# Patient Record
Sex: FEMALE | Race: WHITE | NOT HISPANIC OR LATINO | Employment: OTHER | URBAN - METROPOLITAN AREA
[De-identification: names, ages, dates, MRNs, and addresses within clinical notes are randomized per-mention and may not be internally consistent; named-entity substitution may affect disease eponyms.]

---

## 2017-02-16 ENCOUNTER — ALLSCRIPTS OFFICE VISIT (OUTPATIENT)
Dept: OTHER | Facility: OTHER | Age: 82
End: 2017-02-16

## 2017-05-30 ENCOUNTER — ALLSCRIPTS OFFICE VISIT (OUTPATIENT)
Dept: OTHER | Facility: OTHER | Age: 82
End: 2017-05-30

## 2017-06-29 ENCOUNTER — ALLSCRIPTS OFFICE VISIT (OUTPATIENT)
Dept: OTHER | Facility: OTHER | Age: 82
End: 2017-06-29

## 2017-08-30 ENCOUNTER — ALLSCRIPTS OFFICE VISIT (OUTPATIENT)
Dept: OTHER | Facility: OTHER | Age: 82
End: 2017-08-30

## 2017-12-19 ENCOUNTER — ALLSCRIPTS OFFICE VISIT (OUTPATIENT)
Dept: OTHER | Facility: OTHER | Age: 82
End: 2017-12-19

## 2017-12-20 NOTE — PROGRESS NOTES
Assessment  Assessed    1  Visit for pre-operative examination (V72 84) (Z01 818)   2  Hyperlipidemia (272 4) (E78 5)   3  Pacemaker Permanent Placement   4  Paroxysmal atrial fibrillation (427 31) (I48 0)    Plan  Hyperlipidemia, Pacemaker Permanent Placement, Paroxysmal atrial fibrillation    · Follow-up visit in 6 months Evaluation and Treatment  Follow-up  Status: Hold For -Scheduling  Requested for: 99WZE5852   Ordered; For: Hyperlipidemia, Pacemaker Permanent Placement, Paroxysmal atrial fibrillation; Ordered By: Liat Tucker Performed:  Due: 67KOI4386  Visit for pre-operative examination    · EKG/ECG- POC; Status:Complete;   Done: 80OVS5186   Perform: In Office; Due:32Cqn9707; Last Updated By:Anusha Machuca; 12/19/2017 2:48:05 PM;Ordered; For:Visit for pre-operative examination; Ordered By:Briana Ramsey; Discussion/Summary  Cardiology Discussion Summary Free Text Note Form [facility]:   I will continue her present medical regimen  She appears well compensated  I've asked her to call if there is a problem in the interim otherwise I will see her again in six months time  From my perspective she is cleared for her upcoming bilateral inguinal herniorrhaphy which is scheduled early in January with Dr Lisa Bethea  Chief Complaint  Chief Complaint Free Text Note Form: 6 mth ov S/C Hernia Repair      History of Present Illness  Cardiology HPI Free Text Note Form [facility]: The patient is here for a follow-up visit  She was last seen by me in June of this year  Since that time she has done well from a cardiac point of view  She was identified as having bilateral inguinal hernias and will require repair of this  This is to be done by Dr Lisa Bethea  She did have cardiac testing done in the fall of last year including an echocardiogram and a nuclear stress test both of which showed no significant abnormality  Her EKG today is unchanged compared to a prior study done November 8, 2016     Atrial Fibrillation (Follow-Up): The patient presents with paroxysmal atrial fibrillation  The treatment strategy for this patient is rate control  She states her atrial fibrillation has been well controlled since the last visit  Symptoms:   Hyperlipidemia (Follow-Up): The patient states her hyperlipidemia has been under good control since the last visit  Symptoms: The patient is currently asymptomatic  Review of Systems  Cardiology Female ROS:    Cardiac: No complaints of chest pain, no palpitations, no fainting -- and-- as noted in HPI  Skin: No complaints of nonhealing sores or skin rash  Genitourinary: No complaints of recurrent urinary tract infections, frequent urination at night, difficult urination, blood in urine, kidney stones, loss of bladder control, kidney problems, denies any birth control or hormone replacement, is not post menopausal, not currently pregnant  Psychological: No complaints of feeling depressed, anxiety, panic attacks, or difficulty concentrating  General: No complaints of trouble sleeping, lack of energy, fatigue, appetite changes, weight changes, fever, frequent infections, or night sweats  Respiratory: No complaints of shortness of breath, cough with sputum, or wheezing  HEENT: No complaints of serious problems, hearing problems, nose problems, throat problems, or snoring  Gastrointestinal: No complaints of liver problems, nausea, vomiting, heartburn, constipation, bloody stools, diarrhea, problems swallowing, adbominal pain, or rectal bleeding  Hematologic: No complaints of bleeding disorders, anemia, blood clots, or excessive brusing  Neurological: No complaints of numbness, tingling, dizziness, weakness, seizures, headaches, syncope or fainting, AM fatigue, daytime sleepiness, no witnessed apnea episodes  Musculoskeletal: No complaints of arthritis, back pain, or painfull swelling  Active Problems  Problems    1  Carotid artery plaque, bilateral (742 67,067 45) (I68 23)   2  Chest tightness or pressure (786 59) (R07 89)   3  Dizziness (780 4) (R42)   4  Glaucoma (365 9) (H40 9)   5  Hyperlipidemia (272 4) (E78 5)   6  Hypertension (401 9) (I10)   7  Near syncope (780 2) (R55)   8  Occlusion of right carotid artery (433 10) (I65 21)   9  Pacemaker Permanent Placement   10  Paroxysmal atrial fibrillation (427 31) (I48 0)   11  Sinus bradycardia (427 89) (R00 1)   12  SOB (shortness of breath) on exertion (786 05) (R06 02)   13  Stenosis of left carotid artery (433 10) (I65 22)   14  Symptomatic PVCs (427 69) (I49 3)   15  Type 2 diabetes mellitus (250 00) (E11 9)   16  Visit for pre-operative examination (V72 84) (I95 085)    Past Medical History  Active Problems And Past Medical History Reviewed: The active problems and past medical history were reviewed and updated today  Family History  Father    1  Family history of myocardial infarction (V17 3) (Z82 49)   2  Family history of Hiatal hernia    Social History  Problems    · Former smoker (N83 66) (E88 135)    Current Meds   1  Aspirin Low Dose 81 MG Oral Tablet Delayed Release; TAKE 1 TABLET DAILY; Therapy: 97IDL5227 to Recorded   2  Ativan 0 5 MG Oral Tablet; Take 1 tablet 2 times daily as needed; Therapy: (Recorded:22Nov2016) to Recorded   3  Famotidine 10 MG Oral Tablet; PRN; Therapy: (Recorded:43Xgq6306) to Recorded   4  Flaxseed Oil CAPS; Therapy: (Recorded:05Gsb6500) to Recorded   5  GlipiZIDE 5 MG Oral Tablet; TAKE 1 TABLET TWICE DAILY; Therapy: 70BEN3521 to Recorded   6  Losartan Potassium 50 MG Oral Tablet; Take 1 tablet daily; Therapy: 76NAY8769 to (Evaluate:21Qyc8942)  Requested for: 95EDZ1490; Last Rx:08Nov2017 Ordered   7  Lumigan 0 01 % Ophthalmic Solution; APPLY DROP  as directed; Therapy: 07ESS2247 to Recorded   8  MetFORMIN HCl - 500 MG Oral Tablet; TAKE 1 TABLET TWICE DAILY; Therapy: 50PGT7120 to (Susan Toro) Recorded   9   Metoprolol Tartrate 50 MG Oral Tablet; TAKE 1 AND 1/2 TABLETS(75MG) TWICE A DAY; Therapy: 67DAO9881 to (Kendell Hayes)  Requested for: 25LVI4610; Last Rx:08Nov2017 Ordered   10  Vytorin 10-40 MG Oral Tablet; TAKE 1 TABLET AT BEDTIME; Therapy: 78BSR7012 to Recorded  Medication List Reviewed: The medication list was reviewed and updated today  Allergies  Medication    1  No Known Drug Allergies    Vitals  Vital Signs    Recorded: 90VCB8823 02:40PM   Heart Rate 68   Systolic 789, RUE, Sitting   Diastolic 62, RUE, Sitting   Height 5 ft 8 in   Weight 162 lb 8 oz   BMI Calculated 24 71   BSA Calculated 1 87     Physical Exam   Constitutional  General appearance: No acute distress, well appearing and well nourished  Eyes  Conjunctiva and Sclera examination: Conjunctiva pink, sclera anicteric  Pulmonary  Respiratory effort: No increased work of breathing or signs of respiratory distress  Auscultation of lungs: Clear to auscultation, no rales, no rhonchi, no wheezing, good air movement  Cardiovascular  Palpation of heart: Normal PMI, no thrills  Auscultation of heart: Normal rate and rhythm, normal S1 and S2, no murmurs  Carotid pulses: Normal, 2+ bilaterally  Examination of extremities for edema and/or varicosities: Normal    Chest - Chest: Normal   Musculoskeletal Gait and station: Normal gait  -- Digits and nails: Normal without clubbing or cyanosis    Neurologic - Speech: Normal    Psychiatric - Orientation to person, place, and time: Normal -- Mood and affect: Normal       Results/Data  ECG Report: Normal sinus rhythm versus ectopic atrial rhythm with left axis deviation and no significant change compared to a prior EKG done November 8, 2016      Future Appointments    Date/Time Provider Specialty Site   02/21/2018 02:00 PM Cardiology, 2021 Nic Ferreira     Signatures   Electronically signed by : LAURA Villalobos ; Dec 19 2017  3:02PM EST                       (Author)

## 2017-12-29 NOTE — PRE-PROCEDURE INSTRUCTIONS
Pre-Surgery Instructions:   Medication Instructions    aspirin 81 mg chewable tablet Instructed patient per Anesthesia Guidelines   bimatoprost (LUMIGAN) 0 01 % ophthalmic drops Instructed patient per Anesthesia Guidelines   ezetimibe-simvastatin (VYTORIN) 10-40 mg per tablet Instructed patient per Anesthesia Guidelines   glipiZIDE (GLUCOTROL) 5 mg tablet Instructed patient per Anesthesia Guidelines   LOSARTAN POTASSIUM PO Instructed patient per Anesthesia Guidelines   metFORMIN (GLUCOPHAGE) 500 mg tablet Instructed patient per Anesthesia Guidelines   metoprolol tartrate (LOPRESSOR) 50 mg tablet Instructed patient per Anesthesia Guidelines  REVIEWED  PRINTED SURGICAL INSTRUCTIONS WITH PATIENT , PATIENT VERBALIZED UNDERSTANDING   MEDICATIONS REVIEWED

## 2018-01-04 ENCOUNTER — ANESTHESIA EVENT (OUTPATIENT)
Dept: PERIOP | Facility: HOSPITAL | Age: 83
End: 2018-01-04
Payer: MEDICARE

## 2018-01-05 ENCOUNTER — HOSPITAL ENCOUNTER (OUTPATIENT)
Facility: HOSPITAL | Age: 83
Setting detail: OUTPATIENT SURGERY
Discharge: HOME | End: 2018-01-05
Attending: SURGERY | Admitting: SURGERY
Payer: MEDICARE

## 2018-01-05 ENCOUNTER — ANESTHESIA (OUTPATIENT)
Dept: PERIOP | Facility: HOSPITAL | Age: 83
End: 2018-01-05
Payer: MEDICARE

## 2018-01-05 VITALS
SYSTOLIC BLOOD PRESSURE: 165 MMHG | HEIGHT: 69 IN | WEIGHT: 161 LBS | BODY MASS INDEX: 23.85 KG/M2 | HEART RATE: 68 BPM | OXYGEN SATURATION: 99 % | DIASTOLIC BLOOD PRESSURE: 62 MMHG | TEMPERATURE: 97.2 F | RESPIRATION RATE: 16 BRPM

## 2018-01-05 LAB
GLUCOSE SERPL-MCNC: 163 MG/DL (ref 65–140)
GLUCOSE SERPL-MCNC: 174 MG/DL (ref 65–140)
GLUCOSE SERPL-MCNC: 215 MG/DL (ref 65–140)

## 2018-01-05 PROCEDURE — 82948 REAGENT STRIP/BLOOD GLUCOSE: CPT

## 2018-01-05 PROCEDURE — C1781 MESH (IMPLANTABLE): HCPCS | Performed by: SURGERY

## 2018-01-05 DEVICE — BARD MODIFIED KUGEL HERNIA PATCH
Type: IMPLANTABLE DEVICE | Site: INGUINAL | Status: FUNCTIONAL
Brand: BARD MODIFIED KUGEL HERNIA PATCH

## 2018-01-05 RX ORDER — SODIUM CHLORIDE, SODIUM LACTATE, POTASSIUM CHLORIDE, CALCIUM CHLORIDE 600; 310; 30; 20 MG/100ML; MG/100ML; MG/100ML; MG/100ML
50 INJECTION, SOLUTION INTRAVENOUS CONTINUOUS
Status: DISCONTINUED | OUTPATIENT
Start: 2018-01-05 | End: 2018-01-05 | Stop reason: HOSPADM

## 2018-01-05 RX ORDER — BUPIVACAINE HYDROCHLORIDE AND EPINEPHRINE 2.5; 5 MG/ML; UG/ML
INJECTION, SOLUTION INFILTRATION; PERINEURAL AS NEEDED
Status: DISCONTINUED | OUTPATIENT
Start: 2018-01-05 | End: 2018-01-05 | Stop reason: HOSPADM

## 2018-01-05 RX ORDER — LIDOCAINE HYDROCHLORIDE 10 MG/ML
INJECTION, SOLUTION INFILTRATION; PERINEURAL AS NEEDED
Status: DISCONTINUED | OUTPATIENT
Start: 2018-01-05 | End: 2018-01-05 | Stop reason: SURG

## 2018-01-05 RX ORDER — ROCURONIUM BROMIDE 10 MG/ML
INJECTION, SOLUTION INTRAVENOUS AS NEEDED
Status: DISCONTINUED | OUTPATIENT
Start: 2018-01-05 | End: 2018-01-05 | Stop reason: SURG

## 2018-01-05 RX ORDER — GLYCOPYRROLATE 0.2 MG/ML
INJECTION INTRAMUSCULAR; INTRAVENOUS AS NEEDED
Status: DISCONTINUED | OUTPATIENT
Start: 2018-01-05 | End: 2018-01-05 | Stop reason: SURG

## 2018-01-05 RX ORDER — LORAZEPAM 0.5 MG/1
1 TABLET ORAL
COMMUNITY
End: 2019-06-25 | Stop reason: SDUPTHER

## 2018-01-05 RX ORDER — FENTANYL CITRATE 50 UG/ML
INJECTION, SOLUTION INTRAMUSCULAR; INTRAVENOUS AS NEEDED
Status: DISCONTINUED | OUTPATIENT
Start: 2018-01-05 | End: 2018-01-05 | Stop reason: SURG

## 2018-01-05 RX ORDER — MAGNESIUM HYDROXIDE 1200 MG/15ML
LIQUID ORAL AS NEEDED
Status: DISCONTINUED | OUTPATIENT
Start: 2018-01-05 | End: 2018-01-05 | Stop reason: HOSPADM

## 2018-01-05 RX ORDER — ACETAMINOPHEN 500 MG
1000 TABLET ORAL AS NEEDED
COMMUNITY

## 2018-01-05 RX ORDER — CEFAZOLIN SODIUM 1 G/3ML
INJECTION, POWDER, FOR SOLUTION INTRAMUSCULAR; INTRAVENOUS AS NEEDED
Status: DISCONTINUED | OUTPATIENT
Start: 2018-01-05 | End: 2018-01-05 | Stop reason: SURG

## 2018-01-05 RX ORDER — SODIUM CHLORIDE, SODIUM LACTATE, POTASSIUM CHLORIDE, CALCIUM CHLORIDE 600; 310; 30; 20 MG/100ML; MG/100ML; MG/100ML; MG/100ML
20 INJECTION, SOLUTION INTRAVENOUS CONTINUOUS
Status: DISCONTINUED | OUTPATIENT
Start: 2018-01-05 | End: 2018-01-05 | Stop reason: HOSPADM

## 2018-01-05 RX ORDER — FENTANYL CITRATE/PF 50 MCG/ML
50 SYRINGE (ML) INJECTION
Status: DISCONTINUED | OUTPATIENT
Start: 2018-01-05 | End: 2018-01-05 | Stop reason: HOSPADM

## 2018-01-05 RX ORDER — ONDANSETRON 2 MG/ML
INJECTION INTRAMUSCULAR; INTRAVENOUS AS NEEDED
Status: DISCONTINUED | OUTPATIENT
Start: 2018-01-05 | End: 2018-01-05 | Stop reason: SURG

## 2018-01-05 RX ORDER — EPHEDRINE SULFATE 50 MG/ML
INJECTION, SOLUTION INTRAVENOUS AS NEEDED
Status: DISCONTINUED | OUTPATIENT
Start: 2018-01-05 | End: 2018-01-05 | Stop reason: SURG

## 2018-01-05 RX ORDER — KETOROLAC TROMETHAMINE 30 MG/ML
INJECTION, SOLUTION INTRAMUSCULAR; INTRAVENOUS AS NEEDED
Status: DISCONTINUED | OUTPATIENT
Start: 2018-01-05 | End: 2018-01-05 | Stop reason: SURG

## 2018-01-05 RX ORDER — ONDANSETRON 2 MG/ML
4 INJECTION INTRAMUSCULAR; INTRAVENOUS EVERY 4 HOURS PRN
Status: DISCONTINUED | OUTPATIENT
Start: 2018-01-05 | End: 2018-01-05 | Stop reason: HOSPADM

## 2018-01-05 RX ORDER — PROMETHAZINE HYDROCHLORIDE 25 MG/ML
25 INJECTION, SOLUTION INTRAMUSCULAR; INTRAVENOUS ONCE AS NEEDED
Status: DISCONTINUED | OUTPATIENT
Start: 2018-01-05 | End: 2018-01-05 | Stop reason: HOSPADM

## 2018-01-05 RX ORDER — OXYCODONE HYDROCHLORIDE AND ACETAMINOPHEN 5; 325 MG/1; MG/1
1 TABLET ORAL EVERY 4 HOURS PRN
Status: DISCONTINUED | OUTPATIENT
Start: 2018-01-05 | End: 2018-01-05 | Stop reason: HOSPADM

## 2018-01-05 RX ORDER — PROPOFOL 10 MG/ML
INJECTION, EMULSION INTRAVENOUS AS NEEDED
Status: DISCONTINUED | OUTPATIENT
Start: 2018-01-05 | End: 2018-01-05 | Stop reason: SURG

## 2018-01-05 RX ADMIN — FENTANYL CITRATE 50 MCG: 50 INJECTION, SOLUTION INTRAMUSCULAR; INTRAVENOUS at 11:37

## 2018-01-05 RX ADMIN — SODIUM CHLORIDE, SODIUM LACTATE, POTASSIUM CHLORIDE, AND CALCIUM CHLORIDE: .6; .31; .03; .02 INJECTION, SOLUTION INTRAVENOUS at 11:03

## 2018-01-05 RX ADMIN — ROCURONIUM BROMIDE 25 MG: 10 INJECTION INTRAVENOUS at 11:25

## 2018-01-05 RX ADMIN — KETOROLAC TROMETHAMINE 15 MG: 30 INJECTION, SOLUTION INTRAMUSCULAR at 12:36

## 2018-01-05 RX ADMIN — EPHEDRINE SULFATE 5 MG: 50 INJECTION, SOLUTION INTRAMUSCULAR; INTRAVENOUS; SUBCUTANEOUS at 12:33

## 2018-01-05 RX ADMIN — NEOSTIGMINE METHYLSULFATE 3 MG: 1 INJECTION, SOLUTION INTRAMUSCULAR; INTRAVENOUS; SUBCUTANEOUS at 12:36

## 2018-01-05 RX ADMIN — SODIUM CHLORIDE, SODIUM LACTATE, POTASSIUM CHLORIDE, AND CALCIUM CHLORIDE: .6; .31; .03; .02 INJECTION, SOLUTION INTRAVENOUS at 12:40

## 2018-01-05 RX ADMIN — SODIUM CHLORIDE, SODIUM LACTATE, POTASSIUM CHLORIDE, AND CALCIUM CHLORIDE: .6; .31; .03; .02 INJECTION, SOLUTION INTRAVENOUS at 11:33

## 2018-01-05 RX ADMIN — FENTANYL CITRATE 50 MCG: 50 INJECTION INTRAMUSCULAR; INTRAVENOUS at 13:02

## 2018-01-05 RX ADMIN — FENTANYL CITRATE 50 MCG: 50 INJECTION, SOLUTION INTRAMUSCULAR; INTRAVENOUS at 12:37

## 2018-01-05 RX ADMIN — SODIUM CHLORIDE, SODIUM LACTATE, POTASSIUM CHLORIDE, AND CALCIUM CHLORIDE 20 ML/HR: .6; .31; .03; .02 INJECTION, SOLUTION INTRAVENOUS at 09:55

## 2018-01-05 RX ADMIN — CEFAZOLIN 1000 MG: 1 INJECTION, POWDER, FOR SOLUTION INTRAVENOUS at 11:18

## 2018-01-05 RX ADMIN — FENTANYL CITRATE 50 MCG: 50 INJECTION INTRAMUSCULAR; INTRAVENOUS at 13:23

## 2018-01-05 RX ADMIN — LIDOCAINE HYDROCHLORIDE 50 MG: 10 INJECTION, SOLUTION INFILTRATION; PERINEURAL at 11:25

## 2018-01-05 RX ADMIN — DEXAMETHASONE SODIUM PHOSPHATE 4 MG: 10 INJECTION INTRAMUSCULAR; INTRAVENOUS at 11:33

## 2018-01-05 RX ADMIN — ONDANSETRON 4 MG: 2 INJECTION INTRAMUSCULAR; INTRAVENOUS at 12:36

## 2018-01-05 RX ADMIN — PROPOFOL 150 MG: 10 INJECTION, EMULSION INTRAVENOUS at 11:25

## 2018-01-05 RX ADMIN — GLYCOPYRROLATE 0.4 MG: 0.2 INJECTION, SOLUTION INTRAMUSCULAR; INTRAVENOUS at 12:36

## 2018-01-05 NOTE — DISCHARGE INSTRUCTIONS
Diet and activity as tolerated  May shower  May drive when not taking pain med  Please use milk of magnesia daily in order to help prevent constipation  Please call [phone] for a follow-up office visit for 2 weeks

## 2018-01-05 NOTE — PERIOPERATIVE NURSING NOTE
VSS, pt denies nausea, tolerating ice chips, reports improvement in pain, assessment unchanged, report called, no questions, pt transferred to [facility]

## 2018-01-05 NOTE — ANESTHESIA PREPROCEDURE EVALUATION
Review of Systems/Medical History  Patient summary reviewed  Chart reviewed  No history of anesthetic complications     Cardiovascular  EKG reviewed, Exercise tolerance: good,  Pacemaker/AICD, Hyperlipidemia, Hypertension controlled, Valvular heart disease , mitral regurgitation, No CAD, , No dysrhythmias, , No angina , No CHF , No DASILVA,    Pulmonary       GI/Hepatic  Dysphagia, Dysphagia type: solids  GERD poorly controlled,             Endo/Other  Diabetes well controlled type 2 Oral agent,      GYN       Hematology   Musculoskeletal       Neurology   Psychology           Physical Exam    Airway    Mallampati score: II  TM Distance: >3 FB  Neck ROM: full     Dental   upper dentures and lower dentures,     Cardiovascular      Pulmonary      Other Findings        Anesthesia Plan  ASA Score- 2     Anesthesia Type- general with ASA Monitors  Additional Monitors:   Airway Plan: ETT  Plan Factors-    Induction-     Postoperative Plan- Plan for postoperative opioid use  Informed Consent- Anesthetic plan and risks discussed with patient

## 2018-01-05 NOTE — ANESTHESIA POSTPROCEDURE EVALUATION
Post-Op Assessment Note      CV Status:  Stable    Mental Status:  Alert and awake    Hydration Status:  Euvolemic    PONV Controlled:  None    Airway Patency:  Patent  Airway: intubated    Post Op Vitals Reviewed: Yes          Staff: Anesthesiologist           /64 (01/05/18 1249)    Temp 97 9 °F (36 6 °C) (01/05/18 1249)    Pulse 63 (01/05/18 1249)   Resp 21 (01/05/18 1249)    SpO2 99 % (01/05/18 1249)

## 2018-01-09 NOTE — PROCEDURES
Procedures by Petra Molina MD at 11/8/2016  12:37 PM      Author:  Petra Molina MD Service:  Electrophysiology Author Type:  Physician     Filed:  11/8/2016 12:42 PM Date of Service:  11/8/2016 12:37 PM Status:  Signed     :  Petra Molina MD (Physician)             DUAL  CHAMBER    History and physical were reviewed  Patient was examined and history was reviewed  No change in patient's condition Since history and physical has been completed        The pre- operative diagnosis:  Tachy -guerline syndrome  PVC, PAC -  Symptomatic ( palpitation and light headedness)  Symptomatic bradycardia on low dose metoprolol - HR in 40s with light headedness        Postoperative diagnosis:  Tachy -guerline syndrome  PVC, PAC symptomatic ( palpitation and light headedness)  Symptomatic bradycardia on low dose metoprolol - HR in 40s with light headedness          Procedure:  Dual chamber PPM        Surgeon: [address] [place] [address] Drive -none    Specimens - none    Estimated blood loss- 10 ml    Findings-none    Complications none    Anesthesia-  Anesthesia by anaesthesiologist , local lidocaine by myself      Details of the device          Description of procedure: The patient was seen before the procedure  The details of the procedure was explained and patient agreed to the same  Appropriate consent was signed  The patient was brought to the electrophysiologic laboratory  Proper time out was done  Sterile dressing and draping was done  Local lidocaine was infiltrated, In the infraclavicular region at the site of device implant  Initial incision was made by a sharp number 10 blade  Thereafter electrocautery and blunt dissection was used to form a prepectoral pocket  Appropriate hemostasis was taken care of      20 mL of radiocontrast, followed by 20 mL of saline, was injected in a peripheral vein on the side of the implant  Under direct visualization, the axillary vein was  Punctured,extra-thoracic   A single guidewire was inserted, and taking all the way to  the inferior vena cava to confirm that it is on the right side  We also confirmed by the left anterior oblique view that the wire is in the right side  Thereafter a second access was obtained using the fluoroscopic image of the venogram as a roadmap  Wire was once again taken to the inferior vena cava, and position checked in Citizen of [place] views To confirm the appropriate position  A sheath was passed over the wire  The right ventricular lead was taken through the sheath  In BARRIENTOS view the lead was taken to the right ventricular outflow tract  It was then dropped to the apex  Position of the lead was confirmed in both BARRIENT[place] and Citizen of [place]  views that it was apical and septal  Appropriate sensing and thresholds were noted  The lead was screwed in  Once again the lead was tested for appropriate sensing, impedance and threshold  The sheath was removed  The lead was sutured to the prepectoral  fascia and muscle  A 7 [place] sheath was passed over the second wire  The dilator and wire were removed  An atrial lead with the car stylet in it was taken through this sheath into the right atrium  It was maneuvered into the right atrial appendage  Appropriate sensing  and thresholds were noted  The lead was screwed in  The sheath was removed  The lead was sutured to the pectoral muscle and fascia    The pocket was washed with large amounts of antibiotic saline  Appropriate hemostasis was taken care of  The lead was connected to the generator  The generator and leads were placed inside the pocket  Thereafter the device was interrogated and proper  sensing and thresholds through the device were confirmed  The pocket was closed in 3 separate layers with intermittent sutures  Dressing was done with Steri-Strips, Telfa and Tegaderm        Summary of the procedure: The patient came in to the laboratory for dual -chamber device placement   The device has been placed and patient tolerated the procedure pastora RICHARDSON    Nov 8 2016 12:43PM [facility] Standard Time

## 2018-01-14 VITALS
WEIGHT: 163 LBS | HEIGHT: 68 IN | HEART RATE: 72 BPM | DIASTOLIC BLOOD PRESSURE: 68 MMHG | SYSTOLIC BLOOD PRESSURE: 130 MMHG | BODY MASS INDEX: 24.71 KG/M2

## 2018-01-18 NOTE — MISCELLANEOUS
12/16/2016    To whom it may concern: On 11/5/2016 Deonnabry Tejadanetta [birth year] called the ambulance for PVC's which resulted in hospital admission  I feel it was appropriate for the patient to call 911 due to dizziness, lightheadedness, which resulted in a diagnosis of tachy/guerline  syndrome, and a permanent pacemaker that was implanted  Sincerely        Dr Farhat Bravo MD        Electronically signed Nury RICHARDSON    Dec 16 2016  2:37PM EST Author

## 2018-01-22 VITALS
HEART RATE: 68 BPM | SYSTOLIC BLOOD PRESSURE: 122 MMHG | DIASTOLIC BLOOD PRESSURE: 62 MMHG | BODY MASS INDEX: 24.63 KG/M2 | WEIGHT: 162.5 LBS | HEIGHT: 68 IN

## 2018-02-21 ENCOUNTER — CLINICAL SUPPORT (OUTPATIENT)
Dept: CARDIOLOGY CLINIC | Facility: CLINIC | Age: 83
End: 2018-02-21
Payer: MEDICARE

## 2018-02-21 DIAGNOSIS — Z95.0 CARDIAC PACEMAKER IN SITU: Primary | ICD-10-CM

## 2018-02-21 DIAGNOSIS — R00.1 BRADYCARDIA: ICD-10-CM

## 2018-02-21 PROCEDURE — 93280 PM DEVICE PROGR EVAL DUAL: CPT | Performed by: INTERNAL MEDICINE

## 2018-02-21 NOTE — PROGRESS NOTES
DEVICE INTERROGATED IN THE [facility] OFFICE: PACER  BATTERY VOLTAGE ADEQUATE  AP 99 2%  0%  ALL LEAD PARAMETERS WITHIN NORMAL LIMITS  NO SIGNIFICANT HIGH RATE EPISODES  NO PROGRAMMING CHANGES MADE TO DEVICE PARAMETERS  NORMAL DEVICE FUNCTION   NC

## 2018-03-07 NOTE — PROGRESS NOTES
"  Discussion/Summary  Normal device function      Results/Data  Cardiac Device In Clinic [id number] 07:32PM Bill Leon     Test Name Result Flag Reference   MISCELLANEOUS COMMENT (Report)     DEVICE INTERROGATED IN THE [facility] OFFICE  BATTERY VOLTAGE ADEQUATE  (9 YRS)  AP 98%  1%  ALL AVAILABLE LEAD PARAMETERS WITHIN NORMAL LIMITS  NO SIGNIFICANT HIGH RATE EPISODES  DECREASE MADE TO AMPLITUDE TO PROMOTE DEVICE LONGEVITY WHILE MAINTAINING AN APPROPRIATE SAFETY MARGIN  NORMAL DEVICE FUNCTION  ---CHERRY   Cardiac Electrophysiology Report      [id number]_1  pdf   DEVICE TYPE Pacemaker       Cardiac Electrophysiology Report [id number] 07:32PM Bill Leon     Test Name Result Flag Reference   Cardiac Electrophysiology Report      [id number]  pdf     Signatures   Electronically signed by : Zehra Culver, ; Feb 16 2017  2:58PM EST                       (Author)    Electronically signed by : LAURA Shepard ; Feb 19 2017  5:11PM EST                       (Author)    "

## 2018-03-07 NOTE — PROGRESS NOTES
"  Discussion/Summary  Normal device function      Results/Data  Cardiac Device In Clinic 22Nov2016 07:37PM B[facility]      Test Name Result Flag Reference   MISCELLANEOUS COMMENT (Report)     DEVICE INTERROGATED IN THE [facility] OFFICE  BATTERY VOLTAGE ADEQUATE (12 YRS)  AP-95%, <0 1%  NO SIGNIFICANT HIGH RATE EPISODES  ALL LEAD PARAMETERS WITHIN NORMAL LIMITS  ALL OTHER TESTING WITHIN NORMAL LIMITS  NORMAL DEVICE FUNCTION  WOUND CHECK: INCISION CLEAN AND DRY WITH EDGES APPROXIMATED; WOUND CARE AND RESTRICTIONS REVIEWED WITH PATIENT  GV   Cardiac Electrophysiology Report      [id number] [id number]_1  pdf   DEVICE TYPE Pacemaker       Cardiac Electrophysiology Report [id number] 07:37PM [facility]      Test Name Result Flag Reference   Cardiac Electrophysiology Report      [id number] pdf     Signatures   Electronically signed by : Delgado Jett RN; Nov 22 2016  3:06PM EST                       (Author)    Electronically signed by : Beryle Mares, M D ; Nov 23 2016  6:51PM EST                       (Author)    "

## 2018-03-07 NOTE — PROGRESS NOTES
"  Discussion/Summary  Normal device function      Results/Data  Cardiac Device Remote [id number] 03:48PM Murl Lenexa     Test Name Result Flag Reference   MISCELLANEOUS COMMENT      CARELINK TRANSMISSION: BATTERY STATUS "OK"  AP 98 3%   0%  ALL AVAILABLE LEAD PARAMETERS WITHIN NORMAL LIMITS  NO SIGNIFICANT HIGH RATE EPISODES  NORMAL DEVICE FUNCTION  NC   Cardiac Electrophysiology Report      [id number] Health_[id number]  pdf   DEVICE TYPE Pacemaker       Cardiac Electrophysiology Report [id number] 03:48PM Murl Lenexa     Test Name Result Flag Reference   Cardiac Electrophysiology Report      [id number] pdf     Signatures   Electronically signed by : Loli Flores, ; Jun 5 2017 10:43AM EST                       (Author)    Electronically signed by : Rush Calvo DO; Jun 5 2017  2:05PM EST                       (Author)    "

## 2018-03-07 NOTE — PROGRESS NOTES
"  Discussion/Summary  Normal device function      Results/Data  Cardiac Device Remote 30Aug2017 11:30AM Bakari Sanchez     Test Name Result Flag Reference   MISCELLANEOUS COMMENT      CARELINK TRANSMISSION: [id number] VOLTAGE ADEQUATE  (8 YRS)  AP 91%  <1%  ALL AVAILABLE LEAD PARAMETERS WITHIN NORMAL LIMITS  NO SIGNIFICANT HIGH RATE EPISODES  NORMAL DEVICE FUNCTION --CHERRY   Cardiac Electrophysiology Report      [id number]  pdf   DEVICE TYPE Pacemaker       Cardiac Electrophysiology Report [id number] 11:30AM Bakari Sanchez     Test Name Result Flag Reference   Cardiac Electrophysiology Report      [id number]  pdf     Signatures   Electronically signed by : Samanta Montgomery, ; Sep  8 2017 10:01AM EST                       (Author)    Electronically signed by : Gilbert Chaudhari DO; Sep  8 2017 11:00AM EST                       (Author)    "

## 2018-05-22 ENCOUNTER — IN-CLINIC DEVICE VISIT (OUTPATIENT)
Dept: CARDIOLOGY CLINIC | Facility: CLINIC | Age: 83
End: 2018-05-22
Payer: MEDICARE

## 2018-05-22 DIAGNOSIS — R00.1 BRADYCARDIA: Primary | ICD-10-CM

## 2018-05-22 DIAGNOSIS — Z95.0 CARDIAC PACEMAKER IN SITU: ICD-10-CM

## 2018-05-22 PROCEDURE — 93294 REM INTERROG EVL PM/LDLS PM: CPT | Performed by: INTERNAL MEDICINE

## 2018-05-22 PROCEDURE — 93296 REM INTERROG EVL PM/IDS: CPT | Performed by: INTERNAL MEDICINE

## 2018-05-22 NOTE — PROGRESS NOTES
CARELINK TRANSMISSION: BATTERY VOLTAGE ADEQUATE  (8 YRS)  AP 99 5%  <0 1%  ALL AVAILABLE LEAD PARAMETERS WITHIN NORMAL LIMITS  NO SIGNIFICANT HIGH RATE EPISODES   NORMAL DEVICE FUNCTION   CH/GV

## 2018-06-13 RX ORDER — LOSARTAN POTASSIUM 50 MG/1
1 TABLET ORAL DAILY
COMMUNITY
Start: 2016-12-14 | End: 2018-06-29 | Stop reason: SDUPTHER

## 2018-06-14 ENCOUNTER — OFFICE VISIT (OUTPATIENT)
Dept: CARDIOLOGY CLINIC | Facility: CLINIC | Age: 83
End: 2018-06-14
Payer: MEDICARE

## 2018-06-14 VITALS
WEIGHT: 166 LBS | DIASTOLIC BLOOD PRESSURE: 66 MMHG | BODY MASS INDEX: 25.16 KG/M2 | HEIGHT: 68 IN | HEART RATE: 60 BPM | SYSTOLIC BLOOD PRESSURE: 118 MMHG

## 2018-06-14 DIAGNOSIS — I48.0 PAROXYSMAL ATRIAL FIBRILLATION (HCC): ICD-10-CM

## 2018-06-14 DIAGNOSIS — Z95.0 PACEMAKER: ICD-10-CM

## 2018-06-14 DIAGNOSIS — E78.00 PURE HYPERCHOLESTEROLEMIA: Primary | ICD-10-CM

## 2018-06-14 PROCEDURE — 99214 OFFICE O/P EST MOD 30 MIN: CPT | Performed by: INTERNAL MEDICINE

## 2018-06-14 RX ORDER — FAMOTIDINE 10 MG
10 TABLET ORAL AS NEEDED
COMMUNITY
End: 2021-01-06 | Stop reason: ALTCHOICE

## 2018-06-14 NOTE — PROGRESS NOTES
Cardiology Follow Up    Pratibha Colón  1935  [id number]  HEART & VASCULAR [facility]  [address] [place] [address] [address]    1  Pure hypercholesterolemia     2  Paroxysmal atrial fibrillation (HCC)     3  Pacemaker         Interval History:  Patient is here for a follow-up visit  She was last seen by me in December of last year  Patient had recent interrogation of her dual-chamber permanent pacemaker in May of this year and this demonstrated an appropriately functioning device with no evidence of significant high rate episodes  She had a pharmacologic nuclear stress test done October 2016 which showed no evidence of prognostically important ischemia  An echocardiogram done at that time as well demonstrated preserved LV systolic function with mild LVH and no significant valvular heart disease  She has been feeling well  She has had no chest pain or significant dyspnea  She is concerned about her carotid status as she has a history of carotid Doppler performed October 2016 that demonstrated an occluded right internal carotid an approximately 50% stenosis of the left carotid  There had been no change compared to a prior study  I will order an updated study and refer her to vascular surgery if there is any change in her study  Patient Active Problem List   Diagnosis    Diabetes mellitus ([address] )    HTN (hypertension)    HLD (hyperlipidemia)    Glaucoma    Bilateral carotid artery disease (HCC)    Symptomatic PVCs     Past Medical History:   Diagnosis Date    Diabetes mellitus [facility] [address] )     Glaucoma     Hyperlipidemia     Hypertension     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 ([facility] [address] [facility] )      Social History     Social History    Marital status:      Spouse name: N/A    Number of children: N/A    Years of education: N/A     Occupational History    Not on file       Social History Main Topics    Smoking status: Former Smoker     Quit date: 1994    Smokeless tobacco: Never Used    Alcohol use No    Drug use: No    Sexual activity: Not on file     Other Topics Concern    Not on file     Social History Narrative    No narrative on file      No family history on file  Past Surgical History:   Procedure Laterality Date    NV REPAIR ING HERNIA,5+Y/O,REDUCIBL Bilateral 1/5/2018    Procedure: INGUINAL HERNIA REPAIR;  Surgeon: Josette Barcenas MD;  [facility];  Service: General    VASCULAR SURGERY  1994    Agram-  R carotid occlusion       Current Outpatient Prescriptions:     losartan (COZAAR) 50 mg tablet, Take 1 tablet by mouth daily, Disp: , Rfl:     acetaminophen (TYLENOL) 500 mg tablet, Take 1,000 mg by mouth as needed for mild pain, Disp: , Rfl:     aspirin 81 mg chewable tablet, Chew 1 tablet daily for 30 days, Disp: 30 tablet, Rfl: 0    bimatoprost (LUMIGAN) 0 01 % ophthalmic drops, Administer 1 drop to both eyes daily at bedtime for 30 days, Disp: 1 5 mL, Rfl: 0    ezetimibe-simvastatin (VYTORIN) 10-40 mg per tablet, Take 1 tablet by mouth daily at bedtime, Disp: , Rfl:     Flaxseed, Linseed, (FLAX SEED OIL PO), Take by mouth, Disp: , Rfl:     glipiZIDE (GLUCOTROL) 5 mg tablet, Take 5 mg by mouth 2 (two) times a day before meals, Disp: , Rfl:     LORazepam (ATIVAN) 0 5 mg tablet, Take by mouth daily at bedtime, Disp: , Rfl:     LOSARTAN POTASSIUM PO, Take by mouth, Disp: , Rfl:     metFORMIN (GLUCOPHAGE) 500 mg tablet, Take 500 mg by mouth 2 (two) times a day with meals, Disp: , Rfl:     metoprolol tartrate (LOPRESSOR) 50 mg tablet, Take 1 5 tablets by mouth 2 (two) times a day for 30 days, Disp: 30 tablet, Rfl: 0  No Known Allergies    Labs:not applicable  Imaging: No results found  Review of Systems:  Review of Systems   All other systems reviewed and are negative  Physical Exam:  Physical Exam   Constitutional: She is oriented to person, place, and time   She appears well-developed and well-nourished  HENT:   Head: Normocephalic and atraumatic  Eyes: Conjunctivae and EOM are normal  Pupils are equal, round, and reactive to light  Neck: Normal range of motion  Neck supple  Cardiovascular: Normal rate and normal heart sounds  Pulmonary/Chest: Effort normal and breath sounds normal    Neurological: She is alert and oriented to person, place, and time  Skin: Skin is warm and dry  Psychiatric: She has a normal mood and affect  Vitals reviewed  Discussion/Summary:I will continue the patient's present medical regimen  The patient appears well compensated  I have asked the patient to call if there is a problem in the interim otherwise I will see the patient in six months time  She will have ongoing follow-up in reference to her permanent pacemaker and as noted I will check a carotid Doppler given her prior history of carotid artery disease

## 2018-06-14 NOTE — PATIENT INSTRUCTIONS
I will continue the patient's present medical regimen  The patient appears well compensated  I have asked the patient to call if there is a problem in the interim otherwise I will see the patient in six months time  As per our discussion I will order a carotid Doppler

## 2018-06-29 DIAGNOSIS — I10 ESSENTIAL (PRIMARY) HYPERTENSION: Primary | ICD-10-CM

## 2018-06-30 RX ORDER — METOPROLOL TARTRATE 50 MG/1
TABLET, FILM COATED ORAL
Qty: 90 TABLET | Refills: 0 | Status: SHIPPED | OUTPATIENT
Start: 2018-06-30 | End: 2019-06-25 | Stop reason: SDUPTHER

## 2018-06-30 RX ORDER — LOSARTAN POTASSIUM 50 MG/1
TABLET ORAL
Qty: 30 TABLET | Refills: 0 | Status: SHIPPED | OUTPATIENT
Start: 2018-06-30 | End: 2019-06-24 | Stop reason: ALTCHOICE

## 2018-07-16 ENCOUNTER — HOSPITAL ENCOUNTER (OUTPATIENT)
Dept: NON INVASIVE DIAGNOSTICS | Facility: CLINIC | Age: 83
Discharge: HOME | End: 2018-07-16
Payer: MEDICARE

## 2018-07-16 DIAGNOSIS — I48.0 PAROXYSMAL ATRIAL FIBRILLATION (HCC): ICD-10-CM

## 2018-07-16 DIAGNOSIS — Z95.0 PACEMAKER: ICD-10-CM

## 2018-07-16 DIAGNOSIS — E78.00 PURE HYPERCHOLESTEROLEMIA: ICD-10-CM

## 2018-07-16 PROCEDURE — 93880 EXTRACRANIAL BILAT STUDY: CPT | Performed by: SURGERY

## 2018-07-16 PROCEDURE — 93880 EXTRACRANIAL BILAT STUDY: CPT

## 2018-08-22 ENCOUNTER — REMOTE DEVICE CLINIC VISIT (OUTPATIENT)
Dept: CARDIOLOGY CLINIC | Facility: CLINIC | Age: 83
End: 2018-08-22
Payer: MEDICARE

## 2018-08-22 DIAGNOSIS — I49.5 SSS (SICK SINUS SYNDROME) (HCC): Primary | ICD-10-CM

## 2018-08-22 DIAGNOSIS — Z95.0 PRESENCE OF PERMANENT CARDIAC PACEMAKER: ICD-10-CM

## 2018-08-22 DIAGNOSIS — I48.0 PAROXYSMAL ATRIAL FIBRILLATION (HCC): ICD-10-CM

## 2018-08-22 PROCEDURE — 93296 REM INTERROG EVL PM/IDS: CPT | Performed by: INTERNAL MEDICINE

## 2018-08-22 PROCEDURE — 93294 REM INTERROG EVL PM/LDLS PM: CPT | Performed by: INTERNAL MEDICINE

## 2018-12-11 NOTE — PROGRESS NOTES
Cardiology Follow Up    [facility]  1935  [id number]  [address]    1  Presence of permanent cardiac pacemaker     2  Paroxysmal atrial fibrillation (HCC)     3  Essential (primary) hypertension     4  Pure hypercholesterolemia         Interval History:  Patient is here for a follow-up visit  She was last seen by me in June  Patient had recent interrogation of her dual-chamber permanent pacemaker in May of this year and this demonstrated an appropriately functioning device with no evidence of significant high rate episodes  She had a pharmacologic nuclear stress test done October 2016 which showed no evidence of prognostically important ischemia  An echocardiogram done at that time as well demonstrated preserved LV systolic function with mild LVH and no significant valvular heart disease  Patient had a carotid Doppler done in July which demonstrated a chronic occlusion of the right internal carotid with a 50-69% stenosis his noted in the left internal carotid  There was no significant change compared to a prior study done October 30, 2016  Patient did notice of brief episode of palpitation when she had missed a dose of metoprolol  In general however she has been well compensated  Her primary care physician is retiring and she is taking on a new physician  Patient Active Problem List   Diagnosis    Diabetes mellitus ([facility] )    HTN (hypertension)    HLD (hyperlipidemia)    Glaucoma    Bilateral carotid artery disease (HCC)    Symptomatic PVCs     Past Medical History:   Diagnosis Date    Diabetes mellitus ([facility] [address] [facility] )     Glaucoma     Hyperlipidemia     Hypertension     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 ([facility] [address] [facility] )      Social History     Social History    Marital status:      Spouse name: N/A    Number of children: N/A    Years of education: N/A     Occupational History    Not on file  Social History Main Topics    Smoking status: Former Smoker     Quit date: 1994    Smokeless tobacco: Never Used    Alcohol use No    Drug use: No    Sexual activity: Not on file     Other Topics Concern    Not on file     Social History Narrative    No narrative on file      Family History   Problem Relation Age of Onset    Heart attack Father     Hiatal hernia Father      Past Surgical History:   Procedure Laterality Date    CA REPAIR ING HERNIA,5+Y/O,REDUCIBL Bilateral 1/5/2018    Procedure: INGUINAL HERNIA REPAIR;  Surgeon: Cj Espinoza MD;  Location[facility];  Service: [address] [place] [address] carotid occlusion       Current Outpatient Prescriptions:     acetaminophen (TYLENOL) 500 mg tablet, Take 1,000 mg by mouth as needed for mild pain, Disp: , Rfl:     aspirin 81 mg chewable tablet, Chew 1 tablet daily for 30 days, Disp: 30 tablet, Rfl: 0    bimatoprost (LUMIGAN) 0 01 % ophthalmic drops, Administer 1 drop to both eyes daily at bedtime for 30 days, Disp: 1 5 mL, Rfl: 0    ezetimibe-simvastatin (VYTORIN) 10-40 mg per tablet, Take 1 tablet by mouth daily at bedtime, Disp: , Rfl:     famotidine (PEPCID) 10 mg tablet, Take by mouth, Disp: , Rfl:     glipiZIDE (GLUCOTROL) 5 mg tablet, Take 5 mg by mouth 2 (two) times a day before meals, Disp: , Rfl:     LORazepam (ATIVAN) 0 5 mg tablet, Take by mouth daily at bedtime, Disp: , Rfl:     losartan (COZAAR) 50 mg tablet, TAKE 1 TABLET DAILY, Disp: 30 tablet, Rfl: 0    metFORMIN (GLUCOPHAGE) 500 mg tablet, Take 500 mg by mouth 2 (two) times a day with meals, Disp: , Rfl:     metoprolol tartrate (LOPRESSOR) 50 mg tablet, TAKE 1 AND 1/2 TABS(75MG) TWICE A DAY, Disp: 90 tablet, Rfl: 0  No Known Allergies    Labs:not applicable  Imaging: No results found  Review of Systems:  Review of Systems   All other systems reviewed and are negative        Physical Exam:  /66 (BP Location: Right arm, Patient Position: Sitting, Cuff Size: Standard)   Pulse 66   Ht 5' 8" (1 727 m)   Wt 74 8 kg (165 lb)   BMI 25 09 kg/m²   Physical Exam   Constitutional: She is oriented to person, place, and time  She appears well-developed and well-nourished  HENT:   Head: Normocephalic and atraumatic  Eyes: Pupils are equal, round, and reactive to light  Conjunctivae and EOM are normal    Neck: Normal range of motion  Neck supple  Cardiovascular: Normal rate and normal heart sounds  Pulmonary/Chest: Effort normal and breath sounds normal    Neurological: She is alert and oriented to person, place, and time  Skin: Skin is warm and dry  Psychiatric: She has a normal mood and affect  Vitals reviewed  Discussion/Summary:I will continue the patient's present medical regimen  The patient appears well compensated  I have asked the patient to call if there is a problem in the interim otherwise I will see the patient in six months time

## 2018-12-14 ENCOUNTER — IN-CLINIC DEVICE VISIT (OUTPATIENT)
Dept: CARDIOLOGY CLINIC | Facility: CLINIC | Age: 83
End: 2018-12-14
Payer: MEDICARE

## 2018-12-14 ENCOUNTER — OFFICE VISIT (OUTPATIENT)
Dept: CARDIOLOGY CLINIC | Facility: CLINIC | Age: 83
End: 2018-12-14
Payer: MEDICARE

## 2018-12-14 VITALS
BODY MASS INDEX: 25.01 KG/M2 | WEIGHT: 165 LBS | HEIGHT: 68 IN | DIASTOLIC BLOOD PRESSURE: 66 MMHG | SYSTOLIC BLOOD PRESSURE: 116 MMHG | HEART RATE: 66 BPM

## 2018-12-14 DIAGNOSIS — E78.00 PURE HYPERCHOLESTEROLEMIA: ICD-10-CM

## 2018-12-14 DIAGNOSIS — Z95.0 CARDIAC PACEMAKER IN SITU: ICD-10-CM

## 2018-12-14 DIAGNOSIS — R00.1 BRADYCARDIA: Primary | ICD-10-CM

## 2018-12-14 DIAGNOSIS — Z95.0 PRESENCE OF PERMANENT CARDIAC PACEMAKER: Primary | ICD-10-CM

## 2018-12-14 DIAGNOSIS — Z45.018 ADJUSTMENT AND MANAGEMENT OF CARDIAC PACEMAKER: ICD-10-CM

## 2018-12-14 DIAGNOSIS — I10 ESSENTIAL (PRIMARY) HYPERTENSION: ICD-10-CM

## 2018-12-14 DIAGNOSIS — I48.0 PAROXYSMAL ATRIAL FIBRILLATION (HCC): ICD-10-CM

## 2018-12-14 PROCEDURE — 99214 OFFICE O/P EST MOD 30 MIN: CPT | Performed by: INTERNAL MEDICINE

## 2018-12-14 PROCEDURE — 93280 PM DEVICE PROGR EVAL DUAL: CPT | Performed by: INTERNAL MEDICINE

## 2018-12-14 NOTE — PROGRESS NOTES
Results for orders placed or performed in visit on 12/14/18   Cardiac EP device report    Narrative    MDT DUAL PM  DEVICE INTERROGATED IN THE [facility] OFFICE  BATTERY VOLTAGE ADEQUATE (8 YRS)  AP>99%, <0 1%  ALL LEAD PARAMETERS WITHIN NORMAL LIMITS  1 NSVT EPISODE ON 10/21/18 FOR 9 BEATS, AVG CL~400MS  EF-60% (ECHO 10/30/16)  PT ON ASA 81MG & METOPROLOL  DECREASE MADE TO RV AMPLITUDE TO PROMOTE DEVICE LONGEVITY WHILE MAINTAINING AN APPROPRIATE SAFETY MARGIN  NORMAL DEVICE FUNCTION  APPT W/ DR VIDAL TODAY   GV

## 2019-01-10 LAB
LEFT EYE DIABETIC RETINOPATHY: NORMAL
RIGHT EYE DIABETIC RETINOPATHY: NORMAL

## 2019-03-08 DIAGNOSIS — E78.5 HYPERLIPIDEMIA, UNSPECIFIED HYPERLIPIDEMIA TYPE: Primary | ICD-10-CM

## 2019-03-08 RX ORDER — EZETIMIBE AND SIMVASTATIN 10; 40 MG/1; MG/1
1 TABLET ORAL
Qty: 90 TABLET | Refills: 3 | Status: SHIPPED | OUTPATIENT
Start: 2019-03-08 | End: 2019-06-25 | Stop reason: SDUPTHER

## 2019-03-08 NOTE — TELEPHONE ENCOUNTER
Pt's PCP usually orders, but he is retired and presently pt stuck in [place] because she fell and FX'd her shoulder

## 2019-03-21 LAB — HBA1C MFR BLD HPLC: 7.2 %

## 2019-04-12 LAB — HBA1C MFR BLD HPLC: 7.1 %

## 2019-06-18 ENCOUNTER — EVALUATION (OUTPATIENT)
Dept: PHYSICAL THERAPY | Facility: REHABILITATION | Age: 84
End: 2019-06-18
Payer: MEDICARE

## 2019-06-18 VITALS — SYSTOLIC BLOOD PRESSURE: 128 MMHG | DIASTOLIC BLOOD PRESSURE: 64 MMHG | HEART RATE: 73 BPM

## 2019-06-18 DIAGNOSIS — Z96.611 STATUS POST REVERSE TOTAL ARTHROPLASTY OF RIGHT SHOULDER: ICD-10-CM

## 2019-06-18 DIAGNOSIS — M25.511 ACUTE PAIN OF RIGHT SHOULDER: Primary | ICD-10-CM

## 2019-06-18 PROCEDURE — 97140 MANUAL THERAPY 1/> REGIONS: CPT | Performed by: PHYSICAL THERAPIST

## 2019-06-18 PROCEDURE — 97162 PT EVAL MOD COMPLEX 30 MIN: CPT | Performed by: PHYSICAL THERAPIST

## 2019-06-19 ENCOUNTER — TRANSCRIBE ORDERS (OUTPATIENT)
Dept: PHYSICAL THERAPY | Facility: REHABILITATION | Age: 84
End: 2019-06-19

## 2019-06-19 DIAGNOSIS — M25.511 ACUTE PAIN OF RIGHT SHOULDER: Primary | ICD-10-CM

## 2019-06-20 ENCOUNTER — OFFICE VISIT (OUTPATIENT)
Dept: PHYSICAL THERAPY | Facility: REHABILITATION | Age: 84
End: 2019-06-20
Payer: MEDICARE

## 2019-06-20 DIAGNOSIS — M25.511 ACUTE PAIN OF RIGHT SHOULDER: ICD-10-CM

## 2019-06-20 DIAGNOSIS — Z96.611 STATUS POST REVERSE TOTAL ARTHROPLASTY OF RIGHT SHOULDER: Primary | ICD-10-CM

## 2019-06-20 PROCEDURE — 97140 MANUAL THERAPY 1/> REGIONS: CPT | Performed by: PHYSICAL THERAPIST

## 2019-06-20 PROCEDURE — 97110 THERAPEUTIC EXERCISES: CPT | Performed by: PHYSICAL THERAPIST

## 2019-06-24 ENCOUNTER — OFFICE VISIT (OUTPATIENT)
Dept: PHYSICAL THERAPY | Facility: REHABILITATION | Age: 84
End: 2019-06-24
Payer: MEDICARE

## 2019-06-24 ENCOUNTER — OFFICE VISIT (OUTPATIENT)
Dept: ENDOCRINOLOGY | Facility: CLINIC | Age: 84
End: 2019-06-24
Payer: MEDICARE

## 2019-06-24 ENCOUNTER — TELEPHONE (OUTPATIENT)
Dept: ENDOCRINOLOGY | Facility: CLINIC | Age: 84
End: 2019-06-24

## 2019-06-24 VITALS
WEIGHT: 153 LBS | HEIGHT: 67 IN | DIASTOLIC BLOOD PRESSURE: 88 MMHG | HEART RATE: 76 BPM | BODY MASS INDEX: 24.01 KG/M2 | SYSTOLIC BLOOD PRESSURE: 130 MMHG

## 2019-06-24 DIAGNOSIS — Z96.611 STATUS POST REVERSE TOTAL ARTHROPLASTY OF RIGHT SHOULDER: Primary | ICD-10-CM

## 2019-06-24 DIAGNOSIS — Z79.4 TYPE 2 DIABETES MELLITUS WITH RETINOPATHY, WITH LONG-TERM CURRENT USE OF INSULIN, MACULAR EDEMA PRESENCE UNSPECIFIED, UNSPECIFIED LATERALITY, UNSPECIFIED RETINOPATHY SEVERITY (HCC): Primary | ICD-10-CM

## 2019-06-24 DIAGNOSIS — I10 ESSENTIAL HYPERTENSION: ICD-10-CM

## 2019-06-24 DIAGNOSIS — M25.511 ACUTE PAIN OF RIGHT SHOULDER: ICD-10-CM

## 2019-06-24 DIAGNOSIS — E11.42 DIABETIC POLYNEUROPATHY ASSOCIATED WITH TYPE 2 DIABETES MELLITUS (HCC): ICD-10-CM

## 2019-06-24 DIAGNOSIS — E78.2 MIXED HYPERLIPIDEMIA: ICD-10-CM

## 2019-06-24 DIAGNOSIS — E11.319 TYPE 2 DIABETES MELLITUS WITH RETINOPATHY, WITH LONG-TERM CURRENT USE OF INSULIN, MACULAR EDEMA PRESENCE UNSPECIFIED, UNSPECIFIED LATERALITY, UNSPECIFIED RETINOPATHY SEVERITY (HCC): Primary | ICD-10-CM

## 2019-06-24 PROCEDURE — 99204 OFFICE O/P NEW MOD 45 MIN: CPT | Performed by: INTERNAL MEDICINE

## 2019-06-24 PROCEDURE — 97110 THERAPEUTIC EXERCISES: CPT

## 2019-06-24 PROCEDURE — 97140 MANUAL THERAPY 1/> REGIONS: CPT

## 2019-06-24 RX ORDER — INSULIN ASPART 100 [IU]/ML
INJECTION, SOLUTION INTRAVENOUS; SUBCUTANEOUS
Refills: 5 | COMMUNITY
Start: 2019-05-31 | End: 2019-07-23 | Stop reason: SDUPTHER

## 2019-06-24 RX ORDER — INSULIN DETEMIR 100 [IU]/ML
5 INJECTION, SOLUTION SUBCUTANEOUS
Refills: 4 | COMMUNITY
Start: 2019-05-13 | End: 2019-09-30 | Stop reason: SDUPTHER

## 2019-06-24 RX ORDER — APIXABAN 5 MG/1
5 TABLET, FILM COATED ORAL 2 TIMES DAILY
Refills: 1 | COMMUNITY
Start: 2019-05-21 | End: 2019-06-25 | Stop reason: SDUPTHER

## 2019-06-25 ENCOUNTER — OFFICE VISIT (OUTPATIENT)
Dept: FAMILY MEDICINE CLINIC | Facility: CLINIC | Age: 84
End: 2019-06-25
Payer: MEDICARE

## 2019-06-25 VITALS
TEMPERATURE: 96.7 F | BODY MASS INDEX: 23.61 KG/M2 | DIASTOLIC BLOOD PRESSURE: 60 MMHG | SYSTOLIC BLOOD PRESSURE: 106 MMHG | HEART RATE: 66 BPM | WEIGHT: 150.4 LBS | OXYGEN SATURATION: 97 % | RESPIRATION RATE: 16 BRPM | HEIGHT: 67 IN

## 2019-06-25 DIAGNOSIS — Z76.89 ENCOUNTER TO ESTABLISH CARE: ICD-10-CM

## 2019-06-25 DIAGNOSIS — E11.3552 TYPE 2 DIABETES MELLITUS WITH STABLE PROLIFERATIVE RETINOPATHY OF LEFT EYE, WITH LONG-TERM CURRENT USE OF INSULIN (HCC): ICD-10-CM

## 2019-06-25 DIAGNOSIS — R42 DIZZINESS: ICD-10-CM

## 2019-06-25 DIAGNOSIS — E11.319 TYPE 2 DIABETES MELLITUS WITH RETINOPATHY, WITH LONG-TERM CURRENT USE OF INSULIN, MACULAR EDEMA PRESENCE UNSPECIFIED, UNSPECIFIED LATERALITY, UNSPECIFIED RETINOPATHY SEVERITY (HCC): ICD-10-CM

## 2019-06-25 DIAGNOSIS — I77.9 BILATERAL CAROTID ARTERY DISEASE, UNSPECIFIED TYPE (HCC): ICD-10-CM

## 2019-06-25 DIAGNOSIS — Z79.4 TYPE 2 DIABETES MELLITUS WITH RETINOPATHY, WITH LONG-TERM CURRENT USE OF INSULIN, MACULAR EDEMA PRESENCE UNSPECIFIED, UNSPECIFIED LATERALITY, UNSPECIFIED RETINOPATHY SEVERITY (HCC): ICD-10-CM

## 2019-06-25 DIAGNOSIS — Z79.4 TYPE 2 DIABETES MELLITUS WITH STABLE PROLIFERATIVE RETINOPATHY OF LEFT EYE, WITH LONG-TERM CURRENT USE OF INSULIN (HCC): ICD-10-CM

## 2019-06-25 DIAGNOSIS — K21.9 GASTROESOPHAGEAL REFLUX DISEASE, ESOPHAGITIS PRESENCE NOT SPECIFIED: ICD-10-CM

## 2019-06-25 DIAGNOSIS — F41.9 ANXIETY: ICD-10-CM

## 2019-06-25 DIAGNOSIS — E78.5 HYPERLIPIDEMIA, UNSPECIFIED HYPERLIPIDEMIA TYPE: ICD-10-CM

## 2019-06-25 DIAGNOSIS — M85.80 OSTEOPENIA, UNSPECIFIED LOCATION: ICD-10-CM

## 2019-06-25 DIAGNOSIS — I10 ESSENTIAL (PRIMARY) HYPERTENSION: ICD-10-CM

## 2019-06-25 DIAGNOSIS — I10 ESSENTIAL HYPERTENSION: ICD-10-CM

## 2019-06-25 DIAGNOSIS — E11.42 TYPE 2 DIABETES MELLITUS WITH DIABETIC POLYNEUROPATHY, WITH LONG-TERM CURRENT USE OF INSULIN (HCC): ICD-10-CM

## 2019-06-25 DIAGNOSIS — Z79.4 TYPE 2 DIABETES MELLITUS WITH RIGHT EYE AFFECTED BY MODERATE NONPROLIFERATIVE RETINOPATHY WITHOUT MACULAR EDEMA, WITH LONG-TERM CURRENT USE OF INSULIN (HCC): ICD-10-CM

## 2019-06-25 DIAGNOSIS — I48.0 PAROXYSMAL ATRIAL FIBRILLATION (HCC): Primary | ICD-10-CM

## 2019-06-25 DIAGNOSIS — Z79.4 TYPE 2 DIABETES MELLITUS WITH DIABETIC POLYNEUROPATHY, WITH LONG-TERM CURRENT USE OF INSULIN (HCC): ICD-10-CM

## 2019-06-25 DIAGNOSIS — E11.3391 TYPE 2 DIABETES MELLITUS WITH RIGHT EYE AFFECTED BY MODERATE NONPROLIFERATIVE RETINOPATHY WITHOUT MACULAR EDEMA, WITH LONG-TERM CURRENT USE OF INSULIN (HCC): ICD-10-CM

## 2019-06-25 PROCEDURE — 99204 OFFICE O/P NEW MOD 45 MIN: CPT | Performed by: NURSE PRACTITIONER

## 2019-06-25 PROCEDURE — 93000 ELECTROCARDIOGRAM COMPLETE: CPT | Performed by: NURSE PRACTITIONER

## 2019-06-25 RX ORDER — APIXABAN 5 MG/1
5 TABLET, FILM COATED ORAL 2 TIMES DAILY
Qty: 60 TABLET | Refills: 2 | Status: SHIPPED | OUTPATIENT
Start: 2019-06-25 | End: 2019-08-19 | Stop reason: SDUPTHER

## 2019-06-25 RX ORDER — METOPROLOL TARTRATE 50 MG/1
50 TABLET, FILM COATED ORAL EVERY 12 HOURS SCHEDULED
Qty: 60 TABLET | Refills: 2 | Status: SHIPPED | OUTPATIENT
Start: 2019-06-25 | End: 2019-08-28 | Stop reason: SDUPTHER

## 2019-06-25 RX ORDER — EZETIMIBE AND SIMVASTATIN 10; 40 MG/1; MG/1
1 TABLET ORAL
Qty: 30 TABLET | Refills: 2 | Status: SHIPPED | OUTPATIENT
Start: 2019-06-25 | End: 2019-08-28 | Stop reason: SDUPTHER

## 2019-06-25 RX ORDER — LORAZEPAM 0.5 MG/1
1 TABLET ORAL
Qty: 60 TABLET | Refills: 2 | Status: SHIPPED | OUTPATIENT
Start: 2019-06-25 | End: 2019-09-25 | Stop reason: SDUPTHER

## 2019-06-26 ENCOUNTER — LAB (OUTPATIENT)
Dept: LAB | Facility: CLINIC | Age: 84
End: 2019-06-26
Payer: MEDICARE

## 2019-06-26 ENCOUNTER — TELEPHONE (OUTPATIENT)
Dept: ENDOCRINOLOGY | Facility: CLINIC | Age: 84
End: 2019-06-26

## 2019-06-26 DIAGNOSIS — E11.319 TYPE 2 DIABETES MELLITUS WITH RETINOPATHY, WITH LONG-TERM CURRENT USE OF INSULIN, MACULAR EDEMA PRESENCE UNSPECIFIED, UNSPECIFIED LATERALITY, UNSPECIFIED RETINOPATHY SEVERITY (HCC): ICD-10-CM

## 2019-06-26 DIAGNOSIS — Z79.4 TYPE 2 DIABETES MELLITUS WITH RETINOPATHY, WITH LONG-TERM CURRENT USE OF INSULIN, MACULAR EDEMA PRESENCE UNSPECIFIED, UNSPECIFIED LATERALITY, UNSPECIFIED RETINOPATHY SEVERITY (HCC): ICD-10-CM

## 2019-06-26 LAB
ALBUMIN SERPL BCP-MCNC: 3.9 G/DL (ref 3.5–5)
ALP SERPL-CCNC: 71 U/L (ref 46–116)
ALT SERPL W P-5'-P-CCNC: 18 U/L (ref 12–78)
ANION GAP SERPL CALCULATED.3IONS-SCNC: 4 MMOL/L (ref 4–13)
AST SERPL W P-5'-P-CCNC: 17 U/L (ref 5–45)
BILIRUB SERPL-MCNC: 0.41 MG/DL (ref 0.2–1)
BUN SERPL-MCNC: 14 MG/DL (ref 5–25)
CALCIUM SERPL-MCNC: 9 MG/DL (ref 8.3–10.1)
CHLORIDE SERPL-SCNC: 97 MMOL/L (ref 100–108)
CHOLEST SERPL-MCNC: 124 MG/DL (ref 50–200)
CO2 SERPL-SCNC: 30 MMOL/L (ref 21–32)
CREAT SERPL-MCNC: 0.54 MG/DL (ref 0.6–1.3)
CREAT UR-MCNC: 20.7 MG/DL
EST. AVERAGE GLUCOSE BLD GHB EST-MCNC: 166 MG/DL
GFR SERPL CREATININE-BSD FRML MDRD: 89 ML/MIN/1.73SQ M
GLUCOSE P FAST SERPL-MCNC: 113 MG/DL (ref 65–99)
HBA1C MFR BLD: 7.4 % (ref 4.2–6.3)
HDLC SERPL-MCNC: 66 MG/DL (ref 40–60)
LDLC SERPL CALC-MCNC: 42 MG/DL (ref 0–100)
MICROALBUMIN UR-MCNC: 19.5 MG/L (ref 0–20)
MICROALBUMIN/CREAT 24H UR: 94 MG/G CREATININE (ref 0–30)
NONHDLC SERPL-MCNC: 58 MG/DL
POTASSIUM SERPL-SCNC: 4.1 MMOL/L (ref 3.5–5.3)
PROT SERPL-MCNC: 7.5 G/DL (ref 6.4–8.2)
SODIUM SERPL-SCNC: 131 MMOL/L (ref 136–145)
T4 FREE SERPL-MCNC: 1.28 NG/DL (ref 0.76–1.46)
TRIGL SERPL-MCNC: 81 MG/DL
TSH SERPL DL<=0.05 MIU/L-ACNC: 1.28 UIU/ML (ref 0.36–3.74)

## 2019-06-26 PROCEDURE — 82570 ASSAY OF URINE CREATININE: CPT

## 2019-06-26 PROCEDURE — 84443 ASSAY THYROID STIM HORMONE: CPT

## 2019-06-26 PROCEDURE — 83036 HEMOGLOBIN GLYCOSYLATED A1C: CPT

## 2019-06-26 PROCEDURE — 80053 COMPREHEN METABOLIC PANEL: CPT

## 2019-06-26 PROCEDURE — 36415 COLL VENOUS BLD VENIPUNCTURE: CPT

## 2019-06-26 PROCEDURE — 82043 UR ALBUMIN QUANTITATIVE: CPT

## 2019-06-26 PROCEDURE — 80061 LIPID PANEL: CPT

## 2019-06-26 PROCEDURE — 84439 ASSAY OF FREE THYROXINE: CPT

## 2019-06-27 ENCOUNTER — OFFICE VISIT (OUTPATIENT)
Dept: PHYSICAL THERAPY | Facility: REHABILITATION | Age: 84
End: 2019-06-27
Payer: MEDICARE

## 2019-06-27 DIAGNOSIS — M25.511 ACUTE PAIN OF RIGHT SHOULDER: ICD-10-CM

## 2019-06-27 DIAGNOSIS — Z79.4 TYPE 2 DIABETES MELLITUS WITH RETINOPATHY, WITH LONG-TERM CURRENT USE OF INSULIN, MACULAR EDEMA PRESENCE UNSPECIFIED, UNSPECIFIED LATERALITY, UNSPECIFIED RETINOPATHY SEVERITY (HCC): ICD-10-CM

## 2019-06-27 DIAGNOSIS — Z96.611 STATUS POST REVERSE TOTAL ARTHROPLASTY OF RIGHT SHOULDER: Primary | ICD-10-CM

## 2019-06-27 DIAGNOSIS — E11.319 TYPE 2 DIABETES MELLITUS WITH RETINOPATHY, WITH LONG-TERM CURRENT USE OF INSULIN, MACULAR EDEMA PRESENCE UNSPECIFIED, UNSPECIFIED LATERALITY, UNSPECIFIED RETINOPATHY SEVERITY (HCC): ICD-10-CM

## 2019-06-27 DIAGNOSIS — I10 ESSENTIAL HYPERTENSION: Primary | ICD-10-CM

## 2019-06-27 PROCEDURE — 97140 MANUAL THERAPY 1/> REGIONS: CPT | Performed by: PHYSICAL THERAPIST

## 2019-06-27 RX ORDER — LISINOPRIL 5 MG/1
5 TABLET ORAL DAILY
Qty: 30 TABLET | Refills: 5 | Status: CANCELLED | OUTPATIENT
Start: 2019-06-27

## 2019-06-28 DIAGNOSIS — E11.319 TYPE 2 DIABETES MELLITUS WITH RETINOPATHY, WITH LONG-TERM CURRENT USE OF INSULIN, MACULAR EDEMA PRESENCE UNSPECIFIED, UNSPECIFIED LATERALITY, UNSPECIFIED RETINOPATHY SEVERITY (HCC): Primary | ICD-10-CM

## 2019-06-28 DIAGNOSIS — Z79.4 TYPE 2 DIABETES MELLITUS WITH RETINOPATHY, WITH LONG-TERM CURRENT USE OF INSULIN, MACULAR EDEMA PRESENCE UNSPECIFIED, UNSPECIFIED LATERALITY, UNSPECIFIED RETINOPATHY SEVERITY (HCC): Primary | ICD-10-CM

## 2019-06-29 PROBLEM — M85.80 OSTEOPENIA: Status: ACTIVE | Noted: 2019-06-29

## 2019-06-29 PROBLEM — E11.42 TYPE 2 DIABETES MELLITUS WITH DIABETIC POLYNEUROPATHY, WITH LONG-TERM CURRENT USE OF INSULIN (HCC): Status: ACTIVE | Noted: 2019-06-29

## 2019-06-29 PROBLEM — Z79.4 TYPE 2 DIABETES MELLITUS WITH RIGHT EYE AFFECTED BY MODERATE NONPROLIFERATIVE RETINOPATHY WITHOUT MACULAR EDEMA, WITH LONG-TERM CURRENT USE OF INSULIN (HCC): Status: ACTIVE | Noted: 2019-06-29

## 2019-06-29 PROBLEM — K21.9 GERD (GASTROESOPHAGEAL REFLUX DISEASE): Status: ACTIVE | Noted: 2019-06-29

## 2019-06-29 PROBLEM — E11.3391 TYPE 2 DIABETES MELLITUS WITH RIGHT EYE AFFECTED BY MODERATE NONPROLIFERATIVE RETINOPATHY WITHOUT MACULAR EDEMA, WITH LONG-TERM CURRENT USE OF INSULIN (HCC): Status: ACTIVE | Noted: 2019-06-29

## 2019-06-29 PROBLEM — Z79.4 TYPE 2 DIABETES MELLITUS WITH DIABETIC POLYNEUROPATHY, WITH LONG-TERM CURRENT USE OF INSULIN (HCC): Status: ACTIVE | Noted: 2019-06-29

## 2019-06-29 PROBLEM — F41.9 ANXIETY: Status: ACTIVE | Noted: 2019-06-29

## 2019-06-29 RX ORDER — FERROUS SULFATE 325(65) MG
325 TABLET ORAL
COMMUNITY
End: 2021-11-09 | Stop reason: ALTCHOICE

## 2019-07-01 ENCOUNTER — TELEPHONE (OUTPATIENT)
Dept: FAMILY MEDICINE CLINIC | Facility: CLINIC | Age: 84
End: 2019-07-01

## 2019-07-01 ENCOUNTER — TELEPHONE (OUTPATIENT)
Dept: ENDOCRINOLOGY | Facility: CLINIC | Age: 84
End: 2019-07-01

## 2019-07-01 ENCOUNTER — LAB (OUTPATIENT)
Dept: LAB | Facility: CLINIC | Age: 84
End: 2019-07-01
Payer: MEDICARE

## 2019-07-01 DIAGNOSIS — I10 ESSENTIAL HYPERTENSION: ICD-10-CM

## 2019-07-01 DIAGNOSIS — E11.42 TYPE 2 DIABETES MELLITUS WITH DIABETIC POLYNEUROPATHY, WITH LONG-TERM CURRENT USE OF INSULIN (HCC): ICD-10-CM

## 2019-07-01 DIAGNOSIS — I48.0 PAROXYSMAL ATRIAL FIBRILLATION (HCC): ICD-10-CM

## 2019-07-01 DIAGNOSIS — I48.0 PAROXYSMAL ATRIAL FIBRILLATION (HCC): Primary | ICD-10-CM

## 2019-07-01 DIAGNOSIS — Z79.4 TYPE 2 DIABETES MELLITUS WITH DIABETIC POLYNEUROPATHY, WITH LONG-TERM CURRENT USE OF INSULIN (HCC): ICD-10-CM

## 2019-07-01 LAB
BASOPHILS # BLD AUTO: 0.03 THOUSANDS/ΜL (ref 0–0.1)
BASOPHILS NFR BLD AUTO: 1 % (ref 0–1)
EOSINOPHIL # BLD AUTO: 0.16 THOUSAND/ΜL (ref 0–0.61)
EOSINOPHIL NFR BLD AUTO: 3 % (ref 0–6)
ERYTHROCYTE [DISTWIDTH] IN BLOOD BY AUTOMATED COUNT: 14.2 % (ref 11.6–15.1)
HCT VFR BLD AUTO: 34.7 % (ref 34.8–46.1)
HGB BLD-MCNC: 10.8 G/DL (ref 11.5–15.4)
IMM GRANULOCYTES # BLD AUTO: 0.01 THOUSAND/UL (ref 0–0.2)
IMM GRANULOCYTES NFR BLD AUTO: 0 % (ref 0–2)
LYMPHOCYTES # BLD AUTO: 1.42 THOUSANDS/ΜL (ref 0.6–4.47)
LYMPHOCYTES NFR BLD AUTO: 25 % (ref 14–44)
MCH RBC QN AUTO: 26.2 PG (ref 26.8–34.3)
MCHC RBC AUTO-ENTMCNC: 31.1 G/DL (ref 31.4–37.4)
MCV RBC AUTO: 84 FL (ref 82–98)
MONOCYTES # BLD AUTO: 0.71 THOUSAND/ΜL (ref 0.17–1.22)
MONOCYTES NFR BLD AUTO: 12 % (ref 4–12)
NEUTROPHILS # BLD AUTO: 3.46 THOUSANDS/ΜL (ref 1.85–7.62)
NEUTS SEG NFR BLD AUTO: 59 % (ref 43–75)
NRBC BLD AUTO-RTO: 0 /100 WBCS
PLATELET # BLD AUTO: 183 THOUSANDS/UL (ref 149–390)
PMV BLD AUTO: 10.4 FL (ref 8.9–12.7)
RBC # BLD AUTO: 4.12 MILLION/UL (ref 3.81–5.12)
WBC # BLD AUTO: 5.79 THOUSAND/UL (ref 4.31–10.16)

## 2019-07-01 PROCEDURE — 36415 COLL VENOUS BLD VENIPUNCTURE: CPT

## 2019-07-01 PROCEDURE — 85025 COMPLETE CBC W/AUTO DIFF WBC: CPT

## 2019-07-02 ENCOUNTER — TELEPHONE (OUTPATIENT)
Dept: ENDOCRINOLOGY | Facility: CLINIC | Age: 84
End: 2019-07-02

## 2019-07-02 ENCOUNTER — OFFICE VISIT (OUTPATIENT)
Dept: PHYSICAL THERAPY | Facility: REHABILITATION | Age: 84
End: 2019-07-02
Payer: MEDICARE

## 2019-07-02 DIAGNOSIS — E11.319 TYPE 2 DIABETES MELLITUS WITH RETINOPATHY, WITH LONG-TERM CURRENT USE OF INSULIN, MACULAR EDEMA PRESENCE UNSPECIFIED, UNSPECIFIED LATERALITY, UNSPECIFIED RETINOPATHY SEVERITY (HCC): ICD-10-CM

## 2019-07-02 DIAGNOSIS — Z79.4 TYPE 2 DIABETES MELLITUS WITH RETINOPATHY, WITH LONG-TERM CURRENT USE OF INSULIN, MACULAR EDEMA PRESENCE UNSPECIFIED, UNSPECIFIED LATERALITY, UNSPECIFIED RETINOPATHY SEVERITY (HCC): ICD-10-CM

## 2019-07-02 DIAGNOSIS — E11.65 UNCONTROLLED TYPE 2 DIABETES MELLITUS WITH HYPERGLYCEMIA (HCC): Primary | ICD-10-CM

## 2019-07-02 DIAGNOSIS — M25.511 ACUTE PAIN OF RIGHT SHOULDER: ICD-10-CM

## 2019-07-02 DIAGNOSIS — Z96.611 STATUS POST REVERSE TOTAL ARTHROPLASTY OF RIGHT SHOULDER: Primary | ICD-10-CM

## 2019-07-02 PROBLEM — IMO0002 TYPE II DIABETES MELLITUS, UNCONTROLLED: Status: ACTIVE | Noted: 2019-07-02

## 2019-07-02 PROCEDURE — 97140 MANUAL THERAPY 1/> REGIONS: CPT | Performed by: PHYSICAL THERAPIST

## 2019-07-02 PROCEDURE — 97110 THERAPEUTIC EXERCISES: CPT | Performed by: PHYSICAL THERAPIST

## 2019-07-02 NOTE — PROGRESS NOTES
Daily Note     Today's date: 2019  Patient name: Veronica Woods  : 1935  MRN: [id number]  Referring provider: Mike Ghosh  Dx:   Encounter Diagnosis     ICD-10-CM    1  Status post reverse total arthroplasty of right shoulder Z96 611    2  Acute pain of right shoulder M25 511        Start Time: 930  Stop Time: 1014  Total time in clinic (min): 44 minutes    Subjective: I over did it this weekend and am very sore  Objective: See treatment diary below      Assessment: Tolerated treatment fair  Patient demonstrated fatigue post treatment, exhibited good technique with therapeutic exercises and would benefit from continued PT      Plan: Continue per plan of care  Progress treatment as tolerated  Progress treament per protocol        Precautions: Falls, hypotension, Follow attached Reverse Shoulder Protcol sx 19  Precautions: Falls, hypotension, Follow attached Reverse Shoulder Protcol sx 19    Manual   07/02          PROM  LB  LMR LB          Upper trap STM  LB  LB          Elbow PROM  LB LMR LB                                        Exercise Diary   7/2          Pulleys FF  nv  8 min           scap squeezes   5"x5 3x5          Table slides FF abd    3x10           Sleeper stretch gentle Follow protcol              JAGUAR terminal elbow ext              Mod tandem              SLS              FT FA EOEC                                                                                                                                                                             Modalities

## 2019-07-02 NOTE — TELEPHONE ENCOUNTER
Spoke with pharmacist Omega Solorzano, she called the  c/o that we are not taking care of a rx for the patient  I did tell pharmacist that I would give a verbal over the phone however she said that the only way this could be done was sending a fax due pt having Medicare, and it needed to say one touch ultra test strips, testing 4 times daily and it also needed a dx code  I said ok that we would provide that information  I told her that this had already been done, however there must have been something that went wrong with the electronic transmission  I told her as she well knows this often happens between pharmacy and physician offices, we often send something and then it does not go through for whatever reason  She became very nasty with me, she said why cant you just do your job? I said we have done our job, we have sent it through and it did not transmit for some reason  She went on and on with a horrible attitude that we are not doing what we are supposed to do and they have been trying to get this from us since Saturday, she said what is my excuse for Saturday? I said I dont know what happened on Sat since we are not here on Sat it may have been a on call physician  She said just manually fax it I told her that we do not manually fax rx's it will need to go through, erx and this will get signed first thing in the AM  She said you have no one that can do this now? At the time I did not realize we had someone here so I told her we did not have any provider in the office that they had all gone, she specifically asked for Dr Bree De La Paz and I told her that he was gone for the day  She then said "oh so your office is closed"? I said no we are not closed  She kept after me with everything, I told her that I would call the pt and make her aware of the situation  She kept going, I then told her that I was going to end the call and then hung up   I then called the pt and informed her that I had spoke to the pharmacy and there was an issue with the transmission of the rx between office and pharmacy  I apologized for this and any inconvenience this caused her  I told her that I would make Dr Eliza Crocker aware that this rx needed to be signed first thing in the morning and then it would get sent to the pharmacy  The pt was very kind and understanding, she also mentioned that she would be needing her one touch ultra soft lancets as well  I told her that we would send this in at the same time for her   Patient understands

## 2019-07-03 ENCOUNTER — TELEPHONE (OUTPATIENT)
Dept: ENDOCRINOLOGY | Facility: CLINIC | Age: 84
End: 2019-07-03

## 2019-07-03 ENCOUNTER — OFFICE VISIT (OUTPATIENT)
Dept: PHYSICAL THERAPY | Facility: REHABILITATION | Age: 84
End: 2019-07-03
Payer: MEDICARE

## 2019-07-03 DIAGNOSIS — Z96.611 STATUS POST REVERSE TOTAL ARTHROPLASTY OF RIGHT SHOULDER: Primary | ICD-10-CM

## 2019-07-03 DIAGNOSIS — M25.511 ACUTE PAIN OF RIGHT SHOULDER: ICD-10-CM

## 2019-07-03 PROCEDURE — 97112 NEUROMUSCULAR REEDUCATION: CPT | Performed by: PHYSICAL THERAPIST

## 2019-07-03 PROCEDURE — 97140 MANUAL THERAPY 1/> REGIONS: CPT | Performed by: PHYSICAL THERAPIST

## 2019-07-03 PROCEDURE — 97110 THERAPEUTIC EXERCISES: CPT | Performed by: PHYSICAL THERAPIST

## 2019-07-03 NOTE — TELEPHONE ENCOUNTER
Confirmed yesterday that Rx was rec'd at pharmacy for test strips and lancets at pt's request   Pharmacy stated that this could not be filled till 7/5/19 and she had not had Rx's there x 1 year

## 2019-07-03 NOTE — PROGRESS NOTES
Daily Note     Today's date: 7/3/2019  Patient name: Francisco Moreno  : 1935  MRN: [id number]  Referring provider: Sang Hirsch  Dx:   Encounter Diagnosis     ICD-10-CM    1  Status post reverse total arthroplasty of right shoulder Z96 611    2  Acute pain of right shoulder M25 511        Start Time: 0815  Stop Time: 0910  Total time in clinic (min): 55 minutes    Subjective: I feel a little better than last time but still so sore from over doing it on the weekend  Objective: See treatment diary below      Assessment: Tolerated treatment well  Patient demonstrated fatigue post treatment, exhibited good technique with therapeutic exercises and would benefit from continued PT      Plan: Continue per plan of care  Progress treatment as tolerated         Precautions: Falls, hypotension, Follow attached Reverse Shoulder Protcol sx 19  Precautions: Falls, hypotension, Follow attached Reverse Shoulder Protcol sx 19    Manual   07/ 7/03         PROM  LB  LMR LB LB         Upper trap STM  LB  LB LB         Elbow PROM  LB LMR LB LB                                       Exercise Diary   7/ 7/03         Pulleys FF  nv  8 min  8          scap squeezes   5"x5 3x5 YTB 2x15         Table slides FF abd    3x10  3x10          Sleeper stretch gentle Follow protcol              JAGUAR terminal elbow ext     2x15         Mod tandem              SLS              FT FA EOEC             digiflex     2x15         AAROM FF ABD     3x10                                                                                                                                               Modalities

## 2019-07-03 NOTE — TELEPHONE ENCOUNTER
Joey Cartagena called in stating she spoke to Cora Hurst and the prescription she was requesting was not going through the system  Chey Terry re-sent the RX with the lancets and had [employer]  sign off in order to expedite the process  Chey Terry called the pharmacy after the RX was re-sent to verify that it was received  It was received and will be available for the patient to  on 7/5/19

## 2019-07-05 ENCOUNTER — TELEPHONE (OUTPATIENT)
Dept: CARDIOLOGY CLINIC | Facility: CLINIC | Age: 84
End: 2019-07-05

## 2019-07-05 ENCOUNTER — OFFICE VISIT (OUTPATIENT)
Dept: CARDIOLOGY CLINIC | Facility: CLINIC | Age: 84
End: 2019-07-05
Payer: MEDICARE

## 2019-07-05 VITALS
HEIGHT: 67 IN | SYSTOLIC BLOOD PRESSURE: 132 MMHG | DIASTOLIC BLOOD PRESSURE: 76 MMHG | BODY MASS INDEX: 23.54 KG/M2 | HEART RATE: 83 BPM | WEIGHT: 150 LBS

## 2019-07-05 DIAGNOSIS — I10 ESSENTIAL (PRIMARY) HYPERTENSION: Primary | ICD-10-CM

## 2019-07-05 DIAGNOSIS — E11.3552 TYPE 2 DIABETES MELLITUS WITH STABLE PROLIFERATIVE RETINOPATHY OF LEFT EYE, WITH LONG-TERM CURRENT USE OF INSULIN (HCC): Primary | ICD-10-CM

## 2019-07-05 DIAGNOSIS — Z95.0 CARDIAC PACEMAKER IN SITU: ICD-10-CM

## 2019-07-05 DIAGNOSIS — E78.00 PURE HYPERCHOLESTEROLEMIA: ICD-10-CM

## 2019-07-05 DIAGNOSIS — I48.0 PAROXYSMAL ATRIAL FIBRILLATION (HCC): ICD-10-CM

## 2019-07-05 DIAGNOSIS — Z79.4 TYPE 2 DIABETES MELLITUS WITH STABLE PROLIFERATIVE RETINOPATHY OF LEFT EYE, WITH LONG-TERM CURRENT USE OF INSULIN (HCC): Primary | ICD-10-CM

## 2019-07-05 PROCEDURE — 99214 OFFICE O/P EST MOD 30 MIN: CPT | Performed by: INTERNAL MEDICINE

## 2019-07-05 PROCEDURE — 93000 ELECTROCARDIOGRAM COMPLETE: CPT | Performed by: INTERNAL MEDICINE

## 2019-07-05 RX ORDER — LISINOPRIL 5 MG/1
5 TABLET ORAL DAILY
Qty: 30 TABLET | Refills: 2 | Status: SHIPPED | OUTPATIENT
Start: 2019-07-05 | End: 2019-08-28 | Stop reason: SDUPTHER

## 2019-07-05 RX ORDER — LISINOPRIL 5 MG/1
5 TABLET ORAL DAILY
COMMUNITY
End: 2019-07-05 | Stop reason: SDUPTHER

## 2019-07-05 NOTE — TELEPHONE ENCOUNTER
Left VM for Device Clinic stating pt was seen in office today  Pt was in [place] and needs a Device Check and please contact pt to schedule

## 2019-07-05 NOTE — TELEPHONE ENCOUNTER
Pt called and said her dr said it was alright for her to start lisinopril she said that you wanted her to check but I don't see anything about it in note what dose should she be taking

## 2019-07-05 NOTE — TELEPHONE ENCOUNTER
Faxed signed Release of Health Information to [facility] for pt records while in [place]    [phone]

## 2019-07-08 ENCOUNTER — OFFICE VISIT (OUTPATIENT)
Dept: PHYSICAL THERAPY | Facility: REHABILITATION | Age: 84
End: 2019-07-08
Payer: MEDICARE

## 2019-07-08 DIAGNOSIS — M25.511 ACUTE PAIN OF RIGHT SHOULDER: ICD-10-CM

## 2019-07-08 DIAGNOSIS — Z96.611 STATUS POST REVERSE TOTAL ARTHROPLASTY OF RIGHT SHOULDER: Primary | ICD-10-CM

## 2019-07-08 PROCEDURE — 97110 THERAPEUTIC EXERCISES: CPT | Performed by: PHYSICAL THERAPY ASSISTANT

## 2019-07-08 PROCEDURE — 97112 NEUROMUSCULAR REEDUCATION: CPT | Performed by: PHYSICAL THERAPY ASSISTANT

## 2019-07-08 PROCEDURE — 97140 MANUAL THERAPY 1/> REGIONS: CPT | Performed by: PHYSICAL THERAPY ASSISTANT

## 2019-07-08 NOTE — PROGRESS NOTES
Daily Note     Today's date: 2019  Patient name: Jerel Jasso  : 1935  MRN: [id number]  Referring provider: Adrienne Correa  Dx:   Encounter Diagnosis     ICD-10-CM    1  Status post reverse total arthroplasty of right shoulder Z96 611    2  Acute pain of right shoulder M25 511                   Subjective: No new complaints  Pt reports rainy weather makes symptoms worse  Objective: See treatment diary below      Assessment: Tolerated treatment well  Verbal cues for good technique and appropriate intensity of TE  Patient demonstrated fatigue post treatment, exhibited good technique with therapeutic exercises and would benefit from continued PT      Plan: Continue per plan of care  Progress treatment as tolerated         Precautions: Falls, hypotension, Follow attached Reverse Shoulder Protcol sx 19  Precautions: Falls, hypotension, Follow attached Reverse Shoulder Protcol sx 19    Manual   7 7/8        PROM  LB  LMR LB LB RK        Upper trap STM  LB  LB LB RK        Elbow PROM  LB LMR LB LB RK                                      Exercise Diary   7 7/8        Pulleys FF  nv  8 min  8  8'        scap squeezes   5"x5 3x5 YTB 2x15 YTB  2x15        Table slides FF abd    3x10  3x10  3x10        Sleeper stretch gentle Follow protcol              JAGUAR terminal elbow ext     2x15 3x10        Mod tandem              SLS              FT FA EOEC             digiflex     2x15 2x15        AAROM FF ABD     3x10 3x10  ABD, ER,FF                                                                                                                                              Modalities
Gastritis    GERD (gastroesophageal reflux disease)    HTN (hypertension)    Hypercholesteremia    Hypertension    Pulmonary embolism

## 2019-07-10 ENCOUNTER — OFFICE VISIT (OUTPATIENT)
Dept: PHYSICAL THERAPY | Facility: REHABILITATION | Age: 84
End: 2019-07-10
Payer: MEDICARE

## 2019-07-10 DIAGNOSIS — M25.511 ACUTE PAIN OF RIGHT SHOULDER: ICD-10-CM

## 2019-07-10 DIAGNOSIS — Z96.611 STATUS POST REVERSE TOTAL ARTHROPLASTY OF RIGHT SHOULDER: Primary | ICD-10-CM

## 2019-07-10 DIAGNOSIS — D64.9 ANEMIA, UNSPECIFIED TYPE: Primary | ICD-10-CM

## 2019-07-10 PROCEDURE — 97140 MANUAL THERAPY 1/> REGIONS: CPT | Performed by: PHYSICAL THERAPY ASSISTANT

## 2019-07-10 PROCEDURE — 97112 NEUROMUSCULAR REEDUCATION: CPT | Performed by: PHYSICAL THERAPY ASSISTANT

## 2019-07-10 PROCEDURE — 97110 THERAPEUTIC EXERCISES: CPT | Performed by: PHYSICAL THERAPY ASSISTANT

## 2019-07-10 NOTE — PROGRESS NOTES
Daily Note     Today's date: 7/10/2019  Patient name: Faizan Boyle  : 1935  MRN: [id number]  Referring provider: Sarah Cleary  Dx:   Encounter Diagnosis     ICD-10-CM    1  Status post reverse total arthroplasty of right shoulder Z96 611    2  Acute pain of right shoulder M25 511                   Subjective: No new complaints  Pt reports she is feeling really good today  Objective: See treatment diary below      Assessment: Tolerated treatment well  Patient demonstrated fatigue post treatment, exhibited good technique with therapeutic exercises and would benefit from continued PT      Plan: Continue per plan of care  Progress treatment as tolerated         Precautions: Falls, hypotension, Follow attached Reverse Shoulder Protcol sx 19  Precautions: Falls, hypotension, Follow attached Reverse Shoulder Protcol sx 19    Manual   7/8 7/10       PROM  LB  LMR LB LB RK rk       Upper trap STM  LB  LB LB RK rk       Elbow PROM  LB LMR LB LB RK rk                                     Exercise Diary   7 7/8 7/10       Pulleys FF  nv  8 min  8  8' 8'       scap squeezes   5"x5 3x5 YTB 2x15 YTB  2x15 YTB  2x15       Table slides FF abd    3x10  3x10  3x10 3x10       Sleeper stretch gentle Follow protcol              JAGUAR terminal elbow ext     2x15 3x10 3x10       Mod tandem              SLS              FT FA EOEC             digiflex     2x15 2x15 2x15  green       AAROM FF ABD     3x10 3x10  ABD, ER,FF 3x10  ABD,  FF                                                                                                                                             Modalities

## 2019-07-10 NOTE — RESULT ENCOUNTER NOTE
Please let patient know her blood count is a little bit low  Given her recent surgery, I recommend repeating this in 6-8 weeks to monitor  Continue to take daily iron supplement

## 2019-07-12 ENCOUNTER — OFFICE VISIT (OUTPATIENT)
Dept: PHYSICAL THERAPY | Facility: REHABILITATION | Age: 84
End: 2019-07-12
Payer: MEDICARE

## 2019-07-12 DIAGNOSIS — M25.511 ACUTE PAIN OF RIGHT SHOULDER: ICD-10-CM

## 2019-07-12 DIAGNOSIS — Z96.611 STATUS POST REVERSE TOTAL ARTHROPLASTY OF RIGHT SHOULDER: Primary | ICD-10-CM

## 2019-07-12 PROCEDURE — 97140 MANUAL THERAPY 1/> REGIONS: CPT | Performed by: PHYSICAL THERAPIST

## 2019-07-12 PROCEDURE — 97112 NEUROMUSCULAR REEDUCATION: CPT | Performed by: PHYSICAL THERAPIST

## 2019-07-12 PROCEDURE — 97110 THERAPEUTIC EXERCISES: CPT | Performed by: PHYSICAL THERAPIST

## 2019-07-15 ENCOUNTER — OFFICE VISIT (OUTPATIENT)
Dept: PHYSICAL THERAPY | Facility: REHABILITATION | Age: 84
End: 2019-07-15
Payer: MEDICARE

## 2019-07-15 DIAGNOSIS — M25.511 ACUTE PAIN OF RIGHT SHOULDER: ICD-10-CM

## 2019-07-15 DIAGNOSIS — Z96.611 STATUS POST REVERSE TOTAL ARTHROPLASTY OF RIGHT SHOULDER: Primary | ICD-10-CM

## 2019-07-15 PROCEDURE — 97140 MANUAL THERAPY 1/> REGIONS: CPT | Performed by: PHYSICAL THERAPIST

## 2019-07-15 PROCEDURE — 97110 THERAPEUTIC EXERCISES: CPT | Performed by: PHYSICAL THERAPIST

## 2019-07-15 PROCEDURE — 97112 NEUROMUSCULAR REEDUCATION: CPT | Performed by: PHYSICAL THERAPIST

## 2019-07-15 NOTE — PROGRESS NOTES
Daily Note     Today's date: 7/15/2019  Patient name: Abel Mortensen  : 1935  MRN: [id number]  Referring provider: Meg Gold  Dx:   Encounter Diagnosis     ICD-10-CM    1  Status post reverse total arthroplasty of right shoulder Z96 611    2  Acute pain of right shoulder M25 511        Start Time: 1032  Stop Time: 1119  Total time in clinic (min): 47 minutes    Subjective: I am just not feeling myself today  Objective: See treatment diary below      Assessment: BP was assessed 130/68 Tolerated treatment well  Patient demonstrated fatigue post treatment, exhibited good technique with therapeutic exercises and would benefit from continued PT      Plan: Continue per plan of care  Progress treatment as tolerated           Precautions: Falls, hypotension, Follow attached Reverse Shoulder Protcol sx 19    Manual  6/18 06/27 07/02 7/03 7/8 7/10 7/12 7/15     PROM  LB  LMR LB LB RK rk LB LB     Upper trap STM  LB  LB LB RK rk LB LB     Elbow PROM  LB LMR LB LB RK rk LB LB                                   Exercise Diary  6/18 06/27 7/2 7/03 7/8 7/10 7/12 7/15     Pulleys FF  nv  8 min  8  8' 8' 8' 8'     scap squeezes   5"x5 3x5 YTB 2x15 YTB  2x15 YTB  2x15 GTB 3x15 GTB 3x15      Table slides FF abd    3x10  3x10  3x10 3x10 3x10 3x10      Sleeper stretch gentle Follow protcol              JAGUAR terminal elbow ext     2x15 3x10 3x10 3x10  2# 2# 3x10     Mod tandem              SLS              FT FA EOEC             digiflex     2x15 2x15 2x15  green       AAROM FF ABD     3x10 3x10  ABD, ER,FF 3x10  ABD,  FF 3x10  incline table  3x10      Isometric ball 6 way        20x 2x20                                                                                                                              Modalities

## 2019-07-16 ENCOUNTER — TELEPHONE (OUTPATIENT)
Dept: ENDOCRINOLOGY | Facility: CLINIC | Age: 84
End: 2019-07-16

## 2019-07-16 ENCOUNTER — LAB (OUTPATIENT)
Dept: LAB | Facility: CLINIC | Age: 84
End: 2019-07-16
Payer: MEDICARE

## 2019-07-16 DIAGNOSIS — I10 ESSENTIAL HYPERTENSION: ICD-10-CM

## 2019-07-16 LAB
ALBUMIN SERPL BCP-MCNC: 3.7 G/DL (ref 3.5–5)
ALP SERPL-CCNC: 68 U/L (ref 46–116)
ALT SERPL W P-5'-P-CCNC: 20 U/L (ref 12–78)
ANION GAP SERPL CALCULATED.3IONS-SCNC: 8 MMOL/L (ref 4–13)
AST SERPL W P-5'-P-CCNC: 15 U/L (ref 5–45)
BILIRUB SERPL-MCNC: 0.33 MG/DL (ref 0.2–1)
BUN SERPL-MCNC: 16 MG/DL (ref 5–25)
CALCIUM SERPL-MCNC: 8.7 MG/DL (ref 8.3–10.1)
CHLORIDE SERPL-SCNC: 97 MMOL/L (ref 100–108)
CO2 SERPL-SCNC: 30 MMOL/L (ref 21–32)
CREAT SERPL-MCNC: 0.52 MG/DL (ref 0.6–1.3)
GFR SERPL CREATININE-BSD FRML MDRD: 90 ML/MIN/1.73SQ M
GLUCOSE P FAST SERPL-MCNC: 115 MG/DL (ref 65–99)
POTASSIUM SERPL-SCNC: 4.2 MMOL/L (ref 3.5–5.3)
PROT SERPL-MCNC: 6.9 G/DL (ref 6.4–8.2)
SODIUM SERPL-SCNC: 135 MMOL/L (ref 136–145)

## 2019-07-16 PROCEDURE — 36415 COLL VENOUS BLD VENIPUNCTURE: CPT

## 2019-07-16 PROCEDURE — 80053 COMPREHEN METABOLIC PANEL: CPT

## 2019-07-16 NOTE — TELEPHONE ENCOUNTER
----- Message from Rufino Asher MD sent at 7/16/2019 12:45 PM EDT -----  Please call the patient regarding her abnormal result  Sodium level slightly low  Glucose a little elevated  Monitor for now

## 2019-07-16 NOTE — RESULT ENCOUNTER NOTE
Please call the patient regarding her abnormal result  Sodium level slightly low  Glucose a little elevated  Monitor for now

## 2019-07-17 ENCOUNTER — OFFICE VISIT (OUTPATIENT)
Dept: PHYSICAL THERAPY | Facility: REHABILITATION | Age: 84
End: 2019-07-17
Payer: MEDICARE

## 2019-07-17 DIAGNOSIS — Z96.611 STATUS POST REVERSE TOTAL ARTHROPLASTY OF RIGHT SHOULDER: Primary | ICD-10-CM

## 2019-07-17 DIAGNOSIS — M25.511 ACUTE PAIN OF RIGHT SHOULDER: ICD-10-CM

## 2019-07-17 PROCEDURE — 97140 MANUAL THERAPY 1/> REGIONS: CPT | Performed by: PHYSICAL THERAPIST

## 2019-07-17 PROCEDURE — 97110 THERAPEUTIC EXERCISES: CPT | Performed by: PHYSICAL THERAPIST

## 2019-07-17 PROCEDURE — 97112 NEUROMUSCULAR REEDUCATION: CPT | Performed by: PHYSICAL THERAPIST

## 2019-07-17 NOTE — PROGRESS NOTES
Daily Note     Today's date: 7/15/2019  Patient name: Stephon Delgado  : 1935  MRN: [id number]  Referring provider: Anant Amador  Dx:   Encounter Diagnosis     ICD-10-CM    1  Status post reverse total arthroplasty of right shoulder Z96 611    2  Acute pain of right shoulder M25 511        Start Time: 1032  Stop Time: 1119  Total time in clinic (min): 47 minutes    Subjective: I had a good day yesterday but today not as well  I am starting to be able to reach a little bit higher  Objective: See treatment diary below      Assessment:  Tolerated treatment well  Patient demonstrated fatigue post treatment, exhibited good technique with therapeutic exercises and would benefit from continued PT      Plan: Continue per plan of care  Progress treatment as tolerated           Precautions: Falls, hypotension, Follow attached Reverse Shoulder Protcol sx 19    Manual  6/18 06/27 07/02 7/03 7/8 7/10 7/12 7/15 7/17    PROM  LB  LMR LB LB RK rk LB LB LB    Upper trap STM  LB  LB LB RK rk LB LB LB    Elbow PROM  LB LMR LB LB RK rk LB LB LB                                  Exercise Diary  6/18 06/27 7/2 7/03 7/8 7/10 7/12 7/15 7/17    Pulleys FF  nv  8 min  8  8' 8' 8' 8' 6'    scap squeezes   5"x5 3x5 YTB 2x15 YTB  2x15 YTB  2x15 GTB 3x15 GTB 3x15  GTB 3x15    Table slides FF abd    3x10  3x10  3x10 3x10 3x10 3x10  3x10 ball FF ABD     Sleeper stretch gentle Follow protcol              JAGUAR terminal elbow ext     2x15 3x10 3x10 3x10  2# 2# 3x10 2x10    Mod tandem              SLS              FT FA EOEC             digiflex     2x15 2x15 2x15  green       AAROM FF ABD     3x10 3x10  ABD, ER,FF 3x10  ABD,  FF 3x10  incline table  3x10  3x10     Isometric ball 6 way        20x 2x20 2x20     Cane FF ABD          2x10 5"                                                                                                                 Modalities

## 2019-07-18 ENCOUNTER — TELEPHONE (OUTPATIENT)
Dept: ENDOCRINOLOGY | Facility: CLINIC | Age: 84
End: 2019-07-18

## 2019-07-18 NOTE — TELEPHONE ENCOUNTER
Spoke to pt and she is concerned that her sugars have been going low, the 15th at 8 am it was 94, at lunch it was 132 took 6 units, at dinner it was 189 took 8 units and then at 915 pm 87 had to eat something before bed, on the 16th 645 am was 124 didn't take insulin, at 130 it was 135 took 6 units then at 4 pm was 85 before bed 169 took 5 units and on the 17th  at 7 am was 115, 12pm 195 took 8 units at 3 pm was 68, she isn't sure what she is doing wrong, pt called back and 2 hours after breakfast was 60 took 4 glucose tablets

## 2019-07-19 ENCOUNTER — OFFICE VISIT (OUTPATIENT)
Dept: PHYSICAL THERAPY | Facility: REHABILITATION | Age: 84
End: 2019-07-19
Payer: MEDICARE

## 2019-07-19 DIAGNOSIS — M25.511 ACUTE PAIN OF RIGHT SHOULDER: ICD-10-CM

## 2019-07-19 DIAGNOSIS — Z96.611 STATUS POST REVERSE TOTAL ARTHROPLASTY OF RIGHT SHOULDER: Primary | ICD-10-CM

## 2019-07-19 PROCEDURE — 97110 THERAPEUTIC EXERCISES: CPT | Performed by: PHYSICAL THERAPIST

## 2019-07-19 PROCEDURE — 97140 MANUAL THERAPY 1/> REGIONS: CPT | Performed by: PHYSICAL THERAPIST

## 2019-07-19 NOTE — PROGRESS NOTES
Daily Note     Today's date: 2019  Patient name: Pratibha Colón  : 1935  MRN: [id number]  Referring provider: Fady Guzman  Dx:   Encounter Diagnosis     ICD-10-CM    1  Status post reverse total arthroplasty of right shoulder Z96 611    2  Acute pain of right shoulder M25 511        Start Time: 0900  Stop Time: 0948  Total time in clinic (min): 48 minutes    Subjective: I had a great deal of problems with my blood sugar on Wednesday  My shoulder is not feeling well today its painful when I stretch it  Objective: See treatment diary below      Assessment: Tolerated treatment fair  Patient exhibited good technique with therapeutic exercises and would benefit from continued PT      Plan: Continue per plan of care  Progress treatment as tolerated  Precautions: Falls, hypotension, Follow attached Reverse Shoulder Protcol sx 19    Manual  6/18 06/27 07/02 7/03 7/8 7/10 7/12 7/15 7/19    PROM  LB  LMR LB LB RK rk LB LB LB    Upper trap STM  LB  LB LB RK rk LB LB LB    Elbow PROM  LB LMR LB LB RK rk LB LB LB                                  Exercise Diary  6/18 06/27 7/2 7/03 7/8 7/10 7/12 7/15 7/19    Pulleys FF  nv  8 min  8  8' 8' 8' 8' UBE 4/4  pulleys 3 ! P    scap squeezes   5"x5 3x5 YTB 2x15 YTB  2x15 YTB  2x15 GTB 3x15 GTB 3x15      Table slides FF abd    3x10  3x10  3x10 3x10 3x10 3x10  VG slides 3x10     Sleeper stretch gentle Follow protcol              JAGUAR terminal elbow ext     2x15 3x10 3x10 3x10  2# 2# 3x10 3x10     Mod tandem              SLS              FT FA EOEC             digiflex     2x15 2x15 2x15  green       AAROM FF ABD     3x10 3x10  ABD, ER,FF 3x10  ABD,  FF 3x10  incline table  3x10  3x10     Isometric ball 6 way        20x 2x20     Cane FF abd ER          10x 5-10 sec                                                                                                                 Modalities

## 2019-07-19 NOTE — TELEPHONE ENCOUNTER
We probably need to adjust her sliding scale insulin  Please find out with the scale is and I will make adjustments

## 2019-07-22 ENCOUNTER — OFFICE VISIT (OUTPATIENT)
Dept: PHYSICAL THERAPY | Facility: REHABILITATION | Age: 84
End: 2019-07-22
Payer: MEDICARE

## 2019-07-22 DIAGNOSIS — M25.511 ACUTE PAIN OF RIGHT SHOULDER: ICD-10-CM

## 2019-07-22 DIAGNOSIS — Z96.611 STATUS POST REVERSE TOTAL ARTHROPLASTY OF RIGHT SHOULDER: Primary | ICD-10-CM

## 2019-07-22 PROCEDURE — 97140 MANUAL THERAPY 1/> REGIONS: CPT | Performed by: PHYSICAL THERAPIST

## 2019-07-22 PROCEDURE — 97110 THERAPEUTIC EXERCISES: CPT | Performed by: PHYSICAL THERAPIST

## 2019-07-22 PROCEDURE — 97112 NEUROMUSCULAR REEDUCATION: CPT | Performed by: PHYSICAL THERAPIST

## 2019-07-22 NOTE — PROGRESS NOTES
PT Re-Evaluation     Today's date: 2019  Patient name: Anne Marie Mendiola  : 1935  MRN: [id number]  Referring provider: Guerda Bone  Dx:   Encounter Diagnosis     ICD-10-CM    1  Status post reverse total arthroplasty of right shoulder Z96 611    2  Acute pain of right shoulder M25 511        Start Time: 0950  Stop Time: 1045  Total time in clinic (min): 55 minutes    Assessment  Assessment details: Since beginning physical therapy, Judit Car has attended a total number of 3 and 12 visits and has maintained excellent compliance with established POC  Patient has made significant improvements in all areas, including decreased pain, increased strength, increased ROM, improved flexibility and improved postural awareness  Patient is reporting improved ability to perform a/iadls, recreational activities and engaging in social activities  Despite these improvements, Patient continues to present with pain, decreased strength, decreased ROM and postural  dysfunction  Patient would continue to benefit from skilled physical therapy to address Her aforementioned impairments, improve their level of function and to improve their overall quality of life  Goals  Goals  Short Term: Partially achieved   Pt will report decreased levels of pain by at least 2 subjective ratings in 4 weeks  Pt will demonstrate improved ROM by at least 10 degrees in 4 weeks  Long Term:   Pt will be independent in their HEP in 8 weeks  Pt will demonstrate improved FOTO, > 62 Not achieved  Pt will return to performing all ADL's with RUE partially achieved   Pt will improve AROM in RUE shoulder in order to perform household tasks Not achieved   Pt will increase RUE strength to 4/5 throughout in order to lift groceries and perform household tasks   Not achieved             Subjective Evaluation    Pain  Current pain ratin  At best pain ratin  At worst pain ratin  Progression: improved          Objective     Static Posture Shoulders  Depressed  Tenderness     Right Shoulder  Tenderness in the biceps tendon (proximal) and bicipital groove  No tenderness in the [facility] joint and medial scapula  Neurological Testing     Sensation     Shoulder   Left Shoulder   Intact: light touch and proprioception    Right Shoulder   Intact: light touch and proprioception    Reflexes   Left   Biceps (C5/C6): trace (1+)    Right   Biceps (C5/C6): normal (2+)    Active Range of Motion   Left Shoulder   Flexion: 160 degrees   Abduction: 149 degrees   External rotation 90°: 72 degrees   Internal rotation 90°: 60 degrees     Right Shoulder   Flexion: 87 degrees   Abduction: 78 degrees with pain  External rotation 45°: 13 degrees   Internal rotation 45°: 22 degrees     Passive Range of Motion   Left Shoulder   Normal passive range of motion    Right Shoulder   Flexion: 112 degrees with pain  Abduction: 96 degrees with pain  External rotation 45°: 32 degrees with pain  Internal rotation 45°: 42 degrees     Strength/Myotome Testing     Right Shoulder     Planes of Motion   Flexion: 3-   Abduction: 3   Adduction: 3-   External rotation at 45°: 3-   Internal rotation at 45°: 3-     Functional Assessment        Single Leg Stance - Eyes Open   Left  Trial 1: 2 seconds  Trial 2: 1 seconds  Trial 3: 2 seconds  Average: 1 67 seconds     Right  Trial 1: 3 seconds  Trial 2: 1 seconds  Trial 3: 2 seconds  Average: 2 seconds              Precautions: Falls, hypotension, Follow attached Reverse Shoulder Protcol sx 4/26/19    Manual  6/18 06/27 07/02 7/03 7/8 7/10 7/12 7/15 7/19 7/22   PROM  LB  LMR LB LB RK rk LB LB LB LB   Upper trap STM  LB  LB LB RK rk LB LB LB    Elbow PROM  LB LMR LB LB RK rk LB LB LB LB                                 Exercise Diary  6/18 06/27 7/2 7/03 7/8 7/10 7/12 7/15 7/19 7/22   Pulleys FF  nv  8 min  8  8' 8' 8' 8' UBE 4/4  pulleys 3 ! P UBE 5/5  pulleys 4   scap squeezes   5"x5 3x5 YTB 2x15 YTB  2x15 YTB  2x15 GTB 3x15 GTB 3x15   GTB 3x15  Ext 3x10 only to ant hip    Table slides FF abd    3x10  3x10  3x10 3x10 3x10 3x10  VG slides 3x10  Ball 3x10 FF abd    Sleeper stretch gentle Follow protcol              JAGUAR terminal elbow ext     2x15 3x10 3x10 3x10  2# 2# 3x10 3x10     Mod tandem              SLS              FT FA EOEC             digiflex     2x15 2x15 2x15  green       AAROM FF ABD     3x10 3x10  ABD, ER,FF 3x10  ABD,  FF 3x10  incline table  3x10  3x10  3x10    Isometric ball 6 way        20x 2x20     Cane FF abd ER          10x 5-10 sec  2x10 5-10"   No moneys           3x10    T band ER/IR walkouts           3x10 YTB    T's Y's           3x15                                                                        Modalities

## 2019-07-23 ENCOUNTER — TELEPHONE (OUTPATIENT)
Dept: ENDOCRINOLOGY | Facility: CLINIC | Age: 84
End: 2019-07-23

## 2019-07-23 DIAGNOSIS — Z79.4 TYPE 2 DIABETES MELLITUS WITH STABLE PROLIFERATIVE RETINOPATHY OF LEFT EYE, WITH LONG-TERM CURRENT USE OF INSULIN (HCC): Primary | ICD-10-CM

## 2019-07-23 DIAGNOSIS — E11.3552 TYPE 2 DIABETES MELLITUS WITH STABLE PROLIFERATIVE RETINOPATHY OF LEFT EYE, WITH LONG-TERM CURRENT USE OF INSULIN (HCC): Primary | ICD-10-CM

## 2019-07-23 RX ORDER — INSULIN ASPART 100 [IU]/ML
INJECTION, SOLUTION INTRAVENOUS; SUBCUTANEOUS
Qty: 5 PEN | Refills: 5
Start: 2019-07-23 | End: 2019-11-05 | Stop reason: ALTCHOICE

## 2019-07-23 NOTE — TELEPHONE ENCOUNTER
Can you please call this pt dr Néstor áSnchez never addressed this before he left and pt called Friday after everyone was gone, with low sugars and was upset that no one called her back, conor told her she could call on call  if there was a problem over the weekend, I would appreciate if you could reach out to her

## 2019-07-23 NOTE — TELEPHONE ENCOUNTER
Spoke with patient by phone apologized for delays in communication  For now Advised her to reduce Novolog to 4 units per meal  Continue levemir- she is taking 5 units per meal  Send log in 1 week  Advised her to call office if any issues, she can ask for Usman Earl- Dr Rafita Warner medical assistant who well get her readings to Dr Nidia Moreno or someone else that can help her if he is not here  Due to low requirements and normal kidney function transition to non-insulin medications may be an option  She is wondering what to do if having trouble getting through to us again-- if every any doubt what to do advised her that she can skip a dose if needed  If issues after hours, she should call office to be connected to on call physician

## 2019-07-23 NOTE — TELEPHONE ENCOUNTER
Pt called at the end of the day on Friday 7/19/19 to say that she was having low blood sugars  There was no provider here for us to be able to help her so I asked her to call the on call doctor  As of today there are no notes re: same  I called pt and she said the answering service did not put her in touch with the on call doctor and just said if it was an emergency to go to the ER  I apologized to pt many times and told her that I would have you talk to her and help her out

## 2019-07-24 ENCOUNTER — OFFICE VISIT (OUTPATIENT)
Dept: PHYSICAL THERAPY | Facility: REHABILITATION | Age: 84
End: 2019-07-24
Payer: MEDICARE

## 2019-07-24 DIAGNOSIS — Z96.611 STATUS POST REVERSE TOTAL ARTHROPLASTY OF RIGHT SHOULDER: Primary | ICD-10-CM

## 2019-07-24 DIAGNOSIS — M25.511 ACUTE PAIN OF RIGHT SHOULDER: ICD-10-CM

## 2019-07-24 PROCEDURE — 97112 NEUROMUSCULAR REEDUCATION: CPT | Performed by: PHYSICAL THERAPIST

## 2019-07-24 PROCEDURE — 97110 THERAPEUTIC EXERCISES: CPT | Performed by: PHYSICAL THERAPIST

## 2019-07-24 PROCEDURE — 97140 MANUAL THERAPY 1/> REGIONS: CPT | Performed by: PHYSICAL THERAPIST

## 2019-07-24 NOTE — PROGRESS NOTES
Daily Note     Today's date: 2019  Patient name: Tracy Maguire  : 1935  MRN: [id number]  Referring provider: Jeramy Lopez  Dx:   Encounter Diagnosis     ICD-10-CM    1  Status post reverse total arthroplasty of right shoulder Z96 611    2  Acute pain of right shoulder M25 511        Start Time: 0900  Stop Time: 0950  Total time in clinic (min): 50 minutes    Subjective: I was sore yesterday from the increased stretching  Objective: See treatment diary below      Assessment: Tolerated treatment fair  Pt tx modified secondary to pain  Patient demonstrated fatigue post treatment, exhibited good technique with therapeutic exercises and would benefit from continued PT      Plan: Continue per plan of care  Progress treatment as tolerated         Precautions: Falls, hypotension, Follow attached Reverse Shoulder Protcol sx 19    Manual              PROM  LB            Upper trap STM  LB            Elbow PROM  LB                                          Exercise Diary              UBE  3/3            scap squeezes  GTB 2x15             Table slides FF abd  Ball 2x10             Sleeper stretch gentle Follow protcol              JAGUAR terminal elbow ext              Mod tandem              SLS              FT FA EOEC             digiflex              AAROM FF ABD              Isometric ball 6 way              Cane FF abd ER  2x10 5-10"            No moneys              T band ER/IR walkouts              T's Y's              Bicep curls w ext hold  2# 2x15 5"            scap punches  1# 2x15                                                       Modalities

## 2019-07-25 ENCOUNTER — OFFICE VISIT (OUTPATIENT)
Dept: FAMILY MEDICINE CLINIC | Facility: CLINIC | Age: 84
End: 2019-07-25
Payer: MEDICARE

## 2019-07-25 DIAGNOSIS — Z23 ENCOUNTER FOR IMMUNIZATION: ICD-10-CM

## 2019-07-25 DIAGNOSIS — M85.80 OSTEOPENIA, UNSPECIFIED LOCATION: ICD-10-CM

## 2019-07-25 DIAGNOSIS — E11.42 TYPE 2 DIABETES MELLITUS WITH DIABETIC POLYNEUROPATHY, WITH LONG-TERM CURRENT USE OF INSULIN (HCC): Primary | ICD-10-CM

## 2019-07-25 DIAGNOSIS — L85.3 XEROSIS OF SKIN: ICD-10-CM

## 2019-07-25 DIAGNOSIS — Z79.4 TYPE 2 DIABETES MELLITUS WITH DIABETIC POLYNEUROPATHY, WITH LONG-TERM CURRENT USE OF INSULIN (HCC): Primary | ICD-10-CM

## 2019-07-25 DIAGNOSIS — I48.0 PAROXYSMAL ATRIAL FIBRILLATION (HCC): ICD-10-CM

## 2019-07-25 DIAGNOSIS — Z00.00 INITIAL MEDICARE ANNUAL WELLNESS VISIT: ICD-10-CM

## 2019-07-25 DIAGNOSIS — Z96.611 STATUS POST REPLACEMENT OF RIGHT SHOULDER JOINT: ICD-10-CM

## 2019-07-25 DIAGNOSIS — Z78.0 POST-MENOPAUSE: ICD-10-CM

## 2019-07-25 DIAGNOSIS — I10 ESSENTIAL HYPERTENSION: ICD-10-CM

## 2019-07-25 DIAGNOSIS — R05.9 COUGH: ICD-10-CM

## 2019-07-25 PROCEDURE — 90670 PCV13 VACCINE IM: CPT

## 2019-07-25 PROCEDURE — 99214 OFFICE O/P EST MOD 30 MIN: CPT | Performed by: NURSE PRACTITIONER

## 2019-07-25 PROCEDURE — G0009 ADMIN PNEUMOCOCCAL VACCINE: HCPCS

## 2019-07-25 PROCEDURE — G0438 PPPS, INITIAL VISIT: HCPCS | Performed by: NURSE PRACTITIONER

## 2019-07-25 NOTE — PROGRESS NOTES
Assessment and Plan:     Problem List Items Addressed This Visit        Endocrine    Type 2 diabetes mellitus with diabetic polyneuropathy, with long-term current use of insulin (HCC)    Relevant Orders    Comprehensive metabolic panel    CBC    Lipid panel    TSH, 3rd generation    Hemoglobin A1C       Cardiovascular and Mediastinum    HTN (hypertension)    Relevant Orders    Comprehensive metabolic panel    CBC    Lipid panel    TSH, 3rd generation    Hemoglobin A1C       Musculoskeletal and Integument    Osteopenia - Primary    Relevant Orders    DXA bone density spine hip and pelvis      Other Visit Diagnoses     Post-menopause        Relevant Orders    DXA bone density spine hip and pelvis         History of Present Illness:     Patient presents for Medicare Annual Wellness visit    Patient Care Team:  Joel Houston as PCP - General (Family Medicine)  Rebecca Farr MD as PCP - Endocrinology (Endocrinology)  Lauren Singh MD     Problem List:     Patient Active Problem List   Diagnosis    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, with long-term current use of insulin ([address] )    HTN (hypertension)    HLD (hyperlipidemia)    Glaucoma    Bilateral carotid artery disease (HCC)    Symptomatic PVCs    Occlusion of right carotid artery    Stenosis of left carotid artery    Paroxysmal atrial fibrillation ([address] )    Presence of permanent cardiac pacemaker    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, with long-term current use of insulin ([address] )    Type 2 diabetes mellitus with diabetic polyneuropathy, with long-term current use of insulin ([address] )    Osteopenia    GERD (gastroesophageal reflux disease)    Anxiety    Type II diabetes mellitus, uncontrolled ([address] )      Past Medical and Surgical History:     Past Medical History:   Diagnosis Date    Diabetes mellitus ([address] )     Glaucoma     H/O degenerative disc disease     Hyperlipidemia     Hypertension     Peripheral neuropathy     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 ([address] )      Past Surgical History:   Procedure Laterality Date    CATARACT EXTRACTION, BILATERAL      CYST REMOVAL      left lower Quadrant     HERNIA REPAIR      LIPOMA RESECTION      RI REPAIR ING HERNIA,5+Y/O,REDUCIBL Bilateral 2018    Procedure: INGUINAL HERNIA REPAIR;  Surgeon: Laury Rodriguez MD;  Location:  MAIN OR;  Service: General    SHOULDER SURGERY  2019    Dr Bette Tolliver [facility]   [id number] carotid occlusion      Family History:     Family History   Problem Relation Age of Onset    Heart attack Father     Hiatal hernia Father     Diabetes Father     Heart disease Mother     Cancer Brother     Diabetes Brother     Heart disease Brother     Hypertension Brother       Social History:     Social History     Tobacco Use   Smoking Status Former Smoker    Last attempt to quit:     Years since quittin 5   Smokeless Tobacco Never Used     Social History     Substance and Sexual Activity   Alcohol Use No     Social History     Substance and Sexual Activity   Drug Use No      Medications and Allergies:     Current Outpatient Medications   Medication Sig Dispense Refill    acetaminophen (TYLENOL) 500 mg tablet Take 1,000 mg by mouth as needed for mild pain      bimatoprost (LUMIGAN) 0 01 % ophthalmic drops Administer 1 drop to both eyes daily at bedtime for 30 days 1 5 mL 0    ELIQUIS 5 MG Take 1 tablet (5 mg total) by mouth 2 (two) times a day 60 tablet 2    ezetimibe-simvastatin (VYTORIN) 10-40 mg per tablet Take 1 tablet by mouth daily at bedtime 30 tablet 2    famotidine (PEPCID) 10 mg tablet Take 10 mg by mouth as needed       ferrous sulfate 325 (65 Fe) mg tablet Take 325 mg by mouth daily with breakfast      Insulin Pen Needle (BD PEN NEEDLE CORNELL U/F) 32G X 4 MM MISC by Does not apply route 4 (four) times a day 400 each 3    LEVEMIR FLEXTOUCH 100 units/mL injection pen Inject 5 Units as directed daily at bedtime  4    lisinopril (ZESTRIL) 5 mg tablet Take 1 tablet (5 mg total) by mouth daily 30 tablet 2    LORazepam (ATIVAN) 0 5 mg tablet Take 2 tablets (1 mg total) by mouth daily at bedtime 60 tablet 2    metFORMIN (GLUCOPHAGE) 500 mg tablet Take 1 tablet (500 mg total) by mouth 2 (two) times a day with meals 60 tablet 2    metoprolol tartrate (LOPRESSOR) 50 mg tablet Take 1 tablet (50 mg total) by mouth every 12 (twelve) hours for 30 days 60 tablet 2    NOVOLOG FLEXPEN 100 units/mL injection pen 4 units with meals 5 pen 5    ONE TOUCH ULTRA TEST test strip Testing four times daily as directed 400 each 1    ONETOUCH DELICA LANCETS FINE MISC Use one lancet to test blood 4 times a day 400 each 1     No current facility-administered medications for this visit  No Known Allergies   Immunizations:     Immunization History   Administered Date(s) Administered    INFLUENZA 11/02/2016, 10/16/2017, 09/18/2018, 09/18/2018    Influenza, Quadrivalent (nasal) 11/02/2016    Pneumococcal Polysaccharide PPV23 03/17/2003    Zoster 07/09/2010      Medicare Screening Tests and Risk Assessments:     Sofia Chandler is here for her Initial Wellness visit  Health Risk Assessment:  Patient rates overall health as good  Patient feels that their physical health rating is Slightly worse  Eyesight was rated as Slightly worse  Hearing was rated as Same  Patient feels that their emotional and mental health rating is Slightly worse  Pain experienced by patient in the last 7 days has been Some  Emotional/Mental Health:  Patient has been feeling nervous/anxious  PHQ-9 Depression Screening:    Frequency of the following problems over the past two weeks:      1  Little interest or pleasure in doing things: 0 - not at all      2   Feeling down, depressed, or hopeless: 0 - not at all  PHQ-2 Score: 0          Broken Bones/Falls: Fall Risk Assessment:    In the past year, patient has experienced: No history of falling in past year          Bladder/Bowel:  Patient has leaked urine accidently in the last six months  Patient reports no loss of bowel control  Immunizations:  Patient has had a flu vaccination within the last year  Patient has received a pneumonia shot  Patient has received a shingles shot  Patient has not received tetanus/diphtheria shot  Home Safety:  Patient does not have trouble with stairs inside or outside of their home  Patient currently reports that there are no safety hazards present in home, working smoke alarms,     Preventative Screenings:   Breast cancer screening performed, 4/15/2014  no colon cancer screen completed, cholesterol screen completed, 6/26/2019  glaucoma eye exam completed, 7/1/2019      Nutrition:  Current diet: Diabetic, Low Cholesterol and Limited junk food with servings of the following:    Medications:  Patient is currently taking over-the-counter supplements  List of OTC medications includes: see med list  Patient is able to manage medications  Lifestyle Choices:  Patient reports no tobacco use  Patient has smoked or used tobacco in the past   Patient has stopped her tobacco use  Patient reports no alcohol use  Patient does not drive a vehicle  Patient wears seat belt  Current level of exercise of physical activity described by patient as: minimal         Activities of Daily Living:  Can get out of bed by his or her self, able to dress self, able to make own meals, able to do own shopping, able to bathe self, can do own laundry/housekeeping, can manage own money, pay bills and track expenses    Previous Hospitalizations:  Hospitalization or ED visit in past 12 months  Number of hospitalizations within the last year: 1-2        Advanced Directives:  Patient has decided on a power of     Patient has spoken to designated power of   Patient has completed advanced directive  Preventative Screening/Counseling:      Cardiovascular:      General: Screening Current          Diabetes:      General: Screening Current          Colorectal Cancer:      General: Screening Not Indicated          Breast Cancer:      General: Patient Declines          Cervical Cancer:      General: Screening Not Indicated          Osteoporosis:      General: Risks and Benefits Discussed      Counseling: Calcium and Vitamin D Intake and Regular Weightbearing Exercise      Due for studies: DXA Axial          AAA:      General: Screening Not Indicated          Glaucoma:      General: Screening Current          HIV:      General: Screening Not Indicated          Hepatitis C:      General: Screening Not Indicated        Advanced Directives:   Patient has living will for healthcare, has durable POA for healthcare, patient has an advanced directive  Immunizations:   Additional Comments: Pneumovax up to date  Prevnar 15 given today  Will consider Shingrix

## 2019-07-25 NOTE — PATIENT INSTRUCTIONS
Obesity   AMBULATORY CARE:   Obesity  is when your body mass index (BMI) is greater than 30  Your healthcare provider will use your height and weight to measure your BMI  The risks of obesity include  many health problems, such as injuries or physical disability  You may need tests to check for the following:  · Diabetes     · High blood pressure or high cholesterol     · Heart disease     · Gallbladder or liver disease     · Cancer of the colon, breast, prostate, liver, or kidney     · Sleep apnea     · Arthritis or gout  Seek care immediately if:   · You have a severe headache, confusion, or difficulty speaking  · You have weakness on one side of your body  · You have chest pain, sweating, or shortness of breath  Contact your healthcare provider if:   · You have symptoms of gallbladder or liver disease, such as pain in your upper abdomen  · You have knee or hip pain and discomfort while walking  · You have symptoms of diabetes, such as intense hunger and thirst, and frequent urination  · You have symptoms of sleep apnea, such as snoring or daytime sleepiness  · You have questions or concerns about your condition or care  Treatment for obesity  focuses on helping you lose weight to improve your health  Even a small decrease in BMI can reduce the risk for many health problems  Your healthcare provider will help you set a weight-loss goal   · Lifestyle changes  are the first step in treating obesity  These include making healthy food choices and getting regular physical activity  Your healthcare provider may suggest a weight-loss program that involves coaching, education, and therapy  · Medicine  may help you lose weight when it is used with a healthy diet and physical activity  · Surgery  can help you lose weight if you are very obese and have other health problems  There are several types of weight-loss surgery  Ask your healthcare provider for more information    Be successful losing weight:   · Set small, realistic goals  An example of a small goal is to walk for 20 minutes 5 days a week  Anther goal is to lose 5% of your body weight  · Tell friends, family members, and coworkers about your goals  and ask for their support  Ask a friend to lose weight with you, or join a weight-loss support group  · Identify foods or triggers that may cause you to overeat , and find ways to avoid them  Remove tempting high-calorie foods from your home and workplace  Place a bowl of fresh fruit on your kitchen counter  If stress causes you to eat, then find other ways to cope with stress  · Keep a diary to track what you eat and drink  Also write down how many minutes of physical activity you do each day  Weigh yourself once a week and record it in your diary  Eating changes: You will need to eat 500 to 1,000 fewer calories each day than you currently eat to lose 1 to 2 pounds a week  The following changes will help you cut calories:  · Eat smaller portions  Use small plates, no larger than 9 inches in diameter  Fill your plate half full of fruits and vegetables  Measure your food using measuring cups until you know what a serving size looks like  · Eat 3 meals and 1 or 2 snacks each day  Plan your meals in advance  [facility] and eat at home most of the time  Eat slowly  · Eat fruits and vegetables at every meal   They are low in calories and high in fiber, which makes you feel full  Do not add butter, margarine, or cream sauce to vegetables  Use herbs to season steamed vegetables  · Eat less fat and fewer fried foods  Eat more baked or grilled chicken and fish  These protein sources are lower in calories and fat than red meat  Limit fast food  Dress your salads with olive oil and vinegar instead of bottled dressing  · Limit the amount of sugar you eat  Do not drink sugary beverages  Limit alcohol  Activity changes:  Physical activity is good for your body in many ways   It helps you burn calories and build strong muscles  It decreases stress and depression, and improves your mood  It can also help you sleep better  Talk to your healthcare provider before you begin an exercise program   · Exercise for at least 30 minutes 5 days a week  Start slowly  Set aside time each day for physical activity that you enjoy and that is convenient for you  It is best to do both weight training and an activity that increases your heart rate, such as walking, bicycling, or swimming  · Find ways to be more active  Do yard work and housecleaning  Walk up the stairs instead of using elevators  Spend your leisure time going to events that require walking, such as outdoor festivals or fairs  This extra physical activity can help you lose weight and keep it off  Follow up with your healthcare provider as directed: You may need to meet with a dietitian  Write down your questions so you remember to ask them during your visits  © 2017 [employer] Information is for End User's use only and may not be sold, redistributed or otherwise used for commercial purposes  All illustrations and images included in CareNotes® are the copyrighted property of [employer]  or Jakob Harmon  The above information is an  only  It is not intended as medical advice for individual conditions or treatments  Talk to your doctor, nurse or pharmacist before following any medical regimen to see if it is safe and effective for you  Urinary Incontinence   WHAT YOU NEED TO KNOW:   What is urinary incontinence? Urinary incontinence (UI) is when you lose control of your bladder  What causes UI? UI occurs because your bladder cannot store or empty urine properly  The following are the most common types of UI:  · Stress incontinence  is when you leak urine due to increased bladder pressure  This may happen when you cough, sneeze, or exercise       · Urge incontinence  is when you feel the need to urinate right away and leak urine accidentally  · Mixed incontinence  is when you have both stress and urge UI  What are the signs and symptoms of UI?   · You feel like your bladder does not empty completely when you urinate  · You urinate often and need to urinate immediately  · You leak urine when you sleep, or you wake up with the urge to urinate  · You leak urine when you cough, sneeze, exercise, or laugh  How is UI diagnosed? Your healthcare provider will ask how often you leak urine and whether you have stress or urge symptoms  Tell him which medicines you take, how often you urinate, and how much liquid you drink each day  You may need any of the following tests:  · Urine tests  may show infection or kidney function  · A pelvic exam  may be done to check for blockages  A pelvic exam will also show if your bladder, uterus, or other organs have moved out of place  · An x-ray, ultrasound, or CT  may show problems with parts of your urinary system  You may be given contrast liquid to help your organs show up better in the pictures  Tell the healthcare provider if you have ever had an allergic reaction to contrast liquid  Do not enter the MRI room with anything metal  Metal can cause serious injury  Tell the healthcare provider if you have any metal in or on your body  · A bladder scan  will show how much urine is left in your bladder after you urinate  You will be asked to urinate and then healthcare providers will use a small ultrasound machine to check the urine left in your bladder  · Cystometry  is used to check the function of your urinary system  Your healthcare provider checks the pressure in your bladder while filling it with fluid  Your bladder pressure may also be tested when your bladder is full and while you urinate  How is UI treated? · Medicines  can help strengthen your bladder control      · Electrical stimulation  is used to send a small amount of electrical energy to your pelvic floor muscles  This helps control your bladder function  Electrodes may be placed outside your body or in your rectum  For women, the electrodes may be placed in the vagina  · A bulking agent  may be injected into the wall of your urethra to make it thicker  This helps keep your urethra closed and decreases urine leakage  · Devices  such as a clamp, pessary, or tampon may help stop urine leaks  Ask your healthcare provider for more information about these and other devices  · Surgery  may be needed if other treatments do not work  Several types of surgery can help improve your bladder control  Ask your healthcare provider for more information about the surgery you may need  How can I manage my symptoms? · Do pelvic muscle exercises often  Your pelvic muscles help you stop urinating  Squeeze these muscles tight for 5 seconds, then relax for 5 seconds  Gradually work up to squeezing for 10 seconds  Do 3 sets of 15 repetitions a day, or as directed  This will help strengthen your pelvic muscles and improve bladder control  · A catheter  may be used to help empty your bladder  A catheter is a tiny, plastic tube that is put into your bladder to drain your urine  Your healthcare provider may tell you to use a catheter to prevent your bladder from getting too full and leaking urine  · Keep a UI record  Write down how often you leak urine and how much you leak  Make a note of what you were doing when you leaked urine  · Train your bladder  Go to the bathroom at set times, such as every 2 hours, even if you do not feel the urge to go  You can also try to hold your urine when you feel the urge to go  For example, hold your urine for 5 minutes when you feel the urge to go  As that becomes easier, hold your urine for 10 minutes  · Drink liquids as directed  Ask your healthcare provider how much liquid to drink each day and which liquids are best for you   You may need to limit the amount of liquid you drink to help control your urine leakage  Limit or do not have drinks that contain caffeine or alcohol  Do not drink any liquid right before you go to bed  · Prevent constipation  Eat a variety of high-fiber foods  Good examples are high-fiber cereals, beans, vegetables, and whole-grain breads  Prune juice may help make your bowel movement softer  Walking is the best way to trigger your intestines to have a bowel movement  · Exercise regularly and maintain a healthy weight  Ask your healthcare provider how much you should weigh and about the best exercise plan for you  Weight loss and exercise will decrease pressure on your bladder and help you control your leakage  Ask him to help you create a weight loss plan if you are overweight  When should I seek immediate care? · You have severe pain  · You are confused or cannot think clearly  When should I contact my healthcare provider? · You have a fever  · You see blood in your urine  · You have pain when you urinate  · You have new or worse pain, even after treatment  · Your mouth feels dry or you have vision changes  · Your urine is cloudy or smells bad  · You have questions or concerns about your condition or care  CARE AGREEMENT:   You have the right to help plan your care  Learn about your health condition and how it may be treated  Discuss treatment options with your caregivers to decide what care you want to receive  You always have the right to refuse treatment  The above information is an  only  It is not intended as medical advice for individual conditions or treatments  Talk to your doctor, nurse or pharmacist before following any medical regimen to see if it is safe and effective for you  © 2017 [employer] Information is for End User's use only and may not be sold, redistributed or otherwise used for commercial purposes   All illustrations and images included in CareNotes® are the copyrighted property of [employer]  or Jakob Harmon  Cigarette Smoking and Your Health   AMBULATORY CARE:   Risks to your health if you smoke:  Nicotine and other chemicals found in tobacco damage every cell in your body  Even if you are a light smoker, you have an increased risk for cancer, heart disease, and lung disease  If you are pregnant or have diabetes, smoking increases your risk for complications  Benefits to your health if you stop smoking:   · You decrease respiratory symptoms such as coughing, wheezing, and shortness of breath  · You reduce your risk for cancers of the lung, mouth, throat, kidney, bladder, pancreas, stomach, and cervix  If you already have cancer, you increase the benefits of chemotherapy  You also reduce your risk for cancer returning or a second cancer from developing  · You reduce your risk for heart disease, blood clots, heart attack, and stroke  · You reduce your risk for lung infections, and diseases such as pneumonia, asthma, chronic bronchitis, and emphysema  · Your circulation improves  More oxygen can be delivered to your body  If you have diabetes, you lower your risk for complications, such as kidney, artery, and eye diseases  You also lower your risk for nerve damage  Nerve damage can lead to amputations, poor vision, and blindness  · You improve your body's ability to heal and to fight infections  Benefits to the health of others if you stop smoking:  Tobacco is harmful to nonsmokers who breathe in your secondhand smoke  The following are ways the health of others around you may improve when you stop smoking:  · You lower the risks for lung cancer and heart disease in nonsmoking adults  · If you are pregnant, you lower the risk for miscarriage, early delivery, low birth weight, and stillbirth  You also lower your baby's risk for SIDS, obesity, developmental delay, and neurobehavioral problems, such as ADHD  · If you have children, you lower their risk for ear infections, colds, pneumonia, bronchitis, and asthma  For more information and support to stop smoking:   · [employer]  gov  Phone: 1- 214 - 983-7910  Web Address: www smokefree  gov  Follow up with your healthcare provider as directed:  Write down your questions so you remember to ask them during your visits  © 2017 [employer] Information is for End User's use only and may not be sold, redistributed or otherwise used for commercial purposes  All illustrations and images included in CareNotes® are the copyrighted property of [employer]  or Jakob Harmon  The above information is an  only  It is not intended as medical advice for individual conditions or treatments  Talk to your doctor, nurse or pharmacist before following any medical regimen to see if it is safe and effective for you  Fall Prevention   AMBULATORY CARE:   Fall prevention  includes ways to make your home and other areas safer  It also includes ways you can move more carefully to prevent a fall  Health conditions that cause changes in your blood pressure, vision, or muscle strength and coordination may increase your risk for falls  Medicines may also increase your risk for falls if they make you dizzy, weak, or sleepy  Call 911 or have someone else call if:   · You have fallen and are unconscious  · You have fallen and cannot move part of your body  Contact your healthcare provider if:   · You have fallen and have pain or a headache  · You have questions or concerns about your condition or care  Fall prevention tips:   · Stand or sit up slowly  This may help you keep your balance and prevent falls  · Use assistive devices as directed  Your healthcare provider may suggest that you use a cane or walker to help you keep your balance  You may need to have grab bars put in your bathroom near the toilet or in the shower      · Wear shoes that fit well and have soles that   Wear shoes both inside and outside  Use slippers with good   Do not wear shoes with high heels  · Wear a personal alarm  This is a device that allows you to call 911 if you fall and need help  Ask your healthcare provider for more information  · Stay active  Exercise can help strengthen your muscles and improve your balance  Your healthcare provider may recommend water aerobics or walking  He or she may also recommend physical therapy to improve your coordination  Never start an exercise program without talking to your healthcare provider first      · Manage your medical conditions  Keep all appointments with your healthcare providers  Visit your eye doctor as directed  Home safety tips:   · Add items to prevent falls in the bathroom  Put nonslip strips on your bath or shower floor to prevent you from slipping  Use a bath mat if you do not have carpet in the bathroom  This will prevent you from falling when you step out of the bath or shower  Use a shower seat so you do not need to stand while you shower  Sit on the toilet or a chair in your bathroom to dry yourself and put on clothing  This will prevent you from losing your balance from drying or dressing yourself while you are standing  · Keep paths clear  Remove books, shoes, and other objects from walkways and stairs  Place cords for telephones and lamps out of the way so that you do not need to walk over them  Tape them down if you cannot move them  Remove small rugs  If you cannot remove a rug, secure it with double-sided tape  This will prevent you from tripping  · Install bright lights in your home  Use night lights to help light paths to the bathroom or kitchen  Always turn on the light before you start walking  · Keep items you use often on shelves within reach  Do not use a step stool to help you reach an item  · Paint or place reflective tape on the edges of your stairs    This will help you see the stairs better  Follow up with your healthcare provider as directed:  Write down your questions so you remember to ask them during your visits  © 2017 [employer] Information is for End User's use only and may not be sold, redistributed or otherwise used for commercial purposes  All illustrations and images included in CareNotes® are the copyrighted property of [employer]  or Jakob Harmon  The above information is an  only  It is not intended as medical advice for individual conditions or treatments  Talk to your doctor, nurse or pharmacist before following any medical regimen to see if it is safe and effective for you  Advance Directives   WHAT YOU NEED TO KNOW:   What are advance directives? Advance directives are legal documents that state your wishes and plans for medical care  These plans are made ahead of time in case you lose your ability to make decisions for yourself  Advance directives can apply to any medical decision, such as the treatments you want, and if you want to donate organs  What are the types of advance directives? There are many types of advance directives, and each state has rules about how to use them  You may choose a combination of any of the following:  · Living will: This is a written record of the treatment you want  You can also choose which treatments you do not want, which to limit, and which to stop at a certain time  This includes surgery, medicine, IV fluid, and tube feedings  · Durable power of  for healthcare [place] [facility]): This is a written record that states who you want to make healthcare choices for you when you are unable to make them for yourself  This person, called a proxy, is usually a family member or a friend  You may choose more than 1 proxy  · Do not resuscitate (DNR) order:  A DNR order is used in case your heart stops beating or you stop breathing   It is a request not to have certain forms of treatment, such as CPR  A DNR order may be included in other types of advance directives  · Medical directive: This covers the care that you want if you are in a coma, near death, or unable to make decisions for yourself  You can list the treatments you want for each condition  Treatment may include pain medicine, surgery, blood transfusions, dialysis, IV or tube feedings, and a ventilator (breathing machine)  · Values history: This document has questions about your views, beliefs, and how you feel and think about life  This information can help others choose the care that you would choose  Why are advance directives important? An advance directive helps you control your care  Although spoken wishes may be used, it is better to have your wishes written down  Spoken wishes can be misunderstood, or not followed  Treatments may be given even if you do not want them  An advance directive may make it easier for your family to make difficult choices about your care  How do I decide what to put in my advance directives? · Make informed decisions:  Make sure you fully understand treatments or care you may receive  Think about the benefits and problems your decisions could cause for you or your family  Talk to healthcare providers if you have concerns or questions before you write down your wishes  You may also want to talk with your [employer]regation or , or a   Check your state laws to make sure that what you put in your advance directive is legal      · Sign all forms:  Sign and date your advance directive when you have finished  You may also need 2 witnesses to sign the forms  Witnesses cannot be your doctor or his staff, your spouse, heirs or beneficiaries, people you owe money to, or your chosen proxy  Talk to your family, proxy, and healthcare providers about your advance directive  Give each person a copy, and keep one for yourself in a place you can get to easily   Do not keep it hidden or locked away  · Review and revise your plans: You can revise your advance directive at any time, as long as you are able to make decisions  Review your plan every year, and when there are changes in your life, or your health  When you make changes, let your family, proxy, and healthcare providers know  Give each a new copy  Where can I find more information? · [employer]  [address] , [place]  Phone: 4- 050 - 723-0181  Phone: 5- 357 - [phone]  Web Address: http://www  aafp org  · [address] [facility])  [address] [address] [facility], [address] [facility] , [address] [place] [address]  Phone: 3- [phone]  Phone: [phone]  Web Address: Gabe olivo  CARE AGREEMENT:   You have the right to help plan your care  To help with this plan, you must learn about your health condition and treatment options  You must also learn about advance directives and how they are used  Work with your healthcare providers to decide what care will be used to treat you  You always have the right to refuse treatment  The above information is an  only  It is not intended as medical advice for individual conditions or treatments  Talk to your doctor, nurse or pharmacist before following any medical regimen to see if it is safe and effective for you  © 2017 [employer] [employer] Information is for End User's use only and may not be sold, redistributed or otherwise used for commercial purposes  All illustrations and images included in CareNotes® are the copyrighted property of [employer]  or Jakob Harmon

## 2019-07-25 NOTE — PROGRESS NOTES
FAMILY PRACTICE OFFICE VISIT       NAME: Thu Hurd  AGE: 82 y o  SEX: female       : 1935        MRN: [id number]    DATE: 2019    Assessment and Plan     Problem List Items Addressed This Visit        Endocrine    Type 2 diabetes mellitus with diabetic polyneuropathy, with long-term current use of insulin (HCC) - Primary    Relevant Orders    Comprehensive metabolic panel    CBC    Lipid panel    TSH, 3rd generation    Hemoglobin A1C       Cardiovascular and Mediastinum    HTN (hypertension)    Relevant Orders    Comprehensive metabolic panel    CBC    Lipid panel    TSH, 3rd generation    Hemoglobin A1C    Paroxysmal atrial fibrillation (HCC)       Musculoskeletal and Integument    Osteopenia    Relevant Orders    DXA bone density spine hip and pelvis      Other Visit Diagnoses     Post-menopause        Relevant Orders    DXA bone density spine hip and pelvis    Status post replacement of right shoulder joint        Xerosis of skin        Encounter for immunization        Relevant Orders    PNEUMOCOCCAL CONJUGATE VACCINE 13-VALENT GREATER THAN 6 MONTHS (Completed)    Initial Medicare annual wellness visit            1  Type 2 diabetes mellitus with diabetic polyneuropathy, with long-term current use of insulin (HCC)  A1c 7 4%  Following with endocrinology  Recent insulin adjustments made for hypoglycemia episodes  No hypoglycemia in the last 3-4 days since changes were made  Using Levemir 5 units at bedtime  Using NovoLog 4 units with every meal    Metformin 500 mg b i d      Will repeat blood work in September  Comprehensive metabolic panel    CBC    Lipid panel    TSH, 3rd generation    Hemoglobin A1C   2  Essential hypertension  Stable on current medication metoprolol 75 mg twice daily, lisinopril 5 mg daily  Comprehensive metabolic panel    CBC    Lipid panel    TSH, 3rd generation    Hemoglobin A1C   3   Osteopenia, unspecified location  Recommend repeat DEXA scan given recent fall and shoulder fracture, last DEXA scan was in 2005  DXA bone density spine hip and pelvis   4  Post-menopause  DXA bone density spine hip and pelvis   5  Paroxysmal atrial fibrillation (HCC)  Regular rate and rhythm on auscultation today  Pacemaker in place  Following with Cardiology, Dr Nancy Donnelly  Anticoagulated on Eliquis  Rate controlled with metoprolol  6  Status post replacement of right shoulder joint  Slowly regaining strength and mobility of right shoulder with physical therapy  Has established with Dr Lupe Cartagena for continuing care post shoulder replacement completed in [place]  7  Xerosis of skin  Recommend showering every other day  Gentle soap, such as Dove  Apply Eucerin cream or Aquaphor cream 1-2 times daily  8  Cough Persistent cough over the past few months  Suspect cough is allergic in origin  She did have a chest x-ray in May, which was negative  Will change from Claritin to Zyrtec 10 mg at bedtime  She will call me if cough is not improving over the next 2 weeks  9  Encounter for immunization  Prevnar 13 administered today  PNEUMOCOCCAL CONJUGATE VACCINE 13-VALENT GREATER THAN 6 MONTHS   10  Initial Medicare annual wellness visit       Repeat blood work and follow-up in September  Chief Complaint     Chief Complaint   Patient presents with   [facility]     Initial AWV    Follow-up     Pt is here for 1 mos f/u blood work       History of Present Illness     Sheba Diaz is an 82-year-old female presenting today for follow-up visit  She has established with Dr Lupe Cartagena for continuing care of recent right shoulder replacement completed in [place] after a fall  Following with endocrinology regarding diabetes  Recent episodes of hypoglycemia  She has adjusted insulin, and has had no hypoglycemia over the past 3-4 days  Has persistent dry cough for the past couple of months  She does have postnasal drip    Cough occurs with talking, slouching  Cough is not bothersome at night  Is worse in the morning when she wakes up  She is taking Claritin daily  She has been taking Claritin for many years  Recently started lisinopril 5 mg daily for renal protection  Cough was present prior to starting lisinopril  She does have some sneezing and runny nose  Had a chest x-ray at urgent care in May which was normal      Poor appetite since shoulder surgery  She does monitor her weight daily at home, it has been stable  Review of Systems   Review of Systems   Constitutional: Negative  HENT: Positive for postnasal drip, rhinorrhea and sneezing  Negative for congestion, ear pain, sinus pressure and sore throat  Respiratory: Positive for cough  Cardiovascular: Negative  Gastrointestinal: Negative  Genitourinary: Negative  Musculoskeletal: Positive for arthralgias (right shoulder)  Skin:        dry   Neurological: Positive for dizziness (chronic mild dizziness at times)  Psychiatric/Behavioral: Negative          Active Problem List     Patient Active Problem List   Diagnosis    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, with long-term current use of insulin ([facility] )    HTN (hypertension)    HLD (hyperlipidemia)    Glaucoma    Bilateral carotid artery disease (HCC)    Symptomatic PVCs    Occlusion of right carotid artery    Stenosis of left carotid artery    Paroxysmal atrial fibrillation (HCC)    Presence of permanent cardiac pacemaker    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, with long-term current use of insulin (HCC)    Type 2 diabetes mellitus with diabetic polyneuropathy, with long-term current use of insulin (HCC)    Osteopenia    GERD (gastroesophageal reflux disease)    Anxiety    Type II diabetes mellitus, uncontrolled ([address] )       Past Medical History:  Past Medical History:   Diagnosis Date    Diabetes mellitus ([facility] )     Glaucoma     H/O degenerative disc disease     Hyperlipidemia     Hypertension     Peripheral neuropathy     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 ([facility] )        Past Surgical History:  Past Surgical History:   Procedure Laterality Date    CATARACT EXTRACTION, BILATERAL      CYST REMOVAL      left lower Quadrant     HERNIA REPAIR      LIPOMA RESECTION      MT REPAIR ING HERNIA,5+Y/O,REDUCIBL Bilateral 2018    Procedure: INGUINAL HERNIA REPAIR;  Surgeon: Raymond Menchaca MD;  Location: [facility];  Service: General    SHOULDER SURGERY  2019    Dr Cortez [facility]   [address] carotid occlusion       Family History:  Family History   Problem Relation Age of Onset    Heart attack Father     Hiatal hernia Father     Diabetes Father     Heart disease Mother     Cancer Brother     Diabetes Brother     Heart disease Brother     Hypertension Brother        Social History:  Social History     Socioeconomic History    Marital status:      Spouse name: Not on file    Number of children: Not on file    Years of education: Not on file    Highest education level: Not on file   Occupational History    Not on file   Social Needs    Financial resource strain: Not on file    Food insecurity:     Worry: Not on file     Inability: Not on file    Transportation needs:     Medical: Not on file     Non-medical: Not on file   Tobacco Use    Smoking status: Former Smoker     Last attempt to quit:      Years since quittin 5    Smokeless tobacco: Never Used   Substance and Sexual Activity    Alcohol use: No    Drug use: No    Sexual activity: Not on file   Lifestyle    Physical activity:     Days per week: Not on file     Minutes per session: Not on file    Stress: Not on file   Relationships    Social connections:     Talks on phone: Not on file     Gets together: Not on file     Attends Bahai service: Not on file     Active member of club or organization: Not on file     Attends meetings of clubs or organizations: Not on file     Relationship status: Not on file    Intimate partner violence:     Fear of current or ex partner: Not on file     Emotionally abused: Not on file     Physically abused: Not on file     Forced sexual activity: Not on file   Other Topics Concern    Not on file   Social History Narrative    Lives [place]    2 children       I have reviewed the patient's medical history in detail; there are no changes to the history as noted in the electronic medical record  Objective     Vitals:    07/25/19 1003 07/25/19 1040   BP: 150/90 120/80   Pulse: 68    Resp: 16    Temp: 98 °F (36 7 °C)    TempSrc: Tympanic    SpO2: 98%    Weight: 67 kg (147 lb 12 8 oz)    Height: 5' 6 54" (1 69 m)      Wt Readings from Last 3 Encounters:   07/25/19 67 kg (147 lb 12 8 oz)   07/05/19 68 kg (150 lb)   06/25/19 68 2 kg (150 lb 6 4 oz)     Body mass index is 23 47 kg/m²  PHQ-9 Depression Screening    PHQ-9:    Frequency of the following problems over the past two weeks:            Physical Exam   Constitutional: She is oriented to person, place, and time  She appears well-developed and well-nourished  No distress  HENT:   Head: Normocephalic and atraumatic  Right Ear: Tympanic membrane and ear canal normal    Left Ear: Tympanic membrane and ear canal normal    Nose: Nose normal    Mouth/Throat: Uvula is midline and oropharynx is clear and moist    Eyes: Pupils are equal, round, and reactive to light  Conjunctivae and EOM are normal    Neck: Normal range of motion  Neck supple  Carotid bruit is not present  No thyromegaly present  Cardiovascular: Normal rate and regular rhythm  No murmur heard  Pulmonary/Chest: Effort normal and breath sounds normal    Abdominal: Soft  Bowel sounds are normal    Musculoskeletal: Normal range of motion     Lymphadenopathy:     She has no cervical adenopathy  Neurological: She is alert and oriented to person, place, and time  Skin: No rash noted  Psychiatric: She has a normal mood and affect  Nursing note and vitals reviewed  ALLERGIES:  No Known Allergies    Current Medications     Current Outpatient Medications   Medication Sig Dispense Refill    acetaminophen (TYLENOL) 500 mg tablet Take 1,000 mg by mouth as needed for mild pain      bimatoprost (LUMIGAN) 0 01 % ophthalmic drops Administer 1 drop to both eyes daily at bedtime for 30 days 1 5 mL 0    ELIQUIS 5 MG Take 1 tablet (5 mg total) by mouth 2 (two) times a day 60 tablet 2    ezetimibe-simvastatin (VYTORIN) 10-40 mg per tablet Take 1 tablet by mouth daily at bedtime 30 tablet 2    famotidine (PEPCID) 10 mg tablet Take 10 mg by mouth as needed       ferrous sulfate 325 (65 Fe) mg tablet Take 325 mg by mouth daily with breakfast      Insulin Pen Needle (BD PEN NEEDLE CORNELL U/F) 32G X 4 MM MISC by Does not apply route 4 (four) times a day 400 each 3    LEVEMIR FLEXTOUCH 100 units/mL injection pen Inject 5 Units as directed daily at bedtime  4    lisinopril (ZESTRIL) 5 mg tablet Take 1 tablet (5 mg total) by mouth daily 30 tablet 2    LORazepam (ATIVAN) 0 5 mg tablet Take 2 tablets (1 mg total) by mouth daily at bedtime 60 tablet 2    metFORMIN (GLUCOPHAGE) 500 mg tablet Take 1 tablet (500 mg total) by mouth 2 (two) times a day with meals 60 tablet 2    metoprolol tartrate (LOPRESSOR) 50 mg tablet Take 1 tablet (50 mg total) by mouth every 12 (twelve) hours for 30 days (Patient taking differently: Take 75 mg by mouth every 12 (twelve) hours ) 60 tablet 2    NOVOLOG FLEXPEN 100 units/mL injection pen 4 units with meals 5 pen 5    ONE TOUCH ULTRA TEST test strip Testing four times daily as directed 400 each 1    ONETOUCH DELICA LANCETS FINE MISC Use one lancet to test blood 4 times a day 400 each 1     No current facility-administered medications for this visit            Health Maintenance     Health Maintenance   Topic Date Due    Medicare Annual Wellness Visit (AWV)  1935    PT PLAN OF CARE  1935    HEPATITIS B VACCINES (1 of 3 - Risk 3-dose series) 12/01/1956    Falls: Plan of Care  12/01/2002    INFLUENZA VACCINE  07/01/2019    DTaP,Tdap,and Td Vaccines (1 - Tdap) 06/17/2020 (Originally 12/1/1956)    HEMOGLOBIN A1C  12/26/2019    DM Eye Exam  01/10/2020    Diabetic Foot Exam  06/24/2020    Fall Risk  07/25/2020    Depression Screening PHQ  07/25/2020    Urinary Incontinence Screening  07/25/2020    BMI: Adult  07/25/2020    Pneumococcal Vaccine: 65+ Years  Completed    Pneumococcal Vaccine: Pediatrics (0 to 5 Years) and At-Risk Patients (6 to 59 Years)  Aged [employer] History   Administered Date(s) Administered    INFLUENZA 11/02/2016, 10/16/2017, 09/18/2018, 09/18/2018    Influenza, Quadrivalent (nasal) 11/02/2016    Pneumococcal Conjugate 13-Valent 07/25/2019    Pneumococcal Polysaccharide PPV23 03/17/2003    Zoster 07/09/2010       ZANDER Dos Santos

## 2019-07-26 ENCOUNTER — OFFICE VISIT (OUTPATIENT)
Dept: PHYSICAL THERAPY | Facility: REHABILITATION | Age: 84
End: 2019-07-26
Payer: MEDICARE

## 2019-07-26 DIAGNOSIS — Z96.611 STATUS POST REVERSE TOTAL ARTHROPLASTY OF RIGHT SHOULDER: Primary | ICD-10-CM

## 2019-07-26 DIAGNOSIS — M25.511 ACUTE PAIN OF RIGHT SHOULDER: ICD-10-CM

## 2019-07-26 PROCEDURE — 97112 NEUROMUSCULAR REEDUCATION: CPT | Performed by: PHYSICAL THERAPIST

## 2019-07-26 PROCEDURE — 97140 MANUAL THERAPY 1/> REGIONS: CPT | Performed by: PHYSICAL THERAPIST

## 2019-07-26 PROCEDURE — 97110 THERAPEUTIC EXERCISES: CPT | Performed by: PHYSICAL THERAPIST

## 2019-07-26 NOTE — PROGRESS NOTES
Daily Note     Today's date: 2019  Patient name: Hi Malloy  : 1935  MRN: [id number]  Referring provider: Chey Del Cid  Dx:   Encounter Diagnosis     ICD-10-CM    1  Status post reverse total arthroplasty of right shoulder Z96 611    2  Acute pain of right shoulder M25 511        Start Time: 0900  Stop Time: 0950  Total time in clinic (min): 50 minutes    Subjective: I am feeling better today  Pain 1-2 at worst just feels weak  Objective: See treatment diary below      Assessment: Tolerated treatment well  Patient demonstrated fatigue post treatment, exhibited good technique with therapeutic exercises and would benefit from continued PT      Plan: Continue per plan of care  Progress treatment as tolerated         Precautions: Falls, hypotension, Follow attached Reverse Shoulder Protcol sx 19    Manual             PROM  LB LB           Upper trap STM  LB            Elbow PROM  LB LB                                         Exercise Diary             UBE  3/3 4/4           scap squeezes  GTB 2x15             Table slides FF abd  Ball 2x10  Ball FF abd 10x ea           Sleeper stretch gentle Follow protcol              JAGUAR terminal elbow ext              Mod tandem              SLS              FT FA EOEC             digiflex              AAROM FF ABD   2x10            Isometric ball 6 way              Cane FF abd ER  2x10 5-10" 10x ea           No moneys              T band ER/IR walkouts              T's Y's   2x12            Bicep curls w ext hold  2# 2x15 5" 2# 2x15           scap punches  1# 2x15 1# 2x15            sidelying ER   2x10                                          Modalities

## 2019-07-27 VITALS
RESPIRATION RATE: 16 BRPM | BODY MASS INDEX: 23.2 KG/M2 | SYSTOLIC BLOOD PRESSURE: 120 MMHG | DIASTOLIC BLOOD PRESSURE: 80 MMHG | HEIGHT: 67 IN | OXYGEN SATURATION: 98 % | HEART RATE: 68 BPM | TEMPERATURE: 98 F | WEIGHT: 147.8 LBS

## 2019-07-29 ENCOUNTER — OFFICE VISIT (OUTPATIENT)
Dept: PHYSICAL THERAPY | Facility: REHABILITATION | Age: 84
End: 2019-07-29
Payer: MEDICARE

## 2019-07-29 DIAGNOSIS — Z96.611 STATUS POST REVERSE TOTAL ARTHROPLASTY OF RIGHT SHOULDER: Primary | ICD-10-CM

## 2019-07-29 DIAGNOSIS — M25.511 ACUTE PAIN OF RIGHT SHOULDER: ICD-10-CM

## 2019-07-29 PROCEDURE — 97140 MANUAL THERAPY 1/> REGIONS: CPT | Performed by: PHYSICAL THERAPIST

## 2019-07-29 PROCEDURE — 97112 NEUROMUSCULAR REEDUCATION: CPT | Performed by: PHYSICAL THERAPIST

## 2019-07-29 PROCEDURE — 97110 THERAPEUTIC EXERCISES: CPT | Performed by: PHYSICAL THERAPIST

## 2019-07-29 NOTE — PROGRESS NOTES
Daily Note     Today's date: 2019  Patient name: Pepe Munoz  : 1935  MRN: [id number]  Referring provider: Erlin Amaro  Dx:   Encounter Diagnosis     ICD-10-CM    1  Status post reverse total arthroplasty of right shoulder Z96 611    2  Acute pain of right shoulder M25 511        Start Time: 0900  Stop Time: 0956  Total time in clinic (min): 56 minutes    Subjective: I am feeling pretty tired today not quite myself  Still struggling with regulating my blood sugars      Objective: See treatment diary below      Assessment: Tolerated treatment well  Pt started neuromuscular re-ed balance exercise to improve balance to decrease risk of falls which caused her fx shoulder  Patient demonstrated fatigue post treatment, exhibited good technique with therapeutic exercises and would benefit from continued PT      Plan: Continue per plan of care  Progress treatment as tolerated         Precautions: Falls, hypotension, Follow attached Reverse Shoulder Protcol sx 19    Manual            PROM  LB LB LB          Upper trap STM  LB            Elbow PROM  LB LB LB                                        Exercise Diary             UBE  3/3 4/4 4/4  Pulleys 5 min           scap squeezes  GTB 2x15   GTB 3x15          Table slides FF abd  Ball 2x10  Ball FF abd 10x ea Ball 2x20 abd   20x FF          T band IR/ER    OTB 1 band  3x10           JAGUAR terminal elbow ext              Mod tandem    10x R/L          SLS    5x           FT FA EOEC             digiflex              AAROM FF ABD   2x10            Isometric ball 6 way              Cane FF abd ER  2x10 5-10" 10x ea 10x           No moneys              T band ER/IR walkouts              T's Y's   2x12  3x10           Bicep curls w ext hold  2# 2x15 5" 2# 2x15           scap punches  1# 2x15 1# 2x15            sidelying ER   2x10                                          Modalities

## 2019-07-31 ENCOUNTER — OFFICE VISIT (OUTPATIENT)
Dept: PHYSICAL THERAPY | Facility: REHABILITATION | Age: 84
End: 2019-07-31
Payer: MEDICARE

## 2019-07-31 DIAGNOSIS — M25.511 ACUTE PAIN OF RIGHT SHOULDER: ICD-10-CM

## 2019-07-31 DIAGNOSIS — Z96.611 STATUS POST REVERSE TOTAL ARTHROPLASTY OF RIGHT SHOULDER: Primary | ICD-10-CM

## 2019-07-31 PROCEDURE — 97112 NEUROMUSCULAR REEDUCATION: CPT | Performed by: PHYSICAL THERAPY ASSISTANT

## 2019-07-31 PROCEDURE — 97110 THERAPEUTIC EXERCISES: CPT | Performed by: PHYSICAL THERAPY ASSISTANT

## 2019-07-31 PROCEDURE — 97140 MANUAL THERAPY 1/> REGIONS: CPT | Performed by: PHYSICAL THERAPY ASSISTANT

## 2019-07-31 NOTE — PROGRESS NOTES
Daily Note     Today's date: 2019  Patient name: Jamie Nicole  : 1935  MRN: [id number]  Referring provider: Hansa Larry  Dx:   Encounter Diagnosis     ICD-10-CM    1  Status post reverse total arthroplasty of right shoulder Z96 611    2  Acute pain of right shoulder M25 511                   Subjective:Pt reports she has been having a popping sensation in her shoulder recently  Pt states she mentioned it to the DR, but he didn't seem concerned about it  Pt feels like it may be getting worse  Objective: See treatment diary below      Assessment: Tolerated treatment well  No reports of popping sensation during TE this session  Fair tolerance to balance activities  Patient demonstrated fatigue post treatment, exhibited good technique with therapeutic exercises and would benefit from continued PT      Plan: Continue per plan of care  Progress treatment as tolerated         Precautions: Falls, hypotension, Follow attached Reverse Shoulder Protcol sx 19    Manual           PROM  LB LB LB RK         Upper trap STM  LB            Elbow PROM  LB LB LB RK                                       Exercise Diary           UBE  3/3 4/4 4/4  Pulleys 5 min  4/4  Pulleys  5'         scap squeezes  GTB 2x15   GTB 3x15 GTB  3x15         Table slides FF abd  Ball 2x10  Ball FF abd 10x ea Ball 2x20 abd   20x FF          T band IR/ER    OTB 1 band  3x10  OTB  3x10         JAGUAR terminal elbow ext              Mod tandem    10x R/L 30"x2 each         SLS    5x  10"x5 each         FT FA EOEC             digiflex              AAROM FF ABD   2x10            Isometric ball 6 way              Cane FF abd ER  2x10 5-10" 10x ea 10x  15x ea         No moneys              T band ER/IR walkouts              T's Y's   2x12  3x10  3x10         Bicep curls w ext hold  2# 2x15 5" 2# 2x15           scap punches  1# 2x15 1# 2x15  1#  2x15 1#  2x15         sidelying ER   2x10 3x10                                       Modalities

## 2019-08-02 ENCOUNTER — REMOTE DEVICE CLINIC VISIT (OUTPATIENT)
Dept: CARDIOLOGY CLINIC | Facility: CLINIC | Age: 84
End: 2019-08-02
Payer: MEDICARE

## 2019-08-02 ENCOUNTER — OFFICE VISIT (OUTPATIENT)
Dept: PHYSICAL THERAPY | Facility: REHABILITATION | Age: 84
End: 2019-08-02
Payer: MEDICARE

## 2019-08-02 DIAGNOSIS — Z96.611 STATUS POST REVERSE TOTAL ARTHROPLASTY OF RIGHT SHOULDER: Primary | ICD-10-CM

## 2019-08-02 DIAGNOSIS — M25.511 ACUTE PAIN OF RIGHT SHOULDER: ICD-10-CM

## 2019-08-02 DIAGNOSIS — Z95.0 CARDIAC PACEMAKER IN SITU: Primary | ICD-10-CM

## 2019-08-02 PROCEDURE — 97110 THERAPEUTIC EXERCISES: CPT | Performed by: PHYSICAL THERAPIST

## 2019-08-02 PROCEDURE — 93294 REM INTERROG EVL PM/LDLS PM: CPT | Performed by: INTERNAL MEDICINE

## 2019-08-02 PROCEDURE — 97112 NEUROMUSCULAR REEDUCATION: CPT | Performed by: PHYSICAL THERAPIST

## 2019-08-02 PROCEDURE — 97140 MANUAL THERAPY 1/> REGIONS: CPT | Performed by: PHYSICAL THERAPIST

## 2019-08-02 PROCEDURE — 93296 REM INTERROG EVL PM/IDS: CPT | Performed by: INTERNAL MEDICINE

## 2019-08-02 NOTE — PROGRESS NOTES
Daily Note     Today's date: 2019  Patient name: Josue Julio  : 1935  MRN: [id number]  Referring provider: Franchesca Santizo  Dx:   Encounter Diagnosis     ICD-10-CM    1  Status post reverse total arthroplasty of right shoulder Z96 611    2  Acute pain of right shoulder M25 511        Start Time: 902  Stop Time: 09  Total time in clinic (min): 54 minutes    Subjective: I have more achiness than pain 2/10       Objective: See treatment diary below      Assessment: Tolerated treatment well  Pt focus on increasing strength and intitating more functional reaching throughout the day  Patient demonstrated fatigue post treatment, exhibited good technique with therapeutic exercises and would benefit from continued PT      Plan: Continue per plan of care  Progress treatment as tolerated  Progress treament per protocol        Precautions: Falls, hypotension, Follow attached Reverse Shoulder Protcol sx 19    Manual   8/2        PROM  LB LB LB RK LB        Upper trap STM  LB            Elbow PROM  LB LB LB RK LB                                       Exercise Diary   8/2        UBE  3/3 4/4 4/4  Pulleys 5 min  4/4  Pulleys  5' 4/4        scap squeezes  GTB 2x15   GTB 3x15 GTB  3x15 GTB 3x15         Table slides FF abd  Ball 2x10  Ball FF abd 10x ea Ball 2x20 abd   20x FF  Ball roll table 10x 10' ff ABD         T band IR/ER    OTB 1 band  3x10  OTB  3x10         Bicep curls      1# 3x15         Mod tandem    10x R/L 30"x2 each         SLS    5x  10"x5 each         FT FA EOEC             digiflex              AAROM FF ABD   2x10            Isometric ball 6 way              Cane FF abd ER  2x10 5-10" 10x ea 10x  15x ea         No moneys              T band ER/IR      [id number]        T's Y's   2x12  3x10  3x10 15x 1#        Chest press 2# 2x15 5" 2# 2x15   1# 1/2 no # 1/2 2x10         scap punches  1# 2x15 1# 2x15  1#  2x15 1#  2x15 1# 3x15        sidelying ER 2x10   3x10                                       Modalities

## 2019-08-02 NOTE — PROGRESS NOTES
Results for orders placed or performed in visit on 08/02/19   Cardiac EP device report    Narrative    MDT DUAL PM  CARELINK TRANSMISSION: BATTERY VOLTAGE ADEQUATE (6 5 YRS)  AP-95%, -8%  ALL AVAILABLE LEAD PARAMETERS WITHIN NORMAL LIMITS  1 NSVT EPISODE PREVIOUSLY ADDRESSED  242 AT/AF EPISODES MAX DURATION 47 SEC- COMPETITIVE ATRIAL PACING & LEAD NOISE ON EGM'S (SENSE POLARITY IS UNIPOLAR  POLARITY SWITCH 4/26/19)  PT ON ASA 81MG & METOPROLOL  NORMAL DEVICE FUNCTION   GV

## 2019-08-05 ENCOUNTER — TELEPHONE (OUTPATIENT)
Dept: DERMATOLOGY | Facility: CLINIC | Age: 84
End: 2019-08-05

## 2019-08-06 ENCOUNTER — OFFICE VISIT (OUTPATIENT)
Dept: PHYSICAL THERAPY | Facility: REHABILITATION | Age: 84
End: 2019-08-06
Payer: MEDICARE

## 2019-08-06 DIAGNOSIS — M25.511 ACUTE PAIN OF RIGHT SHOULDER: ICD-10-CM

## 2019-08-06 DIAGNOSIS — Z96.611 STATUS POST REVERSE TOTAL ARTHROPLASTY OF RIGHT SHOULDER: Primary | ICD-10-CM

## 2019-08-06 PROCEDURE — 97110 THERAPEUTIC EXERCISES: CPT | Performed by: PHYSICAL THERAPIST

## 2019-08-06 PROCEDURE — 97140 MANUAL THERAPY 1/> REGIONS: CPT | Performed by: PHYSICAL THERAPIST

## 2019-08-06 PROCEDURE — 97112 NEUROMUSCULAR REEDUCATION: CPT | Performed by: PHYSICAL THERAPIST

## 2019-08-06 NOTE — PROGRESS NOTES
Daily Note     Today's date: 2019  Patient name: Faizan Boyle  : 1935  MRN: [id number]  Referring provider: Sarah Cleary  Dx:   Encounter Diagnosis     ICD-10-CM    1  Status post reverse total arthroplasty of right shoulder Z96 611    2  Acute pain of right shoulder M25 511        Start Time: 915  Stop Time: 1022  Total time in clinic (min): 67 minutes    Subjective: I am ding ok seem to have more clicking lately  Objective: See treatment diary below      Assessment: Tolerated treatment well  Patient demonstrated fatigue post treatment, exhibited good technique with therapeutic exercises and would benefit from continued PT      Plan: Continue per plan of care  Progress treatment as tolerated         Precautions: Falls, hypotension, Follow attached Reverse Shoulder Protcol sx 19    Manual   8/ 8       PROM  LB LB LB RK LB LB       Upper trap STM  LB     LB       Elbow PROM  LB LB LB RK LB                                       Exercise Diary   8/ 86       UBE  3/3 4/ 4/4  Pulleys 5 min  4/4  Pulleys  5' / 4/4       scap squeezes  GTB 2x15   GTB 3x15 GTB  3x15 GTB 3x15         Table slides FF abd  Ball 2x10  Ball FF abd 10x ea Ball 2x20 abd   20x FF  Ball roll table 10x 10' ff ABD  Ball 2x10        T band IR/ER    OTB 1 band  3x10  OTB  3x10  OTB 3x10       Bicep curls      1# 3x15         Mod tandem    10x R/L 30"x2 each  3x 30"       SLS    5x  10"x5 each  10x 5-10"       FT FA EOEC             digiflex              AAROM FF ABD   2x10     2x10        Isometric ball 6 way              Cane FF abd ER  2x10 5-10" 10x ea 10x  15x ea  10x 10"       No moneys              T band ER/IR      [id number]        T's Y's   2x12  3x10  3x10 15x 1# 15x 1#       Chest press 2# 2x15 5" 2# 2x15   1# 1/2 no # 1/2 2x10  1# 2x15       scap punches  1# 2x15 1# 2x15  1#  2x15 1#  2x15 1# 3x15 2# 3x12       sidelying ER   2x10   3x10  3x10 Modalities

## 2019-08-07 ENCOUNTER — APPOINTMENT (OUTPATIENT)
Dept: PHYSICAL THERAPY | Facility: REHABILITATION | Age: 84
End: 2019-08-07
Payer: MEDICARE

## 2019-08-08 ENCOUNTER — OFFICE VISIT (OUTPATIENT)
Dept: PHYSICAL THERAPY | Facility: REHABILITATION | Age: 84
End: 2019-08-08
Payer: MEDICARE

## 2019-08-08 DIAGNOSIS — Z96.611 STATUS POST REVERSE TOTAL ARTHROPLASTY OF RIGHT SHOULDER: Primary | ICD-10-CM

## 2019-08-08 DIAGNOSIS — M25.511 ACUTE PAIN OF RIGHT SHOULDER: ICD-10-CM

## 2019-08-08 PROCEDURE — 97110 THERAPEUTIC EXERCISES: CPT | Performed by: PHYSICAL THERAPIST

## 2019-08-08 PROCEDURE — 97112 NEUROMUSCULAR REEDUCATION: CPT | Performed by: PHYSICAL THERAPIST

## 2019-08-08 PROCEDURE — 97140 MANUAL THERAPY 1/> REGIONS: CPT | Performed by: PHYSICAL THERAPIST

## 2019-08-08 NOTE — PROGRESS NOTES
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    2  Acute pain of right shoulder M25 511                   Subjective: I am sore today think I slept on it wrong      Objective: See treatment diary below      Assessment: Tolerated treatment fair  Patient demonstrated fatigue post treatment, exhibited good technique with therapeutic exercises and would benefit from continued PT      Plan: Continue per plan of care  Progress note during next visit  Progress treatment as tolerated         Precautions: Falls, hypotension, Follow attached Reverse Shoulder Protcol sx 19    Manual        PROM  LB LB LB RK LB LB LB      Upper trap STM  LB     LB LB      Elbow PROM  LB LB LB RK LB                                       Exercise Diary        UBE  3/3   Pulleys 5 min    Pulleys  5' /      scap squeezes  GTB 2x15   GTB 3x15 GTB  3x15 GTB 3x15   GTB 3x15      Table slides FF abd  Ball 2x10  Ball FF abd 10x ea Ball 2x20 abd   20x FF  Ball roll table 10x 10' ff ABD  Ball 2x10  Ball 2x15      T band IR/ER    OTB 1 band  3x10  OTB  3x10  OTB 3x10 OTB 3x10      Bicep curls      1# 3x15         Mod tandem    10x R/L 30"x2 each  3x 30" 5x       SLS    5x  10"x5 each  10x 5-10" 10x 10"      FT FA EOEC             digiflex              AAROM FF ABD   2x10     2x10  10x      Isometric ball 6 way              Cane FF abd ER  2x10 5-10" 10x ea 10x  15x ea  10x 10"       No moneys              T band ER/IR        OTB 3x10      T's Y's   2x12  3x10  3x10 15x 1# 15x 1# 2x10      Chest press 2# 2x15 5" 2# 2x15   1# 1/2 no # 1/2 2x10  1# 2x15       scap punches  1# 2x15 1# 2x15  1#  2x15 1#  2x15 1# 3x15 2# 3x12       sidelying ER   2x10   3x10  3x10 3x10                                    Modalities

## 2019-08-09 ENCOUNTER — APPOINTMENT (OUTPATIENT)
Dept: PHYSICAL THERAPY | Facility: REHABILITATION | Age: 84
End: 2019-08-09
Payer: MEDICARE

## 2019-08-12 ENCOUNTER — OFFICE VISIT (OUTPATIENT)
Dept: PHYSICAL THERAPY | Facility: REHABILITATION | Age: 84
End: 2019-08-12
Payer: MEDICARE

## 2019-08-12 DIAGNOSIS — Z96.611 STATUS POST REVERSE TOTAL ARTHROPLASTY OF RIGHT SHOULDER: Primary | ICD-10-CM

## 2019-08-12 DIAGNOSIS — M25.511 ACUTE PAIN OF RIGHT SHOULDER: ICD-10-CM

## 2019-08-12 PROCEDURE — 97140 MANUAL THERAPY 1/> REGIONS: CPT | Performed by: PHYSICAL THERAPIST

## 2019-08-12 PROCEDURE — 97110 THERAPEUTIC EXERCISES: CPT | Performed by: PHYSICAL THERAPIST

## 2019-08-12 PROCEDURE — 97112 NEUROMUSCULAR REEDUCATION: CPT | Performed by: PHYSICAL THERAPIST

## 2019-08-12 NOTE — PROGRESS NOTES
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    2  Acute pain of right shoulder M25 511        Start Time: 915  Stop Time: 1005  Total time in clinic (min): 50 minutes    Assessment  Assessment details: Since beginning physical therapy, Henry Vanegas has attended a total number of 20  visits and has maintained excellent compliance with established POC  Patient has made significant improvements in all areas, including decreased pain, increased strength, increased ROM, improved flexibility and improved postural awareness  Patient is reporting improved ability to perform a/iadls, recreational activities and engaging in social activities  Despite these improvements, Patient continues to present with pain, decreased strength, decreased ROM and postural  dysfunction  Patient would continue to benefit from skilled physical therapy to address Her aforementioned impairments, improve their level of function and to improve their overall quality of life  Goals  Goals  Short Term: Achieved   Pt will report decreased levels of pain by at least 2 subjective ratings in 4 weeks  Pt will demonstrate improved ROM by at least 10 degrees in 4 weeks  Long Term:   Pt will be independent in their HEP in 8 weeks  Pt will demonstrate improved FOTO, > 62 Not achieved  Pt will return to performing all ADL's with RUE partially achieved   Pt will improve AROM in RUE shoulder in order to perform household tasks Not achieved   Pt will increase RUE strength to 4/5 throughout in order to lift groceries and perform household tasks   Not achieved         Plan  Patient would benefit from: skilled physical therapy  Planned therapy interventions: home exercise program, functional ROM exercises, flexibility, postural training, patient education, neuromuscular re-education, manual therapy, joint mobilization, strengthening, stretching, therapeutic activities, therapeutic exercise and body mechanics training  Frequency: 2x week  Duration in weeks: 16  Treatment plan discussed with: patient        Subjective Evaluation    History of Present Illness  Date of surgery: 3/26/2019  Mechanism of injury: surgery  Mechanism of injury: Reverse total shoulder replacement           Not a recurrent problem   Quality of life: good    Pain  Current pain ratin  At best pain ratin  At worst pain ratin          Objective     Active Range of Motion     Right Shoulder   Flexion: 94 degrees with pain  Extension: 47 degrees with pain  Abduction: 80 degrees with pain  External rotation 45°: 37 degrees   Internal rotation 45°: 41 degrees     Passive Range of Motion     Right Shoulder   Flexion: 155 degrees   Abduction: 130 degrees   External rotation 45°: 55 degrees   Internal rotation 45°: 70 degrees     Strength/Myotome Testing     Right Shoulder     Planes of Motion   Flexion: 3   Extension: 3+   Abduction: 3-   Adduction: 3+   External rotation at 45°: 3   Internal rotation at 45°: 3+     Additional Strength Details  MMT within available ROM            Precautions: Falls, hypotension, Follow attached Reverse Shoulder Protcol sx 19    Manual   8     PROM  LB LB LB RK LB LB LB LB     Upper trap STM  LB     LB LB LB     Elbow PROM  LB LB LB RK LB                                       Exercise Diary       UBE  3/3 4/4 4/4  Pulleys 5 min  4/4  Pulleys  5' 4/4 4/4 4/4 4/4     scap squeezes  GTB 2x15   GTB 3x15 GTB  3x15 GTB 3x15   GTB 3x15 BTB 3x10      Table slides FF abd  Ball 2x10  Ball FF abd 10x ea Ball 2x20 abd   20x FF  Ball roll table 10x 10' ff ABD  Ball 2x10  Ball 2x15 Ball 2x10      T band IR/ER    OTB 1 band  3x10  OTB  3x10  OTB 3x10 OTB 3x10 OTB 3x15     Bicep curls      1# 3x15    2# 3x15      Mod tandem    10x R/L 30"x2 each  3x 30" 5x       SLS    5x  10"x5 each  10x 5-10" 10x 10"      FT FA EOEC             digiflex              AAROM FF ABD   2x10     2x10  10x      Isometric ball 6 way              Cane FF abd ER  2x10 5-10" 10x ea 10x  15x ea  10x 10"       No moneys         YTB 3x10                   T's Y's   2x12  3x10  3x10 15x 1# 15x 1# 2x10 2x10   1# 2x10      Chest press 2# 2x15 5" 2# 2x15   1# 1/2 no # 1/2 2x10  1# 2x15       scap punches  1# 2x15 1# 2x15  1#  2x15 1#  2x15 1# 3x15 2# 3x12       sidelying ER   2x10   3x10  3x10  3x10 3x10                                   Modalities

## 2019-08-14 ENCOUNTER — OFFICE VISIT (OUTPATIENT)
Dept: PHYSICAL THERAPY | Facility: REHABILITATION | Age: 84
End: 2019-08-14
Payer: MEDICARE

## 2019-08-14 DIAGNOSIS — M25.511 ACUTE PAIN OF RIGHT SHOULDER: ICD-10-CM

## 2019-08-14 DIAGNOSIS — Z96.611 STATUS POST REVERSE TOTAL ARTHROPLASTY OF RIGHT SHOULDER: Primary | ICD-10-CM

## 2019-08-14 PROCEDURE — 97110 THERAPEUTIC EXERCISES: CPT | Performed by: PHYSICAL THERAPIST

## 2019-08-14 PROCEDURE — 97112 NEUROMUSCULAR REEDUCATION: CPT | Performed by: PHYSICAL THERAPIST

## 2019-08-14 PROCEDURE — 97140 MANUAL THERAPY 1/> REGIONS: CPT | Performed by: PHYSICAL THERAPIST

## 2019-08-14 NOTE — PROGRESS NOTES
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 611    2  Acute pain of right shoulder M25 511        Start Time: 0915  Stop Time: 1000  Total time in clinic (min): 45 minutes    Subjective: I am fine no changes       Objective: See treatment diary below      Assessment: Tolerated treatment well  Patient demonstrated fatigue post treatment, exhibited good technique with therapeutic exercises and would benefit from continued PT      Plan: Continue per plan of care  Progress treatment as tolerated         Precautions: Falls, hypotension, Follow attached Reverse Shoulder Protcol sx 19    Manual      PROM  LB LB LB RK LB LB LB LB LB    Upper trap STM  LB     LB LB LB LB    Elbow PROM  LB LB LB RK LB                                       Exercise Diary      UBE  3/3 4/4 4/4  Pulleys 5 min  4/4  Pulleys  5' 4/4 4/4 4/4 / 4/4    scap squeezes  GTB 2x15   GTB 3x15 GTB  3x15 GTB 3x15   GTB 3x15 BTB 3x10      Table slides FF abd  Ball 2x10  Ball FF abd 10x ea Ball 2x20 abd   20x FF  Ball roll table 10x 10' ff ABD  Ball 2x10  Ball 2x15 Ball 2x10  Ball 3x10     T band IR/ER    OTB 1 band  3x10  OTB  3x10  OTB 3x10 OTB 3x10 OTB 3x15     Bicep curls      1# 3x15    2# 3x15      Mod tandem    10x R/L 30"x2 each  3x 30" 5x       SLS    5x  10"x5 each  10x 5-10" 10x 10"      FT FA EOEC             digiflex              AAROM FF ABD   2x10     2x10  10x      Isometric ball 6 way              Cane FF abd ER  2x10 5-10" 10x ea 10x  15x ea  10x 10"   10x 10"    No moneys         YTB 3x10  YTB 3x10                  T's Y's   2x12  3x10  3x10 15x 1# 15x 1# 2x10 2x10   1# 2x10  2x10 1# 2x10    Chest press 2# 2x15 5" 2# 2x15   1# 1/2 no # 1/2 2x10 1# 2x15   2# 3x10     scap punches  1# 2x15 1# 2x15  1#  2x15 1#  2x15 1# 3x15 2# 3x12   2# 3x10     sidelying ER   2x10   3x10  3x10  3x10 3x10 1# 3x10                                  Modalities

## 2019-08-15 ENCOUNTER — HOSPITAL ENCOUNTER (OUTPATIENT)
Dept: NON INVASIVE DIAGNOSTICS | Facility: CLINIC | Age: 84
Discharge: HOME | End: 2019-08-15
Payer: MEDICARE

## 2019-08-15 DIAGNOSIS — I48.0 PAROXYSMAL ATRIAL FIBRILLATION (HCC): ICD-10-CM

## 2019-08-15 DIAGNOSIS — Z95.0 CARDIAC PACEMAKER IN SITU: ICD-10-CM

## 2019-08-15 DIAGNOSIS — I10 ESSENTIAL (PRIMARY) HYPERTENSION: ICD-10-CM

## 2019-08-15 DIAGNOSIS — E78.00 PURE HYPERCHOLESTEROLEMIA: ICD-10-CM

## 2019-08-15 PROCEDURE — 93306 TTE W/DOPPLER COMPLETE: CPT

## 2019-08-15 PROCEDURE — 93306 TTE W/DOPPLER COMPLETE: CPT | Performed by: INTERNAL MEDICINE

## 2019-08-16 ENCOUNTER — OFFICE VISIT (OUTPATIENT)
Dept: PHYSICAL THERAPY | Facility: REHABILITATION | Age: 84
End: 2019-08-16
Payer: MEDICARE

## 2019-08-16 DIAGNOSIS — Z96.611 STATUS POST REVERSE TOTAL ARTHROPLASTY OF RIGHT SHOULDER: Primary | ICD-10-CM

## 2019-08-16 DIAGNOSIS — M25.511 ACUTE PAIN OF RIGHT SHOULDER: ICD-10-CM

## 2019-08-16 PROCEDURE — 97112 NEUROMUSCULAR REEDUCATION: CPT | Performed by: PHYSICAL THERAPIST

## 2019-08-16 PROCEDURE — 97140 MANUAL THERAPY 1/> REGIONS: CPT | Performed by: PHYSICAL THERAPIST

## 2019-08-16 PROCEDURE — 97110 THERAPEUTIC EXERCISES: CPT | Performed by: PHYSICAL THERAPIST

## 2019-08-16 NOTE — PROGRESS NOTES
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 611    2  Acute pain of right shoulder M25 511        Start Time: 1045  Stop Time: 1135  Total time in clinic (min): 50 minutes    Subjective: I am doing well today it moving a little better       Objective: See treatment diary below      Assessment: Tolerated treatment well  Patient demonstrated fatigue post treatment, exhibited good technique with therapeutic exercises and would benefit from continued PT      Plan: Continue per plan of care  Progress treatment as tolerated         Precautions: Falls, hypotension, Follow attached Reverse Shoulder Protcol sx 19    Manual     PROM  LB LB LB RK LB LB LB LB LB LB   Upper trap STM  LB     LB LB LB LB LB   Elbow PROM  LB LB LB RK LB                                       Exercise Diary     UBE  3/3 4/4 4/4  Pulleys 5 min  4/4  Pulleys  5' 4/4 4/4 4/4 4/ 4/ 4/4   scap squeezes  GTB 2x15   GTB 3x15 GTB  3x15 GTB 3x15   GTB 3x15 BTB 3x10   BTB 3x15   Table slides FF abd  Ball 2x10  Ball FF abd 10x ea Ball 2x20 abd   20x FF  Ball roll table 10x 10' ff ABD  Ball 2x10  Ball 2x15 Ball 2x10  Ball 3x10  2x10    T band IR/ER    OTB 1 band  3x10  OTB  3x10  OTB 3x10 OTB 3x10 OTB 3x15     Bicep curls      1# 3x15    2# 3x15      Mod tandem    10x R/L 30"x2 each  3x 30" 5x       SLS    5x  10"x5 each  10x 5-10" 10x 10"      FT FA EOEC             digiflex              AAROM FF ABD   2x10     2x10  10x      Isometric ball 6 way              Cane FF abd ER  2x10 5-10" 10x ea 10x  15x ea  10x 10"   10x 10" 10x 10   No moneys         YTB 3x10  YTB 3x10                  T's Y's   2x12  3x10  3x10 15x 1# 15x 1# 2x10 2x10   1# 2x10  2x10 1# 2x10 2x10 2#    Chest press 2# 2x15 5" 2# 2x15   1# 1/2 no # 1/2 2x10  1# 2x15   2# 3x10     scap punches  1# 2x15 1# 2x15  1#  2x15 1#  2x15 1# 3x15 2# 3x12   2# 3x10  3# 3x10    sidelying ER   2x10   3x10  3x10  3x10 3x10 1# 3x10                                  Modalities

## 2019-08-19 DIAGNOSIS — I48.0 PAROXYSMAL ATRIAL FIBRILLATION (HCC): ICD-10-CM

## 2019-08-19 RX ORDER — APIXABAN 5 MG/1
5 TABLET, FILM COATED ORAL 2 TIMES DAILY
Qty: 60 TABLET | Refills: 2 | Status: SHIPPED | OUTPATIENT
Start: 2019-08-19 | End: 2019-11-21 | Stop reason: SDUPTHER

## 2019-08-20 ENCOUNTER — OFFICE VISIT (OUTPATIENT)
Dept: PHYSICAL THERAPY | Facility: REHABILITATION | Age: 84
End: 2019-08-20
Payer: MEDICARE

## 2019-08-20 DIAGNOSIS — Z96.611 STATUS POST REVERSE TOTAL ARTHROPLASTY OF RIGHT SHOULDER: Primary | ICD-10-CM

## 2019-08-20 DIAGNOSIS — M25.511 ACUTE PAIN OF RIGHT SHOULDER: ICD-10-CM

## 2019-08-20 PROCEDURE — 97140 MANUAL THERAPY 1/> REGIONS: CPT | Performed by: PHYSICAL THERAPIST

## 2019-08-20 PROCEDURE — 97112 NEUROMUSCULAR REEDUCATION: CPT | Performed by: PHYSICAL THERAPIST

## 2019-08-20 PROCEDURE — 97110 THERAPEUTIC EXERCISES: CPT | Performed by: PHYSICAL THERAPIST

## 2019-08-20 NOTE — PROGRESS NOTES
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 611    2  Acute pain of right shoulder M25 511        Start Time: 1000  Stop Time: 1048  Total time in clinic (min): 48 minutes    Subjective: I am doing ok today just fatigued  Objective: See treatment diary below      Assessment: Tolerated treatment well  Patient demonstrated fatigue post treatment, exhibited good technique with therapeutic exercises and would benefit from continued PT      Plan: Continue per plan of care  Progress treatment as tolerated         Precautions: Falls, hypotension, Follow attached Reverse Shoulder Protcol sx 19    Manual              PROM  LB            Upper trap STM  LB            Elbow PROM  LB                                          Exercise Diary              UBE              scap squeezes  BTB 3x15            T ball  Slides on table  FF abd  2x10 10"            T band IR/ER              Bicep curls  2# 3x15            Mod tandem              SLS              FT FA EOEC             digiflex              AAROM FF ABD              Isometric ball 6 way              Cane FF abd ER  2x10             No moneys  YTB 3x10                          T's Y's              Chest press             scap punches  3# 3x10            sidelying ER              Wall ball  3x10 c/cc                             Modalities

## 2019-08-23 ENCOUNTER — OFFICE VISIT (OUTPATIENT)
Dept: PHYSICAL THERAPY | Facility: REHABILITATION | Age: 84
End: 2019-08-23
Payer: MEDICARE

## 2019-08-23 DIAGNOSIS — M25.511 ACUTE PAIN OF RIGHT SHOULDER: ICD-10-CM

## 2019-08-23 DIAGNOSIS — Z96.611 STATUS POST REVERSE TOTAL ARTHROPLASTY OF RIGHT SHOULDER: Primary | ICD-10-CM

## 2019-08-23 PROCEDURE — 97112 NEUROMUSCULAR REEDUCATION: CPT | Performed by: PHYSICAL MEDICINE & REHABILITATION

## 2019-08-23 PROCEDURE — 97110 THERAPEUTIC EXERCISES: CPT | Performed by: PHYSICAL MEDICINE & REHABILITATION

## 2019-08-23 PROCEDURE — 97140 MANUAL THERAPY 1/> REGIONS: CPT | Performed by: PHYSICAL MEDICINE & REHABILITATION

## 2019-08-23 NOTE — PROGRESS NOTES
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 611    2  Acute pain of right shoulder M25 511                   Subjective: Patient offers no new complaints regarding shoulder  No issues since last visit  Objective: See treatment diary below      Assessment: Tolerated treatment well  Challenged with TE as charted  Patient demonstrated fatigue post treatment, exhibited good technique with therapeutic exercises and would benefit from continued PT  Plan: Continue per plan of care  Progress treatment as tolerated         Precautions: Falls, hypotension, Follow attached Reverse Shoulder Protcol sx 19    Manual             PROM  LB LH           Upper trap STM  LB LH           Elbow PROM  LB [facility]                                         Exercise Diary             UBE  4/ 5/5           scap squeezes  BTB 3x15 BTB 3x15           T ball  Slides on table  FF abd  2x10 10" 2x10, 10" ea           T band IR/ER              Bicep curls  2# 3x15 2#, 3x15           Mod tandem              SLS              FT FA EOEC             digiflex              AAROM FF ABD              Isometric ball 6 way              Cane FF abd ER  2x10  2x10 ea           No moneys  YTB 3x10  YTB 3x10                        T's Y's              Chest press             scap punches  3# 3x10 3#, 3x10           sidelying ER              Wall ball  3x10 c/cc 3x10 ea                            Modalities

## 2019-08-27 ENCOUNTER — OFFICE VISIT (OUTPATIENT)
Dept: PHYSICAL THERAPY | Facility: REHABILITATION | Age: 84
End: 2019-08-27
Payer: MEDICARE

## 2019-08-27 DIAGNOSIS — Z96.611 STATUS POST REVERSE TOTAL ARTHROPLASTY OF RIGHT SHOULDER: Primary | ICD-10-CM

## 2019-08-27 DIAGNOSIS — M25.511 ACUTE PAIN OF RIGHT SHOULDER: ICD-10-CM

## 2019-08-27 PROCEDURE — 97112 NEUROMUSCULAR REEDUCATION: CPT | Performed by: PHYSICAL THERAPIST

## 2019-08-27 PROCEDURE — 97110 THERAPEUTIC EXERCISES: CPT | Performed by: PHYSICAL THERAPIST

## 2019-08-27 NOTE — PROGRESS NOTES
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 611    2  Acute pain of right shoulder M25 511        Start Time: 915  Stop Time: 1002  Total time in clinic (min): 47 minutes    Subjective: I am about the same worst pain is 3/10 with my stretching exercises      Objective: See treatment diary below      Assessment: Tolerated treatment well  Patient demonstrated fatigue post treatment, exhibited good technique with therapeutic exercises and would benefit from continued PT      Plan: Continue per plan of care  Progress treatment as tolerated         Precautions: Falls, hypotension, Follow attached Reverse Shoulder Protcol sx 19    Manual            PROM  LB LH LB          Upper trap STM  LB LH           Elbow PROM  LB LH LB                                        Exercise Diary            UBE  4/4 5/5 4/4          scap squeezes  BTB 3x15 BTB 3x15           T ball  Slides on table  FF abd  2x10 10" 2x10, 10" ea           T band IR/ER              Bicep curls  2# 3x15 2#, 3x15           Mod tandem              SLS              Shelve reaching 1# 2#    2 heights 4 weights 4x           digiflex              AAROM FF ABD    supine elvated head 3x10 2#          Isometric ball 6 way              Cane FF abd ER  2x10  2x10 ea           No moneys  YTB 3x10  YTB 3x10                        T's Y's              Chest press   3# 1 set 2# 3x10           scap punches  3# 3x10 3#, 3x10 3# 3x15           sidelying ER    2# 3x10           Wall ball  3x10 c/cc 3x10 ea 3x10                            Modalities

## 2019-08-28 DIAGNOSIS — I10 ESSENTIAL (PRIMARY) HYPERTENSION: ICD-10-CM

## 2019-08-28 DIAGNOSIS — F41.9 ANXIETY: ICD-10-CM

## 2019-08-28 DIAGNOSIS — E11.319 TYPE 2 DIABETES MELLITUS WITH RETINOPATHY, WITH LONG-TERM CURRENT USE OF INSULIN, MACULAR EDEMA PRESENCE UNSPECIFIED, UNSPECIFIED LATERALITY, UNSPECIFIED RETINOPATHY SEVERITY (HCC): ICD-10-CM

## 2019-08-28 DIAGNOSIS — E78.5 HYPERLIPIDEMIA, UNSPECIFIED HYPERLIPIDEMIA TYPE: ICD-10-CM

## 2019-08-28 DIAGNOSIS — E11.3552 TYPE 2 DIABETES MELLITUS WITH STABLE PROLIFERATIVE RETINOPATHY OF LEFT EYE, WITH LONG-TERM CURRENT USE OF INSULIN (HCC): ICD-10-CM

## 2019-08-28 DIAGNOSIS — Z79.4 TYPE 2 DIABETES MELLITUS WITH STABLE PROLIFERATIVE RETINOPATHY OF LEFT EYE, WITH LONG-TERM CURRENT USE OF INSULIN (HCC): ICD-10-CM

## 2019-08-28 DIAGNOSIS — Z79.4 TYPE 2 DIABETES MELLITUS WITH RETINOPATHY, WITH LONG-TERM CURRENT USE OF INSULIN, MACULAR EDEMA PRESENCE UNSPECIFIED, UNSPECIFIED LATERALITY, UNSPECIFIED RETINOPATHY SEVERITY (HCC): ICD-10-CM

## 2019-08-28 RX ORDER — METOPROLOL TARTRATE 50 MG/1
75 TABLET, FILM COATED ORAL EVERY 12 HOURS SCHEDULED
Qty: 90 TABLET | Refills: 5 | Status: SHIPPED | OUTPATIENT
Start: 2019-08-28 | End: 2020-01-16

## 2019-08-28 RX ORDER — LORAZEPAM 0.5 MG/1
1 TABLET ORAL
Qty: 60 TABLET | OUTPATIENT
Start: 2019-08-28

## 2019-08-28 RX ORDER — LISINOPRIL 5 MG/1
5 TABLET ORAL DAILY
Qty: 30 TABLET | Refills: 2 | Status: SHIPPED | OUTPATIENT
Start: 2019-08-28 | End: 2019-11-06 | Stop reason: ALTCHOICE

## 2019-08-28 RX ORDER — EZETIMIBE AND SIMVASTATIN 10; 40 MG/1; MG/1
1 TABLET ORAL
Qty: 30 TABLET | Refills: 5 | Status: SHIPPED | OUTPATIENT
Start: 2019-08-28 | End: 2020-02-13

## 2019-08-30 ENCOUNTER — OFFICE VISIT (OUTPATIENT)
Dept: PHYSICAL THERAPY | Facility: REHABILITATION | Age: 84
End: 2019-08-30
Payer: MEDICARE

## 2019-08-30 DIAGNOSIS — M25.511 ACUTE PAIN OF RIGHT SHOULDER: ICD-10-CM

## 2019-08-30 DIAGNOSIS — Z96.611 STATUS POST REVERSE TOTAL ARTHROPLASTY OF RIGHT SHOULDER: Primary | ICD-10-CM

## 2019-08-30 PROCEDURE — 97112 NEUROMUSCULAR REEDUCATION: CPT | Performed by: PHYSICAL THERAPIST

## 2019-08-30 PROCEDURE — 97110 THERAPEUTIC EXERCISES: CPT | Performed by: PHYSICAL THERAPIST

## 2019-08-30 PROCEDURE — 97140 MANUAL THERAPY 1/> REGIONS: CPT | Performed by: PHYSICAL THERAPIST

## 2019-08-30 NOTE — PROGRESS NOTES
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 611    2  Acute pain of right shoulder M25 511                   Subjective: I am not feeling so well today back to being tired and a little dizzy sometimes       Objective: See treatment diary below      Assessment: Tolerated treatment well  BP taken 101/58 pt advised to talk to PCP about her symptoms  Patient demonstrated fatigue post treatment, exhibited good technique with therapeutic exercises and would benefit from continued PT      Plan: Continue per plan of care  Progress treatment as tolerated         Precautions: Falls, hypotension, Follow attached Reverse Shoulder Protcol sx 19    Manual           PROM  LB LH LB LB         Upper trap STM  LB LH           Elbow PROM  LB LH LB                                        Exercise Diary           UBE  4/4 5/5 4/4 4/4         scap squeezes  BTB 3x15 BTB 3x15           T ball  Slides on table  FF abd  2x10 10" 2x10, 10" ea  2x10 10"         T band IR/ER              Bicep curls  2# 3x15 2#, 3x15  2# 3x15         Mod tandem              SLS              Shelve reaching 1# 2#    2 heights 4 weights 4x           digiflex              AAROM FF ABD    supine elvated head 3x10 2# Supine elevated table 3x10 2#FF   abd 2# 2x10         Isometric ball 6 way              Cane FF abd ER  2x10  2x10 ea  10x          No moneys  YTB 3x10  YTB 3x10  YTB 2x10                      T's Y's              Chest press   3# 1 set 2# 3x10           scap punches  3# 3x10 3#, 3x10 3# 3x15  4# 3x10         sidelying ER    2# 3x10           Wall ball  3x10 c/cc 3x10 ea 3x10                            Modalities

## 2019-09-03 ENCOUNTER — OFFICE VISIT (OUTPATIENT)
Dept: PHYSICAL THERAPY | Facility: REHABILITATION | Age: 84
End: 2019-09-03
Payer: MEDICARE

## 2019-09-03 DIAGNOSIS — M25.511 ACUTE PAIN OF RIGHT SHOULDER: ICD-10-CM

## 2019-09-03 DIAGNOSIS — Z96.611 STATUS POST REVERSE TOTAL ARTHROPLASTY OF RIGHT SHOULDER: Primary | ICD-10-CM

## 2019-09-03 PROCEDURE — 97140 MANUAL THERAPY 1/> REGIONS: CPT | Performed by: PHYSICAL THERAPIST

## 2019-09-03 PROCEDURE — 97110 THERAPEUTIC EXERCISES: CPT | Performed by: PHYSICAL THERAPIST

## 2019-09-03 NOTE — PROGRESS NOTES
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    2  Acute pain of right shoulder M25 511                   Subjective: Reports no change in shoulder function this session  Objective: See treatment diary below      Assessment: Able to progress in some strengthening exercises this session  Continues to be limited in PROM and AROM in ER, ABD, flex  Pain noted at end range ER and abd  Pt would benefit from continued PT services to reduce pain and increase level of function  Plan: Continue per plan of care  Progress treatment as tolerated         Precautions: Falls, hypotension, Follow attached Reverse Shoulder Protcol sx 19    Manual  8/20 8/23 8/27 8/30 9/3        PROM  LB LH LB LB MM        Upper trap STM  LB LH           Elbow PROM  LB LH LB                                        Exercise Diary  8/20 8/23 8/27 8/30 9/3        UBE  4/4 5/5 4/4 4/4 4/4        scap squeezes  BTB 3x15 BTB 3x15           T ball  Slides on table  FF abd  2x10 10" 2x10, 10" ea  2x10 10"         T band IR/ER              Bicep curls  2# 3x15 2#, 3x15  2# 3x15 3#, 3x15        Mod tandem              SLS              Shelve reaching 1# 2#    2 heights 4 weights 4x           digiflex              AAROM FF ABD    supine elvated head 3x10 2# Supine elevated table 3x10 2#FF   abd 2# 2x10 Seated, L assist w/ ecc control 2#x20        Isometric ball 6 way              Cane FF abd ER  2x10  2x10 ea  10x  20x        No moneys  YTB 3x10  YTB 3x10  YTB 2x10 YTB, 2x10                     T's Y's              Chest press   3# 1 set 2# 3x10           scap punches  3# 3x10 3#, 3x10 3# 3x15  4# 3x10         sidelying ER    2# 3x10  2#, 3x10 2#, 3x10        Wall ball  3x10 c/cc 3x10 ea 3x10           Scap retract     YTB, 30x3" Modalities

## 2019-09-06 ENCOUNTER — OFFICE VISIT (OUTPATIENT)
Dept: PHYSICAL THERAPY | Facility: REHABILITATION | Age: 84
End: 2019-09-06
Payer: MEDICARE

## 2019-09-06 DIAGNOSIS — M25.511 ACUTE PAIN OF RIGHT SHOULDER: ICD-10-CM

## 2019-09-06 DIAGNOSIS — Z96.611 STATUS POST REVERSE TOTAL ARTHROPLASTY OF RIGHT SHOULDER: Primary | ICD-10-CM

## 2019-09-06 PROCEDURE — 97110 THERAPEUTIC EXERCISES: CPT | Performed by: PHYSICAL THERAPIST

## 2019-09-06 PROCEDURE — 97140 MANUAL THERAPY 1/> REGIONS: CPT | Performed by: PHYSICAL THERAPIST

## 2019-09-06 PROCEDURE — 97112 NEUROMUSCULAR REEDUCATION: CPT | Performed by: PHYSICAL THERAPIST

## 2019-09-06 NOTE — PROGRESS NOTES
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 611    2  Acute pain of right shoulder M25 511        Start Time: 0915  Stop Time: 1000  Total time in clinic (min): 45 minutes    Subjective: I am not as dizzy anymore stopped taking my one medication      Objective: See treatment diary below      Assessment: Tolerated treatment fair  Pt was advised to contact MD about BP symptoms and medication  Patient demonstrated fatigue post treatment, exhibited good technique with therapeutic exercises and would benefit from continued PT      Plan: Continue per plan of care  Progress treatment as tolerated         Precautions: Falls, hypotension, Follow attached Reverse Shoulder Protcol sx 19    Manual  8/20 8/23 8/27 8/30 9/3 9/06       PROM  LB LH LB LB MM LB       Upper trap STM  LB LH           Elbow PROM  LB LH LB                                        Exercise Diary  8/20 8/23 8/27 8/30 9/3 906       UBE   55  44       scap squeezes  BTB 3x15 BTB 3x15           T ball  Slides on table  FF abd  2x10 10" 2x10, 10" ea  2x10 10"  2x10 ball roll up the wall        T band IR/ER              Bicep curls  2# 3x15 2#, 3x15  2# 3x15 3#, 3x15        Mod tandem              SLS              Shelve reaching 1# 2#    2 heights 4 weights 4x           digiflex              AAROM FF ABD    supine elvated head 3x10 2# Supine elevated table 3x10 2#FF   abd 2# 2x10 Seated, L assist w/ ecc control 2#x20 1# 3x10        Isometric ball 6 way              Cane FF abd ER  2x10  2x10 ea  10x  20x        No moneys  YTB 3x10  YTB 3x10  YTB 2x10 YTB, 2x10 YTB 2x10                     T's Y's              Chest press   3# 1 set 2# 3x10           scap punches  3# 3x10 3#, 3x10 3# 3x15  4# 3x10         sidelying ER    2# 3x10  2#, 3x10 2#, 3x10 2# 3x10        Wall ball  3x10 c/cc 3x10 ea 3x10    3x20       Scap retract     YTB, 30x3" bodyblade 3x FF           Modalities

## 2019-09-10 ENCOUNTER — OFFICE VISIT (OUTPATIENT)
Dept: PHYSICAL THERAPY | Facility: REHABILITATION | Age: 84
End: 2019-09-10
Payer: MEDICARE

## 2019-09-10 DIAGNOSIS — M25.511 ACUTE PAIN OF RIGHT SHOULDER: ICD-10-CM

## 2019-09-10 DIAGNOSIS — Z96.611 STATUS POST REVERSE TOTAL ARTHROPLASTY OF RIGHT SHOULDER: Primary | ICD-10-CM

## 2019-09-10 PROCEDURE — 97110 THERAPEUTIC EXERCISES: CPT | Performed by: PHYSICAL THERAPIST

## 2019-09-10 PROCEDURE — 97140 MANUAL THERAPY 1/> REGIONS: CPT | Performed by: PHYSICAL THERAPIST

## 2019-09-10 PROCEDURE — 97112 NEUROMUSCULAR REEDUCATION: CPT | Performed by: PHYSICAL THERAPIST

## 2019-09-10 NOTE — PROGRESS NOTES
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    2  Acute pain of right shoulder M25 511        Start Time: 915  Stop Time: 1000  Total time in clinic (min): 45 minutes    Subjective: I see the MD next week but I think I am just at a standstill  Objective: See treatment diary below      Assessment: Tolerated treatment well  Patient demonstrated fatigue post treatment, exhibited good technique with therapeutic exercises and would benefit from continued PT      Plan: Continue per plan of care  Progress treatment as tolerated         Precautions: Falls, hypotension, Follow attached Reverse Shoulder Protcol sx 19    Manual  8/20 8/23 8/27 8/30 9/3 9/06 09/10      PROM  LB LH LB LB MM LB LB      Upper trap STM  LB LH           Elbow PROM  LB LH LB                                        Exercise Diary  8/20 8/23 8/27 8/30 9/3 9/06 9/10      UBE  4/ 5/5 4/4 4/ 4/ 44 4/ level 3  4 level 1       scap squeezes  BTB 3x15 BTB 3x15     pulleys 5 min      T ball  Slides on table  FF abd  2x10 10" 2x10, 10" ea  2x10 10"  2x10 ball roll up the wall        T band IR/ER              Bicep curls  2# 3x15 2#, 3x15  2# 3x15 3#, 3x15  3# 3x15      Mod tandem              SLS              Shelve reaching 1# 2#    2 heights 4 weights 4x           digiflex              AAROM FF ABD    supine elvated head 3x10 2# Supine elevated table 3x10 2#FF   abd 2# 2x10 Seated, L assist w/ ecc control 2#x20 1# 3x10  T band OTB 2x10 5" hold       Isometric ball 6 way              Cane FF abd ER  2x10  2x10 ea  10x  20x        No moneys  YTB 3x10  YTB 3x10  YTB 2x10 YTB, 2x10 YTB 2x10  YTB 3x10                    T's Y's              Chest press   3# 1 set 2# 3x10     2# 3x10      scap punches  3# 3x10 3#, 3x10 3# 3x15  4# 3x10 3# 3x15       sidelying ER    2# 3x10  2#, 3x10 2#, 3x10 2# 3x10        Wall ball  3x10 c/cc 3x10 ea 3x10    3x20       Scap retract     YTB, 30x3" bodyblade 3x FF           Modalities

## 2019-09-13 ENCOUNTER — APPOINTMENT (OUTPATIENT)
Dept: PHYSICAL THERAPY | Facility: REHABILITATION | Age: 84
End: 2019-09-13
Payer: MEDICARE

## 2019-09-17 ENCOUNTER — OFFICE VISIT (OUTPATIENT)
Dept: PHYSICAL THERAPY | Facility: REHABILITATION | Age: 84
End: 2019-09-17
Payer: MEDICARE

## 2019-09-17 DIAGNOSIS — M25.511 ACUTE PAIN OF RIGHT SHOULDER: ICD-10-CM

## 2019-09-17 DIAGNOSIS — Z96.611 STATUS POST REVERSE TOTAL ARTHROPLASTY OF RIGHT SHOULDER: Primary | ICD-10-CM

## 2019-09-17 PROCEDURE — 97112 NEUROMUSCULAR REEDUCATION: CPT | Performed by: PHYSICAL THERAPIST

## 2019-09-17 PROCEDURE — 97110 THERAPEUTIC EXERCISES: CPT | Performed by: PHYSICAL THERAPIST

## 2019-09-17 PROCEDURE — 97140 MANUAL THERAPY 1/> REGIONS: CPT | Performed by: PHYSICAL THERAPIST

## 2019-09-17 NOTE — PROGRESS NOTES
PT Discharge    Today's date: 2019  Patient name: Eusebia Titus  : 1935  MRN: [id number]  Referring provider: Chele Kamara  Dx:   Encounter Diagnosis     ICD-10-CM    1  Status post reverse total arthroplasty of right shoulder Z96 611    2  Acute pain of right shoulder M25 511        Start Time: 916  Stop Time: 957  Total time in clinic (min): 41 minutes    Assessment  Assessment details: Pt has been attending outpatient PT for _29___  Pt has made steady progress in PT and has met all impairment and functional goals at this time  Pt is independent with HEP  Pt is D/C from PT to HEP continue to progress towards personal goals  Thank you!     Plan  Plan details: Pt dc'd to [facility]         Subjective Evaluation    Pain  Current pain ratin  At best pain ratin  At worst pain ratin (only when stretchig in PT)          Objective     Active Range of Motion     Right Shoulder   Flexion: 97 degrees   Extension: 47 degrees   Abduction: 86 degrees   External rotation 45°: 35 degrees   Internal rotation 45°: 42 degrees     Passive Range of Motion     Right Shoulder   Flexion: 108 degrees   Abduction: 93 degrees with pain  External rotation 45°: 55 degrees with pain  Internal rotation 45°: 62 degrees     Strength/Myotome Testing     Right Shoulder     Planes of Motion   Flexion: 4-   Extension: 4-   Abduction: 3+   Adduction: 3+   External rotation at 45°: 3+   Internal rotation at 45°: 3+     Additional Strength Details  MMT within available ROM        Precautions: Falls, hypotension, Follow attached Reverse Shoulder Protcol sx 19    Manual  8/20 8/23 8/27 8/30 9/3 9/06 09/10 9/17     PROM  LB LH LB LB MM LB LB LB     Upper trap STM  LB LH           Elbow PROM  LB LH LB                                        Exercise Diary  8/20 8/23 8/27 8/30 9/3 9/06 9/10 9/17      UBE  4/4 5/5 4/4 4/4 4/4 44 4/ level 3  4 level 1  3/3     scap squeezes  BTB 3x15 BTB 3x15     pulleys 5 min      T ball Slides on table  FF abd  2x10 10" 2x10, 10" ea  2x10 10"  2x10 ball roll up the wall        T band IR/ER         3x10      Bicep curls  2# 3x15 2#, 3x15  2# 3x15 3#, 3x15  3# 3x15      Mod tandem              SLS              Shelve reaching 1# 2#    2 heights 4 weights 4x           digiflex              AAROM FF ABD    supine elvated head 3x10 2# Supine elevated table 3x10 2#FF   abd 2# 2x10 Seated, L assist w/ ecc control 2#x20 1# 3x10  T band OTB 2x10 5" hold  2# 3x10      Isometric ball 6 way              Cane FF abd ER  2x10  2x10 ea  10x  20x   5x     No moneys  YTB 3x10  YTB 3x10  YTB 2x10 YTB, 2x10 YTB 2x10  YTB 3x10                    T's Y's              Chest press   3# 1 set 2# 3x10     2# 3x10      scap punches  3# 3x10 3#, 3x10 3# 3x15  4# 3x10   3# 3x15  3# 3x15     sidelying ER    2# 3x10  2#, 3x10 2#, 3x10 2# 3x10   2# 15     Wall ball  3x10 c/cc 3x10 ea 3x10    3x20  T's Y's 2x10 2#     Scap retract     YTB, 30x3" bodyblade 3x FF           Modalities

## 2019-09-20 ENCOUNTER — APPOINTMENT (OUTPATIENT)
Dept: PHYSICAL THERAPY | Facility: REHABILITATION | Age: 84
End: 2019-09-20
Payer: MEDICARE

## 2019-09-24 ENCOUNTER — APPOINTMENT (OUTPATIENT)
Dept: PHYSICAL THERAPY | Facility: REHABILITATION | Age: 84
End: 2019-09-24
Payer: MEDICARE

## 2019-09-25 DIAGNOSIS — F41.9 ANXIETY: ICD-10-CM

## 2019-09-25 RX ORDER — LORAZEPAM 0.5 MG/1
1 TABLET ORAL
Qty: 60 TABLET | Refills: 0 | Status: SHIPPED | OUTPATIENT
Start: 2019-09-25 | End: 2019-10-24 | Stop reason: SDUPTHER

## 2019-09-27 ENCOUNTER — APPOINTMENT (OUTPATIENT)
Dept: PHYSICAL THERAPY | Facility: REHABILITATION | Age: 84
End: 2019-09-27
Payer: MEDICARE

## 2019-09-30 ENCOUNTER — OFFICE VISIT (OUTPATIENT)
Dept: ENDOCRINOLOGY | Facility: CLINIC | Age: 84
End: 2019-09-30
Payer: MEDICARE

## 2019-09-30 ENCOUNTER — TELEPHONE (OUTPATIENT)
Dept: ENDOCRINOLOGY | Facility: CLINIC | Age: 84
End: 2019-09-30

## 2019-09-30 VITALS
BODY MASS INDEX: 23.95 KG/M2 | WEIGHT: 149 LBS | HEART RATE: 85 BPM | DIASTOLIC BLOOD PRESSURE: 74 MMHG | HEIGHT: 66 IN | SYSTOLIC BLOOD PRESSURE: 128 MMHG

## 2019-09-30 DIAGNOSIS — E11.3391 TYPE 2 DIABETES MELLITUS WITH RIGHT EYE AFFECTED BY MODERATE NONPROLIFERATIVE RETINOPATHY WITHOUT MACULAR EDEMA, WITH LONG-TERM CURRENT USE OF INSULIN (HCC): ICD-10-CM

## 2019-09-30 DIAGNOSIS — Z79.4 TYPE 2 DIABETES MELLITUS WITH RETINOPATHY, WITH LONG-TERM CURRENT USE OF INSULIN, MACULAR EDEMA PRESENCE UNSPECIFIED, UNSPECIFIED LATERALITY, UNSPECIFIED RETINOPATHY SEVERITY (HCC): Primary | ICD-10-CM

## 2019-09-30 DIAGNOSIS — I10 HYPERTENSION, UNSPECIFIED TYPE: ICD-10-CM

## 2019-09-30 DIAGNOSIS — Z79.4 TYPE 2 DIABETES MELLITUS WITH RIGHT EYE AFFECTED BY MODERATE NONPROLIFERATIVE RETINOPATHY WITHOUT MACULAR EDEMA, WITH LONG-TERM CURRENT USE OF INSULIN (HCC): ICD-10-CM

## 2019-09-30 DIAGNOSIS — E78.5 HYPERLIPIDEMIA, UNSPECIFIED HYPERLIPIDEMIA TYPE: ICD-10-CM

## 2019-09-30 DIAGNOSIS — E11.319 TYPE 2 DIABETES MELLITUS WITH RETINOPATHY, WITH LONG-TERM CURRENT USE OF INSULIN, MACULAR EDEMA PRESENCE UNSPECIFIED, UNSPECIFIED LATERALITY, UNSPECIFIED RETINOPATHY SEVERITY (HCC): Primary | ICD-10-CM

## 2019-09-30 PROCEDURE — 99214 OFFICE O/P EST MOD 30 MIN: CPT | Performed by: PHYSICIAN ASSISTANT

## 2019-09-30 RX ORDER — INSULIN DETEMIR 100 [IU]/ML
5 INJECTION, SOLUTION SUBCUTANEOUS
Qty: 5 PEN | Refills: 4
Start: 2019-09-30 | End: 2020-01-31 | Stop reason: ALTCHOICE

## 2019-09-30 NOTE — PROGRESS NOTES
Established Patient Progress Note      Chief Complaint   Patient presents with    Diabetes Type 2        History of Present Illness:   Yousif Peralta is a 82 y o  female with a history of type 2 diabetes with long term use of insulin since 10 years ago  Reports complications of retinopathy and neuropathy  Denies recent illness or hospitalizations  Denies recent severe hypoglycemic or severe hyperglycemic episodes  Denies any issues with her current regimen  home glucose monitoring: are performed regularly 4x per day  Does does report blood sugars tend to drop over night so has to snack at bedtime  Morning BG are on the low side so skips morning dose of insulin       Home blood glucose readings:   Before breakfast: 80s-low 100s  Before lunch: 121-180  Before dinner: low 100s  Bedtime: 130-180     Current regimen:   Levemir 5 units at bedtime  Novolog 4 units before meals, typically skips breakfast dosing  Metformin 100mg twice per day     Last Eye Exam:  UTD, needed injections for retinopathy  Last Foot Exam: UTD, reports more neuropathy pain in heels    Has hypertension: Taking metoprolol  Has hyperlipidemia: Taking vytorin      Patient Active Problem List   Diagnosis    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, with long-term current use of [facility]    HTN (hypertension)    HLD (hyperlipidemia)    Glaucoma    Bilateral carotid artery disease ([facility]    Symptomatic PVCs    Occlusion of right carotid artery    Stenosis of left carotid artery    Paroxysmal atrial fibrillation ([facility]    Presence of permanent cardiac pacemaker    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   [address]  R carotid occlusion      Family History   Problem Relation Age of Onset    Heart attack Father     Hiatal hernia Father     Diabetes Father     Heart disease Mother     Cancer Brother     Diabetes Brother     Heart disease Brother     Hypertension Brother      Social History     Tobacco Use    Smoking status: Former Smoker     Last attempt to quit:      Years since quittin 7    Smokeless tobacco: Never Used   Substance Use Topics    Alcohol use: No     No Known Allergies      Current Outpatient Medications:     acetaminophen (TYLENOL) 500 mg tablet, Take 1,000 mg by mouth as needed for mild pain, Disp: , Rfl:     bimatoprost (LUMIGAN) 0 01 % ophthalmic drops, Administer 1 drop to both eyes daily at bedtime for 30 days, Disp: 1 5 mL, Rfl: 0    ELIQUIS 5 MG, TAKE 1 TABLET (5 MG TOTAL) BY MOUTH 2 (TWO) TIMES A DAY, Disp: 60 tablet, Rfl: 2    ezetimibe-simvastatin (VYTORIN) 10-40 mg per tablet, TAKE 1 TABLET BY MOUTH DAILY AT BEDTIME, Disp: 30 tablet, Rfl: 5    famotidine (PEPCID) 10 mg tablet, Take 10 mg by mouth as needed , Disp: , Rfl:     ferrous sulfate 325 (65 Fe) mg tablet, Take 325 mg by mouth daily with breakfast, Disp: , Rfl:     Insulin Pen Needle (BD PEN NEEDLE CORNELL U/F) 32G X 4 MM MISC, by Does not apply route 4 (four) times a day, Disp: 400 each, Rfl: 3    LEVEMIR FLEXTOUCH 100 units/mL injection pen, Inject 5 Units under the skin daily at bedtime, Disp: 5 pen, Rfl: 4    LORazepam (ATIVAN) 0 5 mg tablet, TAKE 2 TABLETS (1 MG TOTAL) BY MOUTH DAILY AT BEDTIME, Disp: 60 tablet, Rfl: 0    metFORMIN (GLUCOPHAGE) 500 mg tablet, TAKE 1 TABLET (500 MG TOTAL) BY MOUTH 2 (TWO) TIMES A DAY WITH MEALS, Disp: 60 tablet, Rfl: 5    metoprolol tartrate (LOPRESSOR) 50 mg tablet, Take 1 5 tablets (75 mg total) by mouth every 12 (twelve) hours, Disp: 90 tablet, Rfl: 5    NOVOLOG FLEXPEN 100 units/mL injection pen, 4 units with meals, Disp: 5 pen, Rfl: 5    ONE TOUCH ULTRA TEST test strip, Testing four times daily as directed, Disp: 400 each, Rfl: 1    ONETOUCH DELICA LANCETS FINE MISC, Use one lancet to test blood 4 times a day, Disp: 400 each, Rfl: 1    lisinopril (ZESTRIL) 5 mg tablet, TAKE 1 TABLET (5 MG TOTAL) BY MOUTH DAILY (Patient not taking: Reported on 9/30/2019), Disp: 30 tablet, Rfl: 2    sitaGLIPtin (JANUVIA) 100 mg tablet, Take 1 tablet (100 mg total) by mouth daily, Disp: 30 tablet, Rfl: 5    Review of Systems   Constitutional: Negative for activity change, appetite change, chills, diaphoresis, fatigue, fever and unexpected weight change  HENT: Negative for trouble swallowing and voice change  Eyes: Negative for visual disturbance  Respiratory: Negative for shortness of breath  Cardiovascular: Negative for chest pain and palpitations  Gastrointestinal: Negative for abdominal pain, constipation and diarrhea  Endocrine: Negative for cold intolerance, heat intolerance, polydipsia, polyphagia and polyuria  Genitourinary: Negative for frequency and menstrual problem  Musculoskeletal: Negative for arthralgias and myalgias  Skin: Negative for rash  Allergic/Immunologic: Negative for food allergies     Neurological: Negative for dizziness and tremors  Hematological: Negative for adenopathy  Psychiatric/Behavioral: Negative for sleep disturbance  All other systems reviewed and are negative  Physical Exam:  Body mass index is 24 05 kg/m²  /74   Pulse 85   Ht 5' 6" (1 676 m)   Wt 67 6 kg (149 lb)   BMI 24 05 kg/m²    Wt Readings from Last 3 Encounters:   09/30/19 67 6 kg (149 lb)   07/25/19 67 kg (147 lb 12 8 oz)   07/05/19 68 kg (150 lb)       Physical Exam   Constitutional: She is oriented to person, place, and time  She appears well-developed and well-nourished  No distress  HENT:   Head: Normocephalic and atraumatic  Eyes: Pupils are equal, round, and reactive to light  Conjunctivae are normal    Neck: Normal range of motion  Neck supple  No thyromegaly present  Cardiovascular: Normal rate, regular rhythm and normal heart sounds  Pulmonary/Chest: Effort normal and breath sounds normal  No respiratory distress  She has no wheezes  She has no rales  Abdominal: Soft  Bowel sounds are normal  She exhibits no distension  There is no tenderness  Musculoskeletal: Normal range of motion  She exhibits no edema  Neurological: She is alert and oriented to person, place, and time  Skin: Skin is warm and dry  Psychiatric: She has a normal mood and affect  Vitals reviewed        Labs:   Lab Results   Component Value Date    HGBA1C 7 4 (H) 06/26/2019    HGBA1C 7 1 04/12/2019    HGBA1C 7 2 03/21/2019     Lab Results   Component Value Date    CREATININE 0 52 (L) 07/16/2019    CREATININE 0 54 (L) 06/26/2019    CREATININE 0 60 11/09/2016    BUN 16 07/16/2019    K 4 2 07/16/2019    CL 97 (L) 07/16/2019    CO2 30 07/16/2019     eGFR   Date Value Ref Range Status   07/16/2019 90 ml/min/1 73sq m Final     Lab Results   Component Value Date    HDL 66 (H) 06/26/2019    TRIG 81 06/26/2019     Lab Results   Component Value Date    ALT 20 07/16/2019    AST 15 07/16/2019    ALKPHOS 68 07/16/2019     Lab Results   Component Value Date ZNJ6YWAJPVJQ 1 280 06/26/2019    SSI4DEMWQVPH 1 120 11/06/2016    YQV7ANAXGNDA 1 090 10/31/2016     Lab Results   Component Value Date    FREET4 1 28 06/26/2019       Impression & Plan:    Problem List Items Addressed This Visit        Endocrine    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, with long-term current use of insulin (HCC)     Appears to be well controlled based on result of home glucose monitoring  She is only using about 8 units of novolog per day-- Discussed trying to d/c novolog and start Januvia 100mg daily and she is agreeable  Will also change levemir to morning dosing t prevent overnight hypoglycemia as glucose does drop significantly overnight  Continue to monitor BG 4x per day over next two weeks and send log for review- if stable will reduce BG monitoring to 2x per day  Relevant Medications    sitaGLIPtin (JANUVIA) 100 mg tablet    LEVEMIR FLEXTOUCH 100 units/mL injection pen       Cardiovascular and Mediastinum    HTN (hypertension)     Well Controlled  Other    HLD (hyperlipidemia)     Continue vytorin  Other Visit Diagnoses     Type 2 diabetes mellitus with retinopathy, with long-term current use of insulin, macular edema presence unspecified, unspecified laterality, unspecified retinopathy severity ([address] )    -  Primary    Relevant Medications    sitaGLIPtin (JANUVIA) 100 mg tablet    LEVEMIR FLEXTOUCH 100 units/mL injection pen          No orders of the defined types were placed in this encounter  There are no Patient Instructions on file for this visit  Discussed with the patient and all questioned fully answered  She will call me if any problems arise  Follow-up appointment in 3 months       Counseled patient on diagnostic results, prognosis, risk and benefit of treatment options, instruction for management, importance of treatment compliance, Risk  factor reduction and impressions    Analy Lemus PA-C

## 2019-09-30 NOTE — ASSESSMENT & PLAN NOTE
Appears to be well controlled based on result of home glucose monitoring  She is only using about 8 units of novolog per day-- Discussed trying to d/c novolog and start Januvia 100mg daily and she is agreeable  Will also change levemir to morning dosing t prevent overnight hypoglycemia as glucose does drop significantly overnight  Continue to monitor BG 4x per day over next two weeks and send log for review- if stable will reduce BG monitoring to 2x per day

## 2019-10-02 ENCOUNTER — TELEPHONE (OUTPATIENT)
Dept: ENDOCRINOLOGY | Facility: CLINIC | Age: 84
End: 2019-10-02

## 2019-10-02 NOTE — TELEPHONE ENCOUNTER
has rejected her Januvia  There is a 38% chance she will get it if I prior auth  Do you want to just change her Rx?

## 2019-10-02 NOTE — TELEPHONE ENCOUNTER
Spoke with pt and told her that Januvia was not covered    I told her that I was waiting for [facility] to get back to me to see what we were going to switch her too

## 2019-10-03 ENCOUNTER — TELEPHONE (OUTPATIENT)
Dept: ENDOCRINOLOGY | Facility: CLINIC | Age: 84
End: 2019-10-03

## 2019-10-03 NOTE — TELEPHONE ENCOUNTER
OK thanks! This was we can know the med is working before doing the prior [place]--  Once you get the prior auth i'll fill it out though it will be helpful to see how she does after about a week or so on med

## 2019-10-03 NOTE — TELEPHONE ENCOUNTER
Notified pt that the pharmacy will be filling her Rx for Januvia  This way we can see if it works before we do the prior [place] [place]

## 2019-10-03 NOTE — TELEPHONE ENCOUNTER
38%?    Is [employer] covered? OK to switch to [employer]  If not, I will do prior auth if you get me the paperwork  Thanks!

## 2019-10-03 NOTE — TELEPHONE ENCOUNTER
I called [facility]  They are going to give her a 1 mo supply of Januvia until I can do the prior auth    I will call the Pharmacy and have them resubmit

## 2019-10-09 ENCOUNTER — TELEPHONE (OUTPATIENT)
Dept: DERMATOLOGY | Facility: CLINIC | Age: 84
End: 2019-10-09

## 2019-10-21 ENCOUNTER — LAB (OUTPATIENT)
Dept: LAB | Facility: CLINIC | Age: 84
End: 2019-10-21
Payer: MEDICARE

## 2019-10-21 DIAGNOSIS — I10 ESSENTIAL HYPERTENSION: ICD-10-CM

## 2019-10-21 DIAGNOSIS — Z79.4 TYPE 2 DIABETES MELLITUS WITH DIABETIC POLYNEUROPATHY, WITH LONG-TERM CURRENT USE OF INSULIN (HCC): ICD-10-CM

## 2019-10-21 DIAGNOSIS — D64.9 ANEMIA, UNSPECIFIED TYPE: ICD-10-CM

## 2019-10-21 DIAGNOSIS — E11.42 TYPE 2 DIABETES MELLITUS WITH DIABETIC POLYNEUROPATHY, WITH LONG-TERM CURRENT USE OF INSULIN (HCC): ICD-10-CM

## 2019-10-21 LAB
ALBUMIN SERPL BCP-MCNC: 4.3 G/DL (ref 3.5–5)
ALP SERPL-CCNC: 68 U/L (ref 46–116)
ALT SERPL W P-5'-P-CCNC: 22 U/L (ref 12–78)
ANION GAP SERPL CALCULATED.3IONS-SCNC: 8 MMOL/L (ref 4–13)
AST SERPL W P-5'-P-CCNC: 17 U/L (ref 5–45)
BILIRUB SERPL-MCNC: 0.52 MG/DL (ref 0.2–1)
BUN SERPL-MCNC: 17 MG/DL (ref 5–25)
CALCIUM SERPL-MCNC: 9.5 MG/DL (ref 8.3–10.1)
CHLORIDE SERPL-SCNC: 99 MMOL/L (ref 100–108)
CHOLEST SERPL-MCNC: 122 MG/DL (ref 50–200)
CO2 SERPL-SCNC: 28 MMOL/L (ref 21–32)
CREAT SERPL-MCNC: 0.6 MG/DL (ref 0.6–1.3)
ERYTHROCYTE [DISTWIDTH] IN BLOOD BY AUTOMATED COUNT: 14.1 % (ref 11.6–15.1)
EST. AVERAGE GLUCOSE BLD GHB EST-MCNC: 157 MG/DL
GFR SERPL CREATININE-BSD FRML MDRD: 86 ML/MIN/1.73SQ M
GLUCOSE P FAST SERPL-MCNC: 157 MG/DL (ref 65–99)
HBA1C MFR BLD: 7.1 % (ref 4.2–6.3)
HCT VFR BLD AUTO: 38.2 % (ref 34.8–46.1)
HDLC SERPL-MCNC: 62 MG/DL
HGB BLD-MCNC: 11.6 G/DL (ref 11.5–15.4)
LDLC SERPL CALC-MCNC: 45 MG/DL (ref 0–100)
MCH RBC QN AUTO: 26.6 PG (ref 26.8–34.3)
MCHC RBC AUTO-ENTMCNC: 30.4 G/DL (ref 31.4–37.4)
MCV RBC AUTO: 88 FL (ref 82–98)
NONHDLC SERPL-MCNC: 60 MG/DL
PLATELET # BLD AUTO: 161 THOUSANDS/UL (ref 149–390)
PMV BLD AUTO: 10.9 FL (ref 8.9–12.7)
POTASSIUM SERPL-SCNC: 4.2 MMOL/L (ref 3.5–5.3)
PROT SERPL-MCNC: 7.5 G/DL (ref 6.4–8.2)
RBC # BLD AUTO: 4.36 MILLION/UL (ref 3.81–5.12)
SODIUM SERPL-SCNC: 135 MMOL/L (ref 136–145)
TRIGL SERPL-MCNC: 74 MG/DL
TSH SERPL DL<=0.05 MIU/L-ACNC: 1.36 UIU/ML (ref 0.36–3.74)
WBC # BLD AUTO: 5.26 THOUSAND/UL (ref 4.31–10.16)

## 2019-10-21 PROCEDURE — 84443 ASSAY THYROID STIM HORMONE: CPT

## 2019-10-21 PROCEDURE — 36415 COLL VENOUS BLD VENIPUNCTURE: CPT

## 2019-10-21 PROCEDURE — 80061 LIPID PANEL: CPT

## 2019-10-21 PROCEDURE — 85027 COMPLETE CBC AUTOMATED: CPT

## 2019-10-21 PROCEDURE — 83036 HEMOGLOBIN GLYCOSYLATED A1C: CPT

## 2019-10-21 PROCEDURE — 80053 COMPREHEN METABOLIC PANEL: CPT

## 2019-10-22 NOTE — RESULT ENCOUNTER NOTE
Please let patient know blood work is stable  A1c is 7 1%  We will discuss in further detail at her office visit on 11/6

## 2019-10-24 DIAGNOSIS — F41.9 ANXIETY: ICD-10-CM

## 2019-10-24 RX ORDER — LORAZEPAM 0.5 MG/1
1 TABLET ORAL
Qty: 60 TABLET | Refills: 0 | Status: SHIPPED | OUTPATIENT
Start: 2019-10-24 | End: 2019-11-21 | Stop reason: SDUPTHER

## 2019-11-05 ENCOUNTER — TELEPHONE (OUTPATIENT)
Dept: ENDOCRINOLOGY | Facility: CLINIC | Age: 84
End: 2019-11-05

## 2019-11-05 NOTE — TELEPHONE ENCOUNTER
Blood sugars look good, hypoglycemia has improved off the novolog  Has she has any problems with the [place] [employer] [place]? I think she is going to need prior auth to continue the [place] [employer] [place]

## 2019-11-05 NOTE — TELEPHONE ENCOUNTER
L/m for patient notifying her  Asked her to call back to report any problems she may be having with Januvia

## 2019-11-05 NOTE — TELEPHONE ENCOUNTER
Pt feels she is doing good on the Januvia  I have a form to fill out for [facility]    I will work on it for her

## 2019-11-06 ENCOUNTER — OFFICE VISIT (OUTPATIENT)
Dept: FAMILY MEDICINE CLINIC | Facility: CLINIC | Age: 84
End: 2019-11-06
Payer: MEDICARE

## 2019-11-06 VITALS
RESPIRATION RATE: 16 BRPM | BODY MASS INDEX: 24.13 KG/M2 | HEART RATE: 71 BPM | HEIGHT: 66 IN | SYSTOLIC BLOOD PRESSURE: 106 MMHG | TEMPERATURE: 96.8 F | WEIGHT: 150.13 LBS | OXYGEN SATURATION: 97 % | DIASTOLIC BLOOD PRESSURE: 70 MMHG

## 2019-11-06 DIAGNOSIS — I10 HYPERTENSION, UNSPECIFIED TYPE: ICD-10-CM

## 2019-11-06 DIAGNOSIS — Z23 FLU VACCINE NEED: ICD-10-CM

## 2019-11-06 DIAGNOSIS — M85.80 OSTEOPENIA, UNSPECIFIED LOCATION: ICD-10-CM

## 2019-11-06 DIAGNOSIS — I48.0 PAROXYSMAL ATRIAL FIBRILLATION (HCC): ICD-10-CM

## 2019-11-06 DIAGNOSIS — E78.5 HYPERLIPIDEMIA, UNSPECIFIED HYPERLIPIDEMIA TYPE: ICD-10-CM

## 2019-11-06 DIAGNOSIS — E11.3552 TYPE 2 DIABETES MELLITUS WITH STABLE PROLIFERATIVE RETINOPATHY OF LEFT EYE, WITH LONG-TERM CURRENT USE OF INSULIN (HCC): Primary | ICD-10-CM

## 2019-11-06 DIAGNOSIS — Z79.4 TYPE 2 DIABETES MELLITUS WITH STABLE PROLIFERATIVE RETINOPATHY OF LEFT EYE, WITH LONG-TERM CURRENT USE OF INSULIN (HCC): Primary | ICD-10-CM

## 2019-11-06 PROCEDURE — 90662 IIV NO PRSV INCREASED AG IM: CPT | Performed by: NURSE PRACTITIONER

## 2019-11-06 PROCEDURE — G0008 ADMIN INFLUENZA VIRUS VAC: HCPCS | Performed by: NURSE PRACTITIONER

## 2019-11-06 PROCEDURE — 99214 OFFICE O/P EST MOD 30 MIN: CPT | Performed by: NURSE PRACTITIONER

## 2019-11-06 NOTE — PROGRESS NOTES
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, with long-term current use of insulin ([address] ) - Primary       Lab Results   Component Value Date    HGBA1C 7 1 (H) 10/21/2019     A1c stable at 7 1%  Following with endocrinology  Up-to-date on eye exam and foot exam   Up-to-date on urine for microalbumin  She stopped taking lisinopril, because she thought it making her dizzy  Continue same medications metformin, Levemir, and Januvia  Repeat A1c in 3-4 months  Relevant Orders    CBC    Comprehensive metabolic panel    Hemoglobin A1C       Cardiovascular and Mediastinum    HTN (hypertension)     Blood pressure stable on metoprolol 75 mg every 12 hours  Relevant Orders    CBC    Comprehensive metabolic panel    Paroxysmal atrial fibrillation (HCC)     Regular rate and rhythm auscultated today  Continue metoprolol 75 mg twice daily and Eliquis  Follow up with Cardiology, Dr Ernesto Sterling  Musculoskeletal and Integument    Osteopenia     She does eat a lot of dairy products  I do recommend she start taking an over-the-counter calcium supplement +vitamin d  Recommend Citracal  Continue weight bearing exercise  Other    HLD (hyperlipidemia)     LDL at goal 45, continue Vytorin  Repeat lipid panel in 3-4 months  Other Visit Diagnoses     Flu vaccine need        Relevant Orders    FLUZONE HIGH-DOSE: influenza vaccine, high-dose, preservative-free 0 5 mL (Completed)      Repeat blood work and follow up in 3-4 months  The patient has a history of falls  I did complete a risk assessment for falls  A plan of care for falls was documented    Falls Plan of Care: Balance, strength, and gait training instructions were provided  Just completed a class working on balance  1  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, with long-term current use of insulin (HCC)  CBC    Comprehensive metabolic panel    Hemoglobin A1C   2  Osteopenia, unspecified location     3  Paroxysmal atrial fibrillation (HCC)     4  Hypertension, unspecified type  CBC    Comprehensive metabolic panel   5  Flu vaccine need  FLUZONE HIGH-DOSE: influenza vaccine, high-dose, preservative-free 0 5 mL    CANCELED: FLUBLOK: influenza vaccine, quadrivalent, recombinant, PF, 0 5 mL   6  Hyperlipidemia, unspecified hyperlipidemia type             Chief Complaint     Chief Complaint   Patient presents with    Follow-up     requesting flu shot        History of Present Illness     Zoie Ramirez is an 61-year-old female presenting today for follow-up  Calling with Dr Darwyn Canavan for AFib and history of permanent pacemaker  Following with endocrinology, Dr Mihaela Conley for diabetes  Abida Manjarrez is working well, has only been on this for 5 days now  Sugars are higher than she would like  No recent hypoglycemia  Follows with [facility] for diabetic retinopathy, Dr Maxx Parish and Dr Oneil Expose  Scheduled with Dr Evi Rojas, podiatry on November 13th to establish care  Has completed physical therapy post right shoulder surgery  States she does not have full range of motion, but it is significantly improved  She still does some home exercises  Appetite is better  Recently completed a balance class  She is being extra careful not to fall again  Lorazepam at night for sleep  Recently stopped iron supplementation due to GI upset  Just restarted yesterday  Review of Systems   Review of Systems   Constitutional: Negative  HENT: Negative  Eyes: Negative  Respiratory: Negative  Cardiovascular: Negative  Gastrointestinal: Negative  Endocrine: Negative  Genitourinary: Negative  Musculoskeletal: Negative  Skin: Negative  Allergic/Immunologic: Negative  Neurological: Positive for dizziness (intermittent, chronic, improved with stopping lisinopril)  Hematological: Negative  Psychiatric/Behavioral: Negative          Active Problem List     Patient Active Problem List   Diagnosis    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, with long-term current use of insulin ([facility] )    HTN (hypertension)    HLD (hyperlipidemia)    Glaucoma    Bilateral carotid artery disease (HCC)    Symptomatic PVCs    Occlusion of right carotid artery    Stenosis of left carotid artery    Paroxysmal atrial fibrillation ([facility] )    Presence of permanent cardiac pacemaker    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, with long-term current use of insulin ([place] [address] )    Type 2 diabetes mellitus with diabetic polyneuropathy, with long-term current use of insulin ([facility])    Osteopenia    GERD (gastroesophageal reflux disease)    Anxiety    [facility], [facility]  )       Past Medical History:  Past Medical History:   Diagnosis Date    Diabetes mellitus ([place] [address] )     Glaucoma     H/O degenerative disc disease     Hyperlipidemia     Hypertension     Peripheral neuropathy     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 ([place] [address] )        Past Surgical History:  Past Surgical History:   Procedure Laterality Date    CATARACT EXTRACTION, BILATERAL  2011    CYST REMOVAL  2009    left lower Quadrant     HERNIA REPAIR  1992    LIPOMA RESECTION  2010    MT REPAIR ING HERNIA,5+Y/O,REDUCIBL Bilateral 1/5/2018    Procedure: INGUINAL HERNIA REPAIR;  Surgeon: Flaquita Kaminski MD;  [facility];  Service: General    SHOULDER SURGERY  04/26/2019    Dr Arianne Curran [facility]   [address]  R carotid occlusion       Family History:  Family History Problem Relation Age of Onset    Heart attack Father     Hiatal hernia Father     Diabetes Father     Heart disease Mother     Cancer Brother     Diabetes Brother     Heart disease Brother     Hypertension Brother        Social History:  Social History     Socioeconomic History    Marital status:      Spouse name: Not on file    Number of children: Not on file    Years of education: Not on file    Highest education level: Not on file   Occupational History    Not on file   Social Needs    Financial resource strain: Not on file    Food insecurity:     Worry: Not on file     Inability: Not on file    Transportation needs:     Medical: Not on file     Non-medical: Not on file   Tobacco Use    Smoking status: Former Smoker     Last attempt to quit:      Years since quittin 8    Smokeless tobacco: Never Used   Substance and Sexual Activity    Alcohol use: No    Drug use: No    Sexual activity: Not on file   Lifestyle    Physical activity:     Days per week: Not on file     Minutes per session: Not on file    Stress: Not on file   Relationships    Social connections:     Talks on phone: Not on file     Gets together: Not on file     Attends [employer] service: Not on file     Active member of club or organization: Not on file     Attends meetings of clubs or organizations: Not on file     Relationship status: Not on file    Intimate partner violence:     Fear of current or ex partner: Not on file     Emotionally abused: Not on file     Physically abused: Not on file     Forced sexual activity: Not on file   Other Topics Concern    Not on file   Social History Narrative    Lives alone [place]    2 children       I have reviewed the patient's medical history in detail; there are no changes to the history as noted in the electronic medical record      Objective     Vitals:    19 0942   BP: 106/70   BP Location: Left arm   Patient Position: Sitting   Cuff Size: Adult Pulse: 71   Resp: 16   Temp: (!) 96 8 °F (36 °C)   TempSrc: Tympanic   SpO2: 97%   Weight: 68 1 kg (150 lb 2 oz)   Height: 5' 6" (1 676 m)     Wt Readings from Last 3 Encounters:   11/06/19 68 1 kg (150 lb 2 oz)   09/30/19 67 6 kg (149 lb)   07/25/19 67 kg (147 lb 12 8 oz)     Body mass index is 24 23 kg/m²  PHQ-9 Depression Screening    PHQ-9:    Frequency of the following problems over the past two weeks:            Physical Exam   Constitutional: She is oriented to person, place, and time  She appears well-developed and well-nourished  No distress  HENT:   Head: Normocephalic and atraumatic  Right Ear: Tympanic membrane, external ear and ear canal normal    Left Ear: Tympanic membrane, external ear and ear canal normal    Mouth/Throat: Uvula is midline and oropharynx is clear and moist    Eyes: Pupils are equal, round, and reactive to light  Conjunctivae are normal    Neck: Normal range of motion  Neck supple  Carotid bruit is not present  No thyromegaly present  Cardiovascular: Normal rate and regular rhythm  No murmur heard  Pulmonary/Chest: Effort normal and breath sounds normal    Abdominal: Soft  Bowel sounds are normal  There is no tenderness  Musculoskeletal: She exhibits edema (mild non-pitting edema right ankle--patient reports history of old ankle fracture)  Lymphadenopathy:     She has no cervical adenopathy  Neurological: She is alert and oriented to person, place, and time  Skin: No rash noted  Psychiatric: She has a normal mood and affect  Nursing note and vitals reviewed        ALLERGIES:  No Known Allergies    Current Medications     Current Outpatient Medications   Medication Sig Dispense Refill    acetaminophen (TYLENOL) 500 mg tablet Take 1,000 mg by mouth as needed for mild pain      bimatoprost (LUMIGAN) 0 01 % ophthalmic drops Administer 1 drop to both eyes daily at bedtime for 30 days 1 5 mL 0    ELIQUIS 5 MG TAKE 1 TABLET (5 MG TOTAL) BY MOUTH 2 (TWO) TIMES A DAY 60 tablet 2    ezetimibe-simvastatin (VYTORIN) 10-40 mg per tablet TAKE 1 TABLET BY MOUTH DAILY AT BEDTIME 30 tablet 5    famotidine (PEPCID) 10 mg tablet Take 10 mg by mouth as needed       ferrous sulfate 325 (65 Fe) mg tablet Take 325 mg by mouth daily with breakfast      Insulin Pen Needle (BD PEN NEEDLE CORNELL U/F) 32G X 4 MM MISC by Does not apply route 4 (four) times a day 400 each 3    LEVEMIR FLEXTOUCH 100 units/mL injection pen Inject 5 Units under the skin daily at bedtime 5 pen 4    LORazepam (ATIVAN) 0 5 mg tablet TAKE 2 TABLETS (1 MG TOTAL) BY MOUTH DAILY AT BEDTIME 60 tablet 0    metFORMIN (GLUCOPHAGE) 500 mg tablet TAKE 1 TABLET (500 MG TOTAL) BY MOUTH 2 (TWO) TIMES A DAY WITH MEALS 60 tablet 5    metoprolol tartrate (LOPRESSOR) 50 mg tablet Take 1 5 tablets (75 mg total) by mouth every 12 (twelve) hours 90 tablet 5    ONE TOUCH ULTRA TEST test strip Testing four times daily as directed 400 each 1    ONETOUCH DELICA LANCETS FINE MISC Use one lancet to test blood 4 times a day 400 each 1    sitaGLIPtin (JANUVIA) 100 mg tablet Take 1 tablet (100 mg total) by mouth daily 30 tablet 5     No current facility-administered medications for this visit            Health Maintenance     Health Maintenance   Topic Date Due    HEPATITIS B VACCINES (1 of 3 - Risk 3-dose series) 12/01/1954    Falls: Plan of Care  12/01/2002    DTaP,Tdap,and Td Vaccines (1 - Tdap) 06/17/2020 (Originally 12/1/1956)    DM Eye Exam  01/10/2020    HEMOGLOBIN A1C  04/21/2020    Diabetic Foot Exam  06/24/2020    Fall Risk  07/25/2020    Depression Screening PHQ  07/25/2020    Urinary Incontinence Screening  07/25/2020    Medicare Annual Wellness Visit (AWV)  07/25/2020    BMI: Adult  11/06/2020    INFLUENZA VACCINE  Completed    Pneumococcal Vaccine: 65+ Years  Completed    Pneumococcal Vaccine: Pediatrics (0 to 5 Years) and At-Risk Patients (6 to 59 Years)  Aged [employer] History   Administered Date(s) Administered    INFLUENZA 11/02/2016, 10/16/2017, 09/18/2018, 09/18/2018    Influenza, Quadrivalent (nasal) 11/02/2016    Influenza, high dose seasonal 0 5 mL 11/06/2019    Pneumococcal Conjugate 13-Valent 07/25/2019    Pneumococcal Polysaccharide PPV23 03/17/2003    Zoster 07/09/2010       ZANDER Sanz

## 2019-11-08 ENCOUNTER — TELEPHONE (OUTPATIENT)
Dept: ENDOCRINOLOGY | Facility: CLINIC | Age: 84
End: 2019-11-08

## 2019-11-10 NOTE — ASSESSMENT & PLAN NOTE
She does eat a lot of dairy products  I do recommend she start taking an over-the-counter calcium supplement +vitamin d  Recommend Citracal  Continue weight bearing exercise

## 2019-11-10 NOTE — ASSESSMENT & PLAN NOTE
Regular rate and rhythm auscultated today  Continue metoprolol 75 mg twice daily and Eliquis  Follow up with Cardiology, Dr Lizzie Payan

## 2019-11-10 NOTE — ASSESSMENT & PLAN NOTE
Lab Results   Component Value Date    HGBA1C 7 1 (H) 10/21/2019     A1c stable at 7 1%  Following with endocrinology  Up-to-date on eye exam and foot exam   Up-to-date on urine for microalbumin  She stopped taking lisinopril, because she thought it was making her dizzy  Continue same medications metformin, Levemir, and Januvia  Repeat A1c in 3-4 months

## 2019-11-13 ENCOUNTER — TELEPHONE (OUTPATIENT)
Dept: ENDOCRINOLOGY | Facility: CLINIC | Age: 84
End: 2019-11-13

## 2019-11-13 NOTE — TELEPHONE ENCOUNTER
Kori and they told me [facility] approved the Januvia Rx  I then called pt and relayed this message    They will have it for her tomorrow

## 2019-11-13 NOTE — TELEPHONE ENCOUNTER
Patient was checking on the approval for her [place] [facility] [place]  She said that she is on her last pill  She wants to know if it's not approved, what should she do now? Her number is [phone]    Thank you

## 2019-11-21 DIAGNOSIS — I48.0 PAROXYSMAL ATRIAL FIBRILLATION (HCC): ICD-10-CM

## 2019-11-21 DIAGNOSIS — F41.9 ANXIETY: ICD-10-CM

## 2019-11-21 RX ORDER — LORAZEPAM 0.5 MG/1
1 TABLET ORAL
Qty: 60 TABLET | Refills: 0 | Status: SHIPPED | OUTPATIENT
Start: 2019-11-21 | End: 2019-12-19 | Stop reason: SDUPTHER

## 2019-11-21 RX ORDER — APIXABAN 5 MG/1
5 TABLET, FILM COATED ORAL 2 TIMES DAILY
Qty: 60 TABLET | Refills: 0 | Status: SHIPPED | OUTPATIENT
Start: 2019-11-21 | End: 2020-01-16

## 2019-12-16 ENCOUNTER — IN-CLINIC DEVICE VISIT (OUTPATIENT)
Dept: CARDIOLOGY CLINIC | Facility: CLINIC | Age: 84
End: 2019-12-16
Payer: MEDICARE

## 2019-12-16 DIAGNOSIS — Z95.0 CARDIAC PACEMAKER IN SITU: Primary | ICD-10-CM

## 2019-12-16 PROCEDURE — 93280 PM DEVICE PROGR EVAL DUAL: CPT | Performed by: INTERNAL MEDICINE

## 2019-12-16 NOTE — PROGRESS NOTES
Results for orders placed or performed in visit on 12/16/19   Cardiac EP device report    Narrative    MDT DUAL PM  DEVICE INTERROGATED IN THE [place] OFFICE  BATTERY VOLTAGE ADEQUATE (6 5 YRS)  AP-96%, -10%  ALL LEAD PARAMETERS WITHIN NORMAL LIMITS  50 NEW AT/AF EPISODES MAX DURATION 62 SEC- ATRIAL LEAD NOISE ON AVAILABLE EGM'S (SENSE POLARITY IS UNIPOLAR  POLARITY SWITCHED ON 4/26/19)  HX: PAF & ON ELIQUIS & METOPROLOL  REPROGRAMMED ATRIAL PACE & SENSE POLARITY TO BIPOLAR  NORMAL DEVICE FUNCTION   GV
no

## 2019-12-19 DIAGNOSIS — F41.9 ANXIETY: ICD-10-CM

## 2019-12-19 RX ORDER — LORAZEPAM 0.5 MG/1
1 TABLET ORAL
Qty: 60 TABLET | Refills: 0 | Status: SHIPPED | OUTPATIENT
Start: 2019-12-19 | End: 2020-01-17

## 2020-01-16 DIAGNOSIS — I10 ESSENTIAL (PRIMARY) HYPERTENSION: ICD-10-CM

## 2020-01-16 DIAGNOSIS — I48.0 PAROXYSMAL ATRIAL FIBRILLATION (HCC): ICD-10-CM

## 2020-01-16 DIAGNOSIS — F41.9 ANXIETY: ICD-10-CM

## 2020-01-16 RX ORDER — METOPROLOL TARTRATE 50 MG/1
75 TABLET, FILM COATED ORAL EVERY 12 HOURS SCHEDULED
Qty: 90 TABLET | Refills: 5 | Status: SHIPPED | OUTPATIENT
Start: 2020-01-16 | End: 2020-07-06

## 2020-01-16 RX ORDER — APIXABAN 5 MG/1
5 TABLET, FILM COATED ORAL 2 TIMES DAILY
Qty: 60 TABLET | Refills: 3 | Status: SHIPPED | OUTPATIENT
Start: 2020-01-16 | End: 2020-05-06

## 2020-01-17 RX ORDER — LORAZEPAM 0.5 MG/1
1 TABLET ORAL
Qty: 60 TABLET | Refills: 0 | Status: SHIPPED | OUTPATIENT
Start: 2020-01-17 | End: 2020-02-13

## 2020-01-29 ENCOUNTER — TELEPHONE (OUTPATIENT)
Dept: ENDOCRINOLOGY | Facility: CLINIC | Age: 85
End: 2020-01-29

## 2020-01-29 NOTE — PROGRESS NOTES
Cardiology Follow Up    Kenny Driver  1935  [id number]  [facility] [id number] [facility]  [phone]    1  Essential (primary) hypertension     2  Pure hypercholesterolemia     3  Paroxysmal atrial fibrillation (HCC)     4  Cardiac pacemaker in situ         Interval History:  Patient is here for a follow-up visit  Patient has a dual-chamber pacemaker with ongoing interrogation  Her most recent check was in 12/19 and at that time her device was found to be functioning appropriately  She had PAF  She had a pharmacologic nuclear stress test done October 2016 which showed no evidence of prognostically important ischemia   An echocardiogram done 8/19 demonstrated preserved LV systolic function  and no significant valvular heart disease   Patient had a carotid Doppler done in July 2018 which demonstrated a chronic occlusion of the right internal carotid with a 50-69% stenosis his noted in the left internal carotid  Loree Bolanos was no significant change compared to a prior study done October 30, 2016  Patient while in [place] over the winter time 2019, had an episode where she fractured her right shoulder  She apparently had issues with atrial fibrillation and was placed on anticoagulation  Aspirin was discontinued  It sounds like she had a [facility] Myoview in [place] although I do not have formal records of this  There is reference to one being done which showed no ischemia with an ejection fraction of 62%  patient has been well  She has had no chest pain or significant dyspnea  Patient had a lipid profile done October 2019  Her total cholesterol was 122 with an HDL of 62 and a direct LDL of 45      Patient Active Problem List   Diagnosis    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, with long-term current use of insulin ([address] )    HTN (hypertension)    HLD (hyperlipidemia)    Glaucoma    Bilateral carotid artery disease (HCC)    Symptomatic PVCs    Occlusion of right carotid artery    Stenosis of left carotid artery    Paroxysmal atrial fibrillation (HCC)    Presence of permanent cardiac pacemaker    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, with long-term current use of insulin (HCC)    Type 2 diabetes mellitus with diabetic polyneuropathy, with long-term current use of insulin (HCC)    Osteopenia    GERD (gastroesophageal reflux disease)    Anxiety    Uncontrolled type 2 diabetes mellitus with hypoglycemia ([facility])     Past Medical History:   Diagnosis Date    [facility]     Glaucoma     H/O degenerative disc disease     Hyperlipidemia     Hypertension     Peripheral neuropathy     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 ([place] [address] )      Social History     Socioeconomic History    Marital status:      Spouse name: Not on file    Number of children: Not on file    Years of education: Not on file    Highest education level: Not on file   Occupational History    Occupation: customer service   retired   Social Needs    Financial resource strain: Not on file    Food insecurity:     Worry: Not on file     Inability: Not on file   [employer] needs:     Medical: Not on file     Non-medical: Not on file   Tobacco Use    Smoking status: Former Smoker     Last attempt to quit:      Years since quittin 1    Smokeless tobacco: Never Used   Substance and Sexual Activity    Alcohol use: No    Drug use: No    Sexual activity: Not on file   Lifestyle    Physical activity:     Days per week: Not on file     Minutes per session: Not on file    Stress: Not on file   Relationships    Social connections:     Talks on phone: Not on file     Gets together: Not on file     Attends Christian service: Not on file     Active member of club or organization: Not on file     Attends meetings of clubs or organizations: Not on file     Relationship status: Not on file    Intimate partner violence:     Fear of current or ex partner: Not on file     Emotionally abused: Not on file     Physically abused: Not on file     Forced sexual activity: Not on file   Other Topics Concern    Not on file   Social History Narrative    Lives alone [employer]    2 children      Family History   Problem Relation Age of Onset    Heart attack Father     Hiatal hernia Father     Diabetes Father     Heart disease Mother     Cancer Brother     Diabetes Brother     Heart disease Brother     Hypertension Brother      Past Surgical History:   Procedure Laterality Date    CATARACT EXTRACTION, BILATERAL  2011    CYST REMOVAL  2009    left lower Quadrant     HERNIA REPAIR  1992    LIPOMA RESECTION  2010    PA REPAIR ING HERNIA,5+Y/O,REDUCIBL Bilateral 1/5/2018    Procedure: [address];  Surgeon: Doreen Colvin MD;  [facility];  Service: General    [facility]  [facility]    [facility]   [id number] [employer] carotid occlusion       Current Outpatient Medications:     acetaminophen (TYLENOL) 500 mg tablet, Take 1,000 mg by mouth as needed for mild pain, Disp: , Rfl:     bimatoprost (LUMIGAN) 0 01 % ophthalmic drops, Administer 1 drop to both eyes daily at bedtime for 30 days, Disp: 1 5 mL, Rfl: 0    calcium carbonate (OS-DANIA) 600 MG tablet, Take 600 mg by mouth 2 (two) times a day with meals, Disp: , Rfl:     ELIQUIS 5 MG, TAKE 1 TABLET (5 MG TOTAL) BY MOUTH 2 (TWO) TIMES A DAY, Disp: 60 tablet, Rfl: 3    ezetimibe-simvastatin (VYTORIN) 10-40 mg per tablet, TAKE 1 TABLET BY MOUTH DAILY AT BEDTIME, Disp: 30 tablet, Rfl: 5    famotidine (PEPCID) 10 mg tablet, Take 10 mg by mouth as needed , Disp: , Rfl:     ferrous sulfate 325 (65 Fe) mg tablet, Take 325 mg by mouth daily with breakfast, Disp: , Rfl:     LORazepam (ATIVAN) 0 5 mg tablet, TAKE 2 TABLETS (1 MG TOTAL) BY MOUTH DAILY AT BEDTIME, Disp: 60 tablet, Rfl: 0    metFORMIN (GLUCOPHAGE) 500 mg tablet, TAKE 1 TABLET (500 MG TOTAL) BY MOUTH 2 (TWO) TIMES A DAY WITH MEALS, Disp: 60 tablet, Rfl: 5    metoprolol tartrate (LOPRESSOR) 50 mg tablet, TAKE 1 5 TABLETS (75 MG TOTAL) BY MOUTH EVERY 12 (TWELVE) HOURS, Disp: 90 tablet, Rfl: 5    sitaGLIPtin (JANUVIA) 100 mg tablet, Take 1 tablet (100 mg total) by mouth daily, Disp: 30 tablet, Rfl: 5    Insulin Pen Needle (BD PEN NEEDLE CORNELL U/F) 32G X 4 MM MISC, by Does not apply route 4 (four) times a day, Disp: 400 each, Rfl: 3    ONE TOUCH ULTRA TEST test strip, Testing four times daily as directed, Disp: 400 each, Rfl: 1    ONETOUCH DELICA LANCETS FINE MISC, Use one lancet to test blood 4 times a day, Disp: 400 each, Rfl: 1  No Known Allergies    Labs:not applicable  Imaging: No results found  Review of Systems:  Review of Systems   All other systems reviewed and are negative  Physical Exam:  /60 (BP Location: Left arm, Cuff Size: Standard)   Pulse 60   Ht 5' 6" (1 676 m)   Wt 67 1 kg (148 lb)   BMI 23 89 kg/m²   Physical Exam   Constitutional: She is oriented to person, place, and time  She appears well-developed and well-nourished  HENT:   Head: Normocephalic and atraumatic  Eyes: Pupils are equal, round, and reactive to light  Conjunctivae and EOM are normal    Neck: Normal range of motion  Neck supple  Cardiovascular: Normal rate and normal heart sounds  Pulmonary/Chest: Effort normal and breath sounds normal    Neurological: She is alert and oriented to person, place, and time  Skin: Skin is warm and dry  Psychiatric: She has a normal mood and affect  Vitals reviewed  Discussion/Summary:I will continue the patient's present medical regimen  The patient appears well compensated    I have asked the patient to call if there is a problem in the interim otherwise I will see the patient in six months time

## 2020-01-31 ENCOUNTER — OFFICE VISIT (OUTPATIENT)
Dept: ENDOCRINOLOGY | Facility: CLINIC | Age: 85
End: 2020-01-31
Payer: MEDICARE

## 2020-01-31 VITALS
DIASTOLIC BLOOD PRESSURE: 60 MMHG | HEART RATE: 88 BPM | WEIGHT: 152.6 LBS | BODY MASS INDEX: 24.63 KG/M2 | SYSTOLIC BLOOD PRESSURE: 90 MMHG

## 2020-01-31 DIAGNOSIS — E78.5 HYPERLIPIDEMIA, UNSPECIFIED HYPERLIPIDEMIA TYPE: ICD-10-CM

## 2020-01-31 DIAGNOSIS — Z79.4 TYPE 2 DIABETES MELLITUS WITH STABLE PROLIFERATIVE RETINOPATHY OF LEFT EYE, WITH LONG-TERM CURRENT USE OF INSULIN (HCC): Primary | ICD-10-CM

## 2020-01-31 DIAGNOSIS — E11.65 UNCONTROLLED TYPE 2 DIABETES MELLITUS WITH HYPERGLYCEMIA (HCC): ICD-10-CM

## 2020-01-31 DIAGNOSIS — E11.649 UNCONTROLLED TYPE 2 DIABETES MELLITUS WITH HYPOGLYCEMIA WITHOUT COMA (HCC): ICD-10-CM

## 2020-01-31 DIAGNOSIS — E11.3552 TYPE 2 DIABETES MELLITUS WITH STABLE PROLIFERATIVE RETINOPATHY OF LEFT EYE, WITH LONG-TERM CURRENT USE OF INSULIN (HCC): Primary | ICD-10-CM

## 2020-01-31 DIAGNOSIS — M85.80 OSTEOPENIA, UNSPECIFIED LOCATION: ICD-10-CM

## 2020-01-31 PROCEDURE — 99214 OFFICE O/P EST MOD 30 MIN: CPT | Performed by: INTERNAL MEDICINE

## 2020-01-31 RX ORDER — PHENOL 1.4 %
600 AEROSOL, SPRAY (ML) MUCOUS MEMBRANE 2 TIMES DAILY WITH MEALS
COMMUNITY

## 2020-01-31 NOTE — PATIENT INSTRUCTIONS
Recommend carryingboost / Glucerna and an energy bar/ Atkins bar for treatment of potential hypoglycemia

## 2020-01-31 NOTE — PROGRESS NOTES
New Patient Progress Note        Referring Provider  Claudia Osorio  [address] [place] [id number]     CC:    History of Present Illness:   Khushbu Echols is a 82 y o  female with a twenty-five year history of type 2 diabetes associated with diabetic  Proliferative retinopathy, peripheral neuropathy and nephropathy with microalbuminuria  No reported history of heart attacks or strokes or intermittent claudication  She has been on insulin for approximately a year that was started in [place] and since then has been slowly being reduced on the dosing  She came off her mealtime insulin regimen on her last visit and is presently only taking 5 units of Levemir every morning  Today she presents for routine follow-up  Denies any polyuria polydipsia weight changes or blurred vision  Opthamology:   Yes  Podiatry:  yes  Influenza:  yes  Dental: yes  Pancreatitis: no     Home blood glucose monitoring:  She uses an Accu-Chek glucometer and checks blood glucose 2 to 3 times a day  Before breakfast: 100-150  Before lunch: 110-150  Before dinner:   Bedtime: 100-170    Diet: 3   meals per day,  1 snacks usually at night  diabetic diet compliance:  compliant most of the time  Activity:     Current regimen: Levemir 5u in AM, metformin 500mg BID, Januvia 100mg daily  compliant most of the timedenies any side effects from medications  Injects in: abdomen, periumbilical Rotates sites: Yes  Hypoglycemic episodes:  Reports 1 episode in the last few days when the blood glucose on the morning was 90 but after breakfast patient develops symptoms of hypoglycemia that included clumsy or jerky movements, hunger, jitteriness and sweating a few days ago    Medic alert tag: recommended: Yes  Diabetes education: Yes     Diabetic ROS: no polyuria or polydipsia, no chest pain, dyspnea or TIAs, no numbness, tingling or pain in extremities, no unusual visual symptoms      Diabetic FH:      Thyroid disorders: No   has hypertension  Previously was tried on an ACE-inhibitor for proteinuria but patient was unable to tolerate due to drop in blood pressure  Hx hyperlipidemia: followed by PCP; on statin - tolerating well, no myalgias  compliant most of the time  denies any side effects from medications  She also has history of atrial fibrillation for which she is on anticoagulation with Eliquis and has a pacemaker      Patient Active Problem List   Diagnosis    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, with long-term current use of insulin ([facility]    HTN (hypertension)    HLD (hyperlipidemia)    Glaucoma    Bilateral carotid artery disease (HCC)    Symptomatic PVCs    Occlusion of right carotid artery    Stenosis of left carotid artery    Paroxysmal atrial fibrillation ([facility] )    Presence of permanent cardiac pacemaker    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, with long-term current use of insulin ([facility] )    Type 2 diabetes mellitus with diabetic polyneuropathy, with long-term current use of insulin ([facility])    Osteopenia    GERD (gastroesophageal reflux disease)    Anxiety    Type II diabetes mellitus, uncontrolled ([facility] )      Past Medical History:   Diagnosis Date    Diabetes mellitus ([address] )     Glaucoma     H/O degenerative disc disease     Hyperlipidemia     Hypertension     Peripheral neuropathy     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 ([address] )       Past Surgical History:   Procedure Laterality Date    CATARACT EXTRACTION, BILATERAL  2011    CYST REMOVAL  2009    left lower Quadrant     HERNIA REPAIR  1992    LIPOMA RESECTION  2010    DC REPAIR ING HERNIA,5+Y/O,REDUCIBL Bilateral 1/5/2018    Procedure: INGUINAL HERNIA REPAIR;  Surgeon: Devan Culp MD;  [facility];  Service: General    SHOULDER [facility]  [facility]26/2019    [facility]   [address] [facility] carotid occlusion      Family History   Problem Relation Age of Onset    Heart attack Father     Hiatal hernia Father     Diabetes Father     Heart disease Mother     Cancer Brother     Diabetes Brother     Heart disease Brother     Hypertension Brother      Social History     Tobacco Use    Smoking status: Former Smoker     Last attempt to quit:      Years since quittin 0    Smokeless tobacco: Never Used   Substance Use Topics    Alcohol use: No     No Known Allergies      Current Outpatient Medications:     acetaminophen (TYLENOL) 500 mg tablet, Take 1,000 mg by mouth as needed for mild pain, Disp: , Rfl:     bimatoprost (LUMIGAN) 0 01 % ophthalmic drops, Administer 1 drop to both eyes daily at bedtime for 30 days, Disp: 1 5 mL, Rfl: 0    calcium carbonate (OS-DANIA) 600 MG tablet, Take 600 mg by mouth 2 (two) times a day with meals, Disp: , Rfl:     ELIQUIS 5 MG, TAKE 1 TABLET (5 MG TOTAL) BY MOUTH 2 (TWO) TIMES A DAY, Disp: 60 tablet, Rfl: 3    ezetimibe-simvastatin (VYTORIN) 10-40 mg per tablet, TAKE 1 TABLET BY MOUTH DAILY AT BEDTIME, Disp: 30 tablet, Rfl: 5    famotidine (PEPCID) 10 mg tablet, Take 10 mg by mouth as needed , Disp: , Rfl:     ferrous sulfate 325 (65 Fe) mg tablet, Take 325 mg by mouth daily with breakfast, Disp: , Rfl:     Insulin Pen Needle (BD PEN NEEDLE CORNELL U/F) 32G X 4 MM MISC, by Does not apply route 4 (four) times a day, Disp: 400 each, Rfl: 3    LEVEMIR FLEXTOUCH 100 units/mL injection pen, Inject 5 Units under the skin daily at bedtime, Disp: 5 pen, Rfl: 4    LORazepam (ATIVAN) 0 5 mg tablet, TAKE 2 TABLETS (1 MG TOTAL) BY MOUTH DAILY AT BEDTIME, Disp: 60 tablet, Rfl: 0    metFORMIN (GLUCOPHAGE) 500 mg tablet, TAKE 1 TABLET (500 MG TOTAL) BY MOUTH 2 (TWO) TIMES A DAY WITH MEALS, Disp: 60 tablet, Rfl: 5    metoprolol tartrate (LOPRESSOR) 50 mg tablet, TAKE 1 5 TABLETS (75 MG TOTAL) BY MOUTH EVERY 12 (TWELVE) HOURS, Disp: 90 tablet, Rfl: 5    ONE TOUCH ULTRA TEST test strip, Testing four times daily as directed, Disp: 400 each, Rfl: 1    ONETOUCH DELICA LANCETS FINE MISC, Use one lancet to test blood 4 times a day, Disp: 400 each, Rfl: 1    sitaGLIPtin (JANUVIA) 100 mg tablet, Take 1 tablet (100 mg total) by mouth daily, Disp: 30 tablet, Rfl: 5     Review of Systems   Constitutional: Positive for fatigue  HENT: Negative  Eyes: Negative  Respiratory: Negative  Cardiovascular: Negative  Gastrointestinal: Negative  Endocrine: Negative  Musculoskeletal: Negative  Skin: Negative  Allergic/Immunologic: Negative  Neurological: Negative  Hematological: Negative  Psychiatric/Behavioral: Negative  All other systems reviewed and are negative  Physical Exam:  Body mass index is 24 63 kg/m²  BP 90/60   Pulse 88   Wt 69 2 kg (152 lb 9 6 oz)   BMI 24 63 kg/m²    Wt Readings from Last 3 Encounters:   01/31/20 69 2 kg (152 lb 9 6 oz)   11/06/19 68 1 kg (150 lb 2 oz)   09/30/19 67 6 kg (149 lb)       Physical Exam   Constitutional: She is oriented to person, place, and time  She appears well-developed  HENT:   Head: Normocephalic  Mouth/Throat: Oropharynx is clear and moist    Eyes: Pupils are equal, round, and reactive to light  Neck: Normal range of motion  No thyromegaly present  Cardiovascular: Normal rate and normal heart sounds  Pulmonary/Chest: Effort normal and breath sounds normal    Abdominal: Soft  Bowel sounds are normal    Musculoskeletal: She exhibits no edema or deformity  Neurological: She is alert and oriented to person, place, and time  Skin: Capillary refill takes less than 2 seconds  No rash noted  No pallor  Psychiatric: She has a normal mood and affect  Vitals reviewed          Labs:       Lab Results   Component Value Date    CREATININE 0 60 10/21/2019    CREATININE 0 52 (L) 07/16/2019    CREATININE 0 54 (L) 06/26/2019    BUN 17 10/21/2019    K 4 2 10/21/2019    CL 99 (L) 10/21/2019    CO2 28 10/21/2019     eGFR   Date Value Ref Range Status   10/21/2019 86 ml/min/1 73sq m Final     No components found for: [facility]    Lab Results   Component Value Date    HDL 62 10/21/2019    TRIG 74 10/21/2019       Lab Results   Component Value Date    ALT 22 10/21/2019    AST 17 10/21/2019    ALKPHOS 68 10/21/2019       Lab Results   Component Value Date    FREET4 1 28 06/26/2019      Ref  Range 10/21/2019 08:05   TSH 3RD GENERATON Latest Ref Range: 0 358 - 3 740 uIU/mL 1 360       Impression:  1  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, with long-term current use of insulin ([address] )    2  Osteopenia, unspecified location    3  Hyperlipidemia, unspecified hyperlipidemia type           Plan:  Diagnoses and all orders for this visit:    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, with long-term current use of insulin ([address] )  She is well controlled with an A1c of 7 1 and goal of 8%  Today we reviewed her blood glucose logs which are excellent and ranging from   She does report 1 episode of symptoms of hypoglycemia with jitteriness post breakfast in the last few days  We will stop Levemir to avoid hypoglycemic agents  She can continue metformin 500 twice daily and Januvia 100 mg every day  She may not need to follow with endocrinology and can see her PCP for her diabetes management  Osteopenia, unspecified location  She has normal  Corrected calcium  A repeat DEXA has been ordered by the primary care physician  Discussed with the patient and all questioned fully answered  She will call me if any problems arise  Educated/ Counseled patient on diagnostic test results, prognosis, risk vs benefit of treatment options, importance of treatment compliance, healthy life and lifestyle choices      Meggan Agee

## 2020-02-03 ENCOUNTER — OFFICE VISIT (OUTPATIENT)
Dept: CARDIOLOGY CLINIC | Facility: CLINIC | Age: 85
End: 2020-02-03
Payer: MEDICARE

## 2020-02-03 VITALS
WEIGHT: 148 LBS | BODY MASS INDEX: 23.78 KG/M2 | HEART RATE: 60 BPM | DIASTOLIC BLOOD PRESSURE: 60 MMHG | SYSTOLIC BLOOD PRESSURE: 118 MMHG | HEIGHT: 66 IN

## 2020-02-03 DIAGNOSIS — E78.00 PURE HYPERCHOLESTEROLEMIA: ICD-10-CM

## 2020-02-03 DIAGNOSIS — I10 ESSENTIAL (PRIMARY) HYPERTENSION: Primary | ICD-10-CM

## 2020-02-03 DIAGNOSIS — I48.0 PAROXYSMAL ATRIAL FIBRILLATION (HCC): ICD-10-CM

## 2020-02-03 DIAGNOSIS — Z95.0 CARDIAC PACEMAKER IN SITU: ICD-10-CM

## 2020-02-03 PROCEDURE — 1036F TOBACCO NON-USER: CPT | Performed by: INTERNAL MEDICINE

## 2020-02-03 PROCEDURE — 3078F DIAST BP <80 MM HG: CPT | Performed by: INTERNAL MEDICINE

## 2020-02-03 PROCEDURE — 3008F BODY MASS INDEX DOCD: CPT | Performed by: INTERNAL MEDICINE

## 2020-02-03 PROCEDURE — 99214 OFFICE O/P EST MOD 30 MIN: CPT | Performed by: INTERNAL MEDICINE

## 2020-02-03 PROCEDURE — 4040F PNEUMOC VAC/ADMIN/RCVD: CPT | Performed by: INTERNAL MEDICINE

## 2020-02-03 PROCEDURE — 3074F SYST BP LT 130 MM HG: CPT | Performed by: INTERNAL MEDICINE

## 2020-02-03 PROCEDURE — 1160F RVW MEDS BY RX/DR IN RCRD: CPT | Performed by: INTERNAL MEDICINE

## 2020-02-06 LAB
LEFT EYE DIABETIC RETINOPATHY: NORMAL
RIGHT EYE DIABETIC RETINOPATHY: NORMAL

## 2020-02-13 DIAGNOSIS — Z79.4 TYPE 2 DIABETES MELLITUS WITH RETINOPATHY, WITH LONG-TERM CURRENT USE OF INSULIN, MACULAR EDEMA PRESENCE UNSPECIFIED, UNSPECIFIED LATERALITY, UNSPECIFIED RETINOPATHY SEVERITY (HCC): ICD-10-CM

## 2020-02-13 DIAGNOSIS — E11.319 TYPE 2 DIABETES MELLITUS WITH RETINOPATHY, WITH LONG-TERM CURRENT USE OF INSULIN, MACULAR EDEMA PRESENCE UNSPECIFIED, UNSPECIFIED LATERALITY, UNSPECIFIED RETINOPATHY SEVERITY (HCC): ICD-10-CM

## 2020-02-13 DIAGNOSIS — E78.5 HYPERLIPIDEMIA, UNSPECIFIED HYPERLIPIDEMIA TYPE: ICD-10-CM

## 2020-02-13 DIAGNOSIS — F41.9 ANXIETY: ICD-10-CM

## 2020-02-13 RX ORDER — LORAZEPAM 0.5 MG/1
1 TABLET ORAL
Qty: 60 TABLET | Refills: 0 | Status: SHIPPED | OUTPATIENT
Start: 2020-02-13 | End: 2020-03-12

## 2020-02-13 RX ORDER — EZETIMIBE AND SIMVASTATIN 10; 40 MG/1; MG/1
1 TABLET ORAL
Qty: 30 TABLET | Refills: 5 | Status: SHIPPED | OUTPATIENT
Start: 2020-02-13 | End: 2020-08-03

## 2020-03-02 ENCOUNTER — LAB (OUTPATIENT)
Dept: LAB | Facility: CLINIC | Age: 85
End: 2020-03-02
Payer: MEDICARE

## 2020-03-02 DIAGNOSIS — E11.3552 TYPE 2 DIABETES MELLITUS WITH STABLE PROLIFERATIVE RETINOPATHY OF LEFT EYE, WITH LONG-TERM CURRENT USE OF INSULIN (HCC): ICD-10-CM

## 2020-03-02 DIAGNOSIS — I10 HYPERTENSION, UNSPECIFIED TYPE: ICD-10-CM

## 2020-03-02 DIAGNOSIS — Z79.4 TYPE 2 DIABETES MELLITUS WITH STABLE PROLIFERATIVE RETINOPATHY OF LEFT EYE, WITH LONG-TERM CURRENT USE OF INSULIN (HCC): ICD-10-CM

## 2020-03-02 LAB
ALBUMIN SERPL BCP-MCNC: 3.9 G/DL (ref 3.5–5)
ALP SERPL-CCNC: 59 U/L (ref 46–116)
ALT SERPL W P-5'-P-CCNC: 21 U/L (ref 12–78)
ANION GAP SERPL CALCULATED.3IONS-SCNC: 6 MMOL/L (ref 4–13)
AST SERPL W P-5'-P-CCNC: 17 U/L (ref 5–45)
BILIRUB SERPL-MCNC: 0.46 MG/DL (ref 0.2–1)
BUN SERPL-MCNC: 13 MG/DL (ref 5–25)
CALCIUM SERPL-MCNC: 9.1 MG/DL (ref 8.3–10.1)
CHLORIDE SERPL-SCNC: 99 MMOL/L (ref 100–108)
CO2 SERPL-SCNC: 30 MMOL/L (ref 21–32)
CREAT SERPL-MCNC: 0.56 MG/DL (ref 0.6–1.3)
ERYTHROCYTE [DISTWIDTH] IN BLOOD BY AUTOMATED COUNT: 13.7 % (ref 11.6–15.1)
EST. AVERAGE GLUCOSE BLD GHB EST-MCNC: 174 MG/DL
GFR SERPL CREATININE-BSD FRML MDRD: 87 ML/MIN/1.73SQ M
GLUCOSE P FAST SERPL-MCNC: 161 MG/DL (ref 65–99)
HBA1C MFR BLD: 7.7 %
HCT VFR BLD AUTO: 37 % (ref 34.8–46.1)
HGB BLD-MCNC: 11.5 G/DL (ref 11.5–15.4)
MCH RBC QN AUTO: 26.6 PG (ref 26.8–34.3)
MCHC RBC AUTO-ENTMCNC: 31.1 G/DL (ref 31.4–37.4)
MCV RBC AUTO: 86 FL (ref 82–98)
PLATELET # BLD AUTO: 143 THOUSANDS/UL (ref 149–390)
PMV BLD AUTO: 10.2 FL (ref 8.9–12.7)
POTASSIUM SERPL-SCNC: 4.2 MMOL/L (ref 3.5–5.3)
PROT SERPL-MCNC: 7.4 G/DL (ref 6.4–8.2)
RBC # BLD AUTO: 4.32 MILLION/UL (ref 3.81–5.12)
SODIUM SERPL-SCNC: 135 MMOL/L (ref 136–145)
WBC # BLD AUTO: 4.03 THOUSAND/UL (ref 4.31–10.16)

## 2020-03-02 PROCEDURE — 83036 HEMOGLOBIN GLYCOSYLATED A1C: CPT

## 2020-03-02 PROCEDURE — 80053 COMPREHEN METABOLIC PANEL: CPT

## 2020-03-02 PROCEDURE — 85027 COMPLETE CBC AUTOMATED: CPT

## 2020-03-02 PROCEDURE — 36415 COLL VENOUS BLD VENIPUNCTURE: CPT

## 2020-03-03 DIAGNOSIS — Z79.4 TYPE 2 DIABETES MELLITUS WITH RETINOPATHY, WITH LONG-TERM CURRENT USE OF INSULIN, MACULAR EDEMA PRESENCE UNSPECIFIED, UNSPECIFIED LATERALITY, UNSPECIFIED RETINOPATHY SEVERITY (HCC): ICD-10-CM

## 2020-03-03 DIAGNOSIS — E11.319 TYPE 2 DIABETES MELLITUS WITH RETINOPATHY, WITH LONG-TERM CURRENT USE OF INSULIN, MACULAR EDEMA PRESENCE UNSPECIFIED, UNSPECIFIED LATERALITY, UNSPECIFIED RETINOPATHY SEVERITY (HCC): ICD-10-CM

## 2020-03-03 RX ORDER — SITAGLIPTIN 100 MG/1
TABLET, FILM COATED ORAL
Qty: 30 TABLET | Refills: 5 | Status: SHIPPED | OUTPATIENT
Start: 2020-03-03 | End: 2020-09-21

## 2020-03-04 ENCOUNTER — OFFICE VISIT (OUTPATIENT)
Dept: FAMILY MEDICINE CLINIC | Facility: CLINIC | Age: 85
End: 2020-03-04
Payer: MEDICARE

## 2020-03-04 VITALS
DIASTOLIC BLOOD PRESSURE: 70 MMHG | SYSTOLIC BLOOD PRESSURE: 110 MMHG | BODY MASS INDEX: 23.43 KG/M2 | WEIGHT: 145.8 LBS | HEART RATE: 77 BPM | OXYGEN SATURATION: 98 % | TEMPERATURE: 98.4 F | RESPIRATION RATE: 16 BRPM | HEIGHT: 66 IN

## 2020-03-04 DIAGNOSIS — K52.9 GASTROENTERITIS: Primary | ICD-10-CM

## 2020-03-04 PROCEDURE — 1036F TOBACCO NON-USER: CPT | Performed by: NURSE PRACTITIONER

## 2020-03-04 PROCEDURE — 2026F EYE IMG VALID EVC RTNOPTHY: CPT | Performed by: NURSE PRACTITIONER

## 2020-03-04 PROCEDURE — 3074F SYST BP LT 130 MM HG: CPT | Performed by: NURSE PRACTITIONER

## 2020-03-04 PROCEDURE — 3051F HG A1C>EQUAL 7.0%<8.0%: CPT | Performed by: NURSE PRACTITIONER

## 2020-03-04 PROCEDURE — 4040F PNEUMOC VAC/ADMIN/RCVD: CPT | Performed by: NURSE PRACTITIONER

## 2020-03-04 PROCEDURE — 3078F DIAST BP <80 MM HG: CPT | Performed by: NURSE PRACTITIONER

## 2020-03-04 PROCEDURE — 99213 OFFICE O/P EST LOW 20 MIN: CPT | Performed by: NURSE PRACTITIONER

## 2020-03-04 PROCEDURE — 1160F RVW MEDS BY RX/DR IN RCRD: CPT | Performed by: NURSE PRACTITIONER

## 2020-03-04 RX ORDER — ONDANSETRON 4 MG/1
4 TABLET, ORALLY DISINTEGRATING ORAL EVERY 8 HOURS PRN
Qty: 10 TABLET | Refills: 0 | Status: SHIPPED | OUTPATIENT
Start: 2020-03-04 | End: 2020-07-06 | Stop reason: ALTCHOICE

## 2020-03-04 NOTE — PROGRESS NOTES
FAMILY PRACTICE OFFICE VISIT       NAME: Ramona Carter  AGE: 82 y o  SEX: female       : 1935        MRN: [id number]    Assessment and Plan     Problem List Items Addressed This Visit     None      Visit Diagnoses     Gastroenteritis    -  Primary    Relevant Medications    ondansetron (ZOFRAN-ODT) 4 mg disintegrating tablet        1  Gastroenteritis  ondansetron (ZOFRAN-ODT) 4 mg disintegrating tablet     This 82year old female with diabetes, HTN, HLD, carotid artery disease, GERD, A fib, presents today with symptoms consistent with viral gastroenteritis  Nausea, vomiting, diarrhea started early this morning  Has not eaten yet today  Blood glucose in office 52  She does not feel her sugar is low  Given ricardo crackers, and Pepsi  Will hold metformin and Januvia today  Will push fluids  Eat bland diet, and slowly advance as tolerated  Prescription provided for Zofran to be used if needed  Resume Metformin and Januvia when vomiting and diarrhea resolves, able to keep fluids down and resume eating  Continue to monitor blood glucoses  She has routine follow up visit scheduled in 2 days, she will keep this visit  For any worsening of symptoms, abdominal pain, inability to keep fluids down, fever, go to the emergency room  Chief Complaint     Chief Complaint   Patient presents with    Vomiting    Diarrhea       History of Present Illness     Ramona Carter is an 82year old female presenting today for nausea, vomiting, and diarrhea  Symptoms began early this morning with diarrhea  She was feeling dizzy and not well, tried to drink a boost shake this morning, and vomited  Is feeling better after vomiting  No longer dizzy  She did not eat or drink anything this morning  Did not take her medications yet this morning  Denies abdominal pain, chest pain, shortness of breath  There is a virus going around her apartment building with nausea, vomiting, and diarrhea   She visited a friend that had similar symptoms  She has had no fevers  One episode of diaphoresis surrounding diarrhea  No nausea right now  Feels "headachey "                      Review of Systems   Review of Systems   Constitutional: Positive for diaphoresis and fatigue  Negative for chills and fever  Respiratory: Negative for cough, chest tightness, shortness of breath and wheezing  Cardiovascular: Negative for chest pain, palpitations and leg swelling  Gastrointestinal: Positive for diarrhea, nausea and vomiting  Negative for abdominal pain  Neurological: Positive for dizziness         Active Problem List     Patient Active Problem List   Diagnosis    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, with [facility]-term [facility]    HTN (hypertension)    HLD (hyperlipidemia)    Glaucoma    Bilateral carotid artery disease (HCC)    Symptomatic PVCs    Occlusion of right carotid artery    Stenosis of left carotid artery    [facility]oxysmal [facility] fibrillation ([facility]    Presence of permanent cardiac pacemaker    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, with long-term current use of insulin ([facility] )    Type 2 diabetes mellitus with diabetic polyneuropathy, with long-term current use of insulin ([facility])    Osteopenia    GERD (gastroesophageal reflux disease)    Anxiety    Uncontrolled type 2 diabetes mellitus with hypoglycemia ([facility] )       Past Medical History:  Past Medical History:   Diagnosis Date    Diabetes mellitus ([facility]     Glaucoma     H/O degenerative disc disease     Hyperlipidemia     Hypertension     Peripheral neuropathy     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 ([address] )        Past Surgical History:  Past Surgical History:   Procedure Laterality Date    CATARACT EXTRACTION, BILATERAL  2011    CYST REMOVAL  2009    left lower Quadrant     HERNIA REPAIR  1992    LIPOMA RESECTION  2010  OR REPAIR ING HERNIA,5+Y/O,REDUCIBL Bilateral 2018    Procedure: INGUINAL HERNIA REPAIR;  Surgeon: King Wang MD;  Location[facility];  Service: General    SHOULDER SURGERY  2019    Dr Noelle Fabian [facility]   [address] carotid occlusion       Family History:  Family History   Problem Relation Age of Onset    Heart attack Father     Hiatal hernia Father     Diabetes Father     Heart disease Mother     Cancer Brother     Diabetes Brother     Heart disease Brother     Hypertension Brother        Social History:  Social History     Socioeconomic History    Marital status:      Spouse name: Not on file    Number of children: Not on file    Years of education: Not on file    Highest education level: Not on file   Occupational History    Occupation: customer service   retired   Social Needs    Financial resource strain: Not on file    Food insecurity:     Worry: Not on file     Inability: Not on file   [place] needs:     Medical: Not on file     Non-medical: Not on file   Tobacco Use    Smoking status: Former Smoker     Last attempt to quit:      Years since quittin 1    Smokeless tobacco: Never Used   Substance and Sexual Activity    Alcohol use: No    Drug use: No    Sexual activity: Not on file   Lifestyle    Physical activity:     Days per week: Not on file     Minutes per session: Not on file    Stress: Not on file   Relationships    Social connections:     Talks on phone: Not on file     Gets together: Not on file     Attends Tenriism service: Not on file     Active member of club or organization: Not on file     Attends meetings of clubs or organizations: Not on file     Relationship status: Not on file    Intimate partner violence:     Fear of current or ex partner: Not on file     Emotionally abused: Not on file     Physically abused: Not on file     Forced sexual activity: Not on file   Other Topics Concern    Not on file   Social History Narrative    Lives [place]    2 children       I have reviewed the patient's medical history in detail; there are no changes to the history as noted in the electronic medical record  Objective     Vitals:    03/04/20 1104   BP: 110/70   BP Location: Right arm   Patient Position: Sitting   Cuff Size: Adult   Pulse: 77   Resp: 16   Temp: 98 4 °F (36 9 °C)   TempSrc: Tympanic   SpO2: 98%   Weight: 66 1 kg (145 lb 12 8 oz)   Height: 5' 6" (1 676 m)     Wt Readings from Last 3 Encounters:   03/06/20 65 8 kg (145 lb)   03/04/20 66 1 kg (145 lb 12 8 oz)   02/03/20 67 1 kg (148 lb)     Physical Exam   Constitutional: She appears well-developed and well-nourished  No distress  Cardiovascular: Normal rate, regular rhythm and normal heart sounds  No murmur heard  Pulmonary/Chest: Effort normal and breath sounds normal    Abdominal: Soft  Bowel sounds are increased  There is no tenderness  Musculoskeletal: She exhibits no edema  Skin: There is pallor  Psychiatric: She has a normal mood and affect  Nursing note and vitals reviewed           ALLERGIES:  No Known Allergies    Current Medications     Current Outpatient Medications   Medication Sig Dispense Refill    acetaminophen (TYLENOL) 500 mg tablet Take 1,000 mg by mouth as needed for mild pain      bimatoprost (LUMIGAN) 0 01 % ophthalmic drops Administer 1 drop to both eyes daily at bedtime for 30 days 1 5 mL 0    calcium carbonate (OS-DANIA) 600 MG tablet Take 600 mg by mouth 2 (two) times a day with meals      ELIQUIS 5 MG TAKE 1 TABLET (5 MG TOTAL) BY MOUTH 2 (TWO) TIMES A DAY 60 tablet 3    ezetimibe-simvastatin (VYTORIN) 10-40 mg per tablet TAKE 1 TABLET BY MOUTH DAILY AT BEDTIME 30 tablet 5    famotidine (PEPCID) 10 mg tablet Take 10 mg by mouth as needed       ferrous sulfate 325 (65 Fe) mg tablet Take 325 mg by mouth daily with breakfast      glucose blood (ONE TOUCH ULTRA TEST) test strip TEST FOUR TIMES A DAY 400 each 0    Insulin Pen Needle (BD PEN NEEDLE CORNELL U/F) 32G X 4 MM MISC by Does not apply route 4 (four) times a day 400 each 3    JANUVIA 100 MG tablet TAKE 1 TABLET (100 MG TOTAL) BY MOUTH DAILY 30 tablet 5    LORazepam (ATIVAN) 0 5 mg tablet TAKE 2 TABLETS (1 MG TOTAL) BY MOUTH DAILY AT BEDTIME 60 tablet 0    metFORMIN (GLUCOPHAGE) 500 mg tablet TAKE 1 TABLET (500 MG TOTAL) BY MOUTH 2 (TWO) TIMES A DAY WITH MEALS 60 tablet 5    metoprolol tartrate (LOPRESSOR) 50 mg tablet TAKE 1 5 TABLETS (75 MG TOTAL) BY MOUTH EVERY 12 (TWELVE) HOURS 90 tablet 5    ONETOUCH DELICA LANCETS FINE MISC Use one lancet to test blood 4 times a day 400 each 1    ondansetron (ZOFRAN-ODT) 4 mg disintegrating tablet Take 1 tablet (4 mg total) by mouth every 8 (eight) hours as needed for nausea or vomiting 10 tablet 0     No current facility-administered medications for this visit            Health Maintenance     Health Maintenance   Topic Date Due    DTaP,Tdap,and Td Vaccines (1 - Tdap) 06/17/2020 (Originally 12/1/1946)    Fall Risk  07/25/2020    Depression Screening PHQ  07/25/2020    Medicare Annual Wellness Visit (AWV)  07/25/2020    HEMOGLOBIN A1C  09/02/2020    Falls: Plan of Care  11/10/2020    Diabetic Foot Exam  11/13/2020    DM Eye Exam  02/06/2021    BMI: Adult  03/06/2021    Influenza Vaccine  Completed    Pneumococcal Vaccine: 65+ Years  Completed    Pneumococcal Vaccine: Pediatrics (0 to 5 Years) and At-Risk Patients (6 to 59 Years)  Aged Out    HIB Vaccine  Aged Out    Hepatitis B Vaccine  Aged Out    IPV Vaccine  Aged Out    Hepatitis A Vaccine  Aged Out    Meningococcal ACWY Vaccine  Aged Out    HPV Vaccine  Aged [employer] History   Administered Date(s) Administered    INFLUENZA 11/02/2016, 10/16/2017, 09/18/2018, 09/18/2018    Influenza, Quadrivalent (nasal) 11/02/2016    Influenza, high dose seasonal 0 5 mL 11/06/2019    Pneumococcal Conjugate 13-Valent 07/25/2019    Pneumococcal Polysaccharide PPV23 03/17/2003    Zoster 07/09/2010       ZANDER Swain

## 2020-03-06 ENCOUNTER — OFFICE VISIT (OUTPATIENT)
Dept: FAMILY MEDICINE CLINIC | Facility: CLINIC | Age: 85
End: 2020-03-06
Payer: MEDICARE

## 2020-03-06 VITALS
WEIGHT: 145 LBS | HEIGHT: 66 IN | RESPIRATION RATE: 16 BRPM | OXYGEN SATURATION: 96 % | SYSTOLIC BLOOD PRESSURE: 120 MMHG | TEMPERATURE: 95.9 F | DIASTOLIC BLOOD PRESSURE: 70 MMHG | BODY MASS INDEX: 23.3 KG/M2 | HEART RATE: 84 BPM

## 2020-03-06 DIAGNOSIS — Z79.4 TYPE 2 DIABETES MELLITUS WITH RIGHT EYE AFFECTED BY MODERATE NONPROLIFERATIVE RETINOPATHY WITHOUT MACULAR EDEMA, WITH LONG-TERM CURRENT USE OF INSULIN (HCC): ICD-10-CM

## 2020-03-06 DIAGNOSIS — E78.5 HYPERLIPIDEMIA, UNSPECIFIED HYPERLIPIDEMIA TYPE: ICD-10-CM

## 2020-03-06 DIAGNOSIS — E11.3552 TYPE 2 DIABETES MELLITUS WITH STABLE PROLIFERATIVE RETINOPATHY OF LEFT EYE, WITH LONG-TERM CURRENT USE OF INSULIN (HCC): ICD-10-CM

## 2020-03-06 DIAGNOSIS — I65.22 STENOSIS OF LEFT CAROTID ARTERY: ICD-10-CM

## 2020-03-06 DIAGNOSIS — I10 HYPERTENSION, UNSPECIFIED TYPE: ICD-10-CM

## 2020-03-06 DIAGNOSIS — M85.80 OSTEOPENIA, UNSPECIFIED LOCATION: ICD-10-CM

## 2020-03-06 DIAGNOSIS — I77.9 BILATERAL CAROTID ARTERY DISEASE, UNSPECIFIED TYPE (HCC): Primary | ICD-10-CM

## 2020-03-06 DIAGNOSIS — I65.21 OCCLUSION OF RIGHT CAROTID ARTERY: ICD-10-CM

## 2020-03-06 DIAGNOSIS — E11.3391 TYPE 2 DIABETES MELLITUS WITH RIGHT EYE AFFECTED BY MODERATE NONPROLIFERATIVE RETINOPATHY WITHOUT MACULAR EDEMA, WITH LONG-TERM CURRENT USE OF INSULIN (HCC): ICD-10-CM

## 2020-03-06 DIAGNOSIS — I48.0 PAROXYSMAL ATRIAL FIBRILLATION (HCC): ICD-10-CM

## 2020-03-06 DIAGNOSIS — K21.9 GASTROESOPHAGEAL REFLUX DISEASE, ESOPHAGITIS PRESENCE NOT SPECIFIED: ICD-10-CM

## 2020-03-06 DIAGNOSIS — Z79.4 TYPE 2 DIABETES MELLITUS WITH STABLE PROLIFERATIVE RETINOPATHY OF LEFT EYE, WITH LONG-TERM CURRENT USE OF INSULIN (HCC): ICD-10-CM

## 2020-03-06 PROCEDURE — 3074F SYST BP LT 130 MM HG: CPT | Performed by: NURSE PRACTITIONER

## 2020-03-06 PROCEDURE — 4040F PNEUMOC VAC/ADMIN/RCVD: CPT | Performed by: NURSE PRACTITIONER

## 2020-03-06 PROCEDURE — 3078F DIAST BP <80 MM HG: CPT | Performed by: NURSE PRACTITIONER

## 2020-03-06 PROCEDURE — 2026F EYE IMG VALID EVC RTNOPTHY: CPT | Performed by: NURSE PRACTITIONER

## 2020-03-06 PROCEDURE — 3051F HG A1C>EQUAL 7.0%<8.0%: CPT | Performed by: NURSE PRACTITIONER

## 2020-03-06 PROCEDURE — 1036F TOBACCO NON-USER: CPT | Performed by: NURSE PRACTITIONER

## 2020-03-06 PROCEDURE — 1160F RVW MEDS BY RX/DR IN RCRD: CPT | Performed by: NURSE PRACTITIONER

## 2020-03-06 PROCEDURE — 99214 OFFICE O/P EST MOD 30 MIN: CPT | Performed by: NURSE PRACTITIONER

## 2020-03-06 NOTE — PROGRESS NOTES
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, with long-term current use of insulin ([facility])       Lab Results   Component Value Date    HGBA1C 7 7 (H) 2020     Hemoglobin A1c within goal <8%  Recent discontinuation of levemir  Sugars running on average around 150 on home blood sugar log  Will repeat A1c in 3 months  Up to date on eye exam  Up to date on foot exam  Follow up in 3 months  Relevant Medications    carboxymethylcellulose (Artificial Tears) 1 % ophthalmic solution    Other Relevant Orders    CBC    Comprehensive metabolic panel    Hemoglobin A1C    TSH, 3rd generation    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, with long-term current use of insulin ([facility])       Lab Results   Component Value Date    HGBA1C 7 7 (H) 2020     Hemoglobin A1c within goal <8%  Recent discontinuation of levemir  Sugars running on average around 150 on home blood sugar log  Will repeat A1c in 3 months  Up to date on eye exam  Up to date on foot exam  Follow up in 3 months  Relevant Medications    carboxymethylcellulose (Artificial Tears) 1 % ophthalmic solution    Other Relevant Orders    CBC    Comprehensive metabolic panel    Hemoglobin A1C    TSH, 3rd generation       Cardiovascular and Mediastinum    HTN (hypertension)     Well controlled on Metoprolol            Relevant Orders    CBC    Comprehensive metabolic panel    TSH, 3rd generation    Bilateral carotid artery disease ([address] ) - Primary     Last carotid duplex 2018  RIGHT:  Known occlusion of the internal carotid artery  Vertebral artery flow is antegrade  There is no significant subclavian artery  disease  LEFT:  There is 50-69% stenosis noted in the internal carotid artery  Elevated  velocities can represent compensatory flow for contralateral occlusion  Plaque  is heterogenous and irregular  Vertebral artery flow is antegrade  There is no significant subclavian artery  Disease  Continue Vytorin  Anticoagulated on Eliquis for a fib  Will repeat carotid duplex  Relevant Orders    VAS carotid complete study    Occlusion of right carotid artery     Last carotid doppler July 2018 with   Known occlusion of the internal carotid artery  Vertebral artery flow is antegrade  There is no significant subclavian artery  disease  Will repeat carotid duplex at this time  Relevant Orders    VAS carotid complete study    Stenosis of left carotid artery     Last carotid duplex July 2018  There is 50-69% stenosis noted in the left internal carotid artery  Elevated  velocities can represent compensatory flow for contralateral occlusion  Plaque  is heterogenous and irregular  Vertebral artery flow is antegrade  There is no significant subclavian artery  Disease  Will repeat carotid duplex  Continue Vytorin and Eliquis  Relevant Orders    VAS carotid complete study    Paroxysmal atrial fibrillation (HCC)     Stable on metoprolol and Eliquis  Following with cardiology  Musculoskeletal and Integument    Osteopenia     Maintained on calcium and vitamin D3 supplement  Weight bearing exercise  Due for dexa scan  Has a prescription for this  Other    HLD (hyperlipidemia)     LDL 45 at goal  Continue Vytorin  Relevant Orders    Lipid panel    TSH, 3rd generation        Will complete blood work and follow up in 3 months  1  Bilateral carotid artery disease, unspecified type ([address] )  VAS carotid complete study   2   Stenosis of left carotid artery  VAS carotid complete study   3  Occlusion of right carotid artery  VAS carotid complete study   4  Gastroesophageal reflux disease, esophagitis presence not specified     5  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, with long-term current use of insulin ([facility])  CBC    Comprehensive metabolic panel    Hemoglobin A1C    TSH, 3rd generation   6  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, with long-term current use of insulin ([facility])  CBC    Comprehensive metabolic panel    Hemoglobin A1C    TSH, 3rd generation   7  Hypertension, unspecified type  CBC    Comprehensive metabolic panel    TSH, 3rd generation   8  Osteopenia, unspecified location     9  Paroxysmal atrial fibrillation ([facility])     10  Hyperlipidemia, unspecified hyperlipidemia type  Lipid panel    TSH, 3rd generation           Chief Complaint     Chief Complaint   Patient presents with    Follow-up     Pt is here for 4 mos f/u blood work and blood sugars       History of Present Illness     Dottie Beth is an 82year old female presenting today for follow up  Seen in office 2 days ago for gastroenteritis, which has resolved  She is feeling much better  Radha Hernandez brought blood sugar log today  Sugars run mostly around 150  She recently stopped Levemir insulin under the direction of endocrinology, Dr Dillon Floor  Continues on Januvia and Metformin  Sugars were in the 200's last two days, but she was ill  Is taking calcium supplement most of the time  It does cause abdominal upset sometimes, stops it for a few days, and then is able to resume it  Continues on daily ferrous sulfate, tolerated well  Requires form completion regarding OTC supplements and medications for reducing housing cost    Currently uses the following OTC products:  Tylenol as needed for pain   Has chronic left shoulder pain post shoulder surgery in 4/2019 and arthritis in knees  Flonase nasal spray and Claritin as needed for seasonal allergies  Ferrous sulfate for iron deficiency  Vitamin B12 supplement due to metformin use  Calcium and vitamin D3 supplement for osteopenia  Boost/glucerna, Atkins/Kashi bars as needed for hypoglycemia  Moisturizing eye drops, thera tears for dry eye  Famotidine or Rolaids as needed for GERD    Follows with Dr Maya Pardo and Dr Guevara Craig for eye care  Review of Systems   Review of Systems   Constitutional: Negative  HENT: Negative  Respiratory: Negative  Cardiovascular: Negative  Gastrointestinal: Negative  Genitourinary: Negative  Musculoskeletal: Positive for arthralgias  Skin: Negative  Neurological: Negative  Hematological: Negative  Psychiatric/Behavioral: Negative          Active Problem List     Patient Active Problem List   Diagnosis    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, with long-term current use of insulin ([facility])    HTN (hypertension)    HLD (hyperlipidemia)    Glaucoma    Bilateral carotid artery disease ([facility])    Symptomatic PVCs    Occlusion of right carotid artery    Stenosis of left carotid artery    Paroxysmal atrial fibrillation ([facility])    Presence of permanent cardiac pacemaker    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, with long-term current use of insulin ([facility])    Type 2 diabetes mellitus with diabetic polyneuropathy, with long-term current use of insulin ([facility])    Osteopenia    GERD (gastroesophageal reflux disease)    Anxiety    Uncontrolled type 2 diabetes mellitus with hypoglycemia ([facility])    Iron deficiency       Past Medical History:  Past Medical History:   Diagnosis Date    Diabetes mellitus ([facility] )     Glaucoma     H/O degenerative disc disease     Hyperlipidemia     Hypertension     Peripheral neuropathy     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 ([facility] )        Past Surgical History:  Past Surgical History:   Procedure Laterality Date    CATARACT EXTRACTION, BILATERAL      CYST REMOVAL  2009    left lower Quadrant     HERNIA REPAIR      LIPOMA RESECTION  2010    MI REPAIR ING HERNIA,5+Y/O,REDUCIBL Bilateral 2018    Procedure: INGUINAL HERNIA REPAIR;  Surgeon: Nahum Watters MD;  Location: [facility];  Service: General    SHOULDER SURGERY  2019    Dr Elmer Urbano [facility])   [id number] carotid occlusion       Family History:  Family History   Problem Relation Age of Onset    Heart attack Father     Hiatal hernia Father     Diabetes Father     Heart disease Mother     Cancer Brother     Diabetes Brother     Heart disease Brother     Hypertension Brother        Social History:  Social History     Socioeconomic History    Marital status:      Spouse name: Not on file    Number of children: Not on file    Years of education: Not on file    Highest education level: Not on file   Occupational History    Occupation: customer service   retired   Social Needs    Financial resource strain: Not on file    Food insecurity:     Worry: Not on file     Inability: Not on file   FedBid needs:     Medical: Not on file     Non-medical: Not on file   Tobacco Use    Smoking status: Former Smoker     Last attempt to quit:      Years since quittin 2    Smokeless tobacco: Never Used   Substance and Sexual Activity    Alcohol use: No    Drug use: No    Sexual activity: Not on file   Lifestyle    Physical activity:     Days per week: Not on file     Minutes per session: Not on file    Stress: Not on file   Relationships    Social connections:     Talks on phone: Not on file     Gets together: Not on file     Attends Judaism service: Not on file     Active member of club or organization: Not on file     Attends meetings of clubs or organizations: Not on file     Relationship status: Not on file    Intimate partner violence:     Fear of current or ex partner: Not on file     Emotionally abused: Not on file     Physically abused: Not on file     Forced sexual activity: Not on file   Other Topics Concern    Not on file   Social History Narrative    Lives [place]    2 children       I have reviewed the patient's medical history in detail; there are no changes to the history as noted in the electronic medical record  Objective     Vitals:    03/06/20 0930   BP: 120/70   Pulse: 84   Resp: 16   Temp: (!) 95 9 °F (35 5 °C)   TempSrc: Tympanic   SpO2: 96%   Weight: 65 8 kg (145 lb)   Height: 5' 6" (1 676 m)     Wt Readings from Last 3 Encounters:   03/06/20 65 8 kg (145 lb)   03/04/20 66 1 kg (145 lb 12 8 oz)   02/03/20 67 1 kg (148 lb)     Physical Exam   Constitutional: She is oriented to person, place, and time  She appears well-developed and well-nourished  She does not appear ill  No distress  HENT:   Head: Normocephalic and atraumatic  Right Ear: Tympanic membrane and ear canal normal    Left Ear: Tympanic membrane and ear canal normal    Mouth/Throat: Uvula is midline and oropharynx is clear and moist    Eyes: Pupils are equal, round, and reactive to light  Conjunctivae are normal    Neck: Normal range of motion  Neck supple  Carotid bruit is not present  No thyromegaly present  Cardiovascular: Normal rate and regular rhythm  No murmur heard  Pulmonary/Chest: Effort normal and breath sounds normal  She has no wheezes  She has no rales  Abdominal: Soft  Bowel sounds are normal  There is no tenderness  Musculoskeletal: She exhibits no edema or deformity  Lymphadenopathy:     She has no cervical adenopathy  Neurological: She is alert and oriented to person, place, and time  Skin: No rash noted  Psychiatric: She has a normal mood and affect  Nursing note and vitals reviewed  Falls Plan of Care: balance, strength, and gait training instructions were provided           ALLERGIES:  No Known Allergies    Current Medications     Current Outpatient Medications   Medication Sig Dispense Refill    acetaminophen (TYLENOL) 500 mg tablet Take 1,000 mg by mouth as needed for mild pain      bimatoprost (LUMIGAN) 0 01 % ophthalmic drops Administer 1 drop to both eyes daily at bedtime for 30 days 1 5 mL 0    calcium carbonate (OS-DANIA) 600 MG tablet Take 600 mg by mouth 2 (two) times a day with meals      carboxymethylcellulose (Artificial Tears) 1 % ophthalmic solution 1 drop 3 (three) times a day      ELIQUIS 5 MG TAKE 1 TABLET (5 MG TOTAL) BY MOUTH 2 (TWO) TIMES A DAY 60 tablet 3    ezetimibe-simvastatin (VYTORIN) 10-40 mg per tablet TAKE 1 TABLET BY MOUTH DAILY AT BEDTIME 30 tablet 5    famotidine (PEPCID) 10 mg tablet Take 10 mg by mouth as needed       ferrous sulfate 325 (65 Fe) mg tablet Take 325 mg by mouth daily with breakfast      fluticasone (FLONASE) 50 mcg/act nasal spray 2 sprays into each nostril daily as needed for rhinitis      glucose blood (ONE TOUCH ULTRA TEST) test strip TEST FOUR TIMES A  each 0    JANUVIA 100 MG tablet TAKE 1 TABLET (100 MG TOTAL) BY MOUTH DAILY 30 tablet 5    loratadine (CLARITIN) 10 mg tablet Take 10 mg by mouth daily as needed for allergies      LORazepam (ATIVAN) 0 5 mg tablet TAKE 2 TABLETS (1 MG TOTAL) BY MOUTH DAILY AT BEDTIME 60 tablet 0    metFORMIN (GLUCOPHAGE) 500 mg tablet TAKE 1 TABLET (500 MG TOTAL) BY MOUTH 2 (TWO) TIMES A DAY WITH MEALS 60 tablet 5    metoprolol tartrate (LOPRESSOR) 50 mg tablet TAKE 1 5 TABLETS (75 MG TOTAL) BY MOUTH EVERY 12 (TWELVE) HOURS 90 tablet 5    ondansetron (ZOFRAN-ODT) 4 mg disintegrating tablet Take 1 tablet (4 mg total) by mouth every 8 (eight) hours as needed for nausea or vomiting 10 tablet 0    ONETOUCH DELICA LANCETS FINE MISC Use one lancet to test blood 4 times a day 400 each 1    vitamin B-12 (VITAMIN B-12) 500 mcg tablet Take 500 mcg by mouth daily       No current facility-administered medications for this visit            Health Maintenance     Health Maintenance   Topic Date Due    DTaP,Tdap,and Td Vaccines (1 - Tdap) 06/17/2020 (Originally 12/1/1946)    Fall Risk  07/25/2020    Depression Screening PHQ  07/25/2020    Medicare Annual Wellness Visit (AWV)  07/25/2020    HEMOGLOBIN A1C  09/02/2020    Falls: Plan of Care  11/10/2020    Diabetic Foot Exam  11/13/2020    DM Eye Exam  02/06/2021    BMI: Adult  03/06/2021    Influenza Vaccine  Completed    Pneumococcal Vaccine: 65+ Years  Completed    Pneumococcal Vaccine: Pediatrics (0 to 5 Years) and At-Risk Patients (6 to 59 Years)  Aged Out    HIB Vaccine  Aged Out    Hepatitis B Vaccine  Aged Out    IPV Vaccine  Aged Out    Hepatitis A Vaccine  Aged Out    Meningococcal ACWY Vaccine  Aged Out    HPV Vaccine  Aged [employer] History   Administered Date(s) Administered    INFLUENZA 11/02/2016, 10/16/2017, 09/18/2018, 09/18/2018    Influenza, Quadrivalent (nasal) 11/02/2016    Influenza, high dose seasonal 0 5 mL 11/06/2019    Pneumococcal Conjugate 13-Valent 07/25/2019    Pneumococcal Polysaccharide PPV23 03/17/2003    Zoster 07/09/2010       ZANDER Meng

## 2020-03-08 PROBLEM — E61.1 IRON DEFICIENCY: Status: ACTIVE | Noted: 2020-03-08

## 2020-03-08 RX ORDER — FLUTICASONE PROPIONATE 50 MCG
2 SPRAY, SUSPENSION (ML) NASAL DAILY PRN
COMMUNITY
End: 2020-07-06 | Stop reason: ALTCHOICE

## 2020-03-08 RX ORDER — CHOLECALCIFEROL (VITAMIN D3) 125 MCG
500 CAPSULE ORAL DAILY
COMMUNITY
End: 2020-12-03

## 2020-03-08 RX ORDER — LORATADINE 10 MG/1
10 TABLET ORAL DAILY PRN
COMMUNITY

## 2020-03-08 NOTE — ASSESSMENT & PLAN NOTE
Lab Results   Component Value Date    HGBA1C 7 7 (H) 03/02/2020     Hemoglobin A1c within goal <8%  Recent discontinuation of levemir  Sugars running on average around 150 on home blood sugar log  Will repeat A1c in 3 months  Up to date on eye exam  Up to date on foot exam  Follow up in 3 months

## 2020-03-08 NOTE — ASSESSMENT & PLAN NOTE
Maintained on calcium and vitamin D3 supplement  Weight bearing exercise  Due for dexa scan  Has a prescription for this

## 2020-03-08 NOTE — ASSESSMENT & PLAN NOTE
Last carotid doppler July 2018 with   Known occlusion of the internal carotid artery  Vertebral artery flow is antegrade  There is no significant subclavian artery  disease  Will repeat carotid duplex at this time

## 2020-03-08 NOTE — ASSESSMENT & PLAN NOTE
Last carotid duplex July 2018  There is 50-69% stenosis noted in the left internal carotid artery  Elevated  velocities can represent compensatory flow for contralateral occlusion  Plaque  is heterogenous and irregular  Vertebral artery flow is antegrade  There is no significant subclavian artery  Disease  Will repeat carotid duplex  Continue Vytorin and Eliquis

## 2020-03-08 NOTE — ASSESSMENT & PLAN NOTE
Last carotid duplex July 2018  RIGHT:  Known occlusion of the internal carotid artery  Vertebral artery flow is antegrade  There is no significant subclavian artery  disease  LEFT:  There is 50-69% stenosis noted in the internal carotid artery  Elevated  velocities can represent compensatory flow for contralateral occlusion  Plaque  is heterogenous and irregular  Vertebral artery flow is antegrade  There is no significant subclavian artery  Disease  Continue Vytorin  Anticoagulated on Eliquis for a fib  Will repeat carotid duplex

## 2020-03-11 DIAGNOSIS — F41.9 ANXIETY: ICD-10-CM

## 2020-03-12 RX ORDER — LORAZEPAM 0.5 MG/1
1 TABLET ORAL
Qty: 60 TABLET | Refills: 0 | Status: SHIPPED | OUTPATIENT
Start: 2020-03-12 | End: 2020-05-07

## 2020-03-16 ENCOUNTER — REMOTE DEVICE CLINIC VISIT (OUTPATIENT)
Dept: CARDIOLOGY CLINIC | Facility: CLINIC | Age: 85
End: 2020-03-16
Payer: MEDICARE

## 2020-03-16 DIAGNOSIS — Z95.0 PACEMAKER: Primary | ICD-10-CM

## 2020-03-16 PROCEDURE — 93296 REM INTERROG EVL PM/IDS: CPT | Performed by: INTERNAL MEDICINE

## 2020-03-16 PROCEDURE — 93294 REM INTERROG EVL PM/LDLS PM: CPT | Performed by: INTERNAL MEDICINE

## 2020-03-16 NOTE — PROGRESS NOTES
Results for orders placed or performed in visit on 03/16/20   Cardiac EP device report    Narrative    MDT-DUAL CHAMBER PPM (AAIR-DDDR MODE)  CARELINK TRANSMISSION: BATTERY ADEQUATE (6 5 YRS)  AP 99%;  0%  ALL LEAD PARAMETERS WITHIN NORMAL LIMITS  1 NSVT EPISODE (UP  BPM & 8 BEATS)  PT  TAKES ELIQUIS & METOPROLOL TART  EF 55% (ECHO/2019)  SENT TO DR VIDAL FOR >200 NSVT  NORMAL DEVICE FUNCTION   PL

## 2020-05-06 DIAGNOSIS — F41.9 ANXIETY: ICD-10-CM

## 2020-05-06 DIAGNOSIS — I48.0 PAROXYSMAL ATRIAL FIBRILLATION (HCC): ICD-10-CM

## 2020-05-06 RX ORDER — APIXABAN 5 MG/1
5 TABLET, FILM COATED ORAL 2 TIMES DAILY
Qty: 60 TABLET | Refills: 3 | Status: SHIPPED | OUTPATIENT
Start: 2020-05-06 | End: 2020-08-03

## 2020-05-07 RX ORDER — LORAZEPAM 0.5 MG/1
1 TABLET ORAL
Qty: 60 TABLET | Refills: 0 | Status: SHIPPED | OUTPATIENT
Start: 2020-05-07 | End: 2020-06-05

## 2020-06-03 DIAGNOSIS — F41.9 ANXIETY: ICD-10-CM

## 2020-06-04 LAB
LEFT EYE DIABETIC RETINOPATHY: NORMAL
RIGHT EYE DIABETIC RETINOPATHY: NORMAL

## 2020-06-05 RX ORDER — LORAZEPAM 0.5 MG/1
1 TABLET ORAL
Qty: 60 TABLET | Refills: 0 | Status: SHIPPED | OUTPATIENT
Start: 2020-06-05 | End: 2020-07-06

## 2020-06-17 ENCOUNTER — REMOTE DEVICE CLINIC VISIT (OUTPATIENT)
Dept: CARDIOLOGY CLINIC | Facility: CLINIC | Age: 85
End: 2020-06-17
Payer: MEDICARE

## 2020-06-17 DIAGNOSIS — Z95.0 CARDIAC PACEMAKER IN SITU: Primary | ICD-10-CM

## 2020-06-17 PROCEDURE — 93296 REM INTERROG EVL PM/IDS: CPT | Performed by: INTERNAL MEDICINE

## 2020-06-17 PROCEDURE — 93294 REM INTERROG EVL PM/LDLS PM: CPT | Performed by: INTERNAL MEDICINE

## 2020-06-24 ENCOUNTER — TELEPHONE (OUTPATIENT)
Dept: CARDIOLOGY CLINIC | Facility: CLINIC | Age: 85
End: 2020-06-24

## 2020-07-03 LAB — HBA1C MFR BLD HPLC: NORMAL %

## 2020-07-06 ENCOUNTER — OFFICE VISIT (OUTPATIENT)
Dept: FAMILY MEDICINE CLINIC | Facility: CLINIC | Age: 85
End: 2020-07-06
Payer: MEDICARE

## 2020-07-06 ENCOUNTER — TELEPHONE (OUTPATIENT)
Dept: FAMILY MEDICINE CLINIC | Facility: CLINIC | Age: 85
End: 2020-07-06

## 2020-07-06 VITALS
HEIGHT: 66 IN | HEART RATE: 87 BPM | DIASTOLIC BLOOD PRESSURE: 76 MMHG | OXYGEN SATURATION: 98 % | RESPIRATION RATE: 16 BRPM | BODY MASS INDEX: 23.11 KG/M2 | WEIGHT: 143.8 LBS | TEMPERATURE: 98.4 F | SYSTOLIC BLOOD PRESSURE: 128 MMHG

## 2020-07-06 DIAGNOSIS — E78.5 HYPERLIPIDEMIA, UNSPECIFIED HYPERLIPIDEMIA TYPE: ICD-10-CM

## 2020-07-06 DIAGNOSIS — E61.1 IRON DEFICIENCY: ICD-10-CM

## 2020-07-06 DIAGNOSIS — F41.9 ANXIETY: ICD-10-CM

## 2020-07-06 DIAGNOSIS — I77.9 BILATERAL CAROTID ARTERY DISEASE, UNSPECIFIED TYPE (HCC): ICD-10-CM

## 2020-07-06 DIAGNOSIS — E11.3552 TYPE 2 DIABETES MELLITUS WITH STABLE PROLIFERATIVE RETINOPATHY OF LEFT EYE, WITH LONG-TERM CURRENT USE OF INSULIN (HCC): ICD-10-CM

## 2020-07-06 DIAGNOSIS — Z79.4 TYPE 2 DIABETES MELLITUS WITH DIABETIC POLYNEUROPATHY, WITH LONG-TERM CURRENT USE OF INSULIN (HCC): ICD-10-CM

## 2020-07-06 DIAGNOSIS — Z79.4 TYPE 2 DIABETES MELLITUS WITH STABLE PROLIFERATIVE RETINOPATHY OF LEFT EYE, WITH LONG-TERM CURRENT USE OF INSULIN (HCC): ICD-10-CM

## 2020-07-06 DIAGNOSIS — Z79.4 TYPE 2 DIABETES MELLITUS WITH RIGHT EYE AFFECTED BY MODERATE NONPROLIFERATIVE RETINOPATHY WITHOUT MACULAR EDEMA, WITH LONG-TERM CURRENT USE OF INSULIN (HCC): ICD-10-CM

## 2020-07-06 DIAGNOSIS — K21.9 GASTROESOPHAGEAL REFLUX DISEASE, ESOPHAGITIS PRESENCE NOT SPECIFIED: Primary | ICD-10-CM

## 2020-07-06 DIAGNOSIS — E11.42 TYPE 2 DIABETES MELLITUS WITH DIABETIC POLYNEUROPATHY, WITH LONG-TERM CURRENT USE OF INSULIN (HCC): ICD-10-CM

## 2020-07-06 DIAGNOSIS — Z79.4 TYPE 2 DIABETES MELLITUS WITH DIABETIC POLYNEUROPATHY, WITH LONG-TERM CURRENT USE OF INSULIN (HCC): Primary | ICD-10-CM

## 2020-07-06 DIAGNOSIS — I10 ESSENTIAL (PRIMARY) HYPERTENSION: ICD-10-CM

## 2020-07-06 DIAGNOSIS — M85.80 OSTEOPENIA, UNSPECIFIED LOCATION: ICD-10-CM

## 2020-07-06 DIAGNOSIS — E11.3391 TYPE 2 DIABETES MELLITUS WITH RIGHT EYE AFFECTED BY MODERATE NONPROLIFERATIVE RETINOPATHY WITHOUT MACULAR EDEMA, WITH LONG-TERM CURRENT USE OF INSULIN (HCC): ICD-10-CM

## 2020-07-06 DIAGNOSIS — E11.42 TYPE 2 DIABETES MELLITUS WITH DIABETIC POLYNEUROPATHY, WITH LONG-TERM CURRENT USE OF INSULIN (HCC): Primary | ICD-10-CM

## 2020-07-06 DIAGNOSIS — I10 HYPERTENSION, UNSPECIFIED TYPE: ICD-10-CM

## 2020-07-06 DIAGNOSIS — I48.0 PAROXYSMAL ATRIAL FIBRILLATION (HCC): ICD-10-CM

## 2020-07-06 PROCEDURE — 2026F EYE IMG VALID EVC RTNOPTHY: CPT | Performed by: NURSE PRACTITIONER

## 2020-07-06 PROCEDURE — 1036F TOBACCO NON-USER: CPT | Performed by: NURSE PRACTITIONER

## 2020-07-06 PROCEDURE — 1160F RVW MEDS BY RX/DR IN RCRD: CPT | Performed by: NURSE PRACTITIONER

## 2020-07-06 PROCEDURE — 4040F PNEUMOC VAC/ADMIN/RCVD: CPT | Performed by: NURSE PRACTITIONER

## 2020-07-06 PROCEDURE — 99214 OFFICE O/P EST MOD 30 MIN: CPT | Performed by: NURSE PRACTITIONER

## 2020-07-06 PROCEDURE — 3078F DIAST BP <80 MM HG: CPT | Performed by: NURSE PRACTITIONER

## 2020-07-06 PROCEDURE — 3074F SYST BP LT 130 MM HG: CPT | Performed by: NURSE PRACTITIONER

## 2020-07-06 PROCEDURE — 3051F HG A1C>EQUAL 7.0%<8.0%: CPT | Performed by: NURSE PRACTITIONER

## 2020-07-06 RX ORDER — LORAZEPAM 0.5 MG/1
1 TABLET ORAL
Qty: 60 TABLET | Refills: 0 | Status: SHIPPED | OUTPATIENT
Start: 2020-07-06 | End: 2020-08-03

## 2020-07-06 RX ORDER — METOPROLOL TARTRATE 50 MG/1
75 TABLET, FILM COATED ORAL EVERY 12 HOURS SCHEDULED
Qty: 90 TABLET | Refills: 5 | Status: SHIPPED | OUTPATIENT
Start: 2020-07-06 | End: 2021-02-03

## 2020-07-06 NOTE — PROGRESS NOTES
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, with long-term current use of insulin ([facility] [address] )     Recent eye exam shows regressive PDR  She will continue to be monitored by Ophthalmology, Dr Georgia Pineda and dr Sujata Mujica  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, with long-term current use of insulin ([facility])       Recent eye exam shows right eye with moderate and PDR  Exam is stable and will be monitored  She follows with ophthalmology Dr Georgia Pineda, and Dr Sujata Mujica  Type 2 diabetes mellitus with diabetic polyneuropathy, with long-term current use of insulin ([facility])     Most recent hemoglobin A1c 7 7%    Currently taking metformin 500 mg twice daily and Januvia 100 mg daily  She had repeat A1c completed 3 days ago, awaiting results  Podiatry care as per Dr Ba De La Paz  Cardiovascular and Mediastinum    HTN (hypertension)     Blood pressure stable on metoprolol 75 mg every 12 hours  Bilateral carotid artery disease ([facility] [address] )     Last carotid study 2018:  RIGHT:  Known occlusion of the internal carotid artery  Vertebral artery flow is antegrade  There is no significant subclavian artery  disease  LEFT:  There is 50-69% stenosis noted in the internal carotid artery  Elevated  velocities can represent compensatory flow for contralateral occlusion  Plaque  is heterogenous and irregular  Vertebral artery flow is antegrade  There is no significant subclavian artery  Disease  She is overdue for repeat doppler and will schedule    Remains asymptomatic  She has prior prescription  Paroxysmal atrial fibrillation (HCC)     Heart auscultated with regular rate and rhythm today  She is maintained on Eliquis and aspirin for anticoagulation  Metoprolol tartrate 75 mg twice daily for rate control  Follows with cardiology  Musculoskeletal and Integument    Osteopenia     Recent epigastric abdominal pain, which patient is not sure if it was from eating too many oranges or from the calcium supplement  She stopped both items and pain resolved  Recommend trying to resume calcium supplement on a once daily basis  If tolerated after 2 weeks, may try to increase again to twice daily  Other    HLD (hyperlipidemia)     Last LDL under good control at 45 with Vytorin  Awaiting blood work results completed 3 days ago  Office will call her with results  Anxiety     Anxiety is under good control using lorazepam 1 mg as needed at bedtime  Iron deficiency     Stable Hemoglobin 11 6 on ferrous sulfate once daily, 325 mg  Office will call her with recent blood work results when available  She will follow up in office in 4 months or sooner if needed  1  Gastroesophageal reflux disease, esophagitis presence not specified     2  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, with long-term current use of insulin ([address] )     3  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, with long-term current use of insulin ([address] )     4  Type 2 diabetes mellitus with diabetic polyneuropathy, with long-term current use of insulin ([address] )     5  Bilateral carotid artery disease, unspecified type ([address] )     6  Hypertension, unspecified type     7  Paroxysmal atrial fibrillation (HCC)     8  Osteopenia, unspecified location     9  Anxiety     10  Hyperlipidemia, unspecified hyperlipidemia type     11   Iron deficiency Chief Complaint     Chief Complaint   Patient presents with    Follow-up     Pt is here for 4 mos f/u       History of Present Illness     Charlene Alanis is an 82year old female presenting today for follow up visit  She completed blood work at [facility] 3 days ago  Unfortunately I have not received results yet  She will be scheduling bone density scan and bilateral carotid Doppler as previously ordered  She had originally canceled them due to COVID-19  She is up-to-date with eye exam, which she completed 2 weeks ago  Foot care is provided by Dr Arnaldo Eckert  Osteopenia:  Stop taking calcium approximately 1 month ago due to stomach pain  She was eating a lot of navel oranges at the time, and was unsure if it was the calcium or the oranges, and she stopped both  She is taking iron supplementation for iron deficiency anemia  There is constipated, but she has been successful using high-fiber foods to alleviate this  Continues to use Pepcid for GERD symptoms as needed  Anxiety has been under good control  She is limiting how much new she watches to help reduce anxiety  Review of Systems   Review of Systems   Constitutional: Negative  HENT: Negative  Eyes: Negative  Respiratory: Negative  Cardiovascular: Negative  Gastrointestinal: Negative  Endocrine: Negative  Genitourinary: Negative  Musculoskeletal: Negative  Skin: Negative  Allergic/Immunologic: Negative  Neurological: Negative  Hematological: Negative  Psychiatric/Behavioral: Negative          Active Problem List     Patient Active Problem List   Diagnosis    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, with long-term current use of insulin ([facility] [address] )    HTN (hypertension)    HLD (hyperlipidemia)    Glaucoma    Bilateral carotid artery disease (HCC)    Symptomatic PVCs    Occlusion of right carotid artery    Stenosis of left carotid artery    Paroxysmal atrial fibrillation (HCC)    Presence of permanent cardiac pacemaker    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, with long-term current use of insulin (HCC)    Type 2 diabetes mellitus with diabetic polyneuropathy, with long-term current use of insulin (HCC)    Osteopenia    GERD (gastroesophageal reflux disease)    Anxiety    Uncontrolled type 2 diabetes mellitus with hypoglycemia ([facility] [address] )    Iron deficiency       Past Medical History:  Past Medical History:   Diagnosis Date    Diabetes mellitus ([facility] [address] )     Glaucoma     H/O degenerative disc disease     Hyperlipidemia     Hypertension     Peripheral neuropathy     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 ([facility] [address] )        Past Surgical History:  Past Surgical History:   Procedure Laterality Date    CATARACT EXTRACTION, BILATERAL  2011    CYST REMOVAL  2009    left lower Quadrant     HERNIA REPAIR  1992    LIPOMA RESECTION  2010    HI REPAIR ING HERNIA,5+Y/O,REDUCIBL Bilateral 1/5/2018    Procedure: INGUINAL HERNIA REPAIR;  Surgeon: Sierra Mabry MD;  Location: [facility];  Service: General    SHOULDER SURGERY  04/26/2019    Dr [facility]   [address]  R carotid occlusion       Family History:  Family History   Problem Relation Age of Onset    Heart attack Father     Hiatal hernia Father     Diabetes Father     Heart disease Mother     Cancer Brother     Diabetes Brother     Heart disease Brother     Hypertension Brother        Social History:  Social History     Socioeconomic History    Marital status:      Spouse name: Not on file    Number of children: Not on file    Years of education: Not on file    Highest education level: Not on file   Occupational History    Occupation: customer service   retired   Social Needs    Financial resource strain: Not on file    Food insecurity:     Worry: Not on file     Inability: Not on file    Transportation needs:     Medical: Not on file     Non-medical: Not on file   Tobacco Use    Smoking status: Former Smoker     Last attempt to quit:      Years since quittin 5    Smokeless tobacco: Never Used   Substance and Sexual Activity    Alcohol use: No    Drug use: No    Sexual activity: Not on file   Lifestyle    Physical activity:     Days per week: Not on file     Minutes per session: Not on file    Stress: Not on file   Relationships    Social connections:     Talks on phone: Not on file     Gets together: Not on file     Attends [employer] service: Not on file     Active member of club or organization: Not on file     Attends meetings of clubs or organizations: Not on file     Relationship status: Not on file    Intimate partner violence:     Fear of current or ex partner: Not on file     Emotionally abused: Not on file     Physically abused: Not on file     Forced sexual activity: Not on file   Other Topics Concern    Not on file   Social History Narrative    Lives [place]    2 children       I have reviewed the patient's medical history in detail; there are no changes to the history as noted in the electronic medical record  Objective     Vitals:    20 0858 20 0934   BP: 140/80 128/76   Pulse: 87    Resp: 16    Temp: 98 4 °F (36 9 °C)    TempSrc: Temporal    SpO2: 98%    Weight: 65 2 kg (143 lb 12 8 oz)    Height: 5' 6" (1 676 m)      Wt Readings from Last 3 Encounters:   20 65 2 kg (143 lb 12 8 oz)   20 65 8 kg (145 lb)   20 66 1 kg (145 lb 12 8 oz)     Physical Exam   Constitutional: She is oriented to person, place, and time  She appears well-developed and well-nourished  HENT:   Head: Normocephalic and atraumatic  Eyes: Pupils are equal, round, and reactive to light  Conjunctivae are normal    Neck: Normal range of motion  Neck supple  Carotid bruit is not present  No thyromegaly present  Cardiovascular: Normal rate and regular rhythm  No murmur heard  Pulmonary/Chest: Effort normal and breath sounds normal  No respiratory distress  Abdominal: Soft  Bowel sounds are normal    Musculoskeletal: She exhibits no edema  Lymphadenopathy:     She has no cervical adenopathy  Neurological: She is alert and oriented to person, place, and time  Skin: No rash noted  Psychiatric: She has a normal mood and affect  Nursing note and vitals reviewed           ALLERGIES:  No Known Allergies    Current Medications     Current Outpatient Medications   Medication Sig Dispense Refill    acetaminophen (TYLENOL) 500 mg tablet Take 1,000 mg by mouth as needed for mild pain      bimatoprost (LUMIGAN) 0 01 % ophthalmic drops Administer 1 drop to both eyes daily at bedtime for 30 days 1 5 mL 0    carboxymethylcellulose (Artificial Tears) 1 % ophthalmic solution 1 drop 3 (three) times a day      ELIQUIS 5 MG TAKE 1 TABLET (5 MG TOTAL) BY MOUTH 2 (TWO) TIMES A DAY 60 tablet 3    ezetimibe-simvastatin (VYTORIN) 10-40 mg per tablet TAKE 1 TABLET BY MOUTH DAILY AT BEDTIME 30 tablet 5    famotidine (PEPCID) 10 mg tablet Take 10 mg by mouth as needed       ferrous sulfate 325 (65 Fe) mg tablet Take 325 mg by mouth daily with breakfast      glucose blood (ONE TOUCH ULTRA TEST) test strip TEST FOUR TIMES A  each 0    JANUVIA 100 MG tablet TAKE 1 TABLET (100 MG TOTAL) BY MOUTH DAILY 30 tablet 5    loratadine (CLARITIN) 10 mg tablet Take 10 mg by mouth daily as needed for allergies      ONETOUCH DELICA LANCETS FINE MISC Use one lancet to test blood 4 times a day 400 each 1    vitamin B-12 (VITAMIN B-12) 500 mcg tablet Take 500 mcg by mouth daily      calcium carbonate (OS-DANIA) 600 MG tablet Take 600 mg by mouth 2 (two) times a day with meals      LORazepam (ATIVAN) 0 5 mg tablet TAKE 2 TABLETS (1 MG TOTAL) BY MOUTH DAILY AT BEDTIME 60 tablet 0    metFORMIN (GLUCOPHAGE) 500 mg tablet Take 2 tablets (1000 mg) every morning and 1 tablet (500 mg) every evening 90 tablet 5    metoprolol tartrate (LOPRESSOR) 50 mg tablet TAKE 1 5 TABLETS (75 MG TOTAL) BY MOUTH EVERY 12 (TWELVE) HOURS 90 tablet 5     No current facility-administered medications for this visit            Health Maintenance     Health Maintenance   Topic Date Due    DTaP,Tdap,and Td Vaccines (1 - Tdap) 12/01/1946    Influenza Vaccine  07/01/2020    Fall Risk  07/25/2020    Medicare Annual Wellness Visit (AWV)  07/25/2020    HEMOGLOBIN A1C  01/03/2021    Diabetic Foot Exam  01/23/2021    Falls: Plan of Care  03/08/2021    DM Eye Exam  06/04/2021    Depression Screening PHQ  07/06/2021    BMI: Adult  07/06/2021    Pneumococcal Vaccine: 65+ Years  Completed    Pneumococcal Vaccine: Pediatrics (0 to 5 Years) and At-Risk Patients (6 to 59 Years)  Aged Out    HIB Vaccine  Aged Out    Hepatitis B Vaccine  Aged Out    IPV Vaccine  Aged Out    Hepatitis A Vaccine  Aged Out    Meningococcal ACWY Vaccine  Aged Out    HPV Vaccine  Aged [employer] History   Administered Date(s) Administered    INFLUENZA 11/02/2016, 10/16/2017, 09/18/2018, 09/18/2018    Influenza, Quadrivalent (nasal) 11/02/2016    Influenza, high dose seasonal 0 5 mL 11/06/2019    Pneumococcal Conjugate 13-Valent 07/25/2019    Pneumococcal Polysaccharide PPV23 03/17/2003    Zoster 07/09/2010       ZANDER Nascimento

## 2020-07-07 ENCOUNTER — TELEPHONE (OUTPATIENT)
Dept: FAMILY MEDICINE CLINIC | Facility: CLINIC | Age: 85
End: 2020-07-07

## 2020-07-07 DIAGNOSIS — Z79.4 TYPE 2 DIABETES MELLITUS WITH RETINOPATHY, WITH LONG-TERM CURRENT USE OF INSULIN, MACULAR EDEMA PRESENCE UNSPECIFIED, UNSPECIFIED LATERALITY, UNSPECIFIED RETINOPATHY SEVERITY (HCC): ICD-10-CM

## 2020-07-07 DIAGNOSIS — E11.319 TYPE 2 DIABETES MELLITUS WITH RETINOPATHY, WITH LONG-TERM CURRENT USE OF INSULIN, MACULAR EDEMA PRESENCE UNSPECIFIED, UNSPECIFIED LATERALITY, UNSPECIFIED RETINOPATHY SEVERITY (HCC): ICD-10-CM

## 2020-07-07 NOTE — TELEPHONE ENCOUNTER
Spoke with pt  Made aware as per provider's orders  Pt verbalized understanding and had no questions at this time     BW mailed as per Kimberly's request    3M F/U scheduled for 10/06 at [address] [place] [address]

## 2020-07-07 NOTE — TELEPHONE ENCOUNTER
Please contact patient with results of her blood work  All blood work is stable, except for her hemoglobin A1c has increased to 8 2%  If she has not had any past problems tolerating metformin, and I would like her to increase her metformin to 2 tablets in the morning and 1 tablet in the evening  If she has any questions about this, please let me know and I will call her  I would like her to repeat her blood work and follow-up in 3-4 months  Please mail her a copy of her blood work results, as well as blood work slips for next set of blood work

## 2020-07-07 NOTE — TELEPHONE ENCOUNTER
Jerry with [facility] called requesting a new RX for Metformin, patient informed the pharmacy that her directions changed on the medication and she needs a refill

## 2020-07-12 NOTE — ASSESSMENT & PLAN NOTE
Most recent hemoglobin A1c 7 7%    Currently taking metformin 500 mg twice daily and Januvia 100 mg daily  She had repeat A1c completed 3 days ago, awaiting results  Podiatry care as per Dr Frida Molina

## 2020-07-12 NOTE — ASSESSMENT & PLAN NOTE
Recent epigastric abdominal pain, which patient is not sure if it was from eating too many oranges or from the calcium supplement  She stopped both items and pain resolved  Recommend trying to resume calcium supplement on a once daily basis  If tolerated after 2 weeks, may try to increase again to twice daily

## 2020-07-12 NOTE — ASSESSMENT & PLAN NOTE
Heart auscultated with regular rate and rhythm today  She is maintained on Eliquis and aspirin for anticoagulation  Metoprolol tartrate 75 mg twice daily for rate control  Follows with cardiology

## 2020-07-12 NOTE — ASSESSMENT & PLAN NOTE
Last LDL under good control at 45 with Vytorin  Awaiting blood work results completed 3 days ago  Office will call her with results

## 2020-07-12 NOTE — ASSESSMENT & PLAN NOTE
Recent eye exam shows regressive PDR  She will continue to be monitored by Ophthalmology, Dr Xiang Acuna and dr Princess Caldwell

## 2020-07-12 NOTE — ASSESSMENT & PLAN NOTE
Last carotid study 2018:  RIGHT:  Known occlusion of the internal carotid artery  Vertebral artery flow is antegrade  There is no significant subclavian artery  disease  LEFT:  There is 50-69% stenosis noted in the internal carotid artery  Elevated  velocities can represent compensatory flow for contralateral occlusion  Plaque  is heterogenous and irregular  Vertebral artery flow is antegrade  There is no significant subclavian artery  Disease  She is overdue for repeat doppler and will schedule  Remains asymptomatic  She has prior prescription

## 2020-07-12 NOTE — ASSESSMENT & PLAN NOTE
Recent eye exam shows right eye with moderate and PDR  Exam is stable and will be monitored  She follows with ophthalmology Dr Elsie Ibrahim, and Dr Marlene Duran

## 2020-07-30 ENCOUNTER — APPOINTMENT (OUTPATIENT)
Dept: LAB | Facility: CLINIC | Age: 85
End: 2020-07-30
Payer: MEDICARE

## 2020-07-30 ENCOUNTER — OFFICE VISIT (OUTPATIENT)
Dept: FAMILY MEDICINE CLINIC | Facility: CLINIC | Age: 85
End: 2020-07-30
Payer: MEDICARE

## 2020-07-30 ENCOUNTER — TELEPHONE (OUTPATIENT)
Dept: OTHER | Facility: OTHER | Age: 85
End: 2020-07-30

## 2020-07-30 ENCOUNTER — HOSPITAL ENCOUNTER (OUTPATIENT)
Dept: CT IMAGING | Facility: HOSPITAL | Age: 85
Discharge: HOME | End: 2020-07-30
Payer: MEDICARE

## 2020-07-30 VITALS
OXYGEN SATURATION: 96 % | TEMPERATURE: 98 F | BODY MASS INDEX: 22.52 KG/M2 | HEART RATE: 85 BPM | DIASTOLIC BLOOD PRESSURE: 70 MMHG | RESPIRATION RATE: 18 BRPM | HEIGHT: 66 IN | WEIGHT: 140.13 LBS | SYSTOLIC BLOOD PRESSURE: 120 MMHG

## 2020-07-30 DIAGNOSIS — E87.1 HYPONATREMIA: Primary | ICD-10-CM

## 2020-07-30 DIAGNOSIS — Z00.00 MEDICARE ANNUAL WELLNESS VISIT, SUBSEQUENT: ICD-10-CM

## 2020-07-30 DIAGNOSIS — R63.4 WEIGHT LOSS: ICD-10-CM

## 2020-07-30 DIAGNOSIS — R10.13 EPIGASTRIC PAIN: ICD-10-CM

## 2020-07-30 DIAGNOSIS — R10.13 EPIGASTRIC PAIN: Primary | ICD-10-CM

## 2020-07-30 DIAGNOSIS — E11.65 UNCONTROLLED TYPE 2 DIABETES MELLITUS WITH HYPERGLYCEMIA (HCC): ICD-10-CM

## 2020-07-30 LAB
ALBUMIN SERPL BCP-MCNC: 4.2 G/DL (ref 3.5–5)
ALP SERPL-CCNC: 60 U/L (ref 46–116)
ALT SERPL W P-5'-P-CCNC: 28 U/L (ref 12–78)
ANION GAP SERPL CALCULATED.3IONS-SCNC: 8 MMOL/L (ref 4–13)
AST SERPL W P-5'-P-CCNC: 20 U/L (ref 5–45)
BASOPHILS # BLD AUTO: 0.01 THOUSANDS/ΜL (ref 0–0.1)
BASOPHILS NFR BLD AUTO: 0 % (ref 0–1)
BILIRUB SERPL-MCNC: 0.36 MG/DL (ref 0.2–1)
BUN SERPL-MCNC: 12 MG/DL (ref 5–25)
CALCIUM SERPL-MCNC: 9 MG/DL (ref 8.3–10.1)
CHLORIDE SERPL-SCNC: 90 MMOL/L (ref 100–108)
CO2 SERPL-SCNC: 29 MMOL/L (ref 21–32)
CREAT SERPL-MCNC: 0.52 MG/DL (ref 0.6–1.3)
EOSINOPHIL # BLD AUTO: 0.09 THOUSAND/ΜL (ref 0–0.61)
EOSINOPHIL NFR BLD AUTO: 1 % (ref 0–6)
ERYTHROCYTE [DISTWIDTH] IN BLOOD BY AUTOMATED COUNT: 13.1 % (ref 11.6–15.1)
GFR SERPL CREATININE-BSD FRML MDRD: 89 ML/MIN/1.73SQ M
GLUCOSE SERPL-MCNC: 171 MG/DL (ref 65–140)
HCT VFR BLD AUTO: 37.3 % (ref 34.8–46.1)
HGB BLD-MCNC: 12 G/DL (ref 11.5–15.4)
IMM GRANULOCYTES # BLD AUTO: 0.01 THOUSAND/UL (ref 0–0.2)
IMM GRANULOCYTES NFR BLD AUTO: 0 % (ref 0–2)
LIPASE SERPL-CCNC: 151 U/L (ref 73–393)
LYMPHOCYTES # BLD AUTO: 1.63 THOUSANDS/ΜL (ref 0.6–4.47)
LYMPHOCYTES NFR BLD AUTO: 25 % (ref 14–44)
MCH RBC QN AUTO: 28 PG (ref 26.8–34.3)
MCHC RBC AUTO-ENTMCNC: 32.2 G/DL (ref 31.4–37.4)
MCV RBC AUTO: 87 FL (ref 82–98)
MONOCYTES # BLD AUTO: 0.78 THOUSAND/ΜL (ref 0.17–1.22)
MONOCYTES NFR BLD AUTO: 12 % (ref 4–12)
NEUTROPHILS # BLD AUTO: 3.93 THOUSANDS/ΜL (ref 1.85–7.62)
NEUTS SEG NFR BLD AUTO: 62 % (ref 43–75)
NRBC BLD AUTO-RTO: 0 /100 WBCS
PLATELET # BLD AUTO: 150 THOUSANDS/UL (ref 149–390)
PMV BLD AUTO: 9.8 FL (ref 8.9–12.7)
POTASSIUM SERPL-SCNC: 4.4 MMOL/L (ref 3.5–5.3)
PROT SERPL-MCNC: 7.8 G/DL (ref 6.4–8.2)
RBC # BLD AUTO: 4.29 MILLION/UL (ref 3.81–5.12)
SODIUM SERPL-SCNC: 127 MMOL/L (ref 136–145)
TROPONIN I SERPL-MCNC: <0.02 NG/ML
WBC # BLD AUTO: 6.45 THOUSAND/UL (ref 4.31–10.16)

## 2020-07-30 PROCEDURE — 3008F BODY MASS INDEX DOCD: CPT | Performed by: NURSE PRACTITIONER

## 2020-07-30 PROCEDURE — 2026F EYE IMG VALID EVC RTNOPTHY: CPT | Performed by: NURSE PRACTITIONER

## 2020-07-30 PROCEDURE — 99214 OFFICE O/P EST MOD 30 MIN: CPT | Performed by: NURSE PRACTITIONER

## 2020-07-30 PROCEDURE — 85025 COMPLETE CBC W/AUTO DIFF WBC: CPT

## 2020-07-30 PROCEDURE — G0439 PPPS, SUBSEQ VISIT: HCPCS | Performed by: NURSE PRACTITIONER

## 2020-07-30 PROCEDURE — 1036F TOBACCO NON-USER: CPT | Performed by: NURSE PRACTITIONER

## 2020-07-30 PROCEDURE — 74177 CT ABD & PELVIS W/CONTRAST: CPT

## 2020-07-30 PROCEDURE — 1160F RVW MEDS BY RX/DR IN RCRD: CPT | Performed by: NURSE PRACTITIONER

## 2020-07-30 PROCEDURE — 1125F AMNT PAIN NOTED PAIN PRSNT: CPT | Performed by: NURSE PRACTITIONER

## 2020-07-30 PROCEDURE — 36415 COLL VENOUS BLD VENIPUNCTURE: CPT

## 2020-07-30 PROCEDURE — 1170F FXNL STATUS ASSESSED: CPT | Performed by: NURSE PRACTITIONER

## 2020-07-30 PROCEDURE — 3078F DIAST BP <80 MM HG: CPT | Performed by: NURSE PRACTITIONER

## 2020-07-30 PROCEDURE — 80053 COMPREHEN METABOLIC PANEL: CPT

## 2020-07-30 PROCEDURE — 84484 ASSAY OF TROPONIN QUANT: CPT

## 2020-07-30 PROCEDURE — 1123F ACP DISCUSS/DSCN MKR DOCD: CPT | Performed by: NURSE PRACTITIONER

## 2020-07-30 PROCEDURE — 3051F HG A1C>EQUAL 7.0%<8.0%: CPT | Performed by: NURSE PRACTITIONER

## 2020-07-30 PROCEDURE — 3074F SYST BP LT 130 MM HG: CPT | Performed by: NURSE PRACTITIONER

## 2020-07-30 PROCEDURE — 4040F PNEUMOC VAC/ADMIN/RCVD: CPT | Performed by: NURSE PRACTITIONER

## 2020-07-30 PROCEDURE — 83690 ASSAY OF LIPASE: CPT

## 2020-07-30 RX ORDER — AMOXICILLIN 500 MG/1
CAPSULE ORAL
COMMUNITY
Start: 2020-07-20

## 2020-07-30 RX ORDER — LANCETS 33 GAUGE
EACH MISCELLANEOUS
Qty: 400 EACH | Refills: 1 | Status: SHIPPED | OUTPATIENT
Start: 2020-07-30

## 2020-07-30 RX ORDER — PANTOPRAZOLE SODIUM 40 MG/1
40 TABLET, DELAYED RELEASE ORAL DAILY
Qty: 30 TABLET | Refills: 1 | Status: SHIPPED | OUTPATIENT
Start: 2020-07-30 | End: 2020-09-25

## 2020-07-30 RX ADMIN — IOHEXOL 100 ML: 350 INJECTION, SOLUTION INTRAVENOUS at 17:58

## 2020-07-30 NOTE — PROGRESS NOTES
FAMILY PRACTICE OFFICE VISIT       NAME: Con Dickey  AGE: 82 y o  SEX: female       : 1935        MRN: [id number]      Assessment and Plan     Problem List Items Addressed This Visit     None      Visit Diagnoses     Epigastric pain    -  Primary    Relevant Medications    pantoprazole (PROTONIX) 40 mg tablet    Other Relevant Orders    CBC and differential (Completed)    Comprehensive metabolic panel (Completed)    Lipase (Completed)    Ambulatory referral to Gastroenterology    CT abdomen pelvis w contrast (Completed)    Troponin I (Completed)    Weight loss        Relevant Orders    CBC and differential (Completed)    Comprehensive metabolic panel (Completed)    Lipase (Completed)    Ambulatory referral to Gastroenterology    CT abdomen pelvis w contrast (Completed)    Uncontrolled type 2 diabetes mellitus with hyperglycemia (HCC)        Relevant Medications    glucose blood (ONE TOUCH ULTRA TEST) test strip    Lancets ([address]) MISC    Medicare annual wellness visit, subsequent              1  Epigastric pain  CBC and differential    Comprehensive metabolic panel    Lipase    Ambulatory referral to Gastroenterology    pantoprazole (PROTONIX) 40 mg tablet    CT abdomen pelvis w contrast    Troponin I    CANCELED: Ambulatory referral to Gastroenterology   2  Weight loss  CBC and differential    Comprehensive metabolic panel    Lipase    Ambulatory referral to Gastroenterology    CT abdomen pelvis w contrast   3  Uncontrolled type 2 diabetes mellitus with hyperglycemia (HCC)  glucose blood (ONE TOUCH ULTRA TEST) test strip    Lancets (ONETOUCH DELICA PLUS [id number]) MISC   4  Medicare annual wellness visit, subsequent       This 22-year-old female with diabetes, hypertension, paroxysmal atrial fib, GERD , left carotid artery stenosis , hyperlipidemia , iron deficiency anemia, pacemaker in place presents today for epigastric pain  Pain started about 6-8 weeks ago    Was initially mild and intermittent  She attributed it to different foods  Tried eliminating different things from her diet  Over the past 1 week pain has increased in frequency and intensity  Pain occurs approximately 3-5 hours after eating, when her stomach is empty  Pain is relieved by eating  She has tried taking over-the-counter antacids with no relief  She has intermittently taken Pepcid 10-20 mg, with no relief  She has started to slowly lose weight  Denies any bloody stools  No nausea or vomiting  No diarrhea  Decreased appetite  On exam abdomen is nontender  Appears normal, with active bowel sounds  There is a left upper quadrant palpable mass, likely stool given history of constipation  Unclear etiology of symptoms  In office ECG shows an atrial paced rhythm  No changes in comparison to 07/05/2019  Will check blood work, CBC, CMP, lipase, troponin  Will send for stat CT abdomen and pelvis with contrast due to abdominal pain with weight loss  Will refer to Gastroenterology  Will start pantoprazole 40 mg once daily, 30 minutes prior to breakfast, for possible ulcer or gastritis  Office will call her with results of CT abdomen and pelvis when available  For any worsening pain or persisting pain she is advised to go the emergency room  8:15 pm  Spoke with patient via telephone regarding results of blood work and CT abdomen and pelvis  There is a 5 mm stone or debris in the common bile duct, further testing is recommended  Discussed this may be an incidental finding, as her symptoms are not completely consistent with a common bile duct stone  Will need evaluation by gastroenterology, our office will facilitate appointment  There is a moderate to large amount of stool seen on scan, which is consistent with patient reports of constipation  Do not feel this is cause of pain  There is a L1-L2 disc protrusion and moderate central canal stenosis    She denies any numbness or tingling in lower extremities  She only has low back pain if she is sitting too long  Will continue to monitor symptoms for now  Blood work shows normal CBC, troponin, lipase  Sodium today is 127  Patient typically has mild hyponatremia, 133-135  Blood work completed on 07/06 shows a sodium level of 134  She denies headaches, nausea, vomiting, forgetfulness, confusion, muscle cramping  She does have mild fatigue  She tries to drink 8 glasses of water daily  She has not been drinking more fluids than usual   Unclear etiology for recent decrease in sodium  She will go to the lab 1st thing tomorrow morning, and repeat sodium level  Again advised patient if pain is severe, or if pain is persisting, not relieved with eating, go to the emergency room  Chief Complaint     Chief Complaint   Patient presents with    Medicare Wellness Visit    Follow-up     abd pain         History of Present Illness     Erica Lowe is an 82year old female presenting today for epigastric pain  Epigastric pain began approximately 6-8 weeks ago  Pain was intermittent and mild  She attributed pain to something she was eating such as a lot of oranges, or a vinegar oil salad dressing she was using a lot  Also eliminated dairy and stopped calcium supplementation, thought this may be the cause as well  Over the past week, abdominal pain has increased in intensity and frequency  She has over-the-counter Pepcid 10 mg tablets  Has tried taking 1-2 tablets, but it does not help with symptoms  Is not taking Pepcid often, because it was not helping with symptoms  Has also tried over-the-counter antacids with no relief  Last week, she was experiencing loose stools, this is abnormal for her  She is usually constipated due to iron supplementation  Stools have returned to normal this week  She felt fatigued last week, still fatigued this week, but less so  Has no nausea or vomiting  Appetite is reduced    She is slowly losing weight, weight in March was 145, earlier this month weight was 143, today weight is 140  Pain begins approximately 3-5 hours after she has eaten, when her stomach is empty  If she eats, pain resolves  Pain was more severe at lunch time today, and took longer to resolve today than usual   She ate oatmeal for breakfast and oatmeal for lunch today  Yesterday, when she was having pain, she drank tomato juice and ate chili, and pain resolved  No pain right now  States "intestines feel irritated "    She denies any shortness of breath  She has intermittent mild dizziness, that is at her baseline  She has no palpitations  Sometimes in the afternoon in the evenings she feels a little bit jittery  Drinks 2 cups of coffee every morning for caffeine intake  No alcohol intake  Former smoker, quit 1994  Not using NSAIDS  Review of Systems   Review of Systems   Constitutional: Positive for appetite change (decreased), fatigue and unexpected weight change  Negative for chills, diaphoresis and fever  Respiratory: Negative for cough, chest tightness, shortness of breath and wheezing  Cardiovascular: Negative for chest pain, palpitations and leg swelling  Gastrointestinal: Positive for abdominal pain and constipation  Negative for anal bleeding, blood in stool, diarrhea, nausea and vomiting  Genitourinary: Negative  Musculoskeletal: Negative  Skin: Negative for rash  Neurological: Positive for dizziness (mild intermittent, at baseline)  Negative for tremors, seizures, syncope, speech difficulty, weakness, light-headedness, numbness and headaches         Active Problem List     Patient Active Problem List   Diagnosis    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)    Osteopenia    GERD (gastroesophageal reflux disease)    Anxiety    Uncontrolled type 2 diabetes mellitus with hypoglycemia ([facility]    Iron deficiency       Past Medical History:  Past Medical History:   Diagnosis Date    [facility]     Glaucoma     H/O degenerative disc disease     Hyperlipidemia     Hypertension     Peripheral neuropathy     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 ([facility] )        Past Surgical History:  Past Surgical History:   Procedure Laterality Date    CATARACT EXTRACTION, BILATERAL  2011    CYST REMOVAL  2009    left lower Quadrant     HERNIA REPAIR  1992    LIPOMA RESECTION  2010    CA REPAIR ING HERNIA,5+Y/O,REDUCIBL Bilateral 1/5/2018    Procedure: INGUINAL HERNIA REPAIR;  Surgeon: Frieda Chavira MD;  [facility];  Service: General    SHOULDER SURGERY  04/26/2019    Dr Woods [facility]-  R carotid occlusion       Family History:  Family History   Problem Relation Age of Onset    Heart attack Father     Hiatal hernia Father     Diabetes Father     Heart disease Mother     Cancer Brother     Diabetes Brother     Heart disease Brother     Hypertension Brother        Social History:  Social History     Socioeconomic History    Marital status:      Spouse name: Not on file    Number of children: Not on file    Years of education: Not on file    Highest education level: Not on file   Occupational History    Occupation: customer service   retired   Social Needs    Financial resource strain: Not on file    Food insecurity:     Worry: Not on file     Inability: Not on file    Transportation needs:     Medical: Not on file     Non-medical: Not on file   Tobacco Use    Smoking status: Former Smoker     Last attempt to quit:      Years since quittin 5    Smokeless tobacco: Never Used   Substance and Sexual Activity    Alcohol use: No    Drug use: No    Sexual activity: Not on file   Lifestyle    Physical activity:     Days per week: Not on file     Minutes per session: Not on file    Stress: Not on file   Relationships    Social connections:     Talks on phone: Not on file     Gets together: Not on file     Attends Taoism service: Not on file     Active member of club or organization: Not on file     Attends meetings of clubs or organizations: Not on file     Relationship status: Not on file    Intimate partner violence:     Fear of current or ex partner: Not on file     Emotionally abused: Not on file     Physically abused: Not on file     Forced sexual activity: Not on file   Other Topics Concern    Not on file   Social History Narrative    Lives alone [place]    2 children       I have reviewed the patient's medical history in detail; there are no changes to the history as noted in the electronic medical record  Objective     Vitals:    20 1400   BP: 120/70   Pulse: 85   Resp: 18   Temp: 98 °F (36 7 °C)   SpO2: 96%   Weight: 63 6 kg (140 lb 2 oz)   Height: 5' 6" (1 676 m)     Wt Readings from Last 3 Encounters:   20 63 6 kg (140 lb 2 oz)   20 65 2 kg (143 lb 12 8 oz)   20 65 8 kg (145 lb)     Physical Exam   Constitutional: She is oriented to person, place, and time  She appears well-developed and well-nourished  No distress  HENT:   Head: Normocephalic and atraumatic  Cardiovascular: Normal rate, regular rhythm and normal heart sounds  Exam reveals no gallop and no friction rub  No murmur heard    Pulmonary/Chest: Effort normal and breath sounds normal  No respiratory distress  She has no wheezes  She has no rales  Abdominal: Soft  Normal appearance and bowel sounds are normal  There is no tenderness  There is no guarding  Neurological: She is alert and oriented to person, place, and time  Skin: Skin is warm and dry  No rash noted  No pallor  Psychiatric: She has a normal mood and affect  Her behavior is normal  Judgment and thought content normal    Nursing note and vitals reviewed           ALLERGIES:  No Known Allergies    Current Medications     Current Outpatient Medications   Medication Sig Dispense Refill    acetaminophen (TYLENOL) 500 mg tablet Take 1,000 mg by mouth as needed for mild pain      amoxicillin (AMOXIL) 500 mg capsule TAKE 4 CAPSULES 1 HOUR BEFORE DENTAL APPOINTMENT      bimatoprost (LUMIGAN) 0 01 % ophthalmic drops Administer 1 drop to both eyes daily at bedtime for 30 days 1 5 mL 0    carboxymethylcellulose (Artificial Tears) 1 % ophthalmic solution 1 drop 3 (three) times a day      ELIQUIS 5 MG TAKE 1 TABLET (5 MG TOTAL) BY MOUTH 2 (TWO) TIMES A DAY 60 tablet 3    ezetimibe-simvastatin (VYTORIN) 10-40 mg per tablet TAKE 1 TABLET BY MOUTH DAILY AT BEDTIME 30 tablet 5    famotidine (PEPCID) 10 mg tablet Take 10 mg by mouth as needed       ferrous sulfate 325 (65 Fe) mg tablet Take 325 mg by mouth daily with breakfast      glucose blood (ONE TOUCH ULTRA TEST) test strip TEST FOUR TIMES A  each 1    JANUVIA 100 MG tablet TAKE 1 TABLET (100 MG TOTAL) BY MOUTH DAILY 30 tablet 5    loratadine (CLARITIN) 10 mg tablet Take 10 mg by mouth daily as needed for allergies      LORazepam (ATIVAN) 0 5 mg tablet TAKE 2 TABLETS (1 MG TOTAL) BY MOUTH DAILY AT BEDTIME 60 tablet 0    metFORMIN (GLUCOPHAGE) 500 mg tablet Take 2 tablets (1000 mg) every morning and 1 tablet (500 mg) every evening 90 tablet 5    metoprolol tartrate (LOPRESSOR) 50 mg tablet TAKE 1 5 TABLETS (75 MG TOTAL) BY MOUTH EVERY 12 (TWELVE) HOURS 90 tablet 5    vitamin B-12 (VITAMIN B-12) 500 mcg tablet Take 500 mcg by mouth daily      calcium carbonate (OS-DANIA) 600 MG tablet Take 600 mg by mouth 2 (two) times a day with meals      Lancets (ONETOUCH DELICA PLUS [id number]) MISC Use one lancet to test blood 4 times a day 400 each 1    pantoprazole (PROTONIX) 40 mg tablet Take 1 tablet (40 mg total) by mouth daily 30 tablet 1     No current facility-administered medications for this visit            Health Maintenance     Health Maintenance   Topic Date Due    DTaP,Tdap,and Td Vaccines (1 - Tdap) 12/01/1946    Influenza Vaccine  07/01/2020    Medicare Annual Wellness Visit (AWV)  07/25/2020    HEMOGLOBIN A1C  01/03/2021    Diabetic Foot Exam  01/23/2021    DM Eye Exam  06/04/2021    Fall Risk  07/30/2021    Depression Screening PHQ  07/30/2021    BMI: Adult  07/30/2021    Pneumococcal Vaccine: 65+ Years  Completed    Pneumococcal Vaccine: Pediatrics (0 to 5 Years) and At-Risk Patients (6 to 59 Years)  Aged Out    HIB Vaccine  Aged Out    Hepatitis B Vaccine  Aged Out    IPV Vaccine  Aged Out    Hepatitis A Vaccine  Aged Out    Meningococcal ACWY Vaccine  Aged Out    HPV Vaccine  Aged [employer] History   Administered Date(s) Administered    INFLUENZA 11/02/2016, 10/16/2017, 09/18/2018, 09/18/2018    Influenza, Quadrivalent (nasal) 11/02/2016    Influenza, high dose seasonal 0 5 mL 11/06/2019    Pneumococcal Conjugate 13-Valent 07/25/2019    Pneumococcal Polysaccharide PPV23 03/17/2003    Zoster 07/09/2010       ZANDER Goyal

## 2020-07-30 NOTE — PROGRESS NOTES
Assessment and Plan:     Problem List Items Addressed This Visit     None           Preventive health issues were discussed with patient, and age appropriate screening tests were ordered as noted in patient's After Visit Summary  Personalized health advice and appropriate referrals for health education or preventive services given if needed, as noted in patient's After Visit Summary       History of Present Illness:     Patient presents for Medicare Annual Wellness visit    Patient Care Team:  Alec Ramon as PCP - General (Family Medicine)  Jack Hicks MD as PCP - Endocrinology (Endocrinology)  MD Shannan Nova MD (Ophthalmology)  Patricia Darling MD (Ophthalmology)     Problem List:     Patient Active Problem List   Diagnosis    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, with long-term current use of insulin ([address]    HTN (hypertension)    HLD (hyperlipidemia)    Glaucoma    Bilateral carotid artery disease ([address] )    Symptomatic PVCs    Occlusion of right carotid artery    Stenosis of left carotid artery    Paroxysmal atrial fibrillation ([address] )    Presence of permanent cardiac pacemaker    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, with long-term current use of insulin ([address] )    Type 2 diabetes mellitus with diabetic polyneuropathy, with long-term current use of insulin ([address] )    Osteopenia    GERD (gastroesophageal reflux disease)    Anxiety    Uncontrolled type 2 diabetes mellitus with hypoglycemia ([address] )    Iron deficiency      Past Medical and Surgical History:     Past Medical History:   Diagnosis Date    Diabetes mellitus ([address]     Glaucoma     H/O degenerative disc disease     Hyperlipidemia     Hypertension     Peripheral neuropathy     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 ([address] )      Past Surgical History:   Procedure Laterality Date    CATARACT EXTRACTION, BILATERAL      CYST REMOVAL      left lower Quadrant     HERNIA REPAIR  1992    LIPOMA RESECTION  2010    DC REPAIR ING HERNIA,5+Y/O,REDUCIBL Bilateral 2018    Procedure: INGUINAL HERNIA REPAIR;  Surgeon: Jocelyn Remy MD;  [facility];  Service: General    SHOULDER SURGERY  2019    Dr Hong [facility])   [address] carotid occlusion      Family History:     Family History   Problem Relation Age of Onset    Heart attack Father     Hiatal hernia Father     Diabetes Father     Heart disease Mother     Cancer Brother     Diabetes Brother     Heart disease Brother     Hypertension Brother       Social History:     E-Cigarette/Vaping    E-Cigarette Use Never User      E-Cigarette/Vaping Substances    Nicotine No     THC No     CBD No     Flavoring No     Other No     Unknown No      Social History     Socioeconomic History    Marital status:      Spouse name: None    Number of children: None    Years of education: None    Highest education level: None   Occupational History    Occupation: customer service   retired   Social Needs    Financial resource strain: None    Food insecurity:     Worry: None     Inability: None    Transportation needs:     Medical: None     Non-medical: None   Tobacco Use    Smoking status: Former Smoker     Last attempt to quit:      Years since quittin 5    Smokeless tobacco: Never Used   Substance and Sexual Activity    Alcohol use: No    Drug use: No    Sexual activity: None   Lifestyle    Physical activity:     Days per week: None     Minutes per session: None    Stress: None   Relationships    Social connections:     Talks on phone: None     Gets together: None     Attends Confucianism service: None     Active member of club or organization: None     Attends meetings of clubs or organizations: None     Relationship status: None    Intimate partner violence:     Fear of current or ex partner: None     Emotionally abused: None     Physically abused: None     Forced sexual activity: None   Other Topics Concern    None   Social History Narrative    Lives alone [place]    2 children      Medications and Allergies:     Current Outpatient Medications   Medication Sig Dispense Refill    acetaminophen (TYLENOL) 500 mg tablet Take 1,000 mg by mouth as needed for mild pain      amoxicillin (AMOXIL) 500 mg capsule TAKE 4 CAPSULES 1 HOUR BEFORE DENTAL APPOINTMENT      bimatoprost (LUMIGAN) 0 01 % ophthalmic drops Administer 1 drop to both eyes daily at bedtime for 30 days 1 5 mL 0    carboxymethylcellulose (Artificial Tears) 1 % ophthalmic solution 1 drop 3 (three) times a day      ELIQUIS 5 MG TAKE 1 TABLET (5 MG TOTAL) BY MOUTH 2 (TWO) TIMES A DAY 60 tablet 3    ezetimibe-simvastatin (VYTORIN) 10-40 mg per tablet TAKE 1 TABLET BY MOUTH DAILY AT BEDTIME 30 tablet 5    famotidine (PEPCID) 10 mg tablet Take 10 mg by mouth as needed       ferrous sulfate 325 (65 Fe) mg tablet Take 325 mg by mouth daily with breakfast      glucose blood (ONE TOUCH ULTRA TEST) test strip TEST FOUR TIMES A  each 0    JANUVIA 100 MG tablet TAKE 1 TABLET (100 MG TOTAL) BY MOUTH DAILY 30 tablet 5    loratadine (CLARITIN) 10 mg tablet Take 10 mg by mouth daily as needed for allergies      LORazepam (ATIVAN) 0 5 mg tablet TAKE 2 TABLETS (1 MG TOTAL) BY MOUTH DAILY AT BEDTIME 60 tablet 0    metFORMIN (GLUCOPHAGE) 500 mg tablet Take 2 tablets (1000 mg) every morning and 1 tablet (500 mg) every evening 90 tablet 5    metoprolol tartrate (LOPRESSOR) 50 mg tablet TAKE 1 5 TABLETS (75 MG TOTAL) BY MOUTH EVERY 12 (TWELVE) HOURS 90 tablet 5    ONETOUCH DELICA LANCETS FINE MISC Use one lancet to test blood 4 times a day 400 each 1    vitamin B-12 (VITAMIN B-12) 500 mcg tablet Take 500 mcg by mouth daily      calcium carbonate (OS-DANIA) 600 MG tablet Take 600 mg by mouth 2 (two) times a day with meals       No current facility-administered medications for this visit  No Known Allergies   Immunizations:     Immunization History   Administered Date(s) Administered    INFLUENZA 11/02/2016, 10/16/2017, 09/18/2018, 09/18/2018    Influenza, Quadrivalent (nasal) 11/02/2016    Influenza, high dose seasonal 0 5 mL 11/06/2019    Pneumococcal Conjugate 13-Valent 07/25/2019    Pneumococcal Polysaccharide PPV23 03/17/2003    Zoster 07/09/2010      Health Maintenance: There are no preventive care reminders to display for this patient  Topic Date Due    DTaP,Tdap,and Td Vaccines (1 - Tdap) 12/01/1946    Influenza Vaccine  07/01/2020      Medicare Health Risk Assessment:     /70   Pulse 85   Temp 98 °F (36 7 °C)   Resp 18   Ht 5' 6" (1 676 m)   Wt 63 6 kg (140 lb 2 oz)   SpO2 96%   BMI 22 62 kg/m²          Health Risk Assessment:   Patient rates overall health as fair  Patient feels that their physical health rating is much better  Eyesight was rated as same  Hearing was rated as same  Patient feels that their emotional and mental health rating is slightly better  Pain experienced in the last 7 days has been a lot  Patient's pain rating has been 9/10  Patient states that she has experienced no weight loss or gain in last 6 months  Depression Screening:   PHQ-2 Score: 0      Fall Risk Screening: In the past year, patient has experienced: no history of falling in past year      Urinary Incontinence Screening:   Patient has leaked urine accidently in the last six months  Home Safety:  Patient does not have trouble with stairs inside or outside of their home  Patient has working smoke alarms and has working carbon monoxide detector  Home safety hazards include: none  Nutrition:   Current diet is Regular  Medications:   Patient is currently taking over-the-counter supplements  OTC medications include: see medication list  Patient is able to manage medications       Activities of Daily Living (ADLs)/Instrumental Activities of Daily Living (IADLs):   Walk and transfer into and out of bed and chair?: Yes  Dress and groom yourself?: Yes    Bathe or shower yourself?: Yes    Feed yourself?  Yes  Do your laundry/housekeeping?: Yes  Manage your money, pay your bills and track your expenses?: Yes  Make your own meals?: Yes    Do your own shopping?: Yes    Previous Hospitalizations:   Any hospitalizations or ED visits within the last 12 months?: No      Advance Care Planning:   Living will: Yes    Advanced directive: Yes      PREVENTIVE SCREENINGS      Cardiovascular Screening:    General: Screening Not Indicated and History Lipid Disorder      Diabetes Screening:     General: Screening Not Indicated and History Diabetes      Cervical Cancer Screening:    General: Screening Not Indicated      Lung Cancer Screening:     General: Screening Not Indicated      Kimber[facility]

## 2020-07-30 NOTE — PATIENT INSTRUCTIONS
Medicare Preventive Visit Patient Instructions  Thank you for completing your Welcome to Medicare Visit or Medicare Annual Wellness Visit today  Your next wellness visit will be due in one year (7/30/2021)  The screening/preventive services that you may require over the next 5-10 years are detailed below  Some tests may not apply to you based off risk factors and/or age  Screening tests ordered at today's visit but not completed yet may show as past due  Also, please note that scanned in results may not display below  Preventive Screenings:  Service Recommendations Previous Testing/Comments   Colorectal Cancer Screening  * Colonoscopy    * Fecal Occult Blood Test (FOBT)/Fecal Immunochemical Test (FIT)  * Fecal DNA/Cologuard Test  * Flexible Sigmoidoscopy Age: 54-65 years old   Colonoscopy: every 10 years (may be performed more frequently if at higher risk)  OR  FOBT/FIT: every 1 year  OR  Cologuard: every 3 years  OR  Sigmoidoscopy: every 5 years  Screening may be recommended earlier than age 48 if at higher risk for colorectal cancer  Also, an individualized decision between you and your healthcare provider will decide whether screening between the ages of 74-80 would be appropriate  Colonoscopy: Not on file  FOBT/FIT: Not on file  Cologuard: Not on file  Sigmoidoscopy: Not on file         Breast Cancer Screening Age: 36 years old  Frequency: every 1-2 years  Not required if history of left and right mastectomy Mammogram: 04/15/2014       Cervical Cancer Screening Between the ages of 21-29, pap smear recommended once every 3 years  Between the ages of 33-67, can perform pap smear with HPV co-testing every 5 years     Recommendations may differ for women with a history of total hysterectomy, cervical cancer, or abnormal pap smears in past  Pap Smear: Not on file    Screening Not Indicated   Hepatitis C Screening Once for adults born between [facility]  More frequently in patients at high risk for Hepatitis C Hep C Antibody: Not on file       Diabetes Screening 1-2 times per year if you're at risk for diabetes or have pre-diabetes Fasting glucose: 161 mg/dL   A1C: 8/0 2    Screening Not Indicated  History Diabetes   Cholesterol Screening Once every 5 years if you don't have a lipid disorder  May order more often based on risk factors  Lipid panel: 10/21/2019    Screening Not Indicated  History Lipid Disorder     Other Preventive Screenings Covered by Medicare:  1  Abdominal Aortic Aneurysm (AAA) Screening: covered once if your at risk  You're considered to be at risk if you have a family history of AAA  2  Lung Cancer Screening: covers low dose CT scan once per year if you meet all of the following conditions: (1) Age 50-69; (2) No signs or symptoms of lung cancer; (3) Current smoker or have quit smoking within the last 15 years; (4) You have a tobacco smoking history of at least 30 pack years (packs per day multiplied by number of years you smoked); (5) You get a written order from a healthcare provider  3  Glaucoma Screening: covered annually if you're considered high risk: (1) You have diabetes OR (2) Family history of glaucoma OR (3)  aged 48 and older OR (3)  American aged 72 and older  3  Osteoporosis Screening: covered every 2 years if you meet one of the following conditions: (1) You're estrogen deficient and at risk for osteoporosis based off medical history and other findings; (2) Have a vertebral abnormality; (3) On glucocorticoid therapy for more than 3 months; (4) Have primary hyperparathyroidism; (5) On osteoporosis medications and need to assess response to drug therapy  · Last bone density test (DXA Scan): Not on file  5  HIV Screening: covered annually if you're between the age of 12-76  Also covered annually if you are younger than 13 and older than 72 with risk factors for HIV infection   For pregnant patients, it is covered up to 3 times per pregnancy  Immunizations:  Immunization Recommendations   Influenza Vaccine Annual influenza vaccination during flu season is recommended for all persons aged >= 6 months who do not have contraindications   Pneumococcal Vaccine (Prevnar and Pneumovax)  * Prevnar = PCV13  * Pneumovax = PPSV23   Adults 25-60 years old: 1-3 doses may be recommended based on certain risk factors  Adults 72 years old: Prevnar (PCV13) vaccine recommended followed by Pneumovax (PPSV23) vaccine  If already received PPSV23 since turning 65, then PCV13 recommended at least one year after PPSV23 dose  Hepatitis B Vaccine 3 dose series if at intermediate or high risk (ex: diabetes, end stage renal disease, liver disease)   Tetanus (Td) Vaccine - COST NOT COVERED BY MEDICARE PART B Following completion of primary series, a booster dose should be given every 10 years to maintain immunity against tetanus  Td may also be given as tetanus wound prophylaxis  Tdap Vaccine - COST NOT COVERED BY MEDICARE PART B Recommended at least once for all adults  For pregnant patients, recommended with each pregnancy  Shingles Vaccine (Shingrix) - COST NOT COVERED BY MEDICARE PART B  2 shot series recommended in those aged 48 and above     Health Maintenance Due:  There are no preventive care reminders to display for this patient  Immunizations Due:      Topic Date Due    DTaP,Tdap,and Td Vaccines (1 - Tdap) 12/01/1946    Influenza Vaccine  07/01/2020     Advance Directives   What are advance directives? Advance directives are legal documents that state your wishes and plans for medical care  These plans are made ahead of time in case you lose your ability to make decisions for yourself  Advance directives can apply to any medical decision, such as the treatments you want, and if you want to donate organs  What are the types of advance directives? There are many types of advance directives, and each state has rules about how to use them   You may choose a combination of any of the following:  · Living will: This is a written record of the treatment you want  You can also choose which treatments you do not want, which to limit, and which to stop at a certain time  This includes surgery, medicine, IV fluid, and tube feedings  · Durable power of  for healthcare [facility]): This is a written record that states who you want to make healthcare choices for you when you are unable to make them for yourself  This person, called a proxy, is usually a family member or a friend  You may choose more than 1 proxy  · Do not resuscitate (DNR) order:  A DNR order is used in case your heart stops beating or you stop breathing  It is a request not to have certain forms of treatment, such as CPR  A DNR order may be included in other types of advance directives  · Medical directive: This covers the care that you want if you are in a coma, near death, or unable to make decisions for yourself  You can list the treatments you want for each condition  Treatment may include pain medicine, surgery, blood transfusions, dialysis, IV or tube feedings, and a ventilator (breathing machine)  · Values history: This document has questions about your views, beliefs, and how you feel and think about life  This information can help others choose the care that you would choose  Why are advance directives important? An advance directive helps you control your care  Although spoken wishes may be used, it is better to have your wishes written down  Spoken wishes can be misunderstood, or not followed  Treatments may be given even if you do not want them  An advance directive may make it easier for your family to make difficult choices about your care  Urinary Incontinence   Urinary incontinence (UI)  is when you lose control of your bladder  UI develops because your bladder cannot store or empty urine properly   The 3 most common types of UI are stress incontinence, urge incontinence, or both  Medicines:   · May be given to help strengthen your bladder control  Report any side effects of medication to your healthcare provider  Do pelvic muscle exercises often:  Your pelvic muscles help you stop urinating  Squeeze these muscles tight for 5 seconds, then relax for 5 seconds  Gradually work up to squeezing for 10 seconds  Do 3 sets of 15 repetitions a day, or as directed  This will help strengthen your pelvic muscles and improve bladder control  Train your bladder:  Go to the bathroom at set times, such as every 2 hours, even if you do not feel the urge to go  You can also try to hold your urine when you feel the urge to go  For example, hold your urine for 5 minutes when you feel the urge to go  As that becomes easier, hold your urine for 10 minutes  Self-care:   · Keep a UI record  Write down how often you leak urine and how much you leak  Make a note of what you were doing when you leaked urine  · Drink liquids as directed  You may need to limit the amount of liquid you drink to help control your urine leakage  Do not drink any liquid right before you go to bed  Limit or do not have drinks that contain caffeine or alcohol  · Prevent constipation  Eat a variety of high-fiber foods  Good examples are high-fiber cereals, beans, vegetables, and whole-grain breads  Walking is the best way to trigger your intestines to have a bowel movement  · Exercise regularly and maintain a healthy weight  Weight loss and exercise will decrease pressure on your bladder and help you control your leakage  · Use a catheter as directed  to help empty your bladder  A catheter is a tiny, plastic tube that is put into your bladder to drain your urine  · Go to behavior therapy as directed  Behavior therapy may be used to help you learn to control your urge to urinate         © Copyright [employer] 2018 Information is for End User's use only and may not be sold, redistributed or otherwise used for commercial purposes   All illustrations and images included in CareNotes® are the copyrighted property of A D A M , Inc  or [facility] Saran Aranda

## 2020-07-31 ENCOUNTER — APPOINTMENT (OUTPATIENT)
Dept: LAB | Facility: CLINIC | Age: 85
End: 2020-07-31
Payer: MEDICARE

## 2020-07-31 ENCOUNTER — TELEPHONE (OUTPATIENT)
Dept: FAMILY MEDICINE CLINIC | Facility: CLINIC | Age: 85
End: 2020-07-31

## 2020-07-31 DIAGNOSIS — E87.1 HYPONATREMIA: ICD-10-CM

## 2020-07-31 DIAGNOSIS — E87.1 HYPONATREMIA: Primary | ICD-10-CM

## 2020-07-31 LAB
ANION GAP SERPL CALCULATED.3IONS-SCNC: 7 MMOL/L (ref 4–13)
BUN SERPL-MCNC: 11 MG/DL (ref 5–25)
CALCIUM SERPL-MCNC: 9.1 MG/DL (ref 8.3–10.1)
CHLORIDE SERPL-SCNC: 93 MMOL/L (ref 100–108)
CO2 SERPL-SCNC: 29 MMOL/L (ref 21–32)
CREAT SERPL-MCNC: 0.7 MG/DL (ref 0.6–1.3)
GFR SERPL CREATININE-BSD FRML MDRD: 81 ML/MIN/1.73SQ M
GLUCOSE SERPL-MCNC: 226 MG/DL (ref 65–140)
POTASSIUM SERPL-SCNC: 4.1 MMOL/L (ref 3.5–5.3)
SODIUM SERPL-SCNC: 129 MMOL/L (ref 136–145)

## 2020-07-31 PROCEDURE — 80048 BASIC METABOLIC PNL TOTAL CA: CPT

## 2020-07-31 PROCEDURE — 36415 COLL VENOUS BLD VENIPUNCTURE: CPT

## 2020-07-31 NOTE — PROGRESS NOTES
Assessment and Plan:     Problem List Items Addressed This Visit     None      Visit Diagnoses     Epigastric pain    -  Primary    Relevant Medications    pantoprazole (PROTONIX) 40 mg tablet    Other Relevant Orders    CBC and differential (Completed)    Comprehensive metabolic panel (Completed)    Lipase (Completed)    Ambulatory referral to Gastroenterology    CT abdomen pelvis w contrast (Completed)    Troponin I (Completed)    Weight loss        Relevant Orders    CBC and differential (Completed)    Comprehensive metabolic panel (Completed)    Lipase (Completed)    Ambulatory referral to Gastroenterology    CT abdomen pelvis w contrast (Completed)    Uncontrolled type 2 diabetes mellitus with hyperglycemia (HCC)        Relevant Medications    glucose blood (ONE TOUCH ULTRA TEST) test strip    Lancets ([address]) MISC    Medicare annual wellness visit, subsequent               Preventive health issues were discussed with patient, and age appropriate screening tests were ordered as noted in patient's After Visit Summary  Personalized health advice and appropriate referrals for health education or preventive services given if needed, as noted in patient's After Visit Summary       History of Present Illness:     Patient presents for Medicare Annual Wellness visit    Patient Care Team:  Alysha Sotelo as PCP - General (Family Medicine)  Steffi Garcia MD as PCP - Endocrinology (Endocrinology)  MD Carolynn Blakely MD (Ophthalmology)  Evita Funez MD (Ophthalmology)     Problem List:     Patient Active Problem List   Diagnosis    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, with long-term current use of insulin ([facility] )    HTN (hypertension)    HLD (hyperlipidemia)    Glaucoma    Bilateral carotid artery disease ([facility] )    Symptomatic PVCs    Occlusion of right carotid artery    Stenosis of left carotid artery    Paroxysmal atrial fibrillation ([facility] )    Presence of permanent cardiac pacemaker    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, with long-term current use of insulin (HCC)    Type 2 diabetes mellitus with diabetic polyneuropathy, with long-term current use of insulin ([facility])    Osteopenia    GERD (gastroesophageal reflux disease)    Anxiety    Uncontrolled type 2 diabetes mellitus with hypoglycemia [facility]    Iron deficiency      Past Medical and Surgical History:     Past Medical History:   Diagnosis Date    Diabetes mellitus ([place] [address] )     Glaucoma     H/O degenerative disc disease     Hyperlipidemia     Hypertension     Peripheral neuropathy     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 ([facility] )      Past Surgical History:   Procedure Laterality Date    CATARACT EXTRACTION, BILATERAL  2011    CYST REMOVAL  2009    left lower Quadrant     HERNIA REPAIR  1992    LIPOMA RESECTION  2010    IA REPAIR ING HERNIA,5+Y/O,REDUCIBL Bilateral 1/5/2018    Procedure: INGUINAL HERNIA REPAIR;  Surgeon: Anni Cline MD;  Location: [facility];  Service: General    SHOULDER SURGERY  04/26/2019    Dr Vo Rinne [facility]) [address]  R carotid occlusion      Family History:     Family History   Problem Relation Age of Onset    Heart attack Father     Hiatal hernia Father     Diabetes Father     Heart disease Mother     Cancer Brother     Diabetes Brother     Heart disease Brother     Hypertension Brother       Social History:     E-Cigarette/Vaping    E-Cigarette Use Never User      E-Cigarette/Vaping Substances    Nicotine No     THC No     CBD No     Flavoring No     Other No     Unknown No      Social History     Socioeconomic History    Marital status:      Spouse name: None    Number of children: None    Years of education: None    Highest education level: None   Occupational History    Occupation: customer service retired   Social Needs    Financial resource strain: None    Food insecurity:     Worry: None     Inability: None    Transportation needs:     Medical: None     Non-medical: None   Tobacco Use    Smoking status: Former Smoker     Last attempt to quit:      Years since quittin 5    Smokeless tobacco: Never Used   Substance and Sexual Activity    Alcohol use: No    Drug use: No    Sexual activity: None   Lifestyle    Physical activity:     Days per week: None     Minutes per session: None    Stress: None   Relationships    Social connections:     Talks on phone: None     Gets together: None     Attends [employer] service: None     Active member of club or organization: None     Attends meetings of clubs or organizations: None     Relationship status: None    Intimate partner violence:     Fear of current or ex partner: None     Emotionally abused: None     Physically abused: None     Forced sexual activity: None   Other Topics Concern    None   Social History Narrative    Lives [place]    2 children      Medications and Allergies:     Current Outpatient Medications   Medication Sig Dispense Refill    acetaminophen (TYLENOL) 500 mg tablet Take 1,000 mg by mouth as needed for mild pain      amoxicillin (AMOXIL) 500 mg capsule TAKE 4 CAPSULES 1 HOUR BEFORE DENTAL APPOINTMENT      bimatoprost (LUMIGAN) 0 01 % ophthalmic drops Administer 1 drop to both eyes daily at bedtime for 30 days 1 5 mL 0    carboxymethylcellulose (Artificial Tears) 1 % ophthalmic solution 1 drop 3 (three) times a day      ELIQUIS 5 MG TAKE 1 TABLET (5 MG TOTAL) BY MOUTH 2 (TWO) TIMES A DAY 60 tablet 3    ezetimibe-simvastatin (VYTORIN) 10-40 mg per tablet TAKE 1 TABLET BY MOUTH DAILY AT BEDTIME 30 tablet 5    famotidine (PEPCID) 10 mg tablet Take 10 mg by mouth as needed       ferrous sulfate 325 (65 Fe) mg tablet Take 325 mg by mouth daily with breakfast      glucose blood (ONE TOUCH ULTRA TEST) test strip TEST FOUR TIMES A  each 1    JANUVIA 100 MG tablet TAKE 1 TABLET (100 MG TOTAL) BY MOUTH DAILY 30 tablet 5    loratadine (CLARITIN) 10 mg tablet Take 10 mg by mouth daily as needed for allergies      LORazepam (ATIVAN) 0 5 mg tablet TAKE 2 TABLETS (1 MG TOTAL) BY MOUTH DAILY AT BEDTIME 60 tablet 0    metFORMIN (GLUCOPHAGE) 500 mg tablet Take 2 tablets (1000 mg) every morning and 1 tablet (500 mg) every evening 90 tablet 5    metoprolol tartrate (LOPRESSOR) 50 mg tablet TAKE 1 5 TABLETS (75 MG TOTAL) BY MOUTH EVERY 12 (TWELVE) HOURS 90 tablet 5    vitamin B-12 (VITAMIN B-12) 500 mcg tablet Take 500 mcg by mouth daily      calcium carbonate (OS-DANIA) 600 MG tablet Take 600 mg by mouth 2 (two) times a day with meals      Lancets (ONETOUCH DELICA PLUS [id number]) MISC Use one lancet to test blood 4 times a day 400 each 1    pantoprazole (PROTONIX) 40 mg tablet Take 1 tablet (40 mg total) by mouth daily 30 tablet 1     No current facility-administered medications for this visit  No Known Allergies   Immunizations:     Immunization History   Administered Date(s) Administered    INFLUENZA 11/02/2016, 10/16/2017, 09/18/2018, 09/18/2018    Influenza, Quadrivalent (nasal) 11/02/2016    Influenza, high dose seasonal 0 5 mL 11/06/2019    Pneumococcal Conjugate 13-Valent 07/25/2019    Pneumococcal Polysaccharide PPV23 03/17/2003    Zoster 07/09/2010      Health Maintenance: There are no preventive care reminders to display for this patient  Topic Date Due    DTaP,Tdap,and Td Vaccines (1 - Tdap) 12/01/1946    Influenza Vaccine  07/01/2020      Medicare Health Risk Assessment:     /70   Pulse 85   Temp 98 °F (36 7 °C)   Resp 18   Ht 5' 6" (1 676 m)   Wt 63 6 kg (140 lb 2 oz)   SpO2 96%   BMI 22 62 kg/m²      Lovette Dakin is here for her Subsequent Wellness visit  Health Risk Assessment:   Patient rates overall health as fair   Patient feels that their physical health rating is much better  Eyesight was rated as same  Hearing was rated as same  Patient feels that their emotional and mental health rating is slightly better  Pain experienced in the last 7 days has been a lot  Patient's pain rating has been 9/10  Patient states that she has experienced no weight loss or gain in last 6 months  Depression Screening:   PHQ-2 Score: 0      Fall Risk Screening: In the past year, patient has experienced: no history of falling in past year      Urinary Incontinence Screening:   Patient has leaked urine accidently in the last six months  Home Safety:  Patient does not have trouble with stairs inside or outside of their home  Patient has working smoke alarms and has working carbon monoxide detector  Home safety hazards include: none  Nutrition:   Current diet is Regular  Medications:   Patient is currently taking over-the-counter supplements  OTC medications include: see medication list  Patient is able to manage medications  Activities of Daily Living (ADLs)/Instrumental Activities of Daily Living (IADLs):   Walk and transfer into and out of bed and chair?: Yes  Dress and groom yourself?: Yes    Bathe or shower yourself?: Yes    Feed yourself?  Yes  Do your laundry/housekeeping?: Yes  Manage your money, pay your bills and track your expenses?: Yes  Make your own meals?: Yes    Do your own shopping?: Yes    Previous Hospitalizations:   Any hospitalizations or ED visits within the last 12 months?: No      Advance Care Planning:   Living will: Yes    Advanced directive: Yes      Cognitive Screening:   Provider or family/friend/caregiver concerned regarding cognition?: No    PREVENTIVE SCREENINGS      Cardiovascular Screening:    General: Screening Not Indicated and History Lipid Disorder      Diabetes Screening:     General: Screening Not Indicated and History Diabetes      Colorectal Cancer Screening:     General: Screening Not Indicated      Breast Cancer Screening:     General: Patient Declines      Cervical Cancer Screening:    General: Screening Not Indicated      Osteoporosis Screening:    General: Risks and Benefits Discussed      Abdominal Aortic Aneurysm (AAA) Screening:        General: Screening Not Indicated      Lung Cancer Screening:     General: Screening Not Indicated      Hepatitis C Screening:    General: Screening Not Indicated      Preventive Screening Comments: Dexa scheduled      ZANDER Long

## 2020-08-03 ENCOUNTER — LAB (OUTPATIENT)
Dept: LAB | Facility: CLINIC | Age: 85
End: 2020-08-03
Payer: MEDICARE

## 2020-08-03 DIAGNOSIS — E78.5 HYPERLIPIDEMIA, UNSPECIFIED HYPERLIPIDEMIA TYPE: ICD-10-CM

## 2020-08-03 DIAGNOSIS — Z79.4 TYPE 2 DIABETES MELLITUS WITH DIABETIC POLYNEUROPATHY, WITH LONG-TERM CURRENT USE OF INSULIN (HCC): ICD-10-CM

## 2020-08-03 DIAGNOSIS — E11.42 TYPE 2 DIABETES MELLITUS WITH DIABETIC POLYNEUROPATHY, WITH LONG-TERM CURRENT USE OF INSULIN (HCC): ICD-10-CM

## 2020-08-03 DIAGNOSIS — I48.0 PAROXYSMAL ATRIAL FIBRILLATION (HCC): ICD-10-CM

## 2020-08-03 DIAGNOSIS — Z79.4 TYPE 2 DIABETES MELLITUS WITH RIGHT EYE AFFECTED BY MODERATE NONPROLIFERATIVE RETINOPATHY WITHOUT MACULAR EDEMA, WITH LONG-TERM CURRENT USE OF INSULIN (HCC): ICD-10-CM

## 2020-08-03 DIAGNOSIS — E11.3391 TYPE 2 DIABETES MELLITUS WITH RIGHT EYE AFFECTED BY MODERATE NONPROLIFERATIVE RETINOPATHY WITHOUT MACULAR EDEMA, WITH LONG-TERM CURRENT USE OF INSULIN (HCC): ICD-10-CM

## 2020-08-03 DIAGNOSIS — F41.9 ANXIETY: ICD-10-CM

## 2020-08-03 DIAGNOSIS — E11.3552 TYPE 2 DIABETES MELLITUS WITH STABLE PROLIFERATIVE RETINOPATHY OF LEFT EYE, WITH LONG-TERM CURRENT USE OF INSULIN (HCC): ICD-10-CM

## 2020-08-03 DIAGNOSIS — I10 HYPERTENSION, UNSPECIFIED TYPE: ICD-10-CM

## 2020-08-03 DIAGNOSIS — Z79.4 TYPE 2 DIABETES MELLITUS WITH STABLE PROLIFERATIVE RETINOPATHY OF LEFT EYE, WITH LONG-TERM CURRENT USE OF INSULIN (HCC): ICD-10-CM

## 2020-08-03 DIAGNOSIS — E87.1 HYPONATREMIA: ICD-10-CM

## 2020-08-03 DIAGNOSIS — I10 ESSENTIAL (PRIMARY) HYPERTENSION: ICD-10-CM

## 2020-08-03 LAB
ANION GAP SERPL CALCULATED.3IONS-SCNC: 7 MMOL/L (ref 4–13)
BUN SERPL-MCNC: 15 MG/DL (ref 5–25)
CALCIUM SERPL-MCNC: 9 MG/DL (ref 8.3–10.1)
CHLORIDE SERPL-SCNC: 94 MMOL/L (ref 100–108)
CO2 SERPL-SCNC: 31 MMOL/L (ref 21–32)
CREAT SERPL-MCNC: 0.53 MG/DL (ref 0.6–1.3)
GFR SERPL CREATININE-BSD FRML MDRD: 89 ML/MIN/1.73SQ M
GLUCOSE SERPL-MCNC: 158 MG/DL (ref 65–140)
POTASSIUM SERPL-SCNC: 4 MMOL/L (ref 3.5–5.3)
SODIUM SERPL-SCNC: 132 MMOL/L (ref 136–145)

## 2020-08-03 PROCEDURE — 80048 BASIC METABOLIC PNL TOTAL CA: CPT

## 2020-08-03 PROCEDURE — 36415 COLL VENOUS BLD VENIPUNCTURE: CPT

## 2020-08-03 RX ORDER — LORAZEPAM 0.5 MG/1
1 TABLET ORAL
Qty: 60 TABLET | Refills: 0 | Status: SHIPPED | OUTPATIENT
Start: 2020-08-03 | End: 2020-08-31

## 2020-08-03 RX ORDER — APIXABAN 5 MG/1
5 TABLET, FILM COATED ORAL 2 TIMES DAILY
Qty: 60 TABLET | Refills: 3 | Status: SHIPPED | OUTPATIENT
Start: 2020-08-03 | End: 2020-12-14 | Stop reason: HOSPADM

## 2020-08-03 RX ORDER — EZETIMIBE AND SIMVASTATIN 10; 40 MG/1; MG/1
1 TABLET ORAL
Qty: 30 TABLET | Refills: 5 | Status: SHIPPED | OUTPATIENT
Start: 2020-08-03 | End: 2021-04-04

## 2020-08-03 NOTE — RESULT ENCOUNTER NOTE
Please let patient know her sodium level has improved to 132, this is still a little bit low but better  Blood sugars better at 158  Will await recommendations of gastroenterology

## 2020-08-04 ENCOUNTER — TELEPHONE (OUTPATIENT)
Dept: FAMILY MEDICINE CLINIC | Facility: CLINIC | Age: 85
End: 2020-08-04

## 2020-08-04 ENCOUNTER — TELEPHONE (OUTPATIENT)
Dept: OTHER | Facility: OTHER | Age: 85
End: 2020-08-04

## 2020-08-04 NOTE — TELEPHONE ENCOUNTER
Pt had to cancel her apt for today  Pt is not feeling well  She would like a call back to reschedule

## 2020-08-04 NOTE — TELEPHONE ENCOUNTER
Patient called and stated that she had to cancel her appointment today with Gastro due to having Diarrhea  She called Dr Brittany Faria office and they will not schedule her until October    She would like to know if you could call again to try and get in earlier/  Please call to advise

## 2020-08-05 ENCOUNTER — TELEPHONE (OUTPATIENT)
Dept: FAMILY MEDICINE CLINIC | Facility: CLINIC | Age: 85
End: 2020-08-05

## 2020-08-05 DIAGNOSIS — R93.5 ABNORMAL CT OF THE ABDOMEN: Primary | ICD-10-CM

## 2020-08-05 NOTE — TELEPHONE ENCOUNTER
Spoke with patient via telephone  Abdominal pain has improved  Still has "twinges of pain " but better  Taking protonix 40 mg daily  Had diarrhea Monday night and all day yesterday, with cramping  No stools yet today  Decreased appetite and fatigue  Has been taking Amoxicillin frequently recently for dental prophylaxis  July 1, 16, and 23  Will need to take again August 17th and 24th  Our office facilitated an appointment for her with Gastroenterology, Dr Willie Green on August 12th at 11:00 a m  She has address and phone number  We will proceed with MRI abdomen with and without contrast and MRCP for possible common bile duct stone or debris  I contacted patient's cardiology office yesterday, and spoke with Josafat Friend in the [place] [address], who states her pacemaker is compatible with MRI

## 2020-08-11 LAB
LEFT EYE DIABETIC RETINOPATHY: NORMAL
RIGHT EYE DIABETIC RETINOPATHY: NORMAL

## 2020-08-12 ENCOUNTER — OFFICE VISIT (OUTPATIENT)
Dept: GASTROENTEROLOGY | Facility: CLINIC | Age: 85
End: 2020-08-12
Payer: MEDICARE

## 2020-08-12 VITALS
WEIGHT: 138 LBS | BODY MASS INDEX: 22.18 KG/M2 | HEART RATE: 66 BPM | TEMPERATURE: 98.5 F | HEIGHT: 66 IN | SYSTOLIC BLOOD PRESSURE: 170 MMHG | DIASTOLIC BLOOD PRESSURE: 90 MMHG

## 2020-08-12 DIAGNOSIS — E11.42 TYPE 2 DIABETES MELLITUS WITH DIABETIC POLYNEUROPATHY, WITH LONG-TERM CURRENT USE OF INSULIN (HCC): ICD-10-CM

## 2020-08-12 DIAGNOSIS — K21.00 GASTROESOPHAGEAL REFLUX DISEASE WITH ESOPHAGITIS: Primary | ICD-10-CM

## 2020-08-12 DIAGNOSIS — Z79.4 TYPE 2 DIABETES MELLITUS WITH DIABETIC POLYNEUROPATHY, WITH LONG-TERM CURRENT USE OF INSULIN (HCC): ICD-10-CM

## 2020-08-12 DIAGNOSIS — E61.1 IRON DEFICIENCY: ICD-10-CM

## 2020-08-12 DIAGNOSIS — R10.11 RIGHT UPPER QUADRANT ABDOMINAL PAIN: ICD-10-CM

## 2020-08-12 PROCEDURE — 99204 OFFICE O/P NEW MOD 45 MIN: CPT | Performed by: INTERNAL MEDICINE

## 2020-08-12 PROCEDURE — 3051F HG A1C>EQUAL 7.0%<8.0%: CPT | Performed by: INTERNAL MEDICINE

## 2020-08-12 NOTE — LETTER
August 21, 2020     ZANDER Gonzales  [address] [facility] [address]    Patient: Gin Clay   YOB: 1935   Date of Visit: 8/12/2020       Dear Dr Luong Fresh: Thank you for referring Tyrone Noel to me for evaluation  Below are my notes for this consultation  If you have questions, please do not hesitate to call me  I look forward to following your patient along with you  Sincerely,        Celestino Cárdenas MD        CC: No Recipients  Celestino Cárdenas MD  8/21/2020  7:45 AM  Signed  [facility] - Outpatient Consultation  Gin Clay 82 y o  female MRN: [id number]  Encounter: [id number]          ASSESSMENT AND PLAN:      1  Right upper quadrant abdominal pain rule out choledocholithiasis  She continued to have intermittent epigastric and right upper quadrant pain  Sometimes her pain is following eating other times pain starts without eating  She reports mild nausea but no vomiting  She reports about 10-15 lb weight loss  She had a CT scan which showed possible 5 mm noncalcified CBD stone or debris  She could not have MRCP due to pacemaker  Fortunately LFTs are normal   Will start with ultrasound to see if there is biliary dilatation or choledocholithiasis  If ultrasound is inconclusive will proceed to endoscopic ultrasound to assess for choledocholithiasis  2  Iron deficiency  -she has chronic iron-deficiency anemia currently taking iron sulfate  Her hemoglobin has been stable between ranging between 11 and 12  She has no evidence of overt GI bleeding  No recent EGD or colonoscopy  Will plan EGD and colonoscopy for further evaluation  - Colonoscopy; Future  - EGD; Future    3  Gastroesophageal reflux disease with esophagitis  We reviewed reflux precautions  Continue pantoprazole 40 mg daily    4   Type 2 diabetes mellitus with diabetic polyneuropathy, with long-term current use of insulin ([facility] )  Follow-up with family doctor prior to the procedure    We reviewed risks and benefits of EGD and colonoscopy as well as EUS  This include but not limited to infection, bleeding, perforation, missed lesion  Addendum  Her ultrasound showed cholelithiasis but no clear evidence of choledocholithiasis  Will proceed with EGD/EUS and colonoscopy  Clearance for cardiologist to hold her Eliquis prior to the procedure  ______________________________________________________________________    HPI:  61-year-old female with diabetes, chronic iron-deficiency anemia here for evaluation of reflux symptoms, intermittent epigastric and right upper quadrant pain and weight loss  She has been experiencing epigastric and right upper quadrant pain  This pain is intermittent radiating to her back sometimes related to eating  She also reports nausea but no vomiting  She reports 10-15 lb weight loss over the last several months  She denies diarrhea, constipation, melena or hematochezia  No family history of GI malignancy  She had a CT scan which showed questionable stone in the distal CBD  Her LFTs are completely normal  She had colonoscopy several years ago    REVIEW OF SYSTEMS:    CONSTITUTIONAL: Denies any fever, chills, rigors, and weight loss  HEENT: No earache or tinnitus  Denies hearing loss or visual disturbances  CARDIOVASCULAR: No chest pain or palpitations  RESPIRATORY: Denies any cough, hemoptysis, shortness of breath or dyspnea on exertion  GASTROINTESTINAL: As noted in the History of Present Illness  GENITOURINARY: No problems with urination  Denies any hematuria or dysuria  NEUROLOGIC: No dizziness or vertigo, denies headaches  MUSCULOSKELETAL: Denies any muscle or joint pain  SKIN: Denies skin rashes or itching  ENDOCRINE: Denies excessive thirst  Denies intolerance to heat or cold  PSYCHOSOCIAL: Denies depression or anxiety  Denies any recent memory loss         Historical Information   Past Medical History:   Diagnosis Date    Diabetes mellitus ([facility] )     Glaucoma     H/O degenerative disc disease     Hyperlipidemia     Hypertension     Peripheral neuropathy     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 ([facility] )      Past Surgical History:   Procedure Laterality Date    CATARACT EXTRACTION, BILATERAL      COLONOSCOPY      CYST REMOVAL      left lower Quadrant     HERNIA REPAIR      LIPOMA RESECTION      NM REPAIR ING HERNIA,5+Y/O,REDUCIBL Bilateral 2018    Procedure: INGUINAL HERNIA REPAIR;  Surgeon: Yari Santo MD;  Location: [facility];  Service: General    [facility]  [facility]   [address] carotid occlusion     Social History   Social History     Substance and Sexual Activity   Alcohol Use No     Social History     Substance and Sexual Activity   Drug Use No     Social History     Tobacco Use   Smoking Status Former Smoker    Last attempt to quit:     Years since quittin 6   Smokeless Tobacco Never Used     Family History   Problem Relation Age of Onset    Heart attack Father     Hiatal hernia Father     Diabetes Father     Heart disease Mother     Cancer Brother     Diabetes Brother     Heart disease Brother     Hypertension Brother        Meds/Allergies       Current Outpatient Medications:     acetaminophen (TYLENOL) 500 mg tablet    amoxicillin (AMOXIL) 500 mg capsule    bimatoprost (LUMIGAN) 0 01 % ophthalmic drops    calcium carbonate (OS-DANIA) 600 MG tablet    carboxymethylcellulose (Artificial Tears) 1 % ophthalmic solution    Eliquis 5 MG    ezetimibe-simvastatin (VYTORIN) 10-40 mg per tablet    famotidine (PEPCID) 10 mg tablet    ferrous sulfate 325 (65 Fe) mg tablet    glucose blood (ONE TOUCH ULTRA TEST) test strip    JANUVIA 100 MG tablet    Lancets (ONETOUCH DELICA PLUS [id number]) MISC    loratadine (CLARITIN) 10 mg tablet    LORazepam (ATIVAN) 0 5 mg tablet    metFORMIN (GLUCOPHAGE) 500 mg tablet   metoprolol tartrate (LOPRESSOR) 50 mg tablet    pantoprazole (PROTONIX) 40 mg tablet    vitamin B-12 (VITAMIN B-12) 500 mcg tablet    No Known Allergies        Objective     Blood pressure 170/90, pulse 66, temperature 98 5 °F (36 9 °C), temperature source Tympanic, height 5' 6" (1 676 m), weight 62 6 kg (138 lb)  Body mass index is 22 27 kg/m²  PHYSICAL EXAM:      General Appearance:   Alert, cooperative, no distress   HEENT:   Normocephalic, atraumatic, anicteric      Neck:  Supple, symmetrical, trachea midline   Lungs:   Clear to auscultation bilaterally; no rales, rhonchi or wheezing; respirations unlabored    Heart[de-identified]   Regular rate and rhythm; no murmur, rub, or gallop  Abdomen:   Soft, non-tender, non-distended; normal bowel sounds; no masses, no organomegaly    Genitalia:   Deferred    Rectal:   Deferred    Extremities:  No cyanosis, clubbing or edema    Pulses:  2+ and symmetric    Skin:  No jaundice, rashes, or lesions    Lymph nodes:  No palpable cervical lymphadenopathy        Lab Results:   No visits with results within 1 Day(s) from this visit  Latest known visit with results is:   Orders Only on 08/11/2020   Component Date Value    Right Eye Diabetic Retin* 08/11/2020 None     Left Eye Diabetic Retino* 08/11/2020 None          Radiology Results:   Us Abdomen Limited    Result Date: 8/13/2020  Narrative: RIGHT UPPER QUADRANT ULTRASOUND INDICATION:    R10 11: Right upper quadrant pain  Epigastric pain  Assess for CBD stone  COMPARISON: CT dated July 30, 2020 TECHNIQUE:   Real-time ultrasound of the right upper quadrant was performed with a curvilinear transducer with both volumetric sweeps and still imaging techniques  FINDINGS: PANCREAS:  Visualized portions of the pancreas are within normal limits  AORTA AND IVC:  Aorta is atherosclerotic  Distal abdominal aorta is somewhat ectatic as seen on the prior CT measuring 2 2 cm transverse  LIVER: Size:  Within normal range    The liver measures 13 6 cm in the midclavicular line  Contour:  Surface contour is smooth  Parenchyma:  Echogenicity and echotexture are within normal limits  No evidence of suspicious mass  Limited imaging of the main portal vein shows it to be patent and hepatopetal   BILIARY: The gallbladder is adequately distended No wall thickening or pericholecystic fluid  Mobile calcified gallstone  No sonographic Scott's sign  No intrahepatic biliary dilatation  CBD only segmentally visualized measuring 4 mm  No choledocholithiasis  KIDNEY: Right kidney measures 11 x 6 6 cm  Lower pole simple cyst measuring 3 2 cm  Previously 3 2 cm  Exophytic lower interpolar cyst laterally measuring 6 2 cm  Previously 6 2 cm  ASCITES:   None  Impression: Midline structures including Pancreas and distal CBD obscured by overlying bowel gas  Cholelithiasis without sonographic evidence of cholecystitis  Midportion of CBD only segmentally visualized appearing normal caliber  No CBD stones were identified  If there remains a high clinical suspicion for nonobstructive CBD stones: Consider MRCP or ERCP (there is a cardiac pacemaker present)  Mild ectasia of the distal abdominal aorta stable from prior exam measuring 2 2 cm transverse  Unremarkable liver  Simple right-sided renal cysts  The study was marked in [facility] for immediate notification  Workstation performed: [id number]     Ct Abdomen Pelvis W Contrast    Result Date: 7/30/2020  Narrative: CT ABDOMEN AND PELVIS WITH IV CONTRAST INDICATION:  R10 13: Epigastric pain  R63 4: Abnormal weight loss  COMPARISON:  None TECHNIQUE:  CT examination of the abdomen and pelvis was performed  Axial, sagittal, and coronal 2D reformatted images were created from the source data and submitted for interpretation  Radiation dose length product (DLP) for this visit:  490 mGy-cm     This examination, like all CT scans performed in the [facility], was performed utilizing techniques to minimize radiation dose exposure, including the use of iterative reconstruction and automated exposure control  IV Contrast:  100 mL of iohexol (OMNIPAQUE) Enteric Contrast:  50 mL of iohexol (OMNIPAQUE) FINDINGS: ABDOMEN LOWER CHEST:  Mild reticular interstitial opacity bilaterally may reflect mild fibrosis  Atherosclerotic changes thoracic aorta and coronary arteries  Pacer wires  LIVER/BILIARY TREE:  The liver is grossly unremarkable  There is no biliary dilatation seen  Possible layering 5 mm noncalcified stone or debris within the distal CBD series 601 image 91  GALLBLADDER:  Calcified gallstone  No pericholecystic inflammatory change  SPLEEN:  Unremarkable  PANCREAS:  Unremarkable  ADRENAL GLANDS:  Unremarkable  KIDNEYS/URETERS:  Bilateral renal cysts including an exophytic 6 1 cm right mid to lower pole exophytic cyst laterally  No hydronephrosis  Cortical scarring upper pole left kidney  STOMACH AND BOWEL:  The stomach is grossly unremarkable  The small bowel is normal caliber  Moderate to large amount of colonic stool may reflect constipation  No focal inflammatory changes or fluid collections are identified  APPENDIX:  The appendix is not visualized  No pericecal inflammatory changes detected  ABDOMINOPELVIC CAVITY:  No ascites  No pneumoperitoneum  No lymphadenopathy  VESSELS: Atherosclerotic changes abdominal aorta without evidence of aneurysm  PELVIS REPRODUCTIVE ORGANS:  Unremarkable for patient's age  URINARY BLADDER:  Unremarkable  ABDOMINAL WALL/INGUINAL REGIONS:  Unremarkable  OSSEOUS STRUCTURES:  No acute fracture or destructive osseous lesion  Bony demineralization with multilevel degenerative changes  Calcified central disc protrusion suspected at L1-2 causing moderate central canal stenosis  Grade 1 anterolisthesis L4 on L5  Impression: Possible 5 mm noncalcified distal CBD calculus or debris  No biliary dilatation    Given its location near the ampulla, doubtful ultrasound would be beneficial   Presuming the pacemaker will preclude MRCP evaluation, consider ERCP  If the pacemaker is MRI compatible, MRCP would be the study of choice  Otherwise, no acute intra-abdominal abnormality identified  Cholelithiasis  Moderate to large amount of colonic stool may reflect constipation  Calcified central disc protrusion suspected at L1-2 causing moderate central canal stenosis    Workstation performed: [id number]

## 2020-08-12 NOTE — PATIENT INSTRUCTIONS
EGD/COLON scheduled on 8/27/20 with Dr Lebron at [facility]  Koko Gonzalez gave patient verbal instructions/paper work given  Med Clearance faxed to Dr Roxy Mai-Miralax/Dulcolax   scheduled at [facility] on 8/13/20

## 2020-08-13 ENCOUNTER — HOSPITAL ENCOUNTER (OUTPATIENT)
Dept: RADIOLOGY | Age: 85
Discharge: HOME | End: 2020-08-13
Payer: MEDICARE

## 2020-08-13 ENCOUNTER — TELEPHONE (OUTPATIENT)
Dept: GASTROENTEROLOGY | Facility: AMBULARY SURGERY CENTER | Age: 85
End: 2020-08-13

## 2020-08-13 DIAGNOSIS — R10.11 RIGHT UPPER QUADRANT ABDOMINAL PAIN: ICD-10-CM

## 2020-08-13 PROCEDURE — 76705 ECHO EXAM OF ABDOMEN: CPT

## 2020-08-13 NOTE — TELEPHONE ENCOUNTER
[facility],    Please schedule her for EEG/EUS and colonoscopy with Dr Green Blizzard      Thank you,    Lucrecia Goddard

## 2020-08-13 NOTE — TELEPHONE ENCOUNTER
Patients GI provider:  Dr Sherin Richardson    Number to return call: (  [phone]    Reason for call: Pt has immediate findings on ruq ultrasound, per pascale in radiology, will send tiger text    Scheduled procedure/appointment date if applicable: Apt/procedure   8-27-20 COLON

## 2020-08-14 NOTE — TELEPHONE ENCOUNTER
I called Ac back with ultrasound results - she is aware of our plan  Alicia Pratt - please schedule her for EGD/EUS and colonoscopy

## 2020-08-14 NOTE — TELEPHONE ENCOUNTER
Pt returning call for results from her [address]   Would like a call back at [phone]

## 2020-08-17 ENCOUNTER — DOCUMENTATION (OUTPATIENT)
Dept: GASTROENTEROLOGY | Facility: CLINIC | Age: 85
End: 2020-08-17

## 2020-08-17 ENCOUNTER — PREP FOR PROCEDURE (OUTPATIENT)
Dept: GASTROENTEROLOGY | Facility: CLINIC | Age: 85
End: 2020-08-17

## 2020-08-17 DIAGNOSIS — R93.5 ABNORMAL US (ULTRASOUND) OF ABDOMEN: Primary | ICD-10-CM

## 2020-08-17 PROBLEM — R93.3 ABNORMAL FINDING ON GI TRACT IMAGING: Status: ACTIVE | Noted: 2020-08-17

## 2020-08-17 NOTE — PROGRESS NOTES
Patient with abnormal finding on abdominal US - unable to have MRI due to pacemaker  Will proceed to EUS as previously documented in telephone messages  Patient is aware      Mahad Tai PA-C

## 2020-08-18 ENCOUNTER — TELEPHONE (OUTPATIENT)
Dept: CARDIOLOGY CLINIC | Facility: CLINIC | Age: 85
End: 2020-08-18

## 2020-08-18 NOTE — TELEPHONE ENCOUNTER
Received a fax from GI asking permission to hold Eliquis two days prior to EUS/EGD/colonoscopy scheduled for 9/2/2020?

## 2020-08-19 ENCOUNTER — TELEPHONE (OUTPATIENT)
Dept: FAMILY MEDICINE CLINIC | Facility: CLINIC | Age: 85
End: 2020-08-19

## 2020-08-19 NOTE — TELEPHONE ENCOUNTER
Patient lives in the the [facility], there is a cleaning crew that comes in but have not been able to since the start of the pandemic, her  stated if she receives a letter form her PCP stating due to her age and various medical conditions she needs the cleaning crew to come in to clean her apartment  Can a letter be written and mailed to patient  Thank you!

## 2020-08-21 ENCOUNTER — HOSPITAL ENCOUNTER (OUTPATIENT)
Dept: NON INVASIVE DIAGNOSTICS | Facility: CLINIC | Age: 85
Discharge: HOME | End: 2020-08-21
Payer: MEDICARE

## 2020-08-21 DIAGNOSIS — I65.22 STENOSIS OF LEFT CAROTID ARTERY: ICD-10-CM

## 2020-08-21 DIAGNOSIS — I77.9 BILATERAL CAROTID ARTERY DISEASE, UNSPECIFIED TYPE (HCC): ICD-10-CM

## 2020-08-21 DIAGNOSIS — I65.21 OCCLUSION OF RIGHT CAROTID ARTERY: ICD-10-CM

## 2020-08-21 PROCEDURE — 93880 EXTRACRANIAL BILAT STUDY: CPT | Performed by: SURGERY

## 2020-08-21 PROCEDURE — 93880 EXTRACRANIAL BILAT STUDY: CPT

## 2020-08-21 NOTE — PROGRESS NOTES
[facility] - Outpatient Consultation  Rosaura Espitia 82 y o  female MRN: [id number]  Encounter: [id number]          ASSESSMENT AND PLAN:      1  Right upper quadrant abdominal pain rule out choledocholithiasis  She continued to have intermittent epigastric and right upper quadrant pain  Sometimes her pain is following eating other times pain starts without eating  She reports mild nausea but no vomiting  She reports about 10-15 lb weight loss  She had a CT scan which showed possible 5 mm noncalcified CBD stone or debris  She could not have MRCP due to pacemaker  Fortunately LFTs are normal   Will start with ultrasound to see if there is biliary dilatation or choledocholithiasis  If ultrasound is inconclusive will proceed to endoscopic ultrasound to assess for choledocholithiasis  2  Iron deficiency  -she has chronic iron-deficiency anemia currently taking iron sulfate  Her hemoglobin has been stable between ranging between 11 and 12  She has no evidence of overt GI bleeding  No recent EGD or colonoscopy  Will plan EGD and colonoscopy for further evaluation  - Colonoscopy; Future  - EGD; Future    3  Gastroesophageal reflux disease with esophagitis  We reviewed reflux precautions  Continue pantoprazole 40 mg daily    4  Type 2 diabetes mellitus with diabetic polyneuropathy, with long-term current use of insulin ([facility] [address] )  Follow-up with family doctor prior to the procedure    We reviewed risks and benefits of EGD and colonoscopy as well as EUS  This include but not limited to infection, bleeding, perforation, missed lesion        Addendum  Her ultrasound showed cholelithiasis but no clear evidence of choledocholithiasis  Will proceed with EGD/EUS and colonoscopy  Clearance for cardiologist to hold her Eliquis prior to the procedure  ______________________________________________________________________    HPI:  82-year-old female with diabetes, chronic iron-deficiency anemia here for evaluation of reflux symptoms, intermittent epigastric and right upper quadrant pain and weight loss  She has been experiencing epigastric and right upper quadrant pain  This pain is intermittent radiating to her back sometimes related to eating  She also reports nausea but no vomiting  She reports 10-15 lb weight loss over the last several months  She denies diarrhea, constipation, melena or hematochezia  No family history of GI malignancy  She had a CT scan which showed questionable stone in the distal CBD  Her LFTs are completely normal  She had colonoscopy several years ago    REVIEW OF SYSTEMS:    CONSTITUTIONAL: Denies any fever, chills, rigors, and weight loss  HEENT: No earache or tinnitus  Denies hearing loss or visual disturbances  CARDIOVASCULAR: No chest pain or palpitations  RESPIRATORY: Denies any cough, hemoptysis, shortness of breath or dyspnea on exertion  GASTROINTESTINAL: As noted in the History of Present Illness  GENITOURINARY: No problems with urination  Denies any hematuria or dysuria  NEUROLOGIC: No dizziness or vertigo, denies headaches  MUSCULOSKELETAL: Denies any muscle or joint pain  SKIN: Denies skin rashes or itching  ENDOCRINE: Denies excessive thirst  Denies intolerance to heat or cold  PSYCHOSOCIAL: Denies depression or anxiety  Denies any recent memory loss         Historical Information   Past Medical History:   Diagnosis Date    Diabetes mellitus ([facility] [address] )     Glaucoma     H/O degenerative disc disease     Hyperlipidemia     Hypertension     Peripheral neuropathy     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 ([facility] [address] )      Past Surgical History:   Procedure Laterality Date    CATARACT EXTRACTION, BILATERAL  2011    COLONOSCOPY      CYST REMOVAL  2009    left lower Quadrant     HERNIA REPAIR  1992    LIPOMA RESECTION  2010    TN REPAIR ING HERNIA,5+Y/O,REDUCIBL Bilateral 1/5/2018    Procedure: INGUINAL HERNIA REPAIR;  Surgeon: Marianna Soares, MD;  Location: [facility];  Service: General    SHOULDER SURGERY  2019    Dr Samira Cobian [facility])   [address] carotid occlusion     Social History   Social History     Substance and Sexual Activity   Alcohol Use No     Social History     Substance and Sexual Activity   Drug Use No     Social History     Tobacco Use   Smoking Status Former Smoker    Last attempt to quit:     Years since quittin 6   Smokeless Tobacco Never Used     Family History   Problem Relation Age of Onset    Heart attack Father     Hiatal hernia Father     Diabetes Father     Heart disease Mother     Cancer Brother     Diabetes Brother     Heart disease Brother     Hypertension Brother        Meds/Allergies       Current Outpatient Medications:     acetaminophen (TYLENOL) 500 mg tablet    amoxicillin (AMOXIL) 500 mg capsule    bimatoprost (LUMIGAN) 0 01 % ophthalmic drops    calcium carbonate (OS-DANIA) 600 MG tablet    carboxymethylcellulose (Artificial Tears) 1 % ophthalmic solution    Eliquis 5 MG    ezetimibe-simvastatin (VYTORIN) 10-40 mg per tablet    famotidine (PEPCID) 10 mg tablet    ferrous sulfate 325 (65 Fe) mg tablet    glucose blood (ONE TOUCH ULTRA TEST) test strip    JANUVIA 100 MG tablet    Lancets (ONETOUCH DELICA PLUS [id number]) MISC    loratadine (CLARITIN) 10 mg tablet    LORazepam (ATIVAN) 0 5 mg tablet    metFORMIN (GLUCOPHAGE) 500 mg tablet    metoprolol tartrate (LOPRESSOR) 50 mg tablet    pantoprazole (PROTONIX) 40 mg tablet    vitamin B-12 (VITAMIN B-12) 500 mcg tablet    No Known Allergies        Objective     Blood pressure 170/90, pulse 66, temperature 98 5 °F (36 9 °C), temperature source Tympanic, height 5' 6" (1 676 m), weight 62 6 kg (138 lb)  Body mass index is 22 27 kg/m²          PHYSICAL EXAM:      General Appearance:   Alert, cooperative, no distress   HEENT:   Normocephalic, atraumatic, anicteric      Neck:  Supple, symmetrical, trachea midline   Lungs:   Clear to auscultation bilaterally; no rales, rhonchi or wheezing; respirations unlabored    Heart[de-identified]   Regular rate and rhythm; no murmur, rub, or gallop  Abdomen:   Soft, non-tender, non-distended; normal bowel sounds; no masses, no organomegaly    Genitalia:   Deferred    Rectal:   Deferred    Extremities:  No cyanosis, clubbing or edema    Pulses:  2+ and symmetric    Skin:  No jaundice, rashes, or lesions    Lymph nodes:  No palpable cervical lymphadenopathy        Lab Results:   No visits with results within 1 Day(s) from this visit  Latest known visit with results is:   Orders Only on 08/11/2020   Component Date Value    Right Eye Diabetic Retin* 08/11/2020 None     Left Eye Diabetic Retino* 08/11/2020 None          Radiology Results:   Us Abdomen Limited    Result Date: 8/13/2020  Narrative: RIGHT UPPER QUADRANT ULTRASOUND INDICATION:    R10 11: Right upper quadrant pain  Epigastric pain  Assess for CBD stone  COMPARISON: CT dated July 30, 2020 TECHNIQUE:   Real-time ultrasound of the right upper quadrant was performed with a curvilinear transducer with both volumetric sweeps and still imaging techniques  FINDINGS: PANCREAS:  Visualized portions of the pancreas are within normal limits  AORTA AND IVC:  Aorta is atherosclerotic  Distal abdominal aorta is somewhat ectatic as seen on the prior CT measuring 2 2 cm transverse  LIVER: Size:  Within normal range  The liver measures 13 6 cm in the midclavicular line  Contour:  Surface contour is smooth  Parenchyma:  Echogenicity and echotexture are within normal limits  No evidence of suspicious mass  Limited imaging of the main portal vein shows it to be patent and hepatopetal   BILIARY: The gallbladder is adequately distended No wall thickening or pericholecystic fluid  Mobile calcified gallstone  No sonographic Scott's sign  No intrahepatic biliary dilatation  CBD only segmentally visualized measuring 4 mm  No choledocholithiasis  KIDNEY: Right kidney measures 11 x 6 6 cm  Lower pole simple cyst measuring 3 2 cm  Previously 3 2 cm  Exophytic lower interpolar cyst laterally measuring 6 2 cm  Previously 6 2 cm  ASCITES:   None  Impression: Midline structures including Pancreas and distal CBD obscured by overlying bowel gas  Cholelithiasis without sonographic evidence of cholecystitis  Midportion of CBD only segmentally visualized appearing normal caliber  No CBD stones were identified  If there remains a high clinical suspicion for nonobstructive CBD stones: Consider MRCP or ERCP (there is a cardiac pacemaker present)  Mild ectasia of the distal abdominal aorta stable from prior exam measuring 2 2 cm transverse  Unremarkable liver  Simple right-sided renal cysts  The study was marked in [facility] for immediate notification  Workstation performed: [id number]     Ct Abdomen Pelvis W Contrast    Result Date: 7/30/2020  Narrative: CT ABDOMEN AND PELVIS WITH IV CONTRAST INDICATION:  R10 13: Epigastric pain  R63 4: Abnormal weight loss  COMPARISON:  None TECHNIQUE:  CT examination of the abdomen and pelvis was performed  Axial, sagittal, and coronal 2D reformatted images were created from the source data and submitted for interpretation  Radiation dose length product (DLP) for this visit:  490 mGy-cm   This examination, like all CT scans performed in the [place], was performed utilizing techniques to minimize radiation dose exposure, including the use of iterative reconstruction and automated exposure control  IV Contrast:  100 mL of iohexol (OMNIPAQUE) Enteric Contrast:  50 mL of iohexol (OMNIPAQUE) FINDINGS: ABDOMEN LOWER CHEST:  Mild reticular interstitial opacity bilaterally may reflect mild fibrosis  Atherosclerotic changes thoracic aorta and coronary arteries  Pacer wires  LIVER/BILIARY TREE:  The liver is grossly unremarkable  There is no biliary dilatation seen    Possible layering 5 mm noncalcified stone or debris within the distal CBD series 601 image 91  GALLBLADDER:  Calcified gallstone  No pericholecystic inflammatory change  SPLEEN:  Unremarkable  PANCREAS:  Unremarkable  ADRENAL GLANDS:  Unremarkable  KIDNEYS/URETERS:  Bilateral renal cysts including an exophytic 6 1 cm right mid to lower pole exophytic cyst laterally  No hydronephrosis  Cortical scarring upper pole left kidney  STOMACH AND BOWEL:  The stomach is grossly unremarkable  The small bowel is normal caliber  Moderate to large amount of colonic stool may reflect constipation  No focal inflammatory changes or fluid collections are identified  APPENDIX:  The appendix is not visualized  No pericecal inflammatory changes detected  ABDOMINOPELVIC CAVITY:  No ascites  No pneumoperitoneum  No lymphadenopathy  VESSELS: Atherosclerotic changes abdominal aorta without evidence of aneurysm  PELVIS REPRODUCTIVE ORGANS:  Unremarkable for patient's age  URINARY BLADDER:  Unremarkable  ABDOMINAL WALL/INGUINAL REGIONS:  Unremarkable  OSSEOUS STRUCTURES:  No acute fracture or destructive osseous lesion  Bony demineralization with multilevel degenerative changes  Calcified central disc protrusion suspected at L1-2 causing moderate central canal stenosis  Grade 1 anterolisthesis L4 on L5  Impression: Possible 5 mm noncalcified distal CBD calculus or debris  No biliary dilatation  Given its location near the ampulla, doubtful ultrasound would be beneficial   Presuming the pacemaker will preclude MRCP evaluation, consider ERCP  If the pacemaker is MRI compatible, MRCP would be the study of choice  Otherwise, no acute intra-abdominal abnormality identified  Cholelithiasis  Moderate to large amount of colonic stool may reflect constipation  Calcified central disc protrusion suspected at L1-2 causing moderate central canal stenosis    Workstation performed: [id number]

## 2020-08-24 NOTE — RESULT ENCOUNTER NOTE
Please let patient know results of carotid artery study  Left side is stable  Right side shows new narrowing of 50-75% in the right subclavian artery  I would like you to see a vascular surgeon regarding this  Please provide contact information for vascular Center, [phone]

## 2020-08-25 ENCOUNTER — TELEPHONE (OUTPATIENT)
Dept: FAMILY MEDICINE CLINIC | Facility: CLINIC | Age: 85
End: 2020-08-25

## 2020-08-25 NOTE — TELEPHONE ENCOUNTER
----- Message from [address] sent at 8/24/2020  7:21 PM EDT -----  Please let patient know results of carotid artery study  Left side is stable  Right side shows new narrowing of 50-75% in the right subclavian artery  I would like you to see a vascular surgeon regarding this  Please provide contact information for vascular Center, [phone]

## 2020-08-26 NOTE — TELEPHONE ENCOUNTER
Pt called to ask if she can take her blood pressure medication on the day of her procedure    Call back # [phone]

## 2020-08-28 ENCOUNTER — TELEPHONE (OUTPATIENT)
Dept: VASCULAR SURGERY | Facility: CLINIC | Age: 85
End: 2020-08-28

## 2020-08-31 ENCOUNTER — OFFICE VISIT (OUTPATIENT)
Dept: VASCULAR SURGERY | Facility: CLINIC | Age: 85
End: 2020-08-31
Payer: MEDICARE

## 2020-08-31 VITALS
SYSTOLIC BLOOD PRESSURE: 138 MMHG | DIASTOLIC BLOOD PRESSURE: 84 MMHG | HEART RATE: 65 BPM | TEMPERATURE: 97.6 F | WEIGHT: 136 LBS | BODY MASS INDEX: 21.35 KG/M2 | HEIGHT: 67 IN | RESPIRATION RATE: 18 BRPM

## 2020-08-31 DIAGNOSIS — E11.649 UNCONTROLLED TYPE 2 DIABETES MELLITUS WITH HYPOGLYCEMIA WITHOUT COMA (HCC): ICD-10-CM

## 2020-08-31 DIAGNOSIS — I65.22 ASYMPTOMATIC CAROTID ARTERY STENOSIS, LEFT: Primary | ICD-10-CM

## 2020-08-31 DIAGNOSIS — I77.1 STENOSIS OF RIGHT SUBCLAVIAN ARTERY (HCC): ICD-10-CM

## 2020-08-31 DIAGNOSIS — F41.9 ANXIETY: ICD-10-CM

## 2020-08-31 DIAGNOSIS — I65.21 OCCLUSION OF RIGHT CAROTID ARTERY: ICD-10-CM

## 2020-08-31 PROCEDURE — 3051F HG A1C>EQUAL 7.0%<8.0%: CPT | Performed by: SURGERY

## 2020-08-31 PROCEDURE — 99203 OFFICE O/P NEW LOW 30 MIN: CPT | Performed by: SURGERY

## 2020-08-31 RX ORDER — ASPIRIN 81 MG/1
81 TABLET ORAL DAILY
Start: 2020-08-31

## 2020-08-31 RX ORDER — LORAZEPAM 0.5 MG/1
1 TABLET ORAL
Qty: 60 TABLET | Refills: 0 | Status: SHIPPED | OUTPATIENT
Start: 2020-08-31 | End: 2020-09-29

## 2020-08-31 NOTE — PATIENT INSTRUCTIONS
1) Carotid disease  -you have a complete blockage on the right side and a moderate blockage on the left side  -we discussed common stroke symptoms; if you have any of these, please go immediately to the ER  -we will continue to monitor this with ultrasound on a 6 month basis    2) Subclavian disease  -you have a moderate blockage in the artery leading to the right arm  -this is not causing you any issues and does not need to be fixed  -we will continue to monitor this with ultrasound    3) Medications  -please continue taking your vytorin and eliquis  -I would like you to start aspirin 81mg once daily after your GI testing is complete    4) Diabetes  -continue to work on your diabetes diet and lowering your A1C levels

## 2020-08-31 NOTE — PROGRESS NOTES
Assessment/Plan:    Pt is an 81 yo F w/ GERD, DM, HTN, afib (on Eliquis), anxiety, HLD, s/p pacer, presents to discuss carotid disease    Asymptomatic carotid artery stenosis, left  Occlusion of right carotid artery  Stenosis of right subclavian artery (HCC)  -     VAS carotid complete study; Future  -     aspirin (ECOTRIN LOW STRENGTH) 81 mg EC tablet; Take 1 tablet (81 mg total) by mouth daily  -reviewed carotid DU which shows R ICA occlusion, L ICA 50-69% stenosis and R SC stenosis w/ brach BP gradient 8mmHg and antegrade verts B  -discussed pathophysiology of carotid disease; no surgical intervention on R side for complete occlusion; will continue to monitor her L ICA stenosis with DU q6mos; discussed common CVA symptoms  -R SC stenosis is asymptomatic with B antegrade verts and nonsignificant BP gradient; patient has limited mobility/use of her arm after shoulder injury last year; unlikely that she will become symptomatic from this but did discuss symptoms including claudication; will monitor this at the same time as her carotid disease  -f/u 1 year    Diabetes Mellitus  -A1C: 8 0  -discussed importance of good diabetic diet and decreasing A1C  -care per PCP; endocrine if needed    Medications  -continue statin and eliquis for afib  -instructed to start ASA 81mg once daily for best medical management; instructed her to start this AFTER her GI testing is complete    Subjective:      Patient ID: Sotero Samayoa is a 82 y o  female  Patient is new to our practice and was referred by ZANDER Caldera  Pt denies TIA or CVA symptoms  Pt is on Eliquis and Vytorin  HPI:    Patient presents to discuss carotid disease  Patient has been followed by PCP  In 1994, she had an episode of R eye amaurosis  This led to further workup and finding of R carotid occlusion  She has been monitored for this since that time  Denies any other episodes of amaurosis   Denies any episodes of facial droop, garbled speech, unilateral weakness/numbness  Denies hx of CVA  Denies any RUE cramping with use  Very limited mobility 2/2 R shoulder injury last year  Complains of dizziness upon standing  Past smoker, quit in 1994  Takes Eliquis for afib  Not on any antiplatelet  Takes statin  The following portions of the patient's history were reviewed and updated as appropriate: allergies, current medications, past family history, past medical history, past social history, past surgical history and problem list     Review of Systems   Constitutional: Negative  HENT: Negative  Eyes: Negative for visual disturbance  Respiratory: Negative  Cardiovascular: Negative  Negative for leg swelling  Gastrointestinal: Positive for abdominal pain (undergoing workup) and constipation  Negative for nausea and vomiting  Endocrine: Negative  Genitourinary: Negative  Musculoskeletal: Positive for arthralgias (R shoulder) and myalgias  Negative for gait problem  Skin: Negative  Negative for wound  Allergic/Immunologic: Negative  Neurological: Negative for dizziness, facial asymmetry, speech difficulty, weakness, light-headedness and numbness  Hematological: Bruises/bleeds easily  Psychiatric/Behavioral: Negative  Objective:      /84 (BP Location: Left arm, Patient Position: Sitting)   Pulse 65   Temp 97 6 °F (36 4 °C) (Tympanic)   Resp 18   Ht 5' 7" (1 702 m)   Wt 61 7 kg (136 lb)   BMI 21 30 kg/m²          Physical Exam  Vitals signs and nursing note reviewed  Constitutional:       Appearance: She is well-developed  HENT:      Head: Normocephalic and atraumatic  Eyes:      Conjunctiva/sclera: Conjunctivae normal    Neck:      Musculoskeletal: Normal range of motion and neck supple  Cardiovascular:      Rate and Rhythm: Normal rate and regular rhythm  Pulses:           Radial pulses are 2+ on the right side and 2+ on the left side          Popliteal pulses are 0 on the right side and 1+ on the left side  Dorsalis pedis pulses are 2+ on the right side and 2+ on the left side  Posterior tibial pulses are 0 on the right side and 0 on the left side  Heart sounds: Normal heart sounds  No murmur  Comments: No carotid bruits B  Pulmonary:      Effort: Pulmonary effort is normal  No respiratory distress  Breath sounds: Normal breath sounds  No wheezing or rales  Abdominal:      General: There is no distension  Palpations: Abdomen is soft  Tenderness: There is no abdominal tenderness  There is no rebound  Musculoskeletal: Normal range of motion  General: No swelling  Skin:     General: Skin is warm and dry  Comments: Some spider veins and reticular veins to BLE; no chronic skin changes; no wounds   Neurological:      Mental Status: She is alert and oriented to person, place, and time  Psychiatric:         Behavior: Behavior normal            I have reviewed and made appropriate changes to the review of systems input by the medical assistant      Vitals:    08/31/20 0926 08/31/20 0929   BP: 142/88 138/84   BP Location: Right arm Left arm   Patient Position: Sitting Sitting   Pulse: 65    Resp: 18    Temp: 97 6 °F (36 4 °C)    TempSrc: Tympanic    Weight: 61 7 kg (136 lb)    Height: 5' 7" (1 702 m)        Patient Active Problem List   Diagnosis    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, with long-term current use of insulin ([facility]    HTN (hypertension)    HLD (hyperlipidemia)    Glaucoma    Asymptomatic carotid artery stenosis, left    Symptomatic PVCs    Occlusion of right carotid artery    Stenosis of right subclavian artery (HCC)    Paroxysmal atrial fibrillation (HCC)    Presence of permanent cardiac pacemaker    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, with long-term current use of insulin ([facility])    Type 2 diabetes mellitus with diabetic polyneuropathy, with long-term current use of insulin (HCC)    Osteopenia    GERD (gastroesophageal reflux disease)    Anxiety    Uncontrolled type 2 diabetes mellitus with hypoglycemia (HCC)    Iron deficiency    Abnormal finding on GI tract imaging       Past Surgical History:   Procedure Laterality Date    CATARACT EXTRACTION, BILATERAL      COLONOSCOPY      CYST REMOVAL  2009    left lower Quadrant    [address] [facility] [place]    LIPOMA RESECTION      PA REPAIR ING HERNIA,5+Y/O,REDUCIBL Bilateral 2018    Procedure: INGUINAL HERNIA REPAIR;  Surgeon: Heather Morillo MD;  Location:  MAIN OR;  Service: General    SHOULDER SURGERY  2019    Dr Lori Wong [facility]   [address] carotid occlusion       Family History   Problem Relation Age of Onset    Heart attack Father     Hiatal hernia Father     Diabetes Father     Heart disease Mother     Cancer Brother     Diabetes Brother     Heart disease Brother     Hypertension Brother        Social History     Socioeconomic History    Marital status:      Spouse name: Not on file    Number of children: Not on file    Years of education: Not on file    Highest education level: Not on file   Occupational History    Occupation: customer service   retired   Social Needs    Financial resource strain: Not on file    Food insecurity     Worry: Not on file     Inability: Not on file   [employer] needs     Medical: Not on file     Non-medical: Not on file   Tobacco Use    Smoking status: Former Smoker     Last attempt to quit:      Years since quittin 6    Smokeless tobacco: Never Used   Substance and Sexual Activity    Alcohol use: No    Drug use: No    Sexual activity: Not on file   Lifestyle    Physical activity     Days per week: Not on file     Minutes per session: Not on file    Stress: Not on file   Relationships    Social connections     Talks on phone: Not on file     Gets together: Not on file     Attends [employer] service: Not on file     Active member of club or organization: Not on file     Attends meetings of clubs or organizations: Not on file     Relationship status: Not on file    Intimate partner violence     Fear of current or ex partner: Not on file     Emotionally abused: Not on file     Physically abused: Not on file     Forced sexual activity: Not on file   Other Topics Concern    Not on file   Social History Narrative    Lives [place]    2 children       No Known Allergies      Current Outpatient Medications:     acetaminophen (TYLENOL) 500 mg tablet, Take 1,000 mg by mouth as needed for mild pain, Disp: , Rfl:     amoxicillin (AMOXIL) 500 mg capsule, TAKE 4 CAPSULES 1 HOUR BEFORE DENTAL APPOINTMENT, Disp: , Rfl:     bimatoprost (LUMIGAN) 0 01 % ophthalmic drops, Administer 1 drop to both eyes daily at bedtime for 30 days, Disp: 1 5 mL, Rfl: 0    carboxymethylcellulose (Artificial Tears) 1 % ophthalmic solution, 1 drop 3 (three) times a day, Disp: , Rfl:     Eliquis 5 MG, TAKE 1 TABLET (5 MG TOTAL) BY MOUTH 2 (TWO) TIMES A DAY, Disp: 60 tablet, Rfl: 3    ezetimibe-simvastatin (VYTORIN) 10-40 mg per tablet, TAKE 1 TABLET BY MOUTH DAILY AT BEDTIME, Disp: 30 tablet, Rfl: 5    famotidine (PEPCID) 10 mg tablet, Take 10 mg by mouth as needed , Disp: , Rfl:     ferrous sulfate 325 (65 Fe) mg tablet, Take 325 mg by mouth daily with breakfast, Disp: , Rfl:     JANUVIA 100 MG tablet, TAKE 1 TABLET (100 MG TOTAL) BY MOUTH DAILY, Disp: 30 tablet, Rfl: 5    loratadine (CLARITIN) 10 mg tablet, Take 10 mg by mouth daily as needed for allergies, Disp: , Rfl:     LORazepam (ATIVAN) 0 5 mg tablet, TAKE 2 TABLETS (1 MG TOTAL) BY MOUTH DAILY AT BEDTIME, Disp: 60 tablet, Rfl: 0    metFORMIN (GLUCOPHAGE) 500 mg tablet, Take 2 tablets (1000 mg) every morning and 1 tablet (500 mg) every evening, Disp: 90 tablet, Rfl: 5    metoprolol tartrate (LOPRESSOR) 50 mg tablet, TAKE 1 5 TABLETS (75 MG TOTAL) BY MOUTH EVERY 12 (TWELVE) HOURS, Disp: 90 tablet, Rfl: 5    pantoprazole (PROTONIX) 40 mg tablet, Take 1 tablet (40 mg total) by mouth daily, Disp: 30 tablet, Rfl: 1    vitamin B-12 (VITAMIN B-12) 500 mcg tablet, Take 500 mcg by mouth daily, Disp: , Rfl:     aspirin (ECOTRIN LOW STRENGTH) 81 mg EC tablet, Take 1 tablet (81 mg total) by mouth daily, Disp:  , Rfl:     calcium carbonate (OS-DANIA) 600 MG tablet, Take 600 mg by mouth 2 (two) times a day with meals, Disp: , Rfl:     glucose blood (ONE TOUCH ULTRA TEST) test strip, TEST FOUR TIMES A DAY, Disp: 400 each, Rfl: 1    Lancets (ONETOUCH DELICA PLUS [id number]) MISC, Use one lancet to test blood 4 times a day, Disp: 400 each, Rfl: 1

## 2020-09-01 ENCOUNTER — HOSPITAL ENCOUNTER (OUTPATIENT)
Dept: RADIOLOGY | Age: 85
Discharge: HOME | End: 2020-09-01
Payer: MEDICARE

## 2020-09-01 DIAGNOSIS — Z78.0 POST-MENOPAUSE: ICD-10-CM

## 2020-09-01 DIAGNOSIS — M85.80 OSTEOPENIA, UNSPECIFIED LOCATION: ICD-10-CM

## 2020-09-01 PROCEDURE — 77080 DXA BONE DENSITY AXIAL: CPT

## 2020-09-02 ENCOUNTER — HOSPITAL ENCOUNTER (OUTPATIENT)
Dept: GASTROENTEROLOGY | Facility: HOSPITAL | Age: 85
Setting detail: OUTPATIENT SURGERY
Discharge: HOME | End: 2020-09-02
Attending: INTERNAL MEDICINE
Payer: MEDICARE

## 2020-09-02 ENCOUNTER — ANESTHESIA EVENT (OUTPATIENT)
Dept: GASTROENTEROLOGY | Facility: HOSPITAL | Age: 85
End: 2020-09-02

## 2020-09-02 ENCOUNTER — ANESTHESIA (OUTPATIENT)
Dept: GASTROENTEROLOGY | Facility: HOSPITAL | Age: 85
End: 2020-09-02

## 2020-09-02 VITALS
BODY MASS INDEX: 21.3 KG/M2 | HEART RATE: 67 BPM | DIASTOLIC BLOOD PRESSURE: 66 MMHG | SYSTOLIC BLOOD PRESSURE: 133 MMHG | RESPIRATION RATE: 18 BRPM | OXYGEN SATURATION: 98 % | WEIGHT: 136 LBS

## 2020-09-02 DIAGNOSIS — E61.1 IRON DEFICIENCY: ICD-10-CM

## 2020-09-02 DIAGNOSIS — R93.5 ABNORMAL US (ULTRASOUND) OF ABDOMEN: ICD-10-CM

## 2020-09-02 DIAGNOSIS — K21.00 GASTROESOPHAGEAL REFLUX DISEASE WITH ESOPHAGITIS: ICD-10-CM

## 2020-09-02 PROCEDURE — NC001 PR NO CHARGE: Performed by: INTERNAL MEDICINE

## 2020-09-02 PROCEDURE — 88305 TISSUE EXAM BY PATHOLOGIST: CPT | Performed by: PATHOLOGY

## 2020-09-02 PROCEDURE — 43239 EGD BIOPSY SINGLE/MULTIPLE: CPT | Performed by: INTERNAL MEDICINE

## 2020-09-02 PROCEDURE — 43237 ENDOSCOPIC US EXAM ESOPH: CPT | Performed by: INTERNAL MEDICINE

## 2020-09-02 PROCEDURE — 45385 COLONOSCOPY W/LESION REMOVAL: CPT | Performed by: INTERNAL MEDICINE

## 2020-09-02 RX ORDER — PROPOFOL 10 MG/ML
INJECTION, EMULSION INTRAVENOUS CONTINUOUS PRN
Status: DISCONTINUED | OUTPATIENT
Start: 2020-09-02 | End: 2020-09-02

## 2020-09-02 RX ORDER — EPHEDRINE SULFATE 50 MG/ML
INJECTION INTRAVENOUS AS NEEDED
Status: DISCONTINUED | OUTPATIENT
Start: 2020-09-02 | End: 2020-09-02

## 2020-09-02 RX ORDER — LIDOCAINE HYDROCHLORIDE 10 MG/ML
INJECTION, SOLUTION EPIDURAL; INFILTRATION; INTRACAUDAL; PERINEURAL AS NEEDED
Status: DISCONTINUED | OUTPATIENT
Start: 2020-09-02 | End: 2020-09-02

## 2020-09-02 RX ORDER — FENTANYL CITRATE 50 UG/ML
INJECTION, SOLUTION INTRAMUSCULAR; INTRAVENOUS AS NEEDED
Status: DISCONTINUED | OUTPATIENT
Start: 2020-09-02 | End: 2020-09-02

## 2020-09-02 RX ORDER — PROPOFOL 10 MG/ML
INJECTION, EMULSION INTRAVENOUS AS NEEDED
Status: DISCONTINUED | OUTPATIENT
Start: 2020-09-02 | End: 2020-09-02

## 2020-09-02 RX ORDER — SODIUM CHLORIDE, SODIUM LACTATE, POTASSIUM CHLORIDE, CALCIUM CHLORIDE 600; 310; 30; 20 MG/100ML; MG/100ML; MG/100ML; MG/100ML
INJECTION, SOLUTION INTRAVENOUS CONTINUOUS PRN
Status: DISCONTINUED | OUTPATIENT
Start: 2020-09-02 | End: 2020-09-02

## 2020-09-02 RX ADMIN — FENTANYL CITRATE 25 MCG: 50 INJECTION, SOLUTION INTRAMUSCULAR; INTRAVENOUS at 12:58

## 2020-09-02 RX ADMIN — PHENYLEPHRINE HYDROCHLORIDE 100 MCG: 10 INJECTION INTRAVENOUS at 13:26

## 2020-09-02 RX ADMIN — SODIUM CHLORIDE, SODIUM LACTATE, POTASSIUM CHLORIDE, AND CALCIUM CHLORIDE: .6; .31; .03; .02 INJECTION, SOLUTION INTRAVENOUS at 13:29

## 2020-09-02 RX ADMIN — SODIUM CHLORIDE, SODIUM LACTATE, POTASSIUM CHLORIDE, AND CALCIUM CHLORIDE: .6; .31; .03; .02 INJECTION, SOLUTION INTRAVENOUS at 12:58

## 2020-09-02 RX ADMIN — LIDOCAINE HYDROCHLORIDE 80 MG: 10 INJECTION, SOLUTION EPIDURAL; INFILTRATION; INTRACAUDAL; PERINEURAL at 12:58

## 2020-09-02 RX ADMIN — PROPOFOL 80 MCG/KG/MIN: 10 INJECTION, EMULSION INTRAVENOUS at 12:59

## 2020-09-02 RX ADMIN — PROPOFOL 40 MG: 10 INJECTION, EMULSION INTRAVENOUS at 12:58

## 2020-09-02 RX ADMIN — PROPOFOL 40 MG: 10 INJECTION, EMULSION INTRAVENOUS at 12:59

## 2020-09-02 RX ADMIN — FENTANYL CITRATE 25 MCG: 50 INJECTION, SOLUTION INTRAMUSCULAR; INTRAVENOUS at 13:06

## 2020-09-02 RX ADMIN — EPHEDRINE SULFATE 15 MG: 50 INJECTION, SOLUTION INTRAVENOUS at 13:50

## 2020-09-02 RX ADMIN — PHENYLEPHRINE HYDROCHLORIDE 100 MCG: 10 INJECTION INTRAVENOUS at 13:06

## 2020-09-02 RX ADMIN — FENTANYL CITRATE 50 MCG: 50 INJECTION, SOLUTION INTRAMUSCULAR; INTRAVENOUS at 13:12

## 2020-09-02 RX ADMIN — EPHEDRINE SULFATE 15 MG: 50 INJECTION, SOLUTION INTRAVENOUS at 13:37

## 2020-09-02 RX ADMIN — PHENYLEPHRINE HYDROCHLORIDE 100 MCG: 10 INJECTION INTRAVENOUS at 13:02

## 2020-09-02 NOTE — H&P
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 ([facility] )      Past Surgical History:   Procedure Laterality Date    CATARACT EXTRACTION, BILATERAL      COLONOSCOPY      CYST REMOVAL      left lower Quadrant     HERNIA REPAIR      LIPOMA RESECTION      KS REPAIR ING HERNIA,5+Y/O,REDUCIBL Bilateral 2018    Procedure: INGUINAL HERNIA REPAIR;  Surgeon: Alessandra Hardin MD;  [facility];  Service: General    SHOULDER SURGERY  2019    Dr Ross [facility]   [address] carotid occlusion     Social History   Social History     Substance and Sexual Activity   Alcohol Use No     Social History     Substance and Sexual Activity   Drug Use No     Social History     Tobacco Use   Smoking Status Former Smoker    Last attempt to quit:     Years since quittin 6   Smokeless Tobacco Never Used     Family History   Problem Relation Age of Onset    Heart attack Father     Hiatal hernia Father     Diabetes Father     Heart disease Mother     Cancer Brother     Diabetes Brother     Heart disease Brother     Hypertension Brother        Meds/Allergies     (Not in a hospital admission)      No Known Allergies    Objective     BP (!) 184/88   Pulse 92   Resp 18   Wt 61 7 kg (136 lb)   SpO2 100%   BMI 21 30 kg/m²       PHYSICAL EXAM    Gen: NAD  CV: RRR  CHEST: Clear  ABD: soft, NT/ND  EXT: no edema      ASSESSMENT/PLAN:  This is a 82y o  year old female here for EGD/EUS/colonoscopy, and she is stable and optimized for her procedure

## 2020-09-02 NOTE — ANESTHESIA POSTPROCEDURE EVALUATION
Post-Op Assessment Note    CV Status:  Stable    Pain management: adequate     Mental Status:  Awake   Hydration Status:  Euvolemic and stable   PONV Controlled:  None   Airway Patency:  Patent      Post Op Vitals Reviewed: Yes      Staff: CRNA         No complications documented      BP     Temp      Pulse     Resp      SpO2

## 2020-09-02 NOTE — ANESTHESIA PREPROCEDURE EVALUATION
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, with long-term current use of insulin (HCC)   (+) Uncontrolled type 2 diabetes mellitus with hypoglycemia (HCC)      GI/HEPATIC   (+) GERD (gastroesophageal reflux disease)      NEURO/PSYCH   (+) Anxiety        Physical Exam    Airway    Mallampati score: II  TM Distance: >3 FB  Neck ROM: full     Dental   Comment: Poor dentition, upper dentures and lower dentures,     Cardiovascular      Pulmonary      Other Findings        Anesthesia Plan  ASA Score- 3     Anesthesia Type- IV sedation with anesthesia with ASA Monitors  Additional Monitors:   Airway Plan:           Plan Factors-Exercise tolerance (METS): <4 METS  Chart reviewed  EKG reviewed  Existing labs reviewed  Patient summary reviewed  Patient is not a current smoker  Induction-     Postoperative Plan-     Informed Consent- Anesthetic plan and risks discussed with patient  I personally reviewed this patient with the CRNA  Discussed and agreed on the Anesthesia Plan with the NICKOLAS Hauser

## 2020-09-03 ENCOUNTER — TELEPHONE (OUTPATIENT)
Dept: GASTROENTEROLOGY | Facility: CLINIC | Age: 85
End: 2020-09-03

## 2020-09-03 NOTE — TELEPHONE ENCOUNTER
----- Message from Oswald Murillo MA sent at 9/2/2020  4:22 PM EDT -----    ----- Message -----  From: Rita Damon MD  Sent: 9/2/2020   3:22 PM EDT  To: , #    Can we schedule patient to follow up with myself in the office in 2-3 weeks to discuss results of EUS and possible need for ERCP  Thank you!

## 2020-09-08 ENCOUNTER — HOSPITAL ENCOUNTER (OUTPATIENT)
Dept: RADIOLOGY | Facility: HOSPITAL | Age: 85
Discharge: HOME | End: 2020-09-08
Payer: MEDICARE

## 2020-09-08 DIAGNOSIS — R93.5 ABNORMAL CT OF THE ABDOMEN: ICD-10-CM

## 2020-09-08 PROCEDURE — 74183 MRI ABD W/O CNTR FLWD CNTR: CPT

## 2020-09-08 PROCEDURE — A9585 GADOBUTROL INJECTION: HCPCS | Performed by: NURSE PRACTITIONER

## 2020-09-08 PROCEDURE — G1004 CDSM NDSC: HCPCS

## 2020-09-08 PROCEDURE — 76376 3D RENDER W/INTRP POSTPROCES: CPT

## 2020-09-08 RX ADMIN — GADOBUTROL 6 ML: 604.72 INJECTION INTRAVENOUS at 10:04

## 2020-09-08 NOTE — NURSING NOTE
Device interrogation for MRI  Normal device function prior to MRI  Device and leads meet all requirements for MRI  Device programmed DOO 80bpm per Cardiology  Pt has no complaints  Vital signs stable throughout  Normal device function post MRI  Device reprogrammed to prior settings per Cardiology

## 2020-09-10 ENCOUNTER — TELEPHONE (OUTPATIENT)
Dept: FAMILY MEDICINE CLINIC | Facility: CLINIC | Age: 85
End: 2020-09-10

## 2020-09-10 NOTE — TELEPHONE ENCOUNTER
----- Message from [address] sent at 9/10/2020  1:38 PM EDT -----  Please let patient know her bone density scan shows osteoporosis, is she able to schedule an appointment to discuss?

## 2020-09-11 NOTE — TELEPHONE ENCOUNTER
Patient states that she will wait until the results of her MRI comes back and then she will schedule an appointment with Kimberly to discuss both

## 2020-09-14 NOTE — TELEPHONE ENCOUNTER
I have received her MRI results  Please ask her to schedule  Also, has she scheduled follow up with her gastroenterologist?  If not, please ask her to do so         Thank you

## 2020-09-14 NOTE — TELEPHONE ENCOUNTER
Patient scheduled for 9/18/2020 and would likt to talk to you before she sets up appt with [facility]

## 2020-09-16 ENCOUNTER — REMOTE DEVICE CLINIC VISIT (OUTPATIENT)
Dept: CARDIOLOGY CLINIC | Facility: CLINIC | Age: 85
End: 2020-09-16
Payer: MEDICARE

## 2020-09-16 DIAGNOSIS — Z95.0 PACEMAKER: Primary | ICD-10-CM

## 2020-09-16 PROCEDURE — 93294 REM INTERROG EVL PM/LDLS PM: CPT | Performed by: INTERNAL MEDICINE

## 2020-09-16 PROCEDURE — 93296 REM INTERROG EVL PM/IDS: CPT | Performed by: INTERNAL MEDICINE

## 2020-09-16 NOTE — PROGRESS NOTES
Results for orders placed or performed in visit on 09/16/20   Cardiac EP device report    Narrative    MDT-DUAL CHAMBER PPM (AAIR-DDDR MODE)/ ACTIVE SYSTEM IS MRI CONDITIONAL  CARELINK TRANSMISSION: BATTERY VOLTAGE ADEQUATE (6 YRS)  AP 97 5%  <0 1%  ALL AVAILABLE LEAD PARAMETERS WITHIN NORMAL LIMITS  NO SIGNIFICANT HIGH RATE EPISODES  PACEMAKER FUNCTIONING APPROPRIATELY   EB

## 2020-09-16 NOTE — PROGRESS NOTES
Cardiology Follow Up    [facility] Setting  1935  [id number]  [facility] ARIEL Mckeon [address] [facility]  [phone]    1  Essential (primary) hypertension     2  Pure hypercholesterolemia     3  Paroxysmal atrial fibrillation (HCC)     4  Pacemaker         Interval History: Patient is here for a follow-up visit  Joanne Campbell has a dual-chamber pacemaker with ongoing interrogation  [facility] most recent check was in 9/2020 and at that time her device was found to be functioning well  There was no evidence of atrial fibrillation   She had a pharmacologic nuclear stress test done October 2016 which showed no evidence of prognostically important ischemia   An echocardiogram done 8/19 demonstrated preserved LV systolic function  and no significant valvular heart disease   Patient had a carotid Doppler done in [address] [place] [address] demonstrated a chronic occlusion of the right internal carotid with a 50-69% stenosis his noted in the left internal carotid  Karen Hodges was no significant change compared to a prior study done October 30, 2016  Patient has right subclavian stenosis as well  Patient follows with vascular in reference to this and a follow-up study is been arranged    Patient while in [place] over the winter time 2019, had an episode where she fractured her right shoulder   She apparently had issues with atrial fibrillation and was placed on anticoagulation   Aspirin was discontinued  It sounds like she had a [employer] in [place] although I do not have formal records of this  Karen Hodges is reference to one being done which showed no ischemia with an ejection fraction of 62%  Patient had a lipid profile done October 2019  Her total cholesterol was 122 with an HDL of 62 and a direct LDL of 45  Patient has had no chest pain or significant dyspnea  Her vital signs are stable today    She has been following with Gastroenterology in reference to she may require ERCP  cholelithiasis  She had recent EGD and colonoscopy  She has had some weight loss      Patient Active Problem List   Diagnosis    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, with long-term current use of insulin ([facility])    HTN (hypertension)    HLD (hyperlipidemia)    Glaucoma    Asymptomatic carotid artery stenosis, left    Symptomatic PVCs    Occlusion of right carotid artery    Stenosis of right subclavian artery ([facility])    Paroxysmal atrial fibrillation ([place] [address] )    Pacemaker    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, with long-term current use of insulin ([facility])    Type 2 diabetes mellitus with diabetic polyneuropathy, with long-term current use of insulin ([facility])    Osteoporosis    GERD (gastroesophageal reflux disease)    Anxiety    Uncontrolled type 2 diabetes mellitus with hypoglycemia ([facility])    Iron deficiency    Abnormal finding on GI tract imaging     Past Medical History:   Diagnosis Date    Diabetes mellitus ([place] [address] )     Glaucoma     H/O degenerative disc disease     Hyperlipidemia     Hypertension     Peripheral neuropathy     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 ([place] [address] )      Social History     Socioeconomic History    Marital status:      Spouse name: Not on file    Number of children: Not on file    Years of education: Not on file    Highest education level: Not on file   Occupational History    Occupation: customer service   retired   Social Needs    Financial resource strain: Not on file    Food insecurity     Worry: Not on file     Inability: Not on file   [employer] needs     Medical: Not on file     Non-medical: Not on file   Tobacco Use    Smoking status: Former Smoker     Last attempt to quit:      Years since quittin 7    Smokeless tobacco: Never Used   Substance and Sexual Activity  Alcohol use: No    Drug use: No    Sexual activity: Not on file   Lifestyle    Physical activity     Days per week: Not on file     Minutes per session: Not on file    Stress: Not on file   Relationships    Social connections     Talks on phone: Not on file     Gets together: Not on file     Attends Latter day service: Not on file     Active member of club or organization: Not on file     Attends meetings of clubs or organizations: Not on file     Relationship status: Not on file    Intimate partner violence     Fear of current or ex partner: Not on file     Emotionally abused: Not on file     Physically abused: Not on file     Forced sexual activity: Not on file   Other Topics Concern    Not on file   Social History Narrative    Lives alone [place]    2 children      Family History   Problem Relation Age of Onset    Heart attack Father     Hiatal hernia Father     Diabetes Father     Heart disease Mother     Cancer Brother     Diabetes Brother     Heart disease Brother     Hypertension Brother      Past Surgical History:   Procedure Laterality Date    CATARACT EXTRACTION, BILATERAL  2011    COLONOSCOPY      CYST REMOVAL  2009    left lower Quadrant     HERNIA REPAIR  1992    LIPOMA RESECTION  2010    VT REPAIR ING HERNIA,5+Y/O,REDUCIBL Bilateral 1/5/2018    Procedure: [address];  Surgeon: Vaughn Salter MD;  [facility];  Service: General    SHOULDER SURGERY  04/26/2019    Dr Adams [facility] [place]   [address] [facility] carotid occlusion       Current Outpatient Medications:     acetaminophen (TYLENOL) 500 mg tablet, Take 1,000 mg by mouth as needed for mild pain, Disp: , Rfl:     amoxicillin (AMOXIL) 500 mg capsule, TAKE 4 CAPSULES 1 HOUR BEFORE DENTAL APPOINTMENT, Disp: , Rfl:     aspirin (ECOTRIN LOW STRENGTH) 81 mg EC tablet, Take 1 tablet (81 mg total) by mouth daily, Disp:  , Rfl:     bimatoprost (LUMIGAN) 0 01 % ophthalmic drops, Administer 1 drop to both eyes daily at bedtime for 30 days, Disp: 1 5 mL, Rfl: 0    calcium carbonate (OS-DANIA) 600 MG tablet, Take 600 mg by mouth 2 (two) times a day with meals, Disp: , Rfl:     carboxymethylcellulose (Artificial Tears) 1 % ophthalmic solution, 1 drop 3 (three) times a day, Disp: , Rfl:     Eliquis 5 MG, TAKE 1 TABLET (5 MG TOTAL) BY MOUTH 2 (TWO) TIMES A DAY, Disp: 60 tablet, Rfl: 3    ezetimibe-simvastatin (VYTORIN) 10-40 mg per tablet, TAKE 1 TABLET BY MOUTH DAILY AT BEDTIME, Disp: 30 tablet, Rfl: 5    famotidine (PEPCID) 10 mg tablet, Take 10 mg by mouth as needed , Disp: , Rfl:     ferrous sulfate 325 (65 Fe) mg tablet, Take 325 mg by mouth daily with breakfast, Disp: , Rfl:     glucose blood (ONE TOUCH ULTRA TEST) test strip, TEST FOUR TIMES A DAY, Disp: 400 each, Rfl: 1    JANUVIA 100 MG tablet, TAKE 1 TABLET (100 MG TOTAL) BY MOUTH DAILY, Disp: 30 tablet, Rfl: 5    Lancets (ONETOUCH DELICA PLUS [id number]) MISC, Use one lancet to test blood 4 times a day, Disp: 400 each, Rfl: 1    loratadine (CLARITIN) 10 mg tablet, Take 10 mg by mouth daily as needed for allergies, Disp: , Rfl:     LORazepam (ATIVAN) 0 5 mg tablet, TAKE 2 TABLETS (1 MG TOTAL) BY MOUTH DAILY AT BEDTIME, Disp: 60 tablet, Rfl: 0    metFORMIN (GLUCOPHAGE) 500 mg tablet, Take 2 tablets (1000 mg) every morning and 1 tablet (500 mg) every evening (Patient taking differently: 500 mg 2 tabs in am and 1 tab in pm), Disp: 90 tablet, Rfl: 5    metoprolol tartrate (LOPRESSOR) 50 mg tablet, TAKE 1 5 TABLETS (75 MG TOTAL) BY MOUTH EVERY 12 (TWELVE) HOURS, Disp: 90 tablet, Rfl: 5    pantoprazole (PROTONIX) 40 mg tablet, Take 1 tablet (40 mg total) by mouth daily, Disp: 30 tablet, Rfl: 1    vitamin B-12 (VITAMIN B-12) 500 mcg tablet, Take 500 mcg by mouth daily, Disp: , Rfl:   No Known Allergies    Labs:not applicable  Imaging: Mri Abdomen W Wo Contrast And Mrcp    Result Date: 9/10/2020  Narrative: MRI OF THE ABDOMEN WITH AND WITHOUT CONTRAST WITH MRCP INDICATION:  Abnormal CT with possible choledocholithiasis, abdominal pain and diarrhea COMPARISON: CT from 7/30/2020 TECHNIQUE:  The following pulse sequences were obtained:  Coronal and axial T2 with TE of 90 and 180 respectively, axial T2 with fat saturation, axial FIESTA fat-sat, axial T1-weighted in-and-out-of phase, axial DWI/ADC, pre-contrast axial T1 with fat saturation, post-contrast dynamic axial T1 with fat saturation at 20, 70, and 180 seconds, followed by coronal and 7 minute delayed axial T1 with fat saturation  3D MRCP images were obtained with radial thick slabs and projections  3D rendering was performed from the acquisition scanner  Imaging performed on 1 5T MRI IV Contrast:  6 mL of gadobutrol injection (MULTI-DOSE) FINDINGS: LOWER CHEST:   Unremarkable  LIVER: Normal in size and configuration  No suspicious mass  The hepatic veins and portal veins are patent  BILE DUCTS:  As seen on prior CT and endoscopic ultrasound, there is a 3 mm distal common bile duct stone  The duct measures 7-8 mm distally, 5-6 mm more proximally  There is no intrahepatic biliary ductal dilatation  GALLBLADDER:  There are gallstones measuring up to 1 6 cm  PANCREAS:  There is a pancreas divisum  No main pancreatic ductal dilation  ADRENAL GLANDS:  Normal  SPLEEN:  Normal  KIDNEYS/PROXIMAL URETERS:  No hydroureteronephrosis  No suspicious renal mass  There are right renal cysts, the largest measuring 4 0 x 5 6 cm  There is a small left renal cyst  BOWEL:   No dilated loops of bowel  PERITONEUM/RETROPERITONEUM:  No ascites  LYMPH NODES:  No abdominal lymphadenopathy  VASCULAR STRUCTURES:  No aneurysm  ABDOMINAL WALL:  Unremarkable  OSSEOUS STRUCTURES:  No suspicious osseous lesion  Impression: 1  Stable 3 mm distal common bile duct stone with minimal dilatation of the distal common bile duct and normal caliber proximal duct and intrahepatic ducts  2   Cholelithiasis   3   Pancreas divisum  4   Bilateral renal cysts  The study was marked in EPIC for significant notification  Workstation performed: [id number]     Dxa Bone Density Spine Hip And Pelvis    Result Date: 9/3/2020  Narrative: CENTRAL  DXA SCAN CLINICAL HISTORY:   82year old post-menopausal  female risk factors include medication for reflux  History of diabetes and hypertension  TECHNIQUE: Bone densitometry was performed using a Horizon A bone densitometer  Regions of interest appear properly placed  There are no obvious fractures or other confounding variables which could limit the study  Degenerative changes in the lumbar spine may spuriously elevate bone density  COMPARISON:  None  RESULTS: LUMBAR SPINE:  L1-L4: BMD 0 808 gm/cm2 T-score -2 2 Z-score 0 6 LEFT TOTAL HIP: BMD 0 575 gm/cm2 T-score -3 0 Z-score -0 8 LEFT FEMORAL NECK: BMD 0 548 gm/cm2 T-score -2 7 Z-score -0 3     Impression: 1  Based on the [facility] classification, the T-score of -3 0 at the left hip is consistent with osteoporosis  2   According to the [address] [place] [address], prescription therapy is recommended with a T-score of -2 5 or less in the spine or hip after appropriate evaluation to exclude secondary causes  3   A daily intake of at least 1200 mg Calcium and 800 to 1000 IU of Vitamin D, as well as weight bearing and muscle strengthening exercise, fall prevention and avoidance of tobacco and excessive alcohol intake as  basic preventive measures are suggested  4   Repeat DXA  in 18 - 24 months, on the same machine, as clinically indicated  The 10 year risk of hip fracture is 7 0%, with the 10 year risk of major osteoporotic fracture being 18%, as calculated by the [facility] fracture risk assessment tool (FRAX)  The current NOF guidelines recommend treating patients with FRAX 10 year risk score of >3% for hip fracture and >20% for major osteoporotic fracture   WHO CLASSIFICATION: Normal (a T-score of -1 0 or higher) Low bone mineral density (a T-score of less than -1 0 but higher than -2 5) Osteoporosis (a T-score of -2 5 or less) Severe osteoporosis (a T-score of -2 5 or less with a fragility fracture)   Workstation performed: [id number]     Vas Carotid Complete Study    Result Date: 8/21/2020  Narrative:  [facility] REPORT CLINICAL: Indications:  Surveillance of carotid artery disease  Patient states long term dizziness only when getting up from certain chairs, though this is not a new problem  She states recent difficulty with swallowing food at times  Operative History: Vascular surgery, 1994 Risk Factors The patient has history of ELENA, HTN, Diabetes (IDDM), HLD, A-Fib and previous smoking (quit >10 yrs ago)  Clinical Right Pressure:  172/90 mm Hg Left Pressure:  180/90 mm Hg  FINDINGS:  Right        Impression  PSV  EDV (cm/s)  Direction of Flow  Ratio  Dist  ICA    Occluded                                               Mid  ICA     Occluded                                               Prox  ICA    Occluded                                               Dist CCA                  27           3                            Mid CCA                   36           0                      0 34  Prox CCA                 107           0                      1 91  Ext Carotid               70           5                      1 94  Prox Vert                 55          11  Antegrade                 Subclavian   50 - 75%    151           0                            Innominate                56           8                             Left         Impression        PSV  EDV (cm/s)  Direction of Flow  Ratio  Dist  ICA    Tortuous          135          35                      2 69  Mid  ICA     50 - 69%          137          27                      2 73  Prox   ICA    50 - 69%          142          46                      2 82  Dist CCA                        64          17                            Mid CCA                         50          13 0 84  Prox CCA                        60          12                            Ext Carotid  Minimal stenosis  165           5                      3 28  Prox Vert                       57          14  Antegrade                 Subclavian                     114           0                               CONCLUSION:  Impression RIGHT: Known occlusion of the internal carotid artery  Vertebral artery flow is antegrade  There is evidence of a 50-75 % stenosis in the proximal subclavian artery  LEFT: There is 50-69% stenosis noted in the internal carotid artery  Elevated velocities can represent compensatory flow for contralateral occlusion  Plaque is homogenous and irregular  Minimal stenosis noted in the proximal external carotid artery  Vertebral artery flow is antegrade  There is no significant subclavian artery disease  In comparison to the study of 7/16/18, there is new disease in the right SCLA  SIGNATURE: Electronically Signed by: Eunice Hill on 2020-08-21 07:51:22 PM      Review of Systems:  Review of Systems   All other systems reviewed and are negative  Physical Exam:  /70 (BP Location: Right arm, Patient Position: Sitting, Cuff Size: Standard)   Pulse 78   Temp 97 8 °F (36 6 °C)   Ht 5' 9" (1 753 m)   Wt 61 3 kg (135 lb 3 2 oz)   BMI 19 97 kg/m²   Physical Exam  Vitals signs reviewed  Constitutional:       Appearance: She is well-developed  HENT:      Head: Normocephalic and atraumatic  Eyes:      Conjunctiva/sclera: Conjunctivae normal       Pupils: Pupils are equal, round, and reactive to light  Neck:      Musculoskeletal: Normal range of motion and neck supple  Cardiovascular:      Rate and Rhythm: Normal rate  Heart sounds: Normal heart sounds  Pulmonary:      Effort: Pulmonary effort is normal       Breath sounds: Normal breath sounds  Skin:     General: Skin is warm and dry     Neurological:      Mental Status: She is alert and oriented to person, place, and time  Discussion/Summary:I will continue the patient's present medical regimen  The patient appears well compensated  I have asked the patient to call if there is a problem in the interim otherwise I will see the patient in six months time

## 2020-09-18 ENCOUNTER — OFFICE VISIT (OUTPATIENT)
Dept: FAMILY MEDICINE CLINIC | Facility: CLINIC | Age: 85
End: 2020-09-18
Payer: MEDICARE

## 2020-09-18 VITALS
TEMPERATURE: 98 F | BODY MASS INDEX: 21.44 KG/M2 | WEIGHT: 136.6 LBS | SYSTOLIC BLOOD PRESSURE: 120 MMHG | RESPIRATION RATE: 16 BRPM | OXYGEN SATURATION: 100 % | DIASTOLIC BLOOD PRESSURE: 80 MMHG | HEART RATE: 92 BPM | HEIGHT: 67 IN

## 2020-09-18 DIAGNOSIS — K80.50 COMMON BILE DUCT STONE: ICD-10-CM

## 2020-09-18 DIAGNOSIS — R63.4 WEIGHT LOSS: ICD-10-CM

## 2020-09-18 DIAGNOSIS — M81.0 OSTEOPOROSIS, UNSPECIFIED OSTEOPOROSIS TYPE, UNSPECIFIED PATHOLOGICAL FRACTURE PRESENCE: Primary | ICD-10-CM

## 2020-09-18 DIAGNOSIS — R05.9 COUGH: ICD-10-CM

## 2020-09-18 DIAGNOSIS — R13.10 DYSPHAGIA, UNSPECIFIED TYPE: ICD-10-CM

## 2020-09-18 PROCEDURE — 99214 OFFICE O/P EST MOD 30 MIN: CPT | Performed by: NURSE PRACTITIONER

## 2020-09-18 NOTE — PROGRESS NOTES
FAMILY PRACTICE OFFICE VISIT       NAME: Delroy Casiano  AGE: 82 y o  SEX: female       : 1935        MRN: [id number]      Assessment and Plan     Problem List Items Addressed This Visit        Musculoskeletal and Integument    Osteoporosis - Primary    Relevant Orders    Vitamin D 25 hydroxy      Other Visit Diagnoses     Common bile duct stone        Cough        Relevant Orders    XR chest pa & lateral    Weight loss        Relevant Orders    XR chest pa & lateral    Dysphagia, unspecified type        Relevant Orders    FL barium swallow          1  Osteoporosis, unspecified osteoporosis type, unspecified pathological fracture presence  Vitamin D 25 hydroxy   2  Common bile duct stone     3  Cough  XR chest pa & lateral   4  Weight loss  XR chest pa & lateral   5  Dysphagia, unspecified type  FL barium swallow     Osteoporosis:  We discussed at length recent DEXA scan results that show osteoporosis  We discussed importance of calcium and vitamin-D supplementation  She is having difficulty swallowing calcium pills  I have encouraged her to change to a chewable calcium supplement with vitamin-D   1200 mg of calcium is needed daily  We will repeat vitamin-D level  Discussed importance of weight-bearing exercises and several examples of weight-bearing exercises that she could do  We discussed medications to treat osteoporosis  She would not be a candidate at this time for Fosamax due to recent abdominal pain, GERD on pantoprazole, and dysphagia  We discussed Prolia and side effect profile  She declines Prolia, due to side effect profile  We discussed risks of not treating osteoporosis, including fractures from minor trauma, as well as spontaneous fractures, especially of the hip and spine  Check vitamin-D level  Discussed recent testing including MRI of the abdomen, colonoscopy, EGD, endoscopic ultrasound  Reviewed MRI results today  She does have a 3 mm common bile duct stone    It is unclear how long the stone has been present, or if the stone was causing her recent abdominal symptoms, as symptoms seemed to improve 1-2 weeks after starting pantoprazole  I have encouraged her to follow-up and discussed results and plan of care with Gastroenterology, Junior Chacon  She is agreeable will schedule  Weight loss, 14 pounds over the past 10 months  This summer experienced epigastric abdominal pain with poor appetite, which likely contributed to weight loss  She is trying to eat more again  Recent abdomen and pelvis scans, colonoscopy, EGD with no evidence of malignancy  She has had a chronic dry cough, will check a chest x-ray  Former smoker, quit in 1994  May consider CT chest in the future  Dysphagia: has had some difficulty swallowing last few years  Worsening recently  Can no longer swallow calcium tablets  Can't swallow plain peanut butter  Will send for barium swallow  She is due for repeat blood work in October  She will complete this and then return to office for follow up and weight check  She will call with any questions or concerns in the interim  Chief Complaint     Chief Complaint   Patient presents with    Follow-up     Pt is here for results, MRI and bone density       History of Present Illness     Josue Phoenix is an 82year old female presenting today for follow up  Recent testing for abdominal pain experienced in July demonstrates common bile duct stone  She is due to follow up with Dr Rodriguez Kathy to discuss testing in detail and plan of care  Pain has resolved 1-2 weeks after starting pantoprazole  Intermittent loose stools and diarrhea has resolved  She has felt more tired lately  Not as much energy is normal   She is not sure this is from just overall being sedentary, and COVID-19, she has not been going anywhere  She is trying to eat more  Drinking Glucerna once daily  Has been slowly losing weight    Has lost 14 lb over the past 10 months  Her appetite was down significantly this summer when she was having a lot of pain  Food log:  Breakfast: cheese and peanut butter on bread  Lunch: turkey sandwich  Dinner: Baked chicken, sweet potato, broccoli  Snack: Cheddar cheese before bed    Fasting blood sugar 163 today  Has difficulty swallowing, can't get calcium tablets down anymore  Used to eat a spoonful of peanut butter before bed, now can't swallow it  Sometimes feels like food gets stuck  No problems swallowing liquids  Continues to experience dizziness when standing up or bending over  Has constant rhinorrhea, dry cough sneezing  These symptoms seem worse at home  She thinks it is old rugs and dust  They have not had cleaning services in her apartment due to COVID-19  Review of Systems   Review of Systems   Constitutional: Negative  HENT: Positive for postnasal drip, rhinorrhea and sneezing  Negative for congestion, ear pain and sore throat  Respiratory: Positive for cough  Negative for chest tightness, shortness of breath and wheezing  Cardiovascular: Negative  Musculoskeletal: Negative  Skin: Negative  Neurological: Positive for dizziness  Negative for tremors, weakness and headaches  Hematological: Negative  Psychiatric/Behavioral: Negative          Active Problem List     Patient Active Problem List   Diagnosis    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    Pacemaker    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, with long-term current use of insulin ([facility])    Type 2 diabetes mellitus with diabetic polyneuropathy, with long-term current use of insulin (HCC)    Osteoporosis    GERD (gastroesophageal reflux disease)    Anxiety    Uncontrolled type 2 diabetes mellitus with hypoglycemia (HCC)    Iron deficiency    Abnormal finding on GI tract imaging       Past Medical History:  Past Medical History:   Diagnosis Date    Diabetes mellitus ([facility] )     Glaucoma     H/O degenerative disc disease     Hyperlipidemia     Hypertension     Peripheral neuropathy     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 ([facility] )        Past Surgical History:  Past Surgical History:   Procedure Laterality Date    CATARACT EXTRACTION, BILATERAL  2011    COLONOSCOPY      CYST REMOVAL  2009    left lower Quadrant     HERNIA REPAIR  1992    LIPOMA RESECTION  2010    IL REPAIR ING HERNIA,5+Y/O,REDUCIBL Bilateral 1/5/2018    Procedure: INGUINAL HERNIA REPAIR;  Surgeon: Sierra Mabry MD;  Location: [facility];  Service: General    SHOULDER SURGERY  04/26/2019    [facility]  R [facility] occlusion       Family History:  Family History   Problem Relation Age of Onset    Heart attack Father     Hiatal hernia Father     Diabetes Father     Heart disease Mother     Cancer Brother     Diabetes Brother     Heart disease Brother     Hypertension Brother        Social History:  Social History     Socioeconomic History    Marital status:      Spouse name: Not on file    Number of children: Not on file    Years of education: Not on file    Highest education level: Not on file   Occupational History    Occupation: customer service   retired   Social Needs    Financial resource strain: Not on file    Food insecurity     Worry: Not on file     Inability: Not on file   [employer] needs     Medical: Not on file     Non-medical: Not on file   Tobacco Use    Smoking status: Former Smoker     Last attempt to quit: 1994     Years since quittin 7    Smokeless tobacco: Never Used   Substance and Sexual Activity    Alcohol use: No    Drug use: No    Sexual activity: Not on file   Lifestyle    Physical activity     Days per week: Not on file     Minutes per session: Not on file    Stress: Not on file   Relationships    Social connections     Talks on phone: Not on file     Gets together: Not on file     Attends Pentecostalism service: Not on file     Active member of club or organization: Not on file     Attends meetings of clubs or organizations: Not on file     Relationship status: Not on file    Intimate partner violence     Fear of current or ex partner: Not on file     Emotionally abused: Not on file     Physically abused: Not on file     Forced sexual activity: Not on file   Other Topics Concern    Not on file   Social History Narrative    Lives alone [place]    2 children       I have reviewed the patient's medical history in detail; there are no changes to the history as noted in the electronic medical record  Objective     Vitals:    20 1112   BP: 120/80   Pulse: 92   Resp: 16   Temp: 98 °F (36 7 °C)   TempSrc: Temporal   SpO2: 100%   Weight: 62 kg (136 lb 9 6 oz)   Height: 5' 7" (1 702 m)     Wt Readings from Last 3 Encounters:   20 62 kg (136 lb 9 6 oz)   20 61 7 kg (136 lb)   20 61 7 kg (136 lb)     Physical Exam  Vitals signs and nursing note reviewed  Constitutional:       General: She is not in acute distress  Appearance: Normal appearance  She is not ill-appearing, toxic-appearing or diaphoretic  HENT:      Head: Normocephalic and atraumatic  Right Ear: Tympanic membrane and ear canal normal       Left Ear: Tympanic membrane and ear canal normal    Eyes:      Pupils: Pupils are equal, round, and reactive to light  Cardiovascular:      Rate and Rhythm: Normal rate and regular rhythm  Heart sounds: No murmur     Pulmonary:      Effort: Pulmonary effort is normal  No respiratory distress  Breath sounds: Normal breath sounds  No wheezing or rales  Abdominal:      General: Abdomen is flat  Bowel sounds are normal       Palpations: Abdomen is soft  Tenderness: There is no abdominal tenderness  Musculoskeletal:      Right lower leg: No edema  Left lower leg: No edema  Skin:     General: Skin is warm and dry  Neurological:      Mental Status: She is alert and oriented to person, place, and time     Psychiatric:         Mood and Affect: Mood normal          Behavior: Behavior normal             ALLERGIES:  No Known Allergies    Current Medications     Current Outpatient Medications   Medication Sig Dispense Refill    acetaminophen (TYLENOL) 500 mg tablet Take 1,000 mg by mouth as needed for mild pain      amoxicillin (AMOXIL) 500 mg capsule TAKE 4 CAPSULES 1 HOUR BEFORE DENTAL APPOINTMENT      aspirin (ECOTRIN LOW STRENGTH) 81 mg EC tablet Take 1 tablet (81 mg total) by mouth daily      bimatoprost (LUMIGAN) 0 01 % ophthalmic drops Administer 1 drop to both eyes daily at bedtime for 30 days 1 5 mL 0    calcium carbonate (OS-DANIA) 600 MG tablet Take 600 mg by mouth 2 (two) times a day with meals      carboxymethylcellulose (Artificial Tears) 1 % ophthalmic solution 1 drop 3 (three) times a day      Eliquis 5 MG TAKE 1 TABLET (5 MG TOTAL) BY MOUTH 2 (TWO) TIMES A DAY 60 tablet 3    ezetimibe-simvastatin (VYTORIN) 10-40 mg per tablet TAKE 1 TABLET BY MOUTH DAILY AT BEDTIME 30 tablet 5    famotidine (PEPCID) 10 mg tablet Take 10 mg by mouth as needed       ferrous sulfate 325 (65 Fe) mg tablet Take 325 mg by mouth daily with breakfast      glucose blood (ONE TOUCH ULTRA TEST) test strip TEST FOUR TIMES A  each 1    JANUVIA 100 MG tablet TAKE 1 TABLET (100 MG TOTAL) BY MOUTH DAILY 30 tablet 5    Lancets (ONETOUCH DELICA PLUS [id number]) MISC Use one lancet to test blood 4 times a day 400 each 1    loratadine (CLARITIN) 10 mg tablet Take 10 mg by mouth daily as needed for allergies      LORazepam (ATIVAN) 0 5 mg tablet TAKE 2 TABLETS (1 MG TOTAL) BY MOUTH DAILY AT BEDTIME 60 tablet 0    metFORMIN (GLUCOPHAGE) 500 mg tablet Take 2 tablets (1000 mg) every morning and 1 tablet (500 mg) every evening 90 tablet 5    metoprolol tartrate (LOPRESSOR) 50 mg tablet TAKE 1 5 TABLETS (75 MG TOTAL) BY MOUTH EVERY 12 (TWELVE) HOURS 90 tablet 5    pantoprazole (PROTONIX) 40 mg tablet Take 1 tablet (40 mg total) by mouth daily 30 tablet 1    vitamin B-12 (VITAMIN B-12) 500 mcg tablet Take 500 mcg by mouth daily       No current facility-administered medications for this visit            Health Maintenance     Health Maintenance   Topic Date Due    DTaP,Tdap,and Td Vaccines (1 - Tdap) 12/01/1956    Influenza Vaccine  07/01/2020    HEMOGLOBIN A1C  01/03/2021    Diabetic Foot Exam  01/23/2021    Fall Risk  07/30/2021    Depression Screening PHQ  07/30/2021    Medicare Annual Wellness Visit (AWV)  07/30/2021    DM Eye Exam  08/11/2021    BMI: Adult  09/18/2021    Colonoscopy Surveillance  09/02/2025    Pneumococcal Vaccine: 65+ Years  Completed    HIB Vaccine  Aged Out    Hepatitis B Vaccine  Aged Out    IPV Vaccine  Aged Out    Hepatitis A Vaccine  Aged Out    Meningococcal ACWY Vaccine  Aged Out    HPV Vaccine  Aged [employer] History   Administered Date(s) Administered    INFLUENZA 11/02/2016, 10/16/2017, 09/18/2018, 09/18/2018    Influenza, Quadrivalent (nasal) 11/02/2016    Influenza, high dose seasonal 0 7 mL 11/06/2019    Pneumococcal Conjugate 13-Valent 07/25/2019    Pneumococcal Polysaccharide PPV23 03/17/2003    Zoster 07/09/2010       ZANDER Moore

## 2020-09-20 PROBLEM — M81.0 OSTEOPOROSIS: Status: ACTIVE | Noted: 2019-06-29

## 2020-09-21 ENCOUNTER — OFFICE VISIT (OUTPATIENT)
Dept: CARDIOLOGY CLINIC | Facility: CLINIC | Age: 85
End: 2020-09-21
Payer: MEDICARE

## 2020-09-21 VITALS
WEIGHT: 135.2 LBS | HEART RATE: 78 BPM | TEMPERATURE: 97.8 F | BODY MASS INDEX: 20.03 KG/M2 | HEIGHT: 69 IN | DIASTOLIC BLOOD PRESSURE: 70 MMHG | SYSTOLIC BLOOD PRESSURE: 112 MMHG

## 2020-09-21 DIAGNOSIS — I10 ESSENTIAL (PRIMARY) HYPERTENSION: Primary | ICD-10-CM

## 2020-09-21 DIAGNOSIS — Z79.4 TYPE 2 DIABETES MELLITUS WITH RETINOPATHY, WITH LONG-TERM CURRENT USE OF INSULIN, MACULAR EDEMA PRESENCE UNSPECIFIED, UNSPECIFIED LATERALITY, UNSPECIFIED RETINOPATHY SEVERITY (HCC): ICD-10-CM

## 2020-09-21 DIAGNOSIS — Z95.0 PACEMAKER: ICD-10-CM

## 2020-09-21 DIAGNOSIS — E78.00 PURE HYPERCHOLESTEROLEMIA: ICD-10-CM

## 2020-09-21 DIAGNOSIS — I48.0 PAROXYSMAL ATRIAL FIBRILLATION (HCC): ICD-10-CM

## 2020-09-21 DIAGNOSIS — E11.319 TYPE 2 DIABETES MELLITUS WITH RETINOPATHY, WITH LONG-TERM CURRENT USE OF INSULIN, MACULAR EDEMA PRESENCE UNSPECIFIED, UNSPECIFIED LATERALITY, UNSPECIFIED RETINOPATHY SEVERITY (HCC): ICD-10-CM

## 2020-09-21 PROCEDURE — 99214 OFFICE O/P EST MOD 30 MIN: CPT | Performed by: INTERNAL MEDICINE

## 2020-09-21 RX ORDER — SITAGLIPTIN 100 MG/1
TABLET, FILM COATED ORAL
Qty: 30 TABLET | Refills: 5 | Status: SHIPPED | OUTPATIENT
Start: 2020-09-21 | End: 2021-03-03 | Stop reason: SDUPTHER

## 2020-09-25 DIAGNOSIS — R10.13 EPIGASTRIC PAIN: ICD-10-CM

## 2020-09-25 RX ORDER — PANTOPRAZOLE SODIUM 40 MG/1
40 TABLET, DELAYED RELEASE ORAL DAILY
Qty: 30 TABLET | Refills: 1 | Status: SHIPPED | OUTPATIENT
Start: 2020-09-25 | End: 2020-10-18 | Stop reason: ALTCHOICE

## 2020-09-29 ENCOUNTER — APPOINTMENT (OUTPATIENT)
Dept: LAB | Facility: CLINIC | Age: 85
End: 2020-09-29
Payer: MEDICARE

## 2020-09-29 DIAGNOSIS — F41.9 ANXIETY: ICD-10-CM

## 2020-09-29 DIAGNOSIS — M81.0 OSTEOPOROSIS, UNSPECIFIED OSTEOPOROSIS TYPE, UNSPECIFIED PATHOLOGICAL FRACTURE PRESENCE: ICD-10-CM

## 2020-09-29 LAB
25(OH)D3 SERPL-MCNC: 33.5 NG/ML (ref 30–100)
ALBUMIN SERPL BCP-MCNC: 4.3 G/DL (ref 3.5–5)
ALP SERPL-CCNC: 56 U/L (ref 46–116)
ALT SERPL W P-5'-P-CCNC: 21 U/L (ref 12–78)
ANION GAP SERPL CALCULATED.3IONS-SCNC: 4 MMOL/L (ref 4–13)
AST SERPL W P-5'-P-CCNC: 14 U/L (ref 5–45)
BILIRUB SERPL-MCNC: 0.54 MG/DL (ref 0.2–1)
BUN SERPL-MCNC: 14 MG/DL (ref 5–25)
CALCIUM SERPL-MCNC: 9.1 MG/DL (ref 8.3–10.1)
CHLORIDE SERPL-SCNC: 98 MMOL/L (ref 100–108)
CHOLEST SERPL-MCNC: 139 MG/DL (ref 50–200)
CO2 SERPL-SCNC: 32 MMOL/L (ref 21–32)
CREAT SERPL-MCNC: 0.46 MG/DL (ref 0.6–1.3)
ERYTHROCYTE [DISTWIDTH] IN BLOOD BY AUTOMATED COUNT: 13.6 % (ref 11.6–15.1)
EST. AVERAGE GLUCOSE BLD GHB EST-MCNC: 186 MG/DL
GFR SERPL CREATININE-BSD FRML MDRD: 93 ML/MIN/1.73SQ M
GLUCOSE P FAST SERPL-MCNC: 158 MG/DL (ref 65–99)
HBA1C MFR BLD: 8.1 %
HCT VFR BLD AUTO: 37.8 % (ref 34.8–46.1)
HDLC SERPL-MCNC: 57 MG/DL
HGB BLD-MCNC: 12 G/DL (ref 11.5–15.4)
LDLC SERPL CALC-MCNC: 65 MG/DL (ref 0–100)
MCH RBC QN AUTO: 27.5 PG (ref 26.8–34.3)
MCHC RBC AUTO-ENTMCNC: 31.7 G/DL (ref 31.4–37.4)
MCV RBC AUTO: 87 FL (ref 82–98)
NONHDLC SERPL-MCNC: 82 MG/DL
PLATELET # BLD AUTO: 170 THOUSANDS/UL (ref 149–390)
PMV BLD AUTO: 10.6 FL (ref 8.9–12.7)
POTASSIUM SERPL-SCNC: 4.2 MMOL/L (ref 3.5–5.3)
PROT SERPL-MCNC: 7.5 G/DL (ref 6.4–8.2)
RBC # BLD AUTO: 4.37 MILLION/UL (ref 3.81–5.12)
SODIUM SERPL-SCNC: 134 MMOL/L (ref 136–145)
TRIGL SERPL-MCNC: 84 MG/DL
TSH SERPL DL<=0.05 MIU/L-ACNC: 0.76 UIU/ML (ref 0.36–3.74)
WBC # BLD AUTO: 5.39 THOUSAND/UL (ref 4.31–10.16)

## 2020-09-29 PROCEDURE — 84443 ASSAY THYROID STIM HORMONE: CPT

## 2020-09-29 PROCEDURE — 80061 LIPID PANEL: CPT

## 2020-09-29 PROCEDURE — 36415 COLL VENOUS BLD VENIPUNCTURE: CPT

## 2020-09-29 PROCEDURE — 83036 HEMOGLOBIN GLYCOSYLATED A1C: CPT

## 2020-09-29 PROCEDURE — 85027 COMPLETE CBC AUTOMATED: CPT

## 2020-09-29 PROCEDURE — 80053 COMPREHEN METABOLIC PANEL: CPT

## 2020-09-29 PROCEDURE — 82306 VITAMIN D 25 HYDROXY: CPT

## 2020-09-29 RX ORDER — LORAZEPAM 0.5 MG/1
1 TABLET ORAL
Qty: 60 TABLET | Refills: 0 | Status: SHIPPED | OUTPATIENT
Start: 2020-09-29 | End: 2020-10-27

## 2020-10-06 ENCOUNTER — OFFICE VISIT (OUTPATIENT)
Dept: FAMILY MEDICINE CLINIC | Facility: CLINIC | Age: 85
End: 2020-10-06
Payer: MEDICARE

## 2020-10-06 VITALS
TEMPERATURE: 97.3 F | HEART RATE: 74 BPM | BODY MASS INDEX: 20.06 KG/M2 | HEIGHT: 69 IN | OXYGEN SATURATION: 98 % | DIASTOLIC BLOOD PRESSURE: 80 MMHG | SYSTOLIC BLOOD PRESSURE: 130 MMHG | WEIGHT: 135.4 LBS | RESPIRATION RATE: 16 BRPM

## 2020-10-06 DIAGNOSIS — R13.10 DYSPHAGIA, UNSPECIFIED TYPE: ICD-10-CM

## 2020-10-06 DIAGNOSIS — Z79.4 TYPE 2 DIABETES MELLITUS WITH DIABETIC POLYNEUROPATHY, WITH LONG-TERM CURRENT USE OF INSULIN (HCC): Primary | ICD-10-CM

## 2020-10-06 DIAGNOSIS — E11.42 TYPE 2 DIABETES MELLITUS WITH DIABETIC POLYNEUROPATHY, WITH LONG-TERM CURRENT USE OF INSULIN (HCC): Primary | ICD-10-CM

## 2020-10-06 DIAGNOSIS — M81.0 OSTEOPOROSIS WITHOUT CURRENT PATHOLOGICAL FRACTURE, UNSPECIFIED OSTEOPOROSIS TYPE: ICD-10-CM

## 2020-10-06 DIAGNOSIS — Z23 INFLUENZA VACCINE NEEDED: ICD-10-CM

## 2020-10-06 DIAGNOSIS — I10 HYPERTENSION, UNSPECIFIED TYPE: ICD-10-CM

## 2020-10-06 DIAGNOSIS — E78.5 HYPERLIPIDEMIA, UNSPECIFIED HYPERLIPIDEMIA TYPE: ICD-10-CM

## 2020-10-06 DIAGNOSIS — K21.00 GASTROESOPHAGEAL REFLUX DISEASE WITH ESOPHAGITIS WITHOUT HEMORRHAGE: ICD-10-CM

## 2020-10-06 PROCEDURE — 99214 OFFICE O/P EST MOD 30 MIN: CPT | Performed by: NURSE PRACTITIONER

## 2020-10-06 PROCEDURE — 90662 IIV NO PRSV INCREASED AG IM: CPT | Performed by: FAMILY MEDICINE

## 2020-10-06 PROCEDURE — G0008 ADMIN INFLUENZA VIRUS VAC: HCPCS | Performed by: FAMILY MEDICINE

## 2020-10-08 ENCOUNTER — TRANSCRIBE ORDERS (OUTPATIENT)
Dept: RADIOLOGY | Facility: HOSPITAL | Age: 85
End: 2020-10-08

## 2020-10-08 ENCOUNTER — HOSPITAL ENCOUNTER (OUTPATIENT)
Dept: RADIOLOGY | Facility: HOSPITAL | Age: 85
Discharge: HOME | End: 2020-10-08
Payer: MEDICARE

## 2020-10-08 DIAGNOSIS — R05.9 COUGH: ICD-10-CM

## 2020-10-08 DIAGNOSIS — R13.10 DYSPHAGIA, UNSPECIFIED TYPE: Primary | ICD-10-CM

## 2020-10-08 DIAGNOSIS — R13.10 DYSPHAGIA, UNSPECIFIED TYPE: ICD-10-CM

## 2020-10-08 DIAGNOSIS — R63.4 WEIGHT LOSS: ICD-10-CM

## 2020-10-08 PROCEDURE — 71046 X-RAY EXAM CHEST 2 VIEWS: CPT

## 2020-10-08 PROCEDURE — 74220 X-RAY XM ESOPHAGUS 1CNTRST: CPT

## 2020-10-09 ENCOUNTER — TELEPHONE (OUTPATIENT)
Dept: FAMILY MEDICINE CLINIC | Facility: CLINIC | Age: 85
End: 2020-10-09

## 2020-10-12 DIAGNOSIS — J84.10 PULMONARY FIBROSIS (HCC): Primary | ICD-10-CM

## 2020-10-13 ENCOUNTER — TELEPHONE (OUTPATIENT)
Dept: PULMONOLOGY | Facility: CLINIC | Age: 85
End: 2020-10-13

## 2020-10-13 PROBLEM — J84.9 INTERSTITIAL LUNG DISEASE (HCC): Status: ACTIVE | Noted: 2020-10-13

## 2020-10-14 ENCOUNTER — OFFICE VISIT (OUTPATIENT)
Dept: PULMONOLOGY | Facility: CLINIC | Age: 85
End: 2020-10-14
Payer: MEDICARE

## 2020-10-14 VITALS
BODY MASS INDEX: 19.99 KG/M2 | OXYGEN SATURATION: 97 % | HEART RATE: 84 BPM | RESPIRATION RATE: 16 BRPM | SYSTOLIC BLOOD PRESSURE: 158 MMHG | TEMPERATURE: 98.3 F | DIASTOLIC BLOOD PRESSURE: 80 MMHG | HEIGHT: 69 IN | WEIGHT: 135 LBS

## 2020-10-14 DIAGNOSIS — I10 HYPERTENSION, UNSPECIFIED TYPE: ICD-10-CM

## 2020-10-14 DIAGNOSIS — K21.00 GASTROESOPHAGEAL REFLUX DISEASE WITH ESOPHAGITIS WITHOUT HEMORRHAGE: ICD-10-CM

## 2020-10-14 DIAGNOSIS — R09.82 POST-NASAL DRIP: ICD-10-CM

## 2020-10-14 DIAGNOSIS — J84.9 INTERSTITIAL LUNG DISEASE (HCC): Primary | ICD-10-CM

## 2020-10-14 DIAGNOSIS — R09.82 POSTNASAL DRIP: ICD-10-CM

## 2020-10-14 DIAGNOSIS — J31.0 CHRONIC RHINITIS: ICD-10-CM

## 2020-10-14 DIAGNOSIS — Z91.09 ENVIRONMENTAL ALLERGIES: ICD-10-CM

## 2020-10-14 DIAGNOSIS — J84.10 PULMONARY FIBROSIS (HCC): ICD-10-CM

## 2020-10-14 DIAGNOSIS — I48.0 PAROXYSMAL ATRIAL FIBRILLATION (HCC): ICD-10-CM

## 2020-10-14 PROCEDURE — 99205 OFFICE O/P NEW HI 60 MIN: CPT | Performed by: INTERNAL MEDICINE

## 2020-10-14 RX ORDER — AZELASTINE 1 MG/ML
1 SPRAY, METERED NASAL 2 TIMES DAILY
Qty: 1 BOTTLE | Refills: 2 | Status: SHIPPED | OUTPATIENT
Start: 2020-10-14 | End: 2021-07-19

## 2020-10-14 RX ORDER — FLUTICASONE PROPIONATE 50 MCG
2 SPRAY, SUSPENSION (ML) NASAL DAILY
Qty: 9.9 ML | Refills: 3 | Status: CANCELLED | OUTPATIENT
Start: 2020-10-14

## 2020-10-16 ENCOUNTER — LAB (OUTPATIENT)
Dept: LAB | Facility: CLINIC | Age: 85
End: 2020-10-16
Payer: MEDICARE

## 2020-10-16 DIAGNOSIS — J31.0 CHRONIC RHINITIS: ICD-10-CM

## 2020-10-16 DIAGNOSIS — Z91.09 ENVIRONMENTAL ALLERGIES: ICD-10-CM

## 2020-10-16 DIAGNOSIS — J84.9 INTERSTITIAL LUNG DISEASE (HCC): ICD-10-CM

## 2020-10-16 LAB — RHEUMATOID FACT SER QL LA: NEGATIVE

## 2020-10-16 PROCEDURE — 86671 FUNGUS NES ANTIBODY: CPT

## 2020-10-16 PROCEDURE — 86255 FLUORESCENT ANTIBODY SCREEN: CPT

## 2020-10-16 PROCEDURE — 86430 RHEUMATOID FACTOR TEST QUAL: CPT

## 2020-10-16 PROCEDURE — 36415 COLL VENOUS BLD VENIPUNCTURE: CPT

## 2020-10-16 PROCEDURE — 86038 ANTINUCLEAR ANTIBODIES: CPT

## 2020-10-16 PROCEDURE — 86331 IMMUNODIFFUSION OUCHTERLONY: CPT

## 2020-10-16 PROCEDURE — 86602 ANTINOMYCES ANTIBODY: CPT

## 2020-10-16 PROCEDURE — 82785 ASSAY OF IGE: CPT

## 2020-10-16 PROCEDURE — 86200 CCP ANTIBODY: CPT

## 2020-10-16 PROCEDURE — 86606 ASPERGILLUS ANTIBODY: CPT

## 2020-10-16 PROCEDURE — 86003 ALLG SPEC IGE CRUDE XTRC EA: CPT

## 2020-10-18 LAB — CCP IGA+IGG SERPL IA-ACNC: 14 UNITS (ref 0–19)

## 2020-10-19 LAB
A ALTERNATA IGE QN: <0.1 KUA/I
A FUMIGATUS IGE QN: <0.1 KUA/I
ALLERGEN COMMENT: ABNORMAL
BERMUDA GRASS IGE QN: <0.1 KUA/I
BOXELDER IGE QN: <0.1 KUA/I
C HERBARUM IGE QN: <0.1 KUA/I
C-ANCA TITR SER IF: NORMAL TITER
CAT DANDER IGE QN: <0.1 KUA/I
CMN PIGWEED IGE QN: <0.1 KUA/I
COMMON RAGWEED IGE QN: <0.1 KUA/I
COTTONWOOD IGE QN: <0.1 KUA/I
D FARINAE IGE QN: <0.1 KUA/I
D PTERONYSS IGE QN: <0.1 KUA/I
DOG DANDER IGE QN: <0.1 KUA/I
LONDON PLANE IGE QN: 0.16 KUA/I
MOUSE URINE PROT IGE QN: <0.1 KUA/I
MT JUNIPER IGE QN: <0.1 KUA/I
MUGWORT IGE QN: 2.21 KUA/I
MYELOPEROXIDASE AB SER IA-ACNC: <9 U/ML (ref 0–9)
P NOTATUM IGE QN: <0.1 KUA/I
P-ANCA ATYPICAL TITR SER IF: NORMAL TITER
P-ANCA TITR SER IF: NORMAL TITER
PROTEINASE3 AB SER IA-ACNC: <3.5 U/ML (ref 0–3.5)
ROACH IGE QN: <0.1 KUA/I
RYE IGE QN: NEGATIVE
SHEEP SORREL IGE QN: <0.1 KUA/I
SILVER BIRCH IGE QN: 0.13 KUA/I
TIMOTHY IGE QN: <0.1 KUA/I
TOTAL IGE SMQN RAST: 14.6 KU/L (ref 0–113)
WALNUT IGE QN: <0.1 KUA/I
WHITE ASH IGE QN: <0.1 KUA/I
WHITE ELM IGE QN: <0.1 KUA/I
WHITE MULBERRY IGE QN: <0.1 KUA/I
WHITE OAK IGE QN: 0.23 KUA/I

## 2020-10-21 LAB
A FUMIGATUS1 AB SER QL ID: NEGATIVE
A PULLULANS AB SER QL: NEGATIVE
LACEYELLA SACCHARI AB SER QL: NEGATIVE
PIGEON SERUM AB QL ID: NEGATIVE
S RECTIVIRGULA AB SER QL ID: NEGATIVE
T VULGARIS AB SER QL ID: NEGATIVE

## 2020-10-27 DIAGNOSIS — F41.9 ANXIETY: ICD-10-CM

## 2020-10-27 RX ORDER — LORAZEPAM 0.5 MG/1
1 TABLET ORAL
Qty: 60 TABLET | Refills: 0 | Status: SHIPPED | OUTPATIENT
Start: 2020-10-27 | End: 2020-11-23

## 2020-11-03 ENCOUNTER — OFFICE VISIT (OUTPATIENT)
Dept: FAMILY MEDICINE CLINIC | Facility: CLINIC | Age: 85
End: 2020-11-03
Payer: MEDICARE

## 2020-11-03 VITALS
BODY MASS INDEX: 19.76 KG/M2 | HEART RATE: 80 BPM | WEIGHT: 133.4 LBS | SYSTOLIC BLOOD PRESSURE: 130 MMHG | TEMPERATURE: 97.3 F | DIASTOLIC BLOOD PRESSURE: 70 MMHG | OXYGEN SATURATION: 99 % | HEIGHT: 69 IN | RESPIRATION RATE: 16 BRPM

## 2020-11-03 DIAGNOSIS — E11.42 TYPE 2 DIABETES MELLITUS WITH DIABETIC POLYNEUROPATHY, WITH LONG-TERM CURRENT USE OF INSULIN (HCC): ICD-10-CM

## 2020-11-03 DIAGNOSIS — R39.9 UTI SYMPTOMS: Primary | ICD-10-CM

## 2020-11-03 DIAGNOSIS — Z79.4 TYPE 2 DIABETES MELLITUS WITH DIABETIC POLYNEUROPATHY, WITH LONG-TERM CURRENT USE OF INSULIN (HCC): ICD-10-CM

## 2020-11-03 DIAGNOSIS — R82.90 ABNORMAL URINE FINDING: ICD-10-CM

## 2020-11-03 LAB
SL AMB  POCT GLUCOSE, UA: NORMAL
SL AMB LEUKOCYTE ESTERASE,UA: NORMAL
SL AMB POCT BILIRUBIN,UA: NORMAL
SL AMB POCT BLOOD,UA: NORMAL
SL AMB POCT CLARITY,UA: NORMAL
SL AMB POCT COLOR,UA: NORMAL
SL AMB POCT KETONES,UA: NORMAL
SL AMB POCT NITRITE,UA: NORMAL
SL AMB POCT PH,UA: 6
SL AMB POCT SPECIFIC GRAVITY,UA: 1
SL AMB POCT URINE PROTEIN: NORMAL
SL AMB POCT UROBILINOGEN: 0.2

## 2020-11-03 PROCEDURE — 99213 OFFICE O/P EST LOW 20 MIN: CPT | Performed by: NURSE PRACTITIONER

## 2020-11-03 PROCEDURE — 87077 CULTURE AEROBIC IDENTIFY: CPT | Performed by: NURSE PRACTITIONER

## 2020-11-03 PROCEDURE — 81003 URINALYSIS AUTO W/O SCOPE: CPT | Performed by: NURSE PRACTITIONER

## 2020-11-03 PROCEDURE — 87186 SC STD MICRODIL/AGAR DIL: CPT | Performed by: NURSE PRACTITIONER

## 2020-11-03 PROCEDURE — 87086 URINE CULTURE/COLONY COUNT: CPT | Performed by: NURSE PRACTITIONER

## 2020-11-03 RX ORDER — CEPHALEXIN 500 MG/1
500 CAPSULE ORAL EVERY 12 HOURS SCHEDULED
Qty: 14 CAPSULE | Refills: 0 | Status: SHIPPED | OUTPATIENT
Start: 2020-11-03 | End: 2020-11-04

## 2020-11-04 ENCOUNTER — TELEPHONE (OUTPATIENT)
Dept: FAMILY MEDICINE CLINIC | Facility: CLINIC | Age: 85
End: 2020-11-04

## 2020-11-04 DIAGNOSIS — R39.9 UTI SYMPTOMS: Primary | ICD-10-CM

## 2020-11-04 RX ORDER — NITROFURANTOIN 25; 75 MG/1; MG/1
100 CAPSULE ORAL 2 TIMES DAILY
Qty: 10 CAPSULE | Refills: 0 | Status: SHIPPED | OUTPATIENT
Start: 2020-11-04 | End: 2020-11-09

## 2020-11-06 ENCOUNTER — TELEPHONE (OUTPATIENT)
Dept: FAMILY MEDICINE CLINIC | Facility: CLINIC | Age: 85
End: 2020-11-06

## 2020-11-06 LAB — BACTERIA UR CULT: ABNORMAL

## 2020-11-11 ENCOUNTER — HOSPITAL ENCOUNTER (OUTPATIENT)
Dept: CT IMAGING | Facility: HOSPITAL | Age: 85
Discharge: HOME | End: 2020-11-11
Attending: INTERNAL MEDICINE
Payer: MEDICARE

## 2020-11-11 ENCOUNTER — HOSPITAL ENCOUNTER (OUTPATIENT)
Dept: PULMONOLOGY | Facility: HOSPITAL | Age: 85
Discharge: HOME | End: 2020-11-11
Attending: INTERNAL MEDICINE
Payer: MEDICARE

## 2020-11-11 DIAGNOSIS — J84.10 PULMONARY FIBROSIS (HCC): ICD-10-CM

## 2020-11-11 DIAGNOSIS — J84.9 INTERSTITIAL LUNG DISEASE (HCC): ICD-10-CM

## 2020-11-11 PROCEDURE — 94010 BREATHING CAPACITY TEST: CPT

## 2020-11-11 PROCEDURE — 94729 DIFFUSING CAPACITY: CPT

## 2020-11-11 PROCEDURE — 94726 PLETHYSMOGRAPHY LUNG VOLUMES: CPT | Performed by: INTERNAL MEDICINE

## 2020-11-11 PROCEDURE — 94618 PULMONARY STRESS TESTING: CPT | Performed by: INTERNAL MEDICINE

## 2020-11-11 PROCEDURE — G1004 CDSM NDSC: HCPCS

## 2020-11-11 PROCEDURE — 94761 N-INVAS EAR/PLS OXIMETRY MLT: CPT

## 2020-11-11 PROCEDURE — 94729 DIFFUSING CAPACITY: CPT | Performed by: INTERNAL MEDICINE

## 2020-11-11 PROCEDURE — 94726 PLETHYSMOGRAPHY LUNG VOLUMES: CPT

## 2020-11-11 PROCEDURE — 71250 CT THORAX DX C-: CPT

## 2020-11-11 PROCEDURE — 94010 BREATHING CAPACITY TEST: CPT | Performed by: INTERNAL MEDICINE

## 2020-11-16 ENCOUNTER — TELEPHONE (OUTPATIENT)
Dept: GASTROENTEROLOGY | Facility: CLINIC | Age: 85
End: 2020-11-16

## 2020-11-16 ENCOUNTER — OFFICE VISIT (OUTPATIENT)
Dept: GASTROENTEROLOGY | Facility: CLINIC | Age: 85
End: 2020-11-16
Payer: MEDICARE

## 2020-11-16 VITALS
TEMPERATURE: 96.7 F | DIASTOLIC BLOOD PRESSURE: 80 MMHG | BODY MASS INDEX: 19.05 KG/M2 | WEIGHT: 129 LBS | SYSTOLIC BLOOD PRESSURE: 157 MMHG | HEART RATE: 68 BPM

## 2020-11-16 DIAGNOSIS — K80.50 CHOLEDOCHOLITHIASIS: Primary | ICD-10-CM

## 2020-11-16 PROCEDURE — 99214 OFFICE O/P EST MOD 30 MIN: CPT | Performed by: INTERNAL MEDICINE

## 2020-11-18 ENCOUNTER — TELEPHONE (OUTPATIENT)
Dept: FAMILY MEDICINE CLINIC | Facility: CLINIC | Age: 85
End: 2020-11-18

## 2020-11-19 ENCOUNTER — CLINICAL SUPPORT (OUTPATIENT)
Dept: FAMILY MEDICINE CLINIC | Facility: CLINIC | Age: 85
End: 2020-11-19
Payer: MEDICARE

## 2020-11-19 DIAGNOSIS — R39.9 UTI SYMPTOMS: Primary | ICD-10-CM

## 2020-11-19 LAB
SL AMB  POCT GLUCOSE, UA: NORMAL
SL AMB LEUKOCYTE ESTERASE,UA: NORMAL
SL AMB POCT BILIRUBIN,UA: NORMAL
SL AMB POCT BLOOD,UA: NORMAL
SL AMB POCT CLARITY,UA: CLEAR
SL AMB POCT COLOR,UA: YELLOW
SL AMB POCT KETONES,UA: NORMAL
SL AMB POCT NITRITE,UA: NORMAL
SL AMB POCT PH,UA: 5
SL AMB POCT SPECIFIC GRAVITY,UA: 1
SL AMB POCT URINE PROTEIN: NORMAL
SL AMB POCT UROBILINOGEN: 0.2

## 2020-11-19 PROCEDURE — 81003 URINALYSIS AUTO W/O SCOPE: CPT | Performed by: NURSE PRACTITIONER

## 2020-11-23 DIAGNOSIS — F41.9 ANXIETY: ICD-10-CM

## 2020-11-23 RX ORDER — LORAZEPAM 0.5 MG/1
1 TABLET ORAL
Qty: 60 TABLET | Refills: 0 | Status: SHIPPED | OUTPATIENT
Start: 2020-11-23 | End: 2021-01-19

## 2020-11-25 ENCOUNTER — OFFICE VISIT (OUTPATIENT)
Dept: PULMONOLOGY | Facility: CLINIC | Age: 85
End: 2020-11-25
Payer: MEDICARE

## 2020-11-25 VITALS
TEMPERATURE: 95.3 F | BODY MASS INDEX: 19.55 KG/M2 | HEART RATE: 88 BPM | SYSTOLIC BLOOD PRESSURE: 126 MMHG | HEIGHT: 69 IN | WEIGHT: 132 LBS | OXYGEN SATURATION: 99 % | DIASTOLIC BLOOD PRESSURE: 74 MMHG | RESPIRATION RATE: 18 BRPM

## 2020-11-25 DIAGNOSIS — I48.0 PAROXYSMAL ATRIAL FIBRILLATION (HCC): ICD-10-CM

## 2020-11-25 DIAGNOSIS — I10 HYPERTENSION, UNSPECIFIED TYPE: ICD-10-CM

## 2020-11-25 DIAGNOSIS — R09.82 POSTNASAL DRIP: ICD-10-CM

## 2020-11-25 DIAGNOSIS — J84.9 INTERSTITIAL LUNG DISEASE (HCC): Primary | ICD-10-CM

## 2020-11-25 DIAGNOSIS — K21.00 GASTROESOPHAGEAL REFLUX DISEASE WITH ESOPHAGITIS WITHOUT HEMORRHAGE: ICD-10-CM

## 2020-11-25 PROCEDURE — 99214 OFFICE O/P EST MOD 30 MIN: CPT | Performed by: INTERNAL MEDICINE

## 2020-12-02 ENCOUNTER — ANESTHESIA EVENT (OUTPATIENT)
Dept: GASTROENTEROLOGY | Facility: HOSPITAL | Age: 85
End: 2020-12-02

## 2020-12-02 PROBLEM — K80.50 CHOLEDOCHOLITHIASIS: Status: ACTIVE | Noted: 2020-12-02

## 2020-12-02 RX ORDER — SODIUM CHLORIDE 9 MG/ML
125 INJECTION, SOLUTION INTRAVENOUS CONTINUOUS
Status: CANCELLED | OUTPATIENT
Start: 2020-12-02

## 2020-12-03 ENCOUNTER — HOSPITAL ENCOUNTER (OUTPATIENT)
Dept: RADIOLOGY | Facility: HOSPITAL | Age: 85
Discharge: HOME | End: 2020-12-03
Payer: MEDICARE

## 2020-12-03 ENCOUNTER — ANESTHESIA (OUTPATIENT)
Dept: GASTROENTEROLOGY | Facility: HOSPITAL | Age: 85
End: 2020-12-03

## 2020-12-03 ENCOUNTER — HOSPITAL ENCOUNTER (EMERGENCY)
Facility: HOSPITAL | Age: 85
Discharge: HOME | End: 2020-12-03
Attending: EMERGENCY MEDICINE | Admitting: EMERGENCY MEDICINE
Payer: MEDICARE

## 2020-12-03 ENCOUNTER — TELEPHONE (OUTPATIENT)
Dept: GASTROENTEROLOGY | Facility: AMBULARY SURGERY CENTER | Age: 85
End: 2020-12-03

## 2020-12-03 ENCOUNTER — HOSPITAL ENCOUNTER (OUTPATIENT)
Dept: GASTROENTEROLOGY | Facility: HOSPITAL | Age: 85
Setting detail: OUTPATIENT SURGERY
Discharge: HOME | End: 2020-12-03
Attending: INTERNAL MEDICINE | Admitting: INTERNAL MEDICINE
Payer: MEDICARE

## 2020-12-03 VITALS
RESPIRATION RATE: 16 BRPM | SYSTOLIC BLOOD PRESSURE: 188 MMHG | HEART RATE: 61 BPM | BODY MASS INDEX: 20.09 KG/M2 | TEMPERATURE: 96.8 F | WEIGHT: 128 LBS | OXYGEN SATURATION: 100 % | DIASTOLIC BLOOD PRESSURE: 61 MMHG | HEIGHT: 67 IN

## 2020-12-03 VITALS — HEART RATE: 61 BPM

## 2020-12-03 DIAGNOSIS — K80.50 CHOLEDOCHOLITHIASIS: ICD-10-CM

## 2020-12-03 DIAGNOSIS — R10.9 ABDOMINAL PAIN: Primary | ICD-10-CM

## 2020-12-03 LAB
ALBUMIN SERPL BCP-MCNC: 3.9 G/DL (ref 3.5–5)
ALP SERPL-CCNC: 69 U/L (ref 46–116)
ALT SERPL W P-5'-P-CCNC: 198 U/L (ref 12–78)
ANION GAP SERPL CALCULATED.3IONS-SCNC: 6 MMOL/L (ref 4–13)
AST SERPL W P-5'-P-CCNC: 309 U/L (ref 5–45)
BASOPHILS # BLD AUTO: 0.02 THOUSANDS/ΜL (ref 0–0.1)
BASOPHILS NFR BLD AUTO: 0 % (ref 0–1)
BILIRUB SERPL-MCNC: 0.87 MG/DL (ref 0.2–1)
BUN SERPL-MCNC: 20 MG/DL (ref 5–25)
CALCIUM SERPL-MCNC: 9 MG/DL (ref 8.3–10.1)
CHLORIDE SERPL-SCNC: 96 MMOL/L (ref 100–108)
CO2 SERPL-SCNC: 30 MMOL/L (ref 21–32)
CREAT SERPL-MCNC: 0.63 MG/DL (ref 0.6–1.3)
EOSINOPHIL # BLD AUTO: 0.06 THOUSAND/ΜL (ref 0–0.61)
EOSINOPHIL NFR BLD AUTO: 1 % (ref 0–6)
ERYTHROCYTE [DISTWIDTH] IN BLOOD BY AUTOMATED COUNT: 13.5 % (ref 11.6–15.1)
GFR SERPL CREATININE-BSD FRML MDRD: 83 ML/MIN/1.73SQ M
GLUCOSE SERPL-MCNC: 168 MG/DL (ref 65–140)
HCT VFR BLD AUTO: 35.3 % (ref 34.8–46.1)
HGB BLD-MCNC: 11 G/DL (ref 11.5–15.4)
IMM GRANULOCYTES # BLD AUTO: 0.04 THOUSAND/UL (ref 0–0.2)
IMM GRANULOCYTES NFR BLD AUTO: 0 % (ref 0–2)
LIPASE SERPL-CCNC: 162 U/L (ref 73–393)
LYMPHOCYTES # BLD AUTO: 1.08 THOUSANDS/ΜL (ref 0.6–4.47)
LYMPHOCYTES NFR BLD AUTO: 9 % (ref 14–44)
MCH RBC QN AUTO: 26.9 PG (ref 26.8–34.3)
MCHC RBC AUTO-ENTMCNC: 31.2 G/DL (ref 31.4–37.4)
MCV RBC AUTO: 86 FL (ref 82–98)
MONOCYTES # BLD AUTO: 1.07 THOUSAND/ΜL (ref 0.17–1.22)
MONOCYTES NFR BLD AUTO: 9 % (ref 4–12)
NEUTROPHILS # BLD AUTO: 10 THOUSANDS/ΜL (ref 1.85–7.62)
NEUTS SEG NFR BLD AUTO: 81 % (ref 43–75)
NRBC BLD AUTO-RTO: 0 /100 WBCS
PLATELET # BLD AUTO: 142 THOUSANDS/UL (ref 149–390)
PMV BLD AUTO: 10.2 FL (ref 8.9–12.7)
POTASSIUM SERPL-SCNC: 4.4 MMOL/L (ref 3.5–5.3)
PROT SERPL-MCNC: 7.4 G/DL (ref 6.4–8.2)
RBC # BLD AUTO: 4.09 MILLION/UL (ref 3.81–5.12)
SODIUM SERPL-SCNC: 132 MMOL/L (ref 136–145)
WBC # BLD AUTO: 12.27 THOUSAND/UL (ref 4.31–10.16)

## 2020-12-03 PROCEDURE — C1769 GUIDE WIRE: HCPCS

## 2020-12-03 PROCEDURE — C1726 CATH, BAL DIL, NON-VASCULAR: HCPCS

## 2020-12-03 PROCEDURE — 99284 EMERGENCY DEPT VISIT MOD MDM: CPT

## 2020-12-03 PROCEDURE — 99284 EMERGENCY DEPT VISIT MOD MDM: CPT | Performed by: EMERGENCY MEDICINE

## 2020-12-03 PROCEDURE — 80053 COMPREHEN METABOLIC PANEL: CPT | Performed by: EMERGENCY MEDICINE

## 2020-12-03 PROCEDURE — 43262 ENDO CHOLANGIOPANCREATOGRAPH: CPT | Performed by: INTERNAL MEDICINE

## 2020-12-03 PROCEDURE — 43264 ERCP REMOVE DUCT CALCULI: CPT | Performed by: INTERNAL MEDICINE

## 2020-12-03 PROCEDURE — 85025 COMPLETE CBC W/AUTO DIFF WBC: CPT | Performed by: EMERGENCY MEDICINE

## 2020-12-03 PROCEDURE — 83690 ASSAY OF LIPASE: CPT | Performed by: EMERGENCY MEDICINE

## 2020-12-03 PROCEDURE — 36415 COLL VENOUS BLD VENIPUNCTURE: CPT | Performed by: EMERGENCY MEDICINE

## 2020-12-03 PROCEDURE — 74328 X-RAY BILE DUCT ENDOSCOPY: CPT

## 2020-12-03 RX ORDER — FENTANYL CITRATE 50 UG/ML
INJECTION, SOLUTION INTRAMUSCULAR; INTRAVENOUS AS NEEDED
Status: DISCONTINUED | OUTPATIENT
Start: 2020-12-03 | End: 2020-12-03

## 2020-12-03 RX ORDER — SODIUM CHLORIDE, SODIUM LACTATE, POTASSIUM CHLORIDE, CALCIUM CHLORIDE 600; 310; 30; 20 MG/100ML; MG/100ML; MG/100ML; MG/100ML
INJECTION, SOLUTION INTRAVENOUS CONTINUOUS PRN
Status: DISCONTINUED | OUTPATIENT
Start: 2020-12-03 | End: 2020-12-03

## 2020-12-03 RX ORDER — SUCCINYLCHOLINE/SOD CL,ISO/PF 100 MG/5ML
SYRINGE (ML) INTRAVENOUS AS NEEDED
Status: DISCONTINUED | OUTPATIENT
Start: 2020-12-03 | End: 2020-12-03

## 2020-12-03 RX ORDER — MULTIVITAMIN
1 TABLET ORAL DAILY
COMMUNITY

## 2020-12-03 RX ORDER — ONDANSETRON 2 MG/ML
INJECTION INTRAMUSCULAR; INTRAVENOUS AS NEEDED
Status: DISCONTINUED | OUTPATIENT
Start: 2020-12-03 | End: 2020-12-03

## 2020-12-03 RX ORDER — PROPOFOL 10 MG/ML
INJECTION, EMULSION INTRAVENOUS AS NEEDED
Status: DISCONTINUED | OUTPATIENT
Start: 2020-12-03 | End: 2020-12-03

## 2020-12-03 RX ORDER — LIDOCAINE HYDROCHLORIDE 10 MG/ML
INJECTION, SOLUTION EPIDURAL; INFILTRATION; INTRACAUDAL; PERINEURAL AS NEEDED
Status: DISCONTINUED | OUTPATIENT
Start: 2020-12-03 | End: 2020-12-03

## 2020-12-03 RX ADMIN — Medication 70 MG: at 08:07

## 2020-12-03 RX ADMIN — PHENYLEPHRINE HYDROCHLORIDE 100 MCG: 10 INJECTION INTRAVENOUS at 08:13

## 2020-12-03 RX ADMIN — LIDOCAINE HYDROCHLORIDE 50 MG: 10 INJECTION, SOLUTION EPIDURAL; INFILTRATION; INTRACAUDAL; PERINEURAL at 08:07

## 2020-12-03 RX ADMIN — SODIUM CHLORIDE, SODIUM LACTATE, POTASSIUM CHLORIDE, AND CALCIUM CHLORIDE: .6; .31; .03; .02 INJECTION, SOLUTION INTRAVENOUS at 08:02

## 2020-12-03 RX ADMIN — PROPOFOL 120 MG: 10 INJECTION, EMULSION INTRAVENOUS at 08:07

## 2020-12-03 RX ADMIN — INDOMETHACIN 100 MG: 50 SUPPOSITORY RECTAL at 08:20

## 2020-12-03 RX ADMIN — IOHEXOL 16 ML: 240 INJECTION, SOLUTION INTRATHECAL; INTRAVASCULAR; INTRAVENOUS; ORAL at 08:30

## 2020-12-03 RX ADMIN — PHENYLEPHRINE HYDROCHLORIDE 100 MCG: 10 INJECTION INTRAVENOUS at 08:33

## 2020-12-03 RX ADMIN — FENTANYL CITRATE 50 MCG: 50 INJECTION INTRAMUSCULAR; INTRAVENOUS at 08:07

## 2020-12-03 RX ADMIN — ONDANSETRON 4 MG: 2 INJECTION INTRAMUSCULAR; INTRAVENOUS at 08:07

## 2020-12-04 VITALS
HEART RATE: 64 BPM | SYSTOLIC BLOOD PRESSURE: 141 MMHG | OXYGEN SATURATION: 100 % | RESPIRATION RATE: 16 BRPM | TEMPERATURE: 97.8 F | DIASTOLIC BLOOD PRESSURE: 67 MMHG

## 2020-12-07 ENCOUNTER — APPOINTMENT (EMERGENCY)
Dept: RADIOLOGY | Facility: HOSPITAL | Age: 85
DRG: 418 | End: 2020-12-07
Payer: MEDICARE

## 2020-12-07 ENCOUNTER — HOSPITAL ENCOUNTER (INPATIENT)
Facility: HOSPITAL | Age: 85
LOS: 7 days | Discharge: HOME HEALTH | DRG: 418 | End: 2020-12-14
Attending: EMERGENCY MEDICINE | Admitting: INTERNAL MEDICINE
Payer: MEDICARE

## 2020-12-07 DIAGNOSIS — K80.50 CHOLEDOCHOLITHIASIS: ICD-10-CM

## 2020-12-07 DIAGNOSIS — K80.20 CHOLELITHIASIS: ICD-10-CM

## 2020-12-07 DIAGNOSIS — R17 ELEVATED BILIRUBIN: ICD-10-CM

## 2020-12-07 DIAGNOSIS — R10.13 EPIGASTRIC PAIN: ICD-10-CM

## 2020-12-07 DIAGNOSIS — I48.0 PAROXYSMAL ATRIAL FIBRILLATION (HCC): ICD-10-CM

## 2020-12-07 DIAGNOSIS — E11.65 TYPE 2 DIABETES MELLITUS WITH HYPERGLYCEMIA (HCC): ICD-10-CM

## 2020-12-07 DIAGNOSIS — E11.3552 TYPE 2 DIABETES MELLITUS WITH STABLE PROLIFERATIVE RETINOPATHY OF LEFT EYE, WITH LONG-TERM CURRENT USE OF INSULIN (HCC): ICD-10-CM

## 2020-12-07 DIAGNOSIS — R10.9 ABDOMINAL PAIN: ICD-10-CM

## 2020-12-07 DIAGNOSIS — R74.01 TRANSAMINITIS: ICD-10-CM

## 2020-12-07 DIAGNOSIS — K83.8 COMMON BILE DUCT DILATATION: ICD-10-CM

## 2020-12-07 DIAGNOSIS — Z79.4 TYPE 2 DIABETES MELLITUS WITH STABLE PROLIFERATIVE RETINOPATHY OF LEFT EYE, WITH LONG-TERM CURRENT USE OF INSULIN (HCC): ICD-10-CM

## 2020-12-07 DIAGNOSIS — K83.1 BILIARY OBSTRUCTION: Primary | ICD-10-CM

## 2020-12-07 DIAGNOSIS — K59.00 CONSTIPATION: ICD-10-CM

## 2020-12-07 PROBLEM — E11.9 TYPE 2 DIABETES MELLITUS (HCC): Status: ACTIVE | Noted: 2019-06-29

## 2020-12-07 PROBLEM — E87.1 HYPONATREMIA: Status: ACTIVE | Noted: 2020-12-07

## 2020-12-07 PROBLEM — R13.10 DYSPHAGIA: Status: ACTIVE | Noted: 2020-12-07

## 2020-12-07 LAB
ALBUMIN SERPL BCP-MCNC: 3.5 G/DL (ref 3.5–5)
ALP SERPL-CCNC: 734 U/L (ref 46–116)
ALT SERPL W P-5'-P-CCNC: 1574 U/L (ref 12–78)
ANION GAP SERPL CALCULATED.3IONS-SCNC: 11 MMOL/L (ref 4–13)
AST SERPL W P-5'-P-CCNC: 992 U/L (ref 5–45)
BACTERIA UR QL AUTO: NORMAL /HPF
BASOPHILS # BLD AUTO: 0.02 THOUSANDS/ΜL (ref 0–0.1)
BASOPHILS NFR BLD AUTO: 0 % (ref 0–1)
BILIRUB DIRECT SERPL-MCNC: 3.2 MG/DL (ref 0–0.2)
BILIRUB SERPL-MCNC: 3.79 MG/DL (ref 0.2–1)
BILIRUB UR QL STRIP: ABNORMAL
BUN SERPL-MCNC: 8 MG/DL (ref 5–25)
CALCIUM SERPL-MCNC: 9 MG/DL (ref 8.3–10.1)
CHLORIDE SERPL-SCNC: 91 MMOL/L (ref 100–108)
CLARITY UR: CLEAR
CO2 SERPL-SCNC: 28 MMOL/L (ref 21–32)
COLOR UR: YELLOW
COLOR, POC: NORMAL
CREAT SERPL-MCNC: 0.56 MG/DL (ref 0.6–1.3)
EOSINOPHIL # BLD AUTO: 0.02 THOUSAND/ΜL (ref 0–0.61)
EOSINOPHIL NFR BLD AUTO: 0 % (ref 0–6)
ERYTHROCYTE [DISTWIDTH] IN BLOOD BY AUTOMATED COUNT: 14 % (ref 11.6–15.1)
GFR SERPL CREATININE-BSD FRML MDRD: 87 ML/MIN/1.73SQ M
GLUCOSE SERPL-MCNC: 174 MG/DL (ref 65–140)
GLUCOSE SERPL-MCNC: 196 MG/DL (ref 65–140)
GLUCOSE SERPL-MCNC: 203 MG/DL (ref 65–140)
GLUCOSE SERPL-MCNC: 214 MG/DL (ref 65–140)
GLUCOSE UR STRIP-MCNC: ABNORMAL MG/DL
HAV IGM SER QL: NORMAL
HBV CORE AB SER QL: NORMAL
HBV CORE IGM SER QL: NORMAL
HBV SURFACE AB SER-ACNC: <3.1 MIU/ML
HBV SURFACE AG SER QL: NORMAL
HCT VFR BLD AUTO: 36.4 % (ref 34.8–46.1)
HCV AB SER QL: NORMAL
HGB BLD-MCNC: 11.6 G/DL (ref 11.5–15.4)
HGB UR QL STRIP.AUTO: ABNORMAL
HYALINE CASTS #/AREA URNS LPF: NORMAL /LPF
IMM GRANULOCYTES # BLD AUTO: 0.06 THOUSAND/UL (ref 0–0.2)
IMM GRANULOCYTES NFR BLD AUTO: 1 % (ref 0–2)
KETONES UR STRIP-MCNC: ABNORMAL MG/DL
LEUKOCYTE ESTERASE UR QL STRIP: NEGATIVE
LIPASE SERPL-CCNC: 163 U/L (ref 73–393)
LYMPHOCYTES # BLD AUTO: 0.59 THOUSANDS/ΜL (ref 0.6–4.47)
LYMPHOCYTES NFR BLD AUTO: 6 % (ref 14–44)
MCH RBC QN AUTO: 27.3 PG (ref 26.8–34.3)
MCHC RBC AUTO-ENTMCNC: 31.9 G/DL (ref 31.4–37.4)
MCV RBC AUTO: 86 FL (ref 82–98)
MONOCYTES # BLD AUTO: 0.86 THOUSAND/ΜL (ref 0.17–1.22)
MONOCYTES NFR BLD AUTO: 9 % (ref 4–12)
NEUTROPHILS # BLD AUTO: 7.93 THOUSANDS/ΜL (ref 1.85–7.62)
NEUTS SEG NFR BLD AUTO: 84 % (ref 43–75)
NITRITE UR QL STRIP: NEGATIVE
NON-SQ EPI CELLS URNS QL MICRO: NORMAL /HPF
NRBC BLD AUTO-RTO: 0 /100 WBCS
PH UR STRIP.AUTO: 7 [PH] (ref 4.5–8)
PLATELET # BLD AUTO: 146 THOUSANDS/UL (ref 149–390)
PMV BLD AUTO: 11.2 FL (ref 8.9–12.7)
POTASSIUM SERPL-SCNC: 4 MMOL/L (ref 3.5–5.3)
PROT SERPL-MCNC: 7.6 G/DL (ref 6.4–8.2)
PROT UR STRIP-MCNC: ABNORMAL MG/DL
RBC # BLD AUTO: 4.25 MILLION/UL (ref 3.81–5.12)
RBC #/AREA URNS AUTO: NORMAL /HPF
SODIUM SERPL-SCNC: 130 MMOL/L (ref 136–145)
SP GR UR STRIP.AUTO: 1.01 (ref 1–1.03)
UROBILINOGEN UR QL STRIP.AUTO: 0.2 E.U./DL
WBC # BLD AUTO: 9.48 THOUSAND/UL (ref 4.31–10.16)
WBC #/AREA URNS AUTO: NORMAL /HPF

## 2020-12-07 PROCEDURE — 99223 1ST HOSP IP/OBS HIGH 75: CPT | Performed by: INTERNAL MEDICINE

## 2020-12-07 PROCEDURE — 36415 COLL VENOUS BLD VENIPUNCTURE: CPT

## 2020-12-07 PROCEDURE — 80074 ACUTE HEPATITIS PANEL: CPT | Performed by: INTERNAL MEDICINE

## 2020-12-07 PROCEDURE — 74177 CT ABD & PELVIS W/CONTRAST: CPT

## 2020-12-07 PROCEDURE — 86706 HEP B SURFACE ANTIBODY: CPT | Performed by: INTERNAL MEDICINE

## 2020-12-07 PROCEDURE — 99285 EMERGENCY DEPT VISIT HI MDM: CPT | Performed by: EMERGENCY MEDICINE

## 2020-12-07 PROCEDURE — 99285 EMERGENCY DEPT VISIT HI MDM: CPT

## 2020-12-07 PROCEDURE — 83690 ASSAY OF LIPASE: CPT

## 2020-12-07 PROCEDURE — G1004 CDSM NDSC: HCPCS

## 2020-12-07 PROCEDURE — 96375 TX/PRO/DX INJ NEW DRUG ADDON: CPT

## 2020-12-07 PROCEDURE — 96365 THER/PROPH/DIAG IV INF INIT: CPT

## 2020-12-07 PROCEDURE — 80053 COMPREHEN METABOLIC PANEL: CPT

## 2020-12-07 PROCEDURE — 96366 THER/PROPH/DIAG IV INF ADDON: CPT

## 2020-12-07 PROCEDURE — 82948 REAGENT STRIP/BLOOD GLUCOSE: CPT

## 2020-12-07 PROCEDURE — 96376 TX/PRO/DX INJ SAME DRUG ADON: CPT

## 2020-12-07 PROCEDURE — 86704 HEP B CORE ANTIBODY TOTAL: CPT | Performed by: INTERNAL MEDICINE

## 2020-12-07 PROCEDURE — 82248 BILIRUBIN DIRECT: CPT | Performed by: INTERNAL MEDICINE

## 2020-12-07 PROCEDURE — 85025 COMPLETE CBC W/AUTO DIFF WBC: CPT

## 2020-12-07 PROCEDURE — 81001 URINALYSIS AUTO W/SCOPE: CPT

## 2020-12-07 RX ORDER — SODIUM CHLORIDE, SODIUM GLUCONATE, SODIUM ACETATE, POTASSIUM CHLORIDE, MAGNESIUM CHLORIDE, SODIUM PHOSPHATE, DIBASIC, AND POTASSIUM PHOSPHATE .53; .5; .37; .037; .03; .012; .00082 G/100ML; G/100ML; G/100ML; G/100ML; G/100ML; G/100ML; G/100ML
500 INJECTION, SOLUTION INTRAVENOUS ONCE
Status: COMPLETED | OUTPATIENT
Start: 2020-12-07 | End: 2020-12-07

## 2020-12-07 RX ORDER — MORPHINE SULFATE 10 MG/ML
6 INJECTION, SOLUTION INTRAMUSCULAR; INTRAVENOUS ONCE
Status: COMPLETED | OUTPATIENT
Start: 2020-12-07 | End: 2020-12-07

## 2020-12-07 RX ORDER — LORAZEPAM 0.5 MG/1
1 TABLET ORAL
Status: DISCONTINUED | OUTPATIENT
Start: 2020-12-07 | End: 2020-12-14 | Stop reason: HOSPADM

## 2020-12-07 RX ORDER — SODIUM CHLORIDE, SODIUM GLUCONATE, SODIUM ACETATE, POTASSIUM CHLORIDE, MAGNESIUM CHLORIDE, SODIUM PHOSPHATE, DIBASIC, AND POTASSIUM PHOSPHATE .53; .5; .37; .037; .03; .012; .00082 G/100ML; G/100ML; G/100ML; G/100ML; G/100ML; G/100ML; G/100ML
75 INJECTION, SOLUTION INTRAVENOUS CONTINUOUS
Status: DISCONTINUED | OUTPATIENT
Start: 2020-12-07 | End: 2020-12-09

## 2020-12-07 RX ORDER — POLYVINYL ALCOHOL 14 MG/ML
1 SOLUTION/ DROPS OPHTHALMIC
Status: DISCONTINUED | OUTPATIENT
Start: 2020-12-07 | End: 2020-12-07 | Stop reason: CLARIF

## 2020-12-07 RX ORDER — FAMOTIDINE 20 MG/1
10 TABLET, FILM COATED ORAL DAILY
Status: DISCONTINUED | OUTPATIENT
Start: 2020-12-07 | End: 2020-12-14 | Stop reason: HOSPADM

## 2020-12-07 RX ORDER — HYDROMORPHONE HCL/PF 1 MG/ML
0.2 SYRINGE (ML) INJECTION EVERY 4 HOURS PRN
Status: DISCONTINUED | OUTPATIENT
Start: 2020-12-07 | End: 2020-12-14 | Stop reason: HOSPADM

## 2020-12-07 RX ORDER — ONDANSETRON 2 MG/ML
4 INJECTION INTRAMUSCULAR; INTRAVENOUS EVERY 6 HOURS PRN
Status: DISCONTINUED | OUTPATIENT
Start: 2020-12-07 | End: 2020-12-14 | Stop reason: HOSPADM

## 2020-12-07 RX ORDER — AZELASTINE 1 MG/ML
1 SPRAY, METERED NASAL 2 TIMES DAILY
Status: DISCONTINUED | OUTPATIENT
Start: 2020-12-07 | End: 2020-12-14 | Stop reason: HOSPADM

## 2020-12-07 RX ORDER — OXYCODONE HYDROCHLORIDE 5 MG/1
2.5 TABLET ORAL EVERY 4 HOURS PRN
Status: DISCONTINUED | OUTPATIENT
Start: 2020-12-07 | End: 2020-12-14 | Stop reason: HOSPADM

## 2020-12-07 RX ORDER — ASPIRIN 81 MG/1
81 TABLET ORAL DAILY
Status: DISCONTINUED | OUTPATIENT
Start: 2020-12-07 | End: 2020-12-14 | Stop reason: HOSPADM

## 2020-12-07 RX ORDER — CALCIUM CARBONATE 500(1250)
1 TABLET ORAL
Status: DISCONTINUED | OUTPATIENT
Start: 2020-12-07 | End: 2020-12-14 | Stop reason: HOSPADM

## 2020-12-07 RX ORDER — FERROUS SULFATE 325(65) MG
325 TABLET ORAL
Status: DISCONTINUED | OUTPATIENT
Start: 2020-12-07 | End: 2020-12-14 | Stop reason: HOSPADM

## 2020-12-07 RX ORDER — ONDANSETRON 2 MG/ML
4 INJECTION INTRAMUSCULAR; INTRAVENOUS ONCE
Status: COMPLETED | OUTPATIENT
Start: 2020-12-07 | End: 2020-12-07

## 2020-12-07 RX ORDER — LORATADINE 10 MG/1
10 TABLET ORAL DAILY PRN
Status: DISCONTINUED | OUTPATIENT
Start: 2020-12-07 | End: 2020-12-14 | Stop reason: HOSPADM

## 2020-12-07 RX ORDER — OXYCODONE HYDROCHLORIDE 5 MG/1
5 TABLET ORAL EVERY 4 HOURS PRN
Status: DISCONTINUED | OUTPATIENT
Start: 2020-12-07 | End: 2020-12-08

## 2020-12-07 RX ADMIN — SODIUM CHLORIDE 3 G: 9 INJECTION, SOLUTION INTRAVENOUS at 05:47

## 2020-12-07 RX ADMIN — AZELASTINE HYDROCHLORIDE 1 SPRAY: 137 SPRAY, METERED NASAL at 19:21

## 2020-12-07 RX ADMIN — INSULIN LISPRO 1 UNITS: 100 INJECTION, SOLUTION INTRAVENOUS; SUBCUTANEOUS at 19:24

## 2020-12-07 RX ADMIN — METOPROLOL TARTRATE 75 MG: 50 TABLET, FILM COATED ORAL at 22:00

## 2020-12-07 RX ADMIN — SODIUM CHLORIDE, SODIUM GLUCONATE, SODIUM ACETATE, POTASSIUM CHLORIDE, MAGNESIUM CHLORIDE, SODIUM PHOSPHATE, DIBASIC, AND POTASSIUM PHOSPHATE 75 ML/HR: .53; .5; .37; .037; .03; .012; .00082 INJECTION, SOLUTION INTRAVENOUS at 19:47

## 2020-12-07 RX ADMIN — ONDANSETRON 4 MG: 2 INJECTION INTRAMUSCULAR; INTRAVENOUS at 01:06

## 2020-12-07 RX ADMIN — SODIUM CHLORIDE, SODIUM GLUCONATE, SODIUM ACETATE, POTASSIUM CHLORIDE, MAGNESIUM CHLORIDE, SODIUM PHOSPHATE, DIBASIC, AND POTASSIUM PHOSPHATE 500 ML: .53; .5; .37; .037; .03; .012; .00082 INJECTION, SOLUTION INTRAVENOUS at 01:05

## 2020-12-07 RX ADMIN — SODIUM CHLORIDE, SODIUM GLUCONATE, SODIUM ACETATE, POTASSIUM CHLORIDE, MAGNESIUM CHLORIDE, SODIUM PHOSPHATE, DIBASIC, AND POTASSIUM PHOSPHATE 75 ML/HR: .53; .5; .37; .037; .03; .012; .00082 INJECTION, SOLUTION INTRAVENOUS at 05:45

## 2020-12-07 RX ADMIN — FAMOTIDINE 10 MG: 20 TABLET ORAL at 10:31

## 2020-12-07 RX ADMIN — METOPROLOL TARTRATE 75 MG: 50 TABLET, FILM COATED ORAL at 10:31

## 2020-12-07 RX ADMIN — MORPHINE SULFATE 6 MG: 10 INJECTION INTRAVENOUS at 03:03

## 2020-12-07 RX ADMIN — BIMATOPROST 1 DROP: 0.1 SOLUTION/ DROPS OPHTHALMIC at 22:01

## 2020-12-07 RX ADMIN — INSULIN LISPRO 1 UNITS: 100 INJECTION, SOLUTION INTRAVENOUS; SUBCUTANEOUS at 12:56

## 2020-12-07 RX ADMIN — LORAZEPAM 1 MG: 0.5 TABLET ORAL at 22:00

## 2020-12-07 RX ADMIN — ASPIRIN 81 MG: 81 TABLET ORAL at 10:30

## 2020-12-07 RX ADMIN — MORPHINE SULFATE 6 MG: 10 INJECTION INTRAVENOUS at 01:06

## 2020-12-07 RX ADMIN — AZELASTINE HYDROCHLORIDE 1 SPRAY: 137 SPRAY, METERED NASAL at 10:31

## 2020-12-07 RX ADMIN — IOHEXOL 100 ML: 350 INJECTION, SOLUTION INTRAVENOUS at 02:12

## 2020-12-08 ENCOUNTER — ANESTHESIA (INPATIENT)
Dept: GASTROENTEROLOGY | Facility: HOSPITAL | Age: 85
DRG: 418 | End: 2020-12-08
Payer: MEDICARE

## 2020-12-08 ENCOUNTER — APPOINTMENT (INPATIENT)
Dept: RADIOLOGY | Facility: HOSPITAL | Age: 85
DRG: 418 | End: 2020-12-08
Payer: MEDICARE

## 2020-12-08 ENCOUNTER — ANESTHESIA EVENT (INPATIENT)
Dept: PERIOP | Facility: HOSPITAL | Age: 85
DRG: 418 | End: 2020-12-08
Payer: MEDICARE

## 2020-12-08 ENCOUNTER — APPOINTMENT (INPATIENT)
Dept: RADIOLOGY | Facility: HOSPITAL | Age: 85
DRG: 418 | End: 2020-12-08
Attending: INTERNAL MEDICINE
Payer: MEDICARE

## 2020-12-08 ENCOUNTER — APPOINTMENT (INPATIENT)
Dept: GASTROENTEROLOGY | Facility: HOSPITAL | Age: 85
DRG: 418 | End: 2020-12-08
Payer: MEDICARE

## 2020-12-08 ENCOUNTER — ANESTHESIA EVENT (INPATIENT)
Dept: GASTROENTEROLOGY | Facility: HOSPITAL | Age: 85
DRG: 418 | End: 2020-12-08
Payer: MEDICARE

## 2020-12-08 VITALS — HEART RATE: 85 BPM

## 2020-12-08 LAB
ALBUMIN SERPL BCP-MCNC: 2.7 G/DL (ref 3.5–5)
ALP SERPL-CCNC: 518 U/L (ref 46–116)
ALT SERPL W P-5'-P-CCNC: 788 U/L (ref 12–78)
ANION GAP SERPL CALCULATED.3IONS-SCNC: 8 MMOL/L (ref 4–13)
AST SERPL W P-5'-P-CCNC: 232 U/L (ref 5–45)
BILIRUB SERPL-MCNC: 1.34 MG/DL (ref 0.2–1)
BUN SERPL-MCNC: 9 MG/DL (ref 5–25)
CALCIUM ALBUM COR SERPL-MCNC: 9.9 MG/DL (ref 8.3–10.1)
CALCIUM SERPL-MCNC: 8.9 MG/DL (ref 8.3–10.1)
CHLORIDE SERPL-SCNC: 97 MMOL/L (ref 100–108)
CO2 SERPL-SCNC: 29 MMOL/L (ref 21–32)
CREAT SERPL-MCNC: 0.29 MG/DL (ref 0.6–1.3)
ERYTHROCYTE [DISTWIDTH] IN BLOOD BY AUTOMATED COUNT: 14.4 % (ref 11.6–15.1)
GFR SERPL CREATININE-BSD FRML MDRD: 107 ML/MIN/1.73SQ M
GLUCOSE SERPL-MCNC: 121 MG/DL (ref 65–140)
GLUCOSE SERPL-MCNC: 122 MG/DL (ref 65–140)
GLUCOSE SERPL-MCNC: 134 MG/DL (ref 65–140)
GLUCOSE SERPL-MCNC: 161 MG/DL (ref 65–140)
GLUCOSE SERPL-MCNC: 215 MG/DL (ref 65–140)
HCT VFR BLD AUTO: 30.5 % (ref 34.8–46.1)
HGB BLD-MCNC: 9.7 G/DL (ref 11.5–15.4)
INR PPP: 1.14 (ref 0.84–1.19)
MCH RBC QN AUTO: 27.2 PG (ref 26.8–34.3)
MCHC RBC AUTO-ENTMCNC: 31.8 G/DL (ref 31.4–37.4)
MCV RBC AUTO: 85 FL (ref 82–98)
PLATELET # BLD AUTO: 130 THOUSANDS/UL (ref 149–390)
PMV BLD AUTO: 10.9 FL (ref 8.9–12.7)
POTASSIUM SERPL-SCNC: 3.6 MMOL/L (ref 3.5–5.3)
PROT SERPL-MCNC: 6.3 G/DL (ref 6.4–8.2)
PROTHROMBIN TIME: 14.6 SECONDS (ref 11.6–14.5)
RBC # BLD AUTO: 3.57 MILLION/UL (ref 3.81–5.12)
SODIUM SERPL-SCNC: 134 MMOL/L (ref 136–145)
WBC # BLD AUTO: 6.75 THOUSAND/UL (ref 4.31–10.16)

## 2020-12-08 PROCEDURE — C1769 GUIDE WIRE: HCPCS

## 2020-12-08 PROCEDURE — 85610 PROTHROMBIN TIME: CPT | Performed by: INTERNAL MEDICINE

## 2020-12-08 PROCEDURE — 99232 SBSQ HOSP IP/OBS MODERATE 35: CPT | Performed by: INTERNAL MEDICINE

## 2020-12-08 PROCEDURE — 43264 ERCP REMOVE DUCT CALCULI: CPT | Performed by: INTERNAL MEDICINE

## 2020-12-08 PROCEDURE — 82948 REAGENT STRIP/BLOOD GLUCOSE: CPT

## 2020-12-08 PROCEDURE — 0F798DZ DILATION OF COMMON BILE DUCT WITH INTRALUMINAL DEVICE, VIA NATURAL OR ARTIFICIAL OPENING ENDOSCOPIC: ICD-10-PCS | Performed by: INTERNAL MEDICINE

## 2020-12-08 PROCEDURE — 99223 1ST HOSP IP/OBS HIGH 75: CPT | Performed by: SURGERY

## 2020-12-08 PROCEDURE — BF101ZZ FLUOROSCOPY OF BILE DUCTS USING LOW OSMOLAR CONTRAST: ICD-10-PCS | Performed by: INTERNAL MEDICINE

## 2020-12-08 PROCEDURE — 80076 HEPATIC FUNCTION PANEL: CPT | Performed by: INTERNAL MEDICINE

## 2020-12-08 PROCEDURE — C1726 CATH, BAL DIL, NON-VASCULAR: HCPCS

## 2020-12-08 PROCEDURE — G1004 CDSM NDSC: HCPCS

## 2020-12-08 PROCEDURE — 85027 COMPLETE CBC AUTOMATED: CPT | Performed by: INTERNAL MEDICINE

## 2020-12-08 PROCEDURE — 74328 X-RAY BILE DUCT ENDOSCOPY: CPT

## 2020-12-08 PROCEDURE — 93005 ELECTROCARDIOGRAM TRACING: CPT

## 2020-12-08 PROCEDURE — 74181 MRI ABDOMEN W/O CONTRAST: CPT

## 2020-12-08 PROCEDURE — C1874 STENT, COATED/COV W/DEL SYS: HCPCS

## 2020-12-08 PROCEDURE — 73630 X-RAY EXAM OF FOOT: CPT

## 2020-12-08 PROCEDURE — 43274 ERCP DUCT STENT PLACEMENT: CPT | Performed by: INTERNAL MEDICINE

## 2020-12-08 PROCEDURE — 80053 COMPREHEN METABOLIC PANEL: CPT | Performed by: INTERNAL MEDICINE

## 2020-12-08 RX ORDER — PROPOFOL 10 MG/ML
INJECTION, EMULSION INTRAVENOUS AS NEEDED
Status: DISCONTINUED | OUTPATIENT
Start: 2020-12-08 | End: 2020-12-08

## 2020-12-08 RX ORDER — GLYCOPYRROLATE 0.2 MG/ML
INJECTION INTRAMUSCULAR; INTRAVENOUS AS NEEDED
Status: DISCONTINUED | OUTPATIENT
Start: 2020-12-08 | End: 2020-12-08

## 2020-12-08 RX ORDER — PROPOFOL 10 MG/ML
INJECTION, EMULSION INTRAVENOUS CONTINUOUS PRN
Status: DISCONTINUED | OUTPATIENT
Start: 2020-12-08 | End: 2020-12-08

## 2020-12-08 RX ORDER — KETOROLAC TROMETHAMINE 30 MG/ML
15 INJECTION, SOLUTION INTRAMUSCULAR; INTRAVENOUS EVERY 6 HOURS PRN
Status: DISCONTINUED | OUTPATIENT
Start: 2020-12-08 | End: 2020-12-09

## 2020-12-08 RX ORDER — ROCURONIUM BROMIDE 10 MG/ML
INJECTION, SOLUTION INTRAVENOUS AS NEEDED
Status: DISCONTINUED | OUTPATIENT
Start: 2020-12-08 | End: 2020-12-08

## 2020-12-08 RX ORDER — DEXAMETHASONE SODIUM PHOSPHATE 10 MG/ML
INJECTION, SOLUTION INTRAMUSCULAR; INTRAVENOUS AS NEEDED
Status: DISCONTINUED | OUTPATIENT
Start: 2020-12-08 | End: 2020-12-08

## 2020-12-08 RX ORDER — LIDOCAINE HYDROCHLORIDE 10 MG/ML
INJECTION, SOLUTION EPIDURAL; INFILTRATION; INTRACAUDAL; PERINEURAL AS NEEDED
Status: DISCONTINUED | OUTPATIENT
Start: 2020-12-08 | End: 2020-12-08

## 2020-12-08 RX ORDER — CEFAZOLIN SODIUM 2 G/50ML
2000 SOLUTION INTRAVENOUS
Status: COMPLETED | OUTPATIENT
Start: 2020-12-09 | End: 2020-12-09

## 2020-12-08 RX ORDER — SUCCINYLCHOLINE/SOD CL,ISO/PF 100 MG/5ML
SYRINGE (ML) INTRAVENOUS AS NEEDED
Status: DISCONTINUED | OUTPATIENT
Start: 2020-12-08 | End: 2020-12-08

## 2020-12-08 RX ORDER — MAGNESIUM CARB/ALUMINUM HYDROX 105-160MG
296 TABLET,CHEWABLE ORAL ONCE
Status: DISCONTINUED | OUTPATIENT
Start: 2020-12-08 | End: 2020-12-08

## 2020-12-08 RX ADMIN — METOPROLOL TARTRATE 75 MG: 50 TABLET, FILM COATED ORAL at 21:13

## 2020-12-08 RX ADMIN — DEXAMETHASONE SODIUM PHOSPHATE 5 MG: 10 INJECTION, SOLUTION INTRAMUSCULAR; INTRAVENOUS at 15:36

## 2020-12-08 RX ADMIN — METOPROLOL TARTRATE 75 MG: 50 TABLET, FILM COATED ORAL at 09:25

## 2020-12-08 RX ADMIN — GLYCOPYRROLATE 0.2 MG: 0.2 INJECTION, SOLUTION INTRAMUSCULAR; INTRAVENOUS at 15:12

## 2020-12-08 RX ADMIN — ASPIRIN 81 MG: 81 TABLET ORAL at 09:25

## 2020-12-08 RX ADMIN — BIMATOPROST 1 DROP: 0.1 SOLUTION/ DROPS OPHTHALMIC at 21:15

## 2020-12-08 RX ADMIN — SUGAMMADEX 200 MG: 100 INJECTION, SOLUTION INTRAVENOUS at 15:53

## 2020-12-08 RX ADMIN — ROCURONIUM BROMIDE 50 MG: 50 INJECTION, SOLUTION INTRAVENOUS at 15:27

## 2020-12-08 RX ADMIN — PROPOFOL 50 MCG/KG/MIN: 10 INJECTION, EMULSION INTRAVENOUS at 15:21

## 2020-12-08 RX ADMIN — SODIUM CHLORIDE, SODIUM GLUCONATE, SODIUM ACETATE, POTASSIUM CHLORIDE, MAGNESIUM CHLORIDE, SODIUM PHOSPHATE, DIBASIC, AND POTASSIUM PHOSPHATE 75 ML/HR: .53; .5; .37; .037; .03; .012; .00082 INJECTION, SOLUTION INTRAVENOUS at 12:09

## 2020-12-08 RX ADMIN — PROPOFOL 120 MG: 10 INJECTION, EMULSION INTRAVENOUS at 15:18

## 2020-12-08 RX ADMIN — Medication 1 TABLET: at 09:25

## 2020-12-08 RX ADMIN — AZELASTINE HYDROCHLORIDE 1 SPRAY: 137 SPRAY, METERED NASAL at 09:25

## 2020-12-08 RX ADMIN — Medication 80 MG: at 15:19

## 2020-12-08 RX ADMIN — PHENYLEPHRINE HYDROCHLORIDE 100 MCG: 10 INJECTION INTRAVENOUS at 15:49

## 2020-12-08 RX ADMIN — FAMOTIDINE 10 MG: 20 TABLET ORAL at 09:32

## 2020-12-08 RX ADMIN — SODIUM CHLORIDE, SODIUM GLUCONATE, SODIUM ACETATE, POTASSIUM CHLORIDE, MAGNESIUM CHLORIDE, SODIUM PHOSPHATE, DIBASIC, AND POTASSIUM PHOSPHATE: .53; .5; .37; .037; .03; .012; .00082 INJECTION, SOLUTION INTRAVENOUS at 15:07

## 2020-12-08 RX ADMIN — IOHEXOL 16 ML: 240 INJECTION, SOLUTION INTRATHECAL; INTRAVASCULAR; INTRAVENOUS; ORAL at 15:45

## 2020-12-08 RX ADMIN — LORAZEPAM 1 MG: 0.5 TABLET ORAL at 21:13

## 2020-12-08 RX ADMIN — LIDOCAINE HYDROCHLORIDE 50 MG: 10 INJECTION, SOLUTION EPIDURAL; INFILTRATION; INTRACAUDAL; PERINEURAL at 15:18

## 2020-12-08 RX ADMIN — AZELASTINE HYDROCHLORIDE 1 SPRAY: 137 SPRAY, METERED NASAL at 18:17

## 2020-12-08 RX ADMIN — PHENYLEPHRINE HYDROCHLORIDE 100 MCG/MIN: 10 INJECTION INTRAVENOUS at 15:18

## 2020-12-08 RX ADMIN — SODIUM CHLORIDE, SODIUM GLUCONATE, SODIUM ACETATE, POTASSIUM CHLORIDE, MAGNESIUM CHLORIDE, SODIUM PHOSPHATE, DIBASIC, AND POTASSIUM PHOSPHATE 75 ML/HR: .53; .5; .37; .037; .03; .012; .00082 INJECTION, SOLUTION INTRAVENOUS at 18:23

## 2020-12-08 RX ADMIN — INSULIN LISPRO 2 UNITS: 100 INJECTION, SOLUTION INTRAVENOUS; SUBCUTANEOUS at 18:18

## 2020-12-09 ENCOUNTER — ANESTHESIA (INPATIENT)
Dept: PERIOP | Facility: HOSPITAL | Age: 85
DRG: 418 | End: 2020-12-09
Payer: MEDICARE

## 2020-12-09 VITALS — HEART RATE: 73 BPM

## 2020-12-09 LAB
ABO GROUP BLD: NORMAL
ALBUMIN SERPL BCP-MCNC: 3 G/DL (ref 3.5–5)
ALP SERPL-CCNC: 482 U/L (ref 46–116)
ALT SERPL W P-5'-P-CCNC: 571 U/L (ref 12–78)
ANION GAP SERPL CALCULATED.3IONS-SCNC: 15 MMOL/L (ref 4–13)
AST SERPL W P-5'-P-CCNC: 94 U/L (ref 5–45)
ATRIAL RATE: 65 BPM
BILIRUB DIRECT SERPL-MCNC: 0.62 MG/DL (ref 0–0.2)
BILIRUB SERPL-MCNC: 1.02 MG/DL (ref 0.2–1)
BLD GP AB SCN SERPL QL: NEGATIVE
BUN SERPL-MCNC: 14 MG/DL (ref 5–25)
CALCIUM SERPL-MCNC: 9.5 MG/DL (ref 8.3–10.1)
CHLORIDE SERPL-SCNC: 97 MMOL/L (ref 100–108)
CO2 SERPL-SCNC: 22 MMOL/L (ref 21–32)
CREAT SERPL-MCNC: 0.48 MG/DL (ref 0.6–1.3)
ERYTHROCYTE [DISTWIDTH] IN BLOOD BY AUTOMATED COUNT: 14.4 % (ref 11.6–15.1)
GFR SERPL CREATININE-BSD FRML MDRD: 91 ML/MIN/1.73SQ M
GLUCOSE SERPL-MCNC: 186 MG/DL (ref 65–140)
GLUCOSE SERPL-MCNC: 199 MG/DL (ref 65–140)
GLUCOSE SERPL-MCNC: 206 MG/DL (ref 65–140)
GLUCOSE SERPL-MCNC: 222 MG/DL (ref 65–140)
GLUCOSE SERPL-MCNC: 287 MG/DL (ref 65–140)
GLUCOSE SERPL-MCNC: 321 MG/DL (ref 65–140)
HCT VFR BLD AUTO: 31.4 % (ref 34.8–46.1)
HGB BLD-MCNC: 9.9 G/DL (ref 11.5–15.4)
MCH RBC QN AUTO: 27 PG (ref 26.8–34.3)
MCHC RBC AUTO-ENTMCNC: 31.5 G/DL (ref 31.4–37.4)
MCV RBC AUTO: 86 FL (ref 82–98)
P AXIS: 42 DEGREES
PLATELET # BLD AUTO: 191 THOUSANDS/UL (ref 149–390)
PMV BLD AUTO: 11 FL (ref 8.9–12.7)
POTASSIUM SERPL-SCNC: 3.8 MMOL/L (ref 3.5–5.3)
PR INTERVAL: 186 MS
PROT SERPL-MCNC: 6.8 G/DL (ref 6.4–8.2)
QRS AXIS: -27 DEGREES
QRSD INTERVAL: 84 MS
QT INTERVAL: 436 MS
QTC INTERVAL: 453 MS
RBC # BLD AUTO: 3.67 MILLION/UL (ref 3.81–5.12)
RH BLD: NEGATIVE
SODIUM SERPL-SCNC: 134 MMOL/L (ref 136–145)
SPECIMEN EXPIRATION DATE: NORMAL
T WAVE AXIS: 19 DEGREES
VENTRICULAR RATE: 65 BPM
WBC # BLD AUTO: 8.37 THOUSAND/UL (ref 4.31–10.16)

## 2020-12-09 PROCEDURE — 80076 HEPATIC FUNCTION PANEL: CPT | Performed by: INTERNAL MEDICINE

## 2020-12-09 PROCEDURE — 85027 COMPLETE CBC AUTOMATED: CPT | Performed by: ORTHOPAEDIC SURGERY

## 2020-12-09 PROCEDURE — 99233 SBSQ HOSP IP/OBS HIGH 50: CPT | Performed by: SURGERY

## 2020-12-09 PROCEDURE — 99232 SBSQ HOSP IP/OBS MODERATE 35: CPT | Performed by: INTERNAL MEDICINE

## 2020-12-09 PROCEDURE — 88307 TISSUE EXAM BY PATHOLOGIST: CPT | Performed by: PATHOLOGY

## 2020-12-09 PROCEDURE — 0FT44ZZ RESECTION OF GALLBLADDER, PERCUTANEOUS ENDOSCOPIC APPROACH: ICD-10-PCS | Performed by: SURGERY

## 2020-12-09 PROCEDURE — 86900 BLOOD TYPING SEROLOGIC ABO: CPT | Performed by: INTERNAL MEDICINE

## 2020-12-09 PROCEDURE — 93005 ELECTROCARDIOGRAM TRACING: CPT

## 2020-12-09 PROCEDURE — 86850 RBC ANTIBODY SCREEN: CPT | Performed by: INTERNAL MEDICINE

## 2020-12-09 PROCEDURE — 82948 REAGENT STRIP/BLOOD GLUCOSE: CPT

## 2020-12-09 PROCEDURE — 47562 LAPAROSCOPIC CHOLECYSTECTOMY: CPT | Performed by: SURGERY

## 2020-12-09 PROCEDURE — 80048 BASIC METABOLIC PNL TOTAL CA: CPT | Performed by: ORTHOPAEDIC SURGERY

## 2020-12-09 PROCEDURE — 86901 BLOOD TYPING SEROLOGIC RH(D): CPT | Performed by: INTERNAL MEDICINE

## 2020-12-09 PROCEDURE — 93010 ELECTROCARDIOGRAM REPORT: CPT | Performed by: INTERNAL MEDICINE

## 2020-12-09 RX ORDER — XYLITOL/YERBA SANTA
5 AEROSOL, SPRAY WITH PUMP (ML) MUCOUS MEMBRANE 4 TIMES DAILY PRN
Status: DISCONTINUED | OUTPATIENT
Start: 2020-12-09 | End: 2020-12-14 | Stop reason: HOSPADM

## 2020-12-09 RX ORDER — METOPROLOL TARTRATE 5 MG/5ML
2.5 INJECTION INTRAVENOUS EVERY 6 HOURS PRN
Status: DISCONTINUED | OUTPATIENT
Start: 2020-12-09 | End: 2020-12-14 | Stop reason: HOSPADM

## 2020-12-09 RX ORDER — HEPARIN SODIUM 5000 [USP'U]/ML
5000 INJECTION, SOLUTION INTRAVENOUS; SUBCUTANEOUS EVERY 8 HOURS SCHEDULED
Status: DISCONTINUED | OUTPATIENT
Start: 2020-12-09 | End: 2020-12-12

## 2020-12-09 RX ORDER — FENTANYL CITRATE/PF 50 MCG/ML
50 SYRINGE (ML) INJECTION
Status: COMPLETED | OUTPATIENT
Start: 2020-12-09 | End: 2020-12-09

## 2020-12-09 RX ORDER — GLYCOPYRROLATE 0.2 MG/ML
INJECTION INTRAMUSCULAR; INTRAVENOUS AS NEEDED
Status: DISCONTINUED | OUTPATIENT
Start: 2020-12-09 | End: 2020-12-09

## 2020-12-09 RX ORDER — BUPIVACAINE HYDROCHLORIDE 5 MG/ML
INJECTION, SOLUTION PERINEURAL AS NEEDED
Status: DISCONTINUED | OUTPATIENT
Start: 2020-12-09 | End: 2020-12-09 | Stop reason: HOSPADM

## 2020-12-09 RX ORDER — PROPOFOL 10 MG/ML
INJECTION, EMULSION INTRAVENOUS AS NEEDED
Status: DISCONTINUED | OUTPATIENT
Start: 2020-12-09 | End: 2020-12-09

## 2020-12-09 RX ORDER — HEPARIN SODIUM 5000 [USP'U]/ML
5000 INJECTION, SOLUTION INTRAVENOUS; SUBCUTANEOUS EVERY 8 HOURS SCHEDULED
Status: DISCONTINUED | OUTPATIENT
Start: 2020-12-09 | End: 2020-12-09

## 2020-12-09 RX ORDER — LIDOCAINE HYDROCHLORIDE 10 MG/ML
INJECTION, SOLUTION EPIDURAL; INFILTRATION; INTRACAUDAL; PERINEURAL AS NEEDED
Status: DISCONTINUED | OUTPATIENT
Start: 2020-12-09 | End: 2020-12-09

## 2020-12-09 RX ORDER — ROCURONIUM BROMIDE 10 MG/ML
INJECTION, SOLUTION INTRAVENOUS AS NEEDED
Status: DISCONTINUED | OUTPATIENT
Start: 2020-12-09 | End: 2020-12-09

## 2020-12-09 RX ORDER — ONDANSETRON 2 MG/ML
4 INJECTION INTRAMUSCULAR; INTRAVENOUS ONCE AS NEEDED
Status: DISCONTINUED | OUTPATIENT
Start: 2020-12-09 | End: 2020-12-09 | Stop reason: HOSPADM

## 2020-12-09 RX ORDER — ONDANSETRON 2 MG/ML
INJECTION INTRAMUSCULAR; INTRAVENOUS AS NEEDED
Status: DISCONTINUED | OUTPATIENT
Start: 2020-12-09 | End: 2020-12-09

## 2020-12-09 RX ORDER — SODIUM CHLORIDE, SODIUM LACTATE, POTASSIUM CHLORIDE, CALCIUM CHLORIDE 600; 310; 30; 20 MG/100ML; MG/100ML; MG/100ML; MG/100ML
125 INJECTION, SOLUTION INTRAVENOUS CONTINUOUS
Status: CANCELLED | OUTPATIENT
Start: 2020-12-09

## 2020-12-09 RX ORDER — MAGNESIUM HYDROXIDE 1200 MG/15ML
LIQUID ORAL AS NEEDED
Status: DISCONTINUED | OUTPATIENT
Start: 2020-12-09 | End: 2020-12-09 | Stop reason: HOSPADM

## 2020-12-09 RX ORDER — NEOSTIGMINE METHYLSULFATE 1 MG/ML
INJECTION INTRAVENOUS AS NEEDED
Status: DISCONTINUED | OUTPATIENT
Start: 2020-12-09 | End: 2020-12-09

## 2020-12-09 RX ORDER — LABETALOL 20 MG/4 ML (5 MG/ML) INTRAVENOUS SYRINGE
AS NEEDED
Status: DISCONTINUED | OUTPATIENT
Start: 2020-12-09 | End: 2020-12-09

## 2020-12-09 RX ORDER — FENTANYL CITRATE 50 UG/ML
INJECTION, SOLUTION INTRAMUSCULAR; INTRAVENOUS AS NEEDED
Status: DISCONTINUED | OUTPATIENT
Start: 2020-12-09 | End: 2020-12-09

## 2020-12-09 RX ORDER — DIPHENHYDRAMINE HYDROCHLORIDE 50 MG/ML
12.5 INJECTION INTRAMUSCULAR; INTRAVENOUS ONCE AS NEEDED
Status: DISCONTINUED | OUTPATIENT
Start: 2020-12-09 | End: 2020-12-09 | Stop reason: HOSPADM

## 2020-12-09 RX ORDER — DEXAMETHASONE SODIUM PHOSPHATE 10 MG/ML
INJECTION, SOLUTION INTRAMUSCULAR; INTRAVENOUS AS NEEDED
Status: DISCONTINUED | OUTPATIENT
Start: 2020-12-09 | End: 2020-12-09

## 2020-12-09 RX ORDER — HYDROMORPHONE HCL/PF 1 MG/ML
0.5 SYRINGE (ML) INJECTION
Status: DISCONTINUED | OUTPATIENT
Start: 2020-12-09 | End: 2020-12-09 | Stop reason: HOSPADM

## 2020-12-09 RX ORDER — METOPROLOL TARTRATE 5 MG/5ML
INJECTION INTRAVENOUS AS NEEDED
Status: DISCONTINUED | OUTPATIENT
Start: 2020-12-09 | End: 2020-12-09

## 2020-12-09 RX ADMIN — HYDROMORPHONE HYDROCHLORIDE 0.5 MG: 1 INJECTION, SOLUTION INTRAMUSCULAR; INTRAVENOUS; SUBCUTANEOUS at 13:24

## 2020-12-09 RX ADMIN — GLYCOPYRROLATE 0.4 MG: 0.2 INJECTION, SOLUTION INTRAMUSCULAR; INTRAVENOUS at 12:35

## 2020-12-09 RX ADMIN — FENTANYL CITRATE 50 MCG: 50 INJECTION INTRAMUSCULAR; INTRAVENOUS at 10:45

## 2020-12-09 RX ADMIN — HYDROMORPHONE HYDROCHLORIDE 0.5 MG: 1 INJECTION, SOLUTION INTRAMUSCULAR; INTRAVENOUS; SUBCUTANEOUS at 13:44

## 2020-12-09 RX ADMIN — PROPOFOL 120 MG: 10 INJECTION, EMULSION INTRAVENOUS at 10:46

## 2020-12-09 RX ADMIN — DEXAMETHASONE SODIUM PHOSPHATE 10 MG: 10 INJECTION, SOLUTION INTRAMUSCULAR; INTRAVENOUS at 11:07

## 2020-12-09 RX ADMIN — LIDOCAINE HYDROCHLORIDE 50 MG: 10 INJECTION, SOLUTION EPIDURAL; INFILTRATION; INTRACAUDAL; PERINEURAL at 10:43

## 2020-12-09 RX ADMIN — METOPROLOL TARTRATE 75 MG: 50 TABLET, FILM COATED ORAL at 21:48

## 2020-12-09 RX ADMIN — PROPOFOL 30 MG: 10 INJECTION, EMULSION INTRAVENOUS at 11:08

## 2020-12-09 RX ADMIN — INSULIN LISPRO 3 UNITS: 100 INJECTION, SOLUTION INTRAVENOUS; SUBCUTANEOUS at 21:50

## 2020-12-09 RX ADMIN — Medication 50 MCG: at 13:15

## 2020-12-09 RX ADMIN — CEFAZOLIN SODIUM 2000 MG: 2 SOLUTION INTRAVENOUS at 10:40

## 2020-12-09 RX ADMIN — Medication 5 SPRAY: at 21:52

## 2020-12-09 RX ADMIN — INSULIN LISPRO 1 UNITS: 100 INJECTION, SOLUTION INTRAVENOUS; SUBCUTANEOUS at 01:04

## 2020-12-09 RX ADMIN — ROCURONIUM BROMIDE 30 MG: 50 INJECTION, SOLUTION INTRAVENOUS at 10:46

## 2020-12-09 RX ADMIN — FENTANYL CITRATE 50 MCG: 50 INJECTION INTRAMUSCULAR; INTRAVENOUS at 11:23

## 2020-12-09 RX ADMIN — INSULIN LISPRO 3 UNITS: 100 INJECTION, SOLUTION INTRAVENOUS; SUBCUTANEOUS at 17:56

## 2020-12-09 RX ADMIN — BIMATOPROST 1 DROP: 0.1 SOLUTION/ DROPS OPHTHALMIC at 21:55

## 2020-12-09 RX ADMIN — Medication 50 MCG: at 13:09

## 2020-12-09 RX ADMIN — AZELASTINE HYDROCHLORIDE 1 SPRAY: 137 SPRAY, METERED NASAL at 17:56

## 2020-12-09 RX ADMIN — FENTANYL CITRATE 50 MCG: 50 INJECTION INTRAMUSCULAR; INTRAVENOUS at 12:57

## 2020-12-09 RX ADMIN — OXYCODONE HYDROCHLORIDE 2.5 MG: 5 TABLET ORAL at 16:07

## 2020-12-09 RX ADMIN — METOROPROLOL TARTRATE 2 MG: 5 INJECTION, SOLUTION INTRAVENOUS at 11:15

## 2020-12-09 RX ADMIN — PHENYLEPHRINE HYDROCHLORIDE 100 MCG: 10 INJECTION INTRAVENOUS at 11:02

## 2020-12-09 RX ADMIN — ROCURONIUM BROMIDE 20 MG: 50 INJECTION, SOLUTION INTRAVENOUS at 11:04

## 2020-12-09 RX ADMIN — LORAZEPAM 1 MG: 0.5 TABLET ORAL at 21:48

## 2020-12-09 RX ADMIN — PHENYLEPHRINE HYDROCHLORIDE 50 MCG: 10 INJECTION INTRAVENOUS at 11:34

## 2020-12-09 RX ADMIN — HYDROMORPHONE HYDROCHLORIDE 0.5 MG: 1 INJECTION, SOLUTION INTRAMUSCULAR; INTRAVENOUS; SUBCUTANEOUS at 13:34

## 2020-12-09 RX ADMIN — NEOSTIGMINE METHYLSULFATE 3 MG: 1 INJECTION INTRAVENOUS at 12:35

## 2020-12-09 RX ADMIN — ONDANSETRON 4 MG: 2 INJECTION INTRAMUSCULAR; INTRAVENOUS at 11:07

## 2020-12-09 RX ADMIN — FENTANYL CITRATE 50 MCG: 50 INJECTION INTRAMUSCULAR; INTRAVENOUS at 10:44

## 2020-12-09 RX ADMIN — HEPARIN SODIUM 5000 UNITS: 5000 INJECTION INTRAVENOUS; SUBCUTANEOUS at 05:46

## 2020-12-09 RX ADMIN — SODIUM CHLORIDE, SODIUM GLUCONATE, SODIUM ACETATE, POTASSIUM CHLORIDE, MAGNESIUM CHLORIDE, SODIUM PHOSPHATE, DIBASIC, AND POTASSIUM PHOSPHATE 75 ML/HR: .53; .5; .37; .037; .03; .012; .00082 INJECTION, SOLUTION INTRAVENOUS at 09:03

## 2020-12-09 RX ADMIN — HEPARIN SODIUM 5000 UNITS: 5000 INJECTION INTRAVENOUS; SUBCUTANEOUS at 21:49

## 2020-12-09 RX ADMIN — INSULIN LISPRO 1 UNITS: 100 INJECTION, SOLUTION INTRAVENOUS; SUBCUTANEOUS at 06:19

## 2020-12-09 RX ADMIN — LABETALOL 20 MG/4 ML (5 MG/ML) INTRAVENOUS SYRINGE 10 MG: at 11:17

## 2020-12-10 ENCOUNTER — PREP FOR PROCEDURE (OUTPATIENT)
Dept: GASTROENTEROLOGY | Facility: CLINIC | Age: 85
End: 2020-12-10

## 2020-12-10 DIAGNOSIS — E61.1 IRON DEFICIENCY: Primary | ICD-10-CM

## 2020-12-10 DIAGNOSIS — K80.50 CHOLEDOCHOLITHIASIS: ICD-10-CM

## 2020-12-10 DIAGNOSIS — K80.50 CHOLEDOCHOLITHIASIS: Primary | ICD-10-CM

## 2020-12-10 LAB
ABO GROUP BLD: NORMAL
ALBUMIN SERPL BCP-MCNC: 2.7 G/DL (ref 3.5–5)
ALBUMIN SERPL BCP-MCNC: 2.9 G/DL (ref 3.5–5)
ALP SERPL-CCNC: 354 U/L (ref 46–116)
ALP SERPL-CCNC: 514 U/L (ref 46–116)
ALT SERPL W P-5'-P-CCNC: 381 U/L (ref 12–78)
ALT SERPL W P-5'-P-CCNC: 830 U/L (ref 12–78)
AST SERPL W P-5'-P-CCNC: 242 U/L (ref 5–45)
AST SERPL W P-5'-P-CCNC: 90 U/L (ref 5–45)
ATRIAL RATE: 150 BPM
BASOPHILS # BLD AUTO: 0.01 THOUSANDS/ΜL (ref 0–0.1)
BASOPHILS NFR BLD AUTO: 0 % (ref 0–1)
BILIRUB DIRECT SERPL-MCNC: 0.51 MG/DL (ref 0–0.2)
BILIRUB DIRECT SERPL-MCNC: 0.86 MG/DL (ref 0–0.2)
BILIRUB SERPL-MCNC: 0.81 MG/DL (ref 0.2–1)
BILIRUB SERPL-MCNC: 1.31 MG/DL (ref 0.2–1)
EOSINOPHIL # BLD AUTO: 0 THOUSAND/ΜL (ref 0–0.61)
EOSINOPHIL NFR BLD AUTO: 0 % (ref 0–6)
ERYTHROCYTE [DISTWIDTH] IN BLOOD BY AUTOMATED COUNT: 14.4 % (ref 11.6–15.1)
GLUCOSE SERPL-MCNC: 253 MG/DL (ref 65–140)
GLUCOSE SERPL-MCNC: 277 MG/DL (ref 65–140)
GLUCOSE SERPL-MCNC: 334 MG/DL (ref 65–140)
GLUCOSE SERPL-MCNC: 374 MG/DL (ref 65–140)
GLUCOSE SERPL-MCNC: 379 MG/DL (ref 65–140)
HCT VFR BLD AUTO: 24.8 % (ref 34.8–46.1)
HGB BLD-MCNC: 7.8 G/DL (ref 11.5–15.4)
IMM GRANULOCYTES # BLD AUTO: 0.06 THOUSAND/UL (ref 0–0.2)
IMM GRANULOCYTES NFR BLD AUTO: 1 % (ref 0–2)
LYMPHOCYTES # BLD AUTO: 0.48 THOUSANDS/ΜL (ref 0.6–4.47)
LYMPHOCYTES NFR BLD AUTO: 4 % (ref 14–44)
MCH RBC QN AUTO: 27.2 PG (ref 26.8–34.3)
MCHC RBC AUTO-ENTMCNC: 31.5 G/DL (ref 31.4–37.4)
MCV RBC AUTO: 86 FL (ref 82–98)
MONOCYTES # BLD AUTO: 1.07 THOUSAND/ΜL (ref 0.17–1.22)
MONOCYTES NFR BLD AUTO: 9 % (ref 4–12)
NEUTROPHILS # BLD AUTO: 9.82 THOUSANDS/ΜL (ref 1.85–7.62)
NEUTS SEG NFR BLD AUTO: 86 % (ref 43–75)
NRBC BLD AUTO-RTO: 0 /100 WBCS
PLATELET # BLD AUTO: 165 THOUSANDS/UL (ref 149–390)
PMV BLD AUTO: 10.5 FL (ref 8.9–12.7)
PROT SERPL-MCNC: 6.1 G/DL (ref 6.4–8.2)
PROT SERPL-MCNC: 6.8 G/DL (ref 6.4–8.2)
QRS AXIS: -14 DEGREES
QRSD INTERVAL: 94 MS
QT INTERVAL: 336 MS
QTC INTERVAL: 452 MS
RBC # BLD AUTO: 2.87 MILLION/UL (ref 3.81–5.12)
RH BLD: NEGATIVE
T WAVE AXIS: 183 DEGREES
VENTRICULAR RATE: 109 BPM
WBC # BLD AUTO: 11.44 THOUSAND/UL (ref 4.31–10.16)

## 2020-12-10 PROCEDURE — 93010 ELECTROCARDIOGRAM REPORT: CPT | Performed by: INTERNAL MEDICINE

## 2020-12-10 PROCEDURE — 99024 POSTOP FOLLOW-UP VISIT: CPT | Performed by: SURGERY

## 2020-12-10 PROCEDURE — 99232 SBSQ HOSP IP/OBS MODERATE 35: CPT | Performed by: INTERNAL MEDICINE

## 2020-12-10 PROCEDURE — 82948 REAGENT STRIP/BLOOD GLUCOSE: CPT

## 2020-12-10 PROCEDURE — 85025 COMPLETE CBC W/AUTO DIFF WBC: CPT | Performed by: SURGERY

## 2020-12-10 PROCEDURE — 80076 HEPATIC FUNCTION PANEL: CPT | Performed by: INTERNAL MEDICINE

## 2020-12-10 RX ORDER — OXYCODONE HYDROCHLORIDE 5 MG/1
5 TABLET ORAL EVERY 4 HOURS PRN
Status: DISCONTINUED | OUTPATIENT
Start: 2020-12-10 | End: 2020-12-14 | Stop reason: HOSPADM

## 2020-12-10 RX ORDER — POLYETHYLENE GLYCOL 3350 17 G/17G
17 POWDER, FOR SOLUTION ORAL DAILY
Status: DISCONTINUED | OUTPATIENT
Start: 2020-12-10 | End: 2020-12-12

## 2020-12-10 RX ORDER — AMOXICILLIN 250 MG
1 CAPSULE ORAL
Status: DISCONTINUED | OUTPATIENT
Start: 2020-12-10 | End: 2020-12-14 | Stop reason: HOSPADM

## 2020-12-10 RX ADMIN — DOCUSATE SODIUM AND SENNOSIDES 1 TABLET: 8.6; 5 TABLET ORAL at 21:18

## 2020-12-10 RX ADMIN — HEPARIN SODIUM 5000 UNITS: 5000 INJECTION INTRAVENOUS; SUBCUTANEOUS at 21:18

## 2020-12-10 RX ADMIN — INSULIN LISPRO 3 UNITS: 100 INJECTION, SOLUTION INTRAVENOUS; SUBCUTANEOUS at 21:22

## 2020-12-10 RX ADMIN — METOPROLOL TARTRATE 75 MG: 50 TABLET, FILM COATED ORAL at 08:14

## 2020-12-10 RX ADMIN — HEPARIN SODIUM 5000 UNITS: 5000 INJECTION INTRAVENOUS; SUBCUTANEOUS at 05:36

## 2020-12-10 RX ADMIN — POLYETHYLENE GLYCOL 3350 17 G: 17 POWDER, FOR SOLUTION ORAL at 11:46

## 2020-12-10 RX ADMIN — INSULIN LISPRO 2 UNITS: 100 INJECTION, SOLUTION INTRAVENOUS; SUBCUTANEOUS at 16:58

## 2020-12-10 RX ADMIN — INSULIN LISPRO 3 UNITS: 100 INJECTION, SOLUTION INTRAVENOUS; SUBCUTANEOUS at 06:32

## 2020-12-10 RX ADMIN — HYDROMORPHONE HYDROCHLORIDE 0.5 MG: 1 INJECTION, SOLUTION INTRAMUSCULAR; INTRAVENOUS; SUBCUTANEOUS at 08:16

## 2020-12-10 RX ADMIN — AZELASTINE HYDROCHLORIDE 1 SPRAY: 137 SPRAY, METERED NASAL at 17:00

## 2020-12-10 RX ADMIN — BIMATOPROST 1 DROP: 0.1 SOLUTION/ DROPS OPHTHALMIC at 21:22

## 2020-12-10 RX ADMIN — INSULIN LISPRO 4 UNITS: 100 INJECTION, SOLUTION INTRAVENOUS; SUBCUTANEOUS at 11:46

## 2020-12-10 RX ADMIN — ASPIRIN 81 MG: 81 TABLET ORAL at 08:16

## 2020-12-10 RX ADMIN — LORAZEPAM 1 MG: 0.5 TABLET ORAL at 21:18

## 2020-12-10 RX ADMIN — OXYCODONE HYDROCHLORIDE 5 MG: 5 TABLET ORAL at 11:52

## 2020-12-10 RX ADMIN — DOCUSATE SODIUM AND SENNOSIDES 1 TABLET: 8.6; 5 TABLET ORAL at 11:46

## 2020-12-10 RX ADMIN — FAMOTIDINE 10 MG: 20 TABLET ORAL at 08:14

## 2020-12-10 RX ADMIN — Medication 1 TABLET: at 08:14

## 2020-12-10 RX ADMIN — OXYCODONE HYDROCHLORIDE 5 MG: 5 TABLET ORAL at 21:28

## 2020-12-10 RX ADMIN — HEPARIN SODIUM 5000 UNITS: 5000 INJECTION INTRAVENOUS; SUBCUTANEOUS at 14:52

## 2020-12-10 RX ADMIN — METOPROLOL TARTRATE 75 MG: 50 TABLET, FILM COATED ORAL at 21:18

## 2020-12-10 RX ADMIN — AZELASTINE HYDROCHLORIDE 1 SPRAY: 137 SPRAY, METERED NASAL at 08:17

## 2020-12-11 LAB
GLUCOSE SERPL-MCNC: 212 MG/DL (ref 65–140)
GLUCOSE SERPL-MCNC: 212 MG/DL (ref 65–140)
GLUCOSE SERPL-MCNC: 276 MG/DL (ref 65–140)
GLUCOSE SERPL-MCNC: 370 MG/DL (ref 65–140)

## 2020-12-11 PROCEDURE — 97163 PT EVAL HIGH COMPLEX 45 MIN: CPT

## 2020-12-11 PROCEDURE — 82948 REAGENT STRIP/BLOOD GLUCOSE: CPT

## 2020-12-11 PROCEDURE — 97167 OT EVAL HIGH COMPLEX 60 MIN: CPT

## 2020-12-11 PROCEDURE — 99232 SBSQ HOSP IP/OBS MODERATE 35: CPT | Performed by: INTERNAL MEDICINE

## 2020-12-11 RX ORDER — INSULIN GLARGINE 100 [IU]/ML
10 INJECTION, SOLUTION SUBCUTANEOUS EVERY MORNING
Status: DISCONTINUED | OUTPATIENT
Start: 2020-12-11 | End: 2020-12-12

## 2020-12-11 RX ADMIN — LORAZEPAM 1 MG: 0.5 TABLET ORAL at 21:38

## 2020-12-11 RX ADMIN — OXYCODONE HYDROCHLORIDE 5 MG: 5 TABLET ORAL at 21:43

## 2020-12-11 RX ADMIN — INSULIN GLARGINE 10 UNITS: 100 INJECTION, SOLUTION SUBCUTANEOUS at 09:16

## 2020-12-11 RX ADMIN — DOCUSATE SODIUM AND SENNOSIDES 1 TABLET: 8.6; 5 TABLET ORAL at 21:39

## 2020-12-11 RX ADMIN — INSULIN LISPRO 4 UNITS: 100 INJECTION, SOLUTION INTRAVENOUS; SUBCUTANEOUS at 11:39

## 2020-12-11 RX ADMIN — Medication 1 TABLET: at 09:05

## 2020-12-11 RX ADMIN — HEPARIN SODIUM 5000 UNITS: 5000 INJECTION INTRAVENOUS; SUBCUTANEOUS at 06:53

## 2020-12-11 RX ADMIN — INSULIN LISPRO 1 UNITS: 100 INJECTION, SOLUTION INTRAVENOUS; SUBCUTANEOUS at 21:39

## 2020-12-11 RX ADMIN — BIMATOPROST 1 DROP: 0.1 SOLUTION/ DROPS OPHTHALMIC at 21:39

## 2020-12-11 RX ADMIN — INSULIN LISPRO 2 UNITS: 100 INJECTION, SOLUTION INTRAVENOUS; SUBCUTANEOUS at 17:22

## 2020-12-11 RX ADMIN — ASPIRIN 81 MG: 81 TABLET ORAL at 09:05

## 2020-12-11 RX ADMIN — OXYCODONE HYDROCHLORIDE 5 MG: 5 TABLET ORAL at 06:59

## 2020-12-11 RX ADMIN — METOPROLOL TARTRATE 75 MG: 50 TABLET, FILM COATED ORAL at 09:05

## 2020-12-11 RX ADMIN — INSULIN LISPRO 3 UNITS: 100 INJECTION, SOLUTION INTRAVENOUS; SUBCUTANEOUS at 06:53

## 2020-12-11 RX ADMIN — AZELASTINE HYDROCHLORIDE 1 SPRAY: 137 SPRAY, METERED NASAL at 17:23

## 2020-12-11 RX ADMIN — AZELASTINE HYDROCHLORIDE 1 SPRAY: 137 SPRAY, METERED NASAL at 09:19

## 2020-12-11 RX ADMIN — POLYETHYLENE GLYCOL 3350 17 G: 17 POWDER, FOR SOLUTION ORAL at 09:09

## 2020-12-11 RX ADMIN — GLYCERIN, HYPROMELLOSE, POLYETHYLENE GLYCOL 1 DROP: .2; .2; 1 LIQUID OPHTHALMIC at 15:09

## 2020-12-11 RX ADMIN — FAMOTIDINE 10 MG: 20 TABLET ORAL at 09:05

## 2020-12-11 RX ADMIN — HEPARIN SODIUM 5000 UNITS: 5000 INJECTION INTRAVENOUS; SUBCUTANEOUS at 15:02

## 2020-12-11 RX ADMIN — HEPARIN SODIUM 5000 UNITS: 5000 INJECTION INTRAVENOUS; SUBCUTANEOUS at 21:39

## 2020-12-11 RX ADMIN — METOPROLOL TARTRATE 75 MG: 50 TABLET, FILM COATED ORAL at 21:38

## 2020-12-12 ENCOUNTER — APPOINTMENT (INPATIENT)
Dept: RADIOLOGY | Facility: HOSPITAL | Age: 85
DRG: 418 | End: 2020-12-12
Payer: MEDICARE

## 2020-12-12 PROBLEM — K59.00 CONSTIPATION: Status: ACTIVE | Noted: 2020-12-12

## 2020-12-12 PROBLEM — E44.0 MODERATE PROTEIN-CALORIE MALNUTRITION (HCC): Status: ACTIVE | Noted: 2020-12-12

## 2020-12-12 LAB
GLUCOSE SERPL-MCNC: 191 MG/DL (ref 65–140)
GLUCOSE SERPL-MCNC: 237 MG/DL (ref 65–140)
GLUCOSE SERPL-MCNC: 283 MG/DL (ref 65–140)
GLUCOSE SERPL-MCNC: 297 MG/DL (ref 65–140)

## 2020-12-12 PROCEDURE — 92610 EVALUATE SWALLOWING FUNCTION: CPT

## 2020-12-12 PROCEDURE — 92611 MOTION FLUOROSCOPY/SWALLOW: CPT

## 2020-12-12 PROCEDURE — 74230 X-RAY XM SWLNG FUNCJ C+: CPT

## 2020-12-12 PROCEDURE — 82948 REAGENT STRIP/BLOOD GLUCOSE: CPT

## 2020-12-12 PROCEDURE — 99232 SBSQ HOSP IP/OBS MODERATE 35: CPT | Performed by: INTERNAL MEDICINE

## 2020-12-12 RX ORDER — INSULIN GLARGINE 100 [IU]/ML
16 INJECTION, SOLUTION SUBCUTANEOUS EVERY MORNING
Status: DISCONTINUED | OUTPATIENT
Start: 2020-12-13 | End: 2020-12-13

## 2020-12-12 RX ORDER — BISACODYL 10 MG
10 SUPPOSITORY, RECTAL RECTAL DAILY
Status: DISCONTINUED | OUTPATIENT
Start: 2020-12-12 | End: 2020-12-13

## 2020-12-12 RX ORDER — MAGNESIUM CARB/ALUMINUM HYDROX 105-160MG
296 TABLET,CHEWABLE ORAL ONCE
Status: COMPLETED | OUTPATIENT
Start: 2020-12-12 | End: 2020-12-12

## 2020-12-12 RX ORDER — POLYETHYLENE GLYCOL 3350 17 G/17G
17 POWDER, FOR SOLUTION ORAL 3 TIMES DAILY
Status: DISCONTINUED | OUTPATIENT
Start: 2020-12-12 | End: 2020-12-13

## 2020-12-12 RX ORDER — BISACODYL 10 MG
10 SUPPOSITORY, RECTAL RECTAL DAILY PRN
Status: DISCONTINUED | OUTPATIENT
Start: 2020-12-12 | End: 2020-12-12

## 2020-12-12 RX ORDER — POLYETHYLENE GLYCOL 3350 17 G/17G
17 POWDER, FOR SOLUTION ORAL 2 TIMES DAILY
Status: DISCONTINUED | OUTPATIENT
Start: 2020-12-12 | End: 2020-12-12

## 2020-12-12 RX ADMIN — BIMATOPROST 1 DROP: 0.1 SOLUTION/ DROPS OPHTHALMIC at 21:17

## 2020-12-12 RX ADMIN — Medication 1 TABLET: at 08:38

## 2020-12-12 RX ADMIN — INSULIN GLARGINE 10 UNITS: 100 INJECTION, SOLUTION SUBCUTANEOUS at 08:41

## 2020-12-12 RX ADMIN — POLYETHYLENE GLYCOL 3350 17 G: 17 POWDER, FOR SOLUTION ORAL at 17:28

## 2020-12-12 RX ADMIN — INSULIN LISPRO 2 UNITS: 100 INJECTION, SOLUTION INTRAVENOUS; SUBCUTANEOUS at 21:18

## 2020-12-12 RX ADMIN — INSULIN LISPRO 1 UNITS: 100 INJECTION, SOLUTION INTRAVENOUS; SUBCUTANEOUS at 08:42

## 2020-12-12 RX ADMIN — OXYCODONE HYDROCHLORIDE 5 MG: 5 TABLET ORAL at 02:13

## 2020-12-12 RX ADMIN — MAGNESIUM CITRATE 296 ML: 1.75 LIQUID ORAL at 12:58

## 2020-12-12 RX ADMIN — ASPIRIN 81 MG: 81 TABLET ORAL at 08:41

## 2020-12-12 RX ADMIN — METOPROLOL TARTRATE 75 MG: 50 TABLET, FILM COATED ORAL at 21:15

## 2020-12-12 RX ADMIN — FERROUS SULFATE TAB 325 MG (65 MG ELEMENTAL FE) 325 MG: 325 (65 FE) TAB at 08:38

## 2020-12-12 RX ADMIN — BISACODYL 20 MG: 5 TABLET, COATED ORAL at 12:45

## 2020-12-12 RX ADMIN — METOPROLOL TARTRATE 75 MG: 50 TABLET, FILM COATED ORAL at 08:38

## 2020-12-12 RX ADMIN — BISACODYL 10 MG: 10 SUPPOSITORY RECTAL at 13:51

## 2020-12-12 RX ADMIN — AZELASTINE HYDROCHLORIDE 1 SPRAY: 137 SPRAY, METERED NASAL at 17:36

## 2020-12-12 RX ADMIN — HEPARIN SODIUM 5000 UNITS: 5000 INJECTION INTRAVENOUS; SUBCUTANEOUS at 06:27

## 2020-12-12 RX ADMIN — LORAZEPAM 1 MG: 0.5 TABLET ORAL at 21:16

## 2020-12-12 RX ADMIN — INSULIN HUMAN 8 UNITS: 100 INJECTION, SUSPENSION SUBCUTANEOUS at 23:00

## 2020-12-12 RX ADMIN — APIXABAN 2.5 MG: 2.5 TABLET, FILM COATED ORAL at 17:28

## 2020-12-12 RX ADMIN — POLYETHYLENE GLYCOL 3350 17 G: 17 POWDER, FOR SOLUTION ORAL at 12:48

## 2020-12-12 RX ADMIN — INSULIN LISPRO 3 UNITS: 100 INJECTION, SOLUTION INTRAVENOUS; SUBCUTANEOUS at 17:35

## 2020-12-12 RX ADMIN — AZELASTINE HYDROCHLORIDE 1 SPRAY: 137 SPRAY, METERED NASAL at 08:45

## 2020-12-12 RX ADMIN — FAMOTIDINE 10 MG: 20 TABLET ORAL at 08:38

## 2020-12-12 RX ADMIN — INSULIN LISPRO 3 UNITS: 100 INJECTION, SOLUTION INTRAVENOUS; SUBCUTANEOUS at 12:49

## 2020-12-13 PROBLEM — E87.1 HYPONATREMIA: Status: ACTIVE | Noted: 2020-12-13

## 2020-12-13 LAB
ALBUMIN SERPL BCP-MCNC: 2.3 G/DL (ref 3.5–5)
ALP SERPL-CCNC: 226 U/L (ref 46–116)
ALT SERPL W P-5'-P-CCNC: 121 U/L (ref 12–78)
ANION GAP SERPL CALCULATED.3IONS-SCNC: 7 MMOL/L (ref 4–13)
AST SERPL W P-5'-P-CCNC: 22 U/L (ref 5–45)
BASOPHILS # BLD AUTO: 0.02 THOUSANDS/ΜL (ref 0–0.1)
BASOPHILS NFR BLD AUTO: 0 % (ref 0–1)
BILIRUB SERPL-MCNC: 0.8 MG/DL (ref 0.2–1)
BUN SERPL-MCNC: 5 MG/DL (ref 5–25)
CALCIUM ALBUM COR SERPL-MCNC: 10.2 MG/DL (ref 8.3–10.1)
CALCIUM SERPL-MCNC: 8.8 MG/DL (ref 8.3–10.1)
CHLORIDE SERPL-SCNC: 94 MMOL/L (ref 100–108)
CO2 SERPL-SCNC: 31 MMOL/L (ref 21–32)
CREAT SERPL-MCNC: 0.39 MG/DL (ref 0.6–1.3)
EOSINOPHIL # BLD AUTO: 0.1 THOUSAND/ΜL (ref 0–0.61)
EOSINOPHIL NFR BLD AUTO: 1 % (ref 0–6)
ERYTHROCYTE [DISTWIDTH] IN BLOOD BY AUTOMATED COUNT: 14.6 % (ref 11.6–15.1)
GFR SERPL CREATININE-BSD FRML MDRD: 97 ML/MIN/1.73SQ M
GLUCOSE SERPL-MCNC: 137 MG/DL (ref 65–140)
GLUCOSE SERPL-MCNC: 160 MG/DL (ref 65–140)
GLUCOSE SERPL-MCNC: 174 MG/DL (ref 65–140)
GLUCOSE SERPL-MCNC: 175 MG/DL (ref 65–140)
GLUCOSE SERPL-MCNC: 208 MG/DL (ref 65–140)
HCT VFR BLD AUTO: 26.9 % (ref 34.8–46.1)
HGB BLD-MCNC: 8.5 G/DL (ref 11.5–15.4)
IMM GRANULOCYTES # BLD AUTO: 0.06 THOUSAND/UL (ref 0–0.2)
IMM GRANULOCYTES NFR BLD AUTO: 1 % (ref 0–2)
LYMPHOCYTES # BLD AUTO: 0.99 THOUSANDS/ΜL (ref 0.6–4.47)
LYMPHOCYTES NFR BLD AUTO: 9 % (ref 14–44)
MCH RBC QN AUTO: 27.2 PG (ref 26.8–34.3)
MCHC RBC AUTO-ENTMCNC: 31.6 G/DL (ref 31.4–37.4)
MCV RBC AUTO: 86 FL (ref 82–98)
MONOCYTES # BLD AUTO: 0.78 THOUSAND/ΜL (ref 0.17–1.22)
MONOCYTES NFR BLD AUTO: 7 % (ref 4–12)
NEUTROPHILS # BLD AUTO: 9.71 THOUSANDS/ΜL (ref 1.85–7.62)
NEUTS SEG NFR BLD AUTO: 82 % (ref 43–75)
NRBC BLD AUTO-RTO: 0 /100 WBCS
PLATELET # BLD AUTO: 229 THOUSANDS/UL (ref 149–390)
PMV BLD AUTO: 10.1 FL (ref 8.9–12.7)
POTASSIUM SERPL-SCNC: 3.5 MMOL/L (ref 3.5–5.3)
PROT SERPL-MCNC: 6 G/DL (ref 6.4–8.2)
RBC # BLD AUTO: 3.13 MILLION/UL (ref 3.81–5.12)
SODIUM SERPL-SCNC: 132 MMOL/L (ref 136–145)
WBC # BLD AUTO: 11.66 THOUSAND/UL (ref 4.31–10.16)

## 2020-12-13 PROCEDURE — 99223 1ST HOSP IP/OBS HIGH 75: CPT | Performed by: INTERNAL MEDICINE

## 2020-12-13 PROCEDURE — 99232 SBSQ HOSP IP/OBS MODERATE 35: CPT | Performed by: INTERNAL MEDICINE

## 2020-12-13 PROCEDURE — 85025 COMPLETE CBC W/AUTO DIFF WBC: CPT | Performed by: INTERNAL MEDICINE

## 2020-12-13 PROCEDURE — 80053 COMPREHEN METABOLIC PANEL: CPT | Performed by: INTERNAL MEDICINE

## 2020-12-13 PROCEDURE — 82948 REAGENT STRIP/BLOOD GLUCOSE: CPT

## 2020-12-13 RX ORDER — POLYETHYLENE GLYCOL 3350 17 G/17G
17 POWDER, FOR SOLUTION ORAL DAILY PRN
Status: DISCONTINUED | OUTPATIENT
Start: 2020-12-13 | End: 2020-12-14 | Stop reason: HOSPADM

## 2020-12-13 RX ORDER — INSULIN GLARGINE 100 [IU]/ML
12 INJECTION, SOLUTION SUBCUTANEOUS EVERY MORNING
Status: DISCONTINUED | OUTPATIENT
Start: 2020-12-14 | End: 2020-12-14 | Stop reason: HOSPADM

## 2020-12-13 RX ADMIN — GLYCERIN, HYPROMELLOSE, POLYETHYLENE GLYCOL 1 DROP: .2; .2; 1 LIQUID OPHTHALMIC at 08:31

## 2020-12-13 RX ADMIN — AZELASTINE HYDROCHLORIDE 1 SPRAY: 137 SPRAY, METERED NASAL at 08:26

## 2020-12-13 RX ADMIN — INSULIN GLARGINE 16 UNITS: 100 INJECTION, SOLUTION SUBCUTANEOUS at 08:27

## 2020-12-13 RX ADMIN — LORAZEPAM 1 MG: 0.5 TABLET ORAL at 21:28

## 2020-12-13 RX ADMIN — INSULIN LISPRO 4 UNITS: 100 INJECTION, SOLUTION INTRAVENOUS; SUBCUTANEOUS at 17:18

## 2020-12-13 RX ADMIN — INSULIN LISPRO 1 UNITS: 100 INJECTION, SOLUTION INTRAVENOUS; SUBCUTANEOUS at 11:48

## 2020-12-13 RX ADMIN — INSULIN LISPRO 1 UNITS: 100 INJECTION, SOLUTION INTRAVENOUS; SUBCUTANEOUS at 17:18

## 2020-12-13 RX ADMIN — INSULIN LISPRO 1 UNITS: 100 INJECTION, SOLUTION INTRAVENOUS; SUBCUTANEOUS at 08:27

## 2020-12-13 RX ADMIN — METOPROLOL TARTRATE 75 MG: 50 TABLET, FILM COATED ORAL at 08:26

## 2020-12-13 RX ADMIN — SODIUM CHLORIDE 1000 ML: 0.9 INJECTION, SOLUTION INTRAVENOUS at 09:27

## 2020-12-13 RX ADMIN — METOPROLOL TARTRATE 75 MG: 50 TABLET, FILM COATED ORAL at 21:28

## 2020-12-13 RX ADMIN — APIXABAN 2.5 MG: 2.5 TABLET, FILM COATED ORAL at 17:18

## 2020-12-13 RX ADMIN — Medication 1 TABLET: at 08:25

## 2020-12-13 RX ADMIN — FAMOTIDINE 10 MG: 20 TABLET ORAL at 08:25

## 2020-12-13 RX ADMIN — BIMATOPROST 1 DROP: 0.1 SOLUTION/ DROPS OPHTHALMIC at 21:28

## 2020-12-13 RX ADMIN — FERROUS SULFATE TAB 325 MG (65 MG ELEMENTAL FE) 325 MG: 325 (65 FE) TAB at 08:25

## 2020-12-13 RX ADMIN — ASPIRIN 81 MG: 81 TABLET ORAL at 08:25

## 2020-12-13 RX ADMIN — APIXABAN 2.5 MG: 2.5 TABLET, FILM COATED ORAL at 08:25

## 2020-12-13 RX ADMIN — INSULIN LISPRO 1 UNITS: 100 INJECTION, SOLUTION INTRAVENOUS; SUBCUTANEOUS at 21:30

## 2020-12-13 RX ADMIN — INSULIN LISPRO 4 UNITS: 100 INJECTION, SOLUTION INTRAVENOUS; SUBCUTANEOUS at 11:48

## 2020-12-14 ENCOUNTER — TELEPHONE (OUTPATIENT)
Dept: CARDIOLOGY CLINIC | Facility: CLINIC | Age: 85
End: 2020-12-14

## 2020-12-14 ENCOUNTER — TELEPHONE (OUTPATIENT)
Dept: GASTROENTEROLOGY | Facility: CLINIC | Age: 85
End: 2020-12-14

## 2020-12-14 VITALS
BODY MASS INDEX: 19.93 KG/M2 | HEART RATE: 67 BPM | SYSTOLIC BLOOD PRESSURE: 108 MMHG | HEIGHT: 67 IN | TEMPERATURE: 98.4 F | RESPIRATION RATE: 17 BRPM | OXYGEN SATURATION: 98 % | WEIGHT: 127 LBS | DIASTOLIC BLOOD PRESSURE: 56 MMHG

## 2020-12-14 LAB
ALBUMIN SERPL BCP-MCNC: 2.5 G/DL (ref 3.5–5)
ALP SERPL-CCNC: 194 U/L (ref 46–116)
ALT SERPL W P-5'-P-CCNC: 109 U/L (ref 12–78)
ANION GAP SERPL CALCULATED.3IONS-SCNC: 7 MMOL/L (ref 4–13)
AST SERPL W P-5'-P-CCNC: 32 U/L (ref 5–45)
BILIRUB SERPL-MCNC: 0.68 MG/DL (ref 0.2–1)
BUN SERPL-MCNC: 6 MG/DL (ref 5–25)
CALCIUM ALBUM COR SERPL-MCNC: 9.6 MG/DL (ref 8.3–10.1)
CALCIUM SERPL-MCNC: 8.4 MG/DL (ref 8.3–10.1)
CHLORIDE SERPL-SCNC: 98 MMOL/L (ref 100–108)
CO2 SERPL-SCNC: 29 MMOL/L (ref 21–32)
CREAT SERPL-MCNC: 0.4 MG/DL (ref 0.6–1.3)
GFR SERPL CREATININE-BSD FRML MDRD: 97 ML/MIN/1.73SQ M
GLUCOSE SERPL-MCNC: 123 MG/DL (ref 65–140)
GLUCOSE SERPL-MCNC: 148 MG/DL (ref 65–140)
GLUCOSE SERPL-MCNC: 205 MG/DL (ref 65–140)
POTASSIUM SERPL-SCNC: 3.4 MMOL/L (ref 3.5–5.3)
PROT SERPL-MCNC: 6.2 G/DL (ref 6.4–8.2)
SODIUM SERPL-SCNC: 134 MMOL/L (ref 136–145)

## 2020-12-14 PROCEDURE — 99239 HOSP IP/OBS DSCHRG MGMT >30: CPT | Performed by: INTERNAL MEDICINE

## 2020-12-14 PROCEDURE — 80053 COMPREHEN METABOLIC PANEL: CPT | Performed by: INTERNAL MEDICINE

## 2020-12-14 PROCEDURE — 97110 THERAPEUTIC EXERCISES: CPT

## 2020-12-14 PROCEDURE — 82948 REAGENT STRIP/BLOOD GLUCOSE: CPT

## 2020-12-14 PROCEDURE — 97116 GAIT TRAINING THERAPY: CPT

## 2020-12-14 PROCEDURE — 97535 SELF CARE MNGMENT TRAINING: CPT

## 2020-12-14 RX ORDER — OXYCODONE HYDROCHLORIDE 5 MG/1
2.5 TABLET ORAL EVERY 4 HOURS PRN
Qty: 30 TABLET | Refills: 0 | Status: SHIPPED | OUTPATIENT
Start: 2020-12-14 | End: 2020-12-24

## 2020-12-14 RX ORDER — POLYETHYLENE GLYCOL 3350 17 G/17G
17 POWDER, FOR SOLUTION ORAL DAILY PRN
Refills: 0
Start: 2020-12-14 | End: 2021-03-04 | Stop reason: ALTCHOICE

## 2020-12-14 RX ORDER — DICYCLOMINE HYDROCHLORIDE 10 MG/1
20 CAPSULE ORAL ONCE
Status: DISCONTINUED | OUTPATIENT
Start: 2020-12-14 | End: 2020-12-14 | Stop reason: HOSPADM

## 2020-12-14 RX ORDER — POTASSIUM CHLORIDE 20 MEQ/1
40 TABLET, EXTENDED RELEASE ORAL ONCE
Status: DISCONTINUED | OUTPATIENT
Start: 2020-12-14 | End: 2020-12-14 | Stop reason: HOSPADM

## 2020-12-14 RX ORDER — LOPERAMIDE HYDROCHLORIDE 2 MG/1
2 CAPSULE ORAL ONCE
Status: COMPLETED | OUTPATIENT
Start: 2020-12-14 | End: 2020-12-14

## 2020-12-14 RX ORDER — INSULIN GLARGINE 100 [IU]/ML
12 INJECTION, SOLUTION SUBCUTANEOUS
Qty: 5 PEN | Refills: 0 | Status: SHIPPED | OUTPATIENT
Start: 2020-12-14 | End: 2020-12-27

## 2020-12-14 RX ORDER — INSULIN LISPRO 100 [IU]/ML
4 INJECTION, SOLUTION INTRAVENOUS; SUBCUTANEOUS
Qty: 5 PEN | Refills: 0 | Status: SHIPPED | OUTPATIENT
Start: 2020-12-14 | End: 2021-03-04 | Stop reason: ALTCHOICE

## 2020-12-14 RX ADMIN — FERROUS SULFATE TAB 325 MG (65 MG ELEMENTAL FE) 325 MG: 325 (65 FE) TAB at 09:29

## 2020-12-14 RX ADMIN — INSULIN GLARGINE 12 UNITS: 100 INJECTION, SOLUTION SUBCUTANEOUS at 09:31

## 2020-12-14 RX ADMIN — LOPERAMIDE HYDROCHLORIDE 2 MG: 2 CAPSULE ORAL at 09:30

## 2020-12-14 RX ADMIN — LOPERAMIDE HYDROCHLORIDE 2 MG: 2 CAPSULE ORAL at 12:26

## 2020-12-14 RX ADMIN — APIXABAN 2.5 MG: 2.5 TABLET, FILM COATED ORAL at 09:31

## 2020-12-14 RX ADMIN — ASPIRIN 81 MG: 81 TABLET ORAL at 09:31

## 2020-12-14 RX ADMIN — Medication 1 TABLET: at 09:30

## 2020-12-14 RX ADMIN — FAMOTIDINE 10 MG: 20 TABLET ORAL at 09:29

## 2020-12-14 RX ADMIN — INSULIN LISPRO 4 UNITS: 100 INJECTION, SOLUTION INTRAVENOUS; SUBCUTANEOUS at 09:32

## 2020-12-15 ENCOUNTER — TRANSITIONAL CARE MANAGEMENT (OUTPATIENT)
Dept: FAMILY MEDICINE CLINIC | Facility: CLINIC | Age: 85
End: 2020-12-15

## 2020-12-21 ENCOUNTER — OFFICE VISIT (OUTPATIENT)
Dept: SURGERY | Facility: CLINIC | Age: 85
End: 2020-12-21

## 2020-12-21 VITALS — TEMPERATURE: 96.9 F | BODY MASS INDEX: 20.97 KG/M2 | HEIGHT: 67 IN | WEIGHT: 133.6 LBS

## 2020-12-21 DIAGNOSIS — Z09 POSTOP CHECK: Primary | ICD-10-CM

## 2020-12-21 PROBLEM — Z90.49 S/P LAPAROSCOPIC CHOLECYSTECTOMY: Status: ACTIVE | Noted: 2020-12-21

## 2020-12-21 PROBLEM — K80.20 CHOLELITHIASIS: Status: RESOLVED | Noted: 2020-12-07 | Resolved: 2020-12-21

## 2020-12-21 PROBLEM — K80.50 CHOLEDOCHOLITHIASIS: Status: RESOLVED | Noted: 2020-12-02 | Resolved: 2020-12-21

## 2020-12-21 PROCEDURE — 99024 POSTOP FOLLOW-UP VISIT: CPT | Performed by: SURGERY

## 2020-12-21 NOTE — PROGRESS NOTES
Office Visit - General Surgery  Kat Orona MRN: [id number]  Encounter: [id number]    Assessment and Plan    Problem List Items Addressed This Visit     None      Patient can be discharged from the Surgical Clinic. Follow-up p.r.n..  I advised her in regards to her bowel movement. She can take either Colace/MiraLax or Metamucil if needed. I advised her to follow with her GP for which she is doing tomorrow. She should also follow up with her speech/swallow therapist.  She states that she is also possibly seeing an endocrinologist.    Chief Complaint:  Kat Orona is a 82 y.o. female who presents for Post-op (p/o lap yassine. Feels fine.)    Subjective  Patient states that she is doing well in terms of the surgery part and abdominal pain. Having flatus and bowel movement. No N/V. Does have lack of appetite but this is chronic. Also have swallowing issues for which she is seeing a therapist. States she is mostly back to her usual self and is able to do most of the daily chores without help.     Past Medical History  Past Medical History:   Diagnosis Date   • Diabetes mellitus ([address])    • Glaucoma    • H/O degenerative disc disease    • Hyperlipidemia    • Hypertension    • Idiopathic pulmonary fibrosis ([address]) 11/2020   • Irregular heart beat     afib   • Peripheral neuropathy    • 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 [facility])        Past Surgical History  Past Surgical History:   Procedure Laterality Date   • CATARACT EXTRACTION, BILATERAL  2011   • CHOLECYSTECTOMY LAPAROSCOPIC N/A 12/9/2020    Procedure: CHOLECYSTECTOMY LAPAROSCOPIC;  Surgeon: Tricia Galeana MD;  [facility];  Service: General   • COLONOSCOPY     • CYST REMOVAL  2009    left lower Quadrant    • HERNIA REPAIR  1992   • LIPOMA RESECTION  2010   • LA REPAIR ING HERNIA,5+Y/O,REDUCIBL Bilateral 1/5/2018    Procedure: INGUINAL HERNIA REPAIR;  Surgeon: Violeta Connell MD;  [facility];  Service: General   • SHOULDER SURGERY  2019    Dr. Tiffanie Loyd [facility])   • VASCULAR SURGERY      Agram-  R carotid occlusion       Family History  Family History   Problem Relation Age of Onset   • Heart attack Father    • Hiatal hernia Father    • Diabetes Father    • Heart disease Mother    • Cancer Brother    • Diabetes Brother    • Heart disease Brother    • Hypertension Brother        Social History  Social History     Socioeconomic History   • Marital status:      Spouse name: None   • Number of children: None   • Years of education: None   • Highest education level: None   Occupational History   • Occupation: customer service   retired   Social Needs   • Financial resource strain: None   • Food insecurity     Worry: None     Inability: None   • Transportation needs     Medical: None     Non-medical: None   Tobacco Use   • Smoking status: Former Smoker     Packs/day: 1.50     Years: 40.00     Pack years: 60.00     Types: Cigarettes     Quit date:      Years since quittin.9   • Smokeless tobacco: Never Used   Substance and Sexual Activity   • Alcohol use: No   • Drug use: No   • Sexual activity: None   Lifestyle   • Physical activity     Days per week: None     Minutes per session: None   • Stress: None   Relationships   • Social connections     Talks on phone: None     Gets together: None     Attends Yazdanism service: None     Active member of club or organization: None     Attends meetings of clubs or organizations: None     Relationship status: None   • Intimate partner violence     Fear of current or ex partner: None     Emotionally abused: None     Physically abused: None     Forced sexual activity: None   Other Topics Concern   • None   Social History Narrative    Lives alone [place]    2 children        Medications  Current Outpatient Medications on File Prior to Visit   Medication Sig Dispense Refill   • acetaminophen (TYLENOL) 500 mg tablet Take 1,000 mg by mouth as needed for mild pain     • amoxicillin (AMOXIL) 500 mg capsule TAKE 4 CAPSULES 1 HOUR BEFORE DENTAL APPOINTMENT     • apixaban (ELIQUIS) 2.5 mg Take 1 tablet (2.5 mg total) by mouth 2 (two) times a day 60 tablet 0   • aspirin (ECOTRIN LOW STRENGTH) 81 mg EC tablet Take 1 tablet (81 mg total) by mouth daily     • azelastine (ASTELIN) 0.1 % nasal spray 1 spray into each nostril 2 (two) times a day Use in each nostril as directed 1 Bottle 2   • bimatoprost (LUMIGAN) 0.01 % ophthalmic drops Administer 1 drop to both eyes daily at bedtime for 30 days 1.5 mL 0   • calcium carbonate (OS-DANIA) 600 MG tablet Take 600 mg by mouth 2 (two) times a day with meals     • carboxymethylcellulose (Artificial Tears) 1 % ophthalmic solution 1 drop 3 (three) times a day     • ezetimibe-simvastatin (VYTORIN) 10-40 mg per tablet TAKE 1 TABLET BY MOUTH DAILY AT BEDTIME 30 tablet 5   • famotidine (PEPCID) 10 mg tablet Take 10 mg by mouth as needed      • ferrous sulfate 325 (65 Fe) mg tablet Take 325 mg by mouth daily with breakfast     • glucose blood (ONE TOUCH ULTRA TEST) test strip TEST FOUR TIMES A  each 1   • insulin glargine (Lantus SoloStar) 100 units/mL injection pen Inject 12 Units under the skin daily with breakfast 5 pen 0   • insulin lispro (HumaLOG KwikPen) 100 units/mL injection pen Inject 4 Units under the skin 3 (three) times a day with meals 5 pen 0   • Insulin Pen Needle 29G X 5MM MISC Use 4 (four) times a day (before meals and at bedtime) 100 each 0   • Januvia 100 MG tablet TAKE 1 TABLET (100 MG TOTAL) BY MOUTH DAILY 30 tablet 5   • Lancets (ONETOUCH DELICA PLUS [id number]) MISC Use one lancet to test blood 4 times a day 400 each 1   • loratadine (CLARITIN) 10 mg tablet Take 10 mg by mouth daily as needed for allergies     • LORazepam (ATIVAN) 0.5 mg tablet TAKE 2 TABLETS (1 MG TOTAL) BY MOUTH DAILY AT BEDTIME 60 tablet 0   • metFORMIN (GLUCOPHAGE) 500 mg tablet Take 2 tablets (1,000 mg total) by mouth 2 (two) times a day with meals 360 tablet 3   • metoprolol tartrate (LOPRESSOR) 50 mg tablet TAKE 1.5 TABLETS (75 MG TOTAL) BY MOUTH EVERY 12 (TWELVE) HOURS 90 tablet 5   • Multiple Vitamin (multivitamin) tablet Take 1 tablet by mouth daily     • oxyCODONE (ROXICODONE) 5 mg immediate release tablet Take 0.5 tablets (2.5 mg total) by mouth every 4 (four) hours as needed for moderate pain for up to 10 daysMax Daily Amount: 15 mg 30 tablet 0   • polyethylene glycol (MIRALAX) 17 g packet Take 17 g by mouth daily as needed (constipation)  0     No current facility-administered medications on file prior to visit. Allergies  Allergies   Allergen Reactions   • Keflex [Cephalexin] Diarrhea       Review of Systems   12 systems reviewed and were negative except for the above. Objective  Vitals:    12/21/20 0858   Temp: (!) 96.9 °F (36.1 °C)       Physical Exam   General:  Alert, awake, not in acute distress. Pulmonary:  Not tachypneic. On room air. Cardiology:  2+ pulses. not tachycardic. Abdomen:  Soft, nondistended, nontender to palpation. Incisions clean dry and intact with Steri-Strips still on. No surrounding erythema or signs of infection. Skin:  Warm, dry. Neurology:  Appropriate affect.   HEENT: atraumatic

## 2020-12-22 ENCOUNTER — OFFICE VISIT (OUTPATIENT)
Dept: FAMILY MEDICINE CLINIC | Facility: CLINIC | Age: 85
End: 2020-12-22
Payer: MEDICARE

## 2020-12-22 VITALS
TEMPERATURE: 97.1 F | OXYGEN SATURATION: 98 % | HEIGHT: 67 IN | DIASTOLIC BLOOD PRESSURE: 60 MMHG | SYSTOLIC BLOOD PRESSURE: 120 MMHG | BODY MASS INDEX: 20.75 KG/M2 | RESPIRATION RATE: 16 BRPM | WEIGHT: 132.2 LBS | HEART RATE: 70 BPM

## 2020-12-22 DIAGNOSIS — E11.65 TYPE 2 DIABETES MELLITUS WITH HYPERGLYCEMIA (HCC): ICD-10-CM

## 2020-12-22 DIAGNOSIS — J84.9 INTERSTITIAL LUNG DISEASE (HCC): ICD-10-CM

## 2020-12-22 DIAGNOSIS — E44.0 MODERATE PROTEIN-CALORIE MALNUTRITION (HCC): ICD-10-CM

## 2020-12-22 DIAGNOSIS — Z90.49 S/P LAPAROSCOPIC CHOLECYSTECTOMY: ICD-10-CM

## 2020-12-22 DIAGNOSIS — Z09 HOSPITAL DISCHARGE FOLLOW-UP: Primary | ICD-10-CM

## 2020-12-22 DIAGNOSIS — R13.19 ESOPHAGEAL DYSPHAGIA: ICD-10-CM

## 2020-12-22 PROCEDURE — 99495 TRANSJ CARE MGMT MOD F2F 14D: CPT | Performed by: NURSE PRACTITIONER

## 2020-12-27 RX ORDER — INSULIN GLARGINE 100 [IU]/ML
6 INJECTION, SOLUTION SUBCUTANEOUS
Qty: 5 PEN | Refills: 0
Start: 2020-12-27 | End: 2021-01-06 | Stop reason: ALTCHOICE

## 2020-12-29 ENCOUNTER — TELEPHONE (OUTPATIENT)
Dept: FAMILY MEDICINE CLINIC | Facility: CLINIC | Age: 85
End: 2020-12-29

## 2020-12-30 ENCOUNTER — TELEPHONE (OUTPATIENT)
Dept: FAMILY MEDICINE CLINIC | Facility: CLINIC | Age: 85
End: 2020-12-30

## 2021-01-05 ENCOUNTER — REMOTE DEVICE CLINIC VISIT (OUTPATIENT)
Dept: CARDIOLOGY CLINIC | Facility: CLINIC | Age: 86
End: 2021-01-05
Payer: MEDICARE

## 2021-01-05 DIAGNOSIS — Z95.0 PRESENCE OF PERMANENT CARDIAC PACEMAKER: Primary | ICD-10-CM

## 2021-01-05 PROCEDURE — 93294 REM INTERROG EVL PM/LDLS PM: CPT | Performed by: INTERNAL MEDICINE

## 2021-01-05 PROCEDURE — 93296 REM INTERROG EVL PM/IDS: CPT | Performed by: INTERNAL MEDICINE

## 2021-01-05 NOTE — PROGRESS NOTES
MDT-DUAL CHAMBER PPM / ACTIVE SYSTEM IS MRI CONDITIONAL   CARELINK TRANSMISSION:  BATTERY VOLTAGE ADEQUATE (6 5 YR)   AP 76 1%  0 3%    ALL LEAD PARAMETERS WITHIN NORMAL LIMITS   SINCE 12/8/20;  5 VT-NS, 5 FAST A&V, AND 34 AF EPISODES WITH ALL AVAILABLE EGMS SHOWING AT/AF/RVR, WITH V HR UP  BPM   PT TAKES ASA, ELIQUIS, AND METOPROLOL   TASK TO DR Brigitte Holt FOR REVIEW   NORMAL DEVICE FUNCTION   RG

## 2021-01-06 ENCOUNTER — OFFICE VISIT (OUTPATIENT)
Dept: FAMILY MEDICINE CLINIC | Facility: CLINIC | Age: 86
End: 2021-01-06
Payer: MEDICARE

## 2021-01-06 VITALS
BODY MASS INDEX: 20.09 KG/M2 | OXYGEN SATURATION: 94 % | TEMPERATURE: 97.1 F | WEIGHT: 128 LBS | RESPIRATION RATE: 16 BRPM | HEART RATE: 95 BPM | DIASTOLIC BLOOD PRESSURE: 80 MMHG | SYSTOLIC BLOOD PRESSURE: 140 MMHG | HEIGHT: 67 IN

## 2021-01-06 DIAGNOSIS — E78.2 MIXED HYPERLIPIDEMIA: ICD-10-CM

## 2021-01-06 DIAGNOSIS — I77.1 STENOSIS OF RIGHT SUBCLAVIAN ARTERY (HCC): ICD-10-CM

## 2021-01-06 DIAGNOSIS — E44.0 MODERATE PROTEIN-CALORIE MALNUTRITION (HCC): ICD-10-CM

## 2021-01-06 DIAGNOSIS — F32.A DEPRESSION, UNSPECIFIED DEPRESSION TYPE: Primary | ICD-10-CM

## 2021-01-06 DIAGNOSIS — J84.9 INTERSTITIAL LUNG DISEASE (HCC): ICD-10-CM

## 2021-01-06 DIAGNOSIS — K21.00 GASTROESOPHAGEAL REFLUX DISEASE WITH ESOPHAGITIS WITHOUT HEMORRHAGE: ICD-10-CM

## 2021-01-06 DIAGNOSIS — I48.91 ATRIAL FIBRILLATION, UNSPECIFIED TYPE (HCC): ICD-10-CM

## 2021-01-06 DIAGNOSIS — E11.65 TYPE 2 DIABETES MELLITUS WITH HYPERGLYCEMIA, UNSPECIFIED WHETHER LONG TERM INSULIN USE (HCC): ICD-10-CM

## 2021-01-06 DIAGNOSIS — R63.4 WEIGHT LOSS: ICD-10-CM

## 2021-01-06 DIAGNOSIS — R63.0 DECREASED APPETITE: ICD-10-CM

## 2021-01-06 PROBLEM — C23 MALIGNANT NEOPLASM OF GALLBLADDER (HCC): Status: ACTIVE | Noted: 2021-01-06

## 2021-01-06 PROCEDURE — 99214 OFFICE O/P EST MOD 30 MIN: CPT | Performed by: NURSE PRACTITIONER

## 2021-01-06 RX ORDER — FAMOTIDINE 20 MG/1
20 TABLET, FILM COATED ORAL 2 TIMES DAILY
Qty: 180 TABLET | Refills: 1 | Status: SHIPPED | OUTPATIENT
Start: 2021-01-06 | End: 2021-07-26

## 2021-01-06 RX ORDER — MIRTAZAPINE 7.5 MG/1
7.5 TABLET, FILM COATED ORAL
Qty: 30 TABLET | Refills: 5 | Status: SHIPPED | OUTPATIENT
Start: 2021-01-06 | End: 2021-01-22 | Stop reason: ALTCHOICE

## 2021-01-06 NOTE — PROGRESS NOTES
FAMILY PRACTICE OFFICE VISIT       NAME: Oniel Cat  AGE: 82 y o  SEX: female       : 1935        MRN: [id number]    Assessment and Plan     Problem List Items Addressed This Visit        Digestive    GERD (gastroesophageal reflux disease)    Relevant Medications    famotidine (PEPCID) 20 mg tablet       Endocrine    Type 2 diabetes mellitus with hyperglycemia (HCC)    Relevant Orders    CBC and differential    Comprehensive metabolic panel    Hemoglobin A1C    Ambulatory referral to clinical pharmacy       Respiratory    Interstitial lung disease ([facility] )       Cardiovascular and Mediastinum    Stenosis of right subclavian artery (HCC)       Other    HLD (hyperlipidemia)    Relevant Orders    Lipid panel    Moderate protein-calorie malnutrition ([facility] )      Other Visit Diagnoses     Depression, unspecified depression type    -  Primary    Relevant Medications    mirtazapine (REMERON) 7 5 MG tablet    Decreased appetite        Relevant Medications    mirtazapine (REMERON) 7 5 MG tablet    Weight loss        Relevant Medications    mirtazapine (REMERON) 7 5 MG tablet    Other Relevant Orders    TSH, 3rd generation    Atrial fibrillation, unspecified type (HCC)              1  Depression, unspecified depression type  mirtazapine (REMERON) 7 5 MG tablet   2  Decreased appetite  mirtazapine (REMERON) 7 5 MG tablet   3  Weight loss  mirtazapine (REMERON) 7 5 MG tablet    TSH, 3rd generation   4  Moderate protein-calorie malnutrition ([facility] )     5  Gastroesophageal reflux disease with esophagitis without hemorrhage  famotidine (PEPCID) 20 mg tablet   6  Type 2 diabetes mellitus with hyperglycemia, unspecified whether long term insulin use (HCC)  CBC and differential    Comprehensive metabolic panel    Hemoglobin A1C    Ambulatory referral to clinical pharmacy   7  Mixed hyperlipidemia  Lipid panel   8  Stenosis of right subclavian artery ([facility] )     9  Atrial fibrillation, unspecified type ([facility] )     10  Interstitial lung disease [facility])       This 29-year-old female presents today for follow-up on chronic conditions  She is status post cholecystectomy on 12/09/2020  Is struggling with mood, decreased appetite, weight loss, protein calorie malnutrition  She is trying to eat 3 meals a day and drink at least 1 can of Glucerna per day  She is weighing herself at home on a weekly basis last week her weight was 123 4 lbs  This week her weight is 124 2 lbs  Based on office visit weights, it appears she has lost 4 lb since last office visit  States recently it is painful to eat  She has epigastric discomfort  Notes she did miss a few doses of Pepcid and has had heartburn  Recommend resuming Pepcid 20 mg twice daily on a regular basis  Stop using Pepcid on a p r n  basis  She does have chronic cough which she notes has increased recently with increased postnasal drip  She is continuing to use Claritin and azelastine nasal spray  We discussed this could be allergy related, it could also be related to recent increase in GERD symptoms  She will monitor symptoms after increasing Pepcid to twice daily  May need to consider resuming PPI  She was on a PPI in the past, however we stopped PPI due to osteoporosis  We discussed trial of low dose Remeron for mood as well as appetite, weight gain  She will stop lorazepam at bedtime, and will trial Remeron  Aware not to take both of these medications together, one or the other, not both  Diabetes:  Last hemoglobin A1c in October 8 1%,  Slightly higher than goal   She is currently taking metformin and Januvia  After hospitalization for cholecystectomy she was started on Lantus 12 units at bedtime  She had an episode of symptomatic hypoglycemia on this dose, we reduced dose to 6 units daily  She again had an episode of symptomatic hypoglycemia  We discontinued Lantus  She has continued to use her mealtime insulin lispro 4 units 3 times daily    Recommend stopping mealtime insulin, she will return to office in 2 weeks with her blood sugar log  Will consider an alternative oral agent after reviewing her blood sugar log  She will call me with any questions or concerns in the interim  Diabetic foot exam completed today  Hyperlipidemia: Lipid panel is stable with LDL 65 on Vytorin  Will repeat lipid panel  She follows with vascular surgery for asymptomatic carotid artery stenosis and stenosis of right subclavian artery currently maintained on aspirin, statin, and Eliquis  Atrial fibrillation:  Stable  Anticoagulated on aspirin and Eliquis  Continue metoprolol [id number] mg twice daily  Continue follow-up with Cardiology  Interstitial lung disease: Following with pulmonology  Will be starting pulmonary rehab on Monday  Follow up in 2 weeks  Chief Complaint     Chief Complaint   Patient presents with    Follow-up       History of Present Illness     Chanell Mazariegos is an 63-year-old female presenting today for follow-up on blood sugars and weight  Still having difficulty with appetite  She has started weighing herself once weekly on Tuesdays  Last Tuesday her weight was 123 4 lbs  Yesterday her weight was 124 2 lbs  Feels like it hurts to eat  Has epigastric discomfort  Forgot to take Pepcid for a few days, and has had heartburn  Is trying to get in at least 1 can of glucerna per day  Trying to eat as much protein as able  Sugars: Worse at night, eating more at night  Sugars running 138-200  Chronic cough getting worse  Exacerbated by talking a lot  Using honey and cough drops  Always clearing throat--"a lot of phlegm back there "  She is taking Claritin and azelastine nasal spray  Speech therapy started yesterday  Twice per week for 5 weeks  Starting pulmonary rehab on Monday  Pulmonologist follow up in February  Mood is mildly down  Friends are dying   Not sure when she will be able to get to socialize again due to COVID-19, worries, if anyone will be left  States she is trying to work through all of these thoughts  Review of Systems   Review of Systems   Constitutional: Positive for fatigue and unexpected weight change  HENT: Positive for postnasal drip and rhinorrhea  Respiratory: Positive for cough and shortness of breath (with exertion)  Negative for chest tightness and wheezing  Cardiovascular: Negative  Gastrointestinal: Positive for abdominal pain (epigastric discomfort, "hurts to eat")  Negative for diarrhea, nausea and vomiting  Skin: Positive for rash ("bumpy rash on both feet, at the location of her slippers  She did get new slippers  )  Psychiatric/Behavioral: Positive for dysphoric mood (mild as noted in HPI)  Negative for self-injury, sleep disturbance and suicidal ideas         Active Problem List     Patient Active Problem List   Diagnosis    HTN (hypertension)    HLD (hyperlipidemia)    Glaucoma    Asymptomatic carotid artery stenosis, left    Symptomatic PVCs    Occlusion of right carotid artery    Stenosis of right subclavian artery (HCC)    Paroxysmal atrial fibrillation (HCC)    Pacemaker    Type 2 diabetes mellitus with hyperglycemia (HCC)    Osteoporosis    GERD (gastroesophageal reflux disease)    Anxiety    Iron deficiency    Interstitial lung disease (HCC)    Common bile duct dilatation    Abdominal pain    Dysphagia    Moderate protein-calorie malnutrition (HCC)    Constipation    Hyponatremia    S/P laparoscopic cholecystectomy    Malignant neoplasm of gallbladder ([facility] )       Past Medical History:  Past Medical History:   Diagnosis Date    [facility]     Glaucoma     H/O degenerative disc disease     Hyperlipidemia     Hypertension     Idiopathic pulmonary fibrosis [facility] ) 11/2020    Irregular heart beat     afib    Peripheral neuropathy     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 [facility])        Past Surgical History:  Past Surgical History:   Procedure Laterality Date    CATARACT EXTRACTION, BILATERAL      CHOLECYSTECTOMY LAPAROSCOPIC N/A 2020    Procedure: CHOLECYSTECTOMY LAPAROSCOPIC;  Surgeon: Naila Calhoun MD;  [facility];  Service: General    COLONOSCOPY      CYST REMOVAL      left lower Quadrant    [address]    LIPOMA RESECTION      CA REPAIR ING HERNIA,5+Y/O,REDUCIBL Bilateral 2018    Procedure: INGUINAL HERNIA REPAIR;  Surgeon: Elise Cloud MD;  [facility];  Service: General    [facility]   [address] carotid occlusion       Family History:  Family History   Problem Relation Age of Onset    Heart attack Father     Hiatal hernia Father     Diabetes Father     Heart disease Mother     Cancer Brother     Diabetes Brother     Heart disease Brother     Hypertension Brother        Social History:  Social History     Socioeconomic History    Marital status:      Spouse name: Not on file    Number of children: Not on file    Years of education: Not on file    Highest education level: Not on file   Occupational History    Occupation: customer service   retired   Social Needs    Financial resource strain: Not on file    Food insecurity     Worry: Not on file     Inability: Not on file   IfOnly needs     Medical: Not on file     Non-medical: Not on file   Tobacco Use    Smoking status: Former Smoker     Packs/day: 1 50     Years: 40 00     Pack years: 60 00     Types: Cigarettes     Quit date:      Years since quittin 0    Smokeless tobacco: Never Used   Substance and Sexual Activity    Alcohol use: No    Drug use: No    Sexual activity: Not on file   Lifestyle    Physical activity     Days per week: Not on file     Minutes per session: Not on file    Stress: Not on file   Relationships    Social connections     Talks on phone: Not on file Gets together: Not on file     Attends Baptism service: Not on file     Active member of club or organization: Not on file     Attends meetings of clubs or organizations: Not on file     Relationship status: Not on file    Intimate partner violence     Fear of current or ex partner: Not on file     Emotionally abused: Not on file     Physically abused: Not on file     Forced sexual activity: Not on file   Other Topics Concern    Not on file   Social History Narrative    Lives [place]    2 children       I have reviewed the patient's medical history in detail; there are no changes to the history as noted in the electronic medical record  Objective     Vitals:    01/06/21 1057   BP: 140/80   Pulse: 95   Resp: 16   Temp: (!) 97 1 °F (36 2 °C)   TempSrc: Temporal   SpO2: 94%   Weight: 58 1 kg (128 lb)   Height: 5' 7" (1 702 m)     Wt Readings from Last 3 Encounters:   01/08/21 56 2 kg (124 lb)   01/06/21 58 1 kg (128 lb)   12/22/20 60 kg (132 lb 3 2 oz)     Physical Exam  Vitals signs and nursing note reviewed  Constitutional:       General: She is not in acute distress  Appearance: She is not ill-appearing, toxic-appearing or diaphoretic  Comments: Frail, cachetic  HENT:      Head: Normocephalic and atraumatic  Eyes:      Conjunctiva/sclera: Conjunctivae normal    Cardiovascular:      Rate and Rhythm: Normal rate and regular rhythm  Pulses: no weak pulses          Dorsalis pedis pulses are 2+ on the right side and 2+ on the left side  Posterior tibial pulses are 2+ on the right side and 2+ on the left side  Heart sounds: No murmur  Comments: Dry cough  Pulmonary:      Effort: Pulmonary effort is normal  No respiratory distress  Breath sounds: Decreased breath sounds (mildly) present  Abdominal:      General: Abdomen is flat  Bowel sounds are normal       Palpations: Abdomen is soft  Tenderness: There is no abdominal tenderness     Musculoskeletal: Right lower leg: No edema  Left lower leg: No edema  Feet:      Right foot:      Skin integrity: Dry skin present  No ulcer, skin breakdown, erythema, warmth or callus  Left foot:      Skin integrity: Dry skin present  No ulcer, skin breakdown, erythema, warmth or callus  Skin:     General: Skin is warm and dry  Neurological:      Mental Status: She is alert and oriented to person, place, and time  Gait: Gait normal    Psychiatric:         Mood and Affect: Mood normal          Behavior: Behavior normal            Patient's shoes and socks removed  Right Foot/Ankle   Right Foot Inspection  Skin Exam: skin normal, skin intact and dry skin no warmth, no callus, no erythema, no maceration, no abnormal color, no pre-ulcer, no ulcer and no callus                          Toe Exam: ROM and strength within normal limits  Sensory     Proprioception: intact   Monofilament testing: diminished  Vascular  Capillary refills: < 3 seconds  The right DP pulse is 2+  The right PT pulse is 2+  Left Foot/Ankle  Left Foot Inspection  Skin Exam: skin normal, skin intact and dry skinno warmth, no erythema, no maceration, normal color, no pre-ulcer, no ulcer and no callus                         Toe Exam: ROM and strength within normal limits                   Sensory     Proprioception: intact  Monofilament: diminished  Vascular  Capillary refills: < 3 seconds  The left DP pulse is 2+  The left PT pulse is 2+  Assign Risk Category:  No deformity present; Loss of protective sensation;  No weak pulses       Risk: 1      ALLERGIES:  Allergies   Allergen Reactions    Keflex [Cephalexin] Diarrhea       Current Medications     Current Outpatient Medications   Medication Sig Dispense Refill    acetaminophen (TYLENOL) 500 mg tablet Take 1,000 mg by mouth as needed for mild pain      amoxicillin (AMOXIL) 500 mg capsule TAKE 4 CAPSULES 1 HOUR BEFORE DENTAL APPOINTMENT      apixaban (ELIQUIS) 2 5 mg Take 1 tablet (2 5 mg total) by mouth 2 (two) times a day 60 tablet 0    aspirin (ECOTRIN LOW STRENGTH) 81 mg EC tablet Take 1 tablet (81 mg total) by mouth daily      azelastine (ASTELIN) 0 1 % nasal spray 1 spray into each nostril 2 (two) times a day Use in each nostril as directed 1 Bottle 2    bimatoprost (LUMIGAN) 0 01 % ophthalmic drops Administer 1 drop to both eyes daily at bedtime for 30 days 1 5 mL 0    calcium carbonate (OS-DANIA) 600 MG tablet Take 600 mg by mouth 2 (two) times a day with meals      carboxymethylcellulose (Artificial Tears) 1 % ophthalmic solution 1 drop 3 (three) times a day      ezetimibe-simvastatin (VYTORIN) 10-40 mg per tablet TAKE 1 TABLET BY MOUTH DAILY AT BEDTIME 30 tablet 5    ferrous sulfate 325 (65 Fe) mg tablet Take 325 mg by mouth daily with breakfast      glucose blood (ONE TOUCH ULTRA TEST) test strip TEST FOUR TIMES A  each 1    insulin lispro (HumaLOG KwikPen) 100 units/mL injection pen Inject 4 Units under the skin 3 (three) times a day with meals 5 pen 0    Insulin Pen Needle 29G X 5MM MISC Use 4 (four) times a day (before meals and at bedtime) 100 each 0    Januvia 100 MG tablet TAKE 1 TABLET (100 MG TOTAL) BY MOUTH DAILY 30 tablet 5    Lancets (ONETOUCH DELICA PLUS VIZJRH99G) MISC Use one lancet to test blood 4 times a day 400 each 1    loratadine (CLARITIN) 10 mg tablet Take 10 mg by mouth daily as needed for allergies      LORazepam (ATIVAN) 0 5 mg tablet TAKE 2 TABLETS (1 MG TOTAL) BY MOUTH DAILY AT BEDTIME 60 tablet 0    metFORMIN (GLUCOPHAGE) 500 mg tablet Take 2 tablets (1,000 mg total) by mouth 2 (two) times a day with meals 360 tablet 3    metoprolol tartrate (LOPRESSOR) 50 mg tablet TAKE 1 5 TABLETS (75 MG TOTAL) BY MOUTH EVERY 12 (TWELVE) HOURS 90 tablet 5    Multiple Vitamin (multivitamin) tablet Take 1 tablet by mouth daily      polyethylene glycol (MIRALAX) 17 g packet Take 17 g by mouth daily as needed (constipation)  0    famotidine (PEPCID) 20 mg tablet Take 1 tablet (20 mg total) by mouth 2 (two) times a day 180 tablet 1    mirtazapine (REMERON) 7 5 MG tablet Take 1 tablet (7 5 mg total) by mouth daily at bedtime 30 tablet 5     No current facility-administered medications for this visit            Health Maintenance     Health Maintenance   Topic Date Due    DTaP,Tdap,and Td Vaccines (1 - Tdap) 12/01/1956    COVID-19 Vaccine (1) 12/01/2012    Diabetic Foot Exam  01/23/2021    HEMOGLOBIN A1C  03/29/2021    Fall Risk  07/30/2021    Depression Screening PHQ  07/30/2021    Medicare Annual Wellness Visit (AWV)  07/30/2021    DM Eye Exam  08/11/2021    BMI: Adult  01/08/2022    Colonoscopy Surveillance  09/02/2025    Hepatitis C Screening  Completed    Pneumococcal Vaccine: 65+ Years  Completed    Influenza Vaccine  Completed    HIB Vaccine  Aged Out    Hepatitis B Vaccine  Aged Out    IPV Vaccine  Aged Out    Hepatitis A Vaccine  Aged Out    Meningococcal ACWY Vaccine  Aged Out    HPV Vaccine  Aged Out     Immunization History   Administered Date(s) Administered    INFLUENZA 11/02/2016, 10/16/2017, 09/18/2018, 09/18/2018    Influenza, Quadrivalent (nasal) 11/02/2016    Influenza, high dose seasonal 0 7 mL 11/06/2019, 10/06/2020    Pneumococcal Conjugate 13-Valent 07/25/2019    Pneumococcal Polysaccharide PPV23 03/17/2003    Zoster 07/09/2010       ZANDER Osorio

## 2021-01-08 ENCOUNTER — HOSPITAL ENCOUNTER (OUTPATIENT)
Dept: GASTROENTEROLOGY | Facility: HOSPITAL | Age: 86
Setting detail: OUTPATIENT SURGERY
Discharge: HOME | End: 2021-01-08
Attending: INTERNAL MEDICINE | Admitting: INTERNAL MEDICINE
Payer: MEDICARE

## 2021-01-08 ENCOUNTER — ANESTHESIA (OUTPATIENT)
Dept: GASTROENTEROLOGY | Facility: HOSPITAL | Age: 86
End: 2021-01-08

## 2021-01-08 ENCOUNTER — TELEPHONE (OUTPATIENT)
Dept: FAMILY MEDICINE CLINIC | Facility: CLINIC | Age: 86
End: 2021-01-08

## 2021-01-08 ENCOUNTER — ANESTHESIA EVENT (OUTPATIENT)
Dept: GASTROENTEROLOGY | Facility: HOSPITAL | Age: 86
End: 2021-01-08

## 2021-01-08 ENCOUNTER — HOSPITAL ENCOUNTER (OUTPATIENT)
Dept: RADIOLOGY | Facility: HOSPITAL | Age: 86
Discharge: HOME | End: 2021-01-08
Payer: MEDICARE

## 2021-01-08 VITALS
OXYGEN SATURATION: 99 % | HEART RATE: 69 BPM | DIASTOLIC BLOOD PRESSURE: 81 MMHG | RESPIRATION RATE: 18 BRPM | WEIGHT: 124 LBS | SYSTOLIC BLOOD PRESSURE: 134 MMHG | HEIGHT: 67 IN | TEMPERATURE: 98 F | BODY MASS INDEX: 19.46 KG/M2

## 2021-01-08 VITALS — HEART RATE: 60 BPM

## 2021-01-08 DIAGNOSIS — K80.50 CHOLEDOCHOLITHIASIS: ICD-10-CM

## 2021-01-08 LAB — GLUCOSE SERPL-MCNC: 229 MG/DL (ref 65–140)

## 2021-01-08 PROCEDURE — 74328 X-RAY BILE DUCT ENDOSCOPY: CPT

## 2021-01-08 PROCEDURE — 43275 ERCP REMOVE FORGN BODY DUCT: CPT | Performed by: INTERNAL MEDICINE

## 2021-01-08 PROCEDURE — 82948 REAGENT STRIP/BLOOD GLUCOSE: CPT

## 2021-01-08 RX ORDER — SODIUM CHLORIDE 9 MG/ML
125 INJECTION, SOLUTION INTRAVENOUS CONTINUOUS
Status: CANCELLED | OUTPATIENT
Start: 2021-01-08

## 2021-01-08 RX ORDER — SODIUM CHLORIDE, SODIUM LACTATE, POTASSIUM CHLORIDE, CALCIUM CHLORIDE 600; 310; 30; 20 MG/100ML; MG/100ML; MG/100ML; MG/100ML
50 INJECTION, SOLUTION INTRAVENOUS CONTINUOUS
Status: CANCELLED | OUTPATIENT
Start: 2021-01-08

## 2021-01-08 RX ORDER — FENTANYL CITRATE 50 UG/ML
INJECTION, SOLUTION INTRAMUSCULAR; INTRAVENOUS AS NEEDED
Status: DISCONTINUED | OUTPATIENT
Start: 2021-01-08 | End: 2021-01-08

## 2021-01-08 RX ORDER — DEXAMETHASONE SODIUM PHOSPHATE 10 MG/ML
INJECTION, SOLUTION INTRAMUSCULAR; INTRAVENOUS AS NEEDED
Status: DISCONTINUED | OUTPATIENT
Start: 2021-01-08 | End: 2021-01-08

## 2021-01-08 RX ORDER — PROPOFOL 10 MG/ML
INJECTION, EMULSION INTRAVENOUS AS NEEDED
Status: DISCONTINUED | OUTPATIENT
Start: 2021-01-08 | End: 2021-01-08

## 2021-01-08 RX ORDER — ONDANSETRON 2 MG/ML
INJECTION INTRAMUSCULAR; INTRAVENOUS AS NEEDED
Status: DISCONTINUED | OUTPATIENT
Start: 2021-01-08 | End: 2021-01-08

## 2021-01-08 RX ORDER — ONDANSETRON 2 MG/ML
4 INJECTION INTRAMUSCULAR; INTRAVENOUS ONCE AS NEEDED
Status: CANCELLED | OUTPATIENT
Start: 2021-01-08

## 2021-01-08 RX ORDER — SODIUM CHLORIDE, SODIUM LACTATE, POTASSIUM CHLORIDE, CALCIUM CHLORIDE 600; 310; 30; 20 MG/100ML; MG/100ML; MG/100ML; MG/100ML
INJECTION, SOLUTION INTRAVENOUS CONTINUOUS PRN
Status: DISCONTINUED | OUTPATIENT
Start: 2021-01-08 | End: 2021-01-08

## 2021-01-08 RX ORDER — SUCCINYLCHOLINE/SOD CL,ISO/PF 100 MG/5ML
SYRINGE (ML) INTRAVENOUS AS NEEDED
Status: DISCONTINUED | OUTPATIENT
Start: 2021-01-08 | End: 2021-01-08

## 2021-01-08 RX ORDER — LIDOCAINE HYDROCHLORIDE 10 MG/ML
INJECTION, SOLUTION EPIDURAL; INFILTRATION; INTRACAUDAL; PERINEURAL AS NEEDED
Status: DISCONTINUED | OUTPATIENT
Start: 2021-01-08 | End: 2021-01-08

## 2021-01-08 RX ORDER — FENTANYL CITRATE/PF 50 MCG/ML
50 SYRINGE (ML) INJECTION
Status: CANCELLED | OUTPATIENT
Start: 2021-01-08

## 2021-01-08 RX ADMIN — FENTANYL CITRATE 25 MCG: 50 INJECTION INTRAMUSCULAR; INTRAVENOUS at 08:14

## 2021-01-08 RX ADMIN — PROPOFOL 120 MG: 10 INJECTION, EMULSION INTRAVENOUS at 08:05

## 2021-01-08 RX ADMIN — LIDOCAINE HYDROCHLORIDE 50 MG: 10 INJECTION, SOLUTION EPIDURAL; INFILTRATION; INTRACAUDAL; PERINEURAL at 08:05

## 2021-01-08 RX ADMIN — FENTANYL CITRATE 25 MCG: 50 INJECTION INTRAMUSCULAR; INTRAVENOUS at 08:19

## 2021-01-08 RX ADMIN — PHENYLEPHRINE HYDROCHLORIDE 100 MCG: 10 INJECTION INTRAVENOUS at 08:22

## 2021-01-08 RX ADMIN — PHENYLEPHRINE HYDROCHLORIDE 100 MCG: 10 INJECTION INTRAVENOUS at 08:10

## 2021-01-08 RX ADMIN — SODIUM CHLORIDE, SODIUM LACTATE, POTASSIUM CHLORIDE, AND CALCIUM CHLORIDE: .6; .31; .03; .02 INJECTION, SOLUTION INTRAVENOUS at 08:02

## 2021-01-08 RX ADMIN — IOHEXOL 9 ML: 240 INJECTION, SOLUTION INTRATHECAL; INTRAVASCULAR; INTRAVENOUS; ORAL at 08:43

## 2021-01-08 RX ADMIN — Medication 84.3 MG: at 08:06

## 2021-01-08 RX ADMIN — ONDANSETRON 4 MG: 2 INJECTION INTRAMUSCULAR; INTRAVENOUS at 08:10

## 2021-01-08 RX ADMIN — PHENYLEPHRINE HYDROCHLORIDE 100 MCG: 10 INJECTION INTRAVENOUS at 08:30

## 2021-01-08 RX ADMIN — FENTANYL CITRATE 50 MCG: 50 INJECTION INTRAMUSCULAR; INTRAVENOUS at 08:05

## 2021-01-08 RX ADMIN — DEXAMETHASONE SODIUM PHOSPHATE 10 MG: 10 INJECTION, SOLUTION INTRAMUSCULAR; INTRAVENOUS at 08:10

## 2021-01-08 NOTE — ANESTHESIA POSTPROCEDURE EVALUATION
Post-Op Assessment Note    CV Status:  Stable  Pain Score: 0    Pain management: adequate     Mental Status:  Alert and awake   Hydration Status:  Euvolemic   PONV Controlled:  Controlled   Airway Patency:  Patent      Post Op Vitals Reviewed: Yes      Staff: Anesthesiologist, CRNA         No complications documented      /66 (01/08/21 0841)    Temp 98 °F (36 7 °C) (01/08/21 0841)    Pulse 67 (01/08/21 0841)   Resp 18 (01/08/21 0841)    SpO2 100 % (01/08/21 0841)

## 2021-01-08 NOTE — TELEPHONE ENCOUNTER
Patient following up on blood sugar results  It was 397 at [facility], and at 4PM it was 296   Patient took shot of lispro at [facility]

## 2021-01-08 NOTE — TELEPHONE ENCOUNTER
Please have her restart her meal time insulin  Keep blood sugar log over the weekend, and I will call her on Monday or Tuesday for a report on blood sugars

## 2021-01-08 NOTE — H&P
History and Physical - [facility]  Shannan Rodriguez 82 y o  female MRN: [id number]                  HPI: Shannan Rodriguez is a 82y o  year old female who presents for ERCP with stent removal      REVIEW OF SYSTEMS: Per the HPI, and otherwise unremarkable      Historical Information   Past Medical History:   Diagnosis Date    Diabetes mellitus ([facility] )     Glaucoma     H/O degenerative disc disease     Hyperlipidemia     Hypertension     Idiopathic pulmonary fibrosis (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 [facility])      Past Surgical History:   Procedure Laterality Date    CATARACT EXTRACTION, BILATERAL      CHOLECYSTECTOMY LAPAROSCOPIC N/A 2020    Procedure: CHOLECYSTECTOMY LAPAROSCOPIC;  Surgeon: Loyd Zamora MD;  [facility];  Service: General    COLONOSCOPY      CYST REMOVAL      left lower Quadrant    [address]    LIPOMA RESECTION      MI REPAIR ING HERNIA,5+Y/O,REDUCIBL Bilateral 2018    Procedure: INGUINAL HERNIA REPAIR;  Surgeon: Domonique Horvath MD;  [facility];  Service: General    [facility]  [facility]   [address] carotid occlusion     Social History   Social History     Substance and Sexual Activity   Alcohol Use No     Social History     Substance and Sexual Activity   Drug Use No     Social History     Tobacco Use   Smoking Status Former Smoker    Packs/day: 1 50    Years: 40 00    Pack years: 60 00    Types: Cigarettes    Quit date: 12    Years since quittin 0   Smokeless Tobacco Never Used     Family History   Problem Relation Age of Onset    Heart attack Father     Hiatal hernia Father     Diabetes Father     Heart disease Mother     Cancer Brother     Diabetes Brother     Heart disease Brother     Hypertension Brother        Meds/Allergies     (Not in a hospital admission)      Allergies   Allergen Reactions    Keflex [Cephalexin] Diarrhea       Objective     Blood pressure 153/66, pulse 71, temperature 98 7 °F (37 1 °C), temperature source Oral, resp  rate 16, height 5' 7" (1 702 m), weight 56 2 kg (124 lb), SpO2 100 %  PHYSICAL EXAM    Gen: NAD  CV: RRR  CHEST: Clear  ABD: soft, NT/ND  EXT: no edema      ASSESSMENT/PLAN:  This is a 82y o  year old female here for ERCP, the patient is stable and optimized for the procedure, we reviewed risk and benefits   Risk include but not limited to infection, bleeding, perforation and missing a lesion    PLAN:   Procedure: ERCP with stent removal

## 2021-01-08 NOTE — ANESTHESIA PREPROCEDURE EVALUATION
Procedure:  ERCP    Relevant Problems   CARDIO   (+) Asymptomatic carotid artery stenosis, left   (+) HLD (hyperlipidemia)   (+) HTN (hypertension)   (+) Pacemaker   (+) Paroxysmal atrial fibrillation (HCC)   (+) Stenosis of right subclavian artery (HCC)   (+) Symptomatic PVCs      ENDO   (+) Type 2 diabetes mellitus with hyperglycemia (HCC)      GI/HEPATIC   (+) Dysphagia   (+) GERD (gastroesophageal reflux disease)      NEURO/PSYCH   (+) Anxiety      Other   (+) Hyponatremia   (+) Interstitial lung disease (HCC)        Physical Exam    Airway    Mallampati score: II  TM Distance: >3 FB  Neck ROM: full     Dental   Comment: Top and bottom partial , upper dentures and lower dentures,     Cardiovascular  Rhythm: irregular,     Pulmonary  Pulmonary exam normal     Other Findings  Atrial fibrillation with rapid ventricular response with premature ventricular or aberrantly conducted complexes  Poor R wave progression  Abnormal ECG  When compared with ECG of 08-DEC-2020 23:55,  Atrial fibrillation has replaced Sinus rhythm  Vent  rate has increased BY  44 BPM         Anesthesia Plan  ASA Score- 3     Anesthesia Type- general with ASA Monitors  Additional Monitors:   Airway Plan: ETT  Plan Factors-Exercise tolerance (METS): >4 METS  Chart reviewed  Patient is not a current smoker  Patient not instructed to abstain from smoking on day of procedure  Patient did not smoke on day of surgery  Induction- intravenous  Postoperative Plan-     Informed Consent- Anesthetic plan and risks discussed with patient  I personally reviewed this patient with the CRNA  Discussed and agreed on the Anesthesia Plan with the CRNA  Edwar Colon

## 2021-01-11 ENCOUNTER — CLINICAL SUPPORT (OUTPATIENT)
Dept: PULMONOLOGY | Facility: CLINIC | Age: 86
End: 2021-01-11
Payer: MEDICARE

## 2021-01-11 DIAGNOSIS — J84.9 INTERSTITIAL LUNG DISEASE (HCC): ICD-10-CM

## 2021-01-11 NOTE — PROGRESS NOTES
Pulmonary Rehabilitation Plan of Care   Initial Care Plan      Today's date: 2021   Visits: initial evaluation  Patient name: Ramona Carter      : 1935  Age: 82 y o  MRN: [id number]  Referring Physician: Jenn Harrison MD   Pulmonologist: same  Provider: Dwight Tucker  Clinician: Bethany Johnson, RRT    Dx:   Encounter Diagnosis   Name Primary?  [facility] ([facility] )      Date of onset: 10/2020      SUMMARY OF PROGRESS:  Initial evaluation for pulmonary rehab with recent diagnosis of ILD  Patient has mild sob with exertion  She also has a hx of diastolic heart failure, atrial fibrillation and has a pacemaker  She has had shoulder surgery in the past and has had cortisone injections in both knees  She also has a history of carotid artery stenosis and Type 2 diabetes    She walked 960 ft for her 6 MWT with a low oxygen level of 94% on room air  She felt moderately short of breath with the walk  She would like to increase her strength and stamina        Medication compliance: Yes   Comments:   Fall Risk: Low   Comments:     Oxygen Needs: none needed    Oxygen Goal: Maintain SpO2>90% during exercise  Plan: Titrate supplemental oxygen as needed to maintain SpO2>90% with exercise  CAT: 7   Shortness of breath questionnaire: 56    Progressing:  Reviewed Pt goals and determined plan of care    Readiness to change: Preparation:  (Getting ready to change)     EXERCISE ASSESSMENT and PLAN    Current Exercise Program in Rehab:       Frequency: days/week        Minutes:          METS:               SpO2:               RPD:                       HR:    RPE:          Modalities:       Exercise Progression 30 Day Goals :    Frequency: 3 days/week (one at home)    Minutes: 50 minutes         METS: 4-6              SpO2: > 90%              RPD: 4-6                      HR: 89-99   RPE: 4-6        Modalities: Treadmill, Airdyne bike, UBE, NuStep and Recumbent bike     Strength trainin-3 days / week  12-15 repetitions  1-2 sets per modality    Modalities: Lateral Raise, Arm Curl, Seated Row, Sit to [employer]    Progressing:  Reviewed Pt goals and determined plan of care    Home Exercise: goal is 1-2 days per week    Goals: Reduced dyspnea with physical activity  0-1/10    Education: home exercise guidelines   Plan:education on home exercise guidelines  Readiness to change: Contemplation:  (Acknowledging that there is a problem but not yet ready or sure of wanting to make a change)      NUTRITION ASSESSMENT AND PLAN    Weight control:    Starting weight: 124   Current weight:     Waist circumference:    Starting:    Current:    Diabetes: patient will bring glucometer  Lipid management:   Goals:patient lost 40 lbs in last year  BMI 19 4  Education: target goal for A1c <7 0  Progressing:Reviewed Pt goals and determined plan of care  Plan: remove salt shaker from table and use salt substitute like Mrs   Dash, increase utilization of fresh or dried herbs  Readiness to change: Contemplation:  (Acknowledging that there is a problem but not yet ready or sure of wanting to make a change)      PSYCHOSOCIAL ASSESSMENT AND PLAN    Emotional:  Depression assessment:  PHQ-9 = 1-4 = Minimal Depression            Anxiety measure:  WILFREDO-7 = 0-4  = Not anxious  Self-reported stress level: 2   Social support: Patient has friends available for support when needed   Goals:  patient denies depression; declined Kathie 33  Education: declined [place]  Progressing:Reviewed Pt goals and determined plan of care  Plan: Return to previous social activity and when health retrictions removed  Readiness to change: Pre-Contemplation:   (Not yet acknowledging that there is a problem behavior that needs to change)      OTHER CORE COMPONENTS     Tobacco:   Social History     Tobacco Use   Smoking Status Former Smoker    Packs/day: 1 50    Years: 40 00    Pack years: 60 00    Types: Cigarettes    Quit date: 1994    Years since quittin 0   Smokeless Tobacco Never Used       Tobacco Use Intervention: Referral to tobacco expert:   N/A: Pt has a remote history of smoking she quit > 20 years ago      Blood pressure:    Restin/66   Exercise:     Goals:   Education:  low sodium diet and HTN  Progressing:  Plan: engage in regular exercise  Readiness to change: Contemplation:  (Acknowledging that there is a problem but not yet ready or sure of wanting to make a change)

## 2021-01-11 NOTE — PROGRESS NOTES
PULMONARY REHAB ASSESSMENT    Today's date: 2021  Patient name: Gio Martinez     : 1935       MRN: [id number]  PCP: ZANDER Santoyo  Referring Physician: Raiza Whitten MD  Pulmonologist: same    Dx: ILD      Date of onset: 10/01/2020  Cultural needs:     Weight:    Wt Readings from Last 1 Encounters:   21 56 2 kg (124 lb)      Height:   Ht Readings from Last 1 Encounters:   21 5' 7" (1 702 m)     Medical History:   Past Medical History:   Diagnosis Date    Diabetes mellitus ([facility] )     Glaucoma     H/O degenerative disc disease     Hyperlipidemia     Hypertension     Idiopathic pulmonary fibrosis ([facility] ) 2020    Irregular heart beat     afib    Peripheral neuropathy     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 ([facility] )          Physical Limitations: none    Oxygen needs: none    Risk Factors   Cholesterol: Yes  Smoking: Former user  HTN: Yes  DM: Type 2   Obesity: No   Inactivity: Yes  Stress:  perceived  stress: 2/10   Stressors:health, pandemic     Goals for Stress Management:    Family History:  Family History   Problem Relation Age of Onset    Heart attack Father     Hiatal hernia Father     Diabetes Father     Heart disease Mother     Cancer Brother     Diabetes Brother     Heart disease Brother     Hypertension Brother        Allergies: Keflex [cephalexin]  ETOH:   Social History     Substance and Sexual Activity   Alcohol Use No         Current Medications:   Current Outpatient Medications   Medication Sig Dispense Refill    acetaminophen (TYLENOL) 500 mg tablet Take 1,000 mg by mouth as needed for mild pain      amoxicillin (AMOXIL) 500 mg capsule TAKE 4 CAPSULES 1 HOUR BEFORE DENTAL APPOINTMENT      apixaban (ELIQUIS) 2 5 mg Take 1 tablet (2 5 mg total) by mouth 2 (two) times a day 60 tablet 0    aspirin (ECOTRIN LOW STRENGTH) 81 mg EC tablet Take 1 tablet (81 mg total) by mouth daily      azelastine (ASTELIN) 0 1 % nasal spray 1 spray into each nostril 2 (two) times a day Use in each nostril as directed 1 Bottle 2    bimatoprost (LUMIGAN) 0 01 % ophthalmic drops Administer 1 drop to both eyes daily at bedtime for 30 days 1 5 mL 0    calcium carbonate (OS-DANIA) 600 MG tablet Take 600 mg by mouth 2 (two) times a day with meals      carboxymethylcellulose (Artificial Tears) 1 % ophthalmic solution 1 drop 3 (three) times a day      ezetimibe-simvastatin (VYTORIN) 10-40 mg per tablet TAKE 1 TABLET BY MOUTH DAILY AT BEDTIME 30 tablet 5    famotidine (PEPCID) 20 mg tablet Take 1 tablet (20 mg total) by mouth 2 (two) times a day 180 tablet 1    ferrous sulfate 325 (65 Fe) mg tablet Take 325 mg by mouth daily with breakfast      glucose blood (ONE TOUCH ULTRA TEST) test strip TEST FOUR TIMES A  each 1    insulin lispro (HumaLOG KwikPen) 100 units/mL injection pen Inject 4 Units under the skin 3 (three) times a day with meals 5 pen 0    Insulin Pen Needle 29G X 5MM MISC Use 4 (four) times a day (before meals and at bedtime) 100 each 0    Januvia 100 MG tablet TAKE 1 TABLET (100 MG TOTAL) BY MOUTH DAILY 30 tablet 5    Lancets (ONETOUCH DELICA PLUS BNQWIL33L) MISC Use one lancet to test blood 4 times a day 400 each 1    loratadine (CLARITIN) 10 mg tablet Take 10 mg by mouth daily as needed for allergies      LORazepam (ATIVAN) 0 5 mg tablet TAKE 2 TABLETS (1 MG TOTAL) BY MOUTH DAILY AT BEDTIME 60 tablet 0    metFORMIN (GLUCOPHAGE) 500 mg tablet Take 2 tablets (1,000 mg total) by mouth 2 (two) times a day with meals 360 tablet 3    metoprolol tartrate (LOPRESSOR) 50 mg tablet TAKE 1 5 TABLETS (75 MG TOTAL) BY MOUTH EVERY 12 (TWELVE) HOURS 90 tablet 5    mirtazapine (REMERON) 7 5 MG tablet Take 1 tablet (7 5 mg total) by mouth daily at bedtime 30 tablet 5    Multiple Vitamin (multivitamin) tablet Take 1 tablet by mouth daily      polyethylene glycol (MIRALAX) 17 g packet Take 17 g by mouth daily as needed (constipation)  0     No current facility-administered medications for this visit  Current Functional Status    Occupation: retired  Recreation: reading  ADLs: Capable of performing light to moderate ADLs able to perform self-care lives alone  [place]: limited by Dyspnea  Exercise: none  Other:         Patient Specific Goals:  Breathe better    Short Term Program Goals: improved energy/stamina with ADLs    Long Term Goals: Improved functional capacity    Oxygen Goals: Maintain SpO2>90% titrating supplemental oxygen as needed     Ability to reach goals/rehabilitation potential:  Good    Projected return to function: 12 weeks  Objective tests: 6 MWT      Nutritional   Reviewed details of Rate your Plate  Discussed key elements of heart healthy eating  Reviewed patient goals for dietary modifications and their clinical implications  Reviewed most recent lipid profile       Goals for dietary modification: low sodium      Emotional/Social  Patient reports he/she is coping well with good social support and denies depression or anxiety    SOCIAL SUPPORT NETWORK    Marital status:       Domestic Violence Screening: No    Comments:

## 2021-01-12 ENCOUNTER — TELEPHONE (OUTPATIENT)
Dept: FAMILY MEDICINE CLINIC | Facility: CLINIC | Age: 86
End: 2021-01-12

## 2021-01-13 ENCOUNTER — CLINICAL SUPPORT (OUTPATIENT)
Dept: PULMONOLOGY | Facility: CLINIC | Age: 86
End: 2021-01-13
Payer: MEDICARE

## 2021-01-13 DIAGNOSIS — J84.9 INTERSTITIAL LUNG DISEASE (HCC): ICD-10-CM

## 2021-01-13 PROCEDURE — G0239 OTH RESP PROC, GROUP: HCPCS

## 2021-01-13 NOTE — TELEPHONE ENCOUNTER
Spoke with patient via telephone today at 5:15 p m       Weight today is the same as last week 124 2 lbs  Blood sugars running mostly in the 200s  11/9 prior to dinner time she had a sugar of 141   11/10 she had a sugar of 163 before lunch time  Today before dinner  Her blood sugar was 109  States she was very active today, doing laundry, vacuum, moving around a lot  She is starting to feel better and be more active again  Discussed, I would like to consider getting her off of insulin and on to all oral agents  However, I will reach out to our clinical pharmacist, Ronak Olivas for her recommendations  Patient is agreeable to this and is willing to work with Ilene Hernadez  She has started attending pulmonary rehab  States her weight at pulmonary rehab yesterday was 129 lb  She has started taking Pepcid 20 mg twice daily  She is feeling better on this  Epigastric discomfort is less  She does still sometimes get epigastric discomfort at times  We discussed we may need to consider PPI  She did not start taking Remeron  She was reading the side effects and does not want to try this medication at this time  She will keep her follow-up visit as scheduled next week on 01/22

## 2021-01-18 ENCOUNTER — CLINICAL SUPPORT (OUTPATIENT)
Dept: PULMONOLOGY | Facility: CLINIC | Age: 86
End: 2021-01-18
Payer: MEDICARE

## 2021-01-18 DIAGNOSIS — F41.9 ANXIETY: ICD-10-CM

## 2021-01-18 DIAGNOSIS — J84.9 INTERSTITIAL LUNG DISEASE (HCC): ICD-10-CM

## 2021-01-18 DIAGNOSIS — E11.65 TYPE 2 DIABETES MELLITUS WITH HYPERGLYCEMIA (HCC): ICD-10-CM

## 2021-01-18 PROCEDURE — G0239 OTH RESP PROC, GROUP: HCPCS

## 2021-01-19 ENCOUNTER — LAB (OUTPATIENT)
Dept: LAB | Facility: CLINIC | Age: 86
End: 2021-01-19
Payer: MEDICARE

## 2021-01-19 DIAGNOSIS — E11.42 TYPE 2 DIABETES MELLITUS WITH DIABETIC POLYNEUROPATHY, WITH LONG-TERM CURRENT USE OF INSULIN (HCC): ICD-10-CM

## 2021-01-19 DIAGNOSIS — E78.5 HYPERLIPIDEMIA, UNSPECIFIED HYPERLIPIDEMIA TYPE: ICD-10-CM

## 2021-01-19 DIAGNOSIS — R63.4 WEIGHT LOSS: ICD-10-CM

## 2021-01-19 DIAGNOSIS — I10 HYPERTENSION, UNSPECIFIED TYPE: ICD-10-CM

## 2021-01-19 DIAGNOSIS — E78.2 MIXED HYPERLIPIDEMIA: ICD-10-CM

## 2021-01-19 DIAGNOSIS — Z79.4 TYPE 2 DIABETES MELLITUS WITH DIABETIC POLYNEUROPATHY, WITH LONG-TERM CURRENT USE OF INSULIN (HCC): ICD-10-CM

## 2021-01-19 DIAGNOSIS — E11.65 TYPE 2 DIABETES MELLITUS WITH HYPERGLYCEMIA, UNSPECIFIED WHETHER LONG TERM INSULIN USE (HCC): ICD-10-CM

## 2021-01-19 LAB
ALBUMIN SERPL BCP-MCNC: 3.6 G/DL (ref 3.5–5)
ALP SERPL-CCNC: 100 U/L (ref 46–116)
ALT SERPL W P-5'-P-CCNC: 53 U/L (ref 12–78)
ANION GAP SERPL CALCULATED.3IONS-SCNC: 4 MMOL/L (ref 4–13)
AST SERPL W P-5'-P-CCNC: 53 U/L (ref 5–45)
BASOPHILS # BLD AUTO: 0.03 THOUSANDS/ΜL (ref 0–0.1)
BASOPHILS NFR BLD AUTO: 0 % (ref 0–1)
BILIRUB SERPL-MCNC: 0.49 MG/DL (ref 0.2–1)
BUN SERPL-MCNC: 12 MG/DL (ref 5–25)
CALCIUM SERPL-MCNC: 9.1 MG/DL (ref 8.3–10.1)
CHLORIDE SERPL-SCNC: 97 MMOL/L (ref 100–108)
CHOLEST SERPL-MCNC: 129 MG/DL (ref 50–200)
CO2 SERPL-SCNC: 31 MMOL/L (ref 21–32)
CREAT SERPL-MCNC: 0.42 MG/DL (ref 0.6–1.3)
EOSINOPHIL # BLD AUTO: 0.09 THOUSAND/ΜL (ref 0–0.61)
EOSINOPHIL NFR BLD AUTO: 1 % (ref 0–6)
ERYTHROCYTE [DISTWIDTH] IN BLOOD BY AUTOMATED COUNT: 14.5 % (ref 11.6–15.1)
EST. AVERAGE GLUCOSE BLD GHB EST-MCNC: 146 MG/DL
GFR SERPL CREATININE-BSD FRML MDRD: 95 ML/MIN/1.73SQ M
GLUCOSE P FAST SERPL-MCNC: 157 MG/DL (ref 65–99)
HBA1C MFR BLD: 6.7 %
HCT VFR BLD AUTO: 35.1 % (ref 34.8–46.1)
HDLC SERPL-MCNC: 69 MG/DL
HGB BLD-MCNC: 10.4 G/DL (ref 11.5–15.4)
IMM GRANULOCYTES # BLD AUTO: 0.02 THOUSAND/UL (ref 0–0.2)
IMM GRANULOCYTES NFR BLD AUTO: 0 % (ref 0–2)
LDLC SERPL CALC-MCNC: 41 MG/DL (ref 0–100)
LYMPHOCYTES # BLD AUTO: 1 THOUSANDS/ΜL (ref 0.6–4.47)
LYMPHOCYTES NFR BLD AUTO: 14 % (ref 14–44)
MCH RBC QN AUTO: 25.8 PG (ref 26.8–34.3)
MCHC RBC AUTO-ENTMCNC: 29.6 G/DL (ref 31.4–37.4)
MCV RBC AUTO: 87 FL (ref 82–98)
MONOCYTES # BLD AUTO: 0.65 THOUSAND/ΜL (ref 0.17–1.22)
MONOCYTES NFR BLD AUTO: 9 % (ref 4–12)
NEUTROPHILS # BLD AUTO: 5.59 THOUSANDS/ΜL (ref 1.85–7.62)
NEUTS SEG NFR BLD AUTO: 76 % (ref 43–75)
NONHDLC SERPL-MCNC: 60 MG/DL
NRBC BLD AUTO-RTO: 0 /100 WBCS
PLATELET # BLD AUTO: 263 THOUSANDS/UL (ref 149–390)
PMV BLD AUTO: 10 FL (ref 8.9–12.7)
POTASSIUM SERPL-SCNC: 4 MMOL/L (ref 3.5–5.3)
PROT SERPL-MCNC: 7.9 G/DL (ref 6.4–8.2)
RBC # BLD AUTO: 4.03 MILLION/UL (ref 3.81–5.12)
SODIUM SERPL-SCNC: 132 MMOL/L (ref 136–145)
TRIGL SERPL-MCNC: 95 MG/DL
TSH SERPL DL<=0.05 MIU/L-ACNC: 1.08 UIU/ML (ref 0.36–3.74)
WBC # BLD AUTO: 7.38 THOUSAND/UL (ref 4.31–10.16)

## 2021-01-19 PROCEDURE — 84443 ASSAY THYROID STIM HORMONE: CPT

## 2021-01-19 PROCEDURE — 36415 COLL VENOUS BLD VENIPUNCTURE: CPT

## 2021-01-19 PROCEDURE — 80061 LIPID PANEL: CPT

## 2021-01-19 PROCEDURE — 80053 COMPREHEN METABOLIC PANEL: CPT

## 2021-01-19 PROCEDURE — 85025 COMPLETE CBC W/AUTO DIFF WBC: CPT

## 2021-01-19 PROCEDURE — 83036 HEMOGLOBIN GLYCOSYLATED A1C: CPT

## 2021-01-19 RX ORDER — PEN NEEDLE, DIABETIC
NEEDLE, DISPOSABLE MISCELLANEOUS
Qty: 100 EACH | Refills: 3 | Status: SHIPPED | OUTPATIENT
Start: 2021-01-19

## 2021-01-19 RX ORDER — LORAZEPAM 0.5 MG/1
1 TABLET ORAL
Qty: 60 TABLET | Refills: 0 | Status: SHIPPED | OUTPATIENT
Start: 2021-01-19 | End: 2021-02-20

## 2021-01-20 ENCOUNTER — CLINICAL SUPPORT (OUTPATIENT)
Dept: PULMONOLOGY | Facility: CLINIC | Age: 86
End: 2021-01-20
Payer: MEDICARE

## 2021-01-20 ENCOUNTER — TELEPHONE (OUTPATIENT)
Dept: FAMILY MEDICINE CLINIC | Facility: CLINIC | Age: 86
End: 2021-01-20

## 2021-01-20 DIAGNOSIS — J84.9 INTERSTITIAL LUNG DISEASE (HCC): ICD-10-CM

## 2021-01-20 PROCEDURE — G0239 OTH RESP PROC, GROUP: HCPCS

## 2021-01-20 NOTE — TELEPHONE ENCOUNTER
----- Message from [address] [facility] sent at 1/20/2021  8:20 AM EST -----  Will review with patient at office visit on Friday, 1/22/21

## 2021-01-22 ENCOUNTER — OFFICE VISIT (OUTPATIENT)
Dept: FAMILY MEDICINE CLINIC | Facility: CLINIC | Age: 86
End: 2021-01-22
Payer: MEDICARE

## 2021-01-22 ENCOUNTER — IMMUNIZATIONS (OUTPATIENT)
Dept: FAMILY MEDICINE CLINIC | Facility: HOSPITAL | Age: 86
End: 2021-01-22

## 2021-01-22 VITALS
BODY MASS INDEX: 19.9 KG/M2 | OXYGEN SATURATION: 96 % | SYSTOLIC BLOOD PRESSURE: 114 MMHG | HEIGHT: 67 IN | TEMPERATURE: 97.6 F | RESPIRATION RATE: 16 BRPM | HEART RATE: 86 BPM | WEIGHT: 126.8 LBS | DIASTOLIC BLOOD PRESSURE: 62 MMHG

## 2021-01-22 DIAGNOSIS — R79.89 ELEVATED LFTS: ICD-10-CM

## 2021-01-22 DIAGNOSIS — E11.65 TYPE 2 DIABETES MELLITUS WITH HYPERGLYCEMIA, UNSPECIFIED WHETHER LONG TERM INSULIN USE (HCC): Primary | ICD-10-CM

## 2021-01-22 DIAGNOSIS — K21.00 GASTROESOPHAGEAL REFLUX DISEASE WITH ESOPHAGITIS WITHOUT HEMORRHAGE: ICD-10-CM

## 2021-01-22 DIAGNOSIS — E44.0 MODERATE PROTEIN-CALORIE MALNUTRITION (HCC): ICD-10-CM

## 2021-01-22 DIAGNOSIS — F41.9 ANXIETY: ICD-10-CM

## 2021-01-22 DIAGNOSIS — Z23 ENCOUNTER FOR IMMUNIZATION: Primary | ICD-10-CM

## 2021-01-22 DIAGNOSIS — J84.9 INTERSTITIAL LUNG DISEASE (HCC): ICD-10-CM

## 2021-01-22 DIAGNOSIS — R13.19 ESOPHAGEAL DYSPHAGIA: ICD-10-CM

## 2021-01-22 PROCEDURE — 99214 OFFICE O/P EST MOD 30 MIN: CPT | Performed by: NURSE PRACTITIONER

## 2021-01-22 PROCEDURE — 91300 SARS-COV-2 / COVID-19 MRNA VACCINE (PFIZER-BIONTECH) 30 MCG: CPT

## 2021-01-22 PROCEDURE — 0001A SARS-COV-2 / COVID-19 MRNA VACCINE (PFIZER-BIONTECH) 30 MCG: CPT

## 2021-01-22 NOTE — PROGRESS NOTES
FAMILY PRACTICE OFFICE VISIT       NAME: Brandon Mayorga  AGE: 82 y o  SEX: female       : 1935        MRN: [id number]    Assessment and Plan     Problem List Items Addressed This Visit        Digestive    GERD (gastroesophageal reflux disease)      Currently using Pepcid  20 mg twice daily  She has intermittent epigastric aching that is relieved with over-the-counter antacids  This does not happen every day  We discussed risks and benefits of resuming pantoprazole  She will continue Pepcid 20 mg twice daily  She will use pantoprazole on an as-needed basis, only if she is having a bad day with a lot of GERD symptoms  Dysphagia     Normal EGD in 2020  Video barium swallow with speech therapy in December, no aspiration  Currently participating in speech therapy  Endocrine    Type 2 diabetes mellitus with hyperglycemia ([facility] ) - Primary       Lab Results   Component Value Date    HGBA1C 6 7 (H) 2021     Hemoglobin A1c has improved significantly from 8 1% to 6 7%  This is below goal   Goal between 7-8%  She has had a lot of changes over the past few months since last A1c was checked  In October we increase metformin from a 1000 mg in the morning and 500 mg in the even to 1000 mg twice daily  She has continued Januvia 100 mg daily  She was hospitalized in December for  Choledocholithiasis, with subsequent cholecystectomy  Sugars were elevated, inpatient she was started on Lantus insulin and mealtime insulin  After coming home she experienced hypoglycemia episodes, even after did cutting Lantus dosage in half to 6 units daily  She has continued to use mealtime insulin, however is still experiencing sugars in the low 100s at times,  Symptomatic at times  I would like to keep her blood sugar greater than 120 and <180     Over the past 2 weeks she has been more active, mood is improving, appetite is improving,  Subsequently sugars have improved,  Mostly staying less than 200, often around 140s to 170s, which is acceptable for her age  I have asked her to stop mealtime insulin  Keep track of her blood sugars over the weekend  She is meeting with Martha Smallwood, clinical pharmacist to help manage diabetes on Monday 1/25  At this point, with a hemoglobin A1c of 6 7%,  I would consider monitoring sugars on metformin 1000 mg twice daily and Januvia 100 mg daily,  Without adding an additional agent at this time,  However I would like to see recommendations from clinical pharmacist   Recommend she return to office in 1 month for follow up, will repeat POCT A1c at this time  I have also asked her to consult with a dietitian as she is struggling with regaining weight and trying to maintain a diabetic diet  She is concerned to glucometer is not working correctly  Glucometer is [de-identified] years old  Will send prescriptionsfor new glucometer supplies  Relevant Orders    Ambulatory referral to Nutrition Services       Respiratory    Interstitial lung disease [facility])     Following with pulmonology  Currently attending pulmonary rehab  Other    Anxiety     Mood is overall improving, as she has more contacts with people, speech therapy, VNA, attending pulmonary rehab  Even thought these are not close contacts, just being with other people has been very helpful  Still has anxiety   Fears falling, and being alone  May consider using a cane  Continues to use lorazepam at bedtime, which is effective  Moderate protein-calorie malnutrition (HCC)     Temporal wasting, clavicle protruding  Weight in March 2020, 145 lbs  Has slowly been losing weight since March, with weight currently 126 pounds  Is s/p cholecystectomy in December  Has been struggling with appetite and weight gain  Is trying to balance weight gain and eating a diabetic diet  Is very concerned about sugars     With low appetite, I have encouraged her to for now, eat whatever she feels she can eat, and we will manage sugars  Over the past 2 weeks mood is improving, appetite is improving, she is more active  Recommend follow up with dietician to work on balancing diabetic diet with weight gain  She is agreeable  Body mass index is 19 86 kg/m²  Relevant Orders    Ambulatory referral to Nutrition Services      Other Visit Diagnoses     Elevated LFTs      LFTs were significantly elevated prior to cholecystectomy  Now post cholecystectomy alk-phos has returned to 100, ALT 53, AST still mildly elevated at 53  Will repeat in 1 month  Relevant Orders    Comprehensive metabolic panel            1  Type 2 diabetes mellitus with hyperglycemia, unspecified whether long term insulin use (HCC)  Ambulatory referral to Nutrition Services    Glucometer    Glucometer test strips   2  Moderate protein-calorie malnutrition ([address] )  Ambulatory referral to Nutrition Services   3  Elevated LFTs  Comprehensive metabolic panel   4  Anxiety     5  Esophageal dysphagia     6  Gastroesophageal reflux disease with esophagitis without hemorrhage     7  Interstitial lung disease [facility])             Chief Complaint     Chief Complaint   Patient presents with    Follow-up     2 week f/u        History of Present Illness     Sendy Real  Is an 51-year-old female presenting today for follow-up on weight loss and diabetes  Weight is stable at home at 124 lb  She weighs herself weekly on Tuesdays  Yesterday she had a poor appetite, but overall she feels her appetite is improving  Looking forward to eating at times  She is more active, doing more activities around the house, going to pulmonary rehab  On Mondays and Wednesdays  She does feel she struggles with anxiety at times  For example yesterday she walked to her podiatrist office visit, which is not far from where she lives  She was scared of falling and being alone  Currently is not using a cane, however considering this    She does not experience this when she is walking or exercising at physical therapy  Blood sugars have been running mostly less than 200  She does have sugars in the low 100s  Sometimes  The other day, 3 hours after dinner she was feeling a little bit off  She checked her blood sugar which was 116, she had a snack and felt better  She notices that she is getting more active her sugars are improving  She has an appointment on Monday, 1/25 with Artemio Isaac, clinical pharmacist to work on diabetes control  Epigastric aching occurs intermittently  Not every day  Taking pepcid 20 mg twice daily  Using antacids as needed, which help  Trying to avoid food triggers  COVID-19 vaccine today [facility]  Currently participating in speech therapy for dysphagia  Review of Systems   Review of Systems   Constitutional: Negative  HENT: Positive for postnasal drip (chronic), rhinorrhea (chronic) and trouble swallowing  Negative for congestion and ear pain  Respiratory: Positive for shortness of breath (with exertion at baseline)  Cardiovascular: Negative  Gastrointestinal: Positive for abdominal pain (epigastric aching as noted in HPI)  Negative for abdominal distention, anal bleeding, blood in stool, constipation, diarrhea, nausea, rectal pain and vomiting  Genitourinary: Negative  Skin: Negative for rash  Neurological: Negative  Psychiatric/Behavioral: Negative for dysphoric mood, self-injury, sleep disturbance and suicidal ideas  The patient is nervous/anxious          Active Problem List     Patient Active Problem List   Diagnosis    HTN (hypertension)    HLD (hyperlipidemia)    Glaucoma    Asymptomatic carotid artery stenosis, left    Symptomatic PVCs    Occlusion of right carotid artery    Stenosis of right subclavian artery (HCC)    Paroxysmal atrial fibrillation (HCC)    Pacemaker    Type [facility] diabetes mellitus with hyperglycemia [facility] )    Osteoporosis    GERD (gastroesophageal reflux disease)    Anxiety    Iron deficiency    Interstitial lung disease (HCC)    Common bile duct dilatation    Abdominal pain    Dysphagia    Moderate protein-calorie malnutrition (HCC)    Constipation    Hyponatremia    S/P laparoscopic cholecystectomy       Past Medical History:  Past Medical History:   Diagnosis Date    Diabetes mellitus ([facility] )     Glaucoma     H/O degenerative disc disease     Hyperlipidemia     Hypertension     [facility] pulmonary [facility] 11/2020    Irregular heart beat     afib    Peripheral neuropathy     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 ([facility] )        Past Surgical History:  Past Surgical History:   Procedure Laterality Date    CATARACT EXTRACTION, BILATERAL  2011    CHOLECYSTECTOMY LAPAROSCOPIC N/A 12/9/2020    Procedure: CHOLECYSTECTOMY LAPAROSCOPIC;  Surgeon: Mauri Menchaca MD;  [facility];  Service: General    COLONOSCOPY      CYST REMOVAL  2009    left lower Quad[address]    [address]    LIPOMA RESECTION  2010    ND REPAIR ING HERNIA,5+Y/O,REDUCIBL Bilateral 1/5/2018    Procedure: INGUINAL HERNIA REPAIR;  Surgeon: Flaquita Kaminski MD;  Location: [facility];  Service: General    SHOULDER SURGERY  04/26/2019    Dr Arianne Curran [facility]   [address]  R carotid occlusion       Family History:  Family History   Problem Relation Age of Onset    Heart attack Father     Hiatal hernia Father     Diabetes Father     Heart disease Mother     Cancer Brother     Diabetes Brother     Heart disease Brother     Hypertension Brother        Social History:  Social History     Socioeconomic History    Marital status:      Spouse name: Not on file    Number of children: Not on file    Years of education: Not on file    Highest education level: Not on file   Occupational History    Occupation: customer service   retired   Social Needs    Financial resource strain: Not on file    Food insecurity     Worry: Not on file     Inability: Not on file    Transportation needs     Medical: Not on file     Non-medical: Not on file   Tobacco Use    Smoking status: Former Smoker     Packs/day: 1 50     Years: 40 00     Pack years: 60 00     Types: Cigarettes     Quit date: 12     Years since quittin 0    Smokeless tobacco: Never Used   Substance and Sexual Activity    Alcohol use: No    Drug use: No    Sexual activity: Not on file   Lifestyle    Physical activity     Days per week: Not on file     Minutes per session: Not on file    Stress: Not on file   Relationships    Social connections     Talks on phone: Not on file     Gets together: Not on file     Attends Yazidi service: Not on file     Active member of club or organization: Not on file     Attends meetings of clubs or organizations: Not on file     Relationship status: Not on file    Intimate partner violence     Fear of current or ex partner: Not on file     Emotionally abused: Not on file     Physically abused: Not on file     Forced sexual activity: Not on file   Other Topics Concern    Not on file   Social History Narrative    Lives [place]    2 children       I have reviewed the patient's medical history in detail; there are no changes to the history as noted in the electronic medical record  Objective     Vitals:    21 0902   BP: 114/62   BP Location: Left arm   Patient Position: Sitting   Cuff Size: Adult   Pulse: 86   Resp: 16   Temp: 97 6 °F (36 4 °C)   TempSrc: Temporal   SpO2: 96%   Weight: 57 5 kg (126 lb 12 8 oz)   Height: 5' 7" (1 702 m)     Wt Readings from Last 3 Encounters:   21 57 5 kg (126 lb 12 8 oz)   21 56 2 kg (124 lb)   21 58 1 kg (128 lb)     Physical Exam  Vitals signs and nursing note reviewed  Constitutional:       General: She is not in acute distress  Appearance: Normal appearance  She is not ill-appearing, toxic-appearing or diaphoretic  Comments: Cachetic, frail   HENT:      Head: Normocephalic and atraumatic  Eyes:      Conjunctiva/sclera: Conjunctivae normal    Neck:      Musculoskeletal: Normal range of motion and neck supple  Thyroid: No thyromegaly  Cardiovascular:      Rate and Rhythm: Normal rate and regular rhythm  Heart sounds: No murmur  Pulmonary:      Effort: Pulmonary effort is normal  No respiratory distress  Breath sounds: Normal breath sounds  No wheezing or rales  Abdominal:      General: Abdomen is flat  Palpations: Abdomen is soft  Tenderness: There is no abdominal tenderness  Musculoskeletal:      Right lower leg: No edema  Left lower leg: No edema  Lymphadenopathy:      Cervical: No cervical adenopathy  Skin:     General: Skin is warm and dry  Findings: No rash  Neurological:      Mental Status: She is alert and oriented to person, place, and time  Psychiatric:         Mood and Affect: Mood normal          Behavior: Behavior normal       Comments: In much better spirits than last several office visits              ALLERGIES:  Allergies   Allergen Reactions    Keflex [Cephalexin] Diarrhea       Current Medications     Current Outpatient Medications   Medication Sig Dispense Refill    acetaminophen (TYLENOL) 500 mg tablet Take 1,000 mg by mouth as needed for mild pain      amoxicillin (AMOXIL) 500 mg capsule TAKE 4 CAPSULES 1 HOUR BEFORE DENTAL APPOINTMENT      apixaban (ELIQUIS) 2 5 mg Take 1 tablet (2 5 mg total) by mouth 2 (two) times a day 60 tablet 0    aspirin (ECOTRIN LOW STRENGTH) 81 mg EC tablet Take 1 tablet (81 mg total) by mouth daily      azelastine (ASTELIN) 0 1 % nasal spray 1 spray into each nostril 2 (two) times a day Use in each nostril as directed 1 Bottle 2    bimatoprost (LUMIGAN) 0 01 % ophthalmic drops Administer 1 drop to both eyes daily at bedtime for 30 days 1 5 mL 0    calcium carbonate (OS-DANIA) 600 MG tablet Take 600 mg by mouth 2 (two) times a day with meals      carboxymethylcellulose (Artificial Tears) 1 % ophthalmic solution 1 drop 3 (three) times a day      ezetimibe-simvastatin (VYTORIN) 10-40 mg per tablet TAKE 1 TABLET BY MOUTH DAILY AT BEDTIME 30 tablet 5    famotidine (PEPCID) 20 mg tablet Take 1 tablet (20 mg total) by mouth 2 (two) times a day 180 tablet 1    ferrous sulfate 325 (65 Fe) mg tablet Take 325 mg by mouth daily with breakfast      glucose blood (ONE TOUCH ULTRA TEST) test strip TEST FOUR TIMES A  each 1    insulin lispro (HumaLOG KwikPen) 100 units/mL injection pen Inject 4 Units under the skin 3 (three) times a day with meals 5 pen 0    Januvia 100 MG tablet TAKE 1 TABLET (100 MG TOTAL) BY MOUTH DAILY 30 tablet 5    Lancets (ONETOUCH DELICA PLUS [id number]) MISC Use one lancet to test blood 4 times a day 400 each 1    loratadine (CLARITIN) 10 mg tablet Take 10 mg by mouth daily as needed for allergies      LORazepam (ATIVAN) 0 5 mg tablet TAKE 2 TABLETS (1 MG TOTAL) BY MOUTH DAILY AT BEDTIME 60 tablet 0    metFORMIN (GLUCOPHAGE) 500 mg tablet Take 2 tablets (1,000 mg total) by mouth 2 (two) times a day with meals 360 tablet 3    metoprolol tartrate (LOPRESSOR) 50 mg tablet TAKE 1 5 TABLETS (75 MG TOTAL) BY MOUTH EVERY 12 (TWELVE) HOURS 90 tablet 5    Multiple Vitamin (multivitamin) tablet Take 1 tablet by mouth daily      polyethylene glycol (MIRALAX) 17 g packet Take 17 g by mouth daily as needed (constipation)  0    QC Pen Needles 31G X 6 MM MISC USE 4 (FOUR) TIMES A DAY (BEFORE MEALS AND AT BEDTIME) 100 each 3     No current facility-administered medications for this visit            Health Maintenance     Health Maintenance   Topic Date Due    DTaP,Tdap,and Td Vaccines (1 - Tdap) 12/01/1956    COVID-19 Vaccine (2 of 2 - Pfizer series) 02/12/2021    HEMOGLOBIN A1C  07/19/2021    Fall Risk  07/30/2021    Depression Screening PHQ  07/30/2021    Medicare Annual Wellness Visit (AWV)  07/30/2021    DM Eye Exam  08/11/2021    Diabetic Foot Exam  01/12/2022    BMI: Adult  01/22/2022    Colonoscopy Surveillance  09/02/2025    Hepatitis C Screening  Completed    Pneumococcal Vaccine: 65+ Years  Completed    Influenza Vaccine  Completed    HIB Vaccine  Aged Out    Hepatitis B Vaccine  Aged Out    IPV Vaccine  Aged Out    Hepatitis A Vaccine  Aged Out    Meningococcal ACWY Vaccine  Aged Out    HPV Vaccine  Aged Out     Immunization History   Administered Date(s) Administered    INFLUENZA 11/02/2016, 10/16/2017, 09/18/2018, 09/18/2018    Influenza, Quadrivalent (nasal) 11/02/2016    Influenza, high dose seasonal 0 7 mL 11/06/2019, 10/06/2020    Pneumococcal Conjugate 13-Valent 07/25/2019    Pneumococcal Polysaccharide PPV23 03/17/2003    SARS-CoV-2 / COVID-19 mRNA IM (Pfizer-BioNTech) 01/22/2021    Zoster 07/09/2010       ZANDER Pearson

## 2021-01-24 DIAGNOSIS — F41.9 ANXIETY: ICD-10-CM

## 2021-01-24 PROBLEM — C23 MALIGNANT NEOPLASM OF GALLBLADDER (HCC): Status: RESOLVED | Noted: 2021-01-06 | Resolved: 2021-01-24

## 2021-01-24 NOTE — ASSESSMENT & PLAN NOTE
Currently using Pepcid  20 mg twice daily  She has intermittent epigastric aching that is relieved with over-the-counter antacids  This does not happen every day  We discussed risks and benefits of resuming pantoprazole  She will continue Pepcid 20 mg twice daily  She will use pantoprazole on an as-needed basis, only if she is having a bad day with a lot of GERD symptoms

## 2021-01-24 NOTE — ASSESSMENT & PLAN NOTE
Lab Results   Component Value Date    HGBA1C 6 7 (H) 01/19/2021     Hemoglobin A1c has improved significantly from 8 1% to 6 7%  This is below goal   Goal between 7-8%  She has had a lot of changes over the past few months since last A1c was checked  In October we increase metformin from a 1000 mg in the morning and 500 mg in the even to 1000 mg twice daily  She has continued Januvia 100 mg daily  She was hospitalized in December for  Choledocholithiasis, with subsequent cholecystectomy  Sugars were elevated, inpatient she was started on Lantus insulin and mealtime insulin  After coming home she experienced hypoglycemia episodes, even after did cutting Lantus dosage in half to 6 units daily  She has continued to use mealtime insulin, however is still experiencing sugars in the low 100s at times,  Symptomatic at times  I would like to keep her blood sugar greater than 120 and <200  Over the past 2 weeks she has been more active, mood is improving, appetite is improving,  Subsequently sugars have improved,  Mostly staying less than 200, often around 140s to 170s, which is acceptable for her age  I have asked her to stop mealtime insulin  Keep track of her blood sugars over the weekend  She is meeting with Kendell Reyez clinical pharmacist to help manage diabetes on Monday 1/25  At this point, with a hemoglobin A1c of 6 7%,  I would consider monitoring sugars on metformin 1000 mg twice daily and Januvia 100 mg daily,  Without adding an additional agent at this time,  However I would like to see recommendations from clinical pharmacist   Recommend she return to office in 1 month for follow up, will repeat POCT A1c at this time  I have also asked her to consult with a dietitian as she is struggling with regaining weight and trying to maintain a diabetic diet

## 2021-01-24 NOTE — ASSESSMENT & PLAN NOTE
Temporal wasting, clavicle protruding  Weight in March 2020, 145 lbs  Has slowly been losing weight since March, with weight currently 126 pounds  Is s/p cholecystectomy in December  Has been struggling with appetite and weight gain  Is trying to balance weight gain and eating a diabetic diet  Is very concerned about sugars  With low appetite, I have encouraged her to for now, eat whatever she feels she can eat, and we will manage sugars  Over the past 2 weeks mood is improving, appetite is improving, she is more active  Drinking 1-2 cans of glucerna daily  Recommend follow up with dietician to work on balancing diabetic diet with weight gain  She is agreeable  Body mass index is 19 86 kg/m²

## 2021-01-24 NOTE — ASSESSMENT & PLAN NOTE
Mood is overall improving, as she has more contacts with people, speech therapy, VNA, attending pulmonary rehab  Even thought these are not close contacts, just being with other people has been very helpful  Still has anxiety   Fears falling, and being alone  May consider using a cane  Continues to use lorazepam at bedtime, which is effective

## 2021-01-24 NOTE — ASSESSMENT & PLAN NOTE
Normal EGD in April 2020  Video barium swallow with speech therapy in December, no aspiration  Currently participating in speech therapy

## 2021-01-25 ENCOUNTER — APPOINTMENT (OUTPATIENT)
Dept: PULMONOLOGY | Facility: CLINIC | Age: 86
End: 2021-01-25
Payer: MEDICARE

## 2021-01-25 ENCOUNTER — CLINICAL SUPPORT (OUTPATIENT)
Dept: FAMILY MEDICINE CLINIC | Facility: CLINIC | Age: 86
End: 2021-01-25

## 2021-01-25 DIAGNOSIS — E11.65 TYPE 2 DIABETES MELLITUS WITH HYPERGLYCEMIA, UNSPECIFIED WHETHER LONG TERM INSULIN USE (HCC): Primary | ICD-10-CM

## 2021-01-25 NOTE — PROGRESS NOTES
[address] [facility]   Garth Regan, PharmD       Reason for visit: Appointment with 82y o  year old for management of T2DM monitoring efficacy and tolerability of anti-diabetic medications  Current DM Regimen:   Metformin 1000 BID   Januvia 100mg daily    ASSESSMENT/PLAN                                                                                     1  Type 2 diabetes: goal A1c <8 based on 2019 ADA guidelines and individualized based on age, duration, co-morbidities   Most recent A1c below goal 6 7 (Jan 2021), decrease from 8 prior to hospitalization, weight loss, loss of appetite     Duration: 30 plus years  Microvascular complications: neuropathy  Macrovascular complications: none noted  (Yes ) Aspirin (Yes ) Statin (No) ACEI/ARB  Eye Exam:  August 2020 UTD   Foot Exam: January 2021 UTD     Most recent labs and diabetes goals discussed with patient in clinic today  o MEDICATIONS: No changes, PCP already dced mealtime insulin, will await SMBG and A1C in 1 month to assess if we can dc Januvia as well  - If additional mealtime coverage is necessary could consider Prandin, but with SMBG patient brought today and recent A1C unlikely to need additional therapy   SMBG: Fasting in 160-170s, pre lunch and pre dinner similar, no highs or lows    TLCs: Re-enforced the importance of a Diabetic Diet, exercise  As able   REFERRALS PLACED: none today      2  Hyperlipidemia without clinical ASCVD event: 2018 ACC/AHA lipid guidelines recommend moderate intensity statin  Pt tolerating well without side effects  The ASCVD Risk score (Jose Larry et al , 2013) failed to calculate for the following reasons: The 2013 ASCVD risk score is only valid for ages 36 to 78     Continue vytorin as prescribed  3  HTN in the presence of DM: BP goal less than 130/80 mmHg  clinic BP below goal, HR acceptable  Serum K+ WNL        MEDICATIONS: metoprolol tartrate   HOME MONITORING: not conducted   TLCs: discussed salt intake, continued non processed foods diet    4  Medication Reconciliation: Medication list reviewed with pt at today's visit  Discrepancies as discussed below  - Medication list updated to reflect medications pt is currently taking   - Renewal request sent to prescribing provider for: none    5  TLC:  - Medication adherence is excellent  - Eating habits are fair  - Exercise habits are poor    6  Preventative Care  Immunizations: UTD  Referrals:none  Labs: urine microalbumin with next set of labs due in April 2021    Patient-specific goals to achieve prior to next visit:  1  Schedule with dietician to get specific recommendations for foods that are convenient, high in calories and protein, but NOT processed, not high in carbs, and not high in cholesterol  2  Continue monitoring blood sugar and report anything out of range 120-200mg/dL to PCP    Follow-up with pharmacy in 1 month (when a1c conducted at PCP)    Follow-Up:   _x_ Home Monitoring Records, BG/BP  _x_ Labs April 2021      SUBJECTIVE                                                                                                            Diagnosed with diabetes: sometime in the 90s per patient  ASCVD History: none  Previous DM medications/tolerability:    Lantus - hypoglycemia even with only 6 units   Lispro -dc since A1C under control     She denies hypoglycemia and denies hyperglycemia symptoms  Recent symptoms include: none  Past symptoms include: increased fatigue and polydipsia Hypoglycemia treatment includes: juice, crackers      Weight: has decreased 25 pounds over last year      Eating habits:She has not yet seen dietician/nutritionist   Current diet:  in general, a "healthy" diet  , tries to eat low carb and low cholesterol   Breakfast: peanut butter and cheese, whole wheat tortilla, cup or regular and cup of decaf black   Lunch: sandwich with tortilla turkey cheese scrambled eggs, low fat white, chips or crackers, frozen grapes, somedays no appetite for lunch    Dinner: chili, some kind of soup, make big batches and freeze, baked chicken, no out to eat anymore with COVID, shops at [employer],    Snacks: not much anymore, difficulty chewing plus swallowing   Beverages: water only, tonic for leg cramps, no soda/juice/sugary beverages, some milk and glucerna - carb steady version, herbal tea at night   Barriers to improving diet: issues with swallowing - something new in last year or so, speech therapy comes to house twice weekly, liquids okay but thicker gets stuck, hummus etc    Exercise habits: She is not active  (Goal at least 150 minutes per week)  Previous habits: walking   Barriers to improving exercise: low weight, many hospital stays and comorbidites to recover from currently      1  Medication Adherence: Medication list reviewed with patient, reports the following discrepancies/problems:   None noted    Misses doses:  Yes, maybe 1 time every couple months         Social History     Tobacco Use   Smoking Status Former Smoker    Packs/day: 1 50    Years: 40 00    Pack years: 60 00    Types: Cigarettes    Quit date: 12    Years since quittin 0   Smokeless Tobacco Never Used     Social History     Substance and Sexual Activity   Alcohol Use No          OBJECTIVE                                                                                                       reports that she quit smoking about 27 years ago  Her smoking use included cigarettes  She has a 60 00 pack-year smoking history   She has never used smokeless tobacco     Immunization History   Administered Date(s) Administered    INFLUENZA 2016, 10/16/2017, 2018, 2018    Influenza, Quadrivalent (nasal) 2016    Influenza, high dose seasonal 0 7 mL 2019, 10/06/2020    Pneumococcal Conjugate 13-Valent 2019    Pneumococcal Polysaccharide PPV23 2003    SARS-CoV-2 / COVID-19 mRNA IM (Pfizer-BioNTech) 01/22/2021    Zoster 07/09/2010       Allergies:      Allergies   Allergen Reactions    Keflex [Cephalexin] Diarrhea       Current Outpatient Medications on File Prior to Visit   Medication Sig Dispense Refill    acetaminophen (TYLENOL) 500 mg tablet Take 1,000 mg by mouth as needed for mild pain      amoxicillin (AMOXIL) 500 mg capsule TAKE 4 CAPSULES 1 HOUR BEFORE DENTAL APPOINTMENT      apixaban (ELIQUIS) 2 5 mg Take 1 tablet (2 5 mg total) by mouth 2 (two) times a day 60 tablet 0    aspirin (ECOTRIN LOW STRENGTH) 81 mg EC tablet Take 1 tablet (81 mg total) by mouth daily      azelastine (ASTELIN) 0 1 % nasal spray 1 spray into each nostril 2 (two) times a day Use in each nostril as directed 1 Bottle 2    bimatoprost (LUMIGAN) 0 01 % ophthalmic drops Administer 1 drop to both eyes daily at bedtime for 30 days 1 5 mL 0    calcium carbonate (OS-DANIA) 600 MG tablet Take 600 mg by mouth 2 (two) times a day with meals      carboxymethylcellulose (Artificial Tears) 1 % ophthalmic solution 1 drop 3 (three) times a day      ezetimibe-simvastatin (VYTORIN) 10-40 mg per tablet TAKE 1 TABLET BY MOUTH DAILY AT BEDTIME 30 tablet 5    famotidine (PEPCID) 20 mg tablet Take 1 tablet (20 mg total) by mouth 2 (two) times a day 180 tablet 1    ferrous sulfate 325 (65 Fe) mg tablet Take 325 mg by mouth daily with breakfast      glucose blood (ONE TOUCH ULTRA TEST) test strip TEST FOUR TIMES A  each 1    insulin lispro (HumaLOG KwikPen) 100 units/mL injection pen Inject 4 Units under the skin 3 (three) times a day with meals 5 pen 0    Januvia 100 MG tablet TAKE 1 TABLET (100 MG TOTAL) BY MOUTH DAILY 30 tablet 5    Lancets (ONETOUCH DELICA PLUS [id number]) MISC Use one lancet to test blood 4 times a day 400 each 1    loratadine (CLARITIN) 10 mg tablet Take 10 mg by mouth daily as needed for allergies      LORazepam (ATIVAN) 0 5 mg tablet TAKE 2 TABLETS (1 MG TOTAL) BY MOUTH DAILY AT BEDTIME 60 tablet 0    metFORMIN (GLUCOPHAGE) 500 mg tablet Take 2 tablets (1,000 mg total) by mouth 2 (two) times a day with meals 360 tablet 3    metoprolol tartrate (LOPRESSOR) 50 mg tablet TAKE 1 5 TABLETS (75 MG TOTAL) BY MOUTH EVERY 12 (TWELVE) HOURS 90 tablet 5    Multiple Vitamin (multivitamin) tablet Take 1 tablet by mouth daily      polyethylene glycol (MIRALAX) 17 g packet Take 17 g by mouth daily as needed (constipation)  0    QC Pen Needles 31G X 6 MM MISC USE 4 (FOUR) TIMES A DAY (BEFORE MEALS AND AT BEDTIME) 100 each 3     No current facility-administered medications on file prior to visit  I have reviewed the patient's allergies, medications and history as noted in the electronic medical record and updated as necessary  Vital Signs:   Wt Readings from Last 3 Encounters:   01/22/21 57 5 kg (126 lb 12 8 oz)   01/08/21 56 2 kg (124 lb)   01/06/21 58 1 kg (128 lb)      BP Readings from Last 3 Encounters:   01/22/21 114/62   01/08/21 134/81   01/06/21 140/80      Pulse Readings from Last 3 Encounters:   01/22/21 86   01/08/21 69   01/08/21 60           Pertinent Lab Data:   Patient was fasting for most recent labs  Hemoglobin A1C   Date Value   01/19/2021 6 7 % (H)   09/29/2020 8 1 % (H)   07/03/2020 8/0 2   03/02/2020 7 7 % (H)   12/04/2018 7 7 % (H)   05/14/2018 7 9 % (H)      Cholesterol (mg/dL)   Date Value   01/19/2021 129   09/29/2020 139   10/21/2019 122     Triglycerides (mg/dL)   Date Value   01/19/2021 95   09/29/2020 84   10/21/2019 74     HDL, Direct (mg/dL)   Date Value   01/19/2021 69   09/29/2020 57   10/21/2019 62     LDL Calculated (mg/dL)   Date Value   01/19/2021 41   09/29/2020 65   10/21/2019 45     No components found for: CREATSERUM, CREATFLUID, CREATADULT, MICROALBUMIN, MICROALBUPOC, POTASSIUM  ALT (U/L)   Date Value   01/19/2021 53     AST (U/L)   Date Value   01/19/2021 53 (H)     Alkaline Phosphatase (U/L)   Date Value   01/19/2021 100      No results found for: [id number]    Microalbumin/Creatinine:   Microalb Creat Ratio (mg/g creatinine)   Date Value   2019 94 (H)           Preventative Care:  Last dilated eye exam (annually): 2020  Last foot exam (annually): 2021  Last dental exam (every 6 months): unknown  Last microalbumin (annually, goal <30 mg/gm): 2019, overdue  Influenza vaccination status: Influenza: Influenza Up To Date This Year  Influenza Recommended Annually  Tetanus vaccination status:  last tetanus booster within 10 years  Pneumonia vaccination status: Pneumococcal: Lifetime Vaccine Complete  Hepatitis B vaccination status: unknown      Goals:  SMBG: once a day  FP-200  2hrPPG: <250  A1C: < 8  BP: < 130 / < 80  LDL: < 100 mg/dl (CHD or "CHD risk equivalent" is present)    Demographics  Interaction Method: Face to Face  Type of Intervention: New    Topic(s) Addressed  Diabetes    Intervention(s) Made    Pharmacologic:      Medication Discontinuation    Prevent or Manage Adverse Drug Reaction    Non-Pharmacologic:  Adherence Addressed    Preventive Care Gap Closure Recommendation    Lab Ordered    Other    Tool(s) Used  Not Applicable    Time Spent:   Time Spent in Direct Patient Care: 45 minutes    Time Spent in Care Coordination: 30 minutes    Recommendation(s) Accepted by the Patient/Caregiver:  All Accepted

## 2021-01-25 NOTE — Clinical Note
Thanks for sending her my way! I agree with luann lispro on Friday, we can monitor SMBG to see if she can dc Januvia as well to be honest  She mostly needs help with speciic food recommendations that can help her gain weight/strength but not impact her blood sugar  She is planning on scheduling with dietician very soon  I will watch for her f/u with you to see if more needs to be done  Thanks!

## 2021-01-27 ENCOUNTER — CLINICAL SUPPORT (OUTPATIENT)
Dept: PULMONOLOGY | Facility: CLINIC | Age: 86
End: 2021-01-27
Payer: MEDICARE

## 2021-01-27 DIAGNOSIS — J84.9 INTERSTITIAL LUNG DISEASE (HCC): ICD-10-CM

## 2021-01-27 PROCEDURE — G0239 OTH RESP PROC, GROUP: HCPCS

## 2021-02-01 ENCOUNTER — APPOINTMENT (OUTPATIENT)
Dept: PULMONOLOGY | Facility: CLINIC | Age: 86
End: 2021-02-01
Payer: MEDICARE

## 2021-02-03 ENCOUNTER — CLINICAL SUPPORT (OUTPATIENT)
Dept: PULMONOLOGY | Facility: CLINIC | Age: 86
End: 2021-02-03
Payer: MEDICARE

## 2021-02-03 DIAGNOSIS — J84.9 INTERSTITIAL LUNG DISEASE (HCC): ICD-10-CM

## 2021-02-03 DIAGNOSIS — I10 ESSENTIAL (PRIMARY) HYPERTENSION: ICD-10-CM

## 2021-02-03 PROCEDURE — G0239 OTH RESP PROC, GROUP: HCPCS

## 2021-02-03 RX ORDER — METOPROLOL TARTRATE 50 MG/1
75 TABLET, FILM COATED ORAL EVERY 12 HOURS SCHEDULED
Qty: 90 TABLET | Refills: 5 | Status: SHIPPED | OUTPATIENT
Start: 2021-02-03 | End: 2021-08-23

## 2021-02-08 ENCOUNTER — CLINICAL SUPPORT (OUTPATIENT)
Dept: PULMONOLOGY | Facility: CLINIC | Age: 86
End: 2021-02-08
Payer: MEDICARE

## 2021-02-08 DIAGNOSIS — J84.9 INTERSTITIAL LUNG DISEASE (HCC): ICD-10-CM

## 2021-02-08 PROCEDURE — G0239 OTH RESP PROC, GROUP: HCPCS

## 2021-02-10 ENCOUNTER — TELEPHONE (OUTPATIENT)
Dept: FAMILY MEDICINE CLINIC | Facility: CLINIC | Age: 86
End: 2021-02-10

## 2021-02-10 ENCOUNTER — APPOINTMENT (OUTPATIENT)
Dept: PULMONOLOGY | Facility: CLINIC | Age: 86
End: 2021-02-10
Payer: MEDICARE

## 2021-02-10 NOTE — TELEPHONE ENCOUNTER
Patient called, said she has a uti, she has had it for about a week now, she wants to know if she can just drop a sample off

## 2021-02-10 NOTE — TELEPHONE ENCOUNTER
Patient notified and verbalized understanding       Patient states she will drop off urine tomorrow if she can get a ride

## 2021-02-12 ENCOUNTER — CLINICAL SUPPORT (OUTPATIENT)
Dept: FAMILY MEDICINE CLINIC | Facility: CLINIC | Age: 86
End: 2021-02-12
Payer: MEDICARE

## 2021-02-12 ENCOUNTER — IMMUNIZATIONS (OUTPATIENT)
Dept: FAMILY MEDICINE CLINIC | Facility: HOSPITAL | Age: 86
End: 2021-02-12

## 2021-02-12 DIAGNOSIS — R39.9 UTI SYMPTOMS: Primary | ICD-10-CM

## 2021-02-12 DIAGNOSIS — Z23 ENCOUNTER FOR IMMUNIZATION: Primary | ICD-10-CM

## 2021-02-12 DIAGNOSIS — R82.90 ABNORMAL FINDING IN URINE: ICD-10-CM

## 2021-02-12 LAB
SL AMB  POCT GLUCOSE, UA: NORMAL
SL AMB LEUKOCYTE ESTERASE,UA: NORMAL
SL AMB POCT BILIRUBIN,UA: NORMAL
SL AMB POCT BLOOD,UA: NORMAL
SL AMB POCT CLARITY,UA: CLEAR
SL AMB POCT COLOR,UA: YELLOW
SL AMB POCT KETONES,UA: NORMAL
SL AMB POCT NITRITE,UA: NORMAL
SL AMB POCT PH,UA: 70
SL AMB POCT SPECIFIC GRAVITY,UA: 1
SL AMB POCT URINE PROTEIN: NORMAL
SL AMB POCT UROBILINOGEN: 0.2

## 2021-02-12 PROCEDURE — 87186 SC STD MICRODIL/AGAR DIL: CPT | Performed by: NURSE PRACTITIONER

## 2021-02-12 PROCEDURE — 91300 SARS-COV-2 / COVID-19 MRNA VACCINE (PFIZER-BIONTECH) 30 MCG: CPT

## 2021-02-12 PROCEDURE — 87077 CULTURE AEROBIC IDENTIFY: CPT | Performed by: NURSE PRACTITIONER

## 2021-02-12 PROCEDURE — 87086 URINE CULTURE/COLONY COUNT: CPT | Performed by: NURSE PRACTITIONER

## 2021-02-12 PROCEDURE — 0002A SARS-COV-2 / COVID-19 MRNA VACCINE (PFIZER-BIONTECH) 30 MCG: CPT

## 2021-02-12 PROCEDURE — 81003 URINALYSIS AUTO W/O SCOPE: CPT

## 2021-02-12 NOTE — PROGRESS NOTES
Pulmonary Rehabilitation Plan of Care   30 day reasessment      Today's date: 2021   Visits: 7 sessions  Patient name: Oniel Cat      : 1935  Age: 82 y o  MRN: [id number]  Referring Physician: Nuzhat Sequeira MD   Pulmonologist: lior  Provider: Wallace Hendrickson  Clinician: Henry Barrios, RRT    Dx:   Encounter Diagnosis   Name Primary?  Interstitial lung disease ([facility] [address] )      Date of onset: 10/2020      SUMMARY OF PROGRESS: Avani Moralez has completed 7 sessions of pulmonary rehab  She walks on the treadmill for 12 minutes at 1 1 mph  She exercises on the nustep for 12 minutes at level 2 and the UBE for 12 minutes at level 1 5  She does some light weight lifting with the 2 5 free weight  She feels the exercise is moderately hard and her sob  is slight  She exercises on room air with oxygen saturation levels > 94%  Medication compliance: Yes   Comments:   Fall Risk: Low   Comments:     Oxygen Needs: none needed    Oxygen Goal: Maintain SpO2>90% during exercise  Plan: Titrate supplemental oxygen as needed to maintain SpO2>90% with exercise  CAT: 7   Shortness of breath questionnaire: 56    Progressing:  Pt is progressing and showing improvement  toward the following goals:  inmproved strength and stamina        Readiness to change: Preparation:  (Getting ready to change)  and Action:  (Changing behavior)    EXERCISE ASSESSMENT and PLAN    Current Exercise Program in Rehab:       Frequency: days/week        Minutes:          METS:               SpO2:               RPD:                       HR:    RPE:          Modalities:       Exercise Progression 30 Day Goals :    Frequency: 3 days/week (one at home)    Minutes: 50 minutes         METS: 4-6              SpO2: > 90%              RPD: 4-6                      HR: 89-99   RPE: 4-6        Modalities: Treadmill, Airdyne bike, UBE, NuStep and Recumbent bike     Strength trainin-3 days / week  12-15 repetitions  1-2 sets per modality    Modalities: Lateral Raise, Arm Curl, Seated Row, Sit to [employer] Raises    Progressing:  Reviewed Pt goals and determined plan of care, Will continue to educate and progress as tolerated  Home Exercise: goal is 1-2 days per week    Goals: Reduced dyspnea with physical activity  0-1/10    Education: home exercise guidelines   Plan:education on home exercise guidelines  Readiness to change: Contemplation:  (Acknowledging that there is a problem but not yet ready or sure of wanting to make a change)      NUTRITION ASSESSMENT AND PLAN    Weight control:    Starting weight: 124   Current weight:     Waist circumference:    Starting:    Current:    Diabetes: patient will bring glucometer  Lipid management:   Goals:patient lost 40 lbs in last year  BMI 19 4  Education: target goal for A1c <7 0  Progressing:Reviewed Pt goals and determined plan of care  Plan: remove salt shaker from table and use salt substitute like Mrs   Dash, increase utilization of fresh or dried herbs  Readiness to change: Contemplation:  (Acknowledging that there is a problem but not yet ready or sure of wanting to make a change)      PSYCHOSOCIAL ASSESSMENT AND PLAN    Emotional:  Depression assessment:  PHQ-9 = 1-4 = Minimal Depression            Anxiety measure:  WILFREDO-7 = 0-4  = Not anxious  Self-reported stress level: 2   Social support: Patient has friends available for support when needed   Goals:  patient denies depression; declined Kathie [address]  Education: declined [place]  Progressing:Reviewed Pt goals and determined plan of care  Plan: Return to previous social activity and when health retrictions removed  Readiness to change: Pre-Contemplation:   (Not yet acknowledging that there is a problem behavior that needs to change)      OTHER CORE COMPONENTS     Tobacco:   Social History     Tobacco Use   Smoking Status Former Smoker    Packs/day: 1 50    Years: 40 00    Pack years: 60 00    Types: Cigarettes    Quit date: 12    Years since quittin 1   Smokeless Tobacco Never Used       Tobacco Use Intervention: Referral to tobacco expert:   N/A: Pt has a remote history of smoking she quit > 20 years ago      Blood pressure:    Restin/66   Exercise:     Goals:   Education:  low sodium diet and HTN  Progressing:  Plan: engage in regular exercise  Readiness to change: Contemplation:  (Acknowledging that there is a problem but not yet ready or sure of wanting to make a change)

## 2021-02-15 ENCOUNTER — APPOINTMENT (OUTPATIENT)
Dept: PULMONOLOGY | Facility: CLINIC | Age: 86
End: 2021-02-15
Payer: MEDICARE

## 2021-02-17 ENCOUNTER — CLINICAL SUPPORT (OUTPATIENT)
Dept: PULMONOLOGY | Facility: CLINIC | Age: 86
End: 2021-02-17
Payer: MEDICARE

## 2021-02-17 DIAGNOSIS — J84.9 INTERSTITIAL LUNG DISEASE (HCC): ICD-10-CM

## 2021-02-17 DIAGNOSIS — N30.00 ACUTE CYSTITIS WITHOUT HEMATURIA: Primary | ICD-10-CM

## 2021-02-17 LAB — BACTERIA UR CULT: ABNORMAL

## 2021-02-17 PROCEDURE — G0239 OTH RESP PROC, GROUP: HCPCS

## 2021-02-17 RX ORDER — NITROFURANTOIN 25; 75 MG/1; MG/1
100 CAPSULE ORAL 2 TIMES DAILY
Qty: 10 CAPSULE | Refills: 0 | Status: SHIPPED | OUTPATIENT
Start: 2021-02-17 | End: 2021-02-22

## 2021-02-19 DIAGNOSIS — F41.9 ANXIETY: ICD-10-CM

## 2021-02-20 RX ORDER — LORAZEPAM 0.5 MG/1
1 TABLET ORAL
Qty: 60 TABLET | Refills: 0 | Status: SHIPPED | OUTPATIENT
Start: 2021-02-20 | End: 2021-03-22

## 2021-02-22 ENCOUNTER — CLINICAL SUPPORT (OUTPATIENT)
Dept: PULMONOLOGY | Facility: CLINIC | Age: 86
End: 2021-02-22
Payer: MEDICARE

## 2021-02-22 DIAGNOSIS — I48.0 PAROXYSMAL ATRIAL FIBRILLATION (HCC): ICD-10-CM

## 2021-02-22 DIAGNOSIS — J84.9 INTERSTITIAL LUNG DISEASE (HCC): ICD-10-CM

## 2021-02-22 PROCEDURE — G0239 OTH RESP PROC, GROUP: HCPCS

## 2021-02-22 RX ORDER — APIXABAN 2.5 MG/1
TABLET, FILM COATED ORAL
Qty: 60 TABLET | Refills: 5 | Status: SHIPPED | OUTPATIENT
Start: 2021-02-22 | End: 2021-07-19

## 2021-02-24 ENCOUNTER — APPOINTMENT (OUTPATIENT)
Dept: PULMONOLOGY | Facility: CLINIC | Age: 86
End: 2021-02-24
Payer: MEDICARE

## 2021-03-01 ENCOUNTER — CLINICAL SUPPORT (OUTPATIENT)
Dept: PULMONOLOGY | Facility: CLINIC | Age: 86
End: 2021-03-01
Payer: MEDICARE

## 2021-03-01 DIAGNOSIS — J84.9 INTERSTITIAL LUNG DISEASE (HCC): ICD-10-CM

## 2021-03-01 PROCEDURE — G0239 OTH RESP PROC, GROUP: HCPCS

## 2021-03-02 ENCOUNTER — HOSPITAL ENCOUNTER (OUTPATIENT)
Dept: NON INVASIVE DIAGNOSTICS | Facility: CLINIC | Age: 86
Discharge: HOME | End: 2021-03-02
Payer: MEDICARE

## 2021-03-02 DIAGNOSIS — I77.1 STENOSIS OF RIGHT SUBCLAVIAN ARTERY (HCC): ICD-10-CM

## 2021-03-02 DIAGNOSIS — I65.21 OCCLUSION OF RIGHT CAROTID ARTERY: ICD-10-CM

## 2021-03-02 DIAGNOSIS — I65.22 ASYMPTOMATIC CAROTID ARTERY STENOSIS, LEFT: ICD-10-CM

## 2021-03-02 PROCEDURE — 93880 EXTRACRANIAL BILAT STUDY: CPT

## 2021-03-02 NOTE — PROGRESS NOTES
Pulmonary Follow Up Note   Lindsay Gonzalez 82 y o  female MRN: [id number]  3/3/2021    1  Interstitial lung disease ([address] )  Assessment & Plan:  - No chronic occupational exposures that are known, no history of autoimmune/rheumatologic diseases or symptoms   No exposure to animals, does not appear to be related to GERD given distribution   - Extensive autoimmune workup including PASTOR, ANCA, CCP, RF, HP panel negative  - HRCT revealed subpleural reticular/fiber nodular opacities, interlobular septal thickening, mild central/bibasilar bronchiectasis, possible UIP pattern  - PFTs with mild restriction based on TLC 73% predicted, severe diffusion defect      - At this time, leading diagnosis appears to be IPF, but given chronicity of findings, unclear, could be slowly progressing IPF  - Given mild restriction on PFts, will hold off on starting anti fibrotic therapy at this time, given significant side effect profile, josse in elderly patients with GI issues  -  Previously presented at [facility] conference and agreed with plan  - will have serial imaging and PFTs to monitor for progression of disease, Will schedule for April which would be 6 months from prior, with 6 minutes walk  - check TTE to r/o any contributing disease, pHTN,  Will schedule  -  Remains active with pulmonary rehab, will continue     2  Gastroesophageal reflux disease with esophagitis without hemorrhage  Assessment & Plan:  - recommend continued GI follow-up for evaluation of GERD and dysphagia  - continue Pepcid, recently taken off of Protonix due to osteoporosis        3  Hypertension, unspecified type  Assessment & Plan:  - management per pcp         4  Paroxysmal atrial fibrillation (HCC)  Assessment & Plan:  -continue Lopressor, Eliquis  -PCP and Cardiology follow-up        5   Postnasal drip  Assessment & Plan:  -  Patient will check with her ophthalmologist given glaucoma, then will start Flonase  -  Given samples of saline nasal sprays in the office Diagnoses and all orders for this visit:    Interstitial lung disease ([address] )  -     Complete PFT with post Bronchodilator and Six Minute walk; Future    Gastroesophageal reflux disease with esophagitis without hemorrhage    Hypertension, unspecified type    Paroxysmal atrial fibrillation (HCC)    Post-nasal drip    Pulmonary fibrosis (HCC)  -     Complete PFT with post Bronchodilator and Six Minute walk; Future        Return in about 4 months (around 7/3/2021) for Next scheduled follow up  History of Present Illness      HPI:  Zoie Ramirez is a 82 y o  female with history of hypertension/hyperlipidemia, dysphagia, paroxysmal atrial fibrillation on Eliquis with pacemaker, diabetes, neuropathy, presents for f/u of interstitial lung disease      Patient for seen in the office In November 2020  She had prior imaging showing significant interstitial lung disease and fibrosis  Looking back on prior imaging, it appears that she did have some reticular pattern back to imaging on 2007, but has progressed significantly over the past 13 years       In terms of pulmonary history, patient is a former smoker, quit in 1994  Patient used to work for [employer]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 and 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 chemicals   She subsequently worked as a  person and 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 different types of [employer] recently, she worked as a  without exposures  Phansunny Vargas has never had pets and denies any exposure to birds, lifestyle, farm animals   She denies any autoimmune or connective tissue disorders   She denies any skin rashes   She does have intermittent knee pain, but denies significant small joint pains   She has no family history of lung disease   She does note some environmental allergies worsen her postnasal drip and given her watery eyes      Given patient's finding of ILD, she then underwent further workup    High-resolution CT chest revealed subpleural reticular/fiber nodular opacities, interlobular septal thickening, mild central/bibasilar bronchiectasis, possible UIP pattern  Autoimmune workup was completed with negative PASTOR, Anca, RF, CCP, HP panel  Patient was also prescribed azelastin for postnasal drip  PFTs revealed mild restriction with TLC 73% predicted, DLCO 39% predicted (severe diffusion defect)  6 minutes walk did not reveal any desaturation  Since last visit, patient did have multiple ERCPs and cholecystectomy for biliary issues  She also had a barium swallow which did not reveal any aspiration  She has also been attending pulmonary rehab  She denies any significant worsening of her shortness of breath, cough  She does complain of postnasal drip, not alleviated by Azelastin  Denies recent f/c/n/v, chest pain  Trying to remain active  Does not feel that her breathing is getting any worse      Review of Systems   Constitutional: Positive for fatigue  Negative for chills and fever  HENT: Positive for postnasal drip  Negative for congestion, rhinorrhea, sneezing and sore throat  Respiratory: Positive for shortness of breath  Negative for cough and wheezing  Cardiovascular: Negative for chest pain, palpitations and leg swelling  Gastrointestinal: Negative for abdominal pain, constipation, diarrhea, nausea and vomiting  Endocrine: Negative for cold intolerance and heat intolerance  Genitourinary: Negative for dysuria  Musculoskeletal: Negative for arthralgias  Allergic/Immunologic: Negative for immunocompromised state  Neurological: Negative for dizziness and numbness         Historical Information   Past Medical History:   Diagnosis Date    Diabetes mellitus ([facility] )     Glaucoma     H/O degenerative disc disease     Hyperlipidemia     Hypertension     Idiopathic pulmonary fibrosis ([facility] ) 11/2020    Irregular heart beat     afib    Peripheral neuropathy     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 ([facility] )      Past Surgical History:   Procedure Laterality Date    CATARACT EXTRACTION, BILATERAL  2011    CHOLECYSTECTOMY LAPAROSCOPIC N/A 12/9/2020    Procedure: CHOLECYSTECTOMY LAPAROSCOPIC;  Surgeon: Alverto Viera MD;  Location: [facility];  Service: General    COLONOSCOPY      CYST REMOVAL  2009    left lower [facility] [address]    LIPOMA RESECTION  2010    DE REPAIR ING HERNIA,5+Y/O,REDUCIBL Bilateral 1/5/2018    Procedure: INGUINAL HERNIA REPAIR;  Surgeon: Magnus Jack MD;  Location: [facility];  Service: General    SHOULDER SURG[facility]  [facility]26/[facility]   [address]  R carotid occlusion     Family History   Problem Relation Age of Onset    Heart attack Father     Hiatal hernia Father     Diabetes Father     Heart disease Mother     Cancer Brother     Diabetes Brother     Heart disease Brother     Hypertension Brother          Meds/Allergies     Current Outpatient Medications:     acetaminophen (TYLENOL) 500 mg tablet, Take 1,000 mg by mouth as needed for mild pain, Disp: , Rfl:     amoxicillin (AMOXIL) 500 mg capsule, TAKE 4 CAPSULES 1 HOUR BEFORE DENTAL APPOINTMENT, Disp: , Rfl:     aspirin (ECOTRIN LOW STRENGTH) 81 mg EC tablet, Take 1 tablet (81 mg total) by mouth daily, Disp:  , Rfl:     azelastine (ASTELIN) 0 1 % nasal spray, 1 spray into each nostril 2 (two) times a day Use in each nostril as directed, Disp: 1 Bottle, Rfl: 2    bimatoprost (LUMIGAN) 0 01 % ophthalmic drops, Administer 1 drop to both eyes daily at bedtime for 30 days, Disp: 1 5 mL, Rfl: 0    calcium carbonate (OS-DANIA) 600 MG tablet, Take 600 mg by mouth 2 (two) times a day with meals, Disp: , Rfl:     carboxymethylcellulose (Artificial Tears) 1 % ophthalmic solution, 1 drop 3 (three) times a day, Disp: , Rfl:     Eliquis 2 5 MG, TAKE 1 TABLET (2 5 MG TOTAL) BY MOUTH 2 (TWO) TIMES A DAY, Disp: 60 tablet, Rfl: 5    ezetimibe-simvastatin (VYTORIN) 10-40 mg per tablet, TAKE 1 TABLET BY MOUTH DAILY AT BEDTIME, Disp: 30 tablet, Rfl: 5    famotidine (PEPCID) 20 mg tablet, Take 1 tablet (20 mg total) by mouth 2 (two) times a day, Disp: 180 tablet, Rfl: 1    ferrous sulfate 325 (65 Fe) mg tablet, Take 325 mg by mouth daily with breakfast, Disp: , Rfl:     glucose blood (ONE TOUCH ULTRA TEST) test strip, TEST FOUR TIMES A DAY, Disp: 400 each, Rfl: 1    insulin lispro (HumaLOG KwikPen) 100 units/mL injection pen, Inject 4 Units under the skin 3 (three) times a day with meals, Disp: 5 pen, Rfl: 0    Lancets (ONETOUCH DELICA PLUS IRAMXQ18X) MISC, Use one lancet to test blood 4 times a day, Disp: 400 each, Rfl: 1    loratadine (CLARITIN) 10 mg tablet, Take 10 mg by mouth daily as needed for allergies, Disp: , Rfl:     LORazepam (ATIVAN) 0 5 mg tablet, TAKE 2 TABLETS (1 MG TOTAL) BY MOUTH DAILY AT BEDTIME, Disp: 60 tablet, Rfl: 0    metFORMIN (GLUCOPHAGE) 500 mg tablet, Take 2 tablets (1,000 mg total) by mouth 2 (two) times a day with meals, Disp: 360 tablet, Rfl: 3    metoprolol tartrate (LOPRESSOR) 50 mg tablet, TAKE 1 5 TABLETS (75 MG TOTAL) BY MOUTH EVERY 12 (TWELVE) HOURS, Disp: 90 tablet, Rfl: 5    Multiple Vitamin (multivitamin) tablet, Take 1 tablet by mouth daily, Disp: , Rfl:     polyethylene glycol (MIRALAX) 17 g packet, Take 17 g by mouth daily as needed (constipation), Disp: , Rfl: 0    QC Pen Needles 31G X 6 MM MISC, USE 4 (FOUR) TIMES A DAY (BEFORE MEALS AND AT BEDTIME), Disp: 100 each, Rfl: 3    sitaGLIPtin (Januvia) 100 mg tablet, Take 1 tablet (100 mg total) by mouth daily, Disp: 30 tablet, Rfl: 5     Allergies   Allergen Reactions    Keflex [Cephalexin] Diarrhea     Vitals: Blood pressure 140/70, pulse 95, temperature (!) 96 2 °F (35 7 °C), resp  rate 18, height 5' 7" (1 702 m), weight 56 7 kg (125 lb), SpO2 97 %  Body mass index is 19 58 kg/m²   Oxygen Therapy  SpO2: 97 %  Oxygen Therapy: None (Room air)    Physical Exam  Constitutional:       General: She is not in acute distress  Appearance: She is well-developed  She is not diaphoretic  Comments: Thin    HENT:      Head: Normocephalic and atraumatic  Mouth/Throat:      Mouth: Mucous membranes are moist       Pharynx: Oropharynx is clear  No oropharyngeal exudate  Eyes:      General: No scleral icterus  Cardiovascular:      Rate and Rhythm: Normal rate and regular rhythm  Heart sounds: Normal heart sounds  No murmur  Pulmonary:      Effort: Pulmonary effort is normal  No respiratory distress  Breath sounds: No wheezing  Comments: Bibasilar crackles   Abdominal:      General: Bowel sounds are normal  There is no distension  Palpations: Abdomen is soft  Tenderness: There is no abdominal tenderness  Musculoskeletal:      Right lower leg: No edema  Left lower leg: No edema  Neurological:      Mental Status: She is alert and oriented to person, place, and time  Labs: I have personally reviewed pertinent lab results    Lab Results   Component Value Date    WBC 7 38 01/19/2021    HGB 10 4 (L) 01/19/2021    HCT 35 1 01/19/2021    MCV 87 01/19/2021     01/19/2021     Lab Results   Component Value Date    CALCIUM 9 1 01/19/2021    K 4 0 01/19/2021    CO2 31 01/19/2021    CL 97 (L) 01/19/2021    BUN 12 01/19/2021    CREATININE 0 42 (L) 01/19/2021     Lab Results   Component Value Date    IGE 14 6 10/16/2020     Lab Results   Component Value Date    ALT 53 01/19/2021    AST 53 (H) 01/19/2021    ALKPHOS 100 01/19/2021       Imaging and other studies: I have personally reviewed pertinent reports    and I have personally reviewed pertinent films in PACS  CT abdomen/pelvis  12/07/2020   intra and extrahepatic biliary ductal dilatation with CBD measuring up to 12 mm   subpleural reticular/ fibronodular opacities with interlobular septal thickening and bronchiectasis at lung bases    HRCT 11/11/20  High-resolution CT chest revealed subpleural reticular/fiber nodular opacities, interlobular septal thickening, mild central/bibasilar bronchiectasis, possible UIP pattern     Chest x-ray 10/08/2020  Moderate pulmonary fibrosis       Pulmonary function testin/11/20  Results:  FEV1/FVC Ratio: 85 %  Forced Vital Capacity: 1 98 L    60 % predicted  FEV1: 1 69 L     70 % predicted     Lung volumes by body plethysmography: Total Lung Capacity 73 % predicted Residual volume 85 % predicted     DLCO corrected for patients hemoglobin level: 39 %     6MWT performed on Room Air - total walk distance 204 meters, initialk SpO2 99%, omari 97%, initial HR 93bpm, maximal HR 97%, Aishwarya Dyspnea Scale 1/10--->3/10     Interpretation:     · Mild restrictive airflow defect on spirometry      · Normal Lung volumes     · Severely Reduced Diffusion     · Normal flow volume loops     · 6MWT distance of 204 meters, no significant desaturation     EKG, Pathology, and Other Studies: I have personally reviewed pertinent reports     Echo 08/15/2019  EF 26%, grade 1 diastolic dysfunction        Mook Edgar MD  Pulmonary & Critical Care Fellow, [facility]

## 2021-03-03 ENCOUNTER — OFFICE VISIT (OUTPATIENT)
Dept: PULMONOLOGY | Facility: CLINIC | Age: 86
End: 2021-03-03
Payer: MEDICARE

## 2021-03-03 ENCOUNTER — APPOINTMENT (OUTPATIENT)
Dept: PULMONOLOGY | Facility: CLINIC | Age: 86
End: 2021-03-03
Payer: MEDICARE

## 2021-03-03 VITALS
OXYGEN SATURATION: 97 % | SYSTOLIC BLOOD PRESSURE: 140 MMHG | HEART RATE: 95 BPM | DIASTOLIC BLOOD PRESSURE: 70 MMHG | HEIGHT: 67 IN | TEMPERATURE: 96.2 F | BODY MASS INDEX: 19.62 KG/M2 | WEIGHT: 125 LBS | RESPIRATION RATE: 18 BRPM

## 2021-03-03 DIAGNOSIS — R09.82 POST-NASAL DRIP: ICD-10-CM

## 2021-03-03 DIAGNOSIS — E11.319 TYPE 2 DIABETES MELLITUS WITH RETINOPATHY, WITH LONG-TERM CURRENT USE OF INSULIN, MACULAR EDEMA PRESENCE UNSPECIFIED, UNSPECIFIED LATERALITY, UNSPECIFIED RETINOPATHY SEVERITY (HCC): ICD-10-CM

## 2021-03-03 DIAGNOSIS — K21.00 GASTROESOPHAGEAL REFLUX DISEASE WITH ESOPHAGITIS WITHOUT HEMORRHAGE: ICD-10-CM

## 2021-03-03 DIAGNOSIS — J84.10 PULMONARY FIBROSIS (HCC): ICD-10-CM

## 2021-03-03 DIAGNOSIS — I48.0 PAROXYSMAL ATRIAL FIBRILLATION (HCC): ICD-10-CM

## 2021-03-03 DIAGNOSIS — Z79.4 TYPE 2 DIABETES MELLITUS WITH RETINOPATHY, WITH LONG-TERM CURRENT USE OF INSULIN, MACULAR EDEMA PRESENCE UNSPECIFIED, UNSPECIFIED LATERALITY, UNSPECIFIED RETINOPATHY SEVERITY (HCC): ICD-10-CM

## 2021-03-03 DIAGNOSIS — I10 HYPERTENSION, UNSPECIFIED TYPE: ICD-10-CM

## 2021-03-03 DIAGNOSIS — J84.9 INTERSTITIAL LUNG DISEASE (HCC): Primary | ICD-10-CM

## 2021-03-03 PROCEDURE — 99214 OFFICE O/P EST MOD 30 MIN: CPT | Performed by: INTERNAL MEDICINE

## 2021-03-03 RX ORDER — SITAGLIPTIN 100 MG/1
TABLET, FILM COATED ORAL
Qty: 30 TABLET | Refills: 5 | OUTPATIENT
Start: 2021-03-03

## 2021-03-03 NOTE — TELEPHONE ENCOUNTER
Patient needs appt for more refills  When appt has been made, let me know when appt is and will rx supply to last until appt  *She has not seen our office in about a year  Please schedule with our office for follow up or obtain refills from family physician if she plans to continue to follow with PCP for control of Diabetes   Thanks

## 2021-03-04 ENCOUNTER — OFFICE VISIT (OUTPATIENT)
Dept: FAMILY MEDICINE CLINIC | Facility: CLINIC | Age: 86
End: 2021-03-04
Payer: MEDICARE

## 2021-03-04 ENCOUNTER — LAB (OUTPATIENT)
Dept: LAB | Facility: CLINIC | Age: 86
End: 2021-03-04
Payer: MEDICARE

## 2021-03-04 VITALS
HEART RATE: 87 BPM | DIASTOLIC BLOOD PRESSURE: 74 MMHG | OXYGEN SATURATION: 98 % | BODY MASS INDEX: 19.62 KG/M2 | HEIGHT: 67 IN | WEIGHT: 125 LBS | RESPIRATION RATE: 16 BRPM | TEMPERATURE: 97.6 F | SYSTOLIC BLOOD PRESSURE: 130 MMHG

## 2021-03-04 DIAGNOSIS — R79.89 ELEVATED LFTS: ICD-10-CM

## 2021-03-04 DIAGNOSIS — E87.1 HYPONATREMIA: ICD-10-CM

## 2021-03-04 DIAGNOSIS — E11.65 TYPE 2 DIABETES MELLITUS WITH HYPERGLYCEMIA, UNSPECIFIED WHETHER LONG TERM INSULIN USE (HCC): Primary | ICD-10-CM

## 2021-03-04 DIAGNOSIS — E44.0 MODERATE PROTEIN-CALORIE MALNUTRITION (HCC): ICD-10-CM

## 2021-03-04 DIAGNOSIS — E78.2 MIXED HYPERLIPIDEMIA: ICD-10-CM

## 2021-03-04 DIAGNOSIS — M81.0 OSTEOPOROSIS WITHOUT CURRENT PATHOLOGICAL FRACTURE, UNSPECIFIED OSTEOPOROSIS TYPE: ICD-10-CM

## 2021-03-04 DIAGNOSIS — K21.00 GASTROESOPHAGEAL REFLUX DISEASE WITH ESOPHAGITIS WITHOUT HEMORRHAGE: ICD-10-CM

## 2021-03-04 DIAGNOSIS — R13.19 ESOPHAGEAL DYSPHAGIA: ICD-10-CM

## 2021-03-04 DIAGNOSIS — E61.1 IRON DEFICIENCY: ICD-10-CM

## 2021-03-04 DIAGNOSIS — I10 HYPERTENSION, UNSPECIFIED TYPE: ICD-10-CM

## 2021-03-04 LAB
ALBUMIN SERPL BCP-MCNC: 3.6 G/DL (ref 3.5–5)
ALP SERPL-CCNC: 94 U/L (ref 46–116)
ALT SERPL W P-5'-P-CCNC: 24 U/L (ref 12–78)
ANION GAP SERPL CALCULATED.3IONS-SCNC: 3 MMOL/L (ref 4–13)
AST SERPL W P-5'-P-CCNC: 15 U/L (ref 5–45)
BILIRUB SERPL-MCNC: 0.36 MG/DL (ref 0.2–1)
BUN SERPL-MCNC: 10 MG/DL (ref 5–25)
CALCIUM SERPL-MCNC: 8.8 MG/DL (ref 8.3–10.1)
CHLORIDE SERPL-SCNC: 101 MMOL/L (ref 100–108)
CO2 SERPL-SCNC: 31 MMOL/L (ref 21–32)
CREAT SERPL-MCNC: 0.43 MG/DL (ref 0.6–1.3)
GFR SERPL CREATININE-BSD FRML MDRD: 94 ML/MIN/1.73SQ M
GLUCOSE P FAST SERPL-MCNC: 172 MG/DL (ref 65–99)
POTASSIUM SERPL-SCNC: 4.4 MMOL/L (ref 3.5–5.3)
PROT SERPL-MCNC: 7.4 G/DL (ref 6.4–8.2)
SL AMB POCT HEMOGLOBIN AIC: 7.3 (ref ?–6.5)
SODIUM SERPL-SCNC: 135 MMOL/L (ref 136–145)

## 2021-03-04 PROCEDURE — 93880 EXTRACRANIAL BILAT STUDY: CPT | Performed by: SURGERY

## 2021-03-04 PROCEDURE — 83036 HEMOGLOBIN GLYCOSYLATED A1C: CPT | Performed by: NURSE PRACTITIONER

## 2021-03-04 PROCEDURE — 36416 COLLJ CAPILLARY BLOOD SPEC: CPT | Performed by: NURSE PRACTITIONER

## 2021-03-04 PROCEDURE — 36415 COLL VENOUS BLD VENIPUNCTURE: CPT

## 2021-03-04 PROCEDURE — 80053 COMPREHEN METABOLIC PANEL: CPT

## 2021-03-04 PROCEDURE — 99214 OFFICE O/P EST MOD 30 MIN: CPT | Performed by: NURSE PRACTITIONER

## 2021-03-04 NOTE — PROGRESS NOTES
FAMILY PRACTICE OFFICE VISIT       NAME: Romero Napoles  AGE: 82 y o  SEX: female       : 1935        MRN: [id number]    Assessment and Plan     Problem List Items Addressed This Visit        Digestive    GERD (gastroesophageal reflux disease)    Dysphagia       Endocrine    Type 2 diabetes mellitus with hyperglycemia ([facility] [address] ) - Primary    Relevant Orders    POCT hemoglobin A1c (Completed)       Other    Moderate protein-calorie malnutrition ([facility] )          1  Type 2 diabetes mellitus with hyperglycemia, unspecified whether long term insulin use (HCC)  POCT hemoglobin A1c   2  Gastroesophageal reflux disease with esophagitis without hemorrhage     3  Esophageal dysphagia     4  Moderate protein-calorie malnutrition ([facility] [address] )       This 79-year-old female presents today for follow-up on diabetes and weight  Hemoglobin A1c is 7 3% today  This is acceptable for her age  And chronic conditions  Moderate protein calorie  Malnutrition, has had difficulty gaining weight post cholecystectomy  For this reason, I continue to tell her to eat when she is hungry and feels able to eat, and do not worry so much about her sugars, right now  She still maintains a diabetic diet  She is scheduled with a nutritionist in April to work on gaining weight and managing diabetes  She does drink Glucerna 2 cans per day  GERD is stable on Pepcid  Dysphagia is stable after completing speech therapy  Follow up in 3 months  Continue to check weekly weights  Call if weight should be going down  Chief Complaint     Chief Complaint   Patient presents with    Follow-up     4 -6 week        History of Present Illness     Romero Napoles  Is an 79-year-old female presenting today for follow-up on diabetes and weight  Is feeling better after recent bladder infection  She did lose her appetite while taking the antibiotic  Weight was down to 119 lb, now is back up to 122 lb at home      She continues to follow with pulmonology for interstitial lung disease  Is attending pulmonary rehab  Has an appointment April 12th with nutritionist     Had both COVID-19 vaccinations and did well with them  Has brought her blood sugar log today  Blood sugars running 140s to 170s  She is doing overall well  Drinking Glucerna twice daily  She has recently tried if you fatty foods for the 1st time after cholecystectomy, notes they did not sit well  GERD-- doing well, minimal to no pain on pepcid  One day she forgot to take Pepcid, and did have abdominal pain  Swallowing is doing fair-completed speech therapy  Chronic cough still present  Tried flonase in the past--did not help  Continues to use Claritin  Review of Systems   Review of Systems   Constitutional: Negative  HENT: Negative  Respiratory: Positive for cough (chornic)  Cardiovascular: Negative  Gastrointestinal: Negative  Genitourinary: Negative  Musculoskeletal: Negative  Skin: Negative  Neurological: Negative  Hematological: Negative  Psychiatric/Behavioral: Negative          Active Problem List     Patient Active Problem List   Diagnosis    HTN (hypertension)    HLD (hyperlipidemia)    Glaucoma    Asymptomatic carotid artery stenosis, left    Symptomatic PVCs    Occlusion of right carotid artery    Stenosis of right subclavian artery (HCC)    Paroxysmal atrial fibrillation (HCC)    Pacemaker    Type 2 diabetes mellitus with hyperglycemia (HCC)    Osteoporosis    GERD (gastroesophageal reflux disease)    Anxiety    Iron deficiency    Interstitial lung disease (HCC)    Common bile duct dilatation    Abdominal pain    Dysphagia    Moderate protein-calorie malnutrition (HCC)    Constipation    Hyponatremia    S/P laparoscopic cholecystectomy    Post-nasal drip       Past Medical History:  Past Medical History:   Diagnosis Date    Diabetes mellitus ([address] )     Glaucoma     H/O degenerative disc disease  Hyperlipidemia     Hypertension     Idiopathic pulmonary fibrosis ([facility] [address] ) 2020    Irregular heart beat     afib    Peripheral neuropathy     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 ([facility] )        Past Surgical History:  Past Surgical History:   Procedure Laterality Date    CATARACT EXTRACTION, BILATERAL      CHOLECYSTECTOMY LAPAROSCOPIC N/A 2020    Procedure: CHOLECYSTECTOMY LAPAROSCOPIC;  Surgeon: Deedee Denny MD;  Location: [facility];  Service: General    COLONOSCOPY      CYST REMOVAL      left lower Quadrant    [address]    LIPOMA RESECTION      DE REPAIR ING HERNIA,5+Y/O,REDUCIBL Bilateral 2018    Procedure: INGUINAL HERNIA REPAIR;  Surgeon: Samella Najjar, MD;  Location: [facility];  Service: General    [facility]  [facility]   [address] carotid occlusion       Family History:  Family History   Problem Relation Age of Onset    Heart attack Father     Hiatal hernia Father     Diabetes Father     Heart disease Mother     Cancer Brother     Diabetes Brother     Heart disease Brother     Hypertension Brother        Social History:  Social History     Socioeconomic History    Marital status:      Spouse name: Not on file    Number of children: Not on file    Years of education: Not on file    Highest education level: Not on file   Occupational History    Occupation: customer service   retired   Social Needs    Financial resource strain: Not on file    Food insecurity     Worry: Not on file     Inability: Not on file   [employer] needs     Medical: Not on file     Non-medical: Not on file   Tobacco Use    Smoking status: Former Smoker     Packs/day: 1 50     Years: 38 00     Pack years: 57 00     Types: Cigarettes     Start date:      Quit date:      Years since quittin 1    Smokeless tobacco: Never Used   Substance and Sexual Activity    Alcohol use: No    Drug use: No    Sexual activity: Not on file   Lifestyle    Physical activity     Days per week: Not on file     Minutes per session: Not on file    Stress: Not on file   Relationships    Social connections     Talks on phone: Not on file     Gets together: Not on file     Attends Synagogue service: Not on file     Active member of club or organization: Not on file     Attends meetings of clubs or organizations: Not on file     Relationship status: Not on file    Intimate partner violence     Fear of current or ex partner: Not on file     Emotionally abused: Not on file     Physically abused: Not on file     Forced sexual activity: Not on file   Other Topics Concern    Not on file   Social History Narrative    Lives [place]    2 children       I have reviewed the patient's medical history in detail; there are no changes to the history as noted in the electronic medical record  Objective     Vitals:    03/04/21 1117   BP: 130/74   Pulse: 87   Resp: 16   Temp: 97 6 °F (36 4 °C)   SpO2: 98%   Weight: 56 7 kg (125 lb)   Height: 5' 7" (1 702 m)     Wt Readings from Last 3 Encounters:   03/04/21 56 7 kg (125 lb)   03/03/21 56 7 kg (125 lb)   01/22/21 57 5 kg (126 lb 12 8 oz)     Physical Exam  Vitals signs and nursing note reviewed  Constitutional:       General: She is not in acute distress  Appearance: Normal appearance  She is not ill-appearing, toxic-appearing or diaphoretic  HENT:      Head: Normocephalic and atraumatic  Right Ear: Tympanic membrane and ear canal normal       Left Ear: Tympanic membrane and ear canal normal    Eyes:      Conjunctiva/sclera: Conjunctivae normal    Neck:      Musculoskeletal: Normal range of motion and neck supple  Cardiovascular:      Rate and Rhythm: Normal rate and regular rhythm  Heart sounds: No murmur  Pulmonary:      Effort: Pulmonary effort is normal  No respiratory distress  Breath sounds: Normal breath sounds   No wheezing or rales    Musculoskeletal:      Right lower leg: No edema  Left lower leg: No edema  Skin:     General: Skin is warm and dry  Findings: No rash  Neurological:      Mental Status: She is alert and oriented to person, place, and time     Psychiatric:         Mood and Affect: Mood normal             ALLERGIES:  Allergies   Allergen Reactions    Keflex [Cephalexin] Diarrhea       Current Medications     Current Outpatient Medications   Medication Sig Dispense Refill    acetaminophen (TYLENOL) 500 mg tablet Take 1,000 mg by mouth as needed for mild pain      amoxicillin (AMOXIL) 500 mg capsule TAKE 4 CAPSULES 1 HOUR BEFORE DENTAL APPOINTMENT      aspirin (ECOTRIN LOW STRENGTH) 81 mg EC tablet Take 1 tablet (81 mg total) by mouth daily      azelastine (ASTELIN) 0 1 % nasal spray 1 spray into each nostril 2 (two) times a day Use in each nostril as directed (Patient taking differently: 1 spray into each nostril as needed Use in each nostril as directed) 1 Bottle 2    bimatoprost (LUMIGAN) 0 01 % ophthalmic drops Administer 1 drop to both eyes daily at bedtime for 30 days 1 5 mL 0    calcium carbonate (OS-DANIA) 600 MG tablet Take 600 mg by mouth 2 (two) times a day with meals      carboxymethylcellulose (Artificial Tears) 1 % ophthalmic solution 1 drop 3 (three) times a day      Eliquis 2 5 MG TAKE 1 TABLET (2 5 MG TOTAL) BY MOUTH 2 (TWO) TIMES A DAY 60 tablet 5    ezetimibe-simvastatin (VYTORIN) 10-40 mg per tablet TAKE 1 TABLET BY MOUTH DAILY AT BEDTIME 30 tablet 5    famotidine (PEPCID) 20 mg tablet Take 1 tablet (20 mg total) by mouth 2 (two) times a day 180 tablet 1    ferrous sulfate 325 (65 Fe) mg tablet Take 325 mg by mouth daily with breakfast      glucose blood (ONE TOUCH ULTRA TEST) test strip TEST FOUR TIMES A  each 1    Lancets (ONETOUCH DELICA PLUS [id number]) MISC Use one lancet to test blood 4 times a day 400 each 1    loratadine (CLARITIN) 10 mg tablet Take 10 mg by mouth daily as needed for allergies      LORazepam (ATIVAN) 0 5 mg tablet TAKE 2 TABLETS (1 MG TOTAL) BY MOUTH DAILY AT BEDTIME 60 tablet 0    metFORMIN (GLUCOPHAGE) 500 mg tablet Take 2 tablets (1,000 mg total) by mouth 2 (two) times a day with meals 360 tablet 3    metoprolol tartrate (LOPRESSOR) 50 mg tablet TAKE 1 5 TABLETS (75 MG TOTAL) BY MOUTH EVERY 12 (TWELVE) HOURS 90 tablet 5    Multiple Vitamin (multivitamin) tablet Take 1 tablet by mouth daily      QC Pen Needles 31G X 6 MM MISC USE 4 (FOUR) TIMES A DAY (BEFORE MEALS AND AT BEDTIME) 100 each 3    sitaGLIPtin (Januvia) 100 mg tablet Take 1 tablet (100 mg total) by mouth daily 30 tablet 5     No current facility-administered medications for this visit            Health Maintenance     Health Maintenance   Topic Date Due    DTaP,Tdap,and Td Vaccines (1 - Tdap) 12/01/1956   [facility] Medicare Annual Wellness Visit (AWV)  07/30/2021    DM Eye Exam  08/11/2021    HEMOGLOBIN A1C  09/04/2021    Diabetic Foot Exam  01/21/2022    Fall Risk  03/04/2022    Depression Screening PHQ  03/04/2022    BMI: Adult  03/04/2022    Colonoscopy Surveillance  09/02/2025    Hepatitis C Screening  Completed    Pneumococcal Vaccine: 65+ Years  Completed    Influenza Vaccine  Completed    COVID-19 Vaccine  Completed    HIB Vaccine  Aged Out    Hepatitis B Vaccine  Aged Out    IPV Vaccine  Aged Out    Hepatitis A Vaccine  Aged Out    Meningococcal ACWY Vaccine  Aged Out    HPV Vaccine  Aged Out     Immunization History   Administered Date(s) Administered    INFLUENZA 11/02/2016, 10/16/2017, 09/18/2018, 09/18/2018    Influenza, Quadrivalent (nasal) 11/02/2016    Influenza, high dose seasonal 0 7 mL 11/06/2019, 10/06/2020    Pneumococcal Conjugate 13-Valent 07/25/2019    Pneumococcal Polysaccharide PPV23 03/17/2003    SARS-CoV-2 / COVID-19 mRNA IM (Pfizer-BioNTech) 01/22/2021, 02/12/2021    Zoster 07/09/2010       ZANDER Hughes

## 2021-03-08 ENCOUNTER — CLINICAL SUPPORT (OUTPATIENT)
Dept: PULMONOLOGY | Facility: CLINIC | Age: 86
End: 2021-03-08
Payer: MEDICARE

## 2021-03-08 DIAGNOSIS — J84.9 INTERSTITIAL LUNG DISEASE (HCC): ICD-10-CM

## 2021-03-08 PROCEDURE — G0239 OTH RESP PROC, GROUP: HCPCS

## 2021-03-10 ENCOUNTER — CLINICAL SUPPORT (OUTPATIENT)
Dept: PULMONOLOGY | Facility: CLINIC | Age: 86
End: 2021-03-10
Payer: MEDICARE

## 2021-03-10 DIAGNOSIS — J84.9 INTERSTITIAL LUNG DISEASE (HCC): ICD-10-CM

## 2021-03-10 PROCEDURE — G0239 OTH RESP PROC, GROUP: HCPCS

## 2021-03-10 NOTE — PROGRESS NOTES
Pulmonary Rehabilitation Plan of Care   60 day reasessment      Today's date: 3/10/2021   Visits:  sessions  Patient name: Shayy Alejandro      : 1935  Age: 82 y o  MRN: [id number]  Referring Physician: Marisol Borrego MD   Pulmonologist: same  Provider: [facility]  Clinician: Juan Carlos Love, RRT    Dx:   Encounter Diagnosis   Name Primary?  Interstitial lung disease ([facility] [address] )      Date of onset: 10/2020      SUMMARY OF PROGRESS: Dilia Sanchez has completed 11 sessions of pulmonary rehab  She walks on the treadmill for 15 minutes at 1 3 mph  She exercises on the nustep for 15 minutes at level 2 and the UBE for 10 minutes at level 2  She does some light weight lifting with the 2 5 free weight  She feels the exercise is moderately hard and her sob  is slight  She exercises on room air with oxygen saturation levels > 94%  Medication compliance: Yes   Comments:   Fall Risk: Low   Comments:     Oxygen Needs: none needed    Oxygen Goal: Maintain SpO2>90% during exercise  Plan: Titrate supplemental oxygen as needed to maintain SpO2>90% with exercise  CAT: 7   Shortness of breath questionnaire: 56    Progressing:  Pt is progressing and showing improvement  toward the following goals:  inmproved strength and stamina        Readiness to change: Preparation:  (Getting ready to change)  and Action:  (Changing behavior)    EXERCISE ASSESSMENT and PLAN    Current Exercise Program in Rehab:       Frequency: days/week        Minutes:          METS:               SpO2:               RPD:                       HR:    RPE:          Modalities:       Exercise Progression 30 Day Goals :    Frequency: 3 days/week (one at home)    Minutes: 50 minutes         METS: 4-6              SpO2: > 90%              RPD: 4-6                      HR: 89-99   RPE: 4-6        Modalities: Treadmill, Airdyne bike, UBE, NuStep and Recumbent bike     Strength trainin-3 days / week  12-15 repetitions  1-2 sets per modality    Modalities: Lateral Raise, Arm Curl, Seated Row, Sit to [employer] Raises    Progressing:  Reviewed Pt goals and determined plan of care, Will continue to educate and progress as tolerated  Home Exercise: goal is 1-2 days per week    Goals: Reduced dyspnea with physical activity  0-1/10    Education: home exercise guidelines   Plan:education on home exercise guidelines  Readiness to change: Contemplation:  (Acknowledging that there is a problem but not yet ready or sure of wanting to make a change)      NUTRITION ASSESSMENT AND PLAN    Weight control:    Starting weight: 124   Current weight:     Waist circumference:    Starting:    Current:    Diabetes: patient will bring glucometer  Lipid management:   Goals:patient lost 40 lbs in last year  BMI 19 4  Education: target goal for A1c <7 0  Progressing:Reviewed Pt goals and determined plan of care  Plan: remove salt shaker from table and use salt substitute like Mrs   Dash, increase utilization of fresh or dried herbs  Readiness to change: Contemplation:  (Acknowledging that there is a problem but not yet ready or sure of wanting to make a change)      PSYCHOSOCIAL ASSESSMENT AND PLAN    Emotional:  Depression assessment:  PHQ-9 = 1-4 = Minimal Depression            Anxiety measure:  WILFREDO-7 = 0-4  = Not anxious  Self-reported stress level: 2   Social support: Patient has friends available for support when needed   Goals:  patient denies depression; declined [address]  Education: declined [employer]  Progressing:Reviewed Pt goals and determined plan of care  Plan: Return to previous social activity and when health retrictions removed  Readiness to change: Pre-Contemplation:   (Not yet acknowledging that there is a problem behavior that needs to change)      OTHER CORE COMPONENTS     Tobacco:   Social History     Tobacco Use   Smoking Status Former Smoker    Packs/day: 1 50    Years: 38 00    Pack years: 57 00    Types: Cigarettes    Start date: 1956    Quit date: Dalia Burton Years since quittin 2   Smokeless Tobacco Never Used       Tobacco Use Intervention: Referral to tobacco expert:   N/A: Pt has a remote history of smoking she quit > 20 years ago      Blood pressure:    Rest[place]/66   Exercise:     Goals:   Education:  low sodium diet and HTN  Progressing:  Plan: engage in regular exercise  Readiness to change: Contemplation:  (Acknowledging that there is a problem but not yet ready or sure of wanting to make a change)

## 2021-03-15 ENCOUNTER — CLINICAL SUPPORT (OUTPATIENT)
Dept: PULMONOLOGY | Facility: CLINIC | Age: 86
End: 2021-03-15
Payer: MEDICARE

## 2021-03-15 DIAGNOSIS — J84.9 INTERSTITIAL LUNG DISEASE (HCC): ICD-10-CM

## 2021-03-15 PROCEDURE — G0239 OTH RESP PROC, GROUP: HCPCS

## 2021-03-17 ENCOUNTER — APPOINTMENT (OUTPATIENT)
Dept: PULMONOLOGY | Facility: CLINIC | Age: 86
End: 2021-03-17
Payer: MEDICARE

## 2021-03-18 LAB
LEFT EYE DIABETIC RETINOPATHY: NORMAL
RIGHT EYE DIABETIC RETINOPATHY: NORMAL

## 2021-03-22 ENCOUNTER — CLINICAL SUPPORT (OUTPATIENT)
Dept: PULMONOLOGY | Facility: CLINIC | Age: 86
End: 2021-03-22
Payer: MEDICARE

## 2021-03-22 DIAGNOSIS — F41.9 ANXIETY: ICD-10-CM

## 2021-03-22 DIAGNOSIS — J84.9 INTERSTITIAL LUNG DISEASE (HCC): ICD-10-CM

## 2021-03-22 PROBLEM — E11.3592: Status: ACTIVE | Noted: 2021-03-22

## 2021-03-22 PROBLEM — E11.39 TYPE 2 DIABETES MELLITUS WITH OPHTHALMIC COMPLICATION, WITHOUT LONG-TERM CURRENT USE OF INSULIN (HCC): Status: ACTIVE | Noted: 2021-03-22

## 2021-03-22 PROCEDURE — G0239 OTH RESP PROC, GROUP: HCPCS

## 2021-03-22 RX ORDER — LORAZEPAM 0.5 MG/1
1 TABLET ORAL
Qty: 60 TABLET | Refills: 0 | Status: SHIPPED | OUTPATIENT
Start: 2021-03-22 | End: 2021-04-22

## 2021-03-22 NOTE — PROGRESS NOTES
Cardiology Follow Up    Josue Phoenix  1935  [id number]  [facility] ARIEL Mckeon [address] [facility]  [phone]    1  Essential (primary) hypertension  POCT ECG   2  Pure hypercholesterolemia  POCT ECG   3  Paroxysmal atrial fibrillation (HCC)  POCT ECG   4  Pacemaker         Interval History:  Patient is here for a follow-up visit  Tari Osullivan has a DDD PPM with ongoing interrogation  Maurilio Parham most recent check was in 1/2021 and at that time her device was found to be functioning well  There was no evidence of PAF and  NSVT   She had a pharmacologic nuclear stress test done October 2016 which showed no evidence of prognostically important ischemia   An echocardiogram done 8/19 demonstrated preserved LV systolic function  and no significant valvular heart disease   Patient had a carotid Doppler done in July 2018 which demonstrated a chronic occlusion of the BEBA with a 50-69% stenosis his noted in the LIC   There was no significant change compared to a prior study done October 2016  Patient has right subclavian stenosis as well  Patient follows with vascular in reference to this and a follow-up study is been arranged    Patient while in [place] over the winter time 2019, had an episode where she fractured her right shoulder   She apparently had issues with Afib and was placed on anticoagulation   Aspirin was discontinued  She had a [place] in [place] although I do not have formal records of this  Arya Betancur is reference to one being done which showed no ischemia with an ejection fraction of 62%   Lipid profile done January 2021 demonstrated total cholesterol of 129 with an HDL of 69 and a calculated LDL of 41  Patient has had no chest pain  Her vital signs are stable today    EKG today demonstrates sinus rhythm with nonspecific ST segment changes with no significant change compared to a prior tracing done July 31, 2020      Patient Active Problem List   Diagnosis    HTN (hypertension)    HLD (hyperlipidemia)    Glaucoma    Asymptomatic carotid artery stenosis, left    Symptomatic PVCs    Occlusion of right carotid artery    Stenosis of right subclavian artery (HCC)    Paroxysmal atrial fibrillation (HCC)    Pacemaker    Type 2 diabetes mellitus with hyperglycemia (HCC)    Osteoporosis    GERD (gastroesophageal reflux disease)    Anxiety    Iron deficiency    Interstitial lung disease (HCC)    Common bile duct dilatation    Abdominal pain    Dysphagia    Moderate protein-calorie malnutrition (HCC)    Constipation    Hyponatremia    S/P laparoscopic cholecystectomy    Post-nasal drip    Type 2 diabetes mellitus with ophthalmic complication, without long-term current use of insulin (HCC)    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, without long-term current use of insulin (HCC)     Past Medical History:   Diagnosis Date    Diabetes mellitus ([facility] )     Glaucoma     H/O degenerative disc disease     Hyperlipidemia     Hypertension     Idiopathic pulmonary fibrosis ([facility] ) 11/2020    Irregular heart beat     afib    Peripheral neuropathy     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 ([facility] )      Social History     Socioeconomic History    Marital status:      Spouse name: Not on file    Number of children: Not on file    Years of education: Not on file    Highest education level: Not on file   Occupational History    Occupation: customer service   retired   Social Needs    Financial resource strain: Not on file    Food insecurity     Worry: Not on file     Inability: Not on file   [employer] needs     Medical: Not on file     Non-medical: Not on file   Tobacco Use    Smoking status: Former Smoker     Packs/day: 1 50     Years: 38 00     Pack years: 57 00     Types: Cigarettes     Start date: 1956     Quit date: 12     Years since quittin 2    Smokeless tobacco: Never Used   Substance and Sexual Activity    Alcohol use: No    Drug use: No    Sexual activity: Not on file   Lifestyle    Physical activity     Days per week: Not on file     Minutes per session: Not on file    Stress: Not on file   Relationships    Social connections     Talks on phone: Not on file     Gets together: Not on file     Attends Restorationism service: Not on file     Active member of club or organization: Not on file     Attends meetings of clubs or organizations: Not on file     Relationship status: Not on file    Intimate partner violence     Fear of current or ex partner: Not on file     Emotionally abused: Not on file     Physically abused: Not on file     Forced sexual activity: Not on file   Other Topics Concern    Not on file   Social History Narrative    Lives alone [place]    2 children      Family History   Problem Relation Age of Onset    Heart attack Father     Hiatal hernia Father     Diabetes Father     Heart disease Mother     Cancer Brother     Diabetes Brother     Heart disease Brother     Hypertension Brother      Past Surgical History:   Procedure Laterality Date    CATARACT EXTRACTION, BILATERAL      CHOLECYSTECTOMY LAPAROSCOPIC N/A 2020    Procedure: CHOLECYSTECTOMY LAPAROSCOPIC;  Surgeon: Estela Gallagher MD;  Location: [facility];  Service: General    COLONOSCOPY      CYST REMOVAL      left lower Quadrant    [address] RESECTION      IA REPAIR ING HERNIA,5+Y/O,REDUCIBL Bilateral 2018    Procedure: INGUINAL HERNIA REPAIR;  Surgeon: Danii Merida MD;  Location: [facility];  Service: General    SHOULDER SURGERY  2019    Dr Manzanares [facility]   [id number] [facility] carotid occlusion       Current Outpatient Medications:     acetaminophen (TYLENOL) 500 mg tablet, Take 1,000 mg by mouth as needed for mild pain, Disp: , Rfl:    amoxicillin (AMOXIL) 500 mg capsule, TAKE 4 CAPSULES 1 HOUR BEFORE DENTAL APPOINTMENT, Disp: , Rfl:     aspirin (ECOTRIN LOW STRENGTH) 81 mg EC tablet, Take 1 tablet (81 mg total) by mouth daily, Disp:  , Rfl:     azelastine (ASTELIN) 0 1 % nasal spray, 1 spray into each nostril 2 (two) times a day Use in each nostril as directed (Patient taking differently: 1 spray into each nostril as needed Use in each nostril as directed), Disp: 1 Bottle, Rfl: 2    bimatoprost (LUMIGAN) 0 01 % ophthalmic drops, Administer 1 drop to both eyes daily at bedtime for 30 days, Disp: 1 5 mL, Rfl: 0    calcium carbonate (OS-DANIA) 600 MG tablet, Take 600 mg by mouth 2 (two) times a day with meals, Disp: , Rfl:     carboxymethylcellulose (Artificial Tears) 1 % ophthalmic solution, 1 drop 3 (three) times a day, Disp: , Rfl:     Eliquis 2 5 MG, TAKE 1 TABLET (2 5 MG TOTAL) BY MOUTH 2 (TWO) TIMES A DAY, Disp: 60 tablet, Rfl: 5    ezetimibe-simvastatin (VYTORIN) 10-40 mg per tablet, TAKE 1 TABLET BY MOUTH DAILY AT BEDTIME, Disp: 30 tablet, Rfl: 5    famotidine (PEPCID) 20 mg tablet, Take 1 tablet (20 mg total) by mouth 2 (two) times a day, Disp: 180 tablet, Rfl: 1    ferrous sulfate 325 (65 Fe) mg tablet, Take 325 mg by mouth daily with breakfast, Disp: , Rfl:     glucose blood (ONE TOUCH ULTRA TEST) test strip, TEST FOUR TIMES A DAY, Disp: 400 each, Rfl: 1    Lancets (ONETOUCH DELICA PLUS [id number]) MISC, Use one lancet to test blood 4 times a day, Disp: 400 each, Rfl: 1    loratadine (CLARITIN) 10 mg tablet, Take 10 mg by mouth daily as needed for allergies, Disp: , Rfl:     LORazepam (ATIVAN) 0 5 mg tablet, TAKE 2 TABLETS (1 MG TOTAL) BY MOUTH DAILY AT BEDTIME, Disp: 60 tablet, Rfl: 0    metFORMIN (GLUCOPHAGE) 500 mg tablet, Take 2 tablets (1,000 mg total) by mouth 2 (two) times a day with meals, Disp: 360 tablet, Rfl: 3    metoprolol tartrate (LOPRESSOR) 50 mg tablet, TAKE 1 5 TABLETS (75 MG TOTAL) BY MOUTH EVERY 12 (TWELVE) HOURS, Disp: 90 tablet, Rfl: 5    Multiple Vitamin (multivitamin) tablet, Take 1 tablet by mouth daily, Disp: , Rfl:     QC Pen Needles 31G X 6 MM MISC, USE 4 (FOUR) TIMES A DAY (BEFORE MEALS AND AT BEDTIME), Disp: 100 each, Rfl: 3    sitaGLIPtin (Januvia) 100 mg tablet, Take 1 tablet (100 mg total) by mouth daily, Disp: 30 tablet, Rfl: 5  Allergies   Allergen Reactions    Keflex [Cephalexin] Diarrhea       Labs:not applicable  Imaging: Vas Carotid Complete Study    Result Date: 3/4/2021  Narrative:  [facility] REPORT CLINICAL: Indications: 6 month surveillance of carotid artery disease  Patient is asymptomatic at this time  Operative History: 1994-01-01 Right Carotid agram Risk Factors The patient has history of HTN, Diabetes, HLD, ELENA, AFIB, and previous smoking (quit >10yrs ago)  Clinical Right Pressure: 130/78 mm Hg, Left Pressure: 134/78 mm Hg  FINDINGS:  Right        Impression  PSV  EDV (cm/s)  Direction of Flow  Ratio  Dist  ICA    Occluded      0                                  0 00  Mid  ICA     Occluded      0                                  0 00  Prox  ICA    Occluded      0                                  0 00  Dist CCA                  17           0                            Mid CCA                   28           0                      0 61  Prox CCA                  47           0                            Ext Carotid               41           0                      1 45  Prox Vert                 50          12  Antegrade                 Subclavian               157           0                             Left         Impression  PSV  EDV (cm/s)  Direction of Flow  Ratio  Dist  ICA                110          29                      2 18  Mid  ICA                  89          18                      1 77  Prox   ICA    50 - 69%    135          31                      2 66  Dist CCA                  53          14                            Mid CCA                   51          14 1 02  Prox CCA                  50           9                            Ext Carotid              152           0                      3 00  Prox Vert                 45          12  Antegrade                 Subclavian                99           0                               CONCLUSION:  Impression  RIGHT: Known occlusion of the internal carotid artery  Vertebral artery flow is antegrade  There is no significant subclavian artery disease  LEFT: There is 50-69% stenosis noted in the internal carotid artery  Plaque is heterogenous and irregular  Vertebral artery flow is antegrade  There is no significant subclavian artery disease  In comparison to the study on 8/21/2020, there is no significant change in the disease process, unable to reproduce previous 50-75% stenosis in the right subclavian artery  SIGNATURE: Electronically Signed by: Dillon Elizabeth MD on 2021-03-04 04:22:48 PM      Review of Systems:  Review of Systems   All other systems reviewed and are negative  Physical Exam:  /60 (BP Location: Left arm, Patient Position: Sitting, Cuff Size: Standard)   Pulse 81   Ht 5' 7" (1 702 m)   Wt 56 1 kg (123 lb 9 6 oz)   BMI 19 36 kg/m²   Physical Exam  Vitals signs reviewed  Constitutional:       Appearance: She is well-developed  HENT:      Head: Normocephalic and atraumatic  Eyes:      Conjunctiva/sclera: Conjunctivae normal       Pupils: Pupils are equal, round, and reactive to light  Neck:      Musculoskeletal: Normal range of motion and neck supple  Cardiovascular:      Rate and Rhythm: Normal rate  Heart sounds: Normal heart sounds  Pulmonary:      Effort: Pulmonary effort is normal       Breath sounds: Normal breath sounds  Skin:     General: Skin is warm and dry  Neurological:      Mental Status: She is alert and oriented to person, place, and time  Discussion/Summary:I will continue the patient's present medical regimen    The patient appears well compensated  I have asked the patient to call if there is a problem in the interim otherwise I will see the patient in six months time

## 2021-03-24 ENCOUNTER — APPOINTMENT (OUTPATIENT)
Dept: PULMONOLOGY | Facility: CLINIC | Age: 86
End: 2021-03-24
Payer: MEDICARE

## 2021-03-29 ENCOUNTER — CLINICAL SUPPORT (OUTPATIENT)
Dept: PULMONOLOGY | Facility: CLINIC | Age: 86
End: 2021-03-29
Payer: MEDICARE

## 2021-03-29 DIAGNOSIS — J84.9 INTERSTITIAL LUNG DISEASE (HCC): ICD-10-CM

## 2021-03-29 PROCEDURE — G0239 OTH RESP PROC, GROUP: HCPCS

## 2021-03-31 ENCOUNTER — CLINICAL SUPPORT (OUTPATIENT)
Dept: PULMONOLOGY | Facility: CLINIC | Age: 86
End: 2021-03-31
Payer: MEDICARE

## 2021-03-31 DIAGNOSIS — J84.9 INTERSTITIAL LUNG DISEASE (HCC): ICD-10-CM

## 2021-03-31 PROCEDURE — G0239 OTH RESP PROC, GROUP: HCPCS

## 2021-04-01 ENCOUNTER — OFFICE VISIT (OUTPATIENT)
Dept: CARDIOLOGY CLINIC | Facility: CLINIC | Age: 86
End: 2021-04-01
Payer: MEDICARE

## 2021-04-01 ENCOUNTER — IN-CLINIC DEVICE VISIT (OUTPATIENT)
Dept: CARDIOLOGY CLINIC | Facility: CLINIC | Age: 86
End: 2021-04-01
Payer: MEDICARE

## 2021-04-01 VITALS
HEIGHT: 67 IN | DIASTOLIC BLOOD PRESSURE: 60 MMHG | BODY MASS INDEX: 19.4 KG/M2 | WEIGHT: 123.6 LBS | SYSTOLIC BLOOD PRESSURE: 118 MMHG | HEART RATE: 81 BPM

## 2021-04-01 DIAGNOSIS — Z95.0 PACEMAKER: Primary | ICD-10-CM

## 2021-04-01 DIAGNOSIS — E78.00 PURE HYPERCHOLESTEROLEMIA: ICD-10-CM

## 2021-04-01 DIAGNOSIS — I48.0 PAROXYSMAL ATRIAL FIBRILLATION (HCC): ICD-10-CM

## 2021-04-01 DIAGNOSIS — I10 ESSENTIAL (PRIMARY) HYPERTENSION: Primary | ICD-10-CM

## 2021-04-01 DIAGNOSIS — Z95.0 PACEMAKER: ICD-10-CM

## 2021-04-01 PROCEDURE — 99214 OFFICE O/P EST MOD 30 MIN: CPT | Performed by: INTERNAL MEDICINE

## 2021-04-01 PROCEDURE — 93280 PM DEVICE PROGR EVAL DUAL: CPT | Performed by: INTERNAL MEDICINE

## 2021-04-01 PROCEDURE — 93000 ELECTROCARDIOGRAM COMPLETE: CPT | Performed by: INTERNAL MEDICINE

## 2021-04-01 NOTE — PROGRESS NOTES
Results for orders placed or performed in visit on 04/01/21   Cardiac EP device report    Narrative    MDT-DUAL CHAMBER PPM (AAIR-DDDR MODE)/ ACTIVE SYSTEM IS MRI CONDITIONAL  DEVICE INTERROGATED IN THE [place] OFFICE/[facility]  BATTERY ADEQUATE (4-5 5 YRS)  AP 90%;  0%  ALL LEAD PARAMETERS WITHIN NORMAL LIMITS  62 NEW AF EPISODES LISTED (UP TO 6:01 MNTS/DR  [address])  NO NEW VHR EPISODES NOTED  PT  TAKES ASA 81 & METOPROLOL TART  NO PROGRAMMING CHANGES MADE TO DEVICE PARAMETERS  NORMAL DEVICE FUNCTION   PL   **PT  DOES TAKE ELIQUIS**

## 2021-04-03 DIAGNOSIS — E78.5 HYPERLIPIDEMIA, UNSPECIFIED HYPERLIPIDEMIA TYPE: ICD-10-CM

## 2021-04-04 RX ORDER — EZETIMIBE AND SIMVASTATIN 10; 40 MG/1; MG/1
1 TABLET ORAL
Qty: 30 TABLET | Refills: 5 | Status: SHIPPED | OUTPATIENT
Start: 2021-04-04 | End: 2021-09-20

## 2021-04-05 ENCOUNTER — APPOINTMENT (OUTPATIENT)
Dept: PULMONOLOGY | Facility: CLINIC | Age: 86
End: 2021-04-05
Payer: MEDICARE

## 2021-04-12 ENCOUNTER — CLINICAL SUPPORT (OUTPATIENT)
Dept: PULMONOLOGY | Facility: CLINIC | Age: 86
End: 2021-04-12
Payer: MEDICARE

## 2021-04-12 ENCOUNTER — CLINICAL SUPPORT (OUTPATIENT)
Dept: NUTRITION | Facility: HOSPITAL | Age: 86
End: 2021-04-12
Payer: MEDICARE

## 2021-04-12 VITALS — BODY MASS INDEX: 19.45 KG/M2 | WEIGHT: 124.2 LBS

## 2021-04-12 DIAGNOSIS — E11.65 TYPE 2 DIABETES MELLITUS WITH HYPERGLYCEMIA, UNSPECIFIED WHETHER LONG TERM INSULIN USE (HCC): ICD-10-CM

## 2021-04-12 DIAGNOSIS — E44.0 MODERATE PROTEIN-CALORIE MALNUTRITION (HCC): ICD-10-CM

## 2021-04-12 DIAGNOSIS — J84.9 INTERSTITIAL LUNG DISEASE (HCC): ICD-10-CM

## 2021-04-12 PROCEDURE — 97802 MEDICAL NUTRITION INDIV IN: CPT

## 2021-04-12 PROCEDURE — G0239 OTH RESP PROC, GROUP: HCPCS

## 2021-04-12 NOTE — PROGRESS NOTES
Initial Nutrition Assessment Form    Patient Name: Zac Velázquez    YOB: 1935    Sex: Female     Assessment Date: 4/12/2021  Start Time: 1255 155 Total Minutes: 61     Data:  Present at session: self   Parent/Patient Concerns: "I need to gain weight and watch my sugar"   Medical Dx/Reason for Referral: DM   Past Medical History:   Diagnosis Date    [facility] mellitus [facility] )     Glaucoma     H/O degenerative disc disease     Hyperlipidemia     Hypertension     [facility] ) 11/2020    Irregular heart beat     afib    Peripheral neuropathy     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)        Current Outpatient Medications   Medication Sig Dispense Refill    acetaminophen (TYLENOL) 500 mg tablet Take 1,000 mg by mouth as needed for mild pain      amoxicillin (AMOXIL) 500 mg capsule TAKE 4 CAPSULES 1 HOUR BEFORE DENTAL APPOINTMENT      aspirin (ECOTRIN LOW STRENGTH) 81 mg EC tablet Take 1 tablet (81 mg total) by mouth daily      azelastine (ASTELIN) 0 1 % nasal spray 1 spray into each nostril 2 (two) times a day Use in each nostril as directed (Patient taking differently: 1 spray into each nostril as needed Use in each nostril as directed) 1 Bottle 2    bimatoprost (LUMIGAN) 0 01 % ophthalmic drops Administer 1 drop to both eyes daily at bedtime for 30 days 1 5 mL 0    calcium carbonate (OS-DANIA) 600 MG tablet Take 600 mg by mouth 2 (two) times a day with meals      carboxymethylcellulose (Artificial Tears) 1 % ophthalmic solution 1 drop 3 (three) times a day      Eliquis 2 5 MG TAKE 1 TABLET (2 5 MG TOTAL) BY MOUTH 2 (TWO) TIMES A DAY 60 tablet 5    ezetimibe-simvastatin (VYTORIN) 10-40 mg per tablet TAKE 1 TABLET BY MOUTH DAILY AT BEDTIME 30 tablet 5    famotidine (PEPCID) 20 mg tablet Take 1 tablet (20 mg total) by mouth 2 (two) times a day 180 tablet 1    ferrous sulfate 325 (65 Fe) mg tablet Take 325 mg by mouth daily with breakfast      glucose blood (ONE TOUCH ULTRA TEST) test strip TEST FOUR TIMES A  each 1    Lancets (ONETOUCH DELICA PLUS [id number]) MISC Use one lancet to test blood 4 times a day 400 each 1    loratadine (CLARITIN) 10 mg tablet Take 10 mg by mouth daily as needed for allergies      LORazepam (ATIVAN) 0 5 mg tablet TAKE 2 TABLETS (1 MG TOTAL) BY MOUTH DAILY AT BEDTIME 60 tablet 0    metFORMIN (GLUCOPHAGE) 500 mg tablet Take 2 tablets (1,000 mg total) by mouth 2 (two) times a day with meals 360 tablet 3    metoprolol tartrate (LOPRESSOR) 50 mg tablet TAKE 1 5 TABLETS (75 MG TOTAL) BY MOUTH EVERY 12 (TWELVE) HOURS 90 tablet 5    Multiple Vitamin (multivitamin) tablet Take 1 tablet by mouth daily      QC Pen Needles 31G X 6 MM MISC USE 4 (FOUR) TIMES A DAY (BEFORE MEALS AND AT BEDTIME) 100 each 3    sitaGLIPtin (Januvia) 100 mg tablet Take 1 tablet (100 mg total) by mouth daily 30 tablet 5     No current facility-administered medications for this visit  Additional Meds/Supplements: n/a   Special Learning Needs: n/a   Height: HC Readings from Last 3 Encounters:   No data found for [facility]       Weight: Wt Readings from Last 10 Encounters:   04/12/21 56 3 kg (124 lb 3 2 oz)   04/01/21 56 1 kg (123 lb 9 6 oz)   03/04/21 56 7 kg (125 lb)   03/03/21 56 7 kg (125 lb)   01/22/21 57 5 kg (126 lb 12 8 oz)   01/08/21 56 2 kg (124 lb)   01/06/21 58 1 kg (128 lb)   12/22/20 60 kg (132 lb 3 2 oz)   12/21/20 60 6 kg (133 lb 9 6 oz)   12/07/20 57 6 kg (127 lb)     Estimated body mass index is 19 45 kg/m² as calculated from the following:    Height as of 4/1/21: 5' 7" (1 702 m)  Weight as of this encounter: 56 3 kg (124 lb 3 2 oz)     Recent Weight Change: [x]Yes     []No  Amount: Gradually losing weight, indesirable      Energy Needs: 1680kcals (30kcals/kg) -1960 (35kcals/kg) to allow for 1#/wk wt gain   Allergies   Allergen Reactions    Keflex [Cephalexin] Diarrhea    NKFA   Social History     Substance and Sexual Activity Alcohol Use No    n/a   Social History     Tobacco Use   Smoking Status Former Smoker    Packs/day: 1 50    Years: 38 00    Pack years: 57 00    Types: Cigarettes    Start date:     Quit date: 12    Years since quittin 2   Smokeless Tobacco Never Used    n/a   Who shops? patient   Who cooks? patient   Exercise: n/a   Prior Counseling? []Yes     [x]No  When:      Why:         Diet Hx: coffee in the morning, glucerna 160 cals; water or milk with lunch -whole milk -water for dinner    Breakfast:  bread 1 5oz cheese (swiss) 2 TBSP PB    730-8  a m  Lunch: turkey x3oz s/w mayonnaise   12-1  p m  Dinner:  Bread 2 slices chicken x3 oz broccoli x 1c ; 2 c homemade chili; 2 c ham/bean soup   5  p m  Snacks: tortilla (medium 110 cals and 26gms carbs) with cheese and/or glucerna AM - n/a  PM - rare  HS - 8-10-11        Nutrition Diagnosis:   Altered Nutrition-Related Laboratory values  related to Kidney, liver, cardiac, endocrine, neurologic, and/or pulmonary dysfunction as evidenced by  Abnormal plasma glucose and/or HgbA1c levels       Medical Nutrition Therapy Intervention:  [x]Individualized Meal Plan-1600-2000kcals daily []Understanding Lab Values   []Basic Pathophysiology of Disease []Food/Medication Interactions   []Food Diary []Exercise   []Lifestyle/Behavior Modification Techniques []Medication, Mechanism of Action   []Label Reading [x]Self Blood Glucose Monitoring-was running hihg last A1C in 7's which was good for her; does check Bg at home, runs in the 130's this morning it was 167   [x]Weight/BMI Goals- lost weight over the last 2 years not intentional  [x]Other - more focused on gaining weight vs watching BG   Other Notes:  Getting 7 hours sleep   But does nap daily an hour or 2; in bed at 11; waking up at 7 Is having a trouble with swallowing now       Comprehension: []Excellent  []Very Good  [x]Good  []Fair   []Poor    Receptivity: []Excellent  []Very Good  [x]Good  []Fair []Poor    Expected Compliance: []Excellent  []Very Good  [x]Good  []Fair   []Poor        Goals:  1  8 oz milk -whole with lunch and dinner   2  3 pieces of fruit daily   3  1/4 nuts daily       No follow-ups on file    Labs:  CMP  Lab Results   Component Value Date    K 4 4 03/04/2021     03/04/2021    CO2 31 03/04/2021    BUN 10 03/04/2021    CREATININE 0 43 (L) 03/04/2021    GLUF 172 (H) 03/04/2021    CALCIUM 8 8 03/04/2021    CORRECTEDCA 9 6 12/14/2020    AST 15 03/04/2021    ALT 24 03/04/2021    ALKPHOS 94 03/04/2021    EGFR 94 03/04/2021       BMP  Lab Results   Component Value Date    CALCIUM 8 8 03/04/2021    K 4 4 03/04/2021    CO2 31 03/04/2021     03/04/2021    BUN 10 03/04/2021    CREATININE 0 43 (L) 03/04/2021       Lipids  No results found for: CHOL  Lab Results   Component Value Date    HDL 69 01/19/2021    HDL 57 09/29/2020    HDL 62 10/21/2019     Lab Results   Component Value Date    LDLCALC 41 01/19/2021    LDLCALC 65 09/29/2020    LDLCALC 45 10/21/2019     Lab Results   Component Value Date    TRIG 95 01/19/2021    TRIG 84 09/29/2020    TRIG 74 10/21/2019     No results found for: CHOLHDL    Hemoglobin A1C  Lab Results   Component Value Date    HGBA1C 7 3 (A) 03/04/2021       Fasting Glucose  Lab Results   Component Value Date    GLUF 172 (H) 03/04/2021       Insulin     Thyroid  No results found for: TSH, E2DZNIR, U3DWLVH, THYROIDAB    Hepatic Function Panel  Lab Results   Component Value Date    ALT 24 03/04/2021    AST 15 03/04/2021    ALKPHOS 94 03/04/2021       Celiac Disease Antibody Panel  No results found for: ENDOMYSIAL IGA, GLIADIN IGA, GLIADIN IGG, IGA, TISSUE TRANSGLUT AB, TTG IGA   Iron  Lab Results   Component Value Date    IRON 66 11/01/2016    TIBC 278 11/01/2016    FERRITIN 88 11/01/2016       Vitamins  No results found for: VITAMIN B2   No results found for: NICOTINAMIDE, NICOTINIC ACID   No results found for: VITAMINB6  No results found for: [id number]  No results found for: VITB5  No results found for: A8IRINBS  No results found for: THYROGLB  No results found for: VITAMIN K   No results found for: 25-HYDROXY VIT D   No components found for: Laura Almanza MS RD LDN  [place] [id number] [facility]

## 2021-04-16 NOTE — PROGRESS NOTES
Pulmonary Rehabilitation Plan of Care   discharge      Today's date: 2021   Visits:  16 sessions  Patient name: Oniel Cat      : 1935  Age: 82 y o  MRN: [id number]  Referring Physician: Nuzhat Sequeira MD   Pulmonologist: lior  Provider: Wallace Hendrickson  Clinician: Henry Barrios, RRT    Dx:   Encounter Diagnosis   Name Primary?  Inter[facility] )      Date of onset: 10/2020      SUMMARY OF PROGRESS: Avani Moralez has completed 16 sessions of pulmonary rehab  She is not continung with the program   She no longer has transportation  She walked on the treadmill for 15 minutes at 1 4 mph  She exercises on the nustep for 15 minutes at level 2 and the UBE for 10 minutes at level 2  She does some light weight lifting with the 2 5 free weight  She feels the exercise is moderately hard and her sob  is slight  She exercises on room air with oxygen saturation levels > 94%  She did repeat her 6 MWT and was able to walk 795 ft on room air with oxygen levels > 98%  She c/o hip pain with the walk  She did not complete the patient questionnaires at discharge  Medication compliance: Yes   Comments:   Fall Risk: Low   Comments:     Oxygen Needs: none needed    Oxygen Goal: Maintain SpO2>90% during exercise  Plan: Titrate supplemental oxygen as needed to maintain SpO2>90% with exercise  CAT: 7   Shortness of breath questionnaire: 56    Progressing:  Pt is progressing and showing improvement  toward the following goals:  inmproved strength and stamina        Readiness to change: Preparation:  (Getting ready to change)  and Action:  (Changing behavior)    EXERCISE ASSESSMENT and PLAN    Current Exercise Program in Rehab:       Frequency: days/week        Minutes:          METS:               SpO2:               RPD:                       HR:    RPE:          Modalities:       Exercise Progression 30 Day Goals :    Frequency: 3 days/week (one at home)    Minutes: 50 minutes         METS: 4-6 SpO2: > 90%              RPD: 4-6                      HR: 89-99   RPE: 4-6        Modalities: Treadmill, Airdyne bike, UBE, NuStep and Recumbent bike     Strength trainin-3 days / week  12-15 repetitions  1-2 sets per modality    Modalities: Lateral Raise, Arm Curl, Seated Row, Sit to [employer] Raises    Progressing:  Reviewed Pt goals and determined plan of care, Will continue to educate and progress as tolerated  Home Exercise: goal is 1-2 days per week    Goals: Reduced dyspnea with physical activity  0-1/10    Education: home exercise guidelines   Plan:education on home exercise guidelines  Readiness to change: Contemplation:  (Acknowledging that there is a problem but not yet ready or sure of wanting to make a change)      NUTRITION ASSESSMENT AND PLAN    Weight control:    Starting weight: 124   Current weight:     Waist circumference:    Starting:    Current:    Diabetes: patient will bring glucometer  Lipid management:   Goals:patient lost 40 lbs in last year  BMI 19 4  Education: target goal for A1c <7 0  Progressing:Reviewed Pt goals and determined plan of care  Plan: remove salt shaker from table and use salt substitute like Mrs   Dash, increase utilization of fresh or dried herbs  Readiness to change: Contemplation:  (Acknowledging that there is a problem but not yet ready or sure of wanting to make a change)      PSYCHOSOCIAL ASSESSMENT AND PLAN    Emotional:  Depression assessment:  PHQ-9 = 1-4 = Minimal Depression            Anxiety measure:  WILFREDO-7 = 0-4  = Not anxious  Self-reported stress level: 2   Social support: Patient has friends available for support when needed   Goals:  patient denies depression; declined [address]  Education: declined [place] cloud  Progressing:Reviewed Pt goals and determined plan of care  Plan: Return to previous social activity and when health retrictions removed  Readiness to change: Pre-Contemplation:   (Not yet acknowledging that there is a problem behavior that needs to change)      OTHER CORE COMPONENTS     Tobacco:   Social History     Tobacco Use   Smoking Status Former Smoker    Packs/day: 1 50    Years: 38 00    Pack years: 57 00    Types: Cigarettes    Start date: 28    Quit date: 12    Years since quittin 3   Smokeless Tobacco Never Used       Tobacco Use Intervention: Referral to tobacco expert:   N/A: Pt has a remote history of smoking she quit > 20 years ago      Blood pressure:    Restin/66   Exercise:     Goals:   Education:  low sodium diet and HTN  Progressing:  Plan: engage in regular exercise  Readiness to change: Contemplation:  (Acknowledging that there is a problem but not yet ready or sure of wanting to make a change)

## 2021-04-19 ENCOUNTER — HOSPITAL ENCOUNTER (OUTPATIENT)
Dept: PULMONOLOGY | Facility: HOSPITAL | Age: 86
Discharge: HOME | End: 2021-04-19
Attending: INTERNAL MEDICINE
Payer: MEDICARE

## 2021-04-19 DIAGNOSIS — J84.9 INTERSTITIAL LUNG DISEASE (HCC): ICD-10-CM

## 2021-04-19 DIAGNOSIS — J84.10 PULMONARY FIBROSIS (HCC): ICD-10-CM

## 2021-04-19 PROCEDURE — 94010 BREATHING CAPACITY TEST: CPT

## 2021-04-19 PROCEDURE — 94618 PULMONARY STRESS TESTING: CPT | Performed by: INTERNAL MEDICINE

## 2021-04-19 PROCEDURE — 94726 PLETHYSMOGRAPHY LUNG VOLUMES: CPT

## 2021-04-19 PROCEDURE — 94761 N-INVAS EAR/PLS OXIMETRY MLT: CPT

## 2021-04-19 PROCEDURE — 94729 DIFFUSING CAPACITY: CPT

## 2021-04-19 PROCEDURE — 94726 PLETHYSMOGRAPHY LUNG VOLUMES: CPT | Performed by: INTERNAL MEDICINE

## 2021-04-19 PROCEDURE — 94729 DIFFUSING CAPACITY: CPT | Performed by: INTERNAL MEDICINE

## 2021-04-19 PROCEDURE — 94010 BREATHING CAPACITY TEST: CPT | Performed by: INTERNAL MEDICINE

## 2021-04-20 ENCOUNTER — HOSPITAL ENCOUNTER (OUTPATIENT)
Dept: NON INVASIVE DIAGNOSTICS | Facility: CLINIC | Age: 86
Discharge: HOME | End: 2021-04-20
Payer: MEDICARE

## 2021-04-20 DIAGNOSIS — J84.9 INTERSTITIAL LUNG DISEASE (HCC): ICD-10-CM

## 2021-04-20 PROCEDURE — 93306 TTE W/DOPPLER COMPLETE: CPT

## 2021-04-20 PROCEDURE — 93306 TTE W/DOPPLER COMPLETE: CPT | Performed by: INTERNAL MEDICINE

## 2021-04-21 DIAGNOSIS — J84.9 INTERSTITIAL LUNG DISEASE (HCC): Primary | ICD-10-CM

## 2021-04-21 DIAGNOSIS — F41.9 ANXIETY: ICD-10-CM

## 2021-04-21 NOTE — RESULT ENCOUNTER NOTE
Reviewed results of PFTs with patient, indicating stable mild restriction with severe diffusion defect  Patient indicates that she has had no worsening in her symptoms/ shortness of breath  Her echocardiogram shows normal EF, grade 1 diastolic dysfunction, but no evidence of significant pulmonary hypertension  Given the stability of her disease, will plan on repeat spirometry with diffusion in 6 months with follow-up in the office    Patient voiced understanding and appreciation, all questions answered

## 2021-04-22 RX ORDER — LORAZEPAM 0.5 MG/1
1 TABLET ORAL
Qty: 60 TABLET | Refills: 0 | Status: SHIPPED | OUTPATIENT
Start: 2021-04-22 | End: 2021-05-22

## 2021-05-07 ENCOUNTER — OFFICE VISIT (OUTPATIENT)
Dept: FAMILY MEDICINE CLINIC | Facility: CLINIC | Age: 86
End: 2021-05-07
Payer: MEDICARE

## 2021-05-07 DIAGNOSIS — B02.9 HERPES ZOSTER WITHOUT COMPLICATION: Primary | ICD-10-CM

## 2021-05-07 PROCEDURE — 99213 OFFICE O/P EST LOW 20 MIN: CPT | Performed by: NURSE PRACTITIONER

## 2021-05-07 RX ORDER — VALACYCLOVIR HYDROCHLORIDE 500 MG/1
1000 TABLET, FILM COATED ORAL 3 TIMES DAILY
Qty: 42 TABLET | Refills: 0 | Status: SHIPPED | OUTPATIENT
Start: 2021-05-07 | End: 2021-06-04 | Stop reason: ALTCHOICE

## 2021-05-07 NOTE — PROGRESS NOTES
FAMILY PRACTICE OFFICE VISIT       NAME: Abner Arguelles  AGE: 82 y o  SEX: female       : 1935        MRN: [id number]    Assessment and Plan     Problem List Items Addressed This Visit     None      Visit Diagnoses     Herpes zoster without complication    -  Primary    Relevant Medications    valACYclovir (VALTREX) 500 mg tablet          1  Herpes zoster without complication  valACYclovir (VALTREX) 500 mg tablet     This 44-year-old female presents today for shingles rash on left low back and left lateral hip  She just noticed the rash yesterday  Will start Valtrex 1000 mg 3 times daily for 7 days  Reviewed usual course of shingles, and preventing spread of illness  She will call spread of rash, fever, increasing pain not able to be managed with Tylenol, nausea, vomiting, feeling ill  She has follow-up appointment scheduled in   Chief Complaint     Chief Complaint   Patient presents with    Rash       History of Present Illness     Abner Arguelles is an 82year old female presenting today for a rash  Rash noticed last night on left hip  Itchy, irritated rash  Has been having left low back pain last couple of days, using a heating pad  No fever  Diarrhea 3 times this morning, improved with one dose of imodium  Otherwise is feeling at her baseline  Review of Systems   Review of Systems   Constitutional: Negative  Gastrointestinal: Positive for diarrhea  Negative for abdominal pain, nausea and vomiting  Musculoskeletal: Positive for back pain  Skin: Positive for rash         Active Problem List     Patient Active Problem List   Diagnosis    HTN (hypertension)    HLD (hyperlipidemia)    Glaucoma    Asymptomatic carotid artery stenosis, left    Symptomatic PVCs    Occlusion of right carotid artery    Stenosis of right subclavian artery (HCC)    [facility]oxysmal atrial fibrillation ([facility] )    Pacemaker    Type 2 diabetes mellitus with hyperglycemia ([facility] )  Osteoporosis    GERD (gastroesophageal reflux disease)    Anxiety    Iron deficiency    Interstitial lung disease (HCC)    Common bile duct dilatation    Abdominal pain    Dysphagia    Moderate protein-calorie malnutrition (HCC)    Constipation    Hyponatremia    S/P laparoscopic cholecystectomy    Post-nasal drip    Type 2 diabetes mellitus with ophthalmic complication, without long-term current use of insulin (HCC)    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, without long-term current use of insulin (HCC)       Past Medical History:  Past Medical History:   Diagnosis Date    Diabetes mellitus ([facility] )     Glaucoma     H/O degenerative disc disease     Hyperlipidemia     Hypertension     Idiopathic pulmonary fibrosis ([facility] ) 11/2020    Irregular heart beat     afib    Peripheral neuropathy     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        Past Surgical History:  Past Surgical History:   Procedure Laterality Date    CATARACT EXTRACTION, BILATERAL  2011    CHOLECYSTECTOMY LAPAROSCOPIC N/A 12/9/2020    Procedure: CHOLECYSTECTOMY LAPAROSCOPIC;  Surgeon: Jay Irwin MD;  [facility];  Service: General    COLONOSCOPY      CYST REMOVAL  2009    left lower Quadrant     HERNIA REPAIR  1992    LIPOMA RESECTION  2010    SC REPAIR ING HERNIA,5+Y/O,REDUCIBL Bilateral 1/5/2018    Procedure: INGUINAL HERNIA REPAIR;  Surgeon: Heather Morillo MD;  Location: [facility];  Service: General    SHOULDER SURGERY  04/26/2019    Dr Lori Wong [facility]   [address]  R carotid occlusion       Family History:  Family History   Problem Relation Age of Onset    Heart attack Father     Hiatal hernia Father     Diabetes Father     Heart disease Mother     Cancer Brother     Diabetes Brother     Heart disease Brother     Hypertension Brother        Social History:  Social History     Socioeconomic History  Marital status:      Spouse name: Not on file    Number of children: Not on file    Years of education: Not on file    Highest education level: Not on file   Occupational History    Occupation: customer service   retired   Social Needs    Financial resource strain: Not on file    Food insecurity     Worry: Not on file     Inability: Not on file   [employer] needs     Medical: Not on file     Non-medical: Not on file   Tobacco Use    Smoking status: Former Smoker     Packs/day: 1 50     Years: 38 00     Pack years: 57 00     Types: Cigarettes     Start date:      Quit date:      Years since quittin 3    Smokeless tobacco: Never Used   Substance and Sexual Activity    Alcohol use: No    Drug use: No    Sexual activity: Not on file   Lifestyle    Physical activity     Days per week: Not on file     Minutes per session: Not on file    Stress: Not on file   Relationships    Social connections     Talks on phone: Not on file     Gets together: Not on file     Attends Buddhism service: Not on file     Active member of club or organization: Not on file     Attends meetings of clubs or organizations: Not on file     Relationship status: Not on file    Intimate partner violence     Fear of current or ex partner: Not on file     Emotionally abused: Not on file     Physically abused: Not on file     Forced sexual activity: Not on file   Other Topics Concern    Not on file   Social History Narrative    Lives [place]    2 children       I have reviewed the patient's medical history in detail; there are no changes to the history as noted in the electronic medical record      Objective     Vitals:    21 1508 21 1520   BP: 150/90 118/64   Pulse: 78    Resp: 16    Temp: 98 2 °F (36 8 °C)    TempSrc: Temporal    SpO2: 97%    Weight: 55 5 kg (122 lb 6 4 oz)    Height: 5' 7" (1 702 m)      Wt Readings from Last 3 Encounters:   21 55 5 kg (122 lb 6 4 oz) 04/12/21 56 3 kg (124 lb 3 2 oz)   04/01/21 56 1 kg (123 lb 9 6 oz)     Physical Exam  Vitals signs and nursing note reviewed  Constitutional:       General: She is not in acute distress  Appearance: She is not ill-appearing, toxic-appearing or diaphoretic  Comments: Thin, frail   HENT:      Head: Normocephalic and atraumatic  Cardiovascular:      Rate and Rhythm: Normal rate and regular rhythm  Heart sounds: No murmur  Pulmonary:      Effort: Pulmonary effort is normal  No respiratory distress  Breath sounds: Normal breath sounds  Skin:     Comments: Vesicular rash on erythematous base on left low back  Scabbed vesicles on erythematous base on left lateral hip  Left L2 dermatome distribution  Neurological:      Mental Status: She is alert     Psychiatric:         Mood and Affect: Mood normal             ALLERGIES:  Allergies   Allergen Reactions    Keflex [Cephalexin] Diarrhea       Current Medications     Current Outpatient Medications   Medication Sig Dispense Refill    acetaminophen (TYLENOL) 500 mg tablet Take 1,000 mg by mouth as needed for mild pain      amoxicillin (AMOXIL) 500 mg capsule TAKE 4 CAPSULES 1 HOUR BEFORE DENTAL APPOINTMENT      aspirin (ECOTRIN LOW STRENGTH) 81 mg EC tablet Take 1 tablet (81 mg total) by mouth daily      azelastine (ASTELIN) 0 1 % nasal spray 1 spray into each nostril 2 (two) times a day Use in each nostril as directed (Patient taking differently: 1 spray into each nostril as needed Use in each nostril as directed) 1 Bottle 2    bimatoprost (LUMIGAN) 0 01 % ophthalmic drops Administer 1 drop to both eyes daily at bedtime for 30 days 1 5 mL 0    calcium carbonate (OS-DANIA) 600 MG tablet Take 600 mg by mouth 2 (two) times a day with meals      carboxymethylcellulose (Artificial Tears) 1 % ophthalmic solution 1 drop 3 (three) times a day      Eliquis 2 5 MG TAKE 1 TABLET (2 5 MG TOTAL) BY MOUTH 2 (TWO) TIMES A DAY 60 tablet 5    ezetimibe-simvastatin (VYTORIN) 10-40 mg per tablet TAKE 1 TABLET BY MOUTH DAILY AT BEDTIME 30 tablet 5    famotidine (PEPCID) 20 mg tablet Take 1 tablet (20 mg total) by mouth 2 (two) times a day 180 tablet 1    ferrous sulfate 325 (65 Fe) mg tablet Take 325 mg by mouth daily with breakfast      glucose blood (ONE TOUCH ULTRA TEST) test strip TEST FOUR TIMES A  each 1    Lancets (ONETOUCH DELICA PLUS [id number]) MISC Use one lancet to test blood 4 times a day 400 each 1    loratadine (CLARITIN) 10 mg tablet Take 10 mg by mouth daily as needed for allergies      LORazepam (ATIVAN) 0 5 mg tablet TAKE 2 TABLETS (1 MG TOTAL) BY MOUTH DAILY AT BEDTIME 60 tablet 0    metFORMIN (GLUCOPHAGE) 500 mg tablet Take 2 tablets (1,000 mg total) by mouth 2 (two) times a day with meals 360 tablet 3    metoprolol tartrate (LOPRESSOR) 50 mg tablet TAKE 1 5 TABLETS (75 MG TOTAL) BY MOUTH EVERY 12 (TWELVE) HOURS 90 tablet 5    Multiple Vitamin (multivitamin) tablet Take 1 tablet by mouth daily      QC Pen Needles 31G X 6 MM MISC USE 4 (FOUR) TIMES A DAY (BEFORE MEALS AND AT BEDTIME) 100 each 3    sitaGLIPtin (Januvia) 100 mg tablet Take 1 tablet (100 mg total) by mouth daily 30 tablet 5    valACYclovir (VALTREX) 500 mg tablet Take 2 tablets (1,000 mg total) by mouth 3 (three) times a day for 7 days 42 tablet 0     No current facility-administered medications for this visit            Health Maintenance     Health Maintenance   Topic Date Due    DTaP,Tdap,and Td Vaccines (1 - Tdap) Never done   [facility] [employer] Annual Wellness Visit (AWV)  07/30/2021    HEMOGLOBIN A1C  09/04/2021    Fall Risk  03/04/2022    Depression Screening PHQ  03/04/2022    DM Eye Exam  03/18/2022    Diabetic Foot Exam  04/22/2022    BMI: Adult  05/07/2022    Colonoscopy Surveillance  09/02/2025    Hepatitis C Screening  Completed    Pneumococcal Vaccine: 65+ Years  Completed    Influenza Vaccine  Completed    COVID-19 Vaccine  Completed    HIB Vaccine  Aged Out    Hepatitis B Vaccine  Aged Out    IPV Vaccine  Aged Out    Hepatitis A Vaccine  Aged Out    Meningococcal ACWY Vaccine  Aged Out    HPV Vaccine  Aged Out     Immunization History   Administered Date(s) Administered    INFLUENZA 11/02/2016, 10/16/2017, 09/18/2018, 09/18/2018    Influenza, Quadrivalent (nasal) 11/02/2016    Influenza, high dose seasonal 0 7 mL 11/06/2019, 10/06/2020    Pneumococcal Conjugate 13-Valent 07/25/2019    Pneumococcal Polysaccharide PPV23 03/17/2003    SARS-CoV-2 / COVID-19 mRNA IM (Pfizer-BioNTech) 01/22/2021, 02/12/2021    Zoster 07/09/2010       ZANDER Long

## 2021-05-08 VITALS
TEMPERATURE: 98.2 F | RESPIRATION RATE: 16 BRPM | HEART RATE: 78 BPM | SYSTOLIC BLOOD PRESSURE: 118 MMHG | WEIGHT: 122.4 LBS | OXYGEN SATURATION: 97 % | DIASTOLIC BLOOD PRESSURE: 64 MMHG | BODY MASS INDEX: 19.21 KG/M2 | HEIGHT: 67 IN

## 2021-05-22 DIAGNOSIS — F41.9 ANXIETY: ICD-10-CM

## 2021-05-22 RX ORDER — LORAZEPAM 0.5 MG/1
1 TABLET ORAL
Qty: 60 TABLET | Refills: 0 | Status: SHIPPED | OUTPATIENT
Start: 2021-05-22 | End: 2021-06-21

## 2021-05-28 ENCOUNTER — TRANSCRIBE ORDERS (OUTPATIENT)
Dept: LAB | Facility: CLINIC | Age: 86
End: 2021-05-28

## 2021-05-28 ENCOUNTER — LAB (OUTPATIENT)
Dept: LAB | Facility: CLINIC | Age: 86
End: 2021-05-28
Payer: MEDICARE

## 2021-05-28 DIAGNOSIS — E11.65 TYPE 2 DIABETES MELLITUS WITH HYPERGLYCEMIA, UNSPECIFIED WHETHER LONG TERM INSULIN USE (HCC): ICD-10-CM

## 2021-05-28 DIAGNOSIS — E61.1 IRON DEFICIENCY: ICD-10-CM

## 2021-05-28 DIAGNOSIS — R10.9 ABDOMINAL PAIN: ICD-10-CM

## 2021-05-28 DIAGNOSIS — E78.2 MIXED HYPERLIPIDEMIA: ICD-10-CM

## 2021-05-28 DIAGNOSIS — E87.1 HYPONATREMIA: ICD-10-CM

## 2021-05-28 DIAGNOSIS — I10 HYPERTENSION, UNSPECIFIED TYPE: ICD-10-CM

## 2021-05-28 DIAGNOSIS — M81.0 OSTEOPOROSIS WITHOUT CURRENT PATHOLOGICAL FRACTURE, UNSPECIFIED OSTEOPOROSIS TYPE: ICD-10-CM

## 2021-05-28 DIAGNOSIS — K80.50 CHOLEDOCHOLITHIASIS: ICD-10-CM

## 2021-05-28 LAB
25(OH)D3 SERPL-MCNC: 29 NG/ML (ref 30–100)
ALBUMIN SERPL BCP-MCNC: 3.9 G/DL (ref 3.5–5)
ALP SERPL-CCNC: 56 U/L (ref 46–116)
ALT SERPL W P-5'-P-CCNC: 27 U/L (ref 12–78)
ANION GAP SERPL CALCULATED.3IONS-SCNC: 1 MMOL/L (ref 4–13)
AST SERPL W P-5'-P-CCNC: 18 U/L (ref 5–45)
BASOPHILS # BLD AUTO: 0.02 THOUSANDS/ΜL (ref 0–0.1)
BASOPHILS NFR BLD AUTO: 0 % (ref 0–1)
BILIRUB DIRECT SERPL-MCNC: 0.18 MG/DL (ref 0–0.2)
BILIRUB SERPL-MCNC: 0.56 MG/DL (ref 0.2–1)
BUN SERPL-MCNC: 12 MG/DL (ref 5–25)
CALCIUM SERPL-MCNC: 9.2 MG/DL (ref 8.3–10.1)
CHLORIDE SERPL-SCNC: 93 MMOL/L (ref 100–108)
CHOLEST SERPL-MCNC: 131 MG/DL (ref 50–200)
CO2 SERPL-SCNC: 36 MMOL/L (ref 21–32)
CREAT SERPL-MCNC: 0.39 MG/DL (ref 0.6–1.3)
EOSINOPHIL # BLD AUTO: 0.12 THOUSAND/ΜL (ref 0–0.61)
EOSINOPHIL NFR BLD AUTO: 2 % (ref 0–6)
ERYTHROCYTE [DISTWIDTH] IN BLOOD BY AUTOMATED COUNT: 16.2 % (ref 11.6–15.1)
EST. AVERAGE GLUCOSE BLD GHB EST-MCNC: 166 MG/DL
GFR SERPL CREATININE-BSD FRML MDRD: 97 ML/MIN/1.73SQ M
GLUCOSE P FAST SERPL-MCNC: 165 MG/DL (ref 65–99)
HBA1C MFR BLD: 7.4 %
HCT VFR BLD AUTO: 38.5 % (ref 34.8–46.1)
HDLC SERPL-MCNC: 65 MG/DL
HGB BLD-MCNC: 12.1 G/DL (ref 11.5–15.4)
IMM GRANULOCYTES # BLD AUTO: 0.02 THOUSAND/UL (ref 0–0.2)
IMM GRANULOCYTES NFR BLD AUTO: 0 % (ref 0–2)
LDLC SERPL CALC-MCNC: 50 MG/DL (ref 0–100)
LYMPHOCYTES # BLD AUTO: 1.66 THOUSANDS/ΜL (ref 0.6–4.47)
LYMPHOCYTES NFR BLD AUTO: 31 % (ref 14–44)
MCH RBC QN AUTO: 26.9 PG (ref 26.8–34.3)
MCHC RBC AUTO-ENTMCNC: 31.4 G/DL (ref 31.4–37.4)
MCV RBC AUTO: 86 FL (ref 82–98)
MONOCYTES # BLD AUTO: 0.58 THOUSAND/ΜL (ref 0.17–1.22)
MONOCYTES NFR BLD AUTO: 11 % (ref 4–12)
NEUTROPHILS # BLD AUTO: 3 THOUSANDS/ΜL (ref 1.85–7.62)
NEUTS SEG NFR BLD AUTO: 56 % (ref 43–75)
NONHDLC SERPL-MCNC: 66 MG/DL
NRBC BLD AUTO-RTO: 0 /100 WBCS
PLATELET # BLD AUTO: 183 THOUSANDS/UL (ref 149–390)
PMV BLD AUTO: 10.2 FL (ref 8.9–12.7)
POTASSIUM SERPL-SCNC: 4 MMOL/L (ref 3.5–5.3)
PROT SERPL-MCNC: 7.7 G/DL (ref 6.4–8.2)
RBC # BLD AUTO: 4.5 MILLION/UL (ref 3.81–5.12)
SODIUM SERPL-SCNC: 130 MMOL/L (ref 136–145)
TRIGL SERPL-MCNC: 81 MG/DL
TSH SERPL DL<=0.05 MIU/L-ACNC: 1.18 UIU/ML (ref 0.36–3.74)
WBC # BLD AUTO: 5.4 THOUSAND/UL (ref 4.31–10.16)

## 2021-05-28 PROCEDURE — 80053 COMPREHEN METABOLIC PANEL: CPT

## 2021-05-28 PROCEDURE — 85025 COMPLETE CBC W/AUTO DIFF WBC: CPT

## 2021-05-28 PROCEDURE — 84443 ASSAY THYROID STIM HORMONE: CPT

## 2021-05-28 PROCEDURE — 36415 COLL VENOUS BLD VENIPUNCTURE: CPT

## 2021-05-28 PROCEDURE — 82306 VITAMIN D 25 HYDROXY: CPT

## 2021-05-28 PROCEDURE — 83036 HEMOGLOBIN GLYCOSYLATED A1C: CPT

## 2021-05-28 PROCEDURE — 80061 LIPID PANEL: CPT

## 2021-05-28 PROCEDURE — 82248 BILIRUBIN DIRECT: CPT

## 2021-05-29 NOTE — RESULT ENCOUNTER NOTE
Dear staff:    Please kindly let patient know that liver enzymes have normalized  No evidence of CBD obstruction currently  Follow-up with PCP  As scheduled  Thank you

## 2021-06-04 ENCOUNTER — OFFICE VISIT (OUTPATIENT)
Dept: FAMILY MEDICINE CLINIC | Facility: CLINIC | Age: 86
End: 2021-06-04
Payer: MEDICARE

## 2021-06-04 VITALS
BODY MASS INDEX: 19.37 KG/M2 | RESPIRATION RATE: 16 BRPM | DIASTOLIC BLOOD PRESSURE: 64 MMHG | HEART RATE: 85 BPM | WEIGHT: 123.4 LBS | SYSTOLIC BLOOD PRESSURE: 120 MMHG | TEMPERATURE: 97.3 F | OXYGEN SATURATION: 100 % | HEIGHT: 67 IN

## 2021-06-04 DIAGNOSIS — R41.3 MEMORY CHANGE: ICD-10-CM

## 2021-06-04 DIAGNOSIS — I48.0 PAROXYSMAL ATRIAL FIBRILLATION (HCC): ICD-10-CM

## 2021-06-04 DIAGNOSIS — E61.1 IRON DEFICIENCY: ICD-10-CM

## 2021-06-04 DIAGNOSIS — E87.1 HYPONATREMIA: ICD-10-CM

## 2021-06-04 DIAGNOSIS — I65.22 ASYMPTOMATIC CAROTID ARTERY STENOSIS, LEFT: ICD-10-CM

## 2021-06-04 DIAGNOSIS — R19.7 DIARRHEA, UNSPECIFIED TYPE: ICD-10-CM

## 2021-06-04 DIAGNOSIS — E44.0 MODERATE PROTEIN-CALORIE MALNUTRITION (HCC): ICD-10-CM

## 2021-06-04 DIAGNOSIS — Z12.31 BREAST CANCER SCREENING BY MAMMOGRAM: ICD-10-CM

## 2021-06-04 DIAGNOSIS — R53.83 FATIGUE, UNSPECIFIED TYPE: ICD-10-CM

## 2021-06-04 DIAGNOSIS — I65.21 OCCLUSION OF RIGHT CAROTID ARTERY: ICD-10-CM

## 2021-06-04 DIAGNOSIS — J84.9 INTERSTITIAL LUNG DISEASE (HCC): ICD-10-CM

## 2021-06-04 DIAGNOSIS — I10 HYPERTENSION, UNSPECIFIED TYPE: ICD-10-CM

## 2021-06-04 DIAGNOSIS — E11.3393 TYPE 2 DIABETES MELLITUS WITH BOTH EYES AFFECTED BY MODERATE NONPROLIFERATIVE RETINOPATHY WITHOUT MACULAR EDEMA, WITHOUT LONG-TERM CURRENT USE OF INSULIN (HCC): Primary | ICD-10-CM

## 2021-06-04 DIAGNOSIS — E78.2 MIXED HYPERLIPIDEMIA: ICD-10-CM

## 2021-06-04 DIAGNOSIS — R06.02 SHORTNESS OF BREATH: ICD-10-CM

## 2021-06-04 PROCEDURE — 99214 OFFICE O/P EST MOD 30 MIN: CPT | Performed by: NURSE PRACTITIONER

## 2021-06-04 NOTE — PROGRESS NOTES
FAMILY PRACTICE OFFICE VISIT       NAME: Prachi Harrison  AGE: 82 y o  SEX: female       : 1935        MRN: [id number]    Assessment and Plan     Problem List Items Addressed This Visit        Endocrine    Type 2 diabetes mellitus with both eyes affected by moderate 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 edema, without long-term current use of insulin ([facility] ) - Primary       Lab Results   Component Value Date    HGBA1C 7 4 (H) 2021     Hemoglobin A1c is stable, which is at her goal less than 8%  Currently taking metformin and Januvia  She has been on Levemir and Lantus insulin in the past, however even at very low doses became hypoglycemic  Has bilateral moderate nonproliferative diabetic retinopathy, with recent vitreous hemorrhage in the left eye  She is following with Ophthalmology regarding this  She is compliant with diabetic diet  Currently struggling with trying to gain weight and keeping sugars at a reasonable level, with out hypoglycemia  Met with dietician in April  Will continue same medications for now, and monitor closely  Patient will continue to keep home glucose log  Respiratory    Interstitial lung disease ([facility] )     Continue follow up with pulmonology, Dr Ander Ralph, next appointment scheduled in July  Cardiovascular and Mediastinum    HTN (hypertension)       Blood pressure is stable on metoprolol 75 mg twice daily  Asymptomatic carotid artery stenosis, left     RIGHT:  Known occlusion of the internal carotid artery  Vertebral artery flow is antegrade  There is no significant subclavian artery  disease  LEFT:  There is 50-69% stenosis noted in the internal carotid artery  Plaque is  heterogenous and irregular  Vertebral artery flow is antegrade  There is no significant subclavian artery  disease       In comparison to the study on 2020, there is no significant change in the  disease process, unable to reproduce previous 50-75% stenosis in the right  subclavian artery  Continue aspirin, Eliquis, Vytorin  Continue follow-up with vascular surgery, Dr Ella Shi  Occlusion of right carotid artery     March 2021 carotid duplex study:  RIGHT:  Known occlusion of the internal carotid artery  Vertebral artery flow is antegrade  There is no significant subclavian artery  disease  LEFT:  There is 50-69% stenosis noted in the internal carotid artery  Plaque is  heterogenous and irregular  Vertebral artery flow is antegrade  There is no significant subclavian artery  disease  In comparison to the study on 8/21/2020, there is no significant change in the  disease process, unable to reproduce previous 50-75% stenosis in the right  subclavian artery  Continue Vytorin, aspirin, Eliquis  Continue follow-up with vascular surgery Dr Ella Shi  Paroxysmal atrial fibrillation (HCC)       Stable on metoprolol, Eliquis, and aspirin  Continue cardiology follow-up with Dr Isabella Dumont  Other    HLD (hyperlipidemia)       LDL 50, at goal on Vytorin  Iron deficiency       Stable  Anemia has resolved  Continue ferrous sulfate 325 mg daily  Moderate protein-calorie malnutrition (HCC)             Body mass index is 19 33 kg/m²  Continuing to work on eating high protein foods, Drinking glucerna 1-2 times per day  Weight is stable  Hyponatremia     Recent hypoglycemia, with sodium down to 130  Originally thought to be from poor PO intake  However, she reports she is eating at baseline, trying to eat more, yet sodium has decreased further, typically running 132-135, now 130  Glucose does not seem high enough, at this time to make such a large difference in sodium level  She is not taking any medications that would contribute to hypoglycemia  She denies alcohol use  Will refer to nephrology for further evaluation                  Relevant Orders    Ambulatory referral to Nephrology Other Visit Diagnoses     Fatigue, unspecified type      Suspect this is multifactorial   She struggles with depression, being alone often with COVID-19 pandemic has been difficult on her  She also notes she is fearful of end of life,  Which she thinks about more often now given her age and chronic health issues  May consider counseling, will hold off on SSRI, due to hyponatremia  Low-sodium may also be contributing to fatigue  Memory change      Mild recent memory changes  Notes she fills her pill boxes weekly, and recently has found some mistakes  This is unusual for her  She is also found herself doing things, that she can not explain why she is doing them  This may be related to low-sodium or depression, but cannot exclude other causes  Will check blood work and MRI brain  Diarrhea, unspecified type       Recent episodes of diarrhea over the past 2 days  She was eating is due to apples over the past few days, which may have contributed  She took Imodium today  She will let me know if this is persistent  Denies abdominal pain, nausea or vomiting  Shortness of breath     Over the past month has noted shortness of breath with exertion  She has upcoming appointments with pulmonology and Cardiology in July  She has known interstitial lung disease and has spirometry scheduled before her pulmonology appointment  She will discuss shortness of breath with her cardiologist and pulmonologist  If this should worsen in frequency, duration, or have new associated symptoms she will call me  1  Type 2 diabetes mellitus with both eyes affected by moderate 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 edema, without long-term current use of insulin ([address] )     2  Moderate protein-calorie malnutrition ([address] )     3  Interstitial lung disease ([address] )     4  Hypertension, unspecified type     5  Paroxysmal atrial fibrillation (HCC)     6   Hyponatremia  Ambulatory referral to Nephrology    Basic metabolic panel    Cortisol Level, AM Specimen   7  Iron deficiency     8  Occlusion of right carotid artery     9  Asymptomatic carotid artery stenosis, left     10  Mixed hyperlipidemia     11  Fatigue, unspecified type  Protein electrophoresis, serum   12  Memory change  MRI brain w wo contrast    RPR    Vitamin B12   13  Diarrhea, unspecified type     14  Shortness of breath     15  Breast cancer screening by mammogram  Mammo screening bilateral w 3d & cad           Chief Complaint     Chief Complaint   Patient presents with    Follow-up     Pt is here for 3 mos f/u sugars and weight check       History of Present Illness     Be Bowden is an 82year old female presenting today for follow up  Fatigue: falling asleep easily and unaware  Was talking on the phone to a friend, and fell asleep  Suddenly waking up  Noticed this over the past 1 month  Not able to do shopping  Tried to go to [employer] with in 30 minutes had to leave  Physically has no energy,  Dizzy sometimes, lasts a few seconds and goes away  Sitting down, feels better  Struggling with depression  Self talks her herself out of it  Ultimately, fears end of life  COVID-19 does not help, with less socialization  Short of breath at times: feels like can't deep breath in   Mild sob with activity, sits and resolves over a few minutes  Yesterday, and today, loose stools  Took imodium today  Ate stewed apples last few days, which may have contributed  Not out to eat recently    Memory  Does her pill boxes--finds she is not doing them correctly sometimes  Usually catches this, and keeps close counts her pills  Yenifer Guthrie herself doing things that she doesn't know why she is doing them  Under a lot of stress with family issues  Following with retinal specialist for moderate NPDR bilaterally, with recent vitreous hemorrhage  Has met with dietician regarding weight gain  Weight is maintaining     Sometimes just not hungry, but she eats anyway  Recently completed pulmonary rehab  Review of Systems   Review of Systems   Constitutional: Positive for appetite change (decreased appetite) and fatigue  Negative for chills, diaphoresis and fever  HENT: Positive for trouble swallowing (chronic at baseline)  Eyes: Positive for visual disturbance (recent vitreous hemorrhage, vision is improving, following with Ophthalmology)  Respiratory: Positive for cough (mild chronic) and shortness of breath  Negative for chest tightness and wheezing  Cardiovascular: Positive for palpitations  Negative for chest pain and leg swelling  Gastrointestinal: Positive for diarrhea (over the last 2 days)  Negative for abdominal pain, nausea and vomiting  Genitourinary: Negative  Musculoskeletal: Negative  Skin: Negative  Neurological: Positive for dizziness (occasionally)  Negative for headaches  Memory changes   Psychiatric/Behavioral: Positive for dysphoric mood  Negative for self-injury, sleep disturbance and suicidal ideas  The patient is nervous/anxious          Active Problem List     Patient Active Problem List   Diagnosis    HTN (hypertension)    HLD (hyperlipidemia)    Glaucoma    Asymptomatic carotid artery stenosis, left    Symptomatic PVCs    Occlusion of right carotid artery    Stenosis of right subclavian artery (HCC)    Paroxysmal atrial fibrillation (HCC)    Pacemaker    Osteoporosis    GERD (gastroesophageal reflux disease)    Anxiety    Iron deficiency    Interstitial lung disease ([address] )    Common bile duct dilatation    Dysphagia    Moderate protein-calorie malnutrition (HCC)    Constipation    Hyponatremia    S/P laparoscopic cholecystectomy    Post-nasal drip    Type 2 diabetes mellitus with both eyes affected by moderate 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 edema, without long-term current use of insulin (HCC)    Idiopathic pulmonary fibrosis [facility])       Past Medical History:  Past Medical History:   Diagnosis Date    Glaucoma     H/O degenerative disc disease     Idiopathic pulmonary fibrosis ([address] ) 2020    Irregular heart beat     afib    Peripheral neuropathy        Past Surgical History:  Past Surgical History:   Procedure Laterality Date    CATARACT EXTRACTION, BILATERAL      CHOLECYSTECTOMY LAPAROSCOPIC N/A 2020    Procedure: CHOLECYSTECTOMY LAPAROSCOPIC;  Surgeon: Julia Jiang MD;  [facility];  Service: General    COLONOSCOPY      CYST REMOVAL      left lower Quadrant    [address]    LIPOMA RESECTION      KS REPAIR ING HERNIA,5+Y/O,REDUCIBL Bilateral 2018    Procedure: INGUINAL HERNIA REPAIR;  Surgeon: Sesar Lares MD;  [facility];  Service: General    SHOULDER SURGERY  2019    Dr Medina [facility]   [id number] carotid occlusion       Family History:  Family History   Problem Relation Age of Onset    Heart attack Father     Hiatal hernia Father     Diabetes Father     Heart disease Mother     Cancer Brother     Diabetes Brother     Heart disease Brother     Hypertension Brother        Social History:  Social History     Socioeconomic History    Marital status:      Spouse name: Not on file    Number of children: Not on file    Years of education: Not on file    Highest education level: Not on file   Occupational History    Occupation: customer service   retired   Tobacco Use    Smoking status: Former Smoker     Packs/day: 1 50     Years: 38 00     Pack years: 57 00     Types: Cigarettes     Start date:      Quit date:      Years since quittin 4    Smokeless tobacco: Never Used   Vaping Use    Vaping Use: Never used   Substance and Sexual Activity    Alcohol use: No    Drug use: No    Sexual activity: Not on file   Other Topics Concern    Not on file   Social History Narrative    Lives alone [place]    2 children     Social Determinants of Health     Financial Resource Strain:     Difficulty of Paying Living Expenses:    Food Insecurity:     Worried About Running Out of Food in the Last Year:     [address] in the Last Year:    Transportation Needs:     Lack of Transportation (Medical):  Lack of Transportation (Non-Medical):    Physical Activity:     Days of Exercise per Week:     Minutes of Exercise per Session:    Stress:     Feeling of Stress :    Social Connections:     Frequency of Communication with Friends and Family:     Frequency of Social Gatherings with Friends and Family:     Attends Baptism Services:     Active Member of Clubs or Organizations:     Attends Club or Organization Meetings:     Marital Status:    Intimate Partner Violence:     Fear of Current or Ex-Partner:     Emotionally Abused:     Physically Abused:     Sexually Abused:        I have reviewed the patient's medical history in detail; there are no changes to the history as noted in the electronic medical record  Objective     Vitals:    06/04/21 1002 06/04/21 1043   BP: 140/80 120/64   Pulse: 85    Resp: 16    Temp: (!) 97 3 °F (36 3 °C)    TempSrc: Temporal    SpO2: 100%    Weight: 56 kg (123 lb 6 4 oz)    Height: 5' 7" (1 702 m)      Wt Readings from Last 3 Encounters:   06/04/21 56 kg (123 lb 6 4 oz)   05/07/21 55 5 kg (122 lb 6 4 oz)   04/12/21 56 3 kg (124 lb 3 2 oz)     Physical Exam  Vitals and nursing note reviewed  Constitutional:       General: She is not in acute distress  Appearance: She is not ill-appearing, toxic-appearing or diaphoretic  Comments: Frail appearing   HENT:      Head: Normocephalic and atraumatic  Right Ear: Tympanic membrane and ear canal normal       Left Ear: Tympanic membrane and ear canal normal       Mouth/Throat:      Mouth: Mucous membranes are moist       Pharynx: Oropharynx is clear     Eyes:      Conjunctiva/sclera: Conjunctivae normal  Pupils: Pupils are equal, round, and reactive to light  Neck:      Thyroid: No thyromegaly  Vascular: No carotid bruit  Cardiovascular:      Rate and Rhythm: Normal rate and regular rhythm  Heart sounds: No murmur heard  Pulmonary:      Effort: Pulmonary effort is normal  No respiratory distress  Breath sounds: Normal breath sounds  No wheezing or rales  Abdominal:      General: Abdomen is flat  Bowel sounds are normal       Palpations: Abdomen is soft  Tenderness: There is no abdominal tenderness  Musculoskeletal:      Cervical back: Normal range of motion and neck supple  Right lower leg: No edema  Left lower leg: No edema  Lymphadenopathy:      Cervical: No cervical adenopathy  Skin:     General: Skin is warm and dry  Neurological:      Mental Status: She is alert and oriented to person, place, and time     Psychiatric:         Mood and Affect: Mood normal             ALLERGIES:  Allergies   Allergen Reactions    Keflex [Cephalexin] Diarrhea       Current Medications     Current Outpatient Medications   Medication Sig Dispense Refill    acetaminophen (TYLENOL) 500 mg tablet Take 1,000 mg by mouth as needed for mild pain      amoxicillin (AMOXIL) 500 mg capsule TAKE 4 CAPSULES 1 HOUR BEFORE DENTAL APPOINTMENT      aspirin (ECOTRIN LOW STRENGTH) 81 mg EC tablet Take 1 tablet (81 mg total) by mouth daily      azelastine (ASTELIN) 0 1 % nasal spray 1 spray into each nostril 2 (two) times a day Use in each nostril as directed (Patient taking differently: 1 spray into each nostril as needed Use in each nostril as directed) 1 Bottle 2    bimatoprost (LUMIGAN) 0 01 % ophthalmic drops Administer 1 drop to both eyes daily at bedtime for 30 days 1 5 mL 0    calcium carbonate (OS-DANIA) 600 MG tablet Take 600 mg by mouth 2 (two) times a day with meals      carboxymethylcellulose (Artificial Tears) 1 % ophthalmic solution 1 drop 3 (three) times a day      Eliquis 2 5 MG TAKE 1 TABLET (2 5 MG TOTAL) BY MOUTH 2 (TWO) TIMES A DAY 60 tablet 5    ezetimibe-simvastatin (VYTORIN) 10-40 mg per tablet TAKE 1 TABLET BY MOUTH DAILY AT BEDTIME 30 tablet 5    famotidine (PEPCID) 20 mg tablet Take 1 tablet (20 mg total) by mouth 2 (two) times a day 180 tablet 1    ferrous sulfate 325 (65 Fe) mg tablet Take 325 mg by mouth daily with breakfast      glucose blood (ONE TOUCH ULTRA TEST) test strip TEST FOUR TIMES A  each 1    Lancets (ONETOUCH DELICA PLUS [id number]) MISC Use one lancet to test blood 4 times a day 400 each 1    loratadine (CLARITIN) 10 mg tablet Take 10 mg by mouth daily as needed for allergies      LORazepam (ATIVAN) 0 5 mg tablet TAKE 2 TABLETS (1 MG TOTAL) BY MOUTH DAILY AT BEDTIME 60 tablet 0    metFORMIN (GLUCOPHAGE) 500 mg tablet Take 2 tablets (1,000 mg total) by mouth 2 (two) times a day with meals 360 tablet 3    metoprolol tartrate (LOPRESSOR) 50 mg tablet TAKE 1 5 TABLETS (75 MG TOTAL) BY MOUTH EVERY 12 (TWELVE) HOURS 90 tablet 5    Multiple Vitamin (multivitamin) tablet Take 1 tablet by mouth daily      QC Pen Needles 31G X 6 MM MISC USE 4 (FOUR) TIMES A DAY (BEFORE MEALS AND AT BEDTIME) 100 each 3    sitaGLIPtin (Januvia) 100 mg tablet Take 1 tablet (100 mg total) by mouth daily 30 tablet 5     No current facility-administered medications for this visit           Health Maintenance     Health Maintenance   Topic Date Due    DTaP,Tdap,and Td Vaccines (1 - Tdap) Never done   [facility] Annual Wellness Visit (AWV)  07/30/2021    HEMOGLOBIN A1C  11/28/2021    Fall Risk  03/04/2022    Depression Screening PHQ  03/04/2022    DM Eye Exam  03/18/2022    Diabetic Foot Exam  04/22/2022    BMI: Adult  06/04/2022    Colorectal Cancer Screening  09/02/2025    Hepatitis C Screening  Completed    Pneumococcal Vaccine: 65+ Years  Completed    Influenza Vaccine  Completed    COVID-19 Vaccine  Completed    HIB Vaccine  Aged Out    Hepatitis B Vaccine  Aged Out    IPV Vaccine  Aged Out    Hepatitis A Vaccine  Aged Out    Meningococcal ACWY Vaccine  Aged Out    HPV Vaccine  Aged Out     Immunization History   Administered Date(s) Administered    INFLUENZA 11/02/2016, 10/16/2017, 09/18/2018, 09/18/2018    Influenza, Quadrivalent (nasal) 11/02/2016    Influenza, high dose seasonal 0 7 mL 11/06/2019, 10/06/2020    Pneumococcal Conjugate 13-Valent 07/25/2019    Pneumococcal Polysaccharide PPV23 03/17/2003    SARS-CoV-2 / COVID-19 mRNA IM (Pfizer-BioNTech) 01/22/2021, 02/12/2021    Zoster 07/09/2010       ZANDER Long

## 2021-06-16 ENCOUNTER — TELEPHONE (OUTPATIENT)
Dept: FAMILY MEDICINE CLINIC | Facility: CLINIC | Age: 86
End: 2021-06-16

## 2021-06-16 PROBLEM — E11.65 TYPE 2 DIABETES MELLITUS WITH HYPERGLYCEMIA (HCC): Status: RESOLVED | Noted: 2019-06-29 | Resolved: 2021-06-16

## 2021-06-16 PROBLEM — J84.112 IDIOPATHIC PULMONARY FIBROSIS (HCC): Status: ACTIVE | Noted: 2020-11-01

## 2021-06-16 PROBLEM — E11.3299 NPDR (NONPROLIFERATIVE DIABETIC RETINOPATHY) (HCC): Status: ACTIVE | Noted: 2021-06-16

## 2021-06-16 PROBLEM — E11.3592: Status: RESOLVED | Noted: 2021-03-22 | Resolved: 2021-06-16

## 2021-06-16 PROBLEM — E11.3393 TYPE 2 DIABETES MELLITUS WITH BOTH EYES AFFECTED BY MODERATE NONPROLIFERATIVE RETINOPATHY WITHOUT MACULAR EDEMA, WITHOUT LONG-TERM CURRENT USE OF INSULIN (HCC): Status: ACTIVE | Noted: 2021-03-22

## 2021-06-16 NOTE — ASSESSMENT & PLAN NOTE
Recent hypoglycemia, with sodium down to 130  Originally thought to be from poor PO intake  However, she reports she is eating at baseline, trying to eat more, yet sodium has decreased further, typically running 132-135, now 130  Glucose does not seem high enough, at this time to make such a large difference in sodium level  She is not taking any medications that would contribute to hypoglycemia  She denies alcohol use  Will refer to nephrology for further evaluation

## 2021-06-16 NOTE — ASSESSMENT & PLAN NOTE
RIGHT:  Known occlusion of the internal carotid artery  Vertebral artery flow is antegrade  There is no significant subclavian artery  disease  LEFT:  There is 50-69% stenosis noted in the internal carotid artery  Plaque is  heterogenous and irregular  Vertebral artery flow is antegrade  There is no significant subclavian artery  disease  In comparison to the study on 8/21/2020, there is no significant change in the  disease process, unable to reproduce previous 50-75% stenosis in the right  subclavian artery  Continue aspirin, Eliquis, Vytorin  Continue follow-up with vascular surgery, Dr Radha Faustin Doctor

## 2021-06-16 NOTE — ASSESSMENT & PLAN NOTE
Lab Results   Component Value Date    HGBA1C 7 4 (H) 05/28/2021     Hemoglobin A1c is stable, which is at her goal less than 8%  Currently taking metformin and Januvia  She has been on Levemir and Lantus insulin in the past, however even at very low doses became hypoglycemic  Has bilateral moderate nonproliferative diabetic retinopathy, with recent vitreous hemorrhage in the left eye  She is following with Ophthalmology regarding this  She is compliant with diabetic diet  Currently struggling with trying to gain weight and keeping sugars at a reasonable level, with out hypoglycemia  Met with dietician in April  Will continue same medications for now, and monitor closely  Patient will continue to keep home glucose log

## 2021-06-16 NOTE — TELEPHONE ENCOUNTER
Please ask Ac to complete the following as follow up from her last office visit:    Additional blood work  MRI brain  Mammogram    Orders placed, please mail if needed  Please also ask her to schedule with nephrology, regarding her low sodium levels as we discussed  Thank you

## 2021-06-16 NOTE — ASSESSMENT & PLAN NOTE
March 2021 carotid duplex study:  RIGHT:  Known occlusion of the internal carotid artery  Vertebral artery flow is antegrade  There is no significant subclavian artery  disease  LEFT:  There is 50-69% stenosis noted in the internal carotid artery  Plaque is  heterogenous and irregular  Vertebral artery flow is antegrade  There is no significant subclavian artery  disease  In comparison to the study on 8/21/2020, there is no significant change in the  disease process, unable to reproduce previous 50-75% stenosis in the right  subclavian artery  Continue Vytorin, aspirin, Eliquis  Continue follow-up with vascular surgery Dr Jennifer Loaiza Doctor

## 2021-06-16 NOTE — ASSESSMENT & PLAN NOTE
Body mass index is 19 33 kg/m²  Continuing to work on eating high protein foods, Drinking glucerna 1-2 times per day  Weight is stable

## 2021-06-21 DIAGNOSIS — F41.9 ANXIETY: ICD-10-CM

## 2021-06-21 RX ORDER — LORAZEPAM 0.5 MG/1
1 TABLET ORAL
Qty: 60 TABLET | Refills: 0 | Status: SHIPPED | OUTPATIENT
Start: 2021-06-21 | End: 2021-07-19

## 2021-07-02 NOTE — PROGRESS NOTES
Pulmonary Follow Up Note   Marcela Patton 82 y o  female MRN: [id number]  7/7/2021    1  Interstitial lung disease ([address] )  Assessment & Plan:  - No chronic occupational exposures that are known, no history of autoimmune/rheumatologic diseases or symptoms   No exposure to animals, does not appear to be related to GERD given distribution   - Extensive autoimmune workup including PASTOR, ANCA, CCP, RF, HP panel negative  - HRCT revealed subpleural reticular/fiber nodular opacities, interlobular septal thickening, mild central/bibasilar bronchiectasis, possible UIP pattern  - PFTs with mild restriction based on TLC 75% predicted, severe diffusion defect ; Stable from PFTs 6 months prior   likely IPF, may be slowly progressive  -  Given minimal symptoms and slow progression, will hold off on starting anti fibrotic therapy at this time, given significant side effect profile, josse in elderly patients with GI issues  -  Previously presented at [facility] conference and agreed with plan  - will have serial imaging and PFTs to monitor for progression of disease  - TTE with grade 1 diastolic dysfunction but no evidence of moderate or severe pulmonary hypertension preserved EF  -   recently completed pulmonary rehab  - will check repeat spirometry with diffusion October 2020 and call patient with results, will follow-up in about 6 months or so     2  Gastroesophageal reflux disease with esophagitis without hemorrhage  Assessment & Plan:  -continued GI follow-up for evaluation of GERD and dysphagia  - continue Pepcid, taken off of Protonix due to osteoporosis        3  Hypertension, unspecified type  Assessment & Plan:  - management per pcp         4  Paroxysmal atrial fibrillation (HCC)  Assessment & Plan:  -continue Lopressor, Eliquis  -PCP and Cardiology follow-up        5   Postnasal drip  Assessment & Plan:  -  c/w azelastin bid for post nasal drip and OTC anti histamine    Diagnoses and all orders for this visit:    Idiopathic pulmonary fibrosis (HCC)    Interstitial lung disease (HCC)    Gastroesophageal reflux disease with esophagitis without hemorrhage    Paroxysmal atrial fibrillation (HCC)    Hypertension, unspecified type    Post-nasal drip      Return in about 6 months (around 1/7/2022) for Next scheduled follow up  History of Present Illness      HPI:  Marie Tiwari is a 82 y o  female with history of hypertension/hyperlipidemia, dysphagia, paroxysmal atrial fibrillation on Eliquis with pacemaker, diabetes, neuropathy, presents for f/u of interstitial lung disease      Patient for seen in the office In March 2021   She had prior imaging showing significant interstitial lung disease and fibrosis   Looking back on prior imaging, it appears that she did have some reticular pattern back to imaging on 2007, but has progressed significantly over the past 13 years       In terms of pulmonary history, patient is a former smoker, quit in 1994  Patient 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 and 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 chemicals   She subsequently worked as a  person and 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 different types of [place]  [employer] recently, she worked as a  without exposures   She has never had pets and denies any exposure to birds, lifestyle, farm animals   No autoimmune, connective tissue disease, skin rash  She does have intermittent knee pain, but denies significant small joint pains   She has no family history of lung disease   She does note some environmental allergies worsen her postnasal drip and given her watery eyes      Given patient's finding of ILD, she then underwent further workup   High-resolution CT chest revealed subpleural reticular/fiber nodular opacities, interlobular septal thickening, mild central/bibasilar bronchiectasis, possible UIP pattern   Autoimmune workup was completed with negative PASTOR, Anca, RF, CCP, HP panel   Patient was also prescribed azelastin for postnasal drip  PFTs revealed mild restriction with TLC 73% predicted, DLCO 39% predicted (severe diffusion defect)   6 minutes walk did not reveal any desaturation, stable on PFT about 4 months later      Since last visit,  No change in patient's respiratory symptoms  She is not very active, but is able to walk in the grocery store without significant shortness of breath  She does have some cough worse in the morning and mild dysphagia, for which she follows with GI  She is on Pepcid  She does note postnasal drip and recently restarted her azelastin  Denies any recent fever, chills, weight changes  She does not have any wheezing  She does not use inhalers  Review of Systems   Constitutional: Positive for fatigue  Negative for chills and fever  HENT: Positive for postnasal drip  Negative for congestion, rhinorrhea, sneezing and sore throat  Respiratory: Positive for shortness of breath  Negative for cough and wheezing  Cardiovascular: Negative for chest pain, palpitations and leg swelling  Gastrointestinal: Negative for abdominal pain, constipation, diarrhea, nausea and vomiting  Endocrine: Negative for cold intolerance and heat intolerance  Genitourinary: Negative for dysuria  Musculoskeletal: Negative for arthralgias  Allergic/Immunologic: Negative for immunocompromised state  Neurological: Negative for dizziness and numbness         Historical Information   Past Medical History:   Diagnosis Date    Glaucoma     H/O degenerative disc disease     Idiopathic pulmonary fibrosis ([facility] ) 11/2020    Irregular heart beat     afib    Peripheral neuropathy      Past Surgical History:   Procedure Laterality Date    CATARACT EXTRACTION, BILATERAL  2011    CHOLECYSTECTOMY      CHOLECYSTECTOMY LAPAROSCOPIC N/A 12/9/2020    Procedure: CHOLECYSTECTOMY LAPAROSCOPIC;  Surgeon: Bob Damian MD;  Location: [facility];  Service: General    COLONOSCOPY      CYST REMOVAL  2009    left lower [facility] [address]    LIPOMA RESECTION  2010    AR REPAIR ING HERNIA,5+Y/O,REDUCIBL Bilateral 1/5/2018    Procedure: INGUINAL HERNIA REPAIR;  Surgeon: Jeanette Cleary MD;  Location: [facility];  Service: General    SHOULDER SURGERY  04/26/2019    [facility]   [address] carotid occlusion     Family History   Problem Relation Age of Onset    Heart attack Father     Hiatal hernia Father     Diabetes Father     Heart disease Mother     Cancer Brother     Diabetes Brother     Heart disease Brother     Hypertension Brother      Meds/Allergies     Current Outpatient Medications:     acetaminophen (TYLENOL) 500 mg tablet, Take 1,000 mg by mouth as needed for mild pain, Disp: , Rfl:     amoxicillin (AMOXIL) 500 mg capsule, TAKE 4 CAPSULES 1 HOUR BEFORE DENTAL APPOINTMENT, Disp: , Rfl:     aspirin (ECOTRIN LOW STRENGTH) 81 mg EC tablet, Take 1 tablet (81 mg total) by mouth daily, Disp:  , Rfl:     azelastine (ASTELIN) 0 1 % nasal spray, 1 spray into each nostril 2 (two) times a day Use in each nostril as directed (Patient taking differently: 1 spray into each nostril as needed Use in each nostril as directed), Disp: 1 Bottle, Rfl: 2    bimatoprost (LUMIGAN) 0 01 % ophthalmic drops, Administer 1 drop to both eyes daily at bedtime for 30 days, Disp: 1 5 mL, Rfl: 0    calcium carbonate (OS-DANIA) 600 MG tablet, Take 600 mg by mouth 2 (two) times a day with meals, Disp: , Rfl:     carboxymethylcellulose (Artificial Tears) 1 % ophthalmic solution, 1 drop 3 (three) times a day, Disp: , Rfl:     Eliquis 2 5 MG, TAKE 1 TABLET (2 5 MG TOTAL) BY MOUTH 2 (TWO) TIMES A DAY, Disp: 60 tablet, Rfl: 5    ezetimibe-simvastatin (VYTORIN) 10-40 mg per tablet, TAKE 1 TABLET BY MOUTH DAILY AT BEDTIME, Disp: 30 tablet, Rfl: 5    famotidine (PEPCID) 20 mg tablet, Take 1 tablet (20 mg total) by mouth 2 (two) times a day, Disp: 180 tablet, Rfl: 1    ferrous sulfate 325 (65 Fe) mg tablet, Take 325 mg by mouth daily with breakfast, Disp: , Rfl:     glucose blood (ONE TOUCH ULTRA TEST) test strip, TEST FOUR TIMES A DAY, Disp: 400 each, Rfl: 1    Lancets (ONETOUCH DELICA PLUS XIVRLK14P) MISC, Use one lancet to test blood 4 times a day, Disp: 400 each, Rfl: 1    loratadine (CLARITIN) 10 mg tablet, Take 10 mg by mouth daily as needed for allergies, Disp: , Rfl:     LORazepam (ATIVAN) 0 5 mg tablet, TAKE 2 TABLETS (1 MG TOTAL) BY MOUTH DAILY AT BEDTIME, Disp: 60 tablet, Rfl: 0    metFORMIN (GLUCOPHAGE) 500 mg tablet, Take 2 tablets (1,000 mg total) by mouth 2 (two) times a day with meals, Disp: 360 tablet, Rfl: 3    metoprolol tartrate (LOPRESSOR) 50 mg tablet, TAKE 1 5 TABLETS (75 MG TOTAL) BY MOUTH EVERY 12 (TWELVE) HOURS, Disp: 90 tablet, Rfl: 5    Multiple Vitamin (multivitamin) tablet, Take 1 tablet by mouth daily, Disp: , Rfl:     QC Pen Needles 31G X 6 MM MISC, USE 4 (FOUR) TIMES A DAY (BEFORE MEALS AND AT BEDTIME), Disp: 100 each, Rfl: 3    sitaGLIPtin (Januvia) 100 mg tablet, Take 1 tablet (100 mg total) by mouth daily, Disp: 30 tablet, Rfl: 5  Allergies   Allergen Reactions    Keflex [Cephalexin] Diarrhea       Vitals: Blood pressure 130/82, pulse 81, temperature (!) 96 4 °F (35 8 °C), temperature source Temporal, height 5' 7" (1 702 m), weight 55 kg (121 lb 3 2 oz), SpO2 100 %  Body mass index is 18 98 kg/m²  Oxygen Therapy  SpO2: 100 %  Oxygen Therapy: None (Room air)    Physical Exam  Constitutional:       General: She is not in acute distress  Appearance: She is well-developed  She is not diaphoretic  Comments: thin   HENT:      Head: Normocephalic and atraumatic  Mouth/Throat:      Mouth: Mucous membranes are moist       Pharynx: Oropharynx is clear  No oropharyngeal exudate  Eyes:      General: No scleral icterus  Cardiovascular:      Rate and Rhythm: Normal rate and regular rhythm  Heart sounds: Normal heart sounds  No murmur heard       Pulmonary: Effort: Pulmonary effort is normal  No respiratory distress  Breath sounds: No wheezing  Comments: Bibasilar crackles  Abdominal:      General: Bowel sounds are normal  There is no distension  Palpations: Abdomen is soft  Tenderness: There is no abdominal tenderness  Musculoskeletal:      Right lower leg: No edema  Left lower leg: No edema  Neurological:      Mental Status: She is alert and oriented to person, place, and time  Labs: I have personally reviewed pertinent lab results    Lab Results   Component Value Date    WBC 5 40 2021    HGB 12 1 2021    HCT 38 5 2021    MCV 86 2021     2021     Lab Results   Component Value Date    CALCIUM 9 2 2021    K 4 0 2021    CO2 36 (H) 2021    CL 93 (L) 2021    BUN 12 2021    CREATININE 0 39 (L) 2021     Lab Results   Component Value Date    IGE 14 6 10/16/2020     Lab Results   Component Value Date    ALT 27 2021    AST 18 2021    ALKPHOS 56 2021     Imaging and other studies: I have personally reviewed pertinent reports    and I have personally reviewed pertinent films in PACS  CT abdomen/pelvis  2020   intra and extrahepatic biliary ductal dilatation with CBD measuring up to 12 mm   subpleural reticular/ fibronodular opacities with interlobular septal thickening and bronchiectasis at lung bases     HRCT 20  High-resolution CT chest revealed subpleural reticular/fiber nodular opacities, interlobular septal thickening, mild central/bibasilar bronchiectasis, possible UIP pattern     Chest x-ray 10/08/2020  Moderate pulmonary fibrosis        Pulmonary function testin/11/20  Results:  FEV1/FVC Ratio: 85 %  Forced Vital Capacity: 1 98 L    60 % predicted  FEV1: 1 69 L     70 % predicted     Lung volumes by body plethysmography: Total Lung Capacity 73 % predicted Residual volume 85 % predicted     DLCO corrected for patients hemoglobin level: 39 %     6MWT performed on Room Air - total walk distance 204 meters, initialk SpO2 99%, omari 97%, initial HR 93bpm, maximal HR 97%, Aishwarya Dyspnea Scale 1/10--->3/10     Interpretation:     · Mild restrictive airflow defect on spirometry      · Normal Lung volumes     · Severely Reduced Diffusion     · Normal flow volume loops     · 6MWT distance of 204 meters, no significant desaturation    4/19/21  Results:  FEV1/FVC Ratio: 78 %  Forced Vital Capacity: 1 94 L    63 % predicted  FEV1: 1 52 L     67 % predicted     Lung volumes by body plethysmography:   Total Lung Capacity 75 % predicted   Residual volume 91 % predicted     DLCO corrected for patients hemoglobin level: 34 %     Interpretation:     · No obstructive airflow defect      · Mild restrictive lung disease as indicated by decreased TLC     · Normal Lung volumes     · Severely reduced diffusion capacity     · Resting room air saturation 98% on room air, pulse 87  The patient walked 289m in 6 minutes with a lowest SpO2 of 92% and highest heart rate of 96    Dyspnea at peak activity 0/10 on the Aishwarya scale      EKG, Pathology, and Other Studies: I have personally reviewed pertinent reports     Echo 08/15/2019  EF 24%, grade 1 diastolic dysfunction, no obvious pHTN       Abrahan Schulte MD  Pulmonary & Critical Care Fellow, [address] [facility]

## 2021-07-06 ENCOUNTER — REMOTE DEVICE CLINIC VISIT (OUTPATIENT)
Dept: CARDIOLOGY CLINIC | Facility: CLINIC | Age: 86
End: 2021-07-06
Payer: MEDICARE

## 2021-07-06 DIAGNOSIS — Z95.0 PACEMAKER: Primary | ICD-10-CM

## 2021-07-06 PROCEDURE — 93296 REM INTERROG EVL PM/IDS: CPT | Performed by: INTERNAL MEDICINE

## 2021-07-06 PROCEDURE — 93294 REM INTERROG EVL PM/LDLS PM: CPT | Performed by: INTERNAL MEDICINE

## 2021-07-06 NOTE — PROGRESS NOTES
Results for orders placed or performed in visit on 07/06/21   Cardiac EP device report    Narrative    MDT-DUAL CHAMBER PPM (AAIR-DDDR MODE)/ ACTIVE SYSTEM IS MRI CONDITIONAL  CARELINK TRANSMISSION: BATTERY ADEQUATE (4-5 5 YRS)  AP 97% (BRADYCARDIA/AAIR-DDDR 60)   0%  ALL LEAD PARAMETERS WITHIN NORMAL LIMITS  5 AF EPISODES (UP TO 10 MNTS)  NO VHR EPISODES  PT  TAKES ASA 81, ELIQUIS & METOPROLOL SUCC  NORMAL DEVICE FUNCTION   PL

## 2021-07-07 ENCOUNTER — OFFICE VISIT (OUTPATIENT)
Dept: PULMONOLOGY | Facility: CLINIC | Age: 86
End: 2021-07-07
Payer: MEDICARE

## 2021-07-07 VITALS
HEART RATE: 81 BPM | HEIGHT: 67 IN | SYSTOLIC BLOOD PRESSURE: 130 MMHG | BODY MASS INDEX: 19.02 KG/M2 | WEIGHT: 121.2 LBS | TEMPERATURE: 96.4 F | DIASTOLIC BLOOD PRESSURE: 82 MMHG | OXYGEN SATURATION: 100 %

## 2021-07-07 DIAGNOSIS — I48.0 PAROXYSMAL ATRIAL FIBRILLATION (HCC): ICD-10-CM

## 2021-07-07 DIAGNOSIS — I10 HYPERTENSION, UNSPECIFIED TYPE: ICD-10-CM

## 2021-07-07 DIAGNOSIS — R09.82 POST-NASAL DRIP: ICD-10-CM

## 2021-07-07 DIAGNOSIS — K21.00 GASTROESOPHAGEAL REFLUX DISEASE WITH ESOPHAGITIS WITHOUT HEMORRHAGE: ICD-10-CM

## 2021-07-07 DIAGNOSIS — J84.112 IDIOPATHIC PULMONARY FIBROSIS (HCC): Primary | ICD-10-CM

## 2021-07-07 DIAGNOSIS — J84.9 INTERSTITIAL LUNG DISEASE (HCC): ICD-10-CM

## 2021-07-07 PROCEDURE — 99214 OFFICE O/P EST MOD 30 MIN: CPT | Performed by: INTERNAL MEDICINE

## 2021-07-19 DIAGNOSIS — F41.9 ANXIETY: ICD-10-CM

## 2021-07-19 DIAGNOSIS — I48.0 PAROXYSMAL ATRIAL FIBRILLATION (HCC): ICD-10-CM

## 2021-07-19 RX ORDER — APIXABAN 2.5 MG/1
TABLET, FILM COATED ORAL
Qty: 60 TABLET | Refills: 5 | Status: SHIPPED | OUTPATIENT
Start: 2021-07-19 | End: 2022-02-09

## 2021-07-19 RX ORDER — LORAZEPAM 0.5 MG/1
1 TABLET ORAL
Qty: 60 TABLET | Refills: 0 | Status: SHIPPED | OUTPATIENT
Start: 2021-07-19 | End: 2021-08-23

## 2021-08-17 ENCOUNTER — FOLLOW UP (OUTPATIENT)
Dept: URBAN - METROPOLITAN AREA CLINIC 6 | Facility: CLINIC | Age: 86
End: 2021-08-17

## 2021-08-17 DIAGNOSIS — E11.9: ICD-10-CM

## 2021-08-17 DIAGNOSIS — H40.1132: ICD-10-CM

## 2021-08-17 DIAGNOSIS — H16.223: ICD-10-CM

## 2021-08-17 DIAGNOSIS — H26.493: ICD-10-CM

## 2021-08-17 DIAGNOSIS — Z96.1: ICD-10-CM

## 2021-08-17 PROCEDURE — 92014 COMPRE OPH EXAM EST PT 1/>: CPT

## 2021-08-17 PROCEDURE — 92020 GONIOSCOPY: CPT

## 2021-08-17 PROCEDURE — 92202 OPSCPY EXTND ON/MAC DRAW: CPT

## 2021-08-17 PROCEDURE — 92133 CPTRZD OPH DX IMG PST SGM ON: CPT

## 2021-08-17 ASSESSMENT — TONOMETRY
OD_IOP_MMHG: 12
OS_IOP_MMHG: 15

## 2021-08-17 ASSESSMENT — VISUAL ACUITY
OS_CC: 20/40-2
OD_CC: 20/200

## 2021-08-18 ENCOUNTER — TELEPHONE (OUTPATIENT)
Dept: NEPHROLOGY | Facility: CLINIC | Age: 86
End: 2021-08-18

## 2021-08-18 NOTE — TELEPHONE ENCOUNTER
I left message for patient to call the office to offer a sooner appointment date for her  Please transfer the call to me if possible when the patient calls back  Marvel Montes

## 2021-08-23 DIAGNOSIS — E11.319 TYPE 2 DIABETES MELLITUS WITH RETINOPATHY, WITH LONG-TERM CURRENT USE OF INSULIN, MACULAR EDEMA PRESENCE UNSPECIFIED, UNSPECIFIED LATERALITY, UNSPECIFIED RETINOPATHY SEVERITY (HCC): ICD-10-CM

## 2021-08-23 DIAGNOSIS — Z79.4 TYPE 2 DIABETES MELLITUS WITH RETINOPATHY, WITH LONG-TERM CURRENT USE OF INSULIN, MACULAR EDEMA PRESENCE UNSPECIFIED, UNSPECIFIED LATERALITY, UNSPECIFIED RETINOPATHY SEVERITY (HCC): ICD-10-CM

## 2021-08-23 DIAGNOSIS — F41.9 ANXIETY: ICD-10-CM

## 2021-08-23 DIAGNOSIS — I10 ESSENTIAL (PRIMARY) HYPERTENSION: ICD-10-CM

## 2021-08-23 RX ORDER — METOPROLOL TARTRATE 50 MG/1
75 TABLET, FILM COATED ORAL EVERY 12 HOURS SCHEDULED
Qty: 90 TABLET | Refills: 5 | Status: SHIPPED | OUTPATIENT
Start: 2021-08-23 | End: 2022-02-09

## 2021-08-23 RX ORDER — LORAZEPAM 0.5 MG/1
1 TABLET ORAL
Qty: 60 TABLET | Refills: 0 | Status: SHIPPED | OUTPATIENT
Start: 2021-08-23 | End: 2021-09-20

## 2021-08-23 RX ORDER — SITAGLIPTIN 100 MG/1
TABLET, FILM COATED ORAL
Qty: 30 TABLET | Refills: 5 | Status: SHIPPED | OUTPATIENT
Start: 2021-08-23 | End: 2022-02-09

## 2021-09-01 ENCOUNTER — APPOINTMENT (OUTPATIENT)
Dept: LAB | Facility: CLINIC | Age: 86
End: 2021-09-01
Payer: MEDICARE

## 2021-09-01 DIAGNOSIS — E87.1 HYPONATREMIA: ICD-10-CM

## 2021-09-01 DIAGNOSIS — R53.83 FATIGUE, UNSPECIFIED TYPE: ICD-10-CM

## 2021-09-01 DIAGNOSIS — R41.3 MEMORY CHANGE: ICD-10-CM

## 2021-09-01 PROBLEM — E11.42 TYPE 2 DIABETES MELLITUS WITH DIABETIC POLYNEUROPATHY, WITHOUT LONG-TERM CURRENT USE OF INSULIN (HCC): Status: ACTIVE | Noted: 2021-09-01

## 2021-09-01 PROBLEM — E78.5 TYPE 2 DIABETES MELLITUS WITH HYPERLIPIDEMIA (HCC): Status: ACTIVE | Noted: 2021-09-01

## 2021-09-01 PROBLEM — E11.69 TYPE 2 DIABETES MELLITUS WITH HYPERLIPIDEMIA (HCC): Status: ACTIVE | Noted: 2021-09-01

## 2021-09-01 LAB
ANION GAP SERPL CALCULATED.3IONS-SCNC: 5 MMOL/L (ref 4–13)
BUN SERPL-MCNC: 11 MG/DL (ref 5–25)
CALCIUM SERPL-MCNC: 9 MG/DL (ref 8.3–10.1)
CHLORIDE SERPL-SCNC: 96 MMOL/L (ref 100–108)
CO2 SERPL-SCNC: 30 MMOL/L (ref 21–32)
CORTIS AM PEAK SERPL-MCNC: 19 UG/DL (ref 4.2–22.4)
CREAT SERPL-MCNC: 0.42 MG/DL (ref 0.6–1.3)
GFR SERPL CREATININE-BSD FRML MDRD: 95 ML/MIN/1.73SQ M
GLUCOSE P FAST SERPL-MCNC: 144 MG/DL (ref 65–99)
POTASSIUM SERPL-SCNC: 3.9 MMOL/L (ref 3.5–5.3)
SODIUM SERPL-SCNC: 131 MMOL/L (ref 136–145)
VIT B12 SERPL-MCNC: 677 PG/ML (ref 100–900)

## 2021-09-01 PROCEDURE — 80048 BASIC METABOLIC PNL TOTAL CA: CPT

## 2021-09-01 PROCEDURE — 82533 TOTAL CORTISOL: CPT

## 2021-09-01 PROCEDURE — 36415 COLL VENOUS BLD VENIPUNCTURE: CPT

## 2021-09-01 PROCEDURE — 84165 PROTEIN E-PHORESIS SERUM: CPT

## 2021-09-01 PROCEDURE — 86592 SYPHILIS TEST NON-TREP QUAL: CPT

## 2021-09-01 PROCEDURE — 82607 VITAMIN B-12: CPT

## 2021-09-01 PROCEDURE — 84165 PROTEIN E-PHORESIS SERUM: CPT | Performed by: PATHOLOGY

## 2021-09-02 LAB
ALBUMIN SERPL ELPH-MCNC: 4.4 G/DL (ref 3.5–5)
ALBUMIN SERPL ELPH-MCNC: 57.9 % (ref 52–65)
ALPHA1 GLOB SERPL ELPH-MCNC: 0.27 G/DL (ref 0.1–0.4)
ALPHA1 GLOB SERPL ELPH-MCNC: 3.5 % (ref 2.5–5)
ALPHA2 GLOB SERPL ELPH-MCNC: 1.06 G/DL (ref 0.4–1.2)
ALPHA2 GLOB SERPL ELPH-MCNC: 13.9 % (ref 7–13)
BETA GLOB ABNORMAL SERPL ELPH-MCNC: 0.36 G/DL (ref 0.4–0.8)
BETA1 GLOB SERPL ELPH-MCNC: 4.8 % (ref 5–13)
BETA2 GLOB SERPL ELPH-MCNC: 5 % (ref 2–8)
BETA2+GAMMA GLOB SERPL ELPH-MCNC: 0.38 G/DL (ref 0.2–0.5)
GAMMA GLOB ABNORMAL SERPL ELPH-MCNC: 1.13 G/DL (ref 0.5–1.6)
GAMMA GLOB SERPL ELPH-MCNC: 14.9 % (ref 12–22)
IGG/ALB SER: 1.38 {RATIO} (ref 1.1–1.8)
PROT PATTERN SERPL ELPH-IMP: ABNORMAL
PROT SERPL-MCNC: 7.6 G/DL (ref 6.4–8.2)
RPR SER QL: NORMAL

## 2021-09-07 ENCOUNTER — OFFICE VISIT (OUTPATIENT)
Dept: FAMILY MEDICINE CLINIC | Facility: CLINIC | Age: 86
End: 2021-09-07
Payer: MEDICARE

## 2021-09-07 VITALS
OXYGEN SATURATION: 100 % | HEIGHT: 67 IN | RESPIRATION RATE: 16 BRPM | HEART RATE: 75 BPM | BODY MASS INDEX: 19.3 KG/M2 | TEMPERATURE: 97.2 F | WEIGHT: 123 LBS | SYSTOLIC BLOOD PRESSURE: 120 MMHG | DIASTOLIC BLOOD PRESSURE: 70 MMHG

## 2021-09-07 DIAGNOSIS — E11.3391 TYPE 2 DIABETES MELLITUS WITH RIGHT EYE AFFECTED BY MODERATE NONPROLIFERATIVE RETINOPATHY WITHOUT MACULAR EDEMA, WITHOUT LONG-TERM CURRENT USE OF INSULIN (HCC): ICD-10-CM

## 2021-09-07 DIAGNOSIS — E44.0 MODERATE PROTEIN-CALORIE MALNUTRITION (HCC): ICD-10-CM

## 2021-09-07 DIAGNOSIS — Z00.00 MEDICARE ANNUAL WELLNESS VISIT, SUBSEQUENT: ICD-10-CM

## 2021-09-07 DIAGNOSIS — I48.0 PAROXYSMAL ATRIAL FIBRILLATION (HCC): ICD-10-CM

## 2021-09-07 DIAGNOSIS — E11.3552 TYPE 2 DIABETES MELLITUS WITH STABLE PROLIFERATIVE RETINOPATHY OF LEFT EYE, WITHOUT LONG-TERM CURRENT USE OF INSULIN (HCC): ICD-10-CM

## 2021-09-07 DIAGNOSIS — E87.1 HYPONATREMIA: ICD-10-CM

## 2021-09-07 DIAGNOSIS — E11.3393 TYPE 2 DIABETES MELLITUS WITH BOTH EYES AFFECTED BY MODERATE NONPROLIFERATIVE RETINOPATHY WITHOUT MACULAR EDEMA, WITHOUT LONG-TERM CURRENT USE OF INSULIN (HCC): Primary | ICD-10-CM

## 2021-09-07 DIAGNOSIS — I10 HYPERTENSION, UNSPECIFIED TYPE: ICD-10-CM

## 2021-09-07 DIAGNOSIS — J84.112 IDIOPATHIC PULMONARY FIBROSIS (HCC): ICD-10-CM

## 2021-09-07 DIAGNOSIS — E78.2 MIXED HYPERLIPIDEMIA: ICD-10-CM

## 2021-09-07 LAB — SL AMB POCT HEMOGLOBIN AIC: 7.6 (ref ?–6.5)

## 2021-09-07 PROCEDURE — 99214 OFFICE O/P EST MOD 30 MIN: CPT | Performed by: NURSE PRACTITIONER

## 2021-09-07 PROCEDURE — 83036 HEMOGLOBIN GLYCOSYLATED A1C: CPT | Performed by: NURSE PRACTITIONER

## 2021-09-07 PROCEDURE — G0439 PPPS, SUBSEQ VISIT: HCPCS | Performed by: NURSE PRACTITIONER

## 2021-09-07 NOTE — PROGRESS NOTES
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, without long-term current use of insulin ([facility]) - Primary       Lab Results   Component Value Date    HGBA1C 7 6 (A) 09/07/2021     Hemoglobin A1c 7 6%:  Up slightly from 7 4%  A1c goal less than 8%, ideally less than 7 5% due to retinopathy  Has tried basal insulin, but had even at very low doses hypoglycemia  Also experienced hypoglycemia with mealtime insulin  Currently is on metformin and Januvia  I am concerned about adding sulfonylurea due to risk of hypoglycemia  Will avoid GLP-1 due to associated weight loss  May consider Jardiance 10 mg daily, however, would be concerned about risk of hypotension, hypovolemia given her frailty  For now will continue same medications and continue to monitor  She does have regular eye exams  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, without long-term current use of insulin ([facility])       Lab Results   Component Value Date    HGBA1C 7 6 (A) 09/07/2021     Hemoglobin A1c 7 6%:  Up slightly from 7 4%  A1c goal less than 8%, ideally less than 7 5% due to retinopathy  Has tried basal insulin, but had even at very low doses hypoglycemia  Also experienced hypoglycemia with mealtime insulin  Currently is on metformin and Januvia  I am concerned about adding sulfonylurea due to risk of hypoglycemia  Will avoid GLP-1 due to associated weight loss  May consider Jardiance 10 mg daily, however, would be concerned about risk of hypotension, hypovolemia given her frailty  For now will continue same medications and continue to monitor  She does have regular eye exams  Respiratory    Idiopathic pulmonary fibrosis (HCC)       Stable  Continues follow-up with pulmonology              Cardiovascular and Mediastinum    HTN (hypertension)     Stable on Metoprolol  Paroxysmal atrial fibrillation (HCC)       Anticoagulated on Eliquis and aspirin  Rate controlled on metoprolol  Follows with Cardiology, Dr Brenton Goddard  Other    HLD (hyperlipidemia)       Stable with LDL 50 on Vytorin  Moderate protein-calorie malnutrition (HCC)      BMI has decreased slightly from 19 33 with last office visit  She does weigh herself weekly, and states weight has been stable at 121 lb  She has been trying to eat small frequent meals,  Drinking Glucerna 1-2 times per day  Body mass index is 19 26 kg/m²  Hyponatremia       Persistently low sodium levels  Has consultation with Nephrology tomorrow  Other Visit Diagnoses     Medicare annual wellness visit, subsequent               Preventive health issues were discussed with patient, and age appropriate screening tests were ordered as noted in patient's After Visit Summary  Personalized health advice and appropriate referrals for health education or preventive services given if needed, as noted in patient's After Visit Summary       History of Present Illness:     Patient presents for Medicare Annual Wellness visit    Patient Care Team:  Yanet Dao as PCP - General (Family Medicine)  Alexandria Saenz MD as PCP - Endocrinology (Endocrinology)  MD Mookie Godfrey MD (Ophthalmology)  Sisi Chiang MD (Ophthalmology)     Problem List:     Patient Active Problem List   Diagnosis    HTN (hypertension)    HLD (hyperlipidemia)    Glaucoma    Asymptomatic carotid artery stenosis, left    Symptomatic PVCs    Occlusion of right carotid artery    Stenosis of right subclavian artery (HCC)    Paroxysmal atrial fibrillation ([address] )    Pacemaker    Osteoporosis    GERD (gastroesophageal reflux disease)    Anxiety    Iron deficiency    Interstitial lung disease ([address] )    Common bile duct dilatation    Dysphagia    Moderate protein-calorie malnutrition ([address] )    Constipation    Hyponatremia    S/P laparoscopic cholecystectomy    Post-nasal drip    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, without long-term current use of insulin (HCC)    Idiopathic pulmonary fibrosis (HCC)    Type 2 diabetes mellitus with hyperlipidemia (HCC)    Type 2 diabetes mellitus with diabetic polyneuropathy, without long-term current use of insulin (HCC)    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, without long-term current use of insulin ([facility] )      Past Medical and Surgical History:     Past Medical History:   Diagnosis Date    Glaucoma     H/O degenerative disc disease     Idiopathic pulmonary fibrosis ([facility] ) 11/2020    Irregular heart beat     afib    Peripheral neuropathy      Past Surgical History:   Procedure Laterality Date    CATARACT EXTRACTION, BILATERAL  2011    CHOLECYSTECTOMY      CHOLECYSTECTOMY LAPAROSCOPIC N/A 12/9/2020    Procedure: CHOLECYSTECTOMY LAPAROSCOPIC;  Surgeon: Deepa Salas MD;  [facility];  Service: General    COLONOSCOPY      CYST REMOVAL  2009    left lower Quadrant     HERNIA REPAIR  1992    LIPOMA RESECTION  2010    GA REPAIR ING HERNIA,5+Y/O,REDUCIBL Bilateral 1/5/2018    Procedure: INGUINAL HERNIA REPAIR;  Surgeon: Jazlyn Carpio MD;  [facility];  Service: General    SHOULDER SURGERY  04/26/2019    Dr Rena Wolf [facility]   [address]  R carotid occlusion      Family History:     Family History   Problem Relation Age of Onset    Heart attack Father     Hiatal hernia Father     Diabetes Father     Heart disease Mother     Cancer Brother     Diabetes Brother     Heart disease Brother     Hypertension Brother       Social History:     Social History     Socioeconomic History    Marital status:      Spouse name: None    Number of children: None    Years of education: None    Highest education level: None   Occupational History    Occupation: customer service   retired   Tobacco Use    Smoking status: Former Smoker     Packs/day: 1 50     Years: 38 00     Pack years: 57 00     Types: Cigarettes     Start date:      Quit date:      Years since quittin 7    Smokeless tobacco: Never Used   Vaping Use    Vaping Use: Never used   Substance and Sexual Activity    Alcohol use: No    Drug use: No    Sexual activity: None   Other Topics Concern    None   Social History Narrative    L[place]    2 children     Social Determinants of Health     Financial Resource Strain:     Difficulty of Paying Living Expenses:    Food Insecurity:     Worried About Running Out of Food in the Last Year:     Ran Out of Food in the Last Year:    Transportation Needs:     Lack of Transportation (Medical):      Lack of Transportation (Non-Medical):    Physical Activity:     Days of Exercise per Week:     Minutes of Exercise per Session:    Stress:     Feeling of Stress :    Social Connections:     Frequency of Communication with Friends and Family:     Frequency of Social Gatherings with Friends and Family:     Attends Synagogue Services:     Active Member of Clubs or Organizations:     Attends Club or Organization Meetings:     Marital Status:    Intimate Partner Violence:     Fear of Current or Ex-Partner:     Emotionally Abused:     Physically Abused:     Sexually Abused:       Medications and Allergies:     Current Outpatient Medications   Medication Sig Dispense Refill    acetaminophen (TYLENOL) 500 mg tablet Take 1,000 mg by mouth as needed for mild pain      amoxicillin (AMOXIL) 500 mg capsule Prophylactic, prior to dentist      aspirin (ECOTRIN LOW STRENGTH) 81 mg EC tablet Take 1 tablet (81 mg total) by mouth daily      azelastine (ASTELIN) 0 1 % nasal spray 1 SPRAY INTO EACH NOSTRIL 2 (TWO) TIMES A DAY USE IN EACH NOSTRIL AS DIRECTED 30 mL 1    bimatoprost (LUMIGAN) 0 01 % ophthalmic drops Administer 1 drop to both eyes daily at bedtime for 30 days 1 5 mL 0    calcium carbonate (OS-DANIA) 600 MG tablet Take 600 mg by mouth 2 (two) times a day with meals      carboxymethylcellulose (Artificial Tears) 1 % ophthalmic solution 1 drop 3 (three) times a day      Eliquis 2 5 MG TAKE 1 TABLET (2 5 MG TOTAL) BY MOUTH 2 (TWO) TIMES A DAY 60 tablet 5    ezetimibe-simvastatin (VYTORIN) 10-40 mg per tablet TAKE 1 TABLET BY MOUTH DAILY AT BEDTIME 30 tablet 5    famotidine (PEPCID) 20 mg tablet TAKE 1 TABLET (20 MG TOTAL) BY MOUTH 2 (TWO) TIMES A DAY 60 tablet 5    ferrous sulfate 325 (65 Fe) mg tablet Take 325 mg by mouth daily with breakfast      glucose blood (ONE TOUCH ULTRA TEST) test strip TEST FOUR TIMES A  each 1    Januvia 100 MG tablet TAKE 1 TABLET (100 MG TOTAL) BY MOUTH DAILY 30 tablet 5    Lancets (ONETOUCH DELICA PLUS MVJGEG52N) MISC Use one lancet to test blood 4 times a day 400 each 1    loratadine (CLARITIN) 10 mg tablet Take 10 mg by mouth daily as needed for allergies      LORazepam (ATIVAN) 0 5 mg tablet TAKE 2 TABLETS (1 MG TOTAL) BY MOUTH DAILY AT BEDTIME 60 tablet 0    metFORMIN (GLUCOPHAGE) 500 mg tablet TAKE 2 TABLETS (1,000 MG TOTAL) BY MOUTH 2 (TWO) TIMES A DAY WITH MEALS 120 tablet 5    metoprolol tartrate (LOPRESSOR) 50 mg tablet TAKE 1 5 TABLETS (75 MG TOTAL) BY MOUTH EVERY 12 (TWELVE) HOURS 90 tablet 5    Multiple Vitamin (multivitamin) tablet Take 1 tablet by mouth daily      QC Pen Needles 31G X 6 MM MISC USE 4 (FOUR) TIMES A DAY (BEFORE MEALS AND AT BEDTIME) 100 each 3     No current facility-administered medications for this visit       Allergies   Allergen Reactions    Keflex [Cephalexin] Diarrhea      Immunizations:     Immunization History   Administered Date(s) Administered    INFLUENZA 11/02/2016, 10/16/2017, 09/18/2018, 09/18/2018    Influenza, Quadrivalent (nasal) 11/02/2016    Influenza, high dose seasonal 0 7 mL 11/06/2019, 10/06/2020    Pneumococcal Conjugate 13-Valent 07/25/2019    Pneumococcal Polysaccharide PPV23 03/17/2003    SARS-CoV-2 / COVID-19 mRNA IM (Pfizer-BioNTech) 01/22/2021, 02/12/2021    Zoster 07/09/2010      Health Maintenance:         Topic Date Due    Colorectal Cancer Screening  09/02/2025    Hepatitis C Screening  Completed         Topic Date Due    DTaP,Tdap,and Td Vaccines (1 - Tdap) Never done    Influenza Vaccine (1) 09/01/2021      Medicare Health Risk Assessment:     /70   Pulse 75   Temp (!) 97 2 °F (36 2 °C) (Tympanic)   Resp 16   Ht 5' 7" (1 702 m)   Wt 55 8 kg (123 lb)   SpO2 100%   BMI 19 26 kg/m²      Pranav Glynn is here for her Subsequent Wellness visit  Health Risk Assessment:   Patient rates overall health as fair  Patient feels that their physical health rating is much better  Patient is satisfied with their life  Eyesight was rated as same  Hearing was rated as same  Patient feels that their emotional and mental health rating is much better  Patients states they are never, rarely angry  Patient states they are often unusually tired/fatigued  Pain experienced in the last 7 days has been some  Patient's pain rating has been 3/10  Patient states that she has experienced no weight loss or gain in last 6 months  Depression Screening:   PHQ-2 Score: 0  PHQ-9 Score: 5      Fall Risk Screening: In the past year, patient has experienced: no history of falling in past year      Urinary Incontinence Screening:   Patient has leaked urine accidently in the last six months  Home Safety:  Patient does not have trouble with stairs inside or outside of their home  Patient has working smoke alarms and has working carbon monoxide detector  Home safety hazards include: none  Nutrition:   Current diet is Regular  Medications:   Patient is currently taking over-the-counter supplements  OTC medications include: see medication list  Patient is able to manage medications  Activities of Daily Living (ADLs)/Instrumental Activities of Daily Living (IADLs):   Walk and transfer into and out of bed and chair?: Yes  Dress and groom yourself?: Yes    Bathe or shower yourself?: Yes    Feed yourself? Yes  Do your laundry/housekeeping?: Yes  Manage your money, pay your bills and track your expenses?: Yes  Make your own meals?: Yes    Do your own shopping?: Yes    Previous Hospitalizations:   Any hospitalizations or ED visits within the last 12 months?: Yes    How many hospitalizations have you had in the last year?: 1-2    Advance Care Planning:   Living will: Yes    Durable POA for healthcare: Yes    Advanced directive: Yes      Cognitive Screening:   Provider or family/friend/caregiver concerned regarding cognition?: No    PREVENTIVE SCREENINGS      Cardiovascular Screening:    General: Screening Not Indicated and History Lipid Disorder      Diabetes Screening:     General: Screening Not Indicated and History Diabetes      Colorectal Cancer Screening:     General: Screening Current      Breast Cancer Screening:     General: Patient Declines      Cervical Cancer Screening:    General: Screening Not Indicated      Osteoporosis Screening:    General: Screening Not Indicated and History Osteoporosis      Lung Cancer Screening:     General: Screening Not Indicated      Hepatitis C Screening:    General: Screening Current    Screening, Brief Intervention, and Referral to Treatment (SBIRT)    Screening  Typical number of drinks in a day: 0  Typical number of drinks in a week: 0  Interpretation: Low risk drinking behavior  Single Item Drug Screening:  How often have you used an illegal drug (including marijuana) or a prescription medication for non-medical reasons in the past year? never    Single Item Drug Screen Score: 0  Interpretation: Negative screen for possible drug use disorder    Brief Intervention  Alcohol & drug use screenings were reviewed   No concerns regarding substance use disorder identified         Kimberly Posey

## 2021-09-07 NOTE — PATIENT INSTRUCTIONS
Medicare Preventive Visit Patient Instructions  Thank you for completing your Welcome to Medicare Visit or Medicare Annual Wellness Visit today  Your next wellness visit will be due in one year (9/8/2022)  The screening/preventive services that you may require over the next 5-10 years are detailed below  Some tests may not apply to you based off risk factors and/or age  Screening tests ordered at today's visit but not completed yet may show as past due  Also, please note that scanned in results may not display below  Preventive Screenings:  Service Recommendations Previous Testing/Comments   Colorectal Cancer Screening  * Colonoscopy    * Fecal Occult Blood Test (FOBT)/Fecal Immunochemical Test (FIT)  * Fecal DNA/Cologuard Test  * Flexible Sigmoidoscopy Age: 54-65 years old   Colonoscopy: every 10 years (may be performed more frequently if at higher risk)  OR  FOBT/FIT: every 1 year  OR  Cologuard: every 3 years  OR  Sigmoidoscopy: every 5 years  Screening may be recommended earlier than age 48 if at higher risk for colorectal cancer  Also, an individualized decision between you and your healthcare provider will decide whether screening between the ages of 74-80 would be appropriate  Colonoscopy: 09/02/2020  FOBT/FIT: Not on file  Cologuard: Not on file  Sigmoidoscopy: Not on file    Screening Current     Breast Cancer Screening Age: 36 years old  Frequency: every 1-2 years  Not required if history of left and right mastectomy Mammogram: 04/15/2014        Cervical Cancer Screening Between the ages of 21-29, pap smear recommended once every 3 years  Between the ages of 33-67, can perform pap smear with HPV co-testing every 5 years     Recommendations may differ for women with a history of total hysterectomy, cervical cancer, or abnormal pap smears in past  Pap Smear: Not on file    Screening Not Indicated   Hepatitis C Screening Once for adults born between 1945 and 1965  More frequently in patients at high risk for Hepatitis C Hep C Antibody: 12/07/2020    Screening Current   Diabetes Screening 1-2 times per year if you're at risk for diabetes or have pre-diabetes Fasting glucose: 144 mg/dL   A1C: 7 4 %    Screening Not Indicated  History Diabetes   Cholesterol Screening Once every 5 years if you don't have a lipid disorder  May order more often based on risk factors  Lipid panel: 05/28/2021    Screening Not Indicated  History Lipid Disorder     Other Preventive Screenings Covered by Medicare:  1  Abdominal Aortic Aneurysm (AAA) Screening: covered once if your at risk  You're considered to be at risk if you have a family history of AAA  2  Lung Cancer Screening: covers low dose CT scan once per year if you meet all of the following conditions: (1) Age 50-69; (2) No signs or symptoms of lung cancer; (3) Current smoker or have quit smoking within the last 15 years; (4) You have a tobacco smoking history of at least 30 pack years (packs per day multiplied by number of years you smoked); (5) You get a written order from a healthcare provider  3  Glaucoma Screening: covered annually if you're considered high risk: (1) You have diabetes OR (2) Family history of glaucoma OR (3)  aged 48 and older OR (3)  American aged 72 and older  3  Osteoporosis Screening: covered every 2 years if you meet one of the following conditions: (1) You're estrogen deficient and at risk for osteoporosis based off medical history and other findings; (2) Have a vertebral abnormality; (3) On glucocorticoid therapy for more than 3 months; (4) Have primary hyperparathyroidism; (5) On osteoporosis medications and need to assess response to drug therapy  · Last bone density test (DXA Scan): 09/01/2020   5  HIV Screening: covered annually if you're between the age of 15-65  Also covered annually if you are younger than 13 and older than 72 with risk factors for HIV infection   For pregnant patients, it is covered up to 3 times per pregnancy  Immunizations:  Immunization Recommendations   Influenza Vaccine Annual influenza vaccination during flu season is recommended for all persons aged >= 6 months who do not have contraindications   Pneumococcal Vaccine (Prevnar and Pneumovax)  * Prevnar = PCV13  * Pneumovax = PPSV23   Adults 25-60 years old: 1-3 doses may be recommended based on certain risk factors  Adults 72 years old: Prevnar (PCV13) vaccine recommended followed by Pneumovax (PPSV23) vaccine  If already received PPSV23 since turning 65, then PCV13 recommended at least one year after PPSV23 dose  Hepatitis B Vaccine 3 dose series if at intermediate or high risk (ex: diabetes, end stage renal disease, liver disease)   Tetanus (Td) Vaccine - COST NOT COVERED BY MEDICARE PART B Following completion of primary series, a booster dose should be given every 10 years to maintain immunity against tetanus  Td may also be given as tetanus wound prophylaxis  Tdap Vaccine - COST NOT COVERED BY MEDICARE PART B Recommended at least once for all adults  For pregnant patients, recommended with each pregnancy  Shingles Vaccine (Shingrix) - COST NOT COVERED BY MEDICARE PART B  2 shot series recommended in those aged 48 and above     Health Maintenance Due:      Topic Date Due    Colorectal Cancer Screening  09/02/2025    Hepatitis C Screening  Completed     Immunizations Due:      Topic Date Due    DTaP,Tdap,and Td Vaccines (1 - Tdap) Never done    Influenza Vaccine (1) 09/01/2021     Advance Directives   What are advance directives? Advance directives are legal documents that state your wishes and plans for medical care  These plans are made ahead of time in case you lose your ability to make decisions for yourself  Advance directives can apply to any medical decision, such as the treatments you want, and if you want to donate organs  What are the types of advance directives?   There are many types of advance directives, and each state has rules about how to use them  You may choose a combination of any of the following:  · Living will: This is a written record of the treatment you want  You can also choose which treatments you do not want, which to limit, and which to stop at a certain time  This includes surgery, medicine, IV fluid, and tube feedings  · Durable power of  for healthcare [facility]): This is a written record that states who you want to make healthcare choices for you when you are unable to make them for yourself  This person, called a proxy, is usually a family member or a friend  You may choose more than 1 proxy  · Do not resuscitate (DNR) order:  A DNR order is used in case your heart stops beating or you stop breathing  It is a request not to have certain forms of treatment, such as CPR  A DNR order may be included in other types of advance directives  · Medical directive: This covers the care that you want if you are in a coma, near death, or unable to make decisions for yourself  You can list the treatments you want for each condition  Treatment may include pain medicine, surgery, blood transfusions, dialysis, IV or tube feedings, and a ventilator (breathing machine)  · Values history: This document has questions about your views, beliefs, and how you feel and think about life  This information can help others choose the care that you would choose  Why are advance directives important? An advance directive helps you control your care  Although spoken wishes may be used, it is better to have your wishes written down  Spoken wishes can be misunderstood, or not followed  Treatments may be given even if you do not want them  An advance directive may make it easier for your family to make difficult choices about your care  Urinary Incontinence   Urinary incontinence (UI)  is when you lose control of your bladder  UI develops because your bladder cannot store or empty urine properly   The 3 most common types of UI are stress incontinence, urge incontinence, or both  Medicines:   · May be given to help strengthen your bladder control  Report any side effects of medication to your healthcare provider  Do pelvic muscle exercises often:  Your pelvic muscles help you stop urinating  Squeeze these muscles tight for 5 seconds, then relax for 5 seconds  Gradually work up to squeezing for 10 seconds  Do 3 sets of 15 repetitions a day, or as directed  This will help strengthen your pelvic muscles and improve bladder control  Train your bladder:  Go to the bathroom at set times, such as every 2 hours, even if you do not feel the urge to go  You can also try to hold your urine when you feel the urge to go  For example, hold your urine for 5 minutes when you feel the urge to go  As that becomes easier, hold your urine for 10 minutes  Self-care:   · Keep a UI record  Write down how often you leak urine and how much you leak  Make a note of what you were doing when you leaked urine  · Drink liquids as directed  You may need to limit the amount of liquid you drink to help control your urine leakage  Do not drink any liquid right before you go to bed  Limit or do not have drinks that contain caffeine or alcohol  · Prevent constipation  Eat a variety of high-fiber foods  Good examples are high-fiber cereals, beans, vegetables, and whole-grain breads  Walking is the best way to trigger your intestines to have a bowel movement  · Exercise regularly and maintain a healthy weight  Weight loss and exercise will decrease pressure on your bladder and help you control your leakage  · Use a catheter as directed  to help empty your bladder  A catheter is a tiny, plastic tube that is put into your bladder to drain your urine  · Go to behavior therapy as directed  Behavior therapy may be used to help you learn to control your urge to urinate         © Copyright [employer] 2018 Information is for End User's use only and may not be sold, redistributed or otherwise used for commercial purposes   All illustrations and images included in CareNotes® are the copyrighted property of A [employer] A M , [employer]  or [facility] Saran Aranda

## 2021-09-07 NOTE — PROGRESS NOTES
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, without long-term current use of insulin ([facility] ) - Primary       Lab Results   Component Value Date    HGBA1C 7 6 (A) 2021     Hemoglobin A1c 7 6%:  Up slightly from 7 4%  A1c goal less than 8%, ideally less than 7 5% due to retinopathy  Has tried basal insulin, but had even at very low doses hypoglycemia  Also experienced hypoglycemia with mealtime insulin  Currently is on metformin and Januvia  I am concerned about adding sulfonylurea due to risk of hypoglycemia  Will avoid GLP-1 due to associated weight loss  May consider Jardiance 10 mg daily, however, would be concerned about risk of hypotension, hypovolemia given her frailty  For now will continue same medications and continue to monitor  She does have regular eye exams  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, without long-term current use of insulin ([facility])       Lab Results   Component Value Date    HGBA1C 7 6 (A) 2021     Hemoglobin A1c 7 6%:  Up slightly from 7 4%  A1c goal less than 8%, ideally less than 7 5% due to retinopathy  Has tried basal insulin, but had even at very low doses hypoglycemia  Also experienced hypoglycemia with mealtime insulin  Currently is on metformin and Januvia  I am concerned about adding sulfonylurea due to risk of hypoglycemia  Will avoid GLP-1 due to associated weight loss  May consider Jardiance 10 mg daily, however, would be concerned about risk of hypotension, hypovolemia given her frailty  For now will continue same medications and continue to monitor  She does have regular eye exams              Respiratory    Idiopathic pulmonary fibrosis ([facility] ) Stable  Continues follow-up with pulmonology  Cardiovascular and Mediastinum    HTN (hypertension)     Stable on Metoprolol  Paroxysmal atrial fibrillation (HCC)       Anticoagulated on Eliquis and aspirin  Rate controlled on metoprolol  Follows with Cardiology, Dr Al Dc  Other    HLD (hyperlipidemia)       Stable with LDL 50 on Vytorin  Moderate protein-calorie malnutrition (HCC)      BMI has decreased slightly from 19 33 with last office visit  She does weigh herself weekly, and states weight has been stable at 121 lb  She has been trying to eat small frequent meals,  Drinking Glucerna 1-2 times per day  Body mass index is 19 26 kg/m²  Hyponatremia       Persistently low sodium levels  Has consultation with Nephrology tomorrow  Other Visit Diagnoses     Medicare annual wellness visit, subsequent            1  Type 2 diabetes mellitus with both eyes affected by moderate 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 edema, without long-term current use of insulin ([address] )  POCT hemoglobin A1c   2  Paroxysmal atrial fibrillation (HCC)     3  Moderate protein-calorie malnutrition ([address] )     4  Idiopathic pulmonary fibrosis ([address] )     5  Hyponatremia     6  Hypertension, unspecified type     7  Mixed hyperlipidemia     8  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, without long-term current use of insulin ([address] )     9  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, without long-term current use of insulin ([facility])     10  Medicare annual wellness visit, subsequent             Chief Complaint     Chief Complaint   Patient presents with    Medicare Wellness Visit       History of Present Illness     Thu Hurd is an 82year old female presenting today for follow up on chronic conditions  Weight has been stable  Home weight is 121 lb      Weighs herself once weekly  Has intermittent constipation and diarrhea  Uses Imodium as needed  She has had problems with this since 2019  Home fasting blood sugars every morning running 140 to 170s  Feels lack of exercise and movement affects her sugars  She is mostly homebound due to COVID-19  Previously provided a prescription for an MRI of the brain for memory changes, notes she did not do this, because she does not really want to do a lot of testing anymore  Declines any further mammograms  PHQ-9 score is 5 today  Does not feel depressed  Prayer is coping mechanism and talking with friends  Gets bored  Going to [employer] today just to look around today  Drinks plenty of water  Has Nephrology consultation tomorrow for hyponatremia  Planning for influenza vaccination in October  Review of Systems   Review of Systems   Constitutional: Negative  HENT: Negative  Respiratory: Negative  Cardiovascular: Negative  Gastrointestinal: Positive for constipation and diarrhea  Genitourinary: Negative  Musculoskeletal: Negative  Skin: Negative  Neurological: Negative  Hematological: Negative  Psychiatric/Behavioral: Negative          Active Problem List     Patient Active Problem List   Diagnosis    HTN (hypertension)    HLD (hyperlipidemia)    Glaucoma    Asymptomatic carotid artery stenosis, left    Symptomatic PVCs    Occlusion of right carotid artery    Stenosis of right subclavian artery (HCC)    Paroxysmal atrial fibrillation (HCC)    Pacemaker    Osteoporosis    GERD (gastroesophageal reflux disease)    Anxiety    Iron deficiency    Interstitial lung disease ([address] )    Common bile duct dilatation    Dysphagia    Moderate protein-calorie malnutrition (HCC)    Constipation    Hyponatremia    S/P laparoscopic cholecystectomy    Post-nasal drip    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, without long-term current use of insulin (HCC)    Idiopathic pulmonary fibrosis (HCC)    Type 2 diabetes mellitus with hyperlipidemia (HCC)    Type 2 diabetes mellitus with diabetic polyneuropathy, without long-term current use of insulin (HCC)    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, without long-term current use of insulin (HCC)       Past Medical History:  Past Medical History:   Diagnosis Date    Glaucoma     H/O degenerative disc disease     Idiopathic pulmonary fibrosis ([address] ) 11/2020    Irregular heart beat     afib    Peripheral neuropathy        Past Surgical History:  Past Surgical History:   Procedure Laterality Date    CATARACT EXTRACTION, BILATERAL  2011    CHOLECYSTECTOMY      CHOLECYSTECTOMY LAPAROSCOPIC N/A 12/9/2020    Procedure: CHOLECYSTECTOMY LAPAROSCOPIC;  Surgeon: Eleno Way MD;  Location: [facility];  Service: General    COLONOSCOPY      CYST REMOVAL  2009    left lower Quadrant    [address]    LIPOMA RESECTION  2010    CO REPAIR ING HERNIA,5+Y/O,REDUCIBL Bilateral 1/5/2018    Procedure: INGUINAL HERNIA REPAIR;  Surgeon: Beth Boas, MD;  Location: [facility];  Service: General    SHOULDER SURGERY  04/26/2019    Dr Olga Morfin [facility]   [address]  R carotid occlusion       Family History:  Family History   Problem Relation Age of Onset    Heart attack Father     Hiatal hernia Father     Diabetes Father     Heart disease Mother     Cancer Brother     Diabetes Brother     Heart disease Brother     Hypertension Brother        Social History:  Social History     Socioeconomic History    Marital status:      Spouse name: Not on file    Number of children: Not on file    Years of education: Not on file    Highest education level: Not on file   Occupational History    Occupation: customer service   retired   Tobacco Use    Smoking status: Former Smoker     Packs/day: 1 50     Years: 38 00     Pack years: 57 00     Types: Cigarettes     Start date:      Quit date:      Years since quittin 7    Smokeless tobacco: Never Used   Vaping Use    Vaping Use: Never used   Substance and Sexual Activity    Alcohol use: No    Drug use: No    Sexual activity: Not on file   Other Topics Concern    Not on file   Social History Narrative    Lives [place]    2 children     Social Determinants of Health     Financial Resource Strain:     Difficulty of Paying Living Expenses:    Food Insecurity:     Worried About Running Out of Food in the Last Year:     [employer] of Food in the Last Year:    Transportation Needs:     Lack of Transportation (Medical):  Lack of Transportation (Non-Medical):    Physical Activity:     Days of Exercise per Week:     Minutes of Exercise per Session:    Stress:     Feeling of Stress :    Social Connections:     Frequency of Communication with Friends and Family:     Frequency of Social Gatherings with Friends and Family:     Attends Yazidism Services:     Active Member of Clubs or Organizations:     Attends Club or Organization Meetings:     Marital Status:    Intimate Partner Violence:     Fear of Current or Ex-Partner:     Emotionally Abused:     Physically Abused:     Sexually Abused:        I have reviewed the patient's medical history in detail; there are no changes to the history as noted in the electronic medical record  Objective     Vitals:    21 1001   BP: 120/70   Pulse: 75   Resp: 16   Temp: (!) 97 2 °F (36 2 °C)   TempSrc: Tympanic   SpO2: 100%   Weight: 55 8 kg (123 lb)   Height: 5' 7" (1 702 m)     Wt Readings from Last 3 Encounters:   21 55 2 kg (121 lb 9 6 oz)   21 55 8 kg (123 lb)   21 55 kg (121 lb 3 2 oz)     Physical Exam  Vitals and nursing note reviewed  Constitutional:       General: She is not in acute distress  Appearance: Normal appearance  She is not ill-appearing  Comments: Cachetic, frail   HENT:      Head: Normocephalic and atraumatic  Right Ear: Tympanic membrane and ear canal normal       Left Ear: Tympanic membrane and ear canal normal    Neck:      Thyroid: No thyromegaly  Cardiovascular:      Rate and Rhythm: Normal rate and regular rhythm  Heart sounds: No murmur heard  Pulmonary:      Effort: Pulmonary effort is normal  No respiratory distress  Breath sounds: Normal breath sounds  Abdominal:      General: Abdomen is flat  Bowel sounds are normal  There is no distension  Palpations: Abdomen is soft  Musculoskeletal:      Cervical back: Normal range of motion and neck supple  Right lower leg: No edema  Left lower leg: No edema  Lymphadenopathy:      Cervical: No cervical adenopathy  Skin:     General: Skin is warm and dry  Findings: No rash  Neurological:      Mental Status: She is alert and oriented to person, place, and time     Psychiatric:         Mood and Affect: Mood normal             ALLERGIES:  Allergies   Allergen Reactions    Keflex [Cephalexin] Diarrhea       Current Medications     Current Outpatient Medications   Medication Sig Dispense Refill    acetaminophen (TYLENOL) 500 mg tablet Take 1,000 mg by mouth as needed for mild pain      amoxicillin (AMOXIL) 500 mg capsule Prophylactic, prior to dentist      aspirin (ECOTRIN LOW STRENGTH) 81 mg EC tablet Take 1 tablet (81 mg total) by mouth daily      azelastine (ASTELIN) 0 1 % nasal spray 1 SPRAY INTO EACH NOSTRIL 2 (TWO) TIMES A DAY USE IN EACH NOSTRIL AS DIRECTED 30 mL 1    bimatoprost (LUMIGAN) 0 01 % ophthalmic drops Administer 1 drop to both eyes daily at bedtime for 30 days 1 5 mL 0    calcium carbonate (OS-DANIA) 600 MG tablet Take 600 mg by mouth 2 (two) times a day with meals      carboxymethylcellulose (Artificial Tears) 1 % ophthalmic solution 1 drop 3 (three) times a day      Eliquis 2 5 MG TAKE 1 TABLET (2 5 MG TOTAL) BY MOUTH 2 (TWO) TIMES A DAY 60 tablet 5    ezetimibe-simvastatin (VYTORIN) 10-40 mg per tablet TAKE 1 TABLET BY MOUTH DAILY AT BEDTIME 30 tablet 5    famotidine (PEPCID) 20 mg tablet TAKE 1 TABLET (20 MG TOTAL) BY MOUTH 2 (TWO) TIMES A DAY 60 tablet 5    ferrous sulfate 325 (65 Fe) mg tablet Take 325 mg by mouth daily with breakfast      glucose blood (ONE TOUCH ULTRA TEST) test strip TEST FOUR TIMES A  each 1    Januvia 100 MG tablet TAKE 1 TABLET (100 MG TOTAL) BY MOUTH DAILY 30 tablet 5    Lancets (ONETOUCH DELICA PLUS [id number]) MISC Use one lancet to test blood 4 times a day 400 each 1    loratadine (CLARITIN) 10 mg tablet Take 10 mg by mouth daily as needed for allergies      LORazepam (ATIVAN) 0 5 mg tablet TAKE 2 TABLETS (1 MG TOTAL) BY MOUTH DAILY AT BEDTIME 60 tablet 0    metFORMIN (GLUCOPHAGE) 500 mg tablet TAKE 2 TABLETS (1,000 MG TOTAL) BY MOUTH 2 (TWO) TIMES A DAY WITH MEALS 120 tablet 5    metoprolol tartrate (LOPRESSOR) 50 mg tablet TAKE 1 5 TABLETS (75 MG TOTAL) BY MOUTH EVERY 12 (TWELVE) HOURS 90 tablet 5    Multiple Vitamin (multivitamin) tablet Take 1 tablet by mouth daily      QC Pen Needles 31G X 6 MM MISC USE 4 (FOUR) TIMES A DAY (BEFORE MEALS AND AT BEDTIME) 100 each 3     No current facility-administered medications for this visit           Health Maintenance     Health Maintenance   Topic Date Due    DTaP,Tdap,and Td Vaccines (1 - Tdap) Never done   [facility] Annual Wellness Visit (AWV)  07/30/2021    Influenza Vaccine (1) 09/01/2021    HEMOGLOBIN A1C  03/07/2022    Diabetic Foot Exam  04/22/2022    DM Eye Exam  07/15/2022    Fall Risk  09/07/2022    Depression Screening PHQ  09/07/2022    BMI: Adult  09/08/2022    Colorectal Cancer Screening  09/02/2025    Hepatitis C Screening  Completed    Pneumococcal Vaccine: 65+ Years  Completed    COVID-19 Vaccine  Completed    HIB Vaccine  Aged Out    Hepatitis B Vaccine  Aged Out    IPV Vaccine  Aged Out    Hepatitis A Vaccine Aged Out    Meningococcal ACWY Vaccine  Aged Out    HPV Vaccine  Aged Out     Immunization History   Administered Date(s) Administered    INFLUENZA 11/02/2016, 10/16/2017, 09/18/2018, 09/18/2018    Influenza, Quadrivalent (nasal) 11/02/2016    Influenza, high dose seasonal 0 7 mL 11/06/2019, 10/06/2020    Pneumococcal Conjugate 13-Valent 07/25/2019    Pneumococcal Polysaccharide PPV23 03/17/2003    SARS-CoV-2 / COVID-19 mRNA IM (Pfizer-BioNTech) 01/22/2021, 02/12/2021    Zoster 07/09/2010       ZANDER Jett

## 2021-09-08 ENCOUNTER — CONSULT (OUTPATIENT)
Dept: NEPHROLOGY | Facility: CLINIC | Age: 86
End: 2021-09-08
Payer: MEDICARE

## 2021-09-08 VITALS
WEIGHT: 121.6 LBS | HEIGHT: 67 IN | HEART RATE: 76 BPM | BODY MASS INDEX: 19.09 KG/M2 | DIASTOLIC BLOOD PRESSURE: 65 MMHG | SYSTOLIC BLOOD PRESSURE: 110 MMHG

## 2021-09-08 DIAGNOSIS — E87.1 HYPONATREMIA: ICD-10-CM

## 2021-09-08 PROCEDURE — 99204 OFFICE O/P NEW MOD 45 MIN: CPT | Performed by: STUDENT IN AN ORGANIZED HEALTH CARE EDUCATION/TRAINING PROGRAM

## 2021-09-08 PROCEDURE — 1123F ACP DISCUSS/DSCN MKR DOCD: CPT | Performed by: STUDENT IN AN ORGANIZED HEALTH CARE EDUCATION/TRAINING PROGRAM

## 2021-09-08 RX ORDER — SODIUM CHLORIDE 1000 MG
1 TABLET, SOLUBLE MISCELLANEOUS 3 TIMES DAILY
Qty: 90 TABLET | Refills: 3 | Status: CANCELLED | OUTPATIENT
Start: 2021-09-08

## 2021-09-08 NOTE — PATIENT INSTRUCTIONS
Thank you for coming to your visit today  As we discussed you sodium levels are low, your sodium is 131  Please follow the recommendations below:    · Fluid restriction 1-1 5L a day (30-50 oz/day)  · Sodium tablets 1gr three times a day  (to start after blood and urine test are performed)         Hyponatremia   GENERAL INFORMATION:   What is hyponatremia? Hyponatremia occurs when the amount of sodium (salt) in your blood is lower than normal  Sodium is an electrolyte (mineral) that helps your muscles, heart, and digestive system work properly  It helps control blood pressure and fluid balance  What causes hyponatremia? Hyponatremia happens when too much sodium leaves your body, or when more water than sodium stays in your blood  Any of the following conditions can lead to hyponatremia:  · A diet that is low in sodium    · Drinking too much water or receiving too much fluid through an IV    · Intense and prolonged exercise that causes excessive sweating    · Medical conditions, such as Jonah disease, kidney disease, congestive heart failure, liver cirrhosis, or cancer    · Medicines, such as diuretics, antidepressants, pain medicines, or illegal drugs such as ecstasy    · Dehydration  What are the signs and symptoms of hyponatremia? You may have no signs or symptoms  Symptoms may start to appear when the amount of sodium in your blood drops too low or too fast  You may have any of the following:  · Abdominal cramps, nausea, or vomiting    · Headache, confusion, hallucinations, or trouble staying awake    · Muscle weakness or cramps     · Seizures or coma  How is hyponatremia diagnosed? Your caregiver will ask you about the medicines you take  He will do a physical exam to look for signs of swelling caused by fluid retention (extra water in your body)  · Blood tests will be done to check the level of sodium in your blood  They may also be done to find the cause of your hyponatremia      · Urine sodium is a test that checks the level of sodium in your urine  A sample of your urine is collected and is sent to a lab for tests  How is hyponatremia treated? Treatment depends on the cause of your hyponatremia and how severe it is  Caregivers may limit the amount of liquids you drink if you are retaining water  A salt solution may be given through an IV to increase the amount of sodium in your blood  Medicines may also be given to help get rid of extra fluid in your body  You may urinate more often while taking these medicines  When should I contact my caregiver? · You have muscle cramps or twitching  · You feel very weak or tired  · You have nausea or are vomiting  · You have questions or concerns about your condition or care  When should I seek immediate care or call 911? · You have a seizure  · You have an irregular heartbeat  · You have trouble breathing  · You cannot move your arms and legs  · You are confused or cannot think clearly  CARE AGREEMENT:   You have the right to help plan your care  Learn about your health condition and how it may be treated  Discuss treatment options with your caregivers to decide what care you want to receive  You always have the right to refuse treatment  The above information is an  only  It is not intended as medical advice for individual conditions or treatments  Talk to your doctor, nurse or pharmacist before following any medical regimen to see if it is safe and effective for you  © 2014 [address] is for End User's use only and may not be sold, redistributed or otherwise used for commercial purposes  All illustrations and images included in CareNotes® are the copyrighted property of [employer]  or Jakob Harmon

## 2021-09-08 NOTE — PROGRESS NOTES
NEPHROLOGY OUTPATIENT CONSULTATION   Jaylyn Villaseñor 82 y o  female MRN: [id number]  Date: 9/8/2021  Reason for consultation:   Chief Complaint   Patient presents with    Hyponatremia       ASSESSMENT AND PLAN:  82 y o  woman with OMH of HTN, HLD, dysphagia, pAffib on Eliquis with pacemaker, DM, neuropathy, ILD and  pulmonary fibrosis, exposed to chemicals in the past   CT chest revealed subpleural reticular/fiber nodular opacities, interlobular septal thickening, mild central/bibasilar bronchiectasis, possible UIP pattern  Patient presents for initial consultation for hyponatremia      PLAN:    #Asymptomatic Chronic Hyponatremia   · Sodium levels low since 2019    Serum osm: will check    Urine osm: will check    Urine Sodium: will check    Baseline sodium 130-132mEq/L   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 in the settings of pulmonary disease exacerbate by benzos     Plan:   Labs with serum and urine osm, urine electrolytes   Plan to start sodium tablets 1gr tid (to start after blood work is done)  Wallace Garcia Fluid restriction 1-1 5L a day as possible      #HTN  · BP below goal this morning  Initially SBP 90's, repeat /65mmhg (goal <140/90)  · Metoprolol 75mg bid     #DM  · Metformin 1000mg bid   · HbA1c 7 6  Maintain healthy diet (vegetables, fruits, whole grains, nonfat or low fat)  Weight loss  Physical activity (5 to 10 minutes to start the increase to 30 min a day)    HISTORY OF PRESENT ILLNESS:  Jaylyn Villaseñor is a 82 y o  female  82 y o  woman with OMH of HTN, HLD, dysphagia, pAffib on Eliquis with pacemaker, DM, neuropathy, interstitial lung disease and pulmonary fibrosis  Patient used to work for [employer] for about 10 years in her 25s around [employer] and 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 chemicals   CT chest revealed subpleural reticular/fiber nodular opacities, interlobular septal thickening, mild central/bibasilar bronchiectasis, possible UIP pattern  Autoimmune workup was negative   Patient presents for initial consultation for hyponatremia     REVIEW OF SYSTEMS:    More than 10 point review of systems were obtained and discussed in length with the patient  Complete review of systems were negative / unremarkable except mentioned in the HPI section  Review of Systems - Psychological ROS: negative for - anxiety  ENT ROS: negative for - headaches, tinnitus or visual changes  Hematological and Lymphatic ROS: negative for - bruising positive for weight loss  Endocrine ROS: negative for - hot flashes or skin changes  Respiratory ROS: no cough, shortness of breath, or wheezing  Cardiovascular ROS: no chest pain or dyspnea on exertion  Gastrointestinal ROS: positive for - constipation and diarrhea  Genito-Urinary ROS: strong smell in urine  Musculoskeletal ROS: negative for - gait disturbance or muscle pain  Neurological ROS: no TIA or stroke symptoms  Dermatological ROS: negative     PHYSICAL EXAM:  Vitals:    09/08/21 0844 09/08/21 0916   BP: 94/62 110/65   BP Location: Left arm    Patient Position: Sitting    Cuff Size: Adult    Pulse: 76    Weight: 55 2 kg (121 lb 9 6 oz)    Height: 5' 7" (1 702 m)      Body mass index is 19 05 kg/m²      Physical Exam     General:  no acute distress at this time  Skin:  No acute rash  Eyes:  No scleral icterus and noninjected  ENT:  Normocephalic, atraumatic, mucous membranes moist  Neck:  Supple, no jugular venous distention,  Chest:  Inspiratory crackles bilaterally   CVS:  Regular rate and rhythm without a murmur rub Abdomen:  soft and nontender normal bowel sounds   Extremities:  No LE edema   Neuro:  No gross focality, A&O x 3  Psych:  Very cooperative      PAST MEDICAL HISTORY:  Past Medical History:   Diagnosis Date    Glaucoma     H/O degenerative disc disease     [facility]    Irregular heart beat     afib    Peripheral neuropathy        PAST SURGICAL HISTORY:  Past Surgical History:   Procedure Laterality Date    CATARACT EXTRACTION, BILATERAL      CHOLECYSTECTOMY      CHOLECYSTECTOMY LAPAROSCOPIC N/A 2020    Procedure: CHOLECYSTECTOMY LAPAROSCOPIC;  Surgeon: Eleno Way MD;  [facility]: [facility];  Service: General    COLONOSCOPY      CYST REMOVAL      left lower Quadrant     HERNIA REPAIR      LIPOMA RESECTION      GA REPAIR ING HERNIA,5+Y/O,REDUCIBL Bilateral 2018    Procedure: INGUINAL HERNIA REPAIR;  Surgeon: Beth Boas, MD;  Location: [facility];  Service: General    SHOULDER SURGERY  2019    Dr Olga Morfin [facility]    VASCULAR SURGERY      Agram-  R carotid occlusion       ALLERGIES:  Allergies   Allergen Reactions    Keflex [Cephalexin] Diarrhea       SOCIAL HISTORY:  Social History     Substance and Sexual Activity   Alcohol Use No     Social History     Substance and Sexual Activity   Drug Use No     Social History     Tobacco Use   Smoking Status Former Smoker    Packs/day: 1 50    Years: 38 00    Pack years: 57 00    Types: Cigarettes    Start date:     Quit date: 12    Years since quittin 7   Smokeless Tobacco Never Used       FAMILY HISTORY:  Family History   Problem Relation Age of Onset    Heart attack Father     Hiatal hernia Father     Diabetes Father     Heart disease Mother     Cancer Brother     Diabetes Brother     Heart disease Brother     Hypertension Brother        MEDICATIONS:    Current Outpatient Medications:     acetaminophen (TYLENOL) 500 mg tablet, Take 1,000 mg by mouth as needed for mild pain, Disp: , Rfl:     amoxicillin (AMOXIL) 500 mg capsule, Prophylactic, prior to dentist, Disp: , Rfl:     aspirin (ECOTRIN LOW STRENGTH) 81 mg EC tablet, Take 1 tablet (81 mg total) by mouth daily, Disp:  , Rfl:     azelastine (ASTELIN) 0 1 % nasal spray, 1 SPRAY INTO EACH NOSTRIL 2 (TWO) TIMES A DAY USE IN EACH NOSTRIL AS DIRECTED, Disp: 30 mL, Rfl: 1    bimatoprost (LUMIGAN) 0 01 % ophthalmic drops, Administer 1 drop to both eyes daily at bedtime for 30 days, Disp: 1 5 mL, Rfl: 0    calcium carbonate (OS-DANIA) 600 MG tablet, Take 600 mg by mouth 2 (two) times a day with meals, Disp: , Rfl:     carboxymethylcellulose (Artificial Tears) 1 % ophthalmic solution, 1 drop 3 (three) times a day, Disp: , Rfl:     Eliquis 2 5 MG, TAKE 1 TABLET (2 5 MG TOTAL) BY MOUTH 2 (TWO) TIMES A DAY, Disp: 60 tablet, Rfl: 5    ezetimibe-simvastatin (VYTORIN) 10-40 mg per tablet, TAKE 1 TABLET BY MOUTH DAILY AT BEDTIME, Disp: 30 tablet, Rfl: 5    famotidine (PEPCID) 20 mg tablet, TAKE 1 TABLET (20 MG TOTAL) BY MOUTH 2 (TWO) TIMES A DAY, Disp: 60 tablet, Rfl: 5    ferrous sulfate 325 (65 Fe) mg tablet, Take 325 mg by mouth daily with breakfast, Disp: , Rfl:     glucose blood (ONE TOUCH ULTRA TEST) test strip, TEST FOUR TIMES A DAY, Disp: 400 each, Rfl: 1    Januvia 100 MG tablet, TAKE 1 TABLET (100 MG TOTAL) BY MOUTH DAILY, Disp: 30 tablet, Rfl: 5    Lancets (ONETOUCH DELICA PLUS [id number]) MISC, Use one lancet to test blood 4 times a day, Disp: 400 each, Rfl: 1    loratadine (CLARITIN) 10 mg tablet, Take 10 mg by mouth daily as needed for allergies, Disp: , Rfl:     LORazepam (ATIVAN) 0 5 mg tablet, TAKE 2 TABLETS (1 MG TOTAL) BY MOUTH DAILY AT BEDTIME, Disp: 60 tablet, Rfl: 0    metFORMIN (GLUCOPHAGE) 500 mg tablet, TAKE 2 TABLETS (1,000 MG TOTAL) BY MOUTH 2 (TWO) TIMES A DAY WITH MEALS, Disp: 120 tablet, Rfl: 5    metoprolol tartrate (LOPRESSOR) 50 mg tablet, TAKE 1 5 TABLETS (75 MG TOTAL) BY MOUTH EVERY 12 (TWELVE) HOURS, Disp: 90 tablet, Rfl: 5    Multiple Vitamin (multivitamin) tablet, Take 1 tablet by mouth daily, Disp: , Rfl:     QC Pen Needles 31G X 6 MM MISC, USE 4 (FOUR) TIMES A DAY (BEFORE MEALS AND AT BEDTIME), Disp: 100 each, Rfl: 3    Lab Results:   Results for orders placed or performed in visit on 09/07/21   POCT hemoglobin A1c   Result Value Ref Range    Hemoglobin A1C 7 6 (A) 6 5       Portions of the record may have been created with voice recognition software  Occasional wrong word or "sound a like" substitutions may have occurred due to the inherent limitations of voice recognition software  Read the chart carefully and recognize, using context, where substitutions have occurred

## 2021-09-12 PROBLEM — E11.3391: Status: ACTIVE | Noted: 2021-03-22

## 2021-09-12 PROBLEM — E11.3599 TYPE 2 DIABETES MELLITUS WITH PROLIFERATIVE RETINOPATHY, WITHOUT LONG-TERM CURRENT USE OF INSULIN (HCC): Status: ACTIVE | Noted: 2021-09-12

## 2021-09-12 NOTE — ASSESSMENT & PLAN NOTE
BMI has decreased slightly from 19 33 with last office visit  She does weigh herself weekly, and states weight has been stable at 121 lb  She has been trying to eat small frequent meals,  Drinking Glucerna 1-2 times per day  Body mass index is 19 26 kg/m²

## 2021-09-12 NOTE — ASSESSMENT & PLAN NOTE
Anticoagulated on Eliquis and aspirin  Rate controlled on metoprolol  Follows with Cardiology, Dr Lavern Shah

## 2021-09-12 NOTE — ASSESSMENT & PLAN NOTE
Lab Results   Component Value Date    HGBA1C 7 6 (A) 09/07/2021     Hemoglobin A1c 7 6%:  Up slightly from 7 4%  A1c goal less than 8%, ideally less than 7 5% due to retinopathy  Has tried basal insulin, but had even at very low doses hypoglycemia  Also experienced hypoglycemia with mealtime insulin  Currently is on metformin and Januvia  I am concerned about adding sulfonylurea due to risk of hypoglycemia  Will avoid GLP-1 due to associated weight loss  May consider Jardiance 10 mg daily, however, would be concerned about risk of hypotension, hypovolemia given her frailty  For now will continue same medications and continue to monitor  She does have regular eye exams

## 2021-09-14 ENCOUNTER — APPOINTMENT (OUTPATIENT)
Dept: LAB | Facility: CLINIC | Age: 86
End: 2021-09-14
Payer: MEDICARE

## 2021-09-14 DIAGNOSIS — E87.1 HYPONATREMIA: ICD-10-CM

## 2021-09-14 LAB
ANION GAP SERPL CALCULATED.3IONS-SCNC: 5 MMOL/L (ref 4–13)
BUN SERPL-MCNC: 14 MG/DL (ref 5–25)
CALCIUM SERPL-MCNC: 8.8 MG/DL (ref 8.3–10.1)
CHLORIDE SERPL-SCNC: 93 MMOL/L (ref 100–108)
CO2 SERPL-SCNC: 31 MMOL/L (ref 21–32)
CORTIS AM PEAK SERPL-MCNC: 20.6 UG/DL (ref 4.2–22.4)
CREAT SERPL-MCNC: 0.38 MG/DL (ref 0.6–1.3)
CREAT UR-MCNC: 23 MG/DL
GFR SERPL CREATININE-BSD FRML MDRD: 98 ML/MIN/1.73SQ M
GLUCOSE P FAST SERPL-MCNC: 157 MG/DL (ref 65–99)
OSMOLALITY UR/SERPL-RTO: 280 MMOL/KG (ref 282–298)
OSMOLALITY UR: 300 MMOL/KG
POTASSIUM SERPL-SCNC: 4 MMOL/L (ref 3.5–5.3)
SODIUM 24H UR-SCNC: 48 MOL/L
SODIUM SERPL-SCNC: 129 MMOL/L (ref 136–145)
TSH SERPL DL<=0.05 MIU/L-ACNC: 1.13 UIU/ML (ref 0.36–3.74)
URATE SERPL-MCNC: 4.8 MG/DL (ref 2–6.8)

## 2021-09-14 PROCEDURE — 80048 BASIC METABOLIC PNL TOTAL CA: CPT

## 2021-09-14 PROCEDURE — 84300 ASSAY OF URINE SODIUM: CPT | Performed by: STUDENT IN AN ORGANIZED HEALTH CARE EDUCATION/TRAINING PROGRAM

## 2021-09-14 PROCEDURE — 83930 ASSAY OF BLOOD OSMOLALITY: CPT

## 2021-09-14 PROCEDURE — 83935 ASSAY OF URINE OSMOLALITY: CPT | Performed by: STUDENT IN AN ORGANIZED HEALTH CARE EDUCATION/TRAINING PROGRAM

## 2021-09-14 PROCEDURE — 84550 ASSAY OF BLOOD/URIC ACID: CPT

## 2021-09-14 PROCEDURE — 82533 TOTAL CORTISOL: CPT

## 2021-09-14 PROCEDURE — 36415 COLL VENOUS BLD VENIPUNCTURE: CPT

## 2021-09-14 PROCEDURE — 84443 ASSAY THYROID STIM HORMONE: CPT

## 2021-09-14 PROCEDURE — 82570 ASSAY OF URINE CREATININE: CPT | Performed by: STUDENT IN AN ORGANIZED HEALTH CARE EDUCATION/TRAINING PROGRAM

## 2021-09-15 ENCOUNTER — TELEPHONE (OUTPATIENT)
Dept: OTHER | Facility: HOSPITAL | Age: 86
End: 2021-09-15

## 2021-09-15 ENCOUNTER — TELEPHONE (OUTPATIENT)
Dept: NEPHROLOGY | Facility: CLINIC | Age: 86
End: 2021-09-15

## 2021-09-15 DIAGNOSIS — E87.1 HYPONATREMIA: Primary | ICD-10-CM

## 2021-09-15 RX ORDER — SODIUM CHLORIDE 1000 MG
1 TABLET, SOLUBLE MISCELLANEOUS 3 TIMES DAILY
Qty: 90 TABLET | Refills: 2 | Status: SHIPPED | OUTPATIENT
Start: 2021-09-15 | End: 2021-11-16 | Stop reason: SDUPTHER

## 2021-09-15 NOTE — TELEPHONE ENCOUNTER
Labs were reviewed:    Results for Duke Dang (MRN [id number]) as of 9/15/2021 16:49   Ref   Range 9/14/2021 08:18   Sodium Latest Ref Range: 136 - 145 mmol/L 129 (L)   Potassium Latest Ref Range: 3 5 - 5 3 mmol/L 4 0   Chloride Latest Ref Range: 100 - 108 mmol/L 93 (L)   CO2 Latest Ref Range: 21 - 32 mmol/L 31   Anion Gap Latest Ref Range: 4 - 13 mmol/L 5   BUN Latest Ref Range: 5 - 25 mg/dL 14   Creatinine Latest Ref Range: 0 60 - 1 30 mg/dL 0 38 (L)   GLUCOSE FASTING Latest Ref Range: 65 - 99 mg/dL 157 (H)   Calcium Latest Ref Range: 8 3 - 10 1 mg/dL 8 8   eGFR Latest Units: ml/min/1 73sq m 98   OSMOLALITY, SERUM Latest Ref Range: 282 - 298 mmol/ (L)   Cortisol - AM Latest Ref Range: 4 2 - 22 4 ug/dL 20 6   TSH 3RD GENERATON Latest Ref Range: 0 358 - 3 740 uIU/mL 1 130   EXT Creatinine Urine Latest Units: mg/dL 23 0   Osmolality, Ur Latest Ref Range: 250 - 900 mmol/   SODIUM URINE Unknown 48   URIC ACID Latest Ref Range: 2 0 - 6 8 mg/dL 4 8      Plan:  · Sodium tablets 1gt tid (patient started this morning)  · Next visit in 8 weeks

## 2021-09-15 NOTE — TELEPHONE ENCOUNTER
Patient called asked for lab results  States she is not to start sodium tablets until they are reviewed and she hears back from the doctor  I explained to pt as per office visit note patient is to start sodium tablets 1G TID after testing is completed, patient would like the doctor to verify this though  Thank you

## 2021-09-20 DIAGNOSIS — F41.9 ANXIETY: ICD-10-CM

## 2021-09-20 DIAGNOSIS — E78.5 HYPERLIPIDEMIA, UNSPECIFIED HYPERLIPIDEMIA TYPE: ICD-10-CM

## 2021-09-20 RX ORDER — LORAZEPAM 0.5 MG/1
1 TABLET ORAL
Qty: 60 TABLET | Refills: 0 | Status: SHIPPED | OUTPATIENT
Start: 2021-09-20 | End: 2021-10-19

## 2021-09-20 RX ORDER — EZETIMIBE AND SIMVASTATIN 10; 40 MG/1; MG/1
1 TABLET ORAL
Qty: 30 TABLET | Refills: 5 | Status: SHIPPED | OUTPATIENT
Start: 2021-09-20 | End: 2022-03-09

## 2021-09-20 NOTE — TELEPHONE ENCOUNTER
Requested Prescriptions     Pending Prescriptions Disp Refills    LORazepam (ATIVAN) 0 5 mg tablet [Pharmacy Med Name: LORAZEPAM 0 5MG TABLET] 60 tablet      Sig: TAKE 2 TABLETS (1 MG TOTAL) BY MOUTH DAILY AT BEDTIME    ezetimibe-simvastatin (VYTORIN) 10-40 mg per tablet [Pharmacy Med Name: EZETIMIBE-SIMVASTATIN 10-40MG TABLET] 30 tablet 5     Sig: TAKE 1 TABLET BY MOUTH DAILY AT BEDTIME       Please review and advise

## 2021-09-30 ENCOUNTER — IMMUNIZATIONS (OUTPATIENT)
Dept: FAMILY MEDICINE CLINIC | Facility: HOSPITAL | Age: 86
End: 2021-09-30

## 2021-09-30 DIAGNOSIS — Z23 ENCOUNTER FOR IMMUNIZATION: Primary | ICD-10-CM

## 2021-09-30 PROCEDURE — 0001A SARS-COV-2 / COVID-19 MRNA VACCINE (PFIZER-BIONTECH) 30 MCG: CPT

## 2021-09-30 PROCEDURE — 91300 SARS-COV-2 / COVID-19 MRNA VACCINE (PFIZER-BIONTECH) 30 MCG: CPT

## 2021-10-07 ENCOUNTER — OFFICE VISIT (OUTPATIENT)
Dept: CARDIOLOGY CLINIC | Facility: CLINIC | Age: 86
End: 2021-10-07
Payer: MEDICARE

## 2021-10-07 VITALS
OXYGEN SATURATION: 98 % | BODY MASS INDEX: 19.54 KG/M2 | HEART RATE: 76 BPM | DIASTOLIC BLOOD PRESSURE: 60 MMHG | WEIGHT: 124.5 LBS | SYSTOLIC BLOOD PRESSURE: 110 MMHG | HEIGHT: 67 IN

## 2021-10-07 DIAGNOSIS — I10 ESSENTIAL (PRIMARY) HYPERTENSION: Primary | ICD-10-CM

## 2021-10-07 DIAGNOSIS — Z95.0 PACEMAKER: ICD-10-CM

## 2021-10-07 DIAGNOSIS — E78.00 PURE HYPERCHOLESTEROLEMIA: ICD-10-CM

## 2021-10-07 DIAGNOSIS — I48.0 PAROXYSMAL ATRIAL FIBRILLATION (HCC): ICD-10-CM

## 2021-10-07 PROCEDURE — 99214 OFFICE O/P EST MOD 30 MIN: CPT | Performed by: INTERNAL MEDICINE

## 2021-10-12 ENCOUNTER — REMOTE DEVICE CLINIC VISIT (OUTPATIENT)
Dept: CARDIOLOGY CLINIC | Facility: CLINIC | Age: 86
End: 2021-10-12
Payer: MEDICARE

## 2021-10-12 DIAGNOSIS — Z95.0 PRESENCE OF PERMANENT CARDIAC PACEMAKER: Primary | ICD-10-CM

## 2021-10-12 PROCEDURE — 93296 REM INTERROG EVL PM/IDS: CPT | Performed by: INTERNAL MEDICINE

## 2021-10-12 PROCEDURE — 93294 REM INTERROG EVL PM/LDLS PM: CPT | Performed by: INTERNAL MEDICINE

## 2021-10-18 ENCOUNTER — TELEPHONE (OUTPATIENT)
Dept: NEPHROLOGY | Facility: CLINIC | Age: 86
End: 2021-10-18

## 2021-10-18 DIAGNOSIS — F41.9 ANXIETY: ICD-10-CM

## 2021-10-19 RX ORDER — LORAZEPAM 0.5 MG/1
1 TABLET ORAL
Qty: 60 TABLET | Refills: 0 | Status: SHIPPED | OUTPATIENT
Start: 2021-10-19 | End: 2021-11-16

## 2021-10-20 ENCOUNTER — TELEPHONE (OUTPATIENT)
Dept: NEPHROLOGY | Facility: CLINIC | Age: 86
End: 2021-10-20

## 2021-11-01 ENCOUNTER — HOSPITAL ENCOUNTER (OUTPATIENT)
Dept: PULMONOLOGY | Facility: HOSPITAL | Age: 86
Discharge: HOME | End: 2021-11-01
Attending: INTERNAL MEDICINE
Payer: MEDICARE

## 2021-11-01 DIAGNOSIS — J84.9 INTERSTITIAL LUNG DISEASE (HCC): ICD-10-CM

## 2021-11-01 PROCEDURE — 94760 N-INVAS EAR/PLS OXIMETRY 1: CPT

## 2021-11-01 PROCEDURE — 94729 DIFFUSING CAPACITY: CPT

## 2021-11-01 PROCEDURE — 94010 BREATHING CAPACITY TEST: CPT

## 2021-11-01 PROCEDURE — 94729 DIFFUSING CAPACITY: CPT | Performed by: INTERNAL MEDICINE

## 2021-11-01 PROCEDURE — 94010 BREATHING CAPACITY TEST: CPT | Performed by: INTERNAL MEDICINE

## 2021-11-05 DIAGNOSIS — I77.1 STENOSIS OF RIGHT SUBCLAVIAN ARTERY (HCC): Primary | ICD-10-CM

## 2021-11-05 DIAGNOSIS — I65.22 ASYMPTOMATIC CAROTID ARTERY STENOSIS, LEFT: ICD-10-CM

## 2021-11-05 DIAGNOSIS — I65.21 OCCLUSION OF RIGHT CAROTID ARTERY: ICD-10-CM

## 2021-11-09 ENCOUNTER — OFFICE VISIT (OUTPATIENT)
Dept: FAMILY MEDICINE CLINIC | Facility: CLINIC | Age: 86
End: 2021-11-09
Payer: MEDICARE

## 2021-11-09 ENCOUNTER — TELEPHONE (OUTPATIENT)
Dept: FAMILY MEDICINE CLINIC | Facility: CLINIC | Age: 86
End: 2021-11-09

## 2021-11-09 ENCOUNTER — HOSPITAL ENCOUNTER (OUTPATIENT)
Dept: CT IMAGING | Facility: HOSPITAL | Age: 86
Discharge: HOME | End: 2021-11-09
Payer: MEDICARE

## 2021-11-09 VITALS
HEART RATE: 82 BPM | TEMPERATURE: 96.4 F | OXYGEN SATURATION: 97 % | DIASTOLIC BLOOD PRESSURE: 58 MMHG | BODY MASS INDEX: 19.37 KG/M2 | HEIGHT: 67 IN | WEIGHT: 123.4 LBS | RESPIRATION RATE: 16 BRPM | SYSTOLIC BLOOD PRESSURE: 102 MMHG

## 2021-11-09 DIAGNOSIS — F41.9 ANXIETY: ICD-10-CM

## 2021-11-09 DIAGNOSIS — R10.84 GENERALIZED ABDOMINAL PAIN: ICD-10-CM

## 2021-11-09 DIAGNOSIS — R63.4 WEIGHT LOSS: ICD-10-CM

## 2021-11-09 DIAGNOSIS — E11.69 TYPE 2 DIABETES MELLITUS WITH HYPERLIPIDEMIA (HCC): ICD-10-CM

## 2021-11-09 DIAGNOSIS — M81.0 OSTEOPOROSIS WITHOUT CURRENT PATHOLOGICAL FRACTURE, UNSPECIFIED OSTEOPOROSIS TYPE: ICD-10-CM

## 2021-11-09 DIAGNOSIS — R05.9 COUGH: ICD-10-CM

## 2021-11-09 DIAGNOSIS — E78.5 TYPE 2 DIABETES MELLITUS WITH HYPERLIPIDEMIA (HCC): ICD-10-CM

## 2021-11-09 DIAGNOSIS — Z23 INFLUENZA VACCINE NEEDED: ICD-10-CM

## 2021-11-09 DIAGNOSIS — I10 HYPERTENSION, UNSPECIFIED TYPE: ICD-10-CM

## 2021-11-09 DIAGNOSIS — R14.0 BLOATING: ICD-10-CM

## 2021-11-09 DIAGNOSIS — L98.9 SKIN LESION: ICD-10-CM

## 2021-11-09 DIAGNOSIS — R10.84 GENERALIZED ABDOMINAL PAIN: Primary | ICD-10-CM

## 2021-11-09 PROBLEM — F33.9 DEPRESSION, RECURRENT (HCC): Status: ACTIVE | Noted: 2021-11-09

## 2021-11-09 PROCEDURE — G1004 CDSM NDSC: HCPCS

## 2021-11-09 PROCEDURE — G0008 ADMIN INFLUENZA VIRUS VAC: HCPCS | Performed by: NURSE PRACTITIONER

## 2021-11-09 PROCEDURE — 90662 IIV NO PRSV INCREASED AG IM: CPT | Performed by: NURSE PRACTITIONER

## 2021-11-09 PROCEDURE — 74177 CT ABD & PELVIS W/CONTRAST: CPT

## 2021-11-09 PROCEDURE — 99214 OFFICE O/P EST MOD 30 MIN: CPT | Performed by: NURSE PRACTITIONER

## 2021-11-09 RX ADMIN — IOHEXOL 50 ML: 240 INJECTION, SOLUTION INTRATHECAL; INTRAVASCULAR; INTRAVENOUS; ORAL at 15:10

## 2021-11-09 RX ADMIN — IOHEXOL 100 ML: 350 INJECTION, SOLUTION INTRAVENOUS at 15:10

## 2021-11-13 ENCOUNTER — TELEPHONE (OUTPATIENT)
Dept: FAMILY MEDICINE CLINIC | Facility: CLINIC | Age: 86
End: 2021-11-13

## 2021-11-15 DIAGNOSIS — E87.1 HYPONATREMIA: ICD-10-CM

## 2021-11-15 DIAGNOSIS — F41.9 ANXIETY: ICD-10-CM

## 2021-11-16 RX ORDER — LORAZEPAM 0.5 MG/1
1 TABLET ORAL
Qty: 60 TABLET | Refills: 0 | Status: SHIPPED | OUTPATIENT
Start: 2021-11-16 | End: 2021-12-14

## 2021-11-16 RX ORDER — SODIUM CHLORIDE 1000 MG
1 TABLET, SOLUBLE MISCELLANEOUS 3 TIMES DAILY
Qty: 90 TABLET | Refills: 2 | OUTPATIENT
Start: 2021-11-16

## 2021-11-16 RX ORDER — SODIUM CHLORIDE 1000 MG
1 TABLET, SOLUBLE MISCELLANEOUS 3 TIMES DAILY
Qty: 180 TABLET | Refills: 1 | Status: SHIPPED | OUTPATIENT
Start: 2021-11-16 | End: 2022-06-24

## 2021-11-18 NOTE — TELEPHONE ENCOUNTER
I called patient today at 4:30 pm      Abdominal pain is present, but mild today  Feels like "muscle aching "  Not as severe as yesterday  She is eating small amounts frequently, soups, turkey breast, oatmeal, crackers  Rhinorrhea today, took OTC antihistamine, improved  No fevers  Sugars today [id number]  Sodium remains low, but better at 129, will repeat sodium level on 8/4  She is scheduled for gastroenterology office visit on 8/4  Again advised patient to go the emergency room for any worsening of pain, persisting pain, fevers, nausea, vomiting  Left leg pain

## 2021-11-22 ENCOUNTER — TELEPHONE (OUTPATIENT)
Dept: FAMILY MEDICINE CLINIC | Facility: CLINIC | Age: 86
End: 2021-11-22

## 2021-11-22 ENCOUNTER — HOSPITAL ENCOUNTER (OUTPATIENT)
Dept: NON INVASIVE DIAGNOSTICS | Facility: CLINIC | Age: 86
Discharge: HOME | End: 2021-11-22
Payer: MEDICARE

## 2021-11-22 DIAGNOSIS — I65.22 ASYMPTOMATIC CAROTID ARTERY STENOSIS, LEFT: ICD-10-CM

## 2021-11-22 DIAGNOSIS — I77.1 STENOSIS OF RIGHT SUBCLAVIAN ARTERY (HCC): ICD-10-CM

## 2021-11-22 DIAGNOSIS — I65.21 OCCLUSION OF RIGHT CAROTID ARTERY: ICD-10-CM

## 2021-11-22 PROCEDURE — 93880 EXTRACRANIAL BILAT STUDY: CPT

## 2021-11-22 PROCEDURE — 93880 EXTRACRANIAL BILAT STUDY: CPT | Performed by: SURGERY

## 2021-11-24 ENCOUNTER — TELEPHONE (OUTPATIENT)
Dept: DERMATOLOGY | Facility: CLINIC | Age: 86
End: 2021-11-24

## 2021-11-30 ENCOUNTER — APPOINTMENT (OUTPATIENT)
Dept: LAB | Facility: CLINIC | Age: 86
End: 2021-11-30
Payer: MEDICARE

## 2021-11-30 DIAGNOSIS — E78.5 TYPE 2 DIABETES MELLITUS WITH HYPERLIPIDEMIA (HCC): ICD-10-CM

## 2021-11-30 DIAGNOSIS — M81.0 OSTEOPOROSIS WITHOUT CURRENT PATHOLOGICAL FRACTURE, UNSPECIFIED OSTEOPOROSIS TYPE: ICD-10-CM

## 2021-11-30 DIAGNOSIS — I10 HYPERTENSION, UNSPECIFIED TYPE: ICD-10-CM

## 2021-11-30 DIAGNOSIS — E11.69 TYPE 2 DIABETES MELLITUS WITH HYPERLIPIDEMIA (HCC): ICD-10-CM

## 2021-11-30 DIAGNOSIS — F41.9 ANXIETY: ICD-10-CM

## 2021-11-30 LAB
25(OH)D3 SERPL-MCNC: 24.4 NG/ML (ref 30–100)
ALBUMIN SERPL BCP-MCNC: 3.9 G/DL (ref 3.5–5)
ALP SERPL-CCNC: 58 U/L (ref 46–116)
ALT SERPL W P-5'-P-CCNC: 21 U/L (ref 12–78)
ANION GAP SERPL CALCULATED.3IONS-SCNC: 5 MMOL/L (ref 4–13)
AST SERPL W P-5'-P-CCNC: 19 U/L (ref 5–45)
BASOPHILS # BLD AUTO: 0.01 THOUSANDS/ΜL (ref 0–0.1)
BASOPHILS NFR BLD AUTO: 0 % (ref 0–1)
BILIRUB SERPL-MCNC: 0.73 MG/DL (ref 0.2–1)
BUN SERPL-MCNC: 8 MG/DL (ref 5–25)
CALCIUM SERPL-MCNC: 9 MG/DL (ref 8.3–10.1)
CHLORIDE SERPL-SCNC: 96 MMOL/L (ref 100–108)
CHOLEST SERPL-MCNC: 117 MG/DL
CO2 SERPL-SCNC: 31 MMOL/L (ref 21–32)
CREAT SERPL-MCNC: 0.4 MG/DL (ref 0.6–1.3)
EOSINOPHIL # BLD AUTO: 0.22 THOUSAND/ΜL (ref 0–0.61)
EOSINOPHIL NFR BLD AUTO: 4 % (ref 0–6)
ERYTHROCYTE [DISTWIDTH] IN BLOOD BY AUTOMATED COUNT: 13.6 % (ref 11.6–15.1)
EST. AVERAGE GLUCOSE BLD GHB EST-MCNC: 163 MG/DL
GFR SERPL CREATININE-BSD FRML MDRD: 97 ML/MIN/1.73SQ M
GLUCOSE P FAST SERPL-MCNC: 149 MG/DL (ref 65–99)
HBA1C MFR BLD: 7.3 %
HCT VFR BLD AUTO: 39 % (ref 34.8–46.1)
HDLC SERPL-MCNC: 55 MG/DL
HGB BLD-MCNC: 12 G/DL (ref 11.5–15.4)
IMM GRANULOCYTES # BLD AUTO: 0.01 THOUSAND/UL (ref 0–0.2)
IMM GRANULOCYTES NFR BLD AUTO: 0 % (ref 0–2)
LDLC SERPL CALC-MCNC: 44 MG/DL (ref 0–100)
LYMPHOCYTES # BLD AUTO: 1.55 THOUSANDS/ΜL (ref 0.6–4.47)
LYMPHOCYTES NFR BLD AUTO: 31 % (ref 14–44)
MCH RBC QN AUTO: 27 PG (ref 26.8–34.3)
MCHC RBC AUTO-ENTMCNC: 30.8 G/DL (ref 31.4–37.4)
MCV RBC AUTO: 88 FL (ref 82–98)
MONOCYTES # BLD AUTO: 0.55 THOUSAND/ΜL (ref 0.17–1.22)
MONOCYTES NFR BLD AUTO: 11 % (ref 4–12)
NEUTROPHILS # BLD AUTO: 2.67 THOUSANDS/ΜL (ref 1.85–7.62)
NEUTS SEG NFR BLD AUTO: 54 % (ref 43–75)
NONHDLC SERPL-MCNC: 62 MG/DL
NRBC BLD AUTO-RTO: 0 /100 WBCS
PLATELET # BLD AUTO: 166 THOUSANDS/UL (ref 149–390)
PMV BLD AUTO: 10.2 FL (ref 8.9–12.7)
POTASSIUM SERPL-SCNC: 4.1 MMOL/L (ref 3.5–5.3)
PROT SERPL-MCNC: 7.9 G/DL (ref 6.4–8.2)
RBC # BLD AUTO: 4.45 MILLION/UL (ref 3.81–5.12)
SODIUM SERPL-SCNC: 132 MMOL/L (ref 136–145)
TRIGL SERPL-MCNC: 89 MG/DL
TSH SERPL DL<=0.05 MIU/L-ACNC: 1.81 UIU/ML (ref 0.36–3.74)
WBC # BLD AUTO: 5.01 THOUSAND/UL (ref 4.31–10.16)

## 2021-11-30 PROCEDURE — 85025 COMPLETE CBC W/AUTO DIFF WBC: CPT

## 2021-11-30 PROCEDURE — 80053 COMPREHEN METABOLIC PANEL: CPT

## 2021-11-30 PROCEDURE — 82306 VITAMIN D 25 HYDROXY: CPT

## 2021-11-30 PROCEDURE — 83036 HEMOGLOBIN GLYCOSYLATED A1C: CPT

## 2021-11-30 PROCEDURE — 80061 LIPID PANEL: CPT

## 2021-11-30 PROCEDURE — 36415 COLL VENOUS BLD VENIPUNCTURE: CPT

## 2021-11-30 PROCEDURE — 84443 ASSAY THYROID STIM HORMONE: CPT

## 2021-12-01 ENCOUNTER — TELEPHONE (OUTPATIENT)
Dept: FAMILY MEDICINE CLINIC | Facility: CLINIC | Age: 86
End: 2021-12-01

## 2021-12-07 ENCOUNTER — OFFICE VISIT (OUTPATIENT)
Dept: FAMILY MEDICINE CLINIC | Facility: CLINIC | Age: 86
End: 2021-12-07
Payer: MEDICARE

## 2021-12-07 VITALS
HEART RATE: 76 BPM | DIASTOLIC BLOOD PRESSURE: 80 MMHG | WEIGHT: 122 LBS | HEIGHT: 67 IN | RESPIRATION RATE: 16 BRPM | OXYGEN SATURATION: 94 % | BODY MASS INDEX: 19.15 KG/M2 | TEMPERATURE: 96 F | SYSTOLIC BLOOD PRESSURE: 130 MMHG

## 2021-12-07 DIAGNOSIS — E11.69 TYPE 2 DIABETES MELLITUS WITH HYPERLIPIDEMIA (HCC): Primary | ICD-10-CM

## 2021-12-07 DIAGNOSIS — I48.0 PAROXYSMAL ATRIAL FIBRILLATION (HCC): ICD-10-CM

## 2021-12-07 DIAGNOSIS — E55.9 HYPOVITAMINOSIS D: ICD-10-CM

## 2021-12-07 DIAGNOSIS — E61.1 IRON DEFICIENCY: ICD-10-CM

## 2021-12-07 DIAGNOSIS — E78.5 TYPE 2 DIABETES MELLITUS WITH HYPERLIPIDEMIA (HCC): Primary | ICD-10-CM

## 2021-12-07 DIAGNOSIS — I10 HYPERTENSION, UNSPECIFIED TYPE: ICD-10-CM

## 2021-12-07 DIAGNOSIS — E87.1 HYPONATREMIA: ICD-10-CM

## 2021-12-07 DIAGNOSIS — I65.22 ASYMPTOMATIC CAROTID ARTERY STENOSIS, LEFT: ICD-10-CM

## 2021-12-07 DIAGNOSIS — E78.2 MIXED HYPERLIPIDEMIA: ICD-10-CM

## 2021-12-07 DIAGNOSIS — K59.00 CONSTIPATION, UNSPECIFIED CONSTIPATION TYPE: ICD-10-CM

## 2021-12-07 PROCEDURE — 99214 OFFICE O/P EST MOD 30 MIN: CPT | Performed by: NURSE PRACTITIONER

## 2021-12-12 PROBLEM — E55.9 HYPOVITAMINOSIS D: Status: ACTIVE | Noted: 2021-12-12

## 2021-12-16 ENCOUNTER — OFFICE VISIT (OUTPATIENT)
Dept: GASTROENTEROLOGY | Facility: CLINIC | Age: 86
End: 2021-12-16
Payer: MEDICARE

## 2021-12-16 VITALS
WEIGHT: 117.6 LBS | DIASTOLIC BLOOD PRESSURE: 52 MMHG | BODY MASS INDEX: 18.46 KG/M2 | SYSTOLIC BLOOD PRESSURE: 122 MMHG | HEIGHT: 67 IN | TEMPERATURE: 96.6 F

## 2021-12-16 DIAGNOSIS — R14.0 ABDOMINAL BLOATING: ICD-10-CM

## 2021-12-16 DIAGNOSIS — K59.09 CHRONIC CONSTIPATION WITH OVERFLOW: Primary | ICD-10-CM

## 2021-12-16 PROCEDURE — 99214 OFFICE O/P EST MOD 30 MIN: CPT | Performed by: INTERNAL MEDICINE

## 2021-12-16 RX ORDER — PRUCALOPRIDE 2 MG/1
2 TABLET, FILM COATED ORAL DAILY
Qty: 90 TABLET | Refills: 3 | Status: SHIPPED | OUTPATIENT
Start: 2021-12-16

## 2021-12-17 ENCOUNTER — TELEPHONE (OUTPATIENT)
Dept: GASTROENTEROLOGY | Facility: CLINIC | Age: 86
End: 2021-12-17

## 2021-12-20 ENCOUNTER — TELEPHONE (OUTPATIENT)
Dept: OTHER | Facility: OTHER | Age: 86
End: 2021-12-20

## 2022-01-04 NOTE — PROGRESS NOTES
Pulmonary Follow Up Note   Renee Forde 82 y o  female MRN: [id number]  1/5/2022      1  Interstitial lung disease ([address] )  Assessment & Plan:  - No chronic occupational exposures that are known, no history of autoimmune/rheumatologic diseases or symptoms   No exposure to animals, does not appear to be related to GERD given distribution, but aspiration is a consideration  - Extensive autoimmune workup including PASTOR, ANCA, CCP, RF, HP panel negative  - HRCT revealed subpleural reticular/fiber nodular opacities, interlobular septal thickening, mild central/bibasilar bronchiectasis, possible UIP pattern  - PFTs with mild restriction based on TLC 75% predicted, severe diffusion defect   -most recent spirometry with FEV1 72% predicted FVC 61% predicted, ratio 80% DLCO 28% predicted, remain relatively stable from prior 6 months ago   - likely IPF, may be slowly progressive  -  Given minimal symptoms and slow progression, will hold off on starting anti fibrotic therapy at this time, given significant side effect profile, josse in elderly patients with GI issues, patient remains relatively sedentary   -  Previously presented at [facility] LD conference and agreed with plan  - will have serial walk and spirometry to monitor for progression of disease  - TTE with grade 1 diastolic dysfunction but no evidence of moderate or severe pulmonary hypertension preserved EF  -   recently completed pulmonary rehab  - repeat iva and 6MWT in 6 months in office      2  Gastroesophageal reflux disease with esophagitis without hemorrhage  Assessment & Plan:  -continued GI follow-up for evaluation of GERD and dysphagia  - continue Pepcid, taken off of Protonix due to osteoporosis        3  Hypertension, unspecified type  Assessment & Plan:  - management per pcp         4  Paroxysmal atrial fibrillation (HCC)  Assessment & Plan:  -continue Lopressor, Eliquis  -PCP and Cardiology follow-up        5   Postnasal drip  Assessment & Plan:  -  c/w Atrovent for post nasal drip and OTC anti histamine, follows with ENT    Diagnoses and all orders for this visit:    Idiopathic pulmonary fibrosis ([facility] [address] )  -     POCT 6 minute walk    Interstitial lung disease (HCC)  -     POCT 6 minute walk    Gastroesophageal reflux disease, unspecified whether esophagitis present    Type 2 diabetes mellitus with hyperlipidemia (HCC)    Paroxysmal atrial fibrillation ([facility] [address] )    Hypertension, unspecified type        Return in about 6 months (around 7/5/2022) for Next scheduled follow up  History of Present Illness      HPI:  Prachi Harrison is a 82 y o  female with history of hypertension/hyperlipidemia, dysphagia, paroxysmal atrial fibrillation on Eliquis with pacemaker, diabetes, neuropathy, presents for f/u of interstitial lung disease      Patient 1st seen in the office In March 2021   She had prior imaging showing significant interstitial lung disease and fibrosis   Looking back on prior imaging, it appears that she did have some reticular pattern back to imaging on 2007, but has progressed significantly over the past 14 years       In terms of pulmonary history, patient is a former smoker, quit in 1994  Patient 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 and 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 chemicals   She subsequently worked as a  person and 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 different types of Kathleen Sal[employer] recently, she worked as a  without exposures  Sofia Bird has never had pets and denies any exposure to birds, lifestyle, farm animals   No autoimmune, connective tissue disease, skin rash  She does have intermittent knee pain, but denies significant small joint pains   She has no family history of lung disease   She does note some environmental allergies worsen her postnasal drip and given her watery eyes      Given patient's finding of ILD, she then underwent further workup   High-resolution CT chest revealed subpleural reticular/fiber nodular opacities, interlobular septal thickening, mild central/bibasilar bronchiectasis, possible UIP pattern   Autoimmune workup was completed with negative PASTOR, Anca, RF, CCP, HP panel       Recently had repeat spirometry, overall stable with restriction and severe diffusion defect  Saw GI, recently complaining of constipation and provided with motility agent and ordered for gastric emptying study  Also saw ENT and is taking Atrovent nasal spray  Postnasal drip better controlled with this      Since last visit,  No change in patient's respiratory symptoms  She is not very active  Does state that she gets lightheaded when standing up, may have some component of orthostatic hypotension  Also has some breathlessness with exertion  Does not walk very much and is relatively sedentary  Symptoms not significantly worse  No recent flares, fever, chills, nausea, vomiting, chest pain  Up-to-date with vaccinations including COVID booster  Review of Systems   Constitutional: Positive for fatigue  Negative for chills and fever  HENT: Positive for postnasal drip  Negative for congestion, rhinorrhea, sneezing and sore throat  Respiratory: Positive for shortness of breath  Negative for cough and wheezing  Cardiovascular: Negative for chest pain, palpitations and leg swelling  Gastrointestinal: Negative for abdominal pain, constipation, diarrhea, nausea and vomiting  Endocrine: Negative for cold intolerance and heat intolerance  Genitourinary: Negative for dysuria  Musculoskeletal: Negative for arthralgias  Allergic/Immunologic: Negative for immunocompromised state  Neurological: Negative for dizziness and numbness         Historical Information   Past Medical History:   Diagnosis Date    Glaucoma     H/O degenerative disc disease     Idiopathic pulmonary fibrosis ([facility] [address] ) 11/2020    Irregular heart beat     afib    Peripheral neuropathy      Past Surgical History:   Procedure Laterality Date    CATARACT EXTRACTION, BILATERAL  2011    CHOLECYSTECTOMY      CHOLECYSTECTOMY LAPAROSCOPIC N/A 12/9/2020    Procedure: CHOLECYSTECTOMY LAPAROSCOPIC;  Surgeon: Sari Webb MD;  Location: [facility];  Service: General    COLONOSCOPY      CYST REMOVAL  2009    left lower Charles Professor [address]    LIPOMA RESECTION  2010    VT REPAIR ING HERNIA,5+Y/O,REDUCIBL Bilateral 1/5/2018    Procedure: INGUINAL HERNIA REPAIR;  Surgeon: Luciana Paz MD;  Location: [facility];  Service: General    SHOULDER SURG[facility]  [facility]26/2019    [facility]   [address]  R carotid occlusion     Family History   Problem Relation Age of Onset    Heart disease Mother     Heart attack Father     Hiatal hernia Father     Diabetes Father     Cancer Brother     Diabetes Brother     Heart disease Brother     Hypertension Brother     Other Son         gastroparesis         Meds/Allergies     Current Outpatient Medications:     acetaminophen (TYLENOL) 500 mg tablet, Take 1,000 mg by mouth as needed for mild pain, Disp: , Rfl:     amoxicillin (AMOXIL) 500 mg capsule, Prophylactic, prior to dentist, Disp: , Rfl:     aspirin (ECOTRIN LOW STRENGTH) 81 mg EC tablet, Take 1 tablet (81 mg total) by mouth daily, Disp:  , Rfl:     azelastine (ASTELIN) 0 1 % nasal spray, 1 SPRAY INTO EACH NOSTRIL 2 (TWO) TIMES A DAY USE IN EACH NOSTRIL AS DIRECTED, Disp: 30 mL, Rfl: 1    bimatoprost (LUMIGAN) 0 01 % ophthalmic drops, Administer 1 drop to both eyes daily at bedtime for 30 days, Disp: 1 5 mL, Rfl: 0    carboxymethylcellulose (Artificial Tears) 1 % ophthalmic solution, 1 drop 3 (three) times a day, Disp: , Rfl:     Eliquis 2 5 MG, TAKE 1 TABLET (2 5 MG TOTAL) BY MOUTH 2 (TWO) TIMES A DAY, Disp: 60 tablet, Rfl: 5    ezetimibe-simvastatin (VYTORIN) 10-40 mg per tablet, TAKE 1 TABLET BY MOUTH DAILY AT BEDTIME, Disp: 30 tablet, Rfl: 5    famotidine (PEPCID) 20 mg tablet, TAKE 1 TABLET (20 MG TOTAL) BY MOUTH 2 (TWO) TIMES A DAY, Disp: 60 tablet, Rfl: 5    glucose blood (ONE TOUCH ULTRA TEST) test strip, TEST FOUR TIMES A DAY, Disp: 400 each, Rfl: 1    ipratropium (ATROVENT) 0 03 % nasal spray, 2 sprays into each nostril every 12 (twelve) hours, Disp: 30 mL, Rfl: 6    Januvia 100 MG tablet, TAKE 1 TABLET (100 MG TOTAL) BY MOUTH DAILY, Disp: 30 tablet, Rfl: 5    Lancets (ONETOUCH DELICA PLUS [id number]) MISC, Use one lancet to test blood 4 times a day, Disp: 400 each, Rfl: 1    loratadine (CLARITIN) 10 mg tablet, Take 10 mg by mouth daily as needed for allergies, Disp: , Rfl:     LORazepam (ATIVAN) 0 5 mg tablet, TAKE 2 TABLETS (1 MG TOTAL) BY MOUTH DAILY AT BEDTIME, Disp: 60 tablet, Rfl: 0    metFORMIN (GLUCOPHAGE) 500 mg tablet, TAKE 2 TABLETS (1,000 MG TOTAL) BY MOUTH 2 (TWO) TIMES A DAY WITH MEALS, Disp: 120 tablet, Rfl: 5    metoprolol tartrate (LOPRESSOR) 50 mg tablet, TAKE 1 5 TABLETS (75 MG TOTAL) BY MOUTH EVERY 12 (TWELVE) HOURS, Disp: 90 tablet, Rfl: 5    Multiple Vitamin (multivitamin) tablet, Take 1 tablet by mouth daily  , Disp: , Rfl:     Prucalopride Succinate (Motegrity) 2 MG TABS, Take 2 mg by mouth daily, Disp: 90 tablet, Rfl: 3    QC Pen Needles 31G X 6 MM MISC, USE 4 (FOUR) TIMES A DAY (BEFORE MEALS AND AT BEDTIME), Disp: 100 each, Rfl: 3    sodium chloride 1 g tablet, Take 1 tablet (1 g total) by mouth 3 (three) times a day, Disp: 180 tablet, Rfl: 1    calcium carbonate (OS-DANIA) 600 MG tablet, Take 600 mg by mouth 2 (two) times a day with meals (Patient not taking: Reported on 11/9/2021 ), Disp: , Rfl:     fluticasone (FLONASE) 50 mcg/act nasal spray, 2 sprays into each nostril daily (Patient not taking: Reported on 1/5/2022 ), Disp: 18 mL, Rfl: 0  Allergies   Allergen Reactions    Keflex [Cephalexin] Diarrhea       Vitals: Blood pressure 132/90, pulse 82, temperature (!) 96 7 °F (35 9 °C), temperature source Tympanic, resp   rate 16, height 5' 7" (1 702 m), weight 54 5 kg (120 lb 3 2 oz), SpO2 100 %  Body mass index is 18 83 kg/m²  Oxygen Therapy  SpO2: 100 %  Oxygen Therapy: None (Room air)    Physical Exam  Constitutional:       General: She is not in acute distress  Appearance: She is well-developed  She is not diaphoretic  Comments: thin   HENT:      Head: Normocephalic and atraumatic  Mouth/Throat:      Mouth: Mucous membranes are moist       Pharynx: Oropharynx is clear  No oropharyngeal exudate  Eyes:      General: No scleral icterus  Cardiovascular:      Rate and Rhythm: Normal rate and regular rhythm  Heart sounds: Normal heart sounds  No murmur heard  Pulmonary:      Effort: Pulmonary effort is normal  No respiratory distress  Breath sounds: No wheezing  Comments: Fine bibasilar crackles  Abdominal:      General: Bowel sounds are normal  There is no distension  Palpations: Abdomen is soft  Tenderness: There is no abdominal tenderness  Musculoskeletal:      Right lower leg: No edema  Left lower leg: No edema  Neurological:      Mental Status: She is alert and oriented to person, place, and time  Labs: I have personally reviewed pertinent lab results    Lab Results   Component Value Date    WBC 5 01 11/30/2021    HGB 12 0 11/30/2021    HCT 39 0 11/30/2021    MCV 88 11/30/2021     11/30/2021     Lab Results   Component Value Date    CALCIUM 9 0 11/30/2021    K 4 1 11/30/2021    CO2 31 11/30/2021    CL 96 (L) 11/30/2021    BUN 8 11/30/2021    CREATININE 0 40 (L) 11/30/2021     Lab Results   Component Value Date    IGE 14 6 10/16/2020     Lab Results   Component Value Date    ALT 21 11/30/2021    AST 19 11/30/2021    ALKPHOS 58 11/30/2021       Imaging and other studies: I have personally reviewed pertinent reports    and I have personally reviewed pertinent films in PACS  CT abdomen/pelvis  12/07/2020   intra and extrahepatic biliary ductal dilatation with CBD measuring up to 12 mm   subpleural reticular/ fibronodular opacities with interlobular septal thickening and bronchiectasis at lung bases     HRCT 20  High-resolution CT chest revealed subpleural reticular/fiber nodular opacities, interlobular septal thickening, mild central/bibasilar bronchiectasis, possible UIP pattern     Chest x-ray 10/08/2020  Moderate pulmonary fibrosis        Pulmonary function testin/11/20  Results:  FEV1/FVC Ratio: 85 %  Forced Vital Capacity: 1 98 L    60 % predicted  FEV1: 1 69 L     70 % predicted     Lung volumes by body plethysmography: Total Lung Capacity 73 % predicted Residual volume 85 % predicted     DLCO corrected for patients hemoglobin level: 39 %     6MWT performed on Room Air - total walk distance 204 meters, initialk SpO2 99%, omari 97%, initial HR 93bpm, maximal HR 97%, Aishwarya Dyspnea Scale 1/10--->3/10     Interpretation:     · Mild restrictive airflow defect on spirometry      · Normal Lung volumes     · Severely Reduced Diffusion     · Normal flow volume loops     · 6MWT distance of 204 meters, no significant desaturation     21  Results:  FEV1/FVC Ratio: 78 %  Forced Vital Capacity: 1 94 L    63 % predicted  FEV1: 1 52 L     67 % predicted     Lung volumes by body plethysmography:   Total Lung Capacity 75 % predicted   Residual volume 91 % predicted     DLCO corrected for patients hemoglobin level: 34 %     Interpretation:     · No obstructive airflow defect      · Mild restrictive lung disease as indicated by decreased TLC     · Normal Lung volumes     · Severely reduced diffusion capacity     · Resting room air saturation 98% on room air, pulse 87   The patient walked 289m in 6 minutes with a lowest SpO2 of 92% and highest heart rate of 96   Dyspnea at peak activity 0/10 on the Aishwarya scale      Spirometry 21     Results:  FEV1/FVC Ratio: 88 %  Forced Vital Capacity: 1 86 L    61 % predicted  FEV1: 1 64 L     72 % predicted     DLCO corrected for patients hemoglobin level: 28 %     Interpretation:     · No obstructive airflow defect      · Severely reduced diffusion capacity    6MWT 1/5/21  Patient walked 378 m over 6 minutes   Resting SpO2 98% on room air and resting heart rate 78   During ambulation, SpO2 ranged from 95-98% and heart rate 91-96   No episodes of desaturation requiring supplemental oxygen        EKG, Pathology, and Other Studies: I have personally reviewed pertinent reports     Echo 08/15/2019  EF 97%, grade 1 diastolic dysfunction, no obvious pHTN         Christiano Galindo MD  Pulmonary & Critical Care Fellow, [address] [facility]

## 2022-01-05 ENCOUNTER — OFFICE VISIT (OUTPATIENT)
Dept: PULMONOLOGY | Facility: CLINIC | Age: 87
End: 2022-01-05
Payer: MEDICARE

## 2022-01-05 VITALS
SYSTOLIC BLOOD PRESSURE: 132 MMHG | RESPIRATION RATE: 16 BRPM | BODY MASS INDEX: 18.87 KG/M2 | HEIGHT: 67 IN | DIASTOLIC BLOOD PRESSURE: 90 MMHG | HEART RATE: 82 BPM | WEIGHT: 120.2 LBS | OXYGEN SATURATION: 100 % | TEMPERATURE: 96.7 F

## 2022-01-05 DIAGNOSIS — J84.112 IDIOPATHIC PULMONARY FIBROSIS (HCC): Primary | ICD-10-CM

## 2022-01-05 DIAGNOSIS — I10 HYPERTENSION, UNSPECIFIED TYPE: ICD-10-CM

## 2022-01-05 DIAGNOSIS — I48.0 PAROXYSMAL ATRIAL FIBRILLATION (HCC): ICD-10-CM

## 2022-01-05 DIAGNOSIS — K21.9 GASTROESOPHAGEAL REFLUX DISEASE, UNSPECIFIED WHETHER ESOPHAGITIS PRESENT: ICD-10-CM

## 2022-01-05 DIAGNOSIS — J84.9 INTERSTITIAL LUNG DISEASE (HCC): ICD-10-CM

## 2022-01-05 DIAGNOSIS — E11.69 TYPE 2 DIABETES MELLITUS WITH HYPERLIPIDEMIA (HCC): ICD-10-CM

## 2022-01-05 DIAGNOSIS — E78.5 TYPE 2 DIABETES MELLITUS WITH HYPERLIPIDEMIA (HCC): ICD-10-CM

## 2022-01-05 PROCEDURE — 94618 PULMONARY STRESS TESTING: CPT | Performed by: INTERNAL MEDICINE

## 2022-01-05 PROCEDURE — 99214 OFFICE O/P EST MOD 30 MIN: CPT | Performed by: INTERNAL MEDICINE

## 2022-01-10 DIAGNOSIS — E11.42 TYPE 2 DIABETES MELLITUS WITH DIABETIC POLYNEUROPATHY, WITH LONG-TERM CURRENT USE OF INSULIN (HCC): ICD-10-CM

## 2022-01-10 DIAGNOSIS — F41.9 ANXIETY: ICD-10-CM

## 2022-01-10 DIAGNOSIS — Z79.4 TYPE 2 DIABETES MELLITUS WITH DIABETIC POLYNEUROPATHY, WITH LONG-TERM CURRENT USE OF INSULIN (HCC): ICD-10-CM

## 2022-01-11 RX ORDER — LORAZEPAM 0.5 MG/1
1 TABLET ORAL
Qty: 60 TABLET | Refills: 0 | Status: SHIPPED | OUTPATIENT
Start: 2022-01-11 | End: 2022-02-09

## 2022-01-17 ENCOUNTER — TELEPHONE (OUTPATIENT)
Dept: GASTROENTEROLOGY | Facility: CLINIC | Age: 87
End: 2022-01-17

## 2022-01-17 NOTE — TELEPHONE ENCOUNTER
Patients GI provider:  Dr Wilma Jaffe    Number to return call: [phone]    Reason for call: Pt called wanting to know if she is supposed to continue taking the [address]       Scheduled procedure/appointment date if applicable: Appt 2/76/68

## 2022-01-17 NOTE — TELEPHONE ENCOUNTER
Spoke with pt who wanted to ensure she should stay on motegrity  The medication is working but she does have residual bloating still  I encouraged keeping appt for GES & continuing motegirty for now   Pt verbalized understanding and had no further questions

## 2022-01-18 ENCOUNTER — REMOTE DEVICE CLINIC VISIT (OUTPATIENT)
Dept: CARDIOLOGY CLINIC | Facility: CLINIC | Age: 87
End: 2022-01-18
Payer: MEDICARE

## 2022-01-18 DIAGNOSIS — Z95.0 CARDIAC PACEMAKER IN SITU: Primary | ICD-10-CM

## 2022-01-18 PROCEDURE — 93296 REM INTERROG EVL PM/IDS: CPT | Performed by: INTERNAL MEDICINE

## 2022-01-18 PROCEDURE — 93294 REM INTERROG EVL PM/LDLS PM: CPT | Performed by: INTERNAL MEDICINE

## 2022-01-18 NOTE — PROGRESS NOTES
Results for orders placed or performed in visit on 01/18/22   Cardiac EP device report    Narrative    MDT-DUAL CHAMBER PPM (AAIR-DDDR MODE)/ ACTIVE SYSTEM IS MRI CONDITIONAL  CARELINK TRANSMISSION: BATTERY STATUS "5 YRS " AP 93%  0%  ALL AVAILABLE LEAD PARAMETERS WITHIN NORMAL LIMITS  1 VHR NOTED; APPROX 7 BEATS @ 171 BPM  PT ON METO TART & EF 55% (2021)  NORMAL DEVICE FUNCTION   NC         Current Outpatient Medications:     acetaminophen (TYLENOL) 500 mg tablet, Take 1,000 mg by mouth as needed for mild pain, Disp: , Rfl:     amoxicillin (AMOXIL) 500 mg capsule, Prophylactic, prior to dentist, Disp: , Rfl:     aspirin (ECOTRIN LOW STRENGTH) 81 mg EC tablet, Take 1 tablet (81 mg total) by mouth daily, Disp:  , Rfl:     azelastine (ASTELIN) 0 1 % nasal spray, 1 SPRAY INTO EACH NOSTRIL 2 (TWO) TIMES A DAY USE IN EACH NOSTRIL AS DIRECTED, Disp: 30 mL, Rfl: 1    bimatoprost (LUMIGAN) 0 01 % ophthalmic drops, Administer 1 drop to both eyes daily at bedtime for 30 days, Disp: 1 5 mL, Rfl: 0    calcium carbonate (OS-DANIA) 600 MG tablet, Take 600 mg by mouth 2 (two) times a day with meals (Patient not taking: Reported on 11/9/2021 ), Disp: , Rfl:     carboxymethylcellulose (Artificial Tears) 1 % ophthalmic solution, 1 drop 3 (three) times a day, Disp: , Rfl:     Eliquis 2 5 MG, TAKE 1 TABLET (2 5 MG TOTAL) BY MOUTH 2 (TWO) TIMES A DAY, Disp: 60 tablet, Rfl: 5    ezetimibe-simvastatin (VYTORIN) 10-40 mg per tablet, TAKE 1 TABLET BY MOUTH DAILY AT BEDTIME, Disp: 30 tablet, Rfl: 5    famotidine (PEPCID) 20 mg tablet, TAKE 1 TABLET (20 MG TOTAL) BY MOUTH 2 (TWO) TIMES A DAY, Disp: 60 tablet, Rfl: 5    fluticasone (FLONASE) 50 mcg/act nasal spray, 2 sprays into each nostril daily (Patient not taking: Reported on 1/5/2022 ), Disp: 18 mL, Rfl: 0    glucose blood (ONE TOUCH ULTRA TEST) test strip, TEST FOUR TIMES A DAY, Disp: 400 each, Rfl: 1    ipratropium (ATROVENT) 0 03 % nasal spray, 2 sprays into each nostril every 12 (twelve) hours, Disp: 30 mL, Rfl: 6    Januvia 100 MG tablet, TAKE 1 TABLET (100 MG TOTAL) BY MOUTH DAILY, Disp: 30 tablet, Rfl: 5    Lancets (ONETOUCH DELICA PLUS [id number]) MISC, Use one lancet to test blood 4 times a day, Disp: 400 each, Rfl: 1    loratadine (CLARITIN) 10 mg tablet, Take 10 mg by mouth daily as needed for allergies, Disp: , Rfl:     LORazepam (ATIVAN) 0 5 mg tablet, TAKE 2 TABLETS (1 MG TOTAL) BY MOUTH DAILY AT BEDTIME, Disp: 60 tablet, Rfl: 0    metFORMIN (GLUCOPHAGE) 500 mg tablet, TAKE 2 TABLETS (1,000 MG TOTAL) BY MOUTH 2 (TWO) TIMES A DAY WITH MEALS, Disp: 120 tablet, Rfl: 5    metoprolol tartrate (LOPRESSOR) 50 mg tablet, TAKE 1 5 TABLETS (75 MG TOTAL) BY MOUTH EVERY 12 (TWELVE) HOURS, Disp: 90 tablet, Rfl: 5    Multiple Vitamin (multivitamin) tablet, Take 1 tablet by mouth daily  , Disp: , Rfl:     Prucalopride Succinate (Motegrity) 2 MG TABS, Take 2 mg by mouth daily, Disp: 90 tablet, Rfl: 3    QC Pen Needles 31G X 6 MM MISC, USE 4 (FOUR) TIMES A DAY (BEFORE MEALS AND AT BEDTIME), Disp: 100 each, Rfl: 3    sodium chloride 1 g tablet, Take 1 tablet (1 g total) by mouth 3 (three) times a day, Disp: 180 tablet, Rfl: 1

## 2022-01-20 ENCOUNTER — TELEPHONE (OUTPATIENT)
Dept: VASCULAR SURGERY | Facility: CLINIC | Age: 87
End: 2022-01-20

## 2022-01-20 ENCOUNTER — TRANSCRIBE ORDERS (OUTPATIENT)
Dept: VASCULAR SURGERY | Facility: CLINIC | Age: 87
End: 2022-01-20

## 2022-01-20 DIAGNOSIS — I65.21 OCCLUSION OF RIGHT CAROTID ARTERY: Primary | ICD-10-CM

## 2022-01-20 NOTE — TELEPHONE ENCOUNTER
Spoke with patient  Scheduled appointment(s) listed below  Provided patient with call back number [phone] option 3, should they need to reschedule  Patient's appointment(s) are due on or after 5/22/22  Dopplers  [] Abdominal Aorta Iliac (AOIL)  [x] Carotid (CV)   [] Celiac and/or Mesenteric  [] Endovascular Aortic Repair (EVAR)   [] Hemodialysis Access (HD)   [] Lower Limb Arterial (CHELSEA)  [] Lower Limb Venous Duplex with Reflux (LEVDR)  [] Renal Artery  [] Upper Limb (UEA)    Advanced Imaging   [] CTA abdomen    [] CTA abdomen & pelvis    [] CT abdomen with/ without contrast  [] CT abdomen with contrast  [] CT abdomen without contrast    [] CT abdomen & pelvis with/ without contrast  [] CT abdomen & pelvis with contrast  [] CT abdomen & pelvis without contrast    Office Visit   [] New patient, patient last seen over 3 years ago  [] Risk factor modification (RFM)   [x] Follow up   Spoke with pt she said to give her a call next week to schedule appointments

## 2022-02-09 DIAGNOSIS — E11.319 TYPE 2 DIABETES MELLITUS WITH RETINOPATHY, WITH LONG-TERM CURRENT USE OF INSULIN, MACULAR EDEMA PRESENCE UNSPECIFIED, UNSPECIFIED LATERALITY, UNSPECIFIED RETINOPATHY SEVERITY (HCC): ICD-10-CM

## 2022-02-09 DIAGNOSIS — I48.0 PAROXYSMAL ATRIAL FIBRILLATION (HCC): ICD-10-CM

## 2022-02-09 DIAGNOSIS — Z79.4 TYPE 2 DIABETES MELLITUS WITH RETINOPATHY, WITH LONG-TERM CURRENT USE OF INSULIN, MACULAR EDEMA PRESENCE UNSPECIFIED, UNSPECIFIED LATERALITY, UNSPECIFIED RETINOPATHY SEVERITY (HCC): ICD-10-CM

## 2022-02-09 DIAGNOSIS — I10 ESSENTIAL (PRIMARY) HYPERTENSION: ICD-10-CM

## 2022-02-09 DIAGNOSIS — F41.9 ANXIETY: ICD-10-CM

## 2022-02-09 RX ORDER — METOPROLOL TARTRATE 50 MG/1
75 TABLET, FILM COATED ORAL EVERY 12 HOURS SCHEDULED
Qty: 90 TABLET | Refills: 5 | Status: SHIPPED | OUTPATIENT
Start: 2022-02-09

## 2022-02-09 RX ORDER — APIXABAN 2.5 MG/1
TABLET, FILM COATED ORAL
Qty: 60 TABLET | Refills: 5 | Status: SHIPPED | OUTPATIENT
Start: 2022-02-09 | End: 2022-08-09

## 2022-02-09 RX ORDER — LORAZEPAM 0.5 MG/1
1 TABLET ORAL
Qty: 60 TABLET | Refills: 0 | Status: SHIPPED | OUTPATIENT
Start: 2022-02-09 | End: 2022-03-10

## 2022-02-09 RX ORDER — SITAGLIPTIN 100 MG/1
TABLET, FILM COATED ORAL
Qty: 30 TABLET | Refills: 5 | Status: SHIPPED | OUTPATIENT
Start: 2022-02-09 | End: 2022-08-09

## 2022-02-17 ENCOUNTER — HOSPITAL ENCOUNTER (OUTPATIENT)
Dept: NON INVASIVE DIAGNOSTICS | Facility: CLINIC | Age: 87
Discharge: HOME | End: 2022-02-17
Payer: MEDICARE

## 2022-02-17 DIAGNOSIS — R14.0 ABDOMINAL BLOATING: ICD-10-CM

## 2022-02-17 PROCEDURE — 78264 GASTRIC EMPTYING IMG STUDY: CPT

## 2022-02-17 PROCEDURE — A9541 TC99M SULFUR COLLOID: HCPCS

## 2022-02-17 PROCEDURE — G1004 CDSM NDSC: HCPCS

## 2022-02-22 ENCOUNTER — IOP CHECK (OUTPATIENT)
Dept: URBAN - METROPOLITAN AREA CLINIC 6 | Facility: CLINIC | Age: 87
End: 2022-02-22

## 2022-02-22 DIAGNOSIS — H40.1132: ICD-10-CM

## 2022-02-22 PROCEDURE — 92012 INTRM OPH EXAM EST PATIENT: CPT

## 2022-02-22 PROCEDURE — 92083 EXTENDED VISUAL FIELD XM: CPT

## 2022-02-22 ASSESSMENT — VISUAL ACUITY
OU_CC: J1
OS_CC: 20/40
OD_CC: 20/200

## 2022-02-22 ASSESSMENT — TONOMETRY
OS_IOP_MMHG: 15
OD_IOP_MMHG: 24

## 2022-02-28 DIAGNOSIS — K21.00 GASTROESOPHAGEAL REFLUX DISEASE WITH ESOPHAGITIS WITHOUT HEMORRHAGE: ICD-10-CM

## 2022-02-28 RX ORDER — FAMOTIDINE 20 MG/1
20 TABLET, FILM COATED ORAL 2 TIMES DAILY
Qty: 60 TABLET | Refills: 5 | Status: SHIPPED | OUTPATIENT
Start: 2022-02-28 | End: 2022-08-09

## 2022-03-09 DIAGNOSIS — E78.5 HYPERLIPIDEMIA, UNSPECIFIED HYPERLIPIDEMIA TYPE: ICD-10-CM

## 2022-03-09 DIAGNOSIS — F41.9 ANXIETY: ICD-10-CM

## 2022-03-09 RX ORDER — EZETIMIBE AND SIMVASTATIN 10; 40 MG/1; MG/1
1 TABLET ORAL
Qty: 30 TABLET | Refills: 5 | Status: SHIPPED | OUTPATIENT
Start: 2022-03-09

## 2022-03-10 RX ORDER — LORAZEPAM 0.5 MG/1
1 TABLET ORAL
Qty: 60 TABLET | Refills: 2 | Status: SHIPPED | OUTPATIENT
Start: 2022-03-10 | End: 2022-06-09

## 2022-03-17 ENCOUNTER — IOP CHECK (OUTPATIENT)
Dept: URBAN - METROPOLITAN AREA CLINIC 6 | Facility: CLINIC | Age: 87
End: 2022-03-17

## 2022-03-17 DIAGNOSIS — H40.1132: ICD-10-CM

## 2022-03-17 PROCEDURE — 92012 INTRM OPH EXAM EST PATIENT: CPT

## 2022-03-17 ASSESSMENT — VISUAL ACUITY
OD_CC: 20/400
OS_CC: 20/30
OD_PH: 20/200

## 2022-03-17 ASSESSMENT — TONOMETRY
OD_IOP_MMHG: 13
OS_IOP_MMHG: 12

## 2022-03-24 NOTE — LETTER
July 7, 2021     ZANDER Arias  [address] [facility] [address]    Patient: Rajani Do   YOB: 1935   Date of Visit: 7/7/2021       Dear Dr Rich [place]: Thank you for referring Cherelle Bony to me for evaluation  Below are my notes for this consultation  If you have questions, please do not hesitate to call me  I look forward to following your patient along with you  Sincerely,        Danial Claude, MD        CC: No Recipients  Danial Claude, MD  7/7/2021  9:58 AM  Attested  Pulmonary Follow Up Note   Rajani Do 82 y o  female MRN: [id number]  7/7/2021    1  Interstitial lung disease ([address] )  Assessment & Plan:  - No chronic occupational exposures that are known, no history of autoimmune/rheumatologic diseases or symptoms   No exposure to animals, does not appear to be related to GERD given distribution   - Extensive autoimmune workup including PASTOR, ANCA, CCP, RF, HP panel negative  - HRCT revealed subpleural reticular/fiber nodular opacities, interlobular septal thickening, mild central/bibasilar bronchiectasis, possible UIP pattern  - PFTs with mild restriction based on TLC 75% predicted, severe diffusion defect ; Stable from PFTs 6 months prior   likely IPF, may be slowly progressive  -  Given minimal symptoms and slow progression, will hold off on starting anti fibrotic therapy at this time, given significant side effect profile, josse in elderly patients with GI issues  -  Previously presented at I LD conference and agreed with plan  - will have serial imaging and PFTs to monitor for progression of disease  - TTE with grade 1 diastolic dysfunction but no evidence of moderate or severe pulmonary hypertension preserved EF  -   recently completed pulmonary rehab  - will check repeat spirometry with diffusion October 2020 and call patient with results, will follow-up in about 6 months or so     2   Gastroesophageal reflux disease with esophagitis without Preop Dx: L2-L5 stenosis with neurogenic claudication   Right leg weakness    Postop Dx:Same    Procedure:L2-L5 laminectomy and medial facetectomy and foraminotomy   Segmental instrumentation L2--L5   Posterolateral arthrodesis L2-L5 with morsellized autograft    Attending:Oma     Assistant:Vannesa Munson    Anesthesia:GETA    Findings:Severe foraminal stenosis and nerve root compression    Complications:None    Implants:Clayton Vesuvius Allograft   Morsellized local autograft   BiFORM Collagen Matrix and ProteiOS growth stimulator   Hamilton segmental instrumentation    EBL: 1200    Disposition:Extubated->PACU   hemorrhage  Assessment & Plan:  -continued GI follow-up for evaluation of GERD and dysphagia  - continue Pepcid, taken off of Protonix due to osteoporosis        3  Hypertension, unspecified type  Assessment & Plan:  - management per pcp         4  Paroxysmal atrial fibrillation (HCC)  Assessment & Plan:  -continue Lopressor, Eliquis  -PCP and Cardiology follow-up        5  Postnasal drip  Assessment & Plan:  -  c/w azelastin bid for post nasal drip and OTC anti histamine    Diagnoses and all orders for this visit:    Idiopathic pulmonary fibrosis (HCC)    Interstitial lung disease ([facility] )    Gastroesophageal reflux disease with esophagitis without hemorrhage    Paroxysmal atrial fibrillation ([facility] [facility] )    Hypertension, unspecified type    Post-nasal drip      Return in about 6 months (around 1/7/2022) for Next scheduled follow up  History of Present Illness      HPI:  Purvi Salamanca is a 82 y o  female with history of hypertension/hyperlipidemia, dysphagia, paroxysmal atrial fibrillation on Eliquis with pacemaker, diabetes, neuropathy, presents for f/u of interstitial lung disease      Patient for seen in the office In March 2021   She had prior imaging showing significant interstitial lung disease and fibrosis   Looking back on prior imaging, it appears that she did have some reticular pattern back to imaging on 2007, but has progressed significantly over the past 13 years       In terms of pulmonary history, patient is a former smoker, quit in 1994  Patient 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 and 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 chemicals   She subsequently worked as a  person and 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 different types of [employer]  [employer] recently, she worked as a  without exposures  Makaylatessa oTrres has never had pets and denies any exposure to birds, lifestyle, farm animals   No autoimmune, connective tissue disease, skin rash  She does have intermittent knee pain, but denies significant small joint pains   She has no family history of lung disease   She does note some environmental allergies worsen her postnasal drip and given her watery eyes      Given patient's finding of ILD, she then underwent further workup   High-resolution CT chest revealed subpleural reticular/fiber nodular opacities, interlobular septal thickening, mild central/bibasilar bronchiectasis, possible UIP pattern   Autoimmune workup was completed with negative PASTOR, Anca, RF, CCP, HP panel   Patient was also prescribed azelastin for postnasal drip  PFTs revealed mild restriction with TLC 73% predicted, DLCO 39% predicted (severe diffusion defect)   6 minutes walk did not reveal any desaturation, stable on PFT about 4 months later      Since last visit,  No change in patient's respiratory symptoms  She is not very active, but is able to walk in the grocery store without significant shortness of breath  She does have some cough worse in the morning and mild dysphagia, for which she follows with GI  She is on Pepcid  She does note postnasal drip and recently restarted her azelastin  Denies any recent fever, chills, weight changes  She does not have any wheezing  She does not use inhalers  Review of Systems   Constitutional: Positive for fatigue  Negative for chills and fever  HENT: Positive for postnasal drip  Negative for congestion, rhinorrhea, sneezing and sore throat  Respiratory: Positive for shortness of breath  Negative for cough and wheezing  Cardiovascular: Negative for chest pain, palpitations and leg swelling  Gastrointestinal: Negative for abdominal pain, constipation, diarrhea, nausea and vomiting  Endocrine: Negative for cold intolerance and heat intolerance  Genitourinary: Negative for dysuria  Musculoskeletal: Negative for arthralgias  Allergic/Immunologic: Negative for immunocompromised state  Neurological: Negative for dizziness and numbness         Historical Information Past Medical History:   Diagnosis Date    Glaucoma     H/O degenerative disc disease     Idiopathic pulmonary fibrosis ([facility] [address] ) 11/2020    Irregular heart beat     afib    Peripheral neuropathy      Past Surgical History:   Procedure Laterality Date    CATARACT EXTRACTION, BILATERAL  2011    CHOLECYSTECTOMY      CHOLECYSTECTOMY LAPAROSCOPIC N/A 12/9/2020    Procedure: CHOLECYSTECTOMY LAPAROSCOPIC;  Surgeon: Lisa Worthington MD;  Location: [facility];  Service: General    COLONOSCOPY      CYST REMOVAL  2009    left lower Quadrant    [address]    LIPOMA RESECTION  2010    DC REPAIR ING HERNIA,5+Y/O,REDUCIBL Bilateral 1/5/2018    Procedure: INGUINAL HERNIA REPAIR;  Surgeon: Maday Shaver MD;  Location: [facility];  Service: General    SHOULDER SURGERY  04/26/2019    Dr Shipman [facility]   [id number] carotid occlusion     Family History   Problem Relation Age of Onset    Heart attack Father     Hiatal hernia Father     Diabetes Father     Heart disease Mother     Cancer Brother     Diabetes Brother     Heart disease Brother     Hypertension Brother      Meds/Allergies     Current Outpatient Medications:     acetaminophen (TYLENOL) 500 mg tablet, Take 1,000 mg by mouth as needed for mild pain, Disp: , Rfl:     amoxicillin (AMOXIL) 500 mg capsule, TAKE 4 CAPSULES 1 HOUR BEFORE DENTAL APPOINTMENT, Disp: , Rfl:     aspirin (ECOTRIN LOW STRENGTH) 81 mg EC tablet, Take 1 tablet (81 mg total) by mouth daily, Disp:  , Rfl:     azelastine (ASTELIN) 0 1 % nasal spray, 1 spray into each nostril 2 (two) times a day Use in each nostril as directed (Patient taking differently: 1 spray into each nostril as needed Use in each nostril as directed), Disp: 1 Bottle, Rfl: 2    bimatoprost (LUMIGAN) 0 01 % ophthalmic drops, Administer 1 drop to both eyes daily at bedtime for 30 days, Disp: 1 5 mL, Rfl: 0    calcium carbonate (OS-DANIA) 600 MG tablet, Take 600 mg by mouth 2 (two) times a day with meals, Disp: , Rfl:     carboxymethylcellulose (Artificial Tears) 1 % ophthalmic solution, 1 drop 3 (three) times a day, Disp: , Rfl:     Eliquis 2 5 MG, TAKE 1 TABLET (2 5 MG TOTAL) BY MOUTH 2 (TWO) TIMES A DAY, Disp: 60 tablet, Rfl: 5    ezetimibe-simvastatin (VYTORIN) 10-40 mg per tablet, TAKE 1 TABLET BY MOUTH DAILY AT BEDTIME, Disp: 30 tablet, Rfl: 5    famotidine (PEPCID) 20 mg tablet, Take 1 tablet (20 mg total) by mouth 2 (two) times a day, Disp: 180 tablet, Rfl: 1    ferrous sulfate 325 (65 Fe) mg tablet, Take 325 mg by mouth daily with breakfast, Disp: , Rfl:     glucose blood (ONE TOUCH ULTRA TEST) test strip, TEST FOUR TIMES A DAY, Disp: 400 each, Rfl: 1    Lancets (ONETOUCH DELICA PLUS [id number]) MISC, Use one lancet to test blood 4 times a day, Disp: 400 each, Rfl: 1    loratadine (CLARITIN) 10 mg tablet, Take 10 mg by mouth daily as needed for allergies, Disp: , Rfl:     LORazepam (ATIVAN) 0 5 mg tablet, TAKE 2 TABLETS (1 MG TOTAL) BY MOUTH DAILY AT BEDTIME, Disp: 60 tablet, Rfl: 0    metFORMIN (GLUCOPHAGE) 500 mg tablet, Take 2 tablets (1,000 mg total) by mouth 2 (two) times a day with meals, Disp: 360 tablet, Rfl: 3    metoprolol tartrate (LOPRESSOR) 50 mg tablet, TAKE 1 5 TABLETS (75 MG TOTAL) BY MOUTH EVERY 12 (TWELVE) HOURS, Disp: 90 tablet, Rfl: 5    Multiple Vitamin (multivitamin) tablet, Take 1 tablet by mouth daily, Disp: , Rfl:     QC Pen Needles 31G X 6 MM MISC, USE 4 (FOUR) TIMES A DAY (BEFORE MEALS AND AT BEDTIME), Disp: 100 each, Rfl: 3    sitaGLIPtin (Januvia) 100 mg tablet, Take 1 tablet (100 mg total) by mouth daily, Disp: 30 tablet, Rfl: 5  Allergies   Allergen Reactions    Keflex [Cephalexin] Diarrhea       Vitals: Blood pressure 130/82, pulse 81, temperature (!) 96 4 °F (35 8 °C), temperature source Temporal, height 5' 7" (1 702 m), weight 55 kg (121 lb 3 2 oz), SpO2 100 %  Body mass index is 18 98 kg/m²   Oxygen Therapy  SpO2: 100 %  Oxygen Therapy: None (Room air)    Physical Exam  Constitutional:       General: She is not in acute distress  Appearance: She is well-developed  She is not diaphoretic  Comments: thin   HENT:      Head: Normocephalic and atraumatic  Mouth/Throat:      Mouth: Mucous membranes are moist       Pharynx: Oropharynx is clear  No oropharyngeal exudate  Eyes:      General: No scleral icterus  Cardiovascular:      Rate and Rhythm: Normal rate and regular rhythm  Heart sounds: Normal heart sounds  No murmur heard  Pulmonary:      Effort: Pulmonary effort is normal  No respiratory distress  Breath sounds: No wheezing  Comments: Bibasilar crackles  Abdominal:      General: Bowel sounds are normal  There is no distension  Palpations: Abdomen is soft  Tenderness: There is no abdominal tenderness  Musculoskeletal:      Right lower leg: No edema  Left lower leg: No edema  Neurological:      Mental Status: She is alert and oriented to person, place, and time  Labs: I have personally reviewed pertinent lab results    Lab Results   Component Value Date    WBC 5 40 05/28/2021    HGB 12 1 05/28/2021    HCT 38 5 05/28/2021    MCV 86 05/28/2021     05/28/2021     Lab Results   Component Value Date    CALCIUM 9 2 05/28/2021    K 4 0 05/28/2021    CO2 36 (H) 05/28/2021    CL 93 (L) 05/28/2021    BUN 12 05/28/2021    CREATININE 0 39 (L) 05/28/2021     Lab Results   Component Value Date    IGE 14 6 10/16/2020     Lab Results   Component Value Date    ALT 27 05/28/2021    AST 18 05/28/2021    ALKPHOS 56 05/28/2021     Imaging and other studies: I have personally reviewed pertinent reports    and I have personally reviewed pertinent films in PACS  CT abdomen/pelvis  12/07/2020   intra and extrahepatic biliary ductal dilatation with CBD measuring up to 12 mm   subpleural reticular/ fibronodular opacities with interlobular septal thickening and bronchiectasis at lung bases     HRCT 20  High-resolution CT chest revealed subpleural reticular/fiber nodular opacities, interlobular septal thickening, mild central/bibasilar bronchiectasis, possible UIP pattern     Chest x-ray 10/08/2020  Moderate pulmonary fibrosis        Pulmonary function testin/11/20  Results:  FEV1/FVC Ratio: 85 %  Forced Vital Capacity: 1 98 L    60 % predicted  FEV1: 1 69 L     70 % predicted     Lung volumes by body plethysmography: Total Lung Capacity 73 % predicted Residual volume 85 % predicted     DLCO corrected for patients hemoglobin level: 39 %     6MWT performed on Room Air - total walk distance 204 meters, initialk SpO2 99%, omari 97%, initial HR 93bpm, maximal HR 97%, Aishwarya Dyspnea Scale 1/10--->3/10     Interpretation:     · Mild restrictive airflow defect on spirometry      · Normal Lung volumes     · Severely Reduced Diffusion     · Normal flow volume loops     · 6MWT distance of 204 meters, no significant desaturation    21  Results:  FEV1/FVC Ratio: 78 %  Forced Vital Capacity: 1 94 L    63 % predicted  FEV1: 1 52 L     67 % predicted     Lung volumes by body plethysmography:   Total Lung Capacity 75 % predicted   Residual volume 91 % predicted     DLCO corrected for patients hemoglobin level: 34 %     Interpretation:     · No obstructive airflow defect      · Mild restrictive lung disease as indicated by decreased TLC     · Normal Lung volumes     · Severely reduced diffusion capacity     · Resting room air saturation 98% on room air, pulse 87  The patient walked 289m in 6 minutes with a lowest SpO2 of 92% and highest heart rate of 96    Dyspnea at peak activity 0/10 on the Aishwarya scale      EKG, Pathology, and Other Studies: I have personally reviewed pertinent reports     Echo 08/15/2019  EF 24%, grade 1 diastolic dysfunction, no obvious pHTN       Abrahan Schulte MD  Pulmonary & Critical Care Fellow, [address] [facility]    Attestation signed by Leatha Jones DO Barbara at 7/7/2021 10:11 PM:  I have personally seen and examined  I discussed the patient with the  fellow including, but not limited to, verifying findings; reviewing labs and x-rays;developing the plan of care with patient  I have reviewed the note and assessment performed by the fellow and agree with the fellow's documented findings and plan of care with the following additions  Please see my following comments for details and adjustments  Assessment/Plan  83 y/o F with PMHx of HTN, hyperlipidemia, Afib on Eliquis, s/p pacemaker, DM, neuropathy who comes in for ILD follow up  1   ILD - pattern most consistent with UIP/IPF - Mild restriction with severly reduced DLCO on PFTs  No pulmonary hypertension noted on echo  There is likely a component of microaspiration/reflux that is likely contributing  No clear occupational or environmental exposures  Autoimmune work up negative         -  Discussion revealed little interest in antifibrotic therapy or aggressive treatment at this time  -  Follow [place] and DLCO in 4 - 6 months         -  Follow 6 minute walk in 4-6 months  2  GERD        -  Continue Pepcid twice daily       -  Follow with GI to optimize reflux and dysphagia management   3  Post nasal gtt - continue with azelastin BID and antihistamine         Patient seen and examined  She does note some dry cough and post nasal gtt  She notes some mildy dysphagia an dyspepsia  She admits to not being very active but denies significant shortness of breath  She is on pepcid for reflux symptoms  She has not had any significant worsening since her last visit    Vitals:    07/07/21 0923   BP: 130/82   Pulse: 81   Temp: (!) 96 4 °F (35 8 °C)   SpO2: 100%   Lungs:  Mild crackles in lung bases, no wheezing or rhonchi  Lab Results   Component Value Date    WBC 5 40 05/28/2021    HGB 12 1 05/28/2021    HCT 38 5 05/28/2021    MCV 86 05/28/2021     05/28/2021     Lab Results Component Value Date    SODIUM 130 (L) 05/28/2021    K 4 0 05/28/2021    CL 93 (L) 05/28/2021    CO2 36 (H) 05/28/2021    BUN 12 05/28/2021    CREATININE 0 39 (L) 05/28/2021    GLUC 123 12/14/2020    CALCIUM 9 2 05/28/2021     PFT - mild restriction, reduced diffusion    6 minute walk without desaturation

## 2022-03-31 ENCOUNTER — IN-CLINIC DEVICE VISIT (OUTPATIENT)
Dept: CARDIOLOGY CLINIC | Facility: CLINIC | Age: 87
End: 2022-03-31
Payer: MEDICARE

## 2022-03-31 DIAGNOSIS — Z95.0 CARDIAC PACEMAKER IN SITU: Primary | ICD-10-CM

## 2022-03-31 PROCEDURE — 93280 PM DEVICE PROGR EVAL DUAL: CPT | Performed by: INTERNAL MEDICINE

## 2022-03-31 NOTE — PROGRESS NOTES
Results for orders placed or performed in visit on 03/31/22   Cardiac EP device report    Narrative    MDT-DUAL CHAMBER PPM (AAIR-DDDR MODE)/ ACTIVE SYSTEM IS MRI CONDITIONAL  DEVICE INTERROGATED IN THE [place] OFFICE: BATTERY VOLTAGE ADEQUATE-4 5 YRS  AP 94%  0%  ALL AVAILABLE LEAD PARAMETERS WITHIN NORMAL LIMITS  1 NSVT NOTED; APPROX 9 BEATS @ 182 BPM  PT ON METO TART & EF 55% (2021)  NO PROGRAMMING CHANGES MADE TO DEVICE PARAMETERS  NORMAL DEVICE FUNCTION   NC         Current Outpatient Medications:     acetaminophen (TYLENOL) 500 mg tablet, Take 1,000 mg by mouth as needed for mild pain, Disp: , Rfl:     amoxicillin (AMOXIL) 500 mg capsule, Prophylactic, prior to dentist, Disp: , Rfl:     aspirin (ECOTRIN LOW STRENGTH) 81 mg EC tablet, Take 1 tablet (81 mg total) by mouth daily, Disp:  , Rfl:     azelastine (ASTELIN) 0 1 % nasal spray, 1 SPRAY INTO EACH NOSTRIL 2 (TWO) TIMES A DAY USE IN EACH NOSTRIL AS DIRECTED, Disp: 30 mL, Rfl: 1    bimatoprost (LUMIGAN) 0 01 % ophthalmic drops, Administer 1 drop to both eyes daily at bedtime for 30 days, Disp: 1 5 mL, Rfl: 0    calcium carbonate (OS-DANIA) 600 MG tablet, Take 600 mg by mouth 2 (two) times a day with meals (Patient not taking: Reported on 11/9/2021 ), Disp: , Rfl:     carboxymethylcellulose (Artificial Tears) 1 % ophthalmic solution, 1 drop 3 (three) times a day, Disp: , Rfl:     Eliquis 2 5 MG, TAKE 1 TABLET (2 5 MG TOTAL) BY MOUTH 2 (TWO) TIMES A DAY, Disp: 60 tablet, Rfl: 5    ezetimibe-simvastatin (VYTORIN) 10-40 mg per tablet, TAKE 1 TABLET BY MOUTH DAILY AT BEDTIME, Disp: 30 tablet, Rfl: 5    famotidine (PEPCID) 20 mg tablet, TAKE 1 TABLET (20 MG TOTAL) BY MOUTH 2 (TWO) TIMES A DAY, Disp: 60 tablet, Rfl: 5    fluticasone (FLONASE) 50 mcg/act nasal spray, 2 sprays into each nostril daily (Patient not taking: Reported on 1/5/2022 ), Disp: 18 mL, Rfl: 0    glucose blood (ONE TOUCH ULTRA TEST) test strip, TEST FOUR TIMES A DAY, Disp: 400 each, Rfl: 1    ipratropium (ATROVENT) 0 03 % nasal spray, 2 sprays into each nostril every 12 (twelve) hours, Disp: 30 mL, Rfl: 6    Januvia 100 MG tablet, TAKE 1 TABLET (100 MG TOTAL) BY MOUTH DAILY, Disp: 30 tablet, Rfl: 5    Lancets (ONETOUCH DELICA PLUS [id number]) MISC, Use one lancet to test blood 4 times a day, Disp: 400 each, Rfl: 1    loratadine (CLARITIN) 10 mg tablet, Take 10 mg by mouth daily as needed for allergies, Disp: , Rfl:     LORazepam (ATIVAN) 0 5 mg tablet, TAKE 2 TABLETS (1 MG TOTAL) BY MOUTH DAILY AT BEDTIME, Disp: 60 tablet, Rfl: 2    metFORMIN (GLUCOPHAGE) 500 mg tablet, TAKE 2 TABLETS (1,000 MG TOTAL) BY MOUTH 2 (TWO) TIMES A DAY WITH MEALS, Disp: 120 tablet, Rfl: 5    metoprolol tartrate (LOPRESSOR) 50 mg tablet, TAKE 1 5 TABLETS (75 MG TOTAL) BY MOUTH EVERY 12 (TWELVE) HOURS, Disp: 90 tablet, Rfl: 5    Multiple Vitamin (multivitamin) tablet, Take 1 tablet by mouth daily  , Disp: , Rfl:     Prucalopride Succinate (Motegrity) 2 MG TABS, Take 2 mg by mouth daily, Disp: 90 tablet, Rfl: 3    QC Pen Needles 31G X 6 MM MISC, USE 4 (FOUR) TIMES A DAY (BEFORE MEALS AND AT BEDTIME), Disp: 100 each, Rfl: 3    sodium chloride 1 g tablet, Take 1 tablet (1 g total) by mouth 3 (three) times a day, Disp: 180 tablet, Rfl: 1

## 2022-04-02 ENCOUNTER — APPOINTMENT (OUTPATIENT)
Dept: LAB | Facility: CLINIC | Age: 87
End: 2022-04-02
Payer: MEDICARE

## 2022-04-02 DIAGNOSIS — I10 HYPERTENSION, UNSPECIFIED TYPE: ICD-10-CM

## 2022-04-02 DIAGNOSIS — E78.5 TYPE 2 DIABETES MELLITUS WITH HYPERLIPIDEMIA (HCC): ICD-10-CM

## 2022-04-02 DIAGNOSIS — E11.69 TYPE 2 DIABETES MELLITUS WITH HYPERLIPIDEMIA (HCC): ICD-10-CM

## 2022-04-02 DIAGNOSIS — E55.9 HYPOVITAMINOSIS D: ICD-10-CM

## 2022-04-02 LAB
25(OH)D3 SERPL-MCNC: 28.2 NG/ML (ref 30–100)
ALBUMIN SERPL BCP-MCNC: 4 G/DL (ref 3.5–5)
ALP SERPL-CCNC: 55 U/L (ref 46–116)
ALT SERPL W P-5'-P-CCNC: 28 U/L (ref 12–78)
ANION GAP SERPL CALCULATED.3IONS-SCNC: 5 MMOL/L (ref 4–13)
AST SERPL W P-5'-P-CCNC: 22 U/L (ref 5–45)
BILIRUB SERPL-MCNC: 0.82 MG/DL (ref 0.2–1)
BUN SERPL-MCNC: 12 MG/DL (ref 5–25)
CALCIUM SERPL-MCNC: 8.9 MG/DL (ref 8.3–10.1)
CHLORIDE SERPL-SCNC: 94 MMOL/L (ref 100–108)
CHOLEST SERPL-MCNC: 125 MG/DL
CO2 SERPL-SCNC: 30 MMOL/L (ref 21–32)
CREAT SERPL-MCNC: 0.53 MG/DL (ref 0.6–1.3)
ERYTHROCYTE [DISTWIDTH] IN BLOOD BY AUTOMATED COUNT: 13.6 % (ref 11.6–15.1)
EST. AVERAGE GLUCOSE BLD GHB EST-MCNC: 163 MG/DL
GFR SERPL CREATININE-BSD FRML MDRD: 87 ML/MIN/1.73SQ M
GLUCOSE P FAST SERPL-MCNC: 162 MG/DL (ref 65–99)
HBA1C MFR BLD: 7.3 %
HCT VFR BLD AUTO: 37.7 % (ref 34.8–46.1)
HDLC SERPL-MCNC: 67 MG/DL
HGB BLD-MCNC: 11.7 G/DL (ref 11.5–15.4)
LDLC SERPL CALC-MCNC: 45 MG/DL (ref 0–100)
MCH RBC QN AUTO: 26.9 PG (ref 26.8–34.3)
MCHC RBC AUTO-ENTMCNC: 31 G/DL (ref 31.4–37.4)
MCV RBC AUTO: 87 FL (ref 82–98)
NONHDLC SERPL-MCNC: 58 MG/DL
PLATELET # BLD AUTO: 164 THOUSANDS/UL (ref 149–390)
PMV BLD AUTO: 10 FL (ref 8.9–12.7)
POTASSIUM SERPL-SCNC: 4.7 MMOL/L (ref 3.5–5.3)
PROT SERPL-MCNC: 7.5 G/DL (ref 6.4–8.2)
RBC # BLD AUTO: 4.35 MILLION/UL (ref 3.81–5.12)
SODIUM SERPL-SCNC: 129 MMOL/L (ref 136–145)
TRIGL SERPL-MCNC: 63 MG/DL
TSH SERPL DL<=0.05 MIU/L-ACNC: 1.1 UIU/ML (ref 0.36–3.74)
WBC # BLD AUTO: 6.39 THOUSAND/UL (ref 4.31–10.16)

## 2022-04-02 PROCEDURE — 80061 LIPID PANEL: CPT

## 2022-04-02 PROCEDURE — 82306 VITAMIN D 25 HYDROXY: CPT

## 2022-04-02 PROCEDURE — 83036 HEMOGLOBIN GLYCOSYLATED A1C: CPT

## 2022-04-02 PROCEDURE — 80053 COMPREHEN METABOLIC PANEL: CPT

## 2022-04-02 PROCEDURE — 84443 ASSAY THYROID STIM HORMONE: CPT

## 2022-04-02 PROCEDURE — 85027 COMPLETE CBC AUTOMATED: CPT

## 2022-04-02 PROCEDURE — 36415 COLL VENOUS BLD VENIPUNCTURE: CPT

## 2022-04-04 ENCOUNTER — OFFICE VISIT (OUTPATIENT)
Dept: GASTROENTEROLOGY | Facility: AMBULARY SURGERY CENTER | Age: 87
End: 2022-04-04
Payer: MEDICARE

## 2022-04-04 VITALS
OXYGEN SATURATION: 96 % | HEIGHT: 67 IN | DIASTOLIC BLOOD PRESSURE: 64 MMHG | RESPIRATION RATE: 18 BRPM | BODY MASS INDEX: 18.96 KG/M2 | HEART RATE: 62 BPM | WEIGHT: 120.8 LBS | SYSTOLIC BLOOD PRESSURE: 118 MMHG

## 2022-04-04 DIAGNOSIS — K59.09 CHRONIC CONSTIPATION WITH OVERFLOW: Primary | ICD-10-CM

## 2022-04-04 DIAGNOSIS — E44.0 MODERATE PROTEIN-CALORIE MALNUTRITION (HCC): ICD-10-CM

## 2022-04-04 DIAGNOSIS — R14.0 ABDOMINAL BLOATING: ICD-10-CM

## 2022-04-04 PROCEDURE — 99214 OFFICE O/P EST MOD 30 MIN: CPT | Performed by: INTERNAL MEDICINE

## 2022-04-04 NOTE — PROGRESS NOTES
Cardiology Follow Up    Stacey Gold  1935  [id number]  [facility] ARIEL Mckeon [address] [facility]  [phone]    1  Essential (primary) hypertension  POCT ECG   2  Paroxysmal atrial fibrillation (HCC)  POCT ECG   3  Pure hypercholesterolemia     4  Cardiac pacemaker in situ         Interval History:  Patient is here for a follow-up visit  She was last seen by me in December of last year  Patient has a dual-chamber pacemaker with ongoing interrogation  Her most recent check was in January and at that time her device was found to be functioning appropriately  She had one nonsustained VT episode of five complexes  She had a pharmacologic nuclear stress test done October 2016 which showed no evidence of prognostically important ischemia   An echocardiogram done at that time as well demonstrated preserved LV systolic function with mild LVH and no significant valvular heart disease  Patient had a carotid Doppler done in July 2018 which demonstrated a chronic occlusion of the right internal carotid with a 50-69% stenosis his noted in the left internal carotid  There was no significant change compared to a prior study done October 30, 2016  Patient apparently while in [place] over the winter time had an episode where she fractured her right shoulder  She apparently had issues with atrial fibrillation and was placed on anticoagulation  Aspirin was discontinued  EKG today demonstrates an atrial paced rhythm  She has had a slow recovery since injuring her right shoulder  It sounds like she had a [employer] Myoview in [place] although I do not have formal records of this  There is reference to one being done which showed no ischemia with an ejection fraction of 62%  She has had some issues with cough  Her son is in attendance today      Patient Active Problem List   Diagnosis    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, with long-term current use of insulin ([facility])    HTN (hypertension)    HLD (hyperlipidemia)    Glaucoma    Bilateral carotid artery disease ([facility])    Symptomatic PVCs    Occlusion of right carotid artery    Stenosis of left carotid artery    Paroxysmal atrial fibrillation ([facility])    Presence of permanent cardiac pacemaker    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, with long-term current use of insulin ([facility])    Type 2 diabetes mellitus with diabetic polyneuropathy, with long-term current use of insulin ([facility])    Osteopenia    GERD (gastroesophageal reflux disease)    Anxiety    Type II diabetes mellitus, uncontrolled ([facility] )     Past Medical History:   Diagnosis Date    Diabetes mellitus ([place] [address] )     Glaucoma     Hyperlipidemia     Hypertension     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 ([place] [address] )      Social History     Socioeconomic History    Marital status:      Spouse name: Not on file    Number of children: Not on file    Years of education: Not on file    Highest education level: Not on file   Occupational History    Not on file   Social Needs    Financial resource strain: Not on file    Food insecurity:     Worry: Not on file     Inability: Not on file    Transportation needs:     Medical: Not on file     Non-medical: Not on file   Tobacco Use    Smoking status: Former Smoker     Last attempt to quit:      Years since quittin 5    Smokeless tobacco: Never Used   Substance and Sexual Activity    Alcohol use: No    Drug use: No    Sexual activity: Not on file   Lifestyle    Physical activity:     Days per week: Not on file     Minutes per session: Not on file    Stress: Not on file   Relationships    Social connections:     Talks on phone: Not on file     Gets together: Not on file     Attends Taoism service: Not on file     Active member of club or organization: Not on file     Attends meetings of clubs or organizations: Not on file     Relationship status: Not on file    Intimate partner violence:     Fear of current or ex partner: Not on file     Emotionally abused: Not on file     Physically abused: Not on file     Forced sexual activity: Not on file   Other Topics Concern    Not on file   Social History Narrative    Lives alone [place]    2 children      Family History   Problem Relation Age of Onset    Heart attack Father     Hiatal hernia Father     Diabetes Father     Heart disease Mother     Cancer Brother     Diabetes Brother     Heart disease Brother     Hypertension Brother      Past Surgical History:   Procedure Laterality Date    CATARACT EXTRACTION, BILATERAL  2011    CYST REMOVAL  2009    left lower Quadrant     HERNIA REPAIR  1992    LIPOMA RESECTION  2010    UT REPAIR ING HERNIA,5+Y/O,REDUCIBL Bilateral 1/5/2018    Procedure: [address];  Surgeon: Briana Castro MD;  [facility];  Service: General    SHOULDER SURGERY  04/26/2019    Dr Bartlett [facility]   [address] [employer] carotid occlusion       Current Outpatient Medications:     acetaminophen (TYLENOL) 500 mg tablet, Take 1,000 mg by mouth as needed for mild pain, Disp: , Rfl:     bimatoprost (LUMIGAN) 0 01 % ophthalmic drops, Administer 1 drop to both eyes daily at bedtime for 30 days, Disp: 1 5 mL, Rfl: 0    ELIQUIS 5 MG, Take 1 tablet (5 mg total) by mouth 2 (two) times a day, Disp: 60 tablet, Rfl: 2    ezetimibe-simvastatin (VYTORIN) 10-40 mg per tablet, Take 1 tablet by mouth daily at bedtime, Disp: 30 tablet, Rfl: 2    famotidine (PEPCID) 10 mg tablet, Take 10 mg by mouth as needed , Disp: , Rfl:     ferrous sulfate 325 (65 Fe) mg tablet, Take 325 mg by mouth daily with breakfast, Disp: , Rfl:     Insulin Pen Needle (BD PEN NEEDLE CORNELL U/F) 32G X 4 MM MISC, by Does not apply route 4 (four) times a day, Disp: 400 each, Rfl: 3    LEVEMIR FLEXTOUCH 100 units/mL injection pen, Inject 5 Units as directed daily at bedtime, Disp: , Rfl: 4    LORazepam (ATIVAN) 0 5 mg tablet, Take 2 tablets (1 mg total) by mouth daily at bedtime, Disp: 60 tablet, Rfl: 2    metFORMIN (GLUCOPHAGE) 500 mg tablet, Take 1 tablet (500 mg total) by mouth 2 (two) times a day with meals, Disp: 60 tablet, Rfl: 2    metoprolol tartrate (LOPRESSOR) 50 mg tablet, Take 1 tablet (50 mg total) by mouth every 12 (twelve) hours for 30 days, Disp: 60 tablet, Rfl: 2    NOVOLOG FLEXPEN 100 units/mL injection pen, INJECT UP TO 10 UNITS AS DIRECTED PER SLIDING SCALE, Disp: , Rfl: 5    ONE TOUCH ULTRA TEST test strip, Testing four times daily as directed, Disp: 400 each, Rfl: 1    ONETOUCH DELICA LANCETS FINE MISC, Use one lancet to test blood 4 times a day, Disp: 400 each, Rfl: 1  No Known Allergies    Labs:not applicable  Imaging: No results found  Review of Systems:  Review of Systems   All other systems reviewed and are negative  Physical Exam:  /76 (BP Location: Left arm, Patient Position: Sitting, Cuff Size: Standard)   Pulse 83   Ht 5' 6 54" (1 69 m)   Wt 68 kg (150 lb)   BMI 23 82 kg/m²   Physical Exam   Constitutional: She is oriented to person, place, and time  She appears well-developed and well-nourished  HENT:   Head: Normocephalic and atraumatic  Eyes: Pupils are equal, round, and reactive to light  Conjunctivae and EOM are normal    Neck: Normal range of motion  Neck supple  Cardiovascular: Normal rate and normal heart sounds  Pulmonary/Chest: Effort normal and breath sounds normal    Neurological: She is alert and oriented to person, place, and time  Skin: Skin is warm and dry  Psychiatric: She has a normal mood and affect  Vitals reviewed  Discussion/Summary:  I will continue her present medical regimen  I will arrange a pacemaker check    We will assess whether the patient has any ongoing evidence of paroxysmal atrial fibrillation  She continues on beta-blocker therapy  I will arrange for an echocardiogram to assess LV wall thickness and systolic function  I have asked her to call if there is a problem in the interim otherwise I will see her in follow-up in six months time  04-Apr-2022 15:59

## 2022-04-04 NOTE — PATIENT INSTRUCTIONS
For your persistent symptoms of abdominal bloating,  I would recommend to start peppermint oil, which is a smooth muscle relaxant  Two formulation options (both over the counter) include-  - IBGard -instructions per box  - peppermint oil 400 mg 3 times daily

## 2022-04-04 NOTE — PROGRESS NOTES
[facility] - Outpatient Consultation  Rosanne Varela 82 y o  female MRN: [id number]  Encounter: [id number]      PCP: ZANDER Bucio  Referring: Referral Self  [facility]  [address],  [address]      ASSESSMENT AND PLAN:      1  Chronic constipation with overflow  - continue motegrity  - Ambulatory Referral to Physical Therapy; Future, given incomplete evacuation and persistent alternating diarrhea/constipation    2  Abdominal bloating  Discussed pathophysiology as functional symptom related to irritable bowel syndrome/abnormalities with intestinal accommodation  Start peppermint oil  - Ambulatory Referral to Physical Therapy; Future    3  Moderate protein-calorie malnutrition ([facility] [address] )  Poor appetite/decreased food interest persists after patients remote trauma  - patient deferred nutrition referral    ______________________________________________________________________    CC:  Chief Complaint   Patient presents with    Constipation    Diarrhea       HPI:      Patient is an 72-year-old female who sees me in follow-up for bloating, irregular bowel habits with alternating constipation and diarrhea  Diagnostic evaluation includes CT x2, EGD, colonoscopy, ERCP, nuclear gastric emptying study  She was last seen by me approximately four months ago, during which we started Motegrity  She has had patial improvement with Motegrity- with use of the medication she has good complete bowel movements for 3-4 days  She does continue to experience 2-3 days without bowel movements, which is then followed by hard stools and subsequently water stools  Abdominal bloating continues to be an issue for her, affecting her quality of life  She wears stretchy pants to help her tolerate this  Objectively weight remains stable since her last visit  She denies any rectal bleeding  REVIEW OF SYSTEMS:    CONSTITUTIONAL: Denies any fever, chills, rigors, and weight loss    HEENT: No earache or tinnitus  Denies hearing loss or visual disturbances  CARDIOVASCULAR: No chest pain or palpitations  RESPIRATORY: Denies any cough, hemoptysis, shortness of breath or dyspnea on exertion  GASTROINTESTINAL: As noted in the History of Present Illness  GENITOURINARY: No problems with urination  Denies any hematuria or dysuria  NEUROLOGIC: No dizziness or vertigo, denies headaches  MUSCULOSKELETAL: Denies any muscle or joint pain  SKIN: Denies skin rashes or itching  ENDOCRINE: Denies excessive thirst  Denies intolerance to heat or cold  PSYCHOSOCIAL: Denies depression or anxiety  Denies any recent memory loss         Historical Information   Past Medical History:   Diagnosis Date    Glaucoma     H/O degenerative disc disease     Idiopathic pulmonary fibrosis ([facility] [address] ) 2020    Irregular heart beat     afib    Peripheral neuropathy      Past Surgical History:   Procedure Laterality Date    CATARACT EXTRACTION, BILATERAL      CHOLECYSTECTOMY      CHOLECYSTECTOMY LAPAROSCOPIC N/A 2020    Procedure: CHOLECYSTECTOMY LAPAROSCOPIC;  Surgeon: Magy Salguero MD;  [facility];  Service: General    COLONOSCOPY      CYST REMOVAL      left lower Quadrant     HERNIA REPAIR      LIPOMA RESECTION      WA REPAIR ING HERNIA,5+Y/O,REDUCIBL Bilateral 2018    Procedure: INGUINAL HERNIA REPAIR;  Surgeon: Lia Jernigan MD;  [facility];  Service: General    SHOULDER SURGERY  2019    Dr Leon [facility])   [address] carotid occlusion     Social History   Social History     Substance and Sexual Activity   Alcohol Use No     Social History     Substance and Sexual Activity   Drug Use No     Social History     Tobacco Use   Smoking Status Former Smoker    Packs/day: 1 50    Years: 38 00    Pack years: 57 00    Types: Cigarettes    Start date:     Quit date: 12    Years since quittin 2   Smokeless Tobacco Never Used     Family History   Problem Relation Age of Onset    Heart disease Mother     Heart attack Father     Hiatal hernia Father     Diabetes Father     Cancer Brother     Diabetes Brother     Heart disease Brother     Hypertension Brother     Other Son         gastroparesis       Meds/Allergies       Current Outpatient Medications:     acetaminophen (TYLENOL) 500 mg tablet    amoxicillin (AMOXIL) 500 mg capsule    aspirin (ECOTRIN LOW STRENGTH) 81 mg EC tablet    azelastine (ASTELIN) 0 1 % nasal spray    bimatoprost (LUMIGAN) 0 01 % ophthalmic drops    carboxymethylcellulose (Artificial Tears) 1 % ophthalmic solution    Eliquis 2 5 MG    ezetimibe-simvastatin (VYTORIN) 10-40 mg per tablet    famotidine (PEPCID) 20 mg tablet    ipratropium (ATROVENT) 0 03 % nasal spray    Januvia 100 MG tablet    loratadine (CLARITIN) 10 mg tablet    LORazepam (ATIVAN) 0 5 mg tablet    metFORMIN (GLUCOPHAGE) 500 mg tablet    metoprolol tartrate (LOPRESSOR) 50 mg tablet    Multiple Vitamin (multivitamin) tablet    sodium chloride 1 g tablet    calcium carbonate (OS-DANIA) 600 MG tablet    fluticasone (FLONASE) 50 mcg/act nasal spray    glucose blood (ONE TOUCH ULTRA TEST) test strip    Lancets (ONETOUCH DELICA PLUS EHEUHO83P) MISC    Prucalopride Succinate (Motegrity) 2 MG TABS    QC Pen Needles 31G X 6 MM MISC    Allergies   Allergen Reactions    Keflex [Cephalexin] Diarrhea           Objective     Blood pressure 118/64, pulse 62, resp  rate 18, height 5' 7" (1 702 m), weight 54 8 kg (120 lb 12 8 oz), SpO2 96 %  Body mass index is 18 92 kg/m²  PHYSICAL EXAM:      General Appearance:   Alert, cooperative, no distress   HEENT:   Normocephalic, atraumatic, anicteric  Neck:  Supple, symmetrical, trachea midline   Lungs:   Clear to auscultation bilaterally; no rales, rhonchi or wheezing; respirations unlabored    Heart[de-identified]   Regular rate and rhythm; no murmur, rub, or gallop     Abdomen:   Soft, non-tender, non-distended; normal bowel sounds; no masses, no organomegaly    Genitalia:   Deferred    Rectal:   Deferred    Extremities:  No cyanosis, clubbing or edema    Pulses:  2+ and symmetric    Skin:  No jaundice, rashes, or lesions    Lymph nodes:  No palpable cervical lymphadenopathy        Lab Results:     Lab Results   Component Value Date    WBC 6 39 04/02/2022    HGB 11 7 04/02/2022    HCT 37 7 04/02/2022    MCV 87 04/02/2022     04/02/2022       Lab Results   Component Value Date    K 4 7 04/02/2022    CL 94 (L) 04/02/2022    CO2 30 04/02/2022    BUN 12 04/02/2022    CREATININE 0 53 (L) 04/02/2022    GLUF 162 (H) 04/02/2022    CALCIUM 8 9 04/02/2022    CORRECTEDCA 9 6 12/14/2020    AST 22 04/02/2022    ALT 28 04/02/2022    ALKPHOS 55 04/02/2022    EGFR 87 04/02/2022       Lab Results   Component Value Date    INR 1 14 12/08/2020    PROTIME 14 6 (H) 12/08/2020         Radiology Results:   Cardiac EP device report    Result Date: 3/31/2022  Narrative: MDT-DUAL CHAMBER PPM (AAIR-DDDR MODE)/ ACTIVE SYSTEM IS MRI CONDITIONAL DEVICE INTERROGATED IN THE [place] OFFICE: BATTERY VOLTAGE ADEQUATE-4 5 YRS  AP 94%  0%  ALL AVAILABLE LEAD PARAMETERS WITHIN NORMAL LIMITS  1 NSVT NOTED; APPROX 9 BEATS @ 182 BPM  PT ON METO TART & EF 55% (2021)  NO PROGRAMMING CHANGES MADE TO DEVICE PARAMETERS  NORMAL DEVICE FUNCTION  NC       Portions of the record may have been created with voice recognition software  Occasional wrong word or "sound a like" substitutions may have occurred due to the inherent limitations of voice recognition software  Read the chart carefully and recognize, using context, where substitutions have occurred

## 2022-04-05 RX ORDER — BRIMONIDINE TARTRATE 0.1 %
DROPS OPHTHALMIC (EYE)
COMMUNITY
Start: 2022-03-17

## 2022-04-08 ENCOUNTER — OFFICE VISIT (OUTPATIENT)
Dept: FAMILY MEDICINE CLINIC | Facility: CLINIC | Age: 87
End: 2022-04-08
Payer: MEDICARE

## 2022-04-08 VITALS
TEMPERATURE: 96.6 F | DIASTOLIC BLOOD PRESSURE: 60 MMHG | WEIGHT: 119 LBS | OXYGEN SATURATION: 97 % | BODY MASS INDEX: 19.13 KG/M2 | HEART RATE: 82 BPM | RESPIRATION RATE: 16 BRPM | HEIGHT: 66 IN | SYSTOLIC BLOOD PRESSURE: 90 MMHG

## 2022-04-08 DIAGNOSIS — E11.3552 TYPE 2 DIABETES MELLITUS WITH STABLE PROLIFERATIVE RETINOPATHY OF LEFT EYE, WITHOUT LONG-TERM CURRENT USE OF INSULIN (HCC): ICD-10-CM

## 2022-04-08 DIAGNOSIS — E78.5 TYPE 2 DIABETES MELLITUS WITH HYPERLIPIDEMIA (HCC): Primary | ICD-10-CM

## 2022-04-08 DIAGNOSIS — I48.0 PAROXYSMAL ATRIAL FIBRILLATION (HCC): ICD-10-CM

## 2022-04-08 DIAGNOSIS — F41.9 ANXIETY: ICD-10-CM

## 2022-04-08 DIAGNOSIS — N39.46 MIXED STRESS AND URGE URINARY INCONTINENCE: ICD-10-CM

## 2022-04-08 DIAGNOSIS — I65.22 ASYMPTOMATIC CAROTID ARTERY STENOSIS, LEFT: ICD-10-CM

## 2022-04-08 DIAGNOSIS — E11.3391 TYPE 2 DIABETES MELLITUS WITH RIGHT EYE AFFECTED BY MODERATE NONPROLIFERATIVE RETINOPATHY WITHOUT MACULAR EDEMA, WITHOUT LONG-TERM CURRENT USE OF INSULIN (HCC): ICD-10-CM

## 2022-04-08 DIAGNOSIS — E87.1 HYPONATREMIA: ICD-10-CM

## 2022-04-08 DIAGNOSIS — E78.2 MIXED HYPERLIPIDEMIA: ICD-10-CM

## 2022-04-08 DIAGNOSIS — F33.9 DEPRESSION, RECURRENT (HCC): ICD-10-CM

## 2022-04-08 DIAGNOSIS — E11.69 TYPE 2 DIABETES MELLITUS WITH HYPERLIPIDEMIA (HCC): Primary | ICD-10-CM

## 2022-04-08 DIAGNOSIS — I10 HYPERTENSION, UNSPECIFIED TYPE: ICD-10-CM

## 2022-04-08 PROCEDURE — 99214 OFFICE O/P EST MOD 30 MIN: CPT | Performed by: NURSE PRACTITIONER

## 2022-04-08 NOTE — PROGRESS NOTES
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, without long-term current use of insulin [facility])  Assessment & Plan:    Lab Results   Component Value Date    HGBA1C 7 3 (H) 2022     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  Following with Dr Luba Severe, ophthalmology  Continue metformin, januvia  Has become hypoglycemic in the past with even low dose of basal insulin  Reluctant to add sulfonylurea due to risk of hypoglycemia  Will avoid GLP-1 due to associated weight loss  May consider Jardiance 10 mg daily, however, would be concerned about risk of hypotension, hypovolemia given her frailty  For now will continue same medications and continue to monitor  3  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, without long-term current use of insulin ([facility])  Assessment & Plan:    Lab Results   Component Value Date    HGBA1C 7 3 (H) 2022     Hemoglobin A1c is stable at 7 3%  Continue follow-up with Ophthalmology, Dr Luba Severe  4  Hypertension, unspecified type  Assessment & Plan:  Blood pressure is low normal today on metoprolol 75 mg every 12 hours  Blood pressure initially 120/80, with repeat 90/60  She is experiencing orthostatic hypotension when getting up from sitting to standing position at times    I have asked her to contact her cardiologist, Dr Darwyn Canavan to discuss possibility of reducing metoprolol dose  Orders:  -     Comprehensive metabolic panel; Future  -     CBC; Future  -     TSH, 3rd generation; Future    5  Anxiety  Assessment & Plan:  Anxiety is stable using lorazepam at bedtime  6  Depression, recurrent ([address] )  Assessment & Plan:  Stable without medications, good support from family and friends  7  Mixed hyperlipidemia  Assessment & Plan:  LDL stable at 45 on Vytorin 10-40 mg daily  8  Hyponatremia  Assessment & Plan:  Sodium 129  Admits she is not taking sodium chloride tablets 3 times daily as prescribed typically takes 1-2 daily  She will resume in chloride 1 g 3 times daily  She will continue follow-up with Nephrology, Dr Clarita Hurtado  9  Paroxysmal atrial fibrillation (HCC)  Assessment & Plan:  Anticoagulated on Eliquis and aspirin  Rate controlled on metoprolol  Continue follow-up with Cardiology, Dr Darwyn Canavan  10  Asymptomatic carotid artery stenosis, left  Assessment & Plan:  Continue Vytorin, eliquis, aspirin  November 2021 carotid duplex:  Impression  RIGHT:  Known occlusion of the internal carotid artery  Vertebral artery flow is antegrade  There is no significant subclavian artery  disease  LEFT:  There is 50-69% stenosis noted in the internal carotid artery  Plaque is  heterogenous and irregular  Vertebral artery flow is antegrade  There is no significant subclavian artery  disease  Compared to previous study on 3/2/2021, there is no significant interval  change  Recommend repeat testing in 6 months as per protocol unless otherwise  indicated  11  Mixed stress and urge urinary incontinence  Comments:  Recommend pelvic floor physical therapy  Contact information provided  Follow up in the office in 4 months, with blood work in 4 months      Chief Complaint     Chief Complaint   Patient presents with    Follow-up     Pt is here for 4 mos f/u BW History of Present Illness     Gio Martinez is a n 82year old female presenting today for follow up on chronic conditions  Continues with intermittent constipation, diarrhea, bloating  Follows with gastroenterology, Dr Rhea Rojo  Recently started IBgard  Still struggles with appetite  Good days and bad days  Sodium is low, 129, on recent blood work  Admits she has not been taking sodium tablets as prescribed  Taking 1-2 tablets per day, not 3 as prescribed  Due for nephrology follow up, Dr Milligan  a1c 7 3% eating whatever she wants, due to weight loss and poor appetite  Mood is stable  Sometimes down, with challenges of aging  Has friends and family she talks with and receives support  Finds a lot of comfort in prayer  Breathing good  Talking and walking at the same time is when she is short of breath  Orthostatic hypotension is most bothersome, right now  Brief episodes of lightheadedness, when going from sitting to standing at times  Has been present for long time now, but seems to be getting worse  Review of Systems   Review of Systems   Constitutional: Positive for appetite change (decreased appetite)  HENT: Negative  Respiratory: Negative  Cardiovascular: Negative  Gastrointestinal: Positive for abdominal distention, constipation and diarrhea  Genitourinary:        Stress and urge incontinence  Musculoskeletal: Negative  Skin: Negative  Neurological: Positive for light-headedness (only with going from sitting to standing)  Hematological: Negative  Psychiatric/Behavioral: Negative  I have reviewed the patient's medical history in detail; there are no changes to the history as noted in the electronic medical record      Objective     Vitals:    04/08/22 1007 04/08/22 1043   BP: 120/80 90/60   Pulse: 82    Resp: 16    Temp: (!) 96 6 °F (35 9 °C)    TempSrc: Temporal    SpO2: 97%    Weight: 54 kg (119 lb) Height: 5' 6 14" (1 68 m)      Wt Readings from Last 3 Encounters:   04/08/22 54 kg (119 lb)   04/04/22 54 8 kg (120 lb 12 8 oz)   01/05/22 54 5 kg (120 lb 3 2 oz)     Physical Exam  Vitals and nursing note reviewed  Constitutional:       General: She is not in acute distress  Comments: Frail, cachectic   HENT:      Head: Atraumatic  Eyes:      Conjunctiva/sclera: Conjunctivae normal    Neck:      Thyroid: No thyromegaly  Vascular: No carotid bruit  Cardiovascular:      Rate and Rhythm: Normal rate and regular rhythm  Heart sounds: No murmur heard  Pulmonary:      Effort: Pulmonary effort is normal  No respiratory distress  Breath sounds: Normal breath sounds  No wheezing or rales  Abdominal:      General: Abdomen is flat  Bowel sounds are normal       Palpations: Abdomen is soft  Tenderness: There is no abdominal tenderness  Musculoskeletal:      Cervical back: Normal range of motion and neck supple  Right lower leg: No edema  Left lower leg: No edema  Lymphadenopathy:      Cervical: No cervical adenopathy  Skin:     General: Skin is warm and dry  Neurological:      Mental Status: She is alert and oriented to person, place, and time     Psychiatric:         Mood and Affect: Mood normal             ALLERGIES:  Allergies   Allergen Reactions    Keflex [Cephalexin] Diarrhea       Current Medications     Current Outpatient Medications   Medication Sig Dispense Refill    acetaminophen (TYLENOL) 500 mg tablet Take 1,000 mg by mouth as needed for mild pain      Alphagan P 0 1 % INSTILL 1 DROP RIGHT EYE TWICE A DAY      amoxicillin (AMOXIL) 500 mg capsule Prophylactic, prior to dentist      aspirin (ECOTRIN LOW STRENGTH) 81 mg EC tablet Take 1 tablet (81 mg total) by mouth daily      azelastine (ASTELIN) 0 1 % nasal spray 1 SPRAY INTO EACH NOSTRIL 2 (TWO) TIMES A DAY USE IN EACH NOSTRIL AS DIRECTED 30 mL 1    bimatoprost (LUMIGAN) 0 01 % ophthalmic drops Administer 1 drop to both eyes daily at bedtime for 30 days 1 5 mL 0    calcium carbonate (OS-DANIA) 600 MG tablet Take 600 mg by mouth 2 (two) times a day with meals        carboxymethylcellulose (Artificial Tears) 1 % ophthalmic solution 1 drop 3 (three) times a day      Eliquis 2 5 MG TAKE 1 TABLET (2 5 MG TOTAL) BY MOUTH 2 (TWO) TIMES A DAY 60 tablet 5    ezetimibe-simvastatin (VYTORIN) 10-40 mg per tablet TAKE 1 TABLET BY MOUTH DAILY AT BEDTIME 30 tablet 5    famotidine (PEPCID) 20 mg tablet TAKE 1 TABLET (20 MG TOTAL) BY MOUTH 2 (TWO) TIMES A DAY 60 tablet 5    glucose blood (ONE TOUCH ULTRA TEST) test strip TEST FOUR TIMES A  each 1    ipratropium (ATROVENT) 0 03 % nasal spray 2 sprays into each nostril every 12 (twelve) hours 30 mL 6    Januvia 100 MG tablet TAKE 1 TABLET (100 MG TOTAL) BY MOUTH DAILY 30 tablet 5    Lancets (ONETOUCH DELICA PLUS [id number]) MISC Use one lancet to test blood 4 times a day 400 each 1    loratadine (CLARITIN) 10 mg tablet Take 10 mg by mouth daily as needed for allergies      LORazepam (ATIVAN) 0 5 mg tablet TAKE 2 TABLETS (1 MG TOTAL) BY MOUTH DAILY AT BEDTIME 60 tablet 2    metFORMIN (GLUCOPHAGE) 500 mg tablet TAKE 2 TABLETS (1,000 MG TOTAL) BY MOUTH 2 (TWO) TIMES A DAY WITH MEALS 120 tablet 5    metoprolol tartrate (LOPRESSOR) 50 mg tablet TAKE 1 5 TABLETS (75 MG TOTAL) BY MOUTH EVERY 12 (TWELVE) HOURS 90 tablet 5    Multiple Vitamin (multivitamin) tablet Take 1 tablet by mouth daily        Prucalopride Succinate (Motegrity) 2 MG TABS Take 2 mg by mouth daily 90 tablet 3    QC Pen Needles 31G X 6 MM MISC USE 4 (FOUR) TIMES A DAY (BEFORE MEALS AND AT BEDTIME) 100 each 3    sodium chloride 1 g tablet Take 1 tablet (1 g total) by mouth 3 (three) times a day 180 tablet 1    fluticasone (FLONASE) 50 mcg/act nasal spray 2 sprays into each nostril daily (Patient not taking: Reported on 1/5/2022 ) 18 mL 0     No current facility-administered medications for this visit           Health Maintenance     Health Maintenance   Topic Date Due    DTaP,Tdap,and Td Vaccines (1 - Tdap) Never done    DM Eye Exam  07/15/2022    Fall Risk  09/07/2022    Medicare Annual Wellness Visit (AWV)  09/07/2022    Depression Remission PHQ  09/07/2022    HEMOGLOBIN A1C  10/02/2022    Diabetic Foot Exam  01/20/2023    BMI: Adult  04/08/2023    Colorectal Cancer Screening  09/02/2025    Hepatitis C Screening  Completed    Osteoporosis Screening  Completed    Pneumococcal Vaccine: 65+ Years  Completed    Influenza Vaccine  Completed    COVID-19 Vaccine  Completed    HIB Vaccine  Aged Out    Hepatitis B Vaccine  Aged Out    IPV Vaccine  Aged Out    Hepatitis A Vaccine  Aged Out    Meningococcal ACWY Vaccine  Aged Out    HPV Vaccine  Aged Out     Immunization History   Administered Date(s) Administered    COVID-19 PFIZER VACCINE 0 3 ML IM 01/22/2021, 02/12/2021, 09/30/2021    INFLUENZA 11/02/2016, 10/16/2017, 09/18/2018, 09/18/2018    Influenza, Quadrivalent (nasal) 11/02/2016    Influenza, high dose seasonal 0 7 mL 11/06/2019, 10/06/2020, 11/09/2021    Pneumococcal Conjugate 13-Valent 07/25/2019    Pneumococcal Polysaccharide PPV23 03/17/2003    Zoster 07/09/2010       ZANDRE Choi

## 2022-04-09 PROBLEM — Z90.49 S/P LAPAROSCOPIC CHOLECYSTECTOMY: Status: RESOLVED | Noted: 2020-12-21 | Resolved: 2022-04-09

## 2022-04-09 NOTE — ASSESSMENT & PLAN NOTE
Anticoagulated on Eliquis and aspirin  Rate controlled on metoprolol  Continue follow-up with Cardiology, Dr Queen Pompa

## 2022-04-09 NOTE — ASSESSMENT & PLAN NOTE
Lab Results   Component Value Date    HGBA1C 7 3 (H) 04/02/2022     Hemoglobin A1c is stable at 7 3%  Continue follow-up with Ophthalmology, Dr Phillips Severe

## 2022-04-09 NOTE — ASSESSMENT & PLAN NOTE
Continue Vytorin, eliquis, aspirin  November 2021 carotid duplex:  Impression  RIGHT:  Known occlusion of the internal carotid artery  Vertebral artery flow is antegrade  There is no significant subclavian artery  disease  LEFT:  There is 50-69% stenosis noted in the internal carotid artery  Plaque is  heterogenous and irregular  Vertebral artery flow is antegrade  There is no significant subclavian artery  disease  Compared to previous study on 3/2/2021, there is no significant interval  change  Recommend repeat testing in 6 months as per protocol unless otherwise  indicated

## 2022-04-09 NOTE — ASSESSMENT & PLAN NOTE
Sodium 129  Admits she is not taking sodium chloride tablets 3 times daily as prescribed typically takes 1-2 daily  She will resume in chloride 1 g 3 times daily  She will continue follow-up with Nephrology, Dr Murali Young

## 2022-04-09 NOTE — ASSESSMENT & PLAN NOTE
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

## 2022-04-09 NOTE — ASSESSMENT & PLAN NOTE
Blood pressure is low normal today on metoprolol 75 mg every 12 hours  Blood pressure initially 120/80, with repeat 90/60  She is experiencing orthostatic hypotension when getting up from sitting to standing position at times  I have asked her to contact her cardiologist, Dr Erickson Ba to discuss possibility of reducing metoprolol dose

## 2022-04-09 NOTE — ASSESSMENT & PLAN NOTE
Lab Results   Component Value Date    HGBA1C 7 3 (H) 04/02/2022     Hemoglobin A1c is stable at 7 3%  Will continue metformin and Januvia  LDL is stable at 45 on Vytorin 10-40 mg daily  Will repeat hemoglobin A1c, lipid panel, CMP in 4 months

## 2022-04-16 NOTE — PROGRESS NOTES
Cardiology Follow Up    Stefan Morales  1935  [id number]  [facility] [id number] [facility]  [phone]    1  Essential (primary) hypertension  POCT ECG   2  Pure hypercholesterolemia     3  Cardiac pacemaker in situ     4  Paroxysmal atrial fibrillation (HCC)  POCT ECG       Interval History:  Patient is here for a follow-up visit  Patient has a PPM with ongoing interrogation  Her most recent check was in 3/2022 and at that time her device was found to be functioning well  There was one 9 beat run of NSVT there was no evidence of atrial fibrillation  Brief PAF was noted at the time of study July 2021  A pharmacologic nuclear stress test done October 2016  showed no evidence of prognostically important ischemia  Echocardiogram done April 2021 demonstrated preserved LVEF of 55% with mild LVH  There was no significant valvular heart disease and no significant change compared to a prior study done August 2019  Patient follows with vascular in reference to carotid disease  Repeat study is ordered  Patient follows with vascular  Patient while in [place] over the winter time 2019, had an episode where she fractured her right shoulder  She  had issues with Afib and was placed on AC  Patient is on adjusted dose Eliquis  ASA was discontinued  A Lexiscan Myoview in [place] reportedly showed no ischemia with an LVEF of 62%  Lipid profile April 2022 good  Vital signs are stable today  She has had some lightheadedness and her blood pressure is low normal   Will reduce dose of metoprolol to 50 twice a day and monitor  As necessary we will reduce the dose further  In 2019 she weighed about 160 lb and now she is in the 117 range  She does drink nutritional supplements    EKG today demonstrates sinus rhythm with minor nonspecific ST segment changes with no significant change compared to a prior tracing      Patient Active Problem List   Diagnosis    HTN (hypertension)    HLD (hyperlipidemia)    Glaucoma    Asymptomatic carotid artery stenosis, left    Symptomatic PVCs    Occlusion of right carotid artery    Paroxysmal atrial fibrillation (HCC)    Pacemaker    Osteoporosis    GERD (gastroesophageal reflux disease)    Anxiety    Iron deficiency    Interstitial lung disease (HCC)    Common bile duct dilatation    Dysphagia    Moderate protein-calorie malnutrition (HCC)    Constipation    Hyponatremia    Post-nasal drip    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, without long-term current use of insulin ([facility])    Idiopathic pulmonary fibrosis ([facility])    Type 2 diabetes mellitus with hyperlipidemia (HCC)    Type 2 diabetes mellitus with diabetic polyneuropathy, without long-term current use of insulin ([facility])    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, without long-term current use of insulin ([facility])    Depression, recurrent ([place] [address] )    Hypovitaminosis D     Past Medical History:   Diagnosis Date    Glaucoma     H/O degenerative disc disease     Idiopathic pulmonary fibrosis ([facility] ) 2020    Irregular heart beat     afib    Peripheral neuropathy     S/P laparoscopic cholecystectomy 2020    Stenosis of right subclavian artery ([place] [address] ) 2016     Social History     Socioeconomic History    Marital status:      Spouse name: Not on file    Number of children: Not on file    Years of education: Not on file    Highest education level: Not on file   Occupational History    Occupation: customer service   retired   Tobacco Use    Smoking status: Former Smoker     Packs/day: 1 50     Years: 38 00     Pack years: 57 00     Types: Cigarettes     Start date:      Quit date:      Years since quittin 3    Smokeless tobacco: Never Used   Vaping Use    Vaping Use: Never used   Substance and Sexual Activity    Alcohol use: No    Drug use: No    Sexual activity: Not on file   Other Topics Concern    Not on file   Social History Narrative    Lives [place]    2 children     Social Determinants of Health     Financial Resource Strain: Not on file   Food Insecurity: Not on file   Transportation Needs: Not on file   Physical Activity: Not on file   Stress: Not on file   Social Connections: Not on file   Intimate Partner Violence: Not on file   Housing Stability: Not on file      Family History   Problem Relation Age of Onset    Heart disease Mother     Heart attack Father     Hiatal hernia Father     Diabetes Father     Cancer Brother     Diabetes Brother     Heart disease Brother     Hypertension Brother     Other Son         gastroparesis     Past Surgical History:   Procedure Laterality Date    CATARACT EXTRACTION, BILATERAL  2011    CHOLECYSTECTOMY      CHOLECYSTECTOMY LAPAROSCOPIC N/A 12/9/2020    Procedure: CHOLECYSTECTOMY LAPAROSCOPIC;  Surgeon: Sandy Tee MD;  Location: [facility];  Service: General    COLONOSCOPY      CYST REMOVAL  2009    left lower [place] RESECTION  2010    CT REPAIR ING HERNIA,5+Y/O,REDUCIBL Bilateral 1/5/2018    Procedure: INGUINAL HERNIA REPAIR;  Surgeon: Juliocesar Melendez MD;  Location: [facility];  Service: General    SHOULDER SURGERY  04/26/2019    Dr Bravo [facility])    [address] carotid occlusion       Current Outpatient Medications:     acetaminophen (TYLENOL) 500 mg tablet, Take 1,000 mg by mouth as needed for mild pain, Disp: , Rfl:     Alphagan P 0 1 %, INSTILL 1 DROP RIGHT EYE TWICE A DAY, Disp: , Rfl:     amoxicillin (AMOXIL) 500 mg capsule, Prophylactic, prior to dentist, Disp: , Rfl:     aspirin (ECOTRIN LOW STRENGTH) 81 mg EC tablet, Take 1 tablet (81 mg total) by mouth daily, Disp:  , Rfl:     azelastine (ASTELIN) 0 1 % nasal spray, 1 SPRAY INTO EACH NOSTRIL 2 (TWO) TIMES A DAY USE IN EACH NOSTRIL AS DIRECTED, Disp: 30 mL, Rfl: 1    bimatoprost (LUMIGAN) 0 01 % ophthalmic drops, Administer 1 drop to both eyes daily at bedtime for 30 days, Disp: 1 5 mL, Rfl: 0    calcium carbonate (OS-DANIA) 600 MG tablet, Take 600 mg by mouth 2 (two) times a day with meals  , Disp: , Rfl:     carboxymethylcellulose (Artificial Tears) 1 % ophthalmic solution, 1 drop 3 (three) times a day, Disp: , Rfl:     Eliquis 2 5 MG, TAKE 1 TABLET (2 5 MG TOTAL) BY MOUTH 2 (TWO) TIMES A DAY, Disp: 60 tablet, Rfl: 5    ezetimibe-simvastatin (VYTORIN) 10-40 mg per tablet, TAKE 1 TABLET BY MOUTH DAILY AT BEDTIME, Disp: 30 tablet, Rfl: 5    famotidine (PEPCID) 20 mg tablet, TAKE 1 TABLET (20 MG TOTAL) BY MOUTH 2 (TWO) TIMES A DAY, Disp: 60 tablet, Rfl: 5    fluticasone (FLONASE) 50 mcg/act nasal spray, 2 SPRAYS INTO EACH NOSTRIL DAILY, Disp: 16 g, Rfl: 0    glucose blood (ONE TOUCH ULTRA TEST) test strip, TEST FOUR TIMES A DAY, Disp: 400 each, Rfl: 1    ipratropium (ATROVENT) 0 03 % nasal spray, 2 sprays into each nostril every 12 (twelve) hours, Disp: 30 mL, Rfl: 6    Januvia 100 MG tablet, TAKE 1 TABLET (100 MG TOTAL) BY MOUTH DAILY, Disp: 30 tablet, Rfl: 5    Lancets (ONETOUCH DELICA PLUS [id number]) MISC, Use one lancet to test blood 4 times a day, Disp: 400 each, Rfl: 1    loratadine (CLARITIN) 10 mg tablet, Take 10 mg by mouth daily as needed for allergies, Disp: , Rfl:     LORazepam (ATIVAN) 0 5 mg tablet, TAKE 2 TABLETS (1 MG TOTAL) BY MOUTH DAILY AT BEDTIME, Disp: 60 tablet, Rfl: 2    metFORMIN (GLUCOPHAGE) 500 mg tablet, TAKE 2 TABLETS (1,000 MG TOTAL) BY MOUTH 2 (TWO) TIMES A DAY WITH MEALS, Disp: 120 tablet, Rfl: 5    metoprolol tartrate (LOPRESSOR) 50 mg tablet, TAKE 1 5 TABLETS (75 MG TOTAL) BY MOUTH EVERY 12 (TWELVE) HOURS, Disp: 90 tablet, Rfl: 5    Multiple Vitamin (multivitamin) tablet, Take 1 tablet by mouth daily  , Disp: , Rfl:     Prucalopride Succinate (Motegrity) 2 MG TABS, Take 2 mg by mouth daily, Disp: 90 tablet, Rfl: 3    QC Pen Needles 31G X 6 MM MISC, USE 4 (FOUR) TIMES A DAY (BEFORE MEALS AND AT BEDTIME), Disp: 100 each, Rfl: 3    sodium chloride 1 g tablet, Take 1 tablet (1 g total) by mouth 3 (three) times a day, Disp: 180 tablet, Rfl: 1  Allergies   Allergen Reactions    Keflex [Cephalexin] Diarrhea       Labs:not applicable  Imaging: Cardiac EP device report    Result Date: 3/31/2022  Narrative: MDT-DUAL CHAMBER PPM (AAIR-DDDR MODE)/ ACTIVE SYSTEM IS MRI CONDITIONAL DEVICE INTERROGATED IN THE [facility] OFFICE: BATTERY VOLTAGE ADEQUATE-4 5 YRS  AP 94%  0%  ALL AVAILABLE LEAD PARAMETERS WITHIN NORMAL LIMITS  1 NSVT NOTED; APPROX 9 BEATS @ 182 BPM  PT ON METO TART & EF 55% (2021)  NO PROGRAMMING CHANGES MADE TO DEVICE PARAMETERS  NORMAL DEVICE FUNCTION  NC       Review of Systems:  Review of Systems   All other systems reviewed and are negative  Physical Exam:  /64 (BP Location: Right arm, Patient Position: Sitting, Cuff Size: Standard)   Pulse 72   Wt 54 5 kg (120 lb 3 2 oz)   BMI 19 32 kg/m²   Physical Exam  Vitals reviewed  Constitutional:       Appearance: She is well-developed  HENT:      Head: Normocephalic and atraumatic  Eyes:      Conjunctiva/sclera: Conjunctivae normal       Pupils: Pupils are equal, round, and reactive to light  Cardiovascular:      Rate and Rhythm: Normal rate  Heart sounds: Normal heart sounds  Pulmonary:      Effort: Pulmonary effort is normal       Breath sounds: Normal breath sounds  Musculoskeletal:      Cervical back: Normal range of motion and neck supple  Skin:     General: Skin is warm and dry  Neurological:      Mental Status: She is alert and oriented to person, place, and time  Discussion/Summary:  Will reduce dose of metoprolol to 50 mg twice a day  Will monitor her blood pressure  In other regards she is stable  Her EKG looks good    I have asked her to call if there is a problem in the interim otherwise I will see her in follow-up in six months time in sooner as is necessary

## 2022-04-25 DIAGNOSIS — R09.82 POST-NASAL DRIP: ICD-10-CM

## 2022-04-25 RX ORDER — FLUTICASONE PROPIONATE 50 MCG
2 SPRAY, SUSPENSION (ML) NASAL DAILY
Qty: 16 G | Refills: 0 | Status: SHIPPED | OUTPATIENT
Start: 2022-04-25

## 2022-04-26 ENCOUNTER — OFFICE VISIT (OUTPATIENT)
Dept: CARDIOLOGY CLINIC | Facility: CLINIC | Age: 87
End: 2022-04-26
Payer: MEDICARE

## 2022-04-26 VITALS
SYSTOLIC BLOOD PRESSURE: 100 MMHG | HEART RATE: 72 BPM | DIASTOLIC BLOOD PRESSURE: 64 MMHG | BODY MASS INDEX: 19.32 KG/M2 | WEIGHT: 120.2 LBS

## 2022-04-26 DIAGNOSIS — I48.0 PAROXYSMAL ATRIAL FIBRILLATION (HCC): ICD-10-CM

## 2022-04-26 DIAGNOSIS — Z95.0 CARDIAC PACEMAKER IN SITU: ICD-10-CM

## 2022-04-26 DIAGNOSIS — E78.00 PURE HYPERCHOLESTEROLEMIA: ICD-10-CM

## 2022-04-26 DIAGNOSIS — I10 ESSENTIAL (PRIMARY) HYPERTENSION: Primary | ICD-10-CM

## 2022-04-26 PROCEDURE — 93000 ELECTROCARDIOGRAM COMPLETE: CPT | Performed by: INTERNAL MEDICINE

## 2022-04-26 PROCEDURE — 99214 OFFICE O/P EST MOD 30 MIN: CPT | Performed by: INTERNAL MEDICINE

## 2022-04-26 NOTE — PATIENT INSTRUCTIONS
I will continue the patient's present medical regimen except reduce dose of metoprolol to 50 mg twice a day  I have asked the patient to call if there is a problem in the interim otherwise I will see the patient in six months time

## 2022-05-11 DIAGNOSIS — F41.9 ANXIETY: ICD-10-CM

## 2022-05-12 RX ORDER — LORAZEPAM 0.5 MG/1
1 TABLET ORAL
Qty: 60 TABLET | Refills: 1 | OUTPATIENT
Start: 2022-05-12

## 2022-06-02 ENCOUNTER — HOSPITAL ENCOUNTER (OUTPATIENT)
Dept: VASCULAR ULTRASOUND | Facility: HOSPITAL | Age: 87
Discharge: HOME | End: 2022-06-02
Attending: SURGERY
Payer: MEDICARE

## 2022-06-02 DIAGNOSIS — I65.21 OCCLUSION OF RIGHT CAROTID ARTERY: ICD-10-CM

## 2022-06-02 PROCEDURE — 93880 EXTRACRANIAL BILAT STUDY: CPT | Performed by: SURGERY

## 2022-06-02 PROCEDURE — 93880 EXTRACRANIAL BILAT STUDY: CPT

## 2022-06-08 DIAGNOSIS — F41.9 ANXIETY: ICD-10-CM

## 2022-06-09 RX ORDER — LORAZEPAM 0.5 MG/1
1 TABLET ORAL
Qty: 60 TABLET | Refills: 2 | Status: SHIPPED | OUTPATIENT
Start: 2022-06-09

## 2022-06-24 DIAGNOSIS — E87.1 HYPONATREMIA: ICD-10-CM

## 2022-06-24 RX ORDER — SODIUM CHLORIDE 1000 MG
1 TABLET, SOLUBLE MISCELLANEOUS 3 TIMES DAILY
Qty: 90 TABLET | Refills: 5 | Status: SHIPPED | OUTPATIENT
Start: 2022-06-24

## 2022-07-11 ENCOUNTER — REMOTE DEVICE CLINIC VISIT (OUTPATIENT)
Dept: CARDIOLOGY CLINIC | Facility: CLINIC | Age: 87
End: 2022-07-11
Payer: MEDICARE

## 2022-07-11 DIAGNOSIS — Z95.0 PRESENCE OF PERMANENT CARDIAC PACEMAKER: Primary | ICD-10-CM

## 2022-07-11 PROCEDURE — 93294 REM INTERROG EVL PM/LDLS PM: CPT | Performed by: INTERNAL MEDICINE

## 2022-07-11 PROCEDURE — 93296 REM INTERROG EVL PM/IDS: CPT | Performed by: INTERNAL MEDICINE

## 2022-07-11 NOTE — PROGRESS NOTES
Results for orders placed or performed in visit on 07/11/22   Cardiac EP device report    Narrative    MDT-DUAL CHAMBER PPM (AAIR-DDDR MODE)/ ACTIVE SYSTEM IS MRI CONDITIONAL  CARELINK TRANSMISSION: BATTERY VOLTAGE ADEQUATE  (4 5 YRS) AP 90%  <1%  ALL AVAILABLE LEAD PARAMETERS WITHIN NORMAL LIMITS  1 VHR EPISODE DETECTED 10 BEATS @ 340ms  PATIENT IS ON METOPROLOL TART AND ELIQUIS; EF 55% (2021)  NORMAL DEVICE FUNCTION  ---CHERRY

## 2022-07-14 ENCOUNTER — OFFICE VISIT (OUTPATIENT)
Dept: FAMILY MEDICINE CLINIC | Facility: CLINIC | Age: 87
End: 2022-07-14
Payer: MEDICARE

## 2022-07-14 VITALS
RESPIRATION RATE: 16 BRPM | DIASTOLIC BLOOD PRESSURE: 60 MMHG | WEIGHT: 116.13 LBS | TEMPERATURE: 97.5 F | BODY MASS INDEX: 18.66 KG/M2 | SYSTOLIC BLOOD PRESSURE: 110 MMHG | OXYGEN SATURATION: 95 % | HEIGHT: 66 IN | HEART RATE: 95 BPM

## 2022-07-14 DIAGNOSIS — K21.9 GASTROESOPHAGEAL REFLUX DISEASE, UNSPECIFIED WHETHER ESOPHAGITIS PRESENT: ICD-10-CM

## 2022-07-14 DIAGNOSIS — L89.151 PRESSURE INJURY OF SACRAL REGION, STAGE 1: ICD-10-CM

## 2022-07-14 DIAGNOSIS — R63.4 WEIGHT LOSS: Primary | ICD-10-CM

## 2022-07-14 DIAGNOSIS — K59.00 CONSTIPATION, UNSPECIFIED CONSTIPATION TYPE: ICD-10-CM

## 2022-07-14 DIAGNOSIS — E11.42 TYPE 2 DIABETES MELLITUS WITH DIABETIC POLYNEUROPATHY, WITHOUT LONG-TERM CURRENT USE OF INSULIN (HCC): ICD-10-CM

## 2022-07-14 DIAGNOSIS — I10 HYPERTENSION, UNSPECIFIED TYPE: ICD-10-CM

## 2022-07-14 DIAGNOSIS — J84.112 IDIOPATHIC PULMONARY FIBROSIS (HCC): ICD-10-CM

## 2022-07-14 PROCEDURE — 99214 OFFICE O/P EST MOD 30 MIN: CPT | Performed by: NURSE PRACTITIONER

## 2022-07-14 NOTE — PROGRESS NOTES
FAMILY PRACTICE OFFICE VISIT       NAME: Emely Setting  AGE: 82 y o  SEX: female       : 1935        MRN: [id number]    Assessment and Plan   1  Weight loss  Comments:  Tracked weights in EMR from office visits  Weight 2019 150 pounds  Lost 20 pounds 2020 until 2021  Lost 7 pounds over the past year  Likely multifactorial  Decreased appetite, IBS, chronic constipation, dysphagia, depression/anxiety with decreased social interaction during COVID-19 pandemic  CT abdomen and pelvis in 2021 with no evidence of malignancy  CT chest in 2020, with no evidence of malignancy  Gastric emptying study 22 normal    Last mammogram   Declines mammograms  EGD 2020: Irregular z lint otherwise normal   Colonoscopy 2020: Two sigmoid colon polyps with recall in 5 years  Extensive blood work completed over last 2 years  Has declined trial of mirtazipine  Declines referral to nutritionist        2  Constipation, unspecified constipation type  Assessment & Plan:  Taking Motegrity  Has firm stools followed by intense diarrhea with 6-8 stools per day--watery  Followed by few days with no stools  Unfortunately GI appointment next week was canceled by GI office and not able to be rescheduled until end   I will reach out to Dr Juan Kimbrough regarding if there is an alternative she could try  3  Pressure injury of sacral region, stage 1    Will try to obtain gel cushion for her to sit on  Has to alleviate pressure from this area, frequent position changes, essential    -     Gel Cushions    4  Gastroesophageal reflux disease, unspecified whether esophagitis present  Assessment & Plan:  GERD exacerbated by menta XL  She stop this and retry IBgard  5  Hypertension, unspecified type  Assessment & Plan:  BP low end of normal today  Recent reduction in metoprolol tartrate from 75 mg BID to 50 mg BID     Following with cardiology,   Ajith Tapia  6  Idiopathic pulmonary fibrosis ([facility] [address] )  Assessment & Plan:  Stable  Continue pulmonology follow-up  7  Type 2 diabetes mellitus with diabetic polyneuropathy, without long-term current use of insulin ([facility])  Assessment & Plan:    Lab Results   Component Value Date    HGBA1C 7 3 (H) 04/02/2022     Home sugars running around 160s  Continue metformin and Januvia  Due for repeat A1c next month  Return in 1 month for recheck  Chief Complaint     Chief Complaint   Patient presents with    Follow-up     4 month        History of Present Illness     Jaclyn Lo is an 51-year-old female presenting today for follow-up on chronic conditions  Lost 4 more lb over the past 3 months  She has had extensive workup for weight loss, with no particular cause determined  Completed CT x2, EGD, colonoscopy, ERCP, nuclear gastric emptying study  She is following with Gastroenterology  We discussed mirtazapine in the past, however she did not like side effect profile of this medication and did not want to take it  We have discussed depression in the past   Feels her mood is low at times due to aging process, lack of social interaction due to COVID-19  Feels this is intermittent, and she manages by talking to family and friends via telephone  Taking [address] for chronic constipation, which is she has a hard stool, followed by diarrhea, then few days of no stools, then restarting pattern again  She is upset with this, as she feels like she can't leave the house  Terrible cramping after bowel movement  Started using IBgard, but capsules were large  At recommendation of friend, started taking Menth-XL  This has caused horrible heart burn and she has to drink 8 ounces of water with dose, which further reduces appetite for meals  Bloating not as bad as it was  Not anything compared to what it was  Eating less and less, feels full  Does not get hungry       Diet recall:  Mornin slice of toast, at most 1 5 slices  Greek yogurt with jam      Lunch: at most 1 5 slices of bread, turkey, mayonaise  Dinner: chili or soup  Atkins bar in the evening  No other snacks  Trying to drink milk with lunch and dinner  Sugars around 160's  Due for blood work next month  Pressure sore on buttocks, hard to sit  Started over the last 1-2 weeks  Sitting on 2 pillows  Sits all day long  Sleeps supine  No incontinence  Hip hurts lying on left side  Shoulder hurts lying on right side  Post nasal drip, cough, hoarse voice when talking  Sometimes has yellow productive cough  Hard to get loose  Sleeps with mouth open  Post nasal drip has been ongoing  Following with pulmonology  Review of Systems   Review of Systems   Constitutional: Positive for fatigue and unexpected weight change  HENT: Positive for postnasal drip and voice change  Eyes: Negative for visual disturbance  Respiratory: Negative  Cardiovascular: Negative  Gastrointestinal: Positive for abdominal distention, abdominal pain, constipation and diarrhea  Genitourinary: Negative  Musculoskeletal: Negative  Skin:        Pressure sore   Neurological: Negative  I have reviewed the patient's medical history in detail; there are no changes to the history as noted in the electronic medical record  Objective     Vitals:    22 0934   BP: 110/60   Pulse: 95   Resp: 16   Temp: 97 5 °F (36 4 °C)   SpO2: 95%   Weight: 52 7 kg (116 lb 2 oz)   Height: 5' 6" (1 676 m)     Wt Readings from Last 3 Encounters:   07/15/22 52 7 kg (116 lb 3 2 oz)   22 52 7 kg (116 lb 2 oz)   22 54 5 kg (120 lb 3 2 oz)     Physical Exam  Vitals and nursing note reviewed  Constitutional:       Appearance: She is ill-appearing (chornically ill, frail appearing )  HENT:      Head: Atraumatic     Cardiovascular:      Rate and Rhythm: Normal rate and regular rhythm  Heart sounds: No murmur heard  Pulmonary:      Effort: Pulmonary effort is normal       Breath sounds: Normal breath sounds  Abdominal:      General: Bowel sounds are normal       Palpations: Abdomen is soft  Tenderness: There is no abdominal tenderness  Musculoskeletal:      Cervical back: Normal range of motion and neck supple  Right lower leg: No edema  Left lower leg: No edema  Lymphadenopathy:      Cervical: No cervical adenopathy  Skin:     General: Skin is warm and dry  Comments: Bilateral buttocks with stage I pressure sore, with right buttock with 1 inch round macerated area  Neurological:      Mental Status: She is alert and oriented to person, place, and time        Gait: Gait normal    Psychiatric:         Mood and Affect: Mood normal             ALLERGIES:  Allergies   Allergen Reactions    Keflex [Cephalexin] Diarrhea       Current Medications     Current Outpatient Medications   Medication Sig Dispense Refill    acetaminophen (TYLENOL) 500 mg tablet Take 1,000 mg by mouth as needed for mild pain      Alphagan P 0 1 % INSTILL 1 DROP RIGHT EYE TWICE A DAY      amoxicillin (AMOXIL) 500 mg capsule Prophylactic, prior to dentist      aspirin (ECOTRIN LOW STRENGTH) 81 mg EC tablet Take 1 tablet (81 mg total) by mouth daily      azelastine (ASTELIN) 0 1 % nasal spray 1 SPRAY INTO EACH NOSTRIL 2 (TWO) TIMES A DAY USE IN EACH NOSTRIL AS DIRECTED 30 mL 1    bimatoprost (LUMIGAN) 0 01 % ophthalmic drops Administer 1 drop to both eyes daily at bedtime for 30 days 1 5 mL 0    calcium carbonate (OS-DANIA) 600 MG tablet Take 600 mg by mouth 2 (two) times a day with meals        carboxymethylcellulose 1 % ophthalmic solution 1 drop 3 (three) times a day      Eliquis 2 5 MG TAKE 1 TABLET (2 5 MG TOTAL) BY MOUTH 2 (TWO) TIMES A DAY 60 tablet 5    ezetimibe-simvastatin (VYTORIN) 10-40 mg per tablet TAKE 1 TABLET BY MOUTH DAILY AT BEDTIME 30 tablet 5    famotidine (PEPCID) 20 mg tablet TAKE 1 TABLET (20 MG TOTAL) BY MOUTH 2 (TWO) TIMES A DAY 60 tablet 5    glucose blood (ONE TOUCH ULTRA TEST) test strip TEST FOUR TIMES A  each 1    ipratropium (ATROVENT) 0 03 % nasal spray 2 sprays into each nostril every 12 (twelve) hours 30 mL 6    Januvia 100 MG tablet TAKE 1 TABLET (100 MG TOTAL) BY MOUTH DAILY 30 tablet 5    Lancets (ONETOUCH DELICA PLUS [id number]) MISC Use one lancet to test blood 4 times a day 400 each 1    loratadine (CLARITIN) 10 mg tablet Take 10 mg by mouth daily as needed for allergies      LORazepam (ATIVAN) 0 5 mg tablet TAKE 2 TABLETS (1 MG TOTAL) BY MOUTH DAILY AT BEDTIME 60 tablet 2    metFORMIN (GLUCOPHAGE) 500 mg tablet TAKE 2 TABLETS (1,000 MG TOTAL) BY MOUTH 2 (TWO) TIMES A DAY WITH MEALS 120 tablet 5    metoprolol tartrate (LOPRESSOR) 50 mg tablet TAKE 1 5 TABLETS (75 MG TOTAL) BY MOUTH EVERY 12 (TWELVE) HOURS 90 tablet 5    Multiple Vitamin (multivitamin) tablet Take 1 tablet by mouth daily        Prucalopride Succinate (Motegrity) 2 MG TABS Take 2 mg by mouth daily 90 tablet 3    QC Pen Needles 31G X 6 MM MISC USE 4 (FOUR) TIMES A DAY (BEFORE MEALS AND AT BEDTIME) 100 each 3    sodium chloride 1 g tablet TAKE 1 TABLET (1 G TOTAL) BY MOUTH 3 (THREE) TIMES A DAY 90 tablet 5    fluticasone (FLONASE) 50 mcg/act nasal spray 2 SPRAYS INTO EACH NOSTRIL DAILY 16 g 0     No current facility-administered medications for this visit           Health Maintenance     Health Maintenance   Topic Date Due    COVID-19 Vaccine (4 - Booster for James Peter series) 01/30/2022    DM Eye Exam  07/15/2022    Influenza Vaccine (1) 09/01/2022    Medicare Annual Wellness Visit (AWV)  09/07/2022    HEMOGLOBIN A1C  10/02/2022    Diabetic Foot Exam  01/20/2023    Fall Risk  07/14/2023    Depression Remission PHQ  07/14/2023    BMI: Adult  07/15/2023    Colorectal Cancer Screening  09/02/2025    Hepatitis C Screening  Completed    Osteoporosis Screening  Completed    Pneumococcal Vaccine: 65+ Years  Completed    HIB Vaccine  Aged Out    Hepatitis B Vaccine  Aged Out    IPV Vaccine  Aged Out    Hepatitis A Vaccine  Aged Out    Meningococcal ACWY Vaccine  Aged Out    HPV Vaccine  Aged Out     Immunization History   Administered Date(s) Administered    COVID-19 PFIZER VACCINE 0 3 ML IM 01/22/2021, 02/12/2021, 09/30/2021    INFLUENZA 11/02/2016, 10/16/2017, 09/18/2018, 09/18/2018    Influenza, Quadrivalent (nasal) 11/02/2016    Influenza, high dose seasonal 0 7 mL 11/06/2019, 10/06/2020, 11/09/2021    Pneumococcal Conjugate 13-Valent 07/25/2019    Pneumococcal Polysaccharide PPV23 03/17/2003    Zoster 07/09/2010       ZANDER Bolaños

## 2022-07-15 ENCOUNTER — OFFICE VISIT (OUTPATIENT)
Dept: VASCULAR SURGERY | Facility: CLINIC | Age: 87
End: 2022-07-15
Payer: MEDICARE

## 2022-07-15 VITALS — SYSTOLIC BLOOD PRESSURE: 88 MMHG | BODY MASS INDEX: 18.68 KG/M2 | HEART RATE: 90 BPM | WEIGHT: 116.2 LBS | HEIGHT: 66 IN

## 2022-07-15 DIAGNOSIS — I48.0 PAROXYSMAL ATRIAL FIBRILLATION (HCC): ICD-10-CM

## 2022-07-15 DIAGNOSIS — I65.22 ASYMPTOMATIC CAROTID ARTERY STENOSIS, LEFT: Primary | ICD-10-CM

## 2022-07-15 DIAGNOSIS — E78.2 MIXED HYPERLIPIDEMIA: ICD-10-CM

## 2022-07-15 DIAGNOSIS — I65.21 OCCLUSION OF RIGHT CAROTID ARTERY: ICD-10-CM

## 2022-07-15 PROCEDURE — 99215 OFFICE O/P EST HI 40 MIN: CPT | Performed by: SURGERY

## 2022-07-15 NOTE — PROGRESS NOTES
Assessment/Plan:    Pt is an 83 yo F w/ GERD, DM, HTN, afib (on Eliquis), anxiety, HLD, s/p pacer, ILD, MDD, malnutrition, R ICA occlusion, L ICA stenosis    Occlusion of right carotid artery  Asymptomatic carotid artery stenosis, left  -reviewed carotid DU which shows R ICA occlusion, L ICA 50-69% stenosis  -discussed pathophysiology of carotid disease; no surgical intervention on R side for complete occlusion; will continue to monitor her L ICA stenosis with DU q6mos; discussed common CVA symptoms  -f/u 1 year    DM  -A1C: 7 3  -care per PCP    HTN  -taking 50mg metoprolol BID  -hypotension with dizziness in office today (80s/50s); instructed patient to take 1/2 tablet (25mg) BID and to call Dr Cervantes  office for further instruction; he lowered this dose from 75mg a couple months ago already  Paroxysmal atrial fibrillation (HCC)  Mixed hyperlipidemia  Medications  -continue ASA, statin and eliquis for afib    Subjective:      Patient ID: Charlene Alanis is a 82 y o  female  Patient present to review CV done on 6/2/22  Patient denies any TIA CVA symptoms  Patient states she does get short of breath due to COPD and lately have been getting dizzy and she things is due to the BP medication  Patient is currently taking ASA and Eliquis  HPI:    Patient presents to discuss carotid disease  Patient has been followed by PCP  In 1994, she had an episode of R eye amaurosis  This led to further workup and finding of R carotid occlusion  She has been monitored for this since that time  Denies any other episodes of amaurosis  Denies any episodes of facial droop, garbled speech, unilateral weakness/numbness  Denies hx of CVA  Had a R shoulder injury '19  Limited ROM  Complains of persistent dizziness  This is chronic but worse recently  She is having hypotension and her metoprolol was lessened 2 months ago  Complains of poor appetite and weight loss  Denies pain after eating    She has constipation, bloating, gassiness  No vomiting  Seeing GI for these symptoms  Past smoker, quit in 1994  Takes Eliquis for afib, ASA, statin       The following portions of the patient's history were reviewed and updated as appropriate: allergies, current medications, past family history, past medical history, past social history, past surgical history and problem list     Review of Systems   Constitutional: Positive for appetite change and unexpected weight change (weight loss)  HENT: Negative  Eyes: Negative  Negative for visual disturbance  Respiratory: Positive for shortness of breath (COPD)  Cardiovascular: Negative  Gastrointestinal: Positive for constipation  Endocrine: Negative  Genitourinary: Negative  Musculoskeletal: Negative  Skin: Negative  Allergic/Immunologic: Negative  Neurological: Positive for dizziness  Negative for facial asymmetry, speech difficulty, weakness and numbness  Hematological: Negative  Psychiatric/Behavioral: Negative  Objective:      BP (!) 88/0 (BP Location: Left arm, Patient Position: Sitting, Cuff Size: Adult)   Pulse 90   Ht 5' 6" (1 676 m)   Wt 52 7 kg (116 lb 3 2 oz)   BMI 18 76 kg/m²          Physical Exam  Vitals and nursing note reviewed  Constitutional:       Appearance: She is well-developed  HENT:      Head: Normocephalic and atraumatic  Eyes:      Conjunctiva/sclera: Conjunctivae normal    Cardiovascular:      Rate and Rhythm: Normal rate and regular rhythm  Pulses:           Radial pulses are 2+ on the right side and 2+ on the left side  Popliteal pulses are 0 on the right side and 1+ on the left side  Dorsalis pedis pulses are 2+ on the right side and 2+ on the left side  Posterior tibial pulses are 0 on the right side and 0 on the left side  Heart sounds: Normal heart sounds  No murmur heard       Comments: No carotid bruits B  Pulmonary:      Effort: Pulmonary effort is normal  No respiratory distress  Breath sounds: Normal breath sounds  No wheezing or rales  Abdominal:      General: There is no distension  Palpations: Abdomen is soft  There is no pulsatile mass  Tenderness: There is no abdominal tenderness  There is no rebound  Musculoskeletal:         General: No swelling  Normal range of motion  Cervical back: Normal range of motion and neck supple  Right lower leg: No edema  Left lower leg: No edema  Skin:     General: Skin is warm and dry  Comments: Some spider veins and reticular veins to BLE; no chronic skin changes; no wounds   Neurological:      Mental Status: She is alert and oriented to person, place, and time  Psychiatric:         Behavior: Behavior normal            I have reviewed and made appropriate changes to the review of systems input by the medical assistant      Vitals:    07/15/22 0905 07/15/22 0909   BP: (!) 88/52 (!) 88/0   BP Location: Right arm Left arm   Patient Position: Sitting Sitting   Cuff Size: Adult Adult   Pulse: 90    Weight: 52 7 kg (116 lb 3 2 oz)    Height: 5' 6" (1 676 m)        Patient Active Problem List   Diagnosis    HTN (hypertension)    HLD (hyperlipidemia)    Glaucoma    Asymptomatic carotid artery stenosis, left    Symptomatic PVCs    Occlusion of right carotid artery    Paroxysmal atrial fibrillation (HCC)    Pacemaker    Osteoporosis    GERD (gastroesophageal reflux disease)    Anxiety    Iron deficiency    Interstitial lung disease (HCC)    Common bile duct dilatation    Dysphagia    Moderate protein-calorie malnutrition (HCC)    Constipation    Hyponatremia    Post-nasal drip    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, without long-term current use of insulin (HCC)    Idiopathic pulmonary fibrosis (HCC)    Type 2 diabetes mellitus with hyperlipidemia (HCC)    Type 2 diabetes mellitus with diabetic polyneuropathy, without long-term current use of insulin (HCC)    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, without long-term current use of insulin (HCC)    Depression, recurrent ([address] )    Hypovitaminosis D       Past Surgical History:   Procedure Laterality Date    CATARACT EXTRACTION, BILATERAL      CHOLECYSTECTOMY      CHOLECYSTECTOMY LAPAROSCOPIC N/A 2020    Procedure: CHOLECYSTECTOMY LAPAROSCOPIC;  Surgeon: Steff Galvan MD;  [facility]: [facility];  Service: General    COLONOSCOPY      CYST REMOVAL      left lower Quadrant    [address]    LIPOMA RESECTION      MD REPAIR ING HERNIA,5+Y/O,REDUCIBL Bilateral 2018    Procedure: INGUINAL HERNIA REPAIR;  Surgeon: Allison Castorena MD;  Location[facility];  Service: [address] [place] [address]  2019    Dr Collado [facility]-  R carotid occlusion       Family History   Problem Relation Age of Onset    Heart disease Mother     Heart attack Father     Hiatal hernia Father     Diabetes Father     Cancer Brother     Diabetes Brother     Heart disease Brother     Hypertension Brother     Other Son         gastroparesis       Social History     Socioeconomic History    Marital status:      Spouse name: Not on file    Number of children: Not on file    Years of education: Not on file    Highest education level: Not on file   Occupational History    Occupation: customer service   retired   Tobacco Use    Smoking status: Former Smoker     Packs/day: 1 50     Years: 38 00     Pack years: 57 00     Types: Cigarettes     Start date:      Quit date:      Years since quittin 5    Smokeless tobacco: Never Used   Vaping Use    Vaping Use: Never used   Substance and Sexual Activity    Alcohol use: No    Drug use: No    Sexual activity: Not on file   Other Topics Concern    Not on file   Social History Narrative    Lives alone [facility]    2 children     Social Determinants of Health     Financial Resource Strain: Not on file   Food Insecurity: Not on file   Transportation Needs: Not on file   Physical Activity: Not on file   Stress: Not on file   Social Connections: Not on file   Intimate Partner Violence: Not on file   Housing Stability: Not on file       Allergies   Allergen Reactions    Keflex [Cephalexin] Diarrhea         Current Outpatient Medications:     acetaminophen (TYLENOL) 500 mg tablet, Take 1,000 mg by mouth as needed for mild pain, Disp: , Rfl:     Alphagan P 0 1 %, INSTILL 1 DROP RIGHT EYE TWICE A DAY, Disp: , Rfl:     amoxicillin (AMOXIL) 500 mg capsule, Prophylactic, prior to dentist, Disp: , Rfl:     aspirin (ECOTRIN LOW STRENGTH) 81 mg EC tablet, Take 1 tablet (81 mg total) by mouth daily, Disp:  , Rfl:     azelastine (ASTELIN) 0 1 % nasal spray, 1 SPRAY INTO EACH NOSTRIL 2 (TWO) TIMES A DAY USE IN EACH NOSTRIL AS DIRECTED, Disp: 30 mL, Rfl: 1    bimatoprost (LUMIGAN) 0 01 % ophthalmic drops, Administer 1 drop to both eyes daily at bedtime for 30 days, Disp: 1 5 mL, Rfl: 0    calcium carbonate (OS-DANIA) 600 MG tablet, Take 600 mg by mouth 2 (two) times a day with meals  , Disp: , Rfl:     carboxymethylcellulose 1 % ophthalmic solution, 1 drop 3 (three) times a day, Disp: , Rfl:     Eliquis 2 5 MG, TAKE 1 TABLET (2 5 MG TOTAL) BY MOUTH 2 (TWO) TIMES A DAY, Disp: 60 tablet, Rfl: 5    ezetimibe-simvastatin (VYTORIN) 10-40 mg per tablet, TAKE 1 TABLET BY MOUTH DAILY AT BEDTIME, Disp: 30 tablet, Rfl: 5    famotidine (PEPCID) 20 mg tablet, TAKE 1 TABLET (20 MG TOTAL) BY MOUTH 2 (TWO) TIMES A DAY, Disp: 60 tablet, Rfl: 5    fluticasone (FLONASE) 50 mcg/act nasal spray, 2 SPRAYS INTO EACH NOSTRIL DAILY, Disp: 16 g, Rfl: 0    glucose blood (ONE TOUCH ULTRA TEST) test strip, TEST FOUR TIMES A DAY, Disp: 400 each, Rfl: 1    ipratropium (ATROVENT) 0 03 % nasal spray, 2 sprays into each nostril every 12 (twelve) hours, Disp: 30 mL, Rfl: 6    Januvia 100 MG tablet, TAKE 1 TABLET (100 MG TOTAL) BY MOUTH DAILY, Disp: 30 tablet, Rfl: 5    Lancets (ONETOUCH DELICA PLUS [id number]) MISC, Use one lancet to test blood 4 times a day, Disp: 400 each, Rfl: 1    loratadine (CLARITIN) 10 mg tablet, Take 10 mg by mouth daily as needed for allergies, Disp: , Rfl:     LORazepam (ATIVAN) 0 5 mg tablet, TAKE 2 TABLETS (1 MG TOTAL) BY MOUTH DAILY AT BEDTIME, Disp: 60 tablet, Rfl: 2    metFORMIN (GLUCOPHAGE) 500 mg tablet, TAKE 2 TABLETS (1,000 MG TOTAL) BY MOUTH 2 (TWO) TIMES A DAY WITH MEALS, Disp: 120 tablet, Rfl: 5    metoprolol tartrate (LOPRESSOR) 50 mg tablet, TAKE 1 5 TABLETS (75 MG TOTAL) BY MOUTH EVERY 12 (TWELVE) HOURS, Disp: 90 tablet, Rfl: 5    Multiple Vitamin (multivitamin) tablet, Take 1 tablet by mouth daily  , Disp: , Rfl:     Prucalopride Succinate (Motegrity) 2 MG TABS, Take 2 mg by mouth daily, Disp: 90 tablet, Rfl: 3    QC Pen Needles 31G X 6 MM MISC, USE 4 (FOUR) TIMES A DAY (BEFORE MEALS AND AT BEDTIME), Disp: 100 each, Rfl: 3    sodium chloride 1 g tablet, TAKE 1 TABLET (1 G TOTAL) BY MOUTH 3 (THREE) TIMES A DAY, Disp: 90 tablet, Rfl: 5

## 2022-07-15 NOTE — PATIENT INSTRUCTIONS
1) Carotid disease  -you have a complete blockage on the right side and a moderate blockage on the left side  -we discussed common stroke symptoms; if you have any of these, please go immediately to the ER  -we will continue to monitor this with ultrasound on a 6 month basis    2) Medications  -please continue taking your vytorin and eliquis  -I would like you to start aspirin 81mg once daily after your GI testing is complete    3) Diabetes  -continue to work on your diabetes diet and lowering your A1C levels    4) Nutrition  -try taking 3 boost/ensure a day and try making your own shakes with protein powder added; also try to stick to high protein foods (peanut butter, avocado, chicken, eggs) and avoiding low nutrient foods (toast) which aren't worth the effort to make yourself eat them

## 2022-07-21 PROBLEM — K83.8 COMMON BILE DUCT DILATATION: Status: RESOLVED | Noted: 2020-12-07 | Resolved: 2022-07-21

## 2022-07-21 NOTE — ASSESSMENT & PLAN NOTE
BP low end of normal today  Recent reduction in metoprolol tartrate from 75 mg BID to 50 mg BID  Following with cardiology, Dr Rocael Goncalvess

## 2022-07-21 NOTE — ASSESSMENT & PLAN NOTE
Lab Results   Component Value Date    HGBA1C 7 3 (H) 04/02/2022     Home sugars running around 160s  Continue metformin and Januvia  Due for repeat A1c next month

## 2022-07-21 NOTE — ASSESSMENT & PLAN NOTE
Taking [address]  Has firm stools followed by intense diarrhea with 6-8 stools per day--watery  Followed by few days with no stools  Unfortunately GI appointment next week was canceled by GI office and not able to be rescheduled until end of August  I will reach out to Dr Christiane Haynes regarding if there is an alternative she could try

## 2022-07-28 ENCOUNTER — APPOINTMENT (EMERGENCY)
Dept: RADIOLOGY | Facility: HOSPITAL | Age: 87
End: 2022-07-28
Payer: MEDICARE

## 2022-07-28 ENCOUNTER — HOSPITAL ENCOUNTER (EMERGENCY)
Facility: HOSPITAL | Age: 87
Discharge: HOME | End: 2022-07-28
Attending: EMERGENCY MEDICINE
Payer: MEDICARE

## 2022-07-28 VITALS
BODY MASS INDEX: 18.75 KG/M2 | RESPIRATION RATE: 18 BRPM | WEIGHT: 116.18 LBS | DIASTOLIC BLOOD PRESSURE: 77 MMHG | TEMPERATURE: 97.6 F | SYSTOLIC BLOOD PRESSURE: 166 MMHG | HEART RATE: 82 BPM | OXYGEN SATURATION: 99 %

## 2022-07-28 DIAGNOSIS — R06.02 SHORTNESS OF BREATH: Primary | ICD-10-CM

## 2022-07-28 DIAGNOSIS — J84.112 IPF (IDIOPATHIC PULMONARY FIBROSIS) (HCC): ICD-10-CM

## 2022-07-28 LAB
2HR DELTA HS TROPONIN: 1 NG/L
ALBUMIN SERPL BCP-MCNC: 4 G/DL (ref 3.5–5)
ALP SERPL-CCNC: 57 U/L (ref 46–116)
ALT SERPL W P-5'-P-CCNC: 23 U/L (ref 12–78)
ANION GAP SERPL CALCULATED.3IONS-SCNC: 6 MMOL/L (ref 4–13)
AST SERPL W P-5'-P-CCNC: 23 U/L (ref 5–45)
BASOPHILS # BLD AUTO: 0.02 THOUSANDS/ΜL (ref 0–0.1)
BASOPHILS NFR BLD AUTO: 0 % (ref 0–1)
BILIRUB SERPL-MCNC: 0.33 MG/DL (ref 0.2–1)
BUN SERPL-MCNC: 17 MG/DL (ref 5–25)
CALCIUM SERPL-MCNC: 8.9 MG/DL (ref 8.3–10.1)
CARDIAC TROPONIN I PNL SERPL HS: 6 NG/L
CARDIAC TROPONIN I PNL SERPL HS: 7 NG/L
CHLORIDE SERPL-SCNC: 94 MMOL/L (ref 96–108)
CO2 SERPL-SCNC: 29 MMOL/L (ref 21–32)
CREAT SERPL-MCNC: 0.58 MG/DL (ref 0.6–1.3)
D DIMER PPP FEU-MCNC: <0.27 UG/ML FEU
EOSINOPHIL # BLD AUTO: 0.09 THOUSAND/ΜL (ref 0–0.61)
EOSINOPHIL NFR BLD AUTO: 1 % (ref 0–6)
ERYTHROCYTE [DISTWIDTH] IN BLOOD BY AUTOMATED COUNT: 13.7 % (ref 11.6–15.1)
GFR SERPL CREATININE-BSD FRML MDRD: 84 ML/MIN/1.73SQ M
GLUCOSE SERPL-MCNC: 131 MG/DL (ref 65–140)
GLUCOSE SERPL-MCNC: 172 MG/DL (ref 65–140)
HCT VFR BLD AUTO: 39.8 % (ref 34.8–46.1)
HGB BLD-MCNC: 12.5 G/DL (ref 11.5–15.4)
IMM GRANULOCYTES # BLD AUTO: 0.02 THOUSAND/UL (ref 0–0.2)
IMM GRANULOCYTES NFR BLD AUTO: 0 % (ref 0–2)
LYMPHOCYTES # BLD AUTO: 1.16 THOUSANDS/ΜL (ref 0.6–4.47)
LYMPHOCYTES NFR BLD AUTO: 16 % (ref 14–44)
MCH RBC QN AUTO: 27.1 PG (ref 26.8–34.3)
MCHC RBC AUTO-ENTMCNC: 31.4 G/DL (ref 31.4–37.4)
MCV RBC AUTO: 86 FL (ref 82–98)
MONOCYTES # BLD AUTO: 0.67 THOUSAND/ΜL (ref 0.17–1.22)
MONOCYTES NFR BLD AUTO: 9 % (ref 4–12)
NEUTROPHILS # BLD AUTO: 5.21 THOUSANDS/ΜL (ref 1.85–7.62)
NEUTS SEG NFR BLD AUTO: 74 % (ref 43–75)
NRBC BLD AUTO-RTO: 0 /100 WBCS
NT-PROBNP SERPL-MCNC: 191 PG/ML
PLATELET # BLD AUTO: 177 THOUSANDS/UL (ref 149–390)
PMV BLD AUTO: 9.8 FL (ref 8.9–12.7)
POTASSIUM SERPL-SCNC: 4 MMOL/L (ref 3.5–5.3)
PROT SERPL-MCNC: 8 G/DL (ref 6.4–8.4)
RBC # BLD AUTO: 4.62 MILLION/UL (ref 3.81–5.12)
SODIUM SERPL-SCNC: 129 MMOL/L (ref 135–147)
WBC # BLD AUTO: 7.17 THOUSAND/UL (ref 4.31–10.16)

## 2022-07-28 PROCEDURE — 93005 ELECTROCARDIOGRAM TRACING: CPT

## 2022-07-28 PROCEDURE — 36415 COLL VENOUS BLD VENIPUNCTURE: CPT | Performed by: EMERGENCY MEDICINE

## 2022-07-28 PROCEDURE — 80053 COMPREHEN METABOLIC PANEL: CPT | Performed by: EMERGENCY MEDICINE

## 2022-07-28 PROCEDURE — 85025 COMPLETE CBC W/AUTO DIFF WBC: CPT | Performed by: EMERGENCY MEDICINE

## 2022-07-28 PROCEDURE — 99285 EMERGENCY DEPT VISIT HI MDM: CPT

## 2022-07-28 PROCEDURE — 99285 EMERGENCY DEPT VISIT HI MDM: CPT | Performed by: EMERGENCY MEDICINE

## 2022-07-28 PROCEDURE — 85379 FIBRIN DEGRADATION QUANT: CPT | Performed by: EMERGENCY MEDICINE

## 2022-07-28 PROCEDURE — 71045 X-RAY EXAM CHEST 1 VIEW: CPT

## 2022-07-28 PROCEDURE — 84484 ASSAY OF TROPONIN QUANT: CPT | Performed by: EMERGENCY MEDICINE

## 2022-07-28 PROCEDURE — 83880 ASSAY OF NATRIURETIC PEPTIDE: CPT | Performed by: EMERGENCY MEDICINE

## 2022-07-28 PROCEDURE — 82948 REAGENT STRIP/BLOOD GLUCOSE: CPT

## 2022-07-28 NOTE — ED PROVIDER NOTES
History  Chief Complaint   Patient presents with    Shortness of Breath     Increased  SOB dizziness and fatigue x 2 days     HPI    82-year-old female with interstitial fibrosis presents with worsening shortness of breath for several months  She states she has been evaluated multiple times as an outpatient but does not yet have specific answer why she has worsening shortness of breath  She denies any chest pain or abdominal pain  Prior to Admission Medications   Prescriptions Last Dose Informant Patient Reported? Taking?    Alphagan P 0 1 %  Self Yes No   Sig: INSTILL 1 DROP RIGHT EYE TWICE A DAY   Eliquis 2 5 MG  Self No No   Sig: TAKE 1 TABLET (2 5 MG TOTAL) BY MOUTH 2 (TWO) TIMES A DAY   Januvia 100 MG tablet  Self No No   Sig: TAKE 1 TABLET (100 MG TOTAL) BY MOUTH DAILY   LORazepam (ATIVAN) 0 5 mg tablet   No No   Sig: TAKE 2 TABLETS (1 MG TOTAL) BY MOUTH DAILY AT BEDTIME   Lancets (ONETOUCH DELICA PLUS [id number]) MISC  Self No No   Sig: Use one lancet to test blood 4 times a day   Multiple Vitamin (multivitamin) tablet  Self Yes No   Sig: Take 1 tablet by mouth daily     Prucalopride Succinate (Motegrity) 2 MG TABS  Self No No   Sig: Take 2 mg by mouth daily   QC Pen Needles 31G X 6 MM MISC  Self No No   Sig: USE 4 (FOUR) TIMES A DAY (BEFORE MEALS AND AT BEDTIME)   acetaminophen (TYLENOL) 500 mg tablet  Self Yes No   Sig: Take 1,000 mg by mouth as needed for mild pain   amoxicillin (AMOXIL) 500 mg capsule  Self Yes No   Sig: Prophylactic, prior to dentist   aspirin (ECOTRIN LOW STRENGTH) 81 mg EC tablet  Self No No   Sig: Take 1 tablet (81 mg total) by mouth daily   azelastine (ASTELIN) 0 1 % nasal spray  Self No No   Si SPRAY INTO EACH NOSTRIL 2 (TWO) TIMES A DAY USE IN EACH NOSTRIL AS DIRECTED   bimatoprost (LUMIGAN) 0 01 % ophthalmic drops  Self No No   Sig: Administer 1 drop to both eyes daily at bedtime for 30 days   calcium carbonate (OS-DANIA) 600 MG tablet  Self Yes No   Sig: Take 600 mg by mouth 2 (two) times a day with meals     carboxymethylcellulose 1 % ophthalmic solution  Self Yes No   Si drop 3 (three) times a day   ezetimibe-simvastatin (VYTORIN) 10-40 mg per tablet  Self No No   Sig: TAKE 1 TABLET BY MOUTH DAILY AT BEDTIME   famotidine (PEPCID) 20 mg tablet  Self No No   Sig: TAKE 1 TABLET (20 MG TOTAL) BY MOUTH 2 (TWO) TIMES A DAY   fluticasone (FLONASE) 50 mcg/act nasal spray   No No   Si SPRAYS INTO EACH NOSTRIL DAILY   glucose blood (ONE TOUCH ULTRA TEST) test strip  Self No No   Sig: TEST FOUR TIMES A DAY   ipratropium (ATROVENT) 0 03 % nasal spray  Self No No   Si sprays into each nostril every 12 (twelve) hours   loratadine (CLARITIN) 10 mg tablet  Self Yes No   Sig: Take 10 mg by mouth daily as needed for allergies   metFORMIN (GLUCOPHAGE) 500 mg tablet  Self No No   Sig: TAKE 2 TABLETS (1,000 MG TOTAL) BY MOUTH 2 (TWO) TIMES A DAY WITH MEALS   metoprolol tartrate (LOPRESSOR) 50 mg tablet  Self No No   Sig: TAKE 1 5 TABLETS (75 MG TOTAL) BY MOUTH EVERY 12 (TWELVE) HOURS   sodium chloride 1 g tablet   No No   Sig: TAKE 1 TABLET (1 G TOTAL) BY MOUTH 3 (THREE) TIMES A DAY      Facility-Administered Medications: None       Past Medical History:   Diagnosis Date    Common bile duct dilatation 2020    Glaucoma     H/O degenerative disc disease     [facility] 2020    Irregular heart beat     afib    Peripheral neuropathy     S/P laparoscopic cholecystectomy 2020    Stenosis of right subclavian artery ([facility] ) 2016       Past Surgical History:   Procedure Laterality Date    CATARACT EXTRACTION, BILATERAL      CHOLECYSTECTOMY      CHOLECYSTECTOMY LAPAROSCOPIC N/A 2020    Procedure: CHOLECYSTECTOMY LAPAROSCOPIC;  Surgeon: Estela Gallagher MD;  [facility];  Service: General    COLONOSCOPY      CYST REMOVAL      left lower Quadrant     HERNIA REPAIR      LIPOMA RESECTION  2010    MT REPAIR ING HERNIA,5+Y/O,REDUCIBL Bilateral 2018    Procedure: INGUINAL HERNIA REPAIR;  Surgeon: Sierra Mabry MD;  [facility];  Service: General    SHOULDER [facility]  2019    [facility]  R [facility]       Family History   Problem Relation Age of Onset    Heart disease Mother     Heart attack Father     Hiatal hernia Father     Diabetes Father     Cancer Brother     Diabetes Brother     Heart disease Brother     Hypertension Brother     Other Son         gastroparesis     I have reviewed and agree with the history as documented  E-Cigarette/Vaping    E-Cigarette Use Never User      E-Cigarette/Vaping Substances    Nicotine No     THC No     CBD No     Flavoring No     Other No     Unknown No      Social History     Tobacco Use    Smoking status: Former Smoker     Packs/day: 1 50     Years: 38 00     Pack years: 57 00     Types: Cigarettes     Start date:      Quit date:      Years since quittin 5    Smokeless tobacco: Never Used   Vaping Use    Vaping Use: Never used   Substance Use Topics    Alcohol use: No    Drug use: No       Review of Systems   Constitutional: Positive for fatigue  Negative for fever  HENT: Negative  Respiratory: Positive for shortness of breath  Cardiovascular: Negative  Gastrointestinal: Negative  Genitourinary: Negative  Musculoskeletal: Negative  Skin: Negative  Neurological: Negative  Hematological: Negative  Psychiatric/Behavioral: Negative  Physical Exam  Physical Exam  Vitals and nursing note reviewed  Constitutional:       General: She is not in acute distress  Appearance: She is well-developed  She is ill-appearing  HENT:      Head: Normocephalic  Cardiovascular:      Rate and Rhythm: Normal rate and regular rhythm  Pulmonary:      Effort: Pulmonary effort is normal       Breath sounds: Examination of the right-lower field reveals rales  Examination of the left-lower field reveals rales  Rales present  Abdominal:      Palpations: Abdomen is soft  Tenderness: There is no abdominal tenderness  Musculoskeletal:         General: Normal range of motion  Cervical back: Normal range of motion  Right lower leg: No edema  Left lower leg: No edema  Skin:     General: Skin is warm and dry  Neurological:      General: No focal deficit present  Mental Status: She is alert and oriented to person, place, and time  Psychiatric:         Mood and Affect: Mood normal          Behavior: Behavior normal          Vital Signs  ED Triage Vitals [07/28/22 1053]   Temperature Pulse Respirations Blood Pressure SpO2   97 6 °F (36 4 °C) 82 18 166/77 99 %      Temp Source Heart Rate Source Patient Position - Orthostatic VS BP Location FiO2 (%)   Oral Monitor -- Right arm --      Pain Score       No Pain           Vitals:    07/28/22 1053   BP: 166/77   Pulse: 82         Visual Acuity      ED Medications  Medications - No data to display    Diagnostic Studies  Results Reviewed     Procedure Component Value Units Date/Time    HS Troponin I 2hr [[id number]]  (Normal) Collected: 07/28/22 1333    Lab Status: Final result Specimen: Blood from Arm, Left Updated: 07/28/22 1420     hs TnI 2hr 7 ng/L      Delta 2hr hsTnI 1 ng/L     D-dimer, quantitative [[id number]]  (Normal) Collected: 07/28/22 1152    Lab Status: Final result Specimen: Blood from Arm, Left Updated: 07/28/22 1344     D-Dimer, Quant <0 27 ug/ml FEU     Narrative: In the evaluation for possible pulmonary embolism, in the appropriate (Well's Score of 4 or less) patient, the age adjusted d-dimer cutoff for this patient can be calculated as:    Age x 0 01 (in ug/mL) for Age-adjusted D-dimer exclusion threshold for a patient over 50 years      Fingerstick Glucose (POCT) [[id number]]  (Normal) Collected: 07/28/22 1335    Lab Status: Final result Updated: 07/28/22 1337     POC Glucose 131 mg/dl     NT-BNP PRO [[id number]]  (Normal) Collected: 07/28/22 1152    Lab Status: Final result Specimen: Blood from Arm, Left Updated: 07/28/22 1230     NT-proBNP 191 pg/mL     HS Troponin I 4hr [[id number]]     Lab Status: No result Specimen: Blood     HS Troponin 0hr (reflex protocol) [[id number]]  (Normal) Collected: 07/28/22 1108    Lab Status: Final result Specimen: Blood from Arm, Left Updated: 07/28/22 1145     hs TnI 0hr 6 ng/L     Comprehensive metabolic panel [[id number]]  (Abnormal) Collected: 07/28/22 1108    Lab Status: Final result Specimen: Blood from Arm, Left Updated: 07/28/22 1144     Sodium 129 mmol/L      Potassium 4 0 mmol/L      Chloride 94 mmol/L      CO2 29 mmol/L      ANION GAP 6 mmol/L      BUN 17 mg/dL      Creatinine 0 58 mg/dL      Glucose 172 mg/dL      Calcium 8 9 mg/dL      AST 23 U/L      ALT 23 U/L      Alkaline Phosphatase 57 U/L      Total Protein 8 0 g/dL      Albumin 4 0 g/dL      Total Bilirubin 0 33 mg/dL      eGFR 84 ml/min/1 73sq m     Narrative:      [facility] guidelines for Chronic Kidney Disease (CKD):     Stage 1 with normal or high GFR (GFR > 90 mL/min/1 73 square meters)    Stage 2 Mild CKD (GFR = 60-89 mL/min/1 73 square meters)    Stage 3A Moderate CKD (GFR = 45-59 mL/min/1 73 square meters)    Stage 3B Moderate CKD (GFR = 30-44 mL/min/1 73 square meters)    Stage 4 Severe CKD (GFR = 15-29 mL/min/1 73 square meters)    Stage 5 End Stage CKD (GFR <15 mL/min/1 73 square meters)  Note: GFR calculation is accurate only with a steady state creatinine    CBC and differential [[id number]] Collected: 07/28/22 1108    Lab Status: Final result Specimen: Blood from Arm, Left Updated: 07/28/22 1123     WBC 7 17 Thousand/uL      RBC 4 62 Million/uL      Hemoglobin 12 5 g/dL      Hematocrit 39 8 %      MCV 86 fL      MCH 27 1 pg      MCHC 31 4 g/dL      RDW 13 7 %      MPV 9 8 fL      Platelets 619 Thousands/uL      nRBC 0 /100 WBCs      Neutrophils Relative 74 %      Immat GRANS % 0 %      Lymphocytes Relative 16 %      Monocytes Relative 9 %      Eosinophils Relative 1 %      Basophils Relative 0 %      Neutrophils Absolute 5 21 Thousands/µL      Immature Grans Absolute 0 02 Thousand/uL      Lymphocytes Absolute 1 16 Thousands/µL      Monocytes Absolute 0 67 Thousand/µL      Eosinophils Absolute 0 09 Thousand/µL      Basophils Absolute 0 02 Thousands/µL                  XR chest 1 view portable   Final Result by Kareem Benites DO (07/28 1248)   Progressive, chronic interstitial lung disease  If warranted, consider follow-up pulmonology consultation  Workstation performed: [id number]                    Procedures  ECG 12 Lead Documentation Only    Date/Time: 7/28/2022 11:21 AM  Performed by: Everett Allen DO  Authorized by: Everett Allen DO     Indications / Diagnosis:  SOB  ECG reviewed by me, the ED Provider: yes    Patient location:  ED  Interpretation:     Interpretation: abnormal    Rate:     ECG rate:  68    ECG rate assessment: normal    Rhythm:     Rhythm: sinus rhythm    Ectopy:     Ectopy: none    QRS:     QRS axis:  Normal  ST segments:     ST segments:  Normal  T waves:     T waves: non-specific    Q waves:     Q waves:  V2             ED Course         basic labs, cardiopulm eval                        SBIRT 20yo+    Flowsheet Row Most Recent Value   SBIRT (23 yo +)    In order to provide better care to our patients, we are screening all of our patients for alcohol and drug use  Would it be okay to ask you these screening questions? Unable to answer at this time Filed at: 07/28/2022 1329                    MDM  Number of Diagnoses or Management Options  IPF (idiopathic pulmonary fibrosis) ([facility] [address] ): established and worsening  Shortness of breath: established and worsening  Diagnosis management comments: Likely chronic worsening of IPF causing her sxs  I do not suspect any overlapping acute infections   She is oxygenating well  D/w pulmonary who agrees no acute interventions at this time  Patient has follow up scheduled  Disposition  Final diagnoses:   Shortness of breath   IPF (idiopathic pulmonary fibrosis) ([address] )     Time reflects when diagnosis was documented in both MDM as applicable and the Disposition within this note     Time User Action Codes Description Comment    7/28/2022  2:24 PM Fabby Reddy Add [R06 02] Shortness of breath     7/28/2022  2:25 PM Fabby Reddy Add [W15 240] IPF (idiopathic pulmonary fibrosis) [facility])       ED Disposition     ED Disposition   Discharge    Condition   Stable    Date/Time   Thu Jul 28, 2022  2:24 PM    Comment   Delroy Casiano discharge to home/self care  Follow-up Information    None         Patient's Medications   Discharge Prescriptions    No medications on file       No discharge procedures on file      PDMP Review       Value Time User    PDMP Reviewed  Yes 6/9/2022  8:06 PM ZANDER Nascimento          ED Provider  Electronically Signed by           Damion Eaton,   07/28/22 100 [facility], DO  07/28/22 2608

## 2022-07-29 ENCOUNTER — OFFICE VISIT (OUTPATIENT)
Dept: PULMONOLOGY | Facility: CLINIC | Age: 87
End: 2022-07-29
Payer: MEDICARE

## 2022-07-29 VITALS
DIASTOLIC BLOOD PRESSURE: 70 MMHG | RESPIRATION RATE: 16 BRPM | WEIGHT: 113 LBS | HEIGHT: 66 IN | HEART RATE: 51 BPM | SYSTOLIC BLOOD PRESSURE: 116 MMHG | TEMPERATURE: 97 F | OXYGEN SATURATION: 85 % | BODY MASS INDEX: 18.16 KG/M2

## 2022-07-29 DIAGNOSIS — J84.112 IDIOPATHIC PULMONARY FIBROSIS (HCC): Primary | ICD-10-CM

## 2022-07-29 LAB
ATRIAL RATE: 68 BPM
P AXIS: -14 DEGREES
PR INTERVAL: 200 MS
QRS AXIS: -29 DEGREES
QRSD INTERVAL: 94 MS
QT INTERVAL: 384 MS
QTC INTERVAL: 408 MS
T WAVE AXIS: 34 DEGREES
VENTRICULAR RATE: 68 BPM

## 2022-07-29 PROCEDURE — 94618 PULMONARY STRESS TESTING: CPT | Performed by: INTERNAL MEDICINE

## 2022-07-29 PROCEDURE — 99214 OFFICE O/P EST MOD 30 MIN: CPT | Performed by: INTERNAL MEDICINE

## 2022-07-29 PROCEDURE — 93010 ELECTROCARDIOGRAM REPORT: CPT | Performed by: INTERNAL MEDICINE

## 2022-07-29 RX ORDER — PEPPERMINT OIL
OIL (ML) MISCELLANEOUS ONCE AS NEEDED
COMMUNITY

## 2022-07-29 NOTE — ASSESSMENT & PLAN NOTE
Patient has idiopathic pulmonary fibrosis, previously worked up by Dr Wayne Feliz  Overall, her symptoms had been stable with stable PFT findings and the decision was made to continue with observation  I was brief conversation regarding antifibrotic agents, however given her underlying GI issues, it was decided not to pursue treatment  Now she is having some progression in her symptoms, raising the question as to whether we should reconsider antibiotic treatment  I would like her to have updated PFTs  Once we have those results, we can discuss whether to pursue antifibrotic therapy  I would also consider pulmonary rehabilitation, if she would be agreeable  Will discuss after PFTs  No evidence of oxygen desaturation with ambulation today

## 2022-07-29 NOTE — PROGRESS NOTES
Pulmonary Follow Up Note   Cathie Rich 82 y o  female MRN: [id number]  7/29/2022      Assessment/Plan:     Idiopathic pulmonary fibrosis [facility])  Patient has idiopathic pulmonary fibrosis, previously worked up by Dr Kenny Tobias  Overall, her symptoms had been stable with stable PFT findings and the decision was made to continue with observation  I was brief conversation regarding antifibrotic agents, however given her underlying GI issues, it was decided not to pursue treatment  Now she is having some progression in her symptoms, raising the question as to whether we should reconsider antibiotic treatment  I would like her to have updated PFTs  Once we have those results, we can discuss whether to pursue antifibrotic therapy  I would also consider pulmonary rehabilitation, if she would be agreeable  Will discuss after PFTs  No evidence of oxygen desaturation with ambulation today  Visit orders:    Diagnoses and all orders for this visit:    Idiopathic pulmonary fibrosis ([facility] )  -     POCT 6 minute walk  -     Complete PFT with post bronchodilator; Future    Other orders  -     peppermint oil; Use once as needed        No follow-ups on file  History of Present Illness   HPI:  Cathie Rich is a 82 y o  female who is here today for follow-up regarding idiopathic pulmonary fibrosis  She had previously been following with Dr Kenny Tobias from pulmonary perspective  Over the past few weeks, she has noted significant worsening of her shortness of breath and fatigue  She has an occasional cough with scant white sputum production  She has no hemoptysis  She has been struggling with intermittent diarrhea and constipation with chronic abdominal pain and bloating  During previous pulmonary visits, they had discussed antifibrotic therapy, however given her ongoing abdominal issues, elected not to pursue treatment    She has issues with postnasal drip but elected not to take nasal steroids due to concern that it could increase her blood sugars    Review of Systems   Constitutional: Negative for chills, fever and unexpected weight change  HENT: Positive for postnasal drip  Negative for sore throat  Eyes: Negative for visual disturbance  Respiratory:        As noted in HPI   Cardiovascular: Negative for chest pain  Gastrointestinal: Positive for abdominal pain, constipation and diarrhea  Negative for vomiting  Musculoskeletal: Negative for arthralgias  Skin: Negative for rash  Neurological: Positive for dizziness  Negative for headaches  Hematological: Negative for adenopathy  Psychiatric/Behavioral: Negative  All other systems reviewed and are negative       Medical, Family and Social history reviewed and updated as appropriate    Historical Information   Past Medical History:   Diagnosis Date    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/7/2020    Glaucoma     H/O degenerative disc disease     Idiopathic pulmonary fibrosis ([facility] ) 11/2020    Irregular heart beat     afib    Peripheral neuropathy     S/P laparoscopic cholecystectomy 12/21/2020    [facility] 11/18/2016     Past Surgical History:   Procedure Laterality Date    CATARACT EXTRACTION, BILATERAL  2011    CHOLECYSTECTOMY      CHOLECYSTECTOMY LAPAROSCOPIC N/A 12/9/2020    Procedure: CHOLECYSTECTOMY LAPAROSCOPIC;  Surgeon: Dony Kumar MD;  [facility];  Service: General    COLONOSCOPY      CYST REMOVAL  2009    left lower Quadrant    [facility] RESECTION  2010    DE REPAIR ING HERNIA,5+Y/O,REDUCIBL Bilateral 1/5/2018    Procedure: INGUINAL HERNIA REPAIR;  Surgeon: Cheryl Molina MD;  Location: [facility];  Service: General    SHOULDER SURGERY  04/26/2019    Dr Oliver Schroeder [facility]-  R carotid occlusion     Family History   Problem Relation Age of Onset    Heart disease Mother     Heart attack Father     Hiatal hernia Father     Diabetes Father  Cancer Brother     Diabetes Brother     Heart disease Brother     Hypertension Brother     Other Son         gastroparesis       Social History     Tobacco Use   Smoking Status Former Smoker    Packs/day: 1 50    Years: 38 00    Pack years: 57 00    Types: Cigarettes    Start date:     Quit date: 12    Years since quittin 5   Smokeless Tobacco Never Used         Meds/Allergies     Current Outpatient Medications:     acetaminophen (TYLENOL) 500 mg tablet, Take 1,000 mg by mouth as needed for mild pain, Disp: , Rfl:     Alphagan P 0 1 %, INSTILL 1 DROP RIGHT EYE TWICE A DAY, Disp: , Rfl:     amoxicillin (AMOXIL) 500 mg capsule, Prophylactic, prior to dentist, Disp: , Rfl:     aspirin (ECOTRIN LOW STRENGTH) 81 mg EC tablet, Take 1 tablet (81 mg total) by mouth daily, Disp:  , Rfl:     azelastine (ASTELIN) 0 1 % nasal spray, 1 SPRAY INTO EACH NOSTRIL 2 (TWO) TIMES A DAY USE IN EACH NOSTRIL AS DIRECTED, Disp: 30 mL, Rfl: 1    bimatoprost (LUMIGAN) 0 01 % ophthalmic drops, Administer 1 drop to both eyes daily at bedtime for 30 days, Disp: 1 5 mL, Rfl: 0    calcium carbonate (OS-DANIA) 600 MG tablet, Take 600 mg by mouth 2 (two) times a day with meals  , Disp: , Rfl:     carboxymethylcellulose 1 % ophthalmic solution, 1 drop 3 (three) times a day, Disp: , Rfl:     Eliquis 2 5 MG, TAKE 1 TABLET (2 5 MG TOTAL) BY MOUTH 2 (TWO) TIMES A DAY, Disp: 60 tablet, Rfl: 5    ezetimibe-simvastatin (VYTORIN) 10-40 mg per tablet, TAKE 1 TABLET BY MOUTH DAILY AT BEDTIME, Disp: 30 tablet, Rfl: 5    famotidine (PEPCID) 20 mg tablet, TAKE 1 TABLET (20 MG TOTAL) BY MOUTH 2 (TWO) TIMES A DAY, Disp: 60 tablet, Rfl: 5    fluticasone (FLONASE) 50 mcg/act nasal spray, 2 SPRAYS INTO EACH NOSTRIL DAILY, Disp: 16 g, Rfl: 0    glucose blood (ONE TOUCH ULTRA TEST) test strip, TEST FOUR TIMES A DAY, Disp: 400 each, Rfl: 1    ipratropium (ATROVENT) 0 03 % nasal spray, 2 sprays into each nostril every 12 (twelve) hours, Disp: 30 mL, Rfl: 6    Januvia 100 MG tablet, TAKE 1 TABLET (100 MG TOTAL) BY MOUTH DAILY, Disp: 30 tablet, Rfl: 5    Lancets (ONETOUCH DELICA PLUS [id number]) MISC, Use one lancet to test blood 4 times a day, Disp: 400 each, Rfl: 1    loratadine (CLARITIN) 10 mg tablet, Take 10 mg by mouth daily as needed for allergies, Disp: , Rfl:     LORazepam (ATIVAN) 0 5 mg tablet, TAKE 2 TABLETS (1 MG TOTAL) BY MOUTH DAILY AT BEDTIME, Disp: 60 tablet, Rfl: 2    metFORMIN (GLUCOPHAGE) 500 mg tablet, TAKE 2 TABLETS (1,000 MG TOTAL) BY MOUTH 2 (TWO) TIMES A DAY WITH MEALS, Disp: 120 tablet, Rfl: 5    metoprolol tartrate (LOPRESSOR) 50 mg tablet, TAKE 1 5 TABLETS (75 MG TOTAL) BY MOUTH EVERY 12 (TWELVE) HOURS, Disp: 90 tablet, Rfl: 5    Multiple Vitamin (multivitamin) tablet, Take 1 tablet by mouth daily  , Disp: , Rfl:     peppermint oil, Use once as needed, Disp: , Rfl:     Prucalopride Succinate (Motegrity) 2 MG TABS, Take 2 mg by mouth daily, Disp: 90 tablet, Rfl: 3    QC Pen Needles 31G X 6 MM MISC, USE 4 (FOUR) TIMES A DAY (BEFORE MEALS AND AT BEDTIME), Disp: 100 each, Rfl: 3    sodium chloride 1 g tablet, TAKE 1 TABLET (1 G TOTAL) BY MOUTH 3 (THREE) TIMES A DAY, Disp: 90 tablet, Rfl: 5  Allergies   Allergen Reactions    Keflex [Cephalexin] Diarrhea       Vitals: Blood pressure 116/70, pulse (!) 51, temperature (!) 97 °F (36 1 °C), temperature source Tympanic, resp  rate 16, height 5' 6" (1 676 m), weight 51 3 kg (113 lb), SpO2 (!) 85 %  Body mass index is 18 24 kg/m²  Oxygen Therapy  SpO2: (!) 85 %  Oxygen Therapy: None (Room air)    Physical Exam   Physical Exam  Constitutional:       General: She is not in acute distress  HENT:      Head: Normocephalic  Mouth/Throat:      Pharynx: No oropharyngeal exudate  Eyes:      General: No scleral icterus  Pupils: Pupils are equal, round, and reactive to light  Neck:      Vascular: No JVD     Cardiovascular:      Rate and Rhythm: Normal rate and regular rhythm  Pulmonary:      Breath sounds: Rales present  No wheezing  Abdominal:      Palpations: Abdomen is soft  Tenderness: There is no abdominal tenderness  Musculoskeletal:      Cervical back: Neck supple  Lymphadenopathy:      Cervical: No cervical adenopathy  Skin:     General: Skin is warm and dry  Neurological:      Mental Status: She is alert and oriented to person, place, and time  Psychiatric:         Mood and Affect: Mood normal          Labs: I have personally reviewed pertinent lab results  Lab Results   Component Value Date    WBC 7 17 07/28/2022    HGB 12 5 07/28/2022    HCT 39 8 07/28/2022    MCV 86 07/28/2022     07/28/2022     Lab Results   Component Value Date    CALCIUM 8 9 07/28/2022    K 4 0 07/28/2022    CO2 29 07/28/2022    CL 94 (L) 07/28/2022    BUN 17 07/28/2022    CREATININE 0 58 (L) 07/28/2022     Lab Results   Component Value Date    IGE 14 6 10/16/2020     Lab Results   Component Value Date    ALT 23 07/28/2022    AST 23 07/28/2022    ALKPHOS 57 07/28/2022     Imaging and other studies: I have personally reviewed pertinent reports  and I have personally reviewed pertinent films in PACS CXR 7/28/22 - Progressive, chronic interstitial lung disease        Pulmonary function testing:  Performed 11/1/21 and personally interpreted  FEV1/FVC ratio 88%   FEV1 72% predicted  FVC 61% predicted  DLCO corrected for hemoglobin 28 % predicted  Moderate obstruction, severe diffusion impairment    6 minute walk test performed today  Room air saturations at rest are 98%  She ambulated for 6 minutes for a total of 234 m  Saturations reached a low of 92%

## 2022-08-03 ENCOUNTER — TELEPHONE (OUTPATIENT)
Dept: FAMILY MEDICINE CLINIC | Facility: CLINIC | Age: 87
End: 2022-08-03

## 2022-08-03 DIAGNOSIS — K59.00 CONSTIPATION, UNSPECIFIED CONSTIPATION TYPE: Primary | ICD-10-CM

## 2022-08-03 NOTE — TELEPHONE ENCOUNTER
Spoke with patient via telephone  Has not received gel cushion yet  Will contact [employer] to inquire about this  Pressure sore on buttocks no worse,   Using Desitin and depends as needed  Sitting on pillows at home  Discussed Pelvic floor therapy  Transportation and energy to participate in therapy are barriers  Recently doing a little better using probiotic  Weight 113  Last 4-5 days were good days from GI perspective and eating better  Today, is ow a bad day again, hard to get food down  Just no appetite  Following with pulmonology for recent worsening shortness of breath  Home O2 sats 97-98%, but sometime just feels like she can't breathe right  Will be completing PFT

## 2022-08-05 ENCOUNTER — TELEPHONE (OUTPATIENT)
Dept: FAMILY MEDICINE CLINIC | Facility: CLINIC | Age: 87
End: 2022-08-05

## 2022-08-05 NOTE — TELEPHONE ENCOUNTER
[address] returned my call  They stated that they do not carry the gel cushion  They asked if she had a wheelchair and if so what DM did she get that from?

## 2022-08-05 NOTE — TELEPHONE ENCOUNTER
Can you please contact [address]? We faxed an order for a gel cushion for Ac to sit on due to pressure ulcer  Ac has not heard anything about this  Can you find out what would be the process for her to get a gel cushion?

## 2022-08-08 DIAGNOSIS — Z79.4 TYPE 2 DIABETES MELLITUS WITH RETINOPATHY, WITH LONG-TERM CURRENT USE OF INSULIN, MACULAR EDEMA PRESENCE UNSPECIFIED, UNSPECIFIED LATERALITY, UNSPECIFIED RETINOPATHY SEVERITY (HCC): ICD-10-CM

## 2022-08-08 DIAGNOSIS — E11.42 TYPE 2 DIABETES MELLITUS WITH DIABETIC POLYNEUROPATHY, WITH LONG-TERM CURRENT USE OF INSULIN (HCC): ICD-10-CM

## 2022-08-08 DIAGNOSIS — E11.319 TYPE 2 DIABETES MELLITUS WITH RETINOPATHY, WITH LONG-TERM CURRENT USE OF INSULIN, MACULAR EDEMA PRESENCE UNSPECIFIED, UNSPECIFIED LATERALITY, UNSPECIFIED RETINOPATHY SEVERITY (HCC): ICD-10-CM

## 2022-08-08 DIAGNOSIS — Z79.4 TYPE 2 DIABETES MELLITUS WITH DIABETIC POLYNEUROPATHY, WITH LONG-TERM CURRENT USE OF INSULIN (HCC): ICD-10-CM

## 2022-08-08 DIAGNOSIS — K21.00 GASTROESOPHAGEAL REFLUX DISEASE WITH ESOPHAGITIS WITHOUT HEMORRHAGE: ICD-10-CM

## 2022-08-08 DIAGNOSIS — I48.0 PAROXYSMAL ATRIAL FIBRILLATION (HCC): ICD-10-CM

## 2022-08-08 DIAGNOSIS — F41.9 ANXIETY: ICD-10-CM

## 2022-08-09 RX ORDER — SITAGLIPTIN 100 MG/1
TABLET, FILM COATED ORAL
Qty: 30 TABLET | Refills: 5 | Status: SHIPPED | OUTPATIENT
Start: 2022-08-09

## 2022-08-09 RX ORDER — APIXABAN 2.5 MG/1
TABLET, FILM COATED ORAL
Qty: 60 TABLET | Refills: 5 | Status: SHIPPED | OUTPATIENT
Start: 2022-08-09

## 2022-08-09 RX ORDER — FAMOTIDINE 20 MG/1
20 TABLET, FILM COATED ORAL 2 TIMES DAILY
Qty: 60 TABLET | Refills: 5 | Status: SHIPPED | OUTPATIENT
Start: 2022-08-09

## 2022-08-09 RX ORDER — LORAZEPAM 0.5 MG/1
1 TABLET ORAL
Qty: 60 TABLET | Refills: 0 | OUTPATIENT
Start: 2022-08-09

## 2022-08-11 ENCOUNTER — APPOINTMENT (OUTPATIENT)
Dept: LAB | Facility: CLINIC | Age: 87
End: 2022-08-11
Payer: MEDICARE

## 2022-08-11 DIAGNOSIS — E78.5 TYPE 2 DIABETES MELLITUS WITH HYPERLIPIDEMIA (HCC): ICD-10-CM

## 2022-08-11 DIAGNOSIS — E11.69 TYPE 2 DIABETES MELLITUS WITH HYPERLIPIDEMIA (HCC): ICD-10-CM

## 2022-08-11 DIAGNOSIS — I10 HYPERTENSION, UNSPECIFIED TYPE: ICD-10-CM

## 2022-08-11 LAB
ALBUMIN SERPL BCP-MCNC: 4 G/DL (ref 3.5–5)
ALP SERPL-CCNC: 52 U/L (ref 46–116)
ALT SERPL W P-5'-P-CCNC: 22 U/L (ref 12–78)
ANION GAP SERPL CALCULATED.3IONS-SCNC: 5 MMOL/L (ref 4–13)
AST SERPL W P-5'-P-CCNC: 18 U/L (ref 5–45)
BILIRUB SERPL-MCNC: 0.68 MG/DL (ref 0.2–1)
BUN SERPL-MCNC: 15 MG/DL (ref 5–25)
CALCIUM SERPL-MCNC: 9.4 MG/DL (ref 8.3–10.1)
CHLORIDE SERPL-SCNC: 94 MMOL/L (ref 96–108)
CHOLEST SERPL-MCNC: 139 MG/DL
CO2 SERPL-SCNC: 32 MMOL/L (ref 21–32)
CREAT SERPL-MCNC: 0.54 MG/DL (ref 0.6–1.3)
ERYTHROCYTE [DISTWIDTH] IN BLOOD BY AUTOMATED COUNT: 14 % (ref 11.6–15.1)
EST. AVERAGE GLUCOSE BLD GHB EST-MCNC: 169 MG/DL
GFR SERPL CREATININE-BSD FRML MDRD: 86 ML/MIN/1.73SQ M
GLUCOSE P FAST SERPL-MCNC: 189 MG/DL (ref 65–99)
HBA1C MFR BLD: 7.5 %
HCT VFR BLD AUTO: 39.5 % (ref 34.8–46.1)
HDLC SERPL-MCNC: 68 MG/DL
HGB BLD-MCNC: 12.3 G/DL (ref 11.5–15.4)
LDLC SERPL CALC-MCNC: 49 MG/DL (ref 0–100)
MCH RBC QN AUTO: 26.7 PG (ref 26.8–34.3)
MCHC RBC AUTO-ENTMCNC: 31.1 G/DL (ref 31.4–37.4)
MCV RBC AUTO: 86 FL (ref 82–98)
NONHDLC SERPL-MCNC: 71 MG/DL
PLATELET # BLD AUTO: 175 THOUSANDS/UL (ref 149–390)
PMV BLD AUTO: 10.2 FL (ref 8.9–12.7)
POTASSIUM SERPL-SCNC: 4.2 MMOL/L (ref 3.5–5.3)
PROT SERPL-MCNC: 8.2 G/DL (ref 6.4–8.4)
RBC # BLD AUTO: 4.61 MILLION/UL (ref 3.81–5.12)
SODIUM SERPL-SCNC: 131 MMOL/L (ref 135–147)
TRIGL SERPL-MCNC: 110 MG/DL
TSH SERPL DL<=0.05 MIU/L-ACNC: 1.21 UIU/ML (ref 0.45–4.5)
WBC # BLD AUTO: 6.02 THOUSAND/UL (ref 4.31–10.16)

## 2022-08-11 PROCEDURE — 84443 ASSAY THYROID STIM HORMONE: CPT

## 2022-08-11 PROCEDURE — 36415 COLL VENOUS BLD VENIPUNCTURE: CPT

## 2022-08-11 PROCEDURE — 80053 COMPREHEN METABOLIC PANEL: CPT

## 2022-08-11 PROCEDURE — 83036 HEMOGLOBIN GLYCOSYLATED A1C: CPT

## 2022-08-11 PROCEDURE — 80061 LIPID PANEL: CPT

## 2022-08-11 PROCEDURE — 85027 COMPLETE CBC AUTOMATED: CPT

## 2022-08-15 ENCOUNTER — TELEPHONE (OUTPATIENT)
Dept: FAMILY MEDICINE CLINIC | Facility: CLINIC | Age: 87
End: 2022-08-15

## 2022-08-15 NOTE — TELEPHONE ENCOUNTER
----- Message from [id number] sent at 8/14/2022  3:31 PM EDT -----  Blood work is stable  A1c is 7 5%  Sodium is still low, but up to 131, was 129  Continue same medications, follow up with nephrology as scheduled  Please return to office for check up in October

## 2022-08-18 ENCOUNTER — HOSPITAL ENCOUNTER (OUTPATIENT)
Dept: PULMONOLOGY | Facility: HOSPITAL | Age: 87
Discharge: HOME | End: 2022-08-18
Attending: INTERNAL MEDICINE
Payer: MEDICARE

## 2022-08-18 DIAGNOSIS — J84.112 IDIOPATHIC PULMONARY FIBROSIS (HCC): ICD-10-CM

## 2022-08-18 PROCEDURE — 94010 BREATHING CAPACITY TEST: CPT

## 2022-08-18 PROCEDURE — 94760 N-INVAS EAR/PLS OXIMETRY 1: CPT

## 2022-08-18 PROCEDURE — 94726 PLETHYSMOGRAPHY LUNG VOLUMES: CPT | Performed by: INTERNAL MEDICINE

## 2022-08-18 PROCEDURE — 94729 DIFFUSING CAPACITY: CPT | Performed by: INTERNAL MEDICINE

## 2022-08-18 PROCEDURE — 94060 EVALUATION OF WHEEZING: CPT | Performed by: INTERNAL MEDICINE

## 2022-08-18 PROCEDURE — 94729 DIFFUSING CAPACITY: CPT

## 2022-08-18 PROCEDURE — 94726 PLETHYSMOGRAPHY LUNG VOLUMES: CPT

## 2022-08-18 RX ORDER — ALBUTEROL SULFATE 2.5 MG/3ML
2.5 SOLUTION RESPIRATORY (INHALATION) ONCE
Status: COMPLETED | OUTPATIENT
Start: 2022-08-18 | End: 2022-08-18

## 2022-08-18 RX ADMIN — ALBUTEROL SULFATE 2.5 MG: 2.5 SOLUTION RESPIRATORY (INHALATION) at 13:08

## 2022-08-23 ENCOUNTER — TELEPHONE (OUTPATIENT)
Dept: PULMONOLOGY | Facility: CLINIC | Age: 87
End: 2022-08-23

## 2022-08-23 ENCOUNTER — OFFICE VISIT (OUTPATIENT)
Dept: NEPHROLOGY | Facility: CLINIC | Age: 87
End: 2022-08-23
Payer: MEDICARE

## 2022-08-23 VITALS
HEIGHT: 66 IN | WEIGHT: 113 LBS | HEART RATE: 52 BPM | BODY MASS INDEX: 18.16 KG/M2 | SYSTOLIC BLOOD PRESSURE: 112 MMHG | DIASTOLIC BLOOD PRESSURE: 72 MMHG | OXYGEN SATURATION: 99 %

## 2022-08-23 DIAGNOSIS — J84.112 IDIOPATHIC PULMONARY FIBROSIS (HCC): Primary | ICD-10-CM

## 2022-08-23 DIAGNOSIS — E87.1 HYPONATREMIA: Primary | ICD-10-CM

## 2022-08-23 PROCEDURE — 99214 OFFICE O/P EST MOD 30 MIN: CPT | Performed by: STUDENT IN AN ORGANIZED HEALTH CARE EDUCATION/TRAINING PROGRAM

## 2022-08-23 NOTE — PATIENT INSTRUCTIONS
Thank you for coming to your visit today  As we discussed your sodium level is stable, your sodium is 131mEq/L   Please follow the recommendations below       Avoid nonsteroidal anti-inflammatory drugs such as Naprosyn, ibuprofen, Aleve, Advil, Celebrex, Meloxicam (Mobic) etc   You can use Tylenol as needed if you do not have any liver condition to be concerned about    Next Visit in 4 months with results   If you need to see us earlier we can change the appointment for you      Theresa Garcia MD  Nephrology Attending
Warm

## 2022-08-23 NOTE — PROGRESS NOTES
NEPHROLOGY OUTPATIENT PROGRESS NOTE   Bautista Montana 82 y o  female MRN: [id number]  DATE: 8/23/2022    Reason for visit:   Chief Complaint   Patient presents with    Follow-up    Chronic Kidney Disease        Patient Instructions   Thank you for coming to your visit today  As we discussed your sodium level is stable, your sodium is 131mEq/L  Please follow the recommendations below        Avoid nonsteroidal anti-inflammatory drugs such as Naprosyn, ibuprofen, Aleve, Advil, Celebrex, Meloxicam (Mobic) etc   You can use Tylenol as needed if you do not have any liver condition to be concerned about    Next Visit in 4 months with results   If you need to see us earlier we can change the appointment for you      Theo Jimenez MD  Nephrology Attending               Elyse Singh was seen today for follow-up and chronic kidney disease  Diagnoses and all orders for this visit:    Hyponatremia  -     Basic metabolic panel; Future        Assessment/Plan:  82 y  o  woman with [facility] of HTN, HLD, dysphagia, pAffib on Eliquis with pacemaker, DM, neuropathy, ILD and  pulmonary fibrosis, exposed to chemicals in the past   CT chest revealed subpleural reticular/fiber nodular opacities, interlobular septal thickening, mild central/bibasilar bronchiectasis, possible UIP pattern   Patient presents for initial consultation for hyponatremia        PLAN:     #Asymptomatic chronic hyposomolar euvolemic Hyponatremia   · Sodium levels low since 2019   · Serum osm:280  · Urine osm: 300  · Urine Sodium: 48  · Baseline sodium 130-132mEq/L  · 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 in the settings of pulmonary disease exacerbate by benzos    · Plan:  · Continue sodium chloride 1 gr tid        #HTN  · Tendency to hypotension BP 1120/72mmhg   · Metoprolol decreased to 50mg by cardiologist   · HR 51  · Message sent to Dr Haim Gonzalez to reduce Metoprolol to 25mg bid if possible       #DM  · Metformin 1000mg bid   · HbA1c 7 6  · Maintain healthy diet (vegetables, fruits, whole grains, nonfat or low fat)  · Physical activity (5 to 10 minutes to start the increase to 30 min a day)        SUBJECTIVE / INTERVAL HISTORY:  82 y o  female presents in follow up of hyponatremia  Patient feel tired, fatigue and dizzy  She is having ongoing pulm testing and will probably need oxygen at home  BP low , Metoprolol decreased by cardiologist Michelle Powellchano Recinos denies any recent illness/hospitalizations/medication changes since last office visit  Review of Systems   Constitutional: Positive for fatigue  Negative for activity change  HENT: Negative for congestion  Eyes: Negative for discharge  Respiratory: Positive for shortness of breath  Negative for cough  Cardiovascular: Negative for chest pain and leg swelling  Gastrointestinal: Negative for abdominal distention and abdominal pain  Endocrine: Negative for cold intolerance  Genitourinary: Negative for difficulty urinating and dysuria  Musculoskeletal: Negative for arthralgias  Skin: Negative for color change and pallor  Neurological: Positive for dizziness  Psychiatric/Behavioral: Negative for agitation  OBJECTIVE:  /72 (BP Location: Left arm, Patient Position: Sitting, Cuff Size: Adult)   Pulse (!) 52   Ht 5' 6" (1 676 m)   Wt 51 3 kg (113 lb)   SpO2 99%   BMI 18 24 kg/m²  Body mass index is 18 24 kg/m²      Physical exam:  Physical Exam   General:  no acute distress at this time  Skin:  No acute rash  Eyes:  No scleral icterus and noninjected  ENT:  mucous membranes moist  Neck:  no carotid bruits  Chest:  Clear to auscultation percussion, good respiratory effort, no use of accessory respiratory muscles  CVS:  Regular rate and rhythm without a murmur rub   Abdomen:  soft and nontender   Extremities:  no lower extremity edema  Neuro:  No gross focality  Psych:  Alert , cooperative       Medications:    Current Outpatient Medications:    acetaminophen (TYLENOL) 500 mg tablet, Take 1,000 mg by mouth as needed for mild pain, Disp: , Rfl:     Alphagan P 0 1 %, INSTILL 1 DROP RIGHT EYE TWICE A DAY, Disp: , Rfl:     amoxicillin (AMOXIL) 500 mg capsule, Prophylactic, prior to dentist, Disp: , Rfl:     aspirin (ECOTRIN LOW STRENGTH) 81 mg EC tablet, Take 1 tablet (81 mg total) by mouth daily, Disp:  , Rfl:     azelastine (ASTELIN) 0 1 % nasal spray, 1 SPRAY INTO EACH NOSTRIL 2 (TWO) TIMES A DAY USE IN EACH NOSTRIL AS DIRECTED, Disp: 30 mL, Rfl: 1    bimatoprost (LUMIGAN) 0 01 % ophthalmic drops, Administer 1 drop to both eyes daily at bedtime for 30 days, Disp: 1 5 mL, Rfl: 0    calcium carbonate (OS-DANIA) 600 MG tablet, Take 600 mg by mouth 2 (two) times a day with meals  , Disp: , Rfl:     Eliquis 2 5 MG, TAKE 1 TABLET (2 5 MG TOTAL) BY MOUTH 2 (TWO) TIMES A DAY, Disp: 60 tablet, Rfl: 5    ezetimibe-simvastatin (VYTORIN) 10-40 mg per tablet, TAKE 1 TABLET BY MOUTH DAILY AT BEDTIME, Disp: 30 tablet, Rfl: 5    famotidine (PEPCID) 20 mg tablet, TAKE 1 TABLET (20 MG TOTAL) BY MOUTH 2 (TWO) TIMES A DAY, Disp: 60 tablet, Rfl: 5    glucose blood (ONE TOUCH ULTRA TEST) test strip, TEST FOUR TIMES A DAY, Disp: 400 each, Rfl: 1    ipratropium (ATROVENT) 0 03 % nasal spray, 2 sprays into each nostril every 12 (twelve) hours, Disp: 30 mL, Rfl: 6    Januvia 100 MG tablet, TAKE 1 TABLET (100 MG TOTAL) BY MOUTH DAILY, Disp: 30 tablet, Rfl: 5    Lancets (ONETOUCH DELICA PLUS [id number]) MISC, Use one lancet to test blood 4 times a day, Disp: 400 each, Rfl: 1    loratadine (CLARITIN) 10 mg tablet, Take 10 mg by mouth daily as needed for allergies, Disp: , Rfl:     LORazepam (ATIVAN) 0 5 mg tablet, TAKE 2 TABLETS (1 MG TOTAL) BY MOUTH DAILY AT BEDTIME, Disp: 60 tablet, Rfl: 2    metFORMIN (GLUCOPHAGE) 500 mg tablet, TAKE 2 TABLETS (1,000 MG TOTAL) BY MOUTH 2 (TWO) TIMES A DAY WITH MEALS, Disp: 120 tablet, Rfl: 5    metoprolol tartrate (LOPRESSOR) 50 mg tablet, TAKE 1 5 TABLETS (75 MG TOTAL) BY MOUTH EVERY 12 (TWELVE) HOURS (Patient taking differently: Take 50 mg by mouth every 12 (twelve) hours), Disp: 90 tablet, Rfl: 5    Multiple Vitamin (multivitamin) tablet, Take 1 tablet by mouth daily  , Disp: , Rfl:     peppermint oil, Use once as needed, Disp: , Rfl:     Prucalopride Succinate (Motegrity) 2 MG TABS, Take 2 mg by mouth daily, Disp: 90 tablet, Rfl: 3    QC Pen Needles 31G X 6 MM MISC, USE 4 (FOUR) TIMES A DAY (BEFORE MEALS AND AT BEDTIME), Disp: 100 each, Rfl: 3    sodium chloride 1 g tablet, TAKE 1 TABLET (1 G TOTAL) BY MOUTH 3 (THREE) TIMES A DAY, Disp: 90 tablet, Rfl: 5    carboxymethylcellulose 1 % ophthalmic solution, 1 drop 3 (three) times a day, Disp: , Rfl:     fluticasone (FLONASE) 50 mcg/act nasal spray, 2 SPRAYS INTO EACH NOSTRIL DAILY (Patient not taking: Reported on 8/23/2022), Disp: 16 g, Rfl: 0    Allergies: Allergies as of 08/23/2022 - Reviewed 08/23/2022   Allergen Reaction Noted    Keflex [cephalexin] Diarrhea 11/04/2020       The following portions of the patient's history were reviewed and updated as appropriate: past family history, past surgical history and problem list     Laboratory Results:  Lab Results   Component Value Date    SODIUM 131 (L) 08/11/2022    K 4 2 08/11/2022    CL 94 (L) 08/11/2022    CO2 32 08/11/2022    BUN 15 08/11/2022    CREATININE 0 54 (L) 08/11/2022    GLUC 172 (H) 07/28/2022    CALCIUM 9 4 08/11/2022        Lab Results   Component Value Date    CALCIUM 9 4 08/11/2022       Portions of the record may have been created with voice recognition software  Occasional wrong word or "sound a like" substitutions may have occurred due to the inherent limitations of voice recognition software  Read the chart carefully and recognize, using context, where substitutions have occurred

## 2022-08-25 ENCOUNTER — HOSPITAL ENCOUNTER (OUTPATIENT)
Dept: CT IMAGING | Facility: HOSPITAL | Age: 87
Discharge: HOME | End: 2022-08-25
Attending: INTERNAL MEDICINE
Payer: MEDICARE

## 2022-08-25 DIAGNOSIS — J84.112 IDIOPATHIC PULMONARY FIBROSIS (HCC): ICD-10-CM

## 2022-08-25 PROCEDURE — 71250 CT THORAX DX C-: CPT

## 2022-08-25 PROCEDURE — G1004 CDSM NDSC: HCPCS

## 2022-08-30 DIAGNOSIS — E11.65 UNCONTROLLED TYPE 2 DIABETES MELLITUS WITH HYPERGLYCEMIA (HCC): ICD-10-CM

## 2022-08-30 RX ORDER — BLOOD SUGAR DIAGNOSTIC
STRIP MISCELLANEOUS
Qty: 400 STRIP | Refills: 1 | Status: SHIPPED | OUTPATIENT
Start: 2022-08-30

## 2022-09-01 ENCOUNTER — IOP CHECK (OUTPATIENT)
Dept: URBAN - METROPOLITAN AREA CLINIC 6 | Facility: CLINIC | Age: 87
End: 2022-09-01

## 2022-09-01 DIAGNOSIS — H40.1132: ICD-10-CM

## 2022-09-01 PROCEDURE — 92202 OPSCPY EXTND ON/MAC DRAW: CPT

## 2022-09-01 PROCEDURE — 92020 GONIOSCOPY: CPT

## 2022-09-01 PROCEDURE — 92014 COMPRE OPH EXAM EST PT 1/>: CPT

## 2022-09-01 PROCEDURE — 92133 CPTRZD OPH DX IMG PST SGM ON: CPT

## 2022-09-01 ASSESSMENT — VISUAL ACUITY
OS_CC: 20/40-1
OD_CC: 20/400

## 2022-09-01 ASSESSMENT — TONOMETRY
OD_IOP_MMHG: 13
OS_IOP_MMHG: 14

## 2022-09-12 ENCOUNTER — TELEPHONE (OUTPATIENT)
Dept: PULMONOLOGY | Facility: CLINIC | Age: 87
End: 2022-09-12

## 2022-09-12 DIAGNOSIS — F41.9 ANXIETY: ICD-10-CM

## 2022-09-12 DIAGNOSIS — E78.5 HYPERLIPIDEMIA, UNSPECIFIED HYPERLIPIDEMIA TYPE: ICD-10-CM

## 2022-09-12 NOTE — TELEPHONE ENCOUNTER
Pt called re: she received a letter in the mail stating she needs to contact us to go over her CT results  She is very upset because she hadn't heard from us and this letter scared her  Pt stated she didn't want to speak w/ Dr Es Richardson but she wanted to speak with a different doctor    Please advise: [phone]

## 2022-09-13 ENCOUNTER — TELEPHONE (OUTPATIENT)
Dept: PULMONOLOGY | Facility: CLINIC | Age: 87
End: 2022-09-13

## 2022-09-13 RX ORDER — EZETIMIBE AND SIMVASTATIN 10; 40 MG/1; MG/1
1 TABLET ORAL
Qty: 30 TABLET | Refills: 5 | Status: SHIPPED | OUTPATIENT
Start: 2022-09-13

## 2022-09-13 RX ORDER — LORAZEPAM 0.5 MG/1
1 TABLET ORAL
Qty: 60 TABLET | Refills: 2 | Status: SHIPPED | OUTPATIENT
Start: 2022-09-13

## 2022-09-13 NOTE — TELEPHONE ENCOUNTER
Called left voicemail:  Apologized for delay in response- I did explain that CT imaging did show some worsening opacities and we would like to present her case to ILD, I provided my name and stated I would be here all day that she can feel free and call back at any time and I will be happy to discuss the results again with her and our treatment plan moving forward

## 2022-09-13 NOTE — TELEPHONE ENCOUNTER
I spoke with patient:  She  Is agreeable to being presented to ILD on Thursday-patient  already placed on schedule for 9/15 ,  Dr Ava Hanson is agreeable to presenting her- he is also agreeable to following up with her the following week to discuss treatment plan moving forward      Scarlet Grimm can you please place him on Dr Ava Hanson scheduled for next week and notify patient

## 2022-09-14 NOTE — PROGRESS NOTES
Pulmonary Follow Up Note   Karyn Del Castillo 82 y o  female MRN: [id number]  9/21/2022      Assessment/Plan:    1  Interstitial lung disease  -probable UIP  -autoimmune workup negative:  PASTOR, Anca, RF, CCP, HP panel  No history of connective tissue disease, skin rash  -PFTs with 6 minute walk test in August 2022 revealing decreased DLCO and oxygen saturation dropping by 4% from 96 to 92%  -discussed patient's case at multidisciplinary ILD conference:  Group consensus to try a trial of steroid, repeat echocardiogram, repeat PFTs and 6 minute walk test    -start 1 milligram/kilogram oral prednisone (60 mg daily) decrease by 10 mg every 2 weeks  -Bactrim double-strength prophylaxis 3 times weekly  -repeat echocardiogram evaluate for pulmonary hypertension  -repeat PFTs with respiratory muscle function testing and 6 minute walk test in 3-4 months  -discussed with patient that she may require oxygen soon; possibly over the upcoming year  Currently does not desaturate 0 2 need oxygen  Advised the patient to keep track of her oxygen levels at home and give the office a call if oxygen levels drop below 88%  -discussed antifibrotic; again due to the GI side effects patient would like to avoid  -repeat high-resolution CT scan in 6-12 months  -discussed referral to palliative care in the future    2 T2DM  -currently non-insulin-dependent on Januvia and metformin  -advised the patient to make appointment with primary care provider for blood glucose management while on steroids    3  Dysphagia  -currently reports trouble swallowing and irritation hoarseness of her voice  -follows up with GI    Return in about 2 months (around 11/21/2022) for Recheck      History of Present Illness   HPI:  Karyn Del Castillo is a 82 y o  female with hypertension, hyperlipidemia, dysphagia, glaucoma, bilateral carotid artery stenosis, atrial fibrillation on Eliquis and has pacemaker, type 2 diabetes, GERD presenting for follow-up and evaluation of interstitial lung disease  Patient reports that she is fatigued, short of breath with minimal activities, has poor appetite she states that she is able to do her activities of daily living without much issues but does get winded when she is showering for a prolonged period of time or walking around her house  Denies oxygen saturations dropping below 88%  Reports throat irritation and mild cough  Denies fever, chills, hemoptysis, night sweats  Denies fever, chills, headaches, lightheadedness, dizziness, visual disturbances, chest pain, palpitations,nausea, vomiting, or trouble urinating/ defecating  Previous pulmonary history per Dr Tara Prescott:    "Patient for seen in the office In March 2021   She had prior imaging showing significant interstitial lung disease and fibrosis  Looking back on prior imaging, it appears that she did have some reticular pattern back to imaging on 2007, but has progressed significantly over the past 13 years       In terms of pulmonary history, patient is a former smoker, quit in 1994  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   She subsequently worked as a  person and 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 different types of [employer] recently, she worked as a  without exposures  Live Carter has never had pets and denies any exposure to birds, lifestyle, farm animals   No autoimmune, connective tissue disease, skin rash  She does have intermittent knee pain, but denies significant small joint pains   She has no family history of lung disease   She does note some environmental allergies worsen her postnasal drip and given her watery eyes      Given patient's finding of ILD, she then underwent further workup   High-resolution CT chest revealed subpleural reticular/fiber nodular opacities, interlobular septal thickening, mild central/bibasilar bronchiectasis, possible UIP pattern   Autoimmune workup was completed with negative PASTOR, Anca, RF, CCP, HP panel"    Occupational History:  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 chemicals  Afterwards worked in  and exposed to different types of jewels  Social History: Former smoker  Quit 1994  1 5 packs per day for 38 years  Malignancy History:     Pets/animal exposure:  Never had pets denies exposure to birds, exotic animals, farm animals    Inhalers: none    Health maintenance:  Up-to-date on pneumonia and COVID vaccines  Flu vaccine due fall 2022    Review of systems:  12 point review of systems was completed and was otherwise negative except as listed in HPI      Historical Information   Past Medical History:   Diagnosis Date    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/7/2020    Glaucoma     H/O degenerative disc disease     Idiopathic pulmonary fibrosis ([facility] ) 11/2020    Irregular heart beat     afib    Peripheral neuropathy     S/P laparoscopic cholecystectomy 12/21/2020    Stenosis of right subclavian artery ([facility] [address] ) 11/18/2016     Past Surgical History:   Procedure Laterality Date    CATARACT EXTRACTION, BILATERAL  2011    CHOLECYSTECTOMY      CHOLECYSTECTOMY LAPAROSCOPIC N/A 12/9/2020    Procedure: CHOLECYSTECTOMY LAPAROSCOPIC;  Surgeon: Fortino Ray MD;  Location: [facility];  Service: General    COLONOSCOPY      CYST REMOVAL  2009    left lower Quadrant    [address]    LIPOMA RESECTION  2010    AL REPAIR ING HERNIA,5+Y/O,REDUCIBL Bilateral 1/5/2018    Procedure: INGUINAL HERNIA REPAIR;  Surgeon: Deonna Mcmahon MD;  Location: [facility];  Service: General    SHOULDER SURGERY  04/26/2019    Dr Darian Fang [facility]   [address]  R carotid occlusion     Family History   Problem Relation Age of Onset    Heart disease Mother     Heart attack Father     Hiatal hernia Father     Diabetes Father     Cancer Brother     Diabetes Brother     Heart disease Brother     Hypertension Brother     Other Son         gastroparesis         Meds/Allergies     Current Outpatient Medications:     acetaminophen (TYLENOL) 500 mg tablet, Take 1,000 mg by mouth as needed for mild pain, Disp: , Rfl:     Alphagan P 0 1 %, INSTILL 1 DROP RIGHT EYE TWICE A DAY, Disp: , Rfl:     aspirin (ECOTRIN LOW STRENGTH) 81 mg EC tablet, Take 1 tablet (81 mg total) by mouth daily, Disp:  , Rfl:     azelastine (ASTELIN) 0 1 % nasal spray, 1 SPRAY INTO EACH NOSTRIL 2 (TWO) TIMES A DAY USE IN EACH NOSTRIL AS DIRECTED, Disp: 30 mL, Rfl: 1    bimatoprost (LUMIGAN) 0 01 % ophthalmic drops, Administer 1 drop to both eyes daily at bedtime for 30 days, Disp: 1 5 mL, Rfl: 0    calcium carbonate (OS-DANIA) 600 MG tablet, Take 600 mg by mouth 2 (two) times a day with meals  , Disp: , Rfl:     carboxymethylcellulose 1 % ophthalmic solution, 1 drop 3 (three) times a day, Disp: , Rfl:     Eliquis 2 5 MG, TAKE 1 TABLET (2 5 MG TOTAL) BY MOUTH 2 (TWO) TIMES A DAY, Disp: 60 tablet, Rfl: 5    ezetimibe-simvastatin (VYTORIN) 10-40 mg per tablet, TAKE 1 TABLET BY MOUTH DAILY AT BEDTIME, Disp: 30 tablet, Rfl: 5    famotidine (PEPCID) 20 mg tablet, TAKE 1 TABLET (20 MG TOTAL) BY MOUTH 2 (TWO) TIMES A DAY, Disp: 60 tablet, Rfl: 5    glucose blood (OneTouch Ultra) test strip, TEST FOUR TIMES A DAY, Disp: 400 strip, Rfl: 1    ipratropium (ATROVENT) 0 03 % nasal spray, USE 2 SPRAYS INTO EACH NOSTRIL EVERY 12 (TWELVE) HOURS, Disp: 30 mL, Rfl: 6    Januvia 100 MG tablet, TAKE 1 TABLET (100 MG TOTAL) BY MOUTH DAILY, Disp: 30 tablet, Rfl: 5    Lancets (ONETOUCH DELICA PLUS [id number]) MISC, Use one lancet to test blood 4 times a day, Disp: 400 each, Rfl: 1    loratadine (CLARITIN) 10 mg tablet, Take 10 mg by mouth daily as needed for allergies, Disp: , Rfl:     LORazepam (ATIVAN) 0 5 mg tablet, TAKE 2 TABLETS (1 MG TOTAL) BY MOUTH DAILY AT BEDTIME, Disp: 60 tablet, Rfl: 2    metFORMIN (GLUCOPHAGE) 500 mg tablet, TAKE 2 TABLETS (1,000 MG TOTAL) BY MOUTH 2 (TWO) TIMES A DAY WITH MEALS, Disp: 120 tablet, Rfl: 5    metoprolol tartrate (LOPRESSOR) 50 mg tablet, TAKE 1 5 TABLETS (75 MG TOTAL) BY MOUTH EVERY 12 (TWELVE) HOURS (Patient taking differently: Take 25 mg by mouth every 12 (twelve) hours), Disp: 90 tablet, Rfl: 5    Multiple Vitamin (multivitamin) tablet, Take 1 tablet by mouth daily  , Disp: , Rfl:     peppermint oil, Use once as needed, Disp: , Rfl:     [START ON 9/22/2022] predniSONE 20 mg tablet, Take 3 tablets (60 mg total) by mouth daily for 14 days, THEN 2 5 tablets (50 mg total) daily for 14 days, THEN 2 tablets (40 mg total) daily for 14 days, THEN 1 5 tablets (30 mg total) daily for 14 days, THEN 1 tablet (20 mg total) daily for 14 days, THEN 0 5 tablets (10 mg total) daily for 14 days  , Disp: 147 tablet, Rfl: 0    Prucalopride Succinate (Motegrity) 2 MG TABS, Take 2 mg by mouth daily, Disp: 90 tablet, Rfl: 3    QC Pen Needles 31G X 6 MM MISC, USE 4 (FOUR) TIMES A DAY (BEFORE MEALS AND AT BEDTIME), Disp: 100 each, Rfl: 3    sodium chloride 1 g tablet, TAKE 1 TABLET (1 G TOTAL) BY MOUTH 3 (THREE) TIMES A DAY, Disp: 90 tablet, Rfl: 5    sulfamethoxazole-trimethoprim (BACTRIM DS) 800-160 mg per tablet, Take 1 tablet by mouth 3 (three) times a week, Disp: 36 tablet, Rfl: 0    amoxicillin (AMOXIL) 500 mg capsule, Prophylactic, prior to dentist (Patient not taking: Reported on 9/21/2022), Disp: , Rfl:     fluticasone (FLONASE) 50 mcg/act nasal spray, 2 SPRAYS INTO EACH NOSTRIL DAILY, Disp: 16 g, Rfl: 0  Allergies   Allergen Reactions    Keflex [Cephalexin] Diarrhea       Vitals: Blood pressure 122/66, pulse 86, temperature (!) 96 6 °F (35 9 °C), temperature source Tympanic, resp  rate 18, height 5' 6" (1 676 m), weight 51 3 kg (113 lb), SpO2 95 %  Body mass index is 18 24 kg/m²   Oxygen Therapy  SpO2: 95 %  Oxygen Therapy: None (Room air)      Physical Exam  Physical Exam    Labs: I have personally reviewed pertinent lab results  Lab Results   Component Value Date    WBC 6 02 08/11/2022    HGB 12 3 08/11/2022    HCT 39 5 08/11/2022    MCV 86 08/11/2022     08/11/2022     Lab Results   Component Value Date    CALCIUM 9 4 08/11/2022    K 4 2 08/11/2022    CO2 32 08/11/2022    CL 94 (L) 08/11/2022    BUN 15 08/11/2022    CREATININE 0 54 (L) 08/11/2022     Lab Results   Component Value Date    IGE 14 6 10/16/2020     Lab Results   Component Value Date    ALT 22 08/11/2022    AST 18 08/11/2022    ALKPHOS 52 08/11/2022           Imaging and other studies: I have personally reviewed pertinent reports  and I have personally reviewed pertinent films in PACS    Chest x-ray 10/29/16:  Somewhat increased interstitial markings bilaterally compared with prior study  The appearance is likely chronic    Chest x-ray 10/20/20: Moderate peripheral reticulation    High-resolution CT scan 11/11/20:  Progressive advanced subpleural reticular and fibronodular opacities throughout both lungs most severe at the lung bases  Areas of interlobular septal thickening most prominent at the lung bases  No honeycombing  Mild central and bibasilar bronchiectasis  No pulmonary groundglass densities  High-resolution CT scan 8/25/22:  Interlobular septal thickening  Peripheral reticular nodular opacities, increased from prior  Peripheral consolidative opacities appear increased from prior  Bronchiectasis and bronchiolectasis appear similar from prior  No convincing evidence for honeycombing  No significant air trapping with expiration        Pulmonary function testing:     Pulmonary function testing with 6 minute walk test 11/11/20:    FEV1/FVC Ratio: 85 %  Forced Vital Capacity: 1 98 L    60 % predicted  FEV1: 1 69 L     70 % predicted     Lung volumes by body plethysmography: Total Lung Capacity 73 % predicted Residual volume 85 % predicted     DLCO corrected for patients hemoglobin level: 39 %     6MWT performed on Room Air - total walk distance 204 meters, initialk SpO2 99%, omari 97%, initial HR 93bpm, maximal HR 97%, Aishwarya Dyspnea Scale 1/10--->3/10    Pulmonary function testing with 6 minutes walk test 4/19/21:    FEV1/FVC Ratio: 78 %  Forced Vital Capacity: 1 94 L    63 % predicted  FEV1: 1 52 L     67 % predicted     Lung volumes by body plethysmography:   Total Lung Capacity 75 % predicted   Residual volume 91 % predicted     DLCO corrected for patients hemoglobin level: 34 %    Spirometry with diffusion capacity 11/1/21:    FEV1/FVC Ratio: 88 %  Forced Vital Capacity: 1 86 L    61 % predicted  FEV1: 1 64 L     72 % predicted     DLCO corrected for patients hemoglobin level: 28 %    6 minute walk test 1/5/22:    Patient walked 378 m over 6 minutes  Resting SpO2 98% on room air and resting heart rate 78  During ambulation, SpO2 ranged from 95-98% and heart rate 91-96  No episodes of desaturation requiring supplemental oxygen  6 minute walk test 7/29/22:    6 minute walk test performed today  Room air saturations at rest are 98%  She ambulated for 6 minutes for a total of 234 m  Phan Lr reached a low of 92%    Pulmonary function testing 8/18/22:    FEV1/FVC Ratio: 92 % (132% predicted)  Forced Vital Capacity: 1 53 L    50 % predicted  FEV1: 1 41 L     63 % predicted  After administration of bronchodilator FEV1: 1 23 (-12%) - only exhaled for 1 5 seconds     Lung volumes by body plethysmography: Total Lung Capacity 57 % predicted Residual volume 69 % predicted  RV/TLC ratio 113%     DLCO corrected for patients hemoglobin level: 16 %    EKG, Pathology, and Other Studies: I have personally reviewed pertinent reports  and I have personally reviewed pertinent films in PACS    Echocardiogram 4/20/21: Ejection fraction 55% with grade 1 diastolic dysfunction  Right ventricular pacing wire present  No findings of pulmonary hypertension      Candelaria Lozano, DO - PGY6  [facility]

## 2022-09-21 ENCOUNTER — OFFICE VISIT (OUTPATIENT)
Dept: PULMONOLOGY | Facility: CLINIC | Age: 87
End: 2022-09-21
Payer: MEDICARE

## 2022-09-21 ENCOUNTER — TELEPHONE (OUTPATIENT)
Dept: PULMONOLOGY | Facility: CLINIC | Age: 87
End: 2022-09-21

## 2022-09-21 VITALS
HEART RATE: 86 BPM | SYSTOLIC BLOOD PRESSURE: 122 MMHG | OXYGEN SATURATION: 95 % | HEIGHT: 66 IN | RESPIRATION RATE: 18 BRPM | DIASTOLIC BLOOD PRESSURE: 66 MMHG | BODY MASS INDEX: 18.16 KG/M2 | WEIGHT: 113 LBS | TEMPERATURE: 96.6 F

## 2022-09-21 DIAGNOSIS — R13.19 ESOPHAGEAL DYSPHAGIA: ICD-10-CM

## 2022-09-21 DIAGNOSIS — E11.42 TYPE 2 DIABETES MELLITUS WITH DIABETIC POLYNEUROPATHY, WITHOUT LONG-TERM CURRENT USE OF INSULIN (HCC): ICD-10-CM

## 2022-09-21 DIAGNOSIS — J84.112 IDIOPATHIC PULMONARY FIBROSIS (HCC): Primary | ICD-10-CM

## 2022-09-21 PROCEDURE — 99214 OFFICE O/P EST MOD 30 MIN: CPT | Performed by: INTERNAL MEDICINE

## 2022-09-21 RX ORDER — SULFAMETHOXAZOLE AND TRIMETHOPRIM 800; 160 MG/1; MG/1
1 TABLET ORAL 3 TIMES WEEKLY
Qty: 36 TABLET | Refills: 0 | Status: SHIPPED | OUTPATIENT
Start: 2022-09-21 | End: 2022-09-21

## 2022-09-21 RX ORDER — PREDNISONE 20 MG/1
TABLET ORAL
Qty: 147 TABLET | Refills: 0 | Status: SHIPPED | OUTPATIENT
Start: 2022-09-22 | End: 2022-09-21

## 2022-09-21 RX ORDER — PREDNISONE 20 MG/1
TABLET ORAL
Qty: 147 TABLET | Refills: 0 | Status: SHIPPED | OUTPATIENT
Start: 2022-09-22 | End: 2022-12-15

## 2022-09-21 RX ORDER — SULFAMETHOXAZOLE AND TRIMETHOPRIM 800; 160 MG/1; MG/1
1 TABLET ORAL 3 TIMES WEEKLY
Qty: 36 TABLET | Refills: 0 | Status: SHIPPED | OUTPATIENT
Start: 2022-09-21 | End: 2022-12-14

## 2022-09-21 NOTE — TELEPHONE ENCOUNTER
Patients pharmacy called, they did not get the script for her Prednisone or BactrimDS due to the online system being down  Can these orders please be faxed to the [facility]      Fax #[phone]

## 2022-09-27 ENCOUNTER — OFFICE VISIT (OUTPATIENT)
Dept: FAMILY MEDICINE CLINIC | Facility: CLINIC | Age: 87
End: 2022-09-27
Payer: MEDICARE

## 2022-09-27 ENCOUNTER — TELEPHONE (OUTPATIENT)
Dept: FAMILY MEDICINE CLINIC | Facility: CLINIC | Age: 87
End: 2022-09-27

## 2022-09-27 VITALS
TEMPERATURE: 96.4 F | OXYGEN SATURATION: 100 % | DIASTOLIC BLOOD PRESSURE: 80 MMHG | HEIGHT: 66 IN | WEIGHT: 113 LBS | BODY MASS INDEX: 18.16 KG/M2 | SYSTOLIC BLOOD PRESSURE: 120 MMHG | RESPIRATION RATE: 16 BRPM | HEART RATE: 84 BPM

## 2022-09-27 DIAGNOSIS — N39.46 MIXED STRESS AND URGE INCONTINENCE: ICD-10-CM

## 2022-09-27 DIAGNOSIS — Z00.00 MEDICARE ANNUAL WELLNESS VISIT, SUBSEQUENT: ICD-10-CM

## 2022-09-27 DIAGNOSIS — E11.42 TYPE 2 DIABETES MELLITUS WITH DIABETIC POLYNEUROPATHY, WITHOUT LONG-TERM CURRENT USE OF INSULIN (HCC): Primary | ICD-10-CM

## 2022-09-27 DIAGNOSIS — K21.9 GASTROESOPHAGEAL REFLUX DISEASE, UNSPECIFIED WHETHER ESOPHAGITIS PRESENT: ICD-10-CM

## 2022-09-27 DIAGNOSIS — K59.00 CONSTIPATION, UNSPECIFIED CONSTIPATION TYPE: ICD-10-CM

## 2022-09-27 DIAGNOSIS — J84.112 IDIOPATHIC PULMONARY FIBROSIS (HCC): ICD-10-CM

## 2022-09-27 DIAGNOSIS — R13.19 ESOPHAGEAL DYSPHAGIA: ICD-10-CM

## 2022-09-27 PROCEDURE — 99214 OFFICE O/P EST MOD 30 MIN: CPT | Performed by: NURSE PRACTITIONER

## 2022-09-27 PROCEDURE — G0439 PPPS, SUBSEQ VISIT: HCPCS | Performed by: NURSE PRACTITIONER

## 2022-09-27 RX ORDER — INSULIN LISPRO 100 [IU]/ML
4 INJECTION, SOLUTION INTRAVENOUS; SUBCUTANEOUS
Qty: 9 ML | Refills: 0 | Status: SHIPPED | OUTPATIENT
Start: 2022-09-27

## 2022-09-27 NOTE — PATIENT INSTRUCTIONS
Blood sugars:  Start Humalog insulin 4 units before meals three times daily  Check blood sugars 4 times per day, fasting and prior to meals  Call me for sugars <110 and >250  I will have our clinical pharmacist, Jose Francisco Antonio, contact you as well regarding review of insulin use and monitoring of blood glucoses  Call me with you blood sugar log at the end of the week  Constipation:  Continue Motegrity  Start Metamucil 2 tablespoons daily, sugar free  If this is not effective try miralax 1/2 capful daily  Swallowing: Follow up with Dr Ronal Ashford Preventive Visit Patient Instructions  Thank you for completing your Welcome to Medicare Visit or Medicare Annual Wellness Visit today  Your next wellness visit will be due in one year (9/28/2023)  The screening/preventive services that you may require over the next 5-10 years are detailed below  Some tests may not apply to you based off risk factors and/or age  Screening tests ordered at today's visit but not completed yet may show as past due  Also, please note that scanned in results may not display below  Preventive Screenings:  Service Recommendations Previous Testing/Comments   Colorectal Cancer Screening  * Colonoscopy    * Fecal Occult Blood Test (FOBT)/Fecal Immunochemical Test (FIT)  * Fecal DNA/Cologuard Test  * Flexible Sigmoidoscopy Age: 39-70 years old   Colonoscopy: every 10 years (may be performed more frequently if at higher risk)  OR  FOBT/FIT: every 1 year  OR  Cologuard: every 3 years  OR  Sigmoidoscopy: every 5 years  Screening may be recommended earlier than age 39 if at higher risk for colorectal cancer  Also, an individualized decision between you and your healthcare provider will decide whether screening between the ages of 74-80 would be appropriate   Colonoscopy: 09/02/2020  FOBT/FIT: Not on file  Cologuard: Not on file  Sigmoidoscopy: Not on file    Screening Current     Breast Cancer Screening Age: 36 years old  Frequency: every 1-2 years  Not required if history of left and right mastectomy Mammogram: 04/15/2014        Cervical Cancer Screening Between the ages of 21-29, pap smear recommended once every 3 years  Between the ages of 33-67, can perform pap smear with HPV co-testing every 5 years  Recommendations may differ for women with a history of total hysterectomy, cervical cancer, or abnormal pap smears in past  Pap Smear: Not on file    Screening Not Indicated   Hepatitis C Screening Once for adults born between 1945 and 1965  More frequently in patients at high risk for Hepatitis C Hep C Antibody: 12/07/2020    Screening Current   Diabetes Screening 1-2 times per year if you're at risk for diabetes or have pre-diabetes Fasting glucose: 189 mg/dL (8/11/2022)  A1C: 7 5 % (8/11/2022)  Screening Not Indicated  History Diabetes   Cholesterol Screening Once every 5 years if you don't have a lipid disorder  May order more often based on risk factors  Lipid panel: 08/11/2022    Screening Not Indicated  History Lipid Disorder     Other Preventive Screenings Covered by Medicare:  Abdominal Aortic Aneurysm (AAA) Screening: covered once if your at risk  You're considered to be at risk if you have a family history of AAA  Lung Cancer Screening: covers low dose CT scan once per year if you meet all of the following conditions: (1) Age 50-69; (2) No signs or symptoms of lung cancer; (3) Current smoker or have quit smoking within the last 15 years; (4) You have a tobacco smoking history of at least 20 pack years (packs per day multiplied by number of years you smoked); (5) You get a written order from a healthcare provider    Glaucoma Screening: covered annually if you're considered high risk: (1) You have diabetes OR (2) Family history of glaucoma OR (3)  aged 48 and older OR (3)  American aged 72 and older  Osteoporosis Screening: covered every 2 years if you meet one of the following conditions: (1) You're estrogen deficient and at risk for osteoporosis based off medical history and other findings; (2) Have a vertebral abnormality; (3) On glucocorticoid therapy for more than 3 months; (4) Have primary hyperparathyroidism; (5) On osteoporosis medications and need to assess response to drug therapy  Last bone density test (DXA Scan): 09/01/2020  HIV Screening: covered annually if you're between the age of 12-76  Also covered annually if you are younger than 13 and older than 72 with risk factors for HIV infection  For pregnant patients, it is covered up to 3 times per pregnancy  Immunizations:  Immunization Recommendations   Influenza Vaccine Annual influenza vaccination during flu season is recommended for all persons aged >= 6 months who do not have contraindications   Pneumococcal Vaccine   * Pneumococcal conjugate vaccine = PCV13 (Prevnar 13), PCV15 (Vaxneuvance), PCV20 (Prevnar 20)  * Pneumococcal polysaccharide vaccine = PPSV23 (Pneumovax) Adults 25-60 years old: 1-3 doses may be recommended based on certain risk factors  Adults 72 years old: 1-2 doses may be recommended based off what pneumonia vaccine you previously received   Hepatitis B Vaccine 3 dose series if at intermediate or high risk (ex: diabetes, end stage renal disease, liver disease)   Tetanus (Td) Vaccine - COST NOT COVERED BY MEDICARE PART B Following completion of primary series, a booster dose should be given every 10 years to maintain immunity against tetanus  Td may also be given as tetanus wound prophylaxis  Tdap Vaccine - COST NOT COVERED BY MEDICARE PART B Recommended at least once for all adults  For pregnant patients, recommended with each pregnancy     Shingles Vaccine (Shingrix) - COST NOT COVERED BY MEDICARE PART B  2 shot series recommended in those aged 48 and above     Health Maintenance Due:      Topic Date Due    Colorectal Cancer Screening  09/02/2025    Hepatitis C Screening  Completed     Immunizations Due:      Topic Date Due    Influenza Vaccine (1) 09/01/2022     Advance Directives   What are advance directives? Advance directives are legal documents that state your wishes and plans for medical care  These plans are made ahead of time in case you lose your ability to make decisions for yourself  Advance directives can apply to any medical decision, such as the treatments you want, and if you want to donate organs  What are the types of advance directives? There are many types of advance directives, and each state has rules about how to use them  You may choose a combination of any of the following:  Living will: This is a written record of the treatment you want  You can also choose which treatments you do not want, which to limit, and which to stop at a certain time  This includes surgery, medicine, IV fluid, and tube feedings  [facility] power of  for healthcare [facility]): This is a written record that states who you want to make healthcare choices for you when you are unable to make them for yourself  This person, called a proxy, is usually a family member or a friend  You may choose more than 1 proxy  Do not resuscitate (DNR) order:  A DNR order is used in case your heart stops beating or you stop breathing  It is a request not to have certain forms of treatment, such as CPR  A DNR order may be included in other types of advance directives  Medical directive: This covers the care that you want if you are in a coma, near death, or unable to make decisions for yourself  You can list the treatments you want for each condition  Treatment may include pain medicine, surgery, blood transfusions, dialysis, IV or tube feedings, and a ventilator (breathing machine)  Values history: This document has questions about your views, beliefs, and how you feel and think about life  This information can help others choose the care that you would choose  Why are advance directives important?   An advance directive helps you control your care  Although spoken wishes may be used, it is better to have your wishes written down  Spoken wishes can be misunderstood, or not followed  Treatments may be given even if you do not want them  An advance directive may make it easier for your family to make difficult choices about your care  Urinary Incontinence   Urinary incontinence (UI)  is when you lose control of your bladder  UI develops because your bladder cannot store or empty urine properly  The 3 most common types of UI are stress incontinence, urge incontinence, or both  Medicines:   May be given to help strengthen your bladder control  Report any side effects of medication to your healthcare provider  Do pelvic muscle exercises often:  Your pelvic muscles help you stop urinating  Squeeze these muscles tight for 5 seconds, then relax for 5 seconds  Gradually work up to squeezing for 10 seconds  Do 3 sets of 15 repetitions a day, or as directed  This will help strengthen your pelvic muscles and improve bladder control  Train your bladder:  Go to the bathroom at set times, such as every 2 hours, even if you do not feel the urge to go  You can also try to hold your urine when you feel the urge to go  For example, hold your urine for 5 minutes when you feel the urge to go  As that becomes easier, hold your urine for 10 minutes  Self-care:   Keep a UI record  Write down how often you leak urine and how much you leak  Make a note of what you were doing when you leaked urine  Drink liquids as directed  You may need to limit the amount of liquid you drink to help control your urine leakage  Do not drink any liquid right before you go to bed  Limit or do not have drinks that contain caffeine or alcohol  Prevent constipation  Eat a variety of high-fiber foods  Good examples are high-fiber cereals, beans, vegetables, and whole-grain breads  Walking is the best way to trigger your intestines to have a bowel movement    Exercise regularly and maintain a healthy weight  Weight loss and exercise will decrease pressure on your bladder and help you control your leakage  Use a catheter as directed  to help empty your bladder  A catheter is a tiny, plastic tube that is put into your bladder to drain your urine  Go to behavior therapy as directed  Behavior therapy may be used to help you learn to control your urge to urinate  Underweight  Underweight is defined as having a body mass index (BMI) of less than 18 5 kg/m2   Anorexia  is a loss of appetite, decreased food intake, or both  Your appetite naturally decreases as you get older  You also get full faster than you used to  This occurs because your body needs less energy  Other body changes can also lead to a decreased appetite  Even though some appetite loss is normal, you still need to get enough calories and nutrients to keep you healthy  You can start to lose too much weight if you do not eat as much food as your body needs  Unwanted weight loss can cause health problems, or worsen health problems you already have  You can also become dehydrated if you do not drink enough liquid  How to eat healthy and get enough nutrients:   Choose healthy foods  Eat a variety of fruits, vegetables, whole grains, low-fat dairy foods, lean meats, and other protein foods  Limit foods high in fat, sugar, and salt  Limit or avoid alcohol as directed  Work with a dietitian to help you plan your meals if you need to follow a special diet  A dietitian can also teach you how to modify foods if you have trouble chewing or swallowing  Snack on healthy foods between meals  if you only eat a small amount during meals  Snacks provide extra healthy nutrients and calories between meals  Examples include fruit, cheese, and whole grain crackers  Drink liquids as directed  to avoid dehydration  Drink liquids between meals if they cause you to get full too quickly during meals   Ask how much liquid to drink each day and which liquids are best for you  Use herbs, spices, and flavor enhancers to add flavor to foods  Avoid using herbs and spice blends that also contain sodium  Ask your healthcare provider or dietitian about flavor enhancers  Flavor enhancers with ham, natural romero, and roast beef flavors can also be sprinkled on food to add flavor  Share meals with others as often as you can  Eating with others may help you to eat better during meal time  Ask family members, neighbors, or friends to join you for lunch  There are also senior centers where you can meet people, and share meals with them  Ask family and friends for help  with shopping or preparing foods  Ask for a ride to the grocery store, if needed  © Copyright [employer] 2018 Information is for End User's use only and may not be sold, redistributed or otherwise used for commercial purposes   All illustrations and images included in CareNotes® are the copyrighted property of [employer]  or [address] [place] [address]

## 2022-09-27 NOTE — PROGRESS NOTES
Assessment and Plan:     Problem List Items Addressed This Visit        Digestive    GERD (gastroesophageal reflux disease)    Relevant Medications    pantoprazole (PROTONIX) 40 mg tablet    Dysphagia       Endocrine    Type 2 diabetes mellitus with diabetic polyneuropathy, without long-term current use of insulin (HCC) - Primary    Relevant Medications    insulin lispro (HumaLOG KwikPen) 100 units/mL injection pen    Other Relevant Orders    Ambulatory referral to clinical pharmacy       Respiratory    Idiopathic pulmonary fibrosis ([facility] )       Other    Constipation      Other Visit Diagnoses     Medicare annual wellness visit, subsequent        Mixed stress and urge incontinence               Preventive health issues were discussed with patient, and age appropriate screening tests were ordered as noted in patient's After Visit Summary  Personalized health advice and appropriate referrals for health education or preventive services given if needed, as noted in patient's After Visit Summary       History of Present Illness:     Patient presents for a Medicare Wellness Visit    HPI   Patient Care Team:  ZANDER Cortez as PCP - General (Family Medicine)  Ashley Husain MD as PCP - Endocrinology (Endocrinology)  MD Evonne Costa MD (Ophthalmology)  Robert Farley MD (Ophthalmology)     Review of Systems:     Review of Systems     Problem List:     Patient Active Problem List   Diagnosis    HTN (hypertension)    HLD (hyperlipidemia)    Glaucoma    Asymptomatic carotid artery stenosis, left    Symptomatic PVCs    Occlusion of right carotid artery    Paroxysmal atrial fibrillation ([address] )    Pacemaker    Osteoporosis    GERD (gastroesophageal reflux disease)    Anxiety    Iron deficiency    Interstitial lung disease ([address] )    Dysphagia    Moderate protein-calorie malnutrition ([address] )    Constipation    Hyponatremia    Post-nasal drip    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, without long-term current use of insulin (HCC)    Idiopathic pulmonary fibrosis (HCC)    Type 2 diabetes mellitus with hyperlipidemia (HCC)    Type 2 diabetes mellitus with diabetic polyneuropathy, without long-term current use of insulin (HCC)    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, without long-term current use of insulin (HCC)    Depression, recurrent ([facility] )    Hypovitaminosis D      Past Medical and Surgical History:     Past Medical History:   Diagnosis Date    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/7/2020    Glaucoma     H/O degenerative disc disease     Idiopathic pulmonary fibrosis ([facility] ) 11/2020    Irregular heart beat     afib    Peripheral neuropathy     S/P laparoscopic cholecystectomy 12/21/2020    Stenosis of right subclavian artery ([facility] ) 11/18/2016     Past Surgical History:   Procedure Laterality Date    CATARACT EXTRACTION, BILATERAL  2011    CHOLECYSTECTOMY      CHOLECYSTECTOMY LAPAROSCOPIC N/A 12/9/2020    Procedure: CHOLECYSTECTOMY LAPAROSCOPIC;  Surgeon: Haroon Fernández MD;  [facility];  Service: General    COLONOSCOPY      CYST REMOVAL  2009    left lower Quadrant    [address]    LIPOMA RESECTION  2010    CA REPAIR ING HERNIA,5+Y/O,REDUCIBL Bilateral 1/5/2018    Procedure: INGUINAL HERNIA REPAIR;  Surgeon: Perlita Demarco MD;  [facility];  Service: General    SHOULDER SURGERY  04/26/2019    Dr Samira Cobian [facility]-  R carotid occlusion      Family History:     Family History   Problem Relation Age of Onset    Heart disease Mother     Heart attack Father     Hiatal hernia Father     Diabetes Father     Cancer Brother     Diabetes Brother     Heart disease Brother     Hypertension Brother     Other Son         gastroparesis      Social History:     Social History     Socioeconomic History    Marital status:  Spouse name: None    Number of children: None    Years of education: None    Highest education level: None   Occupational History    Occupation: customer service   retired   Tobacco Use    Smoking status: Former Smoker     Packs/day: 1 50     Years: 38 00     Pack years: 57 00     Types: Cigarettes     Start date:      Quit date:      Years since quittin 7    Smokeless tobacco: Never Used   Vaping Use    Vaping Use: Never used   Substance and Sexual Activity    Alcohol use: No    Drug use: No    Sexual activity: None   Other Topics Concern    None   Social History Narrative    L[place]    2 children     Social Determinants of Health     Financial Resource Strain: Low Risk     Difficulty of Paying Living Expenses: Not hard at all   Food Insecurity: Not on file   Transportation Needs: No Transportation Needs    Lack of Transportation (Medical): No    Lack of Transportation (Non-Medical):  No   Physical Activity: Not on file   Stress: Not on file   Social Connections: Not on file   Intimate Partner Violence: Not on file   Housing Stability: Not on file      Medications and Allergies:     Current Outpatient Medications   Medication Sig Dispense Refill    acetaminophen (TYLENOL) 500 mg tablet Take 1,000 mg by mouth as needed for mild pain      Alphagan P 0 1 % INSTILL 1 DROP RIGHT EYE TWICE A DAY      aspirin (ECOTRIN LOW STRENGTH) 81 mg EC tablet Take 1 tablet (81 mg total) by mouth daily      azelastine (ASTELIN) 0 1 % nasal spray 1 SPRAY INTO EACH NOSTRIL 2 (TWO) TIMES A DAY USE IN EACH NOSTRIL AS DIRECTED 30 mL 1    bimatoprost (LUMIGAN) 0 01 % ophthalmic drops Administer 1 drop to both eyes daily at bedtime for 30 days 1 5 mL 0    calcium carbonate (OS-DANIA) 600 MG tablet Take 600 mg by mouth 2 (two) times a day with meals        carboxymethylcellulose 1 % ophthalmic solution 1 drop 3 (three) times a day      Eliquis 2 5 MG TAKE 1 TABLET (2 5 MG TOTAL) BY MOUTH 2 (TWO) TIMES A DAY 60 tablet 5    ezetimibe-simvastatin (VYTORIN) 10-40 mg per tablet TAKE 1 TABLET BY MOUTH DAILY AT BEDTIME 30 tablet 5    famotidine (PEPCID) 20 mg tablet TAKE 1 TABLET (20 MG TOTAL) BY MOUTH 2 (TWO) TIMES A DAY 60 tablet 5    glucose blood (OneTouch Ultra) test strip TEST FOUR TIMES A  strip 1    insulin lispro (HumaLOG KwikPen) 100 units/mL injection pen Inject 4 Units under the skin 3 (three) times a day with meals 9 mL 0    ipratropium (ATROVENT) 0 03 % nasal spray USE 2 SPRAYS INTO EACH NOSTRIL EVERY 12 (TWELVE) HOURS 30 mL 6    Januvia 100 MG tablet TAKE 1 TABLET (100 MG TOTAL) BY MOUTH DAILY 30 tablet 5    Lancets (ONETOUCH DELICA PLUS [id number]) MISC Use one lancet to test blood 4 times a day 400 each 1    loratadine (CLARITIN) 10 mg tablet Take 10 mg by mouth daily as needed for allergies      LORazepam (ATIVAN) 0 5 mg tablet TAKE 2 TABLETS (1 MG TOTAL) BY MOUTH DAILY AT BEDTIME 60 tablet 2    metFORMIN (GLUCOPHAGE) 500 mg tablet TAKE 2 TABLETS (1,000 MG TOTAL) BY MOUTH 2 (TWO) TIMES A DAY WITH MEALS 120 tablet 5    metoprolol tartrate (LOPRESSOR) 50 mg tablet TAKE 1 5 TABLETS (75 MG TOTAL) BY MOUTH EVERY 12 (TWELVE) HOURS (Patient taking differently: Take 25 mg by mouth every 12 (twelve) hours) 90 tablet 5    Multiple Vitamin (multivitamin) tablet Take 1 tablet by mouth daily        pantoprazole (PROTONIX) 40 mg tablet Take 1 tablet (40 mg total) by mouth daily 90 tablet 1    peppermint oil Use once as needed      predniSONE 20 mg tablet Take 3 tablets (60 mg total) by mouth daily for 14 days, THEN 2 5 tablets (50 mg total) daily for 14 days, THEN 2 tablets (40 mg total) daily for 14 days, THEN 1 5 tablets (30 mg total) daily for 14 days, THEN 1 tablet (20 mg total) daily for 14 days, THEN 0 5 tablets (10 mg total) daily for 14 days   147 tablet 0    Prucalopride Succinate (Motegrity) 2 MG TABS Take 2 mg by mouth daily 90 tablet 3    QC Pen Needles 31G X 6 MM MISC USE 4 (FOUR) TIMES A DAY (BEFORE MEALS AND AT BEDTIME) 100 each 3    sodium chloride 1 g tablet TAKE 1 TABLET (1 G TOTAL) BY MOUTH 3 (THREE) TIMES A DAY 90 tablet 5    sulfamethoxazole-trimethoprim (BACTRIM DS) 800-160 mg per tablet Take 1 tablet by mouth 3 (three) times a week 36 tablet 0    amoxicillin (AMOXIL) 500 mg capsule Prophylactic, prior to dentist (Patient not taking: No sig reported)      fluticasone (FLONASE) 50 mcg/act nasal spray 2 SPRAYS INTO EACH NOSTRIL DAILY (Patient not taking: Reported on 9/27/2022) 16 g 0     No current facility-administered medications for this visit  Allergies   Allergen Reactions    Keflex [Cephalexin] Diarrhea      Immunizations:     Immunization History   Administered Date(s) Administered    COVID-19 PFIZER VACCINE 0 3 ML IM 01/22/2021, 02/12/2021, 09/30/2021, 09/08/2022    INFLUENZA 11/02/2016, 10/16/2017, 09/18/2018, 09/18/2018    Influenza, Quadrivalent (nasal) 11/02/2016    Influenza, high dose seasonal 0 7 mL 11/06/2019, 10/06/2020, 11/09/2021    Pneumococcal Conjugate 13-Valent 07/25/2019    Pneumococcal Polysaccharide PPV23 03/17/2003    Zoster 07/09/2010      Health Maintenance:         Topic Date Due    Colorectal Cancer Screening  09/02/2025    Hepatitis C Screening  Completed         Topic Date Due    Influenza Vaccine (1) 09/01/2022      Medicare Screening Tests and Risk Assessments:     Armando Campos is here for her Subsequent Wellness visit  Health Risk Assessment:   Patient rates overall health as poor  Patient feels that their physical health rating is much worse  Patient is satisfied with their life  Eyesight was rated as same  Hearing was rated as same  Patient feels that their emotional and mental health rating is same  Patients states they are never, rarely angry  Patient states they are always unusually tired/fatigued  Pain experienced in the last 7 days has been none   Patient states that she has experienced no weight loss or gain in last 6 months  Depression Screening:   PHQ-9 Score: 3      Fall Risk Screening: In the past year, patient has experienced: no history of falling in past year      Urinary Incontinence Screening:   Patient has leaked urine accidently in the last six months  Home Safety:  Patient does not have trouble with stairs inside or outside of their home  Patient has working smoke alarms and has working carbon monoxide detector  Home safety hazards include: none  Nutrition:   Current diet is Regular  Medications:   Patient is currently taking over-the-counter supplements  OTC medications include: see medication list  Patient is able to manage medications  Activities of Daily Living (ADLs)/Instrumental Activities of Daily Living (IADLs):   Walk and transfer into and out of bed and chair?: Yes  Dress and groom yourself?: Yes    Bathe or shower yourself?: Yes    Feed yourself? Yes  Do your laundry/housekeeping?: Yes  Manage your money, pay your bills and track your expenses?: Yes  Make your own meals?: Yes    Do your own shopping?: Yes    Previous Hospitalizations:   Any hospitalizations or ED visits within the last 12 months?: Yes    How many hospitalizations have you had in the last year?: 1-2    Advance Care Planning:   Living will: Yes    Durable POA for healthcare:  Yes    Advanced directive: Yes      Cognitive Screening:   Provider or family/friend/caregiver concerned regarding cognition?: No    PREVENTIVE SCREENINGS      Cardiovascular Screening:    General: Screening Not Indicated and History Lipid Disorder      Diabetes Screening:     General: Screening Not Indicated and History Diabetes      Colorectal Cancer Screening:     General: Screening Current      Breast Cancer Screening:     General: Patient Declines      Cervical Cancer Screening:    General: Screening Not Indicated      Osteoporosis Screening:    General: Screening Not Indicated and History Osteoporosis      Abdominal Aortic Aneurysm (AAA) Screening:        General: Screening Not Indicated      Lung Cancer Screening:     General: Screening Not Indicated      Hepatitis C Screening:    General: Screening Current    Screening, Brief Intervention, and Referral to Treatment (SBIRT)    Screening  Typical number of drinks in a day: 0  Typical number of drinks in a week: 0  Interpretation: Low risk drinking behavior  Single Item Drug Screening:  How often have you used an illegal drug (including marijuana) or a prescription medication for non-medical reasons in the past year? never    Single Item Drug Screen Score: 0  Interpretation: Negative screen for possible drug use disorder    Brief Intervention  Alcohol & drug use screenings were reviewed  No concerns regarding substance use disorder identified       No exam data present     Physical Exam:     /80   Pulse 84   Temp (!) 96 4 °F (35 8 °C) (Temporal)   Resp 16   Ht 5' 6" (1 676 m)   Wt 51 3 kg (113 lb)   SpO2 100%   BMI 18 24 kg/m²     Physical Exam     ZANDER Mcdaniel

## 2022-09-27 NOTE — PROGRESS NOTES
FAMILY PRACTICE OFFICE VISIT       NAME: Karen Dumas  AGE: 82 y o  SEX: female       : 1935        MRN: [id number]    Assessment and Plan   1  Type 2 diabetes mellitus with diabetic polyneuropathy, without long-term current use of insulin ([facility])  -     insulin lispro (HumaLOG KwikPen) 100 units/mL injection pen; Inject 4 Units under the skin 3 (three) times a day with meals  -     Ambulatory referral to clinical pharmacy; Future    2  Idiopathic pulmonary fibrosis ([facility] )  Comments: Following with pulmonology, recently started on prolonged prednisone taper rate currently at 60 mg daily and decreasing by 10 mg every 3 weeks  3  Esophageal dysphagia    4  Gastroesophageal reflux disease, unspecified whether esophagitis present  -     pantoprazole (PROTONIX) 40 mg tablet; Take 1 tablet (40 mg total) by mouth daily    5  Constipation, unspecified constipation type    6  Medicare annual wellness visit, subsequent    7  Mixed stress and urge incontinence     I printed AVS and reviewed the following printed instructions with her:    Blood sugars:  Start Humalog insulin 4 units before meals three times daily  Check blood sugars 4 times per day, fasting and prior to meals, bedtime  Call me for sugars <110 and >250  I will have our clinical pharmacist, Delma Castillo, contact you as well regarding review of insulin use and monitoring of blood glucoses  Call me with you blood sugar log at the end of the week  Constipation:  Continue Motegrity  Start Metamucil 2 tablespoons daily, sugar free  If this is not effective try miralax 1/2 capful daily  Swallowing: Follow up with Dr Eluterio Dancer            Chief Complaint     Chief Complaint   Patient presents with    Follow-up     Pt is here for pulmonology f/u    Medicare Wellness Visit       History of Present Illness     Karen Dumas is an 82-year-old female presenting today for diabetes        Recently started on long taper of prednisone for idiopathic pulmonary fibrosis  She was advised to follow-up with PCP for blood sugar management on prednisone  Started prednisone 2022  Currently taking 60 mg daily, dose is decreasing by 10 mg every 3 weeks  Taking Bactrim DS 3 times per week  Having difficulty swallowing the pill, even half a tablet is difficult for her to swallow  She has recently started dissolving the tablet in water and drinking it  I will reach out to clinical pharmacist, to make sure this is an acceptable way to take this medication  Sugar lo/22 9 pm 225   230 pm 221 5:30 pm 246       830 am 150  7 pm 332       0845 175       4:30 pm 361    Understands she should check sugars 4 times daily, before meals and before bed  However notes she is struggling a little bit to keep up with this, in addition to all of her medications  Prednisone, less napping  Little more energy  Some days still very tired, others has more energy  Breathing is little bit better  No low sugars  Gets shaky when sugar is high  In the past on basal insulin was experiencing hypoglycemia with even very low dose  Weight is stable  This week dry mouth  Swallowing--still hard  Ears clog up  And hearing diminished comes and goes  Always has PND  Swallowing worse  ? From Prednisone exacerbating GERD  Feels Pepcid is working pretty good  Taking Motegrity daily  Try add metamucil or miralax  Still experiencing constipation  Sedentary  Review of Systems   Review of Systems   Constitutional: Positive for fatigue  Negative for chills, diaphoresis and fever  HENT: Positive for postnasal drip and trouble swallowing  Respiratory: Positive for cough  Cardiovascular: Negative  Gastrointestinal: Positive for abdominal distention and constipation         I have reviewed the patient's medical history in detail; there are no changes to the history as noted in the electronic medical record  Objective     Vitals:    09/27/22 1029   BP: 120/80   Pulse: 84   Resp: 16   Temp: (!) 96 4 °F (35 8 °C)   TempSrc: Temporal   SpO2: 100%   Weight: 51 3 kg (113 lb)   Height: 5' 6" (1 676 m)     Wt Readings from Last 3 Encounters:   09/27/22 51 3 kg (113 lb)   09/21/22 51 3 kg (113 lb)   08/23/22 51 3 kg (113 lb)     Physical Exam  Vitals and nursing note reviewed  Constitutional:       Appearance: She is ill-appearing (Chronically ill appearing, frail)  HENT:      Head: Atraumatic  Cardiovascular:      Rate and Rhythm: Normal rate and regular rhythm  Heart sounds: No murmur heard  Pulmonary:      Effort: Pulmonary effort is normal  No respiratory distress  Breath sounds: Normal breath sounds  No wheezing or rales  Musculoskeletal:      Right lower leg: No edema  Left lower leg: No edema  Skin:     Findings: No rash  Neurological:      Mental Status: She is alert  Psychiatric:         Mood and Affect: Mood normal          Urinary Incontinence Plan of Care: counseling topics discussed: preventing constipation           ALLERGIES:  Allergies   Allergen Reactions    Keflex [Cephalexin] Diarrhea       Current Medications     Current Outpatient Medications   Medication Sig Dispense Refill    acetaminophen (TYLENOL) 500 mg tablet Take 1,000 mg by mouth as needed for mild pain      Alphagan P 0 1 % INSTILL 1 DROP RIGHT EYE TWICE A DAY      aspirin (ECOTRIN LOW STRENGTH) 81 mg EC tablet Take 1 tablet (81 mg total) by mouth daily      azelastine (ASTELIN) 0 1 % nasal spray 1 SPRAY INTO EACH NOSTRIL 2 (TWO) TIMES A DAY USE IN EACH NOSTRIL AS DIRECTED 30 mL 1    bimatoprost (LUMIGAN) 0 01 % ophthalmic drops Administer 1 drop to both eyes daily at bedtime for 30 days 1 5 mL 0    calcium carbonate (OS-DANIA) 600 MG tablet Take 600 mg by mouth 2 (two) times a day with meals        carboxymethylcellulose 1 % ophthalmic solution 1 drop 3 (three) times a day      Eliquis 2 5 MG TAKE 1 TABLET (2 5 MG TOTAL) BY MOUTH 2 (TWO) TIMES A DAY 60 tablet 5    ezetimibe-simvastatin (VYTORIN) 10-40 mg per tablet TAKE 1 TABLET BY MOUTH DAILY AT BEDTIME 30 tablet 5    famotidine (PEPCID) 20 mg tablet TAKE 1 TABLET (20 MG TOTAL) BY MOUTH 2 (TWO) TIMES A DAY 60 tablet 5    glucose blood (OneTouch Ultra) test strip TEST FOUR TIMES A  strip 1    insulin lispro (HumaLOG KwikPen) 100 units/mL injection pen Inject 4 Units under the skin 3 (three) times a day with meals 9 mL 0    ipratropium (ATROVENT) 0 03 % nasal spray USE 2 SPRAYS INTO EACH NOSTRIL EVERY 12 (TWELVE) HOURS 30 mL 6    Januvia 100 MG tablet TAKE 1 TABLET (100 MG TOTAL) BY MOUTH DAILY 30 tablet 5    Lancets (ONETOUCH DELICA PLUS [id number]) MISC Use one lancet to test blood 4 times a day 400 each 1    loratadine (CLARITIN) 10 mg tablet Take 10 mg by mouth daily as needed for allergies      LORazepam (ATIVAN) 0 5 mg tablet TAKE 2 TABLETS (1 MG TOTAL) BY MOUTH DAILY AT BEDTIME 60 tablet 2    metFORMIN (GLUCOPHAGE) 500 mg tablet TAKE 2 TABLETS (1,000 MG TOTAL) BY MOUTH 2 (TWO) TIMES A DAY WITH MEALS 120 tablet 5    metoprolol tartrate (LOPRESSOR) 50 mg tablet TAKE 1 5 TABLETS (75 MG TOTAL) BY MOUTH EVERY 12 (TWELVE) HOURS (Patient taking differently: Take 25 mg by mouth every 12 (twelve) hours) 90 tablet 5    Multiple Vitamin (multivitamin) tablet Take 1 tablet by mouth daily        pantoprazole (PROTONIX) 40 mg tablet Take 1 tablet (40 mg total) by mouth daily 90 tablet 1    peppermint oil Use once as needed      predniSONE 20 mg tablet Take 3 tablets (60 mg total) by mouth daily for 14 days, THEN 2 5 tablets (50 mg total) daily for 14 days, THEN 2 tablets (40 mg total) daily for 14 days, THEN 1 5 tablets (30 mg total) daily for 14 days, THEN 1 tablet (20 mg total) daily for 14 days, THEN 0 5 tablets (10 mg total) daily for 14 days   147 tablet 0    Prucalopride Succinate (Motegrity) 2 MG TABS Take 2 mg by mouth daily 90 tablet 3    QC Pen Needles 31G X 6 MM MISC USE 4 (FOUR) TIMES A DAY (BEFORE MEALS AND AT BEDTIME) 100 each 3    sodium chloride 1 g tablet TAKE 1 TABLET (1 G TOTAL) BY MOUTH 3 (THREE) TIMES A DAY 90 tablet 5    sulfamethoxazole-trimethoprim (BACTRIM DS) 800-160 mg per tablet Take 1 tablet by mouth 3 (three) times a week 36 tablet 0    amoxicillin (AMOXIL) 500 mg capsule Prophylactic, prior to dentist (Patient not taking: No sig reported)      fluticasone (FLONASE) 50 mcg/act nasal spray 2 SPRAYS INTO EACH NOSTRIL DAILY (Patient not taking: Reported on 9/27/2022) 16 g 0     No current facility-administered medications for this visit           Health Maintenance     Health Maintenance   Topic Date Due    DM Eye Exam  07/15/2022    Influenza Vaccine (1) 09/01/2022    Diabetic Foot Exam  01/20/2023    HEMOGLOBIN A1C  02/11/2023    Fall Risk  09/27/2023    Urinary Incontinence Screening  09/27/2023    Medicare Annual Wellness Visit (AWV)  09/27/2023    BMI: Adult  09/27/2023    Depression Remission PHQ  09/27/2023    BMI: Followup Plan  09/30/2023    Colorectal Cancer Screening  09/02/2025    Hepatitis C Screening  Completed    Osteoporosis Screening  Completed    Pneumococcal Vaccine: 65+ Years  Completed    COVID-19 Vaccine  Completed    HIB Vaccine  Aged Out    Hepatitis B Vaccine  Aged Out    IPV Vaccine  Aged Out    Hepatitis A Vaccine  Aged Out    Meningococcal ACWY Vaccine  Aged Out    HPV Vaccine  Aged Out     Immunization History   Administered Date(s) Administered    COVID-19 PFIZER VACCINE 0 3 ML IM 01/22/2021, 02/12/2021, 09/30/2021, 09/08/2022    INFLUENZA 11/02/2016, 10/16/2017, 09/18/2018, 09/18/2018    Influenza, Quadrivalent (nasal) 11/02/2016    Influenza, high dose seasonal 0 7 mL 11/06/2019, 10/06/2020, 11/09/2021    Pneumococcal Conjugate 13-Valent 07/25/2019    Pneumococcal Polysaccharide PPV23 03/17/2003    Zoster 07/09/2010       Kimberly Barrientos

## 2022-09-27 NOTE — TELEPHONE ENCOUNTER
Please let patient know, as per our clinical pharmacist, it is safe to dissolve the Bactrim in water and drink it, as you have been doing  Just don't mix Bactrim with dairy products            ----- Message from Trinidad Ortiz, Pharmacist sent at 9/27/2022 12:39 PM EDT -----  Yes, she can cut, split, and crush the tablets to ease administration and mix with food or water  Avoid taking with any dairy products    ----- Message -----  From: Alec Raomn  Sent: 9/27/2022  11:26 AM EDT  To: Trinidad Ortiz, Pharmacist    Tre Solares,     This patient has been prescribed bactrim by pulmonology  She is having difficulty swallowing the pill  Is it safe to dissolve Bactrim in water and drink it?     Kimberly

## 2022-09-28 ENCOUNTER — TELEPHONE (OUTPATIENT)
Dept: FAMILY MEDICINE CLINIC | Facility: CLINIC | Age: 87
End: 2022-09-28

## 2022-09-28 NOTE — TELEPHONE ENCOUNTER
[address] [facility]  Loli Vázquez, PharmD, BCPS, BCACP     Reason for Outreach: Patient referred to clinical pharmacist by ZANDER Moore for disease management  First outreach attempt to patient to introduce services and schedule an appointment  Patient unavailable at time of call  Left voicemail with contact information for return call   Will follow-up

## 2022-09-29 ENCOUNTER — TELEPHONE (OUTPATIENT)
Dept: FAMILY MEDICINE CLINIC | Facility: CLINIC | Age: 87
End: 2022-09-29

## 2022-09-29 NOTE — TELEPHONE ENCOUNTER
Pt called stating that her BS was 273 yesterday  Am 144  Lunch 209  Afternoon 357    Loose stools and cramping

## 2022-09-29 NOTE — TELEPHONE ENCOUNTER
[address] [facility]   Surinder Cardenas, PharmD, BCPS, BCACP     Santosh Lockett is a 82 y o  female who presents via telephone for initial clinical pharmacist consult  Reason for visit: diabetes  This virtual check-in was done via telephone  Encounter provider: Carmel Clifford    Patient agrees to participate in a virtual check in via telephone or video visit instead of presenting to the office to address urgent/immediate medical needs  After connecting, the patient was identified by name and date of birth  Santosh Lockett was informed that this was a telemedicine visit and that the exam was being conducted via D1G and patient was informed that this is a secure, HIPAA-compliant platform  She agrees to proceed  Patient is located in the following state in which I hold an active license ([place])  My office door was closed  No one else was in the room  Santosh Lockett acknowledged consent and understanding of privacy and security of the telemedicine visit  I informed the patient that I have reviewed her record in Epic and presented the opportunity for her to ask any questions regarding the visit today  The patient agreed to participate  Plan: The following actions were taken today by the Clinical Pharmacist:   Patient has been using Humalog insulin < 24 hours  Will not make changes at this time  Continue close follow-up  Follow-up:   Follow-up with pharmacist tomorrow    - Home Monitoring Records: [facility]    The following items are to be considered at a future visit:   1  Need for SSI    Chronic Conditions Addressed at this Visit:   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, without long-term current use of insulin: Goals - A1c: <8%; SMBGs: Preprandial: 90-150mg/dL and HS: 100-180mg/dL per ADA guidelines (individualized based on age, disease duration, and co-morbidities)    - Most recent A1c: below goal    - SMBG: limited self-report  Will continue to closely monitor  Current DM Regimen:   Januvia 100 mg daily   Metformin 1,000 mg BID   Humalog 4 units SQ TID AC  MEDICATIONS: SMBGs stable but < 24 hours of readings to review  Will continue to closely monitor  Anticipate need for further adjustments  HOME MONITORING: Check blood sugars 4 times daily and record  Interstitial lung disease  Followed by Pulmonology  Currently taking prednisone 60 mg daily  On long-term taper,  decrease by 10 mg every 2 weeks  Also on Bactrim DS TIW for PJP prophylaxis  Medication Reconciliation: Medication list reviewed with patient at today's visit and updated to reflect medications patient is currently taking    - Reviewed allergies  - Medication adherence is good  TLC:   Dietary habits are fair  Patient with swallowing difficulty  Disc   Exercise habits are fair  Encouraged engaging in 150 min or more of moderate intensity aerobic activity/week, spread over at least 3 days/week, with no more than 2 consecutive days without activity    Sleep habits are good  Counseled regarding sleep hygiene and aspects of healthy sleep   BMI Counseling: The BMI is below normal  Patient was advised to gain weight   Tobacco: Continue smoking abstinence  Former smoker    Education:  - Discussed ABCs of diabetes management (A1c/BP/cholesterol) and goals with patient today    Subjective:     Patient recently started on Humalog due to high dose, long-term prednisone use  She took first dose of Humalog with dinner yesterday  Reviewed use of insulin pen with patient to ensure proper administration  Patient with history of hypoglycemia with previous insulin use  Reviewed s/sx and appropriate treatment         DM History:  Microvascular complications: retinopathy  Cardiovascular complications: high ASCVD risk (age >/= 54 with coronary, carotid, or lower-extremity stenosis >50%, or LVH)  - Aspirin (Men>50, Women>60): Yes   - Statin: Yes   - ACEi/ARB: No DM Self-Management:  - Self-monitoring: are performed regularly  She checks BG 4 times daily, including FBG and 2-hours post-prandial (dinner)/HS  She did provide blood glucose log today  Reviewed appropriate times to perform SMBGs with patient  - Hypoglycemia: (+) awareness; denies any episodes  - Knows how to treat hypoglycemia: Yes   Glucometer: Yes    CGM: No     Eating habits:  Breakfast: Lunch: Dinner: Snacks: peach   Whole wheat tortilla, 1 slice American cheese Whole wheat tortilla, white, turkey, peppers Roast chicken, sweet potato, yogurt Beverages: coffee, water   Barriers to improving diet: swallowing difficulty - eats small portion sizes, difficulty with certain food textures    Medication Adherence: Responsible for medication management: patient  Medication adherence: taking as prescribed  Patient denies missed/extra doses  Medication Efficacy/Safety:   Clinically significant drug interactions identified:  None   Side effects from medication(s) reported:  None  Patient with swallowing difficulty  Discussed methods to ease medication administration       Social History     Tobacco Use    Smoking status: Former Smoker     Packs/day: 1 50     Years: 38 00     Pack years: 57 00     Types: Cigarettes     Start date:      Quit date:      Years since quittin 7    Smokeless tobacco: Never Used   Vaping Use    Vaping Use: Never used   Substance Use Topics    Alcohol use: No    Drug use: No     Objective:   SMBG readings: 143 (pre-dinner), 208 (after dinner), 136 (fasting)    Current Outpatient Medications   Medication Instructions    acetaminophen (TYLENOL) 1,000 mg, Oral, As needed    Alphagan P 0 1 % INSTILL 1 DROP RIGHT EYE TWICE A DAY    amoxicillin (AMOXIL) 500 mg capsule Prophylactic, prior to dentist    aspirin (ECOTRIN LOW STRENGTH) 81 mg, Oral, Daily    azelastine (ASTELIN) 0 1 % nasal spray 1 spray, Nasal, 2 times daily, Use in each nostril as directed    bimatoprost (LUMIGAN) 0 01 % ophthalmic drops 1 drop, Both Eyes, Daily at bedtime    calcium carbonate (OS-DANIA) 600 mg, Oral, 2 times daily with meals    carboxymethylcellulose 1 % ophthalmic solution 1 drop, 3 times daily    Eliquis 2 5 MG TAKE 1 TABLET (2 5 MG TOTAL) BY MOUTH 2 (TWO) TIMES A DAY    ezetimibe-simvastatin (VYTORIN) 10-40 mg per tablet 1 tablet, Oral, Daily at bedtime    famotidine (PEPCID) 20 mg, Oral, 2 times daily    fluticasone (FLONASE) 50 mcg/act nasal spray 2 sprays, Nasal, Daily    glucose blood (OneTouch Ultra) test strip TEST FOUR TIMES A DAY    insulin lispro (HUMALOG KWIKPEN) 4 Units, Subcutaneous, 3 times daily with meals    ipratropium (ATROVENT) 0 03 % nasal spray USE 2 SPRAYS INTO EACH NOSTRIL EVERY 12 (TWELVE) HOURS    Januvia 100 MG tablet TAKE 1 TABLET (100 MG TOTAL) BY MOUTH DAILY    Lancets (ONETOUCH DELICA PLUS [id number]) MISC Use one lancet to test blood 4 times a day    loratadine (CLARITIN) 10 mg, Oral, Daily PRN    LORazepam (ATIVAN) 1 mg, Oral, Daily at bedtime    metFORMIN (GLUCOPHAGE) 1,000 mg, Oral, 2 times daily with meals    metoprolol tartrate (LOPRESSOR) 75 mg, Oral, Every 12 hours scheduled    Motegrity 2 mg, Oral, Daily    Multiple Vitamin (multivitamin) tablet 1 tablet, Oral, Daily    peppermint oil Does not apply, Once as needed    predniSONE 20 mg tablet Take 3 tablets (60 mg total) by mouth daily for 14 days, THEN 2 5 tablets (50 mg total) daily for 14 days, THEN 2 tablets (40 mg total) daily for 14 days, THEN 1 5 tablets (30 mg total) daily for 14 days, THEN 1 tablet (20 mg total) daily for 14 days, THEN 0 5 tablets (10 mg total) daily for 14 days      QC Pen Needles 31G X 6 MM MISC USE 4 (FOUR) TIMES A DAY (BEFORE MEALS AND AT BEDTIME)    sodium chloride 1 g, Oral, 3 times daily    sulfamethoxazole-trimethoprim (BACTRIM DS) 800-160 mg per tablet 1 tablet, Oral, 3 times weekly     Allergies   Allergen Reactions    Keflex [Cephalexin] Diarrhea Immunization History   Administered Date(s) Administered    COVID-19 PFIZER VACCINE 0 3 ML IM 01/22/2021, 02/12/2021, 09/30/2021, 09/08/2022    INFLUENZA 11/02/2016, 10/16/2017, 09/18/2018, 09/18/2018    Influenza, Quadrivalent (nasal) 11/02/2016    Influenza, high dose seasonal 0 7 mL 11/06/2019, 10/06/2020, 11/09/2021    Pneumococcal Conjugate 13-Valent 07/25/2019    Pneumococcal Polysaccharide PPV23 03/17/2003    Zoster 07/09/2010     I have reviewed the patient's allergies, medications and history as noted in the electronic medical record and updated as necessary  Vital Signs:  BP Readings from Last 3 Encounters:   09/27/22 120/80   09/21/22 122/66   08/23/22 112/72     Pulse Readings from Last 3 Encounters:   09/27/22 84   09/21/22 86   08/23/22 (!) 52      Estimated body mass index is 18 24 kg/m² as calculated from the following:    Height as of 9/27/22: 5' 6" (1 676 m)  Weight as of 9/27/22: 51 3 kg (113 lb)  Pertinent Lab Data:  Lab Results   Component Value Date    SODIUM 131 (L) 08/11/2022    K 4 2 08/11/2022    CL 94 (L) 08/11/2022    CO2 32 08/11/2022    BUN 15 08/11/2022    CREATININE 0 54 (L) 08/11/2022    GLUC 172 (H) 07/28/2022    GLUC 123 12/14/2020    CALCIUM 9 4 08/11/2022     Lab Results   Component Value Date    HGBA1C 7 5 (H) 08/11/2022    HGBA1C 7 3 (H) 04/02/2022    HGBA1C 7 3 (H) 11/30/2021    [id number] 677 09/01/2021      Lab Results   Component Value Date    MICROALBCRE 94 (H) 06/26/2019    EGFR 86 08/11/2022     Lab Results   Component Value Date    CHOLESTEROL 139 08/11/2022    TRIG 110 08/11/2022    HDL 68 08/11/2022    LDLCALC 49 08/11/2022     Lab Results   Component Value Date    ALT 22 08/11/2022    AST 18 08/11/2022    ALKPHOS 52 08/11/2022      The ASCVD Risk score (Clavbrice Armas , et al , 2013) failed to calculate for the following reasons:     The 2013 ASCVD risk score is only valid for ages 36 to 78    Preventative Care:  Health Maintenance Due   Topic Date Due    BMI: Followup Plan  Never done    DM Eye Exam  07/15/2022    Medicare Annual Wellness Visit (AWV)  09/07/2022    Influenza Vaccine (1) 09/01/2022     Pharmacist Tracking Tool  Reason For Outreach: Embedded Pharmacist  Demographics:  Intervention Method: Phone  Type of Intervention: New  Topics Addressed: Diabetes  Pharmacologic Interventions: Prevent or Manage JUAN  Non-Pharmacologic Interventions: Home Monitoring  Time:  Direct Patient Care: 25 mins  Care Coordination: 25 mins  Recommendation Recipient: Patient/Caregiver  Outcome: Accepted

## 2022-09-29 NOTE — TELEPHONE ENCOUNTER
If blood sugar is less than 150, hold insulin  For blood sugar 150-200, give 5 units of insulin before meal    For blood sugar 200-300, give 7 units of insulin before meal   For blood sugar 300 or higher give 8 units of insulin before meal   Call me if sugars less than 100 or greater than 300  Call in blood sugar log on Monday

## 2022-10-02 ENCOUNTER — TELEPHONE (OUTPATIENT)
Dept: FAMILY MEDICINE CLINIC | Facility: CLINIC | Age: 87
End: 2022-10-02

## 2022-10-02 RX ORDER — PANTOPRAZOLE SODIUM 40 MG/1
40 TABLET, DELAYED RELEASE ORAL DAILY
Qty: 90 TABLET | Refills: 1 | Status: SHIPPED | OUTPATIENT
Start: 2022-10-02

## 2022-10-03 ENCOUNTER — TELEPHONE (OUTPATIENT)
Dept: FAMILY MEDICINE CLINIC | Facility: CLINIC | Age: 87
End: 2022-10-03

## 2022-10-03 NOTE — TELEPHONE ENCOUNTER
Spoke with patient  Made aware of results and provider's instructions  Patient verbalized understanding and was agreeable w/ plan  BS Readings  Fri- 09/30/22  7:15 am 136  11:00 am 239  2:39 pm 208  5:00 pm 243  8:00 pm 180    Sat- 10/01/22  7:30 am 157  10:00 am 177  12:30 pm 186  3:15 pm 181  5:00 pm 229  7:30 pm 230    Sun- 10/02/22  8:30 am 149   11:30 am 264 (forgot to take insulin)  3:30 pm 316  6:30 pm 224    Mon- 10/03/22  7:30 am 150    Pt states she has been checking her BS QID  At times she is confused, she does not know if to go by the sliding scale, at her mid-day reading or  The reading between lunch and dinner  Refer pt to endo educator / meet with Mary Fabian or come in for apt to see you?

## 2022-10-03 NOTE — TELEPHONE ENCOUNTER
Please contact Ac  I have been thinking of her over the weekend  I would like her to go back to taking Pantoprazole 40 ,g once daily for reflux while she is taking Prednisone  When Prednisone stops, she can stop pantoprazole  Pantoprazole can increase reflux, which could be making swallowing worse recently  Please still plan to follow up with gastroenterology  Please ask her for Blood sugar log

## 2022-10-03 NOTE — TELEPHONE ENCOUNTER
Outreach call placed to patient  Reviewed appropriate times to test blood sugars  Instructed patient to check before meals and 2 hours after dinner/bedtime  These times are informative and will also prevent over correction using sliding scale insulin  Patient verbalized understanding  Will continue close follow-up  Plan to call patient on Thursday  Available to patient if needed sooner

## 2022-10-07 ENCOUNTER — HOSPITAL ENCOUNTER (OUTPATIENT)
Dept: NON INVASIVE DIAGNOSTICS | Facility: HOSPITAL | Age: 87
Discharge: HOME | End: 2022-10-07
Payer: MEDICARE

## 2022-10-07 ENCOUNTER — TELEPHONE (OUTPATIENT)
Dept: FAMILY MEDICINE CLINIC | Facility: CLINIC | Age: 87
End: 2022-10-07

## 2022-10-07 VITALS
BODY MASS INDEX: 18.16 KG/M2 | HEART RATE: 80 BPM | SYSTOLIC BLOOD PRESSURE: 120 MMHG | HEIGHT: 66 IN | DIASTOLIC BLOOD PRESSURE: 80 MMHG | WEIGHT: 113 LBS

## 2022-10-07 DIAGNOSIS — J84.112 IDIOPATHIC PULMONARY FIBROSIS (HCC): ICD-10-CM

## 2022-10-07 DIAGNOSIS — E11.42 TYPE 2 DIABETES MELLITUS WITH DIABETIC POLYNEUROPATHY, WITHOUT LONG-TERM CURRENT USE OF INSULIN (HCC): Primary | ICD-10-CM

## 2022-10-07 LAB
AORTIC ROOT: 2.9 CM
APICAL FOUR CHAMBER EJECTION FRACTION: 60 %
ASCENDING AORTA: 2.8 CM
E WAVE DECELERATION TIME: 240 MS
FRACTIONAL SHORTENING: 34 (ref 28–44)
INTERVENTRICULAR SEPTUM IN DIASTOLE (PARASTERNAL SHORT AXIS VIEW): 1.2 CM
INTERVENTRICULAR SEPTUM: 1.2 CM (ref 0.6–1.1)
IVC: 3 MM
LAAS-AP2: 15.6 CM2
LAAS-AP4: 13.6 CM2
LEFT ATRIUM AREA SYSTOLE SINGLE PLANE A4C: 10.2 CM2
LEFT ATRIUM SIZE: 2.9 CM
LEFT INTERNAL DIMENSION IN SYSTOLE: 2.7 CM (ref 2.1–4)
LEFT VENTRICULAR INTERNAL DIMENSION IN DIASTOLE: 4.1 CM (ref 3.5–6)
LEFT VENTRICULAR POSTERIOR WALL IN END DIASTOLE: 1.2 CM
LEFT VENTRICULAR STROKE VOLUME: 47 ML
LVSV (TEICH): 47 ML
MV E'TISSUE VEL-SEP: 6 CM/S
MV PEAK A VEL: 1.16 M/S
MV PEAK E VEL: 68 CM/S
MV STENOSIS PRESSURE HALF TIME: 70 MS
MV VALVE AREA P 1/2 METHOD: 3.14
RA PRESSURE ESTIMATED: 3 MMHG
RIGHT ATRIUM AREA SYSTOLE A4C: 12.6 CM2
RIGHT VENTRICLE ID DIMENSION: 3.3 CM
RV PSP: 30 MMHG
SL CV LEFT ATRIUM LENGTH A2C: 4.9 CM
SL CV LV EF: 60
SL CV PED ECHO LEFT VENTRICLE DIASTOLIC VOLUME (MOD BIPLANE) 2D: 75 ML
SL CV PED ECHO LEFT VENTRICLE SYSTOLIC VOLUME (MOD BIPLANE) 2D: 28 ML
TR MAX PG: 27 MMHG
TR PEAK VELOCITY: 2.6 M/S
TRICUSPID VALVE PEAK REGURGITATION VELOCITY: 2.62 M/S

## 2022-10-07 PROCEDURE — 93306 TTE W/DOPPLER COMPLETE: CPT

## 2022-10-07 PROCEDURE — 93306 TTE W/DOPPLER COMPLETE: CPT | Performed by: INTERNAL MEDICINE

## 2022-10-07 NOTE — TELEPHONE ENCOUNTER
[address] [facility]  Jonnathan Moreno, PharmD, BCPS, BCACP     Communication with patient: per telephone     Reason for documentation: follow-up    Recommendations: The following actions were taken today by the Clinical Pharmacist:   1  Will adjust patient's Humalog SSI to target elevated BG  Reviewed adjustments with patient and she verbally repeated new sliding scale correctly  · NEW Humalog per sliding scale  BS Humalog Units   < 140 HOLD   140-180 5   181-220 6   221-260 7   261-300 8   > 300 9   2  Follow-up with patient's pharmacy regarding pantoprazole    Follow-up:   Follow-up with pharmacist 3-4 days    Chronic Conditions Addressed at this Visit:      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, without long-term current use of insulin: Goals - A1c: <8%; SMBGs: Preprandial: 90-150mg/dL and HS: 100-180mg/dL per ADA guidelines (individualized based on age, disease duration, and co-morbidities)  - Most recent A1c: below goal    - SMBG: above goal  Will continue to closely monitor  Current DM Regimen:  · Januvia 100 mg daily  · Metformin 1,000 mg BID  · Humalog per sliding scale  BS Humalog Units   < 150 HOLD   150-200 5   200-300 7   > 300 8   Call PCP if sugars < 100 or > 300    MEDICATIONS: SMBGs stable but < 24 hours of readings to review  Will continue to closely monitor  Anticipate need for further adjustments  HOME MONITORING: Check blood sugars 4 times daily (AC and HS) and record      Interstitial lung disease  Followed by Pulmonology  Currently taking prednisone 50 mg daily  On long-term taper,  decrease by 10 mg every 2 weeks  Also on Bactrim DS TIW for PJP prophylaxis  Subjective:   Patient has decreased prednisone to 50 mg daily, starting yesterday  Has experienced some hyperglycemia  Reports feeling confusion when her blood sugar is higher  Noticed some improved today when her BG was lower  No episodes of hypoglycemia       Has not received pantoprazole that was prescribed by PCP to help with swallowing       Objective:   SMBG readings (mg/dL):               Average Min Max Goal      146 169 90 150 FBG Average: 160 mg/dl (146-169 mg/dl), n=4, 0/4= < 70 mg/dl   Pre-Lunch  185 255 90 150 Pre-Lunch BG Average: 221 7 mg/dl (185-255 mg/dl), n=3, 0/3= < 70 mg/dl   Pre-Supper  245 298 90 150 Pre-Supper BG Average: 271 5 mg/dl (245-298 mg/dl), n=2, 0/2= < 70 mg/dl   HS  208 327 100 180 HS BG Average: 260 7 mg/dl (208-327 mg/dl), n=3, 0/3= < 70 mg/dl         Total # of reading  < 70 mg/dl: 0/12     Pharmacist Tracking Tool  Reason For Outreach: Embedded Pharmacist  Demographics:  Intervention Method: Phone  Type of Intervention: Follow-Up  Topics Addressed: Diabetes  Pharmacologic Interventions: Dose or Frequency Adjusted  Non-Pharmacologic Interventions: Home Monitoring  Time:  Direct Patient Care: 20 mins  Care Coordination: 15 mins  Recommendation Recipient: Patient/Caregiver  Outcome: Accepted

## 2022-10-10 DIAGNOSIS — E11.42 TYPE 2 DIABETES MELLITUS WITH DIABETIC POLYNEUROPATHY, WITHOUT LONG-TERM CURRENT USE OF INSULIN (HCC): Primary | ICD-10-CM

## 2022-10-10 RX ORDER — INSULIN HUMAN 100 [IU]/ML
INJECTION, SUSPENSION SUBCUTANEOUS
Qty: 15 ML | Refills: 1 | Status: SHIPPED | OUTPATIENT
Start: 2022-10-10

## 2022-10-10 NOTE — TELEPHONE ENCOUNTER
[address] [facility]  Samir Vinson, PharmD, BCPS, BCACP     Communication with patient: per telephone     Reason for documentation: follow-up    Recommendations: The following actions were taken today by the Clinical Pharmacist:   1  CHANGE patient to NPH 10 units SQ QAM + 5 units SQ QPM  Provide patient education    Follow-up:   Follow-up with pharmacist 3-4 days    Chronic Conditions Addressed at this Visit:      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, without long-term current use of insulin: Goals - A1c: <8%; SMBGs: Preprandial: 90-150mg/dL and HS: 100-180mg/dL per ADA guidelines (individualized based on age, disease duration, and co-morbidities)  - Most recent A1c: below goal    - SMBG: above goal  Have shown minimal improvement from previous check-in but remain elevated  Overall pattern of increasing BG throughout the day which are not likely to be improved with bolus insulin alone  Current DM Regimen:  · Januvia 100 mg daily  · Metformin 1,000 mg BID  · Humalog per sliding scale  BS Humalog Units   < 140 HOLD   140-180 5   181-220 6   221-260 7   261-300 8   > 300 9   Call PCP/clinical pharmacist if sugars < 100 or > 300    MEDICATIONS: Will switch patient to NPH, ~ 0 3 units/kg  Anticipate need for further adjustments  HOME MONITORING: Check blood sugars 4 times daily (AC and HS) and record      Interstitial lung disease  Followed by Pulmonology  On long-term taper,  decrease by 10 mg every 2 weeks  Also on Bactrim DS TIW for PJP prophylaxis  Subjective:   Patient currently taking 50 mg daily  Continues to have elevated BG readings  Feels confusion and shaky when her blood sugar is higher  No episodes of hypoglycemia       Objective:   SMBG readings (mg/dL):      Average Min Max Goal      132 147 90 150 FBG Average: 142 mg/dl (132-147 mg/dl), n=3, 0/3= < 70 mg/dl   Pre-Lunch  171 295 90 150 Pre-Lunch BG Average: 227 mg/dl (171-295 mg/dl), n=3, 0/3= < 70 mg/dl   Pre-Supper  200 264 90 150 Pre-Supper BG Average: 232 mg/dl (200-264 mg/dl), n=2, 0/2= < 70 mg/dl   HS  177 189 100 180 HS BG Average: 183 mg/dl (177-189 mg/dl), n=2, 0/2= < 70 mg/dl         Total # of reading  < 70 mg/dl: 0/10     Pharmacist Tracking Tool  Reason For Outreach: Embedded Pharmacist  Demographics:  Intervention Method: Phone  Type of Intervention: Follow-Up  Topics Addressed: Diabetes  Pharmacologic Interventions: Dose or Frequency Adjusted  Non-Pharmacologic Interventions: Home Monitoring  Time:  Direct Patient Care: 15 mins  Care Coordination: 15 mins  Recommendation Recipient: Patient/Caregiver and Provider  Outcome: Accepted

## 2022-10-13 DIAGNOSIS — R14.0 ABDOMINAL BLOATING: ICD-10-CM

## 2022-10-13 DIAGNOSIS — K59.09 CHRONIC CONSTIPATION WITH OVERFLOW: ICD-10-CM

## 2022-10-13 RX ORDER — PRUCALOPRIDE 2 MG/1
2 TABLET, FILM COATED ORAL DAILY
Qty: 90 TABLET | Refills: 3 | Status: SHIPPED | OUTPATIENT
Start: 2022-10-13

## 2022-10-14 ENCOUNTER — TELEPHONE (OUTPATIENT)
Dept: FAMILY MEDICINE CLINIC | Facility: CLINIC | Age: 87
End: 2022-10-14

## 2022-10-14 ENCOUNTER — REMOTE DEVICE CLINIC VISIT (OUTPATIENT)
Dept: CARDIOLOGY CLINIC | Facility: CLINIC | Age: 87
End: 2022-10-14
Payer: MEDICARE

## 2022-10-14 DIAGNOSIS — Z95.0 CARDIAC PACEMAKER IN SITU: Primary | ICD-10-CM

## 2022-10-14 PROCEDURE — 93294 REM INTERROG EVL PM/LDLS PM: CPT | Performed by: INTERNAL MEDICINE

## 2022-10-14 PROCEDURE — 93296 REM INTERROG EVL PM/IDS: CPT | Performed by: INTERNAL MEDICINE

## 2022-10-14 NOTE — PROGRESS NOTES
Results for orders placed or performed in visit on 10/14/22   Cardiac EP device report    Narrative    MDT-DUAL CHAMBER PPM (AAIR-DDDR MODE)/ ACTIVE SYSTEM IS MRI CONDITIONAL  CARELINK TRANSMISSION: BATTERY STATUS "4 YRS " AP 74%  0%  ALL AVAILABLE LEAD PARAMETERS WITHIN NORMAL LIMITS  NO SIGNIFICANT HIGH RATE EPISODES  NORMAL DEVICE FUNCTION   NC

## 2022-10-14 NOTE — TELEPHONE ENCOUNTER
[address] [facility]  Alva Rosas, PharmD, BCPS, BCACP     Communication with patient: per telephone     Reason for documentation: follow-up    Recommendations: The following actions were taken today by the Clinical Pharmacist:   1  Continue NPH 10 units SQ QAM + 5 units SQ QPM  Provide patient education    Follow-up:   Follow-up with pharmacist 3-4 days    Chronic Conditions Addressed at this Visit:      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, without long-term current use of insulin: Goals - A1c: <8%; SMBGs: Preprandial: 90-150mg/dL and HS: 100-180mg/dL per ADA guidelines (individualized based on age, disease duration, and co-morbidities)  - Most recent A1c: below goal    - SMBG: Difficult to interpret given missed insulin doses and confusion regarding which insulin patient used  Current DM Regimen:  · Januvia 100 mg daily  · Metformin 1,000 mg BID  · NPH 10 units SQ QAM + 5 units SQ QPM  MEDICATIONS: Instructed patient to discard/remove Humalog to prevent future use  Educated that NPH pen is grey with green/blue sticker  Patient is only to use NPH insulin  Will not make any dosage changes at this time and will re-evaluate at next check-in   HOME MONITORING: Check blood sugars 4 times daily (AC and HS) and record      Interstitial lung disease  Followed by Pulmonology  On long-term taper,  decrease by 10 mg every 2 weeks  Also on Bactrim DS TIW for PJP prophylaxis  Subjective:   Patient started using NPH insulin on 10/12/2022  Upon discussion, appears patient may have used both NPH and Humalog insulins over the past 2 days  She had BG reading of 117 mid-morning today  Unsure if she took NPH 10 units of Novolog 10 units this AM  Felt shaky and weak so she at lunch  Also forgot her AM dose of insulin yesterday  Takes Bactrim DS on M/W/F  Reports experiencing frequent BMs on days she takes antibiotic  Patient currently taking prednisone 50 mg daily     Objective:   SMBG readings (mg/dL):       Average Min Max Goal        128 148 90 150 FBG Average: 138 mg/dl (128-148 mg/dl), n=2, 0/2= < 70 mg/dl   Pre-Lunch  117 267 90 150 Pre-Lunch BG Average: 197 mg/dl (117-267 mg/dl), n=3, 0/3= < 70 mg/dl   Pre-Supper  243 277 90 150 Pre-Supper BG Average: 256 7 mg/dl (243-277 mg/dl), n=3, 0/3= < 70 mg/dl     Pharmacist Tracking Tool  Reason For Outreach: Embedded Pharmacist  Demographics:  Intervention Method: Phone  Type of Intervention: Follow-Up  Topics Addressed: Diabetes  Pharmacologic Interventions: Prevent or Manage JUAN  Non-Pharmacologic Interventions: Home Monitoring and Medication/Device education  Time:  Direct Patient Care: 15 mins  Care Coordination: 15 mins  Recommendation Recipient: Patient/Caregiver  Outcome: Accepted

## 2022-10-17 ENCOUNTER — TELEPHONE (OUTPATIENT)
Dept: FAMILY MEDICINE CLINIC | Facility: CLINIC | Age: 87
End: 2022-10-17

## 2022-10-17 DIAGNOSIS — E11.42 TYPE 2 DIABETES MELLITUS WITH DIABETIC POLYNEUROPATHY, WITHOUT LONG-TERM CURRENT USE OF INSULIN (HCC): Primary | ICD-10-CM

## 2022-10-18 NOTE — TELEPHONE ENCOUNTER
[address] [facility]  Berlin Manzanares, PharmD, BCPS, BCACP     Communication with patient: per telephone     Reason for documentation: follow-up    Recommendations: The following actions were taken today by the Clinical Pharmacist:   1  Continue NPH 10 units SQ QAM + 5 units SQ QPM  Provide patient education    Follow-up:   Follow-up with pharmacist 3-4 days    Chronic Conditions Addressed at this Visit:      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, without long-term current use of insulin: Goals - A1c: <8%; SMBGs: Preprandial: 90-150mg/dL and HS: 100-180mg/dL per ADA guidelines (individualized based on age, disease duration, and co-morbidities)  - Most recent A1c: below goal    - SMBG: Less glycemic variability and no hypoglycemia  Current DM Regimen:  · Januvia 100 mg daily  · Metformin 1,000 mg BID  · NPH 10 units SQ QAM + 5 units SQ QPM  MEDICATIONS: Will not make any dosage changes at this time  Check in with patient after next step down in prednisone taper and plan to address elevated evening BG  HOME MONITORING: Check blood sugars 4 times daily (AC and HS) and record      Interstitial lung disease  Followed by Pulmonology  On long-term taper, decrease by 10 mg every 2 weeks  Also on Bactrim DS TIW for PJP prophylaxis  Subjective:   Patient confirmed appropriate use of NPH insulin  No missed or extra doses  Denies using Novolog  No episodes of hypoglycemia  Reports she felt well over the weekend  Patient currently taking prednisone 50 mg daily  Next step down in prednisone taper is on 10/19    Objective:   SMBG readings (mg/dL):      Average Min Max Goal      132 173 90 150 FBG Average: 156 7 mg/dl (132-173 mg/dl), n=3, 0/3= < 70 mg/dl   Pre-Lunch  138 244 90 150 Pre-Lunch BG Average: 180 mg/dl (138-244 mg/dl), n=3, 0/3= < 70 mg/dl   Pre-Supper  223 278 90 150 Pre-Supper BG Average: 250 5 mg/dl (223-278 mg/dl), n=2, 0/2= < 70 mg/dl   HS  225 225 100 180 HS BG Average: 225 mg/dl (225-225 mg/dl), n=1, 0/1= < 70 mg/dl         Total # of reading  < 70 mg/dl: 0/9     Pharmacist Tracking Tool  Reason For Outreach: Embedded Pharmacist  Demographics:  Intervention Method: Phone  Type of Intervention: Follow-Up  Topics Addressed: Diabetes  Pharmacologic Interventions: Prevent or Manage JUAN  Non-Pharmacologic Interventions: Home Monitoring  Time:  Direct Patient Care: 15 mins  Care Coordination: 15 mins  Recommendation Recipient: Patient/Caregiver  Outcome: Accepted

## 2022-10-20 ENCOUNTER — TELEPHONE (OUTPATIENT)
Dept: FAMILY MEDICINE CLINIC | Facility: CLINIC | Age: 87
End: 2022-10-20

## 2022-10-20 DIAGNOSIS — E11.42 TYPE 2 DIABETES MELLITUS WITH DIABETIC POLYNEUROPATHY, WITHOUT LONG-TERM CURRENT USE OF INSULIN (HCC): Primary | ICD-10-CM

## 2022-10-20 NOTE — TELEPHONE ENCOUNTER
[address] [facility] Chen AraujoD, BCPS, BCACP     Communication with patient: per telephone     Reason for documentation: follow-up    Recommendations: The following actions were taken today by the Clinical Pharmacist:   1  CHANGE to NPH 12 units SQ QAM + 5 units SQ QPM  Provided patient education  She confirmed dose and verbalized understanding  Follow-up:   Follow-up with pharmacist 3-4 days    Chronic Conditions Addressed at this Visit:      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, without long-term current use of insulin: Goals - A1c: <8%; SMBGs: Preprandial: 90-150mg/dL and HS: 100-180mg/dL per ADA guidelines (individualized based on age, disease duration, and co-morbidities)  - Most recent A1c: below goal    - SMBG: FBG well controlled  Evening BG remain elevated  No hypoglycemia  Current DM Regimen:  · Januvia 100 mg daily  · Metformin 1,000 mg BID  · NPH 10 units SQ QAM + 5 units SQ QPM  MEDICATIONS: Will increase AM NPH insulin to address elevated evening BG  HOME MONITORING: Check blood sugars 4 times daily (AC and HS) and record      Interstitial lung disease  Followed by Pulmonology  On long-term taper, decrease by 10 mg every 2 weeks  Also on Bactrim DS TIW for PJP prophylaxis  Subjective:   Patient confirmed appropriate use of NPH insulin  No missed or extra doses  No episodes of hypoglycemia  Reports BG of 97 was related to a late lunch  No symptoms at that time    Patient currently taking prednisone 40 mg daily  Decreased dose from 50 mg daily on 10/19    Objective:   SMBG readings (mg/dL):      Average Min Max Goal      118 126 90 150 FBG Average: 121 mg/dl (118-126 mg/dl), n=3, 0/3= < 70 mg/dl   Pre-Lunch  97 262 90 150 Pre-Lunch BG Average: 186 mg/dl ( mg/dl), n=3, 0/3= < 70 mg/dl   Pre-Supper  190 278 90 150 Pre-Supper BG Average: 245 mg/dl (190-278 mg/dl), n=3, 0/3= < 70 mg/dl   HS  286 286 100 180 HS BG Average: 286 mg/dl (286-286 mg/dl), n=1, 0/1= < 70 mg/dl         Total # of reading  < 70 mg/dl: 0/10     Pharmacist Tracking Tool  Reason For Outreach: Embedded Pharmacist  Demographics:  Intervention Method: Phone  Type of Intervention: Follow-Up  Topics Addressed: Diabetes  Pharmacologic Interventions: Prevent or Manage JUAN  Non-Pharmacologic Interventions: Home Monitoring  Time:  Direct Patient Care: 15 mins  Care Coordination: 15 mins  Recommendation Recipient: Patient/Caregiver  Outcome: Accepted

## 2022-10-24 ENCOUNTER — TELEPHONE (OUTPATIENT)
Dept: FAMILY MEDICINE CLINIC | Facility: CLINIC | Age: 87
End: 2022-10-24

## 2022-10-24 NOTE — TELEPHONE ENCOUNTER
[address] [facility]  Rod Chao, PharmD, BCPS, BCACP     Communication with patient: per telephone     Reason for documentation: follow-up    Recommendations: The following actions were taken today by the Clinical Pharmacist:   1  No changes warranted  Continue NPH 12 units SQ QAM + 5 units SQ QPM    2  Discussed hypoglycemia with patient  Stressed that patient should expect to see lower BG readings as she decreases her steroid dose  Strongly discouraged patient from treating isolated BG readings (>70 mg/dL) without any symptoms  She verbalized understanding  Follow-up:   Follow-up with pharmacist 3-4 days    Chronic Conditions Addressed at this Visit:      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, without long-term current use of insulin: Goals - A1c: <8%; SMBGs: Preprandial: 90-150mg/dL and HS: 100-180mg/dL per ADA guidelines (individualized based on age, disease duration, and co-morbidities)  - Most recent A1c: below goal    - SMBG: FBG well controlled  Limited evening SMBGs, but noted improvement  No hypoglycemia  Current DM Regimen:  · Januvia 100 mg daily  · Metformin 1,000 mg BID  · NPH 12 units SQ QAM + 5 units SQ QPM  MEDICATIONS: No changes  HOME MONITORING: Check blood sugars 4 times daily (AC and HS) and record      Interstitial lung disease  Followed by Pulmonology  On long-term taper, decrease by 10 mg every 2 weeks  Also on Bactrim DS TIW for PJP prophylaxis  Subjective:   Patient confirmed appropriate use of NPH insulin  No missed or extra doses  No episodes of symptomatic hypoglycemia  States today she was nervous when her BG was 100 so she ate a few pieces of candy  She was not experiencing any symptoms  Patient currently taking prednisone 40 mg daily  Experiences loose stools and GI upset on days she takes Bactrim DS      Objective:   SMBG readings (mg/dL):      Average Min Max Goal      93 134 90 150 FBG Average: 113 5 mg/dl ( mg/dl), n=2, 0/2= < 70 mg/dl   Pre-Lunch  100 138 90 150 Pre-Lunch BG Average: 119 mg/dl (100-138 mg/dl), n=2, 0/2= < 70 mg/dl   Pre-Supper  155 155 90 150 Pre-Supper BG Average: 155 mg/dl (155-155 mg/dl), n=1, 0/1= < 70 mg/dl         Total # of reading  < 70 mg/dl: 0/5     Pharmacist Tracking Tool  Reason For Outreach: Embedded Pharmacist  Demographics:  Intervention Method: Phone  Type of Intervention: Follow-Up  Topics Addressed: Diabetes  Pharmacologic Interventions: Prevent or Manage JUAN  Non-Pharmacologic Interventions: Home Monitoring  Time:  Direct Patient Care: 15 mins  Care Coordination: 10 mins  Recommendation Recipient: Patient/Caregiver  Outcome: Accepted

## 2022-10-27 ENCOUNTER — TELEPHONE (OUTPATIENT)
Dept: FAMILY MEDICINE CLINIC | Facility: CLINIC | Age: 87
End: 2022-10-27

## 2022-10-27 DIAGNOSIS — E11.42 TYPE 2 DIABETES MELLITUS WITH DIABETIC POLYNEUROPATHY, WITHOUT LONG-TERM CURRENT USE OF INSULIN (HCC): Primary | ICD-10-CM

## 2022-10-27 NOTE — TELEPHONE ENCOUNTER
[address] [facility]  Ainsley Fink, PharmD, BCPS, BCACP     Communication with patient: per telephone     Reason for documentation: follow-up    Recommendations: The following actions were taken today by the Clinical Pharmacist:   · No changes warranted  Continue NPH 12 units SQ QAM + 5 units SQ QPM      Follow-up:   Follow-up with pharmacist 1 week    Chronic Conditions Addressed at this Visit:      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, without long-term current use of insulin: Goals - A1c: <8%; SMBGs: Preprandial: 90-150mg/dL and HS: 100-180mg/dL per ADA guidelines (individualized based on age, disease duration, and co-morbidities)  - Most recent A1c: below goal    - SMBG: FBG well controlled  Evening SMBGs are slightly higher  No hypoglycemia  Current DM Regimen:  · Januvia 100 mg daily  · Metformin 1,000 mg BID  · NPH 12 units SQ QAM + 5 units SQ QPM  MEDICATIONS: Patient to continue steroid taper next week  Will not make changes to insulin at this time  Continue close follow-up  HOME MONITORING: Check blood sugars 4 times daily (AC and HS) and record      Interstitial lung disease  Followed by Pulmonology  On long-term taper, decrease by 10 mg every 2 weeks  Also on Bactrim DS TIW for PJP prophylaxis  Subjective:   Patient confirmed appropriate use of NPH insulin  No missed or extra doses  No episodes of symptomatic hypoglycemia  No inappropriate treatment of "low" blood glucose readings  Patient currently taking prednisone 40 mg daily  Experiences loose stools and GI upset on days she takes Bactrim DS      Objective:   SMBG readings (mg/dL):      Average Min Max Goal      98 135 90 150 FBG Average: 112 mg/dl ( mg/dl), n=3, 0/3= < 70 mg/dl   Pre-Lunch  100 182 90 150 Pre-Lunch BG Average: 142 7 mg/dl (100-182 mg/dl), n=3, 0/3= < 70 mg/dl   Pre-Supper  165 252 90 150 Pre-Supper BG Average: 203 mg/dl (165-252 mg/dl), n=3, 0/3= < 70 mg/dl         Total # of reading  < 70 mg/dl: 0/9       Pharmacist Tracking Tool  Reason For Outreach: Embedded Pharmacist  Demographics:  Intervention Method: Phone  Type of Intervention: Follow-Up  Topics Addressed: Diabetes  Pharmacologic Interventions: Prevent or Manage JUAN  Non-Pharmacologic Interventions: Home Monitoring  Time:  Direct Patient Care: 15 mins  Care Coordination: 10 mins  Recommendation Recipient: Patient/Caregiver  Outcome: Accepted

## 2022-11-04 ENCOUNTER — TELEPHONE (OUTPATIENT)
Dept: FAMILY MEDICINE CLINIC | Facility: CLINIC | Age: 87
End: 2022-11-04

## 2022-11-04 DIAGNOSIS — E11.42 TYPE 2 DIABETES MELLITUS WITH DIABETIC POLYNEUROPATHY, WITHOUT LONG-TERM CURRENT USE OF INSULIN (HCC): Primary | ICD-10-CM

## 2022-11-04 NOTE — TELEPHONE ENCOUNTER
[address] [facility]  Brian Jimenez, PharmD, BCPS, BCACP     Communication with patient: per telephone     Reason for documentation: follow-up    Recommendations: The following actions were taken today by the Clinical Pharmacist:   · No changes warranted  Continue NPH 12 units SQ QAM + 5 units SQ QPM      Follow-up:   Follow-up with pharmacist 1 week    Chronic Conditions Addressed at this Visit:      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, without long-term current use of insulin: Goals - A1c: <8%; SMBGs: Preprandial: 90-150mg/dL and HS: 100-180mg/dL per ADA guidelines (individualized based on age, disease duration, and co-morbidities)  - Most recent A1c: below goal    - SMBG: FBG well controlled  Evening SMBGs are slightly higher  No hypoglycemia  Current DM Regimen:  · Januvia 100 mg daily  · Metformin 1,000 mg BID  · NPH 12 units SQ QAM + 5 units SQ QPM  MEDICATIONS: Permissive PM hyperglycemia given variance in timing of BG readings  Continue close follow-up  HOME MONITORING: Check blood sugars 4 times daily (AC and HS) and record      Interstitial lung disease  Followed by Pulmonology  On long-term taper, decrease by 10 mg every 2 weeks  Also on Bactrim DS TIW for PJP prophylaxis  Subjective:   Patient confirmed appropriate use of NPH insulin  No missed or extra doses  No episodes of symptomatic hypoglycemia  PM SMBGs are taken between 3-6 pm      Patient currently taking prednisone 30 mg daily, reduced dose from 40 mg today  Reports increased bloating and GI upset which she relates to IBS  Objective:   SMBG readings (mg/dL):      Average Min Max Goal     98 157 90 150 FBG Average: 126 2 mg/dl ( mg/dl), n=9, 0/9= < 70 mg/dl   Pre-Lunch  94 166 90 150 Pre-Lunch BG Average: 132 7 mg/dl ( mg/dl), n=6, 0/6= < 70 mg/dl   Pre-Supper  165 249 90 150 Pre-Supper BG Average: 211 7 mg/dl (165-249 mg/dl), n=6, 0/6= < 70 mg/dl   HS  280 280 100 180 HS BG Average: 280 mg/dl (280-280 mg/dl), n=1, 0/1= < 70 mg/dl         Total # of reading  < 70 mg/dl: 0/22     Pharmacist Tracking Tool  Reason For Outreach: Embedded Pharmacist  Demographics:  Intervention Method: Phone  Type of Intervention: Follow-Up  Topics Addressed: Diabetes  Pharmacologic Interventions: Prevent or Manage JUAN  Non-Pharmacologic Interventions: Home Monitoring  Time:  Direct Patient Care: 15 mins  Care Coordination: 10 mins  Recommendation Recipient: Patient/Caregiver  Outcome: Accepted

## 2022-11-07 DIAGNOSIS — E11.65 UNCONTROLLED TYPE 2 DIABETES MELLITUS WITH HYPERGLYCEMIA (HCC): ICD-10-CM

## 2022-11-07 RX ORDER — LANCETS 33 GAUGE
EACH MISCELLANEOUS
Qty: 400 EACH | Refills: 1 | Status: SHIPPED | OUTPATIENT
Start: 2022-11-07

## 2022-11-10 DIAGNOSIS — E11.65 TYPE 2 DIABETES MELLITUS WITH HYPERGLYCEMIA (HCC): ICD-10-CM

## 2022-11-10 RX ORDER — SYRINGE-NEEDLE,INSULIN,0.5 ML 28 GAUGE
SYRINGE, EMPTY DISPOSABLE MISCELLANEOUS
Qty: 100 EACH | Refills: 3 | Status: SHIPPED | OUTPATIENT
Start: 2022-11-10

## 2022-11-11 ENCOUNTER — TELEPHONE (OUTPATIENT)
Dept: FAMILY MEDICINE CLINIC | Facility: CLINIC | Age: 87
End: 2022-11-11

## 2022-11-11 DIAGNOSIS — E11.65 UNCONTROLLED TYPE 2 DIABETES MELLITUS WITH HYPERGLYCEMIA (HCC): Primary | ICD-10-CM

## 2022-11-11 NOTE — TELEPHONE ENCOUNTER
[address] [facility]  Teagan Peres, PharmD, BCPS, BCACP     Communication with patient: per telephone     Reason for documentation: follow-up    Recommendations: The following actions were taken today by the Clinical Pharmacist:   · No changes warranted  Continue NPH 12 units SQ QAM + 5 units SQ QPM      Follow-up:   Follow-up with pharmacist 1 week    Chronic Conditions Addressed at this Visit:      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, without long-term current use of insulin: Goals - A1c: <8%; SMBGs: Preprandial: 90-150mg/dL and HS: 100-180mg/dL per ADA guidelines (individualized based on age, disease duration, and co-morbidities)  - Most recent A1c: below goal    - SMBG: FBG well controlled  Evening SMBGs are slightly higher  No hypoglycemia  Current DM Regimen:  · Januvia 100 mg daily  · Metformin 1,000 mg BID  · NPH 12 units SQ QAM + 5 units SQ QPM  MEDICATIONS: Permissive PM hyperglycemia given variance in timing of PM BG readings  Continue close follow-up  HOME MONITORING: Check blood sugars 4 times daily (AC and HS) and record      Interstitial lung disease  Followed by Pulmonology  On long-term taper, decrease by 10 mg every 2 weeks  Also on Bactrim DS TIW for PJP prophylaxis  Subjective:   Patient confirmed appropriate use of NPH insulin  No missed or extra doses  No episodes of symptomatic hypoglycemia  PM SMBGs are taken between 3-6 pm      Patient currently taking prednisone 30 mg daily  Has been taking Miralax daily and having softer BMs  Objective:   SMBG readings (mg/dL):      Average Min Max Goal      95 142 90 150 FBG Average: 113 7 mg/dl ( mg/dl), n=6, 0/6= < 70 mg/dl   Pre-Lunch  105 176 90 150 Pre-Lunch BG Average: 139 3 mg/dl (105-176 mg/dl), n=3, 0/3= < 70 mg/dl   Pre-Supper  176 295 90 150 Pre-Supper BG Average: 252 8 mg/dl (176-295 mg/dl), n=6, 0/6= < 70 mg/dl         Total # of reading  < 70 mg/dl: 0/15     Pharmacist Tracking Tool  Reason For Outreach: Embedded Pharmacist  Demographics:  Intervention Method: Phone  Type of Intervention: Follow-Up  Topics Addressed: Diabetes  Pharmacologic Interventions: Prevent or Manage JUAN  Non-Pharmacologic Interventions: Home Monitoring  Time:  Direct Patient Care: 15 mins  Care Coordination: 10 mins  Recommendation Recipient: Patient/Caregiver  Outcome: Accepted

## 2022-11-14 ENCOUNTER — TELEPHONE (OUTPATIENT)
Dept: FAMILY MEDICINE CLINIC | Facility: CLINIC | Age: 87
End: 2022-11-14

## 2022-11-15 ENCOUNTER — TELEPHONE (OUTPATIENT)
Dept: FAMILY MEDICINE CLINIC | Facility: CLINIC | Age: 87
End: 2022-11-15

## 2022-11-15 ENCOUNTER — OFFICE VISIT (OUTPATIENT)
Dept: FAMILY MEDICINE CLINIC | Facility: CLINIC | Age: 87
End: 2022-11-15

## 2022-11-15 VITALS
DIASTOLIC BLOOD PRESSURE: 70 MMHG | HEART RATE: 93 BPM | HEIGHT: 66 IN | WEIGHT: 114.2 LBS | RESPIRATION RATE: 16 BRPM | OXYGEN SATURATION: 99 % | TEMPERATURE: 97.8 F | BODY MASS INDEX: 18.35 KG/M2 | SYSTOLIC BLOOD PRESSURE: 130 MMHG

## 2022-11-15 DIAGNOSIS — Z23 INFLUENZA VACCINE NEEDED: ICD-10-CM

## 2022-11-15 DIAGNOSIS — E11.65 UNCONTROLLED TYPE 2 DIABETES MELLITUS WITH HYPERGLYCEMIA (HCC): Primary | ICD-10-CM

## 2022-11-15 DIAGNOSIS — J84.112 IDIOPATHIC PULMONARY FIBROSIS (HCC): ICD-10-CM

## 2022-11-15 DIAGNOSIS — E11.9 TYPE 2 DIABETES MELLITUS WITHOUT COMPLICATION, UNSPECIFIED WHETHER LONG TERM INSULIN USE (HCC): ICD-10-CM

## 2022-11-15 DIAGNOSIS — R19.7 DIARRHEA, UNSPECIFIED TYPE: Primary | ICD-10-CM

## 2022-11-15 LAB — SL AMB POCT HEMOGLOBIN AIC: 7.8 (ref ?–6.5)

## 2022-11-15 NOTE — PATIENT INSTRUCTIONS
Diarrhea it is not present today after having it for 3 days  She is on multiple medications that can cause diarrhea and also has a history of irritable bowel with both constipation and diarrhea  Suggest continuing her usual medications and use Imodium after each loose bowel movement if she has diarrhea  She knows to go easy on Imodium because it can cause constipation  A1c 7 8 which is okay for almost 82years old  She is aware that as her dose of prednisone decreases, her need for insulin will also decline and she should be careful if her blood sugars are running under 100  Back off on the insulin  Flu shot  The worse that happens is that she will not get an adequate immune response if she is on 30 mg of prednisone  Follow-up as scheduled with Kimberly

## 2022-11-15 NOTE — PROGRESS NOTES
Chief Complaint   Patient presents with   • Diarrhea     4 + days        HPI   Here with diarrhea for the last 4 days  She has a history of IBS  Sometimes gets constipation and sometimes diarrhea  She is also on metformin  Recently because she is on prednisone for her pulmonary fibrosis, she has been placed on insulin  Sugar was 78 this morning when she woke up and after she ate, down to 68  She is also on Motegrity which she does not think works  Started taking half a cap full of MiraLax 8 days ago  Currently on 30 mg of prednisone and will dropped down to 20 tomorrow  Scheduled in 2 weeks to dropped to 10 mg a day  Frequent bowel movements yesterday    No bowel movements today as of 2:30 PM     Past Medical History:   Diagnosis Date   • 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/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   • Glaucoma    • H/O degenerative disc disease    • Idiopathic pulmonary fibrosis ([facility] [address] ) 11/2020   • Irregular heart beat     afib   • Peripheral neuropathy    • S/P laparoscopic cholecystectomy 12/21/2020   • Stenosis of right subclavian artery ([facility] [address] ) 11/18/2016        Past Surgical History:   Procedure Laterality Date   • CATARACT EXTRACTION, BILATERAL  2011   • CHOLECYSTECTOMY     • CHOLECYSTECTOMY LAPAROSCOPIC N/A 12/9/2020    Procedure: CHOLECYSTECTOMY LAPAROSCOPIC;  Surgeon: Estela Gallagher MD;  Location: [facility];  Service: General   • COLONOSCOPY     • CYST REMOVAL  2009    left lower Quadrant    • HERNIA REPAIR  1992   • LIPOMA RESECTION  2010   • NM REPAIR ING HERNIA,5+Y/O,REDUCIBL Bilateral 1/5/2018    Procedure: INGUINAL HERNIA REPAIR;  Surgeon: Danii Merida MD;  Location: [facility];  Service: General   • SHOULDER SURGERY  04/26/2019    Dr Leighton Freedman [facility])   • VASCULAR SURGERY  1994    [facility]-  R carotid occlusion       Social History     Tobacco Use   • Smoking status: Former Smoker     Packs/day: 1 50     Years: 38 00     Pack years: 57 00     Types: Cigarettes     Start date: 28     Quit date: 1994     Years since quittin 8   • Smokeless tobacco: Never Used   Substance Use Topics   • Alcohol use: No       Social History     Social History Narrative    Lives alone [place]  Was divorce  Ex- passed away  3 children  Four grandchildren  Spends most of her time at home  Does not drive  The following portions of the patient's history were reviewed and updated as appropriate: allergies, current medications, past family history, past medical history, past social history, past surgical history and problem list       Review of Systems       /70   Pulse 93   Temp 97 8 °F (36 6 °C) (Temporal)   Resp 16   Ht 5' 6" (1 676 m)   Wt 51 8 kg (114 lb 3 2 oz)   SpO2 99%   BMI 18 43 kg/m²      Physical Exam   No acute distress  Thin elderly female  Alert an oriented  Lungs are clear with decreased breath sounds  Heart regular  Abdomen soft and nontender  A1c 7 8              Current Outpatient Medications:   •  acetaminophen (TYLENOL) 500 mg tablet, Take 1,000 mg by mouth as needed for mild pain, Disp: , Rfl:   •  Alphagan P 0 1 %, INSTILL 1 DROP RIGHT EYE TWICE A DAY, Disp: , Rfl:   •  aspirin (ECOTRIN LOW STRENGTH) 81 mg EC tablet, Take 1 tablet (81 mg total) by mouth daily, Disp:  , Rfl:   •  azelastine (ASTELIN) 0 1 % nasal spray, 1 SPRAY INTO EACH NOSTRIL 2 (TWO) TIMES A DAY USE IN EACH NOSTRIL AS DIRECTED, Disp: 30 mL, Rfl: 1  •  bimatoprost (LUMIGAN) 0 01 % ophthalmic drops, Administer 1 drop to both eyes daily at bedtime for 30 days, Disp: 1 5 mL, Rfl: 0  •  calcium carbonate (OS-DANIA) 600 MG tablet, Take 600 mg by mouth 2 (two) times a day with meals  , Disp: , Rfl:   •  carboxymethylcellulose 1 % ophthalmic solution, 1 drop 3 (three) times a day, Disp: , Rfl:   •  Eliquis 2 5 MG, TAKE 1 TABLET (2 5 MG TOTAL) BY MOUTH 2 (TWO) TIMES A DAY, Disp: 60 tablet, Rfl: 5  •  ezetimibe-simvastatin (VYTORIN) 10-40 mg per tablet, TAKE 1 TABLET BY MOUTH DAILY AT BEDTIME, Disp: 30 tablet, Rfl: 5  •  famotidine (PEPCID) 20 mg tablet, TAKE 1 TABLET (20 MG TOTAL) BY MOUTH 2 (TWO) TIMES A DAY, Disp: 60 tablet, Rfl: 5  •  glucose blood (OneTouch Ultra) test strip, TEST FOUR TIMES A DAY, Disp: 400 strip, Rfl: 1  •  insulin isophane (HumuLIN N KwikPen) 100 units/mL injection pen, Inject twice daily under the skin as directed by physician   Total daily dose no more than 25 units, Disp: 15 mL, Rfl: 1  •  insulin lispro (HumaLOG KwikPen) 100 units/mL injection pen, Inject 4 Units under the skin 3 (three) times a day with meals, Disp: 9 mL, Rfl: 0  •  ipratropium (ATROVENT) 0 03 % nasal spray, USE 2 SPRAYS INTO EACH NOSTRIL EVERY 12 (TWELVE) HOURS, Disp: 30 mL, Rfl: 6  •  Januvia 100 MG tablet, TAKE 1 TABLET (100 MG TOTAL) BY MOUTH DAILY, Disp: 30 tablet, Rfl: 5  •  Lancets (OneTouch Delica Plus [id number]) MISC, Use one lancet to test blood 4 times a day, Disp: 400 each, Rfl: 1  •  Litetouch Pen Needles 31G X 6 MM MISC, USE AS DIRECTED FOR INSULIN, Disp: 100 each, Rfl: 3  •  loratadine (CLARITIN) 10 mg tablet, Take 10 mg by mouth daily as needed for allergies, Disp: , Rfl:   •  LORazepam (ATIVAN) 0 5 mg tablet, TAKE 2 TABLETS (1 MG TOTAL) BY MOUTH DAILY AT BEDTIME, Disp: 60 tablet, Rfl: 2  •  metFORMIN (GLUCOPHAGE) 500 mg tablet, TAKE 2 TABLETS (1,000 MG TOTAL) BY MOUTH 2 (TWO) TIMES A DAY WITH MEALS, Disp: 120 tablet, Rfl: 5  •  metoprolol tartrate (LOPRESSOR) 50 mg tablet, TAKE 1 5 TABLETS (75 MG TOTAL) BY MOUTH EVERY 12 (TWELVE) HOURS (Patient taking differently: Take 25 mg by mouth every 12 (twelve) hours), Disp: 90 tablet, Rfl: 5  •  Motegrity 2 MG TABS, TAKE 2 MG BY MOUTH DAILY, Disp: 90 tablet, Rfl: 3  •  Multiple Vitamin (multivitamin) tablet, Take 1 tablet by mouth daily  , Disp: , Rfl:   •  pantoprazole (PROTONIX) 40 mg tablet, Take 1 tablet (40 mg total) by mouth daily, Disp: 90 tablet, Rfl: 1  •  peppermint oil, Use once as needed, Disp: , Rfl:   •  predniSONE 20 mg tablet, Take 3 tablets (60 mg total) by mouth daily for 14 days, THEN 2 5 tablets (50 mg total) daily for 14 days, THEN 2 tablets (40 mg total) daily for 14 days, THEN 1 5 tablets (30 mg total) daily for 14 days, THEN 1 tablet (20 mg total) daily for 14 days, THEN 0 5 tablets (10 mg total) daily for 14 days  , Disp: 147 tablet, Rfl: 0  •  sodium chloride 1 g tablet, TAKE 1 TABLET (1 G TOTAL) BY MOUTH 3 (THREE) TIMES A DAY, Disp: 90 tablet, Rfl: 5  •  sulfamethoxazole-trimethoprim (BACTRIM DS) 800-160 mg per tablet, Take 1 tablet by mouth 3 (three) times a week, Disp: 36 tablet, Rfl: 0  •  amoxicillin (AMOXIL) 500 mg capsule, Prophylactic, prior to dentist (Patient not taking: No sig reported), Disp: , Rfl:   •  fluticasone (FLONASE) 50 mcg/act nasal spray, 2 SPRAYS INTO EACH NOSTRIL DAILY (Patient not taking: No sig reported), Disp: 16 g, Rfl: 0     No problem-specific Assessment & Plan notes found for this encounter  Diagnoses and all orders for this visit:    Diarrhea, unspecified type    Type 2 diabetes mellitus without complication, unspecified whether long term insulin use (HCC)  -     POCT hemoglobin A1c    Idiopathic pulmonary fibrosis (HCC)    Influenza vaccine needed  -     influenza vaccine, high-dose, PF 0 7 mL (FLUZONE HIGH-DOSE)        Patient Instructions   Diarrhea it is not present today after having it for 3 days  She is on multiple medications that can cause diarrhea and also has a history of irritable bowel with both constipation and diarrhea  Suggest continuing her usual medications and use Imodium after each loose bowel movement if she has diarrhea  She knows to go easy on Imodium because it can cause constipation  A1c 7 8 which is okay for almost 82years old  She is aware that as her dose of prednisone decreases, her need for insulin will also decline and she should be careful if her blood sugars are running under 100  Back off on the insulin  Flu shot    The worse that happens is that she will not get an adequate immune response if she is on 30 mg of prednisone  Follow-up as scheduled with Kimberly

## 2022-11-15 NOTE — TELEPHONE ENCOUNTER
[address] [facility]  Xiang Yee, PharmD, BCPS, BCACP     The following actions were taken today by the Clinical Pharmacist:   1  Reviewed appropriate treatment of hypoglycemia  Patient has honey, juice, and hard candy available for treatment  2  Patient agreed to eat a meal within 30-60 minutes  3  Patient to hold insulin if BG < 90  4  Will call patient on 11/16 to discuss any further changes to insulin regimen     Discussion:      Incoming call from patient  Reports she was awoken from sleep ~ 6am by feeling of confusion, weakness today  Checked her BG reading and it was 63  She had tortilla, cheese, and peanut butter for breakfast  She took NPH 12 units SQ  She continued to feel poorly and around 10 am she had chicken soup and 1 Glucerna  She is concerned because it is now ~ 11 am and her BG is 77  Instructed patient to get a fast-acting source of simple sugar  Reviewed what she has on hand and she agreed to eat 1-2 Tbsp of honey  She called back ~ 15 minutes later and her BG had improved to 120  She is less shaky but feeling better  Patient reports she has been experiencing increased diarrhea x 4-5 days  Decreased oral intake during this time  She has appointment with PCP office today at 2:30 to discuss       Pharmacist Tracking Tool  Reason For Outreach: Embedded Pharmacist  Demographics:  Intervention Method: Phone  Type of Intervention: Follow-Up  Topics Addressed: Diabetes  Pharmacologic Interventions: Prevent or Manage JUAN  Non-Pharmacologic Interventions: Care coordination and Disease state education  Time:  Direct Patient Care: 20 mins  Care Coordination: 15 mins  Recommendation Recipient: Patient/Caregiver  Outcome: Accepted

## 2022-11-16 ENCOUNTER — TELEPHONE (OUTPATIENT)
Dept: FAMILY MEDICINE CLINIC | Facility: CLINIC | Age: 87
End: 2022-11-16

## 2022-11-16 DIAGNOSIS — E11.42 TYPE 2 DIABETES MELLITUS WITH DIABETIC POLYNEUROPATHY, WITHOUT LONG-TERM CURRENT USE OF INSULIN (HCC): ICD-10-CM

## 2022-11-16 DIAGNOSIS — E11.65 UNCONTROLLED TYPE 2 DIABETES MELLITUS WITH HYPERGLYCEMIA (HCC): Primary | ICD-10-CM

## 2022-11-16 NOTE — TELEPHONE ENCOUNTER
[address] [facility]  Shandra Mata, PharmD, BCPS, [facility]     Spoke briefly with patient to check SMBGs  Patient reports most recent readings: 128/336/252/153/119    Took insulin as scheduled  No additional hypoglycemic events/symptoms       Pharmacist Tracking Tool  Reason For Outreach: Embedded Pharmacist  Demographics:  Intervention Method: Phone  Type of Intervention: Follow-Up  Topics Addressed: Diabetes  Pharmacologic Interventions: Prevent or Manage JUAN  Non-Pharmacologic Interventions: N/A  Time:  Direct Patient Care: 5 mins  Care Coordination: 5 mins  Recommendation Recipient: Patient/Caregiver  Outcome: Accepted

## 2022-11-17 RX ORDER — INSULIN HUMAN 100 [IU]/ML
INJECTION, SUSPENSION SUBCUTANEOUS
Qty: 15 ML | Refills: 1 | Status: SHIPPED | OUTPATIENT
Start: 2022-11-17

## 2022-11-18 ENCOUNTER — TELEPHONE (OUTPATIENT)
Dept: FAMILY MEDICINE CLINIC | Facility: CLINIC | Age: 87
End: 2022-11-18

## 2022-11-18 DIAGNOSIS — E11.65 UNCONTROLLED TYPE 2 DIABETES MELLITUS WITH HYPERGLYCEMIA (HCC): Primary | ICD-10-CM

## 2022-11-18 NOTE — TELEPHONE ENCOUNTER
[address] [facility]  Tenisha El, PharmD, BCPS, BCACP     Communication with patient: per telephone     Reason for documentation: follow-up    Recommendations: The following actions were taken today by the Clinical Pharmacist:   · REDUCE to NPH 10 units SQ QAM + 5 units SQ QPM  · Do not take insulin if BG is < 90     Follow-up:   Follow-up with pharmacist 3-4 days    Chronic Conditions Addressed at this Visit:      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, without long-term current use of insulin: Goals - A1c: <8%; SMBGs: Preprandial: 90-150mg/dL and HS: 100-180mg/dL per ADA guidelines (individualized based on age, disease duration, and co-morbidities)  - Most recent A1c: below goal    - SMBG: Symptomatic hypoglycemia x 2 this week when BG < 80    Current DM Regimen:  · Januvia 100 mg daily  · Metformin 1,000 mg BID  · NPH 12 units SQ QAM + 5 units SQ QPM  MEDICATIONS: Will reduce insulin to minimize hypos  As steroid taper nears completion, anticipate further need to reduce/eliminate insulin  HOME MONITORING: Check blood sugars 4 times daily (AC and HS) and record      Interstitial lung disease  Followed by Pulmonology  On long-term taper, decrease by 10 mg every 2 weeks  Also on Bactrim DS TIW for PJP prophylaxis  Findings:   Patient reports another hypoglycemic episode around 11AM today  BS: 75  Had symptoms of confusion and weakness  Treated appropriately with honey and milk  Patient currently taking prednisone 20 mg daily, decreased from 30 mg yesterday  Reports diarrhea has resolved  States her appetite has been decreased  Has been eating less       Lab Results   Component Value Date    HGBA1C 7 8 (A) 11/15/2022    HGBA1C 7 5 (H) 08/11/2022     Pharmacist Tracking Tool  Reason For Outreach: Embedded Pharmacist  Demographics:  Intervention Method: Phone  Type of Intervention: Follow-Up  Topics Addressed: Diabetes  Pharmacologic Interventions: Prevent or Manage JUAN  Non-Pharmacologic Interventions: Home Monitoring  Time:  Direct Patient Care: 15 mins  Care Coordination: 10 mins  Recommendation Recipient: Patient/Caregiver  Outcome: Accepted

## 2022-11-21 ENCOUNTER — TELEPHONE (OUTPATIENT)
Dept: ADMINISTRATIVE | Facility: OTHER | Age: 87
End: 2022-11-21

## 2022-11-21 ENCOUNTER — TELEPHONE (OUTPATIENT)
Dept: FAMILY MEDICINE CLINIC | Facility: CLINIC | Age: 87
End: 2022-11-21

## 2022-11-21 DIAGNOSIS — E11.65 UNCONTROLLED TYPE 2 DIABETES MELLITUS WITH HYPERGLYCEMIA (HCC): Primary | ICD-10-CM

## 2022-11-21 NOTE — TELEPHONE ENCOUNTER
----- Message from Lupe Davenport sent at 11/18/2022  1:25 PM EST -----  Regarding: DM Foot Exam  11/18/22 1:25 PM    Hello, our patient Kevon Siemens has had Diabetic Foot Exam completed/performed  Please assist in updating the patient chart by making an External outreach to [address] [facility] located in [facility]  The date of service is 11/7/22    Thank you,  Carolyn Zuniga   [address]

## 2022-11-21 NOTE — LETTER
Diabetic Foot Exam Form    Date Requested: 22  Patient: Billy Slaughter  Patient : 1935   Referring Provider: ZANDER Emerson    Diabetic Foot Exam Performed with shoes and socks removed        Yes         No     Date of Diabetic Foot Exam ______________________________  Risk Score ____________________________________________    Left Foot       Visual Inspection         Monofilament Testing Sensory Exam        Pedal Pulses         Additional Comments         Right Foot      Visual Inspection         Monofilament Testing Sensory Exam       Pedal Pulses         Additional Comments         Comments __________________________________________________________    Practice Providing Exam ______________________________________________    Exam Performed By (print name) _______________________________________      Provider Signature ___________________________________________________      These reports are needed for  compliance  Please fax this completed form and a copy of the Diabetic Foot Exam report to our office located at [place] [address] as soon as possible via [phone] cristi Sanchez: Phone [phone]    We thank you for your assistance in treating our mutual patient

## 2022-11-21 NOTE — TELEPHONE ENCOUNTER
Upon review of the In Basket request and the patient's chart, initial outreach has been made via fax to facility  , please see Contacts section for details       Thank you  Alice Galvan

## 2022-11-21 NOTE — TELEPHONE ENCOUNTER
[facility]  Rubina Thompson, PharmD, BCPS, BCACP     Communication with patient: per telephone     Reason for documentation: follow-up    Recommendations: The following actions were taken today by the Clinical Pharmacist:   · Continue NPH 10 units SQ QAM + 5 units SQ QPM  · If BG is < 90, take 5 units SQ QAM + 3 units SQ QPM    Follow-up:   Follow-up with pharmacist 3-5 days    Chronic Conditions Addressed at this Visit:      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, without long-term current use of insulin: Goals - A1c: <8%; SMBGs: Preprandial: 90-150mg/dL and HS: 100-180mg/dL per ADA guidelines (individualized based on age, disease duration, and co-morbidities)  - Most recent A1c: below goal    - SMBG: No hypoglycemic events since last check-in; patient typically symptomatic if BG < 80    Current DM Regimen:  · Januvia 100 mg daily  · Metformin 1,000 mg BID  · NPH 10 units SQ QAM + 5 units SQ QPM  MEDICATIONS: Will ask patient to administer reduced dose if BS are low/normal to prevent rebound effect  HOME MONITORING: Check blood sugars 4 times daily (AC and HS) and record      Interstitial lung disease  Followed by Pulmonology  On long-term taper, decrease by 10 mg every 2 weeks  Also on Bactrim DS TIW for PJP prophylaxis  Subjective:   No episodes of symptomatic hypoglycemia       This morning she skipped her AM dose of insulin due to BS: 82 and her pre-lunch reading was 238  Patient currently taking prednisone 20 mg daily  Reports having difficulty with fluctuating constipation and diarrhea, which she attributes to IBS  Also abdominal bloating  Recently seen in PCP office for similar complaints  Objective:   SMBG readings (mg/dL):      Average Min Max Goal      82 148 90 150 FBG Average: 115 mg/dl ( mg/dl), n=3, 0/3= < 70 mg/dl   Pre-Lunch  184 211 90 150 Pre-Lunch BG Average: 197 5 mg/dl (184-211 mg/dl), n=2, 0/2= < 70 mg/dl   Pre-Supper  190 297 90 150 Pre-Supper BG Average: 241 7 mg/dl (190-297 mg/dl), n=3, 0/3= < 70 mg/dl   HS  142 209 100 180 HS BG Average: 175 5 mg/dl (142-209 mg/dl), n=2, 0/2= < 70 mg/dl         Total # of reading  < 70 mg/dl: 0/10     Pharmacist Tracking Tool  Reason For Outreach: Embedded Pharmacist  Demographics:  Intervention Method: Phone  Type of Intervention: Follow-Up  Topics Addressed: Diabetes  Pharmacologic Interventions: Prevent or Manage JUAN  Non-Pharmacologic Interventions: Home Monitoring  Time:  Direct Patient Care: 15 mins  Care Coordination: 10 mins  Recommendation Recipient: Patient/Caregiver  Outcome: Accepted

## 2022-11-22 ENCOUNTER — TELEPHONE (OUTPATIENT)
Dept: FAMILY MEDICINE CLINIC | Facility: CLINIC | Age: 87
End: 2022-11-22

## 2022-11-22 NOTE — TELEPHONE ENCOUNTER
Patient not feeling well  States she is having alternating constipation and diarrhea  Abdominal pain and bloating  No blood in stool  No vomiting  No fever  BS slightly elevated ~200-250  No hypoglycemic symptoms  Reports she called PCP office and attempted to schedule appointment  No same day appointments available with PCP  Was offered next-day appointment with Dr Aarti Baker but patient declined  Advised patient to go to urgent care or ED  Patient declined

## 2022-11-22 NOTE — TELEPHONE ENCOUNTER
Upon review of the In Basket request we were able to locate, review, and update the patient chart as requested for Diabetic Foot Exam     Any additional questions or concerns should be emailed to the Practice Liaisons via the appropriate education email address, please do not reply via In Basket      Thank you  Alice Mai

## 2022-11-25 ENCOUNTER — TELEPHONE (OUTPATIENT)
Dept: FAMILY MEDICINE CLINIC | Facility: CLINIC | Age: 87
End: 2022-11-25

## 2022-11-25 DIAGNOSIS — E11.65 UNCONTROLLED TYPE 2 DIABETES MELLITUS WITH HYPERGLYCEMIA (HCC): Primary | ICD-10-CM

## 2022-11-25 NOTE — TELEPHONE ENCOUNTER
[facility]  Rosemary Coornado, PharmD, BCPS, BCACP     Communication with patient: per telephone     Reason for documentation: follow-up    Recommendations: The following actions were taken today by the Clinical Pharmacist:   · No changes warranted  Continue NPH 10 units SQ QAM + 5 units SQ QPM        - If BG is < 90, take 5 units SQ QAM + 3 units SQ QPM     Follow-up:   Follow-up with pharmacist 1 week    Chronic Conditions Addressed at this Visit:      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, without long-term current use of insulin: Goals - A1c: <8%; SMBGs: Preprandial: 90-150mg/dL and HS: 100-180mg/dL per ADA guidelines (individualized based on age, disease duration, and co-morbidities)  - Most recent A1c: below goal    - SMBG: FBG well controlled  Evening SMBGs improving following recent GI upset  No hypoglycemia  Current DM Regimen:  · Januvia 100 mg daily  · Metformin 1,000 mg BID  · NPH 10 units SQ QAM + 5 units SQ QPM  MEDICATIONS: Permissive PM hyperglycemia given variance in timing of PM BG readings  Continue close follow-up  HOME MONITORING: Check blood sugars 4 times daily (AC and HS) and record      Interstitial lung disease  Followed by Pulmonology  On long-term taper, decrease by 10 mg every 2 weeks  Also on Bactrim DS TIW for PJP prophylaxis  Subjective:   Patient confirmed appropriate use of NPH insulin  No missed or extra doses  No episodes of symptomatic hypoglycemia  Patient checking SMBGs frequently on 11/22 as she was not feeling well  Did not visit UC or ED  Today, states GI symptoms have improved  Has been able to get 6-8 hours sleep overnight for several days, which is helpful  Patient currently taking prednisone 20 mg daily     Objective:   SMBG readings (mg/dL):    11/22: (0530) 164, (0730) 187, (0830) 222, (1000) 213, (1130) 143, (1230) 190, (pre-dinner) 265  11/23: (FBG) 192, (pre-lunch) 88, (pre-dinner) 178  11/24: (FBG) 153, (pre-lunch) 239, (pre-dinner) 239  11/25: (FBG) 128, (pre-lunch) 121    Pharmacist Tracking Tool  Reason For Outreach: Embedded Pharmacist  Demographics:  Intervention Method: Phone  Type of Intervention: Follow-Up  Topics Addressed: Diabetes  Pharmacologic Interventions: Prevent or Manage JUAN  Non-Pharmacologic Interventions: Home Monitoring  Time:  Direct Patient Care: 15 mins  Care Coordination: 10 mins  Recommendation Recipient: Patient/Caregiver  Outcome: Accepted

## 2022-12-02 ENCOUNTER — TELEPHONE (OUTPATIENT)
Dept: FAMILY MEDICINE CLINIC | Facility: CLINIC | Age: 87
End: 2022-12-02

## 2022-12-02 DIAGNOSIS — E11.65 UNCONTROLLED TYPE 2 DIABETES MELLITUS WITH HYPERGLYCEMIA (HCC): Primary | ICD-10-CM

## 2022-12-02 NOTE — TELEPHONE ENCOUNTER
[address] [facility]  Alan Mensah, PharmD, BCPS, BCACP     Communication with patient: per telephone     Reason for documentation: follow-up    Recommendations: The following actions were taken today by the Clinical Pharmacist:   · CHANGE to NPH 10 units SQ QAM + 3 units SQ QPM    Follow-up:   Follow-up with pharmacist in 1 week    Chronic Conditions Addressed at this Visit:      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, without long-term current use of insulin: Goals - A1c: <8%; SMBGs: Preprandial: 90-150mg/dL and HS: 100-180mg/dL per ADA guidelines (individualized based on age, disease duration, and co-morbidities)  - Most recent A1c: below goal    - SMBG: No hypoglycemic events since last check-in; patient typically symptomatic if BG < 80    Current DM Regimen:  · Januvia 100 mg daily  · Metformin 1,000 mg BID  · NPH 10 units SQ QAM + 5 units SQ QPM  MEDICATIONS: Will minimize frequency of SMBGs  Work to taper NPH based on glucose readings  HOME MONITORING: Check blood sugars 2 times daily (AM + PM) + any hypoglycemic events and record      Interstitial lung disease  Followed by Pulmonology  On long-term taper, decrease by 10 mg every 2 weeks  Also on Bactrim DS TIW for PJP prophylaxis  Subjective:   Patient is eager to reduce insulin dose as prednisone is being tapered  No GI complaints     Patient currently taking prednisone 20 mg daily  Reports having increasing fatigue  Concerned presenting symptoms are returning  Has follow-up with Pulmonology scheduled  Objective:   SMBG readings (mg/dL):      Average Min Max Goal      116 179 90 150 FBG Average: 143 6 mg/dl (116-179 mg/dl), n=7, 0/7= < 70 mg/dl   Pre-Lunch  113 216 90 150 Pre-Lunch BG Average: 163 8 mg/dl (113-216 mg/dl), n=6, 0/6= < 70 mg/dl   Pre-Supper  186 267 90 150 Pre-Supper BG Average: 215 9 mg/dl (186-267 mg/dl), n=7, 0/7= < 70 mg/dl         Total # of reading  < 70 mg/dl: 0/20     Pharmacist Tracking Tool  Reason For Outreach: Embedded Pharmacist  Demographics:  Intervention Method: Phone  Type of Intervention: Follow-Up  Topics Addressed: Diabetes  Pharmacologic Interventions: Prevent or Manage JUAN  Non-Pharmacologic Interventions: Home Monitoring  Time:  Direct Patient Care: 15 mins  Care Coordination: 10 mins  Recommendation Recipient: Patient/Caregiver  Outcome: Accepted

## 2022-12-08 LAB
LEFT EYE DIABETIC RETINOPATHY: POSITIVE
RIGHT EYE DIABETIC RETINOPATHY: POSITIVE
SEVERITY (EYE EXAM): NORMAL

## 2022-12-09 ENCOUNTER — TELEPHONE (OUTPATIENT)
Dept: FAMILY MEDICINE CLINIC | Facility: CLINIC | Age: 87
End: 2022-12-09

## 2022-12-09 DIAGNOSIS — E11.65 UNCONTROLLED TYPE 2 DIABETES MELLITUS WITH HYPERGLYCEMIA (HCC): Primary | ICD-10-CM

## 2022-12-09 NOTE — TELEPHONE ENCOUNTER
[facility] [facility]  Alva Rosas, PharmD, BCPS, BCACP     Communication with patient: per telephone     Reason for documentation: follow-up    Recommendations: The following actions were taken today by the Clinical Pharmacist:   · Encouraged patient to limit bedtime snacking  · Continue NPH 10 units SQ QAM + 3 units SQ QPM    Follow-up:   Follow-up with pharmacist in 1 week    Chronic Conditions Addressed at this Visit:      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, without long-term current use of insulin: Goals - A1c: <8%; SMBGs: Preprandial: 90-150mg/dL and HS: 100-180mg/dL per ADA guidelines (individualized based on age, disease duration, and co-morbidities)  - Most recent A1c: below goal    - SMBGs: No hypoglycemic events since last check-in; patient typically symptomatic if BG < 80    Current DM Regimen:  · Januvia 100 mg daily  · Metformin 1,000 mg BID  · NPH 10 units SQ QAM + 3 units SQ QPM  MEDICATIONS: Will minimize frequency of SMBGs  Work to taper NPH based on glucose readings  HOME MONITORING: Check blood sugars 2 times daily (AM + PM) + any hypoglycemic events and record      Interstitial lung disease  Followed by Pulmonology  On long-term taper, decrease by 10 mg every 2 weeks  Also on Bactrim DS TIW for PJP prophylaxis  Subjective:   Patient reports eating bedtime snacks most days  She is concerned for overnight lows  Reviewed BG log with patient and discussed that mornings when patient did not have bedtime snack her FBG was ~130, but after having a snack her FBG was ~170-180       Patient currently taking prednisone 10 mg daily  Last day of prednisone will be 12/13/2022  Has follow-up with Pulmonology scheduled  Objective:   SMBG readings (mg/dL):      Average Min Max Goal      131 195 90 150 FBG Average: 166 9 mg/dl (131-195 mg/dl), n=7, 0/7= < 70 mg/dl   Pre-Supper  176 323 90 150 Pre-Supper BG Average: 232 7 mg/dl (176-323 mg/dl), n=6, 0/6= < 70 mg/dl   Random 223 223 223 90 180 Random Average: 223 mg/dl (223-223 mg/dl), n=1, 0/1= < 70 mg/dl         Total # of reading  < 70 mg/dl: 0/14     Pharmacist Tracking Tool  Reason For Outreach: Embedded Pharmacist  Demographics:  Intervention Method: Phone  Type of Intervention: Follow-Up  Topics Addressed: Diabetes  Pharmacologic Interventions: Prevent or Manage JUAN  Non-Pharmacologic Interventions: Home Monitoring  Time:  Direct Patient Care: 15 mins  Care Coordination: 10 mins  Recommendation Recipient: Patient/Caregiver  Outcome: Accepted

## 2022-12-12 ENCOUNTER — TELEPHONE (OUTPATIENT)
Dept: FAMILY MEDICINE CLINIC | Facility: CLINIC | Age: 87
End: 2022-12-12

## 2022-12-12 DIAGNOSIS — E11.65 UNCONTROLLED TYPE 2 DIABETES MELLITUS WITH HYPERGLYCEMIA (HCC): Primary | ICD-10-CM

## 2022-12-12 NOTE — TELEPHONE ENCOUNTER
[address] [facility] Teagan, PharmD, BCPS, BCACP     Communication with patient: per telephone     Reason for documentation: Incoming call from patient    Recommendations:      The following actions were taken today by the Clinical Pharmacist:   • DECREASE NPH to 5 units SQ QAM + 3 units SQ QPM    Follow-up:   Follow-up with pharmacist in 3-4 days     Chronic Conditions Addressed at this Visit:      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, without long-term current use of insulin: Goals - A1c: <8%; SMBGs: Preprandial: 90-150mg/dL and HS: 100-180mg/dL per ADA guidelines (individualized based on age, disease duration, and co-morbidities)  - Most recent A1c: below goal    - SMBGs: Symptomatic hypoglycemic events in the AM   Current DM Regimen:  • Januvia 100 mg daily  • Metformin 1,000 mg BID  • NPH 10 units SQ QAM + 3 units SQ QPM  MEDICATIONS: Will reduce AM NPH  HOME MONITORING: Check blood sugars 2 times daily (AM + PM) + any hypoglycemic events and record      Interstitial lung disease  Followed by Pulmonology  On long-term taper, decrease by 10 mg every 2 weeks  Also on Bactrim DS TIW for PJP prophylaxis      Subjective:     Patient reports experiencing hypoglycemic events the past 2 days  Episodes have been before lunch  She self-adjusted her AM NPH and still felt symptomatic today  Tomorrow (12/13) is her last planned day of steroids       Objective:   SMBG readings (mg/dL):     12/11/2022  FBG Pre-Lunch BG Pre-Supper BG   115  NPH 10 units 79 205  NPH 3 units     12/12/2022  FBG Pre-Lunch BG   126  NPH 7 units 93     Pharmacist Tracking Tool  Reason For Outreach: Embedded Pharmacist  Demographics:  Intervention Method: Phone  Type of Intervention: Follow-Up  Topics Addressed: Diabetes  Pharmacologic Interventions: Dose or Frequency Adjusted and Prevent or Manage JUAN  Non-Pharmacologic Interventions: Home Monitoring  Time:  Direct Patient Care: 10 mins  Care Coordination: 10 mins  Recommendation Recipient: Patient/Caregiver  Outcome: Accepted

## 2022-12-13 ENCOUNTER — TELEPHONE (OUTPATIENT)
Dept: NEPHROLOGY | Facility: CLINIC | Age: 87
End: 2022-12-13

## 2022-12-13 ENCOUNTER — TELEPHONE (OUTPATIENT)
Dept: ADMINISTRATIVE | Facility: OTHER | Age: 87
End: 2022-12-13

## 2022-12-13 DIAGNOSIS — F41.9 ANXIETY: ICD-10-CM

## 2022-12-13 RX ORDER — LORAZEPAM 0.5 MG/1
1 TABLET ORAL
Qty: 60 TABLET | Refills: 1 | Status: SHIPPED | OUTPATIENT
Start: 2022-12-13

## 2022-12-13 NOTE — LETTER
Diabetic Eye Exam Form    Date Requested: 22  Patient: Lakshmi Swartz  Patient : 1935   Referring Provider: ZANDER Nolan      DIABETIC Eye Exam Date _______________________________      Type of Exam MUST be documented for Diabetic Eye Exams  Please CHECK ONE  Retinal Exam       Dilated Retinal Exam       OCT       Optomap-Iris Exam      Fundus Photography       Left Eye - Please check Retinopathy or No Retinopathy        Exam did show retinopathy    Exam did not show retinopathy       Right Eye - Please check Retinopathy or No Retinopathy       Exam did show retinopathy    Exam did not show retinopathy       Comments __________________________________________________________    Practice Providing Exam ______________________________________________    Exam Performed By (print name) _______________________________________      Provider Signature ___________________________________________________      These reports are needed for  compliance  Please fax this completed form and a copy of the Diabetic Eye Exam report to our office located at [place] [address] as soon as possible via [phone] attention Maddi: Phone [phone]  We thank you for your assistance in treating our mutual patient

## 2022-12-13 NOTE — TELEPHONE ENCOUNTER
----- Message from Jimbo Avina sent at 12/12/2022 12:47 PM EST -----  Regarding: DM Eye Exam    Hello, our patient  had Diabetic Eye Exam completed by Dr Brittny Card  Please assist in updating the patients chart by making an External outreach to [facility] [phone]  The date of service is 12/8/22    Thank you,  Dudley Stern   [address]

## 2022-12-13 NOTE — LETTER
Diabetic Eye Exam Form    Date Requested: 22  Patient: Poonam Olivares  Patient : 1935   Referring Provider: ZANDER Miller      DIABETIC Eye Exam Date _______________________________      Type of Exam MUST be documented for Diabetic Eye Exams  Please CHECK ONE  Retinal Exam       Dilated Retinal Exam       OCT       Optomap-Iris Exam      Fundus Photography       Left Eye - Please check Retinopathy or No Retinopathy        Exam did show retinopathy    Exam did not show retinopathy       Right Eye - Please check Retinopathy or No Retinopathy       Exam did show retinopathy    Exam did not show retinopathy       Comments __________________________________________________________    Practice Providing Exam ______________________________________________    Exam Performed By (print name) _______________________________________      Provider Signature ___________________________________________________      These reports are needed for  compliance  Please fax this completed form and a copy of the Diabetic Eye Exam report to our office located at [place] [address] as soon as possible via [phone] attention Maddi: Phone [phone]  We thank you for your assistance in treating our mutual patient

## 2022-12-14 NOTE — TELEPHONE ENCOUNTER
Upon review of the In Basket request and the patient's chart, initial outreach has been made via fax to facility  Please see Contacts section for details       Thank you  Merlene Santoyo MA

## 2022-12-15 ENCOUNTER — TELEPHONE (OUTPATIENT)
Dept: NEPHROLOGY | Facility: CLINIC | Age: 87
End: 2022-12-15

## 2022-12-16 ENCOUNTER — TELEPHONE (OUTPATIENT)
Dept: FAMILY MEDICINE CLINIC | Facility: CLINIC | Age: 87
End: 2022-12-16

## 2022-12-16 ENCOUNTER — APPOINTMENT (OUTPATIENT)
Dept: LAB | Facility: CLINIC | Age: 87
End: 2022-12-16

## 2022-12-16 DIAGNOSIS — E87.1 HYPONATREMIA: ICD-10-CM

## 2022-12-16 DIAGNOSIS — E11.65 UNCONTROLLED TYPE 2 DIABETES MELLITUS WITH HYPERGLYCEMIA (HCC): Primary | ICD-10-CM

## 2022-12-16 LAB
ANION GAP SERPL CALCULATED.3IONS-SCNC: 6 MMOL/L (ref 4–13)
BUN SERPL-MCNC: 9 MG/DL (ref 5–25)
CALCIUM SERPL-MCNC: 9 MG/DL (ref 8.3–10.1)
CHLORIDE SERPL-SCNC: 97 MMOL/L (ref 96–108)
CO2 SERPL-SCNC: 29 MMOL/L (ref 21–32)
CREAT SERPL-MCNC: 0.56 MG/DL (ref 0.6–1.3)
GFR SERPL CREATININE-BSD FRML MDRD: 85 ML/MIN/1.73SQ M
GLUCOSE P FAST SERPL-MCNC: 199 MG/DL (ref 65–99)
POTASSIUM SERPL-SCNC: 4.1 MMOL/L (ref 3.5–5.3)
SODIUM SERPL-SCNC: 132 MMOL/L (ref 135–147)

## 2022-12-16 NOTE — TELEPHONE ENCOUNTER
[address]  Yvone Nageotte, PharmD, BCPS, BCACP     Communication with patient: per telephone     Reason for documentation: follow-up    Recommendations:      The following actions were taken today by the Clinical Pharmacist:   • STOP NPH    Follow-up:   Follow-up with pharmacist PRN     Chronic Conditions Addressed at this Visit:      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, without long-term current use of insulin: Goals - A1c: <8%; SMBGs: Preprandial: 90-150mg/dL and HS: 100-180mg/dL per ADA guidelines (individualized based on age, disease duration, and co-morbidities)  - Most recent A1c: below goal    - SMBGs: Improvement noted since being off of steroids  Current DM Regimen:  • Januvia 100 mg daily  • Metformin 1,000 mg BID  • NPH 5 units SQ QAM + 3 units SQ QPM  MEDICATIONS: Will stop NPH since patient has stopped prednisone  HOME MONITORING: Check blood sugars at least daily (FBG) + any hypoglycemic events and record      Interstitial lung disease  Followed by Pulmonology  Was on long-term prednisone taper  Has follow-up scheduled       Subjective:     Patient has stopped steroids as of 12/13  Feels that symptoms are returning  Has had no hypoglycemic episodes  Objective:   SMBG readings (mg/dL):      Average Min Max Goal      134 174 90 150 FBG Average: 155 8 mg/dl (134-174 mg/dl), n=5, 0/5= < 70 mg/dl   Pre-Lunch  165 165 90 150 Pre-Lunch BG Average: 165 mg/dl (165-165 mg/dl), n=1, 0/1= < 70 mg/dl   Pre-Supper  132 221 90 150 Pre-Supper BG Average: 172 3 mg/dl (132-221 mg/dl), n=3, 0/3= < 70 mg/dl   HS  162 180 100 180 HS BG Average: 171 mg/dl (162-180 mg/dl), n=2, 0/2= < 70 mg/dl         Total # of reading  < 70 mg/dl: 0/11     Pharmacist Tracking Tool  Reason For Outreach: Embedded Pharmacist  Demographics:  Intervention Method: Phone  Type of Intervention: Follow-Up  Topics Addressed: Diabetes  Pharmacologic Interventions: Dose or Frequency Adjusted and Prevent or Manage JUAN  Non-Pharmacologic Interventions: Home Monitoring  Time:  Direct Patient Care: 15 mins  Care Coordination: 10 mins  Recommendation Recipient: Patient/Caregiver  Outcome: Accepted

## 2022-12-17 ENCOUNTER — NURSE TRIAGE (OUTPATIENT)
Dept: OTHER | Facility: OTHER | Age: 87
End: 2022-12-17

## 2022-12-17 DIAGNOSIS — U07.1 COVID-19 VIRUS INFECTION: Primary | ICD-10-CM

## 2022-12-17 DIAGNOSIS — U07.1 COVID-19: Primary | ICD-10-CM

## 2022-12-17 NOTE — TELEPHONE ENCOUNTER
TC on call provider patient's symptoms and request for antiviral mediation  Patient unable to take Paxlovid due to interactions with medications, but on call stated ok to send in molnupiravir for patient  Sent to pharmacy; receipt confirmed by pharmacy  Went over ED precautions with patient and to call back with any further questions or concerns

## 2022-12-17 NOTE — TELEPHONE ENCOUNTER
Reason for Disposition  • HIGH RISK for severe COVID complications (e g , weak immune system, age > 59 years, obesity with BMI > 22, pregnant, chronic lung disease or other chronic medical condition)  (Exception: Already seen by PCP and no new or worsening symptoms )    Answer Assessment - Initial Assessment Questions  1  COVID-19 DIAGNOSIS: "Who made your COVID-19 diagnosis?" "Was it confirmed by a positive lab test or self-test?" If not diagnosed by a doctor (or NP/PA), ask "Are there lots of cases (community spread) where you live?" Note: See public health department website, if unsure  12/17  2  COVID-19 EXPOSURE: "Was there any known exposure to COVID before the symptoms began?" CDC Definition of close contact: within 6 feet (2 meters) for a total of 15 minutes or more over a 24-hour period  N/A  3  ONSET: "When did the COVID-19 symptoms start?"       12/16 in evening  4  WORST SYMPTOM: "What is your worst symptom?" (e g , cough, fever, shortness of breath, muscle aches)      Congestion, cough  5  COUGH: "Do you have a cough?" If Yes, ask: "How bad is the cough?"        Worse this morning, but has improved  Productive  6  FEVER: "Do you have a fever?" If Yes, ask: "What is your temperature, how was it measured, and when did it start?"      Denies, but took tylenol  7  RESPIRATORY STATUS: "Describe your breathing?" (e g , shortness of breath, wheezing, unable to speak)       Denies SOB, wheezing, or chest pain  8  BETTER-SAME-WORSE: "Are you getting better, staying the same or getting worse compared to yesterday?"  If getting worse, ask, "In what way?"      Better than yesterday  9  HIGH RISK DISEASE: "Do you have any chronic medical problems?" (e g , asthma, heart or lung disease, weak immune system, obesity, etc )      Pulmonary fibrosis, IBS  Was just on prednisone for pulmonary fibrosis, DMII  10   VACCINE: "Have you had the COVID-19 vaccine?" If Yes, ask: "Which one, how many shots, when did you get it?"        Pfizer  11  BOOSTER: "Have you received your COVID-19 booster?" If Yes, ask: "Which one and when did you get it?"        Pfizer x3  12  PREGNANCY: "Is there any chance you are pregnant?" "When was your last menstrual period?"        N/A  13   OTHER SYMPTOMS: "Do you have any other symptoms?"  (e g , chills, fatigue, headache, loss of smell or taste, muscle pain, sore throat)        N/A  14  O2 SATURATION MONITOR:  "Do you use an oxygen saturation monitor (pulse oximeter) at home?" If Yes, ask "What is your reading (oxygen level) today?" "What is your usual oxygen saturation reading?" (e g , 95%)        Yes, 94-95% this morning    Protocols used: CORONAVIRUS (COVID-19) DIAGNOSED OR SUSPECTED-ADULT-

## 2022-12-17 NOTE — TELEPHONE ENCOUNTER
Regarding: Covid Care advise-pulmonary fibrosis  ----- Message from Julita Tavarez sent at 12/17/2022  1:53 PM EST -----  "I have a productive cough and congestion    I have pulmonary fibrosis, and I tested positive for Covid "

## 2022-12-19 ENCOUNTER — TELEPHONE (OUTPATIENT)
Dept: NEPHROLOGY | Facility: CLINIC | Age: 87
End: 2022-12-19

## 2022-12-19 NOTE — TELEPHONE ENCOUNTER
Received a voicemail from patient needing to reschedule her appointment for tomorrow due to having covid  Patient is requesting a call from Dr Gisele Dunaway regarding urinating  Patient stated that she has noticed a change and is losing control in bladder  Patient stated it is happening more regularly

## 2022-12-20 ENCOUNTER — TELEPHONE (OUTPATIENT)
Dept: FAMILY MEDICINE CLINIC | Facility: CLINIC | Age: 87
End: 2022-12-20

## 2022-12-20 ENCOUNTER — NURSE TRIAGE (OUTPATIENT)
Dept: OTHER | Facility: OTHER | Age: 87
End: 2022-12-20

## 2022-12-20 NOTE — TELEPHONE ENCOUNTER
Spoke with patient  Took Molnupiravir last night at 19:00  Woke up this morning with Diarrhea  Would like to know if she should continue to take the medication  She has not taken her dose this morning  States since starting the medication her BM's have been getting looser each time

## 2022-12-20 NOTE — TELEPHONE ENCOUNTER
Patient called to state that she started taking molunpiravir Saturday evening and started experiencing diarrhea this morning  She skipped her morning dose because of this  Please advise

## 2022-12-20 NOTE — TELEPHONE ENCOUNTER
Regarding: diarrhea  - possible med allergy  ----- Message from Codey Mccartney sent at 12/20/2022  9:02 AM EST -----  " I started taking molnupiravir on Saturday, I got I 5 full doses in   Now I have major diarrhea, I want to know if there is anything else I can do "

## 2022-12-20 NOTE — TELEPHONE ENCOUNTER
Yes, this could be a side effect to antiviral meds  If they are loose but 1-2/day I would continue med  If BM's > 3/day I would d/c altogether

## 2022-12-20 NOTE — TELEPHONE ENCOUNTER
Answer Assessment - Initial Assessment Questions  1  NAME of MEDICATION: "What medicine are you calling about?"      Molnupiravir   2  QUESTION: "What is your question?" (e g , medication refill, side effect)      Side effect   3  PRESCRIBING HCP: "Who prescribed it?" Reason: if prescribed by specialist, call should be referred to that group  PCP office   4  SYMPTOMS: "Do you have any symptoms?"      Diarrhea  5  SEVERITY: If symptoms are present, ask "Are they mild, moderate or severe?"      Severe  6   PREGNANCY:  "Is there any chance that you are pregnant?" "When was your last menstrual period?"      N/A    Protocols used: MEDICATION QUESTION CALL-ADULT-[place]

## 2022-12-20 NOTE — TELEPHONE ENCOUNTER
Upon review of the In Basket request we were able to locate, review, and update the patient chart as requested for Diabetic Eye Exam     Any additional questions or concerns should be emailed to the Practice Liaisons via the appropriate education email address, please do not reply via In Basket      Thank you  Denisse Franco MA

## 2023-01-06 ENCOUNTER — HOSPITAL ENCOUNTER (OUTPATIENT)
Dept: PULMONOLOGY | Facility: HOSPITAL | Age: 88
End: 2023-01-06

## 2023-01-06 DIAGNOSIS — J84.112 IDIOPATHIC PULMONARY FIBROSIS (HCC): ICD-10-CM

## 2023-01-06 RX ORDER — ALBUTEROL SULFATE 2.5 MG/3ML
2.5 SOLUTION RESPIRATORY (INHALATION) ONCE
Status: COMPLETED | OUTPATIENT
Start: 2023-01-06 | End: 2023-01-06

## 2023-01-06 RX ADMIN — ALBUTEROL SULFATE 2.5 MG: 2.5 SOLUTION RESPIRATORY (INHALATION) at 13:55

## 2023-01-10 ENCOUNTER — OFFICE VISIT (OUTPATIENT)
Dept: GASTROENTEROLOGY | Facility: AMBULARY SURGERY CENTER | Age: 88
End: 2023-01-10

## 2023-01-10 VITALS
SYSTOLIC BLOOD PRESSURE: 124 MMHG | DIASTOLIC BLOOD PRESSURE: 70 MMHG | HEIGHT: 66 IN | WEIGHT: 110.8 LBS | OXYGEN SATURATION: 100 % | BODY MASS INDEX: 17.81 KG/M2 | RESPIRATION RATE: 18 BRPM | HEART RATE: 82 BPM

## 2023-01-10 DIAGNOSIS — R14.0 ABDOMINAL BLOATING: ICD-10-CM

## 2023-01-10 DIAGNOSIS — K59.09 CHRONIC CONSTIPATION WITH OVERFLOW: Primary | ICD-10-CM

## 2023-01-10 DIAGNOSIS — E44.0 MODERATE PROTEIN-CALORIE MALNUTRITION (HCC): ICD-10-CM

## 2023-01-10 NOTE — PATIENT INSTRUCTIONS
Motegrity  - adjust dose at home  - 1) take every other day, alternating with metamucil 1 tbsp in a glass of water  2) if that doesn't work, take [employer] for two days and then metamucil in between    Let me know if you want to try pelvic floor therapy

## 2023-01-10 NOTE — PROGRESS NOTES
[facility] - Outpatient Consultation  Erica Lowe 82 y o  female MRN: [id number]  Encounter: [id number]      PCP: Sandra Parra  Referring: No referring provider defined for this encounter  ASSESSMENT AND PLAN:      1  Chronic constipation with overflow  2  Abdominal bloating  3  Moderate protein-calorie malnutrition ([address] )  Diagnostic evaluation includes CT x2 (2020, 2021), EUS, EGD, colonoscopy, ERCP (choledocholithiasis), nuclear gastric emptying study- unremarkable  Continues with alternating constipation/diarrhea- discussed limited therapeutic options as repeats are directed at either treating constipation or diarrhea  Add Metamucil to her regimen  Recommend adjustment of Motegrity, consider every other day dosing  Add Metamucil on the intervening days    ______________________________________________________________________    CC:  Chief Complaint   Patient presents with   • Follow-up   • Diarrhea   • Constipation       HPI:      Patient is an 82-year-old female who sees me in follow-up for bloating, irregular bowel habits with alternating constipation and diarrhea  Diagnostic evaluation includes CT x2 (2020, 2021), EUS, EGD, colonoscopy, ERCP (choledocholithiasis), nuclear gastric emptying study  She has been on Motegrity since her last office visit in April, she continues to experience alternating bowel habits  She has 3-day alternations, where the first day she has a hard bowel movement associated with gas, abdominal bloating, the next day is a looser bowel movement, and then has diarrhea for 4 times in 1 day  She describes watery, mushy stool  She has been using Imodium as needed with relief, especially when leaving the house  Weight remains stable, she is being treated for interstitial lung disease  REVIEW OF SYSTEMS:    CONSTITUTIONAL: Denies any fever, chills, rigors, and weight loss  HEENT: No earache or tinnitus   Denies hearing loss or visual disturbances  CARDIOVASCULAR: No chest pain or palpitations  RESPIRATORY: Denies any cough, hemoptysis, shortness of breath or dyspnea on exertion  GASTROINTESTINAL: As noted in the History of Present Illness  GENITOURINARY: No problems with urination  Denies any hematuria or dysuria  NEUROLOGIC: No dizziness or vertigo, denies headaches  MUSCULOSKELETAL: Denies any muscle or joint pain  SKIN: Denies skin rashes or itching  ENDOCRINE: Denies excessive thirst  Denies intolerance to heat or cold  PSYCHOSOCIAL: Denies depression or anxiety  Denies any recent memory loss         Historical Information   Past Medical History:   Diagnosis Date   • 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/7/2020   • Glaucoma    • H/O degenerative disc disease    • Idiopathic pulmonary fibrosis [address] [address] ) 11/2020   • Irregular heart beat     afib   • Peripheral neuropathy    • S/P laparoscopic cholecystectomy 12/21/2020   • Stenosis of right subclavian artery ([facility] ) 11/18/2016     Past Surgical History:   Procedure Laterality Date   • CATARACT EXTRACTION, BILATERAL  2011   • CHOLECYSTECTOMY     • CHOLECYSTECTOMY LAPAROSCOPIC N/A 12/09/2020    Procedure: CHOLECYSTECTOMY LAPAROSCOPIC;  Surgeon: Vilma Bassett MD;  Location: [facility];  Service: General   • COLONOSCOPY     • CYST REMOVAL  2009    left lower Quadrant    • HERNIA REPAIR  1992   • LIPOMA RESECTION  2010   • UT RPR 1ST INGUN HRNA AGE 5 YRS/> REDUCIBLE Bilateral 01/05/2018    Procedure: INGUINAL HERNIA REPAIR;  Surgeon: Isabel Pedraza MD;  Location: [facility];  Service: General   • SHOULDER SURGERY  04/26/2019    Dr Colletta Morgans [facility])   • UPPER GASTROINTESTINAL ENDOSCOPY     • VASCULAR SURGERY  1994    Agram-  R carotid occlusion     Social History   Social History     Substance and Sexual Activity   Alcohol Use No     Social History     Substance and Sexual Activity   Drug Use No     Social History     Tobacco Use   Smoking Status Former   • Packs/day: 1 50   • Years: 38 00   • Pack years: 57 00   • Types: Cigarettes   • Start date: 28   • Quit date: 12   • Years since quittin 0   Smokeless Tobacco Never     Family History   Problem Relation Age of Onset   • Heart disease Mother    • Heart attack Father    • Hiatal hernia Father    • Diabetes Father    • Cancer Brother    • Diabetes Brother    • Heart disease Brother    • Hypertension Brother    • Other Son         gastroparesis       Meds/Allergies       Current Outpatient Medications:   •  acetaminophen (TYLENOL) 500 mg tablet  •  Alphagan P 0 1 %  •  aspirin (ECOTRIN LOW STRENGTH) 81 mg EC tablet  •  azelastine (ASTELIN) 0 1 % nasal spray  •  bimatoprost (LUMIGAN) 0 01 % ophthalmic drops  •  calcium carbonate (OS-DANIA) 600 MG tablet  •  carboxymethylcellulose 1 % ophthalmic solution  •  Eliquis 2 5 MG  •  ezetimibe-simvastatin (VYTORIN) 10-40 mg per tablet  •  famotidine (PEPCID) 20 mg tablet  •  glucose blood (OneTouch Ultra) test strip  •  insulin isophane (HumuLIN N KwikPen) 100 units/mL injection pen  •  ipratropium (ATROVENT) 0 03 % nasal spray  •  Januvia 100 MG tablet  •  Lancets (OneTouch Delica Plus MEOPEC33N) MISC  •  Litetouch Pen Needles 31G X 6 MM MISC  •  loratadine (CLARITIN) 10 mg tablet  •  LORazepam (ATIVAN) 0 5 mg tablet  •  metFORMIN (GLUCOPHAGE) 500 mg tablet  •  metoprolol tartrate (LOPRESSOR) 50 mg tablet  •  Motegrity 2 MG TABS  •  Multiple Vitamin (multivitamin) tablet  •  pantoprazole (PROTONIX) 40 mg tablet  •  peppermint oil  •  sodium chloride 1 g tablet  •  fluticasone (FLONASE) 50 mcg/act nasal spray    Allergies   Allergen Reactions   • Keflex [Cephalexin] Diarrhea           Objective     Blood pressure 124/70, pulse 82, resp  rate 18, height 5' 6" (1 676 m), weight 50 3 kg (110 lb 12 8 oz), SpO2 100 %  Body mass index is 17 88 kg/m²  PHYSICAL EXAM:      General Appearance:   Alert, cooperative, no distress   HEENT:   Normocephalic, atraumatic, anicteric       Neck:  Supple, symmetrical, trachea midline   Lungs:   Clear to auscultation bilaterally; no rales, rhonchi or wheezing; respirations unlabored    Heart[de-identified]   Regular rate and rhythm; no murmur, rub, or gallop  Abdomen:   Soft, non-tender, non-distended; normal bowel sounds; no masses, no organomegaly    Genitalia:   Deferred    Rectal:   Deferred    Extremities:  No cyanosis, clubbing or edema    Pulses:  2+ and symmetric    Skin:  No jaundice, rashes, or lesions    Lymph nodes:  No palpable cervical lymphadenopathy        Lab Results:     Lab Results   Component Value Date    WBC 6 02 08/11/2022    HGB 12 3 08/11/2022    HCT 39 5 08/11/2022    MCV 86 08/11/2022     08/11/2022       Lab Results   Component Value Date    K 4 1 12/16/2022    CL 97 12/16/2022    CO2 29 12/16/2022    BUN 9 12/16/2022    CREATININE 0 56 (L) 12/16/2022    GLUF 199 (H) 12/16/2022    CALCIUM 9 0 12/16/2022    CORRECTEDCA 9 6 12/14/2020    AST 18 08/11/2022    ALT 22 08/11/2022    ALKPHOS 52 08/11/2022    EGFR 85 12/16/2022       Lab Results   Component Value Date    INR 1 14 12/08/2020    PROTIME 14 6 (H) 12/08/2020         Radiology Results:   Complete PFT with post Bronchodilator and Six Minute walk    Result Date: 1/6/2023  Narrative: Pulmonary Function Test Report Ross Rodriguez 82 y o  female MRN: [id number] Date test performed: 1/6/2023 Date of test interpretation: 1/6/2023 Requesting Physician: Dr Flo Monae Reason for Testing: idiopathic pulmonary fibrosis Respiratory technician Comments: Variable efforts for FVC  Best efforts reported  The IVC for the DLCO manuver was < 90% of VC  Six minute walk test was performed after PFT  HR 72 , O2 saturation 99% on RMA at rest  Reference set for interpretation: [id number] Procedure: The patient was taken to pulmonary function testing laboratory  The patient demonstrated good effort and cooperation  The results of this test meet ATS standards for acceptability and repeatability    Post bronchodilator testing performed after the administration of 2 5mg albuterol in 3cc normal saline administered via nebulizer per bronchodilator protocol  DLCO was corrected for patient's Hb Results: FEV1/FVC Ratio: 93 24 % Forced Vital Capacity: 1 79 L 69 % predicted FEV1: 1 67 L 86 % predicted After administration of bronchodilator FVC: 1 78 L, 69 % predicted, 0 % change FEV1: 1 58 L, 81 % predicted, -5 % change Lung volumes by body plethysmography: Total Lung Capacity 75 % predicted Residual volume 91 % predicted RV/TLC Ratio: 58 58 %, 117 % predicted DLCO corrected for patients hemoglobin level: 72 % Interpretation: · spirometry indicative of restriction · No significant improvement in airflow or forced vital capacity in response to the administration to bronchodilator per ATS standards  · Mild restriction and air trapping as indicated by the lung volumes · Mild decrease in diffusion capacity · Flow volume loop with blunted expiratory limb 6MWT study: desaturation occurred and patient required 2L supplemental 02 to keep saturation > 88%  Total distance walked = 144 8 meters  Alek Daley MD Pulmonary/Critical Care Fellow PGY-IV [facility] _____________________________________________________ Attending Co-signature: I have reviewed the pulmonary function test with the pulmonary fellow  I agree with the study interpretation as outlined above Mega Vargas MD       Portions of the record may have been created with voice recognition software  Occasional wrong word or "sound a like" substitutions may have occurred due to the inherent limitations of voice recognition software  Read the chart carefully and recognize, using context, where substitutions have occurred

## 2023-01-13 ENCOUNTER — REMOTE DEVICE CLINIC VISIT (OUTPATIENT)
Dept: CARDIOLOGY CLINIC | Facility: CLINIC | Age: 88
End: 2023-01-13

## 2023-01-13 ENCOUNTER — TELEPHONE (OUTPATIENT)
Dept: PULMONOLOGY | Facility: CLINIC | Age: 88
End: 2023-01-13

## 2023-01-13 ENCOUNTER — TELEPHONE (OUTPATIENT)
Dept: NEPHROLOGY | Facility: CLINIC | Age: 88
End: 2023-01-13

## 2023-01-13 DIAGNOSIS — Z95.0 CARDIAC PACEMAKER IN SITU: Primary | ICD-10-CM

## 2023-01-13 NOTE — PROGRESS NOTES
Results for orders placed or performed in visit on 01/13/23   Cardiac EP device report    Narrative    MDT-DUAL CHAMBER PPM (AAIR-DDDR MODE)/ ACTIVE SYSTEM IS MRI CONDITIONAL  CARELINK TRANSMISSION: BATTERY STATUS "4 YRS " AP 70%  0%  ALL AVAILABLE LEAD PARAMETERS WITHIN NORMAL LIMITS  NO SIGNIFICANT HIGH RATE EPISODES  NORMAL DEVICE FUNCTION   NC

## 2023-01-13 NOTE — TELEPHONE ENCOUNTER
Appointment Confirmation   Person confirmed appointment with  If not patient, name of the person Patient    Date and time of appointment 1/16/23   3:30 pm   Patient acknowledged and will be at appointment? yes    Did you advise the patient that they will need a urine sample if they are a new patient?  N/A    Did you advise the patient to bring their current medications for verification? (including any OTC) Yes    Additional Information

## 2023-01-14 NOTE — PROGRESS NOTES
Cardiology Follow Up    Bridget Hassan  1935  [id number]  [address] [facility] [id number] [phone]    1  Cardiac pacemaker in situ        2  Essential (primary) hypertension        3  Pure hypercholesterolemia        4  Paroxysmal atrial fibrillation (HCC)            Interval History:  Patient is here for a follow-up visit  Patient has a PPM with ongoing interrogation  [employer] most recent check 1/2023 showed the device was functioning properly    A pharmacologic nuclear stress test done October 2016  showed no evidence of prognostically important ischemia   Echocardiogram done October 2022 demonstrated LVEF of 60% with evidence of LVH  A small strand-like mass was noted on the aortic aspect of the aortic valve  Review of prior echo done April 20, 2021 shows a similar finding  Patient follows with vascular in reference to carotid disease   Carotid Doppler done in June 2022 demonstrated known occlusion of the right internal carotid and 50 to 69% disease in the left carotid  Patient while in [place] over the winter time 2019, had an episode where she fractured her right shoulder   She  had issues with [facility]  Patient is on adjusted dose Eliquis   ASA was discontinued  A [employer] in [place] reportedly showed no ischemia with an LVEF of 62%  Lipid profile April 2022 good  She has had significant weight loss  She is followed by gastroenterology  She has had no chest pain  Her vital signs are stable today  Follows with pulmonary service in reference to pulmonary fibrosis      Patient Active Problem List   Diagnosis   • HTN (hypertension)   • HLD (hyperlipidemia)   • Glaucoma   • Asymptomatic carotid artery stenosis, left   • Symptomatic PVCs   • Occlusion of right carotid artery   • Paroxysmal atrial fibrillation (HCC)   • Pacemaker   • Osteoporosis   • GERD (gastroesophageal reflux disease)   • Anxiety   • Iron deficiency   • Interstitial lung disease (HCC)   • Dysphagia   • Moderate protein-calorie malnutrition ([facility])   • Constipation   • Hyponatremia   • Post-nasal drip   • 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, without long-term current use of insulin (HCC)   • Idiopathic pulmonary fibrosis ([facility])   • Type 2 diabetes mellitus with hyperlipidemia ([facility])   • Type 2 diabetes mellitus with diabetic polyneuropathy, without long-term current use of insulin ([facility])   • 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, without long-term current use of insulin ([facility])   • Depression, recurrent ([facility])   • Hypovitaminosis D     Past Medical History:   Diagnosis Date   • Common bile duct dilatation 2020   • Glaucoma    • H/O degenerative disc disease    • Idi[facility] ) 2020   • Irregular heart beat     afib   • Peripheral neuropathy    • S/P laparoscopic cholecystectomy 2020   • Stenosis of right subclavian artery ([facility] ) 2016     Social History     Socioeconomic History   • Marital status:      Spouse name: Not on file   • Number of children: Not on file   • Years of education: Not on file   • Highest education level: Not on file   Occupational History   • Occupation: customer service   retired   Tobacco Use   • Smoking status: Former     Packs/day: 1 50     Years: 38 00     Pack years: 57 00     Types: Cigarettes     Start date:      Quit date:      Years since quittin 0   • Smokeless tobacco: Never   Vaping Use   • Vaping Use: Never used   Substance and Sexual Activity   • Alcohol use: No   • Drug use: No   • Sexual activity: Not on file   Other Topics Concern   • Not on file   Social History Narrative    Lives alone [facility]  Was divorce  Ex- passed away  3 children  Four grandchildren      Spends most of her time at home   Does not drive  Social Determinants of Health     Financial Resource Strain: Low Risk    • Difficulty of Paying Living Expenses: Not hard at all   Food Insecurity: Not on file   Transportation Needs: No Transportation Needs   • Lack of Transportation (Medical): No   • Lack of Transportation (Non-Medical):  No   Physical Activity: Not on file   Stress: Not on file   Social Connections: Not on file   Intimate Partner Violence: Not on file   Housing Stability: Not on file      Family History   Problem Relation Age of Onset   • Heart disease Mother    • Heart attack Father    • Hiatal hernia Father    • Diabetes Father    • Cancer Brother    • Diabetes Brother    • Heart disease Brother    • Hypertension Brother    • Other Son         gastroparesis     Past Surgical History:   Procedure Laterality Date   • CATARACT EXTRACTION, BILATERAL  2011   • CHOLECYSTECTOMY     • CHOLECYSTECTOMY LAPAROSCOPIC N/A 12/09/2020    Procedure: CHOLECYSTECTOMY LAPAROSCOPIC;  Surgeon: John Mccullough MD;  Location: [facility];  Service: General   • COLONOSCOPY     • CYST REMOVAL  2009    left lower Quadrant    • HERNIA REPAIR  1992   • LIPOMA RESECTION  2010   • MO RPR 1ST INGUN HRNA AGE 5 YRS/> REDUCIBLE Bilateral 01/05/2018    Procedure: INGUINAL HERNIA REPAIR;  Surgeon: Suma Cardona MD;  Location: [facility];  Service: General   • SHOULDER SURGERY  04/26/2019    Dr Gutierrez [facility])   • UPPER GASTROINTESTINAL ENDOSCOPY     • VASCULAR SURGERY  1994    [facility]gram-  R carotid occlusion       Current Outpatient Medications:   •  acetaminophen (TYLENOL) 500 mg tablet, Take 1,000 mg by mouth as needed for mild pain, Disp: , Rfl:   •  Alphagan P 0 1 %, INSTILL 1 DROP RIGHT EYE TWICE A DAY, Disp: , Rfl:   •  aspirin (ECOTRIN LOW STRENGTH) 81 mg EC tablet, Take 1 tablet (81 mg total) by mouth daily, Disp:  , Rfl:   •  bimatoprost (LUMIGAN) 0 01 % ophthalmic drops, Administer 1 drop to both eyes daily at bedtime for 30 days, Disp: 1 5 mL, Rfl: 0  •  calcium carbonate (OS-DANIA) 600 MG tablet, Take 600 mg by mouth 2 (two) times a day with meals  , Disp: , Rfl:   •  Eliquis 2 5 MG, TAKE 1 TABLET (2 5 MG TOTAL) BY MOUTH 2 (TWO) TIMES A DAY, Disp: 60 tablet, Rfl: 5  •  ezetimibe-simvastatin (VYTORIN) 10-40 mg per tablet, TAKE 1 TABLET BY MOUTH DAILY AT BEDTIME, Disp: 30 tablet, Rfl: 5  •  famotidine (PEPCID) 20 mg tablet, TAKE 1 TABLET (20 MG TOTAL) BY MOUTH 2 (TWO) TIMES A DAY, Disp: 60 tablet, Rfl: 5  •  glucose blood (OneTouch Ultra) test strip, TEST FOUR TIMES A DAY, Disp: 400 strip, Rfl: 1  •  ipratropium (ATROVENT) 0 03 % nasal spray, USE 2 SPRAYS INTO EACH NOSTRIL EVERY 12 (TWELVE) HOURS, Disp: 30 mL, Rfl: 6  •  Januvia 100 MG tablet, TAKE 1 TABLET (100 MG TOTAL) BY MOUTH DAILY, Disp: 30 tablet, Rfl: 5  •  loratadine (CLARITIN) 10 mg tablet, Take 10 mg by mouth daily as needed for allergies, Disp: , Rfl:   •  LORazepam (ATIVAN) 0 5 mg tablet, TAKE 2 TABLETS (1 MG TOTAL) BY MOUTH DAILY AT BEDTIME, Disp: 60 tablet, Rfl: 1  •  metFORMIN (GLUCOPHAGE) 500 mg tablet, TAKE 2 TABLETS (1,000 MG TOTAL) BY MOUTH 2 (TWO) TIMES A DAY WITH MEALS, Disp: 120 tablet, Rfl: 5  •  metoprolol tartrate (LOPRESSOR) 50 mg tablet, TAKE 1 5 TABLETS (75 MG TOTAL) BY MOUTH EVERY 12 (TWELVE) HOURS (Patient taking differently: Take 12 5 mg by mouth every 12 (twelve) hours), Disp: 90 tablet, Rfl: 5  •  Motegrity 2 MG TABS, TAKE 2 MG BY MOUTH DAILY, Disp: 90 tablet, Rfl: 3  •  Multiple Vitamin (multivitamin) tablet, Take 1 tablet by mouth daily  , Disp: , Rfl:   •  peppermint oil, Use once as needed, Disp: , Rfl:   •  sodium chloride 1 g tablet, TAKE 1 TABLET (1 G TOTAL) BY MOUTH 3 (THREE) TIMES A DAY, Disp: 90 tablet, Rfl: 5  Allergies   Allergen Reactions   • Keflex [Cephalexin] Diarrhea       Labs:not applicable  Imaging: Complete PFT with post Bronchodilator and Six Minute walk    Result Date: 1/6/2023  Narrative: Pulmonary Function Test Report Annette Lynch 82 y o  female MRN: [id number] Date test performed: 1/6/2023 Date of test interpretation: 1/6/2023 Requesting Physician: Dr Brittany Weaver Reason for Testing: idiopathic pulmonary fibrosis Respiratory technician Comments: Variable efforts for FVC  Best efforts reported  The IVC for the DLCO manuver was < 90% of VC  Six minute walk test was performed after PFT  HR 72 , O2 saturation 99% on RMA at rest  Reference set for interpretation: [id number] Procedure: The patient was taken to pulmonary function testing laboratory  The patient demonstrated good effort and cooperation  The results of this test meet ATS standards for acceptability and repeatability  Post bronchodilator testing performed after the administration of 2 5mg albuterol in 3cc normal saline administered via nebulizer per bronchodilator protocol  DLCO was corrected for patient's Hb Results: FEV1/FVC Ratio: 93 24 % Forced Vital Capacity: 1 79 L 69 % predicted FEV1: 1 67 L 86 % predicted After administration of bronchodilator FVC: 1 78 L, 69 % predicted, 0 % change FEV1: 1 58 L, 81 % predicted, -5 % change Lung volumes by body plethysmography: Total Lung Capacity 75 % predicted Residual volume 91 % predicted RV/TLC Ratio: 58 58 %, 117 % predicted DLCO corrected for patients hemoglobin level: 72 % Interpretation: · spirometry indicative of restriction · No significant improvement in airflow or forced vital capacity in response to the administration to bronchodilator per ATS standards  · Mild restriction and air trapping as indicated by the lung volumes · Mild decrease in diffusion capacity · Flow volume loop with blunted expiratory limb 6MWT study: desaturation occurred and patient required 2L supplemental 02 to keep saturation > 88%  Total distance walked = 144 8 meters   Hao Aquino MD Pulmonary/Critical Care Fellow PGY-IV [facility] _____________________________________________________ Attending Co-signature: I have reviewed the pulmonary function test with the pulmonary fellow  I agree with the study interpretation as outlined above Aubrey Toro MD     Cardiac EP device report    Result Date: 1/13/2023  Narrative: MDT-DUAL CHAMBER PPM (AAIR-DDDR MODE)/ ACTIVE SYSTEM IS MRI CONDITIONAL CARELINK TRANSMISSION: BATTERY STATUS "4 YRS " AP 70%  0%  ALL AVAILABLE LEAD PARAMETERS WITHIN NORMAL LIMITS  NO SIGNIFICANT HIGH RATE EPISODES  NORMAL DEVICE FUNCTION  NC       Review of Systems:  Review of Systems   All other systems reviewed and are negative  Physical Exam:  /70 (BP Location: Left arm, Patient Position: Sitting, Cuff Size: Standard)   Pulse 79   Ht 5' 6" (1 676 m)   Wt 51 8 kg (114 lb 3 2 oz)   SpO2 94%   BMI 18 43 kg/m²   Physical Exam  Vitals reviewed  Constitutional:       Appearance: She is well-developed  HENT:      Head: Normocephalic and atraumatic  Eyes:      Conjunctiva/sclera: Conjunctivae normal       Pupils: Pupils are equal, round, and reactive to light  Cardiovascular:      Rate and Rhythm: Normal rate  Heart sounds: Normal heart sounds  Pulmonary:      Effort: Pulmonary effort is normal       Comments: Basilar crackles  Musculoskeletal:      Cervical back: Normal range of motion and neck supple  Skin:     General: Skin is warm and dry  Neurological:      Mental Status: She is alert and oriented to person, place, and time  Discussion/Summary:I will continue the patient's present medical regimen  The patient appears well compensated  I have asked the patient to call if there is a problem in the interim otherwise I will see the patient in six months time

## 2023-01-16 ENCOUNTER — HOSPITAL ENCOUNTER (OUTPATIENT)
Dept: NON INVASIVE DIAGNOSTICS | Facility: CLINIC | Age: 88
Discharge: HOME | End: 2023-01-16

## 2023-01-16 ENCOUNTER — OFFICE VISIT (OUTPATIENT)
Dept: NEPHROLOGY | Facility: CLINIC | Age: 88
End: 2023-01-16

## 2023-01-16 VITALS
WEIGHT: 113.8 LBS | SYSTOLIC BLOOD PRESSURE: 160 MMHG | BODY MASS INDEX: 18.29 KG/M2 | DIASTOLIC BLOOD PRESSURE: 90 MMHG | HEIGHT: 66 IN

## 2023-01-16 DIAGNOSIS — I65.22 ASYMPTOMATIC CAROTID ARTERY STENOSIS, LEFT: ICD-10-CM

## 2023-01-16 DIAGNOSIS — E11.42 TYPE 2 DIABETES MELLITUS WITH DIABETIC POLYNEUROPATHY, WITHOUT LONG-TERM CURRENT USE OF INSULIN (HCC): ICD-10-CM

## 2023-01-16 DIAGNOSIS — E87.1 HYPONATREMIA: Primary | ICD-10-CM

## 2023-01-16 NOTE — PATIENT INSTRUCTIONS
Thank you for coming to your visit today  As we discussed you kidney function is normal  Your sodium is stable  Please follow the recommendations below       Recommend low sodium (salt) food    Avoid nonsteroidal anti-inflammatory drugs such as Naprosyn, ibuprofen, Aleve, Advil, Celebrex, Meloxicam (Mobic) etc   You can use Tylenol as needed if you do not have any liver condition to be concerned about    Try to avoid medications such as pantoprazole or  Protonix/Nexium or Esomeprazole)/Prilosec or omeprazole/Prevacid or lansoprazole/AcipHex or Rabeprazole  If you are able to, use Pepcid as this is safer for your kidneys      Try to exercise at least 30 minutes 3 days a week to begin with with an ultimate goal of 5 days a week for at least 30 minutes    Next Visit in 4 months with results   If you need to see us earlier we can change the appointment for you      Joana Kapadia MD  Nephrology Attending

## 2023-01-18 ENCOUNTER — TRANSCRIBE ORDERS (OUTPATIENT)
Dept: VASCULAR SURGERY | Facility: CLINIC | Age: 88
End: 2023-01-18

## 2023-01-18 DIAGNOSIS — I65.22 ASYMPTOMATIC CAROTID ARTERY STENOSIS, LEFT: Primary | ICD-10-CM

## 2023-01-18 NOTE — PROGRESS NOTES
NEPHROLOGY OUTPATIENT PROGRESS NOTE   Lakshmi Swartz 82 y o  female MRN: [id number]  DATE: 1/18/2023    Reason for visit:   Chief Complaint   Patient presents with   • Follow-up   • Hyponatremia        Patient Instructions   Thank you for coming to your visit today  As we discussed you kidney function is normal  Your sodium is stable  Please follow the recommendations below       • Recommend low sodium (salt) food    • Avoid nonsteroidal anti-inflammatory drugs such as Naprosyn, ibuprofen, Aleve, Advil, Celebrex, Meloxicam (Mobic) etc   You can use Tylenol as needed if you do not have any liver condition to be concerned about    • Try to avoid medications such as pantoprazole or  Protonix/Nexium or Esomeprazole)/Prilosec or omeprazole/Prevacid or lansoprazole/AcipHex or Rabeprazole  If you are able to, use Pepcid as this is safer for your kidneys  • Try to exercise at least 30 minutes 3 days a week to begin with with an ultimate goal of 5 days a week for at least 30 minutes    Next Visit in 4 months with results   If you need to see us earlier we can change the appointment for you      Ganesh Hicks MD  Nephrology Attending             Maryan Florez was seen today for follow-up and hyponatremia  Diagnoses and all orders for this visit:    Hyponatremia  -     Basic metabolic panel; Future    Type 2 diabetes mellitus with diabetic polyneuropathy, without long-term current use of insulin (HCC)        Assessment/Plan:  82 y  o  woman with [facility] of HTN, HLD, dysphagia, pAffib on Eliquis with pacemaker, DM, neuropathy, ILD and  pulmonary fibrosis, exposed to chemicals in the past   CT chest revealed subpleural reticular/fiber nodular opacities, interlobular septal thickening, mild central/bibasilar bronchiectasis, possible UIP pattern   Patient presents for follow-up of hyponatremia        PLAN:     #Asymptomatic chronic hyposomolar euvolemic Hyponatremia   • Sodium levels low since 2019   • Serum osm:280  • Urine osm: 300  • Urine Sodium: 48  • Baseline sodium 130-132mEq/L  • Current sodium: 134 4 mEq/L, better than baseline  • 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 exacerbate by benzos    • Plan:  • Sinew with fluid restriction 1 5 L a day  • Recommend decrease sodium chloride to 1 g twice daily  • BMP in 3 weeks        #HTN/volume status  • Hypertensive /90mmhg   • Metoprolol 12 5 mg twice daily    • Will decrease sodium chloride tabs  • Will monitor BP at home       #DM  • Metformin 1000mg bid   • HbA1c 7 8  • Maintain healthy diet (vegetables, fruits, whole grains, nonfat or low fat)  • Physical activity (5 to 10 minutes to start the increase to 30 min a day)  • Advised to maintain a good DM control to prevent CKD          SUBJECTIVE / INTERVAL HISTORY:  82 y o  female presents in follow up of hyponatremia  Patient feels well, no shortness of breath, no chest pain, no dizziness  No recent hospitalizations    Ressie Freddy denies any recent illness/hospitalizations/medication changes since last office visit  Review of Systems   Constitutional: Negative for activity change  HENT: Negative for congestion  Eyes: Negative for discharge  Respiratory: Negative for shortness of breath and stridor  Cardiovascular: Negative for leg swelling  Gastrointestinal: Negative for abdominal distention  Endocrine: Negative for cold intolerance  Genitourinary: Negative for dysuria  Musculoskeletal: Negative for arthralgias  Skin: Negative for pallor and rash  Neurological: Negative for dizziness  Psychiatric/Behavioral: Negative for agitation  OBJECTIVE:  /90 (BP Location: Left arm, Patient Position: Sitting, Cuff Size: Adult)   Ht 5' 6" (1 676 m)   Wt 51 6 kg (113 lb 12 8 oz)   BMI 18 37 kg/m²  Body mass index is 18 37 kg/m²      Physical exam:  Physical Exam  General:  no acute distress at this time  Skin:  No acute rash  Eyes:  No scleral icterus and noninjected  ENT:  mucous membranes moist  Neck:  no carotid bruits  Chest:  Clear to auscultation percussion, good respiratory effort, no use of accessory respiratory muscles  CVS:  Regular rate and rhythm without rub   Abdomen:  soft and nontender   Extremities:   no significant lower extremity edema  Neuro:  No gross focality  Psych:  Alert , cooperative     Medications:    Current Outpatient Medications:   •  acetaminophen (TYLENOL) 500 mg tablet, Take 1,000 mg by mouth as needed for mild pain, Disp: , Rfl:   •  Alphagan P 0 1 %, INSTILL 1 DROP RIGHT EYE TWICE A DAY, Disp: , Rfl:   •  aspirin (ECOTRIN LOW STRENGTH) 81 mg EC tablet, Take 1 tablet (81 mg total) by mouth daily, Disp:  , Rfl:   •  bimatoprost (LUMIGAN) 0 01 % ophthalmic drops, Administer 1 drop to both eyes daily at bedtime for 30 days, Disp: 1 5 mL, Rfl: 0  •  calcium carbonate (OS-DANIA) 600 MG tablet, Take 600 mg by mouth 2 (two) times a day with meals  , Disp: , Rfl:   •  Eliquis 2 5 MG, TAKE 1 TABLET (2 5 MG TOTAL) BY MOUTH 2 (TWO) TIMES A DAY, Disp: 60 tablet, Rfl: 5  •  ezetimibe-simvastatin (VYTORIN) 10-40 mg per tablet, TAKE 1 TABLET BY MOUTH DAILY AT BEDTIME, Disp: 30 tablet, Rfl: 5  •  famotidine (PEPCID) 20 mg tablet, TAKE 1 TABLET (20 MG TOTAL) BY MOUTH 2 (TWO) TIMES A DAY, Disp: 60 tablet, Rfl: 5  •  glucose blood (OneTouch Ultra) test strip, TEST FOUR TIMES A DAY, Disp: 400 strip, Rfl: 1  •  ipratropium (ATROVENT) 0 03 % nasal spray, USE 2 SPRAYS INTO EACH NOSTRIL EVERY 12 (TWELVE) HOURS, Disp: 30 mL, Rfl: 6  •  Januvia 100 MG tablet, TAKE 1 TABLET (100 MG TOTAL) BY MOUTH DAILY, Disp: 30 tablet, Rfl: 5  •  loratadine (CLARITIN) 10 mg tablet, Take 10 mg by mouth daily as needed for allergies, Disp: , Rfl:   •  LORazepam (ATIVAN) 0 5 mg tablet, TAKE 2 TABLETS (1 MG TOTAL) BY MOUTH DAILY AT BEDTIME, Disp: 60 tablet, Rfl: 1  •  metFORMIN (GLUCOPHAGE) 500 mg tablet, TAKE 2 TABLETS (1,000 MG TOTAL) BY MOUTH 2 (TWO) TIMES A DAY WITH MEALS, Disp: 120 tablet, Rfl: 5  •  metoprolol tartrate (LOPRESSOR) 50 mg tablet, TAKE 1 5 TABLETS (75 MG TOTAL) BY MOUTH EVERY 12 (TWELVE) HOURS (Patient taking differently: Take 12 5 mg by mouth every 12 (twelve) hours), Disp: 90 tablet, Rfl: 5  •  Motegrity 2 MG TABS, TAKE 2 MG BY MOUTH DAILY, Disp: 90 tablet, Rfl: 3  •  Multiple Vitamin (multivitamin) tablet, Take 1 tablet by mouth daily  , Disp: , Rfl:   •  peppermint oil, Use once as needed, Disp: , Rfl:   •  sodium chloride 1 g tablet, TAKE 1 TABLET (1 G TOTAL) BY MOUTH 3 (THREE) TIMES A DAY, Disp: 90 tablet, Rfl: 5    Allergies: Allergies as of 01/16/2023 - Reviewed 01/16/2023   Allergen Reaction Noted   • Keflex [cephalexin] Diarrhea 11/04/2020       The following portions of the patient's history were reviewed and updated as appropriate: past family history, past surgical history and problem list     Laboratory Results:  Lab Results   Component Value Date    SODIUM 132 (L) 12/16/2022    K 4 1 12/16/2022    CL 97 12/16/2022    CO2 29 12/16/2022    BUN 9 12/16/2022    CREATININE 0 56 (L) 12/16/2022    GLUC 172 (H) 07/28/2022    CALCIUM 9 0 12/16/2022        Lab Results   Component Value Date    CALCIUM 9 0 12/16/2022       Portions of the record may have been created with voice recognition software  Occasional wrong word or "sound a like" substitutions may have occurred due to the inherent limitations of voice recognition software  Read the chart carefully and recognize, using context, where substitutions have occurred

## 2023-01-24 ENCOUNTER — OFFICE VISIT (OUTPATIENT)
Dept: CARDIOLOGY CLINIC | Facility: CLINIC | Age: 88
End: 2023-01-24

## 2023-01-24 VITALS
HEART RATE: 79 BPM | SYSTOLIC BLOOD PRESSURE: 108 MMHG | HEIGHT: 66 IN | BODY MASS INDEX: 18.35 KG/M2 | OXYGEN SATURATION: 94 % | WEIGHT: 114.2 LBS | DIASTOLIC BLOOD PRESSURE: 70 MMHG

## 2023-01-24 DIAGNOSIS — I48.0 PAROXYSMAL ATRIAL FIBRILLATION (HCC): ICD-10-CM

## 2023-01-24 DIAGNOSIS — Z95.0 CARDIAC PACEMAKER IN SITU: Primary | ICD-10-CM

## 2023-01-24 DIAGNOSIS — I10 ESSENTIAL (PRIMARY) HYPERTENSION: ICD-10-CM

## 2023-01-24 DIAGNOSIS — E78.00 PURE HYPERCHOLESTEROLEMIA: ICD-10-CM

## 2023-02-03 ENCOUNTER — OFFICE VISIT (OUTPATIENT)
Dept: FAMILY MEDICINE CLINIC | Facility: CLINIC | Age: 88
End: 2023-02-03

## 2023-02-03 VITALS
HEART RATE: 91 BPM | RESPIRATION RATE: 16 BRPM | WEIGHT: 116 LBS | OXYGEN SATURATION: 97 % | TEMPERATURE: 97.5 F | BODY MASS INDEX: 18.64 KG/M2 | DIASTOLIC BLOOD PRESSURE: 80 MMHG | HEIGHT: 66 IN | SYSTOLIC BLOOD PRESSURE: 140 MMHG

## 2023-02-03 DIAGNOSIS — E78.2 MIXED HYPERLIPIDEMIA: ICD-10-CM

## 2023-02-03 DIAGNOSIS — E11.3552 TYPE 2 DIABETES MELLITUS WITH STABLE PROLIFERATIVE RETINOPATHY OF LEFT EYE, WITHOUT LONG-TERM CURRENT USE OF INSULIN (HCC): ICD-10-CM

## 2023-02-03 DIAGNOSIS — M25.551 HIP PAIN, BILATERAL: Primary | ICD-10-CM

## 2023-02-03 DIAGNOSIS — E87.1 HYPONATREMIA: ICD-10-CM

## 2023-02-03 DIAGNOSIS — E11.3391 TYPE 2 DIABETES MELLITUS WITH RIGHT EYE AFFECTED BY MODERATE NONPROLIFERATIVE RETINOPATHY WITHOUT MACULAR EDEMA, WITHOUT LONG-TERM CURRENT USE OF INSULIN (HCC): ICD-10-CM

## 2023-02-03 DIAGNOSIS — I10 HYPERTENSION, UNSPECIFIED TYPE: ICD-10-CM

## 2023-02-03 DIAGNOSIS — E11.69 TYPE 2 DIABETES MELLITUS WITH HYPERLIPIDEMIA (HCC): ICD-10-CM

## 2023-02-03 DIAGNOSIS — E11.42 TYPE 2 DIABETES MELLITUS WITH DIABETIC POLYNEUROPATHY, WITHOUT LONG-TERM CURRENT USE OF INSULIN (HCC): ICD-10-CM

## 2023-02-03 DIAGNOSIS — M81.0 OSTEOPOROSIS WITHOUT CURRENT PATHOLOGICAL FRACTURE, UNSPECIFIED OSTEOPOROSIS TYPE: ICD-10-CM

## 2023-02-03 DIAGNOSIS — M25.552 HIP PAIN, BILATERAL: Primary | ICD-10-CM

## 2023-02-03 DIAGNOSIS — E78.5 TYPE 2 DIABETES MELLITUS WITH HYPERLIPIDEMIA (HCC): ICD-10-CM

## 2023-02-03 DIAGNOSIS — F33.9 DEPRESSION, RECURRENT (HCC): ICD-10-CM

## 2023-02-03 NOTE — PROGRESS NOTES
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, without long-term current use of insulin ([facility]ly [facility])  Assessment & Plan:    Lab Results   Component Value Date    HGBA1C 7 8 (A) 11/15/2022     A1c stable on current medications  Due for repeat A1c in May  Left eye vitreous hemorrhage  Proliferative left eye diabetic retinopathy with high risk characteristics  Recommended for intra-vitreal injections  Follows with ophthalmology, Dr Rosy Bills  4  Type 2 diabetes mellitus with diabetic polyneuropathy, without long-term current use of insulin ([facility]ly [facility])  Assessment & Plan:    Lab Results   Component Value Date    HGBA1C 7 8 (A) 11/15/2022     A1c stable at 7 8%  Recently completed long course of corticosteroids, sugars were managed with insulin  Completed steroid course and off insulin now  Continue oral medications  Repeat A1c in May  Orders:  -     CBC; Future  -     Comprehensive metabolic panel; Future  -     Hemoglobin A1C; Future    5  Type 2 diabetes mellitus with hyperlipidemia ([facility])  Assessment & Plan:    Lab Results   Component Value Date    HGBA1C 7 8 (A) 11/15/2022     LDL 49, at goal    Continue Vytorin  Repeat lipid panel, CMP in May        6  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, without long-term current use of insulin ([facility])  Assessment & Plan:    Lab Results   Component Value Date    HGBA1C 7 8 (A) 11/15/2022       A1c 7 8%, stable on current medications  Due for repeat A1c in May  Right eye moderate non proliferative diabetic retinopathy, without edema  Follows with ophthalmology, Dr Mohinder Steel  7  Hypertension, unspecified type  Assessment & Plan:  Stable BP on metoprolol 25 mg twice daily  Orders:  -     CBC; Future  -     Comprehensive metabolic panel; Future  -     TSH, 3rd generation; Future    8  Osteoporosis without current pathological fracture, unspecified osteoporosis type  Assessment & Plan:  Last Dexa 2020 with osteoporosis  Will discuss repeat at next office visit in 3 months  Thus far has declined treatment  9  Mixed hyperlipidemia  Assessment & Plan:  Stable on Vytorin  Orders:  -     Lipid panel; Future    10  Hyponatremia  Assessment & Plan:  Continue nephrology follow up  As per nephrology she follows a 40 ounce per day fluid restriction  Sodium tablets 1 g twice daily, down from three times daily  Follow up in 3 months  Blood work in May  Chief Complaint     Chief Complaint   Patient presents with   • Follow-up       History of Present Illness     Benedicto Hirsch is an 82year old female presenting today for follow up on chronic conditions  Had COVID-19 in December  Had mild symptoms for 5 days  Recent pulmonary testing completed  Has had chronic pain in the bilateral low back, hips  Over the past 6 weeks left hip has been worse than right  Both hips pain when in morning  Spasm like pain, after along while with tylenol pain decreases  Using heating pad  Got worse  Heating pad  Constipation/diarrhea has been doing better using alternating regimen of motegrity and metamucil  Taking [address] for 2 days, then metamucil for 1 day  Having soft stools now  Review of Systems   Review of Systems   Constitutional: Negative  HENT: Negative      Eyes: Positive for visual disturbance (poor vision)  Respiratory: Negative  Cardiovascular: Negative  Gastrointestinal: Negative  Genitourinary: Negative  Musculoskeletal: Positive for arthralgias  Skin: Negative  Neurological: Positive for numbness (bilateral feet)  Hematological: Negative  Psychiatric/Behavioral: Negative  I have reviewed the patient's medical history in detail; there are no changes to the history as noted in the electronic medical record  Objective     Vitals:    02/03/23 1208   BP: 140/80   Pulse: 91   Resp: 16   Temp: 97 5 °F (36 4 °C)   TempSrc: Temporal   SpO2: 97%   Weight: 52 6 kg (116 lb)   Height: 5' 6" (1 676 m)     Wt Readings from Last 3 Encounters:   02/03/23 52 6 kg (116 lb)   01/24/23 51 8 kg (114 lb 3 2 oz)   01/16/23 51 6 kg (113 lb 12 8 oz)     Physical Exam  Vitals and nursing note reviewed  Constitutional:       Appearance: She is ill-appearing (chronically ill appearing, frail)  HENT:      Head: Atraumatic  Right Ear: Tympanic membrane normal       Left Ear: Tympanic membrane normal       Mouth/Throat:      Mouth: Mucous membranes are moist       Pharynx: Oropharynx is clear  Eyes:      Conjunctiva/sclera: Conjunctivae normal    Cardiovascular:      Rate and Rhythm: Normal rate and regular rhythm  Heart sounds: No murmur heard  Pulmonary:      Effort: Pulmonary effort is normal  No respiratory distress  Breath sounds: Normal breath sounds  Musculoskeletal:      Cervical back: Normal range of motion and neck supple  Lumbar back: No spasms or tenderness  Normal range of motion  Negative right straight leg raise test and negative left straight leg raise test       Comments: Tenderness bilateral upper buttocks tenderness  No lateral hip tenderness  Lymphadenopathy:      Cervical: No cervical adenopathy  Neurological:      Mental Status: She is alert        Comments: Sensation reduced bilateral feet--known peripheral neuropathy  Unable to illicit bilateral patellar reflexes  Negative straight leg test bilateral   Bilateral knee flexion strength 5/5  Bilateral knee extension strength 5/5  Bilateral hip flexion strength 5/5  Bilateral plantar flexion strength 5/5  Bilateral dorsiflexion strength 5/5  Bilateral lower extremity sensation intact        Psychiatric:         Mood and Affect: Mood normal             ALLERGIES:  Allergies   Allergen Reactions   • Keflex [Cephalexin] Diarrhea       Current Medications     Current Outpatient Medications   Medication Sig Dispense Refill   • acetaminophen (TYLENOL) 500 mg tablet Take 1,000 mg by mouth as needed for mild pain     • Alphagan P 0 1 % INSTILL 1 DROP RIGHT EYE TWICE A DAY     • aspirin (ECOTRIN LOW STRENGTH) 81 mg EC tablet Take 1 tablet (81 mg total) by mouth daily     • bimatoprost (LUMIGAN) 0 01 % ophthalmic drops Administer 1 drop to both eyes daily at bedtime for 30 days 1 5 mL 0   • calcium carbonate (OS-DANIA) 600 MG tablet Take 600 mg by mouth 2 (two) times a day with meals       • Eliquis 2 5 MG TAKE 1 TABLET (2 5 MG TOTAL) BY MOUTH 2 (TWO) TIMES A DAY 60 tablet 5   • ezetimibe-simvastatin (VYTORIN) 10-40 mg per tablet TAKE 1 TABLET BY MOUTH DAILY AT BEDTIME 30 tablet 5   • famotidine (PEPCID) 20 mg tablet TAKE 1 TABLET (20 MG TOTAL) BY MOUTH 2 (TWO) TIMES A DAY 60 tablet 5   • glucose blood (OneTouch Ultra) test strip TEST FOUR TIMES A  strip 1   • ipratropium (ATROVENT) 0 03 % nasal spray USE 2 SPRAYS INTO EACH NOSTRIL EVERY 12 (TWELVE) HOURS 30 mL 6   • Januvia 100 MG tablet TAKE 1 TABLET (100 MG TOTAL) BY MOUTH DAILY 30 tablet 5   • loratadine (CLARITIN) 10 mg tablet Take 10 mg by mouth daily as needed for allergies     • LORazepam (ATIVAN) 0 5 mg tablet TAKE 2 TABLETS (1 MG TOTAL) BY MOUTH DAILY AT BEDTIME 60 tablet 1   • metFORMIN (GLUCOPHAGE) 500 mg tablet TAKE 2 TABLETS (1,000 MG TOTAL) BY MOUTH 2 (TWO) TIMES A DAY WITH MEALS 120 tablet 5   • metoprolol tartrate (LOPRESSOR) 50 mg tablet TAKE 1 5 TABLETS (75 MG TOTAL) BY MOUTH EVERY 12 (TWELVE) HOURS (Patient taking differently: Take 25 mg by mouth every 12 (twelve) hours) 90 tablet 5   • Motegrity 2 MG TABS TAKE 2 MG BY MOUTH DAILY 90 tablet 3   • Multiple Vitamin (multivitamin) tablet Take 1 tablet by mouth daily       • peppermint oil Use once as needed     • sodium chloride 1 g tablet TAKE 1 TABLET (1 G TOTAL) BY MOUTH 3 (THREE) TIMES A DAY (Patient taking differently: Take 1 g by mouth 2 (two) times a day) 90 tablet 5     No current facility-administered medications for this visit           Health Maintenance     Health Maintenance   Topic Date Due   • COVID-19 Vaccine (5 - Booster for Pfizer series) 11/03/2022   • HEMOGLOBIN A1C  05/15/2023   • Depression Remission PHQ  08/03/2023   • Fall Risk  09/27/2023   • Urinary Incontinence Screening  09/27/2023   • Medicare Annual Wellness Visit (AWV)  09/27/2023   • DM Eye Exam  12/08/2023   • Diabetic Foot Exam  01/11/2024   • BMI: Adult  02/03/2024   • Colorectal Cancer Screening  09/02/2025   • Hepatitis C Screening  Completed   • Osteoporosis Screening  Completed   • Pneumococcal Vaccine: 65+ Years  Completed   • Influenza Vaccine  Completed   • HIB Vaccine  Aged Out   • IPV Vaccine  Aged Out   • Hepatitis A Vaccine  Aged Out   • Meningococcal ACWY Vaccine  Aged Out   • HPV Vaccine  Aged Out     Immunization History   Administered Date(s) Administered   • COVID-19 PFIZER VACCINE 0 3 ML IM 01/22/2021, 02/12/2021, 09/30/2021, 09/08/2022   • INFLUENZA 11/02/2016, 10/16/2017, 09/18/2018, 09/18/2018   • Influenza, Quadrivalent (nasal) 11/02/2016   • Influenza, high dose seasonal 0 7 mL 11/06/2019, 10/06/2020, 11/09/2021, 11/15/2022   • Pneumococcal Conjugate 13-Valent 07/25/2019   • Pneumococcal Polysaccharide PPV23 03/17/2003   • Zoster 07/09/2010       ZANDER Benjamin

## 2023-02-05 NOTE — ASSESSMENT & PLAN NOTE
Recently completed long course of corticosteroids  Recent PFT improved from prior  6 minute walk test with desaturation with walking, notes oxygen levels recovered quickly  She does not have home oxygen  Is overall sedentary  Has upcoming pulmonology follow up

## 2023-02-05 NOTE — ASSESSMENT & PLAN NOTE
Lab Results   Component Value Date    HGBA1C 7 8 (A) 11/15/2022       A1c 7 8%, stable on current medications  Due for repeat A1c in May  Right eye moderate non proliferative diabetic retinopathy, without edema  Follows with ophthalmology, Dr Josette Brasher

## 2023-02-05 NOTE — ASSESSMENT & PLAN NOTE
Lab Results   Component Value Date    HGBA1C 7 8 (A) 11/15/2022     A1c stable at 7 8%  Recently completed long course of corticosteroids, sugars were managed with insulin  Completed steroid course and off insulin now  Continue oral medications  Repeat A1c in May

## 2023-02-05 NOTE — ASSESSMENT & PLAN NOTE
Lab Results   Component Value Date    HGBA1C 7 8 (A) 11/15/2022     LDL 49, at goal    Continue Vytorin  Repeat lipid panel, CMP in May

## 2023-02-05 NOTE — ASSESSMENT & PLAN NOTE
Continue nephrology follow up  As per nephrology she follows a 40 ounce per day fluid restriction  Sodium tablets 1 g twice daily, down from three times daily

## 2023-02-05 NOTE — ASSESSMENT & PLAN NOTE
Last Dexa 2020 with osteoporosis  Will discuss repeat at next office visit in 3 months  Thus far has declined treatment

## 2023-02-05 NOTE — ASSESSMENT & PLAN NOTE
Lab Results   Component Value Date    HGBA1C 7 8 (A) 11/15/2022     A1c stable on current medications  Due for repeat A1c in May  Left eye vitreous hemorrhage  Proliferative left eye diabetic retinopathy with high risk characteristics  Recommended for intra-vitreal injections  Follows with ophthalmology, Dr Rosy Bills

## 2023-02-06 ENCOUNTER — TELEPHONE (OUTPATIENT)
Dept: FAMILY MEDICINE CLINIC | Facility: CLINIC | Age: 88
End: 2023-02-06

## 2023-02-06 DIAGNOSIS — I10 ESSENTIAL (PRIMARY) HYPERTENSION: ICD-10-CM

## 2023-02-06 RX ORDER — METOPROLOL TARTRATE 50 MG/1
25 TABLET, FILM COATED ORAL EVERY 12 HOURS SCHEDULED
Qty: 90 TABLET | Refills: 3 | Status: SHIPPED | OUTPATIENT
Start: 2023-02-06

## 2023-02-06 NOTE — TELEPHONE ENCOUNTER
Patient called to state that the dosage of her metoprolol is 50mg and she takes a half tablet in the morning and a half tablet at supper

## 2023-02-08 ENCOUNTER — HOSPITAL ENCOUNTER (OUTPATIENT)
Dept: RADIOLOGY | Facility: HOSPITAL | Age: 88
Discharge: HOME | End: 2023-02-08

## 2023-02-08 DIAGNOSIS — M25.551 HIP PAIN, BILATERAL: ICD-10-CM

## 2023-02-08 DIAGNOSIS — M25.552 HIP PAIN, BILATERAL: ICD-10-CM

## 2023-02-13 DIAGNOSIS — Z79.4 TYPE 2 DIABETES MELLITUS WITH RETINOPATHY, WITH LONG-TERM CURRENT USE OF INSULIN, MACULAR EDEMA PRESENCE UNSPECIFIED, UNSPECIFIED LATERALITY, UNSPECIFIED RETINOPATHY SEVERITY (HCC): ICD-10-CM

## 2023-02-13 DIAGNOSIS — F41.9 ANXIETY: ICD-10-CM

## 2023-02-13 DIAGNOSIS — K21.00 GASTROESOPHAGEAL REFLUX DISEASE WITH ESOPHAGITIS WITHOUT HEMORRHAGE: ICD-10-CM

## 2023-02-13 DIAGNOSIS — Z79.4 TYPE 2 DIABETES MELLITUS WITH DIABETIC POLYNEUROPATHY, WITH LONG-TERM CURRENT USE OF INSULIN (HCC): ICD-10-CM

## 2023-02-13 DIAGNOSIS — E11.319 TYPE 2 DIABETES MELLITUS WITH RETINOPATHY, WITH LONG-TERM CURRENT USE OF INSULIN, MACULAR EDEMA PRESENCE UNSPECIFIED, UNSPECIFIED LATERALITY, UNSPECIFIED RETINOPATHY SEVERITY (HCC): ICD-10-CM

## 2023-02-13 DIAGNOSIS — E11.42 TYPE 2 DIABETES MELLITUS WITH DIABETIC POLYNEUROPATHY, WITH LONG-TERM CURRENT USE OF INSULIN (HCC): ICD-10-CM

## 2023-02-13 DIAGNOSIS — I48.0 PAROXYSMAL ATRIAL FIBRILLATION (HCC): ICD-10-CM

## 2023-02-13 DIAGNOSIS — E87.1 HYPONATREMIA: ICD-10-CM

## 2023-02-13 RX ORDER — FAMOTIDINE 20 MG/1
20 TABLET, FILM COATED ORAL 2 TIMES DAILY
Qty: 60 TABLET | Refills: 5 | Status: SHIPPED | OUTPATIENT
Start: 2023-02-13

## 2023-02-13 RX ORDER — APIXABAN 2.5 MG/1
TABLET, FILM COATED ORAL
Qty: 60 TABLET | Refills: 5 | Status: SHIPPED | OUTPATIENT
Start: 2023-02-13

## 2023-02-13 RX ORDER — SITAGLIPTIN 100 MG/1
TABLET, FILM COATED ORAL
Qty: 30 TABLET | Refills: 5 | Status: SHIPPED | OUTPATIENT
Start: 2023-02-13

## 2023-02-13 RX ORDER — SODIUM CHLORIDE 1000 MG
1 TABLET, SOLUBLE MISCELLANEOUS 3 TIMES DAILY
Qty: 270 TABLET | Refills: 1 | Status: SHIPPED | OUTPATIENT
Start: 2023-02-13

## 2023-02-13 RX ORDER — LORAZEPAM 0.5 MG/1
1 TABLET ORAL
Qty: 60 TABLET | Refills: 1 | Status: SHIPPED | OUTPATIENT
Start: 2023-02-13

## 2023-02-13 NOTE — TELEPHONE ENCOUNTER
Medication failed [facility] protocol  Please forward to your office staff for further review as this medication was reviewed by a [employer] RN      Medication: Lorazepam  PDMP

## 2023-02-14 ENCOUNTER — TELEPHONE (OUTPATIENT)
Dept: FAMILY MEDICINE CLINIC | Facility: CLINIC | Age: 88
End: 2023-02-14

## 2023-02-14 NOTE — TELEPHONE ENCOUNTER
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

## 2023-02-15 ENCOUNTER — TELEPHONE (OUTPATIENT)
Dept: FAMILY MEDICINE CLINIC | Facility: CLINIC | Age: 88
End: 2023-02-15

## 2023-02-16 NOTE — TELEPHONE ENCOUNTER
Spoke with Ac regarding x-rays of lumbar spine and hips, showing arthritis, no fractures or malignancy concerns  Continues to have pain, especially in the morning, improves in about 40 minutes,  once she takes Tylenol and applies heat  We discussed pain management, or orthopedics consult  She has a chiropractor and prefers to see him first  She will contact me if pain is persistent

## 2023-03-02 ENCOUNTER — TELEPHONE (OUTPATIENT)
Dept: PULMONOLOGY | Facility: CLINIC | Age: 88
End: 2023-03-02

## 2023-03-03 ENCOUNTER — APPOINTMENT (OUTPATIENT)
Dept: LAB | Facility: CLINIC | Age: 88
End: 2023-03-03

## 2023-03-03 DIAGNOSIS — E11.42 TYPE 2 DIABETES MELLITUS WITH DIABETIC POLYNEUROPATHY, WITHOUT LONG-TERM CURRENT USE OF INSULIN (HCC): ICD-10-CM

## 2023-03-03 DIAGNOSIS — I10 HYPERTENSION, UNSPECIFIED TYPE: ICD-10-CM

## 2023-03-03 DIAGNOSIS — E78.2 MIXED HYPERLIPIDEMIA: ICD-10-CM

## 2023-03-03 DIAGNOSIS — E87.1 HYPONATREMIA: ICD-10-CM

## 2023-03-03 LAB
ALBUMIN SERPL BCP-MCNC: 4.2 G/DL (ref 3.5–5)
ALP SERPL-CCNC: 54 U/L (ref 46–116)
ALT SERPL W P-5'-P-CCNC: 17 U/L (ref 12–78)
ANION GAP SERPL CALCULATED.3IONS-SCNC: 5 MMOL/L (ref 4–13)
AST SERPL W P-5'-P-CCNC: 19 U/L (ref 5–45)
BILIRUB SERPL-MCNC: 0.5 MG/DL (ref 0.2–1)
BUN SERPL-MCNC: 13 MG/DL (ref 5–25)
CALCIUM SERPL-MCNC: 9.5 MG/DL (ref 8.3–10.1)
CHLORIDE SERPL-SCNC: 95 MMOL/L (ref 96–108)
CHOLEST SERPL-MCNC: 129 MG/DL
CO2 SERPL-SCNC: 29 MMOL/L (ref 21–32)
CREAT SERPL-MCNC: 0.42 MG/DL (ref 0.6–1.3)
ERYTHROCYTE [DISTWIDTH] IN BLOOD BY AUTOMATED COUNT: 13 % (ref 11.6–15.1)
EST. AVERAGE GLUCOSE BLD GHB EST-MCNC: 177 MG/DL
GFR SERPL CREATININE-BSD FRML MDRD: 93 ML/MIN/1.73SQ M
GLUCOSE P FAST SERPL-MCNC: 167 MG/DL (ref 65–99)
HBA1C MFR BLD: 7.8 %
HCT VFR BLD AUTO: 40.9 % (ref 34.8–46.1)
HDLC SERPL-MCNC: 75 MG/DL
HGB BLD-MCNC: 12.1 G/DL (ref 11.5–15.4)
LDLC SERPL CALC-MCNC: 33 MG/DL (ref 0–100)
MCH RBC QN AUTO: 26.9 PG (ref 26.8–34.3)
MCHC RBC AUTO-ENTMCNC: 29.6 G/DL (ref 31.4–37.4)
MCV RBC AUTO: 91 FL (ref 82–98)
NONHDLC SERPL-MCNC: 54 MG/DL
PLATELET # BLD AUTO: 179 THOUSANDS/UL (ref 149–390)
PMV BLD AUTO: 10.5 FL (ref 8.9–12.7)
POTASSIUM SERPL-SCNC: 4 MMOL/L (ref 3.5–5.3)
PROT SERPL-MCNC: 7.7 G/DL (ref 6.4–8.4)
RBC # BLD AUTO: 4.49 MILLION/UL (ref 3.81–5.12)
SODIUM SERPL-SCNC: 129 MMOL/L (ref 135–147)
TRIGL SERPL-MCNC: 106 MG/DL
TSH SERPL DL<=0.05 MIU/L-ACNC: 1.64 UIU/ML (ref 0.45–4.5)
WBC # BLD AUTO: 5.68 THOUSAND/UL (ref 4.31–10.16)

## 2023-03-06 ENCOUNTER — IOP CHECK (OUTPATIENT)
Dept: URBAN - METROPOLITAN AREA CLINIC 6 | Facility: CLINIC | Age: 88
End: 2023-03-06

## 2023-03-06 ENCOUNTER — TELEPHONE (OUTPATIENT)
Dept: FAMILY MEDICINE CLINIC | Facility: CLINIC | Age: 88
End: 2023-03-06

## 2023-03-06 DIAGNOSIS — H40.1132: ICD-10-CM

## 2023-03-06 PROCEDURE — 92083 EXTENDED VISUAL FIELD XM: CPT

## 2023-03-06 PROCEDURE — 92012 INTRM OPH EXAM EST PATIENT: CPT

## 2023-03-06 ASSESSMENT — VISUAL ACUITY
OS_CC: 20/40-2
OU_CC: J5
OD_CC: 20/350

## 2023-03-06 ASSESSMENT — TONOMETRY
OD_IOP_MMHG: 13
OS_IOP_MMHG: 13

## 2023-03-06 NOTE — TELEPHONE ENCOUNTER
Patient:   SHY VINCENT            MRN: [id number]            FIN: [id number]              Age:   69 years     Sex:  MALE     :  52   Associated Diagnoses:   None   Author:   GISSELLE BARAHONA     Impression and Plan   _69-year-old gentleman with rectal moderately differentiated adenocarcinoma, stage IV, MSI-S, TP53 mutated, with wild SILVINO and BRAF  I was consulted after he had a supraventricular tachycardia terminated with adenosine his blood pressure was borderline I might see how much the beta-blocker he will tolerate I would like a 2D echo study there is a small chance that may be the catheter that we use for chemotherapy is tickling his right atrium he is tolerating beta-blocker well with no more SVT echo was good  1. Left ventricle: The cavity size is normal. Wall thickness is at the    upper limits of normal. Systolic function is normal. The estimated    ejection fraction is 50-55%. Doppler parameters are consistent with    abnormal left ventricular relaxation (grade 1 diastolic dysfunction).  2. Pericardium, extracardiac: A small pericardial effusion is identified    circumferential to the heart. There is no evidence of hemodynamic    compromise. There is a left pleural effusion   Anemic with borderline blood pressure will continue beta blocker for now, it is okay to discharge home cardiac wise  No issues overnight no new complaint I did review the primary care physician progress note it looks like he is waiting for some clearance from other services and I think wound care will see the patient and he will follow-up with urology in the hematology oncology and general surgery is not recommending any intervention on his right groin and he completed IV antibiotic course  He is going to be discharged home today     Subjective   _     Health Status   Allergies:    Allergies (1) Active Reaction  NKA None Documented    Current medications:    No qualifying data available    Tobacco use:  Left message for call back Alcohol  Details: Alcohol Abuse in Household: No.  Use: None.  Exercise  Details: Exercise: Occasionally.  Substance Abuse  Details: Substance Abuse in Household: No.  Use: None.  Tobacco  Details: Smoker in Houshold: No.  Smoked/Smokeless Tobacco Last 30 Days: No.  Smoking Tobacco Use: Never smoker.  Smokeless Tobacco Use Never.  Cultural/Episcopalian Practices  Details: Episcopalian or Cultural Practices While in Hospital: No.      Problem list: Anemia  HTN - Hypertension  Prostate cancer  Rectocutaneous fistula  Risk factors for obstructive sleep apnea      Procedure history:    Colonoscopy (SNOMED CT [id number]).     Review of Systems   Constitutional:  Negative except as documented in history of present illness.   Eye:  Negative except as documented in history of present illness.   Ear/Nose/Mouth/Throat:  Negative except as documented in history of present illness.   Cardiovascular:  Negative except as documented in history of present illness.   Respiratory:  Negative except as documented in history of present illness.   Genitourinary:  Negative except as documented in history of present illness.   Gastrointestinal:  Negative except as documented in history of present illness.   Musculoskeletal:  Negative except as documented in history of present illness.   Integumentary:  Negative except as documented in history of present illness.   Hematology/Lymphatics:  Negative except as documented in history of present illness.   Neurologic:  Negative except as documented in history of present illness.   Endocrine:  Negative except as documented in history of present illness.   Allergy/Immunologic:  Negative except as documented in history of present illness.   Psychiatric:  Negative except as documented in history of present illness.      Objective   Intake and Output   I&O 24 hr   I & O between:  03-DEC-2019 19:41 TO 04-DEC-2019 19:41  Med Dosing Weight:  77  kg   21-NOV-2019  24 Hour Intake:   314.93  ( 4.09 mL/kg )  24  Hour Output:   560.00           24 Hour Urine/Stool Output:   0.0  24 Hour Balance:   -245.07           24 Hour Urine Output:   400.00  ( 0.22 mL/kg/hr )    VS/Measurements     Vitals between:   03-DEC-2019 19:41:56   TO   04-DEC-2019 19:41:56                   LAST RESULT MINIMUM MAXIMUM  Temperature 36.6 36.4 36.6  Heart Rate 103 78 103  Respiratory Rate 16 16 16  NISBP           108 91 108  NIDBP           74 60 74  SpO2                    94 92 96    Lines and Tubes:  Lines, Tubes, and Drains   PIVs   Forearm Left inserted 1 day ago.   .    General:  Alert and oriented, No acute distress.    Eye:  Extraocular movements are intact, Normal conjunctiva, Vision unchanged.   HENT:  Normocephalic, Normal hearing, Oral mucosa is moist, No pharyngeal erythema, Ear canals patent, No sinus tenderness.   Neck:  Supple, Non-tender, No carotid bruit, No jugular venous distention, No lymphadenopathy, No thyromegaly.   Respiratory:  Lungs are clear to auscultation, Respirations are non-labored, Breath sounds are equal, Symmetrical chest wall expansion, No chest wall tenderness.   Cardiovascular:  Normal rate, Regular rhythm, No murmur, No gallop, Good pulses equal in all extremities, Normal peripheral perfusion, No edema.   Breast:  No mass, No tenderness.    Gastrointestinal:  Soft, Non-tender, Non-distended, Normal bowel sounds, No organomegaly, No masses.   Genitourinary:  No costovertebral angle tenderness, No inguinal tenderness.   Lymphatics:  No lymphadenopathy neck, axilla, groin.    Musculoskeletal:  Normal range of motion, Normal strength, No tenderness, No swelling, No deformity, Normal gait.   Integumentary:  Warm, Pink, Intact, Moist, No pallor, No rash.    Neurologic:  Alert, Oriented, Normal sensory, Normal motor function, No focal deficits, Cranial Nerves II-XII are grossly intact, Gag reflex normal, Normal deep tendon reflexes.   Psychiatric:  Cooperative, Appropriate mood & affect, Normal judgment.       Results Review   Interpretation of Results   Laboratory   No Qualifying Labs are resulted on this patient in the last 24 hours       Cardiovascular Labs   No cardiovascular lab results found over the previous 48 hours.     Radiology results         Consults   Primary Care Physician    Physician Name:  NIMA BRISENO  Specialty :  FAMILY PRACTICE  Consulting Physicians   Physician Name:  GISSELLE BARAHONA Speciality:  CARDIOLOGY Consult Reason:  New SVT  Physician Name:  JN GALINDO Speciality:  SURGERY Consult Reason:  Difficulty Advancing Catheter  Physician Name:  SANCHO MEEK Speciality:  INFECTIOUS DISEASE Consult Reason:  scrotal abscess  Physician Name:  JOANNE ZUNIGA Speciality:  INTERNAL MEDICINE Consult Reason:  large scrotal abscess  Physician Name:  HECTOR STEWART Speciality:  SURGERY Consult Reason:  abnormal CT  Physician Name:  JOY WITT Speciality:  Oncology Consult Reason:  current treatment in community  Physician Name:  JOYCE RAMOS Speciality:  GASTROENTEROLOGY Consult Reason:  rectal bleed  Physician Name:  MARY SINGLETARY Speciality:  GASTROENTEROLOGY Consult Reason:  Rectal bleeding

## 2023-03-06 NOTE — TELEPHONE ENCOUNTER
----- Message from [facility] sent at 3/5/2023  9:48 PM EST -----  A1c is stable at 7 8%  Sodium is low at 129, is she taking sodium tablets 3 times daily? All other blood work is good

## 2023-03-07 NOTE — TELEPHONE ENCOUNTER
Patient returned missed call regarding blood work  Patient was informed her A1C is stable and her other labs were normal per note  Patient was informed her sodium level was low  Patient states her feet and ankles have been swollen for the past few weeks  A few days ago the swelling was to her calves  Patient was concerned the solidum tablets were causing extra swelling so she stopped them completely about 5 days ago  Patient was taking 2 tablets daily on and off prior to that  Patient is also concerned since she started with mild headaches while riding in the car about 4 weeks ago  Patient states she has diabetic retinopathy and had a shot for it yesterday  Per patient her doctor said that should not be causing the headaches

## 2023-03-09 ENCOUNTER — OFFICE VISIT (OUTPATIENT)
Dept: FAMILY MEDICINE CLINIC | Facility: CLINIC | Age: 88
End: 2023-03-09

## 2023-03-09 VITALS
WEIGHT: 110 LBS | TEMPERATURE: 97.1 F | HEART RATE: 85 BPM | DIASTOLIC BLOOD PRESSURE: 70 MMHG | HEIGHT: 66 IN | BODY MASS INDEX: 17.68 KG/M2 | OXYGEN SATURATION: 95 % | SYSTOLIC BLOOD PRESSURE: 120 MMHG | RESPIRATION RATE: 16 BRPM

## 2023-03-09 DIAGNOSIS — J84.112 IDIOPATHIC PULMONARY FIBROSIS (HCC): ICD-10-CM

## 2023-03-09 DIAGNOSIS — R60.0 EDEMA OF LOWER EXTREMITY: ICD-10-CM

## 2023-03-09 DIAGNOSIS — E87.1 HYPONATREMIA: Primary | ICD-10-CM

## 2023-03-09 NOTE — PROGRESS NOTES
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  She had self discontinued sodium tablets, but resumed a few days ago  Will repeat BMP next week  Continue nephrology follow up  Lower extremity edema: started in January  Unknown cause, possibly heart failure from lung disease, venous stasis in setting of varicose veins, and sedentary activity level  Creatinine 0 42, with EGFR 93  Total protein  with albumin 4 2  Echo 2022:  Left Ventricle: Left ventricular cavity size is normal  Wall thickness is mildly increased  There is moderate concentric hypertrophy  The left ventricular ejection fraction is 60%  Wall motion is normal  Diastolic function is mildly abnormal, consistent with grade I (abnormal) relaxation  •  IVS: There is sigmoid appearance of the septum  •  Right Ventricle: Right ventricular cavity size is normal  A cardiac device lead is seen entering the RV  Systolic function is normal  Normal tricuspid annular plane systolic excursion (TAPSE) > 1 7 cm  •  Aortic Valve: There is a small, strand-like, mobile mass on the aortic aspect  Differential includes, papillary fibroelastoma, vegetation, degenerative change  •  Tricuspid Valve: There is mild regurgitation  The estimated right ventricular systolic pressure is 04 68 mmHg  She will start wearing compression stockings  Will follow up with pulmonology next week for pulmonary fibrosis      Repeat BMP with BNP next week  If sodium is improving, could consider trial of low dose furosemide  We spoke today about her overall declining health     She is getting tired of multiple office visits, testing, medications, but not ready to stop pursuing medical care/treatments/medications yet  We discussed palliative care as an option to help alleviate, minimize symptoms, while still pursuing regular medical care/treatment  We spoke about and cleared up differences between hospice care and palliative care  She verbalizes understanding  She will think about this  Not ready to make any decisions today  Chief Complaint     Chief Complaint   Patient presents with   • Follow-up     Pt is here for f/u lab results       History of Present Illness     Kevin Beverly is an 82year old female presenting today for low sodium and edema  Swollen and ankles and feet started in late January  She had swelling in lower extremities in the past associated with eating salty foods  But it would be gone the next day  She thought sodium tablets were causing edema, so she stopped taking them  Edema is not going away now  Not sure if it is getting worse  She is fatigued  Has to take a lot of breaks  Naps 3-4 times per day  She has idiopathic pulmonary fibrosis, following with pulmonology  Treated with long prednisone taper early in the year, and felt she was doing better, but now back to where she started before prednisone taper  Has pulmonlogy appointment next week  When sitting, breathing is feeling good  Short of breath with exertion  Home pulse ox 88% with activity,   With rest, goes up to 92-94% quickly  Doing things like making the bed, doing the dishes, is difficult to complete tasks due to sob  Was doing well with metamucil and motegrity, will get back on track with schedule of Motegrity 2 days, metamucil one day  Did not go to chirpractor yet for low back, hip pain  Lost weight--had been off prednisone  Thought related to prednisone, improving appetite  On Prednisone even swallowing was better  Drinks about 2 glasses of water per day    Restarted sodium tablets a few days ago, when blood work came back  +varicose veins  Planning to get rollator walker  Review of Systems   Review of Systems   Constitutional: Negative  Respiratory: Positive for shortness of breath  Cardiovascular: Positive for leg swelling  Negative for chest pain and palpitations  Gastrointestinal: Positive for constipation and diarrhea  Musculoskeletal: Positive for arthralgias  Neurological: Negative for dizziness  I have reviewed the patient's medical history in detail; there are no changes to the history as noted in the electronic medical record  Objective     Vitals:    23 1044   BP: 120/70   Pulse: 85   Resp: 16   Temp: (!) 97 1 °F (36 2 °C)   TempSrc: Temporal   SpO2: 95%   Weight: 49 9 kg (110 lb)   Height: 5' 6" (1 676 m)     Wt Readings from Last 3 Encounters:   23 49 9 kg (110 lb)   23 52 6 kg (116 lb)   23 51 8 kg (114 lb 3 2 oz)     Physical Exam  Vitals and nursing note reviewed  Constitutional:       General: She is not in acute distress  Appearance: Normal appearance  Comments: Frail, chronically ill appearing  HENT:      Head: Atraumatic  Right Ear: Tympanic membrane normal    Cardiovascular:      Rate and Rhythm: Normal rate and regular rhythm  Heart sounds: No murmur heard  Pulmonary:      Effort: Pulmonary effort is normal  No respiratory distress  Breath sounds: Normal breath sounds  Musculoskeletal:      Right lower le+ Edema present  Left lower le+ Edema present  Neurological:      Mental Status: She is alert     Psychiatric:         Mood and Affect: Mood normal             ALLERGIES:  Allergies   Allergen Reactions   • Keflex [Cephalexin] Diarrhea       Current Medications     Current Outpatient Medications   Medication Sig Dispense Refill   • acetaminophen (TYLENOL) 500 mg tablet Take 1,000 mg by mouth as needed for mild pain     • Alphagan P 0 1 % INSTILL 1 DROP RIGHT EYE TWICE A DAY     • aspirin (ECOTRIN LOW STRENGTH) 81 mg EC tablet Take 1 tablet (81 mg total) by mouth daily     • bimatoprost (LUMIGAN) 0 01 % ophthalmic drops Administer 1 drop to both eyes daily at bedtime for 30 days 1 5 mL 0   • calcium carbonate (OS-DANIA) 600 MG tablet Take 600 mg by mouth 2 (two) times a day with meals       • Eliquis 2 5 MG TAKE 1 TABLET (2 5 MG TOTAL) BY MOUTH 2 (TWO) TIMES A DAY 60 tablet 5   • ezetimibe-simvastatin (VYTORIN) 10-40 mg per tablet TAKE 1 TABLET BY MOUTH DAILY AT BEDTIME 30 tablet 5   • famotidine (PEPCID) 20 mg tablet TAKE 1 TABLET (20 MG TOTAL) BY MOUTH 2 (TWO) TIMES A DAY 60 tablet 5   • glucose blood (OneTouch Ultra) test strip TEST FOUR TIMES A  strip 1   • ipratropium (ATROVENT) 0 03 % nasal spray USE 2 SPRAYS INTO EACH NOSTRIL EVERY 12 (TWELVE) HOURS 30 mL 6   • Januvia 100 MG tablet TAKE 1 TABLET (100 MG TOTAL) BY MOUTH DAILY 30 tablet 5   • loratadine (CLARITIN) 10 mg tablet Take 10 mg by mouth daily as needed for allergies     • LORazepam (ATIVAN) 0 5 mg tablet TAKE 2 TABLETS (1 MG TOTAL) BY MOUTH DAILY AT BEDTIME 60 tablet 1   • metFORMIN (GLUCOPHAGE) 500 mg tablet TAKE 2 TABLETS (1,000 MG TOTAL) BY MOUTH 2 (TWO) TIMES A DAY WITH MEALS 120 tablet 5   • metoprolol tartrate (LOPRESSOR) 50 mg tablet Take 0 5 tablets (25 mg total) by mouth every 12 (twelve) hours 90 tablet 3   • Motegrity 2 MG TABS TAKE 2 MG BY MOUTH DAILY 90 tablet 3   • Multiple Vitamin (multivitamin) tablet Take 1 tablet by mouth daily       • peppermint oil Use once as needed     • sodium chloride 1 g tablet TAKE 1 TABLET (1 G TOTAL) BY MOUTH 3 (THREE) TIMES A  tablet 1     No current facility-administered medications for this visit           Health Maintenance     Health Maintenance   Topic Date Due   • COVID-19 Vaccine (5 - Booster for Pfizer series) 11/03/2022   • Depression Remission PHQ  08/03/2023   • HEMOGLOBIN A1C  09/03/2023   • Fall Risk  09/27/2023   • Urinary Incontinence Screening  09/27/2023   • Medicare Annual Wellness Visit (AWV)  09/27/2023   • BMI: Followup Plan  10/07/2023   • DM Eye Exam  12/08/2023   • Diabetic Foot Exam  01/11/2024   • BMI: Adult  03/09/2024   • Colorectal Cancer Screening  09/02/2025   • Hepatitis C Screening  Completed   • Osteoporosis Screening  Completed   • Pneumococcal Vaccine: 65+ Years  Completed   • Influenza Vaccine  Completed   • HIB Vaccine  Aged Out   • IPV Vaccine  Aged Out   • Hepatitis A Vaccine  Aged Out   • Meningococcal ACWY Vaccine  Aged Out   • HPV Vaccine  Aged Out     Immunization History   Administered Date(s) Administered   • COVID-19 PFIZER VACCINE 0 3 ML IM 01/22/2021, 02/12/2021, 09/30/2021, 09/08/2022   • INFLUENZA 11/02/2016, 10/16/2017, 09/18/2018, 09/18/2018   • Influenza, Quadrivalent (nasal) 11/02/2016   • Influenza, high dose seasonal 0 7 mL 11/06/2019, 10/06/2020, 11/09/2021, 11/15/2022   • Pneumococcal Conjugate 13-Valent 07/25/2019   • Pneumococcal Polysaccharide PPV23 03/17/2003   • Zoster 07/09/2010       ZANDER Chanel

## 2023-03-12 NOTE — PROGRESS NOTES
Pulmonary Follow Up Note   Zeferino Vanegas 82 y o  female MRN: [id number]  3/15/2023      Assessment/Plan:    1  Interstitial lung disease  -NSIP with fibrotic features  Consider possible UIP (less likely)  -autoimmune workup negative:  PASTOR, Anca, RF, CCP, HP panel  No history of connective tissue disease, skin rash  -PFTs with 6 minute walk test in August 2022 revealing decreased DLCO and oxygen saturation dropping by 4% from 96 to 92%  -discussed patient's case at multidisciplinary ILD conference:  Group consensus to try a trial of steroid, repeat echocardiogram, repeat PFTs and 6 minute walk test     -Patient had significant improvement in PFTs especially DLCO post 12 weeks of steroids (60 mg daily decrease by 20 milligrams every 2 weeks)  DLCO improved from 16% predicted to 72% predicted  At this point given steroid responsiveness patient's interstitial lung disease behaving clinically more like NSIP  -Repeat PFTs  -Repeat high-resolution CT scan  -We will likely start chronic low-dose prednisone post high-resolution CT scan and PFTs; will consider steroid sparing agent if she has significant side effects on steroids      2 T2DM  -currently non-insulin-dependent on Januvia and metformin  -advised the patient to make appointment with primary care provider for blood glucose management while on steroids     3  Dysphagia  -currently reports trouble swallowing and irritation hoarseness of her voice  -follows up with GI       Return in about 8 weeks (around 5/10/2023) for Recheck  History of Present Illness   HPI:  Zeferino Vanegas is a 82 y o  female with hypertension, hyperlipidemia, dysphagia, glaucoma, bilateral carotid artery stenosis, atrial fibrillation on Eliquis and has pacemaker, type 2 diabetes, GERD presenting for follow-up and evaluation of interstitial lung disease  Patient reports that overall she is doing much better  She completed her steroids in December 2022    She states that while on the steroids her breathing improved but she had significant side effects including feelings of anxiety, agitation, tremulousness, elevated blood glucose, insomnia  She had repeat PFTs which showed marked improvement in her DLCO post steroids  Patient also reported that she cannot make December visit because she had COVID at that time  She currently does not use any oxygen but reports some dyspnea on exertion  Denies any nighttime symptoms  Is able to sleep through the night without issues  Previous pulmonary history per Dr Coburn Counts:     "Patient for seen in the office In March 2021   She had prior imaging showing significant interstitial lung disease and fibrosis   Looking back on prior imaging, it appears that she did have some reticular pattern back to imaging on 2007, but has progressed significantly over the past 13 years       In terms of pulmonary history, patient is a former smoker, quit in 1994  Patient used to work for [employer] for about 10 years in her 25s around [employer] equipment and 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 chemicals   She subsequently worked as a  person and 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 different types of [employer] recently, she worked as a  without exposures   She has never had pets and denies any exposure to birds, lifestyle, farm animals   No autoimmune, connective tissue disease, skin rash  She does have intermittent knee pain, but denies significant small joint pains   She has no family history of lung disease   She does note some environmental allergies worsen her postnasal drip and given her watery eyes      Given patient's finding of ILD, she then underwent further workup   High-resolution CT chest revealed subpleural reticular/fiber nodular opacities, interlobular septal thickening, mild central/bibasilar bronchiectasis, possible UIP pattern   Autoimmune workup was completed with negative PASTOR, Anca, RF, CCP, HP panel"     Occupational History:  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  Afterwards worked in  and exposed to different types of jewels      Social History: Former smoker  Quit 1994  1 5 packs per day for 38 years      Malignancy History:      Pets/animal exposure:  Never had pets denies exposure to birds, exotic animals, farm animals     Inhalers: none     Health maintenance:  Up-to-date on pneumonia and COVID vaccines  Flu vaccine due fall 2022    Review of systems:  12 point review of systems was completed and was otherwise negative except as listed in HPI      Historical Information   Past Medical History:   Diagnosis Date   • 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/7/2020   • Glaucoma    • H/O degenerative disc disease    • Idiopathic pulmonary fibrosis ([facility] [address] ) 11/2020   • Irregular heart beat     afib   • Peripheral neuropathy    • S/P laparoscopic cholecystectomy 12/21/2020   • [facility] right [facility]cl[facility] 11/18/2016     Past Surgical History:   Procedure Laterality Date   • CATARACT EXTRACTION, BILATERAL  2011   • CHOLECYSTECTOMY     • CHOLECYSTECTOMY LAPAROSCOPIC N/A 12/09/2020    Procedure: CHOLECYSTECTOMY LAPAROSCOPIC;  Surgeon: Ludy Donnelly MD;  Location[facility];  Service: General   • COLONOSCOPY     • CYST REMOVAL  2009    left lower Quadrant    • HERNIA REPAIR  1992   • LIPOMA RESECTION  2010   • AR RPR 1ST INGUN HRNA AGE 5 YRS/> REDUCIBLE Bilateral 01/05/2018    Procedure: INGUINAL HERNIA REPAIR;  Surgeon: Macy Campbell MD;  Location: [facility];  Service: General   • SHOULDER SURGERY  04/26/2019    Dr Jey [facility])   • UPPER GASTROINTESTINAL ENDOSCOPY     • VASCULAR SURGERY  1994    Agram-  R carotid occlusion     Family History   Problem Relation Age of Onset   • Heart disease Mother    • Heart attack Father    • Hiatal hernia Father    • Diabetes Father    • Cancer Brother    • Diabetes Brother    • Heart disease Brother    • Hypertension Brother    • Other Son gastroparesis         Meds/Allergies     Current Outpatient Medications:   •  acetaminophen (TYLENOL) 500 mg tablet, Take 1,000 mg by mouth as needed for mild pain, Disp: , Rfl:   •  Alphagan P 0 1 %, INSTILL 1 DROP RIGHT EYE TWICE A DAY, Disp: , Rfl:   •  aspirin (ECOTRIN LOW STRENGTH) 81 mg EC tablet, Take 1 tablet (81 mg total) by mouth daily, Disp:  , Rfl:   •  bimatoprost (LUMIGAN) 0 01 % ophthalmic drops, Administer 1 drop to both eyes daily at bedtime for 30 days, Disp: 1 5 mL, Rfl: 0  •  calcium carbonate (OS-DANIA) 600 MG tablet, Take 600 mg by mouth 2 (two) times a day with meals  , Disp: , Rfl:   •  Eliquis 2 5 MG, TAKE 1 TABLET (2 5 MG TOTAL) BY MOUTH 2 (TWO) TIMES A DAY, Disp: 60 tablet, Rfl: 5  •  ezetimibe-simvastatin (VYTORIN) 10-40 mg per tablet, TAKE 1 TABLET BY MOUTH DAILY AT BEDTIME, Disp: 30 tablet, Rfl: 5  •  famotidine (PEPCID) 20 mg tablet, TAKE 1 TABLET (20 MG TOTAL) BY MOUTH 2 (TWO) TIMES A DAY, Disp: 60 tablet, Rfl: 5  •  glucose blood (OneTouch Ultra) test strip, TEST FOUR TIMES A DAY, Disp: 400 strip, Rfl: 1  •  ipratropium (ATROVENT) 0 03 % nasal spray, USE 2 SPRAYS INTO EACH NOSTRIL EVERY 12 (TWELVE) HOURS, Disp: 30 mL, Rfl: 6  •  Januvia 100 MG tablet, TAKE 1 TABLET (100 MG TOTAL) BY MOUTH DAILY, Disp: 30 tablet, Rfl: 5  •  loratadine (CLARITIN) 10 mg tablet, Take 10 mg by mouth daily as needed for allergies, Disp: , Rfl:   •  LORazepam (ATIVAN) 0 5 mg tablet, TAKE 2 TABLETS (1 MG TOTAL) BY MOUTH DAILY AT BEDTIME, Disp: 60 tablet, Rfl: 1  •  metFORMIN (GLUCOPHAGE) 500 mg tablet, TAKE 2 TABLETS (1,000 MG TOTAL) BY MOUTH 2 (TWO) TIMES A DAY WITH MEALS, Disp: 120 tablet, Rfl: 5  •  metoprolol tartrate (LOPRESSOR) 50 mg tablet, Take 0 5 tablets (25 mg total) by mouth every 12 (twelve) hours, Disp: 90 tablet, Rfl: 3  •  Motegrity 2 MG TABS, TAKE 2 MG BY MOUTH DAILY, Disp: 90 tablet, Rfl: 3  •  Multiple Vitamin (multivitamin) tablet, Take 1 tablet by mouth daily  , Disp: , Rfl:   •  sodium chloride 1 g tablet, TAKE 1 TABLET (1 G TOTAL) BY MOUTH 3 (THREE) TIMES A DAY, Disp: 270 tablet, Rfl: 1  •  peppermint oil, Use once as needed (Patient not taking: Reported on 3/15/2023), Disp: , Rfl:   Allergies   Allergen Reactions   • Keflex [Cephalexin] Diarrhea       Vitals: Blood pressure 110/68, pulse 81, temperature (!) 86 °F (30 °C), height 5' 6" (1 676 m), weight 49 9 kg (110 lb), SpO2 96 %  Body mass index is 17 75 kg/m²  Oxygen Therapy  SpO2: 96 %  Oxygen Therapy: None (Room air)      Physical Exam  Physical Exam  Vitals reviewed  Constitutional:       General: She is not in acute distress  Appearance: She is not ill-appearing, toxic-appearing or diaphoretic  HENT:      Head: Normocephalic and atraumatic  Right Ear: External ear normal       Left Ear: External ear normal       Nose: Nose normal    Eyes:      General: No scleral icterus  Right eye: No discharge  Left eye: No discharge  Extraocular Movements: Extraocular movements intact  Conjunctiva/sclera: Conjunctivae normal    Cardiovascular:      Rate and Rhythm: Normal rate and regular rhythm  Pulses: Normal pulses  Heart sounds: Normal heart sounds  No murmur heard  No friction rub  No gallop  Pulmonary:      Effort: Pulmonary effort is normal  No respiratory distress  Breath sounds: No stridor  Rales present  No wheezing or rhonchi  Comments: Bibasilar rales/crackles  Chest:      Chest wall: No tenderness  Abdominal:      General: Abdomen is flat  Bowel sounds are normal       Palpations: Abdomen is soft  Musculoskeletal:      Right lower leg: No edema  Left lower leg: No edema  Skin:     General: Skin is warm and dry  Neurological:      Mental Status: She is alert and oriented to person, place, and time  Labs: I have personally reviewed pertinent lab results    Lab Results   Component Value Date    WBC 5 68 03/03/2023    HGB 12 1 03/03/2023    HCT 40 9 03/03/2023 MCV 91 03/03/2023     03/03/2023     Lab Results   Component Value Date    CALCIUM 9 5 03/03/2023    K 4 0 03/03/2023    CO2 29 03/03/2023    CL 95 (L) 03/03/2023    BUN 13 03/03/2023    CREATININE 0 42 (L) 03/03/2023     Lab Results   Component Value Date    IGE 14 6 10/16/2020     Lab Results   Component Value Date    ALT 17 03/03/2023    AST 19 03/03/2023    ALKPHOS 54 03/03/2023           Imaging and other studies: I have personally reviewed pertinent reports  and I have personally reviewed pertinent films in PACS     Chest x-ray 10/29/16:  Somewhat increased interstitial markings bilaterally compared with prior study  The appearance is likely chronic     Chest x-ray 10/20/20: Moderate peripheral reticulation     High-resolution CT scan 11/11/20:  Progressive advanced subpleural reticular and fibronodular opacities throughout both lungs most severe at the lung bases  Areas of interlobular septal thickening most prominent at the lung bases  No honeycombing  Mild central and bibasilar bronchiectasis  No pulmonary groundglass densities      High-resolution CT scan 8/25/22:  Interlobular septal thickening  Peripheral reticular nodular opacities, increased from prior  Peripheral consolidative opacities appear increased from prior  Bronchiectasis and bronchiolectasis appear similar from prior  No convincing evidence for honeycombing    No significant air trapping with expiration        Pulmonary function testing:      Pulmonary function testing with 6 minute walk test 11/11/20:     FEV1/FVC Ratio: 85 %  Forced Vital Capacity: 1 98 L    60 % predicted  FEV1: 1 69 L     70 % predicted     Lung volumes by body plethysmography: Total Lung Capacity 73 % predicted Residual volume 85 % predicted     DLCO corrected for patients hemoglobin level: 39 %     6MWT performed on Room Air - total walk distance 204 meters, initialk SpO2 99%, omari 97%, initial HR 93bpm, maximal HR 97%, Aishwarya Dyspnea Scale 1/10--->3/10     Pulmonary function testing with 6 minutes walk test 4/19/21:     FEV1/FVC Ratio: 78 %  Forced Vital Capacity: 1 94 L    63 % predicted  FEV1: 1 52 L     67 % predicted     Lung volumes by body plethysmography:   Total Lung Capacity 75 % predicted   Residual volume 91 % predicted     DLCO corrected for patients hemoglobin level: 34 %     Spirometry with diffusion capacity 11/1/21:     FEV1/FVC Ratio: 88 %  Forced Vital Capacity: 1 86 L    61 % predicted  FEV1: 1 64 L     72 % predicted     DLCO corrected for patients hemoglobin level: 28 %     6 minute walk test 1/5/22:     Patient walked 378 m over 6 minutes  Resting SpO2 98% on room air and resting heart rate 78  During ambulation, SpO2 ranged from 95-98% and heart rate 91-96  No episodes of desaturation requiring supplemental oxygen      6 minute walk test 7/29/22:     6 minute walk test performed today  Room air saturations at rest are 98%  She ambulated for 6 minutes for a total of 234 m    [employer] reached a low of 92%     Pulmonary function testing 8/18/22:     FEV1/FVC Ratio: 92 % (132% predicted)  Forced Vital Capacity: 1 53 L    50 % predicted  FEV1: 1 41 L     63 % predicted  After administration of bronchodilator FEV1: 1 23 (-12%) - only exhaled for 1 5 seconds     Lung volumes by body plethysmography: Total Lung Capacity 57 % predicted Residual volume 69 % predicted  RV/TLC ratio 113%     DLCO corrected for patients hemoglobin level: 16 %    Pulmonary function testing 1/6/2023:    FEV1/FVC Ratio: 93 24 %  Forced Vital Capacity: 1 79 L    69 % predicted  FEV1: 1 67 L     86 % predicted        After administration of bronchodilator      FVC: 1 78 L, 69 % predicted, 0 % change  FEV1: 1 58 L, 81 % predicted, -5 % change     Lung volumes by body plethysmography:      Total Lung Capacity 75 % predicted   Residual volume 91 % predicted  RV/TLC Ratio: 58 58 %, 117 % predicted     DLCO corrected for patients hemoglobin level: 72 %     6MWT study: desaturation occurred and patient required 2L supplemental 02 to keep saturation > 88%  Total distance walked = 144 8 meters  EKG, Pathology, and Other Studies: I have personally reviewed pertinent reports  and I have personally reviewed pertinent films in PACS     Echocardiogram 4/20/21: Ejection fraction 55% with grade 1 diastolic dysfunction  Right ventricular pacing wire present  No findings of pulmonary hypertension      Sri Velazquez, DO - PGY6  [facility]

## 2023-03-13 DIAGNOSIS — E78.5 HYPERLIPIDEMIA, UNSPECIFIED HYPERLIPIDEMIA TYPE: ICD-10-CM

## 2023-03-13 DIAGNOSIS — F41.9 ANXIETY: ICD-10-CM

## 2023-03-14 RX ORDER — LORAZEPAM 0.5 MG/1
1 TABLET ORAL
Qty: 60 TABLET | Refills: 1 | Status: SHIPPED | OUTPATIENT
Start: 2023-03-14

## 2023-03-14 RX ORDER — EZETIMIBE AND SIMVASTATIN 10; 40 MG/1; MG/1
1 TABLET ORAL
Qty: 30 TABLET | Refills: 5 | Status: SHIPPED | OUTPATIENT
Start: 2023-03-14

## 2023-03-15 ENCOUNTER — OFFICE VISIT (OUTPATIENT)
Dept: PULMONOLOGY | Facility: CLINIC | Age: 88
End: 2023-03-15

## 2023-03-15 VITALS
TEMPERATURE: 86 F | WEIGHT: 110 LBS | SYSTOLIC BLOOD PRESSURE: 110 MMHG | HEART RATE: 81 BPM | OXYGEN SATURATION: 96 % | HEIGHT: 66 IN | DIASTOLIC BLOOD PRESSURE: 68 MMHG | BODY MASS INDEX: 17.68 KG/M2

## 2023-03-15 DIAGNOSIS — R13.10 DYSPHAGIA, UNSPECIFIED TYPE: ICD-10-CM

## 2023-03-15 DIAGNOSIS — J84.9 INTERSTITIAL LUNG DISEASE (HCC): Primary | ICD-10-CM

## 2023-03-15 DIAGNOSIS — E78.5 TYPE 2 DIABETES MELLITUS WITH HYPERLIPIDEMIA (HCC): ICD-10-CM

## 2023-03-15 DIAGNOSIS — E11.69 TYPE 2 DIABETES MELLITUS WITH HYPERLIPIDEMIA (HCC): ICD-10-CM

## 2023-03-15 PROBLEM — J84.112 IDIOPATHIC PULMONARY FIBROSIS (HCC): Status: RESOLVED | Noted: 2020-11-01 | Resolved: 2023-03-15

## 2023-03-21 ENCOUNTER — APPOINTMENT (OUTPATIENT)
Dept: LAB | Facility: CLINIC | Age: 88
End: 2023-03-21

## 2023-03-21 DIAGNOSIS — R60.0 EDEMA OF LOWER EXTREMITY: ICD-10-CM

## 2023-03-21 DIAGNOSIS — E87.1 HYPONATREMIA: ICD-10-CM

## 2023-03-21 LAB
ANION GAP SERPL CALCULATED.3IONS-SCNC: 3 MMOL/L (ref 4–13)
BUN SERPL-MCNC: 12 MG/DL (ref 5–25)
CALCIUM SERPL-MCNC: 9.1 MG/DL (ref 8.3–10.1)
CHLORIDE SERPL-SCNC: 97 MMOL/L (ref 96–108)
CO2 SERPL-SCNC: 33 MMOL/L (ref 21–32)
CREAT SERPL-MCNC: 0.45 MG/DL (ref 0.6–1.3)
GFR SERPL CREATININE-BSD FRML MDRD: 91 ML/MIN/1.73SQ M
GLUCOSE P FAST SERPL-MCNC: 198 MG/DL (ref 65–99)
NT-PROBNP SERPL-MCNC: 397 PG/ML
POTASSIUM SERPL-SCNC: 3.6 MMOL/L (ref 3.5–5.3)
SODIUM SERPL-SCNC: 133 MMOL/L (ref 135–147)

## 2023-03-27 ENCOUNTER — HOSPITAL ENCOUNTER (OUTPATIENT)
Dept: PULMONOLOGY | Facility: HOSPITAL | Age: 88
Discharge: HOME | End: 2023-03-27

## 2023-03-27 ENCOUNTER — HOSPITAL ENCOUNTER (OUTPATIENT)
Dept: RADIOLOGY | Facility: HOSPITAL | Age: 88
Discharge: HOME | End: 2023-03-27

## 2023-03-27 DIAGNOSIS — J84.9 INTERSTITIAL LUNG DISEASE (HCC): ICD-10-CM

## 2023-03-27 RX ORDER — ALBUTEROL SULFATE 2.5 MG/3ML
2.5 SOLUTION RESPIRATORY (INHALATION) ONCE
Status: COMPLETED | OUTPATIENT
Start: 2023-03-27 | End: 2023-03-27

## 2023-03-27 RX ADMIN — ALBUTEROL SULFATE 2.5 MG: 2.5 SOLUTION RESPIRATORY (INHALATION) at 14:21

## 2023-03-29 ENCOUNTER — OFFICE VISIT (OUTPATIENT)
Dept: GASTROENTEROLOGY | Facility: AMBULARY SURGERY CENTER | Age: 88
End: 2023-03-29

## 2023-03-29 VITALS
HEIGHT: 66 IN | WEIGHT: 110 LBS | HEART RATE: 84 BPM | SYSTOLIC BLOOD PRESSURE: 118 MMHG | DIASTOLIC BLOOD PRESSURE: 74 MMHG | OXYGEN SATURATION: 94 % | BODY MASS INDEX: 17.68 KG/M2

## 2023-03-29 DIAGNOSIS — K21.9 GASTROESOPHAGEAL REFLUX DISEASE WITHOUT ESOPHAGITIS: ICD-10-CM

## 2023-03-29 DIAGNOSIS — R13.10 DYSPHAGIA, UNSPECIFIED TYPE: Primary | ICD-10-CM

## 2023-03-29 DIAGNOSIS — K59.00 CONSTIPATION, UNSPECIFIED CONSTIPATION TYPE: ICD-10-CM

## 2023-03-29 RX ORDER — PANTOPRAZOLE SODIUM 20 MG/1
20 TABLET, DELAYED RELEASE ORAL 2 TIMES DAILY
Qty: 60 TABLET | Refills: 2 | Status: SHIPPED | OUTPATIENT
Start: 2023-03-29

## 2023-03-29 NOTE — PATIENT INSTRUCTIONS
Start taking Protonix 20 mg twice a day  You can continue famotidine 20 mg twice a day as well       If no improvement in swallowing I recommend setting up the barium swallow test

## 2023-03-29 NOTE — PROGRESS NOTES
[facility] - Outpatient Follow-up Note  Jose Ovalle 82 y o  female MRN: [id number]  Encounter: [id number]          ASSESSMENT AND PLAN:      1  Chronic constipation  Symptoms have been better controlled with Motegrity 1 day and Metamucil the next day with alternating on and off  She reports bowel movement every few days however she is not having any bloating and is not having significant loose stools as she was prior     - Continue alternation between Motegrity and Metamucil as this seems to be working   - Continue to monitor  2  GERD  3  Throat clearing  4  Dysphagia  Patient reports worsening symptoms after completing a prolonged steroid course recently  She has reflux despite Pepcid  She has some throat clearing and reports eating pretzels which caused irritation in the back of the throat  No thrush on exam   Last EGD 2020 was unremarkable  Suspect in the setting of worsening reflux from steroids, dysphagia likely in the setting of mild to moderate oropharyngeal symptoms with patient not following diet precautions recommended     -Will increase patient to Protonix 20 mg twice daily  - Continue Pepcid 20 mg twice daily   - If no improvement of symptoms would consider increasing Protonix to 40 mg dosing   - If no improvement of symptoms, would recommend barium swallow  The order was placed  Advised patient to set this up if no improvement with PPI course in 4 weeks   -Patient previously followed with speech evaluation was recommended dysphagia 2 diet with thin liquids  Recommend her avoiding any hard foods such as pretzels or bread as well as large pieces of meat  Take time when eating, alternate solids and liquids  - We will hold off on EGD at this time which patient would prefer to do as well as she does not want any procedures done at this point          Follow-up in 3 months   ______________________________________________________________________    SUBJECTIVE: Anabella Reese is a pleasant 19-year-old female with history of GERD, type 2 diabetes, interstitial lung disease, hypertension, A-fib on Eliquis who presents the office for follow-up  Patient was last seen in the office 1/2023 for chronic constipation with suspected overflow diarrhea and bloating  Work-up so far includes CT x2 in 2020 and 2021, EUS, EGD, colonoscopy and ERCP for choledocholithiasis as well as a gastric emptying scan which were unremarkable  He has been on Motegrity for about 1 year and was recommended to add Metamucil to bowel regimen to help with inconsistencies  Patient reports her bowel movements have been doing better since last office visit  She reports she has been taking Motegrity 1 day and Metamucil the next and alternate back-and-forth  She reports with this she has a bowel movement every few days however she does not have the significant looser stools which were very bothersome prior  She is happy with the bowel regimen currently  She also has improvement of her bloating  Recent lab work with normal hemoglobin and liver enzymes  Patient's biggest symptom at this time is worsening reflux causing some hoarseness of voice after completing an extended steroid course for interstitial lung disease  She reports worsening reflux since then which is not controlled with Pepcid and she is using other antacids to help the symptoms  She reports eating pretzels which she is not usually supposed to eat in the setting of some oral dysphagia and cause some irritation in the back of the throat  REVIEW OF SYSTEMS IS OTHERWISE NEGATIVE        Historical Information   Past Medical History:   Diagnosis Date   • 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/7/2020   • Glaucoma    • H/O degenerative disc disease    • Idiopathic pulmonary fibrosis ([facility] [address] ) 11/2020   • Irregular heart beat     afib   • Peripheral neuropathy    • S/P laparoscopic cholecystectomy 12/21/2020   • Stenosis of right subclavian artery ([facility] [address] ) 2016     Past Surgical History:   Procedure Laterality Date   • CATARACT EXTRACTION, BILATERAL     • CHOLECYSTECTOMY     • CHOLECYSTECTOMY LAPAROSCOPIC N/A 2020    Procedure: CHOLECYSTECTOMY LAPAROSCOPIC;  Surgeon: Christal Figueroa MD;  Location: [facility];  Service: General   • COLONOSCOPY     • CYST REMOVAL      left lower Quadrant    • HERNIA REPAIR     • LIPOMA RESECTION     • ME RPR 1ST INGUN HRNA AGE 5 YRS/> REDUCIBLE Bilateral 2018    Procedure: INGUINAL HERNIA REPAIR;  Surgeon: Lissa Liu MD;  Location: [facility];  Service: General   • SHOULDER SURGERY  2019    Dr Vi Howard ([place])   • UPPER GASTROINTESTINAL ENDOSCOPY     • VASCULAR SURGERY      Agram-  R carotid occlusion     Social History   Social History     Substance and Sexual Activity   Alcohol Use No     Social History     Substance and Sexual Activity   Drug Use No     Social History     Tobacco Use   Smoking Status Former   • Packs/day: 1 50   • Years: 38 00   • Pack years: 57 00   • Types: Cigarettes   • Start date:    • Quit date:    • Years since quittin 2   Smokeless Tobacco Never     Family History   Problem Relation Age of Onset   • Heart disease Mother    • Heart attack Father    • Hiatal hernia Father    • Diabetes Father    • Cancer Brother    • Diabetes Brother    • Heart disease Brother    • Hypertension Brother    • Other Son         gastroparesis       Meds/Allergies       Current Outpatient Medications:   •  acetaminophen (TYLENOL) 500 mg tablet  •  Alphagan P 0 1 %  •  aspirin (ECOTRIN LOW STRENGTH) 81 mg EC tablet  •  bimatoprost (LUMIGAN) 0 01 % ophthalmic drops  •  calcium carbonate (OS-DANIA) 600 MG tablet  •  Eliquis 2 5 MG  •  ezetimibe-simvastatin (VYTORIN) 10-40 mg per tablet  •  famotidine (PEPCID) 20 mg tablet  •  glucose blood (OneTouch Ultra) test strip  •  ipratropium (ATROVENT) 0 03 % nasal spray  •  Januvia 100 MG tablet  •  loratadine "(CLARITIN) 10 mg tablet  •  LORazepam (ATIVAN) 0 5 mg tablet  •  metFORMIN (GLUCOPHAGE) 500 mg tablet  •  metoprolol tartrate (LOPRESSOR) 50 mg tablet  •  Motegrity 2 MG TABS  •  Multiple Vitamin (multivitamin) tablet  •  pantoprazole (PROTONIX) 20 mg tablet  •  sodium chloride 1 g tablet  •  peppermint oil    Allergies   Allergen Reactions   • Keflex [Cephalexin] Diarrhea           Objective     Blood pressure 118/74, pulse 84, height 5' 6\" (1 676 m), weight 49 9 kg (110 lb), SpO2 94 %  Body mass index is 17 75 kg/m²  PHYSICAL EXAM:      General Appearance:    Pleasant  Frail appearing  HEENT:   Normocephalic, atraumatic, anicteric      Neck:  Supple, symmetrical, trachea midline   Lungs:   Clear to auscultation bilaterally; no rales, rhonchi or wheezing; respirations unlabored    Heart[de-identified]   Regular rate and rhythm; no murmur, rub, or gallop  Abdomen:   Soft, non-tender, non-distended; normal bowel sounds; no masses, no organomegaly    Genitalia:   Deferred    Rectal:   Deferred    Extremities:  No cyanosis, clubbing or edema    Pulses:  2+ and symmetric    Skin:  No jaundice, rashes, or lesions    Lymph nodes:  No palpable cervical lymphadenopathy        Lab Results:   No visits with results within 1 Day(s) from this visit  Latest known visit with results is:   Appointment on 03/21/2023   Component Date Value   • Sodium 03/21/2023 133 (L)    • Potassium 03/21/2023 3 6    • Chloride 03/21/2023 97    • CO2 03/21/2023 33 (H)    • ANION GAP 03/21/2023 3 (L)    • BUN 03/21/2023 12    • Creatinine 03/21/2023 0 45 (L)    • Glucose, Fasting 03/21/2023 198 (H)    • Calcium 03/21/2023 9 1    • eGFR 03/21/2023 91    • NT-proBNP 03/21/2023 397          Radiology Results:   No results found    "

## 2023-03-30 ENCOUNTER — TELEPHONE (OUTPATIENT)
Dept: FAMILY MEDICINE CLINIC | Facility: CLINIC | Age: 88
End: 2023-03-30

## 2023-03-30 DIAGNOSIS — I10 ESSENTIAL (PRIMARY) HYPERTENSION: ICD-10-CM

## 2023-03-30 RX ORDER — METOPROLOL TARTRATE 50 MG/1
TABLET, FILM COATED ORAL
Qty: 90 TABLET | Refills: 3 | Status: SHIPPED | OUTPATIENT
Start: 2023-03-30

## 2023-03-31 NOTE — TELEPHONE ENCOUNTER
I spoke with Tr via telephone today  She is concerned about her memory  Continues to make mistakes with her medications, missed paying her rent last month, forgot again this month, but her son set up a reminder on her phone for her  This is very unusual for her  Normal RPR 9/1/21  B12 677 9/1/21  Could consider repeat B12 level with next blood work  Sodium is improving, back on sodium tablets now  No headaches, vision changes  Will have her complete MRI brain if her pacemaker is compatible with MRI  ? Decreased oxygen affecting memory/cognition  Will have  reach out to her to give her more information on pill packs from the pharmacy  Swelling legs has not decreased, she has not yet obtained compression stockings  She will get stockings and update me on effectiveness  BNP was normal      She will update me if she is advised to resume prednisone by pulmonology

## 2023-04-02 DIAGNOSIS — R41.3 MEMORY LOSS: Primary | ICD-10-CM

## 2023-04-03 ENCOUNTER — PATIENT OUTREACH (OUTPATIENT)
Dept: FAMILY MEDICINE CLINIC | Facility: CLINIC | Age: 88
End: 2023-04-03

## 2023-04-03 NOTE — PROGRESS NOTES
"Spoke with Nano  She is tired from doing laundry  Informed her about pill packing was interested until she heard cost per month  I offered to switch pharmacy to  [facility] where there is no charge for pill packing or delivery but she declines  \" I will just keep it this way until I cannot\" I asked if family or friends could help check her medications she said her neighbor can she has done it before  I offered to have  reach out about getting help in the home  Ac said not at this time  We agreed I will check back in one month  Strongly encouraged her to call PCP office if she needs help sooner     "

## 2023-04-05 DIAGNOSIS — Z96.619 HISTORY OF SHOULDER REPLACEMENT, UNSPECIFIED LATERALITY: Primary | ICD-10-CM

## 2023-04-05 RX ORDER — AMOXICILLIN 500 MG/1
CAPSULE ORAL
Qty: 4 CAPSULE | Refills: 2 | Status: SHIPPED | OUTPATIENT
Start: 2023-04-05 | End: 2024-04-05

## 2023-04-06 ENCOUNTER — IN-CLINIC DEVICE VISIT (OUTPATIENT)
Dept: CARDIOLOGY CLINIC | Facility: CLINIC | Age: 88
End: 2023-04-06

## 2023-04-06 DIAGNOSIS — Z95.0 CARDIAC PACEMAKER IN SITU: Primary | ICD-10-CM

## 2023-04-06 NOTE — PROGRESS NOTES
Results for orders placed or performed in visit on 04/06/23   Cardiac EP device report    Narrative    MDT-DUAL CHAMBER PPM (AAIR-DDDR MODE)/ ACTIVE SYSTEM IS MRI CONDITIONAL  DEVICE INTERROGATED IN THE [facility] OFFICE  BATTERY VOLTAGE ADEQUATE (3 5 YRS)  AP-75%, <0 1%  HIGH RV CAP THRESHOLDS NOTED SINCE 1/2023  NONCAPTURE WHEN TESTED BIPOLAR  0 75MV @ 0 4MS WHEN TESTED UNIPOLAR  BIPOLAR VENT IMPEDANCE INCREASED SINCE 1/2023  VENT SENSING & ATRIAL LEAD PARAMETERS WITHIN NORMAL LIMITS  PT DENIED ANY FALLS OR EVENTS @ THAT TIME  NO NEW SIGNIFICANT HIGH RATE EPISODES  RV PACE POLARITY REPROGRAMMED TO UNIPOLAR  DECREASE MADE TO RV AMPLITUDE & PULSE WIDTH TO PROMOTE DEVICE LONGEVITY WHILE MAINTAINING AN APPROPRIATE SAFETY MARGIN  NORMAL DEVICE FUNCTION   GV

## 2023-04-25 ENCOUNTER — TELEPHONE (OUTPATIENT)
Dept: NEUROLOGY | Facility: CLINIC | Age: 88
End: 2023-04-25

## 2023-04-25 ENCOUNTER — TELEPHONE (OUTPATIENT)
Dept: NEPHROLOGY | Facility: CLINIC | Age: 88
End: 2023-04-25

## 2023-04-25 DIAGNOSIS — G03.9 PACHYMENINGITIS: Primary | ICD-10-CM

## 2023-04-25 DIAGNOSIS — I10 HYPERTENSION, UNSPECIFIED TYPE: ICD-10-CM

## 2023-04-25 DIAGNOSIS — E87.1 HYPONATREMIA: Primary | ICD-10-CM

## 2023-04-25 NOTE — TELEPHONE ENCOUNTER
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

## 2023-04-26 ENCOUNTER — TELEPHONE (OUTPATIENT)
Dept: FAMILY MEDICINE CLINIC | Facility: CLINIC | Age: 88
End: 2023-04-26

## 2023-04-26 ENCOUNTER — HOSPITAL ENCOUNTER (INPATIENT)
Facility: HOSPITAL | Age: 88
LOS: 3 days | Discharge: HOME | End: 2023-04-29
Attending: EMERGENCY MEDICINE | Admitting: INTERNAL MEDICINE

## 2023-04-26 ENCOUNTER — APPOINTMENT (INPATIENT)
Dept: CT IMAGING | Facility: HOSPITAL | Age: 88
End: 2023-04-26

## 2023-04-26 DIAGNOSIS — G03.9 PACHYMENINGITIS: Primary | ICD-10-CM

## 2023-04-26 DIAGNOSIS — R41.82 ALTERED MENTAL STATUS, UNSPECIFIED ALTERED MENTAL STATUS TYPE: Primary | ICD-10-CM

## 2023-04-26 DIAGNOSIS — G03.9 PACHYMENINGITIS: ICD-10-CM

## 2023-04-26 PROBLEM — E43 SEVERE PROTEIN-CALORIE MALNUTRITION (HCC): Status: ACTIVE | Noted: 2020-12-12

## 2023-04-26 PROBLEM — G93.40 ACUTE ENCEPHALOPATHY: Status: ACTIVE | Noted: 2023-04-26

## 2023-04-26 LAB
ALBUMIN SERPL BCP-MCNC: 4.1 G/DL (ref 3.5–5)
ALP SERPL-CCNC: 43 U/L (ref 34–104)
ALT SERPL W P-5'-P-CCNC: 11 U/L (ref 7–52)
ANION GAP SERPL CALCULATED.3IONS-SCNC: 9 MMOL/L (ref 4–13)
APTT PPP: 30 SECONDS (ref 23–37)
AST SERPL W P-5'-P-CCNC: 16 U/L (ref 13–39)
BACTERIA UR QL AUTO: ABNORMAL /HPF
BASOPHILS # BLD AUTO: 0.03 THOUSANDS/ΜL (ref 0–0.1)
BASOPHILS NFR BLD AUTO: 1 % (ref 0–1)
BILIRUB SERPL-MCNC: 0.48 MG/DL (ref 0.2–1)
BILIRUB UR QL STRIP: NEGATIVE
BUN SERPL-MCNC: 13 MG/DL (ref 5–25)
CALCIUM SERPL-MCNC: 9 MG/DL (ref 8.4–10.2)
CHLORIDE SERPL-SCNC: 92 MMOL/L (ref 96–108)
CLARITY UR: CLEAR
CO2 SERPL-SCNC: 33 MMOL/L (ref 21–32)
COLOR UR: ABNORMAL
CREAT SERPL-MCNC: 0.46 MG/DL (ref 0.6–1.3)
EOSINOPHIL # BLD AUTO: 0.03 THOUSAND/ΜL (ref 0–0.61)
EOSINOPHIL NFR BLD AUTO: 1 % (ref 0–6)
ERYTHROCYTE [DISTWIDTH] IN BLOOD BY AUTOMATED COUNT: 13.9 % (ref 11.6–15.1)
ERYTHROCYTE [SEDIMENTATION RATE] IN BLOOD: 27 MM/HOUR (ref 0–29)
GFR SERPL CREATININE-BSD FRML MDRD: 90 ML/MIN/1.73SQ M
GLUCOSE SERPL-MCNC: 195 MG/DL (ref 65–140)
GLUCOSE SERPL-MCNC: 223 MG/DL (ref 65–140)
GLUCOSE SERPL-MCNC: 250 MG/DL (ref 65–140)
GLUCOSE UR STRIP-MCNC: ABNORMAL MG/DL
HCT VFR BLD AUTO: 38.4 % (ref 34.8–46.1)
HGB BLD-MCNC: 11.7 G/DL (ref 11.5–15.4)
HGB UR QL STRIP.AUTO: NEGATIVE
IMM GRANULOCYTES # BLD AUTO: 0.03 THOUSAND/UL (ref 0–0.2)
IMM GRANULOCYTES NFR BLD AUTO: 1 % (ref 0–2)
INR PPP: 1.1 (ref 0.84–1.19)
KETONES UR STRIP-MCNC: ABNORMAL MG/DL
LEUKOCYTE ESTERASE UR QL STRIP: ABNORMAL
LYMPHOCYTES # BLD AUTO: 0.72 THOUSANDS/ΜL (ref 0.6–4.47)
LYMPHOCYTES NFR BLD AUTO: 12 % (ref 14–44)
MCH RBC QN AUTO: 26.5 PG (ref 26.8–34.3)
MCHC RBC AUTO-ENTMCNC: 30.5 G/DL (ref 31.4–37.4)
MCV RBC AUTO: 87 FL (ref 82–98)
MONOCYTES # BLD AUTO: 0.55 THOUSAND/ΜL (ref 0.17–1.22)
MONOCYTES NFR BLD AUTO: 9 % (ref 4–12)
NEUTROPHILS # BLD AUTO: 4.66 THOUSANDS/ΜL (ref 1.85–7.62)
NEUTS SEG NFR BLD AUTO: 76 % (ref 43–75)
NITRITE UR QL STRIP: NEGATIVE
NON-SQ EPI CELLS URNS QL MICRO: ABNORMAL /HPF
NRBC BLD AUTO-RTO: 0 /100 WBCS
PH UR STRIP.AUTO: 6.5 [PH]
PLATELET # BLD AUTO: 211 THOUSANDS/UL (ref 149–390)
PMV BLD AUTO: 10.2 FL (ref 8.9–12.7)
POTASSIUM SERPL-SCNC: 3.4 MMOL/L (ref 3.5–5.3)
PROT SERPL-MCNC: 7.2 G/DL (ref 6.4–8.4)
PROT UR STRIP-MCNC: ABNORMAL MG/DL
PROTHROMBIN TIME: 14.4 SECONDS (ref 11.6–14.5)
RBC # BLD AUTO: 4.41 MILLION/UL (ref 3.81–5.12)
RBC #/AREA URNS AUTO: ABNORMAL /HPF
SODIUM SERPL-SCNC: 134 MMOL/L (ref 135–147)
SP GR UR STRIP.AUTO: 1.01 (ref 1–1.03)
UROBILINOGEN UR STRIP-ACNC: <2 MG/DL
WBC # BLD AUTO: 6.02 THOUSAND/UL (ref 4.31–10.16)
WBC #/AREA URNS AUTO: ABNORMAL /HPF

## 2023-04-26 RX ORDER — BRIMONIDINE TARTRATE 2 MG/ML
1 SOLUTION/ DROPS OPHTHALMIC 3 TIMES DAILY
Status: DISCONTINUED | OUTPATIENT
Start: 2023-04-26 | End: 2023-04-29 | Stop reason: HOSPADM

## 2023-04-26 RX ORDER — INSULIN LISPRO 100 [IU]/ML
1-5 INJECTION, SOLUTION INTRAVENOUS; SUBCUTANEOUS
Status: DISCONTINUED | OUTPATIENT
Start: 2023-04-26 | End: 2023-04-29 | Stop reason: HOSPADM

## 2023-04-26 RX ORDER — FAMOTIDINE 20 MG/1
20 TABLET, FILM COATED ORAL 2 TIMES DAILY
Status: DISCONTINUED | OUTPATIENT
Start: 2023-04-26 | End: 2023-04-29 | Stop reason: HOSPADM

## 2023-04-26 RX ORDER — PRAVASTATIN SODIUM 80 MG/1
80 TABLET ORAL
Status: DISCONTINUED | OUTPATIENT
Start: 2023-04-26 | End: 2023-04-29 | Stop reason: HOSPADM

## 2023-04-26 RX ORDER — LORATADINE 10 MG/1
10 TABLET ORAL DAILY PRN
Status: DISCONTINUED | OUTPATIENT
Start: 2023-04-26 | End: 2023-04-29 | Stop reason: HOSPADM

## 2023-04-26 RX ORDER — LORAZEPAM 1 MG/1
1 TABLET ORAL
Status: DISCONTINUED | OUTPATIENT
Start: 2023-04-26 | End: 2023-04-29 | Stop reason: HOSPADM

## 2023-04-26 RX ORDER — IPRATROPIUM BROMIDE 21 UG/1
1 SPRAY, METERED NASAL 2 TIMES DAILY
Status: DISCONTINUED | OUTPATIENT
Start: 2023-04-26 | End: 2023-04-29 | Stop reason: HOSPADM

## 2023-04-26 RX ORDER — EZETIMIBE 10 MG/1
10 TABLET ORAL
Status: DISCONTINUED | OUTPATIENT
Start: 2023-04-26 | End: 2023-04-29 | Stop reason: HOSPADM

## 2023-04-26 RX ORDER — ASPIRIN 81 MG/1
81 TABLET ORAL DAILY
Status: CANCELLED | OUTPATIENT
Start: 2023-04-26

## 2023-04-26 RX ORDER — ONDANSETRON 2 MG/ML
4 INJECTION INTRAMUSCULAR; INTRAVENOUS EVERY 6 HOURS PRN
Status: DISCONTINUED | OUTPATIENT
Start: 2023-04-26 | End: 2023-04-29 | Stop reason: HOSPADM

## 2023-04-26 RX ORDER — ACETAMINOPHEN 325 MG/1
650 TABLET ORAL EVERY 4 HOURS PRN
Status: DISCONTINUED | OUTPATIENT
Start: 2023-04-26 | End: 2023-04-29 | Stop reason: HOSPADM

## 2023-04-26 RX ORDER — PANTOPRAZOLE SODIUM 20 MG/1
20 TABLET, DELAYED RELEASE ORAL
Status: DISCONTINUED | OUTPATIENT
Start: 2023-04-26 | End: 2023-04-29 | Stop reason: HOSPADM

## 2023-04-26 RX ORDER — SODIUM CHLORIDE 1 G/1
1 TABLET ORAL 3 TIMES DAILY
Status: DISCONTINUED | OUTPATIENT
Start: 2023-04-26 | End: 2023-04-29 | Stop reason: HOSPADM

## 2023-04-26 RX ORDER — CALCIUM CARBONATE 500(1250)
1 TABLET ORAL
Status: DISCONTINUED | OUTPATIENT
Start: 2023-04-27 | End: 2023-04-29 | Stop reason: HOSPADM

## 2023-04-26 RX ORDER — PANTOPRAZOLE SODIUM 20 MG/1
20 TABLET, DELAYED RELEASE ORAL
Status: DISCONTINUED | OUTPATIENT
Start: 2023-04-27 | End: 2023-04-26

## 2023-04-26 RX ORDER — ENOXAPARIN SODIUM 100 MG/ML
40 INJECTION SUBCUTANEOUS DAILY
Status: COMPLETED | OUTPATIENT
Start: 2023-04-26 | End: 2023-04-26

## 2023-04-26 RX ADMIN — FAMOTIDINE 20 MG: 20 TABLET, FILM COATED ORAL at 17:51

## 2023-04-26 RX ADMIN — INSULIN LISPRO 2 UNITS: 100 INJECTION, SOLUTION INTRAVENOUS; SUBCUTANEOUS at 17:35

## 2023-04-26 RX ADMIN — PRAVASTATIN SODIUM 80 MG: 80 TABLET ORAL at 16:32

## 2023-04-26 RX ADMIN — BRIMONIDINE TARTRATE 1 DROP: 2 SOLUTION/ DROPS OPHTHALMIC at 22:07

## 2023-04-26 RX ADMIN — ENOXAPARIN SODIUM 40 MG: 40 INJECTION SUBCUTANEOUS at 16:31

## 2023-04-26 RX ADMIN — METOPROLOL TARTRATE 75 MG: 50 TABLET, FILM COATED ORAL at 16:32

## 2023-04-26 RX ADMIN — INSULIN LISPRO 1 UNITS: 100 INJECTION, SOLUTION INTRAVENOUS; SUBCUTANEOUS at 22:07

## 2023-04-26 RX ADMIN — B-COMPLEX W/ C & FOLIC ACID TAB 1 TABLET: TAB at 16:32

## 2023-04-26 RX ADMIN — SODIUM CHLORIDE 1 G: 1 TABLET ORAL at 22:07

## 2023-04-26 RX ADMIN — PANTOPRAZOLE SODIUM 20 MG: 20 TABLET, DELAYED RELEASE ORAL at 16:32

## 2023-04-26 RX ADMIN — EZETIMIBE 10 MG: 10 TABLET ORAL at 16:32

## 2023-04-26 RX ADMIN — IOHEXOL 100 ML: 350 INJECTION, SOLUTION INTRAVENOUS at 21:15

## 2023-04-26 RX ADMIN — SODIUM CHLORIDE 1 G: 1 TABLET ORAL at 17:37

## 2023-04-26 RX ADMIN — BRIMONIDINE TARTRATE 1 DROP: 2 SOLUTION/ DROPS OPHTHALMIC at 18:22

## 2023-04-26 RX ADMIN — SODIUM CHLORIDE 500 ML: 0.9 INJECTION, SOLUTION INTRAVENOUS at 13:52

## 2023-04-26 RX ADMIN — BIMATOPROST 1 DROP: 0.1 SOLUTION/ DROPS OPHTHALMIC at 22:07

## 2023-04-26 RX ADMIN — LORAZEPAM 1 MG: 1 TABLET ORAL at 22:23

## 2023-04-26 NOTE — ASSESSMENT & PLAN NOTE
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  Differential considerations include infectious or inflammatory neoplastic etiologies as well as idiopathic  Further clinical assessment recommended  Consider lumbar puncture for further characterization  Considerations related to the patient's age and/or comorbidities may be used to alter these recommendations  2   Abnormal right internal carotid artery flow void either representing slow flow or vascular occlusion  3   No acute infarction or acute intracranial hemorrhage  ADDENDUM: Differential diagnosis could include IgG4 related disease  Further clinical assessment advised  \"  - 3/27/23 CT chest high resolution: \"No significant change since August 2022 in moderate diffuse juxtapleural reticulation and traction bronchiolectasis with mild groundglass opacity  According to the [employer] guidelines,  this may be either a probable UIP pattern of pulmonary fibrosis or NSIP  Pulmonary artery enlargement which can be seen with pulmonary hypertension  \"    Plan  -Plan for LP on 4/28/23 afternoon  Last dose Eliquis 4/26/23 am  Hold for 48 hours prior to LP  - Hold Lovenox  4/28/23 am for planned LP   - May continue on home ASA 81 mg daily  - Serum labs within normal limits: ESR, PASTOR, Lyme  - Serum labs pending: ACE, Quantiferon, blood cultures  - Hold off on empiric antibiotics and/or steroids until LP as this can alter results of LP and mask diagnosis     - Medical management and correction of metabolic and infectious disturbances per " primary team   - Avoid CNS altering medications if possible  - Continue supportive care   - Continue to monitor neurologic status   Notify neurology with any changes in exam  STAT CTH with any acute change in neurologic exam

## 2023-04-26 NOTE — TELEPHONE ENCOUNTER
1st attempt to reach from triage and give information to go to ER per Dr Elidia Medina  No answer  Left message on machine

## 2023-04-26 NOTE — ED PROVIDER NOTES
History  Chief Complaint   Patient presents with    Evaluation of Abnormal Diagnostic Test     Pt had abnormal MRI and was told to come to ER for further testing     82 yoF presenting after being contacted for abnormal MRI reading of Pachymeningitis  Patient states she was previously seen for confusion, headache and increased forgetfulness a couple weeks ago and had an MRI done  Since then pt states her symptoms have improved in that she feels slightly less forgetful but pt was called today by PCP with results or MRI showing Pachymeningitis and was offered to follow up with neurology or go to ED for further evaluation  Pt states today she is feeling better, endorses slightly blurry vision but denies headache, SOB, chest pain, N/V, neck pain/stiffness, fever, chills, dysuria, hematuria, abd pain  Prior to Admission Medications   Prescriptions Last Dose Informant Patient Reported? Taking?    Alphagan P 0 1 %   Yes No   Sig: INSTILL 1 DROP RIGHT EYE TWICE A DAY   Eliquis 2 5 MG   No No   Sig: TAKE 1 TABLET (2 5 MG TOTAL) BY MOUTH 2 (TWO) TIMES A DAY   Januvia 100 MG tablet   No No   Sig: TAKE 1 TABLET (100 MG TOTAL) BY MOUTH DAILY   LORazepam (ATIVAN) 0 5 mg tablet   No No   Sig: TAKE 2 TABLETS (1 MG TOTAL) BY MOUTH DAILY AT BEDTIME   Motegrity 2 MG TABS   No No   Sig: TAKE 2 MG BY MOUTH DAILY   Multiple Vitamin (multivitamin) tablet   Yes No   Sig: Take 1 tablet by mouth daily     acetaminophen (TYLENOL) 500 mg tablet   Yes No   Sig: Take 1,000 mg by mouth as needed for mild pain   amoxicillin (AMOXIL) 500 mg capsule   No No   Sig: TAKE 4 CAPSULES 1 HR PRIOR TO DENTAL APPT   aspirin (ECOTRIN LOW STRENGTH) 81 mg EC tablet   No No   Sig: Take 1 tablet (81 mg total) by mouth daily   bimatoprost (LUMIGAN) 0 01 % ophthalmic drops   No No   Sig: Administer 1 drop to both eyes daily at bedtime for 30 days   calcium carbonate (OS-DANIA) 600 MG tablet   Yes No   Sig: Take 600 mg by mouth 2 (two) times a day with meals ezetimibe-simvastatin (VYTORIN) 10-40 mg per tablet   No No   Sig: TAKE 1 TABLET BY MOUTH DAILY AT BEDTIME   famotidine (PEPCID) 20 mg tablet   No No   Sig: TAKE 1 TABLET (20 MG TOTAL) BY MOUTH 2 (TWO) TIMES A DAY   glucose blood (OneTouch Ultra) test strip   No No   Sig: TEST FOUR TIMES A DAY   ipratropium (ATROVENT) 0 03 % nasal spray   No No   Sig: USE 2 SPRAYS INTO EACH NOSTRIL EVERY 12 (TWELVE) HOURS   loratadine (CLARITIN) 10 mg tablet   Yes No   Sig: Take 10 mg by mouth daily as needed for allergies   metFORMIN (GLUCOPHAGE) 500 mg tablet   No No   Sig: TAKE 2 TABLETS (1,000 MG TOTAL) BY MOUTH 2 (TWO) TIMES A DAY WITH MEALS   metoprolol tartrate (LOPRESSOR) 50 mg tablet   No No   Sig: TAKE 1 5 TABLETS (75 MG TOTAL) BY MOUTH EVERY 12 (TWELVE) HOURS   pantoprazole (PROTONIX) 20 mg tablet   No No   Sig: Take 1 tablet (20 mg total) by mouth 2 (two) times a day   peppermint oil   Yes No   Sig: Use once as needed   Patient not taking: Reported on 3/15/2023   sodium chloride 1 g tablet   No No   Sig: TAKE 1 TABLET (1 G TOTAL) BY MOUTH 3 (THREE) TIMES A DAY      Facility-Administered Medications: None       Past Medical History:   Diagnosis Date    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/7/2020    Glaucoma     H/O degenerative disc disease     Idiopathic pulmonary fibrosis ([facility] [address] ) 11/2020    Irregular heart beat     afib    Peripheral neuropathy     S/P laparoscopic cholecystectomy 12/21/2020    Stenosis of right subclavian artery ([facility] [address] ) 11/18/2016       Past Surgical History:   Procedure Laterality Date    CATARACT EXTRACTION, BILATERAL  2011    CHOLECYSTECTOMY      CHOLECYSTECTOMY LAPAROSCOPIC N/A 12/09/2020    Procedure: CHOLECYSTECTOMY LAPAROSCOPIC;  Surgeon: Rozina Alba MD;  [facility];  Service: General    COLONOSCOPY      CYST REMOVAL  2009    left lower Quadrant     HERNIA REPAIR  1992    LIPOMA RESECTION  2010    CA RPR 1ST INGUN HRNA AGE 5 YRS/> REDUCIBLE Bilateral 01/05/2018 Procedure: INGUINAL HERNIA REPAIR;  Surgeon: Jasmina Ramachandran MD;  Location: [facility];  Service: General    SHOULDER SURGERY  2019    Dr Osorio [facility], [place]   [address] VASCULAR SURGERY      Agram-  R carotid occlusion       Family History   Problem Relation Age of Onset    Heart disease Mother     Heart attack Father     Hiatal hernia Father     Diabetes Father     Cancer Brother     Diabetes Brother     Heart disease Brother     Hypertension Brother     Other Son         gastroparesis     I have reviewed and agree with the history as documented  E-Cigarette/Vaping    E-Cigarette Use Never User      E-Cigarette/Vaping Substances    Nicotine No     THC No     CBD No     Flavoring No     Other No     Unknown No      Social History     Tobacco Use    Smoking status: Former     Packs/day: 1 50     Years: 38 00     Pack years: 57 00     Types: Cigarettes     Start date:      Quit date:      Years since quittin 3    Smokeless tobacco: Never   Vaping Use    Vaping Use: Never used   Substance Use Topics    Alcohol use: No    Drug use: No        Review of Systems   Constitutional: Negative for chills and fever  HENT: Negative for ear pain and sore throat  Eyes: Positive for visual disturbance (cloudy)  Negative for pain  Respiratory: Negative for cough and shortness of breath  Cardiovascular: Negative for chest pain and palpitations  Gastrointestinal: Negative for abdominal distention, abdominal pain, constipation, diarrhea, nausea and vomiting  Genitourinary: Negative for dysuria and hematuria  Musculoskeletal: Negative for arthralgias, back pain, neck pain and neck stiffness  Skin: Negative for color change and rash  Neurological: Negative for dizziness, seizures, syncope and headaches  All other systems reviewed and are negative        Physical Exam  ED Triage Vitals   Temperature Pulse Respirations Blood Pressure SpO2 04/26/23 1119 04/26/23 1119 04/26/23 1119 04/26/23 1119 04/26/23 1119   (!) 97 4 °F (36 3 °C) 81 16 128/87 99 %      Temp Source Heart Rate Source Patient Position - Orthostatic VS BP Location FiO2 (%)   04/26/23 1119 04/26/23 1119 04/26/23 1119 04/26/23 1119 --   Axillary Monitor Sitting Right arm       Pain Score       04/26/23 1547       No Pain             Orthostatic Vital Signs  Vitals:    04/26/23 1119 04/26/23 1332 04/26/23 1512   BP: 128/87 (!) 175/85 155/74   Pulse: 81 80 80   Patient Position - Orthostatic VS: Sitting Lying Sitting       Physical Exam  Vitals and nursing note reviewed  Constitutional:       General: She is not in acute distress  Appearance: She is well-developed  HENT:      Head: Normocephalic and atraumatic  Nose: Nose normal  No congestion  Eyes:      Extraocular Movements: Extraocular movements intact  Conjunctiva/sclera: Conjunctivae normal    Cardiovascular:      Rate and Rhythm: Normal rate and regular rhythm  Pulses: Normal pulses  Heart sounds: Normal heart sounds  No murmur heard  Pulmonary:      Effort: Pulmonary effort is normal  No respiratory distress  Breath sounds: Normal breath sounds  Comments: Diffuse crackles, no wheezing present  No increased WOB or respiratory distress  Chest:      Chest wall: No tenderness  Abdominal:      General: Abdomen is flat  Bowel sounds are normal       Palpations: Abdomen is soft  Tenderness: There is no abdominal tenderness  There is no right CVA tenderness or left CVA tenderness  Musculoskeletal:         General: No deformity or signs of injury  Normal range of motion  Cervical back: Normal range of motion and neck supple  No rigidity or tenderness  Right lower leg: No edema  Left lower leg: No edema  Skin:     General: Skin is warm and dry  Findings: No bruising, lesion or rash  Neurological:      General: No focal deficit present        Mental Status: She is alert       Cranial Nerves: No cranial nerve deficit  Sensory: No sensory deficit  Comments: Cranial nerves intact, no facial droop of slurred speech, no visual field cuts  Motor and sensation intact in all extremities           ED Medications  Medications   brimonidine tartrate 0 2 % ophthalmic solution 1 drop (1 drop Right Eye Given 4/26/23 1822)   bimatoprost (LUMIGAN) 0 01 % ophthalmic solution 1 drop (has no administration in time range)   calcium carbonate (OYSTER SHELL,OSCAL) 500 mg tablet 1 tablet (has no administration in time range)   ezetimibe (ZETIA) tablet 10 mg (10 mg Oral Given 4/26/23 1632)     And   pravastatin (PRAVACHOL) tablet 80 mg (80 mg Oral Given 4/26/23 1632)   famotidine (PEPCID) tablet 20 mg (20 mg Oral Given 4/26/23 1751)   ipratropium (ATROVENT) 0 03 % nasal spray 1 spray (1 spray Each Nare Not Given 4/26/23 1745)   loratadine (CLARITIN) tablet 10 mg (has no administration in time range)   metoprolol tartrate (LOPRESSOR) tablet 75 mg (75 mg Oral Given 4/26/23 1632)   multivitamin stress formula tablet 1 tablet (1 tablet Oral Given 4/26/23 1632)   sodium chloride tablet 1 g (1 g Oral Given 4/26/23 1737)   acetaminophen (TYLENOL) tablet 650 mg (has no administration in time range)   ondansetron (ZOFRAN) injection 4 mg (has no administration in time range)   LORazepam (ATIVAN) tablet 1 mg (has no administration in time range)   Prucalopride Succinate TABS 2 mg (2 mg Oral Not Given 4/26/23 1740)   insulin lispro (HumaLOG) 100 units/mL subcutaneous injection 1-5 Units (2 Units Subcutaneous Given 4/26/23 1735)   insulin lispro (HumaLOG) 100 units/mL subcutaneous injection 1-5 Units (has no administration in time range)   pantoprazole (PROTONIX) EC tablet 20 mg (20 mg Oral Given 4/26/23 1632)   sodium chloride 0 9 % bolus 500 mL (500 mL Intravenous New Bag 4/26/23 1352)   enoxaparin (LOVENOX) subcutaneous injection 40 mg (40 mg Subcutaneous Given 4/26/23 1631)       Diagnostic Studies  Results Reviewed     Procedure Component Value Units Date/Time    Urine Microscopic [[id number]]  (Abnormal) Collected: 04/26/23 1330    Lab Status: Final result Specimen: Urine, Clean Catch Updated: 04/26/23 1351     RBC, UA 1-2 /hpf      WBC, UA 4-10 /hpf      Epithelial Cells Occasional /hpf      Bacteria, UA None Seen /hpf     UA w Reflex to Microscopic w Reflex to Culture [[id number]]  (Abnormal) Collected: 04/26/23 1330    Lab Status: Final result Specimen: Urine, Clean Catch Updated: 04/26/23 1348     Color, UA Light Yellow     Clarity, UA Clear     Specific Gravity, UA 1 015     pH, UA 6 5     Leukocytes, UA Trace     Nitrite, UA Negative     Protein,  (2+) mg/dl      Glucose, UA Trace mg/dl      Ketones, UA 40 (2+) mg/dl      Urobilinogen, UA <2 0 mg/dl      Bilirubin, UA Negative     Occult Blood, UA Negative    Comprehensive metabolic panel [[id number]]  (Abnormal) Collected: 04/26/23 1217    Lab Status: Final result Specimen: Blood from Arm, Left Updated: 04/26/23 1255     Sodium 134 mmol/L      Potassium 3 4 mmol/L      Chloride 92 mmol/L      CO2 33 mmol/L      ANION GAP 9 mmol/L      BUN 13 mg/dL      Creatinine 0 46 mg/dL      Glucose 250 mg/dL      Calcium 9 0 mg/dL      AST 16 U/L      ALT 11 U/L      Alkaline Phosphatase 43 U/L      Total Protein 7 2 g/dL      Albumin 4 1 g/dL      Total Bilirubin 0 48 mg/dL      eGFR 90 ml/min/1 73sq m     Narrative:      Meganarinder guidelines for Chronic Kidney Disease (CKD):     Stage 1 with normal or high GFR (GFR > 90 mL/min/1 73 square meters)    Stage 2 Mild CKD (GFR = 60-89 mL/min/1 73 square meters)    Stage 3A Moderate CKD (GFR = 45-59 mL/min/1 73 square meters)    Stage 3B Moderate CKD (GFR = 30-44 mL/min/1 73 square meters)    Stage 4 Severe CKD (GFR = 15-29 mL/min/1 73 square meters)    Stage 5 End Stage CKD (GFR <15 mL/min/1 73 square meters)  Note: GFR calculation is accurate only with a steady state creatinine    Protime-INR [[id number]]  (Normal) Collected: 04/26/23 1217    Lab Status: Final result Specimen: Blood from Arm, Left Updated: 04/26/23 1251     Protime 14 4 seconds      INR 1 10    APTT [[id number]]  (Normal) Collected: 04/26/23 1217    Lab Status: Final result Specimen: Blood from Arm, Left Updated: 04/26/23 1251     PTT 30 seconds     CBC and differential [[id number]]  (Abnormal) Collected: 04/26/23 1217    Lab Status: Final result Specimen: Blood from Arm, Left Updated: 04/26/23 1233     WBC 6 02 Thousand/uL      RBC 4 41 Million/uL      Hemoglobin 11 7 g/dL      Hematocrit 38 4 %      MCV 87 fL      MCH 26 5 pg      MCHC 30 5 g/dL      RDW 13 9 %      MPV 10 2 fL      Platelets 710 Thousands/uL      nRBC 0 /100 WBCs      Neutrophils Relative 76 %      Immat GRANS % 1 %      Lymphocytes Relative 12 %      Monocytes Relative 9 %      Eosinophils Relative 1 %      Basophils Relative 1 %      Neutrophils Absolute 4 66 Thousands/µL      Immature Grans Absolute 0 03 Thousand/uL      Lymphocytes Absolute 0 72 Thousands/µL      Monocytes Absolute 0 55 Thousand/µL      Eosinophils Absolute 0 03 Thousand/µL      Basophils Absolute 0 03 Thousands/µL     Blood culture #1 [[id number]] Collected: 04/26/23 1217    Lab Status: In process Specimen: Blood from Arm, Left Updated: 04/26/23 1228    Blood culture #2 [[id number]] Collected: 04/26/23 1223    Lab Status:  In process Specimen: Blood from Arm, Right Updated: 04/26/23 1228                 CT abdomen pelvis w wo contrast    (Results Pending)         Procedures  ECG 12 Lead Documentation Only    Date/Time: 4/26/2023 6:34 PM  Performed by: Alexandru Chung MD  Authorized by: Alexandru Chung MD     Patient location:  ED  Previous ECG:     Previous ECG:  Compared to current  Interpretation:     Interpretation: abnormal    Rate:     ECG rate:  73    ECG rate assessment: normal    Rhythm:     Rhythm: paced    Pacing:     Capture:  Complete    Type of pacing: Atrial  Ectopy:     Ectopy: PVCs      PVCs:  Infrequent  QRS:     QRS axis:  Left    QRS intervals:  Normal  Conduction:     Conduction: normal    ST segments:     ST segments:  Normal  T waves:     T waves: normal            ED Course  ED Course as of 04/26/23 1841   Wed Apr 26, 2023   1258 Vitals reviewed, stable   1258 Sodium(!): 134   1258 Chloride(!): 92   1258 Glucose, Random(!): 250  Patient started on normal saline   1259 Protime-INR  WNL   1259 PTT: 30  Normal   1259 CBC and differential(!)  Unremarkable   18 79-year-old female presenting due to abnormal MRI showing pachymeningitis  Currently feeling improved from prior to MRI but patient still endorses slight forgetfulness  Patient on Eliquis and states she takes it every day so lumbar puncture will not be done at this time  Patient stable, afebrile  Will contact neurology for recommendations and admit for further work-up  [id number] Neurology contacted, recommend stopping Eliquis for 2 days for spinal tap and to check carotids with carotid ultrasound  Recommends against steroids or antibiotics at this time  1335 UA w Reflex to Microscopic w Reflex to Culture(!)  Glucose and ketones, no signs on UTI   1439 Patient discussed with inpatient team and admitted for further work-up  At time of admission patient in stable condition  Work-up unremarkable at this time                                       MDM      Disposition  Final diagnoses:   Pachymeningitis     Time reflects when diagnosis was documented in both MDM as applicable and the Disposition within this note     Time User Action Codes Description Comment    4/26/2023  2:02 PM Awais Elizabeth Add [G03 9] Pachymeningitis       ED Disposition     ED Disposition   Admit    Condition   Stable    Date/Time   Wed Apr 26, 2023  2:02 PM    Comment   Case was discussed with Dr Jennifer Rizo and the patient's admission status was agreed to be Admission Status: inpatient status to the service of Dr Jennifer Rizo   Follow-up Information    None         Current Discharge Medication List      CONTINUE these medications which have NOT CHANGED    Details   acetaminophen (TYLENOL) 500 mg tablet Take 1,000 mg by mouth as needed for mild pain      Alphagan P 0 1 % INSTILL 1 DROP RIGHT EYE TWICE A DAY      amoxicillin (AMOXIL) 500 mg capsule TAKE 4 CAPSULES 1 HR PRIOR TO DENTAL APPT  Qty: 4 capsule, Refills: 2    Associated Diagnoses: History of shoulder replacement, unspecified laterality      aspirin (ECOTRIN LOW STRENGTH) 81 mg EC tablet Take 1 tablet (81 mg total) by mouth daily  Qty:      Associated Diagnoses: Occlusion of right carotid artery; Asymptomatic carotid artery stenosis, left      bimatoprost (LUMIGAN) 0 01 % ophthalmic drops Administer 1 drop to both eyes daily at bedtime for 30 days  Qty: 1 5 mL, Refills: 0      calcium carbonate (OS-DANIA) 600 MG tablet Take 600 mg by mouth 2 (two) times a day with meals        Eliquis 2 5 MG TAKE 1 TABLET (2 5 MG TOTAL) BY MOUTH 2 (TWO) TIMES A DAY  Qty: 60 tablet, Refills: 5    Associated Diagnoses: Paroxysmal atrial fibrillation (HCC)      ezetimibe-simvastatin (VYTORIN) 10-40 mg per tablet TAKE 1 TABLET BY MOUTH DAILY AT BEDTIME  Qty: 30 tablet, Refills: 5    Associated Diagnoses: Hyperlipidemia, unspecified hyperlipidemia type      famotidine (PEPCID) 20 mg tablet TAKE 1 TABLET (20 MG TOTAL) BY MOUTH 2 (TWO) TIMES A DAY  Qty: 60 tablet, Refills: 5    Associated Diagnoses: Gastroesophageal reflux disease with esophagitis without hemorrhage      glucose blood (OneTouch Ultra) test strip TEST FOUR TIMES A DAY  Qty: 400 strip, Refills: 1    Associated Diagnoses: Uncontrolled type 2 diabetes mellitus with hyperglycemia (HCC)      ipratropium (ATROVENT) 0 03 % nasal spray USE 2 SPRAYS INTO EACH NOSTRIL EVERY 12 (TWELVE) HOURS  Qty: 30 mL, Refills: 6    Associated Diagnoses: Nonallergic rhinitis      Januvia 100 MG tablet TAKE 1 TABLET (100 MG TOTAL) BY MOUTH DAILY  Qty: 30 tablet, Refills: 5    Associated Diagnoses: Type 2 diabetes mellitus with retinopathy, with long-term current use of insulin, macular edema presence unspecified, unspecified laterality, unspecified retinopathy severity (HCC)      loratadine (CLARITIN) 10 mg tablet Take 10 mg by mouth daily as needed for allergies      LORazepam (ATIVAN) 0 5 mg tablet TAKE 2 TABLETS (1 MG TOTAL) BY MOUTH DAILY AT BEDTIME  Qty: 60 tablet, Refills: 1    Associated Diagnoses: Anxiety      metFORMIN (GLUCOPHAGE) 500 mg tablet TAKE 2 TABLETS (1,000 MG TOTAL) BY MOUTH 2 (TWO) TIMES A DAY WITH MEALS  Qty: 120 tablet, Refills: 5    Associated Diagnoses: Type 2 diabetes mellitus with diabetic polyneuropathy, with long-term current use of insulin (HCC)      metoprolol tartrate (LOPRESSOR) 50 mg tablet TAKE 1 5 TABLETS (75 MG TOTAL) BY MOUTH EVERY 12 (TWELVE) HOURS  Qty: 90 tablet, Refills: 3    Associated Diagnoses: Essential (primary) hypertension      Motegrity 2 MG TABS TAKE 2 MG BY MOUTH DAILY  Qty: 90 tablet, Refills: 3    Associated Diagnoses: Chronic constipation with overflow; Abdominal bloating      Multiple Vitamin (multivitamin) tablet Take 1 tablet by mouth daily        pantoprazole (PROTONIX) 20 mg tablet Take 1 tablet (20 mg total) by mouth 2 (two) times a day  Qty: 60 tablet, Refills: 2    Associated Diagnoses: Dysphagia, unspecified type      peppermint oil Use once as needed      sodium chloride 1 g tablet TAKE 1 TABLET (1 G TOTAL) BY MOUTH 3 (THREE) TIMES A DAY  Qty: 270 tablet, Refills: 1    Associated Diagnoses: Hyponatremia           No discharge procedures on file  PDMP Review       Value Time User    PDMP Reviewed  Yes 3/14/2023 11:34 AM Kimberly Martin, [facility]           ED Provider  Attending physically available and evaluated Kitty Rose  KATIE managed the patient along with the ED Attending      Electronically Signed by         Jairo Terry MD  04/26/23 2527

## 2023-04-26 NOTE — TELEPHONE ENCOUNTER
Dr Konrad Rubio - This pt has not been seen by our practice and has not established care with our office  They were in the process of trying to schedule an appt after triage (See below), but apparently pt's condition became emergent and needed to be evaluated in the ED

## 2023-04-26 NOTE — TELEPHONE ENCOUNTER
Spoke to Nano via telephone  She did not answer her phone yesterday, couldn't find it  This is unusual for her  Reviewed recent MRI results, showing pachymeningitis  Reviewed what this is and possible causes  Ac notes she is having episodes of confusion  Not being able to put facts together  Has noted she hears the phone ring sometimes, but doesn't know how to answer it  The other night she was having tingling her arm and dizziness, she tried to call 911, but couldn't get through because she didn't know how to use her phone properly  She feels like she is thinking in her mind what she needs to do, but can't execute the tasks  Forgetting meds  Yesterday, had aching behind her eyes  Notes for about 3 weeks she had a mild headache, she didn't complain about it  She had hit her head on the car door frame, and attributed it to this  Always has blurred vision, but not double vision  Follows with general ophthalmologist, and retina specialist      No slurred speech or weakness  We discussed completing blood work, urinalysis, eye exam, neurology follow up  Neurology has been contacted, awaiting neurologist to review imaging and triage for appointment  We also discussed option to go to ED for evaluation  She prefers to go to ED for evaluation today  She will call her son to take her  ADT order placed

## 2023-04-26 NOTE — PLAN OF CARE
Problem: MOBILITY - ADULT  Goal: Maintain or return to baseline ADL function  Description: INTERVENTIONS:  -  Assess patient's ability to carry out ADLs; assess patient's baseline for ADL function and identify physical deficits which impact ability to perform ADLs (bathing, care of mouth/teeth, toileting, grooming, dressing, etc )  - Assess/evaluate cause of self-care deficits   - Assess range of motion  - Assess patient's mobility; develop plan if impaired  - Assess patient's need for assistive devices and provide as appropriate  - Encourage maximum independence but intervene and supervise when necessary  - Involve family in performance of ADLs  - Assess for home care needs following discharge   - Consider OT consult to assist with ADL evaluation and planning for discharge  - Provide patient education as appropriate  Outcome: Progressing  Goal: Maintains/Returns to pre admission functional level  Description: INTERVENTIONS:  - Perform BMAT or MOVE assessment daily    - Set and communicate daily mobility goal to care team and patient/family/caregiver     - Collaborate with rehabilitation services on mobility goals if consulted  - Perform Range of Motion   - Reposition patient   - Dangle patient  - Stand patient   - Ambulate patient   - Out of bed to chair   - Out of bed for meals   - Out of bed for toileting  - Record patient progress and toleration of activity level   Outcome: Progressing

## 2023-04-26 NOTE — ASSESSMENT & PLAN NOTE
Continue patient on metoprolol, holding Eliquis  Bactrim Counseling:  I discussed with the patient the risks of sulfa antibiotics including but not limited to GI upset, allergic reaction, drug rash, diarrhea, dizziness, photosensitivity, and yeast infections.  Rarely, more serious reactions can occur including but not limited to aplastic anemia, agranulocytosis, methemoglobinemia, blood dyscrasias, liver or kidney failure, lung infiltrates or desquamative/blistering drug rashes.

## 2023-04-26 NOTE — ASSESSMENT & PLAN NOTE
Patient has MRI of the brain which showed abnormal findings of pachymeningitis  We will admit the patient to medical surgical unit  Patient has some associated confusion, as well as blurry vision on and off going on as well  We will consult neurology  Patient would benefit from an lumbar puncture  However had taken her last dose of Eliquis this morning on 4/26/2023 early morning  Hence patient will need to be off of Eliquis for 24 to 48 hours prior to LP  Patient however did take 2 5 mg dose in the morning  Hold aspirin as well

## 2023-04-26 NOTE — TELEPHONE ENCOUNTER
Received VM:  Alvaro Ware brought pt to the [facility] ED  They are going to do some tests on her and probably admit her    ___________________________________    Phone call to pt's son Alvaro Ware stated pt is currently in the ED they are likely to admit her and have a neurology consult, lumber puncture, lab work  Alvaro Ware stated pt has meningitis a headache and blurred vision  Pt has been forgetful x 3-4 days  Alvaro Ware: [id number] to leave a detailed message     Dr Konrad Rubio, please advise  Thank you!

## 2023-04-26 NOTE — CONSULTS
"Consultation - Neurology   Ova Blas 82 y o  female MRN: [id number]  Unit/Bed#: S -01 Encounter: [id number]      Assessment/Plan     * Pachymeningitis  Assessment & Plan   82 y o  right handed female with Afib on Eliquis, pacemaker, occlusion of R carotid artery, L carotid artery stenosis, hyponatremia, idiopathic pulmonary fibrosis, diabetes, HLD, HTN, depression, osteoporosis, who presented to [facility] on 4/26/23 due to abnormal MRI  Imaging  - MRI brain wwo contrast 4/18/23:  \"1  Diffuse pachymeningitis with diffuse thickened abnormal dural enhancement and thickening  Differential considerations include infectious or inflammatory neoplastic etiologies as well as idiopathic  Further clinical assessment recommended  Consider lumbar puncture for further characterization  Considerations related to the patient's age and/or comorbidities may be used to alter these recommendations  2   Abnormal right internal carotid artery flow void either representing slow flow or vascular occlusion  3   No acute infarction or acute intracranial hemorrhage  ADDENDUM: Differential diagnosis could include IgG4 related disease  Further clinical assessment advised  \"  - CT chest high resolution 3/27/23:   \"No significant change since August 2022 in moderate diffuse juxtapleural reticulation and traction bronchiolectasis with mild groundglass opacity  According to the American Thor[employer] Society guidelines,  this may be either a probable UIP pattern of pulmonary fibrosis or NSIP  Pulmonary artery enlargement which can be seen with pulmonary hypertension  \"  Plan  - Obtain ct abdomen and pelvis wwo contrast   - Plan to hold home Eliquis x 48 hours, then obtain LP (earliest time for LP would be afternoon 4/28/23 as pt received AM dose of Eliquis 4/26/23)  - Please hold Lovenox AM of planned LP   - May continue on home ASA 81 mg daily  - Obtain the following labs: ESR, ACE, PASTOR, Lyme, Quantiferon  - Hold off on empiric " "antibiotics and/or steroids until LP as this can alter results of LP and mask diagnosis  - Medical management and correction of metabolic and infectious disturbances per primary team   - Avoid CNS altering medications if possible  - Continue supportive care   - Continue to monitor neurologic status  Notify neurology with any changes in exam  STAT CTH with any acute change in neurologic exam         Case and plan discussed with attending neurologist   Please see attending attestation for any further recommendations and/or changes to plan  Recommendations for outpatient neurological follow up have yet to be determined  History of Present Illness     Reason for Consult / Principal Problem: Abnormal MRI brain   Hx and PE limited by: N/A   HPI: Tim Herrmann is an 82 y o  right handed female with Afib on Eliquis, pacemaker, occlusion of R carotid artery, L carotid artery stenosis, hyponatremia, idiopathic pulmonary fibrosis, diabetes, HLD, HTN, depression, osteoporosis, who presented to [facility] on 4/26/23 due to abnormal MRI  Per chart review: Pt was ordered an MRI as an oupatient due to concerns of memory loss  Outpatient MRI brain wwo contrast was performed on 4/18/23  Patient's PCP contacted patient on 4/26/2023 and reviewed MRI results showing pachymeningitis  Per telephone encounter note, patient reported having episodes of confusion and not being able to \" put vascular  \"  Patient reported to PCP that at times she is able to use the phone room but does not know how to answer  Patient reported that the other night she was having tingling in her arm and dizziness and tried to call 911 but was unable to get through because she did not want to use her phone properly  Patient also reported forgetting to take medications to PCP  Patient reported \"aching behind her eyes\" yesterday  Patient also reported to PCP that she has had a \"mild headache\" for about 3 weeks    Patient reported to PCP that she " "prefer to go to ED for evaluation rather than waiting for neurologist to review imaging; thus, patient presented to [facility] ED on 4/26/23 for further work-up  Patient reports she had an outpatient MRI ordered for her due to several weeks of memory issues  Patient reports several weeks ago (exact timing unclear) she was forget small things around her house but became more concerned when this evolved into being unable to do multiple step math problems (adding a bill) and when she forgot to pay her rent check  Patient reports around the same time, she started with a very mild headache that \"I hardly realize is there  \"  Patient reports she does not currently have a headache  Patient reports that she does not often get headaches  Patient states that she became concerned when she received a call from her PCP regarding MRI result of pachymeningitis  Patient reports that she has had diarrhea recently but she had attributed this to her irritable bowel syndrome  Patient denies any recent tick or mosquito bites  Patient reports that she does not often go outdoors near wooded areas  Patient denies any recent URI  Patient reports that she was on a 12-week course of steroids that ended the week of 12/25/2022 for breathing issues related to her pulmonary fibrosis  Patient reports she does not currently have a headache  Patient reports that she has been having memory difficulties  Patient reports that for instance, last night she was unable to find her decongestant that she puts in the same place every day but it was not in that spot last night when she needed her decongestant  Patient reports that she believes she is compliant with medications at home  Patient reports that she does at time forget to take a medication or finds a pill on the floor that she missed to taking  Patient reports that she did take a m  dose of Eliquis on 4/26/2023    Patient reports she is also on aspirin 81 mg daily for left carotid " artery stenosis  Patient reports she lives alone in apartment in [place] [facility] [facility]  Patient reports she is able to handle all of her own ADLs  Patient reports reports that she previously performed all of IADLs independently, but this has been becoming more of a barrier due to her forgetfulness  Patient reports she ambulates with a rollator at baseline due to poor endurance from pulmonary fibrosis and imbalanced gait chronically  Patient does not drive  Inpatient consult to Neurology  Consult performed by: Lam Elmore PA-C  Consult ordered by: Kush Crocker MD          Review of Systems   Constitutional: Negative for chills and fever  HENT: Positive for congestion (after eating ) and trouble swallowing (chronic, reports she is able to tolerate thin liquids but tries to ear softer foods)  Eyes: Positive for visual disturbance (blurred vision, pt denies loss of vision and/or double vision)  Negative for photophobia  Respiratory: Positive for cough (chronic, no worse than normal) and shortness of breath (chronic, no worse than normal )  Cardiovascular: Negative for chest pain and palpitations  Gastrointestinal: Negative for nausea and vomiting  Genitourinary: Negative for difficulty urinating and dysuria  Musculoskeletal: Positive for gait problem (uses rollator at baseline )  Neurological: Negative for speech difficulty, weakness, numbness and headaches  Psychiatric/Behavioral: Negative for suicidal ideas          Denies HI        Historical Information   Past Medical History:   Diagnosis Date    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/7/2020    Glaucoma     H/O degenerative disc disease     Idiopathic pulmonary fibrosis ([facility] [address] ) 11/2020    Irregular heart beat     afib    Peripheral neuropathy     S/P laparoscopic cholecystectomy 12/21/2020    Stenosis of right subclavian artery ([facility] [address] ) 11/18/2016     Past Surgical History:   Procedure Laterality Date    CATARACT EXTRACTION, BILATERAL  2011    CHOLECYSTECTOMY      CHOLECYSTECTOMY LAPAROSCOPIC N/A 2020    Procedure: CHOLECYSTECTOMY LAPAROSCOPIC;  Surgeon: Amrit Bills MD;  [facility]: [facility];  Service: General    COLONOSCOPY      CYST REMOVAL  2009    left lower Quadrant     HERNIA REPAIR  1992    LIPOMA RESECTION  2010    MN RPR 1ST INGUN HRNA AGE 5 YRS/> REDUCIBLE Bilateral 2018    Procedure: INGUINAL HERNIA REPAIR;  Surgeon: Can Juarez MD;  [facility];  Service: General    SHOULDER SURGERY  2019    Dr Harris [address] carotid occlusion     Social History   Social History     Substance and Sexual Activity   Alcohol Use No     Social History     Substance and Sexual Activity   Drug Use No     E-Cigarette/Vaping    E-Cigarette Use Never User      E-Cigarette/Vaping Substances    Nicotine No     THC No     CBD No     Flavoring No     Other No     Unknown No      Social History     Tobacco Use   Smoking Status Former    Packs/day: 1 50    Years: 38 00    Pack years: 57 00    Types: Cigarettes    Start date:     Quit date: 12    Years since quittin 3   Smokeless Tobacco Never     Family History:   Family History   Problem Relation Age of Onset    Heart disease Mother     Heart attack Father     Hiatal hernia Father     Diabetes Father     Cancer Brother     Diabetes Brother     Heart disease Brother     Hypertension Brother     Other Son         gastroparesis       Review of previous medical records was  completed       Meds/Allergies   all current active meds have been reviewed, current meds:   Current Facility-Administered Medications   Medication Dose Route Frequency    acetaminophen (TYLENOL) tablet 650 mg  650 mg Oral Q4H PRN    bimatoprost (LUMIGAN) 0 01 % ophthalmic solution 1 drop  1 drop Both Eyes HS    brimonidine tartrate 0 2 % ophthalmic solution 1 drop  1 drop Right Eye TID    [START ON 4/27/2023] calcium carbonate (OYSTER SHELL,OSCAL) 500 mg tablet 1 tablet  1 tablet Oral Daily With Breakfast    ezetimibe (ZETIA) tablet 10 mg  10 mg Oral Daily With Dinner    And    pravastatin (PRAVACHOL) tablet 80 mg  80 mg Oral Daily With Dinner    famotidine (PEPCID) tablet 20 mg  20 mg Oral BID    insulin lispro (HumaLOG) 100 units/mL subcutaneous injection 1-5 Units  1-5 Units Subcutaneous TID AC    insulin lispro (HumaLOG) 100 units/mL subcutaneous injection 1-5 Units  1-5 Units Subcutaneous HS    ipratropium (ATROVENT) 0 03 % nasal spray 1 spray  1 spray Each Nare BID    loratadine (CLARITIN) tablet 10 mg  10 mg Oral Daily PRN    LORazepam (ATIVAN) tablet 1 mg  1 mg Oral HS PRN    metoprolol tartrate (LOPRESSOR) tablet 75 mg  75 mg Oral Q12H    multivitamin stress formula tablet 1 tablet  1 tablet Oral Daily    ondansetron (ZOFRAN) injection 4 mg  4 mg Intravenous Q6H PRN    pantoprazole (PROTONIX) EC tablet 20 mg  20 mg Oral BID AC    Prucalopride Succinate TABS 2 mg  2 mg Oral Daily    sodium chloride tablet 1 g  1 g Oral TID    and PTA meds:   Prior to Admission Medications   Prescriptions Last Dose Informant Patient Reported? Taking?    Alphagan P 0 1 %   Yes No   Sig: INSTILL 1 DROP RIGHT EYE TWICE A DAY   Eliquis 2 5 MG   No No   Sig: TAKE 1 TABLET (2 5 MG TOTAL) BY MOUTH 2 (TWO) TIMES A DAY   Januvia 100 MG tablet   No No   Sig: TAKE 1 TABLET (100 MG TOTAL) BY MOUTH DAILY   LORazepam (ATIVAN) 0 5 mg tablet   No No   Sig: TAKE 2 TABLETS (1 MG TOTAL) BY MOUTH DAILY AT BEDTIME   Motegrity 2 MG TABS   No No   Sig: TAKE 2 MG BY MOUTH DAILY   Multiple Vitamin (multivitamin) tablet   Yes No   Sig: Take 1 tablet by mouth daily     acetaminophen (TYLENOL) 500 mg tablet   Yes No   Sig: Take 1,000 mg by mouth as needed for mild pain   amoxicillin (AMOXIL) 500 mg capsule   No No   Sig: TAKE 4 CAPSULES 1 HR PRIOR TO DENTAL APPT   aspirin (ECOTRIN LOW STRENGTH) 81 mg EC tablet   No No   Sig: Take 1 tablet (81 mg total) by mouth daily   bimatoprost (LUMIGAN) 0 01 % ophthalmic drops   No No   Sig: Administer 1 drop to both eyes daily at bedtime for 30 days   calcium carbonate (OS-DANIA) 600 MG tablet   Yes No   Sig: Take 600 mg by mouth 2 (two) times a day with meals     ezetimibe-simvastatin (VYTORIN) 10-40 mg per tablet   No No   Sig: TAKE 1 TABLET BY MOUTH DAILY AT BEDTIME   famotidine (PEPCID) 20 mg tablet   No No   Sig: TAKE 1 TABLET (20 MG TOTAL) BY MOUTH 2 (TWO) TIMES A DAY   glucose blood (OneTouch Ultra) test strip   No No   Sig: TEST FOUR TIMES A DAY   ipratropium (ATROVENT) 0 03 % nasal spray   No No   Sig: USE 2 SPRAYS INTO EACH NOSTRIL EVERY 12 (TWELVE) HOURS   loratadine (CLARITIN) 10 mg tablet   Yes No   Sig: Take 10 mg by mouth daily as needed for allergies   metFORMIN (GLUCOPHAGE) 500 mg tablet   No No   Sig: TAKE 2 TABLETS (1,000 MG TOTAL) BY MOUTH 2 (TWO) TIMES A DAY WITH MEALS   metoprolol tartrate (LOPRESSOR) 50 mg tablet   No No   Sig: TAKE 1 5 TABLETS (75 MG TOTAL) BY MOUTH EVERY 12 (TWELVE) HOURS   pantoprazole (PROTONIX) 20 mg tablet   No No   Sig: Take 1 tablet (20 mg total) by mouth 2 (two) times a day   peppermint oil   Yes No   Sig: Use once as needed   Patient not taking: Reported on 3/15/2023   sodium chloride 1 g tablet   No No   Sig: TAKE 1 TABLET (1 G TOTAL) BY MOUTH 3 (THREE) TIMES A DAY      Facility-Administered Medications: None       Allergies   Allergen Reactions    Keflex [Cephalexin] Diarrhea       Objective   Vitals:Blood pressure 155/74, pulse 80, temperature (!) 97 4 °F (36 3 °C), temperature source Axillary, resp  rate 16, SpO2 97 %  ,There is no height or weight on file to calculate BMI  No intake or output data in the 24 hours ending 04/26/23 1633    Invasive Devices:    Invasive Devices     Peripheral Intravenous Line  Duration           Peripheral IV 04/26/23 Proximal;Right;Ventral (anterior) Forearm <1 day                Physical Exam  Vitals and "nursing note reviewed  Constitutional:       General: She is not in acute distress  Appearance: She is not diaphoretic  Comments: Thin, older adult female    HENT:      Head: Normocephalic and atraumatic  Nose: Nose normal       Mouth/Throat:      Mouth: Mucous membranes are moist    Eyes:      General: No scleral icterus  Right eye: No discharge  Left eye: No discharge  Extraocular Movements: Extraocular movements intact and EOM normal       Conjunctiva/sclera: Conjunctivae normal    Cardiovascular:      Rate and Rhythm: Normal rate  Pulmonary:      Effort: Pulmonary effort is normal    Neurological:      Mental Status: She is alert  Coordination: Finger-Nose-Finger Test and Heel to [facility] Test normal       Deep Tendon Reflexes:      Reflex Scores:       Bicep reflexes are 1+ on the right side and 1+ on the left side  Patellar reflexes are 1+ on the right side and 1+ on the left side  Psychiatric:      Comments: Pleasant and cooperative during exam        Neurologic Exam     Mental Status   Follows 2 step commands  Attention: appropriately attends to provider  Concentration: no redirection or reorientation required during exam    Speech: (No dysarthria appreciated on exam  Speech fluent  )  Level of consciousness: alert  Able to perform simple calculations (6 quarters in $1 50)  Able to name object (glove, glasses, watch)  Able to read  Able to repeat (NIHSS  phrases; \" today is a bright and ben day  \")  -oriented to self   -oriented to month and year   -Oriented to place \"[facility]\"  -Able to name months of the year forwards and backwards     Cranial Nerves     CN II   Visual acuity: (grossly intact with glasses in place)  Right visual field deficit: none  Left visual field deficit: none     CN III, IV, VI   Extraocular motions are normal    Right pupil: Size: 3 mm  Shape: regular  Left pupil: Size: 3 mm  Shape: regular     Nystagmus: none " Conjugate gaze: present    CN V   Facial sensation intact  Right facial sensation deficit: none  Left facial sensation deficit: none    CN VII   Facial expression full, symmetric  Right facial weakness: none  Left facial weakness: none    CN VIII   Hearing impaired: audition intact to finger rub bilaterally  CN XII   CN XII normal    Tongue: not atrophic  Fasciculations: absent  Tongue deviation: none    Motor Exam   Muscle bulk: normal  Overall muscle tone: normal Strength to confrontation testing:  -Bilateral hand  5/5   -bilateral elbow flexion and extension 5/5   -Bilateral shoulder abduction 4+/5   -Bilateral plantar flexion and dorsiflexion 5/5   -bilateral knee flexion and extension 5/5   -Bilateral hip flexion 4+/5     Sensory Exam   Light touch normal    Right arm light touch: normal  Left arm light touch: normal  Right leg light touch: normal  Left leg light touch: normal    Gait, Coordination, and Reflexes     Gait  Gait: (deferred for patient safety)    Coordination   Finger to nose coordination: normal  Heel to shin coordination: normal    Reflexes   Right biceps: 1+  Left biceps: 1+  Right patellar: 1+  Left patellar: 1+      Lab Results:   I have personally reviewed pertinent reports    , CBC:   Results from last 7 days   Lab Units 04/26/23  1217   WBC Thousand/uL 6 02   RBC Million/uL 4 41   HEMOGLOBIN g/dL 11 7   HEMATOCRIT % 38 4   MCV fL 87   PLATELETS Thousands/uL 211   , BMP/CMP:   Results from last 7 days   Lab Units 04/26/23  1217   SODIUM mmol/L 134*   POTASSIUM mmol/L 3 4*   CHLORIDE mmol/L 92*   CO2 mmol/L 33*   BUN mg/dL 13   CREATININE mg/dL 0 46*   CALCIUM mg/dL 9 0   AST U/L 16   ALT U/L 11   ALK PHOS U/L 43   EGFR ml/min/1 73sq m 90   , Vitamin B12:   , HgBA1C:   , TSH:   , Coagulation:   Results from last 7 days   Lab Units 04/26/23  1217   INR  1 10   , Lipid Profile:   , Ammonia:   , Urinalysis:   Results from last 7 days   Lab Units 04/26/23  1330   COLOR UA  Light Yellow   CLARITY UA  Clear   SPEC GRAV UA  1 015   PH UA  6 5   LEUKOCYTES UA  Trace*   NITRITE UA  Negative   GLUCOSE UA mg/dl Trace*   KETONES UA mg/dl 40 (2+)*   BILIRUBIN UA  Negative   BLOOD UA  Negative   , Drug Screen:   , Medication Drug Levels:       Invalid input(s): CARBAMAZEPINE,  PHENOBARB, LACOSAMIDE, OXCARBAZEPINE  Imaging Studies: I have personally reviewed pertinent reports  and I have personally reviewed pertinent films in PACS MRI brain wwo contrast 4/18/23  EKG, Pathology, and Other Studies: I have personally reviewed pertinent reports  VTE Prophylaxis: Enoxaparin (Lovenox)    Code Status: Level 1 - Full Code  Advance Directive and Living Will:      Power of :    POLST:      Counseling / Coordination of Care  I have spent a total time of 60 minutes on 04/26/23 in caring for this patient including Diagnostic results, Risks and benefits of tx options, Instructions for management, Patient and family education, Importance of tx compliance, Risk factor reductions, Impressions, Counseling / Coordination of care, Documenting in the medical record, Reviewing / ordering tests, medicine, procedures  , Obtaining or reviewing history   and Communicating with other healthcare professionals   This note was completed in part utilizing [employer] Direct Software  Grammatical errors, random word insertions, spelling mistakes, and incomplete sentences may be an occasional consequence of this system secondary to software limitations, ambient noise, and hardware issues  If you have any questions or concerns about the content, text, or information contained within the body of this dictation, please contact the provider for clarification

## 2023-04-26 NOTE — ASSESSMENT & PLAN NOTE
Malnutrition Findings:                Significantly malnutrition as noted by bilateral buccal fat pad loss and bilateral temporal muscle wasting  Low BMI  Protein supplements and nutritionist consult  BMI Findings: There is no height or weight on file to calculate BMI

## 2023-04-26 NOTE — ASSESSMENT & PLAN NOTE
No results for input(s): POCGLU in the last 72 hours  Blood Sugar Average: Last 72 hrs:   Holding Januvia, metformin  Start the patient on low-dose sliding scale insulin with Accu-Chek 4 times daily

## 2023-04-26 NOTE — ED ATTENDING ATTESTATION
4/26/2023  IJohnnie MD, saw and evaluated the patient  I have discussed the patient with the resident/non-physician practitioner and agree with the resident's/non-physician practitioner's findings, Plan of Care, and MDM as documented in the resident's/non-physician practitioner's note, except where noted  All available labs and Radiology studies were reviewed  I was present for key portions of any procedure(s) performed by the resident/non-physician practitioner and I was immediately available to provide assistance  At this point I agree with the current assessment done in the Emergency Department  I have conducted an independent evaluation of this patient a history and physical is as follows:    53-year-old female presenting for admission and further work-up at the request of neurology  For the last couple of weeks the patient has had a sudden onset of confusion and forgetfulness with intermittent blurry vision  She underwent brain MRI on 4/18 which showed diffuse pachymeningitis with diffuse thickened abnormal dural enhancement  Per radiology, differential considerations include infectious or inflammatory neoplastic etiologies as well as idiopathic  The patient reports an intermittent mild headache  No additional symptoms  She is anticoagulated on eliquis for afib  Physical Exam  Constitutional:       General: She is not in acute distress  Appearance: She is well-developed  She is not diaphoretic  HENT:      Head: Normocephalic and atraumatic  Right Ear: External ear normal       Left Ear: External ear normal       Nose: Nose normal    Eyes:      Conjunctiva/sclera: Conjunctivae normal    Cardiovascular:      Rate and Rhythm: Normal rate and regular rhythm  Pulses: Normal pulses  Heart sounds: Normal heart sounds  No murmur heard  No friction rub  No gallop  Pulmonary:      Effort: Pulmonary effort is normal  No respiratory distress        Breath sounds: Normal breath sounds  No wheezing or rales  Abdominal:      General: Bowel sounds are normal  There is no distension  Palpations: Abdomen is soft  Tenderness: There is no abdominal tenderness  There is no guarding  Musculoskeletal:         General: No deformity  Normal range of motion  Cervical back: Normal range of motion and neck supple  Skin:     General: Skin is warm and dry  Neurological:      Mental Status: She is alert and oriented to person, place, and time  Motor: No abnormal muscle tone  Comments: Face symmetric  Clear speech  Normal strength in all 4 extremities  Psychiatric:      Comments: Intermittent visual hallucinations (this morning saw people in her apartment who weren't there)                 ED Course  ED Course as of 04/26/23 3208   Wed Apr 26, 2023   1243 I personally interpreted the patient's EKG which reveals normal rate, atrially paced rhythm, left axis deviation unchanged from prior, normal intervals, prominent S wave in leads III and aVF, no ST segment deviation, unchanged from most recent prior EKG  1332 Resident physician discussed case with Dr Jarrett Beatty with neuro via tigerconnect  As the patient is on Eliquis, agrees with holding Eliquis for 2 days until patient can safely undergo lumbar puncture  In the meantime, recommend holding off on antibiotics and steroids  Patient's pachymeningitis is not likely infectious as she has no fever, chills, infectious symptoms, meningismus, or neck stiffness            Critical Care Time  Procedures

## 2023-04-26 NOTE — H&P
[facility]  H&P  Name: Hiram President 82 y o  female I MRN: [id number]  Unit/Bed#: S -01 I Date of Admission: 4/26/2023   Date of Service: 4/26/2023 I Hospital Day: 0      Assessment/Plan   * Pachymeningitis  Assessment & Plan  Patient has MRI of the brain which showed abnormal findings of pachymeningitis  We will admit the patient to medical surgical unit  Patient has some associated confusion, as well as blurry vision on and off going on as well  We will consult neurology  Patient would benefit from an lumbar puncture  However had taken her last dose of Eliquis this morning on 4/26/2023 early morning  Hence patient will need to be off of Eliquis for 24 to 48 hours prior to LP  Patient however did take 2 5 mg dose in the morning  Hold aspirin as well  Paroxysmal atrial fibrillation (HCC)  Assessment & Plan  Continue patient on metoprolol, holding Eliquis  GERD (gastroesophageal reflux disease)  Assessment & Plan  Continue patient Pepcid  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, without long-term current use of insulin (HCC)  Assessment & Plan    No results for input(s): POCGLU in the last 72 hours  Blood Sugar Average: Last 72 hrs:   Holding Januvia, metformin  Start the patient on low-dose sliding scale insulin with Accu-Chek 4 times daily  Severe protein-calorie malnutrition ([address] )  Assessment & Plan  Malnutrition Findings:                Significantly malnutrition as noted by bilateral buccal fat pad loss and bilateral temporal muscle wasting  Low BMI  Protein supplements and nutritionist consult  BMI Findings: There is no height or weight on file to calculate BMI  Anxiety  Assessment & Plan  Ativan nightly as needed  Osteoporosis  Assessment & Plan  Continue patient on calcium and vitamin D supplements      Asymptomatic carotid artery stenosis, left  Assessment & Plan  Continue patient on statin  Holding aspirin  Mixed hyperlipidemia  Assessment & Plan  Continue patient on statin  Date of Service:   04/26/23    VTE Prophylaxis: VTE Score: 4 Moderate Risk (Score 3-4) - Pharmacological DVT Prophylaxis Ordered: enoxaparin (Lovenox)  Code Status: Level 1 - Full Code  POLST:There is no POLST form on file for this patient (pre-hospital)   Discussion with family:  Updated  (son) at bedside  Anticipated Length of Stay:  Patient will be admitted on an Inpatient basis with an anticipated length of stay of  > 2 midnights  Justification for Hospital Stay: Pachymeningitis,     Total Time for Visit, including Counseling / Coordination of Care: 1 hour  Greater than 50% of this total time spent on direct patient counseling and coordination of care  Chief Complaint:     Evaluation of Abnormal Diagnostic Test (Pt had abnormal MRI and was told to come to ER for further testing)    History of Present Illness:    Lucy Ramirez is a 82 y o  female with PMHx significant for type 2 diabetes, hyperlipidemia, hypertension, depression, history of A-fib on Eliquis, carotid stenosis, osteoporosis who presents with chief complaints of abnormal MRI findings and continued confusion progressively worsening over the past 1 month  Patient had seen her primary care physician on 30 March and around that time she had already noticed some episodes of confusion and forgetfulness however they were mild and minor  At that time patient's primary care ordered an MRI of the brain which occurred on 4/18/2023  Official report of the MRI resulted on 4/24/2023  In the meanwhile patient had continued progression of her forgetfulness to the point that over the last 4 to 5 days she has noticed even forgetting very simple tasks and not able to communicate what she actually wants to communicate    She denies any focal weakness in any of her extremities  Did have some blurry vision on and off  And continues to have some floaters on and off  She also had noticed some hallucinations yesterday as told to us by her son  She mentioned that she sees some shadows on and off  Official report of the MRI showed that the patient has pachymeningitis and hence patient was asked to go to the ED for further evaluation and recommendations as well as evaluation by neurologist  Patient is being admitted for acute metabolic encephalopathy confusion, pachymeningitis  Patient denies any recent cough or cold, no urinary symptoms  No abdominal pain, no chest pain or shortness of breath  Patient denies any night sweats, fevers or chills  Denies any loss of appetite  Vitals in the ED were stable, lab work was suggestive of some hypokalemia and hyponatremia, otherwise no leukocytosis  Review of Systems:  Review of Systems: A thorough 10 point review of System was done which was negative for other than that mentioned in HPI       Past Medical and Surgical History:     Past Medical History:   Diagnosis Date    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/7/2020    Glaucoma     H/O degenerative disc disease     Idiopathic pulmonary fibrosis ([address] ) 11/2020    Irregular heart beat     afib    Peripheral neuropathy     S/P laparoscopic cholecystectomy 12/21/2020    Stenosis of right subclavian artery ([facility] [address] ) 11/18/2016       Past Surgical History:   Procedure Laterality Date    CATARACT EXTRACTION, BILATERAL  2011    CHOLECYSTECTOMY      CHOLECYSTECTOMY LAPAROSCOPIC N/A 12/09/2020    Procedure: CHOLECYSTECTOMY LAPAROSCOPIC;  Surgeon: Isaak Spring MD;  [facility];  Service: General    COLONOSCOPY      CYST REMOVAL  2009    left lower Quadrant     HERNIA REPAIR  1992    LIPOMA RESECTION  2010    UT RPR 1ST INGUN HRNA AGE 5 YRS/> REDUCIBLE Bilateral 01/05/2018    Procedure: INGUINAL HERNIA REPAIR;  Surgeon: Katherine Dalton MD;  [facility]: [facility];  Service: General    SHOULDER SURGERY  04/26/2019    Dr [facility])   Tino Felton UPPER GASTROINTESTINAL ENDOSCOPY      VASCULAR SURGERY  1994    Agram-  R carotid occlusion       Meds/Allergies:  Prior to Admission medications    Medication Sig Start Date End Date Taking?  Authorizing Provider   acetaminophen (TYLENOL) 500 mg tablet Take 1,000 mg by mouth as needed for mild pain    Historical Provider, MD   Alphagan P 0 1 % INSTILL 1 DROP RIGHT EYE TWICE A DAY 3/17/22   Historical Provider, MD   amoxicillin (AMOXIL) 500 mg capsule TAKE 4 CAPSULES 1 HR PRIOR TO DENTAL APPT 4/5/23 4/5/24  ZANDER Mcgee   aspirin (ECOTRIN LOW STRENGTH) 81 mg EC tablet Take 1 tablet (81 mg total) by mouth daily 8/31/20   Areta Courser Doctor, MD   bimatoprost (LUMIGAN) 0 01 % ophthalmic drops Administer 1 drop to both eyes daily at bedtime for 30 days 11/2/16   Concetta Alexander MD   calcium carbonate (OS-DANIA) 600 MG tablet Take 600 mg by mouth 2 (two) times a day with meals      Historical Provider, MD   Eliquis 2 5 MG TAKE 1 TABLET (2 5 MG TOTAL) BY MOUTH 2 (TWO) TIMES A DAY 2/13/23   ZANDER Swenson   ezetimibe-simvastatin (VYTORIN) 10-40 mg per tablet TAKE 1 TABLET BY MOUTH DAILY AT BEDTIME 3/14/23   ZANDER Swenson   famotidine (PEPCID) 20 mg tablet TAKE 1 TABLET (20 MG TOTAL) BY MOUTH 2 (TWO) TIMES A DAY 2/13/23   ZANDER Swenson   glucose blood (OneTouch Ultra) test strip TEST FOUR TIMES A DAY 8/30/22   ZANDER Swenson   ipratropium (ATROVENT) 0 03 % nasal spray USE 2 SPRAYS INTO EACH NOSTRIL EVERY 12 (TWELVE) HOURS 9/7/22   Willie Martines MD   Januvia 100 MG tablet TAKE 1 TABLET (100 MG TOTAL) BY MOUTH DAILY 2/13/23   ZANDER Mcgee   loratadine (CLARITIN) 10 mg tablet Take 10 mg by mouth daily as needed for allergies    Historical Provider, MD   LORazepam (ATIVAN) 0 5 mg tablet TAKE 2 TABLETS (1 MG TOTAL) BY MOUTH DAILY AT BEDTIME 3/14/23   ZANDER Swenson   metFORMIN (GLUCOPHAGE) 500 mg tablet TAKE 2 TABLETS (1,000 MG TOTAL) BY MOUTH 2 (TWO) TIMES A DAY WITH MEALS 23   ZANDER Stern   metoprolol tartrate (LOPRESSOR) 50 mg tablet TAKE 1 5 TABLETS (75 MG TOTAL) BY MOUTH EVERY 12 (TWELVE) HOURS 3/30/23   ZANDER Bean   Motegrity 2 MG TABS TAKE 2 MG BY MOUTH DAILY 10/13/22   Hernan Victor MD   Multiple Vitamin (multivitamin) tablet Take 1 tablet by mouth daily      Historical Provider, MD   pantoprazole (PROTONIX) 20 mg tablet Take 1 tablet (20 mg total) by mouth 2 (two) times a day 3/29/23   Inna Falcon PA-C   peppermint oil Use once as needed  Patient not taking: Reported on 3/15/2023    Historical Provider, MD   sodium chloride 1 g tablet TAKE 1 TABLET (1 G TOTAL) BY MOUTH 3 (THREE) TIMES A DAY 23   ZANDER Bean     I have reviewed home medications with patient family member  & personally    Allergies:    Allergies   Allergen Reactions    Keflex [Cephalexin] Diarrhea       Social History:     Marital Status:    Occupation: Reviewed and Non Contributory   Patient Pre-hospital Living Situation: Home   Patient Pre-hospital Level of Mobility: Walks   Patient Pre-hospital Diet Restrictions: None     Substance Use History:   Social History     Substance and Sexual Activity   Alcohol Use No     Social History     Tobacco Use   Smoking Status Former    Packs/day: 1 50    Years: 38 00    Pack years: 57 00    Types: Cigarettes    Start date:     Quit date: 12    Years since quittin 3   Smokeless Tobacco Never     Social History     Substance and Sexual Activity   Drug Use No       Family History:    Family History   Problem Relation Age of Onset    Heart disease Mother     Heart attack Father     Hiatal hernia Father     Diabetes Father     Cancer Brother     Diabetes Brother     Heart disease Brother     Hypertension Brother     Other Son         gastroparesis       Physical Exam:     Vitals:   Blood Pressure: 155/74 (23 1512)  Pulse: 80 (23 1512)  Temperature: (!) 97 4 °F (36 3 "°C) (04/26/23 1119)  Temp Source: Axillary (04/26/23 1119)  Respirations: 16 (04/26/23 1512)  SpO2: 97 % (04/26/23 1332)    Physical Exam  General Exam: Alert and Oriented x 3, NAD  Eyes: SUSANA  Neck: Supple, no nuchal rigidity noted  No Brudzinski or Kernig sign noted      CVS: S1, S2 Greene, RRR    R/S: Clear to auscultate anteriorly  Abd: Soft, NT, ND, BS+ve  Extremities: No edema noted  Skin: No acute Rash noted  CNS: No acute FND  Moves all 4 extremities  Psych: Co-operative, Not agitated  Lab Results: I have personally reviewed pertinent reports  Results from last 7 days   Lab Units 04/26/23  1217   WBC Thousand/uL 6 02   HEMOGLOBIN g/dL 11 7   HEMATOCRIT % 38 4   PLATELETS Thousands/uL 211     Results from last 7 days   Lab Units 04/26/23  1217   POTASSIUM mmol/L 3 4*   CHLORIDE mmol/L 92*   CO2 mmol/L 33*   BUN mg/dL 13   CREATININE mg/dL 0 46*   CALCIUM mg/dL 9 0   ALK PHOS U/L 43   ALT U/L 11   AST U/L 16     Results from last 7 days   Lab Units 04/26/23  1217   INR  1 10               Imaging: I have personally reviewed pertinent reports  No orders to display       No orders to display       EKG, Imaging, and Other Studies Reviewed on Admission:   ·     Discussed the case with ED Provider  Discussed the case with Neurology Team    [facility] Records Reviewed: Yes     ** Please Note: \"This note has been constructed using a voice recognition system  Therefore there may be syntax, spelling, and/or grammatical errors  Please call if you have any questions   \"**    "

## 2023-04-27 ENCOUNTER — TELEPHONE (OUTPATIENT)
Dept: NEPHROLOGY | Facility: CLINIC | Age: 88
End: 2023-04-27

## 2023-04-27 PROBLEM — M25.551 BILATERAL HIP PAIN: Status: ACTIVE | Noted: 2023-04-27

## 2023-04-27 PROBLEM — M25.552 BILATERAL HIP PAIN: Status: ACTIVE | Noted: 2023-04-27

## 2023-04-27 PROBLEM — E11.9 DM2 (DIABETES MELLITUS, TYPE 2) (HCC): Status: ACTIVE | Noted: 2021-03-22

## 2023-04-27 LAB
ACE SERPL-CCNC: 37 U/L (ref 14–82)
ANA SER QL IA: NEGATIVE
ANION GAP SERPL CALCULATED.3IONS-SCNC: 5 MMOL/L (ref 4–13)
ATRIAL RATE: 73 BPM
B BURGDOR IGG+IGM SER-ACNC: 0.2 AI
BASOPHILS # BLD AUTO: 0.03 THOUSANDS/ΜL (ref 0–0.1)
BASOPHILS NFR BLD AUTO: 1 % (ref 0–1)
BUN SERPL-MCNC: 10 MG/DL (ref 5–25)
CALCIUM SERPL-MCNC: 8.6 MG/DL (ref 8.4–10.2)
CHLORIDE SERPL-SCNC: 97 MMOL/L (ref 96–108)
CO2 SERPL-SCNC: 35 MMOL/L (ref 21–32)
CREAT SERPL-MCNC: 0.42 MG/DL (ref 0.6–1.3)
EOSINOPHIL # BLD AUTO: 0.14 THOUSAND/ΜL (ref 0–0.61)
EOSINOPHIL NFR BLD AUTO: 3 % (ref 0–6)
ERYTHROCYTE [DISTWIDTH] IN BLOOD BY AUTOMATED COUNT: 14.1 % (ref 11.6–15.1)
GFR SERPL CREATININE-BSD FRML MDRD: 93 ML/MIN/1.73SQ M
GLUCOSE SERPL-MCNC: 158 MG/DL (ref 65–140)
GLUCOSE SERPL-MCNC: 176 MG/DL (ref 65–140)
GLUCOSE SERPL-MCNC: 203 MG/DL (ref 65–140)
GLUCOSE SERPL-MCNC: 229 MG/DL (ref 65–140)
GLUCOSE SERPL-MCNC: 232 MG/DL (ref 65–140)
HCT VFR BLD AUTO: 34.2 % (ref 34.8–46.1)
HGB BLD-MCNC: 10.7 G/DL (ref 11.5–15.4)
IMM GRANULOCYTES # BLD AUTO: 0.04 THOUSAND/UL (ref 0–0.2)
IMM GRANULOCYTES NFR BLD AUTO: 1 % (ref 0–2)
LYMPHOCYTES # BLD AUTO: 1.22 THOUSANDS/ΜL (ref 0.6–4.47)
LYMPHOCYTES NFR BLD AUTO: 27 % (ref 14–44)
MCH RBC QN AUTO: 27 PG (ref 26.8–34.3)
MCHC RBC AUTO-ENTMCNC: 31.3 G/DL (ref 31.4–37.4)
MCV RBC AUTO: 86 FL (ref 82–98)
MONOCYTES # BLD AUTO: 0.59 THOUSAND/ΜL (ref 0.17–1.22)
MONOCYTES NFR BLD AUTO: 13 % (ref 4–12)
NEUTROPHILS # BLD AUTO: 2.47 THOUSANDS/ΜL (ref 1.85–7.62)
NEUTS SEG NFR BLD AUTO: 55 % (ref 43–75)
NRBC BLD AUTO-RTO: 0 /100 WBCS
P AXIS: -48 DEGREES
PLATELET # BLD AUTO: 174 THOUSANDS/UL (ref 149–390)
PMV BLD AUTO: 10.3 FL (ref 8.9–12.7)
POTASSIUM SERPL-SCNC: 3.9 MMOL/L (ref 3.5–5.3)
PR INTERVAL: 136 MS
QRS AXIS: -43 DEGREES
QRSD INTERVAL: 90 MS
QT INTERVAL: 406 MS
QTC INTERVAL: 447 MS
RBC # BLD AUTO: 3.96 MILLION/UL (ref 3.81–5.12)
SODIUM SERPL-SCNC: 137 MMOL/L (ref 135–147)
T WAVE AXIS: 32 DEGREES
VENTRICULAR RATE: 73 BPM
WBC # BLD AUTO: 4.49 THOUSAND/UL (ref 4.31–10.16)

## 2023-04-27 RX ORDER — LIDOCAINE 50 MG/G
2 PATCH TOPICAL DAILY
Status: DISCONTINUED | OUTPATIENT
Start: 2023-04-27 | End: 2023-04-29 | Stop reason: HOSPADM

## 2023-04-27 RX ADMIN — SODIUM CHLORIDE 1 G: 1 TABLET ORAL at 09:10

## 2023-04-27 RX ADMIN — Medication 1 TABLET: at 09:08

## 2023-04-27 RX ADMIN — PANTOPRAZOLE SODIUM 20 MG: 20 TABLET, DELAYED RELEASE ORAL at 18:12

## 2023-04-27 RX ADMIN — LORAZEPAM 1 MG: 1 TABLET ORAL at 22:14

## 2023-04-27 RX ADMIN — INSULIN LISPRO 2 UNITS: 100 INJECTION, SOLUTION INTRAVENOUS; SUBCUTANEOUS at 21:58

## 2023-04-27 RX ADMIN — SODIUM CHLORIDE 1 G: 1 TABLET ORAL at 21:58

## 2023-04-27 RX ADMIN — METOPROLOL TARTRATE 75 MG: 50 TABLET, FILM COATED ORAL at 18:12

## 2023-04-27 RX ADMIN — PANTOPRAZOLE SODIUM 20 MG: 20 TABLET, DELAYED RELEASE ORAL at 06:10

## 2023-04-27 RX ADMIN — ACETAMINOPHEN 650 MG: 325 TABLET ORAL at 09:08

## 2023-04-27 RX ADMIN — INSULIN LISPRO 1 UNITS: 100 INJECTION, SOLUTION INTRAVENOUS; SUBCUTANEOUS at 09:11

## 2023-04-27 RX ADMIN — FAMOTIDINE 20 MG: 20 TABLET, FILM COATED ORAL at 18:12

## 2023-04-27 RX ADMIN — SODIUM CHLORIDE 1 G: 1 TABLET ORAL at 18:12

## 2023-04-27 RX ADMIN — BRIMONIDINE TARTRATE 1 DROP: 2 SOLUTION/ DROPS OPHTHALMIC at 18:11

## 2023-04-27 RX ADMIN — FAMOTIDINE 20 MG: 20 TABLET, FILM COATED ORAL at 09:10

## 2023-04-27 RX ADMIN — INSULIN LISPRO 1 UNITS: 100 INJECTION, SOLUTION INTRAVENOUS; SUBCUTANEOUS at 12:45

## 2023-04-27 RX ADMIN — BRIMONIDINE TARTRATE 1 DROP: 2 SOLUTION/ DROPS OPHTHALMIC at 09:10

## 2023-04-27 RX ADMIN — B-COMPLEX W/ C & FOLIC ACID TAB 1 TABLET: TAB at 09:08

## 2023-04-27 RX ADMIN — LIDOCAINE 5% 2 PATCH: 700 PATCH TOPICAL at 12:46

## 2023-04-27 RX ADMIN — EZETIMIBE 10 MG: 10 TABLET ORAL at 18:12

## 2023-04-27 RX ADMIN — METOPROLOL TARTRATE 75 MG: 50 TABLET, FILM COATED ORAL at 06:10

## 2023-04-27 RX ADMIN — INSULIN LISPRO 2 UNITS: 100 INJECTION, SOLUTION INTRAVENOUS; SUBCUTANEOUS at 18:11

## 2023-04-27 RX ADMIN — BRIMONIDINE TARTRATE 1 DROP: 2 SOLUTION/ DROPS OPHTHALMIC at 21:58

## 2023-04-27 RX ADMIN — PRAVASTATIN SODIUM 80 MG: 80 TABLET ORAL at 18:12

## 2023-04-27 RX ADMIN — BIMATOPROST 1 DROP: 0.1 SOLUTION/ DROPS OPHTHALMIC at 21:58

## 2023-04-27 NOTE — ASSESSMENT & PLAN NOTE
Recent Labs     04/26/23  1611 04/26/23  2205 04/27/23  0756   POCGLU 223* 195* 176*     A1c appropriate for age at 9 8  Blood Sugar Average: Last 72 hrs:  (P) 198 Holding Januvia, metformin  Restart at discharge    Start the patient on low-dose sliding scale insulin with Accu-Chek 4 times daily

## 2023-04-27 NOTE — PLAN OF CARE
Problem: MOBILITY - ADULT  Goal: Maintain or return to baseline ADL function  Description: INTERVENTIONS:  -  Assess patient's ability to carry out ADLs; assess patient's baseline for ADL function and identify physical deficits which impact ability to perform ADLs (bathing, care of mouth/teeth, toileting, grooming, dressing, etc )  - Assess/evaluate cause of self-care deficits   - Assess range of motion  - Assess patient's mobility; develop plan if impaired  - Assess patient's need for assistive devices and provide as appropriate  - Encourage maximum independence but intervene and supervise when necessary  - Involve family in performance of ADLs  - Assess for home care needs following discharge   - Consider OT consult to assist with ADL evaluation and planning for discharge  - Provide patient education as appropriate  Outcome: Progressing  Goal: Maintains/Returns to pre admission functional level  Description: INTERVENTIONS:  - Perform BMAT or MOVE assessment daily    - Set and communicate daily mobility goal to care team and patient/family/caregiver     - Collaborate with rehabilitation services on mobility goals if consulted  - Stand patient 3 times a day  - Ambulate patient 3 times a day  - Out of bed to chair 2 times a day   - Out of bed for meals 2 times a day  - Out of bed for toileting  - Record patient progress and toleration of activity level   Outcome: Progressing     Problem: SAFETY ADULT  Goal: Patient will remain free of falls  Description: INTERVENTIONS:  - Educate patient/family on patient safety including physical limitations  - Instruct patient to call for assistance with activity   - Consult OT/PT to assist with strengthening/mobility   - Keep Call bell within reach  - Keep bed low and locked with side rails adjusted as appropriate  - Keep care items and personal belongings within reach  - Initiate and maintain comfort rounds  - Make Fall Risk Sign visible to staff  - Offer Toileting every 2 Hours, in advance of need  - Initiate/Maintain bed/chair alarm  - Obtain necessary fall risk management equipment  - Apply yellow socks and bracelet for high fall risk patients  - Consider moving patient to room near nurses station  Outcome: Progressing     Problem: RESPIRATORY - ADULT  Goal: Achieves optimal ventilation and oxygenation  Description: INTERVENTIONS:  - Assess for changes in respiratory status  - Assess for changes in mentation and behavior  - Position to facilitate oxygenation and minimize respiratory effort  - Oxygen administered by appropriate delivery if ordered  - Initiate smoking cessation education as indicated  - Encourage broncho-pulmonary hygiene including cough, deep breathe, Incentive Spirometry  - Assess the need for suctioning and aspirate as needed  - Assess and instruct to report SOB or any respiratory difficulty  - Respiratory Therapy support as indicated  Outcome: Progressing

## 2023-04-27 NOTE — PROGRESS NOTES
[facility]  Progress Note  Name: Radha Cabral  MRN: [id number]  Unit/Bed#: S -01 I Date of Admission: 4/26/2023   Date of Service: 4/27/2023 I Hospital Day: 1    Assessment/Plan   * Pachymeningitis  Assessment & Plan  · Patient had an outpatient MRI of the brain due to memory loss which showed abnormal findings of pachymeningitis, prompting her outpatient team to send her in for admission  · Appreciate neurology  Patient is planned for lumbar puncture to be done tomorrow; could not be done on admission because Eliquis he did to be held  · Additional labs and CT of the abdomen and pelvis also ordered to evaluate for malignant or infectious/inflammatory source  · Clinically stable at this time      Paroxysmal atrial fibrillation [facility])  Assessment & Plan  · Continue patient on metoprolol; holding Eliquis for tap  Severe protein-calorie malnutrition ([facility] [address] )  Assessment & Plan  Malnutrition Findings:   Significantly malnutrition as noted by bilateral buccal fat pad loss and bilateral temporal muscle wasting  Low BMI  Protein supplements and nutritionist consult  BMI Findings: Body mass index is 18 01 kg/m²  DM2 (diabetes mellitus, type 2) [facility])  Assessment & Plan    Recent Labs     04/26/23  1611 04/26/23  2205 04/27/23  0756   POCGLU 223* 195* 176*     A1c appropriate for age at 9 8  Blood Sugar Average: Last 72 hrs:  (P) 198 Holding Januvia, metformin  Restart at discharge    Start the patient on low-dose sliding scale insulin with Accu-Chek 4 times daily  Bilateral hip pain  Assessment & Plan  · Bilateral hip pain is patient's only complaint today  Supportive care with Tylenol and Lidoderm patches       VTE Pharmacologic Prophylaxis: VTE Score: 4 hold for LP    Patient Centered Rounds: I performed bedside rounds with nursing staff today    Discussions with Specialists or Other Care Team Provider: neuro    Education and Discussions with Family / Patient: Updated  (son) via phone  Total Time Spent on Date of Encounter in care of patient: 35 minutes This time was spent on one or more of the following: performing physical exam; counseling and coordination of care; obtaining or reviewing history; documenting in the medical record; reviewing/ordering tests, medications or procedures; communicating with other healthcare professionals and discussing with patient's family/caregivers  Current Length of Stay: 1 day(s)  Current Patient Status: Inpatient   Certification Statement: The patient will continue to require additional inpatient hospital stay due to LP tomorrow  Discharge Plan: Anticipate discharge in 24-48 hrs to discharge location to be determined pending rehab evaluations  Code Status: Level 3 - DNAR and DNI    Subjective:   Patient complaining of bilateral hip pain but denied any headache, nausea, confusion, neck stiffness or other new events overnight  No fever or chills noted  Nursing reports she has been doing well    Objective:     Vitals:   Temp (24hrs), Av 2 °F (36 8 °C), Min:98 °F (36 7 °C), Max:98 4 °F (36 9 °C)    Temp:  [98 °F (36 7 °C)-98 4 °F (36 9 °C)] 98 °F (36 7 °C)  HR:  [62-80] 62  Resp:  [16] 16  BP: (141-180)/(66-85) 180/78  SpO2:  [95 %-97 %] 95 %  Body mass index is 18 01 kg/m²  Input and Output Summary (last 24 hours): Intake/Output Summary (Last 24 hours) at 2023 1121  Last data filed at 2023 0908  Gross per 24 hour   Intake 240 ml   Output --   Net 240 ml       Physical Exam:   Physical Exam  Vitals reviewed  Constitutional:       General: She is not in acute distress  Appearance: She is ill-appearing  She is not toxic-appearing or diaphoretic  Comments: Advanced elderly and extremely thin female   Eyes:      General:         Right eye: No discharge  Left eye: No discharge  Conjunctiva/sclera: Conjunctivae normal    Cardiovascular:      Rate and Rhythm: Normal rate        Heart sounds: No murmur heard  Pulmonary:      Effort: No respiratory distress  Breath sounds: No stridor  No wheezing or rhonchi  Abdominal:      General: There is no distension  Tenderness: There is no guarding  Musculoskeletal:      Right lower leg: No edema  Left lower leg: No edema  Skin:     General: Skin is warm and dry  Coloration: Skin is not jaundiced or pale  Findings: No bruising, erythema, lesion or rash  Neurological:      Mental Status: She is alert  Comments: Awake alert interactive she is ambulating in the room with a walker  She is appropriate and conversant confusion   Psychiatric:         Mood and Affect: Mood normal          Behavior: Behavior normal          Thought Content:  Thought content normal           Additional Data:     Labs:  Results from last 7 days   Lab Units 04/27/23  0559   WBC Thousand/uL 4 49   HEMOGLOBIN g/dL 10 7*   HEMATOCRIT % 34 2*   PLATELETS Thousands/uL 174   NEUTROS PCT % 55   LYMPHS PCT % 27   MONOS PCT % 13*   EOS PCT % 3     Results from last 7 days   Lab Units 04/27/23  0559 04/26/23  1217   SODIUM mmol/L 137 134*   POTASSIUM mmol/L 3 9 3 4*   CHLORIDE mmol/L 97 92*   CO2 mmol/L 35* 33*   BUN mg/dL 10 13   CREATININE mg/dL 0 42* 0 46*   ANION GAP mmol/L 5 9   CALCIUM mg/dL 8 6 9 0   ALBUMIN g/dL  --  4 1   TOTAL BILIRUBIN mg/dL  --  0 48   ALK PHOS U/L  --  43   ALT U/L  --  11   AST U/L  --  16   GLUCOSE RANDOM mg/dL 158* 250*     Results from last 7 days   Lab Units 04/26/23  1217   INR  1 10     Results from last 7 days   Lab Units 04/27/23  1057 04/27/23  0756 04/26/23  2205 04/26/23  1611   POC GLUCOSE mg/dl 203* 176* 195* 223*             Lines/Drains:  Invasive Devices     Peripheral Intravenous Line  Duration           Peripheral IV 04/26/23 Proximal;Right;Ventral (anterior) Forearm <1 day                Imaging: MRI brain    Recent Cultures (last 7 days):   Results from last 7 days   Lab Units 04/26/23  1223 04/26/23  1217 BLOOD CULTURE  Received in Microbiology Lab  Culture in Progress  Received in Microbiology Lab  Culture in Progress  Last 24 Hours Medication List:   Current Facility-Administered Medications   Medication Dose Route Frequency Provider Last Rate    acetaminophen  650 mg Oral Q4H PRN Sarah Oakes MD      bimatoprost  1 drop Both Eyes HS Margot Ortega MD      brimonidine tartrate  1 drop Right Eye TID Margot Ortega MD      calcium carbonate  1 tablet Oral Daily With Breakfast Margot Ortega MD      ezetimibe  10 mg Oral Daily With Maryan Dennis MD      And    pravastatin  80 mg Oral Daily With Maryan Dennis MD      famotidine  20 mg Oral BID Margot Ortega MD      insulin lispro  1-5 Units Subcutaneous TID AC Margot Ortega MD      insulin lispro  1-5 Units Subcutaneous HS Margot Ortega MD      ipratropium  1 spray Each Nare BID Margot Ortega MD      lidocaine  2 patch Topical Daily Antonette Knapp PA-C      loratadine  10 mg Oral Daily PRN Sarah Oakes MD      LORazepam  1 mg Oral HS PRN Margot Ortega MD      metoprolol tartrate  75 mg Oral Q12H Margot Ortega MD      multivitamin stress formula  1 tablet Oral Daily Margot Ortega MD      ondansetron  4 mg Intravenous Q6H PRN Sarah Oakes MD      pantoprazole  20 mg Oral BID AC Margot Ortega MD      Prucalopride Succinate  2 mg Oral Daily Margot Ortega MD      sodium chloride  1 g Oral TID Margot Ortega MD          Today, Patient Was Seen By: Cristela Garza PA-C    **Please Note: This note may have been constructed using a voice recognition system  **

## 2023-04-27 NOTE — ASSESSMENT & PLAN NOTE
Malnutrition Findings:   Significantly malnutrition as noted by bilateral buccal fat pad loss and bilateral temporal muscle wasting  Low BMI  Protein supplements and nutritionist consult  BMI Findings: Body mass index is 18 01 kg/m²

## 2023-04-27 NOTE — ASSESSMENT & PLAN NOTE
· Bilateral hip pain is patient's only complaint today    Supportive care with Tylenol and Lidoderm patches

## 2023-04-27 NOTE — ASSESSMENT & PLAN NOTE
Lab Results   Component Value Date    HGBA1C 7 8 (H) 03/03/2023       Recent Labs     04/26/23  1611 04/26/23  2205 04/27/23  0756 04/27/23  1057   POCGLU 223* 195* 176* 203*     -recommend euglycemia  -continued on insulin regimen  -management per Primary team

## 2023-04-27 NOTE — PROGRESS NOTES
"[facility]  Progress Note  Name: Alicia Choi  MRN: [id number]  Unit/Bed#: S -01 I Date of Admission: 4/26/2023   Date of Service: 4/27/2023 I Hospital Day: 1    Assessment/Plan   * Pachymeningitis  Assessment & Plan   82 y o  right handed female with Afib on Eliquis, pacemaker, occlusion of R carotid artery, L carotid artery stenosis, hyponatremia, idiopathic pulmonary fibrosis, diabetes, HLD, HTN, depression, osteoporosis, who presented to [facility] on 4/26/23 due to abnormal MRI  Imaging  -4/26/23 CT Abdomen/Pelvis wwo contrast completed with report pending  -4/18/23 MRI brain wwo contrast:  \"1  Diffuse pachymeningitis with diffuse thickened abnormal dural enhancement and thickening  Differential considerations include infectious or inflammatory neoplastic etiologies as well as idiopathic  Further clinical assessment recommended  Consider lumbar puncture for further characterization  Considerations related to the patient's age and/or comorbidities may be used to alter these recommendations  2   Abnormal right internal carotid artery flow void either representing slow flow or vascular occlusion  3   No acute infarction or acute intracranial hemorrhage  ADDENDUM: Differential diagnosis could include IgG4 related disease  Further clinical assessment advised  \"  - 3/27/23 CT chest high resolution: \"No significant change since August 2022 in moderate diffuse juxtapleural reticulation and traction bronchiolectasis with mild groundglass opacity  According to the American Thor[employer] Society guidelines,  this may be either a probable UIP pattern of pulmonary fibrosis or NSIP  Pulmonary artery enlargement which can be seen with pulmonary hypertension  \"    Plan  -Plan for LP on 4/28/23 afternoon   Last dose Eliquis 4/26/23 am  Hold for 48 hours prior to LP  - Hold Lovenox  4/28/23 am for planned LP   - May continue on home ASA 81 mg daily  - Serum labs within normal limits: ESR, " "PASTOR, Lyme  - Serum labs pending: ACE, Quantiferon, blood cultures  - Hold off on empiric antibiotics and/or steroids until LP as this can alter results of LP and mask diagnosis  - Medical management and correction of metabolic and infectious disturbances per primary team   - Avoid CNS altering medications if possible  - Continue supportive care   - Continue to monitor neurologic status  Notify neurology with any changes in exam  STAT CTH with any acute change in neurologic exam         DM2 (diabetes mellitus, type 2) ([facility] [address] )  Assessment & Plan  Lab Results   Component Value Date    HGBA1C 7 8 (H) 03/03/2023       Recent Labs     04/26/23  1611 04/26/23  2205 04/27/23  0756 04/27/23  1057   POCGLU 223* 195* 176* 203*     -recommend euglycemia  -continued on insulin regimen  -management per Primary team    Paroxysmal atrial fibrillation (HCC)  Assessment & Plan  -[id number] Score 6  -hold Eliquis for 48 hours prior to lumbar puncture  -continue home metoprolol  -management per Primary team    History and physical exam obtained by Attending Neurologist  This AP was present in the room and witnessed history and physical exam obtained  This AP acted solely as a scribe and did not provide any decision making for this encounter  Recommendations for outpatient neurological follow up have yet to be determined  Subjective: Today, feels \"better\"  Still has \"blurry vision\" but no headache or dizziness  She reports an episode of diplopia that happened yesterday  She heard a voice in her left ear, then in her right ear  She thought two people were in the room and when she turned around she saw two people dressed exactly alike standing side by side  She has not had double vision before just blurriness  Patient also endorses ongoing memory difficulties, forgets how to use things  She also has gait imbalance  She has been using a walker for approximately 3 weeks  She fell in 2019 and broke her shoulder   Since that " "incident she has had \"insecurity about falling\" which has increased prompting her to start using a walker  Patient also reports greater than 60lb weight loss after the fall in 2019  She has a poor appetite as well as dysphagia and hoarse voice  Review of Systems   10 point ROS conducted and negative except as noted in subjective above  Vitals: Blood pressure 142/80, pulse 74, temperature 98 4 °F (36 9 °C), temperature source Oral, resp  rate 16, weight 50 6 kg (111 lb 8 8 oz), SpO2 95 %  ,Body mass index is 18 01 kg/m²  MEDS:  Current Facility-Administered Medications   Medication Dose Route Frequency Provider Last Rate    acetaminophen  650 mg Oral Q4H PRN Sarah Zuñiga MD      bimatoprost  1 drop Both Eyes HS Richie Urban MD      brimonidine tartrate  1 drop Right Eye TID Richie Urban MD      calcium carbonate  1 tablet Oral Daily With Breakfast Richie Urban MD      ezetimibe  10 mg Oral Daily With Analia Sanabria MD      And    pravastatin  80 mg Oral Daily With Analia Sanabria MD      famotidine  20 mg Oral BID Richie Urban MD      insulin lispro  1-5 Units Subcutaneous TID AC Richie Urban MD      insulin lispro  1-5 Units Subcutaneous HS Richie Urban MD      ipratropium  1 spray Each Nare BID Richie Urban MD      loratadine  10 mg Oral Daily PRN Sarah Zuñiga MD      LORazepam  1 mg Oral HS PRN Sarah Zuñiga MD      metoprolol tartrate  75 mg Oral Q12H Richie Urban MD      multivitamin stress formula  1 tablet Oral Daily Richie Urban MD      ondansetron  4 mg Intravenous Q6H PRN Sarah Zuñiga MD      pantoprazole  20 mg Oral BID AC Richie Urban MD      Prucalopride Succinate  2 mg Oral Daily Richie Urban MD      sodium chloride  1 g Oral TID Richie Urban MD     :      Physical Exam  Vitals and nursing note reviewed     Constitutional:      " Appearance: She is cachectic  HENT:      Head: Normocephalic and atraumatic  Mouth/Throat:      Mouth: Mucous membranes are dry  Eyes:      Extraocular Movements: Extraocular movements intact and EOM normal       Conjunctiva/sclera: Conjunctivae normal    Cardiovascular:      Rate and Rhythm: Normal rate and regular rhythm  Heart sounds: No murmur heard  Pulmonary:      Effort: Pulmonary effort is normal  No respiratory distress  Comments: Decreased bilaterally  Abdominal:      General: Bowel sounds are normal  There is no distension  Palpations: Abdomen is soft  Musculoskeletal:         General: Normal range of motion  Cervical back: Normal range of motion  Skin:     General: Skin is warm and dry  Neurological:      Mental Status: She is alert and oriented to person, place, and time  Coordination: Finger-nose-finger test: slow RUE  Positive Romberg's test: slight sway with eyes closed  Psychiatric:         Mood and Affect: Mood normal          Behavior: Behavior is cooperative  Neurologic Exam     Mental Status   Oriented to person, place, and time  Oriented to person  Oriented to place  Oriented to year, month and day  Follows 3 step commands  Level of consciousness: alert  Knowledge: good  Able to name object  Able to repeat  Hoarse, speech clear, decreased concentration  Cranial Nerves     CN II   Visual fields full to confrontation  CN III, IV, VI   Extraocular motions are normal    Nystagmus: none   Diplopia: none    CN V   Facial sensation intact  CN VII   Facial expression full, symmetric  CN IX, X   Palate: symmetric    CN XI   CN XI normal      CN XII   Tongue deviation: none  Patient with chronic dysphagia  Difficulty with large pills but swallows successfully with multiple sips of water and peaches  No coughing noted       Motor Exam   Muscle bulk: normal  Overall muscle tone: normal  Right arm pronator drift: absent  Left arm pronator drift: absent  Full strength throughout except mild weakness RLE hip  Patient reports bilateral hip pain on examination     Sensory Exam   Sensation intact to light touch and pinprick  Extinction present during simultaneous testing of RUE and LLE  Only identifies touch on RUE     Gait, Coordination, and Reflexes     Gait  Gait: wide-based (ambulation with rolling walker, difficulty with turn)    Coordination   Positive Romberg's test: slight sway with eyes closed  Finger-nose-finger test: slow RUE  Diminished/absent reflexes bilateral upper and lower extremities  Toes downgoing       Lab Results:   I have personally reviewed pertinent reports  , CBC:   Results from last 7 days   Lab Units 04/27/23  0559 04/26/23  1217   WBC Thousand/uL 4 49 6 02   RBC Million/uL 3 96 4 41   HEMOGLOBIN g/dL 10 7* 11 7   HEMATOCRIT % 34 2* 38 4   MCV fL 86 87   PLATELETS Thousands/uL 174 211   , BMP/CMP:   Results from last 7 days   Lab Units 04/27/23  0559 04/26/23  1217   SODIUM mmol/L 137 134*   POTASSIUM mmol/L 3 9 3 4*   CHLORIDE mmol/L 97 92*   CO2 mmol/L 35* 33*   BUN mg/dL 10 13   CREATININE mg/dL 0 42* 0 46*   CALCIUM mg/dL 8 6 9 0   AST U/L  --  16   ALT U/L  --  11   ALK PHOS U/L  --  43   EGFR ml/min/1 73sq m 93 90   , Vitamin B12:   , HgBA1C:   , TSH:   , Coagulation:   Results from last 7 days   Lab Units 04/26/23  1217   INR  1 10   , Lipid Profile:   , Ammonia:   , Urinalysis:   Results from last 7 days   Lab Units 04/26/23  1330   COLOR UA  Light Yellow   CLARITY UA  Clear   SPEC GRAV UA  1 015   PH UA  6 5   LEUKOCYTES UA  Trace*   NITRITE UA  Negative   GLUCOSE UA mg/dl Trace*   KETONES UA mg/dl 40 (2+)*   BILIRUBIN UA  Negative   BLOOD UA  Negative   , Drug Screen:   , Medication Drug Levels:       Invalid input(s): CARBAMAZEPINE,  PHENOBARB, LACOSAMIDE, OXCARBAZEPINE    Imaging Studies: I have personally reviewed pertinent reports     and I have personally reviewed pertinent films in PACS     EEG, Pathology, and Other Studies: I have personally reviewed pertinent reports  and I have personally reviewed pertinent films in PACS    VTE Prophylaxis: Enoxaparin (Lovenox)     Counseling / Coordination of Care  Total time spent today 35 minutes  Greater than 50% of total time was spent with the patient and / or family counseling and / or coordination of care  A description of the counseling / coordination of care:       Invasive Devices     Peripheral Intravenous Line  Duration           Peripheral IV 04/26/23 Proximal;Right;Ventral (anterior) Forearm <1 day

## 2023-04-27 NOTE — ASSESSMENT & PLAN NOTE
· Patient had an outpatient MRI of the brain due to memory loss which showed abnormal findings of pachymeningitis, prompting her outpatient team to send her in for admission  · Appreciate neurology  Patient is planned for lumbar puncture to be done tomorrow; could not be done on admission because Eliquis he did to be held  · Additional labs and CT of the abdomen and pelvis also ordered to evaluate for malignant or infectious/inflammatory source    · Clinically stable at this time

## 2023-04-27 NOTE — ASSESSMENT & PLAN NOTE
-[id number] Score 6  -hold Eliquis for 48 hours prior to lumbar puncture  -continue home metoprolol  -management per Primary team

## 2023-04-28 ENCOUNTER — APPOINTMENT (INPATIENT)
Dept: RADIOLOGY | Facility: HOSPITAL | Age: 88
End: 2023-04-28

## 2023-04-28 LAB
APPEARANCE CSF: NORMAL
APTT PPP: 32 SECONDS (ref 23–37)
BASOPHILS # BLD AUTO: 0.02 THOUSANDS/ΜL (ref 0–0.1)
BASOPHILS NFR BLD AUTO: 0 % (ref 0–1)
C GATTII+NEOFOR DNA CSF QL NAA+NON-PROBE: NOT DETECTED
CMV DNA CSF QL NAA+NON-PROBE: NOT DETECTED
E COLI K1 DNA CSF QL NAA+NON-PROBE: NOT DETECTED
EOSINOPHIL # BLD AUTO: 0.04 THOUSAND/ΜL (ref 0–0.61)
EOSINOPHIL NFR BLD AUTO: 1 % (ref 0–6)
ERYTHROCYTE [DISTWIDTH] IN BLOOD BY AUTOMATED COUNT: 14 % (ref 11.6–15.1)
EV RNA CSF QL NAA+NON-PROBE: NOT DETECTED
GAMMA INTERFERON BACKGROUND BLD IA-ACNC: 0.04 IU/ML
GLUCOSE CSF-MCNC: 108 MG/DL (ref 40–70)
GLUCOSE SERPL-MCNC: 163 MG/DL (ref 65–140)
GLUCOSE SERPL-MCNC: 210 MG/DL (ref 65–140)
GLUCOSE SERPL-MCNC: 234 MG/DL (ref 65–140)
GP B STREP DNA CSF QL NAA+NON-PROBE: NOT DETECTED
GRAM STN SPEC: NORMAL
GRAM STN SPEC: NORMAL
HAEM INFLU DNA CSF QL NAA+NON-PROBE: NOT DETECTED
HCT VFR BLD AUTO: 32.8 % (ref 34.8–46.1)
HGB BLD-MCNC: 10.2 G/DL (ref 11.5–15.4)
HHV6 DNA CSF QL NAA+NON-PROBE: NOT DETECTED
HSV1 DNA CSF QL NAA+NON-PROBE: NOT DETECTED
HSV2 DNA CSF QL NAA+NON-PROBE: NOT DETECTED
IMM GRANULOCYTES # BLD AUTO: 0.02 THOUSAND/UL (ref 0–0.2)
IMM GRANULOCYTES NFR BLD AUTO: 0 % (ref 0–2)
INR PPP: 1.15 (ref 0.84–1.19)
L MONOCYTOG DNA CSF QL NAA+NON-PROBE: NOT DETECTED
LYMPHOCYTES # BLD AUTO: 1.02 THOUSANDS/ΜL (ref 0.6–4.47)
LYMPHOCYTES NFR BLD AUTO: 18 % (ref 14–44)
LYMPHOCYTES NFR CSF MANUAL: 30 %
M TB IFN-G BLD-IMP: NEGATIVE
M TB IFN-G CD4+ BCKGRND COR BLD-ACNC: 0.01 IU/ML
M TB IFN-G CD4+ BCKGRND COR BLD-ACNC: 0.01 IU/ML
MCH RBC QN AUTO: 26.8 PG (ref 26.8–34.3)
MCHC RBC AUTO-ENTMCNC: 31.1 G/DL (ref 31.4–37.4)
MCV RBC AUTO: 86 FL (ref 82–98)
MITOGEN IGNF BCKGRD COR BLD-ACNC: 8.67 IU/ML
MONOCYTES # BLD AUTO: 0.57 THOUSAND/ΜL (ref 0.17–1.22)
MONOCYTES NFR BLD AUTO: 10 % (ref 4–12)
MONOS+MACROS CSF MANUAL: 70 %
N MEN DNA CSF QL NAA+NON-PROBE: NOT DETECTED
NEUTROPHILS # BLD AUTO: 3.98 THOUSANDS/ΜL (ref 1.85–7.62)
NEUTS SEG NFR BLD AUTO: 71 % (ref 43–75)
NRBC BLD AUTO-RTO: 0 /100 WBCS
PARECHOVIRUS A RNA CSF QL NAA+NON-PROBE: NOT DETECTED
PLATELET # BLD AUTO: 174 THOUSANDS/UL (ref 149–390)
PMV BLD AUTO: 9.8 FL (ref 8.9–12.7)
PROT CSF-MCNC: 52 MG/DL (ref 15–45)
PROTHROMBIN TIME: 14.9 SECONDS (ref 11.6–14.5)
RBC # BLD AUTO: 3.8 MILLION/UL (ref 3.81–5.12)
RBC # CSF MANUAL: 45 UL (ref 0–10)
S PNEUM DNA CSF QL NAA+NON-PROBE: NOT DETECTED
TOTAL CELLS COUNTED BLD: NO
TOTAL CELLS COUNTED SPEC: 100
TUBE # CSF: 4
VZV DNA CSF QL NAA+NON-PROBE: NOT DETECTED
WBC # BLD AUTO: 5.65 THOUSAND/UL (ref 4.31–10.16)
WBC # CSF AUTO: 1 /UL (ref 0–5)

## 2023-04-28 PROCEDURE — 009U3ZX DRAINAGE OF SPINAL CANAL, PERCUTANEOUS APPROACH, DIAGNOSTIC: ICD-10-PCS | Performed by: STUDENT IN AN ORGANIZED HEALTH CARE EDUCATION/TRAINING PROGRAM

## 2023-04-28 RX ORDER — INSULIN GLARGINE 100 [IU]/ML
6 INJECTION, SOLUTION SUBCUTANEOUS
Status: DISCONTINUED | OUTPATIENT
Start: 2023-04-28 | End: 2023-04-29 | Stop reason: HOSPADM

## 2023-04-28 RX ADMIN — PANTOPRAZOLE SODIUM 20 MG: 20 TABLET, DELAYED RELEASE ORAL at 17:14

## 2023-04-28 RX ADMIN — FAMOTIDINE 20 MG: 20 TABLET, FILM COATED ORAL at 10:11

## 2023-04-28 RX ADMIN — INSULIN LISPRO 2 UNITS: 100 INJECTION, SOLUTION INTRAVENOUS; SUBCUTANEOUS at 17:21

## 2023-04-28 RX ADMIN — BRIMONIDINE TARTRATE 1 DROP: 2 SOLUTION/ DROPS OPHTHALMIC at 17:15

## 2023-04-28 RX ADMIN — PRAVASTATIN SODIUM 80 MG: 80 TABLET ORAL at 17:14

## 2023-04-28 RX ADMIN — INSULIN LISPRO 1 UNITS: 100 INJECTION, SOLUTION INTRAVENOUS; SUBCUTANEOUS at 10:12

## 2023-04-28 RX ADMIN — Medication 1 SPRAY: at 17:15

## 2023-04-28 RX ADMIN — SODIUM CHLORIDE 1 G: 1 TABLET ORAL at 17:15

## 2023-04-28 RX ADMIN — Medication 1 SPRAY: at 10:12

## 2023-04-28 RX ADMIN — BIMATOPROST 1 DROP: 0.1 SOLUTION/ DROPS OPHTHALMIC at 22:10

## 2023-04-28 RX ADMIN — INSULIN GLARGINE 6 UNITS: 100 INJECTION, SOLUTION SUBCUTANEOUS at 22:10

## 2023-04-28 RX ADMIN — LIDOCAINE 5% 2 PATCH: 700 PATCH TOPICAL at 10:13

## 2023-04-28 RX ADMIN — Medication 1 TABLET: at 10:11

## 2023-04-28 RX ADMIN — METOPROLOL TARTRATE 75 MG: 50 TABLET, FILM COATED ORAL at 05:43

## 2023-04-28 RX ADMIN — METOPROLOL TARTRATE 75 MG: 50 TABLET, FILM COATED ORAL at 17:15

## 2023-04-28 RX ADMIN — INSULIN LISPRO 2 UNITS: 100 INJECTION, SOLUTION INTRAVENOUS; SUBCUTANEOUS at 22:10

## 2023-04-28 RX ADMIN — PRUCALOPRIDE 2 MG: 2 TABLET, FILM COATED ORAL at 10:12

## 2023-04-28 RX ADMIN — FAMOTIDINE 20 MG: 20 TABLET, FILM COATED ORAL at 17:14

## 2023-04-28 RX ADMIN — SODIUM CHLORIDE 1 G: 1 TABLET ORAL at 10:11

## 2023-04-28 RX ADMIN — BRIMONIDINE TARTRATE 1 DROP: 2 SOLUTION/ DROPS OPHTHALMIC at 22:10

## 2023-04-28 RX ADMIN — PANTOPRAZOLE SODIUM 20 MG: 20 TABLET, DELAYED RELEASE ORAL at 10:59

## 2023-04-28 RX ADMIN — SODIUM CHLORIDE 1 G: 1 TABLET ORAL at 22:10

## 2023-04-28 RX ADMIN — APIXABAN 2.5 MG: 2.5 TABLET, FILM COATED ORAL at 17:14

## 2023-04-28 RX ADMIN — B-COMPLEX W/ C & FOLIC ACID TAB 1 TABLET: TAB at 10:11

## 2023-04-28 RX ADMIN — BRIMONIDINE TARTRATE 1 DROP: 2 SOLUTION/ DROPS OPHTHALMIC at 10:11

## 2023-04-28 RX ADMIN — EZETIMIBE 10 MG: 10 TABLET ORAL at 17:14

## 2023-04-28 NOTE — ASSESSMENT & PLAN NOTE
Patient notes she has been having significant weight loss  She clearly appears very thin and frail   malnutrition Findings:   Significantly malnutrition as noted by bilateral buccal fat pad loss and bilateral temporal muscle wasting  Low BMI  Protein supplements and nutritionist consult  BMI Findings: Body mass index is 17 15 kg/m²

## 2023-04-28 NOTE — ASSESSMENT & PLAN NOTE
Lab Results   Component Value Date    HGBA1C 7 8 (H) 03/03/2023       Recent Labs     04/28/23  1154 04/28/23  1927 04/29/23  0858 04/29/23  1132   POCGLU 210* 234* 68 183*     - Recommend goal euglycemia  - Continued on insulin regimen  - Management per Primary team

## 2023-04-28 NOTE — ASSESSMENT & PLAN NOTE
Recent Labs     04/27/23  1653 04/27/23 2052 04/28/23  0758 04/28/23  1154   POCGLU 232* 229* 163* 210*     Blood Sugar Average: Last 72 hrs:  · (P) 203 875 Holding Januvia, metformin    Restart at discharge  · Add low-dose Lantus tonight  · Continue sliding scale coverage

## 2023-04-28 NOTE — ASSESSMENT & PLAN NOTE
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  -4/18/23 MRI brain wwo contrast:  \"1  Diffuse pachymeningitis with diffuse thickened abnormal dural enhancement and thickening  Differential considerations include infectious or inflammatory neoplastic etiologies as well as idiopathic  Further clinical assessment recommended  Consider lumbar puncture for further characterization  Considerations related to the patient's age and/or comorbidities may be used to alter these recommendations  2   Abnormal right internal carotid artery flow void either representing slow flow or vascular occlusion  3   No acute infarction or acute intracranial hemorrhage  ADDENDUM: Differential diagnosis could include IgG4 related disease  Further clinical assessment advised  \"  - 3/27/23 CT chest high resolution: \"No significant change since August 2022 in moderate diffuse juxtapleural reticulation and traction bronchiolectasis with mild groundglass opacity  According to the American Thoracic Society guidelines,  this may be either a probable UIP pattern of pulmonary fibrosis or NSIP  Pulmonary artery enlargement which can be seen with pulmonary hypertension  \"    Plan  - LP completed 4/28/2923  - CSF studies ordered: protein mildly elevated at 59, glucose 108, WBCs 1, RBCs 45, gram stain with no polys or bacteria noted, ME panel negative,  - CSF studies pending: fungal culture,  AFB, VDRL, ACE, cytology, flow cytometry, Lyme PCR  - May continue on home ASA 81 mg daily and Eliquis 2 5 mg BID    - Serum labs " within normal limits: ESR, PASTOR, Lyme, ACE, Quantiferon   - Serum labs pending: blood cultures  - Medical management and correction of metabolic and infectious disturbances per primary team   - Avoid CNS altering medications if possible  - Continue supportive care   - Recommend outpatient neurology follow-up in 2-3 weeks

## 2023-04-28 NOTE — ASSESSMENT & PLAN NOTE
- [id number] Score 6  - Continue on Eliquis 2 5 mg BID   - Continue home metoprolol  - Management per Primary team

## 2023-04-28 NOTE — BRIEF OP NOTE (RAD/CATH)
INTERVENTIONAL RADIOLOGY PROCEDURE NOTE    Date: 4/28/2023    Procedure:   LP       Preoperative diagnosis:   1  Pachymeningitis         Postoperative diagnosis: Same  Surgeon: Haydee Mcintosh MD     Assistant: None  No qualified resident was available  Blood loss: 0 ml    Specimens: sent to lab     Findings:   LP L2-L3 returned 10 mls clear csf  Pt had pain during procedure and was uncomfortable with positioning, so no additional fluid was aspirated  Complications: None immediate      Anesthesia: local

## 2023-04-28 NOTE — PROGRESS NOTES
"Progress Note - Neurology   Laura Vann 82 y o  female MRN: [id number]  Unit/Bed#: S -01 Encounter: [id number]      Assessment/Plan     * Pachymeningitis  Assessment & Plan  82 y o  right handed female with Afib on Eliquis, pacemaker, occlusion of R carotid artery, L carotid artery stenosis, hyponatremia, idiopathic pulmonary fibrosis, diabetes, HLD, HTN, depression, osteoporosis, who presented to [facility] on 4/26/23 due to abnormal MRI  Imaging  -4/26/23 CT Abdomen/Pelvis wwo contrast completed and no evidence of malignancy in the abdomen and pelvis  Juxtapleural reticular opacities and traction bronchiectasis at the lung bases, 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  -4/18/23 MRI brain wwo contrast:  \"1  Diffuse pachymeningitis with diffuse thickened abnormal dural enhancement and thickening  Differential considerations include infectious or inflammatory neoplastic etiologies as well as idiopathic  Further clinical assessment recommended  Consider lumbar puncture for further characterization  Considerations related to the patient's age and/or comorbidities may be used to alter these recommendations  2   Abnormal right internal carotid artery flow void either representing slow flow or vascular occlusion  3   No acute infarction or acute intracranial hemorrhage  ADDENDUM: Differential diagnosis could include IgG4 related disease  Further clinical assessment advised  \"  - 3/27/23 CT chest high resolution: \"No significant change since August 2022 in moderate diffuse juxtapleural reticulation and traction bronchiolectasis with mild groundglass opacity  According to the American Thoracic Society guidelines,  this may be either a probable UIP pattern of pulmonary fibrosis or NSIP  Pulmonary artery enlargement which can be seen with pulmonary hypertension  \"    Plan  - LP attempted to bedside today by attending neurologist but unsuccessful  Dr Reena Trevino reached out to radiology and they will attempt this afternoon    Last " dose Eliquis 4/26/23 AM   - CSF studies ordered: protein, glucose, WBCs, RBCs, culture and gram stain, fungal culture, ME panel, AFB, VDRL, ACE, cytology, flow cytometry, Lyme IgG/IgM, Lyme Western blot  - May continue on home ASA 81 mg daily  - Serum labs within normal limits: ESR, PSATOR, Lyme, ACE  - Serum labs pending: Quantiferon, blood cultures  - Hold off on empiric antibiotics and/or steroids until LP as this can alter results of LP and mask diagnosis  - Medical management and correction of metabolic and infectious disturbances per primary team   - Avoid CNS altering medications if possible  - Continue supportive care   - Continue to monitor neurologic status  Notify neurology with any changes in exam  STAT CTH with any acute change in neurologic exam         Paroxysmal atrial fibrillation (HCC)  Assessment & Plan  - [id number] Score 6  - Hold Eliquis for 48 hours prior to lumbar puncture  - Continue home metoprolol  - Management per Primary team    DM2 (diabetes mellitus, type 2) [facility])  Assessment & Plan  Lab Results   Component Value Date    HGBA1C 7 8 (H) 03/03/2023       Recent Labs     04/27/23  1653 04/27/23  2052 04/28/23  0758 04/28/23  1154   POCGLU 232* 229* 163* 210*     - Recommend goal euglycemia  - Continued on insulin regimen  - Management per Primary team        Recommendations for outpatient neurological follow up have yet to be determined  Subjective:   Patient reports continued blurred vision  She denies any further hallucinations for the past day which she is happy about  She does note that she had been having concerns with her memory and that is ultimately what prompted her PCP to order an MRI brain      ROS:  History obtained from the patient  General ROS: negative for - chills, fatigue or fever  Psychological ROS: negative for - hallucinations  Ophthalmic ROS: positive for - blurry vision  negative for - decreased vision or double vision  Respiratory ROS: negative for - shortness of breath  Cardiovascular ROS: negative for - chest pain  Gastrointestinal ROS: negative for - abdominal pain or nausea/vomiting  Musculoskeletal ROS: negative for - gait disturbance or muscular weakness  Neurological ROS: negative for - confusion, dizziness, headaches, numbness/tingling, speech problems or weakness    Medications  Scheduled Meds:  Current Facility-Administered Medications   Medication Dose Route Frequency Provider Last Rate    acetaminophen  650 mg Oral Q4H PRN Sarah Soria MD      bimatoprost  1 drop Both Eyes HS Meghan Eller MD      brimonidine tartrate  1 drop Right Eye TID Meghan Eller MD      calcium carbonate  1 tablet Oral Daily With Breakfast Meghan Eller MD      ezetimibe  10 mg Oral Daily With Lars Moyer MD      And    pravastatin  80 mg Oral Daily With Lars Moyer MD      famotidine  20 mg Oral BID Meghan Eller MD      insulin lispro  1-5 Units Subcutaneous TID AC Meghan Eller MD      insulin lispro  1-5 Units Subcutaneous HS Meghan Eller MD      ipratropium  1 spray Each Nare BID Meghan Eller MD      lidocaine  2 patch Topical Daily Antonette Knapp PA-C      loratadine  10 mg Oral Daily PRN Sarah Soria MD      LORazepam  1 mg Oral HS PRN Sarah Soria MD      metoprolol tartrate  75 mg Oral Q12H Sarah Soria MD      multivitamin stress formula  1 tablet Oral Daily Meghan Eller MD      ondansetron  4 mg Intravenous Q6H PRN Sarah Soria MD      pantoprazole  20 mg Oral BID AC Meghan Eller MD      Prucalopride Succinate  2 mg Oral Daily Sarah Soria MD      sodium chloride  1 g Oral TID Sarah Soria MD       Continuous Infusions:   PRN Meds:   acetaminophen    loratadine    LORazepam    ondansetron    Vitals: Blood pressure (!) 181/86, pulse 72, temperature 98 6 °F (37 °C), temperature source Oral, resp   rate 16, weight 48 2 kg (106 lb 4 2 oz), SpO2 98 %  ,Body mass index is 17 15 kg/m²      Physical Exam: BP (!) 181/86 (BP Location: Left arm)   Pulse 72   Temp 98 6 °F (37 °C) (Oral)   Resp 16   Wt 48 2 kg (106 lb 4 2 oz)   SpO2 98%   BMI 17 15 kg/m²   General appearance: alert and oriented, in no acute distress  Head: Normocephalic, without obvious abnormality, atraumatic  Eyes: negative findings: lids and lashes normal, conjunctivae and sclerae normal, pupils equal, round, reactive to light and accomodation and visual fields full to confrontation  Lungs: clear to auscultation bilaterally  Heart: regular rate and rhythm  Abdomen: soft, non-tender; bowel sounds normal; no masses,  no organomegaly  Extremities: extremities normal, warm and well-perfused; no cyanosis, clubbing, or edema  Skin: Skin color, texture, turgor normal  No rashes or lesions  Neurologic: Mental status: Alert, oriented, thought content appropriate  Cranial nerves: II: visual field normal, II: pupils equal, round, reactive to light and accommodation, III,VII: ptosis no ptosis noted, III,IV,VI: extraocular muscles extra-ocular motions intact, VII: upper facial muscle function normal bilaterally, VII: lower facial muscle function normal bilaterally, XII: tongue strength normal  Sensory: Grossly intact to crude touch  Motor: Motor strength 5/5 B/L UE and LE  Coordination: finger to nose normal bilaterally    Labs  Recent Results (from the past 24 hour(s))   Fingerstick Glucose (POCT)    Collection Time: 04/27/23  4:53 PM   Result Value Ref Range    POC Glucose 232 (H) 65 - 140 mg/dl   Fingerstick Glucose (POCT)    Collection Time: 04/27/23  8:52 PM   Result Value Ref Range    POC Glucose 229 (H) 65 - 140 mg/dl   Fingerstick Glucose (POCT)    Collection Time: 04/28/23  7:58 AM   Result Value Ref Range    POC Glucose 163 (H) 65 - 140 mg/dl   Fingerstick Glucose (POCT)    Collection Time: 04/28/23 11:54 AM   Result Value Ref Range    POC Glucose 210 (H) 65 - 140 mg/dl     Imaging   No new neuro imaging available for review  VTE Prophylaxis: Reason for no pharmacologic prophylaxis Eliquis on hold for LP    Counseling / Coordination of Care  Total time spent today 45 minutes  Greater than 50% of total time was spent with the patient and / or family counseling and / or coordination of care  A description of the counseling / coordination of care: Time spent reviewing plan of care, imaging and also ordering CSF studies for LP

## 2023-04-28 NOTE — PROGRESS NOTES
[facility]  Progress Note  Name: Cherise Gilliam  MRN: [id number]  Unit/Bed#: S -01 I Date of Admission: 4/26/2023   Date of Service: 4/28/2023 I Hospital Day: 2    Assessment/Plan   * Pachymeningitis  Assessment & Plan  · Patient had an outpatient MRI of the brain due to memory loss which showed abnormal findings of pachymeningitis, prompting her outpatient team to send her in for admission  · Appreciate neurology  They attempted bedside lumbar puncture today but it was unsuccessful  Therefore interventional radiology consult was placed  LP could not be done on admission because Eliquis had to be held  · CT of the abdomen and pelvis negative for malignancy  · ? infectious/inflammatory source  Lyme, PASTOR negative  TB pending  · Clinically stable at this time      Paroxysmal atrial fibrillation [facility])  Assessment & Plan  · Continue patient on metoprolol; holding Eliquis for tap  Resume today if ok with neurology    Severe protein-calorie malnutrition [facility])  Assessment & Plan  Patient notes she has been having significant weight loss  She clearly appears very thin and frail   malnutrition Findings:   Significantly malnutrition as noted by bilateral buccal fat pad loss and bilateral temporal muscle wasting  Low BMI  Protein supplements and nutritionist consult  BMI Findings: Body mass index is 17 15 kg/m²  DM2 (diabetes mellitus, type 2) [facility])  Assessment & Plan    Recent Labs     04/27/23  1653 04/27/23  2052 04/28/23  0758 04/28/23  1154   POCGLU 232* 229* 163* 210*     Blood Sugar Average: Last 72 hrs:  · (P) 203 875 Holding Januvia, metformin    Restart at discharge  · Add low-dose Lantus tonight  · Continue sliding scale coverage      Bilateral hip pain  Assessment & Plan  · Bilateral hip pain noted--Supportive care with Tylenol and Lidoderm patches       VTE Pharmacologic Prophylaxis: VTE Score: 4 hold Eliquis for tap    Patient Centered Rounds: spoke with RN  Discussions with Specialists or Other Care Team Provider: Neurology, case management    Education and Discussions with Family / Patient: Updated  (son) via phone  Total Time Spent on Date of Encounter in care of patient: 30 minutes This time was spent on one or more of the following: performing physical exam; counseling and coordination of care; obtaining or reviewing history; documenting in the medical record; reviewing/ordering tests, medications or procedures; communicating with other healthcare professionals and discussing with patient's family/caregivers  Current Length of Stay: 2 day(s)  Current Patient Status: Inpatient   Certification Statement: The patient will continue to require additional inpatient hospital stay due to Awaiting LP  Discharge Plan: Anticipate discharge in 24-48 hrs to home  Code Status: Level 3 - DNAR and DNI    Subjective:   Patient denies headache, confusion, neck stiffness  She reports her hip pain is better  Objective:     Vitals:   Temp (24hrs), Av 5 °F (36 9 °C), Min:98 2 °F (36 8 °C), Max:98 6 °F (37 °C)    Temp:  [98 2 °F (36 8 °C)-98 6 °F (37 °C)] 98 6 °F (37 °C)  HR:  [63-72] 72  Resp:  [16] 16  BP: (120-181)/(59-86) 181/86  SpO2:  [96 %-98 %] 98 %  Body mass index is 17 15 kg/m²  Input and Output Summary (last 24 hours): Intake/Output Summary (Last 24 hours) at 2023 1242  Last data filed at 2023 1900  Gross per 24 hour   Intake 220 ml   Output --   Net 220 ml       Physical Exam:   Physical Exam  Vitals reviewed  Constitutional:       General: She is not in acute distress  Appearance: She is ill-appearing  She is not toxic-appearing or diaphoretic  Comments: Very thin, frail but no acute distress   Eyes:      General: No scleral icterus  Right eye: No discharge  Left eye: No discharge  Conjunctiva/sclera: Conjunctivae normal    Cardiovascular:      Rate and Rhythm: Normal rate        Heart sounds: No murmur heard  Pulmonary:      Effort: No respiratory distress  Breath sounds: No stridor  No wheezing or rhonchi  Abdominal:      General: There is no distension  Tenderness: There is no abdominal tenderness  There is no guarding  Musculoskeletal:      Right lower leg: No edema  Left lower leg: No edema  Skin:     General: Skin is warm and dry  Coloration: Skin is not jaundiced or pale  Findings: No bruising, erythema, lesion or rash  Neurological:      General: No focal deficit present  Mental Status: She is alert  Mental status is at baseline  Comments: Awake alert interactive   Psychiatric:         Mood and Affect: Mood normal          Thought Content:  Thought content normal           Additional Data:     Labs:  Results from last 7 days   Lab Units 04/27/23  0559   WBC Thousand/uL 4 49   HEMOGLOBIN g/dL 10 7*   HEMATOCRIT % 34 2*   PLATELETS Thousands/uL 174   NEUTROS PCT % 55   LYMPHS PCT % 27   MONOS PCT % 13*   EOS PCT % 3     Results from last 7 days   Lab Units 04/27/23  0559 04/26/23  1217   SODIUM mmol/L 137 134*   POTASSIUM mmol/L 3 9 3 4*   CHLORIDE mmol/L 97 92*   CO2 mmol/L 35* 33*   BUN mg/dL 10 13   CREATININE mg/dL 0 42* 0 46*   ANION GAP mmol/L 5 9   CALCIUM mg/dL 8 6 9 0   ALBUMIN g/dL  --  4 1   TOTAL BILIRUBIN mg/dL  --  0 48   ALK PHOS U/L  --  43   ALT U/L  --  11   AST U/L  --  16   GLUCOSE RANDOM mg/dL 158* 250*     Results from last 7 days   Lab Units 04/26/23  1217   INR  1 10     Results from last 7 days   Lab Units 04/28/23  1154 04/28/23  0758 04/27/23  2052 04/27/23  1653 04/27/23  1057 04/27/23  0756 04/26/23  2205 04/26/23  1611   POC GLUCOSE mg/dl 210* 163* 229* 232* 203* 176* 195* 223*               Lines/Drains:  Invasive Devices     Peripheral Intravenous Line  Duration           Peripheral IV 04/26/23 Proximal;Right;Ventral (anterior) Forearm 1 day              Imaging:     Recent Cultures (last 7 days):   Results from last 7 days   Lab Units 04/26/23  1223 04/26/23  1217   BLOOD CULTURE  No Growth at 24 hrs  No Growth at 24 hrs  Last 24 Hours Medication List:   Current Facility-Administered Medications   Medication Dose Route Frequency Provider Last Rate    acetaminophen  650 mg Oral Q4H PRN Sarah Martinez MD      bimatoprost  1 drop Both Eyes HS Sisi Bonner MD      brimonidine tartrate  1 drop Right Eye TID Sisi Bonner MD      calcium carbonate  1 tablet Oral Daily With Breakfast Sisi Bonner MD      ezetimibe  10 mg Oral Daily With Nai Allison MD      And    pravastatin  80 mg Oral Daily With Nai Allison MD      famotidine  20 mg Oral BID Sisi Bonner MD      insulin glargine  6 Units Subcutaneous HS Antonette Knapp PA-C      insulin lispro  1-5 Units Subcutaneous TID AC Sisi Bonner MD      insulin lispro  1-5 Units Subcutaneous HS Sisi Bonner MD      ipratropium  1 spray Each Nare BID Sisi Bonner MD      lidocaine  2 patch Topical Daily Antonette Knapp PA-C      loratadine  10 mg Oral Daily PRN Sarah Martinez MD      LORazepam  1 mg Oral HS PRN Sarah Martinez MD      metoprolol tartrate  75 mg Oral Q12H Sisi Bonner MD      multivitamin stress formula  1 tablet Oral Daily Sisi Bonner MD      ondansetron  4 mg Intravenous Q6H PRN Sarah Martinez MD      pantoprazole  20 mg Oral BID AC Sisi Bonner MD      Prucalopride Succinate  2 mg Oral Daily Sisi Bonner MD      sodium chloride  1 g Oral TID Sisi Bonner MD          Today, Patient Was Seen By: Delia Andrade PA-C    **Please Note: This note may have been constructed using a voice recognition system  **

## 2023-04-28 NOTE — ASSESSMENT & PLAN NOTE
· Patient had an outpatient MRI of the brain due to memory loss which showed abnormal findings of pachymeningitis, prompting her outpatient team to send her in for admission  · Appreciate neurology  They attempted bedside lumbar puncture today but it was unsuccessful  Therefore interventional radiology consult was placed  LP could not be done on admission because Eliquis had to be held  · CT of the abdomen and pelvis negative for malignancy  · ? infectious/inflammatory source  Lyme, PASTOR negative   TB pending  · Clinically stable at this time

## 2023-04-28 NOTE — PLAN OF CARE
Problem: MOBILITY - ADULT  Goal: Maintain or return to baseline ADL function  Description: INTERVENTIONS:  -  Assess patient's ability to carry out ADLs; assess patient's baseline for ADL function and identify physical deficits which impact ability to perform ADLs (bathing, care of mouth/teeth, toileting, grooming, dressing, etc )  - Assess/evaluate cause of self-care deficits   - Assess range of motion  - Assess patient's mobility; develop plan if impaired  - Assess patient's need for assistive devices and provide as appropriate  - Encourage maximum independence but intervene and supervise when necessary  - Involve family in performance of ADLs  - Assess for home care needs following discharge   - Consider OT consult to assist with ADL evaluation and planning for discharge  - Provide patient education as appropriate  Outcome: Progressing  Goal: Maintains/Returns to pre admission functional level  Description: INTERVENTIONS:  - Perform BMAT or MOVE assessment daily    - Set and communicate daily mobility goal to care team and patient/family/caregiver     - Collaborate with rehabilitation services on mobility goals if consulted  - Ambulate patient 3 times a day  - Out of bed to chair 2 times a day   - Out of bed for meals 2 times a day  - Out of bed for toileting  - Record patient progress and toleration of activity level   Outcome: Progressing     Problem: SAFETY ADULT  Goal: Patient will remain free of falls  Description: INTERVENTIONS:  - Educate patient/family on patient safety including physical limitations  - Instruct patient to call for assistance with activity   - Consult OT/PT to assist with strengthening/mobility   - Keep Call bell within reach  - Keep bed low and locked with side rails adjusted as appropriate  - Keep care items and personal belongings within reach  - Initiate and maintain comfort rounds  - Make Fall Risk Sign visible to staff  - Offer Toileting every 2 Hours, in advance of need  - Initiate/Maintain bed alarm  - Obtain necessary fall risk management equipment  - Apply yellow socks and bracelet for high fall risk patients  - Consider moving patient to room near nurses station  Outcome: Progressing     Problem: RESPIRATORY - ADULT  Goal: Achieves optimal ventilation and oxygenation  Description: INTERVENTIONS:  - Assess for changes in respiratory status  - Assess for changes in mentation and behavior  - Position to facilitate oxygenation and minimize respiratory effort  - Oxygen administered by appropriate delivery if ordered  - Initiate smoking cessation education as indicated  - Encourage broncho-pulmonary hygiene including cough, deep breathe, Incentive Spirometry  - Assess the need for suctioning and aspirate as needed  - Assess and instruct to report SOB or any respiratory difficulty  - Respiratory Therapy support as indicated  Outcome: Progressing

## 2023-04-28 NOTE — PLAN OF CARE
Problem: MOBILITY - ADULT  Goal: Maintain or return to baseline ADL function  Description: INTERVENTIONS:  -  Assess patient's ability to carry out ADLs; assess patient's baseline for ADL function and identify physical deficits which impact ability to perform ADLs (bathing, care of mouth/teeth, toileting, grooming, dressing, etc )  - Assess/evaluate cause of self-care deficits   - Assess range of motion  - Assess patient's mobility; develop plan if impaired  - Assess patient's need for assistive devices and provide as appropriate  - Encourage maximum independence but intervene and supervise when necessary  - Involve family in performance of ADLs  - Assess for home care needs following discharge   - Consider OT consult to assist with ADL evaluation and planning for discharge  - Provide patient education as appropriate  Outcome: Progressing  Goal: Maintains/Returns to pre admission functional level  Description: INTERVENTIONS:  - Perform BMAT or MOVE assessment daily    - Set and communicate daily mobility goal to care team and patient/family/caregiver     - Collaborate with rehabilitation services on mobility goals if consulted  - Perform Range of Motion   - Out of bed for toileting  - Record patient progress and toleration of activity level   Outcome: Progressing     Problem: SAFETY ADULT  Goal: Patient will remain free of falls  Description: INTERVENTIONS:  - Educate patient/family on patient safety including physical limitations  - Instruct patient to call for assistance with activity   - Consult OT/PT to assist with strengthening/mobility   - Keep Call bell within reach  - Keep bed low and locked with side rails adjusted as appropriate  - Keep care items and personal belongings within reach  - Initiate and maintain comfort rounds  - Make Fall Risk Sign visible to staff  - Obtain necessary fall risk management equipment  - Apply yellow socks and bracelet for high fall risk patients  - Consider moving patient to room near nurses station  Outcome: Progressing     Problem: RESPIRATORY - ADULT  Goal: Achieves optimal ventilation and oxygenation  Description: INTERVENTIONS:  - Assess for changes in respiratory status  - Assess for changes in mentation and behavior  - Position to facilitate oxygenation and minimize respiratory effort  - Oxygen administered by appropriate delivery if ordered  - Initiate smoking cessation education as indicated  - Encourage broncho-pulmonary hygiene including cough, deep breathe, Incentive Spirometry  - Assess the need for suctioning and aspirate as needed  - Assess and instruct to report SOB or any respiratory difficulty  - Respiratory Therapy support as indicated  Outcome: Progressing

## 2023-04-29 VITALS
RESPIRATION RATE: 16 BRPM | DIASTOLIC BLOOD PRESSURE: 93 MMHG | WEIGHT: 103.4 LBS | SYSTOLIC BLOOD PRESSURE: 167 MMHG | BODY MASS INDEX: 16.69 KG/M2 | HEART RATE: 85 BPM | OXYGEN SATURATION: 94 % | TEMPERATURE: 97.7 F

## 2023-04-29 LAB
GLUCOSE SERPL-MCNC: 183 MG/DL (ref 65–140)
GLUCOSE SERPL-MCNC: 68 MG/DL (ref 65–140)
SCAN RESULT: NORMAL

## 2023-04-29 RX ADMIN — Medication 1 TABLET: at 09:02

## 2023-04-29 RX ADMIN — INSULIN LISPRO 1 UNITS: 100 INJECTION, SOLUTION INTRAVENOUS; SUBCUTANEOUS at 12:51

## 2023-04-29 RX ADMIN — PRUCALOPRIDE 2 MG: 2 TABLET, FILM COATED ORAL at 09:05

## 2023-04-29 RX ADMIN — PANTOPRAZOLE SODIUM 20 MG: 20 TABLET, DELAYED RELEASE ORAL at 05:54

## 2023-04-29 RX ADMIN — ACETAMINOPHEN 650 MG: 325 TABLET ORAL at 05:55

## 2023-04-29 RX ADMIN — SODIUM CHLORIDE 1 G: 1 TABLET ORAL at 09:03

## 2023-04-29 RX ADMIN — B-COMPLEX W/ C & FOLIC ACID TAB 1 TABLET: TAB at 09:07

## 2023-04-29 RX ADMIN — FAMOTIDINE 20 MG: 20 TABLET, FILM COATED ORAL at 09:03

## 2023-04-29 RX ADMIN — APIXABAN 2.5 MG: 2.5 TABLET, FILM COATED ORAL at 09:03

## 2023-04-29 RX ADMIN — METOPROLOL TARTRATE 75 MG: 50 TABLET, FILM COATED ORAL at 05:54

## 2023-04-29 NOTE — ASSESSMENT & PLAN NOTE
Patient notes she has been having significant weight loss  She clearly appears very thin and frail   malnutrition Findings:   Significantly malnutrition as noted by bilateral buccal fat pad loss and bilateral temporal muscle wasting  Low BMI  Protein supplements and nutritionist consult  BMI Findings: Body mass index is 16 69 kg/m²

## 2023-04-29 NOTE — CASE MANAGEMENT
Case Management Progress Note    Patient name Lyndsey Jules  Location S /S [address] [id number] MRN [id number]  : 1935 Date 2023       LOS (days): 3  Geometric Mean LOS (GMLOS) (days): 9 10  Days to GMLOS:6 1        OBJECTIVE:        Current admission status: Inpatient  Preferred Pharmacy:   [facility], [address] [address]  Phone: [phone] Fax: [address] , [address] [facility] [address]  Phone: [phone] Fax: [phone]    [id number]- [address] [address]  Phone: [phone] Fax: [phone]    Primary Care Provider: ZANDER Watts    Primary Insurance: MEDICARE  Secondary Insurance: June [employer]    PROGRESS NOTE:    Per RN, patient to be discharged today  No needs identified and patient currently on airborne isolation  Call made to patient's room to review IMM form  Patient informed of appeal rights, but denies any concerns at this time  Reports that she's anxious to return home and family will provide transport  Declined offer for copy of form to be provided  No other d/c needs identified at this time  IMM reviewed with patient, patient agrees with discharge determination

## 2023-04-29 NOTE — DISCHARGE SUMMARY
[facility]  Discharge- Marija Grove 1935, 82 y o  female MRN: [id number]  Unit/Bed#: S -01 Encounter: [id number]  Primary Care Provider: ZANDER Tavera   Date and time admitted to hospital: 4/26/2023 11:14 AM    Bilateral hip pain  Assessment & Plan  · Bilateral hip pain noted--Supportive care with Tylenol and Lidoderm patches    DM2 (diabetes mellitus, type 2) [facility])  Assessment & Plan    Recent Labs     04/27/23  2052 04/28/23  0758 04/28/23  1154 04/28/23  1927   POCGLU 229* 163* 210* 234*     Blood Sugar Average: Last 72 hrs:  · (P) 207 [id number] Holding Januvia, metformin  Restart at discharge  · Added low-dose Lantus 6U yesterday with improved glycemic control  · Continue sliding scale coverage      Severe protein-calorie malnutrition [facility])  Assessment & Plan  Patient notes she has been having significant weight loss  She clearly appears very thin and frail   malnutrition Findings:   Significantly malnutrition as noted by bilateral buccal fat pad loss and bilateral temporal muscle wasting  Low BMI  Protein supplements and nutritionist consult  BMI Findings: Body mass index is 16 69 kg/m²  Anxiety  Assessment & Plan  Ativan nightly as needed  GERD (gastroesophageal reflux disease)  Assessment & Plan  Continue patient Pepcid  Osteoporosis  Assessment & Plan  Continue patient on calcium and vitamin D supplements  Paroxysmal atrial fibrillation (HCC)  Assessment & Plan  · Continue patient on metoprolol; Eliquis initially held for LP but resumed shortly after LP performed    Asymptomatic carotid artery stenosis, left  Assessment & Plan  Continue patient on statin  Resume asa s/p LP     Mixed hyperlipidemia  Assessment & Plan  Continue patient on statin      * Pachymeningitis  Assessment & Plan  · Patient had an outpatient MRI of the brain due to memory loss which showed abnormal findings of pachymeningitis, prompting her outpatient team to send her in for admission  · Appreciate neurology  They attempted bedside lumbar puncture today but it was unsuccessful  LP could not be done on admission because Eliquis had to be held, underwent LP with IR 4/28  · CT of the abdomen and pelvis negative for malignancy  · ? inflammatory source  Lyme, PASTOR negative  TB pending, initial CSF analysis appears benign and without polys or bacteria on Gram stain, will monitor off antibiotics at this time  · No leukocytosis, has remained afebrile  · Final CSF studies can be followed up in 2 to 3 weeks as an outpatient  · Clinically stable at this time        Medical Problems     Resolved Problems  Date Reviewed: 4/29/2023   None       Discharging Physician / Practitioner: Danelle Bai PA-C  PCP: Keegan Barton, [address] [place] [address]  Admission Date:   Admission Orders (From admission, onward)     Ordered        04/26/23 1403  INPATIENT ADMISSION  Once                      Discharge Date: 04/29/23    Consultations During Hospital Stay:  · Neurology    Procedures Performed:   · Lumbar puncture 4/28     Significant Findings / Test Results:   · LP with following CSF studies resulted prior to discharge: Protein 59, glucose 108, WBCs 1, RBCs 45, Gram stain with no polys or bacteria, ME panel negative  · Blood cultures negative at 48 hours  · Hyponatremic on admission to 134, mild hypokalemia to 3 4 hypochloremia  · UA positive for leuks, protein, ketones and glucose  4-10 WBCs otherwise without bacteria  · Negative quant, normal Lyme antibody profile, normal PASTOR normal ACE and sed rate  · CT abdomen pelvis without evidence of malignancy    Juxtapleural reticular opacities and traction bronchiectasis at the lung bases, 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    Incidental Findings:   · None     Test Results Pending at Discharge (will require follow up):   · CSF fungal culture, AFB, VDRL, ACE, cytology, flow cytometry, Lyme PCR   Outpatient Tests Requested:  · Follow-up with neurology in 2 to 3 weeks to discuss remainder of CSF studies  Complications:  None    Reason for Admission: Pachymeningitis on MRI as an outpatient in setting of headaches and confusion    Hospital Course:   Doug Foreman is a 82 y o  female patient with past medical history of A-fib on Eliquis, pacemaker, occlusion of right carotid artery, left carotid artery stenosis, hyponatremia, idiopathic pulmonary fibrosis, diabetes, hyperlipidemia, hypertension, depression who originally presented to the hospital on 4/26/2023 due to an MRI revealing pachymeningitis after complaints of intermittent headaches and confusion  She reported about 4 to 5 days of forgetfulness and difficulty expressing herself as well as some intermittent blurred vision  Vital signs in the ED were stable, patient was afebrile  No leukocytosis on initial work-up, lab work only remarkable for hypokalemia and hyponatremia  CT chest abdomen pelvis revealed no malignancy  She was admitted to medicine with consult placed to neurology  Her Eliquis was held and she was monitored off antibiotics was not to alter LP results  Neurology attempted LP 48 hours later however they were unsuccessful due to significant scoliosis  IR was consulted and was able to perform the LP on 4/28  Prelim CSF results are without bacteria or polys and viral meningitis/encephalitis panel was negative  CSF did have mild protein elevation but otherwise was not overtly concerning for malignancy  Neurology felt patient stable to follow-up remainder of results in the office in 2 to 3 weeks  Blood cultures remain negative, patient remained free of leukocytosis or fever and all metabolic abnormalities were corrected  Patient stable for discharge on 4/29/23  Please see above list of diagnoses and related plan for additional information       Condition at Discharge: fair    Discharge Day Visit / Exam:   Subjective: Patient admits to some fatigue and weakness as well as mild intermittent headache but overall feels she would be better served at home  She feels a bit depressed being stuck in the hospital and is happy when she is offered follow-up as an outpatient  She denies fevers or chills  Does have some mild low back pain but attributes this more so to being in the hospital bed rather than the lumbar puncture  She denies any headache, lightheadedness or dizziness and has been ambulating to the bathroom without issue  Denies photophobia, neck stiffness  Denies chest pain or shortness of breath  Vitals: Blood Pressure: 167/93 (04/29/23 0859)  Pulse: 85 (04/29/23 0859)  Temperature: 97 7 °F (36 5 °C) (04/29/23 0859)  Temp Source: Oral (04/29/23 0859)  Respirations: 16 (04/29/23 0859)  Weight - Scale: 46 9 kg (103 lb 6 3 oz) (04/29/23 0559)  SpO2: 94 % (04/28/23 1929)  Exam:   Physical Exam  Vitals and nursing note reviewed  Constitutional:       General: She is not in acute distress  Appearance: Normal appearance  She is normal weight  She is not ill-appearing or toxic-appearing  HENT:      Head: Normocephalic and atraumatic  Right Ear: External ear normal       Left Ear: External ear normal       Nose: Nose normal       Mouth/Throat:      Mouth: Mucous membranes are moist       Pharynx: Oropharynx is clear  Eyes:      Conjunctiva/sclera: Conjunctivae normal    Cardiovascular:      Rate and Rhythm: Normal rate and regular rhythm  Pulses: Normal pulses  Heart sounds: No murmur heard  No gallop  Pulmonary:      Effort: Pulmonary effort is normal  No respiratory distress  Breath sounds: Normal breath sounds  No stridor  No wheezing, rhonchi or rales  Abdominal:      General: Abdomen is flat  Bowel sounds are normal  There is no distension  Palpations: Abdomen is soft  There is no mass  Tenderness: There is no abdominal tenderness  There is no guarding or rebound  Hernia: No hernia is present  Musculoskeletal:      Cervical back: Normal range of motion        Right lower leg: No edema  Left lower leg: No edema  Skin:     General: Skin is warm and dry  Neurological:      Mental Status: She is alert and oriented to person, place, and time  Mental status is at baseline  Sensory: No sensory deficit  Motor: No weakness  Psychiatric:         Mood and Affect: Mood normal          Thought Content: Thought content normal           Discussion with Family: Updated  (son) at bedside  Discharge instructions/Information to patient and family:   See after visit summary for information provided to patient and family  Provisions for Follow-Up Care:  See after visit summary for information related to follow-up care and any pertinent home health orders  Disposition:   Home    Planned Readmission: no     Discharge Statement:  I spent 45 minutes discharging the patient  This time was spent on the day of discharge  I had direct contact with the patient on the day of discharge  Greater than 50% of the total time was spent examining patient, answering all patient questions, arranging and discussing plan of care with patient as well as directly providing post-discharge instructions  Additional time then spent on discharge activities  Discharge Medications:  See after visit summary for reconciled discharge medications provided to patient and/or family        **Please Note: This note may have been constructed using a voice recognition system**

## 2023-04-29 NOTE — ASSESSMENT & PLAN NOTE
Recent Labs     04/27/23 2052 04/28/23  0758 04/28/23  1154 04/28/23  1927   POCGLU 229* 163* 210* 234*     Blood Sugar Average: Last 72 hrs:  · (P) 207 [id number] Holding Januvia, metformin    Restart at discharge  · Added low-dose Lantus 6U yesterday with improved glycemic control  · Continue sliding scale coverage

## 2023-04-29 NOTE — PROGRESS NOTES
"Progress Note - Neurology   Dillon Myrick 82 y o  female MRN: [id number]  Unit/Bed#: S -01 Encounter: [id number]      Assessment/Plan     * Pachymeningitis  Assessment & Plan  82 y o  right handed female with Afib on Eliquis, pacemaker, occlusion of R carotid artery, L carotid artery stenosis, hyponatremia, idiopathic pulmonary fibrosis, diabetes, HLD, HTN, depression, osteoporosis, who presented [facility] on 4/26/23 due to abnormal MRI  Imaging  -4/26/23 CT Abdomen/Pelvis wwo contrast completed and no evidence of malignancy in the abdomen and pelvis  Juxtapleural reticular opacities and traction bronchiectasis at the lung bases, 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  -4/18/23 MRI brain wwo contrast:  \"1  Diffuse pachymeningitis with diffuse thickened abnormal dural enhancement and thickening  Differential considerations include infectious or inflammatory neoplastic etiologies as well as idiopathic  Further clinical assessment recommended  Consider lumbar puncture for further characterization  Considerations related to the patient's age and/or comorbidities may be used to alter these recommendations  2   Abnormal right internal carotid artery flow void either representing slow flow or vascular occlusion  3   No acute infarction or acute intracranial hemorrhage  ADDENDUM: Differential diagnosis could include IgG4 related disease  Further clinical assessment advised  \"  - 3/27/23 CT chest high resolution: \"No significant change since August 2022 in moderate diffuse juxtapleural reticulation and traction bronchiolectasis with mild groundglass opacity  According to the [employer] guidelines,  this may be either a probable UIP pattern of pulmonary fibrosis or NSIP  Pulmonary artery enlargement which can be seen with pulmonary hypertension  \"    Plan  - LP completed 4/28/2923  - CSF studies ordered: protein mildly elevated at 59, glucose 108, WBCs 1, RBCs 45, gram stain with no polys or bacteria noted, ME " panel negative,  - CSF studies pending: fungal culture,  AFB, VDRL, ACE, cytology, flow cytometry, Lyme PCR  - May continue on home ASA 81 mg daily and Eliquis 2 5 mg BID  - Serum labs within normal limits: ESR, PASTOR, Lyme, ACE, Quantiferon   - Serum labs pending: blood cultures  - Medical management and correction of metabolic and infectious disturbances per primary team   - Avoid CNS altering medications if possible  - Continue supportive care   - Recommend outpatient neurology follow-up in 2-3 weeks  Paroxysmal atrial fibrillation (HCC)  Assessment & Plan  - [id number] Score 6  - Continue on Eliquis 2 5 mg BID   - Continue home metoprolol  - Management per Primary team    [facility]  Assessment & Plan  Lab Results   Component Value Date    HGBA1C 7 8 (H) 03/03/2023       Recent Labs     04/28/23  1154 04/28/23  1927 04/29/23  0858 04/29/23  1132   POCGLU 210* 234* 68 183*     - Recommend goal euglycemia  - Continued on insulin regimen  - Management per Primary team        Doug Foreman will need follow up in in 2 weeks with general attending  She will not require outpatient neurological testing  Subjective:   Patient reports she is feeling well and hoping to be able to go home today  She denies any headache, back pain, hallucinations  She denies any new or worsening symptoms and notes she feels at her baseline       ROS:  History obtained from the patient  General ROS: negative for - chills, fatigue or fever  Psychological ROS: negative for - hallucinations  Ophthalmic ROS: negative for - blurry vision, decreased vision or double vision  Respiratory ROS: negative for - cough or shortness of breath  Cardiovascular ROS: negative for - chest pain  Gastrointestinal ROS: negative for - abdominal pain or nausea/vomiting  Musculoskeletal ROS: negative for - gait disturbance or muscular weakness  Neurological ROS: negative for - confusion, dizziness, gait disturbance, headaches, numbness/tingling, speech problems, visual changes or weakness    Vitals: Blood pressure 167/93, pulse 85, temperature 97 7 °F (36 5 °C), temperature source Oral, resp  rate 16, weight 46 9 kg (103 lb 6 3 oz), SpO2 94 %  ,Body mass index is 16 69 kg/m²      Physical Exam: /93 (BP Location: Left arm)   Pulse 85   Temp 97 7 °F (36 5 °C) (Oral)   Resp 16   Wt 46 9 kg (103 lb 6 3 oz)   SpO2 94%   BMI 16 69 kg/m²   General appearance: alert and oriented, in no acute distress  Head: Normocephalic, without obvious abnormality, atraumatic  Eyes: negative findings: lids and lashes normal, conjunctivae and sclerae normal, pupils equal, round, reactive to light and accomodation and visual fields full to confrontation  Lungs: clear to auscultation bilaterally  Heart: regular rate and rhythm  Abdomen: soft, non-tender; bowel sounds normal; no masses,  no organomegaly  Extremities: extremities normal, warm and well-perfused; no cyanosis, clubbing, or edema  Skin: Skin color, texture, turgor normal  No rashes or lesions  Neurologic: Mental status: Alert, oriented, thought content appropriate  Cranial nerves: II: visual field normal, II: pupils equal, round, reactive to light and accommodation, III,VII: ptosis no ptosis noted, III,IV,VI: extraocular muscles extra-ocular motions intact, VII: upper facial muscle function normal bilaterally, VII: lower facial muscle function normal bilaterally, XII: tongue strength normal  Sensory: Grossly intact to crude touch  Motor: Motor strength 5/5 B/L UE and LE  Coordination: finger to nose normal bilaterally    Labs  Recent Results (from the past 24 hour(s))   Protein CSF    Collection Time: 04/28/23  2:41 PM   Result Value Ref Range    Protein, CSF 52 (H) 15 - 45 mg/dL   CSF white cell count with differential    Collection Time: 04/28/23  2:41 PM   Result Value Ref Range    Appearance, CSF clear no color     Tube Number, CSF 4     WBC, CSF 1 0 - 5 /uL    Xanthochromia No No Glucose CSF    Collection Time: 04/28/23  2:41 PM   Result Value Ref Range    Glucose,  (H) 40 - 70 mg/dL   RBC count,CSF    Collection Time: 04/28/23  2:41 PM   Result Value Ref Range    RBC, CSF 45 (H) 0 - 10 uL   Gram stain CSF    Collection Time: 04/28/23  2:41 PM    Specimen: Lumbar Puncture; Cerebrospinal Fluid   Result Value Ref Range    Gram Stain Result No No Polys or Bacteria seen     Gram Stain Result No Mononuclear Cells    Meningitis/Encephalitis (ME) Panel    Collection Time: 04/28/23  2:41 PM   Result Value Ref Range    C NEOFORMANS/GATTII Not Detected Not Detected    CYTOMEGALOVIRUS Not Detected Not Detected    ENTEROVIRUS Not Detected Not Detected    E COLI K1 Not Detected Not Detected    H INFLUENZAE Not Detected Not Detected    H SIMPLEX 1 Not Detected Not Detected    H SIMPLEX 2 Not Detected Not Detected    HERPES VIRUS 6 Not Detected Not Detected    PARECHOVIRUS Not Detected Not Detected    L  MONOCYTOGENES Not Detected Not Detected    N MENINGITIDIS Not Detected Not Detected    S AGALACTIAE Not Detected Not Detected    S  PNEUMONIAE Not Detected Not Detected    V ZOSTER Not Detected Not Detected   CSF Diff    Collection Time: 04/28/23  2:41 PM   Result Value Ref Range    Total Counted 100     Lymphs % CSF 30 %    Monocytes % (CSF) 70 %   Fingerstick Glucose (POCT)    Collection Time: 04/28/23  7:27 PM   Result Value Ref Range    POC Glucose 234 (H) 65 - 140 mg/dl   APTT    Collection Time: 04/28/23  7:36 PM   Result Value Ref Range    PTT 32 23 - 37 seconds   Protime-INR    Collection Time: 04/28/23  7:36 PM   Result Value Ref Range    Protime 14 9 (H) 11 6 - 14 5 seconds    INR 1 15 0 84 - 1 19   CBC and differential    Collection Time: 04/28/23  7:36 PM   Result Value Ref Range    WBC 5 65 4 31 - 10 16 Thousand/uL    RBC 3 80 (L) 3 81 - 5 12 Million/uL    Hemoglobin 10 2 (L) 11 5 - 15 4 g/dL    Hematocrit 32 8 (L) 34 8 - 46 1 %    MCV 86 82 - 98 fL    MCH 26 8 26 8 - 34 3 pg    MCHC 31 1 (L) 31 4 - 37 4 g/dL    RDW 14 0 11 6 - 15 1 %    MPV 9 8 8 9 - 12 7 fL    Platelets 386 690 - 950 Thousands/uL    nRBC 0 /100 WBCs    Neutrophils Relative 71 43 - 75 %    Immat GRANS % 0 0 - 2 %    Lymphocytes Relative 18 14 - 44 %    Monocytes Relative 10 4 - 12 %    Eosinophils Relative 1 0 - 6 %    Basophils Relative 0 0 - 1 %    Neutrophils Absolute 3 98 1 85 - 7 62 Thousands/µL    Immature Grans Absolute 0 02 0 00 - 0 20 Thousand/uL    Lymphocytes Absolute 1 02 0 60 - 4 47 Thousands/µL    Monocytes Absolute 0 57 0 17 - 1 22 Thousand/µL    Eosinophils Absolute 0 04 0 00 - 0 61 Thousand/µL    Basophils Absolute 0 02 0 00 - 0 10 Thousands/µL   Fingerstick Glucose (POCT)    Collection Time: 04/29/23  8:58 AM   Result Value Ref Range    POC Glucose 68 65 - 140 mg/dl   Fingerstick Glucose (POCT)    Collection Time: 04/29/23 11:32 AM   Result Value Ref Range    POC Glucose 183 (H) 65 - 140 mg/dl     Imaging   No new neuro imaging available for review  VTE Prophylaxis: Eliquis    Counseling / Coordination of Care  Total time spent today 32 minutes  Greater than 50% of total time was spent with the patient and / or family counseling and / or coordination of care  A description of the counseling / coordination of care: Time spent reviewing results of CSF thus far  Protein mildly elevated but no other abnormalities noted thus far  Awaiting addition CSF results  Recommend outpatient neurology follow-up, patient expressed understanding

## 2023-04-29 NOTE — ASSESSMENT & PLAN NOTE
· Patient had an outpatient MRI of the brain due to memory loss which showed abnormal findings of pachymeningitis, prompting her outpatient team to send her in for admission  · Appreciate neurology  They attempted bedside lumbar puncture today but it was unsuccessful  LP could not be done on admission because Eliquis had to be held, underwent LP with IR 4/28  · CT of the abdomen and pelvis negative for malignancy  · ? inflammatory source  Lyme, PASTOR negative   TB pending, initial CSF analysis appears benign and without polys or bacteria on Gram stain and neg meningitis/encephalitis panel, will monitor off antibiotics at this time  · No leukocytosis, has remained afebrile  · Blood cultures negative at 48 hours  · Final CSF studies can be followed up in 2 to 3 weeks as an outpatient  · Clinically stable at this time

## 2023-04-29 NOTE — PLAN OF CARE
Problem: MOBILITY - ADULT  Goal: Maintain or return to baseline ADL function  Description: INTERVENTIONS:  -  Assess patient's ability to carry out ADLs; assess patient's baseline for ADL function and identify physical deficits which impact ability to perform ADLs (bathing, care of mouth/teeth, toileting, grooming, dressing, etc )  - Assess/evaluate cause of self-care deficits   - Assess range of motion  - Assess patient's mobility; develop plan if impaired  - Assess patient's need for assistive devices and provide as appropriate  - Encourage maximum independence but intervene and supervise when necessary  - Involve family in performance of ADLs  - Assess for home care needs following discharge   - Consider OT consult to assist with ADL evaluation and planning for discharge  - Provide patient education as appropriate  4/29/2023 1342 by Ritu Maldonado RN  Outcome: Adequate for Discharge  4/29/2023 1033 by Ritu Maldonado RN  Outcome: Progressing  Goal: Maintains/Returns to pre admission functional level  Description: INTERVENTIONS:  - Perform BMAT or MOVE assessment daily    - Set and communicate daily mobility goal to care team and patient/family/caregiver     - Collaborate with rehabilitation services on mobility goals if consulted  - Perform Range of Motion  - Reposition patient   - Dangle patient  - Stand patient   - Ambulate patient   - Out of bed to chair   - Out of bed for meals   - Out of bed for toileting  - Record patient progress and toleration of activity level   4/29/2023 1342 by Ritu Maldonado RN  Outcome: Adequate for Discharge  4/29/2023 1033 by Ritu Maldonado RN  Outcome: Progressing     Problem: SAFETY ADULT  Goal: Patient will remain free of falls  Description: INTERVENTIONS:  - Educate patient/family on patient safety including physical limitations  - Instruct patient to call for assistance with activity   - Consult OT/PT to assist with strengthening/mobility   - Keep Call bell within reach  - Keep bed low and locked with side rails adjusted as appropriate  - Keep care items and personal belongings within reach  - Initiate and maintain comfort rounds  - Make Fall Risk Sign visible to staff  - Offer Toileting every 2 Hours, in advance of need  - Initiate/Maintain alarm  - Obtain necessary fall risk management equipment:  - Apply yellow socks and bracelet for high fall risk patients  - Consider moving patient to room near nurses station  4/29/2023 1342 by Gisselle Hilario RN  Outcome: Adequate for Discharge  4/29/2023 1033 by Gisselle Hilario RN  Outcome: Progressing     Problem: RESPIRATORY - ADULT  Goal: Achieves optimal ventilation and oxygenation  Description: INTERVENTIONS:  - Assess for changes in respiratory status  - Assess for changes in mentation and behavior  - Position to facilitate oxygenation and minimize respiratory effort  - Oxygen administered by appropriate delivery if ordered  - Initiate smoking cessation education as indicated  - Encourage broncho-pulmonary hygiene including cough, deep breathe, Incentive Spirometry  - Assess the need for suctioning and aspirate as needed  - Assess and instruct to report SOB or any respiratory difficulty  - Respiratory Therapy support as indicated  4/29/2023 1342 by Gisselle Hilario RN  Outcome: Adequate for Discharge  4/29/2023 1033 by Gisselle Hilario RN  Outcome: Progressing     Problem: Prexisting or High Potential for Compromised Skin Integrity  Goal: Skin integrity is maintained or improved  Description: INTERVENTIONS:  - Identify patients at risk for skin breakdown  - Assess and monitor skin integrity  - Assess and monitor nutrition and hydration status  - Monitor labs   - Assess for incontinence   - Turn and reposition patient  - Assist with mobility/ambulation  - Relieve pressure over bony prominences  - Avoid friction and shearing  - Provide appropriate hygiene as needed including keeping skin clean and dry  - Evaluate need for skin moisturizer/barrier cream  - Collaborate with interdisciplinary team   - Patient/family teaching  - Consider wound care consult   4/29/2023 1342 by Ron Leventhal, RN  Outcome: Adequate for Discharge  4/29/2023 1033 by Ron Leventhal, RN  Outcome: Progressing

## 2023-04-29 NOTE — PLAN OF CARE
Problem: MOBILITY - ADULT  Goal: Maintain or return to baseline ADL function  Description: INTERVENTIONS:  -  Assess patient's ability to carry out ADLs; assess patient's baseline for ADL function and identify physical deficits which impact ability to perform ADLs (bathing, care of mouth/teeth, toileting, grooming, dressing, etc )  - Assess/evaluate cause of self-care deficits   - Assess range of motion  - Assess patient's mobility; develop plan if impaired  - Assess patient's need for assistive devices and provide as appropriate  - Encourage maximum independence but intervene and supervise when necessary  - Involve family in performance of ADLs  - Assess for home care needs following discharge   - Consider OT consult to assist with ADL evaluation and planning for discharge  - Provide patient education as appropriate  Outcome: Progressing  Goal: Maintains/Returns to pre admission functional level  Description: INTERVENTIONS:  - Perform BMAT or MOVE assessment daily    - Set and communicate daily mobility goal to care team and patient/family/caregiver     - Collaborate with rehabilitation services on mobility goals if consulted  - Perform Range of Motion   - Reposition patient  - Dangle patient   - Stand patient   - Ambulate patient   - Out of bed to chair  - Out of bed for meals   - Out of bed for toileting  - Record patient progress and toleration of activity level   Outcome: Progressing     Problem: SAFETY ADULT  Goal: Patient will remain free of falls  Description: INTERVENTIONS:  - Educate patient/family on patient safety including physical limitations  - Instruct patient to call for assistance with activity   - Consult OT/PT to assist with strengthening/mobility   - Keep Call bell within reach  - Keep bed low and locked with side rails adjusted as appropriate  - Keep care items and personal belongings within reach  - Initiate and maintain comfort rounds  - Make Fall Risk Sign visible to staff  - Offer Toileting every 2 Hours, in advance of need  - Initiate/Maintain alarm  - Obtain necessary fall risk management equipment:   - Apply yellow socks and bracelet for high fall risk patients  - Consider moving patient to room near nurses station  Outcome: Progressing     Problem: RESPIRATORY - ADULT  Goal: Achieves optimal ventilation and oxygenation  Description: INTERVENTIONS:  - Assess for changes in respiratory status  - Assess for changes in mentation and behavior  - Position to facilitate oxygenation and minimize respiratory effort  - Oxygen administered by appropriate delivery if ordered  - Initiate smoking cessation education as indicated  - Encourage broncho-pulmonary hygiene including cough, deep breathe, Incentive Spirometry  - Assess the need for suctioning and aspirate as needed  - Assess and instruct to report SOB or any respiratory difficulty  - Respiratory Therapy support as indicated  Outcome: Progressing     Problem: Prexisting or High Potential for Compromised Skin Integrity  Goal: Skin integrity is maintained or improved  Description: INTERVENTIONS:  - Identify patients at risk for skin breakdown  - Assess and monitor skin integrity  - Assess and monitor nutrition and hydration status  - Monitor labs   - Assess for incontinence   - Turn and reposition patient  - Assist with mobility/ambulation  - Relieve pressure over bony prominences  - Avoid friction and shearing  - Provide appropriate hygiene as needed including keeping skin clean and dry  - Evaluate need for skin moisturizer/barrier cream  - Collaborate with interdisciplinary team   - Patient/family teaching  - Consider wound care consult   Outcome: Progressing

## 2023-04-29 NOTE — ASSESSMENT & PLAN NOTE
· Continue patient on metoprolol; Eliquis initially held for LP but resumed shortly after LP performed

## 2023-04-29 NOTE — DISCHARGE INSTR - AVS FIRST PAGE
Please follow-up with neurology in 2 to 3 weeks, they should call to schedule with you  Continue all home medications as you were taking prior to hospitalization, no changes were made  Return to the hospital if you experience worsening headache, dizziness, neck stiffness, nausea or vomiting or light sensitivity

## 2023-05-01 ENCOUNTER — TELEPHONE (OUTPATIENT)
Dept: FAMILY MEDICINE CLINIC | Facility: CLINIC | Age: 88
End: 2023-05-01

## 2023-05-01 ENCOUNTER — TRANSITIONAL CARE MANAGEMENT (OUTPATIENT)
Dept: FAMILY MEDICINE CLINIC | Facility: CLINIC | Age: 88
End: 2023-05-01

## 2023-05-01 LAB
ACE SERPL-CCNC: 28 U/L (ref 14–82)
BACTERIA BLD CULT: NORMAL
BACTERIA BLD CULT: NORMAL
BACTERIA CSF CULT: NO GROWTH
FUNGUS SPEC CULT: NORMAL

## 2023-05-01 NOTE — TELEPHONE ENCOUNTER
Please have Ac take her metoprolol only 25 mg twice daily  Please have her schedule follow up for TCM this week

## 2023-05-01 NOTE — TELEPHONE ENCOUNTER
Morning Ac called said was in hospital for three days getting tests done they raised her Metoprolol to the full dosage and now she is afraid that she is going to get dizzy again would like to speak to you    Thank Major Alvarenga

## 2023-05-02 ENCOUNTER — OFFICE VISIT (OUTPATIENT)
Dept: NEPHROLOGY | Facility: CLINIC | Age: 88
End: 2023-05-02

## 2023-05-02 VITALS
DIASTOLIC BLOOD PRESSURE: 90 MMHG | BODY MASS INDEX: 16.88 KG/M2 | SYSTOLIC BLOOD PRESSURE: 132 MMHG | WEIGHT: 105 LBS | HEIGHT: 66 IN

## 2023-05-02 DIAGNOSIS — E87.1 HYPONATREMIA: ICD-10-CM

## 2023-05-02 LAB
MYCOBACTERIUM SPEC CULT: NORMAL
RHODAMINE-AURAMINE STN SPEC: NORMAL

## 2023-05-02 RX ORDER — SODIUM CHLORIDE 1 G/1
1 TABLET ORAL 2 TIMES DAILY
Qty: 270 TABLET | Refills: 1 | Status: SHIPPED | OUTPATIENT
Start: 2023-05-02

## 2023-05-02 NOTE — PATIENT INSTRUCTIONS
Thank you for coming to your visit today  As we discussed your sodium is normal  Please follow the recommendations below         Avoid nonsteroidal anti-inflammatory drugs such as Naprosyn, ibuprofen, Aleve, Advil, Celebrex, Meloxicam (Mobic) etc   You can use Tylenol as needed if you do not have any liver condition to be concerned about    Try to exercise at least 30 minutes 3 days a week to begin with with an ultimate goal of 5 days a week for at least 30 minutes  Recommend to decrease sodium chloride to 1 g twice a day  Metoprolol 50mg twice a day  If you feels dizzy and your blood pressure is low    You can decrease to 25mg (1/2 tab) twice a day     Next Visit in 4-5 months with results   If you need to see us earlier we can change the appointment for you    Jessica Young MD  Nephrology Attending

## 2023-05-02 NOTE — PROGRESS NOTES
NEPHROLOGY OUTPATIENT PROGRESS NOTE   Tana Fregoso 82 y o  female MRN: [id number]  DATE: 5/15/2023    Reason for visit:   Chief Complaint   Patient presents with   • Follow-up   • Hyponatremia        Patient Instructions   Thank you for coming to your visit today  As we discussed your sodium is normal  Please follow the recommendations below         • Avoid nonsteroidal anti-inflammatory drugs such as Naprosyn, ibuprofen, Aleve, Advil, Celebrex, Meloxicam (Mobic) etc   You can use Tylenol as needed if you do not have any liver condition to be concerned about    • Try to exercise at least 30 minutes 3 days a week to begin with with an ultimate goal of 5 days a week for at least 30 minutes  • Recommend to decrease sodium chloride to 1 g twice a day  • Metoprolol 50mg twice a day  If you feels dizzy and your blood pressure is low   You can decrease to 25mg (1/2 tab) twice a day     Next Visit in 4-5 months with results   If you need to see us earlier we can change the appointment for you    Baljinder Breaux MD  Nephrology Attending                 Nikhil Baeza was seen today for follow-up and hyponatremia  Diagnoses and all orders for this visit:    Hyponatremia  -     Basic metabolic panel; Future  -     sodium chloride 1 g tablet; Take 1 tablet (1 g total) by mouth 2 (two) times a day        Assessment/Plan:  82 y  o  woman with [facility] of HTN, HLD, dysphagia, pAffib on Eliquis with pacemaker, DM, neuropathy, ILD and  pulmonary fibrosis, exposed to chemicals in the past   CT chest revealed subpleural reticular/fiber nodular opacities, interlobular septal thickening, mild central/bibasilar bronchiectasis, possible UIP pattern   Patient presents for follow-up of hyponatremia        PLAN:     #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  • Sodium levels low since 2019   • Serum osm:280  • Urine osm: 300  • Urine Sodium: 48  • Baseline sodium 130-132mEq/L  • Current sodium: 137 mEq/L, back to "normal  • Etiology: Most likely secondary to SIADH in the settings of pulmonary disease and medication induced  • Plan:  • Continue fluid restriction 1 5 to 1 8 L a day  • Recommend to decrease sodium chloride to 1 g twice a day  • Labs in 3 weeks       #Volume status/hypertension:  Volume: Euvolemic  Blood pressure: Normotensive, /90 goal,<140/90  Recommend:  Fluid restriction as above  Metoprolol 12 5 mg twice daily  Advised to maintain a good BP control to prevent progression of CKD      #DM  HbA1c 7 8  Advised to maintain a good DM control to prevent progression of CKD   Maintain healthy diet (vegetables, fruits, whole grains, nonfat or low fat)  Weight loss  Physical activity (5 to 10 minutes to start the increase to 30 min a day)  • Metformin 500 mg  • Januvia 100 mg    #Acid-base disorder  · Serum bicarb 35 mmol/L  · Likely secondary to poor fluid intake due to fluid restriction    #Leukocyturia   · no evidence of infection  · No bacteria      SUBJECTIVE / INTERVAL HISTORY:  82 y o  female presents in follow up of hyponatremia  Patient feeling well no shortness of breath, no chest pain, no dizziness  Rolo Luna denies any recent illness/hospitalizations/medication changes since last office visit  Review of Systems   Constitutional: Negative for activity change and appetite change  HENT: Negative for congestion  Eyes: Negative for discharge  Respiratory: Negative for shortness of breath and stridor  Cardiovascular: Negative for chest pain, palpitations and leg swelling  Gastrointestinal: Negative for abdominal pain  Endocrine: Negative for cold intolerance  Genitourinary: Negative for dysuria  Musculoskeletal: Negative for arthralgias  Skin: Negative for pallor and rash  Neurological: Negative for dizziness  Psychiatric/Behavioral: Negative for agitation         OBJECTIVE:  /90 (BP Location: Left arm, Patient Position: Sitting, Cuff Size: Adult)   Ht 5' 6\" " (1 676 m)   Wt 47 6 kg (105 lb)   BMI 16 95 kg/m²  Body mass index is 16 95 kg/m²      Physical exam:  Physical Exam   General:  , no acute distress at this time  Skin:  No acute rash  Eyes:  No scleral icterus and noninjected  ENT:  mucous membranes moist  Neck:  no carotid bruits  Chest:  Clear to auscultation percussion, good respiratory effort, no use of accessory respiratory muscles  CVS:  Regular rate and rhythm without rub  Abdomen:  soft and nontender   Extremities:  no significant lower extremity edema  Neuro:  No gross focality  Psych:  Alert , cooperative       Medications:    Current Outpatient Medications:   •  acetaminophen (TYLENOL) 500 mg tablet, Take 1,000 mg by mouth as needed for mild pain, Disp: , Rfl:   •  Alphagan P 0 1 %, INSTILL 1 DROP RIGHT EYE TWICE A DAY, Disp: , Rfl:   •  amoxicillin (AMOXIL) 500 mg capsule, TAKE 4 CAPSULES 1 HR PRIOR TO DENTAL APPT, Disp: 4 capsule, Rfl: 2  •  aspirin (ECOTRIN LOW STRENGTH) 81 mg EC tablet, Take 1 tablet (81 mg total) by mouth daily, Disp:  , Rfl:   •  bimatoprost (LUMIGAN) 0 01 % ophthalmic drops, Administer 1 drop to both eyes daily at bedtime for 30 days, Disp: 1 5 mL, Rfl: 0  •  calcium carbonate (OS-DANIA) 600 MG tablet, Take 600 mg by mouth 2 (two) times a day with meals  , Disp: , Rfl:   •  Eliquis 2 5 MG, TAKE 1 TABLET (2 5 MG TOTAL) BY MOUTH 2 (TWO) TIMES A DAY, Disp: 60 tablet, Rfl: 5  •  ezetimibe-simvastatin (VYTORIN) 10-40 mg per tablet, TAKE 1 TABLET BY MOUTH DAILY AT BEDTIME, Disp: 30 tablet, Rfl: 5  •  famotidine (PEPCID) 20 mg tablet, TAKE 1 TABLET (20 MG TOTAL) BY MOUTH 2 (TWO) TIMES A DAY, Disp: 60 tablet, Rfl: 5  •  glucose blood (OneTouch Ultra) test strip, TEST FOUR TIMES A DAY, Disp: 400 strip, Rfl: 1  •  ipratropium (ATROVENT) 0 03 % nasal spray, USE 2 SPRAYS INTO EACH NOSTRIL EVERY 12 (TWELVE) HOURS, Disp: 30 mL, Rfl: 6  •  Januvia 100 MG tablet, TAKE 1 TABLET (100 MG TOTAL) BY MOUTH DAILY, Disp: 30 tablet, Rfl: 5  •  loratadine "(CLARITIN) 10 mg tablet, Take 10 mg by mouth daily as needed for allergies, Disp: , Rfl:   •  LORazepam (ATIVAN) 0 5 mg tablet, TAKE 2 TABLETS (1 MG TOTAL) BY MOUTH DAILY AT BEDTIME, Disp: 60 tablet, Rfl: 1  •  metFORMIN (GLUCOPHAGE) 500 mg tablet, TAKE 2 TABLETS (1,000 MG TOTAL) BY MOUTH 2 (TWO) TIMES A DAY WITH MEALS, Disp: 120 tablet, Rfl: 5  •  Motegrity 2 MG TABS, TAKE 2 MG BY MOUTH DAILY, Disp: 90 tablet, Rfl: 3  •  Multiple Vitamin (multivitamin) tablet, Take 1 tablet by mouth daily  , Disp: , Rfl:   •  sodium chloride 1 g tablet, Take 1 tablet (1 g total) by mouth 2 (two) times a day, Disp: 270 tablet, Rfl: 1  •  metoprolol tartrate (LOPRESSOR) 25 mg tablet, Take 1 tablet (25 mg total) by mouth every 12 (twelve) hours, Disp: 180 tablet, Rfl: 0  •  pantoprazole (PROTONIX) 20 mg tablet, Take 1 tablet (20 mg total) by mouth 2 (two) times a day, Disp: 60 tablet, Rfl: 2    Allergies: Allergies as of 05/02/2023 - Reviewed 05/02/2023   Allergen Reaction Noted   • Keflex [cephalexin] Diarrhea 11/04/2020       The following portions of the patient's history were reviewed and updated as appropriate: past family history, past surgical history and problem list     Laboratory Results:  Lab Results   Component Value Date    SODIUM 137 04/27/2023    K 3 9 04/27/2023    CL 97 04/27/2023    CO2 35 (H) 04/27/2023    BUN 10 04/27/2023    CREATININE 0 42 (L) 04/27/2023    GLUC 158 (H) 04/27/2023    CALCIUM 8 6 04/27/2023        Lab Results   Component Value Date    CALCIUM 8 6 04/27/2023       Portions of the record may have been created with voice recognition software  Occasional wrong word or \"sound a like\" substitutions may have occurred due to the inherent limitations of voice recognition software  Read the chart carefully and recognize, using context, where substitutions have occurred      "

## 2023-05-02 NOTE — TELEPHONE ENCOUNTER
Patient now needs hospital follow up with general attending in 2 weeks  No availability with attendings currently  Is this appropriate for an AP or CRNP as that can be done currently?

## 2023-05-04 LAB — B BURGDOR DNA SPEC QL NAA+PROBE: NEGATIVE

## 2023-05-05 ENCOUNTER — OFFICE VISIT (OUTPATIENT)
Dept: FAMILY MEDICINE CLINIC | Facility: CLINIC | Age: 88
End: 2023-05-05

## 2023-05-05 VITALS
BODY MASS INDEX: 17.52 KG/M2 | HEART RATE: 71 BPM | OXYGEN SATURATION: 100 % | DIASTOLIC BLOOD PRESSURE: 80 MMHG | SYSTOLIC BLOOD PRESSURE: 150 MMHG | WEIGHT: 109 LBS | TEMPERATURE: 98 F | HEIGHT: 66 IN | RESPIRATION RATE: 16 BRPM

## 2023-05-05 DIAGNOSIS — E11.3552 TYPE 2 DIABETES MELLITUS WITH STABLE PROLIFERATIVE RETINOPATHY OF LEFT EYE, WITHOUT LONG-TERM CURRENT USE OF INSULIN (HCC): ICD-10-CM

## 2023-05-05 DIAGNOSIS — I10 HYPERTENSION, UNSPECIFIED TYPE: ICD-10-CM

## 2023-05-05 DIAGNOSIS — G03.9 PACHYMENINGITIS: ICD-10-CM

## 2023-05-05 DIAGNOSIS — Z09 HOSPITAL DISCHARGE FOLLOW-UP: Primary | ICD-10-CM

## 2023-05-05 DIAGNOSIS — R41.82 ALTERED MENTAL STATUS, UNSPECIFIED ALTERED MENTAL STATUS TYPE: ICD-10-CM

## 2023-05-05 NOTE — PROGRESS NOTES
FAMILY PRACTICE OFFICE VISIT       NAME: Jose Kay  AGE: 82 y o  SEX: female       : 1935        MRN: [id number]    Assessment and Plan   1  Hospital discharge follow-up  She was admitted to the hospital  through  for mental status change and pachymeningitis  2  Hypertension, unspecified type  BP is stable  Dischrged home on metoprolol 75 mg twice daily  She was on this dose prior and it was too much, causing dizziness  Will go back to her dose prior to hospitalization 25 mg twice daily  Will monitor   -     metoprolol tartrate (LOPRESSOR) 25 mg tablet; Take 1 tablet (25 mg total) by mouth every 12 (twelve) hours    3  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, without long-term current use of insulin ([facility])  Refill test strips today  A1c stable at 7 8%, acceptable for age and level of frailty  -     Glucometer test strips    4  Pachymeningitis  Pachymeningitis noted on MRI brain completed 23  Work up looking to determine cause completed during hospitalization  CXR  stable, no acute pulmonary disease  CT abdomen and pelvis w/wo contrast negative for malignancy  Lyme, PASTOR, ESR, ACE, blood cultures, quantiferon gold, CSF testing all negative  Neurology follow up needed, she is trying to schedule, she will let me know early next week if she is still not able to obtain an appointment yet  5  Altered mental status, unspecified altered mental status type  Prior to hospitalization she was having difficulty with memory and simple things like using a telephone  This has improved  Follow up in 3 months or sooner if needed  Medication reconciliation completed  Chief Complaint     Chief Complaint   Patient presents with   • Transition of Care Management       History of Present Illness     Jose Kay is an 82year old female presenting today for follow up after hospitalization       She was admitted to the hospital  through 4/29 for mental status change and pachymeningitis  Work up including LP was completed, with no significant findings in regard to cause of pachymeningitis  She is feeling at baseline  Memory has improved  No double vision  Blurry vision, but no double vision  No headaches  Has not been able to schedule neurology follow up appointment yet  She called neurology to schedule appointment, was told they would call her back, but she has not heard back yet  She will call again  And let me know if she is not able to get an appointment scheduled by early next week  TCM Call     Date and time call was made  5/1/2023 10:16 AM    Hospital care reviewed  Records reviewed    Patient was hospitialized at  [address] [place] [address]    Date of Admission  04/26/23    Date of discharge  04/29/23    Diagnosis  Pachymeningitis    Disposition  Home    Were the patients medications reviewed and updated  No    Current Symptoms  None      TCM Call     Post hospital issues  None    Should patient be enrolled in anticoag monitoring? No    Scheduled for follow up? Yes    Did you obtain your prescribed medications  Yes    Do you need help managing your prescriptions or medications  No    Is transportation to your appointment needed  No    I have advised the patient to call PCP with any new or worsening symptoms  Yoselin Richardson MA    Living Arrangements  Alone    Are you recieving any outpatient services  No    Are you recieving home care services  Yes    Types of home care services  Nurse visit    Interperter language line needed  No    Counseling  Patient    Comments  [facility] Scheduled for 5/5/23 @ 11:30 with Kimberly FERMIN          Review of Systems   Review of Systems   Constitutional: Positive for fatigue  HENT: Negative  Eyes: Positive for visual disturbance  Respiratory: Positive for shortness of breath  Cardiovascular: Negative  Gastrointestinal: Negative  Genitourinary: Negative  "  Musculoskeletal: Negative  Skin: Negative  Neurological: Negative  Negative for headaches  Hematological: Negative  Psychiatric/Behavioral: Negative  Negative for confusion  I have reviewed the patient's medical history in detail; there are no changes to the history as noted in the electronic medical record  Objective     Vitals:    05/05/23 1125   BP: 150/80   BP Location: Left arm   Patient Position: Sitting   Cuff Size: Standard   Pulse: 71   Resp: 16   Temp: 98 °F (36 7 °C)   TempSrc: Temporal   SpO2: 100%   Weight: 49 4 kg (109 lb)   Height: 5' 6\" (1 676 m)     Wt Readings from Last 3 Encounters:   05/05/23 49 4 kg (109 lb)   05/02/23 47 6 kg (105 lb)   04/29/23 46 9 kg (103 lb 6 3 oz)     Physical Exam  Vitals and nursing note reviewed  Constitutional:       General: She is not in acute distress  Appearance: Normal appearance  She is ill-appearing  Comments: Frail, chronically ill appearing  Cachetic  HENT:      Head: Atraumatic  Right Ear: Tympanic membrane normal       Left Ear: Tympanic membrane normal       Mouth/Throat:      Mouth: Mucous membranes are moist       Pharynx: Oropharynx is clear  Eyes:      Extraocular Movements: Extraocular movements intact  Conjunctiva/sclera: Conjunctivae normal       Pupils: Pupils are equal, round, and reactive to light  Cardiovascular:      Rate and Rhythm: Normal rate and regular rhythm  Heart sounds: No murmur heard  Pulmonary:      Effort: Pulmonary effort is normal       Breath sounds: Normal breath sounds  Musculoskeletal:      Cervical back: Normal range of motion and neck supple  Right lower leg: No edema  Left lower leg: No edema  Lymphadenopathy:      Cervical: No cervical adenopathy  Skin:     General: Skin is warm and dry  Findings: No rash  Neurological:      Mental Status: She is alert  Cranial Nerves: No cranial nerve deficit  Gait: Gait abnormal (rollator walker)   " Psychiatric:         Mood and Affect: Mood normal             ALLERGIES:  Allergies   Allergen Reactions   • Keflex [Cephalexin] Diarrhea       Current Medications     Current Outpatient Medications   Medication Sig Dispense Refill   • acetaminophen (TYLENOL) 500 mg tablet Take 1,000 mg by mouth as needed for mild pain     • Alphagan P 0 1 % INSTILL 1 DROP RIGHT EYE TWICE A DAY     • amoxicillin (AMOXIL) 500 mg capsule TAKE 4 CAPSULES 1 HR PRIOR TO DENTAL APPT 4 capsule 2   • aspirin (ECOTRIN LOW STRENGTH) 81 mg EC tablet Take 1 tablet (81 mg total) by mouth daily     • bimatoprost (LUMIGAN) 0 01 % ophthalmic drops Administer 1 drop to both eyes daily at bedtime for 30 days 1 5 mL 0   • calcium carbonate (OS-DANIA) 600 MG tablet Take 600 mg by mouth 2 (two) times a day with meals       • Eliquis 2 5 MG TAKE 1 TABLET (2 5 MG TOTAL) BY MOUTH 2 (TWO) TIMES A DAY 60 tablet 5   • ezetimibe-simvastatin (VYTORIN) 10-40 mg per tablet TAKE 1 TABLET BY MOUTH DAILY AT BEDTIME 30 tablet 5   • famotidine (PEPCID) 20 mg tablet TAKE 1 TABLET (20 MG TOTAL) BY MOUTH 2 (TWO) TIMES A DAY 60 tablet 5   • glucose blood (OneTouch Ultra) test strip TEST FOUR TIMES A  strip 1   • ipratropium (ATROVENT) 0 03 % nasal spray USE 2 SPRAYS INTO EACH NOSTRIL EVERY 12 (TWELVE) HOURS 30 mL 6   • Januvia 100 MG tablet TAKE 1 TABLET (100 MG TOTAL) BY MOUTH DAILY 30 tablet 5   • loratadine (CLARITIN) 10 mg tablet Take 10 mg by mouth daily as needed for allergies     • LORazepam (ATIVAN) 0 5 mg tablet TAKE 2 TABLETS (1 MG TOTAL) BY MOUTH DAILY AT BEDTIME 60 tablet 1   • metFORMIN (GLUCOPHAGE) 500 mg tablet TAKE 2 TABLETS (1,000 MG TOTAL) BY MOUTH 2 (TWO) TIMES A DAY WITH MEALS 120 tablet 5   • metoprolol tartrate (LOPRESSOR) 25 mg tablet Take 1 tablet (25 mg total) by mouth every 12 (twelve) hours 180 tablet 0   • Motegrity 2 MG TABS TAKE 2 MG BY MOUTH DAILY 90 tablet 3   • Multiple Vitamin (multivitamin) tablet Take 1 tablet by mouth daily • pantoprazole (PROTONIX) 20 mg tablet Take 1 tablet (20 mg total) by mouth 2 (two) times a day 60 tablet 2   • sodium chloride 1 g tablet Take 1 tablet (1 g total) by mouth 2 (two) times a day 270 tablet 1     No current facility-administered medications for this visit           Health Maintenance     Health Maintenance   Topic Date Due   • COVID-19 Vaccine (5 - Booster for Pfizer series) 11/03/2022   • BMI: Followup Plan  10/07/2023   • Depression Remission PHQ  08/03/2023   • HEMOGLOBIN A1C  09/03/2023   • Fall Risk  09/27/2023   • Urinary Incontinence Screening  09/27/2023   • Medicare Annual Wellness Visit (AWV)  09/27/2023   • DM Eye Exam  12/08/2023   • Diabetic Foot Exam  01/11/2024   • BMI: Adult  05/05/2024   • Colorectal Cancer Screening  09/02/2025   • Hepatitis C Screening  Completed   • Osteoporosis Screening  Completed   • Pneumococcal Vaccine: 65+ Years  Completed   • Influenza Vaccine  Completed   • HIB Vaccine  Aged Out   • IPV Vaccine  Aged Out   • Hepatitis A Vaccine  Aged Out   • Meningococcal ACWY Vaccine  Aged Out   • HPV Vaccine  Aged Out     Immunization History   Administered Date(s) Administered   • COVID-19 PFIZER VACCINE 0 3 ML IM 01/22/2021, 02/12/2021, 09/30/2021, 09/08/2022   • INFLUENZA 11/02/2016, 10/16/2017, 09/18/2018, 09/18/2018   • Influenza, Quadrivalent (nasal) 11/02/2016   • Influenza, high dose seasonal 0 7 mL 11/06/2019, 10/06/2020, 11/09/2021, 11/15/2022   • Pneumococcal Conjugate 13-Valent 07/25/2019   • Pneumococcal Polysaccharide PPV23 03/17/2003   • Zoster 07/09/2010       ZANDER Moffett

## 2023-05-08 ENCOUNTER — TELEPHONE (OUTPATIENT)
Dept: NEUROLOGY | Facility: CLINIC | Age: 88
End: 2023-05-08

## 2023-05-08 ENCOUNTER — TELEPHONE (OUTPATIENT)
Dept: FAMILY MEDICINE CLINIC | Facility: CLINIC | Age: 88
End: 2023-05-08

## 2023-05-08 ENCOUNTER — REMOTE DEVICE CLINIC VISIT (OUTPATIENT)
Dept: CARDIOLOGY CLINIC | Facility: CLINIC | Age: 88
End: 2023-05-08

## 2023-05-08 DIAGNOSIS — Z95.0 PACEMAKER: Primary | ICD-10-CM

## 2023-05-08 LAB — FUNGUS SPEC CULT: NORMAL

## 2023-05-08 NOTE — TELEPHONE ENCOUNTER
Franogine should be taking Metoprolol 1 tablet (25 mg tablet) twice daily--once in the morning and once in the evening dosing  I will have Oksana call to schedule neurology appointment for her tomorrow

## 2023-05-08 NOTE — TELEPHONE ENCOUNTER
Spoke with pt, NP instructions given for metoprolol  Pt verbalizes understanding  Call placed to [facility] ([facility] Neurology, pt is on a list for call back to schedule for PA to see her as a hospital f/u  They will contacting her directly to schedule this appt  Pt aware they will be contacting her directly

## 2023-05-08 NOTE — TELEPHONE ENCOUNTER
Zafar Shen called would like to know if u can call [facility] for her she is said she is having a hard time getting a appointment also she would like to know about her metoprolol tartrate 25 mg says take two a day 1 and then another 12 hrs later she said she has not been doing this she doesn't wait 12 hrs    Jenna Cramer

## 2023-05-08 NOTE — PROGRESS NOTES
MDT-DUAL CHAMBER PPM (AAIR-DDDR MODE)/ ACTIVE SYSTEM IS MRI CONDITIONAL   NON-BILLABLE; CARELINK TRANSMISSION - 1 MO  RV IMP/THRESHOLD PER SN: BATTERY VOLTAGE ADEQUATE (3 5 YRS)  AP 66 7%  0 1% (AAIR-DDDR 60PPM); RV LEAD IMPEDANCE STABLE BY TRENDING IN BOTH UNI/BIPOLAR CONFIGURATION; RV CAPTURE THRESHOLD STABLE BY TRENDING; ALL OTHER AVAILABLE LEAD PARAMETERS WITHIN NORMAL LIMITS/STABLE; NO SIGNIFICANT HIGH RATE EPISODES   NORMAL DEVICE FUNCTION W/STABLE RV LEAD FUNCTION   ES

## 2023-05-08 NOTE — TELEPHONE ENCOUNTER
Pt is now scheduled with Dr Packer 11/21/2023, 11am, as patient stated she can only go to the [place] office which is the closest     [facility]  HFU/SLANDERSON/PACHYMENINGITIS    DISCHARGED ON 04/29/2023  QUETA Friedman  Cc: David Munoz; Deborah Carlson  Good morning,   I spoke with Dr Aisha Gilliland and okay for 4 week follow-up but it needs to be with an attending     Muna Vanegas           Previous Messages

## 2023-05-09 NOTE — TELEPHONE ENCOUNTER
As pt has to be seen in 4 weeks pt has been re-scheduled to 05/16/2023 at 2:30 pm and pt has accepted      Thank you,     Africa Erazo

## 2023-05-09 NOTE — TELEPHONE ENCOUNTER
Stephon Keep, I have 7/7 @ 2:00 on hold to move the pt up quite a few months  I am keeping an eye out for a much sooner appt  If you want me to call the Pt I can

## 2023-05-12 ENCOUNTER — PATIENT OUTREACH (OUTPATIENT)
Dept: FAMILY MEDICINE CLINIC | Facility: CLINIC | Age: 88
End: 2023-05-12

## 2023-05-12 NOTE — PROGRESS NOTES
Spoke with Shelbie perez , reports she is doing better  Was in the hospital and they found she had a form of meningitis to see neurology next week  Does not wish to do pill packing at this time  Feels things are improving    No other needs at this time

## 2023-05-16 ENCOUNTER — OFFICE VISIT (OUTPATIENT)
Dept: NEUROLOGY | Facility: CLINIC | Age: 88
End: 2023-05-16
Payer: MEDICARE

## 2023-05-16 VITALS
SYSTOLIC BLOOD PRESSURE: 130 MMHG | DIASTOLIC BLOOD PRESSURE: 84 MMHG | HEIGHT: 66 IN | WEIGHT: 109 LBS | HEART RATE: 66 BPM | BODY MASS INDEX: 17.52 KG/M2

## 2023-05-16 DIAGNOSIS — G03.9 PACHYMENINGITIS: ICD-10-CM

## 2023-05-16 DIAGNOSIS — R41.3 MEMORY LOSS: ICD-10-CM

## 2023-05-16 DIAGNOSIS — G31.84 MILD COGNITIVE IMPAIRMENT: Primary | ICD-10-CM

## 2023-05-16 LAB
MYCOBACTERIUM SPEC CULT: NORMAL
RHODAMINE-AURAMINE STN SPEC: NORMAL

## 2023-05-16 PROCEDURE — 99205 OFFICE O/P NEW HI 60 MIN: CPT | Performed by: STUDENT IN AN ORGANIZED HEALTH CARE EDUCATION/TRAINING PROGRAM

## 2023-05-16 NOTE — PROGRESS NOTES
"  [facility] Neurology Consult  PATIENT:  Ac Duran  MRN:  [id number]  :  1935  DATE OF SERVICE:  2023  REFERRED BY: Kimberly Gonsales CRNP  PMD: ZANDER Swenson    Assessment/Plan:     Ac Duran is a very pleasant  87 y.o. female with a past medical history that includes     Hx pachymeningitis  Unclear etiology at this time. LP results have been unremarkable - CSF mildly elevated protein noted, ME panel negative, Gram stain negative, RBCs 45 were noted with 1 WBC of 1, flow cytometry neg, cultures negative. Negative quant, normal Lyme antibody profile, normal PASTOR normal ACE and sed rate. Since her hospitalization, she has not had any additional cognitive concerns and has remained at her neurologic baseline. No further neurologic workup recommended at this time. However, if she endorses cognitive complaints in the future, would f/u for formal cognitive evaluation.     CC:   HFU    History of Present Illness:     88 yo right handed female with Afib on Eliquis, pacemaker, occlusion of R carotid artery, L carotid artery stenosis, hyponatremia, idiopathic pulmonary fibrosis, diabetes, HLD, HTN, depression, osteoporosis, who presented to [facility] on 23 due to abnormal MRI. She presents today for HFU.     Pt was ordered an MRI as an oupatient due to concerns of memory loss. Outpatient MRI brain wwo contrast was performed on 23. Patient's PCP contacted patient on 2023 and reviewed MRI results showing pachymeningitis.   Patient reported to PCP that at times she is able to use the phone room but does not know how to answer.  Patient reported that the other night she was having tingling in her arm and dizziness and tried to call 911 but was unable to get through because she did not want to use her phone properly.  Patient also reported forgetting to take medications to PCP.  Patient reported \"aching behind her eyes\" and that she has had a \"mild headache\" for about 3 weeks.  Patient " "reported to PCP that she prefer to go to ED for evaluation rather than waiting for neurologist to review imaging; thus, patient presented to [facility] ED on 4/26/23 for further work-up.     Patient reports she had an outpatient MRI ordered for her due to several weeks of memory issues.  Patient reports several weeks ago (exact timing unclear) she was forget small things around her house but became more concerned when this evolved into being unable to do multiple step math problems (adding a bill) and when she forgot to pay her rent check.  Patient reports around the same time, she started with a very mild headache that \"I hardly realize is there.\"   Patient reported that she was on a 12-week course of steroids that ended the week of 12/25/2022 for breathing issues related to her pulmonary fibrosis.    Patient reports she lives alone in apartment in [facility].  Patient reports she is able to handle all of her own ADLs.  Patient reports reports that she previously performed all of IADLs independently, but this has been becoming more of a barrier due to her forgetfulness.  Patient reports she ambulates with a rollator at baseline due to poor endurance from pulmonary fibrosis and imbalanced gait chronically.  Patient does not drive. LP performed - CSF mildly elevated protein noted, ME panel negative, Gram stain negative, RBCs 45 were noted with 1 WBC of 1, flow cytometry neg, cultures negative.         Past Medical History:     Past Medical History:   Diagnosis Date    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/7/2020    Glaucoma     H/O degenerative disc disease     Idiopathic pulmonary fibrosis (HCC) 11/2020    Irregular heart beat     afib    Peripheral neuropathy     S/P laparoscopic cholecystectomy 12/21/2020    Stenosis of right subclavian artery (HCC) 11/18/2016       Patient Active Problem List   Diagnosis    HTN (hypertension)    Mixed hyperlipidemia    Glaucoma    Asymptomatic carotid artery stenosis, left    " Symptomatic PVCs    Occlusion of right carotid artery    Paroxysmal atrial fibrillation ([facility])    Pacemaker    Osteoporosis    GERD (gastroesophageal reflux disease)    Anxiety    Iron deficiency    Interstitial lung disease ([facility])    Dysphagia    Severe protein-calorie malnutrition ([facility])    Constipation    Hyponatremia    Post-nasal drip    DM2 (diabetes mellitus, type 2) ([facility])    Type 2 diabetes mellitus with hyperlipidemia ([facility])    Type 2 diabetes mellitus with diabetic polyneuropathy, without long-term current use of insulin ([facility])    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, without long-term current use of insulin ([facility])    Depression, recurrent ([facility])    Hypovitaminosis D    Pachymeningitis    Bilateral hip pain       Medications:      Current Outpatient Medications   Medication Sig Dispense Refill    acetaminophen (TYLENOL) 500 mg tablet Take 1,000 mg by mouth as needed for mild pain      Alphagan P 0.1 % INSTILL 1 DROP RIGHT EYE TWICE A DAY      aspirin (ECOTRIN LOW STRENGTH) 81 mg EC tablet Take 1 tablet (81 mg total) by mouth daily      bimatoprost (LUMIGAN) 0.01 % ophthalmic drops Administer 1 drop to both eyes daily at bedtime for 30 days 1.5 mL 0    calcium carbonate (OS-DANIA) 600 MG tablet Take 600 mg by mouth 2 (two) times a day with meals        Eliquis 2.5 MG TAKE 1 TABLET (2.5 MG TOTAL) BY MOUTH 2 (TWO) TIMES A DAY 60 tablet 5    ezetimibe-simvastatin (VYTORIN) 10-40 mg per tablet TAKE 1 TABLET BY MOUTH DAILY AT BEDTIME 30 tablet 5    famotidine (PEPCID) 20 mg tablet TAKE 1 TABLET (20 MG TOTAL) BY MOUTH 2 (TWO) TIMES A DAY 60 tablet 5    glucose blood (OneTouch Ultra) test strip TEST FOUR TIMES A  strip 1    ipratropium (ATROVENT) 0.03 % nasal spray USE 2 SPRAYS INTO EACH NOSTRIL EVERY 12 (TWELVE) HOURS 30 mL 6    Januvia 100 MG tablet TAKE 1 TABLET (100 MG TOTAL) BY MOUTH DAILY 30 tablet 5    loratadine (CLARITIN) 10 mg tablet Take 10 mg by mouth daily as needed for allergies       LORazepam (ATIVAN) 0.5 mg tablet TAKE 2 TABLETS (1 MG TOTAL) BY MOUTH DAILY AT BEDTIME 60 tablet 1    metFORMIN (GLUCOPHAGE) 500 mg tablet TAKE 2 TABLETS (1,000 MG TOTAL) BY MOUTH 2 (TWO) TIMES A DAY WITH MEALS 120 tablet 5    metoprolol tartrate (LOPRESSOR) 25 mg tablet Take 1 tablet (25 mg total) by mouth every 12 (twelve) hours 180 tablet 0    Motegrity 2 MG TABS TAKE 2 MG BY MOUTH DAILY 90 tablet 3    Multiple Vitamin (multivitamin) tablet Take 1 tablet by mouth daily        pantoprazole (PROTONIX) 20 mg tablet Take 1 tablet (20 mg total) by mouth 2 (two) times a day 60 tablet 2    sodium chloride 1 g tablet Take 1 tablet (1 g total) by mouth 2 (two) times a day 270 tablet 1    amoxicillin (AMOXIL) 500 mg capsule TAKE 4 CAPSULES 1 HR PRIOR TO DENTAL APPT (Patient not taking: Reported on 2023) 4 capsule 2     No current facility-administered medications for this visit.        Allergies:      Allergies   Allergen Reactions    Keflex [Cephalexin] Diarrhea       Family History:     Family History   Problem Relation Age of Onset    Heart disease Mother     Heart attack Father     Hiatal hernia Father     Diabetes Father     Cancer Brother     Diabetes Brother     Heart disease Brother     Hypertension Brother     Other Son         gastroparesis       Social History:       Social History     Socioeconomic History    Marital status:      Spouse name: Not on file    Number of children: Not on file    Years of education: Not on file    Highest education level: Not on file   Occupational History    Occupation: customer service   retired   Tobacco Use    Smoking status: Former     Packs/day: 1.50     Years: 38.00     Pack years: 57.00     Types: Cigarettes     Start date:      Quit date:      Years since quittin.3    Smokeless tobacco: Never   Vaping Use    Vaping Use: Never used   Substance and Sexual Activity    Alcohol use: No    Drug use: No    Sexual activity: Not on file   Other Topics  "Concern    Not on file   Social History Narrative    Lives alone [facility].    Was divorce.  Ex- passed away.    3 children.  Four grandchildren.    Spends most of her time at home.  Does not drive.     Social Determinants of Health     Financial Resource Strain: Low Risk     Difficulty of Paying Living Expenses: Not hard at all   Food Insecurity: Not on file   Transportation Needs: No Transportation Needs    Lack of Transportation (Medical): No    Lack of Transportation (Non-Medical): No   Physical Activity: Not on file   Stress: Not on file   Social Connections: Not on file   Intimate Partner Violence: Not on file   Housing Stability: Not on file         Objective:   /84 (BP Location: Left arm, Patient Position: Sitting, Cuff Size: Large)   Pulse 66   Ht 5' 6\" (1.676 m)   Wt 49.4 kg (109 lb)   BMI 17.59 kg/m²     General: Patient is not in any acute/apparent distress, well nourished, well developed and cooperative.   HEENT: normocephalic, atraumatic, moist membranes  Neck: supple  Heart: regular heart rate and rhythm, no murmurs, rubs and or gallops.   Chest: Clear to auscultation bilaterally  Abdomen: soft and non-tender.   Extremities: no edema noted   Skin: no lesions or rash  Musculosketal: no bony abnormalities    Neurologic Examination:   Mental status: alert, awake, oriented X 3 and following commands.     Speech/Language: Speech is fluent without any dysarthria, no aphasia noted, can name, repeat, read and write and comprehension intact    Cranial Nerves:   CN I: smell not tested  CN II: Visual fields full to confrontation, fundus - no papilledema noted.  CN III, IV, VI: Extraocular movements intact bilaterally. Pupils equal round and reactive to light bilaterally.  CN V: Facial sensation is normal.  CN VII: Full and symmetric facial movement.  CN VIII: Hearing is normal.  CN IX, X: Palate elevates symmetrically.   CN XI: Shoulder shrug strength is normal.  CN XII: Tongue midline " without atrophy or fasciculations.    Motor:   Strength 5/5 in all 4 extremities. No pronator drift. Normal rapid alternating movements. Bulk/tone - normal.  Fasiculations - none    Sensory:   Sensation intact to soft touch, vibration and pinprick in all 4 extremities.    Cerebellar:   Finger-to-nose intact, normal heel to shin.    Reflexes: 2+ in all 4 extremities  Pathologic reflexes - babinski reflex negative, Hoffmans reflex - negative    Gait:   Normal gait       Review of Systems:       ROS:    Review of Systems   Constitutional: Negative.  Negative for appetite change and fever.   HENT: Negative.  Negative for hearing loss, tinnitus, trouble swallowing and voice change.    Eyes: Positive for visual disturbance. Negative for photophobia and pain.        Patient states blurred vision.    Respiratory: Negative.  Negative for shortness of breath.    Cardiovascular: Negative.  Negative for palpitations.   Gastrointestinal: Negative.  Negative for nausea and vomiting.   Endocrine: Negative.  Negative for cold intolerance.   Genitourinary: Negative.  Negative for dysuria, frequency and urgency.   Musculoskeletal: Positive for gait problem. Negative for myalgias and neck pain.        Patient uses walker, duo poor gait.    Skin: Negative.  Negative for rash.   Allergic/Immunologic: Negative.    Neurological: Negative for dizziness, tremors, seizures, syncope, facial asymmetry, speech difficulty, weakness, light-headedness, numbness and headaches.        Patient states neuropathy in her L hand and feet.    Hematological: Negative.  Does not bruise/bleed easily.   Psychiatric/Behavioral: Negative for hallucinations and sleep disturbance.        Patient states extreme fatigue.      I have spent a total time of 60 minutes in caring for this patient on the day of the visit/encounter including Risks and benefits of tx options, Instructions for management, Patient and family education, Risk factor reductions, Impressions,  Documenting in the medical record, Reviewing/placing orders in the medical record (including tests, medications, and/or procedures), and Obtaining or reviewing history  .

## 2023-05-16 NOTE — PATIENT INSTRUCTIONS
Patient Instructions:  -we will defer repeat LP for further testing at this time. If your cognitive symptoms worsen in the future would recommend repeat LP at that time  -physical therapy referral  -please have your B12 level checked  -follow up in about 6 months

## 2023-05-22 LAB — FUNGUS SPEC CULT: NORMAL

## 2023-05-23 ENCOUNTER — TELEPHONE (OUTPATIENT)
Dept: FAMILY MEDICINE CLINIC | Facility: CLINIC | Age: 88
End: 2023-05-23

## 2023-05-23 LAB
MYCOBACTERIUM SPEC CULT: NORMAL
RHODAMINE-AURAMINE STN SPEC: NORMAL

## 2023-05-23 NOTE — TELEPHONE ENCOUNTER
Pt calling, is having extraction of tooth tomorrow  Dentist told her stopping Eliquis would not be necessary for extraction, but she would rather hear if this is ok from you for peace of mind

## 2023-05-30 ENCOUNTER — TELEPHONE (OUTPATIENT)
Dept: PULMONOLOGY | Facility: CLINIC | Age: 88
End: 2023-05-30

## 2023-05-30 DIAGNOSIS — J84.9 INTERSTITIAL LUNG DISEASE (HCC): Primary | ICD-10-CM

## 2023-05-30 LAB — FUNGUS SPEC CULT: NORMAL

## 2023-05-30 RX ORDER — PREDNISONE 10 MG/1
TABLET ORAL
Qty: 57 TABLET | Refills: 0 | Status: SHIPPED | OUTPATIENT
Start: 2023-05-30 | End: 2023-07-24 | Stop reason: ALTCHOICE

## 2023-05-30 RX ORDER — SULFAMETHOXAZOLE AND TRIMETHOPRIM 800; 160 MG/1; MG/1
1 TABLET ORAL 3 TIMES WEEKLY
Qty: 6 TABLET | Refills: 0 | Status: SHIPPED | OUTPATIENT
Start: 2023-05-31 | End: 2023-06-14

## 2023-05-30 NOTE — TELEPHONE ENCOUNTER
Pt called and lvm for Mara Penny, she stated she completed her dental procedure and she was supposed to notify you once this was done  She is asking to start her steroid therapy for her pulmonary fibrosis now   Please advise

## 2023-05-31 ENCOUNTER — OFFICE VISIT (OUTPATIENT)
Dept: FAMILY MEDICINE CLINIC | Facility: CLINIC | Age: 88
End: 2023-05-31

## 2023-05-31 ENCOUNTER — TELEPHONE (OUTPATIENT)
Dept: PULMONOLOGY | Facility: CLINIC | Age: 88
End: 2023-05-31

## 2023-05-31 VITALS
SYSTOLIC BLOOD PRESSURE: 130 MMHG | OXYGEN SATURATION: 100 % | DIASTOLIC BLOOD PRESSURE: 70 MMHG | TEMPERATURE: 97.5 F | HEIGHT: 66 IN | BODY MASS INDEX: 17.36 KG/M2 | HEART RATE: 45 BPM | WEIGHT: 108 LBS | RESPIRATION RATE: 16 BRPM

## 2023-05-31 DIAGNOSIS — J84.9 INTERSTITIAL LUNG DISEASE (HCC): ICD-10-CM

## 2023-05-31 DIAGNOSIS — E11.42 TYPE 2 DIABETES MELLITUS WITH DIABETIC POLYNEUROPATHY, WITHOUT LONG-TERM CURRENT USE OF INSULIN (HCC): Primary | ICD-10-CM

## 2023-05-31 DIAGNOSIS — R60.0 LOWER EXTREMITY EDEMA: ICD-10-CM

## 2023-05-31 LAB
MYCOBACTERIUM SPEC CULT: NORMAL
RHODAMINE-AURAMINE STN SPEC: NORMAL
SL AMB POCT HEMOGLOBIN AIC: 9.1 (ref ?–6.5)

## 2023-05-31 RX ORDER — AMOXICILLIN AND CLAVULANATE POTASSIUM 500; 125 MG/1; MG/1
TABLET, FILM COATED ORAL
COMMUNITY
Start: 2023-05-22 | End: 2023-06-02 | Stop reason: ALTCHOICE

## 2023-05-31 NOTE — PROGRESS NOTES
FAMILY PRACTICE OFFICE VISIT       NAME: Christiano Weaver  AGE: 82 y o  SEX: female       : 1935        MRN: [id number]    Assessment and Plan   1  Type 2 diabetes mellitus with diabetic polyneuropathy, without long-term current use of insulin ([facility])  Assessment & Plan:    Lab Results   Component Value Date    HGBA1C 9 1 (A) 2023     A1c is up to 9 1% today  She has noticed sugars getting higher at home  Checks periodically  Start checking sugars twice daily for now  Continue Metformin and Januvia  Could consider adding Jardiance, but I am concerned about hypotension, hypovolemia, weight loss as she is quite frail  She has experienced hypoglycemia in the past on basal insulin even at very low doses  She does not eat consistent amounts due to GI problems  Could consider meal time insulin, or possible prandin-to use only if eating a larger meal    Will be starting long term prednisone as per pulmonology  Will have clinical pharmacist work with Sai Nelson to help manage blood glucoses  Orders:  -     POCT hemoglobin A1c  -     Ambulatory referral to clinical pharmacy; Future    2  Interstitial lung disease ([facility] [address] )  Assessment & Plan:  Planning to start Prednisone under the direction of pulmonology 20 mg daily for 14 days, 10 mg daily for 14 days, 5 mg daily for 30 days  3  Lower extremity edema  Resolved with elevating lower extremities  Regular check up in August     Chief Complaint     Chief Complaint   Patient presents with   • Follow-up     Pt is here to f/u from pulmonology recommendations       History of Present Illness     Christiano Weaver is an 82year old female presenting today for follow up on diabetes and interstitial lung disease  To start prednisone long term, will need close follow up on blood glucoses  Last time she did prednisone, used Humalog Lispro  Last A1c 7 8%  She has noticed sugars are up around 200 sometimes     She has been eating more "recently  Review of Systems   Review of Systems   Constitutional: Positive for fatigue  HENT: Positive for rhinorrhea and trouble swallowing  Respiratory: Positive for shortness of breath  Cardiovascular: Negative  Gastrointestinal: Positive for constipation and diarrhea  Musculoskeletal: Positive for arthralgias  Neurological: Negative  I have reviewed the patient's medical history in detail; there are no changes to the history as noted in the electronic medical record  Objective     Vitals:    05/31/23 1401   BP: 130/70   Pulse: (!) 45   Resp: 16   Temp: 97 5 °F (36 4 °C)   TempSrc: Temporal   SpO2: 100%   Weight: 49 kg (108 lb)   Height: 5' 6\" (1 676 m)     Wt Readings from Last 3 Encounters:   05/31/23 49 kg (108 lb)   05/16/23 49 4 kg (109 lb)   05/05/23 49 4 kg (109 lb)     Physical Exam  Vitals and nursing note reviewed  Constitutional:       General: She is not in acute distress  Appearance: Normal appearance  She is not ill-appearing  Cardiovascular:      Rate and Rhythm: Normal rate and regular rhythm  Heart sounds: No murmur heard  Comments: HR 60  Pulmonary:      Effort: Pulmonary effort is normal  No respiratory distress  Breath sounds: Normal breath sounds  Musculoskeletal:      Right lower leg: No edema  Left lower leg: No edema  Neurological:      Mental Status: She is alert     Psychiatric:         Mood and Affect: Mood normal             ALLERGIES:  Allergies   Allergen Reactions   • Keflex [Cephalexin] Diarrhea       Current Medications     Current Outpatient Medications   Medication Sig Dispense Refill   • acetaminophen (TYLENOL) 500 mg tablet Take 1,000 mg by mouth as needed for mild pain     • Alphagan P 0 1 % INSTILL 1 DROP RIGHT EYE TWICE A DAY     • amoxicillin (AMOXIL) 500 mg capsule TAKE 4 CAPSULES 1 HR PRIOR TO DENTAL APPT 4 capsule 2   • amoxicillin-clavulanate (AUGMENTIN) 500-125 mg per tablet TAKE ONE TABLET EVERY 12 " HOURS WITH FOOD UNTIL FINISHED     • aspirin (ECOTRIN LOW STRENGTH) 81 mg EC tablet Take 1 tablet (81 mg total) by mouth daily     • bimatoprost (LUMIGAN) 0 01 % ophthalmic drops Administer 1 drop to both eyes daily at bedtime for 30 days 1 5 mL 0   • calcium carbonate (OS-DANIA) 600 MG tablet Take 600 mg by mouth 2 (two) times a day with meals       • Eliquis 2 5 MG TAKE 1 TABLET (2 5 MG TOTAL) BY MOUTH 2 (TWO) TIMES A DAY 60 tablet 5   • ezetimibe-simvastatin (VYTORIN) 10-40 mg per tablet TAKE 1 TABLET BY MOUTH DAILY AT BEDTIME 30 tablet 5   • famotidine (PEPCID) 20 mg tablet TAKE 1 TABLET (20 MG TOTAL) BY MOUTH 2 (TWO) TIMES A DAY 60 tablet 5   • glucose blood (OneTouch Ultra) test strip TEST FOUR TIMES A  strip 1   • ipratropium (ATROVENT) 0 03 % nasal spray USE 2 SPRAYS INTO EACH NOSTRIL EVERY 12 (TWELVE) HOURS 30 mL 6   • Januvia 100 MG tablet TAKE 1 TABLET (100 MG TOTAL) BY MOUTH DAILY 30 tablet 5   • loratadine (CLARITIN) 10 mg tablet Take 10 mg by mouth daily as needed for allergies     • LORazepam (ATIVAN) 0 5 mg tablet TAKE 2 TABLETS (1 MG TOTAL) BY MOUTH DAILY AT BEDTIME 60 tablet 1   • metFORMIN (GLUCOPHAGE) 500 mg tablet TAKE 2 TABLETS (1,000 MG TOTAL) BY MOUTH 2 (TWO) TIMES A DAY WITH MEALS 120 tablet 5   • metoprolol tartrate (LOPRESSOR) 25 mg tablet Take 1 tablet (25 mg total) by mouth every 12 (twelve) hours 180 tablet 0   • Motegrity 2 MG TABS TAKE 2 MG BY MOUTH DAILY 90 tablet 3   • Multiple Vitamin (multivitamin) tablet Take 1 tablet by mouth daily       • pantoprazole (PROTONIX) 20 mg tablet Take 1 tablet (20 mg total) by mouth 2 (two) times a day 60 tablet 2   • sodium chloride 1 g tablet Take 1 tablet (1 g total) by mouth 2 (two) times a day 270 tablet 1   • predniSONE 10 mg tablet Take 2 tablets (20 mg total) by mouth daily for 14 days, THEN 1 tablet (10 mg total) daily for 14 days, THEN 0 5 tablets (5 mg total) daily   (Patient not taking: Reported on 5/31/2023) 57 tablet 0   • sulfamethoxazole-trimethoprim (BACTRIM DS) 800-160 mg per tablet Take 1 tablet by mouth 3 (three) times a week for 14 days (Patient not taking: Reported on 5/31/2023) 6 tablet 0     No current facility-administered medications for this visit           Health Maintenance     Health Maintenance   Topic Date Due   • COVID-19 Vaccine (5 - Pfizer series) 11/03/2022   • BMI: Followup Plan  10/07/2023   • Depression Remission PHQ  08/03/2023   • Fall Risk  09/27/2023   • Urinary Incontinence Screening  09/27/2023   • Medicare Annual Wellness Visit (AWV)  09/27/2023   • HEMOGLOBIN A1C  11/30/2023   • DM Eye Exam  12/08/2023   • Diabetic Foot Exam  01/11/2024   • BMI: Adult  05/31/2024   • Colorectal Cancer Screening  09/02/2025   • Hepatitis C Screening  Completed   • Osteoporosis Screening  Completed   • Pneumococcal Vaccine: 65+ Years  Completed   • Influenza Vaccine  Completed   • HIB Vaccine  Aged Out   • IPV Vaccine  Aged Out   • Hepatitis A Vaccine  Aged Out   • Meningococcal ACWY Vaccine  Aged Out   • HPV Vaccine  Aged Out     Immunization History   Administered Date(s) Administered   • COVID-19 PFIZER VACCINE 0 3 ML IM 01/22/2021, 02/12/2021, 09/30/2021, 09/08/2022   • INFLUENZA 11/02/2016, 10/16/2017, 09/18/2018, 09/18/2018   • Influenza, Quadrivalent (nasal) 11/02/2016   • Influenza, high dose seasonal 0 7 mL 11/06/2019, 10/06/2020, 11/09/2021, 11/15/2022   • Pneumococcal Conjugate 13-Valent 07/25/2019   • Pneumococcal Polysaccharide PPV23 03/17/2003   • Zoster 07/09/2010       ZANDER Cedillo

## 2023-05-31 NOTE — TELEPHONE ENCOUNTER
LM for patient to give a call back to set up a follow up Appt in [facility] with which ever doctor is best for patient since Dr Mary Alice Grove be here in July or August

## 2023-06-01 ENCOUNTER — APPOINTMENT (OUTPATIENT)
Dept: LAB | Facility: CLINIC | Age: 88
End: 2023-06-01
Payer: MEDICARE

## 2023-06-01 ENCOUNTER — TELEPHONE (OUTPATIENT)
Dept: FAMILY MEDICINE CLINIC | Facility: CLINIC | Age: 88
End: 2023-06-01

## 2023-06-01 DIAGNOSIS — E87.1 HYPONATREMIA: ICD-10-CM

## 2023-06-01 DIAGNOSIS — G31.84 MILD COGNITIVE IMPAIRMENT: ICD-10-CM

## 2023-06-01 DIAGNOSIS — J84.9 INTERSTITIAL LUNG DISEASE (HCC): ICD-10-CM

## 2023-06-01 DIAGNOSIS — E11.42 TYPE 2 DIABETES MELLITUS WITH DIABETIC POLYNEUROPATHY, WITHOUT LONG-TERM CURRENT USE OF INSULIN (HCC): Primary | ICD-10-CM

## 2023-06-01 LAB
ANION GAP SERPL CALCULATED.3IONS-SCNC: 1 MMOL/L (ref 4–13)
BUN SERPL-MCNC: 14 MG/DL (ref 5–25)
CALCIUM SERPL-MCNC: 9.1 MG/DL (ref 8.3–10.1)
CHLORIDE SERPL-SCNC: 95 MMOL/L (ref 96–108)
CO2 SERPL-SCNC: 32 MMOL/L (ref 21–32)
CREAT SERPL-MCNC: 0.5 MG/DL (ref 0.6–1.3)
FOLATE SERPL-MCNC: 20.5 NG/ML
GFR SERPL CREATININE-BSD FRML MDRD: 88 ML/MIN/1.73SQ M
GLUCOSE P FAST SERPL-MCNC: 153 MG/DL (ref 65–99)
POTASSIUM SERPL-SCNC: 4.4 MMOL/L (ref 3.5–5.3)
SODIUM SERPL-SCNC: 128 MMOL/L (ref 135–147)

## 2023-06-01 PROCEDURE — 80048 BASIC METABOLIC PNL TOTAL CA: CPT

## 2023-06-01 PROCEDURE — 82746 ASSAY OF FOLIC ACID SERUM: CPT

## 2023-06-01 PROCEDURE — 36415 COLL VENOUS BLD VENIPUNCTURE: CPT

## 2023-06-01 NOTE — TELEPHONE ENCOUNTER
Patient called returning Martha's call  She said that she is leaving for a doctor's appointment now so if she call please call her back around 3:30pm  Please advise

## 2023-06-01 NOTE — ASSESSMENT & PLAN NOTE
Planning to start Prednisone under the direction of pulmonology 20 mg daily for 14 days, 10 mg daily for 14 days, 5 mg daily for 30 days

## 2023-06-01 NOTE — ASSESSMENT & PLAN NOTE
Lab Results   Component Value Date    HGBA1C 9 1 (A) 05/31/2023     A1c is up to 9 1% today  She has noticed sugars getting higher at home  Checks periodically  Start checking sugars twice daily for now  Continue Metformin and Januvia  Could consider adding Jardiance, but I am concerned about hypotension, hypovolemia, weight loss as she is quite frail  She has experienced hypoglycemia in the past on basal insulin even at very low doses  She does not eat consistent amounts due to GI problems  Could consider meal time insulin, or possible prandin-to use only if eating a larger meal    Will be starting long term prednisone as per pulmonology  Will have clinical pharmacist work with Constance Solares to help manage blood glucoses  Attending Only

## 2023-06-01 NOTE — TELEPHONE ENCOUNTER
Patient is calling back, I scheduled her for her follow up on July 13th with Dr Shaw in [facility]  She does have a question, she thought you told her to wait a day or two between her antibiotics  She wanted to clarify if you wanted her to start the next antibiotic right away or wait 2 days before starting it   Please advise

## 2023-06-01 NOTE — TELEPHONE ENCOUNTER
[address] [facility]  Sammie Ching, PharmD, BCPS, BCACP     Reina Marei is a 82 y o  female who was referred to the clinical pharmacist by ZANDER Mayer for disease management  Patient presents via telephone for initial clinical pharmacist consult  This virtual check-in was done via telephone  Encounter provider: Carmel Flores    Patient agrees to participate in a virtual check in via telephone or video visit instead of presenting to the office to address urgent/immediate medical needs  After connecting, the patient was identified by name and date of birth  Reina Marie was informed that this was a telemedicine visit and that the exam was being conducted via the [employer]  She agrees to proceed  Patient is located in the following state in which I hold an active license ([place])  My office door was closed  No one else was in the room  Reina Marie acknowledged consent and understanding of privacy and security of the telemedicine visit  I informed the patient that I have reviewed her record in Epic and presented the opportunity for her to ask any questions regarding the visit today  The patient agreed to participate  Recommendations:     Patient plans to being steroids on 6/5 or 6/6  Will start NPH insulin to address steroid-induced hyperglycemia  Anticipate frequent dose adjustments  Closely monitor for hypoglycemia  Interventions: Recommend provider please consider the following, if in agreeance  1  Start NPH insulin 8 units SQ QAM + 3 units QPM  2   Also needs pen needles  • Will pend Rx for PCP signature/concurrence    Follow-up:   Follow-up with pharmacist in 1 week  Next PCP visit: 8/7/2023    Chronic Conditions Addressed at this Visit:      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, without long-term current use of insulin: Goals - A1c: <8%; SMBGs: Preprandial: 90-150mg/dL and HS: 100-180mg/dL per "ADA guidelines (individualized based on age, disease duration, and co-morbidities)  - Most recent A1c: above goal    - SMBG: Patient not routinely checking  Will start   Current DM Regimen:  • Januvia 100 mg daily  • Metformin 1,000 mg BIDWM  MEDICATIONS: Will start NPH  Dose ~0 2 units/kg  Also consider previous dose requirements while using steroids      HOME MONITORING: Check blood sugars 2 times daily and record      Interstitial lung disease  Followed by Pulmonology  On long-term taper, decrease by 10 mg every 2 weeks  Anticipate 5 mg daily maintenance dose  Also on Bactrim DS TIW for PJP prophylaxis         Findings:     Patient with recent increase in A1c  Feels it is related to diet  In an effort to gain weight, she has not been following any dietary restrictions  Unfortunately, weight has remained stable  Due to symptom progression, pulmonology will be re-starting steroids  Will need to manage BG in setting of elevated A1c and anticipated hyperglycemia due to steroids  Lab Results   Component Value Date    HGBA1C 9 1 (A) 05/31/2023    HGBA1C 7 8 (H) 03/03/2023    HGBA1C 7 8 (A) 11/15/2022     Wt Readings from Last 3 Encounters:   05/31/23 49 kg (108 lb)   05/16/23 49 4 kg (109 lb)   05/05/23 49 4 kg (109 lb)     Estimated body mass index is 17 43 kg/m² as calculated from the following:    Height as of 5/31/23: 5' 6\" (1 676 m)  Weight as of 5/31/23: 49 kg (108 lb)      Pharmacist Tracking Tool:     Reason For Outreach: Embedded Pharmacist  Demographics:  Intervention Method: Phone  Type of Intervention: New  Topics Addressed: COPD and Diabetes  Pharmacologic Interventions: Medication Initiation and Prevent or Manage JUAN  Non-Pharmacologic Interventions: Medication/Device education  Time:  Direct Patient Care: 25 mins  Care Coordination: 40 mins  Recommendation Recipient: Patient/Caregiver and Provider  Outcome: Pending/ Follow-up Required    "

## 2023-06-02 RX ORDER — PEN NEEDLE, DIABETIC 32GX 5/32"
NEEDLE, DISPOSABLE MISCELLANEOUS 2 TIMES DAILY
Qty: 100 EACH | Refills: 5 | Status: SHIPPED | OUTPATIENT
Start: 2023-06-02

## 2023-06-02 RX ORDER — INSULIN HUMAN 100 [IU]/ML
INJECTION, SUSPENSION SUBCUTANEOUS
Qty: 15 ML | Refills: 3 | Status: SHIPPED | OUTPATIENT
Start: 2023-06-02

## 2023-06-06 LAB
MYCOBACTERIUM SPEC CULT: NORMAL
RHODAMINE-AURAMINE STN SPEC: NORMAL

## 2023-06-07 ENCOUNTER — REMOTE DEVICE CLINIC VISIT (OUTPATIENT)
Dept: CARDIOLOGY CLINIC | Facility: CLINIC | Age: 88
End: 2023-06-07

## 2023-06-07 DIAGNOSIS — Z95.0 PRESENCE OF PERMANENT CARDIAC PACEMAKER: Primary | ICD-10-CM

## 2023-06-07 PROCEDURE — RECHECK: Performed by: INTERNAL MEDICINE

## 2023-06-07 NOTE — PROGRESS NOTES
Results for orders placed or performed in visit on 06/07/23   Cardiac EP device report    Narrative    MDT-DUAL CHAMBER PPM (AAIR-DDDR MODE)/ ACTIVE SYSTEM IS MRI CONDITIONAL  CARELINK TRANSMISSION: N/B--1 MO  RV IMP/THRESHOLD PER SN: RV LEAD STABLE WITHIN NORMAL LIMITS  NO SIGNIFICANT HIGH RATE EPISODES  NORMAL DEVICE FUNCTION  ---CHERRY

## 2023-06-08 ENCOUNTER — TELEPHONE (OUTPATIENT)
Dept: FAMILY MEDICINE CLINIC | Facility: CLINIC | Age: 88
End: 2023-06-08

## 2023-06-08 DIAGNOSIS — J84.9 INTERSTITIAL LUNG DISEASE (HCC): ICD-10-CM

## 2023-06-08 DIAGNOSIS — E11.42 TYPE 2 DIABETES MELLITUS WITH DIABETIC POLYNEUROPATHY, WITHOUT LONG-TERM CURRENT USE OF INSULIN (HCC): Primary | ICD-10-CM

## 2023-06-08 NOTE — TELEPHONE ENCOUNTER
[address] 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, without long-term current use of insulin: Goals - A1c: <8%; SMBGs: Preprandial: 90-150mg/dL and HS: 100-180mg/dL per ADA guidelines (individualized based on age, disease duration, and co-morbidities)  - Most recent A1c: above goal    - SMBGs: Above goal but readings are taken largely before starting insulin   Current DM Regimen:  · Januvia 100 mg daily  · Metformin 1,000 mg BID  · NPH 8 units SQ QAM + 4 units SQ QPM  MEDICATIONS: No medication changes warranted  HOME MONITORING: Check blood sugars 2 times daily (AM + PM) + any hypoglycemic events and record      Interstitial lung disease  Followed by Pulmonology  On long-term taper: prednisone 20 mg daily x 14 days, then 10 mg daily x 14 days, then 5 mg daily  Also on Bactrim DS TIW for PJP prophylaxis  Subjective:   Patient just received NPH insulin yesterday  Gave first injection before breakfast this AM      No hypoglycemic events; patient typically symptomatic if BG < 80      Patient currently taking prednisone 20 mg daily  Objective:   SMBG readings (mg/dL):      Average Min Max Goal      170 194 90 150 FBG Average: 181 7 mg/dl (170-194 mg/dl), n=7, 0/7= < 70 mg/dl   Pre-Supper  222 222 90 150 Pre-Supper BG Average: 222 mg/dl (222-222 mg/dl), n=1, 0/1= < 70 mg/dl   HS  200 200 100 180 HS BG Average: 200 mg/dl (200-200 mg/dl), n=1, 0/1= < 70 mg/dl         Total # of reading  < 70 mg/dl: 0/9     Pharmacist Tracking Tool  Reason For Outreach: Embedded Pharmacist  Demographics:  Intervention Method: Phone  Type of Intervention: Follow-Up  Topics Addressed: Diabetes  Pharmacologic Interventions: Prevent or Manage JUAN  Non-Pharmacologic Interventions: Home Monitoring  Time:  Direct Patient Care: 20 mins  Care Coordination: 10 mins  Recommendation Recipient: Patient/Caregiver  Outcome: Accepted

## 2023-06-09 ENCOUNTER — TELEPHONE (OUTPATIENT)
Dept: NEPHROLOGY | Facility: CLINIC | Age: 88
End: 2023-06-09

## 2023-06-09 NOTE — TELEPHONE ENCOUNTER
Called patient and left a voicemail with the following information: please  increase the sodium chloride tablets to 1 tab 3 times a day  Advised patient to please call the office to let us know she received the message and if have any questions or concerns

## 2023-06-09 NOTE — TELEPHONE ENCOUNTER
----- Message from Marcy Kayser, MD sent at 6/9/2023 10:40 AM EDT -----  Rylee Manning, can you tell Mrs Alyssa Hilton to increase the sodium chloride tablets to 1 tab 3 times a day   Thanks

## 2023-06-10 DIAGNOSIS — F41.9 ANXIETY: ICD-10-CM

## 2023-06-11 RX ORDER — LORAZEPAM 0.5 MG/1
1 TABLET ORAL
Qty: 60 TABLET | Refills: 2 | Status: SHIPPED | OUTPATIENT
Start: 2023-06-11

## 2023-06-13 LAB
MYCOBACTERIUM SPEC CULT: NORMAL
RHODAMINE-AURAMINE STN SPEC: NORMAL

## 2023-06-15 ENCOUNTER — TELEPHONE (OUTPATIENT)
Dept: FAMILY MEDICINE CLINIC | Facility: CLINIC | Age: 88
End: 2023-06-15

## 2023-06-15 DIAGNOSIS — E11.42 TYPE 2 DIABETES MELLITUS WITH DIABETIC POLYNEUROPATHY, WITHOUT LONG-TERM CURRENT USE OF INSULIN (HCC): ICD-10-CM

## 2023-06-15 DIAGNOSIS — J84.9 INTERSTITIAL LUNG DISEASE (HCC): Primary | ICD-10-CM

## 2023-06-15 NOTE — TELEPHONE ENCOUNTER
[address] [facility]  Dana Jj, PharmD, BCPS, BCACP     Communication with patient: per telephone     Reason for documentation: follow-up    Recommendations: The following actions were taken today by the Clinical Pharmacist:   1  Instructed patient to test blood sugar BEFORE breakfast and dinner  2  Continue NPH 8 units SQ QAM + 4 units SQ QPM    Follow-up:   Follow-up with pharmacist in 1 week    Chronic Conditions Addressed at this Visit:      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, without long-term current use of insulin: Goals - A1c: <8%; SMBGs: Preprandial: 90-150mg/dL and HS: 100-180mg/dL per ADA guidelines (individualized based on age, disease duration, and co-morbidities)  - Most recent A1c: above goal    - SMBGs: Difficult to evaluate PM blood sugars due to inconsistent timing  Current DM Regimen:  · Januvia 100 mg daily  · Metformin 1,000 mg BID  · NPH 8 units SQ QAM + 4 units SQ QPM  MEDICATIONS: No medication changes warranted  HOME MONITORING: Check blood sugars 2 times daily (AM + PM) + any hypoglycemic events and record      Interstitial lung disease  Followed by Pulmonology  On long-term taper: prednisone 20 mg daily x 14 days, then 10 mg daily x 14 days, then 5 mg daily  Also on Bactrim DS TIW for PJP prophylaxis  Subjective:   Patient has been having family visit for the past few days  She has been eating out for most meals  Upon discussion, patient has been checking blood sugars at random times in the afternoon/evening  Sometimes before meals, sometimes after     No hypoglycemic events; patient typically symptomatic if BG < 80      Patient currently taking prednisone 20 mg daily  Objective:   SMBG readings (mg/dL):      Average Min Max Goal      123 198 90 150 FBG Average: 164 4 mg/dl (123-198 mg/dl), n=8, 0/8= < 70 mg/dl   Random 227 176 261 90 180 Random Average: 227 mg/dl (176-261 mg/dl), n=5, 0/5= < 70 mg/dl Total # of reading  < 70 mg/dl: 0/13     Pharmacist Tracking Tool  Reason For Outreach: Embedded Pharmacist  Demographics:  Intervention Method: Phone  Type of Intervention: Follow-Up  Topics Addressed: Diabetes  Pharmacologic Interventions: Prevent or Manage JUAN  Non-Pharmacologic Interventions: Home Monitoring  Time:  Direct Patient Care: 20 mins  Care Coordination: 10 mins  Recommendation Recipient: Patient/Caregiver  Outcome: Accepted

## 2023-06-19 ENCOUNTER — TELEPHONE (OUTPATIENT)
Dept: FAMILY MEDICINE CLINIC | Facility: CLINIC | Age: 88
End: 2023-06-19

## 2023-06-19 DIAGNOSIS — E11.42 TYPE 2 DIABETES MELLITUS WITH DIABETIC POLYNEUROPATHY, WITHOUT LONG-TERM CURRENT USE OF INSULIN (HCC): ICD-10-CM

## 2023-06-19 DIAGNOSIS — J84.9 INTERSTITIAL LUNG DISEASE (HCC): Primary | ICD-10-CM

## 2023-06-19 NOTE — TELEPHONE ENCOUNTER
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, without long-term current use of insulin: Goals - A1c: <8%; SMBGs: Preprandial: 90-150mg/dL and HS: 100-180mg/dL per ADA guidelines (individualized based on age, disease duration, and co-morbidities)  - Most recent A1c: above goal    - SMBGs: Above goal  Current DM Regimen:  · Januvia 100 mg daily  · Metformin 1,000 mg BID  · NPH 8 units SQ QAM + 4 units SQ QPM  MEDICATIONS: Will increase NPH to address global hyperglycemia  HOME MONITORING: Check blood sugars 2 times daily (AM + PM) + any hypoglycemic events and record      Interstitial lung disease  Followed by Pulmonology  On long-term taper: prednisone 20 mg daily x 14 days, then 10 mg daily x 14 days, then 5 mg daily  Also on Bactrim DS TIW for PJP prophylaxis  Subjective:   Patient confirms she has been checking blood sugars AC in the evenings  Reports she has been experiencing loose stools for 3-4 days  Feels it is related to her Bactrim  She has taken Immodium for symptomatic relief  No hypoglycemic events; patient typically symptomatic if BG < 80      Patient currently taking prednisone 20 mg daily  Objective:   SMBG readings (mg/dL):      Average Min Max Goal      154 196 90 150 FBG Average: 177 4 mg/dl (154-196 mg/dl), n=5, 0/5= < 70 mg/dl   Pre-Supper  206 339 90 150 Pre-Supper BG Average: 278 3 mg/dl (206-339 mg/dl), n=3, 0/3= < 70 mg/dl         Total # of reading  < 70 mg/dl: 0/8     Pharmacist Tracking Tool  Reason For Outreach: Embedded Pharmacist  Demographics:  Intervention Method: Phone  Type of Intervention: Follow-Up  Topics Addressed: Diabetes  Pharmacologic Interventions: Prevent or Manage JUAN  Non-Pharmacologic Interventions: Home Monitoring  Time:  Direct Patient Care: 25 mins  Care Coordination: 10 mins  Recommendation Recipient: Patient/Caregiver  Outcome: Accepted

## 2023-06-20 ENCOUNTER — TELEPHONE (OUTPATIENT)
Dept: PULMONOLOGY | Facility: CLINIC | Age: 88
End: 2023-06-20

## 2023-06-20 DIAGNOSIS — R19.7 DIARRHEA, UNSPECIFIED TYPE: Primary | ICD-10-CM

## 2023-06-20 NOTE — TELEPHONE ENCOUNTER
Please advise on possible medication reaction. See note, patient schedule to be seen with Dr Sepideh Romero on 7/13.

## 2023-06-20 NOTE — TELEPHONE ENCOUNTER
It looks like she was also on previous antibiotics for a dental procedure. I can order a C. Dif if she can go to the lab. Dr. Nadya Wilcox - not sure what you want to do with the meds for now, looks like you had ordered the prednisone and bactrim. Can we bring her in sooner.

## 2023-06-20 NOTE — TELEPHONE ENCOUNTER
Pt has called in stating she has been on prednisone and bactrim together. She states she has been having diarrhia since taking it for pulmonary fibrosis.  Please advise

## 2023-06-20 NOTE — TELEPHONE ENCOUNTER
Patient needs to be evaluated due for follow-up. Sees fellows- Dr Lee Lam has left- give her next available appt. I am unable to make a determination about this patient's diarrhea.   Does not seem related to her pulmonary medications

## 2023-06-21 ENCOUNTER — OFFICE VISIT (OUTPATIENT)
Dept: PULMONOLOGY | Facility: CLINIC | Age: 88
End: 2023-06-21
Payer: MEDICARE

## 2023-06-21 VITALS
RESPIRATION RATE: 14 BRPM | OXYGEN SATURATION: 97 % | TEMPERATURE: 97.1 F | SYSTOLIC BLOOD PRESSURE: 158 MMHG | BODY MASS INDEX: 17.64 KG/M2 | WEIGHT: 109.8 LBS | DIASTOLIC BLOOD PRESSURE: 90 MMHG | HEART RATE: 80 BPM | HEIGHT: 66 IN

## 2023-06-21 DIAGNOSIS — K52.1 ANTIBIOTIC-ASSOCIATED DIARRHEA: Primary | ICD-10-CM

## 2023-06-21 DIAGNOSIS — R13.19 ESOPHAGEAL DYSPHAGIA: ICD-10-CM

## 2023-06-21 DIAGNOSIS — I10 HYPERTENSION, UNSPECIFIED TYPE: ICD-10-CM

## 2023-06-21 DIAGNOSIS — J84.9 INTERSTITIAL LUNG DISEASE (HCC): ICD-10-CM

## 2023-06-21 DIAGNOSIS — I48.0 PAROXYSMAL ATRIAL FIBRILLATION (HCC): ICD-10-CM

## 2023-06-21 DIAGNOSIS — E11.69 TYPE 2 DIABETES MELLITUS WITH HYPERLIPIDEMIA (HCC): ICD-10-CM

## 2023-06-21 DIAGNOSIS — E78.5 TYPE 2 DIABETES MELLITUS WITH HYPERLIPIDEMIA (HCC): ICD-10-CM

## 2023-06-21 DIAGNOSIS — K21.9 GASTROESOPHAGEAL REFLUX DISEASE, UNSPECIFIED WHETHER ESOPHAGITIS PRESENT: ICD-10-CM

## 2023-06-21 DIAGNOSIS — T36.95XA ANTIBIOTIC-ASSOCIATED DIARRHEA: Primary | ICD-10-CM

## 2023-06-21 DIAGNOSIS — K21.00 GASTROESOPHAGEAL REFLUX DISEASE WITH ESOPHAGITIS WITHOUT HEMORRHAGE: ICD-10-CM

## 2023-06-21 PROCEDURE — 99214 OFFICE O/P EST MOD 30 MIN: CPT | Performed by: INTERNAL MEDICINE

## 2023-06-21 NOTE — TELEPHONE ENCOUNTER
Patient has been schedule to be seen by Dr Apryl Morris today 6/21 at 4:20 in [facility]. She will call us back if this appointment needs to be changed. If patient calls back to reschedule please schedule next available with a fellow. Instructions were also given to contact pcp per Dr Fabiola Shay in regards to diarrhea, being it is uncommon for pulmonary medications to result in this type of adverse event.

## 2023-06-21 NOTE — PROGRESS NOTES
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  - AI workup negative  - PFT Trend:  Date: 11/11/20 4/19/21 11/1/21 8/18/22 1/6/23 3/27/23   FVC 60 63 61 50 69 58   FEV1 70 67 72 63 86 72   TLC 73 75  57 75 62   DLCO 39 34 28 16 72 60     - Received a course of prednisone starting at 1mg/kg (60mg) with taper last winter with significant improvement in PFTs and increase in her appetite  - HRCT fairly stable over that time  Though imaging did not improve, her lung function clearly improved with steroids  She then had some decrease over time and was restarted on prednisone  She had dental work done a few weeks ago and had received two courses of antibiotics due to dental infection and procedure ppx given her shoulder implant  She was then started on a higher dose (20mg of prednisone) for her ILD to taper over two weeks along with ppx bactrim  While taking the bactrim she developed diarrhea  The diarrhea occurs 2-3 times per day with urgency, but is formed  Imodium only seems to help slightly  She denies fevers, chills or abdominal pain outside of when she experiences fecal urgency  Her prior colonoscopy 2020 did not demonstrate diverticulosis  HTN  - Not very well controlled today, but may have been taken right after walking into exam room    GERD  - Controlled on pepcid    DM  - Reports mild increases in blood sugar while on prednisone    Chronic allergic rhinitis  - Controlled with antihistamines    Afib  - Rate controlled  - Anticoagulated with dose-adjusted eliquis    Plan:  - Discontinue bactrim  - Continue prednisone taper as scheduled, maintain prednisone 5mg chronically  Next visit will discuss long term immunosuppression with steroid sparing agent and when to repeat PFTs    - Can initiate natural probiotics and OTC supplements if desired  - Referred back to PCP for management of HTN - 158/90 today    Return in about 1 month (around 7/21/2023)  History of Present Illness   HPI:  Roula Sanderson is a 82 y o  female who presents for evaluation of her diarrhea  She has not been feeling generally ill, has just been exerpiencing fecal urgency with the need to move her bowels about 2-3 times per day over the last week or so  Of note she has had 3 courses of antibiotics recently, as described above  She describes her stool as formed, not very watery  Denies unusually foul smelling stool  Has no generalized illness  Her last colonoscopy did not show diverticulosis  Imodium helps her urgency only slightly, reducing her need to move her bowels to about twice daily  Her dyspnea has not significantly change on the recent increase in prednisone  However, she has been feeling significantly less fatigue, has been spending more time up and out with her family and has been eating more          Review of Systems    Historical Information   Past Medical History:   Diagnosis Date   • 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/7/2020   • Glaucoma    • H/O degenerative disc disease    • Idiopathic pulmonary fibrosis ([address] ) 11/2020   • Irregular heart beat     afib   • Peripheral neuropathy    • S/P laparoscopic cholecystectomy 12/21/2020   • Stenosis of right subclavian artery ([facility] [address] ) 11/18/2016     Past Surgical History:   Procedure Laterality Date   • CATARACT EXTRACTION, BILATERAL  2011   • CHOLECYSTECTOMY     • CHOLECYSTECTOMY LAPAROSCOPIC N/A 12/09/2020    Procedure: CHOLECYSTECTOMY LAPAROSCOPIC;  Surgeon: Leesa Scanlon MD;  Location: [facility];  Service: General   • COLONOSCOPY     • CYST REMOVAL  2009    left lower Quadrant    • FL LUMBAR PUNCTURE DIAGNOSTIC  4/28/2023   • HERNIA REPAIR  1992   • LIPOMA RESECTION  2010   • PA RPR 1ST INGUN HRNA AGE 5 YRS/> REDUCIBLE Bilateral 01/05/2018    Procedure: INGUINAL HERNIA REPAIR;  Surgeon: Ashley Winters MD;  Location: [facility];  Service: General   • SHOULDER SURGERY  2019    Dr Tevin Kam [facility])   • UPPER GASTROINTESTINAL ENDOSCOPY     • VASCULAR SURGERY      Agram-  R carotid occlusion     Family History   Problem Relation Age of Onset   • Heart disease Mother    • Heart attack Father    • Hiatal hernia Father    • Diabetes Father    • Cancer Brother    • Diabetes Brother    • Heart disease Brother    • Hypertension Brother    • Other Son         gastroparesis     Social History     Tobacco Use   Smoking Status Former   • Packs/day: 1 50   • Years: 38 00   • Total pack years: 57 00   • Types: Cigarettes   • Start date:    • Quit date:    • Years since quittin 4   Smokeless Tobacco Never       Meds/Allergies     Current Outpatient Medications:   •  acetaminophen (TYLENOL) 500 mg tablet, Take 1,000 mg by mouth as needed for mild pain, Disp: , Rfl:   •  Alphagan P 0 1 %, INSTILL 1 DROP RIGHT EYE TWICE A DAY, Disp: , Rfl:   •  amoxicillin (AMOXIL) 500 mg capsule, TAKE 4 CAPSULES 1 HR PRIOR TO DENTAL APPT, Disp: 4 capsule, Rfl: 2  •  aspirin (ECOTRIN LOW STRENGTH) 81 mg EC tablet, Take 1 tablet (81 mg total) by mouth daily, Disp:  , Rfl:   •  bimatoprost (LUMIGAN) 0 01 % ophthalmic drops, Administer 1 drop to both eyes daily at bedtime for 30 days, Disp: 1 5 mL, Rfl: 0  •  calcium carbonate (OS-DANIA) 600 MG tablet, Take 600 mg by mouth 2 (two) times a day with meals  , Disp: , Rfl:   •  Eliquis 2 5 MG, TAKE 1 TABLET (2 5 MG TOTAL) BY MOUTH 2 (TWO) TIMES A DAY, Disp: 60 tablet, Rfl: 5  •  ezetimibe-simvastatin (VYTORIN) 10-40 mg per tablet, TAKE 1 TABLET BY MOUTH DAILY AT BEDTIME, Disp: 30 tablet, Rfl: 5  •  famotidine (PEPCID) 20 mg tablet, TAKE 1 TABLET (20 MG TOTAL) BY MOUTH 2 (TWO) TIMES A DAY, Disp: 60 tablet, Rfl: 5  •  glucose blood (OneTouch Ultra) test strip, TEST FOUR TIMES A DAY, Disp: 400 strip, Rfl: 1  •  insulin isophane (HumuLIN N KwikPen) 100 units/mL injection pen, Inject 8 Units under the skin every morning AND 4 Units every "evening , Disp: 15 mL, Rfl: 3  •  Insulin Pen Needle (BD Pen Needle Domitila 2nd Gen) 32G X 4 MM MISC, Use 2 (two) times a day, Disp: 100 each, Rfl: 5  •  ipratropium (ATROVENT) 0 03 % nasal spray, USE 2 SPRAYS INTO EACH NOSTRIL EVERY 12 (TWELVE) HOURS, Disp: 30 mL, Rfl: 6  •  Januvia 100 MG tablet, TAKE 1 TABLET (100 MG TOTAL) BY MOUTH DAILY, Disp: 30 tablet, Rfl: 5  •  loratadine (CLARITIN) 10 mg tablet, Take 10 mg by mouth daily as needed for allergies, Disp: , Rfl:   •  LORazepam (ATIVAN) 0 5 mg tablet, TAKE 2 TABLETS (1 MG TOTAL) BY MOUTH DAILY AT BEDTIME, Disp: 60 tablet, Rfl: 2  •  metFORMIN (GLUCOPHAGE) 500 mg tablet, TAKE 2 TABLETS (1,000 MG TOTAL) BY MOUTH 2 (TWO) TIMES A DAY WITH MEALS, Disp: 120 tablet, Rfl: 5  •  metoprolol tartrate (LOPRESSOR) 25 mg tablet, Take 1 tablet (25 mg total) by mouth every 12 (twelve) hours, Disp: 180 tablet, Rfl: 0  •  Motegrity 2 MG TABS, TAKE 2 MG BY MOUTH DAILY, Disp: 90 tablet, Rfl: 3  •  Multiple Vitamin (multivitamin) tablet, Take 1 tablet by mouth daily  , Disp: , Rfl:   •  pantoprazole (PROTONIX) 20 mg tablet, Take 1 tablet (20 mg total) by mouth 2 (two) times a day, Disp: 60 tablet, Rfl: 2  •  predniSONE 10 mg tablet, Take 2 tablets (20 mg total) by mouth daily for 14 days, THEN 1 tablet (10 mg total) daily for 14 days, THEN 0 5 tablets (5 mg total) daily  , Disp: 57 tablet, Rfl: 0  •  sodium chloride 1 g tablet, Take 1 tablet (1 g total) by mouth 2 (two) times a day, Disp: 270 tablet, Rfl: 1  Allergies   Allergen Reactions   • Keflex [Cephalexin] Diarrhea       Vitals: Blood pressure 158/90, pulse 80, temperature (!) 97 1 °F (36 2 °C), temperature source Tympanic, resp  rate 14, height 5' 6\" (1 676 m), weight 49 8 kg (109 lb 12 8 oz), SpO2 97 %  Body mass index is 17 72 kg/m²  Oxygen Therapy  SpO2: 97 %  Oxygen Therapy: None (Room air)      Physical Exam  Physical Exam    Labs: I have personally reviewed pertinent lab results    Lab Results   Component Value Date    WBC " 5 65 04/28/2023    HGB 10 2 (L) 04/28/2023    HCT 32 8 (L) 04/28/2023    MCV 86 04/28/2023     04/28/2023     Lab Results   Component Value Date    CALCIUM 9 1 06/01/2023    K 4 4 06/01/2023    CO2 32 06/01/2023    CL 95 (L) 06/01/2023    BUN 14 06/01/2023    CREATININE 0 50 (L) 06/01/2023     Lab Results   Component Value Date    IGE 14 6 10/16/2020     Lab Results   Component Value Date    ALT 11 04/26/2023    AST 16 04/26/2023    ALKPHOS 43 04/26/2023       Preventive   Influenza vaccine: obtained 11/15/22  COVID-19 vaccination: Jacob Hicks x 4 with last dose 9/8/22  Pneumonia vaccine: PCV 12 7/25/19, PPV23 3/17/2003  Lung Cancer screening: N/A      Imaging and other studies: I have personally reviewed pertinent films in PACS  [facility] [place]-patient lumbar puncture    Result Date: 5/1/2023  Impression: Fluoroscopic-guided diagnostic lumbar puncture  Workstation performed: [id number]     CT abdomen pelvis w wo contrast    Result Date: 4/27/2023  Impression: No evidence of malignancy in the abdomen and pelvis  Again seen is juxtapleural reticular opacities and traction bronchiectasis at the lung bases, likely UIP  Workstation performed: [id number]       EKG, Pathology, and Other Studies: I have personally reviewed pertinent reports  Echo 10/7/22:  •  Left Ventricle: Left ventricular cavity size is normal  Wall thickness is mildly increased  There is moderate concentric hypertrophy  The left ventricular ejection fraction is 60%  Wall motion is normal  Diastolic function is mildly abnormal, consistent with grade I (abnormal) relaxation  •  IVS: There is sigmoid appearance of the septum  •  Right Ventricle: Right ventricular cavity size is normal  A cardiac device lead is seen entering the RV  Systolic function is normal  Normal tricuspid annular plane systolic excursion (TAPSE) > 1 7 cm  •  Aortic Valve: There is a small, strand-like, mobile mass on the aortic aspect    Differential includes, papillary "fibroelastoma, vegetation, degenerative change  •  Tricuspid Valve: There is mild regurgitation  The estimated right ventricular systolic pressure is 71 96 mmHg        Disclaimer: Portions of the record may have been created with voice recognition software  Occasional wrong word or \"sound a like\" substitutions may have occurred due to the inherent limitations of voice recognition software  Careful consideration should be taken to recognize, using context, where substitutions have occurred      Miriam Fabian DO   Pulmonary & Critical Care Medicine Fellow PGY-V  [facility]  "

## 2023-06-21 NOTE — TELEPHONE ENCOUNTER
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) that is responsive to steroids  Patient initially had a course of steroids Fall to Winter 2022  The course of steroids showed significant improvements in the PFTs and symptoms  She was then trialed off steroids and lung function slightly declined and symptoms returned  A this point her disease is steroid responsive  She had a dental procedure in May 2023 and had a course of antibiotics  I advised the patient to complete her course of abx and have follow-up post dental procedure and give the office a call afterwards to discuss starting treatment  Patient was started on prednisone 20 mg for 2 weeks (with Bactrim ppx) and then taper by 10 mg to reach maintainance on 5 mg and be evaluated for steroid sparring agents

## 2023-06-22 ENCOUNTER — TELEPHONE (OUTPATIENT)
Dept: FAMILY MEDICINE CLINIC | Facility: CLINIC | Age: 88
End: 2023-06-22

## 2023-06-22 ENCOUNTER — TELEPHONE (OUTPATIENT)
Dept: OTHER | Facility: OTHER | Age: 88
End: 2023-06-22

## 2023-06-22 DIAGNOSIS — E11.42 TYPE 2 DIABETES MELLITUS WITH DIABETIC POLYNEUROPATHY, WITHOUT LONG-TERM CURRENT USE OF INSULIN (HCC): ICD-10-CM

## 2023-06-22 DIAGNOSIS — J84.9 INTERSTITIAL LUNG DISEASE (HCC): Primary | ICD-10-CM

## 2023-06-22 NOTE — TELEPHONE ENCOUNTER
Spoke to pt  Diarrhea has stopped since yesterday  Pulmonology recommended a probiotic due to being on multiple antibiotics  She will call if diarrhea starts again but for now she is feeling better

## 2023-06-22 NOTE — TELEPHONE ENCOUNTER
Patient is calling regarding cancelling an appointment      Date/Time:  06/22/23 /  11:10    Patient was rescheduled: YES [] NO [x]    Patient requesting call back to reschedule: YES [] NO [x]

## 2023-06-22 NOTE — TELEPHONE ENCOUNTER
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, without long-term current use of insulin: Goals - A1c: <8%; SMBGs: Preprandial: 90-150mg/dL and HS: 100-180mg/dL per ADA guidelines (individualized based on age, disease duration, and co-morbidities)  - Most recent A1c: above goal    - SMBGs: FBG near goal and have shown improvement following slight increase in PM insulin adjustment  PM BG readings remain markedly above goal    Current DM Regimen:  · Januvia 100 mg daily  · Metformin 1,000 mg BID  · NPH 10 units SQ QAM + 5 units SQ QPM  MEDICATIONS: Will increase AM NPH to address PM hyperglycemia  HOME MONITORING: Check blood sugars 2 times daily (AM + PM) + with any hypoglycemic sx and record      Interstitial lung disease  Followed by Pulmonology  On long-term taper: prednisone 20 mg daily x 14 days, then 10 mg daily x 14 days, then 5 mg daily  Subjective:   Patient  Seen by Pulmonologist regarding recent diarrhea  Recommended to taking probiotics  Has stopped all antibiotics  Fecal urgency has decreased  Stool consistency remains formed       Patient decreased prednisone to 10 mg daily, starting yesterday  Will take x 14 days  Objective:   SMBG readings (mg/dL):      Average Min Max Goal      142 191 90 150 FBG Average: 164 mg/dl (142-191 mg/dl), n=4, 0/4= < 70 mg/dl   Pre-Supper  241 246 90 150 Pre-Supper BG Average: 243 5 mg/dl (241-246 mg/dl), n=2, 0/2= < 70 mg/dl   Random 199 199 199 90 180 Random Average: 199 mg/dl (199-199 mg/dl), n=1, 0/1= < 70 mg/dl Total # of reading  < 70 mg/dl: 0/7         Pharmacist Tracking Tool  Reason For Outreach: Embedded Pharmacist  Demographics:  Intervention Method: Phone  Type of Intervention: Follow-Up  Topics Addressed: Diabetes  Pharmacologic Interventions: Prevent or Manage JUAN  Non-Pharmacologic Interventions: Home Monitoring  Time:  Direct Patient Care: 25 mins  Care Coordination: 15 mins  Recommendation Recipient: Patient/Caregiver  Outcome: Accepted

## 2023-06-26 ENCOUNTER — TELEPHONE (OUTPATIENT)
Dept: FAMILY MEDICINE CLINIC | Facility: CLINIC | Age: 88
End: 2023-06-26

## 2023-06-26 DIAGNOSIS — J84.9 INTERSTITIAL LUNG DISEASE (HCC): Primary | ICD-10-CM

## 2023-06-26 DIAGNOSIS — E11.42 TYPE 2 DIABETES MELLITUS WITH DIABETIC POLYNEUROPATHY, WITHOUT LONG-TERM CURRENT USE OF INSULIN (HCC): ICD-10-CM

## 2023-06-26 NOTE — TELEPHONE ENCOUNTER
[address] 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, without long-term current use of insulin: Goals - A1c: <8%; SMBGs: Preprandial: 90-150mg/dL and HS: 100-180mg/dL per ADA guidelines (individualized based on age, disease duration, and co-morbidities)  - Most recent A1c: above goal    - SMBGs: FBG within goal; Pre-supper BG above goal but have shown improvement  Current DM Regimen:  · Januvia 100 mg daily  · Metformin 1,000 mg BID  · NPH 14 units SQ QAM + 5 units SQ QPM  MEDICATIONS: No changes  Recent decrease in steroid dose + increase in AM NPH  Will continue current doses and re-evaluate soon    HOME MONITORING: Check blood sugars 2 times daily (AM + PM) + any hypoglycemic sx  and record      Interstitial lung disease  Followed by Pulmonology  On long-term taper: prednisone 20 mg daily x 14 days, then 10 mg daily x 14 days, then 5 mg daily  Subjective:   Patient reports fatigue  Denies SOB  No symptoms when BG was 106 or 109  No hypoglycemic events; patient typically symptomatic if BG < 80      Patient currently taking prednisone 10 mg daily  Objective:   SMBG readings (mg/dL):      Average Min Max Goal      106 179 90 150 FBG Average: 146 8 mg/dl (106-179 mg/dl), n=4, 0/4= < 70 mg/dl   Pre-Lunch  176 176 90 150 Pre-Lunch BG Average: 176 mg/dl (176-176 mg/dl), n=1, 0/1= < 70 mg/dl   Pre-Supper  173 299 90 150 Pre-Supper BG Average: 182 mg/dl (173-299 mg/dl), n=3, 0/3= < 70 mg/dl   Random 109 109 109 90 180 Random Average: 109 mg/dl (109-109 mg/dl), n=1, 0/1= < 70 mg/dl         Total # of reading  < 70 mg/dl: 0/9         Pharmacist Tracking Tool  Reason For Outreach: Embedded Pharmacist  Demographics:  Intervention Method: Phone  Type of Intervention: Follow-Up  Topics Addressed: Diabetes  Pharmacologic Interventions: Prevent or Manage JUAN  Non-Pharmacologic Interventions: Home Monitoring  Time:  Direct Patient Care: 25 mins  Care Coordination: 15 mins  Recommendation Recipient: Patient/Caregiver  Outcome: Accepted

## 2023-06-29 ENCOUNTER — TELEPHONE (OUTPATIENT)
Dept: FAMILY MEDICINE CLINIC | Facility: CLINIC | Age: 88
End: 2023-06-29

## 2023-06-29 DIAGNOSIS — E11.42 TYPE 2 DIABETES MELLITUS WITH DIABETIC POLYNEUROPATHY, WITHOUT LONG-TERM CURRENT USE OF INSULIN (HCC): ICD-10-CM

## 2023-06-29 DIAGNOSIS — J84.9 INTERSTITIAL LUNG DISEASE (HCC): Primary | ICD-10-CM

## 2023-06-29 NOTE — TELEPHONE ENCOUNTER
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, without long-term current use of insulin: Goals - A1c: <8%; SMBGs: Preprandial: 90-150mg/dL and HS: 100-180mg/dL per ADA guidelines (individualized based on age, disease duration, and co-morbidities)  - Most recent A1c: above goal    - SMBGs: FBG within goal; Pre-supper BG also within goal and have shown improvement from last visit  Current DM Regimen:  · Januvia 100 mg daily  · Metformin 1,000 mg BID  · NPH 14 units SQ QAM + 5 units SQ QPM  MEDICATIONS: No changes  Will continue to closely monitor for any hypoglycemic symptoms    HOME MONITORING: Check blood sugars 2 times daily (AM + PM) + any hypoglycemic sx  and record      Interstitial lung disease  Followed by Pulmonology  On long-term taper: prednisone 20 mg daily x 14 days, then 10 mg daily x 14 days, then 5 mg daily  Subjective:   Patient with decreased appetite  Close friend passed away this week    No hypoglycemic symptoms or events; patient typically symptomatic if BG < 80      Patient currently taking prednisone 10 mg daily  Objective:   SMBG readings (mg/dL):      Average Min Max Goal      106 123 90 150 FBG Average: 113 5 mg/dl (106-123 mg/dl), n=4, 0/4= < 70 mg/dl   Pre-Supper  97 122 90 150 Pre-Supper BG Average: 105 3 mg/dl ( mg/dl), n=3, 0/3= < 70 mg/dl         Total # of reading  < 70 mg/dl: 0/7     Pharmacist Tracking Tool  Reason For Outreach: Embedded Pharmacist  Demographics:  Intervention Method: Phone  Type of Intervention: Follow-Up  Topics Addressed: Diabetes  Pharmacologic Interventions: Prevent or Manage JUAN  Non-Pharmacologic Interventions: Home Monitoring  Time:  Direct Patient Care: 25 mins  Care Coordination: 15 mins  Recommendation Recipient: Patient/Caregiver  Outcome: Accepted

## 2023-07-03 ENCOUNTER — OFFICE VISIT (OUTPATIENT)
Dept: FAMILY MEDICINE CLINIC | Facility: CLINIC | Age: 88
End: 2023-07-03
Payer: MEDICARE

## 2023-07-03 VITALS
SYSTOLIC BLOOD PRESSURE: 150 MMHG | OXYGEN SATURATION: 99 % | BODY MASS INDEX: 17.68 KG/M2 | RESPIRATION RATE: 16 BRPM | HEIGHT: 66 IN | DIASTOLIC BLOOD PRESSURE: 80 MMHG | WEIGHT: 110 LBS | HEART RATE: 89 BPM | TEMPERATURE: 97.3 F

## 2023-07-03 DIAGNOSIS — R19.7 DIARRHEA, UNSPECIFIED TYPE: Primary | ICD-10-CM

## 2023-07-03 DIAGNOSIS — E11.3552 TYPE 2 DIABETES MELLITUS WITH STABLE PROLIFERATIVE RETINOPATHY OF LEFT EYE, WITHOUT LONG-TERM CURRENT USE OF INSULIN (HCC): ICD-10-CM

## 2023-07-03 PROCEDURE — 99213 OFFICE O/P EST LOW 20 MIN: CPT | Performed by: NURSE PRACTITIONER

## 2023-07-03 RX ORDER — AMOXICILLIN 500 MG/1
CAPSULE ORAL
COMMUNITY

## 2023-07-03 NOTE — PROGRESS NOTES
FAMILY PRACTICE OFFICE VISIT       NAME: Javi Sy  AGE: 82 y.o. SEX: female       : 1935        MRN: [id number]    Assessment and Plan   1. Diarrhea, unspecified type  -     Clostridium difficile toxin by PCR; Future    Complete stool for c.diff due to recent antibiotic use, 2 course of amoxicillin and also bactrim. Reduce motegrity to every other day. Will be in contact with results of c.diff, and will obtain update on how she is doing at this time. 2. 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, without long-term current use of insulin ([facility])    Doing well with sugars, fasting sugar 111 today. Using Humulin N 8 units in the morning 4 units in the evening. Continue metformin and Januvia. She remains in contact with Teodora Seth clinical pharmacist twice weekly while on prednisone taper to review blood glucoses and make adjustments to insulin as needed. Chief Complaint     Chief Complaint   Patient presents with   • Diarrhea     Persistent       History of Present Illness     Javi Sy is an 82year old female presenting today for diarrhea. Lower abdominal pain, cramping. Diarrhea. Loose stools. 3 stools yesterday. 2 so far already today. Was taking Bactrim under the direction of pulmonology for interstitial lung disease--stopped now. Was also being treated by her dentist for an abscess, took 2 courses of amoxicillin. Symptoms seemed to get better, but now worse again. Did get better, but then restarted. Accompanied by her son, Rian, today. He notes she has been canceling appointments, not getting to grocery store due to afraid to go out, with diarrhea. Tried imodium 2 times, didn't help. IBgard--started to take this last night. Prednisone--down to 10 mg daily. Plan is to be on 5 mg long term. Probitiotic taking daily. Blood sugars have been good.    In contact with clinical Term  [facility]    -Low intermediate TSB, 9.3 @ 42 hrs  -Breastfeeding well   pharmacist weekly, managing sugars on corticosteroids. Fasting sugar 111 today. Eating feels better. Usually struggles with constipation--taking Motegrity daily. Eating regular foods. Milk, yogurt, cheese. Not making her feel worse. No edema, has been propping feet up as much as able at home. Review of Systems   Review of Systems   Constitutional: Positive for fatigue. Negative for diaphoresis and fever. Respiratory: Positive for shortness of breath. Gastrointestinal: Positive for abdominal pain and diarrhea. Negative for nausea and vomiting. Neurological: Negative. I have reviewed the patient's medical history in detail; there are no changes to the history as noted in the electronic medical record. Objective     Vitals:    07/03/23 1054   BP: 150/80   Pulse: 89   Resp: 16   Temp: (!) 97.3 °F (36.3 °C)   TempSrc: Temporal   SpO2: 99%   Weight: 49.9 kg (110 lb)   Height: 5' 6" (1.676 m)     Wt Readings from Last 3 Encounters:   07/03/23 49.9 kg (110 lb)   06/21/23 49.8 kg (109 lb 12.8 oz)   05/31/23 49 kg (108 lb)     Physical Exam  Vitals and nursing note reviewed. Constitutional:       General: She is not in acute distress. Appearance: Normal appearance. She is well-developed. She is not ill-appearing. HENT:      Head: Normocephalic and atraumatic. Cardiovascular:      Rate and Rhythm: Normal rate and regular rhythm. Heart sounds: No murmur heard. Pulmonary:      Effort: Pulmonary effort is normal.      Breath sounds: Normal breath sounds. Abdominal:      General: Bowel sounds are normal.      Palpations: Abdomen is soft. Tenderness: There is no abdominal tenderness. Musculoskeletal:      Cervical back: Normal range of motion and neck supple. Neurological:      Mental Status: She is alert and oriented to person, place, and time.    Psychiatric:         Mood and Affect: Mood normal. ALLERGIES:  Allergies   Allergen Reactions   • Keflex [Cephalexin] Diarrhea       Current Medications     Current Outpatient Medications   Medication Sig Dispense Refill   • acetaminophen (TYLENOL) 500 mg tablet Take 1,000 mg by mouth as needed for mild pain     • Alphagan P 0.1 % INSTILL 1 DROP RIGHT EYE TWICE A DAY     • aspirin (ECOTRIN LOW STRENGTH) 81 mg EC tablet Take 1 tablet (81 mg total) by mouth daily     • bimatoprost (LUMIGAN) 0.01 % ophthalmic drops Administer 1 drop to both eyes daily at bedtime for 30 days 1.5 mL 0   • calcium carbonate (OS-DANIA) 600 MG tablet Take 600 mg by mouth 2 (two) times a day with meals       • Eliquis 2.5 MG TAKE 1 TABLET (2.5 MG TOTAL) BY MOUTH 2 (TWO) TIMES A DAY 60 tablet 5   • ezetimibe-simvastatin (VYTORIN) 10-40 mg per tablet TAKE 1 TABLET BY MOUTH DAILY AT BEDTIME 30 tablet 5   • famotidine (PEPCID) 20 mg tablet TAKE 1 TABLET (20 MG TOTAL) BY MOUTH 2 (TWO) TIMES A DAY 60 tablet 5   • glucose blood (OneTouch Ultra) test strip TEST FOUR TIMES A  strip 1   • insulin isophane (HumuLIN N KwikPen) 100 units/mL injection pen Inject 8 Units under the skin every morning AND 4 Units every evening.  15 mL 3   • Insulin Pen Needle (BD Pen Needle Domitila 2nd Gen) 32G X 4 MM MISC Use 2 (two) times a day 100 each 5   • ipratropium (ATROVENT) 0.03 % nasal spray USE 2 SPRAYS INTO EACH NOSTRIL EVERY 12 (TWELVE) HOURS 30 mL 6   • Januvia 100 MG tablet TAKE 1 TABLET (100 MG TOTAL) BY MOUTH DAILY 30 tablet 5   • loratadine (CLARITIN) 10 mg tablet Take 10 mg by mouth daily as needed for allergies     • LORazepam (ATIVAN) 0.5 mg tablet TAKE 2 TABLETS (1 MG TOTAL) BY MOUTH DAILY AT BEDTIME 60 tablet 2   • metFORMIN (GLUCOPHAGE) 500 mg tablet TAKE 2 TABLETS (1,000 MG TOTAL) BY MOUTH 2 (TWO) TIMES A DAY WITH MEALS 120 tablet 5   • metoprolol tartrate (LOPRESSOR) 25 mg tablet Take 1 tablet (25 mg total) by mouth every 12 (twelve) hours 180 tablet 0   • Motegrity 2 MG TABS TAKE 2 MG BY MOUTH DAILY 90 tablet 3   • Multiple Vitamin (multivitamin) tablet Take 1 tablet by mouth daily       • pantoprazole (PROTONIX) 20 mg tablet TAKE 1 TABLET (20 MG TOTAL) BY MOUTH 2 (TWO) TIMES A DAY 60 tablet 3   • predniSONE 10 mg tablet Take 2 tablets (20 mg total) by mouth daily for 14 days, THEN 1 tablet (10 mg total) daily for 14 days, THEN 0.5 tablets (5 mg total) daily. 57 tablet 0   • sodium chloride 1 g tablet Take 1 tablet (1 g total) by mouth 2 (two) times a day 270 tablet 1   • amoxicillin (AMOXIL) 500 mg capsule  (Patient not taking: Reported on 7/3/2023)       No current facility-administered medications for this visit.          Health Maintenance     Health Maintenance   Topic Date Due   • COVID-19 Vaccine (5 - Pfizer series) 11/03/2022   • Depression Remission PHQ  08/03/2023   • Influenza Vaccine (1) 09/01/2023   • Fall Risk  09/27/2023   • Medicare Annual Wellness Visit (AWV)  09/27/2023   • BMI: Followup Plan  10/07/2023   • Urinary Incontinence Screening  09/27/2023   • HEMOGLOBIN A1C  11/30/2023   • DM Eye Exam  12/08/2023   • Diabetic Foot Exam  01/11/2024   • BMI: Adult  07/03/2024   • Colorectal Cancer Screening  09/02/2025   • Hepatitis C Screening  Completed   • Osteoporosis Screening  Completed   • Pneumococcal Vaccine: 65+ Years  Completed   • HIB Vaccine  Aged Out   • IPV Vaccine  Aged Out   • Hepatitis A Vaccine  Aged Out   • Meningococcal ACWY Vaccine  Aged Out   • HPV Vaccine  Aged Out     Immunization History   Administered Date(s) Administered   • COVID-19 PFIZER VACCINE 0.3 ML IM 01/22/2021, 02/12/2021, 09/30/2021, 09/08/2022   • INFLUENZA 11/02/2016, 10/16/2017, 09/18/2018, 09/18/2018, 11/06/2019   • Influenza, Quadrivalent (nasal) 11/02/2016   • Influenza, high dose seasonal 0.7 mL 11/06/2019, 10/06/2020, 11/09/2021, 11/15/2022   • Pneumococcal Conjugate 13-Valent 07/25/2019   • Pneumococcal Polysaccharide PPV23 03/17/2003   • Zoster 07/09/2010       Kimberly Craner, ZANDER

## 2023-07-06 ENCOUNTER — TELEPHONE (OUTPATIENT)
Dept: FAMILY MEDICINE CLINIC | Facility: CLINIC | Age: 88
End: 2023-07-06

## 2023-07-06 ENCOUNTER — APPOINTMENT (OUTPATIENT)
Dept: LAB | Facility: CLINIC | Age: 88
End: 2023-07-06
Payer: MEDICARE

## 2023-07-06 DIAGNOSIS — J84.9 INTERSTITIAL LUNG DISEASE (HCC): Primary | ICD-10-CM

## 2023-07-06 DIAGNOSIS — R19.7 DIARRHEA, UNSPECIFIED TYPE: ICD-10-CM

## 2023-07-06 DIAGNOSIS — E11.42 TYPE 2 DIABETES MELLITUS WITH DIABETIC POLYNEUROPATHY, WITHOUT LONG-TERM CURRENT USE OF INSULIN (HCC): ICD-10-CM

## 2023-07-06 PROCEDURE — 87493 C DIFF AMPLIFIED PROBE: CPT

## 2023-07-06 NOTE — TELEPHONE ENCOUNTER
[address] [facility]  Warren Jaquez, PharmD, BCPS, BCACP     Communication with patient: per telephone     Reason for documentation: follow-up    Recommendations: The following actions were taken today by the Clinical Pharmacist:   1. 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 - Most recent A1c: above goal.   - SMBGs: FBG and Pre-supper BG are within goal  Current DM Regimen:  · Januvia 100 mg daily  · Metformin 1,000 mg BID  · NPH 14 units SQ QAM + 5 units SQ QPM  MEDICATIONS: Will reduce NPH, given reduction in steroid dose. Will continue to closely monitor for any hypoglycemic symptoms    HOME MONITORING: Check blood sugars 2 times daily (AM + PM) + any hypoglycemic sx  and record.     Interstitial lung disease  Followed by Pulmonology. On long-term taper: prednisone 20 mg daily x 14 days, then 10 mg daily x 14 days, then 5 mg daily. Subjective:   Patient reports diarrhea has returned. Seen by PCP for loose stools. Has order for C. diff testing. No hypoglycemic symptoms or events; patient typically symptomatic if BG < 80.     Today, patient reduce steroids from 10 mg to 5 mg daily. Pulmonology plans to continue patient on low-dose steroids long-term. Objective:   SMBG readings (mg/dL):      Average Min Max Goal     109 159 90 150 FBG Average: 132.9 mg/dl (109-159 mg/dl), n=8, 0/8= < 70 mg/dl   Pre-Supper  107 193 90 150 Pre-Supper BG Average: 160 mg/dl (107-193 mg/dl), n=6, 0/6= < 70 mg/dl   Random 143 143 143 90 180 Random Average: 143 mg/dl (143-143 mg/dl), n=1, 0/1= < 70 mg/dl Total # of reading  < 70 mg/dl: 0/15     Pharmacist Tracking Tool  Reason For Outreach: Embedded Pharmacist  Demographics:  Intervention Method: Phone  Type of Intervention: Follow-Up  Topics Addressed: Diabetes  Pharmacologic Interventions: Prevent or Manage JUAN  Non-Pharmacologic Interventions: Home Monitoring  Time:  Direct Patient Care: 25 mins  Care Coordination: 15 mins  Recommendation Recipient: Patient/Caregiver  Outcome: Accepted

## 2023-07-07 ENCOUNTER — TELEPHONE (OUTPATIENT)
Dept: FAMILY MEDICINE CLINIC | Facility: CLINIC | Age: 88
End: 2023-07-07

## 2023-07-07 DIAGNOSIS — E11.42 TYPE 2 DIABETES MELLITUS WITH DIABETIC POLYNEUROPATHY, WITHOUT LONG-TERM CURRENT USE OF INSULIN (HCC): ICD-10-CM

## 2023-07-07 DIAGNOSIS — J84.9 INTERSTITIAL LUNG DISEASE (HCC): Primary | ICD-10-CM

## 2023-07-07 LAB — C DIFF TOX GENS STL QL NAA+PROBE: NEGATIVE

## 2023-07-07 NOTE — TELEPHONE ENCOUNTER
5314 Alok Bejarano, PharmD, BCPS, BCACP     Communication with patient: per telephone     Reason for documentation: follow-up    Recommendations: The following actions were taken today by the Clinical Pharmacist:   1. Decrease NPH to 12 units SQ QAM + 3 units SQ QPM    Follow-up:   Follow-up with pharmacist in 4-7 days      Findings:   Patient reports hypoglycemic event overnight last night. Feel asleep while watching TV ~ 8pm.     Woke up around 11:45 pm. Initially felt ok. Then began to feel shaky. Checked BG, was 77. Treated symptoms with honey and milk. BG improved to  127. BG this morning at 9:45 am was 145. Patient recently decreased steroids from 10 mg to 5 mg daily.      Current DM Regimen:  • Januvia 100 mg daily  • Metformin 1,000 mg BID  • NPH 12 units SQ QAM + 5 units SQ QPM    Pharmacist Tracking Tool  Reason For Outreach: Embedded Pharmacist  Demographics:  Intervention Method: Phone  Type of Intervention: Follow-Up  Topics Addressed: Diabetes  Pharmacologic Interventions: Dose or Frequency Adjusted and Prevent or Manage JUAN  Non-Pharmacologic Interventions: Home Monitoring  Time:  Direct Patient Care: 10 mins  Care Coordination: 10 mins  Recommendation Recipient: Patient/Caregiver  Outcome: Accepted

## 2023-07-10 ENCOUNTER — TELEPHONE (OUTPATIENT)
Dept: FAMILY MEDICINE CLINIC | Facility: CLINIC | Age: 88
End: 2023-07-10

## 2023-07-10 DIAGNOSIS — E11.42 TYPE 2 DIABETES MELLITUS WITH DIABETIC POLYNEUROPATHY, WITHOUT LONG-TERM CURRENT USE OF INSULIN (HCC): ICD-10-CM

## 2023-07-10 DIAGNOSIS — J84.9 INTERSTITIAL LUNG DISEASE (HCC): Primary | ICD-10-CM

## 2023-07-10 NOTE — TELEPHONE ENCOUNTER
Pt calling and would like results of Clostridium difficile toxin by PCR test, they are negative but in voicemail she states she is still having diarrhea it hasn't gotten any better.  Please advise

## 2023-07-10 NOTE — TELEPHONE ENCOUNTER
5314 Alok Bob, PharmD, BCPS, BCACP     Communication with patient: per telephone     Reason for documentation: follow-up    Recommendations: The following actions were taken today by the Clinical Pharmacist:   1. Continue NPH 12 units SQ QAM + 3 units SQ QPM  2. 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 - Most recent A1c: above goal.   - SMBGs: FBG and Pre-supper BG are within goal  Current DM Regimen:  · Januvia 100 mg daily  · Metformin 1,000 mg BID  · NPH 12 units SQ QAM + 3 units SQ QPM  MEDICATIONS: No medication change warranted. Will continue to closely monitor for any hypoglycemic symptoms    HOME MONITORING: Check blood sugars 2 times daily (AM + PM) + any hypoglycemic sx  and record.     Interstitial lung disease  Followed by Pulmonology. On long-term taper: prednisone 20 mg daily x 14 days, then 10 mg daily x 14 days, then 5 mg daily. Subjective:   Patient reports ongoing diarrhea, frequent BMs and loose stools. C. diff testing is negative. PCP instructed patient to D/C Motegrity. No hypoglycemic symptoms or events; patient typically symptomatic if BG < 80.     Patient currently taking prednisone 5 mg daily. Pulmonology plans to continue patient on low-dose steroids long-term. Objective:   SMBG readings (mg/dL):      Average Min Max Goal      110 135 90 150 FBG Average: 125 mg/dl (110-135 mg/dl), n=3, 0/3= < 70 mg/dl   Pre-Supper  135 259 90 150 Pre-Supper BG Average: 177 mg/dl (135-259 mg/dl), n=3, 0/3= < 70 mg/dl Random 117 117 117 90 180 Random Average: 117 mg/dl (117-117 mg/dl), n=1, 0/1= < 70 mg/dl         Total # of reading  < 70 mg/dl: 0/7     Pharmacist Tracking Tool  Reason For Outreach: Embedded Pharmacist  Demographics:  Intervention Method: Phone  Type of Intervention: Follow-Up  Topics Addressed: Diabetes  Pharmacologic Interventions: Dose or Frequency Adjusted and Prevent or Manage JUAN  Non-Pharmacologic Interventions: Home Monitoring  Time:  Direct Patient Care: 25 mins  Care Coordination: 15 mins  Recommendation Recipient: Patient/Caregiver  Outcome: Accepted

## 2023-07-14 ENCOUNTER — REMOTE DEVICE CLINIC VISIT (OUTPATIENT)
Dept: CARDIOLOGY CLINIC | Facility: CLINIC | Age: 88
End: 2023-07-14
Payer: MEDICARE

## 2023-07-14 DIAGNOSIS — Z95.0 PRESENCE OF PERMANENT CARDIAC PACEMAKER: Primary | ICD-10-CM

## 2023-07-14 PROCEDURE — 93296 REM INTERROG EVL PM/IDS: CPT | Performed by: INTERNAL MEDICINE

## 2023-07-14 PROCEDURE — 93294 REM INTERROG EVL PM/LDLS PM: CPT | Performed by: INTERNAL MEDICINE

## 2023-07-14 NOTE — PROGRESS NOTES
Results for orders placed or performed in visit on 07/14/23   Cardiac EP device report    Narrative    MDT-DUAL CHAMBER PPM (AAIR-DDDR MODE)/ ACTIVE SYSTEM IS MRI CONDITIONAL  CARELINK TRANSMISSION: BATTERY VOLTAGE ADEQUATE. (3.5 YRS) AP 76%  <1%. ALL AVAILABLE LEAD PARAMETERS WITHIN NORMAL LIMITS. NO SIGNIFICANT HIGH RATE EPISODES. NORMAL DEVICE FUNCTION. ---CHERRY

## 2023-07-17 ENCOUNTER — TELEPHONE (OUTPATIENT)
Dept: FAMILY MEDICINE CLINIC | Facility: CLINIC | Age: 88
End: 2023-07-17

## 2023-07-17 DIAGNOSIS — J84.9 INTERSTITIAL LUNG DISEASE (HCC): ICD-10-CM

## 2023-07-17 DIAGNOSIS — E11.42 TYPE 2 DIABETES MELLITUS WITH DIABETIC POLYNEUROPATHY, WITHOUT LONG-TERM CURRENT USE OF INSULIN (HCC): Primary | ICD-10-CM

## 2023-07-17 NOTE — TELEPHONE ENCOUNTER
[id number] Alok Lara, PharmD, BCPS, BCACP     Communication with patient: per telephone     Reason for documentation: follow-up    Recommendations: The following actions were taken today by the Clinical Pharmacist:   1. Decrease NPH to 10 units SQ QAM + 3 units SQ QPM    Follow-up:   Follow-up with pharmacist in 4-7 days  Next PCP visit: 8/7/2023  Next Pulmonary visit: 8/9/2023      Findings:   Patient reports hypoglycemic event yesterday. Fasting BG of 129. Ate breakfast and took NPH 12 units just before 8 am.     Began to feel very shaky around noon. Checked BG, was 75. Treated symptoms by eating lunch. PM BG was 191. BG this morning was 132. Patient continues to take prednisone 5 mg daily. She has scheduled follow-up appointment with Pulm to discuss long-term treatment plan.       Current DM Regimen:  • Januvia 100 mg daily  • Metformin 1,000 mg BID  • NPH 12 units SQ QAM + 3 units SQ QPM    Pharmacist Tracking Tool  Reason For Outreach: Embedded Pharmacist  Demographics:  Intervention Method: Phone  Type of Intervention: Follow-Up  Topics Addressed: Diabetes  Pharmacologic Interventions: Dose or Frequency Adjusted and Prevent or Manage JUAN  Non-Pharmacologic Interventions: Home Monitoring  Time:  Direct Patient Care: 20 mins  Care Coordination: 10 mins  Recommendation Recipient: Patient/Caregiver  Outcome: Accepted

## 2023-07-18 ENCOUNTER — TELEPHONE (OUTPATIENT)
Dept: FAMILY MEDICINE CLINIC | Facility: CLINIC | Age: 88
End: 2023-07-18

## 2023-07-18 NOTE — TELEPHONE ENCOUNTER
Please have her monitor symptoms over the next 2-3 days and let me know if she is not feeling better.

## 2023-07-18 NOTE — TELEPHONE ENCOUNTER
Patient called and stated that she has been feeling better since Friday and has 3 good days until yesterday. Her stools are extremely soft today and she is having slight cramps in her stomach again and didn't know if she should do something in the meantime or just wait to see what happens.   Please call to advise

## 2023-07-21 ENCOUNTER — TELEPHONE (OUTPATIENT)
Dept: FAMILY MEDICINE CLINIC | Facility: CLINIC | Age: 88
End: 2023-07-21

## 2023-07-21 DIAGNOSIS — E11.42 TYPE 2 DIABETES MELLITUS WITH DIABETIC POLYNEUROPATHY, WITHOUT LONG-TERM CURRENT USE OF INSULIN (HCC): Primary | ICD-10-CM

## 2023-07-21 NOTE — TELEPHONE ENCOUNTER
[id number] Alok Champion, PharmD, BCPS, BCACP     Communication with patient: per telephone     Reason for documentation: follow-up    Recommendations:     Hypoglycemic episode today. Patient was symptomatic, although was not able to test BS initially. Patient has had hypoglycemic events, despite decreases in NPH insulin dose. The following actions were taken today by the Clinical Pharmacist:   1. Decrease NPH to 8 units SQ QAM + 3 units SQ QPM  2. Encouraged patient to increase BG testing if change in routine (activity increases, smaller/delayed meals)    Follow-up:   Follow-up with pharmacist in 4-7 days  Next PCP visit: 8/7/2023  Next Pulmonary visit: 8/9/2023      Discussion:   Patient reports episode of weakness in grocery store today. Occurred around noon. States she had difficulty walking. No dizziness, lightheadedness. Denies confusion or difficulty speaking. No vision changes. No SOB. Did not fall. She did take her AM NPH and eat breakfast this morning. She did not check her BG when symptoms occurred. A friend helped her get home. She ate a light lunch (tortilla, piece of cheese, diet soda) and went to sleep. She slept for several hours. During our telephone visit, patient continued to feel fatigued. Instructed patient to check BG. Reading was 77. Patient ate several Tbsp of honey and drank milk. Was on phone for over 20 minutes. Re-check of BS was 96. Patient states she plans to eat dinner. Says she is feeling better. Discussed switching to Lantus given potential for less hypoglycemia. However, patient would prefer to wait until after Pulmonology follow-up to clarify ongoing steroid requirements. Patient continues to take prednisone 5 mg daily. She has scheduled follow-up appointment with Pulm to discuss long-term treatment plan.       Current DM Regimen:  • Januvia 100 mg daily  • Metformin 1,000 mg BID  • NPH 10 units SQ QAM + 3 units SQ QPM    Objective:   SMBG readings (mg/dL):      Average Min Max Goal      121 153 90 150 FBG Average: 134 mg/dl (121-153 mg/dl), n=4, 0/4= < 70 mg/dl   Pre-Lunch  148 148 90 150 Pre-Lunch BG Average: 148 mg/dl (148-148 mg/dl), n=1, 0/1= < 70 mg/dl   Pre-Supper  77 207 90 150 Pre-Supper BG Average: 135.5 mg/dl ( mg/dl), n=4, 0/4= < 70 mg/dl         Total # of reading  < 70 mg/dl: 0/9         Pharmacist Tracking Tool  Reason For Outreach: Embedded Pharmacist  Demographics:  Intervention Method: Phone  Type of Intervention: Follow-Up  Topics Addressed: Diabetes  Pharmacologic Interventions: Dose or Frequency Adjusted and Prevent or Manage JUAN  Non-Pharmacologic Interventions: Home Monitoring  Time:  Direct Patient Care: 25 mins  Care Coordination: 15 mins  Recommendation Recipient: Patient/Caregiver  Outcome: Accepted

## 2023-07-24 DIAGNOSIS — J84.9 INTERSTITIAL LUNG DISEASE (HCC): Primary | ICD-10-CM

## 2023-07-24 RX ORDER — PREDNISONE 5 MG/1
5 TABLET ORAL DAILY
Qty: 10 TABLET | Refills: 0 | Status: SHIPPED | OUTPATIENT
Start: 2023-07-24

## 2023-07-24 NOTE — TELEPHONE ENCOUNTER
Patient called to state she is not having issues with diarrhea at this time and is not able to see pulmonology until August 9th and she will be out of her steroids. She was wondering if she could talk to you about this. Should I move up her follow up appointment? Please advise.

## 2023-07-24 NOTE — TELEPHONE ENCOUNTER
Spoke with Nano via telephone. No more diarrhea. Starting with constipation. Resume Metamucil daily, if still constipated restart Motegrity every other day. I sent in 10 tablets of 5 mg prednisone tablets to get her through until her pulmonology appointment on August 9th. As per pulmonology notes June 21st, she was to continue prednisone 5 mg daily at least until next appointment. She has follow up with me on August 7th. Will call with any questions in the interim.

## 2023-07-27 ENCOUNTER — TELEPHONE (OUTPATIENT)
Dept: FAMILY MEDICINE CLINIC | Facility: CLINIC | Age: 88
End: 2023-07-27

## 2023-07-27 DIAGNOSIS — E11.42 TYPE 2 DIABETES MELLITUS WITH DIABETIC POLYNEUROPATHY, WITHOUT LONG-TERM CURRENT USE OF INSULIN (HCC): Primary | ICD-10-CM

## 2023-07-27 NOTE — TELEPHONE ENCOUNTER
[address] [facility]  Jia Martinez, PharmD, BCPS, BCACP     Communication with patient: per telephone     Reason for documentation: follow-up    Recommendations: The following actions were taken today by the Clinical Pharmacist:   1. 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 - Most recent A1c: above goal.   - SMBGs: FBG and Pre-supper readings slightly above BG target; however, primary goal at this time is avoidance of hypoglycemia  Current DM Regimen:  • Januvia 100 mg daily  • Metformin 1,000 mg BID  • NPH 8 units SQ QAM + 3 units SQ QPM  MEDICATIONS: No changes. HOME MONITORING: Check blood sugars 2 times daily (AM + PM) + any hypoglycemic sx  and record.     Interstitial lung disease  Followed by Pulmonology. On long-term taper: prednisone 20 mg daily x 14 days, then 10 mg daily x 14 days, then 5 mg daily.       Subjective:   Patient reports feeling well overall.  Denies fatigue or SOB.     No hypoglycemic events; patient typically symptomatic if BG < 80.     Patient currently taking prednisone 5 mg daily.      Objective:   SMBG readings (mg/dL):      Average Min Max Goal     134 173 90 150 FBG Average: 151.3 mg/dl (134-173 mg/dl), n=7, 0/7= < 70 mg/dl   Pre-Lunch  149 149 90 150 Pre-Lunch BG Average: 149 mg/dl (149-149 mg/dl), n=1, 0/1= < 70 mg/dl   Pre-Supper  77 203 90 150 Pre-Supper BG Average: 155.7 mg/dl ( mg/dl), n=7, 0/7= < 70 mg/dl   HS BG 96 96 96 100 180 HS BG Average: 96 mg/dl (96-96 mg/dl), n=1, 0/1= < 70 mg/dl Total # of reading  < 70 mg/dl: 0/16     Pharmacist Tracking Tool  Reason For Outreach: Embedded Pharmacist  Demographics:  Intervention Method: Phone  Type of Intervention: Follow-Up  Topics Addressed: Diabetes  Pharmacologic Interventions: Prevent or Manage JUAN  Non-Pharmacologic Interventions: Home Monitoring  Time:  Direct Patient Care: 25 mins  Care Coordination: 15 mins  Recommendation Recipient: Patient/Caregiver  Outcome: Accepted

## 2023-07-28 DIAGNOSIS — E11.42 TYPE 2 DIABETES MELLITUS WITH DIABETIC POLYNEUROPATHY, WITH LONG-TERM CURRENT USE OF INSULIN (HCC): ICD-10-CM

## 2023-07-28 DIAGNOSIS — Z79.4 TYPE 2 DIABETES MELLITUS WITH DIABETIC POLYNEUROPATHY, WITH LONG-TERM CURRENT USE OF INSULIN (HCC): ICD-10-CM

## 2023-08-02 ENCOUNTER — IOP CHECK (OUTPATIENT)
Dept: URBAN - METROPOLITAN AREA CLINIC 6 | Facility: CLINIC | Age: 88
End: 2023-08-02

## 2023-08-02 DIAGNOSIS — H40.1132: ICD-10-CM

## 2023-08-02 DIAGNOSIS — H26.493: ICD-10-CM

## 2023-08-02 DIAGNOSIS — Z96.1: ICD-10-CM

## 2023-08-02 DIAGNOSIS — E11.9: ICD-10-CM

## 2023-08-02 DIAGNOSIS — H16.223: ICD-10-CM

## 2023-08-02 PROCEDURE — 92020 GONIOSCOPY: CPT

## 2023-08-02 PROCEDURE — 92133 CPTRZD OPH DX IMG PST SGM ON: CPT

## 2023-08-02 PROCEDURE — 92202 OPSCPY EXTND ON/MAC DRAW: CPT

## 2023-08-02 PROCEDURE — 92014 COMPRE OPH EXAM EST PT 1/>: CPT

## 2023-08-02 ASSESSMENT — VISUAL ACUITY
OD_CC: 20/200
OS_CC: 20/50+1

## 2023-08-02 ASSESSMENT — TONOMETRY: OD_IOP_MMHG: 13

## 2023-08-03 ENCOUNTER — TELEPHONE (OUTPATIENT)
Dept: FAMILY MEDICINE CLINIC | Facility: CLINIC | Age: 88
End: 2023-08-03

## 2023-08-03 DIAGNOSIS — Z79.4 TYPE 2 DIABETES MELLITUS WITH DIABETIC POLYNEUROPATHY, WITH LONG-TERM CURRENT USE OF INSULIN (HCC): Primary | ICD-10-CM

## 2023-08-03 DIAGNOSIS — E11.42 TYPE 2 DIABETES MELLITUS WITH DIABETIC POLYNEUROPATHY, WITH LONG-TERM CURRENT USE OF INSULIN (HCC): Primary | ICD-10-CM

## 2023-08-03 NOTE — TELEPHONE ENCOUNTER
[id number] Alok Morelos, PharmD, BCPS, BCACP     Communication with patient: per telephone     Reason for documentation: follow-up    Recommendations: The following actions were taken today by the Clinical Pharmacist:   1. 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 - Most recent A1c: above goal.   - SMBGs: FBG and Pre-supper readings are above BG target; however, primary goal at this time is avoidance of hypoglycemia  Current DM Regimen:  • Januvia 100 mg daily  • Metformin 1,000 mg BID  • NPH 8 units SQ QAM + 3 units SQ QPM  MEDICATIONS: No changes at this time. Will await any decisions from PCP or Pulmonology regarding prednisone before changing insulin. HOME MONITORING: Check blood sugars 2 times daily (AM + PM) + any hypoglycemic sx  and record.     Interstitial lung disease  Followed by Pulmonology. On long-term taper: prednisone 20 mg daily x 14 days, then 10 mg daily x 14 days, then 5 mg daily.       Subjective:   Patient reports ongoing fatigue. Denies SOB.     No hypoglycemic events; patient typically symptomatic if BG < 80.     Patient currently taking prednisone 5 mg daily.      Objective:   SMBG readings (mg/dL):      Average Min Max Goal     134 207 90 150 FBG Average: 158.1 mg/dl (134-207 mg/dl), n=8, 0/8= < 70 mg/dl   Pre-Supper  163 263 90 150 Pre-Supper BG Average: 194.9 mg/dl (163-263 mg/dl), n=8, 0/8= < 70 mg/dl         Total # of reading  < 70 mg/dl: 0/16     Pharmacist Tracking Tool  Reason For Outreach: Embedded Pharmacist  Demographics:  Intervention Method: Phone  Type of Intervention: Follow-Up  Topics Addressed: Diabetes  Pharmacologic Interventions: Prevent or Manage JUAN  Non-Pharmacologic Interventions: Home Monitoring  Time:  Direct Patient Care: 20 mins  Care Coordination: 10 mins  Recommendation Recipient: Patient/Caregiver  Outcome: Accepted

## 2023-08-07 ENCOUNTER — OFFICE VISIT (OUTPATIENT)
Dept: FAMILY MEDICINE CLINIC | Facility: CLINIC | Age: 88
End: 2023-08-07
Payer: MEDICARE

## 2023-08-07 VITALS
OXYGEN SATURATION: 98 % | TEMPERATURE: 97.6 F | WEIGHT: 111.6 LBS | HEART RATE: 76 BPM | SYSTOLIC BLOOD PRESSURE: 120 MMHG | HEIGHT: 66 IN | DIASTOLIC BLOOD PRESSURE: 70 MMHG | BODY MASS INDEX: 17.94 KG/M2 | RESPIRATION RATE: 16 BRPM

## 2023-08-07 DIAGNOSIS — E78.2 MIXED HYPERLIPIDEMIA: ICD-10-CM

## 2023-08-07 DIAGNOSIS — E11.69 TYPE 2 DIABETES MELLITUS WITH HYPERLIPIDEMIA (HCC): Primary | ICD-10-CM

## 2023-08-07 DIAGNOSIS — E55.9 HYPOVITAMINOSIS D: ICD-10-CM

## 2023-08-07 DIAGNOSIS — E53.8 LOW SERUM VITAMIN B12: ICD-10-CM

## 2023-08-07 DIAGNOSIS — J84.9 INTERSTITIAL LUNG DISEASE (HCC): ICD-10-CM

## 2023-08-07 DIAGNOSIS — I10 HYPERTENSION, UNSPECIFIED TYPE: ICD-10-CM

## 2023-08-07 DIAGNOSIS — E78.5 TYPE 2 DIABETES MELLITUS WITH HYPERLIPIDEMIA (HCC): Primary | ICD-10-CM

## 2023-08-07 LAB — SL AMB POCT HEMOGLOBIN AIC: 7.7 (ref ?–6.5)

## 2023-08-07 PROCEDURE — 99214 OFFICE O/P EST MOD 30 MIN: CPT | Performed by: NURSE PRACTITIONER

## 2023-08-07 PROCEDURE — 83036 HEMOGLOBIN GLYCOSYLATED A1C: CPT | Performed by: NURSE PRACTITIONER

## 2023-08-07 NOTE — PROGRESS NOTES
FAMILY PRACTICE OFFICE VISIT       NAME: Amy Simon  AGE: 82 y.o. SEX: female       : 1935        MRN: [id number]    Assessment and Plan   1. Type 2 diabetes mellitus with hyperlipidemia [facility])  Assessment & Plan:    Lab Results   Component Value Date    HGBA1C 7.7 (A) 2023     A1c has improved from 9.1% to 7.7%. Will continue metformin, januvia, and current insulin--Humulin N 8 units in the morning and 3 units in the evening. She will keep in contact with me or Lorelei Fay, clinical pharmacist regarding prednisone dosing and sugar logs. Orders:  -     POCT hemoglobin A1c  -     Hemoglobin A1C; Future; Expected date: 2023  -     Albumin / creatinine urine ratio; Future    2. Hypertension, unspecified type  Assessment & Plan:  Stable blood pressure on current medication. Orders:  -     CBC and differential; Future; Expected date: 2023  -     Comprehensive metabolic panel; Future; Expected date: 10/21/2023  -     TSH, 3rd generation with Free T4 reflex; Future; Expected date: 2023    3. Mixed hyperlipidemia  Assessment & Plan:  LDL 56. Continue Vytorin. Lipid panel with next blood work . Orders:  -     Lipid panel; Future; Expected date: 2023  -     TSH, 3rd generation with Free T4 reflex; Future; Expected date: 2023    4. Hypovitaminosis D  Assessment & Plan:  Continue current vitamin D supplement. Vitamin D 25 hydroxy with next blood work. Orders:  -     Vitamin D 25 hydroxy; Future; Expected date: 2023    5. Low serum vitamin B12  -     Vitamin B12; Future; Expected date: 2023    6. Interstitial lung disease ([address])  Assessment & Plan:  Currently on prednisone 5 mg daily. Has pulmonology follow up later this week. She will let me know if prednisone dose changes. 3 month follow up.     Chief Complaint     Chief Complaint   Patient presents with   • Follow-up     3m f/u       History of Present Illness     Amy Simon is an 82year old female presenting today for follow up on diabetes. Overall feels at baseline health. Tires easily. Is on prednisone 5 mg daily as per pulmonology for interstitial lung disease. Humulin N insulin 8u am 3 pm. No hypoglycemia on this dose. Now sugars mostly 130-140's. Sometimes 170's-180's.  200's after splurging and eating potato chips. She is checking in with clinical pharmacist Tree Ramirez with sugars and insulin dosing 2 times per week. Her brother has just been diagnosed with interstitial lung disease, age 76. No more diarrhea--constipation now. Just restarted metamucil. Started taking B12 supplement. Feels she does not move enough. Weight stable. Review of Systems   Review of Systems   Constitutional: Positive for fatigue. Negative for chills, diaphoresis and unexpected weight change. HENT: Negative. Respiratory: Positive for shortness of breath. Negative for cough, chest tightness and wheezing. Cardiovascular: Negative. Gastrointestinal: Positive for constipation. Genitourinary: Negative. Skin: Negative. Neurological: Negative. Psychiatric/Behavioral: Negative. I have reviewed the patient's medical history in detail; there are no changes to the history as noted in the electronic medical record. Objective     Vitals:    08/07/23 1039   BP: 120/70   BP Location: Left arm   Patient Position: Sitting   Cuff Size: Standard   Pulse: 76   Resp: 16   Temp: 97.6 °F (36.4 °C)   TempSrc: Temporal   SpO2: 98%   Weight: 50.6 kg (111 lb 9.6 oz)   Height: 5' 6" (1.676 m)     Wt Readings from Last 3 Encounters:   08/09/23 49.9 kg (110 lb)   08/07/23 50.6 kg (111 lb 9.6 oz)   07/03/23 49.9 kg (110 lb)     Physical Exam  Vitals and nursing note reviewed. Constitutional:       General: She is not in acute distress. Appearance: Normal appearance. She is ill-appearing (chronically ill, frail appearing). HENT:      Head: Atraumatic.       Right Ear: Tympanic membrane normal.      Left Ear: Tympanic membrane normal.      Mouth/Throat:      Mouth: Mucous membranes are moist.      Pharynx: Oropharynx is clear. Cardiovascular:      Rate and Rhythm: Normal rate and regular rhythm. Heart sounds: No murmur heard. Pulmonary:      Effort: Pulmonary effort is normal.      Breath sounds: Rales (bibasilar) present. Abdominal:      General: Bowel sounds are normal.      Palpations: Abdomen is soft. Tenderness: There is no abdominal tenderness. Musculoskeletal:      Cervical back: Normal range of motion and neck supple. Right lower leg: No edema. Left lower leg: No edema. Lymphadenopathy:      Cervical: No cervical adenopathy. Skin:     General: Skin is warm and dry. Findings: No rash. Neurological:      Mental Status: She is alert. Psychiatric:         Mood and Affect: Mood normal.       BMI Counseling: Body mass index is 18.01 kg/m². The BMI is below normal. Patient advised to gain weight. Rationale for BMI follow-up plan is due to patient being underweight.          ALLERGIES:  Allergies   Allergen Reactions   • Keflex [Cephalexin] Diarrhea       Current Medications     Current Outpatient Medications   Medication Sig Dispense Refill   • acetaminophen (TYLENOL) 500 mg tablet Take 1,000 mg by mouth as needed for mild pain     • Alphagan P 0.1 % INSTILL 1 DROP RIGHT EYE TWICE A DAY     • aspirin (ECOTRIN LOW STRENGTH) 81 mg EC tablet Take 1 tablet (81 mg total) by mouth daily     • bimatoprost (LUMIGAN) 0.01 % ophthalmic drops Administer 1 drop to both eyes daily at bedtime for 30 days 1.5 mL 0   • calcium carbonate (OS-DANIA) 600 MG tablet Take 600 mg by mouth 2 (two) times a day with meals       • ezetimibe-simvastatin (VYTORIN) 10-40 mg per tablet TAKE 1 TABLET BY MOUTH DAILY AT BEDTIME 30 tablet 5   • famotidine (PEPCID) 20 mg tablet TAKE 1 TABLET (20 MG TOTAL) BY MOUTH 2 (TWO) TIMES A DAY 60 tablet 5   • glucose blood (OneTouch Ultra) test strip TEST FOUR TIMES A  strip 1   • insulin isophane (HumuLIN N KwikPen) 100 units/mL injection pen Inject 8 Units under the skin every morning AND 4 Units every evening. 15 mL 3   • Insulin Pen Needle (BD Pen Needle Domitila 2nd Gen) 32G X 4 MM MISC Use 2 (two) times a day 100 each 5   • ipratropium (ATROVENT) 0.03 % nasal spray USE 2 SPRAYS INTO EACH NOSTRIL EVERY 12 (TWELVE) HOURS 30 mL 6   • loratadine (CLARITIN) 10 mg tablet Take 10 mg by mouth daily as needed for allergies     • metFORMIN (GLUCOPHAGE) 500 mg tablet TAKE 2 TABLETS (1,000 MG TOTAL) BY MOUTH 2 (TWO) TIMES A DAY WITH MEALS 120 tablet 5   • metoprolol tartrate (LOPRESSOR) 25 mg tablet Take 1 tablet (25 mg total) by mouth every 12 (twelve) hours 180 tablet 0   • Motegrity 2 MG TABS TAKE 2 MG BY MOUTH DAILY 90 tablet 3   • Multiple Vitamin (multivitamin) tablet Take 1 tablet by mouth daily       • pantoprazole (PROTONIX) 20 mg tablet TAKE 1 TABLET (20 MG TOTAL) BY MOUTH 2 (TWO) TIMES A DAY 60 tablet 3   • sodium chloride 1 g tablet Take 1 tablet (1 g total) by mouth 2 (two) times a day 270 tablet 1   • amoxicillin (AMOXIL) 500 mg capsule  (Patient not taking: Reported on 7/3/2023)     • Eliquis 2.5 MG TAKE 1 TABLET (2.5 MG TOTAL) BY MOUTH 2 (TWO) TIMES A DAY 60 tablet 5   • Januvia 100 MG tablet TAKE 1 TABLET (100 MG TOTAL) BY MOUTH DAILY 30 tablet 5   • LORazepam (ATIVAN) 0.5 mg tablet Take 2 tablets (1 mg total) by mouth daily at bedtime 60 tablet 2   • predniSONE 5 mg tablet Take 5mg every other day for 2 weeks 10 tablet 0     No current facility-administered medications for this visit.          Health Maintenance     Health Maintenance   Topic Date Due   • Kidney Health Evaluation: Albumin/Creatinine Ratio  06/26/2020   • COVID-19 Vaccine (5 - Pfizer series) 11/03/2022   • Influenza Vaccine (1) 09/01/2023   • Medicare Annual Wellness Visit (AWV)  09/27/2023   • DM Eye Exam  12/08/2023   • Diabetic Foot Exam  01/11/2024   • Depression Remission PHQ  02/07/2024   • HEMOGLOBIN A1C  02/07/2024   • Kidney Health Evaluation: GFR  06/01/2024   • Fall Risk  08/07/2024   • Urinary Incontinence Screening  08/07/2024   • BMI: Adult  08/09/2024   • BMI: Followup Plan  08/13/2024   • Colorectal Cancer Screening  09/02/2025   • Hepatitis C Screening  Completed   • Osteoporosis Screening  Completed   • Pneumococcal Vaccine: 65+ Years  Completed   • HIB Vaccine  Aged Out   • IPV Vaccine  Aged Out   • Hepatitis A Vaccine  Aged Out   • Meningococcal ACWY Vaccine  Aged Out   • HPV Vaccine  Aged Out     Immunization History   Administered Date(s) Administered   • COVID-19 PFIZER VACCINE 0.3 ML IM 01/22/2021, 02/12/2021, 09/30/2021, 09/08/2022   • INFLUENZA 11/02/2016, 10/16/2017, 09/18/2018, 09/18/2018, 11/06/2019   • Influenza, Quadrivalent (nasal) 11/02/2016   • Influenza, high dose seasonal 0.7 mL 11/06/2019, 10/06/2020, 11/09/2021, 11/15/2022   • Pneumococcal Conjugate 13-Valent 07/25/2019   • Pneumococcal Polysaccharide PPV23 03/17/2003   • Zoster 07/09/2010       ZANDER Tolbert

## 2023-08-09 ENCOUNTER — OFFICE VISIT (OUTPATIENT)
Dept: PULMONOLOGY | Facility: CLINIC | Age: 88
End: 2023-08-09
Payer: MEDICARE

## 2023-08-09 VITALS
HEART RATE: 85 BPM | WEIGHT: 110 LBS | HEIGHT: 66 IN | TEMPERATURE: 96.1 F | SYSTOLIC BLOOD PRESSURE: 108 MMHG | RESPIRATION RATE: 16 BRPM | DIASTOLIC BLOOD PRESSURE: 78 MMHG | BODY MASS INDEX: 17.68 KG/M2 | OXYGEN SATURATION: 99 %

## 2023-08-09 DIAGNOSIS — J84.9 INTERSTITIAL LUNG DISEASE (HCC): Primary | ICD-10-CM

## 2023-08-09 PROCEDURE — 99214 OFFICE O/P EST MOD 30 MIN: CPT | Performed by: INTERNAL MEDICINE

## 2023-08-09 RX ORDER — PREDNISONE 5 MG/1
TABLET ORAL
Qty: 10 TABLET | Refills: 0 | Status: SHIPPED | OUTPATIENT
Start: 2023-08-09

## 2023-08-09 NOTE — PROGRESS NOTES
Pulmonary Follow Up Note   Armand Dodge 82 y.o. female MRN: [id number]  8/8/2023      Assessment:  Armand Dodge is a 82 y.o. 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 Plan:  Interstitial lung disease ([address])  Appears to have had steroid responsive disease in the past supporting the diagnosis of NSIP  - CT chest high resolution; Future  - Complete PFT with post bronchodilator; Future  - Kappa/Lambda Light Chains Free With Ratio, Serum; Future  - PredniSONE 5 mg tablet; Take 5mg every other day for 2 weeks  - If improvement in PFTs and HRCT, will consider initiating steroid sparing immunosuppression       History of Present Illness   HPI:  Armand Dodge is a 82 y.o. 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 Last seen by Dr. Blank Brink on 6/21/2023 for evaluation of diarrhea. Her bactrim was discontinued and her prednisone taper was continued. Current dose of prednisone 5mg daily. Patient reports her breathing symptoms are at baseline. She does not feel that she has dramatically improved from the steroids, however, she does note that she walked up the incline to the pulmonary office as well as the steps and was surprised that she tolerated it. She did feel a little short of breath. She does have a nonproductive cough. She reports that her brother was recently diagnosed with IPF. On review of her chart, she has had multiple intermittent doses of steroids. Prior course was in November-December 2022. During that time, she did report improvement in her breathing. She had repeat PFTs after which showed improvement in her FEV1 (see below). Patient was started on a prednisone taper in June at 20mg for 2 weeks then taper by 10mg to reach maintenance on 5mg daily. She is currently on 5mg daily.     PFT Trend:   FEV1 1.69L 11/2020  FEV1 1.52L 04/2021  FEV1 1.41L 08/2022  FEV1 1.67L 01/2023  FEV1 1.41L 03/2023    Previous pulmonary history per Dr. Jairo Peña:  "Patient for seen in the office In March 2021.  She had prior imaging showing significant interstitial lung disease and fibrosis. Looking back on prior imaging, it appears that she did have some reticular pattern back to imaging on 2007, but has progressed significantly over the past 13 years.      In terms of pulmonary history, patient is a former smoker, quit in 1994. 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  She subsequently worked as a  person and 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 different types of [employer] recently, she worked as a  without exposures.  She has never had pets and denies any exposure to birds, lifestyle, farm animals.  No autoimmune, connective tissue disease, skin rash  She does have intermittent knee pain, but denies significant small joint pains.  She has no family history of lung disease.  She does note some environmental allergies worsen her postnasal drip and given her watery eyes.     Given patient's finding of ILD, she then underwent further workup.  High-resolution CT chest revealed subpleural reticular/fiber nodular opacities, interlobular septal thickening, mild central/bibasilar bronchiectasis, possible UIP pattern.  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  Afterwards worked in  and exposed to different types of [address] [facility] smoker. [address]  1.5 packs per day for 38 years.     Malignancy History: None      Pets/animal exposure:  Never had pets denies exposure to birds, exotic animals, farm animals    Review of Systems   Constitutional: Positive for fatigue. Negative for chills, diaphoresis and fever. HENT: Negative. Eyes: Negative. Respiratory: Positive for cough and shortness of breath. Negative for wheezing. Cardiovascular: Negative. Gastrointestinal: Negative. Musculoskeletal: Negative. Hematological: Negative.         Historical Information   Past Medical History:   Diagnosis Date   • Common bile duct dilatation 2020   • Glaucoma    • H/O degenerative disc disease    • Idiopathic pulmonary fibrosis ([address]) 2020   • Irregular heart beat     afib   • Peripheral neuropathy    • S/P laparoscopic cholecystectomy 2020   • Stenosis of right subclavian artery ([address]) 2016     Past Surgical History:   Procedure Laterality Date   • CATARACT EXTRACTION, BILATERAL     • CHOLECYSTECTOMY     • CHOLECYSTECTOMY LAPAROSCOPIC N/A 2020    Procedure: CHOLECYSTECTOMY LAPAROSCOPIC;  Surgeon: Kunal Kaminski MD;  Location: [facility];  Service: General   • COLONOSCOPY     • CYST REMOVAL      left lower Quadrant    • [place] LUMBAR PUNCTURE DIAGNOSTIC  2023   • HERNIA REPAIR     • LIPOMA RESECTION     • MD RPR 1ST INGUN HRNA AGE 5 YRS/> REDUCIBLE Bilateral 2018    Procedure: INGUINAL HERNIA REPAIR;  Surgeon: Cristopher Ralph MD;  Location: [facility];  Service: General   • SHOULDER SURGERY  2019    Dr. Nakita Wheeler ([place])   • UPPER GASTROINTESTINAL ENDOSCOPY     • VASCULAR SURGERY      Agram-  R carotid occlusion     Family History   Problem Relation Age of Onset   • Heart disease Mother    • Heart attack Father    • Hiatal hernia Father    • Diabetes Father    • Cancer Brother    • Diabetes Brother    • Heart disease Brother    • Hypertension Brother    • Other Son         gastroparesis     Social History     Tobacco Use   Smoking Status Former   • Packs/day: 1.50   • Years: 38.00   • Total pack years: 57.00   • Types: Cigarettes   • Start date:    • Quit date:    • Years since quittin.6   Smokeless Tobacco Never       Meds/Allergies     Current Outpatient Medications: •  acetaminophen (TYLENOL) 500 mg tablet, Take 1,000 mg by mouth as needed for mild pain, Disp: , Rfl:   •  Alphagan P 0.1 %, INSTILL 1 DROP RIGHT EYE TWICE A DAY, Disp: , Rfl:   •  amoxicillin (AMOXIL) 500 mg capsule, , Disp: , Rfl:   •  aspirin (ECOTRIN LOW STRENGTH) 81 mg EC tablet, Take 1 tablet (81 mg total) by mouth daily, Disp:  , Rfl:   •  bimatoprost (LUMIGAN) 0.01 % ophthalmic drops, Administer 1 drop to both eyes daily at bedtime for 30 days, Disp: 1.5 mL, Rfl: 0  •  calcium carbonate (OS-DANIA) 600 MG tablet, Take 600 mg by mouth 2 (two) times a day with meals  , Disp: , Rfl:   •  Eliquis 2.5 MG, TAKE 1 TABLET (2.5 MG TOTAL) BY MOUTH 2 (TWO) TIMES A DAY, Disp: 60 tablet, Rfl: 5  •  ezetimibe-simvastatin (VYTORIN) 10-40 mg per tablet, TAKE 1 TABLET BY MOUTH DAILY AT BEDTIME, Disp: 30 tablet, Rfl: 5  •  famotidine (PEPCID) 20 mg tablet, TAKE 1 TABLET (20 MG TOTAL) BY MOUTH 2 (TWO) TIMES A DAY, Disp: 60 tablet, Rfl: 5  •  glucose blood (OneTouch Ultra) test strip, TEST FOUR TIMES A DAY, Disp: 400 strip, Rfl: 1  •  insulin isophane (HumuLIN N KwikPen) 100 units/mL injection pen, Inject 8 Units under the skin every morning AND 4 Units every evening., Disp: 15 mL, Rfl: 3  •  Insulin Pen Needle (BD Pen Needle Domitila 2nd Gen) 32G X 4 MM MISC, Use 2 (two) times a day, Disp: 100 each, Rfl: 5  •  ipratropium (ATROVENT) 0.03 % nasal spray, USE 2 SPRAYS INTO EACH NOSTRIL EVERY 12 (TWELVE) HOURS, Disp: 30 mL, Rfl: 6  •  Januvia 100 MG tablet, TAKE 1 TABLET (100 MG TOTAL) BY MOUTH DAILY, Disp: 30 tablet, Rfl: 5  •  loratadine (CLARITIN) 10 mg tablet, Take 10 mg by mouth daily as needed for allergies, Disp: , Rfl:   •  LORazepam (ATIVAN) 0.5 mg tablet, TAKE 2 TABLETS (1 MG TOTAL) BY MOUTH DAILY AT BEDTIME, Disp: 60 tablet, Rfl: 2  •  metFORMIN (GLUCOPHAGE) 500 mg tablet, TAKE 2 TABLETS (1,000 MG TOTAL) BY MOUTH 2 (TWO) TIMES A DAY WITH MEALS, Disp: 120 tablet, Rfl: 5  •  metoprolol tartrate (LOPRESSOR) 25 mg tablet, Take 1 tablet (25 mg total) by mouth every 12 (twelve) hours, Disp: 180 tablet, Rfl: 0  •  Motegrity 2 MG TABS, TAKE 2 MG BY MOUTH DAILY, Disp: 90 tablet, Rfl: 3  •  Multiple Vitamin (multivitamin) tablet, Take 1 tablet by mouth daily  , Disp: , Rfl:   •  pantoprazole (PROTONIX) 20 mg tablet, TAKE 1 TABLET (20 MG TOTAL) BY MOUTH 2 (TWO) TIMES A DAY, Disp: 60 tablet, Rfl: 3  •  predniSONE 5 mg tablet, Take 1 tablet (5 mg total) by mouth daily, Disp: 10 tablet, Rfl: 0  •  sodium chloride 1 g tablet, Take 1 tablet (1 g total) by mouth 2 (two) times a day, Disp: 270 tablet, Rfl: 1  Allergies   Allergen Reactions   • Keflex [Cephalexin] Diarrhea       Vitals: There were no vitals taken for this visit. There is no height or weight on file to calculate BMI. Physical Exam  Physical Exam  Vitals reviewed. Constitutional:       General: She is not in acute distress. Appearance: She is not toxic-appearing. HENT:      Head: Normocephalic and atraumatic. Mouth/Throat:      Mouth: Mucous membranes are moist.   Eyes:      Extraocular Movements: Extraocular movements intact. Conjunctiva/sclera: Conjunctivae normal.   Cardiovascular:      Rate and Rhythm: Normal rate and regular rhythm. Heart sounds: No murmur heard. No friction rub. No gallop. Pulmonary:      Effort: Pulmonary effort is normal.      Comments: Left basilar crackles  Abdominal:      General: Abdomen is flat. Palpations: Abdomen is soft. Musculoskeletal:         General: Normal range of motion. Cervical back: Neck supple. Skin:     General: Skin is warm and dry. Comments: Purpura b/l arms   Neurological:      General: No focal deficit present. Mental Status: She is alert and oriented to person, place, and time. Psychiatric:         Mood and Affect: Mood normal.         Behavior: Behavior normal.         Labs: I have personally reviewed pertinent lab results.   Lab Results   Component Value Date    WBC 5.65 04/28/2023    HGB 10.2 (L) 04/28/2023    HCT 32.8 (L) 04/28/2023    MCV 86 04/28/2023     04/28/2023     Lab Results   Component Value Date    CALCIUM 9.1 06/01/2023    K 4.4 06/01/2023    CO2 32 06/01/2023    CL 95 (L) 06/01/2023    BUN 14 06/01/2023    CREATININE 0.50 (L) 06/01/2023     Lab Results   Component Value Date    IGE 14.6 10/16/2020     Lab Results   Component Value Date    ALT 11 04/26/2023    AST 16 04/26/2023    ALKPHOS 43 04/26/2023       Imaging and other studies: I have personally reviewed pertinent films in PACS  [facility] [place]-patient lumbar puncture    Result Date: 5/1/2023  Impression: Fluoroscopic-guided diagnostic lumbar puncture. Workstation performed: [id number]     CT abdomen pelvis w wo contrast    Result Date: 4/27/2023  Impression: No evidence of malignancy in the abdomen and pelvis. Again seen is juxtapleural reticular opacities and traction bronchiectasis at the lung bases, likely UIP. Workstation performed: [id number]     Pulmonary function testing:   Interpretation: 3/2023  • Mild restriction on spirometry  • No significant improvement in airflow or forced vital capacity in response to the administration to bronchodilator per ATS standards. • Air trapping as indicated by the lung volumes and Moderate restriction as indicated by the lung volumes  • Moderate decrease in diffusion capacity  • Flow-volume loop suggestive of a fixed obstruction, clinical correlation advised. • 6-minute walk was performed on room air, distance walked was 173.85 m. Lowest O2 sat was 95%. Highest heart rate was 98 bpm       Disclaimer: Portions of the record may have been created with voice recognition software. Occasional wrong word or "sound a like" substitutions may have occurred due to the inherent limitations of voice recognition software. Careful consideration should be taken to recognize, using context, where substitutions have occurred.     Louisa Navas DO   Pulmonary & Critical Care Medicine Fellow PGY-IV  [facility]

## 2023-08-10 ENCOUNTER — TELEPHONE (OUTPATIENT)
Dept: FAMILY MEDICINE CLINIC | Facility: CLINIC | Age: 88
End: 2023-08-10

## 2023-08-10 DIAGNOSIS — J84.9 INTERSTITIAL LUNG DISEASE (HCC): ICD-10-CM

## 2023-08-10 DIAGNOSIS — E11.319 TYPE 2 DIABETES MELLITUS WITH RETINOPATHY, WITH LONG-TERM CURRENT USE OF INSULIN, MACULAR EDEMA PRESENCE UNSPECIFIED, UNSPECIFIED LATERALITY, UNSPECIFIED RETINOPATHY SEVERITY (HCC): Primary | ICD-10-CM

## 2023-08-10 DIAGNOSIS — Z79.4 TYPE 2 DIABETES MELLITUS WITH RETINOPATHY, WITH LONG-TERM CURRENT USE OF INSULIN, MACULAR EDEMA PRESENCE UNSPECIFIED, UNSPECIFIED LATERALITY, UNSPECIFIED RETINOPATHY SEVERITY (HCC): ICD-10-CM

## 2023-08-10 DIAGNOSIS — E11.319 TYPE 2 DIABETES MELLITUS WITH RETINOPATHY, WITH LONG-TERM CURRENT USE OF INSULIN, MACULAR EDEMA PRESENCE UNSPECIFIED, UNSPECIFIED LATERALITY, UNSPECIFIED RETINOPATHY SEVERITY (HCC): ICD-10-CM

## 2023-08-10 DIAGNOSIS — Z79.4 TYPE 2 DIABETES MELLITUS WITH RETINOPATHY, WITH LONG-TERM CURRENT USE OF INSULIN, MACULAR EDEMA PRESENCE UNSPECIFIED, UNSPECIFIED LATERALITY, UNSPECIFIED RETINOPATHY SEVERITY (HCC): Primary | ICD-10-CM

## 2023-08-10 DIAGNOSIS — F41.9 ANXIETY: ICD-10-CM

## 2023-08-10 DIAGNOSIS — I48.0 PAROXYSMAL ATRIAL FIBRILLATION (HCC): ICD-10-CM

## 2023-08-10 NOTE — TELEPHONE ENCOUNTER
[facility]  Gigi Stacy, PharmD, BCPS, BCACP     Communication with patient: per telephone     Reason for documentation: follow-up    Recommendations: The following actions were taken today by the Clinical Pharmacist:   1. CHANGE NPH. Only take NPH 8 units SQ QAM + 3 units SQ QPM on the days you take prednisone. 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 - Most recent A1c: below goal and shows improvement from previous check. - SMBGs: FBG and Pre-supper readings are below BG target. No hypoglycemic events. Current DM Regimen:  • Januvia 100 mg daily  • Metformin 1,000 mg BID  • NPH 8 units SQ QAM + 3 units SQ QPM  MEDICATIONS: Will hold insulin on days that patient does not take steroids. HOME MONITORING: Check blood sugars 2 times daily (AM + PM) + any hypoglycemic sx  and record.     Interstitial lung disease  Followed by Pulmonology. Being evaluated for ILD vs. Nonspecific insterstitial pneumonitis.    Subjective:   Patient was seen by PCP and Pulmonology this week. Pulmonology decreased prednisone to 5 mg every other day.      Objective:   SMBG readings (mg/dL):      Average Min Max Goal     107 183 90 150 FBG Average: 140.3 mg/dl (107-183 mg/dl), n=7, 0/7= < 70 mg/dl   Pre-Supper  122 161 90 150 Pre-Supper BG Average: 141 mg/dl (122-161 mg/dl), n=6, 0/6= < 70 mg/dl         Total # of reading  < 70 mg/dl: 0/13     Pharmacist Tracking Tool  Reason For Outreach: Embedded Pharmacist  Demographics:  Intervention Method: Phone  Type of Intervention: Follow-Up  Topics Addressed: Diabetes  Pharmacologic Interventions: Dose or Frequency Adjusted and Prevent or Manage JUAN  Non-Pharmacologic Interventions: Home Monitoring  Time:  Direct Patient Care: 20 mins  Care Coordination: 10 mins  Recommendation Recipient: Patient/Caregiver  Outcome: Accepted

## 2023-08-11 DIAGNOSIS — F41.9 ANXIETY: ICD-10-CM

## 2023-08-11 RX ORDER — LORAZEPAM 0.5 MG/1
1 TABLET ORAL
Qty: 60 TABLET | Refills: 2 | Status: SHIPPED | OUTPATIENT
Start: 2023-08-11

## 2023-08-11 RX ORDER — SITAGLIPTIN 100 MG/1
TABLET, FILM COATED ORAL
Qty: 30 TABLET | Refills: 5 | Status: SHIPPED | OUTPATIENT
Start: 2023-08-11

## 2023-08-11 RX ORDER — APIXABAN 2.5 MG/1
TABLET, FILM COATED ORAL
Qty: 60 TABLET | Refills: 5 | Status: SHIPPED | OUTPATIENT
Start: 2023-08-11

## 2023-08-11 RX ORDER — LORAZEPAM 0.5 MG/1
1 TABLET ORAL
Qty: 60 TABLET | Refills: 2 | OUTPATIENT
Start: 2023-08-11

## 2023-08-13 NOTE — ASSESSMENT & PLAN NOTE
Currently on prednisone 5 mg daily. Has pulmonology follow up later this week. She will let me know if prednisone dose changes.

## 2023-08-13 NOTE — ASSESSMENT & PLAN NOTE
Lab Results   Component Value Date    HGBA1C 7.7 (A) 08/07/2023     A1c has improved from 9.1% to 7.7%. Will continue metformin, januvia, and current insulin--Humulin N 8 units in the morning and 3 units in the evening. She will keep in contact with me or Tree Ramirez, clinical pharmacist regarding prednisone dosing and sugar logs.

## 2023-08-14 ENCOUNTER — HOSPITAL ENCOUNTER (OUTPATIENT)
Dept: NON INVASIVE DIAGNOSTICS | Facility: CLINIC | Age: 88
Discharge: HOME | End: 2023-08-14
Payer: MEDICARE

## 2023-08-14 DIAGNOSIS — I65.22 ASYMPTOMATIC CAROTID ARTERY STENOSIS, LEFT: ICD-10-CM

## 2023-08-14 PROCEDURE — 93880 EXTRACRANIAL BILAT STUDY: CPT

## 2023-08-17 ENCOUNTER — HOSPITAL ENCOUNTER (OUTPATIENT)
Dept: CT IMAGING | Facility: HOSPITAL | Age: 88
Discharge: HOME | End: 2023-08-17
Payer: MEDICARE

## 2023-08-17 DIAGNOSIS — J84.9 INTERSTITIAL LUNG DISEASE (HCC): ICD-10-CM

## 2023-08-17 PROCEDURE — G1004 CDSM NDSC: HCPCS

## 2023-08-17 PROCEDURE — 71250 CT THORAX DX C-: CPT

## 2023-08-17 PROCEDURE — 93880 EXTRACRANIAL BILAT STUDY: CPT | Performed by: SURGERY

## 2023-08-21 ENCOUNTER — HOSPITAL ENCOUNTER (OUTPATIENT)
Dept: PULMONOLOGY | Facility: HOSPITAL | Age: 88
Discharge: HOME | End: 2023-08-21
Payer: MEDICARE

## 2023-08-21 ENCOUNTER — OFFICE VISIT (OUTPATIENT)
Dept: VASCULAR SURGERY | Facility: CLINIC | Age: 88
End: 2023-08-21
Payer: MEDICARE

## 2023-08-21 VITALS
WEIGHT: 112 LBS | OXYGEN SATURATION: 99 % | HEART RATE: 85 BPM | DIASTOLIC BLOOD PRESSURE: 74 MMHG | SYSTOLIC BLOOD PRESSURE: 110 MMHG | HEIGHT: 66 IN | BODY MASS INDEX: 18 KG/M2 | RESPIRATION RATE: 18 BRPM

## 2023-08-21 DIAGNOSIS — J84.9 INTERSTITIAL LUNG DISEASE (HCC): ICD-10-CM

## 2023-08-21 DIAGNOSIS — I65.21 OCCLUSION OF RIGHT CAROTID ARTERY: ICD-10-CM

## 2023-08-21 DIAGNOSIS — I65.22 ASYMPTOMATIC CAROTID ARTERY STENOSIS, LEFT: Primary | ICD-10-CM

## 2023-08-21 PROCEDURE — 94760 N-INVAS EAR/PLS OXIMETRY 1: CPT

## 2023-08-21 PROCEDURE — 94060 EVALUATION OF WHEEZING: CPT

## 2023-08-21 PROCEDURE — 94729 DIFFUSING CAPACITY: CPT | Performed by: STUDENT IN AN ORGANIZED HEALTH CARE EDUCATION/TRAINING PROGRAM

## 2023-08-21 PROCEDURE — 94060 EVALUATION OF WHEEZING: CPT | Performed by: STUDENT IN AN ORGANIZED HEALTH CARE EDUCATION/TRAINING PROGRAM

## 2023-08-21 PROCEDURE — 94729 DIFFUSING CAPACITY: CPT

## 2023-08-21 PROCEDURE — 94726 PLETHYSMOGRAPHY LUNG VOLUMES: CPT

## 2023-08-21 PROCEDURE — 99214 OFFICE O/P EST MOD 30 MIN: CPT | Performed by: NURSE PRACTITIONER

## 2023-08-21 PROCEDURE — 94726 PLETHYSMOGRAPHY LUNG VOLUMES: CPT | Performed by: STUDENT IN AN ORGANIZED HEALTH CARE EDUCATION/TRAINING PROGRAM

## 2023-08-21 RX ORDER — CYANOCOBALAMIN (VITAMIN B-12) 500 MCG
500 TABLET ORAL 2 TIMES DAILY
COMMUNITY

## 2023-08-21 RX ORDER — ALBUTEROL SULFATE 2.5 MG/3ML
2.5 SOLUTION RESPIRATORY (INHALATION) ONCE
Status: COMPLETED | OUTPATIENT
Start: 2023-08-21 | End: 2023-08-21

## 2023-08-21 RX ADMIN — ALBUTEROL SULFATE 2.5 MG: 2.5 SOLUTION RESPIRATORY (INHALATION) at 14:05

## 2023-08-21 NOTE — PROGRESS NOTES
Assessment/Plan:    Asymptomatic carotid artery stenosis, left  82-year-old female returns to the office for review of her carotid ultrasound complete on 8/14/23 which demonstrates:   Right ICA known occlusion  Left ICA 50 to 69% stenosis with velocities 175/39 and a ratio 3.16.    -Pt is asymptomatic denies symptoms of numbness/ tingling/ weakness on one side of the body, facial droop, slurred speech or blindness in one eye. Reviewed carotid ultrasound in detail with patient answered all questions. Discussed pathophysiology of arterial disease and indications for vascular intervention. No planned vascular intervention at this time we will continue with yearly noninvasive studies and medical management. Recommendations  -Complete carotid ultrasound in 6 months and 1 year and return to the office for review in one year.  -Continue to take aspirin and Eliquis daily as per PCP. -Continue to take statin therapy daily as per PCP. -Go to the ED/call 911 with any signs or symptoms of CVA/TIA. Type 2 diabetes mellitus with hyperlipidemia (HCC)    Lab Results   Component Value Date    HGBA1C 7.7 (A) 08/07/2023   -Reviewed most recent HgA1c with pt.  -Stable/decreasing blood sugars. Reviewed the importance of maintaining an optimized blood sugar for progression of vascular disease.  -management per PCP      HTN (hypertension)  -BP well controlled  -continue current medical regimen  -management per PCP      Mixed hyperlipidemia  -stable  -continue statin medication  -continue routine follow-up with family doctor         Diagnoses and all orders for this visit:    Asymptomatic carotid artery stenosis, left  -     VAS carotid complete study; Future  -     VAS carotid complete study; Future    Occlusion of right carotid artery  -     VAS carotid complete study; Future  -     VAS carotid complete study;  Future    Other orders  -     Cyanocobalamin (Vitamin B 12) 500 MCG TABS; Take 500 mg by mouth 2 (two) times a day          Subjective:      Patient ID: Macy Vargas is a 82 y.o. female. Patient had a CV on 8/14/23. Pt denies TIA or CVA symptoms. Pt os on ASA 81 mg and Eliquis. 82years-old with PMHx GERD, dysphagia, T2 DM, HTN, A-fib (eliquis), interstitial lung disease, HLD, Known R ICA occlusion, Left ICA stenosis returns to the office for review of her carotid ultrasound. Pt in the office today with her rolling walker. Denies symptoms of numbness/ tingling/ weakness on one side of the body, facial droop, slurred speech or blindness in one eye. She denies leg pain, swelling. Denies claudication, denies rest pain, no wounds/ tissue loss. The following portions of the patient's history were reviewed and updated as appropriate: allergies, current medications, past family history, past medical history, past social history, past surgical history and problem list.    Review of Systems   Constitutional: Negative. HENT: Negative. Eyes: Negative for visual disturbance. Respiratory: Positive for shortness of breath. Cardiovascular: Negative. Gastrointestinal: Positive for diarrhea. Negative for nausea and vomiting. Endocrine: Negative. Genitourinary: Negative. Musculoskeletal: Positive for arthralgias, back pain and gait problem. Allergic/Immunologic: Negative. Neurological: Positive for dizziness (upon standing) and numbness (hand adn feet rojas to neurpoathy). Negative for speech difficulty and weakness. Hematological: Negative. Psychiatric/Behavioral: Negative. Objective:      /74 (BP Location: Left arm, Patient Position: Sitting)   Pulse 85   Resp 18   Ht 5' 6" (1.676 m)   Wt 50.8 kg (112 lb)   SpO2 99%   BMI 18.08 kg/m²          Physical Exam  Vitals and nursing note reviewed. Constitutional:       Appearance: Normal appearance. Comments: BMI 18 kg/m   HENT:      Head: Normocephalic and atraumatic. Neck:      Vascular: No carotid bruit. Cardiovascular:      Rate and Rhythm: Normal rate and regular rhythm. Pulses:           Radial pulses are 2+ on the right side and 2+ on the left side. Popliteal pulses are 0 on the right side and 0 on the left side. Dorsalis pedis pulses are 1+ on the right side and 1+ on the left side. Heart sounds: Murmur heard. Pulmonary:      Comments: Crackles b/l  Abdominal:      Palpations: Abdomen is soft. There is no mass. Musculoskeletal:         General: No tenderness. Right lower le+ Edema present. Left lower le+ Edema present. Skin:     General: Skin is warm and dry. Capillary Refill: Capillary refill takes less than 2 seconds. Findings: No bruising or erythema. Neurological:      General: No focal deficit present. Mental Status: She is alert and oriented to person, place, and time. Gait: Gait abnormal (Rolling walker). Psychiatric:         Mood and Affect: Mood normal.         Behavior: Behavior normal.           I have reviewed and made appropriate changes to the review of systems input by the medical assistant.     Vitals:    23 1301   BP: 110/74   BP Location: Left arm   Patient Position: Sitting   Pulse: 85   Resp: 18   SpO2: 99%   Weight: 50.8 kg (112 lb)   Height: 5' 6" (1.676 m)       Patient Active Problem List   Diagnosis   • HTN (hypertension)   • Mixed hyperlipidemia   • Glaucoma   • Asymptomatic carotid artery stenosis, left   • Symptomatic PVCs   • Occlusion of right carotid artery   • Paroxysmal atrial fibrillation (HCC)   • Pacemaker   • Osteoporosis   • GERD (gastroesophageal reflux disease)   • Anxiety   • Iron deficiency   • Interstitial lung disease (HCC)   • Dysphagia   • Severe protein-calorie malnutrition (HCC)   • Constipation   • Hyponatremia   • Post-nasal drip   • DM2 (diabetes mellitus, type 2) ([facility])   • Type 2 diabetes mellitus with hyperlipidemia ([facility])   • Type 2 diabetes mellitus with diabetic polyneuropathy, without long-term current use of insulin ([address])   • 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, without long-term current use of insulin (HCC)   • Depression, recurrent ([address])   • Hypovitaminosis D   • Pachymeningitis   • Bilateral hip pain       Past Surgical History:   Procedure Laterality Date   • CATARACT EXTRACTION, BILATERAL     • CHOLECYSTECTOMY     • CHOLECYSTECTOMY LAPAROSCOPIC N/A 2020    Procedure: CHOLECYSTECTOMY LAPAROSCOPIC;  Surgeon: Medina Bautista MD;  Location: [facility];  Service: General   • COLONOSCOPY     • CYST REMOVAL      left lower Quadrant    • [place] LUMBAR PUNCTURE DIAGNOSTIC  2023   • HERNIA REPAIR     • LIPOMA RESECTION     • [place] RPR 1ST INGUN HRNA AGE 5 YRS/> REDUCIBLE Bilateral 2018    Procedure: INGUINAL HERNIA REPAIR;  Surgeon: Sherly Ann MD;  Location: [facility];  Service: General   • SHOULDER SURGERY  2019    Dr. Rakesh Salazar ([place])   • UPPER GASTROINTESTINAL ENDOSCOPY     • VASCULAR SURGERY      Agram-  R carotid occlusion       Family History   Problem Relation Age of Onset   • Heart disease Mother    • Heart attack Father    • Hiatal hernia Father    • Diabetes Father    • Cancer Brother    • Diabetes Brother    • Heart disease Brother    • Hypertension Brother    • Lung disease Brother 76        interstitial lung disease   • Cancer Brother 52        glioblastoma   • Other Son         gastroparesis       Social History     Socioeconomic History   • Marital status:      Spouse name: Not on file   • Number of children: Not on file   • Years of education: Not on file   • Highest education level: Not on file   Occupational History   • Occupation: customer service   retired   Tobacco Use   • Smoking status: Former     Packs/day: 1.50     Years: 38.00     Total pack years: 57.00     Types: Cigarettes     Start date:      Quit date:      Years since quittin.6   • Smokeless tobacco: Never   Vaping Use   • Vaping Use: Never used   Substance and Sexual Activity   • Alcohol use: No   • Drug use: No   • Sexual activity: Not on file   Other Topics Concern   • Not on file   Social History Narrative    Lives alone [place]. Was divorce. Ex- passed away. 3 children. Four grandchildren. Spends most of her time at home. Does not drive. Social Determinants of Health     Financial Resource Strain: Low Risk  (9/27/2022)    Overall Financial Resource Strain (CARDIA)    • Difficulty of Paying Living Expenses: Not hard at all   Food Insecurity: Not on file   Transportation Needs: No Transportation Needs (9/27/2022)    PRAPARE - Transportation    • Lack of Transportation (Medical): No    • Lack of Transportation (Non-Medical):  No   Physical Activity: Not on file   Stress: Not on file   Social Connections: Not on file   Intimate Partner Violence: Not on file   Housing Stability: Not on file       Allergies   Allergen Reactions   • Keflex [Cephalexin] Diarrhea         Current Outpatient Medications:   •  acetaminophen (TYLENOL) 500 mg tablet, Take 1,000 mg by mouth as needed for mild pain, Disp: , Rfl:   •  Alphagan P 0.1 %, INSTILL 1 DROP RIGHT EYE TWICE A DAY, Disp: , Rfl:   •  aspirin (ECOTRIN LOW STRENGTH) 81 mg EC tablet, Take 1 tablet (81 mg total) by mouth daily, Disp:  , Rfl:   •  bimatoprost (LUMIGAN) 0.01 % ophthalmic drops, Administer 1 drop to both eyes daily at bedtime for 30 days, Disp: 1.5 mL, Rfl: 0  •  calcium carbonate (OS-DANIA) 600 MG tablet, Take 600 mg by mouth 2 (two) times a day with meals  , Disp: , Rfl:   •  Cyanocobalamin (Vitamin B 12) 500 MCG TABS, Take 500 mg by mouth 2 (two) times a day, Disp: , Rfl:   •  Eliquis 2.5 MG, TAKE 1 TABLET (2.5 MG TOTAL) BY MOUTH 2 (TWO) TIMES A DAY, Disp: 60 tablet, Rfl: 5  •  ezetimibe-simvastatin (VYTORIN) 10-40 mg per tablet, TAKE 1 TABLET BY MOUTH DAILY AT BEDTIME, Disp: 30 tablet, Rfl: 5  •  famotidine (PEPCID) 20 mg tablet, TAKE 1 TABLET (20 MG TOTAL) BY MOUTH 2 (TWO) TIMES A DAY, Disp: 60 tablet, Rfl: 5  •  insulin isophane (HumuLIN N KwikPen) 100 units/mL injection pen, Inject 8 Units under the skin every morning AND 4 Units every evening., Disp: 15 mL, Rfl: 3  •  ipratropium (ATROVENT) 0.03 % nasal spray, USE 2 SPRAYS INTO EACH NOSTRIL EVERY 12 (TWELVE) HOURS, Disp: 30 mL, Rfl: 6  •  Januvia 100 MG tablet, TAKE 1 TABLET (100 MG TOTAL) BY MOUTH DAILY, Disp: 30 tablet, Rfl: 5  •  loratadine (CLARITIN) 10 mg tablet, Take 10 mg by mouth daily as needed for allergies, Disp: , Rfl:   •  LORazepam (ATIVAN) 0.5 mg tablet, Take 2 tablets (1 mg total) by mouth daily at bedtime, Disp: 60 tablet, Rfl: 2  •  metFORMIN (GLUCOPHAGE) 500 mg tablet, TAKE 2 TABLETS (1,000 MG TOTAL) BY MOUTH 2 (TWO) TIMES A DAY WITH MEALS, Disp: 120 tablet, Rfl: 5  •  metoprolol tartrate (LOPRESSOR) 25 mg tablet, Take 1 tablet (25 mg total) by mouth every 12 (twelve) hours, Disp: 180 tablet, Rfl: 0  •  Multiple Vitamin (multivitamin) tablet, Take 1 tablet by mouth daily  , Disp: , Rfl:   •  pantoprazole (PROTONIX) 20 mg tablet, TAKE 1 TABLET (20 MG TOTAL) BY MOUTH 2 (TWO) TIMES A DAY, Disp: 60 tablet, Rfl: 3  •  predniSONE 5 mg tablet, Take 5mg every other day for 2 weeks, Disp: 10 tablet, Rfl: 0  •  sodium chloride 1 g tablet, Take 1 tablet (1 g total) by mouth 2 (two) times a day, Disp: 270 tablet, Rfl: 1  •  amoxicillin (AMOXIL) 500 mg capsule, , Disp: , Rfl:   •  glucose blood (OneTouch Ultra) test strip, TEST FOUR TIMES A DAY, Disp: 400 strip, Rfl: 1  •  Insulin Pen Needle (BD Pen Needle Domitila 2nd Gen) 32G X 4 MM MISC, Use 2 (two) times a day, Disp: 100 each, Rfl: 5  •  Motegrity 2 MG TABS, TAKE 2 MG BY MOUTH DAILY (Patient not taking: Reported on 8/21/2023), Disp: 90 tablet, Rfl: 3  I have spent a total time of 30 minutes on 08/21/23 in caring for this patient including Diagnostic results, Prognosis, Risks and benefits of tx options, Instructions for management, Patient and family education, Importance of tx compliance, Risk factor reductions, Counseling / Coordination of care, Documenting in the medical record and Reviewing / ordering tests, medicine, procedures  .

## 2023-08-21 NOTE — ASSESSMENT & PLAN NOTE
29-year-old female returns to the office for review of her carotid ultrasound complete on 8/14/23 which demonstrates:   Right ICA known occlusion  Left ICA 50 to 69% stenosis with velocities 175/39 and a ratio 3.16.    -Pt is asymptomatic denies symptoms of numbness/ tingling/ weakness on one side of the body, facial droop, slurred speech or blindness in one eye. Reviewed carotid ultrasound in detail with patient answered all questions. Discussed pathophysiology of arterial disease and indications for vascular intervention. No planned vascular intervention at this time we will continue with yearly noninvasive studies and medical management. Recommendations  -Complete carotid ultrasound in 6 months and 1 year and return to the office for review in one year.  -Continue to take aspirin and Eliquis daily as per PCP. -Continue to take statin therapy daily as per PCP. -Go to the ED/call 911 with any signs or symptoms of CVA/TIA.

## 2023-08-21 NOTE — ASSESSMENT & PLAN NOTE
Lab Results   Component Value Date    HGBA1C 7.7 (A) 08/07/2023   -Reviewed most recent HgA1c with pt.  -Stable/decreasing blood sugars.  Reviewed the importance of maintaining an optimized blood sugar for progression of vascular disease.  -management per PCP

## 2023-08-21 NOTE — PATIENT INSTRUCTIONS
-Continue to take aspirin 81 mg, simvastatin, Eliquis daily as per PCP. -Go to the ED with any signs or symptoms of stroke such as numbness/ tingling on one side of your body, blindness in one eye, slurred speech, facial droop.    -Increase physical activity.  Try to exercise 3 times a week for 30 min.       -Complete carotid ultrasound in 6 months and 1 year return to the office for review in one year

## 2023-08-24 ENCOUNTER — TELEPHONE (OUTPATIENT)
Dept: FAMILY MEDICINE CLINIC | Facility: CLINIC | Age: 88
End: 2023-08-24

## 2023-08-24 DIAGNOSIS — E11.319 TYPE 2 DIABETES MELLITUS WITH RETINOPATHY, WITH LONG-TERM CURRENT USE OF INSULIN, MACULAR EDEMA PRESENCE UNSPECIFIED, UNSPECIFIED LATERALITY, UNSPECIFIED RETINOPATHY SEVERITY (HCC): Primary | ICD-10-CM

## 2023-08-24 DIAGNOSIS — Z79.4 TYPE 2 DIABETES MELLITUS WITH RETINOPATHY, WITH LONG-TERM CURRENT USE OF INSULIN, MACULAR EDEMA PRESENCE UNSPECIFIED, UNSPECIFIED LATERALITY, UNSPECIFIED RETINOPATHY SEVERITY (HCC): Primary | ICD-10-CM

## 2023-08-24 NOTE — TELEPHONE ENCOUNTER
[id number]h, PharmD, BCPS, BCACP     Communication with patient: per telephone     Reason for documentation: follow-up    Recommendations: The following actions were taken today by the Clinical Pharmacist:   1. No medication changes warranted  2. 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 - Most recent A1c: below goal and shows improvement from previous check. - SMBGs: FBG readings are below BG target. PM Readings are above BG target. No hypoglycemic events. Higher evening SMBGs more likely related to higher carb intake. Current DM Regimen:  • Januvia 100 mg daily  • Metformin 1,000 mg BID  • NPH 8 units SQ QAM + 3 units SQ QPM (only on days when taking steroids)  MEDICATIONS: No discernable pattern of hyperglycemia related to alternating day dosing of insulin so will continue at this time. HOME MONITORING: Check blood sugars 2 times daily (AM + PM) + any hypoglycemic sx  and record.     Interstitial lung disease  Followed by Pulmonology. Being evaluated for ILD vs. Nonspecific insterstitial pneumonitis.    Subjective:   Patient has had discussions with several relatives (brother, cousin) who also have diagnosis of pulmonary fibrosis. She reports dietary indulgences (chips, crackers) frequently. Pulmonology decreased prednisone to 5 mg every other day.      Objective:   SMBG readings (mg/dL):      Average Min Max Goal      108 210 90 150 FBG Average: 148.2 mg/dl (108-210 mg/dl), n=14, 0/14= < 70 mg/dl Pre-Supper  122 259 90 150 Pre-Supper BG Average: 180.5 mg/dl (122-259 mg/dl), n=13, 0/13= < 70 mg/dl   HS  148 148 100 180 HS BG Average: 148 mg/dl (148-148 mg/dl), n=1, 0/1= < 70 mg/dl         Total # of reading  < 70 mg/dl: 0/28     Pharmacist Tracking Tool  Reason For Outreach: Embedded Pharmacist  Demographics:  Intervention Method: Phone  Type of Intervention: Follow-Up  Topics Addressed: Diabetes  Pharmacologic Interventions: Prevent or Manage JUAN  Non-Pharmacologic Interventions: Home Monitoring  Time:  Direct Patient Care: 20 mins  Care Coordination: 10 mins  Recommendation Recipient: Patient/Caregiver  Outcome: Accepted

## 2023-08-25 DIAGNOSIS — K21.00 GASTROESOPHAGEAL REFLUX DISEASE WITH ESOPHAGITIS WITHOUT HEMORRHAGE: ICD-10-CM

## 2023-08-25 RX ORDER — FAMOTIDINE 20 MG/1
20 TABLET, FILM COATED ORAL 2 TIMES DAILY
Qty: 60 TABLET | Refills: 5 | Status: SHIPPED | OUTPATIENT
Start: 2023-08-25

## 2023-08-29 ENCOUNTER — TELEPHONE (OUTPATIENT)
Dept: FAMILY MEDICINE CLINIC | Facility: CLINIC | Age: 88
End: 2023-08-29

## 2023-08-29 ENCOUNTER — TELEPHONE (OUTPATIENT)
Dept: PULMONOLOGY | Facility: CLINIC | Age: 88
End: 2023-08-29

## 2023-08-29 NOTE — TELEPHONE ENCOUNTER
Patient called asking if the doctor can give her a call because she said that she finished taking the prednisone and wants to know what the next steps are because her next appt isn't for another few weeks. Please advise.

## 2023-08-29 NOTE — TELEPHONE ENCOUNTER
Pt requesting call back from someone to explain if she should continue using her insulin and that she has only 1 bottle of insulin left, states she called pulmonology about prednisone ending. She wants to make sure she is taking everything correctly.

## 2023-08-30 ENCOUNTER — APPOINTMENT (OUTPATIENT)
Dept: LAB | Facility: CLINIC | Age: 88
End: 2023-08-30
Payer: MEDICARE

## 2023-08-30 DIAGNOSIS — J84.9 INTERSTITIAL LUNG DISEASE (HCC): ICD-10-CM

## 2023-08-30 PROCEDURE — 36415 COLL VENOUS BLD VENIPUNCTURE: CPT

## 2023-08-30 PROCEDURE — 83521 IG LIGHT CHAINS FREE EACH: CPT

## 2023-08-31 LAB
KAPPA LC FREE SER-MCNC: 19.6 MG/L (ref 3.3–19.4)
KAPPA LC FREE/LAMBDA FREE SER: 1.22 {RATIO} (ref 0.26–1.65)
LAMBDA LC FREE SERPL-MCNC: 16.1 MG/L (ref 5.7–26.3)

## 2023-09-01 DIAGNOSIS — E11.42 TYPE 2 DIABETES MELLITUS WITH DIABETIC POLYNEUROPATHY, WITHOUT LONG-TERM CURRENT USE OF INSULIN (HCC): ICD-10-CM

## 2023-09-01 DIAGNOSIS — J84.9 INTERSTITIAL LUNG DISEASE (HCC): ICD-10-CM

## 2023-09-01 RX ORDER — INSULIN HUMAN 100 [IU]/ML
INJECTION, SUSPENSION SUBCUTANEOUS
Qty: 15 ML | Refills: 3 | Status: SHIPPED | OUTPATIENT
Start: 2023-09-01

## 2023-09-01 NOTE — TELEPHONE ENCOUNTER
Please call Ac. Find out what insulin dosing she is taking now? How many days has she been off Prednisone? How are her sugars? Any low sugars?

## 2023-09-01 NOTE — TELEPHONE ENCOUNTER
Spoke to pt. She was taking 8 units in the AM and 3 units in the PM only when taking the steroid. She completed the steroid on 08/29/23. Sugars are okay. No low sugars.

## 2023-09-09 DIAGNOSIS — E78.5 HYPERLIPIDEMIA, UNSPECIFIED HYPERLIPIDEMIA TYPE: ICD-10-CM

## 2023-09-09 RX ORDER — EZETIMIBE AND SIMVASTATIN 10; 40 MG/1; MG/1
1 TABLET ORAL
Qty: 30 TABLET | Refills: 5 | Status: SHIPPED | OUTPATIENT
Start: 2023-09-09

## 2023-09-22 DIAGNOSIS — R13.10 DYSPHAGIA, UNSPECIFIED TYPE: ICD-10-CM

## 2023-09-22 RX ORDER — PANTOPRAZOLE SODIUM 20 MG/1
20 TABLET, DELAYED RELEASE ORAL 2 TIMES DAILY
Qty: 60 TABLET | Refills: 3 | Status: SHIPPED | OUTPATIENT
Start: 2023-09-22

## 2023-10-11 ENCOUNTER — TELEPHONE (OUTPATIENT)
Dept: NEPHROLOGY | Facility: CLINIC | Age: 88
End: 2023-10-11

## 2023-10-11 DIAGNOSIS — E87.1 HYPONATREMIA: Primary | ICD-10-CM

## 2023-10-11 DIAGNOSIS — I10 HYPERTENSION, UNSPECIFIED TYPE: ICD-10-CM

## 2023-10-11 NOTE — TELEPHONE ENCOUNTER
Called patient reminding to please complete labwork prior to 10/16 appointment with Dr. Deon Tiwari.

## 2023-10-13 ENCOUNTER — REMOTE DEVICE CLINIC VISIT (OUTPATIENT)
Dept: CARDIOLOGY CLINIC | Facility: CLINIC | Age: 88
End: 2023-10-13
Payer: MEDICARE

## 2023-10-13 ENCOUNTER — APPOINTMENT (OUTPATIENT)
Dept: LAB | Facility: CLINIC | Age: 88
End: 2023-10-13
Payer: MEDICARE

## 2023-10-13 DIAGNOSIS — E78.5 TYPE 2 DIABETES MELLITUS WITH HYPERLIPIDEMIA: ICD-10-CM

## 2023-10-13 DIAGNOSIS — E78.2 MIXED HYPERLIPIDEMIA: ICD-10-CM

## 2023-10-13 DIAGNOSIS — I10 HYPERTENSION, UNSPECIFIED TYPE: ICD-10-CM

## 2023-10-13 DIAGNOSIS — E11.69 TYPE 2 DIABETES MELLITUS WITH HYPERLIPIDEMIA: ICD-10-CM

## 2023-10-13 DIAGNOSIS — E53.8 LOW SERUM VITAMIN B12: ICD-10-CM

## 2023-10-13 DIAGNOSIS — Z95.0 PRESENCE OF PERMANENT CARDIAC PACEMAKER: Primary | ICD-10-CM

## 2023-10-13 DIAGNOSIS — G31.84 MILD COGNITIVE IMPAIRMENT: ICD-10-CM

## 2023-10-13 DIAGNOSIS — E55.9 HYPOVITAMINOSIS D: ICD-10-CM

## 2023-10-13 LAB
ANION GAP SERPL CALCULATED.3IONS-SCNC: 10 MMOL/L
BUN SERPL-MCNC: 13 MG/DL (ref 5–25)
CALCIUM SERPL-MCNC: 9.2 MG/DL (ref 8.4–10.2)
CHLORIDE SERPL-SCNC: 94 MMOL/L (ref 96–108)
CO2 SERPL-SCNC: 31 MMOL/L (ref 21–32)
CREAT SERPL-MCNC: 0.42 MG/DL (ref 0.6–1.3)
GFR SERPL CREATININE-BSD FRML MDRD: 93 ML/MIN/1.73SQ M
GLUCOSE P FAST SERPL-MCNC: 150 MG/DL (ref 65–99)
POTASSIUM SERPL-SCNC: 4.6 MMOL/L (ref 3.5–5.3)
SODIUM SERPL-SCNC: 135 MMOL/L (ref 135–147)

## 2023-10-13 PROCEDURE — 93296 REM INTERROG EVL PM/IDS: CPT | Performed by: INTERNAL MEDICINE

## 2023-10-13 PROCEDURE — 93294 REM INTERROG EVL PM/LDLS PM: CPT | Performed by: INTERNAL MEDICINE

## 2023-10-13 NOTE — PROGRESS NOTES
Results for orders placed or performed in visit on 10/13/23   Cardiac EP device report    Narrative    MDT-DUAL CHAMBER PPM (AAIR-DDDR MODE)/ ACTIVE SYSTEM IS MRI CONDITIONAL  CARELINK TRANSMISSION: BATTERY VOLTAGE ADEQUATE (3.5 yrs). AP 84.1%  0.1% (AAIR-DDDR 60PPM); ALL AVAILABLE LEAD PARAMETERS WITHIN NORMAL LIMITS. 1 DEVICE CLASSIFIED VT-NS EPISODE WITH PAT ON EGM, 8 @  BPM; PATIENT ON ASA 81, ELIQUIS, METO TART; NORMAL DEVICE FUNCTION.   ES

## 2023-10-16 ENCOUNTER — OFFICE VISIT (OUTPATIENT)
Dept: NEPHROLOGY | Facility: CLINIC | Age: 88
End: 2023-10-16

## 2023-10-16 VITALS
HEIGHT: 66 IN | WEIGHT: 118 LBS | DIASTOLIC BLOOD PRESSURE: 76 MMHG | HEART RATE: 86 BPM | SYSTOLIC BLOOD PRESSURE: 114 MMHG | BODY MASS INDEX: 18.96 KG/M2

## 2023-10-16 DIAGNOSIS — E87.3 METABOLIC ALKALOSIS: ICD-10-CM

## 2023-10-16 DIAGNOSIS — I10 ESSENTIAL HYPERTENSION: ICD-10-CM

## 2023-10-16 DIAGNOSIS — E22.2 SIADH (SYNDROME OF INAPPROPRIATE ADH PRODUCTION) (HCC): ICD-10-CM

## 2023-10-16 DIAGNOSIS — E11.42 TYPE 2 DIABETES MELLITUS WITH DIABETIC POLYNEUROPATHY, WITHOUT LONG-TERM CURRENT USE OF INSULIN (HCC): ICD-10-CM

## 2023-10-16 DIAGNOSIS — E87.1 HYPONATREMIA: Primary | ICD-10-CM

## 2023-10-16 NOTE — PROGRESS NOTES
NEPHROLOGY OUTPATIENT PROGRESS NOTE   Roger Gaytan 82 y.o. female MRN: [id number]  DATE: 10/19/2023    Reason for visit: No chief complaint on file. Patient Instructions   Thank you for coming to your visit today. As we discussed you kidney function is normal. Your sodium is normal 136mEq/L. Please follow the recommendations below       Decrease solidum chloride tabs to once a day   Fluid intake ~40oz , avoid to drink only water or tea     Avoid nonsteroidal anti-inflammatory drugs such as Naprosyn, ibuprofen, Aleve, Advil, Celebrex, Meloxicam (Mobic) etc.  You can use Tylenol as needed if you do not have any liver condition to be concerned about    Next Visit in 6 months with results   If you need to see us earlier we can change the appointment for you      Alberteen Aschoff, MD  Nephrology Attending         Diagnoses and all orders for this visit:    Hyponatremia  -     Basic metabolic panel; Future  -     Protein / creatinine ratio, urine; Future  -     Urinalysis with microscopic; Future        Assessment/Plan:  82 y.o. woman with [facility] of HTN, HLD, dysphagia, pAffib on Eliquis with pacemaker, DM, neuropathy, ILD and  pulmonary fibrosis, exposed to chemicals in the past.  CT chest revealed subpleural reticular/fiber nodular opacities, interlobular septal thickening, mild central/bibasilar bronchiectasis, possible UIP pattern.   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  Sodium levels low since 2019   Serum osm:280  Urine osm: 300  Urine Sodium: 48  Baseline sodium 130-132mEq/L  Current sodium: 136 mEq/L, stable corrected blood glucose  Etiology: Most likely secondary to SIADH in the settings of pulmonary disease and medication induced  Plan:  Continue fluid restriction 1.5 to 1.8 L a day  Decrease sodium chloride tablets to once a day  BMP in 3 weeks        #Volume status/hypertension:  Volume: Euvolemic  Blood pressure: Normotensive, /76 goal,<140/90  Recommend:  Fluid restriction as above  Metoprolol  25 mg twice daily  Advised to maintain a good BP control to prevent progression of CKD      #DM  HbA1c 7.7  Advised to maintain a good DM control to prevent progression of CKD   Maintain healthy diet (vegetables, fruits, whole grains, nonfat or low fat)  Weight loss  Physical activity (5 to 10 minutes to start the increase to 30 min a day)  Metformin 500 mg  Januvia 100 mg     #Acid-base disorder  Serum bicarb 31 mmol/L  Likely secondary to poor fluid intake due to fluid restriction      SUBJECTIVE / INTERVAL HISTORY:  82 y.o. female presents in follow up of her natremia. Feels well, no SOB, no CP, no recent hospitalizations. No issues with medication       Clevester Shows denies any recent illness/hospitalizations/medication changes since last office visit. Review of Systems   Constitutional:  Negative for appetite change. HENT:  Negative for congestion and dental problem. Eyes:  Negative for discharge. Respiratory:  Negative for shortness of breath and stridor. Cardiovascular:  Negative for chest pain, palpitations and leg swelling. Gastrointestinal:  Negative for abdominal distention and abdominal pain. Endocrine: Negative for cold intolerance. Genitourinary:  Negative for dysuria. Musculoskeletal:  Negative for arthralgias. Skin:  Negative for color change and pallor. Neurological:  Negative for dizziness. Psychiatric/Behavioral:  Negative for agitation. OBJECTIVE:  /76 (BP Location: Left arm, Patient Position: Sitting, Cuff Size: Standard)   Pulse 86   Ht 5' 6" (1.676 m)   Wt 53.5 kg (118 lb)   BMI 19.05 kg/m²  Body mass index is 19.05 kg/m².     Physical exam:  Physical Exam  General:  no acute distress at this time  Skin:  No acute rash  Eyes:  No scleral icterus and noninjected  ENT:  mucous membranes moist  Neck:  no carotid bruits  Chest:  Clear to auscultation percussion, good respiratory effort, no use of accessory respiratory muscles  CVS:  Regular rate and rhythm without rub   Abdomen:  soft and nontender   Extremities: no significant lower extremity edema  Neuro:  No gross focality  Psych:  Alert , cooperative       Medications:    Current Outpatient Medications:     acetaminophen (TYLENOL) 500 mg tablet, Take 1,000 mg by mouth as needed for mild pain, Disp: , Rfl:     Alphagan P 0.1 %, INSTILL 1 DROP RIGHT EYE TWICE A DAY, Disp: , Rfl:     aspirin (ECOTRIN LOW STRENGTH) 81 mg EC tablet, Take 1 tablet (81 mg total) by mouth daily, Disp:  , Rfl:     bimatoprost (LUMIGAN) 0.01 % ophthalmic drops, Administer 1 drop to both eyes daily at bedtime for 30 days, Disp: 1.5 mL, Rfl: 0    calcium carbonate (OS-DANIA) 600 MG tablet, Take 600 mg by mouth 2 (two) times a day with meals  , Disp: , Rfl:     Cyanocobalamin (Vitamin B 12) 500 MCG TABS, Take 500 mg by mouth 2 (two) times a day, Disp: , Rfl:     Eliquis 2.5 MG, TAKE 1 TABLET (2.5 MG TOTAL) BY MOUTH 2 (TWO) TIMES A DAY, Disp: 60 tablet, Rfl: 5    ezetimibe-simvastatin (VYTORIN) 10-40 mg per tablet, TAKE 1 TABLET BY MOUTH DAILY AT BEDTIME, Disp: 30 tablet, Rfl: 5    famotidine (PEPCID) 20 mg tablet, TAKE 1 TABLET (20 MG TOTAL) BY MOUTH 2 (TWO) TIMES A DAY, Disp: 60 tablet, Rfl: 5    glucose blood (OneTouch Ultra) test strip, TEST FOUR TIMES A DAY, Disp: 400 strip, Rfl: 1    insulin isophane (HumuLIN N KwikPen) 100 units/mL injection pen, Inject 8 Units under the skin every morning AND 4 Units every evening., Disp: 15 mL, Rfl: 3    Insulin Pen Needle (BD Pen Needle Domitila 2nd Gen) 32G X 4 MM MISC, Use 2 (two) times a day, Disp: 100 each, Rfl: 5    ipratropium (ATROVENT) 0.03 % nasal spray, USE 2 SPRAYS INTO EACH NOSTRIL EVERY 12 (TWELVE) HOURS, Disp: 30 mL, Rfl: 6    Januvia 100 MG tablet, TAKE 1 TABLET (100 MG TOTAL) BY MOUTH DAILY, Disp: 30 tablet, Rfl: 5    loratadine (CLARITIN) 10 mg tablet, Take 10 mg by mouth daily as needed for allergies, Disp: , Rfl:     LORazepam (ATIVAN) 0.5 mg tablet, Take 2 tablets (1 mg total) by mouth daily at bedtime, Disp: 60 tablet, Rfl: 2    metFORMIN (GLUCOPHAGE) 500 mg tablet, TAKE 2 TABLETS (1,000 MG TOTAL) BY MOUTH 2 (TWO) TIMES A DAY WITH MEALS, Disp: 120 tablet, Rfl: 5    metoprolol tartrate (LOPRESSOR) 25 mg tablet, Take 1 tablet (25 mg total) by mouth every 12 (twelve) hours, Disp: 180 tablet, Rfl: 0    Multiple Vitamin (multivitamin) tablet, Take 1 tablet by mouth daily  , Disp: , Rfl:     pantoprazole (PROTONIX) 20 mg tablet, TAKE 1 TABLET (20 MG TOTAL) BY MOUTH 2 (TWO) TIMES A DAY, Disp: 60 tablet, Rfl: 3    sodium chloride 1 g tablet, Take 1 tablet (1 g total) by mouth 2 (two) times a day, Disp: 270 tablet, Rfl: 1    Allergies: Allergies as of 10/16/2023 - Reviewed 10/16/2023   Allergen Reaction Noted    Keflex [cephalexin] Diarrhea 11/04/2020       The following portions of the patient's history were reviewed and updated as appropriate: past family history, past surgical history and problem list.    Laboratory Results:  Lab Results   Component Value Date    SODIUM 135 10/13/2023    K 4.6 10/13/2023    CL 94 (L) 10/13/2023    CO2 31 10/13/2023    BUN 13 10/13/2023    CREATININE 0.42 (L) 10/13/2023    GLUC 158 (H) 04/27/2023    CALCIUM 9.2 10/13/2023        Lab Results   Component Value Date    CALCIUM 9.2 10/13/2023       Portions of the record may have been created with voice recognition software. Occasional wrong word or "sound a like" substitutions may have occurred due to the inherent limitations of voice recognition software. Read the chart carefully and recognize, using context, where substitutions have occurred.

## 2023-10-16 NOTE — PATIENT INSTRUCTIONS
Thank you for coming to your visit today. As we discussed you kidney function is normal. Your sodium is normal 136mEq/L.  Please follow the recommendations below       Decrease solidum chloride tabs to once a day   Fluid intake ~40oz , avoid to drink only water or tea     Avoid nonsteroidal anti-inflammatory drugs such as Naprosyn, ibuprofen, Aleve, Advil, Celebrex, Meloxicam (Mobic) etc.  You can use Tylenol as needed if you do not have any liver condition to be concerned about    Next Visit in 6 months with results   If you need to see us earlier we can change the appointment for you      Kenneth Rajput MD  Nephrology Attending

## 2023-10-19 PROBLEM — E87.3 METABOLIC ALKALOSIS: Status: ACTIVE | Noted: 2023-10-19

## 2023-10-19 PROBLEM — E22.2 SIADH (SYNDROME OF INAPPROPRIATE ADH PRODUCTION) (HCC): Status: ACTIVE | Noted: 2023-10-19

## 2023-11-08 ENCOUNTER — APPOINTMENT (OUTPATIENT)
Dept: LAB | Facility: CLINIC | Age: 88
End: 2023-11-08
Payer: MEDICARE

## 2023-11-08 DIAGNOSIS — E87.1 HYPONATREMIA: ICD-10-CM

## 2023-11-08 LAB
25(OH)D3 SERPL-MCNC: 39.1 NG/ML (ref 30–100)
ALBUMIN SERPL BCP-MCNC: 4.4 G/DL (ref 3.5–5)
ALP SERPL-CCNC: 50 U/L (ref 34–104)
ALT SERPL W P-5'-P-CCNC: 14 U/L (ref 7–52)
ANION GAP SERPL CALCULATED.3IONS-SCNC: 6 MMOL/L
AST SERPL W P-5'-P-CCNC: 21 U/L (ref 13–39)
BACTERIA UR QL AUTO: ABNORMAL /HPF
BASOPHILS # BLD AUTO: 0.03 THOUSANDS/ÂΜL (ref 0–0.1)
BASOPHILS NFR BLD AUTO: 1 % (ref 0–1)
BILIRUB SERPL-MCNC: 0.5 MG/DL (ref 0.2–1)
BILIRUB UR QL STRIP: NEGATIVE
BUN SERPL-MCNC: 17 MG/DL (ref 5–25)
CALCIUM SERPL-MCNC: 9.6 MG/DL (ref 8.4–10.2)
CHLORIDE SERPL-SCNC: 93 MMOL/L (ref 96–108)
CHOLEST SERPL-MCNC: 126 MG/DL
CLARITY UR: CLEAR
CO2 SERPL-SCNC: 33 MMOL/L (ref 21–32)
COLOR UR: ABNORMAL
CREAT SERPL-MCNC: 0.48 MG/DL (ref 0.6–1.3)
CREAT UR-MCNC: 25.5 MG/DL
EOSINOPHIL # BLD AUTO: 0.17 THOUSAND/ÂΜL (ref 0–0.61)
EOSINOPHIL NFR BLD AUTO: 3 % (ref 0–6)
ERYTHROCYTE [DISTWIDTH] IN BLOOD BY AUTOMATED COUNT: 14.8 % (ref 11.6–15.1)
EST. AVERAGE GLUCOSE BLD GHB EST-MCNC: 146 MG/DL
GFR SERPL CREATININE-BSD FRML MDRD: 89 ML/MIN/1.73SQ M
GLUCOSE P FAST SERPL-MCNC: 115 MG/DL (ref 65–99)
GLUCOSE UR STRIP-MCNC: NEGATIVE MG/DL
HBA1C MFR BLD: 6.7 %
HCT VFR BLD AUTO: 33.9 % (ref 34.8–46.1)
HDLC SERPL-MCNC: 62 MG/DL
HGB BLD-MCNC: 10.3 G/DL (ref 11.5–15.4)
HGB UR QL STRIP.AUTO: NEGATIVE
IMM GRANULOCYTES # BLD AUTO: 0.01 THOUSAND/UL (ref 0–0.2)
IMM GRANULOCYTES NFR BLD AUTO: 0 % (ref 0–2)
KETONES UR STRIP-MCNC: NEGATIVE MG/DL
LDLC SERPL CALC-MCNC: 48 MG/DL (ref 0–100)
LEUKOCYTE ESTERASE UR QL STRIP: NEGATIVE
LYMPHOCYTES # BLD AUTO: 1.22 THOUSANDS/ÂΜL (ref 0.6–4.47)
LYMPHOCYTES NFR BLD AUTO: 25 % (ref 14–44)
MCH RBC QN AUTO: 25.4 PG (ref 26.8–34.3)
MCHC RBC AUTO-ENTMCNC: 30.4 G/DL (ref 31.4–37.4)
MCV RBC AUTO: 84 FL (ref 82–98)
MICROALBUMIN UR-MCNC: 50 MG/L
MICROALBUMIN/CREAT 24H UR: 196 MG/G CREATININE (ref 0–30)
MONOCYTES # BLD AUTO: 0.59 THOUSAND/ÂΜL (ref 0.17–1.22)
MONOCYTES NFR BLD AUTO: 12 % (ref 4–12)
NEUTROPHILS # BLD AUTO: 2.92 THOUSANDS/ÂΜL (ref 1.85–7.62)
NEUTS SEG NFR BLD AUTO: 59 % (ref 43–75)
NITRITE UR QL STRIP: NEGATIVE
NON-SQ EPI CELLS URNS QL MICRO: ABNORMAL /HPF
NONHDLC SERPL-MCNC: 64 MG/DL
NRBC BLD AUTO-RTO: 0 /100 WBCS
PH UR STRIP.AUTO: 6.5 [PH]
PLATELET # BLD AUTO: 194 THOUSANDS/UL (ref 149–390)
PMV BLD AUTO: 10.6 FL (ref 8.9–12.7)
POTASSIUM SERPL-SCNC: 4.9 MMOL/L (ref 3.5–5.3)
PROT SERPL-MCNC: 7.3 G/DL (ref 6.4–8.4)
PROT UR STRIP-MCNC: ABNORMAL MG/DL
RBC # BLD AUTO: 4.06 MILLION/UL (ref 3.81–5.12)
RBC #/AREA URNS AUTO: ABNORMAL /HPF
SODIUM SERPL-SCNC: 132 MMOL/L (ref 135–147)
SP GR UR STRIP.AUTO: 1.01 (ref 1–1.03)
TRIGL SERPL-MCNC: 81 MG/DL
TSH SERPL DL<=0.05 MIU/L-ACNC: 1.79 UIU/ML (ref 0.45–4.5)
UROBILINOGEN UR STRIP-ACNC: <2 MG/DL
VIT B12 SERPL-MCNC: 492 PG/ML (ref 180–914)
WBC # BLD AUTO: 4.94 THOUSAND/UL (ref 4.31–10.16)
WBC #/AREA URNS AUTO: ABNORMAL /HPF

## 2023-11-08 PROCEDURE — 82570 ASSAY OF URINE CREATININE: CPT

## 2023-11-08 PROCEDURE — 82043 UR ALBUMIN QUANTITATIVE: CPT

## 2023-11-08 PROCEDURE — 82607 VITAMIN B-12: CPT

## 2023-11-08 PROCEDURE — 80061 LIPID PANEL: CPT

## 2023-11-08 PROCEDURE — 84443 ASSAY THYROID STIM HORMONE: CPT

## 2023-11-08 PROCEDURE — 81001 URINALYSIS AUTO W/SCOPE: CPT

## 2023-11-08 PROCEDURE — 85025 COMPLETE CBC W/AUTO DIFF WBC: CPT

## 2023-11-08 PROCEDURE — 82306 VITAMIN D 25 HYDROXY: CPT

## 2023-11-08 PROCEDURE — 80053 COMPREHEN METABOLIC PANEL: CPT

## 2023-11-08 PROCEDURE — 83036 HEMOGLOBIN GLYCOSYLATED A1C: CPT

## 2023-11-10 DIAGNOSIS — F41.9 ANXIETY: ICD-10-CM

## 2023-11-10 RX ORDER — LORAZEPAM 0.5 MG/1
1 TABLET ORAL
Qty: 60 TABLET | Refills: 2 | Status: SHIPPED | OUTPATIENT
Start: 2023-11-10

## 2023-11-13 ENCOUNTER — OFFICE VISIT (OUTPATIENT)
Dept: FAMILY MEDICINE CLINIC | Facility: CLINIC | Age: 88
End: 2023-11-13
Payer: MEDICARE

## 2023-11-13 VITALS
DIASTOLIC BLOOD PRESSURE: 60 MMHG | SYSTOLIC BLOOD PRESSURE: 112 MMHG | HEIGHT: 66 IN | RESPIRATION RATE: 16 BRPM | HEART RATE: 58 BPM | OXYGEN SATURATION: 95 % | BODY MASS INDEX: 18.8 KG/M2 | WEIGHT: 117 LBS | TEMPERATURE: 97.5 F

## 2023-11-13 DIAGNOSIS — I10 ESSENTIAL HYPERTENSION: ICD-10-CM

## 2023-11-13 DIAGNOSIS — E43 SEVERE PROTEIN-CALORIE MALNUTRITION (HCC): ICD-10-CM

## 2023-11-13 DIAGNOSIS — J84.9 INTERSTITIAL LUNG DISEASE (HCC): ICD-10-CM

## 2023-11-13 DIAGNOSIS — G03.9 PACHYMENINGITIS: ICD-10-CM

## 2023-11-13 DIAGNOSIS — K21.9 GASTROESOPHAGEAL REFLUX DISEASE, UNSPECIFIED WHETHER ESOPHAGITIS PRESENT: ICD-10-CM

## 2023-11-13 DIAGNOSIS — E11.42 TYPE 2 DIABETES MELLITUS WITH DIABETIC POLYNEUROPATHY, WITHOUT LONG-TERM CURRENT USE OF INSULIN (HCC): ICD-10-CM

## 2023-11-13 DIAGNOSIS — E87.1 HYPONATREMIA: ICD-10-CM

## 2023-11-13 DIAGNOSIS — E11.3552 TYPE 2 DIABETES MELLITUS WITH STABLE PROLIFERATIVE RETINOPATHY OF LEFT EYE, WITHOUT LONG-TERM CURRENT USE OF INSULIN (HCC): ICD-10-CM

## 2023-11-13 DIAGNOSIS — Z00.00 MEDICARE ANNUAL WELLNESS VISIT, SUBSEQUENT: Primary | ICD-10-CM

## 2023-11-13 DIAGNOSIS — E78.2 MIXED HYPERLIPIDEMIA: ICD-10-CM

## 2023-11-13 DIAGNOSIS — E61.1 IRON DEFICIENCY: ICD-10-CM

## 2023-11-13 PROCEDURE — 99214 OFFICE O/P EST MOD 30 MIN: CPT | Performed by: NURSE PRACTITIONER

## 2023-11-13 PROCEDURE — G0439 PPPS, SUBSEQ VISIT: HCPCS | Performed by: NURSE PRACTITIONER

## 2023-11-13 NOTE — PATIENT INSTRUCTIONS
Medicare Preventive Visit Patient Instructions  Thank you for completing your Welcome to Medicare Visit or Medicare Annual Wellness Visit today. Your next wellness visit will be due in one year (11/13/2024). The screening/preventive services that you may require over the next 5-10 years are detailed below. Some tests may not apply to you based off risk factors and/or age. Screening tests ordered at today's visit but not completed yet may show as past due. Also, please note that scanned in results may not display below. Preventive Screenings:  Service Recommendations Previous Testing/Comments   Colorectal Cancer Screening  * Colonoscopy    * Fecal Occult Blood Test (FOBT)/Fecal Immunochemical Test (FIT)  * Fecal DNA/Cologuard Test  * Flexible Sigmoidoscopy Age: 43-73 years old   Colonoscopy: every 10 years (may be performed more frequently if at higher risk)  OR  FOBT/FIT: every 1 year  OR  Cologuard: every 3 years  OR  Sigmoidoscopy: every 5 years  Screening may be recommended earlier than age 39 if at higher risk for colorectal cancer. Also, an individualized decision between you and your healthcare provider will decide whether screening between the ages of 77-80 would be appropriate. Colonoscopy: 09/02/2020  FOBT/FIT: Not on file  Cologuard: Not on file  Sigmoidoscopy: Not on file    Screening Not Indicated     Breast Cancer Screening Age: 36 years old  Frequency: every 1-2 years  Not required if history of left and right mastectomy Mammogram: 04/15/2014    Patient Declines   Cervical Cancer Screening Between the ages of 21-29, pap smear recommended once every 3 years. Between the ages of 32-69, can perform pap smear with HPV co-testing every 5 years.    Recommendations may differ for women with a history of total hysterectomy, cervical cancer, or abnormal pap smears in past. Pap Smear: Not on file    Screening Not Indicated   Hepatitis C Screening Once for adults born between 1945 and 1965  More frequently in patients at high risk for Hepatitis C Hep C Antibody: 12/07/2020    Screening Current   Diabetes Screening 1-2 times per year if you're at risk for diabetes or have pre-diabetes Fasting glucose: 115 mg/dL (11/8/2023)  A1C: 6.7 % (11/8/2023)  Screening Not Indicated  History Diabetes   Cholesterol Screening Once every 5 years if you don't have a lipid disorder. May order more often based on risk factors. Lipid panel: 11/08/2023    Screening Not Indicated  History Lipid Disorder     Other Preventive Screenings Covered by Medicare:  Abdominal Aortic Aneurysm (AAA) Screening: covered once if your at risk. You're considered to be at risk if you have a family history of AAA. Lung Cancer Screening: covers low dose CT scan once per year if you meet all of the following conditions: (1) Age 48-67; (2) No signs or symptoms of lung cancer; (3) Current smoker or have quit smoking within the last 15 years; (4) You have a tobacco smoking history of at least 20 pack years (packs per day multiplied by number of years you smoked); (5) You get a written order from a healthcare provider. Glaucoma Screening: covered annually if you're considered high risk: (1) You have diabetes OR (2) Family history of glaucoma OR (3)  aged 48 and older OR (3)  American aged 72 and older  Osteoporosis Screening: covered every 2 years if you meet one of the following conditions: (1) You're estrogen deficient and at risk for osteoporosis based off medical history and other findings; (2) Have a vertebral abnormality; (3) On glucocorticoid therapy for more than 3 months; (4) Have primary hyperparathyroidism; (5) On osteoporosis medications and need to assess response to drug therapy. Last bone density test (DXA Scan): 09/01/2020. HIV Screening: covered annually if you're between the age of 14-79. Also covered annually if you are younger than 13 and older than 72 with risk factors for HIV infection.  For pregnant patients, it is covered up to 3 times per pregnancy. Immunizations:  Immunization Recommendations   Influenza Vaccine Annual influenza vaccination during flu season is recommended for all persons aged >= 6 months who do not have contraindications   Pneumococcal Vaccine   * Pneumococcal conjugate vaccine = PCV13 (Prevnar 13), PCV15 (Vaxneuvance), PCV20 (Prevnar 20)  * Pneumococcal polysaccharide vaccine = PPSV23 (Pneumovax) Adults 52-83 yo with certain risk factors or if 69+ yo  If never received any pneumonia vaccine: recommend Prevnar 20 (PCV20)  Give PCV20 if previously received 1 dose of PCV13 or PPSV23   Hepatitis B Vaccine 3 dose series if at intermediate or high risk (ex: diabetes, end stage renal disease, liver disease)   Respiratory syncytial virus (RSV) Vaccine - COVERED BY MEDICARE PART D  * RSVPreF3 (Arexvy) CDC recommends that adults 61years of age and older may receive a single dose of RSV vaccine using shared clinical decision-making (SCDM)   Tetanus (Td) Vaccine - COST NOT COVERED BY MEDICARE PART B Following completion of primary series, a booster dose should be given every 10 years to maintain immunity against tetanus. Td may also be given as tetanus wound prophylaxis. Tdap Vaccine - COST NOT COVERED BY MEDICARE PART B Recommended at least once for all adults. For pregnant patients, recommended with each pregnancy. Shingles Vaccine (Shingrix) - COST NOT COVERED BY MEDICARE PART B  2 shot series recommended in those 19 years and older who have or will have weakened immune systems or those 50 years and older     Health Maintenance Due:      Topic Date Due   • Colorectal Cancer Screening  09/02/2025   • Hepatitis C Screening  Completed     Immunizations Due:      Topic Date Due   • COVID-19 Vaccine (5 - Pfizer series) 11/03/2022   • Influenza Vaccine (1) 09/01/2023     Advance Directives   What are advance directives? Advance directives are legal documents that state your wishes and plans for medical care. These plans are made ahead of time in case you lose your ability to make decisions for yourself. Advance directives can apply to any medical decision, such as the treatments you want, and if you want to donate organs. What are the types of advance directives? There are many types of advance directives, and each state has rules about how to use them. You may choose a combination of any of the following:  Living will: This is a written record of the treatment you want. You can also choose which treatments you do not want, which to limit, and which to stop at a certain time. This includes surgery, medicine, IV fluid, and tube feedings. Durable power of  for healthcare [facility]): This is a written record that states who you want to make healthcare choices for you when you are unable to make them for yourself. This person, called a proxy, is usually a family member or a friend. You may choose more than 1 proxy. Do not resuscitate (DNR) order:  A DNR order is used in case your heart stops beating or you stop breathing. It is a request not to have certain forms of treatment, such as CPR. A DNR order may be included in other types of advance directives. Medical directive: This covers the care that you want if you are in a coma, near death, or unable to make decisions for yourself. You can list the treatments you want for each condition. Treatment may include pain medicine, surgery, blood transfusions, dialysis, IV or tube feedings, and a ventilator (breathing machine). Values history: This document has questions about your views, beliefs, and how you feel and think about life. This information can help others choose the care that you would choose. Why are advance directives important? An advance directive helps you control your care. Although spoken wishes may be used, it is better to have your wishes written down. Spoken wishes can be misunderstood, or not followed.  Treatments may be given even if you do not want them. An advance directive may make it easier for your family to make difficult choices about your care. Urinary Incontinence   Urinary incontinence (UI)  is when you lose control of your bladder. UI develops because your bladder cannot store or empty urine properly. The 3 most common types of UI are stress incontinence, urge incontinence, or both. Medicines:   May be given to help strengthen your bladder control. Report any side effects of medication to your healthcare provider. Do pelvic muscle exercises often:  Your pelvic muscles help you stop urinating. Squeeze these muscles tight for 5 seconds, then relax for 5 seconds. Gradually work up to squeezing for 10 seconds. Do 3 sets of 15 repetitions a day, or as directed. This will help strengthen your pelvic muscles and improve bladder control. Train your bladder:  Go to the bathroom at set times, such as every 2 hours, even if you do not feel the urge to go. You can also try to hold your urine when you feel the urge to go. For example, hold your urine for 5 minutes when you feel the urge to go. As that becomes easier, hold your urine for 10 minutes. Self-care:   Keep a UI record. Write down how often you leak urine and how much you leak. Make a note of what you were doing when you leaked urine. Drink liquids as directed. You may need to limit the amount of liquid you drink to help control your urine leakage. Do not drink any liquid right before you go to bed. Limit or do not have drinks that contain caffeine or alcohol. Prevent constipation. Eat a variety of high-fiber foods. Good examples are high-fiber cereals, beans, vegetables, and whole-grain breads. Walking is the best way to trigger your intestines to have a bowel movement. Exercise regularly and maintain a healthy weight. Weight loss and exercise will decrease pressure on your bladder and help you control your leakage. Use a catheter as directed  to help empty your bladder.  A catheter is a tiny, plastic tube that is put into your bladder to drain your urine. Go to behavior therapy as directed. Behavior therapy may be used to help you learn to control your urge to urinate. © Copyright [employer] 2018 Information is for End User's use only and may not be sold, redistributed or otherwise used for commercial purposes.  All illustrations and images included in CareNotes® are the copyrighted property of [employer] or [address] [place] [address]

## 2023-11-13 NOTE — PROGRESS NOTES
Assessment and Plan:     Problem List Items Addressed This Visit       Essential hypertension     Stable blood pressure on metoprolol. Relevant Orders    CBC and differential    Comprehensive metabolic panel    TSH, 3rd generation with Free T4 reflex    Mixed hyperlipidemia     LDL 48. Continue Vytorin. Relevant Orders    Lipid panel    GERD (gastroesophageal reflux disease)     Continue pantoprazole, under the care of GI. Iron deficiency     Hemoglobin 10.2, stable, MCH dropping, history of CHELITA, stopped iron supplement due to constipation. Will need to resume iron supplement or could consider trial of MVI with iron. Interstitial lung disease [facility])     Continue pulmonology follow up. Severe protein-calorie malnutrition ([address])     Gained 5 pounds since August, keep up the good work. Hyponatremia     Sodium 132. Continue sodium chloride tablets. Type 2 diabetes mellitus with diabetic polyneuropathy, without long-term current use of insulin ([facility])       Lab Results   Component Value Date    HGBA1C 6.7 (H) 11/08/2023   Peripheral neuropathy in feet, recent heel pain, is tolerable. Does not desire to treat at this time, will monitor. Relevant Orders    Hemoglobin A1C    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, without long-term current use of insulin ([facility])       Lab Results   Component Value Date    HGBA1C 6.7 (H) 11/08/2023   Left eye vitreous hemorrhage. 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 Recommended for intra-vitreal injections. Follows with ophthalmology, Dr. Washington Comment. A1c is well controlled at 6.7%. Would like to keep A1c around 7% due to significant eye disease, but don't want to risk hypoglycemia due to age and frailty. Continue same medications, could consider discontinuation of Januvia in the future. Repeat blood work and follow up in 4 months.           Pachymeningitis Continue neurology follow up. Other Visit Diagnoses       Medicare annual wellness visit, subsequent    -  Primary            4 month follow up with blood work. Preventive health issues were discussed with patient, and age appropriate screening tests were ordered as noted in patient's After Visit Summary. Personalized health advice and appropriate referrals for health education or preventive services given if needed, as noted in patient's After Visit Summary. History of Present Illness:     Patient presents for a Medicare Wellness Visit    Sander Ramsay is an 82year old female presenting today for annual exam and check up on chronic conditions. Overall stable health. Has chronic tickle in her throat, cough. Clear mucous. Is taking pantoprazole. Used Flonase in the past, it was not helpful. Due for pulmonology follow up. Diarrhea and cnstipation--varying, chronic IBS    Stopped motegrity. Eating anything, anytime. Eating before bed--snack. Had one low sugar 66, in the morning, after she did not have a snack before bed. No low sugars since. Forgot to bring sugar log today. Humulin N 8 units in the morning and 4 units in the evening. Checking sugars BID. Left heel neuropathy, pain intermittent. Keep an eye on this. Does not desire intervention at this time. Pulmonology due in December  Neurology in November  Cardiology in January                     Patient Care Team:  Nick Bean as PCP - General (Family Medicine)  Kris Collins MD as PCP - Endocrinology (Endocrinology)  MD Maureen Ashley MD (Ophthalmology)  Good Black MD (Ophthalmology)  Iraj Gonzalez MD (Nephrology)     Review of Systems:     Review of Systems   Constitutional:  Positive for fatigue. HENT: Negative. Respiratory:  Positive for shortness of breath. Cardiovascular: Negative.     Gastrointestinal:  Positive for constipation and diarrhea. Negative for nausea and vomiting. Genitourinary: Negative. Musculoskeletal: Negative. Skin: Negative. Neurological: Negative. Psychiatric/Behavioral: Negative.           Problem List:     Patient Active Problem List   Diagnosis    Essential hypertension    Mixed hyperlipidemia    Glaucoma    Asymptomatic carotid artery stenosis, left    Symptomatic PVCs    Occlusion of right carotid artery    Paroxysmal atrial fibrillation (HCC)    Pacemaker    Osteoporosis    GERD (gastroesophageal reflux disease)    Anxiety    Iron deficiency    Interstitial lung disease (HCC)    Dysphagia    Severe protein-calorie malnutrition ([facility])    Constipation    Hyponatremia    Post-nasal drip    DM2 (diabetes mellitus, type 2) ([address])    Type 2 diabetes mellitus with hyperlipidemia     Type 2 diabetes mellitus with diabetic polyneuropathy, without long-term current use of insulin ([address])    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, without long-term current use of insulin ([facility])    Depression, recurrent ([facility])    Hypovitaminosis D    Pachymeningitis    Bilateral hip pain    Metabolic alkalosis    SIADH (syndrome of inappropriate ADH production) ([address])      Past Medical and Surgical History:     Past Medical History:   Diagnosis Date    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/7/2020    Glaucoma     H/O degenerative disc disease     Idiopathic pulmonary fibrosis ([address]) 11/2020    Irregular heart beat     afib    Peripheral neuropathy     S/P laparoscopic cholecystectomy 12/21/2020    Stenosis of right subclavian artery ([address]) 11/18/2016     Past Surgical History:   Procedure Laterality Date    CATARACT EXTRACTION, BILATERAL  2011    CHOLECYSTECTOMY      CHOLECYSTECTOMY LAPAROSCOPIC N/A 12/09/2020    Procedure: CHOLECYSTECTOMY LAPAROSCOPIC;  Surgeon: Martha Quintana MD;  Location: [facility];  Service: General    COLONOSCOPY      CYST REMOVAL  2009    left lower Quadrant     FL LUMBAR PUNCTURE DIAGNOSTIC  4/28/2023 HERNIA REPAIR  1992    LIPOMA RESECTION  2010    [place] RPR 1ST INGUN HRNA AGE 5 YRS/> REDUCIBLE Bilateral 2018    Procedure: INGUINAL HERNIA REPAIR;  Surgeon: Jes Cordon MD;  Location: [facility];  Service: General    SHOULDER SURGERY  2019    Dr. Patel Crigler [facility])    UPPER GASTROINTESTINAL ENDOSCOPY      VASCULAR SURGERY      Agram-  R carotid occlusion      Family History:     Family History   Problem Relation Age of Onset    Heart disease Mother     Heart attack Father     Hiatal hernia Father     Diabetes Father     Cancer Brother     Diabetes Brother     Heart disease Brother     Hypertension Brother     Lung disease Brother 76        interstitial lung disease    Cancer Brother 52        glioblastoma    Other Son         gastroparesis    Other Cousin         interstitial lung disease      Social History:     Social History     Socioeconomic History    Marital status:      Spouse name: None    Number of children: None    Years of education: None    Highest education level: None   Occupational History    Occupation: customer service   retired   Tobacco Use    Smoking status: Former     Packs/day: 1.50     Years: 38.00     Total pack years: 57.00     Types: Cigarettes     Start date:      Quit date:      Years since quittin.8    Smokeless tobacco: Never   Vaping Use    Vaping Use: Never used   Substance and Sexual Activity    Alcohol use: No    Drug use: No    Sexual activity: None   Other Topics Concern    None   Social History Narrative    Lives alone [facility]. Was divorce. Ex- passed away. 3 children. Four grandchildren. Spends most of her time at home. Does not drive.      Social Determinants of Health     Financial Resource Strain: Low Risk  (2023)    Overall Financial Resource Strain (CARDIA)     Difficulty of Paying Living Expenses: Not very hard   Food Insecurity: Not on file   Transportation Needs: No Transportation Needs (2023) PRAPARE - Transportation     Lack of Transportation (Medical): No     Lack of Transportation (Non-Medical): No   Physical Activity: Not on file   Stress: Not on file   Social Connections: Not on file   Intimate Partner Violence: Not on file   Housing Stability: Not on file      Medications and Allergies:     Current Outpatient Medications   Medication Sig Dispense Refill    acetaminophen (TYLENOL) 500 mg tablet Take 1,000 mg by mouth as needed for mild pain      Alphagan P 0.1 % INSTILL 1 DROP RIGHT EYE TWICE A DAY      aspirin (ECOTRIN LOW STRENGTH) 81 mg EC tablet Take 1 tablet (81 mg total) by mouth daily      bimatoprost (LUMIGAN) 0.01 % ophthalmic drops Administer 1 drop to both eyes daily at bedtime for 30 days 1.5 mL 0    calcium carbonate (OS-DANIA) 600 MG tablet Take 600 mg by mouth 2 (two) times a day with meals        Cyanocobalamin (Vitamin B 12) 500 MCG TABS Take 500 mg by mouth 2 (two) times a day      Eliquis 2.5 MG TAKE 1 TABLET (2.5 MG TOTAL) BY MOUTH 2 (TWO) TIMES A DAY 60 tablet 5    ezetimibe-simvastatin (VYTORIN) 10-40 mg per tablet TAKE 1 TABLET BY MOUTH DAILY AT BEDTIME 30 tablet 5    famotidine (PEPCID) 20 mg tablet TAKE 1 TABLET (20 MG TOTAL) BY MOUTH 2 (TWO) TIMES A DAY 60 tablet 5    glucose blood (OneTouch Ultra) test strip TEST FOUR TIMES A  strip 1    insulin isophane (HumuLIN N KwikPen) 100 units/mL injection pen Inject 8 Units under the skin every morning AND 4 Units every evening.  15 mL 3    Insulin Pen Needle (BD Pen Needle Domitila 2nd Gen) 32G X 4 MM MISC Use 2 (two) times a day 100 each 5    ipratropium (ATROVENT) 0.03 % nasal spray USE 2 SPRAYS INTO EACH NOSTRIL EVERY 12 (TWELVE) HOURS 30 mL 6    Januvia 100 MG tablet TAKE 1 TABLET (100 MG TOTAL) BY MOUTH DAILY 30 tablet 5    LORazepam (ATIVAN) 0.5 mg tablet TAKE 2 TABLETS (1 MG TOTAL) BY MOUTH DAILY AT BEDTIME 60 tablet 2    metFORMIN (GLUCOPHAGE) 500 mg tablet TAKE 2 TABLETS (1,000 MG TOTAL) BY MOUTH 2 (TWO) TIMES A DAY WITH MEALS 120 tablet 5    metoprolol tartrate (LOPRESSOR) 25 mg tablet Take 1 tablet (25 mg total) by mouth every 12 (twelve) hours 180 tablet 0    Multiple Vitamin (multivitamin) tablet Take 1 tablet by mouth daily        pantoprazole (PROTONIX) 20 mg tablet TAKE 1 TABLET (20 MG TOTAL) BY MOUTH 2 (TWO) TIMES A DAY 60 tablet 3    sodium chloride 1 g tablet Take 1 tablet (1 g total) by mouth 2 (two) times a day 270 tablet 1    loratadine (CLARITIN) 10 mg tablet Take 10 mg by mouth daily as needed for allergies       No current facility-administered medications for this visit. Allergies   Allergen Reactions    Keflex [Cephalexin] Diarrhea      Immunizations:     Immunization History   Administered Date(s) Administered    COVID-19 PFIZER VACCINE 0.3 ML IM 01/22/2021, 02/12/2021, 09/30/2021, 09/08/2022    INFLUENZA 11/02/2016, 10/16/2017, 09/18/2018, 09/18/2018, 11/06/2019    Influenza, Quadrivalent (nasal) 11/02/2016    Influenza, high dose seasonal 0.7 mL 11/06/2019, 10/06/2020, 11/09/2021, 11/15/2022    Pneumococcal Conjugate 13-Valent 07/25/2019    Pneumococcal Polysaccharide PPV23 03/17/2003    Zoster 07/09/2010      Health Maintenance:         Topic Date Due    Colorectal Cancer Screening  09/02/2025    Hepatitis C Screening  Completed         Topic Date Due    COVID-19 Vaccine (5 - Pfizer series) 11/03/2022    Influenza Vaccine (1) 09/01/2023      Medicare Screening Tests and Risk Assessments:     Claudine Lowery is here for her Subsequent Wellness visit. Last Medicare Wellness visit information reviewed, patient interviewed and updates made to the record today. Health Risk Assessment:   Patient rates overall health as poor. Patient feels that their physical health rating is much worse. Patient is satisfied with their life. Eyesight was rated as same. Hearing was rated as same. Patient feels that their emotional and mental health rating is same. Patients states they are never, rarely angry.  Patient states they are always unusually tired/fatigued. Pain experienced in the last 7 days has been none. Patient states that she has experienced no weight loss or gain in last 6 months. Fall Risk Screening: In the past year, patient has experienced: no history of falling in past year      Urinary Incontinence Screening:   Patient has leaked urine accidently in the last six months. Home Safety:  Patient does not have trouble with stairs inside or outside of their home. Patient has working smoke alarms and has working carbon monoxide detector. Home safety hazards include: none. Nutrition:   Current diet is Regular. Medications:   Patient is currently taking over-the-counter supplements. OTC medications include: see medication list. Patient is able to manage medications. Activities of Daily Living (ADLs)/Instrumental Activities of Daily Living (IADLs):   Walk and transfer into and out of bed and chair?: Yes  Dress and groom yourself?: Yes    Bathe or shower yourself?: Yes    Feed yourself? Yes  Do your laundry/housekeeping?: Yes  Manage your money, pay your bills and track your expenses?: Yes  Make your own meals?: Yes    Do your own shopping?: Yes    Previous Hospitalizations:   Any hospitalizations or ED visits within the last 12 months?: Yes    How many hospitalizations have you had in the last year?: 1-2    Advance Care Planning:   Living will: Yes    Durable POA for healthcare:  Yes    Advanced directive: Yes    Advanced directive counseling given: Yes      Cognitive Screening:   Provider or family/friend/caregiver concerned regarding cognition?: No    PREVENTIVE SCREENINGS      Cardiovascular Screening:    General: Screening Not Indicated and History Lipid Disorder      Diabetes Screening:     General: Screening Not Indicated and History Diabetes      Colorectal Cancer Screening:     General: Screening Not Indicated      Breast Cancer Screening:     General: Patient Declines and Screening Not Indicated Cervical Cancer Screening:    General: Screening Not Indicated      Osteoporosis Screening:    General: History Osteoporosis and Patient Declines      Abdominal Aortic Aneurysm (AAA) Screening:        General: Screening Not Indicated      Lung Cancer Screening:     General: Screening Not Indicated      Hepatitis C Screening:    General: Screening Current    Screening, Brief Intervention, and Referral to Treatment (SBIRT)    Screening  Typical number of drinks in a day: 0  Typical number of drinks in a week: 0  Interpretation: Low risk drinking behavior. Single Item Drug Screening:  How often have you used an illegal drug (including marijuana) or a prescription medication for non-medical reasons in the past year? never    Single Item Drug Screen Score: 0  Interpretation: Negative screen for possible drug use disorder    Brief Intervention  Alcohol & drug use screenings were reviewed. No concerns regarding substance use disorder identified. No results found.      Physical Exam:     /60 (BP Location: Left arm, Patient Position: Sitting, Cuff Size: Standard)   Pulse 58   Temp 97.5 °F (36.4 °C) (Temporal)   Resp 16   Ht 5' 6" (1.676 m)   Wt 53.1 kg (117 lb)   SpO2 95%   BMI 18.88 kg/m²     Physical Exam     Amy Alvina Shone, CRNP

## 2023-11-14 NOTE — ASSESSMENT & PLAN NOTE
Lab Results   Component Value Date    HGBA1C 6.7 (H) 11/08/2023     Peripheral neuropathy in feet, recent heel pain, is tolerable. Does not desire to treat at this time, will monitor.

## 2023-11-14 NOTE — ASSESSMENT & PLAN NOTE
Lab Results   Component Value Date    HGBA1C 6.7 (H) 11/08/2023     Left eye vitreous hemorrhage. 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 Recommended for intra-vitreal injections. Follows with ophthalmology, Dr. Richard Silver. A1c is well controlled at 6.7%. Would like to keep A1c around 7% due to significant eye disease, but don't want to risk hypoglycemia due to age and frailty. Continue same medications, could consider discontinuation of Januvia in the future. Repeat blood work and follow up in 4 months.

## 2023-11-14 NOTE — ASSESSMENT & PLAN NOTE
Anticoagulated on Eliquis and aspirin. Rate controlled on metoprolol.   Continue follow-up with Cardiology, Dr. Sadia Kuhn

## 2023-11-14 NOTE — ASSESSMENT & PLAN NOTE
Hemoglobin 10.2, stable, MCH dropping, history of CHELITA, stopped iron supplement due to constipation. Will need to resume iron supplement or could consider trial of MVI with iron.

## 2023-11-26 ENCOUNTER — TELEPHONE (OUTPATIENT)
Dept: FAMILY MEDICINE CLINIC | Facility: CLINIC | Age: 88
End: 2023-11-26

## 2023-11-26 DIAGNOSIS — M81.0 OSTEOPOROSIS WITHOUT CURRENT PATHOLOGICAL FRACTURE, UNSPECIFIED OSTEOPOROSIS TYPE: Primary | ICD-10-CM

## 2023-11-26 DIAGNOSIS — E61.1 IRON DEFICIENCY: ICD-10-CM

## 2023-11-27 NOTE — TELEPHONE ENCOUNTER
Please contact Ac. Mild iron deficiency anemia. I know she has a lot of problems with constipation and diarrhea, and iron supplements may make this worse. Would she be able to try taking a multivitamin with iron daily? Also she is due for repeat Dexa scan. Orders placed.

## 2024-01-12 ENCOUNTER — REMOTE DEVICE CLINIC VISIT (OUTPATIENT)
Dept: CARDIOLOGY CLINIC | Facility: CLINIC | Age: 89
End: 2024-01-12
Payer: MEDICARE

## 2024-01-12 DIAGNOSIS — Z95.0 PRESENCE OF PERMANENT CARDIAC PACEMAKER: Primary | ICD-10-CM

## 2024-01-12 PROCEDURE — 93296 REM INTERROG EVL PM/IDS: CPT | Performed by: INTERNAL MEDICINE

## 2024-01-12 PROCEDURE — 93294 REM INTERROG EVL PM/LDLS PM: CPT | Performed by: INTERNAL MEDICINE

## 2024-01-12 NOTE — PROGRESS NOTES
MDT-DUAL CHAMBER PPM (AAIR-DDDR MODE)/ ACTIVE SYSTEM IS MRI CONDITIONAL   CARELINK TRANSMISSION: BATTERY VOLTAGE ADEQUATE (3 YRS). AP 77.6%  0.1% (AAIR-DDDR 60); ALL AVAILABLE LEAD PARAMETERS WITHIN NORMAL LIMITS. NO SIGNIFICANT HIGH RATE EPISODES. PVC COUNTER INCREASED (131.9/HR) - PVC'S NOTED ON PRESENTING EGM; EF 60% (10/2022 ECHO); PATIENT TAKING ASA 81, ELIQUIS, METOPROLOL TART. NORMAL DEVICE FUNCTION.  ES

## 2024-01-17 DIAGNOSIS — E11.65 UNCONTROLLED TYPE 2 DIABETES MELLITUS WITH HYPERGLYCEMIA (HCC): ICD-10-CM

## 2024-01-17 RX ORDER — BLOOD SUGAR DIAGNOSTIC
STRIP MISCELLANEOUS
Qty: 100 STRIP | Refills: 4 | Status: SHIPPED | OUTPATIENT
Start: 2024-01-17

## 2024-02-06 ENCOUNTER — TELEPHONE (OUTPATIENT)
Dept: FAMILY MEDICINE CLINIC | Facility: CLINIC | Age: 89
End: 2024-02-06

## 2024-02-06 NOTE — TELEPHONE ENCOUNTER
Patient called and stated that she needs a letter fr her insurance stating what over the counter drugs she takes and if you could add in B12, Ensure, Sustain, power aid electrolyte drink-Camille, and immodium        Please call patient to advise when done and mail letter to patient.

## 2024-02-07 ENCOUNTER — FOLLOW UP (OUTPATIENT)
Dept: URBAN - METROPOLITAN AREA CLINIC 6 | Facility: CLINIC | Age: 89
End: 2024-02-07

## 2024-02-07 DIAGNOSIS — H40.1132: ICD-10-CM

## 2024-02-07 DIAGNOSIS — F41.9 ANXIETY: ICD-10-CM

## 2024-02-07 PROCEDURE — 92083 EXTENDED VISUAL FIELD XM: CPT

## 2024-02-07 PROCEDURE — 92012 INTRM OPH EXAM EST PATIENT: CPT

## 2024-02-07 RX ORDER — LORAZEPAM 0.5 MG/1
1 TABLET ORAL
Qty: 60 TABLET | Refills: 2 | Status: SHIPPED | OUTPATIENT
Start: 2024-02-07

## 2024-02-07 ASSESSMENT — TONOMETRY
OD_IOP_MMHG: 13
OS_IOP_MMHG: 13

## 2024-02-07 ASSESSMENT — VISUAL ACUITY
OS_CC: 20/50
OD_CC: 20/200

## 2024-02-20 ENCOUNTER — HOSPITAL ENCOUNTER (OUTPATIENT)
Dept: NON INVASIVE DIAGNOSTICS | Facility: HOSPITAL | Age: 89
Discharge: HOME | End: 2024-02-20
Payer: MEDICARE

## 2024-02-20 ENCOUNTER — HOSPITAL ENCOUNTER (OUTPATIENT)
Dept: NON INVASIVE DIAGNOSTICS | Facility: HOSPITAL | Age: 89
Discharge: HOME | End: 2024-02-20
Attending: PODIATRIST
Payer: MEDICARE

## 2024-02-20 DIAGNOSIS — I65.21 OCCLUSION OF RIGHT CAROTID ARTERY: ICD-10-CM

## 2024-02-20 DIAGNOSIS — E11.51 TYPE II DIABETES MELLITUS WITH PERIPHERAL CIRCULATORY DISORDER (HCC): ICD-10-CM

## 2024-02-20 DIAGNOSIS — I65.22 ASYMPTOMATIC CAROTID ARTERY STENOSIS, LEFT: ICD-10-CM

## 2024-02-20 PROCEDURE — 93880 EXTRACRANIAL BILAT STUDY: CPT

## 2024-02-20 PROCEDURE — 93923 UPR/LXTR ART STDY 3+ LVLS: CPT

## 2024-02-20 PROCEDURE — 93925 LOWER EXTREMITY STUDY: CPT

## 2024-02-21 DIAGNOSIS — Z79.4 TYPE 2 DIABETES MELLITUS WITH DIABETIC POLYNEUROPATHY, WITH LONG-TERM CURRENT USE OF INSULIN (HCC): ICD-10-CM

## 2024-02-21 DIAGNOSIS — R13.10 DYSPHAGIA, UNSPECIFIED TYPE: ICD-10-CM

## 2024-02-21 DIAGNOSIS — E11.42 TYPE 2 DIABETES MELLITUS WITH DIABETIC POLYNEUROPATHY, WITH LONG-TERM CURRENT USE OF INSULIN (HCC): ICD-10-CM

## 2024-02-21 PROCEDURE — 93880 EXTRACRANIAL BILAT STUDY: CPT | Performed by: SURGERY

## 2024-02-21 PROCEDURE — 93925 LOWER EXTREMITY STUDY: CPT | Performed by: SURGERY

## 2024-02-21 PROCEDURE — 93922 UPR/L XTREMITY ART 2 LEVELS: CPT | Performed by: SURGERY

## 2024-02-21 RX ORDER — PANTOPRAZOLE SODIUM 20 MG/1
20 TABLET, DELAYED RELEASE ORAL 2 TIMES DAILY
Qty: 60 TABLET | Refills: 5 | Status: SHIPPED | OUTPATIENT
Start: 2024-02-21

## 2024-03-04 ENCOUNTER — RA CDI HCC (OUTPATIENT)
Dept: OTHER | Facility: HOSPITAL | Age: 89
End: 2024-03-04

## 2024-03-04 NOTE — PROGRESS NOTES
HCC coding opportunities          Chart Reviewed number of suggestions sent to Provider: 2   E11.39  E11.3391    Patients Insurance     Medicare Insurance: Medicare

## 2024-03-07 DIAGNOSIS — E11.319 TYPE 2 DIABETES MELLITUS WITH RETINOPATHY, WITH LONG-TERM CURRENT USE OF INSULIN, MACULAR EDEMA PRESENCE UNSPECIFIED, UNSPECIFIED LATERALITY, UNSPECIFIED RETINOPATHY SEVERITY (HCC): ICD-10-CM

## 2024-03-07 DIAGNOSIS — I48.0 PAROXYSMAL ATRIAL FIBRILLATION (HCC): ICD-10-CM

## 2024-03-07 DIAGNOSIS — Z79.4 TYPE 2 DIABETES MELLITUS WITH RETINOPATHY, WITH LONG-TERM CURRENT USE OF INSULIN, MACULAR EDEMA PRESENCE UNSPECIFIED, UNSPECIFIED LATERALITY, UNSPECIFIED RETINOPATHY SEVERITY (HCC): ICD-10-CM

## 2024-03-07 RX ORDER — APIXABAN 2.5 MG/1
TABLET, FILM COATED ORAL
Qty: 180 TABLET | Refills: 3 | Status: SHIPPED | OUTPATIENT
Start: 2024-03-07

## 2024-03-07 RX ORDER — SITAGLIPTIN 100 MG/1
TABLET, FILM COATED ORAL
Qty: 90 TABLET | Refills: 3 | Status: SHIPPED | OUTPATIENT
Start: 2024-03-07

## 2024-03-11 ENCOUNTER — OFFICE VISIT (OUTPATIENT)
Dept: FAMILY MEDICINE CLINIC | Facility: CLINIC | Age: 89
End: 2024-03-11
Payer: MEDICARE

## 2024-03-11 VITALS
WEIGHT: 118 LBS | SYSTOLIC BLOOD PRESSURE: 130 MMHG | TEMPERATURE: 96.5 F | RESPIRATION RATE: 16 BRPM | HEART RATE: 68 BPM | OXYGEN SATURATION: 96 % | BODY MASS INDEX: 18.96 KG/M2 | DIASTOLIC BLOOD PRESSURE: 80 MMHG | HEIGHT: 66 IN

## 2024-03-11 DIAGNOSIS — K58.2 IRRITABLE BOWEL SYNDROME WITH BOTH CONSTIPATION AND DIARRHEA: ICD-10-CM

## 2024-03-11 DIAGNOSIS — F33.9 DEPRESSION, RECURRENT (HCC): ICD-10-CM

## 2024-03-11 DIAGNOSIS — E78.2 MIXED HYPERLIPIDEMIA: ICD-10-CM

## 2024-03-11 DIAGNOSIS — I73.9 PERIPHERAL ARTERIAL DISEASE (HCC): ICD-10-CM

## 2024-03-11 DIAGNOSIS — E44.0 MODERATE PROTEIN-CALORIE MALNUTRITION (HCC): ICD-10-CM

## 2024-03-11 DIAGNOSIS — R13.10 DYSPHAGIA, UNSPECIFIED TYPE: ICD-10-CM

## 2024-03-11 DIAGNOSIS — I48.0 PAROXYSMAL ATRIAL FIBRILLATION (HCC): ICD-10-CM

## 2024-03-11 DIAGNOSIS — E11.3552 TYPE 2 DIABETES MELLITUS WITH STABLE PROLIFERATIVE RETINOPATHY OF LEFT EYE, WITHOUT LONG-TERM CURRENT USE OF INSULIN (HCC): Primary | ICD-10-CM

## 2024-03-11 DIAGNOSIS — J84.9 INTERSTITIAL LUNG DISEASE (HCC): ICD-10-CM

## 2024-03-11 DIAGNOSIS — E22.2 SIADH (SYNDROME OF INAPPROPRIATE ADH PRODUCTION) (HCC): ICD-10-CM

## 2024-03-11 DIAGNOSIS — I10 ESSENTIAL HYPERTENSION: ICD-10-CM

## 2024-03-11 PROCEDURE — G2211 COMPLEX E/M VISIT ADD ON: HCPCS | Performed by: NURSE PRACTITIONER

## 2024-03-11 PROCEDURE — 99214 OFFICE O/P EST MOD 30 MIN: CPT | Performed by: NURSE PRACTITIONER

## 2024-03-11 RX ORDER — PANTOPRAZOLE SODIUM 40 MG/1
40 TABLET, DELAYED RELEASE ORAL 2 TIMES DAILY
Qty: 180 TABLET | Refills: 0 | Status: SHIPPED | OUTPATIENT
Start: 2024-03-11

## 2024-03-11 NOTE — PROGRESS NOTES
FAMILY PRACTICE OFFICE VISIT       NAME: Ac Duran  AGE: 88 y.o. SEX: female       : 1935        MRN: [id number]    Assessment and Plan   1. 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       2. Depression, recurrent ([facility])  Assessment & Plan:  Mood is related to aging process and chronic conditions.   Stable without medications.   Has good support from family and friends.      3. Paroxysmal atrial fibrillation ([facility])  Assessment & Plan:  Anticoagulated on aspirin and Eliquis, on metoprolol.   Recent rare episodes of palpitations that seem to resolve with valsalva maneuver. She has a pacemaker, as per recent interogation 24: NO SIGNIFICANT HIGH RATE EPISODES. PVC COUNTER INCREASED (131.9/HR).  I have asked to schedule follow up with Dr. Ramsey, her cardiologist.       4. SIADH (syndrome of inappropriate ADH production) ([facility])  Assessment & Plan:  As per nephrology likely from pulmonary disease and medications.   Continue Sodium chloride tablets s prescribed.   Last sodium 132.       5. Peripheral arterial disease ([facility])  Assessment & Plan:  2024 LEADS:    RIGHT LOWER LIMB:  ABNORMAL:  Evaluation shows a >75% stenosis of the proximal superficial femoral artery.  Diffuse calcified disease noted throughout the remaining femoral-popliteal  arteries without significant focal stenosis identified.  There is evidence of tibio-peroneal disease.  Ankle/Brachial index:  0.81 which is in the moderate disease category.  PVR/ PPG tracings are dampened.  Metatarsal pressure:  128 mmHg  Great toe pressure:  94 mmHg, within the healing range for a diabetic.     LEFT LOWER LIMB:  ABNORMAL:  Evaluation shows a >75% stenosis of the proximal popliteal artery.  Diffuse calcified disease noted throughout the  remaining femoral-popliteal  arteries without significant focal stenosis identified.  There is evidence of tibio-peroneal disease with a well developed arterial  branch at the distal femoral artery.  Ankle/Brachial index:  0.7, which is in the moderate disease category.  PVR/ PPG tracings are dampened.  Metatarsal pressure:  105 mmHg.  Great toe pressure:  60 mmHg, within the healing range for a diabetic.    She is on aspirin, Eliquis statin, zetia.   Has vascular follow up in August.           6. Moderate protein-calorie malnutrition (HCC)  Assessment & Plan:  Malnutrition Findings:   Thin, frail, muscle atrophy, temporal wasting.   Continue to eat small frequent amounts, Glucerna 1-2 per day.     BMI Findings:           Body mass index is 19.05 kg/m².         7. Interstitial lung disease (HCC)  Assessment & Plan:  Continue pulmonology follow up.   She is frustrated with current pulmonology office, because she sees a different fellow or resident every time she goes, and gets conflicting information, doesn't really understand what is the plan of care.   She would like to seek second opinion.     Orders:  -     Ambulatory Referral to Pulmonology; Future    8. Dysphagia, unspecified type  Assessment & Plan:  Worsening dysphagia, and globus sensation.   Prior GI and ENT evaluation.   EGD normal in 2020.   She does not desire to go back to GI. Would like to try to increase Pantoprazole and monitor for improvement. If not improving, will need to go back to GI.     Orders:  -     pantoprazole (PROTONIX) 40 mg tablet; Take 1 tablet (40 mg total) by mouth 2 (two) times a day    9. Mixed hyperlipidemia  Assessment & Plan:  LDL 48, continue Vytorin.       10. Essential hypertension  Assessment & Plan:  Stable blood pressure on metoprolol.       11. Irritable bowel syndrome with both constipation and diarrhea  Assessment & Plan:  Recommend restarting metamucil 1-2 times per day.   Imodium as needed for loose stools.         "  Follow up 3-4 months or sooner if needed.     Chief Complaint     Chief Complaint   Patient presents with    Follow-up     Patient is here for 4 mos f/u       History of Present Illness     Ac Duran is an 88 year old female presenting today for check up on chronic conditions.    \"Fudge like\" stools, 3-5 per day  Can go for 3 days with no stools, then loose stools restart.  Using imodium.   Gas bloating.   Taking gas-x.    Swallowing is getting worse. Has globus sensation.  Started iron supplement--didn't cause constipation.     Taking B12.    Days she feels well are few and far between.    Recently went to podiatry and her feet were red. Podiatrist is Dr. Warner.   She was given a prescription for LEADS and Carotid doppler study.   She does follow with vascular, due for her next appointment in August.   She has left heel pain, which she attributes to neuropathy, not other foot pain or lower extremity pain.     Legs were swelling--when recliner broke, new recliner and doing better now being able to elevate.   Sleeps a lot.  Breathing is the same-- no changes.    Blood work  due in May    Sometimes has palpitations, feel like a pounding heart rate.valsalva maneuver resolves symptoms.   Occurs rarely. Overdue to see Dr. Ramsey.             Review of Systems   Review of Systems   Constitutional:  Positive for fatigue.   HENT:  Positive for postnasal drip.    Respiratory:  Positive for cough and shortness of breath.    Cardiovascular: Negative.    Gastrointestinal: Negative.         Globus sensation, dysphagia.   Genitourinary: Negative.    Musculoskeletal: Negative.    Skin: Negative.    Neurological: Negative.    Psychiatric/Behavioral: Negative.         I have reviewed the patient's medical history in detail; there are no changes to the history as noted in the electronic medical record.    Objective     Vitals:    03/11/24 1043   BP: 130/80   Pulse: 68   Resp: 16   Temp: (!) 96.5 °F (35.8 °C)   TempSrc: " "Tympanic   SpO2: 96%   Weight: 53.5 kg (118 lb)   Height: 5' 6\" (1.676 m)     Wt Readings from Last 3 Encounters:   03/11/24 53.5 kg (118 lb)   11/13/23 53.1 kg (117 lb)   10/16/23 53.5 kg (118 lb)     Physical Exam  Vitals and nursing note reviewed.   Constitutional:       Appearance: Normal appearance. She is ill-appearing (chronically ill appearing).   HENT:      Head: Atraumatic.      Right Ear: Tympanic membrane normal.      Left Ear: Tympanic membrane normal.      Mouth/Throat:      Mouth: Mucous membranes are moist.      Pharynx: Oropharynx is clear.   Eyes:      Conjunctiva/sclera: Conjunctivae normal.      Pupils: Pupils are equal, round, and reactive to light.   Neck:      Thyroid: No thyromegaly.   Cardiovascular:      Rate and Rhythm: Normal rate and regular rhythm.      Heart sounds: Murmur heard.   Pulmonary:      Effort: Pulmonary effort is normal. No respiratory distress.      Breath sounds: Normal breath sounds. No wheezing or rales.   Abdominal:      General: Bowel sounds are normal.      Palpations: Abdomen is soft.      Tenderness: There is no abdominal tenderness.   Musculoskeletal:      Cervical back: Normal range of motion and neck supple.      Right lower leg: No edema.      Left lower leg: No edema.      Comments: Bilateral feet red in  color, CRT 2-3 seconds. Pedal pulses +1/3.   Cool to touch.    Lymphadenopathy:      Cervical: No cervical adenopathy.   Neurological:      Mental Status: She is alert.   Psychiatric:         Mood and Affect: Mood normal.         Depression Screening and Follow-up Plan: Patient was screened for depression during today's encounter. They screened negative with a PHQ-9 score of 0.        ALLERGIES:  Allergies   Allergen Reactions    Keflex [Cephalexin] Diarrhea       Current Medications     Current Outpatient Medications   Medication Sig Dispense Refill    acetaminophen (TYLENOL) 500 mg tablet Take 1,000 mg by mouth as needed for mild pain      Alphagan P 0.1 % " INSTILL 1 DROP RIGHT EYE TWICE A DAY      apixaban (Eliquis) 2.5 mg TAKE 1 TABLET (2.5 MG TOTAL) BY MOUTH 2 (TWO) TIMES A  tablet 3    aspirin (ECOTRIN LOW STRENGTH) 81 mg EC tablet Take 1 tablet (81 mg total) by mouth daily      bimatoprost (LUMIGAN) 0.01 % ophthalmic drops Administer 1 drop to both eyes daily at bedtime for 30 days 1.5 mL 0    calcium carbonate (OS-DANIA) 600 MG tablet Take 600 mg by mouth 2 (two) times a day with meals        Cyanocobalamin (Vitamin B 12) 500 MCG TABS Take 500 mg by mouth 2 (two) times a day      ezetimibe-simvastatin (VYTORIN) 10-40 mg per tablet TAKE 1 TABLET BY MOUTH DAILY AT BEDTIME 30 tablet 5    famotidine (PEPCID) 20 mg tablet TAKE 1 TABLET (20 MG TOTAL) BY MOUTH 2 (TWO) TIMES A DAY 60 tablet 5    insulin isophane (HumuLIN N KwikPen) 100 units/mL injection pen Inject 8 Units under the skin every morning AND 4 Units every evening. 15 mL 3    Insulin Pen Needle (BD Pen Needle Domitila 2nd Gen) 32G X 4 MM MISC Use 2 (two) times a day 100 each 5    ipratropium (ATROVENT) 0.03 % nasal spray USE 2 SPRAYS INTO EACH NOSTRIL EVERY 12 (TWELVE) HOURS 30 mL 6    loratadine (CLARITIN) 10 mg tablet Take 10 mg by mouth daily as needed for allergies      LORazepam (ATIVAN) 0.5 mg tablet TAKE 2 TABLETS (1 MG TOTAL) BY MOUTH DAILY AT BEDTIME 60 tablet 2    metFORMIN (GLUCOPHAGE) 500 mg tablet TAKE 2 TABLETS (1,000 MG TOTAL) BY MOUTH 2 (TWO) TIMES A DAY WITH MEALS 120 tablet 5    metoprolol tartrate (LOPRESSOR) 25 mg tablet Take 1 tablet (25 mg total) by mouth every 12 (twelve) hours 180 tablet 0    Multiple Vitamin (multivitamin) tablet Take 1 tablet by mouth daily        OneTouch Ultra test strip TEST FOUR TIMES A  strip 4    pantoprazole (PROTONIX) 40 mg tablet Take 1 tablet (40 mg total) by mouth 2 (two) times a day 180 tablet 0    sitaGLIPtin (Januvia) 100 mg tablet TAKE 1 TABLET (100 MG TOTAL) BY MOUTH DAILY 90 tablet 3    sodium chloride 1 g tablet Take 1 tablet (1 g total) by  mouth 2 (two) times a day 270 tablet 1     No current facility-administered medications for this visit.         Health Maintenance     Health Maintenance   Topic Date Due    Zoster Vaccine (2 of 3) 09/03/2010    COVID-19 Vaccine (8 - 2023-24 season) 01/31/2024    HEMOGLOBIN A1C  05/08/2024    DM Eye Exam  11/25/2030 (Originally 12/8/2023)    Diabetic Foot Exam  12/23/2030 (Originally 1/11/2024)    Kidney Health Evaluation: GFR  11/08/2024    Kidney Health Evaluation: Albumin/Creatinine Ratio  11/08/2024    Fall Risk  11/13/2024    Urinary Incontinence Screening  11/13/2024    Medicare Annual Wellness Visit (AWV)  11/13/2024    Depression Screening  03/11/2025    Colorectal Cancer Screening  09/02/2025    Hepatitis C Screening  Completed    Pneumococcal Vaccine: 65+ Years  Completed    Influenza Vaccine  Completed    HIB Vaccine  Aged Out    IPV Vaccine  Aged Out    Hepatitis A Vaccine  Aged Out    Meningococcal ACWY Vaccine  Aged Out    HPV Vaccine  Aged Out     Immunization History   Administered Date(s) Administered    COVID-19 Moderna mRNA Vaccine 12 Yr+ 50 mcg/0.5 mL (Spikevax) 12/06/2023    COVID-19 PFIZER VACCINE 0.3 ML IM 01/22/2021, 02/12/2021, 09/30/2021, 09/08/2022    COVID-19 Pfizer Vac BIVALENT Edy-sucrose 12 Yr+ IM 09/08/2022    COVID-19 Pfizer vac (Edy-sucrose, gray cap) 12 yr+ IM 04/11/2022    INFLUENZA 11/02/2016, 10/16/2017, 09/18/2018, 09/18/2018, 11/06/2019, 11/20/2023    Influenza, Quadrivalent (nasal) 11/02/2016    Influenza, high dose seasonal 0.7 mL 11/06/2019, 10/06/2020, 11/09/2021, 11/15/2022    Pneumococcal Conjugate 13-Valent 07/25/2019    Pneumococcal Polysaccharide PPV23 03/17/2003    Respiratory Syncytial Virus Vaccine (Recombinant) 12/27/2023    Zoster 07/09/2010       ZANDER Swenson

## 2024-03-17 PROBLEM — K58.2 IRRITABLE BOWEL SYNDROME WITH BOTH CONSTIPATION AND DIARRHEA: Status: ACTIVE | Noted: 2024-03-17

## 2024-03-17 PROBLEM — I73.9 PERIPHERAL ARTERIAL DISEASE (HCC): Status: ACTIVE | Noted: 2024-03-17

## 2024-03-17 NOTE — ASSESSMENT & PLAN NOTE
Worsening dysphagia, and globus sensation.   Prior GI and ENT evaluation.   EGD normal in 2020.   She does not desire to go back to GI. Would like to try to increase Pantoprazole and monitor for improvement. If not improving, will need to go back to GI.

## 2024-03-17 NOTE — ASSESSMENT & PLAN NOTE
Continue pulmonology follow up.   She is frustrated with current pulmonology office, because she sees a different fellow or resident every time she goes, and gets conflicting information, doesn't really understand what is the plan of care.   She would like to seek second opinion.

## 2024-03-17 NOTE — ASSESSMENT & PLAN NOTE
2/20/2024 LEADS:    RIGHT LOWER LIMB:  ABNORMAL:  Evaluation shows a >75% stenosis of the proximal superficial femoral artery.  Diffuse calcified disease noted throughout the remaining femoral-popliteal  arteries without significant focal stenosis identified.  There is evidence of tibio-peroneal disease.  Ankle/Brachial index:  0.81 which is in the moderate disease category.  PVR/ PPG tracings are dampened.  Metatarsal pressure:  128 mmHg  Great toe pressure:  94 mmHg, within the healing range for a diabetic.     LEFT LOWER LIMB:  ABNORMAL:  Evaluation shows a >75% stenosis of the proximal popliteal artery.  Diffuse calcified disease noted throughout the remaining femoral-popliteal  arteries without significant focal stenosis identified.  There is evidence of tibio-peroneal disease with a well developed arterial  branch at the distal femoral artery.  Ankle/Brachial index:  0.7, which is in the moderate disease category.  PVR/ PPG tracings are dampened.  Metatarsal pressure:  105 mmHg.  Great toe pressure:  60 mmHg, within the healing range for a diabetic.    She is on aspirin, Eliquis statin, zetia.   Has vascular follow up in August.

## 2024-03-17 NOTE — ASSESSMENT & PLAN NOTE
Mood is related to aging process and chronic conditions.   Stable without medications.   Has good support from family and friends.

## 2024-03-17 NOTE — ASSESSMENT & PLAN NOTE
As per nephrology likely from pulmonary disease and medications.   Continue Sodium chloride tablets s prescribed.   Last sodium 132.

## 2024-03-17 NOTE — ASSESSMENT & PLAN NOTE
Malnutrition Findings:   Thin, frail, muscle atrophy, temporal wasting.   Continue to eat small frequent amounts, Glucerna 1-2 per day.     BMI Findings:           Body mass index is 19.05 kg/m².

## 2024-03-17 NOTE — ASSESSMENT & PLAN NOTE
Anticoagulated on aspirin and Eliquis, on metoprolol.   Recent rare episodes of palpitations that seem to resolve with valsalva maneuver. She has a pacemaker, as per recent interogation 1/12/24: NO SIGNIFICANT HIGH RATE EPISODES. PVC COUNTER INCREASED (131.9/HR).  I have asked to schedule follow up with Dr. Ramsey, her cardiologist.

## 2024-03-17 NOTE — ASSESSMENT & PLAN NOTE
Lab Results   Component Value Date    HGBA1C 6.7 (H) 11/08/2023     A1c stable on current medications.

## 2024-03-20 ENCOUNTER — TELEPHONE (OUTPATIENT)
Dept: FAMILY MEDICINE CLINIC | Facility: CLINIC | Age: 89
End: 2024-03-20

## 2024-03-20 NOTE — TELEPHONE ENCOUNTER
Please call Ac.     As per vascular, ZANDER Rankin, you can wait to see them at your annual check up in August.   You should be seen sooner, if you have pain in your legs with activity that resolves with rest, new constant leg/foot pain at rest, new color changes to your feet/legs, or any development of wounds on your feet.     Now would be a good time to make your August appointment for vascular, to get it set.   Please let me know if you have any questions.

## 2024-03-21 DIAGNOSIS — K21.00 GASTROESOPHAGEAL REFLUX DISEASE WITH ESOPHAGITIS WITHOUT HEMORRHAGE: ICD-10-CM

## 2024-03-21 RX ORDER — FAMOTIDINE 20 MG/1
20 TABLET, FILM COATED ORAL 2 TIMES DAILY
Qty: 60 TABLET | Refills: 5 | Status: SHIPPED | OUTPATIENT
Start: 2024-03-21

## 2024-03-25 DIAGNOSIS — E11.42 TYPE 2 DIABETES MELLITUS WITH DIABETIC POLYNEUROPATHY, WITHOUT LONG-TERM CURRENT USE OF INSULIN (HCC): ICD-10-CM

## 2024-03-25 DIAGNOSIS — J84.9 INTERSTITIAL LUNG DISEASE (HCC): ICD-10-CM

## 2024-03-25 RX ORDER — FLURBIPROFEN SODIUM 0.3 MG/ML
SOLUTION/ DROPS OPHTHALMIC
Qty: 100 EACH | Refills: 3 | Status: SHIPPED | OUTPATIENT
Start: 2024-03-25

## 2024-04-02 ENCOUNTER — TELEPHONE (OUTPATIENT)
Dept: NEPHROLOGY | Facility: CLINIC | Age: 89
End: 2024-04-02

## 2024-04-02 NOTE — TELEPHONE ENCOUNTER
Left message for patient to schedule follow up appointment (recall list). This is the first attempt.

## 2024-04-15 PROBLEM — R06.02 SHORTNESS OF BREATH: Status: ACTIVE | Noted: 2024-04-15

## 2024-04-16 NOTE — PROGRESS NOTES
Cardiology Follow Up    Ac Duran  1935  [id number]  [facility]  [address]  [place] [id number] [phone]    1. Essential (primary) hypertension        2. Pure hypercholesterolemia        3. Paroxysmal atrial fibrillation (HCC)        4. Cardiac pacemaker in situ          Interval History: Patient is here for a f/u visit. Patient has a PPM with ongoing interrogation. PPM check 1/2024 showed the device was functioning properly.  PVCs were noted on presentation.  A pharmacologic nuclear stress test 10/2016 showed no evidence of prognostically important ischemia.  Echocardiogram 10/2022 demonstrated LVEF of 60% with evidence of LVH.  A small strand-like mass was noted on the aortic aspect of the aortic valve.  Review of prior echo 4/2021 shows a similar finding. Patient follows with vascular in reference to carotid disease. Patient while in [place] over the winter time 2019, had an episode where she fractured her right shoulder.  She  had issues with Afib and was placed on AC.  Patient is on adjusted dose Eliquis.  ASA was discontinued. A Lexiscan Myoview in [place] reportedly showed no ischemia with an LVEF of 62%.  Lipid profile 11/2023 was good. Follows with pulmonary service in reference to pulmonary fibrosis.  She has had no chest pain.  Her HTN and HLD are stable on her current medicines.    Patient Active Problem List   Diagnosis   • Essential hypertension   • Mixed hyperlipidemia   • Glaucoma   • Asymptomatic carotid artery stenosis, left   • Symptomatic PVCs   • Occlusion of right carotid artery   • Paroxysmal atrial fibrillation (HCC)   • Pacemaker   • Osteoporosis   • GERD (gastroesophageal reflux disease)   • Anxiety   • Iron deficiency   • Interstitial lung disease (HCC)   • Dysphagia   • Moderate protein-calorie malnutrition (HCC)   • Constipation   • Hyponatremia   • Post-nasal drip   • DM2  (diabetes mellitus, type 2) ([facility])   • Type 2 diabetes mellitus with hyperlipidemia  ([facility])   • Type 2 diabetes mellitus with diabetic polyneuropathy, without long-term current use of insulin ([facility])   • 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, without long-term current use of insulin ([facility])   • Depression, recurrent ([facility])   • Hypovitaminosis D   • Pachymeningitis   • Bilateral hip pain   • Metabolic alkalosis   • SIADH (syndrome of inappropriate ADH production) ([facility])   • Irritable bowel syndrome with both constipation and diarrhea   • Peripheral arterial disease ([facility])   • Shortness of breath     Past Medical History:   Diagnosis Date   • Common bile duct dilatation 2020   • Glaucoma    • H/O degenerative disc disease    • Idiopathic pulmonary fibrosis ([facility]) 2020   • Irregular heart beat     afib   • Peripheral neuropathy    • S/P laparoscopic cholecystectomy 2020   • 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     Social History     Socioeconomic History   • Marital status:      Spouse name: Not on file   • Number of children: Not on file   • Years of education: Not on file   • Highest education level: Not on file   Occupational History   • Occupation: customer service   retired   Tobacco Use   • Smoking status: Former     Current packs/day: 0.00     Average packs/day: 1.5 packs/day for 38.0 years (57.0 ttl pk-yrs)     Types: Cigarettes     Start date:      Quit date:      Years since quittin.3   • Smokeless tobacco: Never   Vaping Use   • Vaping status: Never Used   Substance and Sexual Activity   • Alcohol use: No   • Drug use: No   • Sexual activity: Not on file   Other Topics Concern   • Not on file   Social History Narrative    L[facility].    Was divorce.  Ex- passed away.    3 children.  Four grandchildren.    Spends most of her time at home.  Does not drive.     Social Determinants of Health     Financial Resource Strain: Low  Risk  (11/13/2023)    Overall Financial Resource Strain (CARDIA)    • Difficulty of Paying Living Expenses: Not very hard   Food Insecurity: Not on file   Transportation Needs: No Transportation Needs (11/13/2023)    PRAPARE - Transportation    • Lack of Transportation (Medical): No    • Lack of Transportation (Non-Medical): No   Physical Activity: Not on file   Stress: Not on file   Social Connections: Not on file   Intimate Partner Violence: Not on file   Housing Stability: Not on file      Family History   Problem Relation Age of Onset   • Heart disease Mother    • Heart attack Father    • Hiatal hernia Father    • Diabetes Father    • Cancer Brother    • Diabetes Brother    • Heart disease Brother    • Hypertension Brother    • Lung disease Brother 75        interstitial lung disease   • Cancer Brother 49        glioblastoma   • Other Son         gastroparesis   • Other Cousin         interstitial lung disease     Past Surgical History:   Procedure Laterality Date   • CATARACT EXTRACTION, BILATERAL  2011   • CHOLECYSTECTOMY     • CHOLECYSTECTOMY LAPAROSCOPIC N/A 12/09/2020    Procedure: CHOLECYSTECTOMY LAPAROSCOPIC;  Surgeon: Kalen Garrett MD;  Location: [facility];  Service: General   • COLONOSCOPY     • CYST REMOVAL  2009    left lower Quadrant    • [place] LUMBAR PUNCTURE DIAGNOSTIC  4/28/2023   • HERNIA REPAIR  1992   • LIPOMA RESECTION  2010   • RI RPR 1ST INGUN HRNA AGE 5 YRS/> REDUCIBLE Bilateral 01/05/2018    Procedure: INGUINAL HERNIA REPAIR;  Surgeon: Volodymyr Lee MD;  Location: [facility];  Service: General   • SHOULDER SURGERY  04/26/2019    Dr. Arnett ([place])   • UPPER GASTROINTESTINAL ENDOSCOPY     • VASCULAR SURGERY  1994    Agram-  R carotid occlusion       Current Outpatient Medications:   •  acetaminophen (TYLENOL) 500 mg tablet, Take 1,000 mg by mouth as needed for mild pain, Disp: , Rfl:   •  Alphagan P 0.1 %, INSTILL 1 DROP RIGHT EYE TWICE A DAY, Disp: , Rfl:   •  apixaban (Eliquis) 2.5 mg,  TAKE 1 TABLET (2.5 MG TOTAL) BY MOUTH 2 (TWO) TIMES A DAY, Disp: 180 tablet, Rfl: 3  •  aspirin (ECOTRIN LOW STRENGTH) 81 mg EC tablet, Take 1 tablet (81 mg total) by mouth daily, Disp:  , Rfl:   •  bimatoprost (LUMIGAN) 0.01 % ophthalmic drops, Administer 1 drop to both eyes daily at bedtime for 30 days, Disp: 1.5 mL, Rfl: 0  •  calcium carbonate (OS-DANIA) 600 MG tablet, Take 600 mg by mouth 2 (two) times a day with meals  , Disp: , Rfl:   •  Cyanocobalamin (Vitamin B 12) 500 MCG TABS, Take 500 mg by mouth 2 (two) times a day, Disp: , Rfl:   •  ezetimibe-simvastatin (VYTORIN) 10-40 mg per tablet, TAKE 1 TABLET BY MOUTH DAILY AT BEDTIME, Disp: 30 tablet, Rfl: 5  •  famotidine (PEPCID) 20 mg tablet, TAKE 1 TABLET (20 MG TOTAL) BY MOUTH 2 (TWO) TIMES A DAY, Disp: 60 tablet, Rfl: 5  •  insulin isophane (HumuLIN N KwikPen) 100 units/mL injection pen, Inject 8 Units under the skin every morning AND 4 Units every evening., Disp: 15 mL, Rfl: 3  •  Insulin Pen Needle (B-D UF III MINI PEN NEEDLES) 31G X 5 MM MISC, USE 2 (TWO) TIMES A DAY, Disp: 100 each, Rfl: 3  •  ipratropium (ATROVENT) 0.03 % nasal spray, USE 2 SPRAYS INTO EACH NOSTRIL EVERY 12 (TWELVE) HOURS, Disp: 30 mL, Rfl: 6  •  loratadine (CLARITIN) 10 mg tablet, Take 10 mg by mouth daily as needed for allergies, Disp: , Rfl:   •  LORazepam (ATIVAN) 0.5 mg tablet, TAKE 2 TABLETS (1 MG TOTAL) BY MOUTH DAILY AT BEDTIME, Disp: 60 tablet, Rfl: 2  •  metFORMIN (GLUCOPHAGE) 500 mg tablet, TAKE 2 TABLETS (1,000 MG TOTAL) BY MOUTH 2 (TWO) TIMES A DAY WITH MEALS, Disp: 120 tablet, Rfl: 5  •  metoprolol tartrate (LOPRESSOR) 25 mg tablet, Take 1 tablet (25 mg total) by mouth every 12 (twelve) hours, Disp: 180 tablet, Rfl: 0  •  Multiple Vitamin (multivitamin) tablet, Take 1 tablet by mouth daily  , Disp: , Rfl:   •  OneTouch Ultra test strip, TEST FOUR TIMES A DAY, Disp: 100 strip, Rfl: 4  •  pantoprazole (PROTONIX) 40 mg tablet, Take 1 tablet (40 mg total) by mouth 2 (two)  "times a day, Disp: 180 tablet, Rfl: 0  •  sitaGLIPtin (Januvia) 100 mg tablet, TAKE 1 TABLET (100 MG TOTAL) BY MOUTH DAILY, Disp: 90 tablet, Rfl: 3  •  sodium chloride 1 g tablet, Take 1 tablet (1 g total) by mouth 2 (two) times a day, Disp: 270 tablet, Rfl: 1  Allergies   Allergen Reactions   • Keflex [Cephalexin] Diarrhea       Labs:not applicable  Imaging: No results found.    Review of Systems:  Review of Systems   All other systems reviewed and are negative.      Physical Exam:  /74 (BP Location: Left arm, Patient Position: Sitting, Cuff Size: Standard)   Pulse 66   Ht 5' 6\" (1.676 m)   Wt 54.5 kg (120 lb 1.6 oz)   SpO2 99%   BMI 19.38 kg/m²   Physical Exam  Vitals reviewed.   Constitutional:       Appearance: She is well-developed.   HENT:      Head: Normocephalic and atraumatic.   Eyes:      Conjunctiva/sclera: Conjunctivae normal.      Pupils: Pupils are equal, round, and reactive to light.   Cardiovascular:      Rate and Rhythm: Normal rate.      Heart sounds: Normal heart sounds.   Pulmonary:      Effort: Pulmonary effort is normal.      Comments: Crackles  Musculoskeletal:      Cervical back: Normal range of motion and neck supple.   Skin:     General: Skin is warm and dry.   Neurological:      Mental Status: She is alert and oriented to person, place, and time.         Discussion/Summary:I will continue the patient's present medical regimen.  The patient appears well compensated.  I have asked the patient to call if there is a problem in the interim otherwise I will see the patient in six months time.  "

## 2024-04-19 DIAGNOSIS — E78.5 HYPERLIPIDEMIA, UNSPECIFIED HYPERLIPIDEMIA TYPE: ICD-10-CM

## 2024-04-19 RX ORDER — EZETIMIBE AND SIMVASTATIN 10; 40 MG/1; MG/1
1 TABLET ORAL
Qty: 30 TABLET | Refills: 5 | Status: SHIPPED | OUTPATIENT
Start: 2024-04-19

## 2024-04-26 ENCOUNTER — OFFICE VISIT (OUTPATIENT)
Dept: CARDIOLOGY CLINIC | Facility: CLINIC | Age: 89
End: 2024-04-26
Payer: MEDICARE

## 2024-04-26 ENCOUNTER — IN-CLINIC DEVICE VISIT (OUTPATIENT)
Dept: CARDIOLOGY CLINIC | Facility: CLINIC | Age: 89
End: 2024-04-26
Payer: MEDICARE

## 2024-04-26 VITALS
OXYGEN SATURATION: 99 % | DIASTOLIC BLOOD PRESSURE: 74 MMHG | HEART RATE: 66 BPM | WEIGHT: 120.1 LBS | HEIGHT: 66 IN | BODY MASS INDEX: 19.3 KG/M2 | SYSTOLIC BLOOD PRESSURE: 132 MMHG

## 2024-04-26 DIAGNOSIS — E78.00 PURE HYPERCHOLESTEROLEMIA: ICD-10-CM

## 2024-04-26 DIAGNOSIS — Z95.0 PRESENCE OF CARDIAC PACEMAKER: Primary | ICD-10-CM

## 2024-04-26 DIAGNOSIS — I10 ESSENTIAL (PRIMARY) HYPERTENSION: Primary | ICD-10-CM

## 2024-04-26 DIAGNOSIS — Z95.0 CARDIAC PACEMAKER IN SITU: ICD-10-CM

## 2024-04-26 DIAGNOSIS — I48.0 PAROXYSMAL ATRIAL FIBRILLATION (HCC): ICD-10-CM

## 2024-04-26 PROCEDURE — 99214 OFFICE O/P EST MOD 30 MIN: CPT | Performed by: INTERNAL MEDICINE

## 2024-04-26 PROCEDURE — 93280 PM DEVICE PROGR EVAL DUAL: CPT | Performed by: STUDENT IN AN ORGANIZED HEALTH CARE EDUCATION/TRAINING PROGRAM

## 2024-04-26 NOTE — PROGRESS NOTES
Results for orders placed or performed in visit on 04/26/24   Cardiac EP device report    Narrative    MDT-DUAL CHAMBER PPM (AAIR-DDDR MODE)/ ACTIVE SYSTEM IS MRI CONDITIONAL  DEVICE INTERROGATED IN THE [place] OFFICE. BATTERY VOLTAGE ADEQUATE (2.5 YRS).  AP 79.7%  0.1%. ALL LEAD PARAMETERS WITHIN NORMAL LIMITS. NO NEW SIGNIFICANT HIGH RATE EPISODES. (VT NS ON ARRHYTHMIA LOG PREV NOTED)  PVC COUNTER INCREASED (136.3/HR).  AT/AF BURDEN < 0.1%.  EF 60% (ECHO 10/7/2022). PT ASA, ELIQUIS, METOPROLOL TART.  NO PROGRAMMING CHANGES MADE TO DEVICE PARAMETERS. NORMAL DEVICE FUNCTION. JEMAL W/DR MILNER TODAY.  PAS/NC

## 2024-04-29 ENCOUNTER — NURSE TRIAGE (OUTPATIENT)
Age: 89
End: 2024-04-29

## 2024-04-29 PROBLEM — J84.112 IDIOPATHIC PULMONARY FIBROSIS (HCC): Status: ACTIVE | Noted: 2024-04-29

## 2024-04-29 NOTE — TELEPHONE ENCOUNTER
"Last OV: 3/29/23   \Hx: constipation, GERD, Throat clearing, dysphagia     Pt calling in, reports increased in symptoms of dysphagia over the last month. She has a \"squeaky\" sound when breathing before or after eating, spasms in esophagus when eating solid foods josse crackers or harder foods. Her voice is hoarse with frequent throat clearing. She does she pulm as well.   I reviewed notes from last OV: 3/29/23- pt will have barium swallow done and OV scheduled to discuss.   I advise to avoid hard foods such as pretzels, chips crackers, bread and large pieces of meat. Take time eating and alternate solids and liquids.   Cont. Pantoprazole BID and Pepcid BID.   Reason for Disposition   Patient wants to be seen    Answer Assessment - Initial Assessment Questions  1. SYMPTOM: \"Are you having difficulty swallowing liquids, solids, or both?\"      Solids   2. ONSET: \"When did the swallowing problems begin?\"       Ongoing   3. CAUSE: \"What do you think is causing the problem?\"       Hard foods   4. CHRONIC/RECURRENT: \"Is this a new problem for you?\"  If no, ask: \"How long have you had this problem?\" (e.g., days, weeks, months)       Chronic    Protocols used: Swallowing Difficulty-ADULT-[place]    "

## 2024-04-29 NOTE — TELEPHONE ENCOUNTER
"Regarding: sensation in her throat, squeaky sound in her throat  ----- Message from Annika Arteaga sent at 4/29/2024  1:20 PM EDT -----  Pt having a sensation in her throat. She also states she is making a \"squeaky sound\" all the time. She states she is coughing more and is getting hoarse. She thinks she needs another barium swallow done.    "

## 2024-04-29 NOTE — TELEPHONE ENCOUNTER
Please let patient know that she can be seen in office appointment in 2 days to further discuss. She should HOLD OFF on obtaining the barium swallow until she is seen in the office to discuss.  She may need a video barium swallow again first instead if any concerns for aspiration. From her office visit last year she was supposed to be on a modified diet which she should continue until her office visit.  If she has any difficulty breathing or respiratory distress she should go to the emergency room.  Thank you    Deena KUMAR as patient is seeing you this week

## 2024-05-02 ENCOUNTER — TELEPHONE (OUTPATIENT)
Dept: GASTROENTEROLOGY | Facility: CLINIC | Age: 89
End: 2024-05-02

## 2024-05-02 DIAGNOSIS — R09.89 CHRONIC THROAT CLEARING: ICD-10-CM

## 2024-05-02 DIAGNOSIS — R13.12 OROPHARYNGEAL DYSPHAGIA: ICD-10-CM

## 2024-05-02 DIAGNOSIS — R49.0 CHRONIC HOARSENESS: Primary | ICD-10-CM

## 2024-05-02 DIAGNOSIS — R13.10 DYSPHAGIA, UNSPECIFIED TYPE: ICD-10-CM

## 2024-05-02 NOTE — TELEPHONE ENCOUNTER
Spoke with patient.  Informed her of providers message.  Patient verbalized understanding.  She stated she did have an appointment scheduled for 10:30 this morning but had to cancel because of severe diarrhea.  I told the patient I would give her information to the  to call her to make an appointment.

## 2024-05-02 NOTE — TELEPHONE ENCOUNTER
Called and spoke to patient in depth.  Biggest symptom is a chronic hoarse voice which has been ongoing with increased mucus production.  I recommended ENT referral for this.  Referral placed.  Gave number to call to set this up.  She also reports excess coughing.  She is following with pulmonology and has interstitial lung disease however her recent office visit looked stable.  She reports worsening difficulty swallowing foods that she previously could tolerate.  I am more concerned that this is oropharyngeal in the setting of her coughing.  Last video swallow was in 2020.  I recommend she repeat this prior to considering a repeat barium swallow or EGD.  Discussed diet modifications that were recommended with patient in 2020.  Continue PPI twice daily.  Give central scheduling number.  She was appreciative of call.  She has office visit with us rescheduled for 1 month.

## 2024-05-03 ENCOUNTER — OFFICE VISIT (OUTPATIENT)
Dept: FAMILY MEDICINE CLINIC | Facility: CLINIC | Age: 89
End: 2024-05-03
Payer: MEDICARE

## 2024-05-03 VITALS
HEIGHT: 66 IN | DIASTOLIC BLOOD PRESSURE: 70 MMHG | SYSTOLIC BLOOD PRESSURE: 120 MMHG | BODY MASS INDEX: 18.64 KG/M2 | HEART RATE: 93 BPM | RESPIRATION RATE: 16 BRPM | OXYGEN SATURATION: 94 % | TEMPERATURE: 97.1 F | WEIGHT: 116 LBS

## 2024-05-03 DIAGNOSIS — R39.9 UTI SYMPTOMS: Primary | ICD-10-CM

## 2024-05-03 LAB
SL AMB  POCT GLUCOSE, UA: NORMAL
SL AMB LEUKOCYTE ESTERASE,UA: NORMAL
SL AMB POCT BILIRUBIN,UA: NORMAL
SL AMB POCT BLOOD,UA: NORMAL
SL AMB POCT CLARITY,UA: NORMAL
SL AMB POCT COLOR,UA: YELLOW
SL AMB POCT KETONES,UA: NORMAL
SL AMB POCT NITRITE,UA: NORMAL
SL AMB POCT PH,UA: 7.5
SL AMB POCT SPECIFIC GRAVITY,UA: 1.01
SL AMB POCT URINE PROTEIN: NORMAL
SL AMB POCT UROBILINOGEN: 3.5

## 2024-05-03 PROCEDURE — 99213 OFFICE O/P EST LOW 20 MIN: CPT | Performed by: NURSE PRACTITIONER

## 2024-05-03 PROCEDURE — G2211 COMPLEX E/M VISIT ADD ON: HCPCS | Performed by: NURSE PRACTITIONER

## 2024-05-03 PROCEDURE — 81003 URINALYSIS AUTO W/O SCOPE: CPT | Performed by: NURSE PRACTITIONER

## 2024-05-03 PROCEDURE — 87086 URINE CULTURE/COLONY COUNT: CPT | Performed by: NURSE PRACTITIONER

## 2024-05-03 RX ORDER — CEFDINIR 300 MG/1
300 CAPSULE ORAL EVERY 12 HOURS SCHEDULED
Qty: 14 CAPSULE | Refills: 0 | Status: SHIPPED | OUTPATIENT
Start: 2024-05-03 | End: 2024-05-10

## 2024-05-03 NOTE — PROGRESS NOTES
"   FAMILY PRACTICE OFFICE VISIT       NAME: Ac Duran  AGE: 88 y.o. SEX: female       : 1935        MRN: [id number]    Assessment and Plan   1. UTI symptoms  -     POCT urine dip auto non-scope  -     cefdinir (OMNICEF) 300 mg capsule; Take 1 capsule (300 mg total) by mouth every 12 (twelve) hours for 7 days  -     Urine culture      Urine dip with leuks, protein, blood.   Start cefdnir 1 tablet twice daily for 7 days.   Call for worsening or persisting symptoms.   Will send urine culture. Office will call her with results when available.   Compete urine culture.     Routine follow up scheduled in .     Chief Complaint     Chief Complaint   Patient presents with    Urinary Tract Infection     Frequency, urgency 1 + day       History of Present Illness     Ac Duran is an 88 year old female presenting today for possible UTI.     Chronically has irregular bowels, for which she follows with GI, has an appointment in .     Yesterday had diarrhea, and started with urinary burning and frequency.     No fevers or chills.   No new back pain.  No nausea or vomiting.     Cloudy urine.     Blood work in the next 1-2 weeks due.                  Review of Systems   Review of Systems   Constitutional: Negative.    Gastrointestinal:  Positive for abdominal pain (cramping associated wtih bowel movements), constipation and diarrhea. Negative for nausea and vomiting.   Genitourinary:  Positive for dysuria, frequency and urgency. Negative for difficulty urinating, flank pain, genital sores, hematuria and pelvic pain.       I have reviewed the patient's medical history in detail; there are no changes to the history as noted in the electronic medical record.    Objective     Vitals:    24 1038   BP: 120/70   Pulse: 93   Resp: 16   Temp: (!) 97.1 °F (36.2 °C)   TempSrc: Temporal   SpO2: 94%   Weight: 52.6 kg (116 lb)   Height: 5' 6\" (1.676 m)     Wt Readings from Last 3 Encounters:   24 52.6 " kg (116 lb)   04/26/24 54.5 kg (120 lb 1.6 oz)   04/15/24 53.7 kg (118 lb 6.4 oz)     Physical Exam  Vitals and nursing note reviewed.   Constitutional:       General: She is not in acute distress.     Appearance: Normal appearance. She is not ill-appearing.   Cardiovascular:      Rate and Rhythm: Normal rate and regular rhythm.      Heart sounds: No murmur heard.  Pulmonary:      Effort: Pulmonary effort is normal. No respiratory distress.      Breath sounds: Normal breath sounds. No wheezing or rales.   Abdominal:      General: Bowel sounds are normal.      Tenderness: There is no abdominal tenderness. There is no right CVA tenderness, left CVA tenderness or guarding.   Musculoskeletal:      Right lower leg: No edema.      Left lower leg: No edema.   Neurological:      Mental Status: She is alert.   Psychiatric:         Mood and Affect: Mood normal.            ALLERGIES:  Allergies   Allergen Reactions    Keflex [Cephalexin] Diarrhea       Current Medications     Current Outpatient Medications   Medication Sig Dispense Refill    acetaminophen (TYLENOL) 500 mg tablet Take 1,000 mg by mouth as needed for mild pain      apixaban (Eliquis) 2.5 mg TAKE 1 TABLET (2.5 MG TOTAL) BY MOUTH 2 (TWO) TIMES A  tablet 3    aspirin (ECOTRIN LOW STRENGTH) 81 mg EC tablet Take 1 tablet (81 mg total) by mouth daily      bimatoprost (LUMIGAN) 0.01 % ophthalmic drops Administer 1 drop to both eyes daily at bedtime for 30 days 1.5 mL 0    calcium carbonate (OS-DANIA) 600 MG tablet Take 600 mg by mouth 2 (two) times a day with meals        cefdinir (OMNICEF) 300 mg capsule Take 1 capsule (300 mg total) by mouth every 12 (twelve) hours for 7 days 14 capsule 0    Cyanocobalamin (Vitamin B 12) 500 MCG TABS Take 500 mg by mouth 2 (two) times a day      ezetimibe-simvastatin (VYTORIN) 10-40 mg per tablet TAKE 1 TABLET BY MOUTH DAILY AT BEDTIME 30 tablet 5    famotidine (PEPCID) 20 mg tablet TAKE 1 TABLET (20 MG TOTAL) BY MOUTH 2 (TWO)  TIMES A DAY 60 tablet 5    insulin isophane (HumuLIN N KwikPen) 100 units/mL injection pen Inject 8 Units under the skin every morning AND 4 Units every evening. 15 mL 3    Insulin Pen Needle (B-D UF III MINI PEN NEEDLES) 31G X 5 MM MISC USE 2 (TWO) TIMES A  each 3    ipratropium (ATROVENT) 0.03 % nasal spray USE 2 SPRAYS INTO EACH NOSTRIL EVERY 12 (TWELVE) HOURS 30 mL 6    loratadine (CLARITIN) 10 mg tablet Take 10 mg by mouth daily as needed for allergies      LORazepam (ATIVAN) 0.5 mg tablet TAKE 2 TABLETS (1 MG TOTAL) BY MOUTH DAILY AT BEDTIME 60 tablet 2    metFORMIN (GLUCOPHAGE) 500 mg tablet TAKE 2 TABLETS (1,000 MG TOTAL) BY MOUTH 2 (TWO) TIMES A DAY WITH MEALS 120 tablet 5    metoprolol tartrate (LOPRESSOR) 25 mg tablet Take 1 tablet (25 mg total) by mouth every 12 (twelve) hours 180 tablet 0    Multiple Vitamin (multivitamin) tablet Take 1 tablet by mouth daily        OneTouch Ultra test strip TEST FOUR TIMES A  strip 4    pantoprazole (PROTONIX) 40 mg tablet Take 1 tablet (40 mg total) by mouth 2 (two) times a day 180 tablet 0    sitaGLIPtin (Januvia) 100 mg tablet TAKE 1 TABLET (100 MG TOTAL) BY MOUTH DAILY 90 tablet 3    sodium chloride 1 g tablet Take 1 tablet (1 g total) by mouth 2 (two) times a day 270 tablet 1    Alphagan P 0.1 % INSTILL 1 DROP RIGHT EYE TWICE A DAY (Patient not taking: Reported on 5/3/2024)       No current facility-administered medications for this visit.         Health Maintenance     Health Maintenance   Topic Date Due    Zoster Vaccine (2 of 3) 09/03/2010    COVID-19 Vaccine (8 - 2023-24 season) 01/31/2024    HEMOGLOBIN A1C  05/08/2024    DM Eye Exam  11/25/2030 (Originally 12/8/2023)    Diabetic Foot Exam  12/23/2030 (Originally 1/11/2024)    Fall Risk  11/13/2024    Urinary Incontinence Screening  11/13/2024    Medicare Annual Wellness Visit (AWV)  11/13/2024    Depression Screening  03/11/2025    Colorectal Cancer Screening  09/02/2025    Hepatitis C Screening   Completed    Pneumococcal Vaccine: 65+ Years  Completed    Influenza Vaccine  Completed    HIB Vaccine  Aged Out    IPV Vaccine  Aged Out    Hepatitis A Vaccine  Aged Out    Meningococcal ACWY Vaccine  Aged Out    HPV Vaccine  Aged Out     Immunization History   Administered Date(s) Administered    COVID-19 Moderna mRNA Vaccine 12 Yr+ 50 mcg/0.5 mL (Spikevax) 12/06/2023    COVID-19 PFIZER VACCINE 0.3 ML IM 01/22/2021, 02/12/2021, 09/30/2021, 09/08/2022    COVID-19 Pfizer Vac BIVALENT Edy-sucrose 12 Yr+ IM 09/08/2022    COVID-19 Pfizer vac (Edy-sucrose, gray cap) 12 yr+ IM 04/11/2022    INFLUENZA 11/02/2016, 10/16/2017, 09/18/2018, 09/18/2018, 11/06/2019, 11/20/2023    Influenza, Quadrivalent (nasal) 11/02/2016    Influenza, high dose seasonal 0.7 mL 11/06/2019, 10/06/2020, 11/09/2021, 11/15/2022    Pneumococcal Conjugate 13-Valent 07/25/2019    Pneumococcal Polysaccharide PPV23 03/17/2003    Respiratory Syncytial Virus Vaccine (Recombinant) 12/27/2023    Zoster 07/09/2010       ZANDER Swenson

## 2024-05-04 LAB — BACTERIA UR CULT: NORMAL

## 2024-05-06 ENCOUNTER — TELEPHONE (OUTPATIENT)
Dept: FAMILY MEDICINE CLINIC | Facility: CLINIC | Age: 89
End: 2024-05-06

## 2024-05-06 NOTE — TELEPHONE ENCOUNTER
----- Message from ZANDER Swenson sent at 5/5/2024  8:26 PM EDT -----  Urine culture is negative for infection.   Stop antibiotics.   Call if you are still having symptoms.

## 2024-05-07 ENCOUNTER — HOSPITAL ENCOUNTER (OUTPATIENT)
Dept: RADIOLOGY | Facility: HOSPITAL | Age: 89
Discharge: HOME | End: 2024-05-07
Payer: MEDICARE

## 2024-05-07 DIAGNOSIS — R13.12 OROPHARYNGEAL DYSPHAGIA: ICD-10-CM

## 2024-05-07 DIAGNOSIS — R13.10 DYSPHAGIA, UNSPECIFIED TYPE: ICD-10-CM

## 2024-05-07 DIAGNOSIS — F41.9 ANXIETY: ICD-10-CM

## 2024-05-07 PROCEDURE — 92611 MOTION FLUOROSCOPY/SWALLOW: CPT

## 2024-05-07 PROCEDURE — 74230 X-RAY XM SWLNG FUNCJ C+: CPT

## 2024-05-07 RX ORDER — LORAZEPAM 0.5 MG/1
1 TABLET ORAL
Qty: 60 TABLET | Refills: 2 | Status: SHIPPED | OUTPATIENT
Start: 2024-05-07

## 2024-05-07 NOTE — PROCEDURES
Video Swallow Study      Patient Name: Ac Duran  Today's Date: 5/7/2024        Past Medical History  Past Medical History:   Diagnosis Date    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/7/2020    Glaucoma     H/O degenerative disc disease     Idiopathic pulmonary fibrosis (HCC) 11/2020    Irregular heart beat     afib    Peripheral neuropathy     S/P laparoscopic cholecystectomy 12/21/2020    Stenosis of right subclavian artery (HCC) 11/18/2016        Past Surgical History  Past Surgical History:   Procedure Laterality Date    CATARACT EXTRACTION, BILATERAL  2011    CHOLECYSTECTOMY      CHOLECYSTECTOMY LAPAROSCOPIC N/A 12/09/2020    Procedure: CHOLECYSTECTOMY LAPAROSCOPIC;  Surgeon: Kalen Garrett MD;  [facility]: [facility];  Service: General    COLONOSCOPY      CYST REMOVAL  2009    left lower Quadrant     FL LUMBAR PUNCTURE DIAGNOSTIC  4/28/2023    HERNIA REPAIR  1992    LIPOMA RESECTION  2010    DE RPR 1ST INGUN HRNA AGE 5 YRS/> REDUCIBLE Bilateral 01/05/2018    Procedure: INGUINAL HERNIA REPAIR;  Surgeon: Volodymyr Lee MD;  Location: [facility];  Service: General    SHOULDER SURGERY  04/26/2019    Dr. Arnett ([place])    UPPER GASTROINTESTINAL ENDOSCOPY      VASCULAR SURGERY  1994    Agram-  R carotid occlusion     Modified (Video) Barium Swallow Study    Summary:  Images are on PACS for review.   Series 13: [place] A/P view.    Oral stage:WNL  Mastication WNL. Reduced bolus control and formation w/ bread, otherwise adequate. Mild lingual residue w/ hard cookie. Minimally weak transfer.    Pharyngeal stage: Mild  Swallow initiation, velar closure, anterior hyoid excursion, epiglottic inversion, pharyngeal contraction were WNL. Mildly reduced laryngeal vestibular closure, laryngeal elevation, pharyngeal constriction and tongue base retraction. Mild posterior pharyngeal wall coating w/ hard cookie. Mild to mod vallecular retention w/ po trials. Mild pyriform retention thin liquid  when taking a pill. Bolus passed through the UES with trace retention on most trials. Transient penetration following one trial of thin liquid. Trace epiglottic undercoat w/ thin liquids. No penetration or aspiration during study.     Per gross esophageal screen:  Retention in esophagus following pudding and solid trials. Cleared w/ thin liquids. Retropulsion w/ thin liquids in distal esophagus when taking the pill.  The 13 mm barium pill was distal for the remainder of the study. (Please refer to images for a more precise location, as this is only a screen). Additional PO did not clear the pill. Distal retention of the nectar was also observed.     Recommendations:  Diet: Regular, softer options. Avoid any dry dense foods, Particularly dense/dry meats and breads.   Liquids: Thin  Meds: As tolerated.   Strategies: Slow rate (pt stated she eats quickly and doesn't think she can change), chew well, smaller and more frequent meals, add moisture to foods(condiments, gravy, broth, sauce), and take sips of liquids between bites of food.   Frequent oral care  Upright position  F/u ST tx: Not at this time  Noted BMI. Continue supplements. (Pt reports no appetite)  Aspiration Precautions  Reflux Precautions: Remain upright 1 hour after eating, avoid eating 3 hours before bed, and keep HOB elevated 30 degrees at all times (currently lays flay at night)  Consider consult with: Refer back to GI. Possible consult with ENT for reported hoarseness.   Results reviewed with: pt  Repeat MBS as necessary  If a dedicated assessment of the esophagus is desired:  Consider esophagram/routine barium swallow w/ barium tablet administration   or EGD      Functional Oral Intake Scale (Levels)  Silvano Day PhD, F-YULIANA  (Does not include liquids)  5. Total oral diet with multiple consistencies but requiring special preparation or compensations.      Pt is a 88 yof referred by GI. Stated she's been feeling food stuck in her throat/upper chest.  "C/o a \"squeak\" noise while eating that is coming from her chest, not her throat. Reported she notices her voice becoming more hoarse.     H&P/pertinent provider notes: (PMH noted above)  Per GI 4/29/24:  Pt calling in, reports increased in symptoms of dysphagia over the last month. She has a \"squeaky\" sound when breathing before or after eating, spasms in esophagus when eating solid foods josse crackers or harder foods. Her voice is hoarse with frequent throat clearing. She does see pulm as well. I reviewed notes from last OV: 3/29/23- pt will have barium swallow done and OV scheduled to discuss.   I advise to avoid hard foods such as pretzels, chips crackers, bread and large pieces of meat. Take time eating and alternate solids and liquids.   Cont. Pantoprazole BID and Pepcid BID.     Special Studies:  EGD 8/12/2020:  IMPRESSION:  Irregular Z line  Normal EGD, biopsies from the duodenum to rule out celiac  RECOMMENDATION:  Follow up pathology    Previous MBS:  VBS 12/12/2020:  Assessment Summary:  Pt presents w/ mild-moderate oropharyngeal dysphagia sravan by prolonged mastication/bolus formation, decreased hyolaryngeal excursion and poor pharyngeal constriction resulting in mild-moderate (greater w/ regular solid) pharyngeal residue, which mostly cleared w/ cued extra swallow and liquid wash. High trace penetration x1 during consecutive drinking task. No aspiration observed.  Recommendations:  -Dysphagia level 2 to begin and thin liquids  -meds whole in puree  -Aspiration precautions/compensatory strategies- double swallows w/ food/liquid, alternate bites/sips    Food allergies:  None   Current diet:  Regular. Stated she avoids bread (subs with tortillas) and chops meat finely.    Premorbid diet:  Regular   Dentition:  Partials /partial dentition   O2 requirement:  None   Oral mech:  WNL   Vocal quality/speech:  WNL. C/o hoarseness   Cognitive status:  Alert     Consistencies administered: Pudding, hard cookie,bread, " barium pill w/ thin, thin, nectar.    Pt was viewed seated laterally at 90 degrees and AP.

## 2024-05-13 PROBLEM — R09.02 HYPOXIA: Status: ACTIVE | Noted: 2024-05-13

## 2024-05-21 ENCOUNTER — TELEPHONE (OUTPATIENT)
Age: 89
End: 2024-05-21

## 2024-05-21 DIAGNOSIS — I10 HYPERTENSION, UNSPECIFIED TYPE: ICD-10-CM

## 2024-05-21 NOTE — TELEPHONE ENCOUNTER
Patient asking if lab orders are still valid. Made aware yes, they  10/2024. Patient verbalized understanding.

## 2024-05-23 ENCOUNTER — APPOINTMENT (OUTPATIENT)
Dept: LAB | Facility: CLINIC | Age: 89
End: 2024-05-23
Payer: MEDICARE

## 2024-05-23 ENCOUNTER — TELEPHONE (OUTPATIENT)
Dept: FAMILY MEDICINE CLINIC | Facility: CLINIC | Age: 89
End: 2024-05-23

## 2024-05-23 DIAGNOSIS — E11.42 TYPE 2 DIABETES MELLITUS WITH DIABETIC POLYNEUROPATHY, WITHOUT LONG-TERM CURRENT USE OF INSULIN (HCC): ICD-10-CM

## 2024-05-23 DIAGNOSIS — E61.1 IRON DEFICIENCY: ICD-10-CM

## 2024-05-23 DIAGNOSIS — I10 ESSENTIAL HYPERTENSION: ICD-10-CM

## 2024-05-23 DIAGNOSIS — E87.1 HYPONATREMIA: ICD-10-CM

## 2024-05-23 DIAGNOSIS — E78.2 MIXED HYPERLIPIDEMIA: ICD-10-CM

## 2024-05-23 LAB
ALBUMIN SERPL BCP-MCNC: 4.2 G/DL (ref 3.5–5)
ALP SERPL-CCNC: 48 U/L (ref 34–104)
ALT SERPL W P-5'-P-CCNC: 13 U/L (ref 7–52)
ANION GAP SERPL CALCULATED.3IONS-SCNC: 11 MMOL/L (ref 4–13)
AST SERPL W P-5'-P-CCNC: 20 U/L (ref 13–39)
BASOPHILS # BLD AUTO: 0.02 THOUSANDS/ÂΜL (ref 0–0.1)
BASOPHILS NFR BLD AUTO: 0 % (ref 0–1)
BILIRUB SERPL-MCNC: 0.52 MG/DL (ref 0.2–1)
BUN SERPL-MCNC: 14 MG/DL (ref 5–25)
CALCIUM SERPL-MCNC: 9 MG/DL (ref 8.4–10.2)
CHLORIDE SERPL-SCNC: 91 MMOL/L (ref 96–108)
CHOLEST SERPL-MCNC: 125 MG/DL
CO2 SERPL-SCNC: 30 MMOL/L (ref 21–32)
CREAT SERPL-MCNC: 0.43 MG/DL (ref 0.6–1.3)
CREAT UR-MCNC: 35.9 MG/DL
EOSINOPHIL # BLD AUTO: 0.11 THOUSAND/ÂΜL (ref 0–0.61)
EOSINOPHIL NFR BLD AUTO: 2 % (ref 0–6)
ERYTHROCYTE [DISTWIDTH] IN BLOOD BY AUTOMATED COUNT: 16 % (ref 11.6–15.1)
EST. AVERAGE GLUCOSE BLD GHB EST-MCNC: 151 MG/DL
FERRITIN SERPL-MCNC: 20 NG/ML (ref 11–307)
GFR SERPL CREATININE-BSD FRML MDRD: 92 ML/MIN/1.73SQ M
GLUCOSE P FAST SERPL-MCNC: 135 MG/DL (ref 65–99)
HBA1C MFR BLD: 6.9 %
HCT VFR BLD AUTO: 36.6 % (ref 34.8–46.1)
HDLC SERPL-MCNC: 57 MG/DL
HGB BLD-MCNC: 11 G/DL (ref 11.5–15.4)
IMM GRANULOCYTES # BLD AUTO: 0.02 THOUSAND/UL (ref 0–0.2)
IMM GRANULOCYTES NFR BLD AUTO: 0 % (ref 0–2)
IRON SATN MFR SERPL: 19 % (ref 15–50)
IRON SERPL-MCNC: 59 UG/DL (ref 50–212)
LDLC SERPL CALC-MCNC: 53 MG/DL (ref 0–100)
LYMPHOCYTES # BLD AUTO: 1.35 THOUSANDS/ÂΜL (ref 0.6–4.47)
LYMPHOCYTES NFR BLD AUTO: 26 % (ref 14–44)
MCH RBC QN AUTO: 25.1 PG (ref 26.8–34.3)
MCHC RBC AUTO-ENTMCNC: 30.1 G/DL (ref 31.4–37.4)
MCV RBC AUTO: 83 FL (ref 82–98)
MONOCYTES # BLD AUTO: 0.56 THOUSAND/ÂΜL (ref 0.17–1.22)
MONOCYTES NFR BLD AUTO: 11 % (ref 4–12)
NEUTROPHILS # BLD AUTO: 3.12 THOUSANDS/ÂΜL (ref 1.85–7.62)
NEUTS SEG NFR BLD AUTO: 61 % (ref 43–75)
NONHDLC SERPL-MCNC: 68 MG/DL
NRBC BLD AUTO-RTO: 0 /100 WBCS
PLATELET # BLD AUTO: 182 THOUSANDS/UL (ref 149–390)
PMV BLD AUTO: 10.3 FL (ref 8.9–12.7)
POTASSIUM SERPL-SCNC: 4.3 MMOL/L (ref 3.5–5.3)
PROT SERPL-MCNC: 7.4 G/DL (ref 6.4–8.4)
PROT UR-MCNC: 20 MG/DL
PROT/CREAT UR: 0.56 MG/G{CREAT} (ref 0–0.1)
RBC # BLD AUTO: 4.39 MILLION/UL (ref 3.81–5.12)
SODIUM SERPL-SCNC: 132 MMOL/L (ref 135–147)
TIBC SERPL-MCNC: 316 UG/DL (ref 250–450)
TRIGL SERPL-MCNC: 77 MG/DL
TSH SERPL DL<=0.05 MIU/L-ACNC: 1.5 UIU/ML (ref 0.45–4.5)
UIBC SERPL-MCNC: 257 UG/DL (ref 155–355)
WBC # BLD AUTO: 5.18 THOUSAND/UL (ref 4.31–10.16)

## 2024-05-23 PROCEDURE — 80061 LIPID PANEL: CPT

## 2024-05-23 PROCEDURE — 82728 ASSAY OF FERRITIN: CPT

## 2024-05-23 PROCEDURE — 84156 ASSAY OF PROTEIN URINE: CPT

## 2024-05-23 PROCEDURE — 83036 HEMOGLOBIN GLYCOSYLATED A1C: CPT

## 2024-05-23 PROCEDURE — 84443 ASSAY THYROID STIM HORMONE: CPT

## 2024-05-23 PROCEDURE — 36415 COLL VENOUS BLD VENIPUNCTURE: CPT

## 2024-05-23 PROCEDURE — 85025 COMPLETE CBC W/AUTO DIFF WBC: CPT

## 2024-05-23 PROCEDURE — 80053 COMPREHEN METABOLIC PANEL: CPT

## 2024-05-23 PROCEDURE — 82570 ASSAY OF URINE CREATININE: CPT

## 2024-05-23 PROCEDURE — 83540 ASSAY OF IRON: CPT

## 2024-05-23 PROCEDURE — 83550 IRON BINDING TEST: CPT

## 2024-05-23 NOTE — TELEPHONE ENCOUNTER
----- Message from ZANDER Pisano sent at 5/23/2024  3:31 PM EDT -----  Please let Ac know her blood work is stable.   A1c is 6.9%.   We will look at her blood work in detail at her office visit on 6/11/24.

## 2024-06-03 DIAGNOSIS — J84.9 INTERSTITIAL LUNG DISEASE (HCC): ICD-10-CM

## 2024-06-03 DIAGNOSIS — R13.10 DYSPHAGIA, UNSPECIFIED TYPE: ICD-10-CM

## 2024-06-03 DIAGNOSIS — E11.42 TYPE 2 DIABETES MELLITUS WITH DIABETIC POLYNEUROPATHY, WITHOUT LONG-TERM CURRENT USE OF INSULIN (HCC): ICD-10-CM

## 2024-06-03 RX ORDER — PANTOPRAZOLE SODIUM 40 MG/1
40 TABLET, DELAYED RELEASE ORAL 2 TIMES DAILY
Qty: 60 TABLET | Refills: 5 | Status: SHIPPED | OUTPATIENT
Start: 2024-06-03

## 2024-06-03 RX ORDER — INSULIN HUMAN 100 [IU]/ML
INJECTION, SUSPENSION SUBCUTANEOUS
Qty: 15 ML | Refills: 1 | Status: SHIPPED | OUTPATIENT
Start: 2024-06-03

## 2024-06-04 ENCOUNTER — OFFICE VISIT (OUTPATIENT)
Dept: GASTROENTEROLOGY | Facility: CLINIC | Age: 89
End: 2024-06-04
Payer: MEDICARE

## 2024-06-04 VITALS
WEIGHT: 119.4 LBS | HEART RATE: 80 BPM | DIASTOLIC BLOOD PRESSURE: 76 MMHG | BODY MASS INDEX: 19.19 KG/M2 | OXYGEN SATURATION: 99 % | TEMPERATURE: 97.5 F | SYSTOLIC BLOOD PRESSURE: 147 MMHG | HEIGHT: 66 IN

## 2024-06-04 DIAGNOSIS — K21.9 GASTROESOPHAGEAL REFLUX DISEASE WITHOUT ESOPHAGITIS: ICD-10-CM

## 2024-06-04 DIAGNOSIS — K59.09 CHRONIC CONSTIPATION WITH OVERFLOW: ICD-10-CM

## 2024-06-04 DIAGNOSIS — R13.10 DYSPHAGIA, UNSPECIFIED TYPE: Primary | ICD-10-CM

## 2024-06-04 PROCEDURE — 99214 OFFICE O/P EST MOD 30 MIN: CPT | Performed by: DIETITIAN, REGISTERED

## 2024-06-04 RX ORDER — BRIMONIDINE TARTRATE 2 MG/ML
SOLUTION/ DROPS OPHTHALMIC
COMMUNITY
Start: 2024-06-03

## 2024-06-04 NOTE — PROGRESS NOTES
"[facility] - Outpatient Follow-up Note  Ac Duran 88 y.o. female MRN: [id number]  Encounter: [id number]          ASSESSMENT AND PLAN:    1.  GERD  2.  Dysphagia  Patient reports worsening in dysphagia over the last 2 months, with sensation of pressure in her chest as well as a \"squeak\" in her chest, which feels like trapped gas.  The symptoms come on after meals.  She has ongoing difficulty with swallowing solids, but admits that she eats very quickly and has not yet made a significant effort to change her eating habits.  She is taking pantoprazole 40 mg twice daily and Pepcid 20 mg twice daily, which has been working well over the last several months to keep her reflux under control.  She notes recently acid reflux has been flaring somewhat, ever since she started to drink more electrolyte beverages at the advice of her nephrologist.  Video barium swallow with speech 5/7/2024 -oral stage WNL, pharyngeal stage with mild dysphagia, and esophageal stage with solid food retention, cleared with thin liquids.  Pill retention was noted throughout the study.      -Recommend patient focus on lifestyle management of dysphagia, including taking small bites, chewing thoroughly, taking time between bites, sipping water between bites, and sitting upright after meals.  Patient states she will get this a good try as best as she can.  -Recommend barium esophagram for further evaluation of abnormal esophageal swallow function seen on video barium swallow.  -Continue pantoprazole 40 mg twice daily.  -Continue famotidine 20 mg twice daily.  -Noted upon questioning that patient is currently only taking her sodium chloride tablet once daily (rather than twice daily) as previously directed by her PCP.  Patient is questioning whether taking this twice daily again may be helpful.  I recommend patient discuss this with her nephrologist.      3.  Chronic constipation with overflow  Patient reports her " "bowels are currently under fairly good control with 2 teaspoons of Metamucil once daily.  She still has bouts of constipation followed by overflow diarrhea, but it is much better than it previously was.  She has discontinued her Motegrity due to lack of benefit.    -Continue Metamucil 2 teaspoons once daily.      Follow up 3 months.    __________________________________________________________    SUBJECTIVE:  Ac Duran is a 88 y.o. female with history of hypertension, interstitial lung disease, type 2 diabetes, A-fib on Eliquis, and GERD who presents for follow up of GERD and diarrhea.  Last office visit 3/2023, at which time patient's chronic constipation with overflow diarrhea were under better control with Motegrity every other day and alternating with Metamucil.  Also at that last office visit, patient reported worsening symptoms of reflux, thus Protonix was increased to 20 mg twice daily.    Video barium swallow with speech 5/7/2024 -oral stage WNL, pharyngeal stage with mild dysphagia, and esophageal stage with solid food retention, cleared with thin liquids.  Pill retention was noted throughout the study.  Patient was recommended to follow a regular diet with thin liquids but avoiding any dry dense foods such as meats and breads.    Patient reports worsening in dysphagia over the last 2 months, with sensation of pressure in her chest as well as a \"squeak\" in her chest, which feels like trapped gas.  The symptoms come on after meals.  She has ongoing difficulty with swallowing solids, but admits that she eats very quickly and has not yet made a significant effort to change her eating habits.  She is taking pantoprazole 40 mg twice daily and Pepcid 20 mg twice daily, which has been working well over the last several months to keep her reflux under control.  She notes recently acid reflux has been flaring somewhat, ever since she started to drink more electrolyte beverages at the advice of her " nephrologist.    Patient reports her bowels are currently under fairly good control with 2 teaspoons of Metamucil once daily.  She still has bouts of constipation followed by overflow diarrhea, but it is much better than it previously was.  She has discontinued her Motegrity due to lack of benefit.        REVIEW OF SYSTEMS:  10 point ROS reviewed and negative, except as above      Historical Information   Past Medical History:   Diagnosis Date   • Common bile duct dilatation 2020   • Glaucoma    • H/O degenerative disc disease    • Idiopathic pulmonary fibrosis (HCC) 2020   • Irregular heart beat     afib   • Peripheral neuropathy    • S/P laparoscopic cholecystectomy 2020   • Stenosis of right subclavian artery (HCC) 2016     Past Surgical History:   Procedure Laterality Date   • CATARACT EXTRACTION, BILATERAL     • CHOLECYSTECTOMY     • CHOLECYSTECTOMY LAPAROSCOPIC N/A 2020    Procedure: CHOLECYSTECTOMY LAPAROSCOPIC;  Surgeon: Kalen Garrett MD;  [facility];  Service: General   • COLONOSCOPY     • CYST REMOVAL      left lower Quadrant    • [place] LUMBAR PUNCTURE DIAGNOSTIC  2023   • HERNIA REPAIR     • LIPOMA RESECTION     • [place] RPR 1ST INGUN HRNA AGE 5 YRS/> REDUCIBLE Bilateral 2018    Procedure: INGUINAL HERNIA REPAIR;  Surgeon: Volodymyr Lee MD;  [facility];  Service: General   • SHOULDER SURGERY  2019    Dr. Arnett ([place])   • UPPER GASTROINTESTINAL ENDOSCOPY     • VASCULAR SURGERY      Agram-  R carotid occlusion     Social History   Social History     Substance and Sexual Activity   Alcohol Use No     Social History     Substance and Sexual Activity   Drug Use No     Social History     Tobacco Use   Smoking Status Former   • Current packs/day: 0.00   • Average packs/day: 1.5 packs/day for 38.0 years (57.0 ttl pk-yrs)   • Types: Cigarettes   • Start date:    • Quit date:    • Years since quittin.4   Smokeless Tobacco  Never     Family History   Problem Relation Age of Onset   • Heart disease Mother    • Heart attack Father    • Hiatal hernia Father    • Diabetes Father    • Cancer Brother    • Diabetes Brother    • Heart disease Brother    • Hypertension Brother    • Lung disease Brother 75        interstitial lung disease   • Cancer Brother 49        glioblastoma   • Other Son         gastroparesis   • Other Cousin         interstitial lung disease       Meds/Allergies       Current Outpatient Medications:   •  acetaminophen (TYLENOL) 500 mg tablet  •  apixaban (Eliquis) 2.5 mg  •  aspirin (ECOTRIN LOW STRENGTH) 81 mg EC tablet  •  bimatoprost (LUMIGAN) 0.01 % ophthalmic drops  •  brimonidine tartrate 0.2 % ophthalmic solution  •  calcium carbonate (OS-DANIA) 600 MG tablet  •  ezetimibe-simvastatin (VYTORIN) 10-40 mg per tablet  •  famotidine (PEPCID) 20 mg tablet  •  insulin isophane (HumuLIN N KwikPen) 100 units/mL injection pen  •  Insulin Pen Needle (B-D UF III MINI PEN NEEDLES) 31G X 5 MM MISC  •  loratadine (CLARITIN) 10 mg tablet  •  LORazepam (ATIVAN) 0.5 mg tablet  •  metFORMIN (GLUCOPHAGE) 500 mg tablet  •  metoprolol tartrate (LOPRESSOR) 25 mg tablet  •  Multiple Vitamin (multivitamin) tablet  •  OneTouch Ultra test strip  •  pantoprazole (PROTONIX) 40 mg tablet  •  sitaGLIPtin (Januvia) 100 mg tablet  •  sodium chloride 1 g tablet  •  Alphagan P 0.1 %  •  Cyanocobalamin (Vitamin B 12) 500 MCG TABS  •  ipratropium (ATROVENT) 0.03 % nasal spray    Allergies   Allergen Reactions   • Keflex [Cephalexin] Diarrhea           Objective     Wt Readings from Last 3 Encounters:   06/04/24 54.2 kg (119 lb 6.4 oz)   05/03/24 52.6 kg (116 lb)   04/26/24 54.5 kg (120 lb 1.6 oz)     Temp Readings from Last 3 Encounters:   06/04/24 97.5 °F (36.4 °C) (Tympanic)   05/03/24 (!) 97.1 °F (36.2 °C) (Temporal)   04/15/24 (!) 97.1 °F (36.2 °C) (Temporal)     BP Readings from Last 3 Encounters:   06/04/24 147/76   05/03/24 120/70   04/26/24  132/74     Pulse Readings from Last 3 Encounters:   06/04/24 80   05/03/24 93   04/26/24 66          PHYSICAL EXAM:      Physical Exam  Vitals reviewed.   Constitutional:       General: She is not in acute distress.     Appearance: She is underweight.   HENT:      Head: Normocephalic and atraumatic.   Eyes:      Conjunctiva/sclera: Conjunctivae normal.   Cardiovascular:      Rate and Rhythm: Normal rate and regular rhythm.      Heart sounds: No murmur heard.  Pulmonary:      Effort: Pulmonary effort is normal. No respiratory distress.      Breath sounds: Normal breath sounds.   Abdominal:      General: Bowel sounds are normal. There is no distension.      Palpations: Abdomen is soft.      Tenderness: There is no abdominal tenderness. There is no guarding.   Musculoskeletal:         General: No swelling.      Cervical back: Neck supple.   Skin:     General: Skin is warm and dry.   Neurological:      Mental Status: She is alert.   Psychiatric:         Mood and Affect: Mood normal.          Lab Results:   No visits with results within 1 Day(s) from this visit.   Latest known visit with results is:   Appointment on 05/23/2024   Component Date Value   • Creatinine, Ur 05/23/2024 35.9    • Protein Urine Random 05/23/2024 20    • Prot/Creat Ratio, Ur 05/23/2024 0.56 (H)    • WBC 05/23/2024 5.18    • RBC 05/23/2024 4.39    • Hemoglobin 05/23/2024 11.0 (L)    • Hematocrit 05/23/2024 36.6    • MCV 05/23/2024 83    • MCH 05/23/2024 25.1 (L)    • MCHC 05/23/2024 30.1 (L)    • RDW 05/23/2024 16.0 (H)    • MPV 05/23/2024 10.3    • Platelets 05/23/2024 182    • nRBC 05/23/2024 0    • Segmented % 05/23/2024 61    • Immature Grans % 05/23/2024 0    • Lymphocytes % 05/23/2024 26    • Monocytes % 05/23/2024 11    • Eosinophils Relative 05/23/2024 2    • Basophils Relative 05/23/2024 0    • Absolute Neutrophils 05/23/2024 3.12    • Absolute Immature Grans 05/23/2024 0.02    • Absolute Lymphocytes 05/23/2024 1.35    • Absolute Monocytes  05/23/2024 0.56    • Eosinophils Absolute 05/23/2024 0.11    • Basophils Absolute 05/23/2024 0.02    • Sodium 05/23/2024 132 (L)    • Potassium 05/23/2024 4.3    • Chloride 05/23/2024 91 (L)    • CO2 05/23/2024 30    • ANION GAP 05/23/2024 11    • BUN 05/23/2024 14    • Creatinine 05/23/2024 0.43 (L)    • Glucose, Fasting 05/23/2024 135 (H)    • Calcium 05/23/2024 9.0    • AST 05/23/2024 20    • ALT 05/23/2024 13    • Alkaline Phosphatase 05/23/2024 48    • Total Protein 05/23/2024 7.4    • Albumin 05/23/2024 4.2    • Total Bilirubin 05/23/2024 0.52    • eGFR 05/23/2024 92    • Hemoglobin A1C 05/23/2024 6.9 (H)    • EAG 05/23/2024 151    • Cholesterol 05/23/2024 125    • Triglycerides 05/23/2024 77    • HDL, Direct 05/23/2024 57    • LDL Calculated 05/23/2024 53    • Non-HDL-Chol (CHOL-HDL) 05/23/2024 68    • TSH 3RD GENERATON 05/23/2024 1.503    • Iron Saturation 05/23/2024 19    • TIBC 05/23/2024 316    • Iron 05/23/2024 59    • UIBC 05/23/2024 257    • Ferritin 05/23/2024 20        Lab Results   Component Value Date    WBC 5.18 05/23/2024    HGB 11.0 (L) 05/23/2024    HCT 36.6 05/23/2024    MCV 83 05/23/2024     05/23/2024       Lab Results   Component Value Date    SODIUM 132 (L) 05/23/2024    K 4.3 05/23/2024    CL 91 (L) 05/23/2024    CO2 30 05/23/2024    AGAP 11 05/23/2024    BUN 14 05/23/2024    CREATININE 0.43 (L) 05/23/2024    GLUC 158 (H) 04/27/2023    GLUF 135 (H) 05/23/2024    CALCIUM 9.0 05/23/2024    AST 20 05/23/2024    ALT 13 05/23/2024    ALKPHOS 48 05/23/2024    TP 7.4 05/23/2024    TBILI 0.52 05/23/2024    EGFR 92 05/23/2024         Radiology Results:   FL barium swallow video w speech    Result Date: 5/7/2024  Narrative: A video barium swallow study was performed by the Department of Speech Pathology. Please refer to the report for the official interpretation. The images are stored for archival purposes only. Study images were not formally reviewed by the Radiology Department.

## 2024-06-04 NOTE — PATIENT INSTRUCTIONS
- take your time with meals  - take small bites and chew very thoroughly  - put your utensils down between bites, which can help you remember to take your time  - drink a sip of water between bites  - stay upright for at least 1 hour after meals

## 2024-06-11 ENCOUNTER — OFFICE VISIT (OUTPATIENT)
Dept: FAMILY MEDICINE CLINIC | Facility: CLINIC | Age: 89
End: 2024-06-11
Payer: MEDICARE

## 2024-06-11 VITALS
HEART RATE: 88 BPM | SYSTOLIC BLOOD PRESSURE: 130 MMHG | DIASTOLIC BLOOD PRESSURE: 70 MMHG | TEMPERATURE: 97.8 F | OXYGEN SATURATION: 100 % | HEIGHT: 66 IN | BODY MASS INDEX: 19.13 KG/M2 | WEIGHT: 119 LBS | RESPIRATION RATE: 16 BRPM

## 2024-06-11 DIAGNOSIS — E61.1 IRON DEFICIENCY: ICD-10-CM

## 2024-06-11 DIAGNOSIS — J84.9 INTERSTITIAL LUNG DISEASE (HCC): ICD-10-CM

## 2024-06-11 DIAGNOSIS — I10 ESSENTIAL HYPERTENSION: Primary | ICD-10-CM

## 2024-06-11 DIAGNOSIS — E11.3392 TYPE 2 DIABETES MELLITUS WITH LEFT EYE AFFECTED BY MODERATE NONPROLIFERATIVE RETINOPATHY WITHOUT MACULAR EDEMA, WITH LONG-TERM CURRENT USE OF INSULIN (HCC): ICD-10-CM

## 2024-06-11 DIAGNOSIS — E78.2 MIXED HYPERLIPIDEMIA: ICD-10-CM

## 2024-06-11 DIAGNOSIS — E11.42 TYPE 2 DIABETES MELLITUS WITH DIABETIC POLYNEUROPATHY, WITHOUT LONG-TERM CURRENT USE OF INSULIN (HCC): ICD-10-CM

## 2024-06-11 DIAGNOSIS — R13.10 DYSPHAGIA, UNSPECIFIED TYPE: ICD-10-CM

## 2024-06-11 DIAGNOSIS — Z79.4 TYPE 2 DIABETES MELLITUS WITH LEFT EYE AFFECTED BY MODERATE NONPROLIFERATIVE RETINOPATHY WITHOUT MACULAR EDEMA, WITH LONG-TERM CURRENT USE OF INSULIN (HCC): ICD-10-CM

## 2024-06-11 DIAGNOSIS — R09.02 HYPOXIA: ICD-10-CM

## 2024-06-11 DIAGNOSIS — E87.1 HYPONATREMIA: ICD-10-CM

## 2024-06-11 DIAGNOSIS — I48.0 PAROXYSMAL ATRIAL FIBRILLATION (HCC): ICD-10-CM

## 2024-06-11 PROCEDURE — G2211 COMPLEX E/M VISIT ADD ON: HCPCS | Performed by: NURSE PRACTITIONER

## 2024-06-11 PROCEDURE — 99214 OFFICE O/P EST MOD 30 MIN: CPT | Performed by: NURSE PRACTITIONER

## 2024-06-11 RX ORDER — FERROUS SULFATE 325(65) MG
325 TABLET, DELAYED RELEASE (ENTERIC COATED) ORAL
COMMUNITY

## 2024-06-11 RX ORDER — ASCORBIC ACID 500 MG
500 TABLET ORAL DAILY
COMMUNITY

## 2024-06-11 NOTE — PROGRESS NOTES
FAMILY PRACTICE OFFICE VISIT       NAME: Ac Durna  AGE: 88 y.o. SEX: female       : 1935        MRN: [id number]    Assessment and Plan   1. Essential hypertension  Assessment & Plan:  Stable blood pressure on metoprolol.   Orthostatic hypotension, consider reducing metoprolol dose to 12.5 mg twice daily. I will reach out to her cardiologist regarding this, as she has paroxysmal a. Fib as well.   Orders:  -     CBC; Future  -     Comprehensive metabolic panel; Future; Expected date: 2024  -     TSH, 3rd generation with Free T4 reflex; Future; Expected date: 2024  2. Type 2 diabetes mellitus with diabetic polyneuropathy, without long-term current use of insulin ([facility])  Assessment & Plan:    Lab Results   Component Value Date    HGBA1C 6.9 (H) 2024     Bilateral peripheral neuropathy in feet, tolerable.   Orders:  -     Hemoglobin A1C; Future; Expected date: 2024  3. 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   One episode of hypoglycemia, symptomatic.   Continue metformin 1000 mg twice daily.   Continue Januvia 100 mg daily.   Continue Humulin N insulin 8 units in the morning and 4 units in the evening.   We discussed decreasing medications for diabetes control, but she does have significant diabetic retinopathy, and no consider maintenance of vision.   We agree to continue same medications for now.   Follow up in 3 months.   4. Hyponatremia  Assessment & Plan:  She has tried reducing sodium tablet to once daily, but sodium has decreased to 132 with this.   She will go back to 1 tablet twice daily.   5. Hypoxia  Assessment & Plan:  Started oxygen 2 LPM with sleeping as per pulmonology.   6. Iron deficiency  Assessment & Plan:  Ferritin is down to 20 from 88. She  had stopped taking iron supplement, but now has resumed daily ferrous sulfate supplement.   7. Mixed hyperlipidemia  Assessment & Plan:  LDL 53, at goal on Vytorin.  8. Paroxysmal atrial fibrillation (HCC)  Assessment & Plan:  Continue Eliquis, metoprolol.   Stop baby aspirin.  Continue cardiology follow up.   9. Interstitial lung disease (HCC)  Assessment & Plan:  Continue pulmonology follow up, Dr. Jovel.   10. Dysphagia, unspecified type  Assessment & Plan:  Worsening dysphagia, following with GI. Will complete barium esophagram as per GI orders.      3 month recheck with blood work.     Chief Complaint     Chief Complaint   Patient presents with    Follow-up     Patient is here for 3 mos f/u        History of Present Illness     Ac Duran is an 88 year old female presenting today for check up on chronic conditions.     Using oxygen night time now, sleeping better. 2 LPM nasal cannula for 2 weeks now.  Less daytime sleeping.   Not a lot of energy.    One low lgucse 71 about 1 week ago. Had associated symptoms of palpitations, sweaty, shaking.  None since.   Fasting sugars 99 sometimes. 109 fasting this morning.   Not more than 130.     Eats before bed, 1/2 tortilla with peanut better and jelly and 1/2 glass milk.     Weak and tired-tried one sodium tablet daily, sodium back to 132, will go back to 2 per day.     Metoprolol taking about 8 hours apart, should be 12.     Restarted iron supplement.    Dizziness with standing up. So much so, has to put her head down and lean forward.   It is brief.     Memory is getting worse, wrong order of things, harder to put things together.                                   Review of Systems   Review of Systems   Constitutional: Negative.    HENT:  Positive for trouble swallowing.    Respiratory:  Positive for shortness of breath (at baseline).    Cardiovascular: Negative.    Gastrointestinal:  Positive for constipation and diarrhea.   Genitourinary: Negative.   "  Musculoskeletal: Negative.    Skin: Negative.    Neurological:  Positive for light-headedness.   Hematological: Negative.    Psychiatric/Behavioral: Negative.          Memory and cognition not as good as it used to be.        I have reviewed the patient's medical history in detail; there are no changes to the history as noted in the electronic medical record.    Objective     Vitals:    06/11/24 1435   BP: 130/70   Pulse: 88   Resp: 16   Temp: 97.8 °F (36.6 °C)   TempSrc: Temporal   SpO2: 100%   Weight: 54 kg (119 lb)   Height: 5' 6\" (1.676 m)     Wt Readings from Last 3 Encounters:   06/11/24 54 kg (119 lb)   06/04/24 54.2 kg (119 lb 6.4 oz)   05/03/24 52.6 kg (116 lb)     Physical Exam  Vitals and nursing note reviewed.   Constitutional:       General: She is not in acute distress.     Appearance: Normal appearance. She is not ill-appearing.   HENT:      Head: Atraumatic.      Right Ear: Tympanic membrane normal.      Left Ear: Tympanic membrane normal.      Mouth/Throat:      Mouth: Mucous membranes are moist.      Pharynx: Oropharynx is clear.   Neck:      Thyroid: No thyromegaly.   Cardiovascular:      Rate and Rhythm: Normal rate and regular rhythm.      Heart sounds: No murmur heard.  Pulmonary:      Effort: Pulmonary effort is normal.      Breath sounds: Rales (crackles bilateral bases) present.   Musculoskeletal:      Cervical back: Normal range of motion and neck supple.      Right lower leg: No edema.      Left lower leg: No edema.   Lymphadenopathy:      Cervical: No cervical adenopathy.   Skin:     General: Skin is warm and dry.   Neurological:      Mental Status: She is alert.   Psychiatric:         Mood and Affect: Mood normal.            ALLERGIES:  Allergies   Allergen Reactions    Keflex [Cephalexin] Diarrhea       Current Medications     Current Outpatient Medications   Medication Sig Dispense Refill    acetaminophen (TYLENOL) 500 mg tablet Take 1,000 mg by mouth as needed for mild pain      " apixaban (Eliquis) 2.5 mg TAKE 1 TABLET (2.5 MG TOTAL) BY MOUTH 2 (TWO) TIMES A  tablet 3    ascorbic acid (VITAMIN C) 500 MG tablet Take 500 mg by mouth daily      aspirin (ECOTRIN LOW STRENGTH) 81 mg EC tablet Take 1 tablet (81 mg total) by mouth daily      bimatoprost (LUMIGAN) 0.01 % ophthalmic drops Administer 1 drop to both eyes daily at bedtime for 30 days 1.5 mL 0    brimonidine tartrate 0.2 % ophthalmic solution INSTILL 1 DROP INTO RIGHT EYE TWICE A DAY      calcium carbonate (OS-DANIA) 600 MG tablet Take 600 mg by mouth 2 (two) times a day with meals        ezetimibe-simvastatin (VYTORIN) 10-40 mg per tablet TAKE 1 TABLET BY MOUTH DAILY AT BEDTIME 30 tablet 5    famotidine (PEPCID) 20 mg tablet TAKE 1 TABLET (20 MG TOTAL) BY MOUTH 2 (TWO) TIMES A DAY 60 tablet 5    ferrous sulfate 325 (65 FE) MG EC tablet Take 325 mg by mouth 3 (three) times a day with meals      insulin isophane (HumuLIN N KwikPen) 100 units/mL injection pen INJECT 8 UNITS UNDER THE SKIN EVERY MORNING AND 4 UNITS EVERY EVENING. 15 mL 1    Insulin Pen Needle (B-D UF III MINI PEN NEEDLES) 31G X 5 MM MISC USE 2 (TWO) TIMES A  each 3    loratadine (CLARITIN) 10 mg tablet Take 10 mg by mouth daily as needed for allergies      LORazepam (ATIVAN) 0.5 mg tablet TAKE 2 TABLETS (1 MG TOTAL) BY MOUTH DAILY AT BEDTIME 60 tablet 2    metFORMIN (GLUCOPHAGE) 500 mg tablet TAKE 2 TABLETS (1,000 MG TOTAL) BY MOUTH 2 (TWO) TIMES A DAY WITH MEALS 120 tablet 5    metoprolol tartrate (LOPRESSOR) 25 mg tablet TAKE 1 TABLET (25 MG TOTAL) BY MOUTH EVERY 12 (TWELVE) HOURS 60 tablet 5    Multiple Vitamin (multivitamin) tablet Take 1 tablet by mouth daily        OneTouch Ultra test strip TEST FOUR TIMES A  strip 4    pantoprazole (PROTONIX) 40 mg tablet TAKE 1 TABLET (40 MG TOTAL) BY MOUTH 2 (TWO) TIMES A DAY 60 tablet 5    sitaGLIPtin (Januvia) 100 mg tablet TAKE 1 TABLET (100 MG TOTAL) BY MOUTH DAILY 90 tablet 3    sodium chloride 1 g tablet  Take 1 tablet (1 g total) by mouth 2 (two) times a day 270 tablet 1    Alphagan P 0.1 % INSTILL 1 DROP RIGHT EYE TWICE A DAY (Patient not taking: Reported on 6/4/2024)      Cyanocobalamin (Vitamin B 12) 500 MCG TABS Take 500 mg by mouth 2 (two) times a day (Patient not taking: Reported on 6/4/2024)      ipratropium (ATROVENT) 0.03 % nasal spray USE 2 SPRAYS INTO EACH NOSTRIL EVERY 12 (TWELVE) HOURS (Patient not taking: Reported on 6/4/2024) 30 mL 6     No current facility-administered medications for this visit.         Health Maintenance     Health Maintenance   Topic Date Due    Zoster Vaccine (2 of 3) 09/03/2010    DM Eye Exam  11/25/2030 (Originally 12/8/2023)    Diabetic Foot Exam  12/23/2030 (Originally 1/11/2024)    Fall Risk  11/13/2024    Urinary Incontinence Screening  11/13/2024    Medicare Annual Wellness Visit (AWV)  11/13/2024    HEMOGLOBIN A1C  11/23/2024    Depression Screening  03/11/2025    Colorectal Cancer Screening  09/02/2025    Hepatitis C Screening  Completed    RSV Vaccine Age 60+ Years  Completed    Pneumococcal Vaccine: 65+ Years  Completed    Influenza Vaccine  Completed    COVID-19 Vaccine  Completed    RSV Vaccine age 0-20 Months  Aged Out    HIB Vaccine  Aged Out    IPV Vaccine  Aged Out    Hepatitis A Vaccine  Aged Out    Meningococcal ACWY Vaccine  Aged Out    HPV Vaccine  Aged Out     Immunization History   Administered Date(s) Administered    COVID-19 Moderna mRNA Vaccine 12 Yr+ 50 mcg/0.5 mL (Spikevax) 12/06/2023    COVID-19 PFIZER VACCINE 0.3 ML IM 01/22/2021, 02/12/2021, 09/30/2021, 09/08/2022    COVID-19 Pfizer Vac BIVALENT Edy-sucrose 12 Yr+ IM 09/08/2022    COVID-19 Pfizer vac (Edy-sucrose, gray cap) 12 yr+ IM 04/11/2022    INFLUENZA 11/02/2016, 10/16/2017, 09/18/2018, 09/18/2018, 11/06/2019, 11/20/2023    Influenza, Quadrivalent (nasal) 11/02/2016    Influenza, high dose seasonal 0.7 mL 11/06/2019, 10/06/2020, 11/09/2021, 11/15/2022    Pneumococcal Conjugate 13-Valent  07/25/2019    Pneumococcal Polysaccharide PPV23 03/17/2003    Respiratory Syncytial Virus Vaccine (Recombinant) 12/27/2023    Zoster 07/09/2010       ZANDER Swenson

## 2024-06-16 ENCOUNTER — TELEPHONE (OUTPATIENT)
Dept: FAMILY MEDICINE CLINIC | Facility: CLINIC | Age: 89
End: 2024-06-16

## 2024-06-16 DIAGNOSIS — I10 HYPERTENSION, UNSPECIFIED TYPE: ICD-10-CM

## 2024-06-16 PROBLEM — Z79.4 TYPE 2 DIABETES MELLITUS WITH LEFT EYE AFFECTED BY MODERATE NONPROLIFERATIVE RETINOPATHY WITHOUT MACULAR EDEMA, WITH LONG-TERM CURRENT USE OF INSULIN (HCC): Status: ACTIVE | Noted: 2021-09-12

## 2024-06-16 PROBLEM — E11.3392 TYPE 2 DIABETES MELLITUS WITH LEFT EYE AFFECTED BY MODERATE NONPROLIFERATIVE RETINOPATHY WITHOUT MACULAR EDEMA, WITH LONG-TERM CURRENT USE OF INSULIN (HCC): Status: ACTIVE | Noted: 2021-09-12

## 2024-06-16 NOTE — ASSESSMENT & PLAN NOTE
She has tried reducing sodium tablet to once daily, but sodium has decreased to 132 with this.   She will go back to 1 tablet twice daily.

## 2024-06-16 NOTE — TELEPHONE ENCOUNTER
Please contact Ac.     I reached out to Dr. Ramsey, it is okay by him to try to reduce metoprolol to 12.5 mg twice daily--this means take 0.5 tablet every 12 hours.     Let's see if this helps with the lightheadedness when you stand up.

## 2024-06-16 NOTE — ASSESSMENT & PLAN NOTE
Lab Results   Component Value Date    HGBA1C 6.9 (H) 05/23/2024     Bilateral peripheral neuropathy in feet, tolerable.

## 2024-06-16 NOTE — ASSESSMENT & PLAN NOTE
Ferritin is down to 20 from 88. She had stopped taking iron supplement, but now has resumed daily ferrous sulfate supplement.

## 2024-06-16 NOTE — ASSESSMENT & PLAN NOTE
Lab Results   Component Value Date    HGBA1C 6.9 (H) 05/23/2024     A1c 6.9%.   One episode of hypoglycemia, symptomatic.   Continue metformin 1000 mg twice daily.   Continue Januvia 100 mg daily.   Continue Humulin N insulin 8 units in the morning and 4 units in the evening.   We discussed decreasing medications for diabetes control, but she does have significant diabetic retinopathy, and no consider maintenance of vision.   We agree to continue same medications for now.   Follow up in 3 months.

## 2024-06-20 ENCOUNTER — HOSPITAL ENCOUNTER (OUTPATIENT)
Dept: RADIOLOGY | Facility: HOSPITAL | Age: 89
Discharge: HOME | End: 2024-06-20
Payer: MEDICARE

## 2024-06-20 DIAGNOSIS — R13.10 DYSPHAGIA, UNSPECIFIED TYPE: ICD-10-CM

## 2024-06-20 PROCEDURE — 74221 X-RAY XM ESOPHAGUS 2CNTRST: CPT

## 2024-07-01 ENCOUNTER — NURSE TRIAGE (OUTPATIENT)
Dept: GASTROENTEROLOGY | Facility: CLINIC | Age: 89
End: 2024-07-01

## 2024-07-01 NOTE — TELEPHONE ENCOUNTER
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  Silent penetration means that this is not noticed by the patient, AKA no coughing.  I recommend that she call her speech therapist back to discuss these results and see if they can provide any further therapy or recommendations for her.  I also recommend she continue to work on eating more slowly, taking small sips/bites, and chewing very thoroughly.  Thanks so much!

## 2024-07-06 ENCOUNTER — APPOINTMENT (EMERGENCY)
Dept: CT IMAGING | Facility: HOSPITAL | Age: 89
End: 2024-07-06
Payer: MEDICARE

## 2024-07-06 ENCOUNTER — HOSPITAL ENCOUNTER (EMERGENCY)
Facility: HOSPITAL | Age: 89
Discharge: HOME | End: 2024-07-06
Attending: EMERGENCY MEDICINE | Admitting: EMERGENCY MEDICINE
Payer: MEDICARE

## 2024-07-06 VITALS
TEMPERATURE: 98 F | HEART RATE: 64 BPM | OXYGEN SATURATION: 98 % | DIASTOLIC BLOOD PRESSURE: 86 MMHG | RESPIRATION RATE: 18 BRPM | SYSTOLIC BLOOD PRESSURE: 196 MMHG

## 2024-07-06 DIAGNOSIS — N30.90 CYSTITIS: Primary | ICD-10-CM

## 2024-07-06 DIAGNOSIS — R19.7 DIARRHEA: ICD-10-CM

## 2024-07-06 DIAGNOSIS — K59.00 CONSTIPATION: ICD-10-CM

## 2024-07-06 DIAGNOSIS — I10 ELEVATED BLOOD PRESSURE READING WITH DIAGNOSIS OF HYPERTENSION: ICD-10-CM

## 2024-07-06 LAB
2HR DELTA HS TROPONIN: 1 NG/L
ALBUMIN SERPL BCG-MCNC: 4.4 G/DL (ref 3.5–5)
ALP SERPL-CCNC: 52 U/L (ref 34–104)
ALT SERPL W P-5'-P-CCNC: 15 U/L (ref 7–52)
ANION GAP SERPL CALCULATED.3IONS-SCNC: 9 MMOL/L (ref 4–13)
AST SERPL W P-5'-P-CCNC: 25 U/L (ref 13–39)
BACTERIA UR QL AUTO: ABNORMAL /HPF
BASOPHILS # BLD AUTO: 0.03 THOUSANDS/ÂΜL (ref 0–0.1)
BASOPHILS NFR BLD AUTO: 0 % (ref 0–1)
BILIRUB SERPL-MCNC: 0.49 MG/DL (ref 0.2–1)
BILIRUB UR QL STRIP: NEGATIVE
BUN SERPL-MCNC: 15 MG/DL (ref 5–25)
CALCIUM SERPL-MCNC: 9.8 MG/DL (ref 8.4–10.2)
CARDIAC TROPONIN I PNL SERPL HS: 6 NG/L
CARDIAC TROPONIN I PNL SERPL HS: 7 NG/L
CHLORIDE SERPL-SCNC: 94 MMOL/L (ref 96–108)
CLARITY UR: ABNORMAL
CO2 SERPL-SCNC: 28 MMOL/L (ref 21–32)
COLOR UR: ABNORMAL
CREAT SERPL-MCNC: 0.5 MG/DL (ref 0.6–1.3)
EOSINOPHIL # BLD AUTO: 0.04 THOUSAND/ÂΜL (ref 0–0.61)
EOSINOPHIL NFR BLD AUTO: 0 % (ref 0–6)
ERYTHROCYTE [DISTWIDTH] IN BLOOD BY AUTOMATED COUNT: 16.1 % (ref 11.6–15.1)
GFR SERPL CREATININE-BSD FRML MDRD: 87 ML/MIN/1.73SQ M
GLUCOSE SERPL-MCNC: 141 MG/DL (ref 65–140)
GLUCOSE SERPL-MCNC: 82 MG/DL (ref 65–140)
GLUCOSE UR STRIP-MCNC: NEGATIVE MG/DL
HCT VFR BLD AUTO: 40.2 % (ref 34.8–46.1)
HGB BLD-MCNC: 12.2 G/DL (ref 11.5–15.4)
HGB UR QL STRIP.AUTO: ABNORMAL
IMM GRANULOCYTES # BLD AUTO: 0.03 THOUSAND/UL (ref 0–0.2)
IMM GRANULOCYTES NFR BLD AUTO: 0 % (ref 0–2)
KETONES UR STRIP-MCNC: NEGATIVE MG/DL
LEUKOCYTE ESTERASE UR QL STRIP: ABNORMAL
LIPASE SERPL-CCNC: 19 U/L (ref 11–82)
LYMPHOCYTES # BLD AUTO: 1.49 THOUSANDS/ÂΜL (ref 0.6–4.47)
LYMPHOCYTES NFR BLD AUTO: 17 % (ref 14–44)
MCH RBC QN AUTO: 25.8 PG (ref 26.8–34.3)
MCHC RBC AUTO-ENTMCNC: 30.3 G/DL (ref 31.4–37.4)
MCV RBC AUTO: 85 FL (ref 82–98)
MONOCYTES # BLD AUTO: 0.69 THOUSAND/ÂΜL (ref 0.17–1.22)
MONOCYTES NFR BLD AUTO: 8 % (ref 4–12)
NEUTROPHILS # BLD AUTO: 6.69 THOUSANDS/ÂΜL (ref 1.85–7.62)
NEUTS SEG NFR BLD AUTO: 75 % (ref 43–75)
NITRITE UR QL STRIP: NEGATIVE
NON-SQ EPI CELLS URNS QL MICRO: ABNORMAL /HPF
NRBC BLD AUTO-RTO: 0 /100 WBCS
PH UR STRIP.AUTO: 7.5 [PH]
PLATELET # BLD AUTO: 178 THOUSANDS/UL (ref 149–390)
PMV BLD AUTO: 10 FL (ref 8.9–12.7)
POTASSIUM SERPL-SCNC: 4.6 MMOL/L (ref 3.5–5.3)
PROT SERPL-MCNC: 8 G/DL (ref 6.4–8.4)
PROT UR STRIP-MCNC: ABNORMAL MG/DL
RBC # BLD AUTO: 4.72 MILLION/UL (ref 3.81–5.12)
RBC #/AREA URNS AUTO: ABNORMAL /HPF
SODIUM SERPL-SCNC: 131 MMOL/L (ref 135–147)
SP GR UR STRIP.AUTO: 1.01 (ref 1–1.03)
UROBILINOGEN UR STRIP-ACNC: <2 MG/DL
WBC # BLD AUTO: 8.97 THOUSAND/UL (ref 4.31–10.16)
WBC #/AREA URNS AUTO: ABNORMAL /HPF
WBC CLUMPS # UR AUTO: PRESENT /UL

## 2024-07-06 PROCEDURE — 83690 ASSAY OF LIPASE: CPT | Performed by: EMERGENCY MEDICINE

## 2024-07-06 PROCEDURE — 80053 COMPREHEN METABOLIC PANEL: CPT | Performed by: EMERGENCY MEDICINE

## 2024-07-06 PROCEDURE — 99284 EMERGENCY DEPT VISIT MOD MDM: CPT

## 2024-07-06 PROCEDURE — 85025 COMPLETE CBC W/AUTO DIFF WBC: CPT | Performed by: EMERGENCY MEDICINE

## 2024-07-06 PROCEDURE — 84484 ASSAY OF TROPONIN QUANT: CPT | Performed by: EMERGENCY MEDICINE

## 2024-07-06 PROCEDURE — 81001 URINALYSIS AUTO W/SCOPE: CPT | Performed by: EMERGENCY MEDICINE

## 2024-07-06 PROCEDURE — 99285 EMERGENCY DEPT VISIT HI MDM: CPT | Performed by: EMERGENCY MEDICINE

## 2024-07-06 PROCEDURE — 96361 HYDRATE IV INFUSION ADD-ON: CPT

## 2024-07-06 PROCEDURE — 87186 SC STD MICRODIL/AGAR DIL: CPT | Performed by: EMERGENCY MEDICINE

## 2024-07-06 PROCEDURE — 87077 CULTURE AEROBIC IDENTIFY: CPT | Performed by: EMERGENCY MEDICINE

## 2024-07-06 PROCEDURE — 96365 THER/PROPH/DIAG IV INF INIT: CPT

## 2024-07-06 PROCEDURE — 87086 URINE CULTURE/COLONY COUNT: CPT | Performed by: EMERGENCY MEDICINE

## 2024-07-06 PROCEDURE — 82948 REAGENT STRIP/BLOOD GLUCOSE: CPT

## 2024-07-06 PROCEDURE — 93005 ELECTROCARDIOGRAM TRACING: CPT

## 2024-07-06 PROCEDURE — 36415 COLL VENOUS BLD VENIPUNCTURE: CPT | Performed by: EMERGENCY MEDICINE

## 2024-07-06 PROCEDURE — 74177 CT ABD & PELVIS W/CONTRAST: CPT

## 2024-07-06 RX ORDER — NITROFURANTOIN 25; 75 MG/1; MG/1
100 CAPSULE ORAL 2 TIMES DAILY
Qty: 14 CAPSULE | Refills: 0 | Status: SHIPPED | OUTPATIENT
Start: 2024-07-06 | End: 2024-07-06

## 2024-07-06 RX ORDER — POLYETHYLENE GLYCOL 3350 17 G/17G
17 POWDER, FOR SOLUTION ORAL DAILY PRN
Qty: 85 G | Refills: 0 | Status: SHIPPED | OUTPATIENT
Start: 2024-07-06 | End: 2024-07-06

## 2024-07-06 RX ORDER — NITROFURANTOIN 25; 75 MG/1; MG/1
100 CAPSULE ORAL 2 TIMES DAILY
Qty: 14 CAPSULE | Refills: 0 | Status: SHIPPED | OUTPATIENT
Start: 2024-07-06 | End: 2024-07-13

## 2024-07-06 RX ORDER — POLYETHYLENE GLYCOL 3350 17 G/17G
17 POWDER, FOR SOLUTION ORAL DAILY PRN
Qty: 85 G | Refills: 0 | Status: SHIPPED | OUTPATIENT
Start: 2024-07-06 | End: 2024-07-20

## 2024-07-06 RX ADMIN — CEFTRIAXONE SODIUM 1000 MG: 10 INJECTION, POWDER, FOR SOLUTION INTRAVENOUS at 13:23

## 2024-07-06 RX ADMIN — SODIUM CHLORIDE 500 ML: 0.9 INJECTION, SOLUTION INTRAVENOUS at 12:04

## 2024-07-06 RX ADMIN — IOHEXOL 70 ML: 350 INJECTION, SOLUTION INTRAVENOUS at 13:55

## 2024-07-06 NOTE — DISCHARGE INSTRUCTIONS
Take nitrofurantoin 100 mg orally 2 times daily as prescribed to treat urinary tract infection.  You will be contacted by phone after urine culture returns if a change in antibiotic is necessary.    Continue drinking a good amount of fluids to stay well-hydrated.  Consider restarting Metamucil for more consistent stool passage.  If you have abdominal cramping you may use MiraLAX daily to help with constipation.  Reach out to your gastroenterology office to discuss further management of abdominal cramping/bowel movements.    Blood pressure varied though was consistently elevated today.  If possible, obtain a home monitor and check/record blood pressures daily.  Schedule a follow-up appointment with your primary care physician for recheck of blood pressure.  Bring a log of these values if able.

## 2024-07-06 NOTE — ED PROVIDER NOTES
History  Chief Complaint   Patient presents with    Diarrhea     Started with diarrhea on Thursday, took Imodium with some relief. No vomiting or abdominal pain. Reports decreased appetite and feeling weak.      Patient is an 88-year-old female who presents to the emergency department for evaluation feeling generally weak.  Her son accompanies her and notes that she has canceled their shopping trips 3 days in a row.  She has been experiencing intermittent diarrhea and had decreased appetite.  On Tuesday she had 1 loose stool, on Wednesday loose stool with abdominal cramping, Thursday 2 seemingly normal stools and 1 soft 1 with moderate amount of abdominal cramping and yesterday for stools-1 of which was soft and the other few loose.  She has not appreciated any blood in these.  Appetite/intake have been decreased.  She did appreciate urinary frequency overnight voiding approximately every 2 hours.  She did report a sharp sensation radiating from urethra upwards.  She has not appreciated any dysuria or hematuria.  Uncertain if she had any fevers.  No sore throat or chest discomfort.  She does have pulmonary fibrosis and appreciates dyspnea with exertion.  This has not been increased recently.  No leg swelling or cramping.  Medical history includes hypertension, atrial fibrillation, type 2 diabetes and GERD.  She has been diagnosed with IBS and wonders whether this may have flared up.  She has used Imodium a couple of times over the last week with some improvement.  She denies any recent increased stress.  Diet/food selection has been consistent.  History of cholecystectomy and remote hernia repair.  She did briefly take antibiotics last month when experiencing urinary frequency prior to return of negative urine studies.        Prior to Admission Medications   Prescriptions Last Dose Informant Patient Reported? Taking?   Alphagan P 0.1 %  Self Yes No   Sig: INSTILL 1 DROP RIGHT EYE TWICE A DAY   Patient not taking:  Reported on 6/4/2024   Cyanocobalamin (Vitamin B 12) 500 MCG TABS  Self Yes No   Sig: Take 500 mg by mouth 2 (two) times a day   Patient not taking: Reported on 6/4/2024   Insulin Pen Needle (B-D UF III MINI PEN NEEDLES) 31G X 5 MM MISC  Self No No   Sig: USE 2 (TWO) TIMES A DAY   LORazepam (ATIVAN) 0.5 mg tablet   No No   Sig: TAKE 2 TABLETS (1 MG TOTAL) BY MOUTH DAILY AT BEDTIME   Multiple Vitamin (multivitamin) tablet  Self Yes No   Sig: Take 1 tablet by mouth daily     OneTouch Ultra test strip  Self No No   Sig: TEST FOUR TIMES A DAY   acetaminophen (TYLENOL) 500 mg tablet  Self Yes No   Sig: Take 1,000 mg by mouth as needed for mild pain   apixaban (Eliquis) 2.5 mg  Self No No   Sig: TAKE 1 TABLET (2.5 MG TOTAL) BY MOUTH 2 (TWO) TIMES A DAY   ascorbic acid (VITAMIN C) 500 MG tablet   Yes No   Sig: Take 500 mg by mouth daily   aspirin (ECOTRIN LOW STRENGTH) 81 mg EC tablet  Self No No   Sig: Take 1 tablet (81 mg total) by mouth daily   bimatoprost (LUMIGAN) 0.01 % ophthalmic drops  Self No No   Sig: Administer 1 drop to both eyes daily at bedtime for 30 days   brimonidine tartrate 0.2 % ophthalmic solution   Yes No   Sig: INSTILL 1 DROP INTO RIGHT EYE TWICE A DAY   calcium carbonate (OS-DANIA) 600 MG tablet  Self Yes No   Sig: Take 600 mg by mouth 2 (two) times a day with meals     ezetimibe-simvastatin (VYTORIN) 10-40 mg per tablet  Self No No   Sig: TAKE 1 TABLET BY MOUTH DAILY AT BEDTIME   famotidine (PEPCID) 20 mg tablet  Self No No   Sig: TAKE 1 TABLET (20 MG TOTAL) BY MOUTH 2 (TWO) TIMES A DAY   ferrous sulfate 325 (65 FE) MG EC tablet   Yes No   Sig: Take 325 mg by mouth 3 (three) times a day with meals   insulin isophane (HumuLIN N KwikPen) 100 units/mL injection pen   No No   Sig: INJECT 8 UNITS UNDER THE SKIN EVERY MORNING AND 4 UNITS EVERY EVENING.   ipratropium (ATROVENT) 0.03 % nasal spray  Self No No   Sig: USE 2 SPRAYS INTO EACH NOSTRIL EVERY 12 (TWELVE) HOURS   Patient not taking: Reported on  6/4/2024   loratadine (CLARITIN) 10 mg tablet  Self Yes No   Sig: Take 10 mg by mouth daily as needed for allergies   metFORMIN (GLUCOPHAGE) 500 mg tablet  Self No No   Sig: TAKE 2 TABLETS (1,000 MG TOTAL) BY MOUTH 2 (TWO) TIMES A DAY WITH MEALS   metoprolol tartrate (LOPRESSOR) 25 mg tablet   No No   Sig: Take 0.5 tablets (12.5 mg total) by mouth every 12 (twelve) hours   pantoprazole (PROTONIX) 40 mg tablet   No No   Sig: TAKE 1 TABLET (40 MG TOTAL) BY MOUTH 2 (TWO) TIMES A DAY   sitaGLIPtin (Januvia) 100 mg tablet  Self No No   Sig: TAKE 1 TABLET (100 MG TOTAL) BY MOUTH DAILY   sodium chloride 1 g tablet  Self No No   Sig: Take 1 tablet (1 g total) by mouth 2 (two) times a day      Facility-Administered Medications: None       Past Medical History:   Diagnosis Date    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/7/2020    Glaucoma     H/O degenerative disc disease     Idiopathic pulmonary fibrosis (HCC) 11/2020    Irregular heart beat     afib    Peripheral neuropathy     S/P laparoscopic cholecystectomy 12/21/2020    Stenosis of right subclavian artery (HCC) 11/18/2016       Past Surgical History:   Procedure Laterality Date    CATARACT EXTRACTION, BILATERAL  2011    CHOLECYSTECTOMY      CHOLECYSTECTOMY LAPAROSCOPIC N/A 12/09/2020    Procedure: CHOLECYSTECTOMY LAPAROSCOPIC;  Surgeon: Kalen Garrett MD;  Location: [facility];  Service: General    COLONOSCOPY      CYST REMOVAL  2009    left lower Quadrant     FL LUMBAR PUNCTURE DIAGNOSTIC  4/28/2023    HERNIA REPAIR  1992    LIPOMA RESECTION  2010    RI RPR 1ST INGUN HRNA AGE 5 YRS/> REDUCIBLE Bilateral 01/05/2018    Procedure: INGUINAL HERNIA REPAIR;  Surgeon: Volodymyr Lee MD;  Location: [facility];  Service: General    SHOULDER SURGERY  04/26/2019    Dr. Arnett ([place])    UPPER GASTROINTESTINAL ENDOSCOPY      VASCULAR SURGERY  1994    Agram-  R carotid occlusion       Family History   Problem Relation Age of Onset    Heart disease Mother     Heart attack Father     Hiatal  hernia Father     Diabetes Father     Cancer Brother     Diabetes Brother     Heart disease Brother     Hypertension Brother     Lung disease Brother 75        interstitial lung disease    Cancer Brother 49        glioblastoma    Other Son         gastroparesis    Other Cousin         interstitial lung disease     I have reviewed and agree with the history as documented.    E-Cigarette/Vaping    E-Cigarette Use Never User      E-Cigarette/Vaping Substances    Nicotine No     THC No     CBD No     Flavoring No     Other No     Unknown No      Social History     Tobacco Use    Smoking status: Former     Current packs/day: 0.00     Average packs/day: 1.5 packs/day for 38.0 years (57.0 ttl pk-yrs)     Types: Cigarettes     Start date:      Quit date:      Years since quittin.5    Smokeless tobacco: Never   Vaping Use    Vaping status: Never Used   Substance Use Topics    Alcohol use: No    Drug use: No       Review of Systems   Respiratory:  Positive for shortness of breath (w/ exertion, chronic, unchanged).    Cardiovascular:  Negative for chest pain and leg swelling.   Gastrointestinal:  Negative for blood in stool.   Neurological:  Positive for tremors (Slight increase in tremors (appreciated in the chest and arms) while at rest over the last few weeks.  These do not impair her ability to function and are subtle although she notes more intense than previously experienced.  No preceding medication change). Negative for headaches.   All other systems reviewed and are negative.      Physical Exam  Physical Exam  Vitals and nursing note reviewed.   Constitutional:       Comments: Appears mildly malaised   HENT:      Head: Normocephalic.      Mouth/Throat:      Mouth: Mucous membranes are moist.   Eyes:      Extraocular Movements: Extraocular movements intact.      Conjunctiva/sclera: Conjunctivae normal.   Cardiovascular:      Rate and Rhythm: Normal rate and regular rhythm.      Heart sounds: Murmur heard.    Pulmonary:      Breath sounds: Normal breath sounds.   Abdominal:      General: Bowel sounds are normal.      Palpations: Abdomen is soft.      Tenderness: There is abdominal tenderness (Epigastric, mild).   Musculoskeletal:         General: Normal range of motion.   Skin:     General: Skin is warm and dry.   Neurological:      General: No focal deficit present.      Mental Status: She is alert and oriented to person, place, and time.      Comments: Ambulated steadily to restroom   Psychiatric:         Mood and Affect: Mood normal.         Vital Signs  ED Triage Vitals [07/06/24 1127]   Temperature Pulse Respirations Blood Pressure SpO2   98 °F (36.7 °C) 89 18 (!) 187/92 96 %      Temp Source Heart Rate Source Patient Position - Orthostatic VS BP Location FiO2 (%)   Oral Monitor Sitting Left arm --      Pain Score       --           Vitals:    07/06/24 1204 07/06/24 1245 07/06/24 1315 07/06/24 1406   BP: (!) 222/102 (!) 201/89 151/72 (!) 196/86   Pulse: 69 66 63 64   Patient Position - Orthostatic VS:   Sitting          Visual Acuity      ED Medications  Medications   sodium chloride 0.9 % bolus 500 mL (0 mL Intravenous Stopped 7/6/24 1404)   ceftriaxone (ROCEPHIN) 1 g/50 mL in dextrose IVPB (0 mg Intravenous Stopped 7/6/24 1412)   iohexol (OMNIPAQUE) 350 MG/ML injection (MULTI-DOSE) 70 mL (70 mL Intravenous Given 7/6/24 1355)       Diagnostic Studies  Results Reviewed       Procedure Component Value Units Date/Time    Fingerstick Glucose (POCT) [[id number]]  (Normal) Collected: 07/06/24 1501    Lab Status: Final result Specimen: Blood Updated: 07/06/24 1501     POC Glucose 82 mg/dl     HS Troponin I 2hr [[id number]]  (Normal) Collected: 07/06/24 1411    Lab Status: Final result Specimen: Blood from Arm, Left Updated: 07/06/24 1448     hs TnI 2hr 7 ng/L      Delta 2hr hsTnI 1 ng/L     Urine culture [[id number]] Collected: 07/06/24 1321    Lab Status: In process Specimen: Urine, Clean Catch Updated: 07/06/24 1331     Urine Microscopic [[id number]]  (Abnormal) Collected: 07/06/24 1230    Lab Status: Final result Specimen: Urine, Clean Catch Updated: 07/06/24 1302     RBC, UA 2-4 /hpf      WBC, UA Innumerable /hpf      Epithelial Cells None Seen /hpf      Bacteria, UA Occasional /hpf      WBC Clumps Present    HS Troponin 0hr (reflex protocol) [[id number]]  (Normal) Collected: 07/06/24 1204    Lab Status: Final result Specimen: Blood from Arm, Left Updated: 07/06/24 1244     hs TnI 0hr 6 ng/L     UA (URINE) with reflex to Scope [[id number]]  (Abnormal) Collected: 07/06/24 1230    Lab Status: Final result Specimen: Urine, Clean Catch Updated: 07/06/24 1241     Color, UA Light Yellow     Clarity, UA Turbid     Specific Gravity, UA 1.008     pH, UA 7.5     Leukocytes, UA Large     Nitrite, UA Negative     Protein, UA 30 (1+) mg/dl      Glucose, UA Negative mg/dl      Ketones, UA Negative mg/dl      Urobilinogen, UA <2.0 mg/dl      Bilirubin, UA Negative     Occult Blood, UA Small    Comprehensive metabolic panel [[id number]]  (Abnormal) Collected: 07/06/24 1204    Lab Status: Final result Specimen: Blood from Arm, Left Updated: 07/06/24 1239     Sodium 131 mmol/L      Potassium 4.6 mmol/L      Chloride 94 mmol/L      CO2 28 mmol/L      ANION GAP 9 mmol/L      BUN 15 mg/dL      Creatinine 0.50 mg/dL      Glucose 141 mg/dL      Calcium 9.8 mg/dL      AST 25 U/L      ALT 15 U/L      Alkaline Phosphatase 52 U/L      Total Protein 8.0 g/dL      Albumin 4.4 g/dL      Total Bilirubin 0.49 mg/dL      eGFR 87 ml/min/1.73sq m     Narrative:      National Kid[employer] Disease Foundation guidelines for Chronic Kidney Disease (CKD):     Stage 1 with normal or high GFR (GFR > 90 mL/min/1.73 square meters)    Stage 2 Mild CKD (GFR = 60-89 mL/min/1.73 square meters)    Stage 3A Moderate CKD (GFR = 45-59 mL/min/1.73 square meters)    Stage 3B Moderate CKD (GFR = 30-44 mL/min/1.73 square meters)    Stage 4 Severe CKD (GFR = 15-29 mL/min/1.73 square meters)     Stage 5 End Stage CKD (GFR <15 mL/min/1.73 square meters)  Note: GFR calculation is accurate only with a steady state creatinine    Lipase [[id number]]  (Normal) Collected: 07/06/24 1204    Lab Status: Final result Specimen: Blood from Arm, Left Updated: 07/06/24 1239     Lipase 19 u/L     CBC and differential [[id number]]  (Abnormal) Collected: 07/06/24 1204    Lab Status: Final result Specimen: Blood from Arm, Left Updated: 07/06/24 1221     WBC 8.97 Thousand/uL      RBC 4.72 Million/uL      Hemoglobin 12.2 g/dL      Hematocrit 40.2 %      MCV 85 fL      MCH 25.8 pg      MCHC 30.3 g/dL      RDW 16.1 %      MPV 10.0 fL      Platelets 178 Thousands/uL      nRBC 0 /100 WBCs      Segmented % 75 %      Immature Grans % 0 %      Lymphocytes % 17 %      Monocytes % 8 %      Eosinophils Relative 0 %      Basophils Relative 0 %      Absolute Neutrophils 6.69 Thousands/µL      Absolute Immature Grans 0.03 Thousand/uL      Absolute Lymphocytes 1.49 Thousands/µL      Absolute Monocytes 0.69 Thousand/µL      Eosinophils Absolute 0.04 Thousand/µL      Basophils Absolute 0.03 Thousands/µL     Clostridium difficile toxin by PCR with EIA [[id number]]     Lab Status: No result Specimen: Stool     Stool Enteric Bacterial Panel by PCR [[id number]]     Lab Status: No result Specimen: Stool                    CT abdomen pelvis with contrast   Final Result by Bradley Landon Kocher, MD (07/06 1450)   1. No acute intra-abdominal inflammatory process.   2. Mild circumferential bladder wall thickening may reflect underdistention versus cystitis. Correlate with urinalysis.   3. Mild stool retention throughout the colon.   4. Moderate size hiatal hernia.         Workstation performed: [id number]                    Procedures  ECG 12 Lead Documentation Only    Date/Time: 7/6/2024 4:01 PM    Performed by: Catia Castanon MD  Authorized by: Catia Castanon MD    ECG reviewed by me, the ED Provider: yes    Patient  location:  ED  Previous ECG:     Previous ECG:  Compared to current  Rate:     ECG rate:  84    ECG rate assessment: normal    Rhythm:     Rhythm: sinus rhythm    Ectopy:     Ectopy: none    QRS:     QRS axis:  Normal    QRS intervals:  Normal  Conduction:     Conduction: normal    ST segments:     ST segments:  Normal  T waves:     T waves: normal             ED Course  ED Course as of 07/07/24 0636   Sat Jul 06, 2024   1250 CBC unremarkable.  Chemistry reveals normal renal function, hyponatremia and hypochloremia (similar to that on recent testing).  Blood pressure was elevated upon recheck 222/102.  This lowered to 201 over 90s.  Patient has no headache or chest discomfort.  She notes that her blood pressure usually runs low.  Her metoprolol dose was recently decreased from 25 mg twice daily to 12.5 mg twice daily given low pressures and orthostasis-noted per family upon standing.   1251 UA is notable for presence of large leukocyte esterase.  Microscopy pending   1305 Urine microscopy reveals innumerable white blood cell presence.  No epithelial cells-indicating good quality of specimen.  Only occasional bacteria appreciated.  Urine will be sent for culture and antibiotic initiated.  Will obtain CT scan.  Unsure if urinary frequency all secondary to UTI versus potentially related to adjacent colonic inflammation.  Will obtain imaging   1315 Study results reviewed with patient and son along with plan to perform urine culture and CT scan.  Blood pressure has lowered closer to her baseline-151/72.  She is not currently having any discomfort.   1539 Patient upright and eating.    We reviewed CT scan findings.  Bladder wall thickening is present consistent with cystitis.  Cystitis would also explain her urinary frequency and discomfort with voids.  Will prescribe antibiotic.  This will be sent to [facility] pharmacy as her usual pharmacy is closed on Sundays.    We reviewed findings of moderate amount of stool throughout  colon.  I suspect that she may be passing liquid stool around this.  She noted that stools had been moving very well up until a few weeks ago when she switched from Metamucil to a generic fiber supplement (since discontinued).  She now indicates that although stools were passing regularly while on the Metamucil her appetite was significantly suppressed which was problematic.    She has not been on any other bowel regimen.  Discussed restarting Metamucil as 1 option, stool softener such as docusate as another,  MiraLAX short-term to help pass formed stools +/- reviewing options w/ GI.     Blood pressure remains elevated.  Uncertain what this has been running at on a regular basis since reduction in beta-blocker dosing.  Patient notes that her home BP cuff does not fit properly.  She is able to obtain a new one that does.  Encouraged she check this once to twice daily.  As beta-blocker had been reduced secondary to low pressures I hesitate to adjust medication at this time.  She does have upcoming follow-up appointment with PCP and will bring log of blood pressures with her.                               SBIRT 20yo+      Flowsheet Row Most Recent Value   Initial Alcohol Screen: US AUDIT-C     1. How often do you have a drink containing alcohol? 0 Filed at: 07/06/2024 1127   2. How many drinks containing alcohol do you have on a typical day you are drinking?  0 Filed at: 07/06/2024 1127   3a. Male UNDER 65: How often do you have five or more drinks on one occasion? 0 Filed at: 07/06/2024 1127   3b. FEMALE Any Age, or MALE 65+: How often do you have 4 or more drinks on one occassion? 0 Filed at: 07/06/2024 1127   Audit-C Score 0 Filed at: 07/06/2024 1127   [place]: How many times in the past year have you...    Used an illegal drug or used a prescription medication for non-medical reasons? Never Filed at: 07/06/2024 1127                      Medical Decision Making  Amount and/or Complexity of Data Reviewed  Labs:  ordered.  Radiology: ordered.    Risk  Prescription drug management.             Disposition  Final diagnoses:   Cystitis   Diarrhea   Constipation   Elevated blood pressure reading with diagnosis of hypertension     Time reflects when diagnosis was documented in both MDM as applicable and the Disposition within this note       Time User Action Codes Description Comment    7/6/2024  3:47 PM Catia Castanon Add [N30.90] Cystitis     7/6/2024  3:47 PM Catia Castanon Add [R19.7] Diarrhea     7/6/2024  3:47 PM Catia Castanon A Add [K59.00] Constipation     7/6/2024  3:48 PM Catia Castanon Add [I10] Elevated blood pressure reading with diagnosis of hypertension           ED Disposition       ED Disposition   Discharge    Condition   Stable    Date/Time   Sat Jul 6, 2024 1539    Comment   [facility] [place] discharge to home/self care.                   Follow-up Information       Follow up With Specialties Details Why Contact Info    [facility], Nurse Practitioner Schedule an appointment as soon as possible for a visit  For reevaluation including review of blood pressures [address] [place] [address]  [phone]              Discharge Medication List as of 7/6/2024  4:05 PM        CONTINUE these medications which have CHANGED    Details   nitrofurantoin (Macrobid) 100 mg capsule Take 1 capsule (100 mg total) by mouth 2 (two) times a day for 7 days, Starting Sat 7/6/2024, Until Sat 7/13/2024, Normal      polyethylene glycol (MIRALAX) 17 g packet Take 17 g by mouth daily as needed (constipation/ abominal cramping) for up to 14 days Prn constipation, Starting Sat 7/6/2024, Until Sat 7/20/2024 at 2359, Normal           CONTINUE these medications which have NOT CHANGED    Details   acetaminophen (TYLENOL) 500 mg tablet Take 1,000 mg by mouth as needed for mild pain, Historical Med      Alphagan P 0.1 % INSTILL 1 DROP RIGHT EYE TWICE A DAY,  Historical Med      apixaban (Eliquis) 2.5 mg TAKE 1 TABLET (2.5 MG TOTAL) BY MOUTH 2 (TWO) TIMES A DAY, Normal      ascorbic acid (VITAMIN C) 500 MG tablet Take 500 mg by mouth daily, Historical Med      aspirin (ECOTRIN LOW STRENGTH) 81 mg EC tablet Take 1 tablet (81 mg total) by mouth daily, Starting Mon 8/31/2020, No Print      bimatoprost (LUMIGAN) 0.01 % ophthalmic drops Administer 1 drop to both eyes daily at bedtime for 30 days, Starting Wed 11/2/2016, Print      brimonidine tartrate 0.2 % ophthalmic solution INSTILL 1 DROP INTO RIGHT EYE TWICE A DAY, Historical Med      calcium carbonate (OS-DANIA) 600 MG tablet Take 600 mg by mouth 2 (two) times a day with meals  , Historical Med      Cyanocobalamin (Vitamin B 12) 500 MCG TABS Take 500 mg by mouth 2 (two) times a day, Historical Med      ezetimibe-simvastatin (VYTORIN) 10-40 mg per tablet TAKE 1 TABLET BY MOUTH DAILY AT BEDTIME, Starting Fri 4/19/2024, Normal      famotidine (PEPCID) 20 mg tablet TAKE 1 TABLET (20 MG TOTAL) BY MOUTH 2 (TWO) TIMES A DAY, Starting Thu 3/21/2024, Normal      ferrous sulfate 325 (65 FE) MG EC tablet Take 325 mg by mouth 3 (three) times a day with meals, Historical Med      insulin isophane (HumuLIN N KwikPen) 100 units/mL injection pen Multiple Dosages:Starting Mon 6/3/2024INJECT 8 UNITS UNDER THE SKIN EVERY MORNING AND 4 UNITS EVERY EVENING., Normal      Insulin Pen Needle (B-D UF III MINI PEN NEEDLES) 31G X 5 MM MISC USE 2 (TWO) TIMES A DAY, Normal      ipratropium (ATROVENT) 0.03 % nasal spray USE 2 SPRAYS INTO EACH NOSTRIL EVERY 12 (TWELVE) HOURS, Normal      loratadine (CLARITIN) 10 mg tablet Take 10 mg by mouth daily as needed for allergies, Historical Med      LORazepam (ATIVAN) 0.5 mg tablet TAKE 2 TABLETS (1 MG TOTAL) BY MOUTH DAILY AT BEDTIME, Starting Tue 5/7/2024, Normal      metFORMIN (GLUCOPHAGE) 500 mg tablet TAKE 2 TABLETS (1,000 MG TOTAL) BY MOUTH 2 (TWO) TIMES A DAY WITH MEALS, Starting Thu 2/22/2024, Normal       metoprolol tartrate (LOPRESSOR) 25 mg tablet Take 0.5 tablets (12.5 mg total) by mouth every 12 (twelve) hours, Starting Sun 6/16/2024, Normal      Multiple Vitamin (multivitamin) tablet Take 1 tablet by mouth daily  , Historical Med      OneTouch Ultra test strip TEST FOUR TIMES A DAY, Normal      pantoprazole (PROTONIX) 40 mg tablet TAKE 1 TABLET (40 MG TOTAL) BY MOUTH 2 (TWO) TIMES A DAY, Starting Mon 6/3/2024, Normal      sitaGLIPtin (Januvia) 100 mg tablet TAKE 1 TABLET (100 MG TOTAL) BY MOUTH DAILY, Normal      sodium chloride 1 g tablet Take 1 tablet (1 g total) by mouth 2 (two) times a day, Starting Tue 5/2/2023, Normal             No discharge procedures on file.    PDMP Review         Value Time User    PDMP Reviewed  Yes 5/7/2024  7:15 PM [facility] Sw[facility]            ED Provider  Electronically Signed by             Catia Castanon MD  07/07/24 0605

## 2024-07-07 LAB
ATRIAL RATE: 67 BPM
P AXIS: 52 DEGREES
PR INTERVAL: 192 MS
QRS AXIS: 108 DEGREES
QRSD INTERVAL: 92 MS
QT INTERVAL: 408 MS
QTC INTERVAL: 431 MS
T WAVE AXIS: 37 DEGREES
VENTRICULAR RATE: 67 BPM

## 2024-07-07 PROCEDURE — 93010 ELECTROCARDIOGRAM REPORT: CPT | Performed by: INTERNAL MEDICINE

## 2024-07-08 ENCOUNTER — TELEPHONE (OUTPATIENT)
Age: 89
End: 2024-07-08

## 2024-07-08 NOTE — TELEPHONE ENCOUNTER
Pt called to schedule ER follow up. She asked if we could see her ER notes. I double checked that we could.     JAYME

## 2024-07-09 LAB
BACTERIA UR CULT: ABNORMAL
BACTERIA UR CULT: ABNORMAL

## 2024-07-12 ENCOUNTER — OFFICE VISIT (OUTPATIENT)
Dept: FAMILY MEDICINE CLINIC | Facility: CLINIC | Age: 89
End: 2024-07-12
Payer: MEDICARE

## 2024-07-12 VITALS
TEMPERATURE: 97.3 F | HEART RATE: 85 BPM | DIASTOLIC BLOOD PRESSURE: 80 MMHG | WEIGHT: 117 LBS | HEIGHT: 66 IN | OXYGEN SATURATION: 95 % | RESPIRATION RATE: 16 BRPM | BODY MASS INDEX: 18.8 KG/M2 | SYSTOLIC BLOOD PRESSURE: 150 MMHG

## 2024-07-12 DIAGNOSIS — K59.00 CONSTIPATION, UNSPECIFIED CONSTIPATION TYPE: Primary | ICD-10-CM

## 2024-07-12 DIAGNOSIS — I10 HYPERTENSION, UNSPECIFIED TYPE: ICD-10-CM

## 2024-07-12 PROCEDURE — 99214 OFFICE O/P EST MOD 30 MIN: CPT | Performed by: NURSE PRACTITIONER

## 2024-07-12 PROCEDURE — G2211 COMPLEX E/M VISIT ADD ON: HCPCS | Performed by: NURSE PRACTITIONER

## 2024-07-12 NOTE — PROGRESS NOTES
FAMILY PRACTICE OFFICE VISIT       NAME: Ac Duran  AGE: 88 y.o. SEX: female       : 1935        MRN: [id number]    Assessment and Plan   1. Constipation, unspecified constipation type  Assessment & Plan:  ED visit  for diarrhea, imaging showed constipation, with likely diarrhea from seeping of liquid stool around hard stool.   Using Miralax daily now, and is having large stools.   Advised to continue Miralax daily, adjusting dose if needed if stools become loose or diarrhea, can decrease to 1/2 capful daily.   Nee to keep bowels moving regularly.   2. Hypertension, unspecified type  Recently decreased metoprolol to 12.5 mg twice daily for orthostasis, however BP elevated in ED and here today. Will go back to metoprolol 25 mg BID.  Just got a home BP cuff, she will call in BP log in 2 weeks.  -     metoprolol tartrate (LOPRESSOR) 25 mg tablet; Take 1 tablet (25 mg total) by mouth every 12 (twelve) hours    Follow up as scheduled in September or sooner if needed.        Chief Complaint     Chief Complaint   Patient presents with    Follow-up     Patient is here for f/u EMERGENCY ROOM        History of Present Illness     Ac Duran is an 88 year old female presenting today for elevated blood pressure and constipation.     ED visit on  for diarrhea/constipation.                   Review of Systems   Review of Systems   Constitutional: Negative.    Gastrointestinal:  Positive for abdominal distention (bloating at times), constipation and diarrhea. Negative for abdominal pain, anal bleeding, blood in stool, nausea, rectal pain and vomiting.   Neurological: Negative.  Negative for headaches.       I have reviewed the patient's medical history in detail; there are no changes to the history as noted in the electronic medical record.    Objective     Vitals:    24 1606 24 1624   BP: 160/100 150/80   Pulse: 85    Resp: 16    Temp: (!) 97.3 °F (36.3 °C)    TempSrc: Temporal   "  SpO2: 95%    Weight: 53.1 kg (117 lb)    Height: 5' 6\" (1.676 m)      Wt Readings from Last 3 Encounters:   07/12/24 53.1 kg (117 lb)   06/11/24 54 kg (119 lb)   06/04/24 54.2 kg (119 lb 6.4 oz)     Physical Exam  Vitals and nursing note reviewed.   Constitutional:       General: She is not in acute distress.     Appearance: Normal appearance. She is not ill-appearing.   Cardiovascular:      Rate and Rhythm: Normal rate and regular rhythm.      Heart sounds: Murmur heard.   Pulmonary:      Effort: Pulmonary effort is normal.      Breath sounds: Normal breath sounds.   Abdominal:      General: Bowel sounds are normal.      Palpations: Abdomen is soft.      Tenderness: There is no abdominal tenderness.   Musculoskeletal:      Right lower leg: No edema.      Left lower leg: No edema.   Neurological:      Mental Status: She is alert.   Psychiatric:         Mood and Affect: Mood normal.            ALLERGIES:  Allergies   Allergen Reactions    Keflex [Cephalexin] Diarrhea       Current Medications     Current Outpatient Medications   Medication Sig Dispense Refill    acetaminophen (TYLENOL) 500 mg tablet Take 1,000 mg by mouth as needed for mild pain      apixaban (Eliquis) 2.5 mg TAKE 1 TABLET (2.5 MG TOTAL) BY MOUTH 2 (TWO) TIMES A  tablet 3    ascorbic acid (VITAMIN C) 500 MG tablet Take 500 mg by mouth daily      aspirin (ECOTRIN LOW STRENGTH) 81 mg EC tablet Take 1 tablet (81 mg total) by mouth daily      bimatoprost (LUMIGAN) 0.01 % ophthalmic drops Administer 1 drop to both eyes daily at bedtime for 30 days 1.5 mL 0    brimonidine tartrate 0.2 % ophthalmic solution INSTILL 1 DROP INTO RIGHT EYE TWICE A DAY      calcium carbonate (OS-DANIA) 600 MG tablet Take 600 mg by mouth 2 (two) times a day with meals        ezetimibe-simvastatin (VYTORIN) 10-40 mg per tablet TAKE 1 TABLET BY MOUTH DAILY AT BEDTIME 30 tablet 5    famotidine (PEPCID) 20 mg tablet TAKE 1 TABLET (20 MG TOTAL) BY MOUTH 2 (TWO) TIMES A DAY 60 " tablet 5    ferrous sulfate 325 (65 FE) MG EC tablet Take 325 mg by mouth 3 (three) times a day with meals      insulin isophane (HumuLIN N KwikPen) 100 units/mL injection pen INJECT 8 UNITS UNDER THE SKIN EVERY MORNING AND 4 UNITS EVERY EVENING. 15 mL 1    Insulin Pen Needle (B-D UF III MINI PEN NEEDLES) 31G X 5 MM MISC USE 2 (TWO) TIMES A  each 3    ipratropium (ATROVENT) 0.03 % nasal spray USE 2 SPRAYS INTO EACH NOSTRIL EVERY 12 (TWELVE) HOURS 30 mL 6    loratadine (CLARITIN) 10 mg tablet Take 10 mg by mouth daily as needed for allergies      LORazepam (ATIVAN) 0.5 mg tablet TAKE 2 TABLETS (1 MG TOTAL) BY MOUTH DAILY AT BEDTIME 60 tablet 2    metFORMIN (GLUCOPHAGE) 500 mg tablet TAKE 2 TABLETS (1,000 MG TOTAL) BY MOUTH 2 (TWO) TIMES A DAY WITH MEALS 120 tablet 5    metoprolol tartrate (LOPRESSOR) 25 mg tablet Take 1 tablet (25 mg total) by mouth every 12 (twelve) hours 90 tablet 3    Multiple Vitamin (multivitamin) tablet Take 1 tablet by mouth daily        OneTouch Ultra test strip TEST FOUR TIMES A  strip 4    pantoprazole (PROTONIX) 40 mg tablet TAKE 1 TABLET (40 MG TOTAL) BY MOUTH 2 (TWO) TIMES A DAY 60 tablet 5    polyethylene glycol (MIRALAX) 17 g packet Take 17 g by mouth daily as needed (constipation/ abominal cramping) for up to 14 days Prn constipation 85 g 0    sitaGLIPtin (Januvia) 100 mg tablet TAKE 1 TABLET (100 MG TOTAL) BY MOUTH DAILY 90 tablet 3    sodium chloride 1 g tablet Take 1 tablet (1 g total) by mouth 2 (two) times a day 270 tablet 1    Alphagan P 0.1 % INSTILL 1 DROP RIGHT EYE TWICE A DAY (Patient not taking: Reported on 6/4/2024)      Cyanocobalamin (Vitamin B 12) 500 MCG TABS Take 500 mg by mouth 2 (two) times a day (Patient not taking: Reported on 6/4/2024)       No current facility-administered medications for this visit.         Health Maintenance     Health Maintenance   Topic Date Due    Zoster Vaccine (2 of 3) 09/03/2010    Influenza Vaccine (1) 09/01/2024    DM Eye  Exam  11/25/2030 (Originally 12/8/2023)    Diabetic Foot Exam  12/23/2030 (Originally 1/11/2024)    Fall Risk  11/13/2024    Urinary Incontinence Screening  11/13/2024    Medicare Annual Wellness Visit (AWV)  11/13/2024    HEMOGLOBIN A1C  11/23/2024    Depression Screening  03/11/2025    Colorectal Cancer Screening  09/02/2025    Hepatitis C Screening  Completed    RSV Vaccine Age 60+ Years  Completed    Pneumococcal Vaccine: 65+ Years  Completed    COVID-19 Vaccine  Completed    RSV Vaccine age 0-20 Months  Aged Out    HIB Vaccine  Aged Out    IPV Vaccine  Aged Out    Hepatitis A Vaccine  Aged Out    Meningococcal ACWY Vaccine  Aged Out    HPV Vaccine  Aged Out     Immunization History   Administered Date(s) Administered    COVID-19 Moderna mRNA Vaccine 12 Yr+ 50 mcg/0.5 mL (Spikevax) 12/06/2023    COVID-19 PFIZER VACCINE 0.3 ML IM 01/22/2021, 02/12/2021, 09/30/2021, 09/08/2022    COVID-19 Pfizer Vac BIVALENT Edy-sucrose 12 Yr+ IM 09/08/2022    COVID-19 Pfizer vac (Edy-sucrose, gray cap) 12 yr+ IM 04/11/2022    INFLUENZA 11/02/2016, 10/16/2017, 09/18/2018, 09/18/2018, 11/06/2019, 11/20/2023    Influenza, Quadrivalent (nasal) 11/02/2016    Influenza, high dose seasonal 0.7 mL 11/06/2019, 10/06/2020, 11/09/2021, 11/15/2022    Pneumococcal Conjugate 13-Valent 07/25/2019    Pneumococcal Polysaccharide PPV23 03/17/2003    Respiratory Syncytial Virus Vaccine (Recombinant) 12/27/2023    Zoster 07/09/2010       ZANDER Swenson

## 2024-07-14 NOTE — ASSESSMENT & PLAN NOTE
ED visit 7/6 for diarrhea, imaging showed constipation, with likely diarrhea from seeping of liquid stool around hard stool.   Using Miralax daily now, and is having large stools.   Advised to continue Miralax daily, adjusting dose if needed if stools become loose or diarrhea, can decrease to 1/2 capful daily.   Nee to keep bowels moving regularly.

## 2024-07-29 ENCOUNTER — REMOTE DEVICE CLINIC VISIT (OUTPATIENT)
Dept: CARDIOLOGY CLINIC | Facility: CLINIC | Age: 89
End: 2024-07-29
Payer: MEDICARE

## 2024-07-29 DIAGNOSIS — F41.9 ANXIETY: ICD-10-CM

## 2024-07-29 DIAGNOSIS — Z95.0 PRESENCE OF PERMANENT CARDIAC PACEMAKER: Primary | ICD-10-CM

## 2024-07-29 PROCEDURE — 93296 REM INTERROG EVL PM/IDS: CPT | Performed by: INTERNAL MEDICINE

## 2024-07-29 PROCEDURE — 93294 REM INTERROG EVL PM/LDLS PM: CPT | Performed by: INTERNAL MEDICINE

## 2024-07-29 NOTE — PROGRESS NOTES
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 (2.5 YRS) AP 78%  1%. ALL AVAILABLE LEAD PARAMETERS WITHIN NORMAL LIMITS. 1 VHR EPISODE DETECTED 8 BEATS @ 300ms. PATIENT IS ON METOPROLOL TART; EF 60% (2022). NORMAL DEVICE FUNCTION.---CHERRY

## 2024-07-30 RX ORDER — LORAZEPAM 0.5 MG/1
1 TABLET ORAL
Qty: 60 TABLET | Refills: 2 | Status: SHIPPED | OUTPATIENT
Start: 2024-07-30

## 2024-08-14 PROBLEM — G47.33 OSA (OBSTRUCTIVE SLEEP APNEA): Status: ACTIVE | Noted: 2024-08-14

## 2024-08-15 ENCOUNTER — ESTABLISHED COMPREHENSIVE EXAM (OUTPATIENT)
Dept: URBAN - METROPOLITAN AREA CLINIC 6 | Facility: CLINIC | Age: 89
End: 2024-08-15

## 2024-08-15 DIAGNOSIS — E11.9: ICD-10-CM

## 2024-08-15 DIAGNOSIS — H26.493: ICD-10-CM

## 2024-08-15 DIAGNOSIS — H40.1132: ICD-10-CM

## 2024-08-15 DIAGNOSIS — H16.223: ICD-10-CM

## 2024-08-15 LAB
LEFT EYE DIABETIC RETINOPATHY: NORMAL
RIGHT EYE DIABETIC RETINOPATHY: NORMAL

## 2024-08-15 PROCEDURE — 92020 GONIOSCOPY: CPT

## 2024-08-15 PROCEDURE — 92014 COMPRE OPH EXAM EST PT 1/>: CPT

## 2024-08-15 PROCEDURE — 92133 CPTRZD OPH DX IMG PST SGM ON: CPT

## 2024-08-15 PROCEDURE — 92202 OPSCPY EXTND ON/MAC DRAW: CPT

## 2024-08-15 ASSESSMENT — VISUAL ACUITY
OU_CC: J3
OD_CC: 20/400+1
OS_CC: 20/40

## 2024-08-15 ASSESSMENT — TONOMETRY
OS_IOP_MMHG: 15
OD_IOP_MMHG: 16

## 2024-08-17 DIAGNOSIS — E11.42 TYPE 2 DIABETES MELLITUS WITH DIABETIC POLYNEUROPATHY, WITH LONG-TERM CURRENT USE OF INSULIN (HCC): ICD-10-CM

## 2024-08-17 DIAGNOSIS — Z79.4 TYPE 2 DIABETES MELLITUS WITH DIABETIC POLYNEUROPATHY, WITH LONG-TERM CURRENT USE OF INSULIN (HCC): ICD-10-CM

## 2024-08-19 ENCOUNTER — TELEPHONE (OUTPATIENT)
Dept: FAMILY MEDICINE CLINIC | Facility: CLINIC | Age: 89
End: 2024-08-19

## 2024-08-19 NOTE — TELEPHONE ENCOUNTER
Patient called the RX Refill Line. Message is being forwarded to the office.     Patient is requesting someone at the office call [facility] and straighten out her prescription. Kimberly Gonsales sent over a script on 7/12/24 90 tabs with 3 refills and the pharmacy told the patient that script wasn't sent over.     Please contact patient at [phone]

## 2024-08-23 ENCOUNTER — HOSPITAL ENCOUNTER (OUTPATIENT)
Dept: NON INVASIVE DIAGNOSTICS | Facility: CLINIC | Age: 89
Discharge: HOME | End: 2024-08-23
Payer: MEDICARE

## 2024-08-23 DIAGNOSIS — I65.21 OCCLUSION OF RIGHT CAROTID ARTERY: ICD-10-CM

## 2024-08-23 DIAGNOSIS — I65.22 ASYMPTOMATIC CAROTID ARTERY STENOSIS, LEFT: ICD-10-CM

## 2024-08-23 PROCEDURE — 93880 EXTRACRANIAL BILAT STUDY: CPT

## 2024-08-23 PROCEDURE — 93880 EXTRACRANIAL BILAT STUDY: CPT | Performed by: SURGERY

## 2024-08-26 ENCOUNTER — OFFICE VISIT (OUTPATIENT)
Dept: FAMILY MEDICINE CLINIC | Facility: CLINIC | Age: 89
End: 2024-08-26
Payer: MEDICARE

## 2024-08-26 ENCOUNTER — TELEPHONE (OUTPATIENT)
Age: 89
End: 2024-08-26

## 2024-08-26 VITALS
WEIGHT: 114 LBS | RESPIRATION RATE: 16 BRPM | SYSTOLIC BLOOD PRESSURE: 110 MMHG | HEIGHT: 66 IN | OXYGEN SATURATION: 96 % | HEART RATE: 102 BPM | TEMPERATURE: 97.5 F | BODY MASS INDEX: 18.32 KG/M2 | DIASTOLIC BLOOD PRESSURE: 70 MMHG

## 2024-08-26 DIAGNOSIS — G47.00 INSOMNIA, UNSPECIFIED TYPE: ICD-10-CM

## 2024-08-26 DIAGNOSIS — F41.9 ANXIETY: ICD-10-CM

## 2024-08-26 DIAGNOSIS — K58.2 IRRITABLE BOWEL SYNDROME WITH BOTH CONSTIPATION AND DIARRHEA: Primary | ICD-10-CM

## 2024-08-26 PROCEDURE — G2211 COMPLEX E/M VISIT ADD ON: HCPCS | Performed by: NURSE PRACTITIONER

## 2024-08-26 PROCEDURE — 99214 OFFICE O/P EST MOD 30 MIN: CPT | Performed by: NURSE PRACTITIONER

## 2024-08-26 RX ORDER — MIRTAZAPINE 7.5 MG/1
7.5 TABLET, FILM COATED ORAL
Qty: 90 TABLET | Refills: 1 | Status: SHIPPED | OUTPATIENT
Start: 2024-08-26

## 2024-08-26 NOTE — PROGRESS NOTES
FAMILY PRACTICE OFFICE VISIT       NAME: Ac Duran  AGE: 88 y.o. SEX: female       : 1935        MRN: [id number]    Assessment and Plan   1. Irritable bowel syndrome with both constipation and diarrhea  Mixed diarrhea constipation ongoing for years.   Recent increase in diarrhea, having 4-5 loose to watery stools daily.   Will try to restart metamucil, stop sugar substitutes.   Update me in 2 weeks if not improving.   She is established with GI, considering reaching out to GI regarding current symptoms.   If feeling worse, go to the emergency room.  2. Insomnia, unspecified type  Will trial Mirtazapine 7.5 mg daily.   May continue to take 1 lorazepam (0.5 mg) daily at bedtime.  Mirtazapine may help with mood and weight gain.   Previously did not want to take this medication due to side effect profile, but now would like to try it.   -     mirtazapine (REMERON) 7.5 MG tablet; Take 1 tablet (7.5 mg total) by mouth daily at bedtime     Update me in 2 weeks regarding diarrhea, or sooner if needed.     Chief Complaint     Chief Complaint   Patient presents with    Diarrhea     6 + days       History of Present Illness     Ac Duran is an 88 year old female presenting today for diarrhea.     Still having constipation and diarrhea.     Diarrhea worse lately.     Used to be 5-6 days between bouts of diarrhea.     Now every day. Going 4-5 times per day.     Shaky at times, not blood sugar related.     Left foot was swollen yesterday, better now.     Lost 3 pounds, decreased appetite.     Fatigue is terrible.   ROMERO--going to try CPAP. Anticipated starting this week.   Not getting O2 at night anymore.   Follows with Dr. Jovel.     No recent antibiotics. Last was Macrobid in July.                          Review of Systems   Review of Systems   Constitutional:  Positive for fatigue. Negative for chills, diaphoresis and fever.   HENT: Negative.     Respiratory: Negative.     Cardiovascular: Negative.  "   Gastrointestinal:  Positive for diarrhea. Negative for abdominal pain, constipation, nausea and vomiting.   Psychiatric/Behavioral:  Positive for sleep disturbance.        I have reviewed the patient's medical history in detail; there are no changes to the history as noted in the electronic medical record.    Objective     Vitals:    08/26/24 0758   BP: 110/70   Pulse: 102   Resp: 16   Temp: 97.5 °F (36.4 °C)   TempSrc: Temporal   SpO2: 96%   Weight: 51.7 kg (114 lb)   Height: 5' 6\" (1.676 m)     Wt Readings from Last 3 Encounters:   08/26/24 51.7 kg (114 lb)   08/14/24 53.1 kg (117 lb)   08/06/24 53 kg (116 lb 12.8 oz)     Physical Exam  Vitals and nursing note reviewed.   Constitutional:       General: She is not in acute distress.     Appearance: Normal appearance. She is ill-appearing (chronically ill, frail appearing.).   Cardiovascular:      Rate and Rhythm: Normal rate and regular rhythm.      Heart sounds: No murmur heard.  Pulmonary:      Effort: Pulmonary effort is normal.      Breath sounds: Normal breath sounds.   Abdominal:      General: Bowel sounds are normal.      Palpations: Abdomen is soft. There is no mass.      Tenderness: There is no abdominal tenderness. There is no guarding.   Musculoskeletal:      Right lower leg: No edema.      Left lower leg: No edema.   Neurological:      Mental Status: She is alert.   Psychiatric:         Mood and Affect: Mood normal.            ALLERGIES:  Allergies   Allergen Reactions    Keflex [Cephalexin] Diarrhea       Current Medications     Current Outpatient Medications   Medication Sig Dispense Refill    acetaminophen (TYLENOL) 500 mg tablet Take 1,000 mg by mouth as needed for mild pain      apixaban (Eliquis) 2.5 mg TAKE 1 TABLET (2.5 MG TOTAL) BY MOUTH 2 (TWO) TIMES A  tablet 3    ascorbic acid (VITAMIN C) 500 MG tablet Take 500 mg by mouth daily      aspirin (ECOTRIN LOW STRENGTH) 81 mg EC tablet Take 1 tablet (81 mg total) by mouth daily      " bimatoprost (LUMIGAN) 0.01 % ophthalmic drops Administer 1 drop to both eyes daily at bedtime for 30 days 1.5 mL 0    brimonidine tartrate 0.2 % ophthalmic solution INSTILL 1 DROP INTO RIGHT EYE TWICE A DAY      calcium carbonate (OS-ADNIA) 600 MG tablet Take 600 mg by mouth 2 (two) times a day with meals        ezetimibe-simvastatin (VYTORIN) 10-40 mg per tablet TAKE 1 TABLET BY MOUTH DAILY AT BEDTIME 30 tablet 5    famotidine (PEPCID) 20 mg tablet TAKE 1 TABLET (20 MG TOTAL) BY MOUTH 2 (TWO) TIMES A DAY 60 tablet 5    ferrous sulfate 325 (65 FE) MG EC tablet Take 325 mg by mouth 3 (three) times a day with meals      insulin isophane (HumuLIN N KwikPen) 100 units/mL injection pen INJECT 8 UNITS UNDER THE SKIN EVERY MORNING AND 4 UNITS EVERY EVENING. 15 mL 1    Insulin Pen Needle (B-D UF III MINI PEN NEEDLES) 31G X 5 MM MISC USE 2 (TWO) TIMES A  each 3    ipratropium (ATROVENT) 0.03 % nasal spray USE 2 SPRAYS INTO EACH NOSTRIL EVERY 12 (TWELVE) HOURS 30 mL 6    loratadine (CLARITIN) 10 mg tablet Take 10 mg by mouth daily as needed for allergies      LORazepam (ATIVAN) 0.5 mg tablet TAKE 2 TABLETS (1 MG TOTAL) BY MOUTH DAILY AT BEDTIME 60 tablet 2    metFORMIN (GLUCOPHAGE) 500 mg tablet TAKE 2 TABLETS (1,000 MG TOTAL) BY MOUTH 2 (TWO) TIMES A DAY WITH MEALS 120 tablet 5    metoprolol tartrate (LOPRESSOR) 25 mg tablet Take 1 tablet (25 mg total) by mouth every 12 (twelve) hours 90 tablet 3    mirtazapine (REMERON) 7.5 MG tablet Take 1 tablet (7.5 mg total) by mouth daily at bedtime 90 tablet 1    Multiple Vitamin (multivitamin) tablet Take 1 tablet by mouth daily        OneTouch Ultra test strip TEST FOUR TIMES A  strip 4    pantoprazole (PROTONIX) 40 mg tablet TAKE 1 TABLET (40 MG TOTAL) BY MOUTH 2 (TWO) TIMES A DAY 60 tablet 5    sitaGLIPtin (Januvia) 100 mg tablet TAKE 1 TABLET (100 MG TOTAL) BY MOUTH DAILY 90 tablet 3    sodium chloride 1 g tablet Take 1 tablet (1 g total) by mouth 2 (two) times a day  270 tablet 1    Cyanocobalamin (Vitamin B 12) 500 MCG TABS Take 500 mg by mouth 2 (two) times a day (Patient not taking: Reported on 6/4/2024)      OneTouch Delica Lancets 33G MISC Check blood sugars four times daily. Please substitute with appropriate alternative as covered by patient's insurance. Dx: E11.65 400 each 3    polyethylene glycol (MIRALAX) 17 g packet Take 17 g by mouth daily as needed (constipation/ abominal cramping) for up to 14 days Prn constipation 85 g 0     No current facility-administered medications for this visit.         Health Maintenance     Health Maintenance   Topic Date Due    Zoster Vaccine (2 of 3) 09/03/2010    Influenza Vaccine (1) 09/01/2024    Fall Risk  11/13/2024    Medicare Annual Wellness Visit (AWV)  11/13/2024    Diabetic Foot Exam  12/23/2030 (Originally 1/11/2024)    Urinary Incontinence Screening  11/13/2024    HEMOGLOBIN A1C  02/28/2025    Depression Screening  03/11/2025    DM Eye Exam  08/15/2025    Colorectal Cancer Screening  09/02/2025    Hepatitis C Screening  Completed    RSV Vaccine Age 60+ Years  Completed    Pneumococcal Vaccine: 65+ Years  Completed    COVID-19 Vaccine  Completed    RSV Vaccine age 0-20 Months  Aged Out    HIB Vaccine  Aged Out    IPV Vaccine  Aged Out    Hepatitis A Vaccine  Aged Out    Meningococcal ACWY Vaccine  Aged Out    HPV Vaccine  Aged Out     Immunization History   Administered Date(s) Administered    COVID-19 Moderna mRNA Vaccine 12 Yr+ 50 mcg/0.5 mL (Spikevax) 12/06/2023    COVID-19 PFIZER VACCINE 0.3 ML IM 01/22/2021, 02/12/2021, 09/30/2021, 09/08/2022    COVID-19 Pfizer Vac BIVALENT Edy-sucrose 12 Yr+ IM 09/08/2022    COVID-19 Pfizer vac (Edy-sucrose, gray cap) 12 yr+ IM 04/11/2022    INFLUENZA 11/02/2016, 10/16/2017, 09/18/2018, 09/18/2018, 11/06/2019, 11/20/2023    Influenza, Quadrivalent (nasal) 11/02/2016    Influenza, high dose seasonal 0.7 mL 11/06/2019, 10/06/2020, 11/09/2021, 11/15/2022    Pneumococcal Conjugate  13-Valent 07/25/2019    Pneumococcal Polysaccharide PPV23 03/17/2003    Respiratory Syncytial Virus Vaccine (Recombinant) 12/27/2023    Zoster 07/09/2010       ZANDER Swenson

## 2024-08-27 NOTE — TELEPHONE ENCOUNTER
This is sometimes used for IBS, gastroenterology would need to prescribe this one if they thought this was a good option for you.

## 2024-08-29 ENCOUNTER — TELEPHONE (OUTPATIENT)
Dept: ADMINISTRATIVE | Facility: OTHER | Age: 89
End: 2024-08-29

## 2024-08-29 DIAGNOSIS — E11.3392 TYPE 2 DIABETES MELLITUS WITH LEFT EYE AFFECTED BY MODERATE NONPROLIFERATIVE RETINOPATHY WITHOUT MACULAR EDEMA, WITH LONG-TERM CURRENT USE OF INSULIN (HCC): Primary | ICD-10-CM

## 2024-08-29 DIAGNOSIS — R19.7 DIARRHEA, UNSPECIFIED TYPE: Primary | ICD-10-CM

## 2024-08-29 DIAGNOSIS — Z79.4 TYPE 2 DIABETES MELLITUS WITH LEFT EYE AFFECTED BY MODERATE NONPROLIFERATIVE RETINOPATHY WITHOUT MACULAR EDEMA, WITH LONG-TERM CURRENT USE OF INSULIN (HCC): Primary | ICD-10-CM

## 2024-08-29 RX ORDER — LANCETS 33 GAUGE
EACH MISCELLANEOUS
Qty: 400 EACH | Refills: 3 | Status: SHIPPED | OUTPATIENT
Start: 2024-08-29 | End: 2024-08-30 | Stop reason: SDUPTHER

## 2024-08-29 RX ORDER — BLOOD SUGAR DIAGNOSTIC
STRIP MISCELLANEOUS
Qty: 400 EACH | Refills: 3 | Status: SHIPPED | OUTPATIENT
Start: 2024-08-29 | End: 2024-08-29 | Stop reason: ALTCHOICE

## 2024-08-29 NOTE — TELEPHONE ENCOUNTER
Please have her do blood work, stool samples, and contact her gastroenterologist.   She will need to  supplies to complete stool samples.

## 2024-08-29 NOTE — TELEPHONE ENCOUNTER
----- Message from Isa MCKINNEY sent at 8/28/2024  3:09 PM EDT -----  Regarding: Care Gap Request  08/28/24 3:09 PM    Hello, our patient attached above has had Diabetic Eye Exam completed/performed. Please assist in updating the patient chart by pulling the document from the Media Tab. The date of service is 8/15/24.     Thank you,  Isa Enamorado MA  [facility]

## 2024-08-29 NOTE — TELEPHONE ENCOUNTER
Upon review of the In Basket request we were able to locate, review, and update the patient chart as requested for Diabetic Eye Exam.    Any additional questions or concerns should be emailed to the Practice Liaisons via the appropriate education email address, please do not reply via In Basket.    Thank you  Gemma Matamoros MA   [facility]

## 2024-08-30 ENCOUNTER — APPOINTMENT (OUTPATIENT)
Dept: LAB | Facility: CLINIC | Age: 89
End: 2024-08-30
Payer: MEDICARE

## 2024-08-30 DIAGNOSIS — E11.42 TYPE 2 DIABETES MELLITUS WITH DIABETIC POLYNEUROPATHY, WITHOUT LONG-TERM CURRENT USE OF INSULIN (HCC): ICD-10-CM

## 2024-08-30 DIAGNOSIS — Z79.4 TYPE 2 DIABETES MELLITUS WITH LEFT EYE AFFECTED BY MODERATE NONPROLIFERATIVE RETINOPATHY WITHOUT MACULAR EDEMA, WITH LONG-TERM CURRENT USE OF INSULIN (HCC): ICD-10-CM

## 2024-08-30 DIAGNOSIS — I10 ESSENTIAL HYPERTENSION: ICD-10-CM

## 2024-08-30 DIAGNOSIS — E11.3392 TYPE 2 DIABETES MELLITUS WITH LEFT EYE AFFECTED BY MODERATE NONPROLIFERATIVE RETINOPATHY WITHOUT MACULAR EDEMA, WITH LONG-TERM CURRENT USE OF INSULIN (HCC): ICD-10-CM

## 2024-08-30 DIAGNOSIS — R19.7 DIARRHEA, UNSPECIFIED TYPE: ICD-10-CM

## 2024-08-30 LAB
ALBUMIN SERPL BCG-MCNC: 4.3 G/DL (ref 3.5–5)
ALP SERPL-CCNC: 45 U/L (ref 34–104)
ALT SERPL W P-5'-P-CCNC: 12 U/L (ref 7–52)
ANION GAP SERPL CALCULATED.3IONS-SCNC: 8 MMOL/L (ref 4–13)
AST SERPL W P-5'-P-CCNC: 18 U/L (ref 13–39)
BASOPHILS # BLD AUTO: 0.02 THOUSANDS/ÂΜL (ref 0–0.1)
BASOPHILS NFR BLD AUTO: 0 % (ref 0–1)
BILIRUB SERPL-MCNC: 0.44 MG/DL (ref 0.2–1)
BUN SERPL-MCNC: 14 MG/DL (ref 5–25)
CALCIUM SERPL-MCNC: 9.3 MG/DL (ref 8.4–10.2)
CHLORIDE SERPL-SCNC: 91 MMOL/L (ref 96–108)
CO2 SERPL-SCNC: 32 MMOL/L (ref 21–32)
CREAT SERPL-MCNC: 0.5 MG/DL (ref 0.6–1.3)
EOSINOPHIL # BLD AUTO: 0.07 THOUSAND/ÂΜL (ref 0–0.61)
EOSINOPHIL NFR BLD AUTO: 1 % (ref 0–6)
ERYTHROCYTE [DISTWIDTH] IN BLOOD BY AUTOMATED COUNT: 14.6 % (ref 11.6–15.1)
EST. AVERAGE GLUCOSE BLD GHB EST-MCNC: 143 MG/DL
GFR SERPL CREATININE-BSD FRML MDRD: 87 ML/MIN/1.73SQ M
GLUCOSE P FAST SERPL-MCNC: 122 MG/DL (ref 65–99)
HBA1C MFR BLD: 6.6 %
HCT VFR BLD AUTO: 40.5 % (ref 34.8–46.1)
HGB BLD-MCNC: 12.4 G/DL (ref 11.5–15.4)
IMM GRANULOCYTES # BLD AUTO: 0.01 THOUSAND/UL (ref 0–0.2)
IMM GRANULOCYTES NFR BLD AUTO: 0 % (ref 0–2)
LYMPHOCYTES # BLD AUTO: 1.09 THOUSANDS/ÂΜL (ref 0.6–4.47)
LYMPHOCYTES NFR BLD AUTO: 22 % (ref 14–44)
MCH RBC QN AUTO: 25.7 PG (ref 26.8–34.3)
MCHC RBC AUTO-ENTMCNC: 30.6 G/DL (ref 31.4–37.4)
MCV RBC AUTO: 84 FL (ref 82–98)
MONOCYTES # BLD AUTO: 0.5 THOUSAND/ÂΜL (ref 0.17–1.22)
MONOCYTES NFR BLD AUTO: 10 % (ref 4–12)
NEUTROPHILS # BLD AUTO: 3.25 THOUSANDS/ÂΜL (ref 1.85–7.62)
NEUTS SEG NFR BLD AUTO: 67 % (ref 43–75)
NRBC BLD AUTO-RTO: 0 /100 WBCS
PLATELET # BLD AUTO: 175 THOUSANDS/UL (ref 149–390)
PMV BLD AUTO: 10 FL (ref 8.9–12.7)
POTASSIUM SERPL-SCNC: 4.5 MMOL/L (ref 3.5–5.3)
PROT SERPL-MCNC: 7.5 G/DL (ref 6.4–8.4)
RBC # BLD AUTO: 4.83 MILLION/UL (ref 3.81–5.12)
SODIUM SERPL-SCNC: 131 MMOL/L (ref 135–147)
TSH SERPL DL<=0.05 MIU/L-ACNC: 1.83 UIU/ML (ref 0.45–4.5)
WBC # BLD AUTO: 4.94 THOUSAND/UL (ref 4.31–10.16)

## 2024-08-30 PROCEDURE — 85025 COMPLETE CBC W/AUTO DIFF WBC: CPT

## 2024-08-30 PROCEDURE — 80053 COMPREHEN METABOLIC PANEL: CPT

## 2024-08-30 PROCEDURE — 84443 ASSAY THYROID STIM HORMONE: CPT

## 2024-08-30 PROCEDURE — 36415 COLL VENOUS BLD VENIPUNCTURE: CPT

## 2024-08-30 PROCEDURE — 83036 HEMOGLOBIN GLYCOSYLATED A1C: CPT

## 2024-08-30 RX ORDER — LANCETS 33 GAUGE
EACH MISCELLANEOUS
Qty: 400 EACH | Refills: 3 | Status: SHIPPED | OUTPATIENT
Start: 2024-08-30

## 2024-08-30 NOTE — TELEPHONE ENCOUNTER
Not A Duplicate - Pharmacy Change     Reason for call:   [x] Refill   [] Prior Auth  [] Other:     Office:   [x] PCP/Provider - ZANDER Swenson   [] Specialty/Provider -     Medication: OneTouch Delica Lancets 33G MISC /  Check blood sugars four times daily.     Pharmacy: [id number] - [place] - [address] [place].     Does the patient have enough for 3 days?   [] Yes   [x] No - Send as HP to POD

## 2024-09-01 RX ORDER — LORAZEPAM 0.5 MG/1
0.5 TABLET ORAL 2 TIMES DAILY PRN
Qty: 60 TABLET | Refills: 2 | Status: SHIPPED | OUTPATIENT
Start: 2024-09-01

## 2024-09-03 ENCOUNTER — TELEPHONE (OUTPATIENT)
Dept: FAMILY MEDICINE CLINIC | Facility: CLINIC | Age: 89
End: 2024-09-03

## 2024-09-03 NOTE — TELEPHONE ENCOUNTER
----- Message from ZANDER Pisano sent at 9/2/2024 11:40 AM EDT -----  Please let Ac know blood work is stable.   Sodium is 131.   A1c is 6.6%.  Please let me know if she is not feeling better (diarrhea).

## 2024-09-04 ENCOUNTER — TELEPHONE (OUTPATIENT)
Dept: VASCULAR SURGERY | Facility: HOSPITAL | Age: 89
End: 2024-09-04

## 2024-09-04 NOTE — TELEPHONE ENCOUNTER
Spoke with pt and they are aware their appt for 9/6 is canceled and we will be giving them a call back to reschedule

## 2024-09-05 ENCOUNTER — TELEPHONE (OUTPATIENT)
Age: 89
End: 2024-09-05

## 2024-09-05 ENCOUNTER — OFFICE VISIT (OUTPATIENT)
Dept: GASTROENTEROLOGY | Facility: CLINIC | Age: 89
End: 2024-09-05
Payer: MEDICARE

## 2024-09-05 VITALS
HEART RATE: 88 BPM | HEIGHT: 66 IN | SYSTOLIC BLOOD PRESSURE: 129 MMHG | OXYGEN SATURATION: 97 % | TEMPERATURE: 96.4 F | WEIGHT: 115.2 LBS | BODY MASS INDEX: 18.51 KG/M2 | DIASTOLIC BLOOD PRESSURE: 93 MMHG

## 2024-09-05 DIAGNOSIS — R63.4 WEIGHT LOSS, ABNORMAL: ICD-10-CM

## 2024-09-05 DIAGNOSIS — K58.0 IRRITABLE BOWEL SYNDROME WITH DIARRHEA: ICD-10-CM

## 2024-09-05 DIAGNOSIS — R13.10 DYSPHAGIA, UNSPECIFIED TYPE: Primary | ICD-10-CM

## 2024-09-05 DIAGNOSIS — K21.9 GASTROESOPHAGEAL REFLUX DISEASE WITHOUT ESOPHAGITIS: ICD-10-CM

## 2024-09-05 PROCEDURE — 99214 OFFICE O/P EST MOD 30 MIN: CPT | Performed by: DIETITIAN, REGISTERED

## 2024-09-05 NOTE — TELEPHONE ENCOUNTER
When doing pA on CMM leave out the B at the end of the ID number ( PA submitted through [employer] [id number]  )       PA for Xifaxan SUBMITTED     via    [x][facility]-KEY: [id number]  []Surescripts-Case ID #   []Faxed to plan   []Other website   []Phone call Case ID #     Office notes sent, clinical questions answered. Awaiting determination    Turnaround time for your insurance to make a decision on your Prior Authorization can take 7-21 business days.

## 2024-09-05 NOTE — TELEPHONE ENCOUNTER
[facility] called regarding Xifaxan. They do not carry it and also requires a PA. Requesting Rx be sent to another pharmacy.

## 2024-09-05 NOTE — PROGRESS NOTES
"[facility] - Outpatient Follow-up Note  Ac Duran 88 y.o. female MRN: [id number]  Encounter: [id number]          ASSESSMENT AND PLAN:    1.  GERD  2.  Dysphagia  3.  Abnormal weight loss  Patient reports worsening in dysphagia over the last several months, with sensation of pressure in her chest as well as a \"squeak\" in her chest, which feels like trapped gas.  The symptoms come on after meals and sometimes last for hours afterwards (e.g. next morning).  She has ongoing difficulty with swallowing solids, but admits that she eats very quickly and has not yet made a significant effort to change her eating habits.  She is taking pantoprazole 40 mg twice daily and Pepcid 20 mg twice daily.  Weight has decreased about 5 lb since last office visit, BMI 18.59.  Video barium swallow with speech 5/7/2024 showed oral stage WNL, pharyngeal stage with mild dysphagia, and esophageal stage with solid food retention, cleared with thin liquids.  Pill retention was noted throughout the study.  Barium esophagram 6/20/2024 showed tertiary esophageal contractions and diminished primary esophageal peristalsis, moderate gastroesophageal reflux, pooling of contrast in the vallecula and silent laryngeal penetration, and a small to moderate hiatal hernia.  Last EGD was in 2020, which showed irregular Z-line but was otherwise normal.     -Given weight loss in the setting of ongoing issues with dysphagia, discussed pursuing EGD to rule out malignancy.  We discussed that advanced age increases risk of complications with endoscopy.  After careful discussion, patient is agreeable to scheduling EGD at hospital endoscopy lab.  -Referral to speech therapy for management of dysphagia.  -Recommend patient continue to focus on lifestyle management of dysphagia, including taking small bites, chewing thoroughly, taking time between bites, sipping water between bites, and sitting upright after meals.    -Continue " pantoprazole 40 mg twice daily.  -Continue famotidine 20 mg twice daily.    I discussed informed consent with the patient. The risks/benefits/alternatives of the procedure were discussed with the patient. Risks included, but not limited to, infection, bleeding, perforation, injury to organs in the abdomen, missed lesion and incomplete procedure were discussed. Patient was agreeable.        4.  IBS with diarrhea  Patient reports her bowels habits have been worsening with frequent diarrhea/fecal incontinence.  She has rare episodes of constipation.  Often has 3-7 loose/watery BMs/day.  She has discontinued Metamucil due to fear that this could worsen her symptoms.  She admits to using lots of artificial sweeteners and is now trying to cut these out of her diet.    -Continue to avoid artificial sweeteners.  -Consider taking a protein supplement that does not contain lactose or artificial sweetener.  -Recommend empiric treatment with Xifaxan x 2 weeks.  -Advised patient to reintroduce Metamucil with 1 tsp daily.  Can increase to 2 tsp daily if tolerating well.  -We discussed that diarrhea may be due to underlying constipation and I advised to avoid Imodium as much as possible as this may worsen her symptoms long term.   -Advised patient to call the office or send a [facility] message with any questions/concerns or any new/worsening symptoms.      Follow up 2-3 months.    __________________________________________________________    SUBJECTIVE:  Ac Duran is a 88 y.o. female with history of hypertension, interstitial lung disease, type 2 diabetes, A-fib on Eliquis, GERD, and IBS who presents for follow up of dysphagia and constipation/diarrhea.  Last office visit 6/2024.  Reviewed family medicine visit 8/26/2024, at which time patient reported a recent increase in diarrhea with 4-5 loose watery stools daily.    Patient reports her bowels habits have been worsening with frequent diarrhea/fecal incontinence.  She  "has rare episodes of constipation.  Often has 3-7 loose/watery BMs/day.  She has discontinued Metamucil due to fear that this could worsen her symptoms.  She admits to using lots of artificial sweeteners and is now trying to cut these out of her diet.  Her appetite has been very low as a result of her GI symptoms and weight has decreased about 5 lb since last office visit.    Patient reports worsening in dysphagia over the last several months, with sensation of pressure in her chest as well as a \"squeak\" in her chest, which feels like trapped gas.  The symptoms come on after meals and sometimes last for hours afterwards (e.g. next morning).  She has ongoing difficulty with swallowing solids, but admits that she eats very quickly and has not yet made a significant effort to change her eating habits.  She is taking pantoprazole 40 mg twice daily and Pepcid 20 mg twice daily.  Weight has decreased about 5 lb since last office visit, BMI 18.59.       REVIEW OF SYSTEMS:  10 point ROS reviewed and negative, except as above      Historical Information   Past Medical History:   Diagnosis Date   • 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/7/2020   • Glaucoma    • H/O degenerative disc disease    • Idiopathic pulmonary fibrosis (HCC) 11/2020   • Irregular heart beat     afib   • Peripheral neuropathy    • S/P laparoscopic cholecystectomy 12/21/2020   • Stenosis of right subclavian artery (HCC) 11/18/2016     Past Surgical History:   Procedure Laterality Date   • CATARACT EXTRACTION, BILATERAL  2011   • CHOLECYSTECTOMY     • CHOLECYSTECTOMY LAPAROSCOPIC N/A 12/09/2020    Procedure: CHOLECYSTECTOMY LAPAROSCOPIC;  Surgeon: Kalen Garrett MD;  Location: [facility];  Service: General   • COLONOSCOPY     • CYST REMOVAL  2009    left lower Quadrant    • [place] LUMBAR PUNCTURE DIAGNOSTIC  4/28/2023   • HERNIA REPAIR  1992   • LIPOMA RESECTION  2010   • MN RPR 1ST INGUN HRNA AGE 5 YRS/> REDUCIBLE Bilateral 01/05/2018    Procedure: INGUINAL HERNIA " REPAIR;  Surgeon: Volodymyr Lee MD;  Location: [facility];  Service: General   • SHOULDER SURGERY  2019    Dr. Arnett ([place])   • UPPER GASTROINTESTINAL ENDOSCOPY     • VASCULAR SURGERY      Agram-  R carotid occlusion     Social History   Social History     Substance and Sexual Activity   Alcohol Use No     Social History     Substance and Sexual Activity   Drug Use No     Social History     Tobacco Use   Smoking Status Former   • Current packs/day: 0.00   • Average packs/day: 1.5 packs/day for 38.0 years (57.0 ttl pk-yrs)   • Types: Cigarettes   • Start date:    • Quit date:    • Years since quittin.6   Smokeless Tobacco Never     Family History   Problem Relation Age of Onset   • Heart disease Mother    • Heart attack Father    • Hiatal hernia Father    • Diabetes Father    • Cancer Brother    • Diabetes Brother    • Heart disease Brother    • Hypertension Brother    • Lung disease Brother 75        interstitial lung disease   • Cancer Brother 49        glioblastoma   • Other Son         gastroparesis   • Other Cousin         interstitial lung disease       Meds/Allergies       Current Outpatient Medications:   •  acetaminophen (TYLENOL) 500 mg tablet  •  apixaban (Eliquis) 2.5 mg  •  ascorbic acid (VITAMIN C) 500 MG tablet  •  aspirin (ECOTRIN LOW STRENGTH) 81 mg EC tablet  •  bimatoprost (LUMIGAN) 0.01 % ophthalmic drops  •  brimonidine tartrate 0.2 % ophthalmic solution  •  calcium carbonate (OS-DANIA) 600 MG tablet  •  ezetimibe-simvastatin (VYTORIN) 10-40 mg per tablet  •  famotidine (PEPCID) 20 mg tablet  •  ferrous sulfate 325 (65 FE) MG EC tablet  •  insulin isophane (HumuLIN N KwikPen) 100 units/mL injection pen  •  Insulin Pen Needle (B-D UF III MINI PEN NEEDLES) 31G X 5 MM MISC  •  ipratropium (ATROVENT) 0.03 % nasal spray  •  loratadine (CLARITIN) 10 mg tablet  •  LORazepam (ATIVAN) 0.5 mg tablet  •  metFORMIN (GLUCOPHAGE) 500 mg tablet  •  metoprolol tartrate (LOPRESSOR) 25 mg  tablet  •  mirtazapine (REMERON) 7.5 MG tablet  •  Multiple Vitamin (multivitamin) tablet  •  OneTouch Delica Lancets 33G MISC  •  OneTouch Ultra test strip  •  pantoprazole (PROTONIX) 40 mg tablet  •  rifaximin (XIFAXAN) 550 mg tablet  •  sitaGLIPtin (Januvia) 100 mg tablet  •  sodium chloride 1 g tablet  •  Cyanocobalamin (Vitamin B 12) 500 MCG TABS  •  polyethylene glycol (MIRALAX) 17 g packet    Allergies   Allergen Reactions   • Keflex [Cephalexin] Diarrhea           Objective     Wt Readings from Last 3 Encounters:   09/05/24 52.3 kg (115 lb 3.2 oz)   08/26/24 51.7 kg (114 lb)   08/14/24 53.1 kg (117 lb)     Temp Readings from Last 3 Encounters:   09/05/24 (!) 96.4 °F (35.8 °C) (Tympanic)   08/26/24 97.5 °F (36.4 °C) (Temporal)   08/06/24 97.5 °F (36.4 °C)     BP Readings from Last 3 Encounters:   09/05/24 129/93   08/26/24 110/70   08/14/24 142/68     Pulse Readings from Last 3 Encounters:   09/05/24 88   08/26/24 102   08/14/24 71          PHYSICAL EXAM:      Physical Exam  Vitals reviewed.   Constitutional:       General: She is not in acute distress.     Appearance: She is underweight.   HENT:      Head: Normocephalic and atraumatic.   Eyes:      Conjunctiva/sclera: Conjunctivae normal.   Cardiovascular:      Rate and Rhythm: Normal rate and regular rhythm.      Heart sounds: No murmur heard.  Pulmonary:      Effort: Pulmonary effort is normal. No respiratory distress.      Breath sounds: Normal breath sounds.   Abdominal:      General: Bowel sounds are normal. There is no distension.      Palpations: Abdomen is soft.      Tenderness: There is no abdominal tenderness. There is no guarding.   Musculoskeletal:         General: No swelling.      Cervical back: Neck supple.   Skin:     General: Skin is warm and dry.   Neurological:      Mental Status: She is alert.   Psychiatric:         Mood and Affect: Mood normal.          Lab Results:   No visits with results within 1 Day(s) from this visit.   Latest known  visit with results is:   Appointment on 08/30/2024   Component Date Value   • Sodium 08/30/2024 131 (L)    • Potassium 08/30/2024 4.5    • Chloride 08/30/2024 91 (L)    • CO2 08/30/2024 32    • ANION GAP 08/30/2024 8    • BUN 08/30/2024 14    • Creatinine 08/30/2024 0.50 (L)    • Glucose, Fasting 08/30/2024 122 (H)    • Calcium 08/30/2024 9.3    • AST 08/30/2024 18    • ALT 08/30/2024 12    • Alkaline Phosphatase 08/30/2024 45    • Total Protein 08/30/2024 7.5    • Albumin 08/30/2024 4.3    • Total Bilirubin 08/30/2024 0.44    • eGFR 08/30/2024 87    • Hemoglobin A1C 08/30/2024 6.6 (H)    • EAG 08/30/2024 143    • TSH 3RD GENERATON 08/30/2024 1.834    • WBC 08/30/2024 4.94    • RBC 08/30/2024 4.83    • Hemoglobin 08/30/2024 12.4    • Hematocrit 08/30/2024 40.5    • MCV 08/30/2024 84    • MCH 08/30/2024 25.7 (L)    • MCHC 08/30/2024 30.6 (L)    • RDW 08/30/2024 14.6    • MPV 08/30/2024 10.0    • Platelets 08/30/2024 175    • nRBC 08/30/2024 0    • Segmented % 08/30/2024 67    • Immature Grans % 08/30/2024 0    • Lymphocytes % 08/30/2024 22    • Monocytes % 08/30/2024 10    • Eosinophils Relative 08/30/2024 1    • Basophils Relative 08/30/2024 0    • Absolute Neutrophils 08/30/2024 3.25    • Absolute Immature Grans 08/30/2024 0.01    • Absolute Lymphocytes 08/30/2024 1.09    • Absolute Monocytes 08/30/2024 0.50    • Eosinophils Absolute 08/30/2024 0.07    • Basophils Absolute 08/30/2024 0.02        Lab Results   Component Value Date    WBC 4.94 08/30/2024    HGB 12.4 08/30/2024    HCT 40.5 08/30/2024    MCV 84 08/30/2024     08/30/2024       Lab Results   Component Value Date    SODIUM 131 (L) 08/30/2024    K 4.5 08/30/2024    CL 91 (L) 08/30/2024    CO2 32 08/30/2024    AGAP 8 08/30/2024    BUN 14 08/30/2024    CREATININE 0.50 (L) 08/30/2024    GLUC 141 (H) 07/06/2024    GLUF 122 (H) 08/30/2024    CALCIUM 9.3 08/30/2024    AST 18 08/30/2024    ALT 12 08/30/2024    ALKPHOS 45 08/30/2024    TP 7.5 08/30/2024     TBILI 0.44 08/30/2024    EGFR 87 08/30/2024         Radiology Results:   VAS carotid complete study    Result Date: 8/23/2024  Narrative:  [facility] REPORT CLINICAL: Indications: 6 month surveillance of carotid artery disease.  Patient is asymptomatic at this time. Operative History: 1992-01-01 Right Carotid agram Risk Factors The patient has history of HTN, Diabetes, Hyperlipidemia and previous smoking (quit >10yrs ago).  FINDINGS:  Right        Impression  PSV  EDV (cm/s)  Direction of Flow  Ratio  Dist. ICA    Occluded                                               Mid. ICA     Occluded                                               Prox. ICA    Occluded                                               Dist CCA                  21           0                            Mid CCA                   29           0                      0.37  Prox CCA                  79           0                            Ext Carotid               55           0                      1.90  Prox Vert                 57          13  Antegrade                 Subclavian                98           0                             Left         Impression  PSV  EDV (cm/s)  Direction of Flow  Ratio  Dist. ICA                 70          16                      1.38  Mid. ICA                  82          16                      1.62  Prox. 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 Vertebral artery flow is antegrade. There is no  significant subclavian artery disease. LEFT: There is 50-69% stenosis noted in the internal carotid artery. Plaque is heterogenous and irregular. Vertebral artery flow is antegrade. There is no significant subclavian artery disease.  Compared to previous study on 2/20/2024, there is no change.  SIGNATURE: Electronically Signed by: VANITA LITTLE on 2024-08-23 03:07:45 PM

## 2024-09-11 ENCOUNTER — OFFICE VISIT (OUTPATIENT)
Dept: FAMILY MEDICINE CLINIC | Facility: CLINIC | Age: 89
End: 2024-09-11
Payer: MEDICARE

## 2024-09-11 VITALS
HEART RATE: 62 BPM | HEIGHT: 66 IN | DIASTOLIC BLOOD PRESSURE: 82 MMHG | SYSTOLIC BLOOD PRESSURE: 128 MMHG | RESPIRATION RATE: 16 BRPM | BODY MASS INDEX: 18.74 KG/M2 | TEMPERATURE: 97.6 F | OXYGEN SATURATION: 91 % | WEIGHT: 116.6 LBS

## 2024-09-11 DIAGNOSIS — E44.0 MODERATE PROTEIN-CALORIE MALNUTRITION (HCC): ICD-10-CM

## 2024-09-11 DIAGNOSIS — E78.2 MIXED HYPERLIPIDEMIA: ICD-10-CM

## 2024-09-11 DIAGNOSIS — Z23 ENCOUNTER FOR IMMUNIZATION: ICD-10-CM

## 2024-09-11 DIAGNOSIS — F33.9 DEPRESSION, RECURRENT (HCC): ICD-10-CM

## 2024-09-11 DIAGNOSIS — G47.33 OSA (OBSTRUCTIVE SLEEP APNEA): ICD-10-CM

## 2024-09-11 DIAGNOSIS — I10 ESSENTIAL HYPERTENSION: ICD-10-CM

## 2024-09-11 DIAGNOSIS — F41.9 ANXIETY: ICD-10-CM

## 2024-09-11 DIAGNOSIS — K58.2 IRRITABLE BOWEL SYNDROME WITH BOTH CONSTIPATION AND DIARRHEA: ICD-10-CM

## 2024-09-11 DIAGNOSIS — E22.2 SIADH (SYNDROME OF INAPPROPRIATE ADH PRODUCTION) (HCC): ICD-10-CM

## 2024-09-11 DIAGNOSIS — E11.3392 TYPE 2 DIABETES MELLITUS WITH LEFT EYE AFFECTED BY MODERATE NONPROLIFERATIVE RETINOPATHY WITHOUT MACULAR EDEMA, WITH LONG-TERM CURRENT USE OF INSULIN (HCC): Primary | ICD-10-CM

## 2024-09-11 DIAGNOSIS — Z79.4 TYPE 2 DIABETES MELLITUS WITH LEFT EYE AFFECTED BY MODERATE NONPROLIFERATIVE RETINOPATHY WITHOUT MACULAR EDEMA, WITH LONG-TERM CURRENT USE OF INSULIN (HCC): Primary | ICD-10-CM

## 2024-09-11 PROCEDURE — G0008 ADMIN INFLUENZA VIRUS VAC: HCPCS

## 2024-09-11 PROCEDURE — 99214 OFFICE O/P EST MOD 30 MIN: CPT | Performed by: NURSE PRACTITIONER

## 2024-09-11 PROCEDURE — 90662 IIV NO PRSV INCREASED AG IM: CPT

## 2024-09-11 RX ORDER — METOPROLOL TARTRATE 25 MG/1
25 TABLET, FILM COATED ORAL EVERY 12 HOURS SCHEDULED
Qty: 180 TABLET | Refills: 3 | Status: SHIPPED | OUTPATIENT
Start: 2024-09-11

## 2024-09-11 NOTE — PROGRESS NOTES
FAMILY PRACTICE OFFICE VISIT       NAME: Ac Duran  AGE: 88 y.o. SEX: female       : 1935        MRN: [id number]    Assessment and Plan   1. 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   Low for age and chronic conditions.   We have tried to forgo insulin in the past, sugars got too high. She has significant diabetic eye disease, so monitoring closely.   She was hypoglycemic on low dose basal insulin.   Now having hypoglycemia particularly in the evenings.   Currently taking NPH 8 units in the morning 4 units with dinner.   Will hold evening dose for now.   She will continue metformin and Januvia for now.   She will send me in her Blood sugar log in 2 weeks.   2. SIADH (syndrome of inappropriate ADH production) ([facility])  Assessment & Plan:  Sodium 131, currently taking 1 gm sodium chloride tablet once daily, will go back up to twice daily.   3. ROMERO (obstructive sleep apnea)  Assessment & Plan:  Working with pulmonology to get CPAP going, can't tolerate current mask, new mask coming this week.  4. Moderate protein-calorie malnutrition (HCC)  Assessment & Plan:  Started Orgain protein shake supplement, 1-2 daily.  5. Mixed hyperlipidemia  Assessment & Plan:  LDL 53 at goal on Vytorin.   6. Irritable bowel syndrome with both constipation and diarrhea  Assessment & Plan:  No diarrhea for 8 days, was having normal stools.   Now constipated.   Will continue to avoid sugar substitutes.   Hoping to start Xifaxin as per GI, waiting for prior auth.   7. Essential hypertension  Assessment & Plan:  Continue metoprolol 12.5 mg twice daily for now, will monitor for increase in BP.   Orders:  -     metoprolol tartrate (LOPRESSOR) 25 mg tablet; Take 1 tablet (25 mg total) by mouth every 12 (twelve)  hours  8. Depression, recurrent (HCC)  Assessment & Plan:  Started Mirtazapine 7.5 mg at bedtime 2 weeks ago.   Will continue medication for now and continue to monitor effects.   9. Anxiety  Assessment & Plan:  Continue Lorazepam 0.5 mg at bedtime. May take 0.5 mg once during the day if needed.   10. Encounter for immunization  -     FLUZONE HIGH-DOSE: influenza vaccine, high-dose, preservative-free 0.7 mL       Return in 3 months.       Chief Complaint     Chief Complaint   Patient presents with    Follow-up     3m f/u        History of Present Illness     Ac Duran is an 88 year old female presenting today for follow up.    Diarrhea improved significantly with elimination of sugar substitutes.   Found Orgain-- protein drink that has no artifical sweeteners.   Not eating much.   Normal stools for 8 days now.   Today feels constipation.    Has no appetite, eats because she knows she needs too.     ROMERO--CPAP Mask is not working, getting a new mask this week.     She will be starting Xifaxin as per GI, waiting for prior auth.     Currently taking metoprolol 1/2 tablet twice daily.     Sugars are low in the evenings, hypoglycemic.   She brought sugar log with her today.     Mirtazepine--she is taking medication.   Feeling sorry for herself due to medical issues, not sure if this is medication or just her current mood.   Notes she tends to experience this from time to time.     Sleeping okay with adding mirtazapine and taking only 1 alprazolam at bedtime.               Review of Systems   Review of Systems   Constitutional:  Positive for appetite change and fatigue.   HENT: Negative.     Respiratory:  Positive for shortness of breath.    Cardiovascular:  Positive for leg swelling (left foot).   Gastrointestinal:  Positive for constipation and diarrhea.   Musculoskeletal: Negative.    Neurological:  Positive for weakness (generalized).       I have reviewed the patient's medical history in detail; there are no  "changes to the history as noted in the electronic medical record.    Objective     Vitals:    09/11/24 1126   BP: 128/82   BP Location: Left arm   Patient Position: Sitting   Cuff Size: Standard   Pulse: 62   Resp: 16   Temp: 97.6 °F (36.4 °C)   TempSrc: Temporal   SpO2: 91%   Weight: 52.9 kg (116 lb 9.6 oz)   Height: 5' 6\" (1.676 m)     Wt Readings from Last 3 Encounters:   09/11/24 52.9 kg (116 lb 9.6 oz)   09/05/24 52.3 kg (115 lb 3.2 oz)   08/26/24 51.7 kg (114 lb)     Physical Exam  Vitals and nursing note reviewed.   Constitutional:       Appearance: Normal appearance. She is ill-appearing (chronically ill appearing, cachetic).   HENT:      Head: Atraumatic.   Cardiovascular:      Rate and Rhythm: Normal rate and regular rhythm.      Heart sounds: No murmur heard.  Pulmonary:      Effort: Pulmonary effort is normal.      Breath sounds: Normal breath sounds.   Abdominal:      General: Bowel sounds are normal.      Palpations: Abdomen is soft.      Tenderness: There is no abdominal tenderness.   Musculoskeletal:      Cervical back: Normal range of motion and neck supple.      Right lower leg: No edema.      Left lower leg: No edema.   Lymphadenopathy:      Cervical: No cervical adenopathy.   Neurological:      Mental Status: She is alert.      Gait: Gait abnormal.   Psychiatric:         Mood and Affect: Mood normal.            ALLERGIES:  Allergies   Allergen Reactions    Keflex [Cephalexin] Diarrhea       Current Medications     Current Outpatient Medications   Medication Sig Dispense Refill    acetaminophen (TYLENOL) 500 mg tablet Take 1,000 mg by mouth as needed for mild pain      apixaban (Eliquis) 2.5 mg TAKE 1 TABLET (2.5 MG TOTAL) BY MOUTH 2 (TWO) TIMES A  tablet 3    ascorbic acid (VITAMIN C) 500 MG tablet Take 500 mg by mouth daily      aspirin (ECOTRIN LOW STRENGTH) 81 mg EC tablet Take 1 tablet (81 mg total) by mouth daily      bimatoprost (LUMIGAN) 0.01 % ophthalmic drops Administer 1 drop to " both eyes daily at bedtime for 30 days 1.5 mL 0    brimonidine tartrate 0.2 % ophthalmic solution INSTILL 1 DROP INTO RIGHT EYE TWICE A DAY      calcium carbonate (OS-DANIA) 600 MG tablet Take 600 mg by mouth 2 (two) times a day with meals        ezetimibe-simvastatin (VYTORIN) 10-40 mg per tablet TAKE 1 TABLET BY MOUTH DAILY AT BEDTIME 30 tablet 5    famotidine (PEPCID) 20 mg tablet TAKE 1 TABLET (20 MG TOTAL) BY MOUTH 2 (TWO) TIMES A DAY 60 tablet 5    ferrous sulfate 325 (65 FE) MG EC tablet Take 325 mg by mouth 3 (three) times a day with meals      insulin isophane (HumuLIN N KwikPen) 100 units/mL injection pen INJECT 8 UNITS UNDER THE SKIN EVERY MORNING AND 4 UNITS EVERY EVENING. 15 mL 1    Insulin Pen Needle (B-D UF III MINI PEN NEEDLES) 31G X 5 MM MISC USE 2 (TWO) TIMES A  each 3    ipratropium (ATROVENT) 0.03 % nasal spray USE 2 SPRAYS INTO EACH NOSTRIL EVERY 12 (TWELVE) HOURS 30 mL 6    loratadine (CLARITIN) 10 mg tablet Take 10 mg by mouth daily as needed for allergies      LORazepam (ATIVAN) 0.5 mg tablet Take 1 tablet (0.5 mg total) by mouth 2 (two) times a day as needed for anxiety 60 tablet 2    metFORMIN (GLUCOPHAGE) 500 mg tablet TAKE 2 TABLETS (1,000 MG TOTAL) BY MOUTH 2 (TWO) TIMES A DAY WITH MEALS 120 tablet 5    metoprolol tartrate (LOPRESSOR) 25 mg tablet Take 1 tablet (25 mg total) by mouth every 12 (twelve) hours 180 tablet 3    mirtazapine (REMERON) 7.5 MG tablet Take 1 tablet (7.5 mg total) by mouth daily at bedtime 90 tablet 1    Multiple Vitamin (multivitamin) tablet Take 1 tablet by mouth daily        OneTouch Delica Lancets 33G MISC Check blood sugars four times daily. Please substitute with appropriate alternative as covered by patient's insurance. Dx: E11.65 400 each 3    OneTouch Ultra test strip TEST FOUR TIMES A  strip 4    pantoprazole (PROTONIX) 40 mg tablet TAKE 1 TABLET (40 MG TOTAL) BY MOUTH 2 (TWO) TIMES A DAY 60 tablet 5    rifaximin (XIFAXAN) 550 mg tablet Take 1  tablet (550 mg total) by mouth 3 (three) times a day for 14 days 42 tablet 0    sitaGLIPtin (Januvia) 100 mg tablet TAKE 1 TABLET (100 MG TOTAL) BY MOUTH DAILY 90 tablet 3    sodium chloride 1 g tablet Take 1 tablet (1 g total) by mouth 2 (two) times a day 270 tablet 1    Cyanocobalamin (Vitamin B 12) 500 MCG TABS Take 500 mg by mouth 2 (two) times a day (Patient not taking: Reported on 6/4/2024)      polyethylene glycol (MIRALAX) 17 g packet Take 17 g by mouth daily as needed (constipation/ abominal cramping) for up to 14 days Prn constipation 85 g 0     No current facility-administered medications for this visit.         Health Maintenance     Health Maintenance   Topic Date Due    Zoster Vaccine (2 of 3) 09/03/2010    Medicare Annual Wellness Visit (AWV)  11/13/2024    Diabetic Foot Exam  12/23/2030 (Originally 1/11/2024)    HEMOGLOBIN A1C  02/28/2025    DM Eye Exam  08/15/2025    Colorectal Cancer Screening  09/02/2025    Fall Risk  09/11/2025    Depression Screening  09/11/2025    Urinary Incontinence Screening  09/11/2025    Hepatitis C Screening  Completed    RSV Vaccine Age 60+ Years  Completed    Pneumococcal Vaccine: 65+ Years  Completed    Influenza Vaccine  Completed    COVID-19 Vaccine  Completed    RSV Vaccine age 0-20 Months  Aged Out    HIB Vaccine  Aged Out    IPV Vaccine  Aged Out    Hepatitis A Vaccine  Aged Out    Meningococcal ACWY Vaccine  Aged Out    HPV Vaccine  Aged Out     Immunization History   Administered Date(s) Administered    COVID-19 Moderna mRNA Vaccine 12 Yr+ 50 mcg/0.5 mL (Spikevax) 12/06/2023    COVID-19 PFIZER VACCINE 0.3 ML IM 01/22/2021, 02/12/2021, 09/30/2021, 09/08/2022    COVID-19 Pfizer Vac BIVALENT Edy-sucrose 12 Yr+ IM 09/08/2022    COVID-19 Pfizer vac (Edy-sucrose, gray cap) 12 yr+ IM 04/11/2022    INFLUENZA 11/02/2016, 10/16/2017, 09/18/2018, 09/18/2018, 11/06/2019, 11/20/2023    Influenza Split High Dose Preservative Free IM 09/11/2024    Influenza, Quadrivalent  (nasal) 11/02/2016    Influenza, high dose seasonal 0.7 mL 11/06/2019, 10/06/2020, 11/09/2021, 11/15/2022    Pneumococcal Conjugate 13-Valent 07/25/2019    Pneumococcal Polysaccharide PPV23 03/17/2003    Respiratory Syncytial Virus Vaccine (Recombinant) 12/27/2023    Zoster 07/09/2010       ZANDER Swenson

## 2024-09-12 NOTE — ASSESSMENT & PLAN NOTE
Started Mirtazapine 7.5 mg at bedtime 2 weeks ago.   Will continue medication for now and continue to monitor effects.

## 2024-09-12 NOTE — ASSESSMENT & PLAN NOTE
Lab Results   Component Value Date    HGBA1C 6.6 (H) 08/30/2024     A1c 6.6%.   Low for age and chronic conditions.   We have tried to forgo insulin in the past, sugars got too high. She has significant diabetic eye disease, so monitoring closely.   She was hypoglycemic on low dose basal insulin.   Now having hypoglycemia particularly in the evenings.   Currently taking NPH 8 units in the morning 4 units with dinner.   Will hold evening dose for now.   She will continue metformin and Januvia for now.   She will send me in her Blood sugar log in 2 weeks.

## 2024-09-12 NOTE — ASSESSMENT & PLAN NOTE
Working with pulmonology to get CPAP going, can't tolerate current mask, new mask coming this week.

## 2024-09-12 NOTE — ASSESSMENT & PLAN NOTE
No diarrhea for 8 days, was having normal stools.   Now constipated.   Will continue to avoid sugar substitutes.   Hoping to start Xifaxin as per GI, waiting for prior auth.

## 2024-09-12 NOTE — ASSESSMENT & PLAN NOTE
Sodium 131, currently taking 1 gm sodium chloride tablet once daily, will go back up to twice daily.

## 2024-09-14 DIAGNOSIS — K21.00 GASTROESOPHAGEAL REFLUX DISEASE WITH ESOPHAGITIS WITHOUT HEMORRHAGE: ICD-10-CM

## 2024-09-14 DIAGNOSIS — E87.1 HYPONATREMIA: ICD-10-CM

## 2024-09-15 RX ORDER — FAMOTIDINE 20 MG/1
20 TABLET, FILM COATED ORAL 2 TIMES DAILY
Qty: 60 TABLET | Refills: 5 | Status: SHIPPED | OUTPATIENT
Start: 2024-09-15

## 2024-09-16 RX ORDER — SODIUM CHLORIDE 1 G/1
1 TABLET ORAL 3 TIMES DAILY
Qty: 90 TABLET | Refills: 1 | Status: SHIPPED | OUTPATIENT
Start: 2024-09-16

## 2024-09-16 NOTE — TELEPHONE ENCOUNTER
Left VM to patient letting her know that hr Sodium chloride 1 is renewals have been approved and sent to her pharmacy. Advised to please call our office to let us know she received the message and if have any other questions or concerns.

## 2024-09-17 ENCOUNTER — EVALUATION (OUTPATIENT)
Dept: SPEECH THERAPY | Facility: CLINIC | Age: 89
End: 2024-09-17
Payer: MEDICARE

## 2024-09-17 DIAGNOSIS — J84.9 INTERSTITIAL LUNG DISEASE (HCC): ICD-10-CM

## 2024-09-17 DIAGNOSIS — R13.10 DYSPHAGIA, UNSPECIFIED TYPE: Primary | ICD-10-CM

## 2024-09-17 PROCEDURE — 92610 EVALUATE SWALLOWING FUNCTION: CPT | Performed by: SPEECH-LANGUAGE PATHOLOGIST

## 2024-09-17 NOTE — PROGRESS NOTES
"Speech-Language Pathology Initial Evaluation    Today's date: 2024   Patient’s name: Ac Duran  : 1935  MRN: [id number]  Safety measures: Uses walker, pacemaker  Referring provider: Ari Jovel MD    Encounter Diagnosis     ICD-10-CM    1. Dysphagia, unspecified type  R13.10       2. Interstitial lung disease (HCC)  J84.9 Ambulatory Referral to Speech Therapy          ASSESSMENT:   After thorough review of patient's most recent video swallow study results and video esophagram results, it is evident that patient's challenges are primarily related to esophageal dysmotility, hiatal hernia and GERD. Patient presents with WNL oral stage of swallow and only mild pharyngeal stage of swallow dysphagia, noted during today's evaluation with mild throat clear with mixed solids and noted in May 2024 video swallow study as \"Mildly reduced laryngeal vestibular closure, laryngeal elevation, pharyngeal constriction and tongue base retraction. Mild posterior pharyngeal wall coating w/ hard cookie. Mild to mod vallecular retention w/ po trials.\"    Recommend that GI address issues of hiatal hernia and esophageal dysmotility, as this appears to be the primary challenge with food. Patient is self-admittedly a fast eater. Provided education on slow rate of eating, small bites/sips and alternating liquids and solids to slow esophageal filling and improve clearance of food/liquid.    Mild weakness with tongue protrusion was noted. Provided education on tongue strengthening exercises as well as safe swallowing/aspiration precautions. Patient is at an increased risk due to advanced age, GERD and lung disease.     Also recommend a referral to nutrition to aid in improved oral intake with consideration for diabetes (type II).    No further ST services are recommended at this time. Plan was discussed with the patient and her son and they are in agreement at this time.          -Factors affecting performance: " None    -Safety concerns: Risk for aspiration    -Risk factors: GERD and Other lung disease      SWALLOWING SAFETY PRECAUTIONS:  -Recommended solids: Regular    -Recommended liquids: Thin    -Recommended medication form: as tolerated    -Frequent/thorough oral care     -Aspiration precautions   *Monitor for signs/symptoms concerning for aspiration (e.g., low grade fever, increase in WBC, change in chest x-ray, increased congestion, increased coughing with P.O. intake)    -Reflux precautions     -Strategies: Small sips and bites when eating, Slow rate, swallow between bites, and Alternate liquids and solids      -Positioning: Upright position during meals and Upright position at least 30 minutes after P.O. intake    -Compensatory strategies: Effortful swallow    -Supervision: Independent     -Referrals: Nutritionist and Gastroenterology      Plan  Patient would benefit from outpatient skilled Speech Therapy services: N/A    Frequency: No treatment is warranted at this time.  Duration: N/A    Intervention certification from: 9/17/24  Intervention certification to: 10/17/24      Subjective  History of present illness: Patient is a 88 y.o. female who was referred to outpatient skilled Speech Therapy services for a dysphagia evaluation.     Patient has a prior medical history of the following: ROMERO, interstitial lung disease, GERD, dysphagia, irritable bowel syndrome, diabetes (type II), pacemaker, anxiety, depression and SOB (see history for full list).    5/7/24 Video Swallow Study Results:    Modified (Video) Barium Swallow Study     Summary:  Images are on PACS for review.   Series 13: Tripp A/P view.     Oral stage:WNL  Mastication WNL. Reduced bolus control and formation w/ bread, otherwise adequate. Mild lingual residue w/ hard cookie. Minimally weak transfer.     Pharyngeal stage: Mild  Swallow initiation, velar closure, anterior hyoid excursion, epiglottic inversion, pharyngeal contraction were WNL. Mildly  reduced laryngeal vestibular closure, laryngeal elevation, pharyngeal constriction and tongue base retraction. Mild posterior pharyngeal wall coating w/ hard cookie. Mild to mod vallecular retention w/ po trials. Mild pyriform retention thin liquid when taking a pill. Bolus passed through the UES with trace retention on most trials. Transient penetration following one trial of thin liquid. Trace epiglottic undercoat w/ thin liquids. No penetration or aspiration during study.      Per gross esophageal screen:  Retention in esophagus following pudding and solid trials. Cleared w/ thin liquids. Retropulsion w/ thin liquids in distal esophagus when taking the pill.  The 13 mm barium pill was distal for the remainder of the study. (Please refer to images for a more precise location, as this is only a screen). Additional PO did not clear the pill. Distal retention of the nectar was also observed.      Recommendations:  Diet: Regular, softer options. Avoid any dry dense foods, Particularly dense/dry meats and breads.   Liquids: Thin  Meds: As tolerated.   Strategies: Slow rate (pt stated she eats quickly and doesn't think she can change), chew well, smaller and more frequent meals, add moisture to foods(condiments, gravy, broth, sauce), and take sips of liquids between bites of food.   Frequent oral care  Upright position  F/u ST tx: Not at this time  Noted BMI. Continue supplements. (Pt reports no appetite)  Aspiration Precautions  Reflux Precautions: Remain upright 1 hour after eating, avoid eating 3 hours before bed, and keep HOB elevated 30 degrees at all times (currently lays flay at night)  Consider consult with: Refer back to GI. Possible consult with ENT for reported hoarseness.   Results reviewed with: pt  Repeat MBS as necessary  If a dedicated assessment of the esophagus is desired:  Consider esophagram/routine barium swallow w/ barium tablet administration   or EGD      6/20/24 Video Esophagram  "Results:    Narrative & Impression   BARIUM SWALLOW / ESOPHAGRAM     INDICATION: R13.10: Dysphagia, unspecified.     COMPARISON: Barium swallow 5/7/2024     TECHNIQUE: The patient was given effervescent granules and barium by mouth and images of the esophagus were obtained. Multiple fluoroscopic images were saved for subsequent interpretation.     IMAGES: [id number]     FLUOROSCOPY TIME: 1.3 minutes     FINDINGS:     There is pooling of contrast in the vallecula on limited lateral pharyngogram.  Several occasions, silent laryngeal penetration was noted. There is diminished primary peristalsis of the esophagus.. There are numerous intermittent tertiary esophageal contractions. Esophageal motility is normal and emptying of contrast from the   esophagus is prompt. No mucosal lesion, ulceration, or evidence of fold thickening.     There is moderate gastroesophageal reflux.     Small to moderate size hiatal hernia present.        IMPRESSION:     Tertiary esophageal contractions, and diminished primary esophageal peristalsis.     Moderate gastroesophageal reflux.     Pooling of contrast in the vallecula on lateral pharyngogram, as well as silent laryngeal penetration     Small to moderate hiatal hernia     During today's evaluation, patient and son report that she was recently at the GI but talked only about her chronic diahrea. She did not speak about her swallowing difficulties. She had been wondering if stretching the esophagus was an option for her.    She has steadily lost 60 lbs since she broke her shoulder in an accident. She didn't have an appetite after that. This was 5 years ago. This is completely separate from her swallowing issues and weight, although low, has been stable for a while.    An average meal for her is a cup of soup and a couple of meatballs for added protein. She has stopped eating salads because it gets stuck in her throat, \"they aren't going down.\".  She eats tortillas instead of bread since they are " easier to swallow.    She doesn't experience much coughing during swallowing but does cough intermittently (most likely connected with reflux and lung challenges).    She admits that she eats extremely fast.      Patient's goal(s): To reduce burping and tightness in her chest.    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      Objective (testing)    DYSPHAGIA EVALUATION:    -Reason for referral: Weight loss and Signs/symptoms of dysphagia    -Subjective report of swallowing difficulty: Coughing, Globus sensation , and Regurgitation of food    -Eating Assessment Tool (EAT-10) Swallowing Screening Tool is a patient self-assessment tool that rates the percieved impairment a swallowing disorder has on the Functional, Physical, and Emotional aspects of one's life.  EAT-10 score: 28/40    -Difficulty swallowing: Solids, Liquids, and Pills    -Current diet (solids): Regular - prefers softer foods  -Current diet (liquids): Thin  -Current pill intake method: With water or other liquids  -Alternative Feeding Method?: No    -Facial appearance Symmetrical   -Mandible function Adequate ROM   -Dentition Adequate and Partial dentures   -Labial function WFL   -Lingual function Decreased strength (protrusion)   -Velar function Symmetrical   -Oral apraxia? Absent   -Vocal quality Clear/adequate   -Volitional cough Strong/productive   -Respiration WFL   -Drooling? No   -Tremor/involuntary movement? Not present     LIQUID CONSISTENCY TESTING:   Item(s) tested: (Thin) - water    Administered by: Cup and Self-fed    Clinical findings:  -Oral phase impairments: N/A, patient WFL  -Pharyngeal phase impairments: N/A, patient WFL    Other notes: N/A    Strategies, attempts, and responses: N/A      SOLID CONSISTENCY TESTING:  Item(s) tested: (Regular, Mechanical  Soft, Mixed, and Puree) - potato chips, ricardo cracker, diced fruit in thin liquid juice, and applesauce    Administered by: Spoon and Self-fed    Clinical findings:  -Oral phase impairments: N/A, patient WFL  -Pharyngeal phase impairments: other: All consistencies were tolerated with no s/s of aspiration with the exception of mix consistency in which the patient demonstrated a small throat clear.    Other notes: N/A    Strategies, attempts, and responses: N/A          Visit tracking:    Re-eval Due POC Expires Auth Expiration Date ST Visit Limit   10/17/24 10/17/24 12/31/24 BOMN                           Visit/Unit Tracking  AUTH Status:  Date 9/17/24   No authorization needed Used 1    Remaining  BOMN       Intervention comments:  45 minutes of swallow evaluation time

## 2024-09-23 ENCOUNTER — TELEPHONE (OUTPATIENT)
Age: 89
End: 2024-09-23

## 2024-09-23 DIAGNOSIS — K58.0 IRRITABLE BOWEL SYNDROME WITH DIARRHEA: Primary | ICD-10-CM

## 2024-09-23 NOTE — TELEPHONE ENCOUNTER
No pa is needed for Xifaxan through [employer] which is in pt's chart.   The fax is in media.     In pt's chart are two insurances and one pt assistance and neither was check off.  ([employer], [employer], [employer])  Chart States Medicare primary and [employer] as secondary.     Called pharmacy.     Pharmacy has on file [facility]   BIN [id number]  PCN [id number]  GRP [id number]  ID [id number]

## 2024-09-23 NOTE — TELEPHONE ENCOUNTER
Pt may need new script, script .  Routing to GI office    PA for Xifaxan  APPROVED     Date(s) approved utnataliel 10/7/2024    Case #    Patient advised by          []mFoundryhart Message  [x]Phone call , voiced understanding  []LMOM  []L/M to call office as no active Communication consent on file  []Unable to leave detailed message as VM not approved on Communication consent       Pharmacy advised by    [x]Fax  []Phone call    Approval letter scanned into Media Yes

## 2024-09-23 NOTE — TELEPHONE ENCOUNTER
"After discussion with Dr. Meier, decision was made to defer EGD at this time, given suspicion that this procedure may be low yield, as patient had relatively normal EGD in 2020.    I called patient to discuss new recommendations with her.  Patient again asked for EGD with \"stretching\" but I explained that we would like to defer an EGD at this time due to concern that it may not reveal any abnormalities, and may expose her to unnecessary risks, such as bleeding, infection, or perforation.  Patient verbalized understanding.    I advised patient to take small bites, chew carefully, eat slowly, and eat softer foods when necessary.  I also advised her to continue to use protein shakes/supplements to help maintain body weight.  If symptoms remain persistent or worsening at next follow-up office visit, can reconsider EGD at that time.    Patient also mentioned that she has not yet heard back from her pharmacy regarding Xifaxan prescription.  I will reach out to our prior authorization team to see if we can find out the cause for this delay.    Patient was thankful for the phone call and had no further questions at the conclusion of our discussion.  "

## 2024-09-23 NOTE — TELEPHONE ENCOUNTER
PA for Xifaxan 550 mg SUBMITTED     via    [x][facility], [facility]-KEY: [id number]  [][employer]-Case ID #   []Faxed to plan   []Other website   []Phone call Case ID #     Office notes sent, clinical questions answered. Awaiting determination    Turnaround time for your insurance to make a decision on your Prior Authorization can take 7-21 business days.

## 2024-09-26 ENCOUNTER — TELEPHONE (OUTPATIENT)
Age: 89
End: 2024-09-26

## 2024-09-26 ENCOUNTER — TELEPHONE (OUTPATIENT)
Dept: FAMILY MEDICINE CLINIC | Facility: CLINIC | Age: 89
End: 2024-09-26

## 2024-09-26 DIAGNOSIS — F41.9 ANXIETY: ICD-10-CM

## 2024-09-26 RX ORDER — LORAZEPAM 0.5 MG/1
0.5 TABLET ORAL 2 TIMES DAILY PRN
Qty: 60 TABLET | Refills: 2 | Status: SHIPPED | OUTPATIENT
Start: 2024-09-26

## 2024-09-26 NOTE — TELEPHONE ENCOUNTER
I received a refill request from [facility] for lorazepam.     Prescription was sent 9/1/24 with 2 refills.     Please make sure they have refills on file, let me know if new prescription needs to be sent.

## 2024-09-26 NOTE — TELEPHONE ENCOUNTER
Patient calling with questions regarding xifaxan and stool samples.  Advised patient that stool samples should be done prior to starting the medication.  Patient had eye issues and had procedure done so vision has been to blurry to do samples.  Patient will do samples with her next BM and then start xifaxan.

## 2024-09-26 NOTE — TELEPHONE ENCOUNTER
Spoke with Jerry from [facility] and he stated that someone had cancelled patients refills on her script for Lorazepam.  He would like to know if you could send over a new script so he can refill this medication.  Please call to advise

## 2024-09-30 ENCOUNTER — TELEPHONE (OUTPATIENT)
Age: 89
End: 2024-09-30

## 2024-10-02 ENCOUNTER — APPOINTMENT (OUTPATIENT)
Dept: LAB | Facility: CLINIC | Age: 89
End: 2024-10-02
Payer: MEDICARE

## 2024-10-02 DIAGNOSIS — R19.7 DIARRHEA, UNSPECIFIED TYPE: ICD-10-CM

## 2024-10-02 PROCEDURE — 89055 LEUKOCYTE ASSESSMENT FECAL: CPT

## 2024-10-02 PROCEDURE — 87177 OVA AND PARASITES SMEARS: CPT

## 2024-10-02 PROCEDURE — 87209 SMEAR COMPLEX STAIN: CPT

## 2024-10-02 PROCEDURE — 87329 GIARDIA AG IA: CPT

## 2024-10-03 LAB
G LAMBLIA AG STL QL IA: NEGATIVE
G LAMBLIA AG STL QL IA: NORMAL
WBC SPEC QL GRAM STN: NORMAL

## 2024-10-07 DIAGNOSIS — E11.65 UNCONTROLLED TYPE 2 DIABETES MELLITUS WITH HYPERGLYCEMIA (HCC): ICD-10-CM

## 2024-10-07 RX ORDER — BLOOD SUGAR DIAGNOSTIC
STRIP MISCELLANEOUS
Qty: 100 STRIP | Refills: 5 | Status: SHIPPED | OUTPATIENT
Start: 2024-10-07

## 2024-10-07 NOTE — TELEPHONE ENCOUNTER
Reason for call:   [x] Refill   [] Prior Auth  [] Other:     Office:   [x] PCP/Provider - Kimberly Gonsales  [] Specialty/Provider -     Medication: test strips    Dose/Frequency: pt tests blood sugar four times a day    Quantity: 100    Pharmacy: [facility]    Does the patient have enough for 3 days?   [] Yes   [x] No - Send as HP to POD- pt ran out yesterday, she's hoping this can be refilled asap as she needs to get someone to go pick them up for her

## 2024-10-08 ENCOUNTER — NURSE TRIAGE (OUTPATIENT)
Age: 89
End: 2024-10-08

## 2024-10-08 NOTE — TELEPHONE ENCOUNTER
Nothing further to advise at this time with only having diarrhea twice today. She should keep us updated. Thanks

## 2024-10-08 NOTE — TELEPHONE ENCOUNTER
"Last OV 9/5/24 Krishna Kim PA-C IBS-D  Stool studies 10/2/24    Pt started Xifaxan approx 8 days ago. States she had solid stools 2 days prior to starting and no issues with diarrhea until this morning. 2 loose BMs today after eating. Denies new foods, sick contacts, fever, nausea vomiting, or other symptoms. Unsure if Xifaxan side effect or med ineffective. Would like to know if ok to start Imodium and whether to stop Xifaxan.     Per last OV:    -Recommend empiric treatment with Xifaxan x 2 weeks.  -Advised patient to reintroduce Metamucil with 1 tsp daily.  Can increase to 2 tsp daily if tolerating well.  -We discussed that diarrhea may be due to underlying constipation and I advised to avoid Imodium as much as possible as this may worsen her symptoms long term.     Pt advised BRAT diet, hydration/electrolytes in the interim. Pt verbalized understanding.    Please advise.      Reason for Disposition   MILD diarrhea and taking antibiotics    Answer Assessment - Initial Assessment Questions  1. DIARRHEA SEVERITY: \"How bad is the diarrhea?\" \"How many extra stools have you had in the past 24 hours than normal?\"     - NO DIARRHEA (SCALE 0)    - MILD (SCALE 1-3): Few loose or mushy BMs; increase of 1-3 stools over normal daily number of stools; mild increase in ostomy output.    -  MODERATE (SCALE 4-7): Increase of 4-6 stools daily over normal; moderate increase in ostomy output.  * SEVERE (SCALE 8-10; OR 'WORST POSSIBLE'): Increase of 7 or more stools daily over normal; moderate increase in ostomy output; incontinence.       x2  2. ONSET: \"When did the diarrhea begin?\"       After breakfast  3. BM CONSISTENCY: \"How loose or watery is the diarrhea?\"       Loose   4. VOMITING: \"Are you also vomiting?\" If Yes, ask: \"How many times in the past 24 hours?\"       Denies  5. ABDOMINAL PAIN: \"Are you having any abdominal pain?\" If Yes, ask: \"What does it feel like?\" (e.g., crampy, dull, intermittent, constant)       " "Denies  7. ORAL INTAKE: If vomiting, \"Have you been able to drink liquids?\" \"How much fluids have you had in the past 24 hours?\"      No issues with hydration  8. HYDRATION: \"Any signs of dehydration?\" (e.g., dry mouth [not just dry lips], too weak to stand, dizziness, new weight loss) \"When did you last urinate?\"      Denies  9. EXPOSURE: \"Have you traveled to a foreign country recently?\" \"Have you been exposed to anyone with diarrhea?\" \"Could you have eaten any food that was spoiled?\"      Denies  10. ANTIBIOTIC USE: \"Are you taking antibiotics now or have you taken antibiotics in the past 2 months?\"        On Xifaxan  11. OTHER SYMPTOMS: \"Do you have any other symptoms?\" (e.g., fever, blood in stool)        Denies    Protocols used: Diarrhea-ADULT-[place]    "

## 2024-10-09 NOTE — TELEPHONE ENCOUNTER
I spoke with pt and we have spoke on how symptoms are today and pt stated feeling much better than yesterday, pt was thankful for the call back to check-in, I informed pt to reach out if the symptoms return and become worse.

## 2024-10-19 DIAGNOSIS — E78.5 HYPERLIPIDEMIA, UNSPECIFIED HYPERLIPIDEMIA TYPE: ICD-10-CM

## 2024-10-21 RX ORDER — EZETIMIBE AND SIMVASTATIN 10; 40 MG/1; MG/1
1 TABLET ORAL
Qty: 30 TABLET | Refills: 5 | Status: SHIPPED | OUTPATIENT
Start: 2024-10-21

## 2024-10-28 ENCOUNTER — REMOTE DEVICE CLINIC VISIT (OUTPATIENT)
Dept: CARDIOLOGY CLINIC | Facility: CLINIC | Age: 89
End: 2024-10-28
Payer: MEDICARE

## 2024-10-28 DIAGNOSIS — R00.1 BRADYCARDIA: Primary | ICD-10-CM

## 2024-10-28 PROCEDURE — 93294 REM INTERROG EVL PM/LDLS PM: CPT | Performed by: INTERNAL MEDICINE

## 2024-10-28 PROCEDURE — 93296 REM INTERROG EVL PM/IDS: CPT | Performed by: INTERNAL MEDICINE

## 2024-10-28 NOTE — PROGRESS NOTES
Results for orders placed or performed in visit on 10/28/24   Cardiac EP device report    Narrative    MDT-DUAL CHAMBER PPM (AAIR-DDDR MODE)/ ACTIVE SYSTEM IS MRI CONDITIONAL  CARELINK TRANSMISSION: BATTERY VOLTAGE ADEQUATE (2.5 YRS). AP: 78.8%. : 0.3% (MVP-ON). ALL AVAILABLE LEAD PARAMETERS WITHIN NORMAL LIMITS. 1 VT EPISODE W/ EGM SHOWING NSVT 7 BEATS @ 190 BPM. PT TAKES METOPROLOL TART, ELIQUIS, ASA 81MG. EF: 60% (ECHO 10/7/22). NORMAL DEVICE FUNCTION. CH

## 2024-11-05 ENCOUNTER — TELEPHONE (OUTPATIENT)
Age: 89
End: 2024-11-05

## 2024-11-05 DIAGNOSIS — E11.42 TYPE 2 DIABETES MELLITUS WITH DIABETIC POLYNEUROPATHY, WITHOUT LONG-TERM CURRENT USE OF INSULIN (HCC): ICD-10-CM

## 2024-11-05 DIAGNOSIS — J84.9 INTERSTITIAL LUNG DISEASE (HCC): ICD-10-CM

## 2024-11-06 RX ORDER — FLURBIPROFEN SODIUM 0.3 MG/ML
SOLUTION/ DROPS OPHTHALMIC
Qty: 200 EACH | Refills: 3 | Status: SHIPPED | OUTPATIENT
Start: 2024-11-06

## 2024-11-06 RX ORDER — INSULIN HUMAN 100 [IU]/ML
INJECTION, SUSPENSION SUBCUTANEOUS
Qty: 15 ML | Refills: 1 | Status: SHIPPED | OUTPATIENT
Start: 2024-11-06

## 2024-12-03 DIAGNOSIS — R13.10 DYSPHAGIA, UNSPECIFIED TYPE: ICD-10-CM

## 2024-12-03 RX ORDER — PANTOPRAZOLE SODIUM 40 MG/1
40 TABLET, DELAYED RELEASE ORAL 2 TIMES DAILY
Qty: 60 TABLET | Refills: 5 | Status: SHIPPED | OUTPATIENT
Start: 2024-12-03

## 2024-12-09 ENCOUNTER — HOSPITAL ENCOUNTER (EMERGENCY)
Facility: HOSPITAL | Age: 89
Discharge: HOME | End: 2024-12-09
Attending: EMERGENCY MEDICINE
Payer: MEDICARE

## 2024-12-09 ENCOUNTER — APPOINTMENT (EMERGENCY)
Dept: RADIOLOGY | Facility: HOSPITAL | Age: 89
End: 2024-12-09
Payer: MEDICARE

## 2024-12-09 VITALS
RESPIRATION RATE: 16 BRPM | DIASTOLIC BLOOD PRESSURE: 69 MMHG | SYSTOLIC BLOOD PRESSURE: 155 MMHG | TEMPERATURE: 98.6 F | OXYGEN SATURATION: 98 % | HEART RATE: 66 BPM

## 2024-12-09 DIAGNOSIS — R06.00 DYSPNEA: Primary | ICD-10-CM

## 2024-12-09 LAB
ANION GAP SERPL CALCULATED.3IONS-SCNC: 8 MMOL/L (ref 4–13)
BASOPHILS # BLD AUTO: 0.02 THOUSANDS/ÂΜL (ref 0–0.1)
BASOPHILS NFR BLD AUTO: 0 % (ref 0–1)
BUN SERPL-MCNC: 20 MG/DL (ref 5–25)
CALCIUM SERPL-MCNC: 9.3 MG/DL (ref 8.4–10.2)
CHLORIDE SERPL-SCNC: 97 MMOL/L (ref 96–108)
CO2 SERPL-SCNC: 29 MMOL/L (ref 21–32)
CREAT SERPL-MCNC: 0.57 MG/DL (ref 0.6–1.3)
EOSINOPHIL # BLD AUTO: 0.11 THOUSAND/ÂΜL (ref 0–0.61)
EOSINOPHIL NFR BLD AUTO: 2 % (ref 0–6)
ERYTHROCYTE [DISTWIDTH] IN BLOOD BY AUTOMATED COUNT: 15.4 % (ref 11.6–15.1)
GFR SERPL CREATININE-BSD FRML MDRD: 83 ML/MIN/1.73SQ M
GLUCOSE SERPL-MCNC: 173 MG/DL (ref 65–140)
GLUCOSE SERPL-MCNC: 189 MG/DL (ref 65–140)
HCT VFR BLD AUTO: 34.6 % (ref 34.8–46.1)
HGB BLD-MCNC: 10.7 G/DL (ref 11.5–15.4)
IMM GRANULOCYTES # BLD AUTO: 0.02 THOUSAND/UL (ref 0–0.2)
IMM GRANULOCYTES NFR BLD AUTO: 0 % (ref 0–2)
LYMPHOCYTES # BLD AUTO: 1.6 THOUSANDS/ÂΜL (ref 0.6–4.47)
LYMPHOCYTES NFR BLD AUTO: 26 % (ref 14–44)
MCH RBC QN AUTO: 25.9 PG (ref 26.8–34.3)
MCHC RBC AUTO-ENTMCNC: 30.9 G/DL (ref 31.4–37.4)
MCV RBC AUTO: 84 FL (ref 82–98)
MONOCYTES # BLD AUTO: 0.63 THOUSAND/ÂΜL (ref 0.17–1.22)
MONOCYTES NFR BLD AUTO: 10 % (ref 4–12)
NEUTROPHILS # BLD AUTO: 3.8 THOUSANDS/ÂΜL (ref 1.85–7.62)
NEUTS SEG NFR BLD AUTO: 62 % (ref 43–75)
NRBC BLD AUTO-RTO: 0 /100 WBCS
PLATELET # BLD AUTO: 180 THOUSANDS/UL (ref 149–390)
PMV BLD AUTO: 11.1 FL (ref 8.9–12.7)
POTASSIUM SERPL-SCNC: 4.6 MMOL/L (ref 3.5–5.3)
RBC # BLD AUTO: 4.13 MILLION/UL (ref 3.81–5.12)
SODIUM SERPL-SCNC: 134 MMOL/L (ref 135–147)
WBC # BLD AUTO: 6.18 THOUSAND/UL (ref 4.31–10.16)

## 2024-12-09 PROCEDURE — 99285 EMERGENCY DEPT VISIT HI MDM: CPT

## 2024-12-09 PROCEDURE — 85025 COMPLETE CBC W/AUTO DIFF WBC: CPT | Performed by: EMERGENCY MEDICINE

## 2024-12-09 PROCEDURE — 71045 X-RAY EXAM CHEST 1 VIEW: CPT

## 2024-12-09 PROCEDURE — 36415 COLL VENOUS BLD VENIPUNCTURE: CPT | Performed by: EMERGENCY MEDICINE

## 2024-12-09 PROCEDURE — 82948 REAGENT STRIP/BLOOD GLUCOSE: CPT

## 2024-12-09 PROCEDURE — 93005 ELECTROCARDIOGRAM TRACING: CPT

## 2024-12-09 PROCEDURE — 99284 EMERGENCY DEPT VISIT MOD MDM: CPT | Performed by: EMERGENCY MEDICINE

## 2024-12-09 PROCEDURE — 80048 BASIC METABOLIC PNL TOTAL CA: CPT | Performed by: EMERGENCY MEDICINE

## 2024-12-09 NOTE — ED PROVIDER NOTES
Time reflects when diagnosis was documented in both MDM as applicable and the Disposition within this note       Time User Action Codes Description Comment    12/9/2024  7:16 PM Silvano Sierra Add [R06.00] Dyspnea           ED Disposition       ED Disposition   Discharge    Condition   Stable    Date/Time   Mon Dec 9, 2024  7:16 PM    Comment   Malloryfili Duran discharge to home/self care.                   Assessment & Plan       Medical Decision Making  Patient is a normal physical exam given her age, laboratory studies at baseline, hemodynamically stable, blood pressure improving throughout her ED stay.  No evidence of endorgan damage, no current symptoms, EKG, labs, chest x-ray unremarkable patient stable for discharge home, has follow-up on Wednesday with her PCP.    Amount and/or Complexity of Data Reviewed  Labs: ordered.  Radiology: ordered and independent interpretation performed.             Medications - No data to display    ED Risk Strat Scores                           SBIRT 20yo+      Flowsheet Row Most Recent Value   Initial Alcohol Screen: US AUDIT-C     1. How often do you have a drink containing alcohol? 0 Filed at: 12/09/2024 1731   2. How many drinks containing alcohol do you have on a typical day you are drinking?  0 Filed at: 12/09/2024 1731   3a. Male UNDER 65: How often do you have five or more drinks on one occasion? 0 Filed at: 12/09/2024 1731   3b. FEMALE Any Age, or MALE 65+: How often do you have 4 or more drinks on one occassion? 0 Filed at: 12/09/2024 1731   Audit-C Score 0 Filed at: 12/09/2024 1731   [place]: How many times in the past year have you...    Used an illegal drug or used a prescription medication for non-medical reasons? Never Filed at: 12/09/2024 1731                            History of Present Illness       Chief Complaint   Patient presents with    Shortness of Breath     To ED via EMS from podiatrist with c/o SOB. Pt states SOB resolved at office, denies SOB on  arrival.        Past Medical History:   Diagnosis Date    Common bile duct dilatation 2020    Glaucoma     H/O degenerative disc disease     Idiopathic pulmonary fibrosis (HCC) 2020    Irregular heart beat     afib    Peripheral neuropathy     S/P laparoscopic cholecystectomy 2020    Stenosis of right subclavian artery (HCC) 2016      Past Surgical History:   Procedure Laterality Date    CATARACT EXTRACTION, BILATERAL      CHOLECYSTECTOMY      CHOLECYSTECTOMY LAPAROSCOPIC N/A 2020    Procedure: CHOLECYSTECTOMY LAPAROSCOPIC;  Surgeon: Kalen Garrett MD;  Location: [facility];  Service: General    COLONOSCOPY      CYST REMOVAL  2009    left lower Quadrant     FL LUMBAR PUNCTURE DIAGNOSTIC  2023    HERNIA REPAIR  1992    LIPOMA RESECTION  2010    WI RPR 1ST INGUN HRNA AGE 5 YRS/> REDUCIBLE Bilateral 2018    Procedure: INGUINAL HERNIA REPAIR;  Surgeon: Volodymyr Lee MD;  Location: [facility];  Service: General    SHOULDER SURGERY  2019    Dr. Arnett ([place])    UPPER GASTROINTESTINAL ENDOSCOPY      VASCULAR SURGERY      Agram-  R carotid occlusion      Family History   Problem Relation Age of Onset    Heart disease Mother     Heart attack Father     Hiatal hernia Father     Diabetes Father     Cancer Brother     Diabetes Brother     Heart disease Brother     Hypertension Brother     Lung disease Brother 75        interstitial lung disease    Cancer Brother 49        glioblastoma    Other Son         gastroparesis    Other Cousin         interstitial lung disease      Social History     Tobacco Use    Smoking status: Former     Current packs/day: 0.00     Average packs/day: 1.5 packs/day for 38.0 years (57.0 ttl pk-yrs)     Types: Cigarettes     Start date:      Quit date:      Years since quittin.9    Smokeless tobacco: Never   Vaping Use    Vaping status: Never Used   Substance Use Topics    Alcohol use: No    Drug use: No      E-Cigarette/Vaping     E-Cigarette Use Never User       E-Cigarette/Vaping Substances    Nicotine No     THC No     CBD No     Flavoring No     Other No     Unknown No       I have reviewed and agree with the history as documented.     Patient presents for evaluation of shortness of breath.  Sent by her podiatrist office.  Upon arrival, patient denies any and all symptoms including no chest pain, no shortness of breath, no nausea, vomiting or diarrhea.  Patient states she got anxious today while walking to the mailbox, not atypical for her.  She took her blood pressure noted to be elevated, continued to feel unwell until arriving to the emergency department when she currently feels at her baseline.          Review of Systems   Constitutional:  Negative for chills and fever.   HENT:  Negative for ear pain and sore throat.    Eyes:  Negative for pain and visual disturbance.   Respiratory:  Negative for cough and shortness of breath.    Cardiovascular:  Negative for chest pain and palpitations.   Gastrointestinal:  Negative for abdominal pain and vomiting.   Genitourinary:  Negative for dysuria and hematuria.   Musculoskeletal:  Negative for arthralgias and back pain.   Skin:  Negative for color change and rash.   Neurological:  Negative for seizures and syncope.   All other systems reviewed and are negative.          Objective       ED Triage Vitals   Temperature Pulse Blood Pressure Respirations SpO2 Patient Position - Orthostatic VS   12/09/24 1623 12/09/24 1621 12/09/24 1621 12/09/24 1621 12/09/24 1623 --   98.6 °F (37 °C) 80 (!) 193/98 18 98 %       Temp src Heart Rate Source BP Location FiO2 (%) Pain Score    -- 12/09/24 1630 -- -- --     Monitor         Vitals      Date and Time Temp Pulse SpO2 Resp BP Pain Score FACES Pain Rating User   12/09/24 1800 -- 66 98 % 16 155/69 -- --    12/09/24 1700 -- 74 97 % 18 172/71 -- --    12/09/24 1630 -- 79 99 % 18 182/85 -- --    12/09/24 1623 98.6 °F (37 °C) -- 98 % -- -- -- -- MD    12/09/24 1621 -- 80 -- 18 193/98 -- -- MD            Physical Exam  Vitals and nursing note reviewed.   Constitutional:       General: She is not in acute distress.     Appearance: She is well-developed.   HENT:      Head: Normocephalic and atraumatic.   Eyes:      Pupils: Pupils are equal, round, and reactive to light.   Cardiovascular:      Rate and Rhythm: Normal rate and regular rhythm.      Heart sounds: Normal heart sounds. No murmur heard.  Pulmonary:      Effort: Pulmonary effort is normal. No respiratory distress.      Breath sounds: Normal breath sounds. No wheezing or rales.   Abdominal:      General: Bowel sounds are normal. There is no distension.      Palpations: Abdomen is soft.      Tenderness: There is no abdominal tenderness. There is no guarding or rebound.   Musculoskeletal:         General: No deformity. Normal range of motion.      Cervical back: Normal range of motion and neck supple.   Lymphadenopathy:      Cervical: No cervical adenopathy.   Skin:     Capillary Refill: Capillary refill takes less than 2 seconds.      Findings: No erythema or rash.   Neurological:      Mental Status: She is alert and oriented to person, place, and time.      Cranial Nerves: No cranial nerve deficit.      Motor: No abnormal muscle tone.      Coordination: Coordination normal.   Psychiatric:         Behavior: Behavior normal.         Results Reviewed       Procedure Component Value Units Date/Time    Basic metabolic panel [[id number]]  (Abnormal) Collected: 12/09/24 1729    Lab Status: Final result Specimen: Blood from Arm, Left Updated: 12/09/24 1803     Sodium 134 mmol/L      Potassium 4.6 mmol/L      Chloride 97 mmol/L      CO2 29 mmol/L      ANION GAP 8 mmol/L      BUN 20 mg/dL      Creatinine 0.57 mg/dL      Glucose 189 mg/dL      Calcium 9.3 mg/dL      eGFR 83 ml/min/1.73sq m     Narrative:      National Kidney Disease Foundation guidelines for Chronic Kidney Disease (CKD):     Stage 1 with normal or  high GFR (GFR > 90 mL/min/1.73 square meters)    Stage 2 Mild CKD (GFR = 60-89 mL/min/1.73 square meters)    Stage 3A Moderate CKD (GFR = 45-59 mL/min/1.73 square meters)    Stage 3B Moderate CKD (GFR = 30-44 mL/min/1.73 square meters)    Stage 4 Severe CKD (GFR = 15-29 mL/min/1.73 square meters)    Stage 5 End Stage CKD (GFR <15 mL/min/1.73 square meters)  Note: GFR calculation is accurate only with a steady state creatinine    CBC and differential [[id number]]  (Abnormal) Collected: 12/09/24 1729    Lab Status: Final result Specimen: Blood from Arm, Left Updated: 12/09/24 1740     WBC 6.18 Thousand/uL      RBC 4.13 Million/uL      Hemoglobin 10.7 g/dL      Hematocrit 34.6 %      MCV 84 fL      MCH 25.9 pg      MCHC 30.9 g/dL      RDW 15.4 %      MPV 11.1 fL      Platelets 180 Thousands/uL      nRBC 0 /100 WBCs      Segmented % 62 %      Immature Grans % 0 %      Lymphocytes % 26 %      Monocytes % 10 %      Eosinophils Relative 2 %      Basophils Relative 0 %      Absolute Neutrophils 3.80 Thousands/µL      Absolute Immature Grans 0.02 Thousand/uL      Absolute Lymphocytes 1.60 Thousands/µL      Absolute Monocytes 0.63 Thousand/µL      Eosinophils Absolute 0.11 Thousand/µL      Basophils Absolute 0.02 Thousands/µL     Fingerstick Glucose (POCT) [[id number]]  (Abnormal) Collected: 12/09/24 1728    Lab Status: Final result Specimen: Blood Updated: 12/09/24 1729     POC Glucose 173 mg/dl             XR chest 1 view portable   ED Interpretation by Silvano Sierra MD (12/09 1749)   Chronic interstitial lung findings.  No acute cardiac or pulmonary abnormality identified on independent evaluation of chest x-ray.          ECG 12 Lead Documentation Only    Date/Time: 12/9/2024 7:35 PM    Performed by: Silvano Sierra MD  Authorized by: Silvano Sierra MD    Indications / Diagnosis:  Shortness of breath (resolved)  ECG reviewed by me, the ED Provider: yes    Patient location:  ED  Interpretation:      Interpretation: non-specific    Rate:     ECG rate:  80    ECG rate assessment: normal    Ectopy:     Ectopy: none    QRS:     QRS axis:  Left    QRS intervals:  Normal  Conduction:     Conduction: normal    ST segments:     ST segments:  Normal  T waves:     T waves: normal        ED Medication and Procedure Management   Prior to Admission Medications   Prescriptions Last Dose Informant Patient Reported? Taking?   Cyanocobalamin (Vitamin B 12) 500 MCG TABS  Self Yes No   Sig: Take 500 mg by mouth 2 (two) times a day   HumuLIN N KwikPen 100 units/mL injection pen   No No   Sig: INJECT 8 UNITS UNDER THE SKIN EVERY MORNING AND 4 UNITS EVERY EVENING.   Insulin Pen Needle (B-D UF III MINI PEN NEEDLES) 31G X 5 MM MISC   No No   Sig: USE 2 (TWO) TIMES A DAY   LORazepam (ATIVAN) 0.5 mg tablet   No No   Sig: Take 1 tablet (0.5 mg total) by mouth 2 (two) times a day as needed for anxiety   Multiple Vitamin (multivitamin) tablet  Self Yes No   Sig: Take 1 tablet by mouth daily     OneTouch Delica Lancets 33G MISC  Self No No   Sig: Check blood sugars four times daily. Please substitute with appropriate alternative as covered by patient's insurance. Dx: E11.65   acetaminophen (TYLENOL) 500 mg tablet  Self Yes No   Sig: Take 1,000 mg by mouth as needed for mild pain   apixaban (Eliquis) 2.5 mg  Self No No   Sig: TAKE 1 TABLET (2.5 MG TOTAL) BY MOUTH 2 (TWO) TIMES A DAY   ascorbic acid (VITAMIN C) 500 MG tablet  Self Yes No   Sig: Take 500 mg by mouth daily   aspirin (ECOTRIN LOW STRENGTH) 81 mg EC tablet  Self No No   Sig: Take 1 tablet (81 mg total) by mouth daily   bimatoprost (LUMIGAN) 0.01 % ophthalmic drops  Self No No   Sig: Administer 1 drop to both eyes daily at bedtime for 30 days   brimonidine tartrate 0.2 % ophthalmic solution  Self Yes No   Sig: INSTILL 1 DROP INTO RIGHT EYE TWICE A DAY   calcium carbonate (OS-DANIA) 600 MG tablet  Self Yes No   Sig: Take 600 mg by mouth 2 (two) times a day with meals      ezetimibe-simvastatin (VYTORIN) 10-40 mg per tablet   No No   Sig: TAKE 1 TABLET BY MOUTH DAILY AT BEDTIME   famotidine (PEPCID) 20 mg tablet   No No   Sig: TAKE 1 TABLET (20 MG TOTAL) BY MOUTH 2 (TWO) TIMES A DAY   ferrous sulfate 325 (65 FE) MG EC tablet  Self Yes No   Sig: Take 325 mg by mouth 3 (three) times a day with meals   glucose blood (OneTouch Ultra) test strip   No No   Sig: TEST FOUR TIMES A DAY   ipratropium (ATROVENT) 0.03 % nasal spray   No No   Sig: USE 2 SPRAYS INTO EACH NOSTRIL EVERY 12 (TWELVE) HOURS   loratadine (CLARITIN) 10 mg tablet  Self Yes No   Sig: Take 10 mg by mouth daily as needed for allergies   metFORMIN (GLUCOPHAGE) 500 mg tablet  Self No No   Sig: TAKE 2 TABLETS (1,000 MG TOTAL) BY MOUTH 2 (TWO) TIMES A DAY WITH MEALS   metoprolol tartrate (LOPRESSOR) 25 mg tablet   No No   Sig: Take 1 tablet (25 mg total) by mouth every 12 (twelve) hours   mirtazapine (REMERON) 7.5 MG tablet  Self No No   Sig: Take 1 tablet (7.5 mg total) by mouth daily at bedtime   pantoprazole (PROTONIX) 40 mg tablet   No No   Sig: TAKE 1 TABLET (40 MG TOTAL) BY MOUTH 2 (TWO) TIMES A DAY   polyethylene glycol (MIRALAX) 17 g packet   No No   Sig: Take 17 g by mouth daily as needed (constipation/ abominal cramping) for up to 14 days Prn constipation   sitaGLIPtin (Januvia) 100 mg tablet  Self No No   Sig: TAKE 1 TABLET (100 MG TOTAL) BY MOUTH DAILY   sodium chloride 1 g tablet   No No   Sig: TAKE 1 TABLET THREE TIMES A DAY      Facility-Administered Medications: None     Patient's Medications   Discharge Prescriptions    No medications on file     No discharge procedures on file.  ED SEPSIS DOCUMENTATION   Time reflects when diagnosis was documented in both MDM as applicable and the Disposition within this note       Time User Action Codes Description Comment    12/9/2024  7:16 PM Silvano Sierra Add [R06.00] Dyspnea                  Silvano Sierra MD  12/09/24 1938

## 2024-12-11 ENCOUNTER — OFFICE VISIT (OUTPATIENT)
Dept: FAMILY MEDICINE CLINIC | Facility: CLINIC | Age: 89
End: 2024-12-11
Payer: MEDICARE

## 2024-12-11 VITALS
RESPIRATION RATE: 16 BRPM | DIASTOLIC BLOOD PRESSURE: 48 MMHG | HEART RATE: 80 BPM | TEMPERATURE: 97.3 F | BODY MASS INDEX: 19.61 KG/M2 | SYSTOLIC BLOOD PRESSURE: 132 MMHG | WEIGHT: 122 LBS | HEIGHT: 66 IN

## 2024-12-11 DIAGNOSIS — Z00.00 MEDICARE ANNUAL WELLNESS VISIT, SUBSEQUENT: Primary | ICD-10-CM

## 2024-12-11 DIAGNOSIS — I48.0 PAROXYSMAL ATRIAL FIBRILLATION (HCC): ICD-10-CM

## 2024-12-11 DIAGNOSIS — I65.22 ASYMPTOMATIC CAROTID ARTERY STENOSIS, LEFT: ICD-10-CM

## 2024-12-11 DIAGNOSIS — G47.33 OSA (OBSTRUCTIVE SLEEP APNEA): ICD-10-CM

## 2024-12-11 DIAGNOSIS — K58.2 IRRITABLE BOWEL SYNDROME WITH BOTH CONSTIPATION AND DIARRHEA: ICD-10-CM

## 2024-12-11 DIAGNOSIS — E44.0 MODERATE PROTEIN-CALORIE MALNUTRITION (HCC): ICD-10-CM

## 2024-12-11 DIAGNOSIS — F41.9 ANXIETY: ICD-10-CM

## 2024-12-11 DIAGNOSIS — E78.5 TYPE 2 DIABETES MELLITUS WITH HYPERLIPIDEMIA  (HCC): ICD-10-CM

## 2024-12-11 DIAGNOSIS — J84.9 INTERSTITIAL LUNG DISEASE (HCC): ICD-10-CM

## 2024-12-11 DIAGNOSIS — E11.69 TYPE 2 DIABETES MELLITUS WITH HYPERLIPIDEMIA  (HCC): ICD-10-CM

## 2024-12-11 DIAGNOSIS — E78.2 MIXED HYPERLIPIDEMIA: ICD-10-CM

## 2024-12-11 DIAGNOSIS — K21.9 GASTROESOPHAGEAL REFLUX DISEASE, UNSPECIFIED WHETHER ESOPHAGITIS PRESENT: ICD-10-CM

## 2024-12-11 DIAGNOSIS — E87.1 HYPONATREMIA: ICD-10-CM

## 2024-12-11 DIAGNOSIS — I10 ESSENTIAL HYPERTENSION: ICD-10-CM

## 2024-12-11 LAB
ATRIAL RATE: 80 BPM
PR INTERVAL: 220 MS
QRS AXIS: -35 DEGREES
QRSD INTERVAL: 92 MS
QT INTERVAL: 374 MS
QTC INTERVAL: 431 MS
T WAVE AXIS: 68 DEGREES
VENTRICULAR RATE: 80 BPM

## 2024-12-11 PROCEDURE — 99214 OFFICE O/P EST MOD 30 MIN: CPT | Performed by: NURSE PRACTITIONER

## 2024-12-11 PROCEDURE — G0439 PPPS, SUBSEQ VISIT: HCPCS | Performed by: NURSE PRACTITIONER

## 2024-12-11 PROCEDURE — 93010 ELECTROCARDIOGRAM REPORT: CPT | Performed by: INTERNAL MEDICINE

## 2024-12-11 NOTE — PROGRESS NOTES
Name: Ac Duran      : 1935      MRN: [id number]  Encounter Provider: ZANDER Swenson  Encounter Date: 2024   Encounter department: [facility]    Assessment & Plan  Medicare annual wellness visit, subsequent         Type 2 diabetes mellitus with hyperlipidemia  (HCC)    Lab Results   Component Value Date    HGBA1C 6.6 (H) 2024     Metformin 500 mg twice daily.   Januvia 100 mg daily.   Humulin N 8u in the am and 4u in the pm.   A1c is well controlled.   Continue current medications. We discussed if she feels her sugars are getting too low from not eating enough, she can cut her insulin doses in half on these days.     Orders:  •  Hemoglobin A1C; Future    Essential hypertension  /48 today.   ED visit on Monday secondary to hypertension.   Continue metoprolol tartrate 12.5 mg twice daily.     Orders:  •  CBC; Future  •  Comprehensive metabolic panel; Future  •  TSH, 3rd generation; Future    Mixed hyperlipidemia  LDL 53. Continue Vytorin 10-40 mg daily.     Orders:  •  Lipid panel; Future    Gastroesophageal reflux disease, unspecified whether esophagitis present  Continue pantoprazole, pepcid, Follow up with GI.        Anxiety  Mirtazapine at bedtime,   Lorazepam as needed.          Interstitial lung disease (HCC)  Continue follow up with Dr. Jovel.   Stable.        Hyponatremia  Sodium 134.   Continue sodium chloride tablets and nephrology follow up.     Orders:  •  Comprehensive metabolic panel; Future    Paroxysmal atrial fibrillation (HCC)  Continue Eliquis, metoprolol.   Continue cardiology follow up.        ROMERO (obstructive sleep apnea)  CPAP is helping with fatigue, still trying to get mask adjusted.   Following with Dr. Jovel.        Moderate protein-calorie malnutrition (HCC)  Continue Orgain nutritional supplements.   Nutritional supplement without sugar substitute--which exacerbates diarrhea.       Irritable bowel syndrome with both constipation and  diarrhea  Chronic mix of constipation and diarrhea.   Trying to be more consistent with metamucil.  Avoiding sugar substitute has helped.   Continues to follow with GI.          Asymptomatic carotid artery stenosis, left  Left 50-69% stenosis of internal carotid artery.   Right chronic occlusion.   Due for vascular follow up.            Blood work with recheck in 4 months.           Depression Screening and Follow-up Plan: Patient was screened for depression during today's encounter. They screened negative with a PHQ-9 score of 4.      Preventive health issues were discussed with patient, and age appropriate screening tests were ordered as noted in patient's After Visit Summary. Personalized health advice and appropriate referrals for health education or preventive services given if needed, as noted in patient's After Visit Summary.    History of Present Illness     Ac Duran is an 89 year old female presenting today for annual medicare wellness and check up on chronic conditions.     Monday High -200 systolic,   And breathless  Ambulance from podiatry to ED.   ED attributed symptoms to anxiety.     Taking metoprolol 1/2 tablet twice daily  Eating more ice cream.   Gained weight.   Sugars all over the place, depending what she is eating.    Knows she is not eating enough, using Orgain supplements.   Constipation diarrhea still has--chronic.     Metformin 1 table twice daily.   Januvia 100 mg daily.   Humulin N 8u in the am and 4u in the pm.     Has skipped insulin from time to time if sugars are too low.     Has Night time snack.  Is not having low sugars at night when having snacks.     Sugars are mostly <150 when eating good.   Notes high sugars, up in 200's are from dietary indiscretions. Lowest glucose was 11/26 81 at 8:30 pm as per review of blood sugar log, September to current.    Vascular needs to schedule  Uses CPAP, ROMERO, getting adjusted.   Less tired than previously.   Does get easily  fatigued with activity.    Not taking iron, due to GI effects.     Meatmucil needs to be consistent.     Podiatry and eye exam up to date.     Wart like area on right side of face--referred to [facility].            Patient Care Team:  ZANDER Swenson as PCP - General (Family Medicine)  Eveline Workman MD as PCP - Endocrinology (Endocrinology)  MD Moe Van MD (Ophthalmology)  Chandan Conley MD (Ophthalmology)  Joselyn Reyes Bahamonde, MD (Nephrology)    Review of Systems   Constitutional: Negative.    HENT: Negative.     Respiratory: Negative.     Cardiovascular: Negative.    Gastrointestinal:  Positive for constipation and diarrhea.   Genitourinary: Negative.    Musculoskeletal: Negative.    Skin: Negative.    Neurological: Negative.    Hematological: Negative.    Psychiatric/Behavioral: Negative.       Medical History Reviewed by provider this encounter:  Tobacco  Allergies  Meds  Problems  Med Hx  Surg Hx  Fam Hx       Annual Wellness Visit Questionnaire   Ac is here for her Subsequent Wellness visit. Last Medicare Wellness visit information reviewed, patient interviewed and updates made to the record today.      Health Risk Assessment:   Patient rates overall health as fair. Patient feels that their physical health rating is same. Patient is satisfied with their life. Eyesight was rated as same. Hearing was rated as same. Patient feels that their emotional and mental health rating is same. Patients states they are never, rarely angry. Patient states they are often unusually tired/fatigued. Pain experienced in the last 7 days has been none. Patient states that she has experienced no weight loss or gain in last 6 months.     Depression Screening:   PHQ-9 Score: 4      Fall Risk Screening:   In the past year, patient has experienced: no history of falling in past year      Urinary Incontinence Screening:   Patient has not leaked urine accidently in the last six  months.     Home Safety:  Patient has trouble with stairs inside or outside of their home. Patient has working smoke alarms and has working carbon monoxide detector. Home safety hazards include: none.     Nutrition:   Current diet is Regular.     Medications:   Patient is currently taking over-the-counter supplements. OTC medications include: see medication list. Patient is able to manage medications.     Activities of Daily Living (ADLs)/Instrumental Activities of Daily Living (IADLs):   Walk and transfer into and out of bed and chair?: Yes  Dress and groom yourself?: Yes    Bathe or shower yourself?: Yes    Feed yourself? Yes  Do your laundry/housekeeping?: Yes  Manage your money, pay your bills and track your expenses?: Yes  Make your own meals?: Yes    Do your own shopping?: Yes    Previous Hospitalizations:   Any hospitalizations or ED visits within the last 12 months?: Yes    How many hospitalizations have you had in the last year?: 1-2    Advance Care Planning:   Living will: Yes    Durable POA for healthcare: Yes    Advanced directive: Yes      Cognitive Screening:   Provider or family/friend/caregiver concerned regarding cognition?: No    PREVENTIVE SCREENINGS      Cardiovascular Screening:    General: Screening Not Indicated and History Lipid Disorder      Diabetes Screening:     General: Screening Not Indicated and History Diabetes      Colorectal Cancer Screening:     General: Screening Not Indicated      Cervical Cancer Screening:    General: Screening Not Indicated      Osteoporosis Screening:    General: Screening Not Indicated and History Osteoporosis      Lung Cancer Screening:     General: Screening Not Indicated      Hepatitis C Screening:    General: Screening Current    Screening, Brief Intervention, and Referral to Treatment (SBIRT)    Screening    Typical number of drinks in a week: 0    Single Item Drug Screening:  How often have you used an illegal drug (including marijuana) or a  "prescription medication for non-medical reasons in the past year? never    Single Item Drug Screen Score: 0  Interpretation: Negative screen for possible drug use disorder    Social Drivers of Health     Financial Resource Strain: Low Risk  (11/13/2023)    Overall Financial Resource Strain (CARDIA)    • Difficulty of Paying Living Expenses: Not very hard   Food Insecurity: No Food Insecurity (12/11/2024)    Hunger Vital Sign    • Worried About Running Out of Food in the Last Year: Never true    • Ran Out of Food in the Last Year: Never true   Transportation Needs: No Transportation Needs (12/11/2024)    PRAPARE - Transportation    • Lack of Transportation (Medical): No    • Lack of Transportation (Non-Medical): No   Housing Stability: Low Risk  (12/11/2024)    Housing Stability Vital Sign    • Unable to Pay for Housing in the Last Year: No    • Number of Times Moved in the Last Year: 0    • Homeless in the Last Year: No   Utilities: Not At Risk (12/11/2024)    [facility] Utilities    • Threatened with loss of utilities: No     No results found.    Objective   BP (!) 132/48   Pulse 80   Temp (!) 97.3 °F (36.3 °C) (Temporal)   Resp 16   Ht 5' 6\" (1.676 m)   Wt 55.3 kg (122 lb)   BMI 19.69 kg/m²     Physical Exam  Vitals and nursing note reviewed.   Constitutional:       Comments: Chronically ill, frail appearing.    HENT:      Head: Atraumatic.   Cardiovascular:      Rate and Rhythm: Normal rate and regular rhythm.      Heart sounds: No murmur heard.  Pulmonary:      Effort: Pulmonary effort is normal. No respiratory distress.      Breath sounds: No wheezing or rales.      Comments: Decreased breath sounds bilaterally.   Musculoskeletal:      Right lower leg: No edema.      Left lower leg: No edema.   Skin:     General: Skin is warm and dry.      Findings: No rash.      Comments: Right upper cheek--with small skin lesion, appearing to be a wart.    Neurological:      Mental Status: She is alert.   Psychiatric:         " Mood and Affect: Mood normal.

## 2024-12-11 NOTE — ASSESSMENT & PLAN NOTE
Sodium 134.   Continue sodium chloride tablets and nephrology follow up.     Orders:  •  Comprehensive metabolic panel; Future

## 2024-12-11 NOTE — ASSESSMENT & PLAN NOTE
Lab Results   Component Value Date    HGBA1C 6.6 (H) 08/30/2024     Metformin 500 mg twice daily.   Januvia 100 mg daily.   Humulin N 8u in the am and 4u in the pm.   A1c is well controlled.   Continue current medications. We discussed if she feels her sugars are getting too low from not eating enough, she can cut her insulin doses in half on these days.     Orders:  •  Hemoglobin A1C; Future

## 2024-12-11 NOTE — ASSESSMENT & PLAN NOTE
/48 today.   ED visit on Monday secondary to hypertension.   Continue metoprolol tartrate 12.5 mg twice daily.     Orders:  •  CBC; Future  •  Comprehensive metabolic panel; Future  •  TSH, 3rd generation; Future

## 2024-12-11 NOTE — PATIENT INSTRUCTIONS
Medicare Preventive Visit Patient Instructions  Thank you for completing your Welcome to Medicare Visit or Medicare Annual Wellness Visit today. Your next wellness visit will be due in one year (12/12/2025).  The screening/preventive services that you may require over the next 5-10 years are detailed below. Some tests may not apply to you based off risk factors and/or age. Screening tests ordered at today's visit but not completed yet may show as past due. Also, please note that scanned in results may not display below.  Preventive Screenings:  Service Recommendations Previous Testing/Comments   Colorectal Cancer Screening  * Colonoscopy    * Fecal Occult Blood Test (FOBT)/Fecal Immunochemical Test (FIT)  * Fecal DNA/Cologuard Test  * Flexible Sigmoidoscopy Age: 45-75 years old   Colonoscopy: every 10 years (may be performed more frequently if at higher risk)  OR  FOBT/FIT: every 1 year  OR  Cologuard: every 3 years  OR  Sigmoidoscopy: every 5 years  Screening may be recommended earlier than age 45 if at higher risk for colorectal cancer. Also, an individualized decision between you and your healthcare provider will decide whether screening between the ages of 76-85 would be appropriate. Colonoscopy: 09/02/2020  FOBT/FIT: Not on file  Cologuard: Not on file  Sigmoidoscopy: Not on file    Screening Not Indicated     Breast Cancer Screening Age: 40+ years old  Frequency: every 1-2 years  Not required if history of left and right mastectomy Mammogram: 04/15/2014        Cervical Cancer Screening Between the ages of 21-29, pap smear recommended once every 3 years.   Between the ages of 30-65, can perform pap smear with HPV co-testing every 5 years.   Recommendations may differ for women with a history of total hysterectomy, cervical cancer, or abnormal pap smears in past. Pap Smear: Not on file    Screening Not Indicated   Hepatitis C Screening Once for adults born between 1945 and 1965  More frequently in patients at  high risk for Hepatitis C Hep C Antibody: 12/07/2020    Screening Current   Diabetes Screening 1-2 times per year if you're at risk for diabetes or have pre-diabetes Fasting glucose: 122 mg/dL (8/30/2024)  A1C: 6.6 % (8/30/2024)  Screening Not Indicated  History Diabetes   Cholesterol Screening Once every 5 years if you don't have a lipid disorder. May order more often based on risk factors. Lipid panel: 05/23/2024    Screening Not Indicated  History Lipid Disorder     Other Preventive Screenings Covered by Medicare:  Abdominal Aortic Aneurysm (AAA) Screening: covered once if your at risk. You're considered to be at risk if you have a family history of AAA.  Lung Cancer Screening: covers low dose CT scan once per year if you meet all of the following conditions: (1) Age 55-77; (2) No signs or symptoms of lung cancer; (3) Current smoker or have quit smoking within the last 15 years; (4) You have a tobacco smoking history of at least 20 pack years (packs per day multiplied by number of years you smoked); (5) You get a written order from a healthcare provider.  Glaucoma Screening: covered annually if you're considered high risk: (1) You have diabetes OR (2) Family history of glaucoma OR (3)  aged 50 and older OR (4)  American aged 65 and older  Osteoporosis Screening: covered every 2 years if you meet one of the following conditions: (1) You're estrogen deficient and at risk for osteoporosis based off medical history and other findings; (2) Have a vertebral abnormality; (3) On glucocorticoid therapy for more than 3 months; (4) Have primary hyperparathyroidism; (5) On osteoporosis medications and need to assess response to drug therapy.   Last bone density test (DXA Scan): 09/01/2020.  HIV Screening: covered annually if you're between the age of 15-65. Also covered annually if you are younger than 15 and older than 65 with risk factors for HIV infection. For pregnant patients, it is covered up to  3 times per pregnancy.    Immunizations:  Immunization Recommendations   Influenza Vaccine Annual influenza vaccination during flu season is recommended for all persons aged >= 6 months who do not have contraindications   Pneumococcal Vaccine   * Pneumococcal conjugate vaccine = PCV13 (Prevnar 13), PCV15 (Vaxneuvance), PCV20 (Prevnar 20)  * Pneumococcal polysaccharide vaccine = PPSV23 (Pneumovax) Adults 19-65 yo with certain risk factors or if 65+ yo  If never received any pneumonia vaccine: recommend Prevnar 20 (PCV20)  Give PCV20 if previously received 1 dose of PCV13 or PPSV23   Hepatitis B Vaccine 3 dose series if at intermediate or high risk (ex: diabetes, end stage renal disease, liver disease)   Respiratory syncytial virus (RSV) Vaccine - COVERED BY MEDICARE PART D  * RSVPreF3 (Arexvy) CDC recommends that adults 60 years of age and older may receive a single dose of RSV vaccine using shared clinical decision-making (SCDM)   Tetanus (Td) Vaccine - COST NOT COVERED BY MEDICARE PART B Following completion of primary series, a booster dose should be given every 10 years to maintain immunity against tetanus. Td may also be given as tetanus wound prophylaxis.   Tdap Vaccine - COST NOT COVERED BY MEDICARE PART B Recommended at least once for all adults. For pregnant patients, recommended with each pregnancy.   Shingles Vaccine (Shingrix) - COST NOT COVERED BY MEDICARE PART B  2 shot series recommended in those 19 years and older who have or will have weakened immune systems or those 50 years and older     Health Maintenance Due:      Topic Date Due   • Colorectal Cancer Screening  09/02/2025   • Hepatitis C Screening  Completed     Immunizations Due:  There are no preventive care reminders to display for this patient.  Advance Directives   What are advance directives?  Advance directives are legal documents that state your wishes and plans for medical care. These plans are made ahead of time in case you lose your  ability to make decisions for yourself. Advance directives can apply to any medical decision, such as the treatments you want, and if you want to donate organs.   What are the types of advance directives?  There are many types of advance directives, and each state has rules about how to use them. You may choose a combination of any of the following:  Living will:  This is a written record of the treatment you want. You can also choose which treatments you do not want, which to limit, and which to stop at a certain time. This includes surgery, medicine, IV fluid, and tube feedings.   Durable power of  for healthcare (DPAHC):  This is a written record that states who you want to make healthcare choices for you when you are unable to make them for yourself. This person, called a proxy, is usually a family member or a friend. You may choose more than 1 proxy.  Do not resuscitate (DNR) order:  A DNR order is used in case your heart stops beating or you stop breathing. It is a request not to have certain forms of treatment, such as CPR. A DNR order may be included in other types of advance directives.  Medical directive:  This covers the care that you want if you are in a coma, near death, or unable to make decisions for yourself. You can list the treatments you want for each condition. Treatment may include pain medicine, surgery, blood transfusions, dialysis, IV or tube feedings, and a ventilator (breathing machine).  Values history:  This document has questions about your views, beliefs, and how you feel and think about life. This information can help others choose the care that you would choose.  Why are advance directives important?  An advance directive helps you control your care. Although spoken wishes may be used, it is better to have your wishes written down. Spoken wishes can be misunderstood, or not followed. Treatments may be given even if you do not want them. An advance directive may make it easier  for your family to make difficult choices about your care.       © Copyright [employer] 2018 Information is for End User's use only and may not be sold, redistributed or otherwise used for commercial purposes. All illustrations and images included in CareNotes® are the copyrighted property of A.D.A.M., Inc. or [employer]

## 2024-12-16 NOTE — ASSESSMENT & PLAN NOTE
Continue Orgain nutritional supplements.   Nutritional supplement without sugar substitute--which exacerbates diarrhea.

## 2024-12-16 NOTE — ASSESSMENT & PLAN NOTE
Left 50-69% stenosis of internal carotid artery.   Right chronic occlusion.   Due for vascular follow up.

## 2024-12-16 NOTE — ASSESSMENT & PLAN NOTE
Chronic mix of constipation and diarrhea.   Trying to be more consistent with metamucil.  Avoiding sugar substitute has helped.   Continues to follow with GI.

## 2024-12-17 ENCOUNTER — TELEPHONE (OUTPATIENT)
Dept: ADMINISTRATIVE | Facility: OTHER | Age: 89
End: 2024-12-17

## 2024-12-17 NOTE — TELEPHONE ENCOUNTER
Upon review of the In Basket request we have found/obtained the documentation. After careful review of the document we are unable to complete this request for Diabetic Eye Exam because the documentation does not have the result(s) needed to close the requested care gap(s). This is a summary of an eye injection procedure, not a DM eye exam. Last DM exam done 8/2024 and is in [facility].    Any additional questions or concerns should be emailed to the Practice Liaisons via the appropriate education email address, please do not reply via In Basket.    Thank you  Sanchez Beatty MA   [facility]

## 2024-12-17 NOTE — TELEPHONE ENCOUNTER
----- Message from Isa MCKINNEY sent at 12/16/2024  2:16 PM EST -----  Regarding: Care Gap Request  12/16/24 2:16 PM    Hello, our patient attached above has had Diabetic Eye Exam completed/performed. Please assist in updating the patient chart by pulling the document from the Media Tab. The date of service is 11/14/24.     Thank you,  Isa Enamorado MA  [facility]

## 2025-01-02 DIAGNOSIS — F41.9 ANXIETY: ICD-10-CM

## 2025-01-03 RX ORDER — LORAZEPAM 0.5 MG/1
0.5 TABLET ORAL 2 TIMES DAILY PRN
Qty: 60 TABLET | Refills: 2 | Status: SHIPPED | OUTPATIENT
Start: 2025-01-03

## 2025-01-13 ENCOUNTER — OFFICE VISIT (OUTPATIENT)
Age: 89
End: 2025-01-13
Payer: MEDICARE

## 2025-01-13 VITALS
WEIGHT: 125.6 LBS | SYSTOLIC BLOOD PRESSURE: 102 MMHG | DIASTOLIC BLOOD PRESSURE: 70 MMHG | BODY MASS INDEX: 20.18 KG/M2 | HEIGHT: 66 IN | HEART RATE: 71 BPM | TEMPERATURE: 97.6 F | OXYGEN SATURATION: 95 %

## 2025-01-13 DIAGNOSIS — K58.0 IRRITABLE BOWEL SYNDROME WITH DIARRHEA: ICD-10-CM

## 2025-01-13 DIAGNOSIS — R13.10 DYSPHAGIA, UNSPECIFIED TYPE: Primary | ICD-10-CM

## 2025-01-13 PROCEDURE — 99214 OFFICE O/P EST MOD 30 MIN: CPT | Performed by: DIETITIAN, REGISTERED

## 2025-01-13 NOTE — ASSESSMENT & PLAN NOTE
Patient reports worsening in dysphagia to solids x last several months.  She is taking pantoprazole 40 mg twice daily and Pepcid 20 mg twice daily.  Video barium swallow with speech 5/7/2024 showed oral stage WNL, pharyngeal stage with mild dysphagia, and esophageal stage with solid food retention, cleared with thin liquids.  Pill retention was noted throughout the study.  Barium esophagram 6/20/2024 showed tertiary esophageal contractions and diminished primary esophageal peristalsis, moderate gastroesophageal reflux, pooling of contrast in the vallecula and silent laryngeal penetration, and a small to moderate hiatal hernia.  Last EGD was in 2020, which showed irregular Z-line but was otherwise normal.   She was evaluated by SLP in 9/2024 who advises that oropharyngeal swallow function is normal/only mildly abnormal and they suspect esophageal dysphagia is patient's primary issue.  Patient admits she eats very quickly but she is trying to work on this.  Fortunately, weight has increased 10 lb since her last office visit.     -Continue pantorpazole 40 mg twice daily  -Continue famotidine 20 mg twice daily  -Concern that issues are primarily due to esophageal motility dysfunction, in which case endoscopic dilation may not help very much and may put patient at risk for procedural complications.  We discussed risks versus benefits of EGD and I recommend deferring procedure for now.  Patient is in agreement to avoid procedures for now.  -Patient will call us if any worsening of her symptoms.  -Instructed patient to eat slowly, take small bites, chew thoroughly, sip liquids between bites

## 2025-01-13 NOTE — PROGRESS NOTES
Name: Ac Duran      : 1935      MRN: [id number]  Encounter Provider: Krishna Kim PA-C  Encounter Date: 2025   Encounter department: [facility]  :  Assessment & Plan  Dysphagia, unspecified type  Patient reports worsening in dysphagia to solids x last several months.  She is taking pantoprazole 40 mg twice daily and Pepcid 20 mg twice daily.  Video barium swallow with speech 2024 showed oral stage WNL, pharyngeal stage with mild dysphagia, and esophageal stage with solid food retention, cleared with thin liquids.  Pill retention was noted throughout the study.  Barium esophagram 2024 showed tertiary esophageal contractions and diminished primary esophageal peristalsis, moderate gastroesophageal reflux, pooling of contrast in the vallecula and silent laryngeal penetration, and a small to moderate hiatal hernia.  Last EGD was in , which showed irregular Z-line but was otherwise normal.   She was evaluated by SLP in 2024 who advises that oropharyngeal swallow function is normal/only mildly abnormal and they suspect esophageal dysphagia is patient's primary issue.  Patient admits she eats very quickly but she is trying to work on this.  Fortunately, weight has increased 10 lb since her last office visit.     -Continue pantorpazole 40 mg twice daily  -Continue famotidine 20 mg twice daily  -Concern that issues are primarily due to esophageal motility dysfunction, in which case endoscopic dilation may not help very much and may put patient at risk for procedural complications.  We discussed risks versus benefits of EGD and I recommend deferring procedure for now.  Patient is in agreement to avoid procedures for now.  -Patient will call us if any worsening of her symptoms.  -Instructed patient to eat slowly, take small bites, chew thoroughly, sip liquids between bites  Irritable bowel syndrome with diarrhea  Patient with history of  "IBS-D, with recent worsening bowel function with frequent diarrhea/fecal incontinence, up to 3-7 BMs/day.  In 10/2024 we treated with Xifaxan x 2 weeks.  Patient feels her bowel habits have improved after her course of Xifaxan.  She is now having 2-3 soft BMs daily and uses Imodium PRN if stools are loose or urgent.     -Continue Imodium PRN.  -Follow up 6 months, unless needed sooner.      History of Present Illness   HPI  Ac Duran is a 89 y.o. female who presents for follow-up of dysphagia and IBS.    Patient was evaluated by SLP on 9/17/2024, who recommends that GI address issues of hiatal hernia and esophageal dysmotility, as this appears to be the primary challenge with food.  Patient with intact oral phase of swallow and only mild pharyngeal dysphagia.    Patient feels her bowel habits have improved after her course of Xifaxan.  She is now having 2-3 soft BMs daily and uses Imodium PRN if stools are loose or urgent.  She reports ongoing/stable esophageal dysphagia.  She still is a fast eater but she is trying her best to cut food smaller and take her time when she remembers.  Every now and then, a bite of food will get stuck in her throat and then go down with some water.  She feels OK overall.  Still hears some squeaking in her throat which she thinks is possibly due to her hiatal hernia.        Review of Systems   All other systems reviewed and are negative.         Objective   /70 (BP Location: Right arm, Patient Position: Sitting, Cuff Size: Adult)   Pulse 71   Temp 97.6 °F (36.4 °C) (Tympanic)   Ht 5' 6\" (1.676 m)   Wt 57 kg (125 lb 9.6 oz)   SpO2 95%   BMI 20.27 kg/m²      Physical Exam  Constitutional:       Appearance: Normal appearance.   HENT:      Head: Normocephalic and atraumatic.   Pulmonary:      Effort: Pulmonary effort is normal.   Skin:     Coloration: Skin is not jaundiced or pale.   Neurological:      Mental Status: She is alert.   Psychiatric:         Mood and " Affect: Mood normal.         Behavior: Behavior normal.

## 2025-01-23 ENCOUNTER — IOP CHECK (OUTPATIENT)
Dept: URBAN - METROPOLITAN AREA CLINIC 6 | Facility: CLINIC | Age: 89
End: 2025-01-23

## 2025-01-23 DIAGNOSIS — H40.1132: ICD-10-CM

## 2025-01-23 PROCEDURE — 92083 EXTENDED VISUAL FIELD XM: CPT

## 2025-01-23 PROCEDURE — 92012 INTRM OPH EXAM EST PATIENT: CPT

## 2025-01-23 ASSESSMENT — VISUAL ACUITY
OD_PH: 20/200-1
OU_CC: J3
OS_CC: 20/40-1
OD_CC: 20/250

## 2025-01-23 ASSESSMENT — TONOMETRY
OD_IOP_MMHG: 14
OS_IOP_MMHG: 12

## 2025-01-27 ENCOUNTER — REMOTE DEVICE CLINIC VISIT (OUTPATIENT)
Dept: CARDIOLOGY CLINIC | Facility: CLINIC | Age: 89
End: 2025-01-27
Payer: MEDICARE

## 2025-01-27 ENCOUNTER — RESULTS FOLLOW-UP (OUTPATIENT)
Dept: CARDIOLOGY CLINIC | Facility: CLINIC | Age: 89
End: 2025-01-27

## 2025-01-27 DIAGNOSIS — Z95.0 CARDIAC PACEMAKER IN SITU: Primary | ICD-10-CM

## 2025-01-27 PROCEDURE — 93294 REM INTERROG EVL PM/LDLS PM: CPT | Performed by: INTERNAL MEDICINE

## 2025-01-27 PROCEDURE — 93296 REM INTERROG EVL PM/IDS: CPT | Performed by: INTERNAL MEDICINE

## 2025-01-27 NOTE — PROGRESS NOTES
Results for orders placed or performed in visit on 01/27/25   Cardiac EP device report    Narrative    MDT-DUAL CHAMBER PPM (AAIR-DDDR MODE)/ ACTIVE SYSTEM IS MRI CONDITIONAL  CARELINK TRANSMISSION: BATTERY VOLTAGE ADEQUATE (2 YRS). AP-83%, -0.3%. ALL AVAILABLE LEAD PARAMETERS WITHIN NORMAL LIMITS. 2 NSVT EPISODES- 6 BEATS, AVG CL~305MS & 7 BEATS, AVG CL~375MS. EF-60% (ECHO 10/7/22). 1 AF EPISODE ON 11/18/24  LASTING 2.3 HRS. AF BURDEN-0.1%. HX: PAF & ON ELIQUIS & METOPROLOL. NORMAL DEVICE FUNCTION. GV

## 2025-02-03 DIAGNOSIS — I48.0 PAROXYSMAL ATRIAL FIBRILLATION (HCC): ICD-10-CM

## 2025-02-03 DIAGNOSIS — Z79.4 TYPE 2 DIABETES MELLITUS WITH RETINOPATHY, WITH LONG-TERM CURRENT USE OF INSULIN, MACULAR EDEMA PRESENCE UNSPECIFIED, UNSPECIFIED LATERALITY, UNSPECIFIED RETINOPATHY SEVERITY (HCC): ICD-10-CM

## 2025-02-03 DIAGNOSIS — E11.319 TYPE 2 DIABETES MELLITUS WITH RETINOPATHY, WITH LONG-TERM CURRENT USE OF INSULIN, MACULAR EDEMA PRESENCE UNSPECIFIED, UNSPECIFIED LATERALITY, UNSPECIFIED RETINOPATHY SEVERITY (HCC): ICD-10-CM

## 2025-02-04 RX ORDER — APIXABAN 2.5 MG/1
TABLET, FILM COATED ORAL
Qty: 90 TABLET | Refills: 1 | Status: SHIPPED | OUTPATIENT
Start: 2025-02-04

## 2025-02-04 RX ORDER — SITAGLIPTIN 100 MG/1
100 TABLET, FILM COATED ORAL DAILY
Qty: 90 TABLET | Refills: 1 | Status: SHIPPED | OUTPATIENT
Start: 2025-02-04

## 2025-02-17 ENCOUNTER — NURSE TRIAGE (OUTPATIENT)
Age: 89
End: 2025-02-17

## 2025-02-17 ENCOUNTER — IN-CLINIC DEVICE VISIT (OUTPATIENT)
Dept: CARDIOLOGY CLINIC | Facility: CLINIC | Age: 89
End: 2025-02-17
Payer: MEDICARE

## 2025-02-17 DIAGNOSIS — Z79.4 TYPE 2 DIABETES MELLITUS WITH DIABETIC POLYNEUROPATHY, WITH LONG-TERM CURRENT USE OF INSULIN (HCC): ICD-10-CM

## 2025-02-17 DIAGNOSIS — E11.42 TYPE 2 DIABETES MELLITUS WITH DIABETIC POLYNEUROPATHY, WITH LONG-TERM CURRENT USE OF INSULIN (HCC): ICD-10-CM

## 2025-02-17 DIAGNOSIS — K21.00 GASTROESOPHAGEAL REFLUX DISEASE WITH ESOPHAGITIS WITHOUT HEMORRHAGE: ICD-10-CM

## 2025-02-17 DIAGNOSIS — Z95.0 CARDIAC PACEMAKER IN SITU: Primary | ICD-10-CM

## 2025-02-17 DIAGNOSIS — G47.00 INSOMNIA, UNSPECIFIED TYPE: ICD-10-CM

## 2025-02-17 PROCEDURE — 93280 PM DEVICE PROGR EVAL DUAL: CPT | Performed by: STUDENT IN AN ORGANIZED HEALTH CARE EDUCATION/TRAINING PROGRAM

## 2025-02-17 NOTE — TELEPHONE ENCOUNTER
"Pt called to cancel a vascular appt and was transferred to cardiology due to exertional SOB. The pt reports shortness of breath and shakiness. She reports that her heart has been pounding and she has not been able to do anything. The patient does have a hx of PAF. Advised the patient to send a transmission to the device clinic. The patient does not take her vital signs so it is unclear if she is in AFIB/having a rapid heart rate.     The patient was asked if she would like to go to the ER. Pt reports that she was there recently and they she report that the don't find anything when she is laying there. She only gets SOB breath with exertion and she has to sit down. The pt denies chest pain and dizziness/lightheadedness.     The patient is taking her Metoprolol 12.5mg BID and her Eliquis 2.5mg BID.     Attempted to make the patient an urgent apt, however there is nothing avaiable. Advised the patient, would send a message to the provider and call patient back with further recommendations      Answer Assessment - Initial Assessment Questions  1. RESPIRATORY STATUS: \"Describe your breathing?\" (e.g., wheezing, shortness of breath, unable to speak, severe coughing)       Shortness of breath with exertion   2. ONSET: \"When did this breathing problem begin?\"       Several weeks   3. PATTERN \"Does the difficult breathing come and go, or has it been constant since it started?\"       Only with exertion   4. SEVERITY: \"How bad is your breathing?\" (e.g., mild, moderate, severe)       Moderate   5. RECURRENT SYMPTOM: \"Have you had difficulty breathing before?\" If Yes, ask: \"When was the last time?\" and \"What happened that time?\"       Has not happened like this before   6. CARDIAC HISTORY: \"Do you have any history of heart disease?\" (e.g., heart attack, angina, bypass surgery, angioplasty)       History of AFIB and HTN   7. LUNG HISTORY: \"Do you have any history of lung disease?\"  (e.g., pulmonary embolus, asthma, emphysema)      " "Pulmonary fibrosis   8. CAUSE: \"What do you think is causing the breathing problem?\"       Unsure   9. OTHER SYMPTOMS: \"Do you have any other symptoms?\" (e.g., chest pain, cough, dizziness, fever, runny nose)      Sits down right away, can't do anything - so unsure   10. O2 SATURATION MONITOR:  \"Do you use an oxygen saturation monitor (pulse oximeter) at home?\" If Yes, ask: \"What is your reading (oxygen level) today?\" \"What is your usual oxygen saturation reading?\" (e.g., 95%)        \"90's\"    Protocols used: Breathing Difficulty-Adult-[place]    "

## 2025-02-17 NOTE — PROGRESS NOTES
Results for orders placed or performed in visit on 02/17/25   Cardiac EP device report    Narrative    MDT-DUAL CHAMBER PPM (AAIR-DDDR MODE)/ ACTIVE SYSTEM IS MRI CONDITIONAL  DEVICE INTERROGATED IN THE [place] OFFICE. BATTERY VOLTAGE ADEQUATE (2 YRS). AP-88%, -0.2%. ALL LEAD PARAMETERS WITHIN NORMAL LIMITS. NO NEW SIGNIFICANT HIGH RATE EPISODES. PT C/O SOB & FATIGUE W/ ACTIVITY- REPROGRAMMED RATE RESPONSE ACTIVITY THRESHOLD TO LOW FROM MEDIAUM LOW. NORMAL DEVICE FUNCTION. GV

## 2025-02-17 NOTE — TELEPHONE ENCOUNTER
2/17/25 Pt called back today. Wants to send a reading in on her Pacemaker but her machine isn't working. She wants to come in today. Added her on . CR

## 2025-02-18 ENCOUNTER — RESULTS FOLLOW-UP (OUTPATIENT)
Dept: NON INVASIVE DIAGNOSTICS | Facility: HOSPITAL | Age: 89
End: 2025-02-18

## 2025-02-18 RX ORDER — MIRTAZAPINE 7.5 MG/1
7.5 TABLET, FILM COATED ORAL
Qty: 30 TABLET | Refills: 5 | Status: SHIPPED | OUTPATIENT
Start: 2025-02-18

## 2025-02-18 RX ORDER — FAMOTIDINE 20 MG/1
20 TABLET, FILM COATED ORAL 2 TIMES DAILY
Qty: 60 TABLET | Refills: 5 | Status: SHIPPED | OUTPATIENT
Start: 2025-02-18

## 2025-02-24 ENCOUNTER — APPOINTMENT (EMERGENCY)
Dept: RADIOLOGY | Facility: HOSPITAL | Age: 89
DRG: 197 | End: 2025-02-24
Payer: MEDICARE

## 2025-02-24 ENCOUNTER — HOSPITAL ENCOUNTER (INPATIENT)
Facility: HOSPITAL | Age: 89
LOS: 1 days | Discharge: HOME | DRG: 197 | End: 2025-02-26
Attending: EMERGENCY MEDICINE | Admitting: INTERNAL MEDICINE
Payer: MEDICARE

## 2025-02-24 DIAGNOSIS — R06.09 DYSPNEA ON EXERTION: Primary | ICD-10-CM

## 2025-02-24 DIAGNOSIS — E61.1 IRON DEFICIENCY: ICD-10-CM

## 2025-02-24 DIAGNOSIS — J84.9 INTERSTITIAL LUNG DISEASE (HCC): ICD-10-CM

## 2025-02-24 PROBLEM — R06.00 DYSPNEA: Status: ACTIVE | Noted: 2024-04-15

## 2025-02-24 PROBLEM — R71.8 MICROCYTOSIS: Status: ACTIVE | Noted: 2025-02-24

## 2025-02-24 LAB
2HR DELTA HS TROPONIN: 0 NG/L
2HR DELTA HS TROPONIN: 14 NG/L
4HR DELTA HS TROPONIN: -7 NG/L
4HR DELTA HS TROPONIN: 17 NG/L
ALBUMIN SERPL BCG-MCNC: 4.4 G/DL (ref 3.5–5)
ALP SERPL-CCNC: 55 U/L (ref 34–104)
ALT SERPL W P-5'-P-CCNC: 12 U/L (ref 7–52)
ANION GAP SERPL CALCULATED.3IONS-SCNC: 8 MMOL/L (ref 4–13)
AST SERPL W P-5'-P-CCNC: 21 U/L (ref 13–39)
BACTERIA UR QL AUTO: ABNORMAL /HPF
BASOPHILS # BLD AUTO: 0.04 THOUSANDS/ÂΜL (ref 0–0.1)
BASOPHILS NFR BLD AUTO: 1 % (ref 0–1)
BILIRUB SERPL-MCNC: 0.51 MG/DL (ref 0.2–1)
BILIRUB UR QL STRIP: NEGATIVE
BNP SERPL-MCNC: 192 PG/ML (ref 0–100)
BUN SERPL-MCNC: 15 MG/DL (ref 5–25)
CALCIUM SERPL-MCNC: 9.4 MG/DL (ref 8.4–10.2)
CARDIAC TROPONIN I PNL SERPL HS: 13 NG/L (ref ?–50)
CARDIAC TROPONIN I PNL SERPL HS: 18 NG/L (ref ?–50)
CARDIAC TROPONIN I PNL SERPL HS: 25 NG/L (ref ?–50)
CARDIAC TROPONIN I PNL SERPL HS: 25 NG/L (ref ?–50)
CARDIAC TROPONIN I PNL SERPL HS: 27 NG/L (ref ?–50)
CARDIAC TROPONIN I PNL SERPL HS: 30 NG/L (ref ?–50)
CHLORIDE SERPL-SCNC: 94 MMOL/L (ref 96–108)
CLARITY UR: CLEAR
CO2 SERPL-SCNC: 30 MMOL/L (ref 21–32)
COLOR UR: COLORLESS
CREAT SERPL-MCNC: 0.54 MG/DL (ref 0.6–1.3)
D DIMER PPP FEU-MCNC: <0.27 UG/ML FEU
EOSINOPHIL # BLD AUTO: 0.06 THOUSAND/ÂΜL (ref 0–0.61)
EOSINOPHIL NFR BLD AUTO: 1 % (ref 0–6)
ERYTHROCYTE [DISTWIDTH] IN BLOOD BY AUTOMATED COUNT: 15.5 % (ref 11.6–15.1)
FLUAV RNA RESP QL NAA+PROBE: NEGATIVE
FLUBV RNA RESP QL NAA+PROBE: NEGATIVE
GFR SERPL CREATININE-BSD FRML MDRD: 85 ML/MIN/1.73SQ M
GLUCOSE SERPL-MCNC: 119 MG/DL (ref 65–140)
GLUCOSE SERPL-MCNC: 134 MG/DL (ref 65–140)
GLUCOSE SERPL-MCNC: 234 MG/DL (ref 65–140)
GLUCOSE UR STRIP-MCNC: ABNORMAL MG/DL
HCT VFR BLD AUTO: 39.6 % (ref 34.8–46.1)
HGB BLD-MCNC: 12.2 G/DL (ref 11.5–15.4)
HGB UR QL STRIP.AUTO: NEGATIVE
IMM GRANULOCYTES # BLD AUTO: 0.03 THOUSAND/UL (ref 0–0.2)
IMM GRANULOCYTES NFR BLD AUTO: 1 % (ref 0–2)
KETONES UR STRIP-MCNC: NEGATIVE MG/DL
LEUKOCYTE ESTERASE UR QL STRIP: ABNORMAL
LYMPHOCYTES # BLD AUTO: 0.62 THOUSANDS/ÂΜL (ref 0.6–4.47)
LYMPHOCYTES NFR BLD AUTO: 10 % (ref 14–44)
MCH RBC QN AUTO: 24.3 PG (ref 26.8–34.3)
MCHC RBC AUTO-ENTMCNC: 30.8 G/DL (ref 31.4–37.4)
MCV RBC AUTO: 79 FL (ref 82–98)
MONOCYTES # BLD AUTO: 0.48 THOUSAND/ÂΜL (ref 0.17–1.22)
MONOCYTES NFR BLD AUTO: 8 % (ref 4–12)
NEUTROPHILS # BLD AUTO: 5.14 THOUSANDS/ÂΜL (ref 1.85–7.62)
NEUTS SEG NFR BLD AUTO: 79 % (ref 43–75)
NITRITE UR QL STRIP: NEGATIVE
NON-SQ EPI CELLS URNS QL MICRO: ABNORMAL /HPF
NRBC BLD AUTO-RTO: 0 /100 WBCS
PH UR STRIP.AUTO: 7 [PH]
PLATELET # BLD AUTO: 204 THOUSANDS/UL (ref 149–390)
PMV BLD AUTO: 9.7 FL (ref 8.9–12.7)
POTASSIUM SERPL-SCNC: 4.1 MMOL/L (ref 3.5–5.3)
PROT SERPL-MCNC: 8.4 G/DL (ref 6.4–8.4)
PROT UR STRIP-MCNC: ABNORMAL MG/DL
RBC # BLD AUTO: 5.02 MILLION/UL (ref 3.81–5.12)
RBC #/AREA URNS AUTO: ABNORMAL /HPF
RSV RNA RESP QL NAA+PROBE: NEGATIVE
SARS-COV-2 RNA RESP QL NAA+PROBE: NEGATIVE
SODIUM SERPL-SCNC: 132 MMOL/L (ref 135–147)
SP GR UR STRIP.AUTO: 1.01 (ref 1–1.03)
UROBILINOGEN UR STRIP-ACNC: <2 MG/DL
WBC # BLD AUTO: 6.37 THOUSAND/UL (ref 4.31–10.16)
WBC #/AREA URNS AUTO: ABNORMAL /HPF

## 2025-02-24 PROCEDURE — 99223 1ST HOSP IP/OBS HIGH 75: CPT | Performed by: INTERNAL MEDICINE

## 2025-02-24 PROCEDURE — 85025 COMPLETE CBC W/AUTO DIFF WBC: CPT

## 2025-02-24 PROCEDURE — 83880 ASSAY OF NATRIURETIC PEPTIDE: CPT

## 2025-02-24 PROCEDURE — 86140 C-REACTIVE PROTEIN: CPT

## 2025-02-24 PROCEDURE — 99285 EMERGENCY DEPT VISIT HI MDM: CPT

## 2025-02-24 PROCEDURE — 82948 REAGENT STRIP/BLOOD GLUCOSE: CPT

## 2025-02-24 PROCEDURE — 84484 ASSAY OF TROPONIN QUANT: CPT | Performed by: INTERNAL MEDICINE

## 2025-02-24 PROCEDURE — 0241U HB NFCT DS VIR RESP RNA 4 TRGT: CPT | Performed by: INTERNAL MEDICINE

## 2025-02-24 PROCEDURE — 99285 EMERGENCY DEPT VISIT HI MDM: CPT | Performed by: EMERGENCY MEDICINE

## 2025-02-24 PROCEDURE — 84484 ASSAY OF TROPONIN QUANT: CPT

## 2025-02-24 PROCEDURE — 80053 COMPREHEN METABOLIC PANEL: CPT

## 2025-02-24 PROCEDURE — 81001 URINALYSIS AUTO W/SCOPE: CPT | Performed by: INTERNAL MEDICINE

## 2025-02-24 PROCEDURE — 36415 COLL VENOUS BLD VENIPUNCTURE: CPT

## 2025-02-24 PROCEDURE — 71046 X-RAY EXAM CHEST 2 VIEWS: CPT

## 2025-02-24 PROCEDURE — 93005 ELECTROCARDIOGRAM TRACING: CPT

## 2025-02-24 PROCEDURE — 85379 FIBRIN DEGRADATION QUANT: CPT

## 2025-02-24 RX ORDER — FAMOTIDINE 20 MG/1
20 TABLET, FILM COATED ORAL 2 TIMES DAILY
Status: DISCONTINUED | OUTPATIENT
Start: 2025-02-24 | End: 2025-02-26 | Stop reason: HOSPADM

## 2025-02-24 RX ORDER — LORAZEPAM 0.5 MG/1
0.5 TABLET ORAL 2 TIMES DAILY PRN
Status: DISCONTINUED | OUTPATIENT
Start: 2025-02-24 | End: 2025-02-26 | Stop reason: HOSPADM

## 2025-02-24 RX ORDER — ACETAMINOPHEN 325 MG/1
650 TABLET ORAL EVERY 8 HOURS SCHEDULED
Status: DISCONTINUED | OUTPATIENT
Start: 2025-02-24 | End: 2025-02-26 | Stop reason: HOSPADM

## 2025-02-24 RX ORDER — PANTOPRAZOLE SODIUM 40 MG/1
40 TABLET, DELAYED RELEASE ORAL 2 TIMES DAILY
Status: DISCONTINUED | OUTPATIENT
Start: 2025-02-24 | End: 2025-02-26 | Stop reason: HOSPADM

## 2025-02-24 RX ORDER — BRIMONIDINE TARTRATE 2 MG/ML
1 SOLUTION/ DROPS OPHTHALMIC 2 TIMES DAILY
Status: DISCONTINUED | OUTPATIENT
Start: 2025-02-24 | End: 2025-02-26 | Stop reason: HOSPADM

## 2025-02-24 RX ORDER — MIRTAZAPINE 15 MG/1
7.5 TABLET, FILM COATED ORAL
Status: DISCONTINUED | OUTPATIENT
Start: 2025-02-24 | End: 2025-02-26 | Stop reason: HOSPADM

## 2025-02-24 RX ORDER — SODIUM CHLORIDE 1 G/1
1 TABLET ORAL 2 TIMES DAILY WITH MEALS
Status: DISCONTINUED | OUTPATIENT
Start: 2025-02-24 | End: 2025-02-26 | Stop reason: HOSPADM

## 2025-02-24 RX ORDER — IPRATROPIUM BROMIDE 21 UG/1
1 SPRAY, METERED NASAL 2 TIMES DAILY
Status: DISCONTINUED | OUTPATIENT
Start: 2025-02-24 | End: 2025-02-24

## 2025-02-24 RX ORDER — BRIMONIDINE TARTRATE 2 MG/ML
1 SOLUTION/ DROPS OPHTHALMIC EVERY 8 HOURS SCHEDULED
Status: DISCONTINUED | OUTPATIENT
Start: 2025-02-24 | End: 2025-02-24

## 2025-02-24 RX ADMIN — MIRTAZAPINE 7.5 MG: 15 TABLET, FILM COATED ORAL at 21:40

## 2025-02-24 RX ADMIN — PANTOPRAZOLE SODIUM 40 MG: 40 TABLET, DELAYED RELEASE ORAL at 21:40

## 2025-02-24 RX ADMIN — APIXABAN 2.5 MG: 2.5 TABLET, FILM COATED ORAL at 18:12

## 2025-02-24 RX ADMIN — FAMOTIDINE 20 MG: 20 TABLET, FILM COATED ORAL at 18:12

## 2025-02-24 RX ADMIN — Medication 12.5 MG: at 21:40

## 2025-02-24 RX ADMIN — LORAZEPAM 0.5 MG: 0.5 TABLET ORAL at 21:40

## 2025-02-24 NOTE — ED NOTES
Patient ambulated with minimal assistance of staff to restroom. Patient visible SOB upon exertion but pulse ox reads above 94% throughout exertion.     Roger John  02/24/25 [id number]

## 2025-02-24 NOTE — ED ATTENDING ATTESTATION
2/24/2025  I, Silvano Sierra MD, saw and evaluated the patient. I have discussed the patient with the resident/non-physician practitioner and agree with the resident's/non-physician practitioner's findings, Plan of Care, and MDM as documented in the resident's/non-physician practitioner's note, except where noted. All available labs and Radiology studies were reviewed.  I was present for key portions of any procedure(s) performed by the resident/non-physician practitioner and I was immediately available to provide assistance.       At this point I agree with the current assessment done in the Emergency Department.  I have conducted an independent evaluation of this patient a history and physical is as follows: Shortness of breath on minimal exertion.  Has attempted outpatient workup with her pulmonologist, symptoms worsened to the point where she is unable to walk more at about 5 feet with significant short of breath.  She is conversationally dyspneic.  EKG with no acute ischemia.  No chest pain at rest.  Hemodynamically stable.  Concern for new onset heart failure, valvular abnormality.  Will need echo.    Results Reviewed       Procedure Component Value Units Date/Time    HS Troponin I 2hr [[id number]] Collected: 02/24/25 1330    Lab Status: In process Specimen: Blood from Arm, Left Updated: 02/24/25 1335    Fingerstick Glucose (POCT) [[id number]]  (Normal) Collected: 02/24/25 1333    Lab Status: Final result Specimen: Blood Updated: 02/24/25 1334     POC Glucose 134 mg/dl     B-Type Natriuretic Peptide(BNP) [[id number]]     Lab Status: No result Specimen: Blood     HS Troponin I 4hr [[id number]]     Lab Status: No result Specimen: Blood     HS Troponin 0hr (reflex protocol) [[id number]]  (Normal) Collected: 02/24/25 1118    Lab Status: Final result Specimen: Blood from Arm, Left Updated: 02/24/25 1147     hs TnI 0hr 13 ng/L     Comprehensive metabolic panel [[id number]]  (Abnormal) Collected: 02/24/25 1118     Lab Status: Final result Specimen: Blood from Arm, Left Updated: 02/24/25 1142     Sodium 132 mmol/L      Potassium 4.1 mmol/L      Chloride 94 mmol/L      CO2 30 mmol/L      ANION GAP 8 mmol/L      BUN 15 mg/dL      Creatinine 0.54 mg/dL      Glucose 234 mg/dL      Calcium 9.4 mg/dL      AST 21 U/L      ALT 12 U/L      Alkaline Phosphatase 55 U/L      Total Protein 8.4 g/dL      Albumin 4.4 g/dL      Total Bilirubin 0.51 mg/dL      eGFR 85 ml/min/1.73sq m     Narrative:      [employer] guidelines for Chronic Kidney Disease (CKD):     Stage 1 with normal or high GFR (GFR > 90 mL/min/1.73 square meters)    Stage 2 Mild CKD (GFR = 60-89 mL/min/1.73 square meters)    Stage 3A Moderate CKD (GFR = 45-59 mL/min/1.73 square meters)    Stage 3B Moderate CKD (GFR = 30-44 mL/min/1.73 square meters)    Stage 4 Severe CKD (GFR = 15-29 mL/min/1.73 square meters)    Stage 5 End Stage CKD (GFR <15 mL/min/1.73 square meters)  Note: GFR calculation is accurate only with a steady state creatinine    CBC and differential [[id number]]  (Abnormal) Collected: 02/24/25 1118    Lab Status: Final result Specimen: Blood from Arm, Left Updated: 02/24/25 1127     WBC 6.37 Thousand/uL      RBC 5.02 Million/uL      Hemoglobin 12.2 g/dL      Hematocrit 39.6 %      MCV 79 fL      MCH 24.3 pg      MCHC 30.8 g/dL      RDW 15.5 %      MPV 9.7 fL      Platelets 204 Thousands/uL      nRBC 0 /100 WBCs      Segmented % 79 %      Immature Grans % 1 %      Lymphocytes % 10 %      Monocytes % 8 %      Eosinophils Relative 1 %      Basophils Relative 1 %      Absolute Neutrophils 5.14 Thousands/µL      Absolute Immature Grans 0.03 Thousand/uL      Absolute Lymphocytes 0.62 Thousands/µL      Absolute Monocytes 0.48 Thousand/µL      Eosinophils Absolute 0.06 Thousand/µL      Basophils Absolute 0.04 Thousands/µL           XR chest 2 views   Final Result by Alize Grewal MD (02/24 1332)      Moderate pulmonary fibrosis with no acute  disease.            Workstation performed: [id number]               ED Course         Critical Care Time  Procedures

## 2025-02-24 NOTE — ASSESSMENT & PLAN NOTE
Patient presenting with 2 to 3-week history of worsening dyspnea on exertion.  Usually can walk a block or so without being short of breath however now can only take a few steps.  Does have pulmonary fibrosis and follows with [facility] pulmonology.    She was scheduled to have an appointment with pulmonologist tomorrow however her shortness of breath became so severe that she comes into the ED instead.    Her chest x-ray does not show any new acute findings.  We will order a D-dimer.  Troponin initially is negative.  Will order an echo as well.    She does not appear volume overloaded today. We will check flu/covid and rsv.

## 2025-02-24 NOTE — H&P
H&P - Hospitalist   Name: Ac Duran 89 y.o. female I MRN: [id number]  Unit/Bed#: S -01 I Date of Admission: 2/24/2025   Date of Service: 2/24/2025 I Hospital Day: 0     Assessment & Plan  Dyspnea  Patient presenting with 2 to 3-week history of worsening dyspnea on exertion.  Usually can walk a block or so without being short of breath however now can only take a few steps.  Does have pulmonary fibrosis and follows with [facility] pulmonology.    She was scheduled to have an appointment with pulmonologist tomorrow however her shortness of breath became so severe that she comes into the ED instead.    Her chest x-ray does not show any new acute findings.  We will order a D-dimer.  Troponin initially is negative.  Will order an echo as well.    She does not appear volume overloaded today. We will check flu/covid and rsv.   Essential hypertension  BP is 164/69  Continue metoprolol     Paroxysmal atrial fibrillation (HCC)  Continue metoprolol   Continue eliquis  Hyponatremia  Na is 132  Patient is on salt tabs will continue on these  Na appears at or around baseline  Microcytosis  MCV is 79   Hemoglobin is within the normal range         VTE Pharmacologic Prophylaxis: VTE Score: 1 Moderate Risk (Score 3-4) - Pharmacological DVT Prophylaxis Ordered: heparin.  Code Status: Level 1 - Full Code   Discussion with family:  called son Rey.     Anticipated Length of Stay: Patient will be admitted on an observation basis with an anticipated length of stay of less than 2 midnights secondary to sob.    History of Present Illness   Chief Complaint: dyspnea    Ac Duran is a 89 y.o. female with a PMH of IPF who presents with worsening shortness of breath over the last 2-3 weeks.     She denies any fevers or chills, chest pain or leg swelling. She states that usually she would be able to walk a block or two, but now can only take a couple of steps before becoming short of breath.     She had an appointment  to see her Pulmonologist tomorrow for the worsening shortness of breath, however her shortness of breath has now progressed to the point where she is came in to the ED rather than wait for her OP appointment.     In the ED work up thus far is negative with CXR showing only pulmonary fibrosis but no acute process.     On exam she does not appear volume overloaded.     No viral testing was done and this will be ordered now.     Review of Systems   Constitutional:  Positive for activity change and fatigue.   HENT: Negative.     Eyes: Negative.    Respiratory:  Positive for shortness of breath. Negative for apnea, cough, choking, chest tightness, wheezing and stridor.    Cardiovascular: Negative.    Gastrointestinal: Negative.    Endocrine: Negative.    Genitourinary: Negative.    Musculoskeletal: Negative.    Skin: Negative.    Allergic/Immunologic: Negative.    Neurological: Negative.    Hematological: Negative.    Psychiatric/Behavioral: Negative.         Historical Information   Past Medical History:   Diagnosis Date    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/7/2020    Glaucoma     H/O degenerative disc disease     Idiopathic pulmonary fibrosis (HCC) 11/2020    Irregular heart beat     afib    Peripheral neuropathy     S/P laparoscopic cholecystectomy 12/21/2020    Stenosis of right subclavian artery (HCC) 11/18/2016     Past Surgical History:   Procedure Laterality Date    CATARACT EXTRACTION, BILATERAL  2011    CHOLECYSTECTOMY      CHOLECYSTECTOMY LAPAROSCOPIC N/A 12/09/2020    Procedure: CHOLECYSTECTOMY LAPAROSCOPIC;  Surgeon: Kalen Garrett MD;  Location: [facility];  Service: General    COLONOSCOPY      CYST REMOVAL  2009    left lower Quadrant     FL LUMBAR PUNCTURE DIAGNOSTIC  4/28/2023    HERNIA REPAIR  1992    LIPOMA RESECTION  2010    WI RPR 1ST INGUN HRNA AGE 5 YRS/> REDUCIBLE Bilateral 01/05/2018    Procedure: INGUINAL HERNIA REPAIR;  Surgeon: Volodymyr Lee MD;  Location: [facility];  Service: General    SHOULDER  SURGERY  2019    Dr. Arnett ([place])    UPPER GASTROINTESTINAL ENDOSCOPY      VASCULAR SURGERY      Agram-  R carotid occlusion     Social History     Tobacco Use    Smoking status: Former     Current packs/day: 0.00     Average packs/day: 1.5 packs/day for 38.0 years (57.0 ttl pk-yrs)     Types: Cigarettes     Start date:      Quit date:      Years since quittin.1    Smokeless tobacco: Never   Vaping Use    Vaping status: Never Used   Substance and Sexual Activity    Alcohol use: No    Drug use: No    Sexual activity: Not on file     E-Cigarette/Vaping    E-Cigarette Use Never User      E-Cigarette/Vaping Substances    Nicotine No     THC No     CBD No     Flavoring No     Other No     Unknown No      Family History   Problem Relation Age of Onset    Heart disease Mother     Heart attack Father     Hiatal hernia Father     Diabetes Father     Cancer Brother     Diabetes Brother     Heart disease Brother     Hypertension Brother     Lung disease Brother 75        interstitial lung disease    Cancer Brother 49        glioblastoma    Other Son         gastroparesis    Other Cousin         interstitial lung disease     Social History:  Marital Status:    Occupation: retired.   Patient Pre-hospital Living Situation: Home  Patient Pre-hospital Level of Mobility: walks  Patient Pre-hospital Diet Restrictions: none.     Meds/Allergies   I have reviewed home medications with patient personally.  Prior to Admission medications    Medication Sig Start Date End Date Taking? Authorizing Provider   acetaminophen (TYLENOL) 500 mg tablet Take 1,000 mg by mouth as needed for mild pain    Historical Provider, MD   apixaban (Eliquis) 2.5 mg TAKE 1 TABLET (2.5 MG TOTAL) BY MOUTH 2 (TWO) TIMES A DAY 25   ZANDER Swenson   ascorbic acid (VITAMIN C) 500 MG tablet Take 500 mg by mouth daily  Patient not taking: Reported on 2025    Historical Provider, MD   aspirin (ECOTRIN LOW STRENGTH) 81 mg EC  tablet Take 1 tablet (81 mg total) by mouth daily  Patient not taking: Reported on 1/13/2025 8/31/20   Sully Shi MD   bimatoprost (LUMIGAN) 0.01 % ophthalmic drops Administer 1 drop to both eyes daily at bedtime for 30 days 11/2/16   Sean Deasi MD   brimonidine tartrate 0.2 % ophthalmic solution INSTILL 1 DROP INTO RIGHT EYE TWICE A DAY 6/3/24   Historical Provider, MD   calcium carbonate (OS-DANIA) 600 MG tablet Take 600 mg by mouth 2 (two) times a day with meals    Patient not taking: Reported on 1/13/2025    Historical Provider, MD   Cyanocobalamin (Vitamin B 12) 500 MCG TABS Take 500 mg by mouth 2 (two) times a day    Historical Provider, MD   ezetimibe-simvastatin (VYTORIN) 10-40 mg per tablet TAKE 1 TABLET BY MOUTH DAILY AT BEDTIME 10/21/24   ZANDER Swenson   famotidine (PEPCID) 20 mg tablet TAKE 1 TABLET (20 MG TOTAL) BY MOUTH 2 (TWO) TIMES A DAY 2/18/25   ZANDER Swenson   glucose blood (OneTouch Ultra) test strip TEST FOUR TIMES A DAY 10/7/24   ZANDER Swenson   HumuLIN N KwikPen 100 units/mL injection pen INJECT 8 UNITS UNDER THE SKIN EVERY MORNING AND 4 UNITS EVERY EVENING. 11/6/24   ZANDER Swenson   Insulin Pen Needle (B-D UF III MINI PEN NEEDLES) 31G X 5 MM MISC USE 2 (TWO) TIMES A DAY 11/6/24   ZANDER Swenson   ipratropium (ATROVENT) 0.03 % nasal spray USE 2 SPRAYS INTO EACH NOSTRIL EVERY 12 (TWELVE) HOURS 10/31/24   Sanchez Murphy MD   loratadine (CLARITIN) 10 mg tablet Take 10 mg by mouth daily as needed for allergies  Patient not taking: Reported on 1/13/2025    Historical Provider, MD   LORazepam (ATIVAN) 0.5 mg tablet TAKE 1 TABLET (0.5 MG TOTAL) BY MOUTH 2 (TWO) TIMES A DAY AS NEEDED FOR ANXIETY 1/3/25   ZANDER Swenson   metFORMIN (GLUCOPHAGE) 500 mg tablet TAKE 2 TABLETS (1,000 MG TOTAL) BY MOUTH 2 (TWO) TIMES A DAY WITH MEALS 2/18/25   ZANDER Swenson   metoprolol tartrate (LOPRESSOR) 25 mg tablet Take 1 tablet (25 mg total) by mouth every 12 (twelve) hours  Patient taking  differently: Take 12.5 mg by mouth every 12 (twelve) hours 9/11/24   ZANDER Swenson   mirtazapine (REMERON) 7.5 MG tablet TAKE 1 TABLET (7.5 MG TOTAL) BY MOUTH DAILY AT BEDTIME 2/18/25   ZANDER Swenson   Multiple Vitamin (multivitamin) tablet Take 1 tablet by mouth daily      Historical Provider, MD   OneTouch Delica Lancets 33G MISC Check blood sugars four times daily. Please substitute with appropriate alternative as covered by patient's insurance. Dx: E11.65 8/30/24   ZANDER Swenson   pantoprazole (PROTONIX) 40 mg tablet TAKE 1 TABLET (40 MG TOTAL) BY MOUTH 2 (TWO) TIMES A DAY 12/3/24   ZANDER Swenson   polyethylene glycol (MIRALAX) 17 g packet Take 17 g by mouth daily as needed (constipation/ abominal cramping) for up to 14 days Prn constipation 7/6/24 12/11/24  Catia Castanon MD   sitaGLIPtin (Januvia) 100 mg tablet TAKE 1 TABLET (100 MG TOTAL) BY MOUTH DAILY 2/4/25   ZANDER Swenson   sodium chloride 1 g tablet TAKE 1 TABLET THREE TIMES A DAY 9/16/24   Joselyn Reyes Bahamonde, MD     Allergies   Allergen Reactions    Keflex [Cephalexin] Diarrhea       Objective :  Temp:  [98 °F (36.7 °C)] 98 °F (36.7 °C)  HR:  [68-85] 71  BP: (159-175)/(69-88) 164/69  Resp:  [18-20] 18  SpO2:  [96 %] 96 %  O2 Device: None (Room air)    Physical Exam  Constitutional:       General: She is not in acute distress.     Appearance: She is not ill-appearing or toxic-appearing.   HENT:      Head: Normocephalic.      Mouth/Throat:      Mouth: Mucous membranes are moist.   Eyes:      Pupils: Pupils are equal, round, and reactive to light.   Cardiovascular:      Rate and Rhythm: Normal rate.      Heart sounds: No murmur heard.     No friction rub. No gallop.   Pulmonary:      Effort: No respiratory distress.      Breath sounds: No stridor. No wheezing, rhonchi or rales.   Chest:      Chest wall: No tenderness.   Abdominal:      General: Abdomen is flat. There is no distension.      Palpations: There is no mass.       Tenderness: There is no abdominal tenderness. There is no right CVA tenderness, left CVA tenderness, guarding or rebound.      Hernia: No hernia is present.   Musculoskeletal:      Right lower leg: No edema.      Left lower leg: No edema.   Skin:     Capillary Refill: Capillary refill takes less than 2 seconds.      Coloration: Skin is not jaundiced or pale.      Findings: No bruising, erythema, lesion or rash.   Neurological:      Mental Status: She is alert.      Cranial Nerves: No cranial nerve deficit.      Sensory: No sensory deficit.      Motor: No weakness.      Coordination: Coordination normal.      Gait: Gait normal.      Deep Tendon Reflexes: Reflexes normal.   Psychiatric:         Mood and Affect: Mood normal.          Lines/Drains:            Lab Results: I have reviewed the following results:  Results from last 7 days   Lab Units 02/24/25  1118   WBC Thousand/uL 6.37   HEMOGLOBIN g/dL 12.2   HEMATOCRIT % 39.6   PLATELETS Thousands/uL 204   SEGS PCT % 79*   LYMPHO PCT % 10*   MONO PCT % 8   EOS PCT % 1     Results from last 7 days   Lab Units 02/24/25  1118   SODIUM mmol/L 132*   POTASSIUM mmol/L 4.1   CHLORIDE mmol/L 94*   CO2 mmol/L 30   BUN mg/dL 15   CREATININE mg/dL 0.54*   ANION GAP mmol/L 8   CALCIUM mg/dL 9.4   ALBUMIN g/dL 4.4   TOTAL BILIRUBIN mg/dL 0.51   ALK PHOS U/L 55   ALT U/L 12   AST U/L 21   GLUCOSE RANDOM mg/dL 234*         Results from last 7 days   Lab Units 02/24/25  1641 02/24/25  1333   POC GLUCOSE mg/dl 119 134     Lab Results   Component Value Date    HGBA1C 6.6 (H) 08/30/2024    HGBA1C 6.9 (H) 05/23/2024    HGBA1C 6.7 (H) 11/08/2023           Imaging Results Review: No pertinent imaging studies reviewed.  Other Study Results Review: No additional pertinent studies reviewed.    Administrative Statements   I have spent a total time of 45 minutes in caring for this patient on the day of the visit/encounter including Diagnostic results, Prognosis, Risks and benefits of tx  options, Instructions for management, Patient and family education, Importance of tx compliance, Risk factor reductions, Impressions, Counseling / Coordination of care, Documenting in the medical record, Reviewing/placing orders in the medical record (including tests, medications, and/or procedures), Obtaining or reviewing history  , and Communicating with other healthcare professionals .    ** Please Note: This note has been constructed using a voice recognition system. **

## 2025-02-24 NOTE — PLAN OF CARE
Problem: PAIN - ADULT  Goal: Verbalizes/displays adequate comfort level or baseline comfort level  Description: Interventions:  - Encourage patient to monitor pain and request assistance  - Assess pain using appropriate pain scale  - Administer analgesics based on type and severity of pain and evaluate response  - Implement non-pharmacological measures as appropriate and evaluate response  - Consider cultural and social influences on pain and pain management  - Notify physician/advanced practitioner if interventions unsuccessful or patient reports new pain  Outcome: Progressing     Problem: INFECTION - ADULT  Goal: Absence or prevention of progression during hospitalization  Description: INTERVENTIONS:  - Assess and monitor for signs and symptoms of infection  - Monitor lab/diagnostic results  - Monitor all insertion sites, i.e. indwelling lines, tubes, and drains  - Monitor endotracheal if appropriate and nasal secretions for changes in amount and color  - Jackson appropriate cooling/warming therapies per order  - Administer medications as ordered  - Instruct and encourage patient and family to use good hand hygiene technique  - Identify and instruct in appropriate isolation precautions for identified infection/condition  Outcome: Progressing  Goal: Absence of fever/infection during neutropenic period  Description: INTERVENTIONS:  - Monitor WBC    Outcome: Progressing     Problem: SAFETY ADULT  Goal: Patient will remain free of falls  Description: INTERVENTIONS:  - Educate patient/family on patient safety including physical limitations  - Instruct patient to call for assistance with activity   - Consult OT/PT to assist with strengthening/mobility   - Keep Call bell within reach  - Keep bed low and locked with side rails adjusted as appropriate  - Keep care items and personal belongings within reach  - Initiate and maintain comfort rounds  - Make Fall Risk Sign visible to staff  - Offer Toileting every 2 Hours,  in advance of need  - Initiate/Maintain bed and chair alarm  - Obtain necessary fall risk management equipment:   - Apply yellow socks and bracelet for high fall risk patients  - Consider moving patient to room near nurses station  Outcome: Progressing  Goal: Maintain or return to baseline ADL function  Description: INTERVENTIONS:  -  Assess patient's ability to carry out ADLs; assess patient's baseline for ADL function and identify physical deficits which impact ability to perform ADLs (bathing, care of mouth/teeth, toileting, grooming, dressing, etc.)  - Assess/evaluate cause of self-care deficits   - Assess range of motion  - Assess patient's mobility; develop plan if impaired  - Assess patient's need for assistive devices and provide as appropriate  - Encourage maximum independence but intervene and supervise when necessary  - Involve family in performance of ADLs  - Assess for home care needs following discharge   - Consider OT consult to assist with ADL evaluation and planning for discharge  - Provide patient education as appropriate  Outcome: Progressing  Goal: Maintains/Returns to pre admission functional level  Description: INTERVENTIONS:  - Perform AM-PAC 6 Click Basic Mobility/ Daily Activity assessment daily.  - Set and communicate daily mobility goal to care team and patient/family/caregiver.   - Collaborate with rehabilitation services on mobility goals if consulted  - Perform Range of Motion 3 times a day.  - Reposition patient every 2 hours.  - Dangle patient 3 times a day  - Stand patient 3 times a day  - Ambulate patient 3 times a day  - Out of bed to chair 3 times a day   - Out of bed for meals 3 times a day  - Out of bed for toileting  - Record patient progress and toleration of activity level   Outcome: Progressing     Problem: DISCHARGE PLANNING  Goal: Discharge to home or other facility with appropriate resources  Description: INTERVENTIONS:  - Identify barriers to discharge w/patient and  caregiver  - Arrange for needed discharge resources and transportation as appropriate  - Identify discharge learning needs (meds, wound care, etc.)  - Arrange for interpretive services to assist at discharge as needed  - Refer to Case Management Department for coordinating discharge planning if the patient needs post-hospital services based on physician/advanced practitioner order or complex needs related to functional status, cognitive ability, or social support system  Outcome: Progressing     Problem: Knowledge Deficit  Goal: Patient/family/caregiver demonstrates understanding of disease process, treatment plan, medications, and discharge instructions  Description: Complete learning assessment and assess knowledge base.  Interventions:  - Provide teaching at level of understanding  - Provide teaching via preferred learning methods  Outcome: Progressing

## 2025-02-25 ENCOUNTER — APPOINTMENT (OUTPATIENT)
Dept: NON INVASIVE DIAGNOSTICS | Facility: HOSPITAL | Age: 89
DRG: 197 | End: 2025-02-25
Payer: MEDICARE

## 2025-02-25 PROBLEM — R06.09 DYSPNEA ON EXERTION: Status: ACTIVE | Noted: 2024-04-15

## 2025-02-25 LAB
AORTIC ROOT: 2.8 CM
AORTIC VALVE MEAN VELOCITY: 18.8 M/S
ASCENDING AORTA: 3.5 CM
AV AREA BY CONTINUOUS VTI: 1 CM2
AV AREA PEAK VELOCITY: 1.1 CM2
AV LVOT MEAN GRADIENT: 1 MMHG
AV LVOT PEAK GRADIENT: 3 MMHG
AV MEAN PRESS GRAD SYS DOP V1V2: 15 MMHG
AV ORIFICE AREA US: 1.22 CM2
AV PEAK GRADIENT: 25 MMHG
AV VELOCITY RATIO: 0.32
AV VMAX SYS DOP: 2.6 M/S
BSA FOR ECHO PROCEDURE: 1.59 M2
CRP SERPL QL: 2.6 MG/L
DOP CALC AO VTI: 59 CM
DOP CALC LVOT AREA: 3.8 CM2
DOP CALC LVOT CARDIAC INDEX: 2.65 L/MIN/M2
DOP CALC LVOT CARDIAC OUTPUT: 4.24 L/MIN
DOP CALC LVOT DIAMETER: 2.2 CM
DOP CALC LVOT PEAK VEL VTI: 18.9 CM
DOP CALC LVOT PEAK VEL: 0.9 M/S
DOP CALC LVOT STROKE INDEX: 40 ML/M2
DOP CALC LVOT STROKE VOLUME: 71.81 CM3
DOP CALC MV VTI: 39.21 CM
E WAVE DECELERATION TIME: 216 MS
E/A RATIO: 0.62
EST. AVERAGE GLUCOSE BLD GHB EST-MCNC: 163 MG/DL
FRACTIONAL SHORTENING: 30 (ref 28–44)
GLUCOSE SERPL-MCNC: 160 MG/DL (ref 65–140)
GLUCOSE SERPL-MCNC: 188 MG/DL (ref 65–140)
GLUCOSE SERPL-MCNC: 190 MG/DL (ref 65–140)
GLUCOSE SERPL-MCNC: 193 MG/DL (ref 65–140)
HBA1C MFR BLD: 7.3 %
INTERVENTRICULAR SEPTUM IN DIASTOLE (PARASTERNAL SHORT AXIS VIEW): 1.1 CM
INTERVENTRICULAR SEPTUM: 1.1 CM (ref 0.6–1.1)
LAAS-AP2: 19.3 CM2
LAAS-AP4: 20.8 CM2
LEFT ATRIUM SIZE: 4.8 CM
LEFT ATRIUM VOLUME (MOD BIPLANE): 58 ML
LEFT ATRIUM VOLUME INDEX (MOD BIPLANE): 36.2 ML/M2
LEFT INTERNAL DIMENSION IN SYSTOLE: 3.1 CM (ref 2.1–4)
LEFT VENTRICULAR INTERNAL DIMENSION IN DIASTOLE: 4.4 CM (ref 3.5–6)
LEFT VENTRICULAR POSTERIOR WALL IN END DIASTOLE: 0.9 CM
LEFT VENTRICULAR STROKE VOLUME: 49 ML
LV EF US.2D.A4C+ESTIMATED: 63 %
LVSV (TEICH): 49 ML
MV E'TISSUE VEL-SEP: 3 CM/S
MV MEAN GRADIENT: 2 MMHG
MV PEAK A VEL: 1.08 M/S
MV PEAK E VEL: 67 CM/S
MV PEAK GRADIENT: 5 MMHG
MV STENOSIS PRESSURE HALF TIME: 63 MS
MV VALVE AREA BY CONTINUITY EQUATION: 1.83 CM2
MV VALVE AREA P 1/2 METHOD: 3.49
RIGHT ATRIUM AREA SYSTOLE A4C: 16.2 CM2
RIGHT VENTRICLE ID DIMENSION: 3.8 CM
SINOTUBULAR JUNCTION: 2.8 CM
SL CV LEFT ATRIUM LENGTH A2C: 5.7 CM
SL CV LV EF: 60
SL CV PED ECHO LEFT VENTRICLE DIASTOLIC VOLUME (MOD BIPLANE) 2D: 87 ML
SL CV PED ECHO LEFT VENTRICLE SYSTOLIC VOLUME (MOD BIPLANE) 2D: 38 ML
SL CV SINUS OF VALSALVA 2D: 3.3 CM
STJ: 2.8 CM
TR MAX PG: 33 MMHG
TR PEAK VELOCITY: 2.9 M/S
TRICUSPID ANNULAR PLANE SYSTOLIC EXCURSION: 1.7 CM
TRICUSPID VALVE PEAK REGURGITATION VELOCITY: 2.87 M/S

## 2025-02-25 PROCEDURE — 82948 REAGENT STRIP/BLOOD GLUCOSE: CPT

## 2025-02-25 PROCEDURE — 93306 TTE W/DOPPLER COMPLETE: CPT

## 2025-02-25 PROCEDURE — 99232 SBSQ HOSP IP/OBS MODERATE 35: CPT | Performed by: PHYSICIAN ASSISTANT

## 2025-02-25 PROCEDURE — 83036 HEMOGLOBIN GLYCOSYLATED A1C: CPT | Performed by: PHYSICIAN ASSISTANT

## 2025-02-25 PROCEDURE — 93306 TTE W/DOPPLER COMPLETE: CPT | Performed by: INTERNAL MEDICINE

## 2025-02-25 PROCEDURE — 99222 1ST HOSP IP/OBS MODERATE 55: CPT | Performed by: STUDENT IN AN ORGANIZED HEALTH CARE EDUCATION/TRAINING PROGRAM

## 2025-02-25 RX ORDER — INSULIN LISPRO 100 [IU]/ML
1-5 INJECTION, SOLUTION INTRAVENOUS; SUBCUTANEOUS
Status: DISCONTINUED | OUTPATIENT
Start: 2025-02-25 | End: 2025-02-26 | Stop reason: HOSPADM

## 2025-02-25 RX ORDER — PREDNISONE 20 MG/1
40 TABLET ORAL DAILY
Status: DISCONTINUED | OUTPATIENT
Start: 2025-02-25 | End: 2025-02-26 | Stop reason: HOSPADM

## 2025-02-25 RX ADMIN — INSULIN HUMAN 4 UNITS: 100 INJECTION, SUSPENSION SUBCUTANEOUS at 22:07

## 2025-02-25 RX ADMIN — PREDNISONE 40 MG: 20 TABLET ORAL at 16:49

## 2025-02-25 RX ADMIN — INSULIN LISPRO 1 UNITS: 100 INJECTION, SOLUTION INTRAVENOUS; SUBCUTANEOUS at 22:07

## 2025-02-25 RX ADMIN — ACETAMINOPHEN 650 MG: 325 TABLET, FILM COATED ORAL at 08:18

## 2025-02-25 RX ADMIN — LORAZEPAM 0.5 MG: 0.5 TABLET ORAL at 22:14

## 2025-02-25 RX ADMIN — INSULIN LISPRO 1 UNITS: 100 INJECTION, SOLUTION INTRAVENOUS; SUBCUTANEOUS at 11:59

## 2025-02-25 RX ADMIN — INSULIN HUMAN 8 UNITS: 100 INJECTION, SUSPENSION SUBCUTANEOUS at 10:28

## 2025-02-25 RX ADMIN — BRIMONIDINE TARTRATE 1 DROP: 2 SOLUTION/ DROPS OPHTHALMIC at 08:50

## 2025-02-25 RX ADMIN — ACETAMINOPHEN 650 MG: 325 TABLET, FILM COATED ORAL at 16:40

## 2025-02-25 RX ADMIN — SODIUM CHLORIDE 1 G: 1 TABLET ORAL at 08:18

## 2025-02-25 RX ADMIN — BRIMONIDINE TARTRATE 1 DROP: 2 SOLUTION/ DROPS OPHTHALMIC at 00:14

## 2025-02-25 RX ADMIN — PANTOPRAZOLE SODIUM 40 MG: 40 TABLET, DELAYED RELEASE ORAL at 17:01

## 2025-02-25 RX ADMIN — Medication 12.5 MG: at 08:56

## 2025-02-25 RX ADMIN — MIRTAZAPINE 7.5 MG: 15 TABLET, FILM COATED ORAL at 22:07

## 2025-02-25 RX ADMIN — Medication 12.5 MG: at 22:07

## 2025-02-25 RX ADMIN — PANTOPRAZOLE SODIUM 40 MG: 40 TABLET, DELAYED RELEASE ORAL at 22:07

## 2025-02-25 RX ADMIN — INSULIN LISPRO 1 UNITS: 100 INJECTION, SOLUTION INTRAVENOUS; SUBCUTANEOUS at 16:41

## 2025-02-25 RX ADMIN — APIXABAN 2.5 MG: 2.5 TABLET, FILM COATED ORAL at 08:56

## 2025-02-25 RX ADMIN — APIXABAN 2.5 MG: 2.5 TABLET, FILM COATED ORAL at 16:39

## 2025-02-25 RX ADMIN — BRIMONIDINE TARTRATE 1 DROP: 2 SOLUTION/ DROPS OPHTHALMIC at 22:08

## 2025-02-25 RX ADMIN — FAMOTIDINE 20 MG: 20 TABLET, FILM COATED ORAL at 08:56

## 2025-02-25 RX ADMIN — FAMOTIDINE 20 MG: 20 TABLET, FILM COATED ORAL at 16:39

## 2025-02-25 RX ADMIN — PANTOPRAZOLE SODIUM 40 MG: 40 TABLET, DELAYED RELEASE ORAL at 08:56

## 2025-02-25 RX ADMIN — ACETAMINOPHEN 650 MG: 325 TABLET, FILM COATED ORAL at 00:13

## 2025-02-25 NOTE — ASSESSMENT & PLAN NOTE
Patient presenting with 2 to 3-week history of worsening dyspnea on exertion.  Usually can walk a block or so without being short of breath however now can only take a few steps.  Does have pulmonary fibrosis and [facility].  She was scheduled to have an appointment with pulmonologist tomorrow however her shortness of breath became so severe that she comes into the ED instead.  Her chest x-ray does not show any new acute findings.   Troponin negative, dimer negative  ECHO with mild-moderate AS noted with murmur on exam - symptomatology due to fibrosis + AS  Pulmonary consult appreciated   Consider Cardiology consultation   Does not appear volume up to require diuretic and her pressures are soft today

## 2025-02-25 NOTE — ASSESSMENT & PLAN NOTE
Dyspnea on exertion for the past 2 to 3 weeks  History of extensive ILD diagnosed in 2007, last seen pulmonology in March 2023  Patient follows up with Dr. 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  Quit in 1994  No exposure to pets, farm animals or birds  Family History: Younger brother diagnosed with ILD, currently on medication  PFT (08/09/2023): Mild restriction with mildly reduced DCLO  No history of autoimmune disorder  Patient has been on room air since admission, flu RSV COVID negative, BNP mildly elevated at 192, D-dimer normal, troponin normal, UA insignificant except for some leukocytes  Chest x-ray: Moderate pulmonary fibrosis with no acute disease.   Dyspnea on exertion most likely secondary to ILD    PLAN:  Prednisone 40 Mg , 10 mg taper every 5 days  Follow-up on CRP  Incentive spirometry

## 2025-02-25 NOTE — CASE MANAGEMENT
Case Management Assessment & Discharge Planning Note    Patient name Ac Duran  Location S /S -01 MRN [id number]  : 1935 Date 2025       Current Admission Date: 2025  Current Admission Diagnosis:Dyspnea   Patient Active Problem List    Diagnosis Date Noted Date Diagnosed    Microcytosis 2025     ROMERO (obstructive sleep apnea) 2024     Hypoxia 2024     Dyspnea 04/15/2024     Irritable bowel syndrome with both constipation and diarrhea 2024     Peripheral arterial disease (HCC) 2024     Metabolic alkalosis 10/19/2023     SIADH (syndrome of inappropriate ADH production) ([facility]) 10/19/2023     Bilateral hip pain 2023     Pachymeningitis 2023     Hypovitaminosis D 2021     Depression, recurrent ([facility]) 2021     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, with long-term current use of insulin ([facility]) 2021     Type 2 diabetes mellitus with hyperlipidemia  ([facility]) 2021     Type 2 diabetes mellitus with diabetic polyneuropathy, without long-term current use of insulin ([facility]) 2021     DM2 (diabetes mellitus, type 2) ([facility]) 2021     Post-nasal drip 2021     Hyponatremia 2020     Moderate protein-calorie malnutrition ([facility]) 2020     Constipation 2020     Dysphagia 2020     Interstitial lung disease ([facility]) 10/13/2020     Iron deficiency 2020     Osteoporosis 2019     GERD (gastroesophageal reflux disease) 2019     Anxiety 2019     Pacemaker 2016     Occlusion of right carotid artery 2016     Paroxysmal atrial fibrillation (HCC) 2016     Symptomatic PVCs 2016     Essential hypertension 10/29/2016     Mixed hyperlipidemia 10/29/2016     Glaucoma 10/29/2016     Asymptomatic carotid artery stenosis, left 10/29/2016       LOS (days): 0  Geometric Mean LOS (GMLOS) (days):   Days to  GMLOS:     OBJECTIVE:              Current admission status: Observation       Preferred Pharmacy:   [facility] - [place] - [address]  [address] [address]  Phone: [phone] Fax: [phone]    [id number] - [place], [place] - [address]  [address]  [facility] [address]  Phone: [phone] Fax: [phone]    [id number] [place], [place] - [address]  [address] [address]  Phone: [phone] Fax: [phone]    Primary Care Provider: ZANDER Swenson    Primary Insurance: MEDICARE  Secondary Insurance: [employer]    ASSESSMENT:  Active Health Care Proxies    There are no active Health Care Proxies on file.       Readmission Root Cause  30 Day Readmission: No    Patient Information  Admitted from:: Home  Mental Status: Alert  During Assessment patient was accompanied by: Not accompanied during assessment  Assessment information provided by:: Patient  Primary Caregiver: Self  Support Systems: Self, Son, Friend, Family members  County of Residence: [place]  [place] [facility] do you live in?: [place]  Home entry access options. Select all that apply.: Elevator, No steps to enter home  Type of Current Residence: Apartment  Floor Level: 2  Upon entering residence, is there a bedroom on the main floor (no further steps)?: Yes  Upon entering residence, is there a bathroom on the main floor (no further steps)?: Yes  Living Arrangements: Lives Alone    Activities of Daily Living Prior to Admission  Functional Status: Independent  Completes ADLs independently?: Yes  Ambulates independently?: Yes  Does patient use assisted devices?: Yes  Assisted Devices (DME) used: Walker, CPAP  DME Company Name (respiratory supplies): Pt unsure of DME company  Does patient have a history of Outpatient Therapy (PT/OT)?: Yes  Does the patient have a history of Short-Term Rehab?: No  Does patient have a history of HHC?: Yes  Does patient  currently have C?: No    Patient Information Continued  Income Source: Pension/[employer]  Does patient have prescription coverage?: Yes  Does patient receive dialysis treatments?: No  Does patient have a history of substance abuse?: No  Does patient have a history of Mental Health Diagnosis?: Yes  Is patient receiving treatment for mental health?: Yes  Has patient received inpatient treatment related to mental health in the last 2 years?: No    Means of Transportation  Means of Transport to Appts:: Family transport    DISCHARGE DETAILS:    Discharge planning discussed with:: Patient  Freedom of Choice: Yes  Comments - Freedom of Choice: Return home  CM contacted family/caregiver?: No- see comments (Pt A&O)  Were Treatment Team discharge recommendations reviewed with patient/caregiver?: Yes  Did patient/caregiver verbalize understanding of patient care needs?: Yes  Were patient/caregiver advised of the risks associated with not following Treatment Team discharge recommendations?: Yes    Contacts  Patient Contacts: Patient  Contact Method: In Person  Reason/Outcome: Continuity of Care, Discharge Planning    Other Referral/Resources/Interventions Provided:  Interventions: Other (Specify)  Referral Comments: CM spoke with pt at bedside, introduced self and role with dcp. Pt reported she lives alone in a 2nd floor apartment with elevator access. Pt reported she was fully IPTA. Pt reported she has a RW and CPAP, unable to recall name of DME company. Pt reported preferred pharmacy is [facility] and PCP is ZANDER Pisano. Family assists with transportation needs. No further CM needs noted at this time. CM will remain available.    Would you like to participate in our [facility] service program?  : No - Declined    Treatment Team Recommendation: Home  Discharge Destination Plan:: Home  Transport at Discharge : Family

## 2025-02-25 NOTE — PROGRESS NOTES
Progress Note - Hospitalist   Name: Ac Duran 89 y.o. female I MRN: [id number]  Unit/Bed#: S -01 I Date of Admission: 2/24/2025   Date of Service: 2/25/2025 I Hospital Day: 0    Assessment & Plan  Dyspnea  Patient presenting with 2 to 3-week history of worsening dyspnea on exertion.  Usually can walk a block or so without being short of breath however now can only take a few steps.  Does have pulmonary fibrosis and follows with [facility].  She was scheduled to have an appointment with pulmonologist tomorrow however her shortness of breath became so severe that she comes into the ED instead.  Her chest x-ray does not show any new acute findings.   Troponin negative, dimer negative  ECHO with mild-moderate AS noted with murmur on exam - symptomatology due to fibrosis + AS  Pulmonary consult appreciated   Consider Cardiology consultation   Does not appear volume up to require diuretic and her pressures are soft today     Essential hypertension  BP elevated on admission now more soft today   Continue metoprolol     Paroxysmal atrial fibrillation (HCC)  Status post pacemaker, HR controlled   Continue metoprolol   Continue Eliquis  Hyponatremia  Na is 132  Patient is on salt tabs will continue on these  Na appears at or around baseline  Monitor BMP   Microcytosis  MCV is 79   Hemoglobin is within the normal range       VTE Pharmacologic Prophylaxis: VTE Score: 1 Moderate Risk (Score 3-4) - Pharmacological DVT Prophylaxis Ordered: apixaban (Eliquis).    Mobility:   Basic Mobility Inpatient Raw Score: 19  JH-HLM Goal: 6: Walk 10 steps or more  JH-HLM Achieved: 4: Move to chair/commode  JH-HLM Goal NOT achieved. Continue with multidisciplinary rounding and encourage appropriate mobility to improve upon JH-HLM goals.    Patient Centered Rounds: I performed bedside rounds with nursing staff today.   Discussions with Specialists or Other Care Team Provider: Discussed with Pulmonary, RN, CM      Education and Discussions with Family / Patient: Updated  (son) via phone.    Current Length of Stay: 0 day(s)  Current Patient Status: Inpatient   Certification Statement: The patient will continue to require additional inpatient hospital stay due to pulmonary consult, may need cardiology consult   Discharge Plan: Anticipate discharge in 24-48 hrs to home.    Code Status: Level 1 - Full Code    Subjective   Patient reports that she had symptoms again last night of shortness of breath and panting when she got up to use the restroom. Denies symptoms right now and only feels them with exertion. Denies wheezing. Denies leg swelling. Denies chest pain.     Objective :  Temp:  [97.9 °F (36.6 °C)-98.3 °F (36.8 °C)] 97.9 °F (36.6 °C)  HR:  [59-85] 78  BP: (103-175)/(47-88) 103/47  Resp:  [18-20] 20  SpO2:  [93 %-98 %] 98 %  O2 Device: None (Room air)    Body mass index is 18.88 kg/m².     Input and Output Summary (last 24 hours):     Intake/Output Summary (Last 24 hours) at 2/25/2025 1105  Last data filed at 2/25/2025 0501  Gross per 24 hour   Intake 480 ml   Output 500 ml   Net -20 ml       Physical Exam  Vitals and nursing note reviewed.   Constitutional:       General: She is not in acute distress.     Appearance: Normal appearance. She is normal weight. She is not ill-appearing or diaphoretic.   HENT:      Head: Normocephalic and atraumatic.      Mouth/Throat:      Mouth: Mucous membranes are moist.   Eyes:      General: No scleral icterus.     Pupils: Pupils are equal, round, and reactive to light.   Cardiovascular:      Rate and Rhythm: Normal rate and regular rhythm.      Pulses: Normal pulses.      Heart sounds: S1 normal and S2 normal. Murmur heard.      No systolic murmur is present.      No diastolic murmur is present.      No gallop. No S3 or S4 sounds.   Pulmonary:      Effort: Pulmonary effort is normal. No accessory muscle usage or respiratory distress.      Breath sounds: No stridor.  Examination of the right-lower field reveals rales. Examination of the left-lower field reveals rales. Rales (Fibrosis) present. No decreased breath sounds, wheezing or rhonchi.   Chest:      Chest wall: No tenderness.   Abdominal:      General: Bowel sounds are normal. There is no distension.      Palpations: Abdomen is soft.      Tenderness: There is no abdominal tenderness. There is no guarding.   Musculoskeletal:      Right lower leg: No edema.      Left lower leg: No edema.   Skin:     General: Skin is warm and dry.      Coloration: Skin is not jaundiced.   Neurological:      General: No focal deficit present.      Mental Status: She is alert. Mental status is at baseline.      Motor: No tremor or seizure activity.   Psychiatric:         Behavior: Behavior is cooperative.           Lines/Drains:        Telemetry:  Telemetry Orders (From admission, onward)               24 Hour Telemetry Monitoring  Continuous x 24 Hours (Telem)        Expiring   Question:  Reason for 24 Hour Telemetry  Answer:  Decompensated CHF- and any one of the following: continuous diuretic infusion or total diuretic dose >200 mg daily, associated electrolyte derangement (I.e. K < 3.0), inotropic drip (continuous infusion), hx of ventricular arrhythmia, or new EF < 35%                     Telemetry Reviewed: Normal Sinus Rhythm  Indication for Continued Telemetry Use: Arrthymias requiring medical therapy               Lab Results: I have reviewed the following results:   Results from last 7 days   Lab Units 02/24/25  1118   WBC Thousand/uL 6.37   HEMOGLOBIN g/dL 12.2   HEMATOCRIT % 39.6   PLATELETS Thousands/uL 204   SEGS PCT % 79*   LYMPHO PCT % 10*   MONO PCT % 8   EOS PCT % 1     Results from last 7 days   Lab Units 02/24/25  1118   SODIUM mmol/L 132*   POTASSIUM mmol/L 4.1   CHLORIDE mmol/L 94*   CO2 mmol/L 30   BUN mg/dL 15   CREATININE mg/dL 0.54*   ANION GAP mmol/L 8   CALCIUM mg/dL 9.4   ALBUMIN g/dL 4.4   TOTAL BILIRUBIN mg/dL  0.51   ALK PHOS U/L 55   ALT U/L 12   AST U/L 21   GLUCOSE RANDOM mg/dL 234*         Results from last 7 days   Lab Units 02/25/25  0953 02/24/25  1641 02/24/25  1333   POC GLUCOSE mg/dl 188* 119 134               Recent Cultures (last 7 days):         Imaging Results Review: I reviewed radiology reports from this admission including: Echocardiogram.  Other Study Results Review: No additional pertinent studies reviewed.    Last 24 Hours Medication List:     Current Facility-Administered Medications:     acetaminophen (TYLENOL) tablet 650 mg, Q8H JONNA    apixaban (ELIQUIS) tablet 2.5 mg, BID    Artificial Tears Op Soln 2 drop, Q4H PRN    brimonidine tartrate 0.2 % ophthalmic solution 1 drop, BID    famotidine (PEPCID) tablet 20 mg, BID    insulin lispro (HumALOG/ADMELOG) 100 units/mL subcutaneous injection 1-5 Units, TID AC **AND** Fingerstick Glucose (POCT), TID AC    insulin lispro (HumALOG/ADMELOG) 100 units/mL subcutaneous injection 1-5 Units, HS    insulin NPH (HumuLIN N,NovoLIN N) 100 Units/mL subcutaneous injection 4 Units, HS    insulin NPH (HumuLIN N,NovoLIN N) 100 Units/mL subcutaneous injection 8 Units, Daily Before Breakfast    LORazepam (ATIVAN) tablet 0.5 mg, BID PRN    metoprolol tartrate (LOPRESSOR) partial tablet 12.5 mg, Q12H JONNA    mirtazapine (REMERON) tablet 7.5 mg, HS    pantoprazole (PROTONIX) EC tablet 40 mg, BID    sodium chloride tablet 1 g, BID With Meals    Administrative Statements   Today, Patient Was Seen By: Shay Jarrett PA-C  I have spent a total time of 35 minutes in caring for this patient on the day of the visit/encounter including Diagnostic results, Instructions for management, Impressions, Counseling / Coordination of care, Documenting in the medical record, Reviewing/placing orders in the medical record (including tests, medications, and/or procedures), Obtaining or reviewing history  , and Communicating with other healthcare professionals .    **Please Note: This note  may have been constructed using a voice recognition system.**

## 2025-02-25 NOTE — CONSULTS
Consultation - Pulmonology   Name: cA Duran 89 y.o. female I MRN: [id number]  Unit/Bed#: S -01 I Date of Admission: 2/24/2025   Date of Service: 2/25/2025 I Hospital Day: 0   Inpatient consult to Pulmonology  Consult performed by: Jessica Velazquez MD  Consult ordered by: Mable Espinoza MD        Physician Requesting Evaluation: Mable Espinoza MD   Reason for Evaluation / Principal Problem: Dyspnea on exertion    Assessment & Plan  Dyspnea on exertion  Dyspnea on exertion for the past 2 to 3 weeks  History of extensive ILD diagnosed in 2007, last seen pulmonology in March 2023  Patient follows up with Dr. 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  Later worked with [employer] and 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 a lot of different types of jewels  Smoking: Smoked for 20 years, 2 pack daily.  Quit in 1994  No exposure to pets, farm animals or birds  Family History: Younger brother diagnosed with ILD, currently on medication  PFT (08/09/2023): Mild restriction with mildly reduced DCLO  No history of autoimmune disorder  Patient has been on room air since admission, flu RSV COVID negative, BNP mildly elevated at 192, D-dimer normal, troponin normal, UA insignificant except for some leukocytes  Chest x-ray: Moderate pulmonary fibrosis with no acute disease.   Dyspnea on exertion most likely secondary to ILD    PLAN:  Prednisone 40 Mg , 10 mg taper every 5 days  Follow-up on CRP  Incentive spirometry    Please contact the SecureChat role for the Pulmonology service with any questions/concerns.    History of Present Illness   Ac Duran is a 89 y.o. female who presents with dyspnea on exertion for the past 2 weeks.  Patient has extensive history of ILD initially diagnosed in 2007.  ILD has been progressing since then and she has seen multiple pulmonologist outpatient.  Patient is a former smoker, 20-year smoking history, 2  packs a day.  Patient quit in 1994.  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  Patient does not have any pets, no exposure to birds or farm animals.  Patient does not have any history of autoimmune disorder.  Recently patient's brother was diagnosed with ILD and was started on medication.  Patient denies any recent infection including flu, COVID, diarrhea, UTI or any precipitating factor for this episode.  Patient denies orthopnea, swelling of the legs, or weight gain.    Review of Systems    Historical Information   Historical Information   Past Medical History:   Diagnosis Date    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/7/2020    Glaucoma     H/O degenerative disc disease     Idiopathic pulmonary fibrosis (HCC) 11/2020    Irregular heart beat     afib    Peripheral neuropathy     S/P laparoscopic cholecystectomy 12/21/2020    Stenosis of right subclavian artery (HCC) 11/18/2016     Past Surgical History:   Procedure Laterality Date    CATARACT EXTRACTION, BILATERAL  2011    CHOLECYSTECTOMY      CHOLECYSTECTOMY LAPAROSCOPIC N/A 12/09/2020    Procedure: CHOLECYSTECTOMY LAPAROSCOPIC;  Surgeon: Kalen Garrett MD;  Location: [facility];  Service: General    COLONOSCOPY      CYST REMOVAL  2009    left lower Quadrant     FL LUMBAR PUNCTURE DIAGNOSTIC  4/28/2023    HERNIA REPAIR  1992    LIPOMA RESECTION  2010    [place] RPR 1ST INGUN HRNA AGE 5 YRS/> REDUCIBLE Bilateral 01/05/2018    Procedure: INGUINAL HERNIA REPAIR;  Surgeon: Volodymyr Lee MD;  Location: [facility];  Service: General    SHOULDER SURGERY  04/26/2019    Dr. Arnett ([place])    UPPER GASTROINTESTINAL ENDOSCOPY      VASCULAR SURGERY  1994    Agram-  R carotid occlusion     Social History     Tobacco Use    Smoking status: Former     Current packs/day: 0.00     Average packs/day: 1.5 packs/day for 38.0 years (57.0 ttl pk-yrs)      Types: Cigarettes     Start date:      Quit date:      Years since quittin.    Smokeless tobacco: Never   Vaping Use    Vaping status: Never Used   Substance and Sexual Activity    Alcohol use: No    Drug use: No    Sexual activity: Not on file     E-Cigarette/Vaping    E-Cigarette Use Never User      E-Cigarette/Vaping Substances    Nicotine No     THC No     CBD No     Flavoring No     Other No     Unknown No      Family History   Problem Relation Age of Onset    Heart disease Mother     Heart attack Father     Hiatal hernia Father     Diabetes Father     Cancer Brother     Diabetes Brother     Heart disease Brother     Hypertension Brother     Lung disease Brother 75        interstitial lung disease    Cancer Brother 49        glioblastoma    Other Son         gastroparesis    Other Cousin         interstitial lung disease     Social History     Tobacco Use    Smoking status: Former     Current packs/day: 0.00     Average packs/day: 1.5 packs/day for 38.0 years (57.0 ttl pk-yrs)     Types: Cigarettes     Start date:      Quit date:      Years since quittin.    Smokeless tobacco: Never   Vaping Use    Vaping status: Never Used   Substance and Sexual Activity    Alcohol use: No    Drug use: No    Sexual activity: Not on file       Current Facility-Administered Medications:     acetaminophen (TYLENOL) tablet 650 mg, Q8H JONNA    apixaban (ELIQUIS) tablet 2.5 mg, BID    Artificial Tears Op Soln 2 drop, Q4H PRN    brimonidine tartrate 0.2 % ophthalmic solution 1 drop, BID    famotidine (PEPCID) tablet 20 mg, BID    insulin lispro (HumALOG/ADMELOG) 100 units/mL subcutaneous injection 1-5 Units, TID AC **AND** Fingerstick Glucose (POCT), TID AC    insulin lispro (HumALOG/ADMELOG) 100 units/mL subcutaneous injection 1-5 Units, HS    insulin NPH (HumuLIN N,NovoLIN N) 100 Units/mL subcutaneous injection 4 Units, HS    insulin NPH (HumuLIN N,NovoLIN N) 100 Units/mL subcutaneous injection 8 Units,  Daily Before Breakfast    LORazepam (ATIVAN) tablet 0.5 mg, BID PRN    metoprolol tartrate (LOPRESSOR) partial tablet 12.5 mg, Q12H JONNA    mirtazapine (REMERON) tablet 7.5 mg, HS    pantoprazole (PROTONIX) EC tablet 40 mg, BID    sodium chloride tablet 1 g, BID With Meals  Prior to Admission Medications   Prescriptions Last Dose Informant Patient Reported? Taking?   Cyanocobalamin (Vitamin B 12) 500 MCG TABS  Self Yes No   Sig: Take 500 mg by mouth 2 (two) times a day   HumuLIN N KwikPen 100 units/mL injection pen  Self No No   Sig: INJECT 8 UNITS UNDER THE SKIN EVERY MORNING AND 4 UNITS EVERY EVENING.   Insulin Pen Needle (B-D UF III MINI PEN NEEDLES) 31G X 5 MM MISC  Self No No   Sig: USE 2 (TWO) TIMES A DAY   LORazepam (ATIVAN) 0.5 mg tablet  Self No No   Sig: TAKE 1 TABLET (0.5 MG TOTAL) BY MOUTH 2 (TWO) TIMES A DAY AS NEEDED FOR ANXIETY   Multiple Vitamin (multivitamin) tablet  Self Yes No   Sig: Take 1 tablet by mouth daily     OneTouch Delica Lancets 33G MISC  Self No No   Sig: Check blood sugars four times daily. Please substitute with appropriate alternative as covered by patient's insurance. Dx: E11.65   acetaminophen (TYLENOL) 500 mg tablet  Self Yes No   Sig: Take 1,000 mg by mouth as needed for mild pain   apixaban (Eliquis) 2.5 mg   No No   Sig: TAKE 1 TABLET (2.5 MG TOTAL) BY MOUTH 2 (TWO) TIMES A DAY   ascorbic acid (VITAMIN C) 500 MG tablet  Self Yes No   Sig: Take 500 mg by mouth daily   Patient not taking: Reported on 1/13/2025   aspirin (ECOTRIN LOW STRENGTH) 81 mg EC tablet  Self No No   Sig: Take 1 tablet (81 mg total) by mouth daily   Patient not taking: Reported on 1/13/2025   bimatoprost (LUMIGAN) 0.01 % ophthalmic drops  Self No No   Sig: Administer 1 drop to both eyes daily at bedtime for 30 days   brimonidine tartrate 0.2 % ophthalmic solution  Self Yes No   Sig: INSTILL 1 DROP INTO RIGHT EYE TWICE A DAY   calcium carbonate (OS-DANIA) 600 MG tablet  Self Yes No   Sig: Take 600 mg by mouth 2  (two) times a day with meals     Patient not taking: Reported on 1/13/2025   ezetimibe-simvastatin (VYTORIN) 10-40 mg per tablet  Self No No   Sig: TAKE 1 TABLET BY MOUTH DAILY AT BEDTIME   famotidine (PEPCID) 20 mg tablet   No No   Sig: TAKE 1 TABLET (20 MG TOTAL) BY MOUTH 2 (TWO) TIMES A DAY   glucose blood (OneTouch Ultra) test strip  Self No No   Sig: TEST FOUR TIMES A DAY   ipratropium (ATROVENT) 0.03 % nasal spray  Self No No   Sig: USE 2 SPRAYS INTO EACH NOSTRIL EVERY 12 (TWELVE) HOURS   loratadine (CLARITIN) 10 mg tablet  Self Yes No   Sig: Take 10 mg by mouth daily as needed for allergies   Patient not taking: Reported on 1/13/2025   metFORMIN (GLUCOPHAGE) 500 mg tablet   No No   Sig: TAKE 2 TABLETS (1,000 MG TOTAL) BY MOUTH 2 (TWO) TIMES A DAY WITH MEALS   metoprolol tartrate (LOPRESSOR) 25 mg tablet  Self No No   Sig: Take 1 tablet (25 mg total) by mouth every 12 (twelve) hours   Patient taking differently: Take 12.5 mg by mouth every 12 (twelve) hours   mirtazapine (REMERON) 7.5 MG tablet   No No   Sig: TAKE 1 TABLET (7.5 MG TOTAL) BY MOUTH DAILY AT BEDTIME   pantoprazole (PROTONIX) 40 mg tablet  Self No No   Sig: TAKE 1 TABLET (40 MG TOTAL) BY MOUTH 2 (TWO) TIMES A DAY   polyethylene glycol (MIRALAX) 17 g packet   No No   Sig: Take 17 g by mouth daily as needed (constipation/ abominal cramping) for up to 14 days Prn constipation   sitaGLIPtin (Januvia) 100 mg tablet   No No   Sig: TAKE 1 TABLET (100 MG TOTAL) BY MOUTH DAILY   sodium chloride 1 g tablet  Self No No   Sig: TAKE 1 TABLET THREE TIMES A DAY      Facility-Administered Medications: None     Keflex [cephalexin]  Tobacco History: Smoked for 20 yrs, 2 packs daily, quit in 1994   Occupational History: [employer], exposed to [employer] chemicals and jewelry   Family History:  Family History   Problem Relation Age of Onset    Heart disease Mother     Heart attack Father     Hiatal hernia Father     Diabetes Father     Cancer Brother     Diabetes  Brother     Heart disease Brother     Hypertension Brother     Lung disease Brother 75        interstitial lung disease    Cancer Brother 49        glioblastoma    Other Son         gastroparesis    Other Cousin         interstitial lung disease       Objective :  Temp:  [97.9 °F (36.6 °C)-98.3 °F (36.8 °C)] 97.9 °F (36.6 °C)  HR:  [59-78] 78  BP: (103-164)/(47-75) 103/47  Resp:  [18-20] 20  SpO2:  [93 %-98 %] 98 %  O2 Device: None (Room air)      Physical Exam  Constitutional:       Appearance: She is cachectic.   HENT:      Head: Normocephalic and atraumatic.      Right Ear: External ear normal.      Left Ear: External ear normal.      Nose: Nose normal.      Mouth/Throat:      Pharynx: Oropharynx is clear.   Eyes:      Extraocular Movements: Extraocular movements intact.      Conjunctiva/sclera: Conjunctivae normal.      Pupils: Pupils are equal, round, and reactive to light.   Cardiovascular:      Rate and Rhythm: Normal rate and regular rhythm.      Pulses: Normal pulses.      Heart sounds: Normal heart sounds.   Pulmonary:      Effort: Pulmonary effort is normal.      Breath sounds: Decreased air movement: Velcro breath sounds. Examination of the right-lower field reveals rales. Examination of the left-lower field reveals rales. Rales present.   Musculoskeletal:         General: Normal range of motion.   Skin:     General: Skin is warm.      Capillary Refill: Capillary refill takes less than 2 seconds.   Neurological:      General: No focal deficit present.      Mental Status: She is alert and oriented to person, place, and time.          Lab Results: I have reviewed the following results:  .     02/24/25  1814 02/24/25  2019   HSTNI0 25  --    HSTNI2  --  25     ABG: No new results in last 24 hours.    Imaging Results Review: I reviewed radiology reports from this admission including: chest xray.  Other Study Results Review: EKG was reviewed.   PFT Results Reviewed: reviewed    VTE Prophylaxis: VTE covered  by:  apixaban, Oral, 2.5 mg at 02/25/25 0856

## 2025-02-25 NOTE — ASSESSMENT & PLAN NOTE
Na is 132  Patient is on salt tabs will continue on these  Na appears at or around baseline  Monitor BMP

## 2025-02-25 NOTE — ED PROVIDER NOTES
Time reflects when diagnosis was documented in both MDM as applicable and the Disposition within this note       Time User Action Codes Description Comment    2/24/2025  1:37 PM Jeimy Whelan Add [R06.09] Dyspnea on exertion           ED Disposition       ED Disposition   Admit    Condition   Stable    Date/Time   Mon Feb 24, 2025  1:38 PM    Comment   Case was discussed with BENITA and the patient's admission status was agreed to be Admission Status: observation status to the service of Dr. Espinoza .               Assessment & Plan       Medical Decision Making  Amount and/or Complexity of Data Reviewed  Labs: ordered. Decision-making details documented in ED Course.    Risk  Decision regarding hospitalization.      Mónica Duran is a 89 year old female PMH HTN and a-fib on Eliquis who presented to the ED for shortness of breath.   Differential includes anemia, pneumonia, valvular disorder, dysrhythmia, heart failure.  Patient CBC without leukocytosis or anemia.  CMP showing mildly low sodium to 132, but other electrolytes.  BNP is mildly elevated at 182, but chest x-ray shows no widening of cardiac silhouette compared to previous and no consolidations.  Overall, chest x-ray looks similar to previous on December 9, 2024.  No changes to EKG and initial troponin of 13.   On independent chart review, patient last had an echo in October 2022 which showed EF of 60%.    Overall, patient is dyspneic during conversation and has difficulty walking short periods of time without being dyspneic. Considering patient has been to the ED for similar symptoms, has not been able to be managed outpatient, and is exceptionally dyspneic on exam, patient admitted to medicine team for further cardiac workup and management.      ED Course as of 02/25/25 1650   Mon Feb 24, 2025   1238 SOB with exertion for the last 2-3 weeks,some chest tightness today  Coughing a lot the last month, some productive cough the last 2 weeks  No fevers, no  "flu/cold symptoms    Hx of a-fib on Eliquis, taking all meds, no recent med changes       1252 WBC: 6.37       Medications   acetaminophen (TYLENOL) tablet 650 mg (has no administration in time range)   apixaban (ELIQUIS) tablet 2.5 mg (has no administration in time range)   famotidine (PEPCID) tablet 20 mg (has no administration in time range)   LORazepam (ATIVAN) tablet 0.5 mg (has no administration in time range)   metoprolol tartrate (LOPRESSOR) partial tablet 12.5 mg (has no administration in time range)   mirtazapine (REMERON) tablet 7.5 mg (has no administration in time range)   pantoprazole (PROTONIX) EC tablet 40 mg (has no administration in time range)   sodium chloride tablet 1 g (has no administration in time range)       ED Risk Strat Scores          SBIRT 20yo+      Flowsheet Row Most Recent Value   Initial Alcohol Screen: US AUDIT-C     1. How often do you have a drink containing alcohol? 0 Filed at: 02/24/2025 1115   2. How many drinks containing alcohol do you have on a typical day you are drinking?  0 Filed at: 02/24/2025 1115   3b. FEMALE Any Age, or MALE 65+: How often do you have 4 or more drinks on one occassion? 0 Filed at: 02/24/2025 1115   Audit-C Score 0 Filed at: 02/24/2025 1115   [place]: How many times in the past year have you...    Used an illegal drug or used a prescription medication for non-medical reasons? Never Filed at: 02/24/2025 1115                            History of Present Illness       Chief Complaint   Patient presents with    Shortness of Breath     Pt arrives via ems from home, pt c/o SOB on exertion over last few weeks, also reports \"pounding in chest\"       Past Medical History:   Diagnosis Date    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/7/2020    Glaucoma     H/O degenerative disc disease     Idiopathic pulmonary fibrosis (HCC) 11/2020    Irregular heart beat     afib    Peripheral neuropathy     S/P laparoscopic cholecystectomy 12/21/2020    Stenosis of right subclavian " artery (HCC) 2016      Past Surgical History:   Procedure Laterality Date    CATARACT EXTRACTION, BILATERAL      CHOLECYSTECTOMY      CHOLECYSTECTOMY LAPAROSCOPIC N/A 2020    Procedure: CHOLECYSTECTOMY LAPAROSCOPIC;  Surgeon: Kalen Garrett MD;  Location: [facility];  Service: General    COLONOSCOPY      CYST REMOVAL  2009    left lower Quadrant     FL LUMBAR PUNCTURE DIAGNOSTIC  2023    HERNIA REPAIR  1992    LIPOMA RESECTION  2010    TX RPR 1ST INGUN HRNA AGE 5 YRS/> REDUCIBLE Bilateral 2018    Procedure: INGUINAL HERNIA REPAIR;  Surgeon: Volodymyr Lee MD;  Location: [facility];  Service: General    SHOULDER SURGERY  2019    Dr. Arnett ([place])    UPPER GASTROINTESTINAL ENDOSCOPY      VASCULAR SURGERY      Agram-  R carotid occlusion      Family History   Problem Relation Age of Onset    Heart disease Mother     Heart attack Father     Hiatal hernia Father     Diabetes Father     Cancer Brother     Diabetes Brother     Heart disease Brother     Hypertension Brother     Lung disease Brother 75        interstitial lung disease    Cancer Brother 49        glioblastoma    Other Son         gastroparesis    Other Cousin         interstitial lung disease      Social History     Tobacco Use    Smoking status: Former     Current packs/day: 0.00     Average packs/day: 1.5 packs/day for 38.0 years (57.0 ttl pk-yrs)     Types: Cigarettes     Start date:      Quit date:      Years since quittin.1    Smokeless tobacco: Never   Vaping Use    Vaping status: Never Used   Substance Use Topics    Alcohol use: No    Drug use: No      E-Cigarette/Vaping    E-Cigarette Use Never User       E-Cigarette/Vaping Substances    Nicotine No     THC No     CBD No     Flavoring No     Other No     Unknown No       I have reviewed and agree with the history as documented.       Shortness of Breath  Associated symptoms: no abdominal pain, no chest pain, no cough, no fever, no headaches, no neck  pain and no vomiting      Mónica Duran is a 89 year old female PMH HTN and a-fib on Eliquis who presented to the ED for shortness of breath.  Symptoms have been present for the last 2 to 3 weeks.  She has had a cough for the last month but is nonproductive.  She recently saw her pulmonologist who recommended scheduling a cardiology appointment for the shortness of breath.  She decided to come to the ED today for chest tightness that she felt for a few minutes.  She denies any fevers, abdominal pain, nausea, vomiting, weakness, paresthesias.    Per chart review, she was seen in the ER for shortness of breath in December.  Workup was unremarkable and she was sent home with strict return precautions and to follow-up with primary care within the week.    Review of Systems   Constitutional:  Negative for chills and fever.   HENT:  Negative for congestion and rhinorrhea.    Eyes:  Negative for visual disturbance.   Respiratory:  Positive for chest tightness and shortness of breath. Negative for cough.    Cardiovascular:  Negative for chest pain and palpitations.   Gastrointestinal:  Negative for abdominal pain, nausea and vomiting.   Genitourinary:  Negative for dysuria and hematuria.   Musculoskeletal:  Negative for back pain and neck pain.   Skin:  Negative for wound.   Neurological:  Negative for dizziness, light-headedness and headaches.           Objective       ED Triage Vitals   Temperature Pulse Blood Pressure Respirations SpO2 Patient Position - Orthostatic VS   02/24/25 1114 02/24/25 1114 02/24/25 1114 02/24/25 1114 02/24/25 1114 02/24/25 1114   98 °F (36.7 °C) 85 (!) 175/88 20 96 % Sitting      Temp Source Heart Rate Source BP Location FiO2 (%) Pain Score    02/24/25 1114 02/24/25 1114 02/24/25 1114 -- 02/24/25 1730    Oral Monitor Right arm  No Pain      Vitals      Date and Time Temp Pulse SpO2 Resp BP Pain Score FACES Pain Rating User   02/25/25 1640 -- -- -- -- -- No Pain -- GW   02/25/25 1501 98 °F  (36.7 °C) 87 97 % -- 162/82 -- -- DII   02/25/25 1149 -- 66 97 % -- 131/72 -- -- DII   02/25/25 0818 -- -- -- -- -- 5 -- DD   02/25/25 0802 -- 78 -- -- 103/47 -- -- LS   02/25/25 0757 -- -- -- 20 -- -- -- DD   02/25/25 0757 97.9 °F (36.6 °C) 74 98 % -- 116/57 -- -- DII   02/25/25 0206 -- 59 94 % -- 103/47 -- -- DII   02/25/25 0013 -- -- -- -- -- 5 -- TJ   02/24/25 2117 98.2 °F (36.8 °C) -- -- -- -- -- -- AS   02/24/25 2117 -- 72 93 % -- 125/65 -- -- DII   02/24/25 2100 -- -- 95 % -- -- No Pain -- TJ   02/24/25 1831 -- -- -- -- -- No Pain -- MM   02/24/25 1738 98.3 °F (36.8 °C) -- -- -- -- -- -- DC   02/24/25 1730 -- -- -- -- -- No Pain -- MM   02/24/25 1633 -- 71 96 % 18 164/69 -- -- DII   02/24/25 1145 -- 68 96 % 20 159/75 -- -- AB   02/24/25 1114 98 °F (36.7 °C) 85 96 % 20 175/88 -- -- AB            Physical Exam  Constitutional:       General: She is not in acute distress.  HENT:      Head: Normocephalic and atraumatic.      Mouth/Throat:      Mouth: Mucous membranes are moist.      Pharynx: Oropharynx is clear.   Eyes:      Extraocular Movements: Extraocular movements intact.      Conjunctiva/sclera: Conjunctivae normal.   Cardiovascular:      Rate and Rhythm: Normal rate and regular rhythm.      Pulses: Normal pulses.      Heart sounds: Normal heart sounds.   Pulmonary:      Effort: Pulmonary effort is normal. No respiratory distress.      Breath sounds: Normal breath sounds.   Abdominal:      Palpations: Abdomen is soft.      Tenderness: There is no abdominal tenderness.   Musculoskeletal:         General: No tenderness or deformity. Normal range of motion.      Cervical back: Normal range of motion. No rigidity.   Skin:     General: Skin is warm and dry.      Capillary Refill: Capillary refill takes less than 2 seconds.   Neurological:      General: No focal deficit present.      Mental Status: She is alert and oriented to person, place, and time. Mental status is at baseline.         Results Reviewed        Procedure Component Value Units Date/Time    Urine Microscopic [[id number]]  (Abnormal) Collected: 02/24/25 1609    Lab Status: Final result Specimen: Urine, Clean Catch Updated: 02/24/25 1700     RBC, UA 1-2 /hpf      WBC, UA 4-10 /hpf      Epithelial Cells None Seen /hpf      Bacteria, UA None Seen /hpf     HS Troponin I 4hr [[id number]]  (Normal) Collected: 02/24/25 1609    Lab Status: Final result Specimen: Blood from Arm, Left Updated: 02/24/25 1640     hs TnI 4hr 30 ng/L      Delta 4hr hsTnI 17 ng/L     UA (URINE) with reflex to Scope [[id number]]  (Abnormal) Collected: 02/24/25 1609    Lab Status: Final result Specimen: Urine, Clean Catch Updated: 02/24/25 1621     Color, UA Colorless     Clarity, UA Clear     Specific Gravity, UA 1.007     pH, UA 7.0     Leukocytes, UA Trace     Nitrite, UA Negative     Protein, UA 30 (1+) mg/dl      Glucose, UA Trace mg/dl      Ketones, UA Negative mg/dl      Urobilinogen, UA <2.0 mg/dl      Bilirubin, UA Negative     Occult Blood, UA Negative    D-dimer, quantitative [[id number]]  (Normal) Collected: 02/24/25 1118    Lab Status: Final result Specimen: Blood from Arm, Left Updated: 02/24/25 1446     D-Dimer, Quant <0.27 ug/ml FEU     Narrative:      In the evaluation for possible pulmonary embolism, in the appropriate (Well's Score of 4 or less) patient, the age adjusted d-dimer cutoff for this patient can be calculated as:    Age x 0.01 (in ug/mL) for Age-adjusted D-dimer exclusion threshold for a patient over 50 years.    B-Type Natriuretic Peptide(BNP) [[id number]]  (Abnormal) Collected: 02/24/25 1118    Lab Status: Final result Specimen: Blood from Arm, Left Updated: 02/24/25 1411      pg/mL     HS Troponin I 2hr [[id number]]  (Normal) Collected: 02/24/25 1330    Lab Status: Final result Specimen: Blood from Arm, Left Updated: 02/24/25 1403     hs TnI 2hr 27 ng/L      Delta 2hr hsTnI 14 ng/L     Fingerstick Glucose (POCT) [[id number]]  (Normal) Collected: 02/24/25 5368     Lab Status: Final result Specimen: Blood Updated: 02/24/25 1334     POC Glucose 134 mg/dl     HS Troponin 0hr (reflex protocol) [[id number]]  (Normal) Collected: 02/24/25 1118    Lab Status: Final result Specimen: Blood from Arm, Left Updated: 02/24/25 1147     hs TnI 0hr 13 ng/L     Comprehensive metabolic panel [[id number]]  (Abnormal) Collected: 02/24/25 1118    Lab Status: Final result Specimen: Blood from Arm, Left Updated: 02/24/25 1142     Sodium 132 mmol/L      Potassium 4.1 mmol/L      Chloride 94 mmol/L      CO2 30 mmol/L      ANION GAP 8 mmol/L      BUN 15 mg/dL      Creatinine 0.54 mg/dL      Glucose 234 mg/dL      Calcium 9.4 mg/dL      AST 21 U/L      ALT 12 U/L      Alkaline Phosphatase 55 U/L      Total Protein 8.4 g/dL      Albumin 4.4 g/dL      Total Bilirubin 0.51 mg/dL      eGFR 85 ml/min/1.73sq m     Narrative:      National Kid[employer] Foundation guidelines for Chronic Kidney Disease (CKD):     Stage 1 with normal or high GFR (GFR > 90 mL/min/1.73 square meters)    Stage 2 Mild CKD (GFR = 60-89 mL/min/1.73 square meters)    Stage 3A Moderate CKD (GFR = 45-59 mL/min/1.73 square meters)    Stage 3B Moderate CKD (GFR = 30-44 mL/min/1.73 square meters)    Stage 4 Severe CKD (GFR = 15-29 mL/min/1.73 square meters)    Stage 5 End Stage CKD (GFR <15 mL/min/1.73 square meters)  Note: GFR calculation is accurate only with a steady state creatinine    CBC and differential [[id number]]  (Abnormal) Collected: 02/24/25 1118    Lab Status: Final result Specimen: Blood from Arm, Left Updated: 02/24/25 1127     WBC 6.37 Thousand/uL      RBC 5.02 Million/uL      Hemoglobin 12.2 g/dL      Hematocrit 39.6 %      MCV 79 fL      MCH 24.3 pg      MCHC 30.8 g/dL      RDW 15.5 %      MPV 9.7 fL      Platelets 204 Thousands/uL      nRBC 0 /100 WBCs      Segmented % 79 %      Immature Grans % 1 %      Lymphocytes % 10 %      Monocytes % 8 %      Eosinophils Relative 1 %      Basophils Relative 1 %      Absolute  Neutrophils 5.14 Thousands/µL      Absolute Immature Grans 0.03 Thousand/uL      Absolute Lymphocytes 0.62 Thousands/µL      Absolute Monocytes 0.48 Thousand/µL      Eosinophils Absolute 0.06 Thousand/µL      Basophils Absolute 0.04 Thousands/µL             XR chest 2 views   Final Interpretation by Aliez Grewal MD (02/24 1332)      Moderate pulmonary fibrosis with no acute disease.            Workstation performed: [id number]             ECG 12 Lead Documentation Only    Date/Time: 2/24/2025 11:45 AM    Performed by: Jeimy Whelan MD  Authorized by: Jeimy Whelan MD    Indications / Diagnosis:  SOB  ECG reviewed by me, the ED Provider: yes    Patient location:  ED  Previous ECG:     Previous ECG:  Compared to current    Similarity:  No change    Comparison to cardiac monitor: Yes    Interpretation:     Interpretation: abnormal    Rate:     ECG rate:  81    ECG rate assessment: normal    Rhythm:     Rhythm: sinus rhythm and paced    Pacing:     Capture:  Complete    Type of pacing:  Atrial  Ectopy:     Ectopy: PVCs      PVCs:  Unifocal and infrequent  QRS:     QRS axis:  Normal    QRS intervals:  Normal  Conduction:     Conduction: normal    ST segments:     ST segments:  Normal  T waves:     T waves: normal        ED Medication and Procedure Management   Prior to Admission Medications   Prescriptions Last Dose Informant Patient Reported? Taking?   Cyanocobalamin (Vitamin B 12) 500 MCG TABS  Self Yes No   Sig: Take 500 mg by mouth 2 (two) times a day   HumuLIN N KwikPen 100 units/mL injection pen  Self No No   Sig: INJECT 8 UNITS UNDER THE SKIN EVERY MORNING AND 4 UNITS EVERY EVENING.   Insulin Pen Needle (B-D UF III MINI PEN NEEDLES) 31G X 5 MM MISC  Self No No   Sig: USE 2 (TWO) TIMES A DAY   LORazepam (ATIVAN) 0.5 mg tablet  Self No No   Sig: TAKE 1 TABLET (0.5 MG TOTAL) BY MOUTH 2 (TWO) TIMES A DAY AS NEEDED FOR ANXIETY   Multiple Vitamin (multivitamin) tablet  Self Yes No   Sig: Take 1 tablet by mouth  daily     OneTouch Delica Lancets 33G MISC  Self No No   Sig: Check blood sugars four times daily. Please substitute with appropriate alternative as covered by patient's insurance. Dx: E11.65   acetaminophen (TYLENOL) 500 mg tablet  Self Yes No   Sig: Take 1,000 mg by mouth as needed for mild pain   apixaban (Eliquis) 2.5 mg   No No   Sig: TAKE 1 TABLET (2.5 MG TOTAL) BY MOUTH 2 (TWO) TIMES A DAY   ascorbic acid (VITAMIN C) 500 MG tablet  Self Yes No   Sig: Take 500 mg by mouth daily   Patient not taking: Reported on 1/13/2025   aspirin (ECOTRIN LOW STRENGTH) 81 mg EC tablet  Self No No   Sig: Take 1 tablet (81 mg total) by mouth daily   Patient not taking: Reported on 1/13/2025   bimatoprost (LUMIGAN) 0.01 % ophthalmic drops  Self No No   Sig: Administer 1 drop to both eyes daily at bedtime for 30 days   brimonidine tartrate 0.2 % ophthalmic solution  Self Yes No   Sig: INSTILL 1 DROP INTO RIGHT EYE TWICE A DAY   calcium carbonate (OS-DANIA) 600 MG tablet  Self Yes No   Sig: Take 600 mg by mouth 2 (two) times a day with meals     Patient not taking: Reported on 1/13/2025   ezetimibe-simvastatin (VYTORIN) 10-40 mg per tablet  Self No No   Sig: TAKE 1 TABLET BY MOUTH DAILY AT BEDTIME   famotidine (PEPCID) 20 mg tablet   No No   Sig: TAKE 1 TABLET (20 MG TOTAL) BY MOUTH 2 (TWO) TIMES A DAY   glucose blood (OneTouch Ultra) test strip  Self No No   Sig: TEST FOUR TIMES A DAY   ipratropium (ATROVENT) 0.03 % nasal spray  Self No No   Sig: USE 2 SPRAYS INTO EACH NOSTRIL EVERY 12 (TWELVE) HOURS   loratadine (CLARITIN) 10 mg tablet  Self Yes No   Sig: Take 10 mg by mouth daily as needed for allergies   Patient not taking: Reported on 1/13/2025   metFORMIN (GLUCOPHAGE) 500 mg tablet   No No   Sig: TAKE 2 TABLETS (1,000 MG TOTAL) BY MOUTH 2 (TWO) TIMES A DAY WITH MEALS   metoprolol tartrate (LOPRESSOR) 25 mg tablet  Self No No   Sig: Take 1 tablet (25 mg total) by mouth every 12 (twelve) hours   Patient taking differently: Take  12.5 mg by mouth every 12 (twelve) hours   mirtazapine (REMERON) 7.5 MG tablet   No No   Sig: TAKE 1 TABLET (7.5 MG TOTAL) BY MOUTH DAILY AT BEDTIME   pantoprazole (PROTONIX) 40 mg tablet  Self No No   Sig: TAKE 1 TABLET (40 MG TOTAL) BY MOUTH 2 (TWO) TIMES A DAY   polyethylene glycol (MIRALAX) 17 g packet   No No   Sig: Take 17 g by mouth daily as needed (constipation/ abominal cramping) for up to 14 days Prn constipation   sitaGLIPtin (Januvia) 100 mg tablet   No No   Sig: TAKE 1 TABLET (100 MG TOTAL) BY MOUTH DAILY   sodium chloride 1 g tablet  Self No No   Sig: TAKE 1 TABLET THREE TIMES A DAY      Facility-Administered Medications: None     Current Discharge Medication List        CONTINUE these medications which have NOT CHANGED    Details   acetaminophen (TYLENOL) 500 mg tablet Take 1,000 mg by mouth as needed for mild pain      apixaban (Eliquis) 2.5 mg TAKE 1 TABLET (2.5 MG TOTAL) BY MOUTH 2 (TWO) TIMES A DAY  Qty: 90 tablet, Refills: 1    Associated Diagnoses: Paroxysmal atrial fibrillation (HCC)      ascorbic acid (VITAMIN C) 500 MG tablet Take 500 mg by mouth daily      aspirin (ECOTRIN LOW STRENGTH) 81 mg EC tablet Take 1 tablet (81 mg total) by mouth daily  Qty:      Associated Diagnoses: Occlusion of right carotid artery; Asymptomatic carotid artery stenosis, left      bimatoprost (LUMIGAN) 0.01 % ophthalmic drops Administer 1 drop to both eyes daily at bedtime for 30 days  Qty: 1.5 mL, Refills: 0      brimonidine tartrate 0.2 % ophthalmic solution INSTILL 1 DROP INTO RIGHT EYE TWICE A DAY      calcium carbonate (OS-DANIA) 600 MG tablet Take 600 mg by mouth 2 (two) times a day with meals        Cyanocobalamin (Vitamin B 12) 500 MCG TABS Take 500 mg by mouth 2 (two) times a day      ezetimibe-simvastatin (VYTORIN) 10-40 mg per tablet TAKE 1 TABLET BY MOUTH DAILY AT BEDTIME  Qty: 30 tablet, Refills: 5    Associated Diagnoses: Hyperlipidemia, unspecified hyperlipidemia type      famotidine (PEPCID) 20 mg  tablet TAKE 1 TABLET (20 MG TOTAL) BY MOUTH 2 (TWO) TIMES A DAY  Qty: 60 tablet, Refills: 5    Associated Diagnoses: Gastroesophageal reflux disease with esophagitis without hemorrhage      glucose blood (OneTouch Ultra) test strip TEST FOUR TIMES A DAY  Qty: 100 strip, Refills: 5    Associated Diagnoses: Uncontrolled type 2 diabetes mellitus with hyperglycemia ([facility])      HumuLIN N KwikPen 100 units/mL injection pen INJECT 8 UNITS UNDER THE SKIN EVERY MORNING AND 4 UNITS EVERY EVENING.  Qty: 15 mL, Refills: 1    Associated Diagnoses: Type 2 diabetes mellitus with diabetic polyneuropathy, without long-term current use of insulin ([facility]); Interstitial lung disease ([facility])      Insulin Pen Needle (B-D UF III MINI PEN NEEDLES) 31G X 5 MM MISC USE 2 (TWO) TIMES A DAY  Qty: 200 each, Refills: 3    Associated Diagnoses: Type 2 diabetes mellitus with diabetic polyneuropathy, without long-term current use of insulin ([facility]); Interstitial lung disease ([facility])      ipratropium (ATROVENT) 0.03 % nasal spray USE 2 SPRAYS INTO EACH NOSTRIL EVERY 12 (TWELVE) HOURS  Qty: 30 mL, Refills: 6    Associated Diagnoses: Nonallergic rhinitis      loratadine (CLARITIN) 10 mg tablet Take 10 mg by mouth daily as needed for allergies      LORazepam (ATIVAN) 0.5 mg tablet TAKE 1 TABLET (0.5 MG TOTAL) BY MOUTH 2 (TWO) TIMES A DAY AS NEEDED FOR ANXIETY  Qty: 60 tablet, Refills: 2    Associated Diagnoses: Anxiety      metFORMIN (GLUCOPHAGE) 500 mg tablet TAKE 2 TABLETS (1,000 MG TOTAL) BY MOUTH 2 (TWO) TIMES A DAY WITH MEALS  Qty: 120 tablet, Refills: 5    Associated Diagnoses: Type 2 diabetes mellitus with diabetic polyneuropathy, with long-term current use of insulin ([facility])      metoprolol tartrate (LOPRESSOR) 25 mg tablet Take 1 tablet (25 mg total) by mouth every 12 (twelve) hours  Qty: 180 tablet, Refills: 3    Associated Diagnoses: Essential hypertension      mirtazapine (REMERON) 7.5 MG tablet TAKE 1 TABLET (7.5 MG TOTAL) BY MOUTH DAILY AT  BEDTIME  Qty: 30 tablet, Refills: 5    Associated Diagnoses: Insomnia, unspecified type      Multiple Vitamin (multivitamin) tablet Take 1 tablet by mouth daily        OneTouch Delica Lancets 33G MISC Check blood sugars four times daily. Please substitute with appropriate alternative as covered by patient's insurance. 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, with long-term current use of insulin (HCC)      pantoprazole (PROTONIX) 40 mg tablet TAKE 1 TABLET (40 MG TOTAL) BY MOUTH 2 (TWO) TIMES A DAY  Qty: 60 tablet, Refills: 5    Associated Diagnoses: Dysphagia, unspecified type      polyethylene glycol (MIRALAX) 17 g packet Take 17 g by mouth daily as needed (constipation/ abominal cramping) for up to 14 days Prn constipation  Qty: 85 g, Refills: 0    Associated Diagnoses: Constipation      sitaGLIPtin (Januvia) 100 mg tablet TAKE 1 TABLET (100 MG TOTAL) BY MOUTH DAILY  Qty: 90 tablet, Refills: 1    Associated Diagnoses: Type 2 diabetes mellitus with retinopathy, with long-term current use of insulin, macular edema presence unspecified, unspecified laterality, unspecified retinopathy severity ([facility])      sodium chloride 1 g tablet TAKE 1 TABLET THREE TIMES A DAY  Qty: 90 tablet, Refills: 1    Associated Diagnoses: Hyponatremia           No discharge procedures on file.  ED SEPSIS DOCUMENTATION   Time reflects when diagnosis was documented in both MDM as applicable and the Disposition within this note       Time User Action Codes Description Comment    2/24/2025  1:37 PM Jeimy Whelan Add [R06.09] Dyspnea on exertion            Initial Sepsis Screening       Row Name 02/24/25 8066                Is the patient's history suggestive of a new or worsening infection? No  -EP                  User Key   (r) = Recorded By, (t) = Taken By, (c) = Cosigned By      Initials Name Provider Type    EP Mable Espinoza MD Physician                       Jeimy Whelan MD  02/25/25 2529

## 2025-02-25 NOTE — PLAN OF CARE
Problem: PAIN - ADULT  Goal: Verbalizes/displays adequate comfort level or baseline comfort level  Description: Interventions:  - Encourage patient to monitor pain and request assistance  - Assess pain using appropriate pain scale  - Administer analgesics based on type and severity of pain and evaluate response  - Implement non-pharmacological measures as appropriate and evaluate response  - Consider cultural and social influences on pain and pain management  - Notify physician/advanced practitioner if interventions unsuccessful or patient reports new pain  Outcome: Progressing     Problem: INFECTION - ADULT  Goal: Absence or prevention of progression during hospitalization  Description: INTERVENTIONS:  - Assess and monitor for signs and symptoms of infection  - Monitor lab/diagnostic results  - Monitor all insertion sites, i.e. indwelling lines, tubes, and drains  - Monitor endotracheal if appropriate and nasal secretions for changes in amount and color  - [place] appropriate cooling/warming therapies per order  - Administer medications as ordered  - Instruct and encourage patient and family to use good hand hygiene technique  - Identify and instruct in appropriate isolation precautions for identified infection/condition  Outcome: Progressing  Goal: Absence of fever/infection during neutropenic period  Description: INTERVENTIONS:  - Monitor WBC    Outcome: Progressing     Problem: SAFETY ADULT  Goal: Patient will remain free of falls  Description: INTERVENTIONS:  - Educate patient/family on patient safety including physical limitations  - Instruct patient to call for assistance with activity   - Consult OT/PT to assist with strengthening/mobility   - Keep Call bell within reach  - Keep bed low and locked with side rails adjusted as appropriate  - Keep care items and personal belongings within reach  - Initiate and maintain comfort rounds  - Make Fall Risk Sign visible to staff  - Offer Toileting every 2 Hours,  in advance of need  - Initiate/Maintain bed/chair alarm  - Obtain necessary fall risk management equipment  - Apply yellow socks and bracelet for high fall risk patients  - Consider moving patient to room near nurses station  Outcome: Progressing     Problem: DISCHARGE PLANNING  Goal: Discharge to home or other facility with appropriate resources  Description: INTERVENTIONS:  - Identify barriers to discharge w/patient and caregiver  - Arrange for needed discharge resources and transportation as appropriate  - Identify discharge learning needs (meds, wound care, etc.)  - Arrange for interpretive services to assist at discharge as needed  - Refer to Case Management Department for coordinating discharge planning if the patient needs post-hospital services based on physician/advanced practitioner order or complex needs related to functional status, cognitive ability, or social support system  Outcome: Progressing     Problem: Knowledge Deficit  Goal: Patient/family/caregiver demonstrates understanding of disease process, treatment plan, medications, and discharge instructions  Description: Complete learning assessment and assess knowledge base.  Interventions:  - Provide teaching at level of understanding  - Provide teaching via preferred learning methods  Outcome: Progressing

## 2025-02-26 ENCOUNTER — TRANSITIONAL CARE MANAGEMENT (OUTPATIENT)
Dept: FAMILY MEDICINE CLINIC | Facility: CLINIC | Age: 89
End: 2025-02-26

## 2025-02-26 VITALS
OXYGEN SATURATION: 97 % | HEART RATE: 74 BPM | HEIGHT: 66 IN | DIASTOLIC BLOOD PRESSURE: 96 MMHG | SYSTOLIC BLOOD PRESSURE: 152 MMHG | RESPIRATION RATE: 16 BRPM | BODY MASS INDEX: 18.8 KG/M2 | WEIGHT: 117 LBS | TEMPERATURE: 97.7 F

## 2025-02-26 PROBLEM — I35.0 AORTIC STENOSIS: Status: ACTIVE | Noted: 2025-02-26

## 2025-02-26 LAB
ANION GAP SERPL CALCULATED.3IONS-SCNC: 11 MMOL/L (ref 4–13)
BUN SERPL-MCNC: 19 MG/DL (ref 5–25)
CALCIUM SERPL-MCNC: 9.4 MG/DL (ref 8.4–10.2)
CHLORIDE SERPL-SCNC: 96 MMOL/L (ref 96–108)
CO2 SERPL-SCNC: 26 MMOL/L (ref 21–32)
CREAT SERPL-MCNC: 0.55 MG/DL (ref 0.6–1.3)
GFR SERPL CREATININE-BSD FRML MDRD: 84 ML/MIN/1.73SQ M
GLUCOSE SERPL-MCNC: 124 MG/DL (ref 65–140)
GLUCOSE SERPL-MCNC: 132 MG/DL (ref 65–140)
GLUCOSE SERPL-MCNC: 198 MG/DL (ref 65–140)
POTASSIUM SERPL-SCNC: 4 MMOL/L (ref 3.5–5.3)
SODIUM SERPL-SCNC: 133 MMOL/L (ref 135–147)

## 2025-02-26 PROCEDURE — 80048 BASIC METABOLIC PNL TOTAL CA: CPT | Performed by: PHYSICIAN ASSISTANT

## 2025-02-26 PROCEDURE — 82948 REAGENT STRIP/BLOOD GLUCOSE: CPT

## 2025-02-26 PROCEDURE — 99239 HOSP IP/OBS DSCHRG MGMT >30: CPT | Performed by: PHYSICIAN ASSISTANT

## 2025-02-26 PROCEDURE — 99232 SBSQ HOSP IP/OBS MODERATE 35: CPT | Performed by: STUDENT IN AN ORGANIZED HEALTH CARE EDUCATION/TRAINING PROGRAM

## 2025-02-26 RX ORDER — PREDNISONE 10 MG/1
TABLET ORAL
Qty: 42 TABLET | Refills: 0 | Status: ON HOLD | OUTPATIENT
Start: 2025-02-26 | End: 2025-03-05

## 2025-02-26 RX ORDER — CYANOCOBALAMIN (VITAMIN B-12) 500 MCG
500 TABLET ORAL 2 TIMES DAILY
Start: 2025-02-26

## 2025-02-26 RX ADMIN — SODIUM CHLORIDE 1 G: 1 TABLET ORAL at 08:35

## 2025-02-26 RX ADMIN — PANTOPRAZOLE SODIUM 40 MG: 40 TABLET, DELAYED RELEASE ORAL at 08:35

## 2025-02-26 RX ADMIN — ACETAMINOPHEN 650 MG: 325 TABLET, FILM COATED ORAL at 08:35

## 2025-02-26 RX ADMIN — INSULIN HUMAN 8 UNITS: 100 INJECTION, SUSPENSION SUBCUTANEOUS at 08:41

## 2025-02-26 RX ADMIN — BRIMONIDINE TARTRATE 1 DROP: 2 SOLUTION/ DROPS OPHTHALMIC at 08:38

## 2025-02-26 RX ADMIN — APIXABAN 2.5 MG: 2.5 TABLET, FILM COATED ORAL at 08:35

## 2025-02-26 RX ADMIN — FAMOTIDINE 20 MG: 20 TABLET, FILM COATED ORAL at 08:35

## 2025-02-26 RX ADMIN — PREDNISONE 40 MG: 20 TABLET ORAL at 08:34

## 2025-02-26 RX ADMIN — Medication 12.5 MG: at 08:35

## 2025-02-26 NOTE — ASSESSMENT & PLAN NOTE
Na is 133  Patient is on salt tabs will continue on these  Na appears at or around baseline  No further work up needed

## 2025-02-26 NOTE — PLAN OF CARE
Problem: PAIN - ADULT  Goal: Verbalizes/displays adequate comfort level or baseline comfort level  Description: Interventions:  - Encourage patient to monitor pain and request assistance  - Assess pain using appropriate pain scale  - Administer analgesics based on type and severity of pain and evaluate response  - Implement non-pharmacological measures as appropriate and evaluate response  - Consider cultural and social influences on pain and pain management  - Notify physician/advanced practitioner if interventions unsuccessful or patient reports new pain  Outcome: Progressing     Problem: INFECTION - ADULT  Goal: Absence or prevention of progression during hospitalization  Description: INTERVENTIONS:  - Assess and monitor for signs and symptoms of infection  - Monitor lab/diagnostic results  - Monitor all insertion sites, i.e. indwelling lines, tubes, and drains  - Monitor endotracheal if appropriate and nasal secretions for changes in amount and color  - [place] appropriate cooling/warming therapies per order  - Administer medications as ordered  - Instruct and encourage patient and family to use good hand hygiene technique  - Identify and instruct in appropriate isolation precautions for identified infection/condition  Outcome: Progressing  Goal: Absence of fever/infection during neutropenic period  Description: INTERVENTIONS:  - Monitor WBC    Outcome: Progressing     Problem: SAFETY ADULT  Goal: Patient will remain free of falls  Description: INTERVENTIONS:  - Educate patient/family on patient safety including physical limitations  - Instruct patient to call for assistance with activity   - Consult OT/PT to assist with strengthening/mobility   - Keep Call bell within reach  - Keep bed low and locked with side rails adjusted as appropriate  - Keep care items and personal belongings within reach  - Initiate and maintain comfort rounds  - Make Fall Risk Sign visible to staff  - Offer Toileting every 2 Hours,  in advance of need  - Initiate/Maintain bed and chair alarm  - Obtain necessary fall risk management equipment:   - Apply yellow socks and bracelet for high fall risk patients  - Consider moving patient to room near nurses station  Outcome: Progressing  Goal: Maintain or return to baseline ADL function  Description: INTERVENTIONS:  -  Assess patient's ability to carry out ADLs; assess patient's baseline for ADL function and identify physical deficits which impact ability to perform ADLs (bathing, care of mouth/teeth, toileting, grooming, dressing, etc.)  - Assess/evaluate cause of self-care deficits   - Assess range of motion  - Assess patient's mobility; develop plan if impaired  - Assess patient's need for assistive devices and provide as appropriate  - Encourage maximum independence but intervene and supervise when necessary  - Involve family in performance of ADLs  - Assess for home care needs following discharge   - Consider OT consult to assist with ADL evaluation and planning for discharge  - Provide patient education as appropriate  Outcome: Progressing  Goal: Maintains/Returns to pre admission functional level  Description: INTERVENTIONS:  - Perform AM-PAC 6 Click Basic Mobility/ Daily Activity assessment daily.  - Set and communicate daily mobility goal to care team and patient/family/caregiver.   - Collaborate with rehabilitation services on mobility goals if consulted  - Perform Range of Motion 3 times a day.  - Reposition patient every 2 hours.  - Dangle patient 3 times a day  - Stand patient 3 times a day  - Ambulate patient 3 times a day  - Out of bed to chair 3 times a day   - Out of bed for meals 3 times a day  - Out of bed for toileting  - Record patient progress and toleration of activity level   Outcome: Progressing     Problem: DISCHARGE PLANNING  Goal: Discharge to home or other facility with appropriate resources  Description: INTERVENTIONS:  - Identify barriers to discharge w/patient and  caregiver  - Arrange for needed discharge resources and transportation as appropriate  - Identify discharge learning needs (meds, wound care, etc.)  - Arrange for interpretive services to assist at discharge as needed  - Refer to Case Management Department for coordinating discharge planning if the patient needs post-hospital services based on physician/advanced practitioner order or complex needs related to functional status, cognitive ability, or social support system  Outcome: Progressing     Problem: Knowledge Deficit  Goal: Patient/family/caregiver demonstrates understanding of disease process, treatment plan, medications, and discharge instructions  Description: Complete learning assessment and assess knowledge base.  Interventions:  - Provide teaching at level of understanding  - Provide teaching via preferred learning methods  Outcome: Progressing     Problem: Prexisting or High Potential for Compromised Skin Integrity  Goal: Skin integrity is maintained or improved  Description: INTERVENTIONS:  - Identify patients at risk for skin breakdown  - Assess and monitor skin integrity  - Assess and monitor nutrition and hydration status  - Monitor labs   - Assess for incontinence   - Turn and reposition patient  - Assist with mobility/ambulation  - Relieve pressure over bony prominences  - Avoid friction and shearing  - Provide appropriate hygiene as needed including keeping skin clean and dry  - Evaluate need for skin moisturizer/barrier cream  - Collaborate with interdisciplinary team   - Patient/family teaching  - Consider wound care consult   Outcome: Progressing

## 2025-02-26 NOTE — NURSING NOTE
Ambulated patient for two minutes in the hallway. SpO2 saturation was 88-95% on room air during exercise. SLIM aware.

## 2025-02-26 NOTE — ASSESSMENT & PLAN NOTE
Dyspnea on exertion for the past 2 to 3 weeks  History of extensive ILD diagnosed in 2007, last seen pulmonology in March 2023  Patient follows up with Dr. 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  Quit in 1994  No exposure to pets, farm animals or birds  Family History: Younger brother diagnosed with ILD, currently on medication  PFT (08/09/2023): Mild restriction with mildly reduced DCLO  No history of autoimmune disorder  Patient has been on room air since admission, flu RSV COVID negative, BNP mildly elevated at 192, D-dimer normal, troponin normal, UA insignificant except for some leukocytes    Chest x-ray: Moderate pulmonary fibrosis with no acute disease.   Dyspnea on exertion most likely secondary to progression of ILD    PLAN:  Start prednisone 40 Mg for 5 days, decrease to 30mg for 5 days, then 20mg daily for 5 days, then 10mg daily for 5 days, continue on discharge  CRP 2.6  Continue incentive spirometry  Follow up with outpt pulm with Dr. Ari Jovel

## 2025-02-26 NOTE — DISCHARGE SUMMARY
Discharge Summary - Hospitalist   Name: Ac Duran 89 y.o. female I MRN: [id number]  Unit/Bed#: S -01 I Date of Admission: 2/24/2025   Date of Service: 2/26/2025 I Hospital Day: 1     Assessment & Plan  Dyspnea on exertion  Patient presenting with 2 to 3-week history of worsening dyspnea on exertion.  Usually can walk a block or so without being short of breath however now can only take a few steps.  Does have pulmonary fibrosis and follows with [facility].  She was scheduled to have an appointment with pulmonologist tomorrow however her shortness of breath became so severe that she comes into the ED instead.  Her chest x-ray does not show any new acute findings.   Troponin negative, dimer negative  ECHO with mild-moderate AS noted with murmur on exam - symptomatology due to fibrosis + AS  Stable for discharge   Pulmonary consult appreciated   Prednisone taper as delineated   She has cardiology follow up to discuss AS noted on ECHO       Essential hypertension  BP acceptable  Continue metoprolol     Paroxysmal atrial fibrillation (HCC)  Status post pacemaker, HR controlled   Continue metoprolol   Continue Eliquis  Hyponatremia  Na is 133  Patient is on salt tabs will continue on these  Na appears at or around baseline  No further work up needed  Microcytosis  MCV is 79   Hemoglobin is within the normal range     Aortic stenosis  Noted on ECHO  Cardiology follow up already scheduled      Medical Problems       Resolved Problems  Date Reviewed: 2/26/2025   None       Discharging Physician / Practitioner: Shay Jarrett PA-C  PCP: ZANDER Swenson  Admission Date:   Admission Orders (From admission, onward)       Ordered        02/25/25 1100  INPATIENT ADMISSION  Once            02/24/25 1339  Place in Observation  Once                          Discharge Date: 02/26/25    Consultations During Hospital Stay:  Pulmonary - Dr. Lamar     Procedures Performed:   CXR  ECHO    Significant  "Findings / Test Results:   CXR: Moderate pulmonary fibrosis with no acute disease   ECHO: EF = 55-60%, systolic function normal, wall motion normal, grade 1 DD, mild -moderate AS     Incidental Findings:   None     Test Results Pending at Discharge (will require follow up):   None     Outpatient Tests Requested:  None    Complications:  none    Reason for Admission: DASILVA    Hospital Course:   Ac Duran is a 89 y.o. female patient who originally presented to the hospital on 2/24/2025 due to dyspnea on exertion. She has underlying ILD, however she was not demonstrating hypoxia and CXR was overall stable from prior. She was admitted with Pulmonary consultation and cardiology work up was started. Troponin remained negative. ECHO Was done with preserved EF and only mild diastolic dysfunction, however, in addition to murmur on exam, a new AS was found, but this was only mild-moderate. Pulmonary felt that her ILD was causing her DASILVA and she was started on prednisone taper which helped symptoms. She will be discharged home on prednisone taper and will follow up with Pulmonary and Cardiology as planned.     Hospital Course: No notes on file      Please see above list of diagnoses and related plan for additional information.     Condition at Discharge: stable    Discharge Day Visit / Exam:   Subjective:  Patient reports feeling better after starting Prednisone taper. Denies other complaints. Wants to go home today.   Vitals: Blood Pressure: 126/68 (02/26/25 0835)  Pulse: 85 (02/26/25 0835)  Temperature: 97.7 °F (36.5 °C) (02/26/25 0709)  Temp Source: Oral (02/25/25 0757)  Respirations: 16 (02/26/25 0709)  Height: 5' 6\" (167.6 cm) (02/25/25 0802)  Weight - Scale: 53.1 kg (117 lb) (02/25/25 0802)  SpO2: 95 % (02/26/25 0709)  Physical Exam  Vitals and nursing note reviewed.   Constitutional:       General: She is not in acute distress.     Appearance: Normal appearance. She is normal weight. She is not ill-appearing or " diaphoretic.   HENT:      Head: Normocephalic and atraumatic.      Mouth/Throat:      Mouth: Mucous membranes are moist.   Eyes:      General: No scleral icterus.     Pupils: Pupils are equal, round, and reactive to light.   Cardiovascular:      Rate and Rhythm: Normal rate and regular rhythm.      Pulses: Normal pulses.      Heart sounds: S1 normal and S2 normal. Murmur heard.      No systolic murmur is present.      No diastolic murmur is present.      No gallop. No S3 or S4 sounds.   Pulmonary:      Effort: Pulmonary effort is normal. No accessory muscle usage or respiratory distress.      Breath sounds: No stridor. Examination of the right-lower field reveals rales. Examination of the left-lower field reveals rales. Rales present. No decreased breath sounds, wheezing or rhonchi.   Chest:      Chest wall: No tenderness.   Abdominal:      General: Bowel sounds are normal. There is no distension.      Palpations: Abdomen is soft.      Tenderness: There is no abdominal tenderness. There is no guarding.   Musculoskeletal:      Right lower leg: No edema.      Left lower leg: No edema.   Skin:     General: Skin is warm and dry.      Coloration: Skin is not jaundiced.   Neurological:      General: No focal deficit present.      Mental Status: She is alert. Mental status is at baseline.      Motor: No tremor or seizure activity.   Psychiatric:         Behavior: Behavior is cooperative.          Discussion with Family: Updated  (son) via phone.    Discharge instructions/Information to patient and family:   See after visit summary for information provided to patient and family.      Provisions for Follow-Up Care:  See after visit summary for information related to follow-up care and any pertinent home health orders.      Mobility at time of Discharge:   Basic Mobility Inpatient Raw Score: 20  JH-HLM Goal: 6: Walk 10 steps or more  JH-HLM Achieved: 6: Walk 10 steps or more  HLM Goal achieved. Continue to  encourage appropriate mobility.     Disposition:   Home    Planned Readmission: None    Discharge Medications:  See after visit summary for reconciled discharge medications provided to patient and/or family.      Administrative Statements   Discharge Statement:  I have spent a total time of 45 minutes in caring for this patient on the day of the visit/encounter. >30 minutes of time was spent on: Diagnostic results, Instructions for management, Impressions, Counseling / Coordination of care, Documenting in the medical record, Reviewing / ordering tests, medicine, procedures  , and Communicating with other healthcare professionals .    **Please Note: This note may have been constructed using a voice recognition system**

## 2025-02-26 NOTE — PROGRESS NOTES
Progress Note - Pulmonology   Name: Ac Duran 89 y.o. female I MRN: [id number]  Unit/Bed#: S -01 I Date of Admission: 2/24/2025   Date of Service: 2/26/2025 I Hospital Day: 1    Assessment & Plan  Dyspnea on exertion  Dyspnea on exertion for the past 2 to 3 weeks  History of extensive ILD diagnosed in 2007, last seen pulmonology in March 2023  Patient follows up with Dr. 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  Later worked with [employer] and 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 a lot of different types of jewels  Smoking: Smoked for 20 years, 2 pack daily.  Quit in 1994  No exposure to pets, farm animals or birds  Family History: Younger brother diagnosed with ILD, currently on medication  PFT (08/09/2023): Mild restriction with mildly reduced DCLO  No history of autoimmune disorder  Patient has been on room air since admission, flu RSV COVID negative, BNP mildly elevated at 192, D-dimer normal, troponin normal, UA insignificant except for some leukocytes    Chest x-ray: Moderate pulmonary fibrosis with no acute disease.   Dyspnea on exertion most likely secondary to progression of ILD    PLAN:  Start prednisone 40 Mg for 5 days, decrease to 30mg for 5 days, then 20mg daily for 5 days, then 10mg daily for 5 days, continue on discharge  CRP 2.6  Continue incentive spirometry  Follow up with outpt pulm with Dr. Ari Jovel      24 Hour Events : No acute overnight events  Subjective : Patient is doing well, no complaints at this time. Patient reports that shortness of breath already feels improved from day of presentation. Even with long period of conversation, pt does not have increased work of breathing. She still has dyspnea on exertion but ambulatory pulse pulse ox with nurse did not have a desat below 88%. Pulm will sign off at this time, patient needs to continue long prednisone taper on discharge (started at 50mg qd during  this hosp, will decrease by 10mg every 5 days).    Objective :  Temp:  [97.7 °F (36.5 °C)] 97.7 °F (36.5 °C)  HR:  [64-85] 74  BP: (126-174)/(68-96) 152/96  Resp:  [16] 16  SpO2:  [94 %-97 %] 97 %  O2 Device: None (Room air)    Physical Exam  Vitals and nursing note reviewed.   Constitutional:       General: She is not in acute distress.     Appearance: She is well-developed. She is not ill-appearing.   HENT:      Head: Normocephalic and atraumatic.   Eyes:      Conjunctiva/sclera: Conjunctivae normal.   Cardiovascular:      Rate and Rhythm: Normal rate and regular rhythm.      Heart sounds: Normal heart sounds. No murmur heard.  Pulmonary:      Effort: Pulmonary effort is normal. No respiratory distress.      Breath sounds: Rales present. No wheezing or rhonchi.   Abdominal:      General: There is no distension.      Palpations: Abdomen is soft.      Tenderness: There is no abdominal tenderness. There is no guarding.   Musculoskeletal:         General: No swelling.      Cervical back: Neck supple.      Right lower leg: No edema.      Left lower leg: No edema.   Skin:     General: Skin is warm and dry.      Capillary Refill: Capillary refill takes less than 2 seconds.   Neurological:      Mental Status: She is alert.   Psychiatric:         Mood and Affect: Mood normal.           Lab Results: I have reviewed the following results:   .     02/26/25  0512   SODIUM 133*   K 4.0   CL 96   CO2 26   BUN 19   CREATININE 0.55*   GLUC 132     ABG: No new results in last 24 hours.    Imaging Results Review: I reviewed radiology reports from this admission including: chest xray.  Other Study Results Review: Pathology reports reviewed.  PFT Results Reviewed: reviewed

## 2025-02-26 NOTE — CASE MANAGEMENT
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[facility]) 10/19/2023     Bilateral hip pain 2023     Pachymeningitis 2023     Hypovitaminosis D 2021     Depression, recurrent ([facility]) 2021     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, with long-term current use of insulin ([facility]) 2021     Type 2 diabetes mellitus with hyperlipidemia  ([facility]) 2021     Type 2 diabetes mellitus with diabetic polyneuropathy, without long-term current use of insulin ([facility]) 2021     DM2 (diabetes mellitus, type 2) ([facility]) 2021     Post-nasal drip 2021     Hyponatremia 2020     Moderate protein-calorie malnutrition ([facility]) 2020     Constipation 2020     Dysphagia 2020     Interstitial lung disease ([facility]) 10/13/2020     Iron deficiency 2020     Osteoporosis 2019     GERD (gastroesophageal reflux disease) 2019     Anxiety 2019     Pacemaker 2016     Occlusion of right carotid artery 2016     Paroxysmal atrial fibrillation (HCC) 2016     Symptomatic PVCs 2016     Essential hypertension 10/29/2016     Mixed hyperlipidemia 10/29/2016     Glaucoma 10/29/2016     Asymptomatic carotid artery stenosis, left 10/29/2016       LOS (days): 1  Geometric Mean LOS (GMLOS) (days):  2.9  Days to GMLOS:1.9     OBJECTIVE:  Risk of Unplanned Readmission Score: 14.65         Current admission status: Inpatient   Preferred Pharmacy:   [facility] - [place] - [address]  [address] [address]  Phone: [phone] Fax: [phone]    [id number]  [place] - [address]  [address]  [place] [address]  Phone: [phone] Fax: [phone]    [id number]  [place] - [address]  [address] [address]  Phone: [phone] Fax: [phone]    Primary Care Provider: ZANDER Swenson    Primary Insurance: MEDICARE  Secondary Insurance: [facility]    DISCHARGE DETAILS:                                                                                     IMM reviewed with patient, patient agrees with discharge determination.  IMM Given (Date):: 02/26/25  IMM Given to:: Patient

## 2025-02-26 NOTE — PLAN OF CARE
Problem: PAIN - ADULT  Goal: Verbalizes/displays adequate comfort level or baseline comfort level  Description: Interventions:  - Encourage patient to monitor pain and request assistance  - Assess pain using appropriate pain scale  - Administer analgesics based on type and severity of pain and evaluate response  - Implement non-pharmacological measures as appropriate and evaluate response  - Consider cultural and social influences on pain and pain management  - Notify physician/advanced practitioner if interventions unsuccessful or patient reports new pain  Outcome: Progressing     Problem: INFECTION - ADULT  Goal: Absence or prevention of progression during hospitalization  Description: INTERVENTIONS:  - Assess and monitor for signs and symptoms of infection  - Monitor lab/diagnostic results  - Monitor all insertion sites, i.e. indwelling lines, tubes, and drains  - Monitor endotracheal if appropriate and nasal secretions for changes in amount and color  - [place] appropriate cooling/warming therapies per order  - Administer medications as ordered  - Instruct and encourage patient and family to use good hand hygiene technique  - Identify and instruct in appropriate isolation precautions for identified infection/condition  Outcome: Progressing  Goal: Absence of fever/infection during neutropenic period  Description: INTERVENTIONS:  - Monitor WBC    Outcome: Progressing     Problem: SAFETY ADULT  Goal: Patient will remain free of falls  Description: INTERVENTIONS:  - Educate patient/family on patient safety including physical limitations  - Instruct patient to call for assistance with activity   - Consult OT/PT to assist with strengthening/mobility   - Keep Call bell within reach  - Keep bed low and locked with side rails adjusted as appropriate  - Keep care items and personal belongings within reach  - Initiate and maintain comfort rounds  - Make Fall Risk Sign visible to staff  - Offer Toileting every 2 Hours,  in advance of need  - Initiate/Maintain bed/chair alarm  - Obtain necessary fall risk management equipment  - Apply yellow socks and bracelet for high fall risk patients  - Consider moving patient to room near nurses station  Outcome: Progressing     Problem: DISCHARGE PLANNING  Goal: Discharge to home or other facility with appropriate resources  Description: INTERVENTIONS:  - Identify barriers to discharge w/patient and caregiver  - Arrange for needed discharge resources and transportation as appropriate  - Identify discharge learning needs (meds, wound care, etc.)  - Arrange for interpretive services to assist at discharge as needed  - Refer to Case Management Department for coordinating discharge planning if the patient needs post-hospital services based on physician/advanced practitioner order or complex needs related to functional status, cognitive ability, or social support system  Outcome: Progressing     Problem: Knowledge Deficit  Goal: Patient/family/caregiver demonstrates understanding of disease process, treatment plan, medications, and discharge instructions  Description: Complete learning assessment and assess knowledge base.  Interventions:  - Provide teaching at level of understanding  - Provide teaching via preferred learning methods  Outcome: Progressing     Problem: Prexisting or High Potential for Compromised Skin Integrity  Goal: Skin integrity is maintained or improved  Description: INTERVENTIONS:  - Identify patients at risk for skin breakdown  - Assess and monitor skin integrity  - Assess and monitor nutrition and hydration status  - Monitor labs   - Assess for incontinence   - Turn and reposition patient  - Assist with mobility/ambulation  - Relieve pressure over bony prominences  - Avoid friction and shearing  - Provide appropriate hygiene as needed including keeping skin clean and dry  - Evaluate need for skin moisturizer/barrier cream  - Collaborate with interdisciplinary team   -  Patient/family teaching  - Consider wound care consult   Outcome: Progressing

## 2025-02-27 ENCOUNTER — TELEPHONE (OUTPATIENT)
Age: 89
End: 2025-02-27

## 2025-02-27 ENCOUNTER — TELEPHONE (OUTPATIENT)
Dept: FAMILY MEDICINE CLINIC | Facility: CLINIC | Age: 89
End: 2025-02-27

## 2025-02-27 ENCOUNTER — PATIENT OUTREACH (OUTPATIENT)
Dept: CASE MANAGEMENT | Facility: OTHER | Age: 89
End: 2025-02-27

## 2025-02-27 NOTE — TELEPHONE ENCOUNTER
Patient called was discharged from hospital yesterday. Pt was prescribed Rx: Prednisone. States medication has been causing her blood sugars to increase. Yesterday evening her BS was 354 and today was 211. Pt states asymptomatic.Requesting further recommendations in regards to taking her Insulin.        Please review and advise.  Thank you

## 2025-02-27 NOTE — PROGRESS NOTES
Outpatient Care Management Note:    New HRR referral received. Patient was hospitalized from -25 with dyspnea on exertion.  Pulmonary felt it was related to her ILD. She was started on prednisone taper with improvement. She is to follow up with cardiology (3/6), pulmonary and her PCP (3/3)    Care manager called Ac and introduced myself. She was  noticeably short of breath when she answered the phone, but her breathing improved as she sat and talked with CM . She noted that she was rushing to answer the phone.     Ac states she has a pulse ox and despite her dyspnea her oxygen levels have been around 95%.  She will monitor her levels and will call pulmonary if they are dropping below 88-90%. She will call pulmonary to schedule a post hospital follow up appt. She is aware to call 911 with any worsening shortness of breath or difficulty breathing.     Ac declined completing a med review. We did review all stopped medications and prednisone taper.      Ac is concerned that her blood sugars have been very high since starting the prednisone.                   Blood Sugar:  Last night: 354  Early this mornin  9:30 am today: 158.     She is on humulin N insulin, metformin, and januvia. She is waiting on a call back from her PCP to discuss her sugars and whether she needs to adjust her insulin dose.     Ac lives in independent apartment at [facility]. Her son lives locally and assists her as needed. He will be transporting her to all upcoming appt.s     CM gave Ac my contact information. She knows that my phone goes through my computer and I do not get messages after hours or on weekends. She is aware to call her PCP office directly with any immediate questions.

## 2025-02-28 NOTE — TELEPHONE ENCOUNTER
Tr Abdul received a letter in the mail stating her Humulin N kwikpen will no longer be covered. She cannot tolerate Basal insulin due to hypoglycemia.   Would you please find out if there are alternatives or if an appeal could be done?    Thank you

## 2025-02-28 NOTE — TELEPHONE ENCOUNTER
Spoke to Ac regarding her sugars.   Currently on 40 mg prednisone daily, tapering 10 mg every 5 days.   Sugars:  2/26 at 6 pm 354 ate yogurt  2/26 at 8 pm 323 took 8 units of Humulin N  2/27 at 1:30 am 180  2/27 at 7:15 am 141  2/27 9 am ate breakfast  2/27 9:30 am 158  Ate turkey for lunch  All afternoon sugars running in 200's.   2/27 5:30 pm ate half of a banana  2/27 6 pm 316    Advised to make the following insulin adjustments:  Humulin N    When prednisone is at 40 mg and 30 mg daily   12 units in the morning  8 units in the evening    When prednisone is at 20 mg and 10 mg   10 units in the morning  6 units in the evening    Once prednisone is finished  8 units in the morning   4 units in the evening     She will call me if she has any hypoglycemia.

## 2025-03-02 ENCOUNTER — APPOINTMENT (EMERGENCY)
Dept: RADIOLOGY | Facility: HOSPITAL | Age: 89
End: 2025-03-02
Payer: MEDICARE

## 2025-03-02 ENCOUNTER — HOSPITAL ENCOUNTER (INPATIENT)
Facility: HOSPITAL | Age: 89
LOS: 3 days | Discharge: HOME HEALTH | End: 2025-03-05
Attending: EMERGENCY MEDICINE | Admitting: INTERNAL MEDICINE
Payer: MEDICARE

## 2025-03-02 ENCOUNTER — APPOINTMENT (EMERGENCY)
Dept: CT IMAGING | Facility: HOSPITAL | Age: 89
End: 2025-03-02
Payer: MEDICARE

## 2025-03-02 DIAGNOSIS — R06.09 DYSPNEA ON EXERTION: ICD-10-CM

## 2025-03-02 DIAGNOSIS — E87.1 HYPONATREMIA: ICD-10-CM

## 2025-03-02 DIAGNOSIS — J96.01 ACUTE HYPOXEMIC RESPIRATORY FAILURE (HCC): Primary | ICD-10-CM

## 2025-03-02 DIAGNOSIS — R73.9 HYPERGLYCEMIA: ICD-10-CM

## 2025-03-02 DIAGNOSIS — E86.0 DEHYDRATION: ICD-10-CM

## 2025-03-02 DIAGNOSIS — I10 HYPERTENSION: ICD-10-CM

## 2025-03-02 DIAGNOSIS — I10 ESSENTIAL HYPERTENSION: ICD-10-CM

## 2025-03-02 DIAGNOSIS — E11.42 TYPE 2 DIABETES MELLITUS WITH DIABETIC POLYNEUROPATHY, WITHOUT LONG-TERM CURRENT USE OF INSULIN (HCC): ICD-10-CM

## 2025-03-02 DIAGNOSIS — R04.2 HEMOPTYSIS: ICD-10-CM

## 2025-03-02 DIAGNOSIS — J84.9 INTERSTITIAL LUNG DISEASE (HCC): ICD-10-CM

## 2025-03-02 PROBLEM — R65.10 SIRS (SYSTEMIC INFLAMMATORY RESPONSE SYNDROME) (HCC): Status: ACTIVE | Noted: 2025-03-02

## 2025-03-02 LAB
2HR DELTA HS TROPONIN: 4 NG/L
ALBUMIN SERPL BCG-MCNC: 4.6 G/DL (ref 3.5–5)
ALP SERPL-CCNC: 60 U/L (ref 34–104)
ALT SERPL W P-5'-P-CCNC: 15 U/L (ref 7–52)
ANION GAP SERPL CALCULATED.3IONS-SCNC: 10 MMOL/L (ref 4–13)
APTT PPP: 24 SECONDS (ref 23–34)
AST SERPL W P-5'-P-CCNC: 20 U/L (ref 13–39)
ATRIAL RATE: 81 BPM
BACTERIA UR QL AUTO: ABNORMAL /HPF
BASE EX.OXY STD BLDV CALC-SCNC: 64 % (ref 60–80)
BASE EXCESS BLDV CALC-SCNC: 4.7 MMOL/L
BASOPHILS # BLD AUTO: 0.02 THOUSANDS/ÂΜL (ref 0–0.1)
BASOPHILS NFR BLD AUTO: 0 % (ref 0–1)
BILIRUB SERPL-MCNC: 0.83 MG/DL (ref 0.2–1)
BILIRUB UR QL STRIP: NEGATIVE
BNP SERPL-MCNC: 265 PG/ML (ref 0–100)
BUN SERPL-MCNC: 18 MG/DL (ref 5–25)
CALCIUM SERPL-MCNC: 9.7 MG/DL (ref 8.4–10.2)
CARDIAC TROPONIN I PNL SERPL HS: 13 NG/L (ref ?–50)
CARDIAC TROPONIN I PNL SERPL HS: 9 NG/L (ref ?–50)
CHLORIDE SERPL-SCNC: 92 MMOL/L (ref 96–108)
CLARITY UR: CLEAR
CO2 SERPL-SCNC: 30 MMOL/L (ref 21–32)
COLOR UR: COLORLESS
CREAT SERPL-MCNC: 0.56 MG/DL (ref 0.6–1.3)
CRP SERPL QL: 2.2 MG/L
EOSINOPHIL # BLD AUTO: 0.04 THOUSAND/ÂΜL (ref 0–0.61)
EOSINOPHIL NFR BLD AUTO: 0 % (ref 0–6)
ERYTHROCYTE [DISTWIDTH] IN BLOOD BY AUTOMATED COUNT: 15.6 % (ref 11.6–15.1)
FLUAV RNA RESP QL NAA+PROBE: NEGATIVE
FLUBV RNA RESP QL NAA+PROBE: NEGATIVE
GFR SERPL CREATININE-BSD FRML MDRD: 84 ML/MIN/1.73SQ M
GLUCOSE SERPL-MCNC: 215 MG/DL (ref 65–140)
GLUCOSE SERPL-MCNC: 264 MG/DL (ref 65–140)
GLUCOSE SERPL-MCNC: 334 MG/DL (ref 65–140)
GLUCOSE SERPL-MCNC: 359 MG/DL (ref 65–140)
GLUCOSE UR STRIP-MCNC: ABNORMAL MG/DL
HCO3 BLDV-SCNC: 30.1 MMOL/L (ref 24–30)
HCT VFR BLD AUTO: 37.6 % (ref 34.8–46.1)
HGB BLD-MCNC: 11.7 G/DL (ref 11.5–15.4)
HGB UR QL STRIP.AUTO: NEGATIVE
IMM GRANULOCYTES # BLD AUTO: 0.06 THOUSAND/UL (ref 0–0.2)
IMM GRANULOCYTES NFR BLD AUTO: 1 % (ref 0–2)
INR PPP: 1.01 (ref 0.85–1.19)
KETONES UR STRIP-MCNC: ABNORMAL MG/DL
LACTATE SERPL-SCNC: 1.2 MMOL/L (ref 0.5–2)
LACTATE SERPL-SCNC: 2.1 MMOL/L (ref 0.5–2)
LEUKOCYTE ESTERASE UR QL STRIP: NEGATIVE
LYMPHOCYTES # BLD AUTO: 1.3 THOUSANDS/ÂΜL (ref 0.6–4.47)
LYMPHOCYTES NFR BLD AUTO: 10 % (ref 14–44)
MCH RBC QN AUTO: 24.6 PG (ref 26.8–34.3)
MCHC RBC AUTO-ENTMCNC: 31.1 G/DL (ref 31.4–37.4)
MCV RBC AUTO: 79 FL (ref 82–98)
MONOCYTES # BLD AUTO: 1.07 THOUSAND/ÂΜL (ref 0.17–1.22)
MONOCYTES NFR BLD AUTO: 8 % (ref 4–12)
NEUTROPHILS # BLD AUTO: 10.32 THOUSANDS/ÂΜL (ref 1.85–7.62)
NEUTS SEG NFR BLD AUTO: 81 % (ref 43–75)
NITRITE UR QL STRIP: NEGATIVE
NON-SQ EPI CELLS URNS QL MICRO: ABNORMAL /HPF
NRBC BLD AUTO-RTO: 0 /100 WBCS
O2 CT BLDV-SCNC: 10.3 ML/DL
P AXIS: 44 DEGREES
PCO2 BLDV: 48.5 MM HG (ref 42–50)
PH BLDV: 7.41 [PH] (ref 7.3–7.4)
PH UR STRIP.AUTO: 7 [PH]
PLATELET # BLD AUTO: 239 THOUSANDS/UL (ref 149–390)
PMV BLD AUTO: 9.6 FL (ref 8.9–12.7)
PO2 BLDV: 34 MM HG (ref 35–45)
POTASSIUM SERPL-SCNC: 4 MMOL/L (ref 3.5–5.3)
PR INTERVAL: 196 MS
PROCALCITONIN SERPL-MCNC: <0.05 NG/ML
PROT SERPL-MCNC: 8.7 G/DL (ref 6.4–8.4)
PROT UR STRIP-MCNC: NEGATIVE MG/DL
PROTHROMBIN TIME: 14 SECONDS (ref 12.3–15)
QRS AXIS: -22 DEGREES
QRSD INTERVAL: 90 MS
QT INTERVAL: 378 MS
QTC INTERVAL: 439 MS
RBC # BLD AUTO: 4.75 MILLION/UL (ref 3.81–5.12)
RBC #/AREA URNS AUTO: ABNORMAL /HPF
RSV RNA RESP QL NAA+PROBE: NEGATIVE
SARS-COV-2 RNA RESP QL NAA+PROBE: NEGATIVE
SODIUM SERPL-SCNC: 132 MMOL/L (ref 135–147)
SP GR UR STRIP.AUTO: 1.01 (ref 1–1.03)
T WAVE AXIS: 26 DEGREES
UROBILINOGEN UR STRIP-ACNC: <2 MG/DL
VENTRICULAR RATE: 81 BPM
WBC # BLD AUTO: 12.81 THOUSAND/UL (ref 4.31–10.16)
WBC #/AREA URNS AUTO: ABNORMAL /HPF

## 2025-03-02 PROCEDURE — 71045 X-RAY EXAM CHEST 1 VIEW: CPT

## 2025-03-02 PROCEDURE — 99291 CRITICAL CARE FIRST HOUR: CPT | Performed by: EMERGENCY MEDICINE

## 2025-03-02 PROCEDURE — 82948 REAGENT STRIP/BLOOD GLUCOSE: CPT

## 2025-03-02 PROCEDURE — 86140 C-REACTIVE PROTEIN: CPT

## 2025-03-02 PROCEDURE — 96374 THER/PROPH/DIAG INJ IV PUSH: CPT

## 2025-03-02 PROCEDURE — 80053 COMPREHEN METABOLIC PANEL: CPT | Performed by: EMERGENCY MEDICINE

## 2025-03-02 PROCEDURE — 71275 CT ANGIOGRAPHY CHEST: CPT

## 2025-03-02 PROCEDURE — 0241U HB NFCT DS VIR RESP RNA 4 TRGT: CPT | Performed by: EMERGENCY MEDICINE

## 2025-03-02 PROCEDURE — 93005 ELECTROCARDIOGRAM TRACING: CPT

## 2025-03-02 PROCEDURE — 93010 ELECTROCARDIOGRAM REPORT: CPT | Performed by: INTERNAL MEDICINE

## 2025-03-02 PROCEDURE — 83880 ASSAY OF NATRIURETIC PEPTIDE: CPT | Performed by: EMERGENCY MEDICINE

## 2025-03-02 PROCEDURE — 85730 THROMBOPLASTIN TIME PARTIAL: CPT | Performed by: EMERGENCY MEDICINE

## 2025-03-02 PROCEDURE — 85025 COMPLETE CBC W/AUTO DIFF WBC: CPT | Performed by: EMERGENCY MEDICINE

## 2025-03-02 PROCEDURE — 94760 N-INVAS EAR/PLS OXIMETRY 1: CPT

## 2025-03-02 PROCEDURE — 82805 BLOOD GASES W/O2 SATURATION: CPT | Performed by: EMERGENCY MEDICINE

## 2025-03-02 PROCEDURE — 99285 EMERGENCY DEPT VISIT HI MDM: CPT

## 2025-03-02 PROCEDURE — 84484 ASSAY OF TROPONIN QUANT: CPT | Performed by: EMERGENCY MEDICINE

## 2025-03-02 PROCEDURE — 81001 URINALYSIS AUTO W/SCOPE: CPT | Performed by: EMERGENCY MEDICINE

## 2025-03-02 PROCEDURE — 36415 COLL VENOUS BLD VENIPUNCTURE: CPT | Performed by: EMERGENCY MEDICINE

## 2025-03-02 PROCEDURE — 99223 1ST HOSP IP/OBS HIGH 75: CPT | Performed by: INTERNAL MEDICINE

## 2025-03-02 PROCEDURE — 99223 1ST HOSP IP/OBS HIGH 75: CPT | Performed by: STUDENT IN AN ORGANIZED HEALTH CARE EDUCATION/TRAINING PROGRAM

## 2025-03-02 PROCEDURE — 84145 PROCALCITONIN (PCT): CPT | Performed by: EMERGENCY MEDICINE

## 2025-03-02 PROCEDURE — 87040 BLOOD CULTURE FOR BACTERIA: CPT | Performed by: EMERGENCY MEDICINE

## 2025-03-02 PROCEDURE — 85610 PROTHROMBIN TIME: CPT | Performed by: EMERGENCY MEDICINE

## 2025-03-02 PROCEDURE — 83605 ASSAY OF LACTIC ACID: CPT | Performed by: EMERGENCY MEDICINE

## 2025-03-02 RX ORDER — EZETIMIBE 10 MG/1
10 TABLET ORAL
Status: DISCONTINUED | OUTPATIENT
Start: 2025-03-02 | End: 2025-03-05 | Stop reason: HOSPADM

## 2025-03-02 RX ORDER — PRAVASTATIN SODIUM 80 MG/1
80 TABLET ORAL
Status: CANCELLED | OUTPATIENT
Start: 2025-03-02

## 2025-03-02 RX ORDER — METOPROLOL TARTRATE 25 MG/1
12.5 TABLET, FILM COATED ORAL EVERY 12 HOURS SCHEDULED
Status: DISCONTINUED | OUTPATIENT
Start: 2025-03-02 | End: 2025-03-02

## 2025-03-02 RX ORDER — ACETAMINOPHEN 325 MG/1
650 TABLET ORAL EVERY 6 HOURS PRN
Status: DISCONTINUED | OUTPATIENT
Start: 2025-03-02 | End: 2025-03-05 | Stop reason: HOSPADM

## 2025-03-02 RX ORDER — SODIUM CHLORIDE 1 G/1
1 TABLET ORAL
Status: DISCONTINUED | OUTPATIENT
Start: 2025-03-02 | End: 2025-03-05 | Stop reason: HOSPADM

## 2025-03-02 RX ORDER — MIRTAZAPINE 15 MG/1
7.5 TABLET, FILM COATED ORAL
Status: DISCONTINUED | OUTPATIENT
Start: 2025-03-02 | End: 2025-03-05 | Stop reason: HOSPADM

## 2025-03-02 RX ORDER — SODIUM CHLORIDE, SODIUM GLUCONATE, SODIUM ACETATE, POTASSIUM CHLORIDE, MAGNESIUM CHLORIDE, SODIUM PHOSPHATE, DIBASIC, AND POTASSIUM PHOSPHATE .53; .5; .37; .037; .03; .012; .00082 G/100ML; G/100ML; G/100ML; G/100ML; G/100ML; G/100ML; G/100ML
500 INJECTION, SOLUTION INTRAVENOUS ONCE
Status: DISCONTINUED | OUTPATIENT
Start: 2025-03-02 | End: 2025-03-02

## 2025-03-02 RX ORDER — METHYLPREDNISOLONE SODIUM SUCCINATE 125 MG/2ML
125 INJECTION, POWDER, LYOPHILIZED, FOR SOLUTION INTRAMUSCULAR; INTRAVENOUS ONCE
Status: COMPLETED | OUTPATIENT
Start: 2025-03-02 | End: 2025-03-02

## 2025-03-02 RX ORDER — ACETAMINOPHEN 325 MG/1
650 TABLET ORAL EVERY 6 HOURS PRN
Status: CANCELLED | OUTPATIENT
Start: 2025-03-02

## 2025-03-02 RX ORDER — PREDNISONE 20 MG/1
20 TABLET ORAL DAILY
Status: DISCONTINUED | OUTPATIENT
Start: 2025-03-06 | End: 2025-03-05 | Stop reason: HOSPADM

## 2025-03-02 RX ORDER — ACETAMINOPHEN 325 MG/1
975 TABLET ORAL ONCE
Status: COMPLETED | OUTPATIENT
Start: 2025-03-02 | End: 2025-03-02

## 2025-03-02 RX ORDER — METOPROLOL TARTRATE 25 MG/1
25 TABLET, FILM COATED ORAL EVERY 12 HOURS SCHEDULED
Status: DISCONTINUED | OUTPATIENT
Start: 2025-03-02 | End: 2025-03-05 | Stop reason: HOSPADM

## 2025-03-02 RX ORDER — BRIMONIDINE TARTRATE 2 MG/ML
1 SOLUTION/ DROPS OPHTHALMIC 2 TIMES DAILY
Status: DISCONTINUED | OUTPATIENT
Start: 2025-03-02 | End: 2025-03-05 | Stop reason: HOSPADM

## 2025-03-02 RX ORDER — LORAZEPAM 0.5 MG/1
0.5 TABLET ORAL 2 TIMES DAILY PRN
Status: DISCONTINUED | OUTPATIENT
Start: 2025-03-02 | End: 2025-03-05 | Stop reason: HOSPADM

## 2025-03-02 RX ORDER — BIMATOPROST 0.3 MG/ML
1 SOLUTION/ DROPS OPHTHALMIC DAILY
Status: DISCONTINUED | OUTPATIENT
Start: 2025-03-02 | End: 2025-03-05 | Stop reason: HOSPADM

## 2025-03-02 RX ORDER — INSULIN LISPRO 100 [IU]/ML
1-5 INJECTION, SOLUTION INTRAVENOUS; SUBCUTANEOUS
Status: DISCONTINUED | OUTPATIENT
Start: 2025-03-02 | End: 2025-03-05 | Stop reason: HOSPADM

## 2025-03-02 RX ORDER — BIMATOPROST 0.3 MG/ML
1 SOLUTION/ DROPS OPHTHALMIC
Status: CANCELLED | OUTPATIENT
Start: 2025-03-02

## 2025-03-02 RX ORDER — PREDNISONE 10 MG/1
10 TABLET ORAL DAILY
Status: DISCONTINUED | OUTPATIENT
Start: 2025-03-11 | End: 2025-03-05 | Stop reason: HOSPADM

## 2025-03-02 RX ORDER — FAMOTIDINE 20 MG/1
20 TABLET, FILM COATED ORAL 2 TIMES DAILY
Status: DISCONTINUED | OUTPATIENT
Start: 2025-03-02 | End: 2025-03-02

## 2025-03-02 RX ORDER — FAMOTIDINE 20 MG/1
20 TABLET, FILM COATED ORAL DAILY
Status: DISCONTINUED | OUTPATIENT
Start: 2025-03-02 | End: 2025-03-05 | Stop reason: HOSPADM

## 2025-03-02 RX ORDER — HEPARIN SODIUM 5000 [USP'U]/ML
5000 INJECTION, SOLUTION INTRAVENOUS; SUBCUTANEOUS EVERY 8 HOURS SCHEDULED
Status: DISCONTINUED | OUTPATIENT
Start: 2025-03-02 | End: 2025-03-02

## 2025-03-02 RX ORDER — PRAVASTATIN SODIUM 80 MG/1
80 TABLET ORAL
Status: DISCONTINUED | OUTPATIENT
Start: 2025-03-02 | End: 2025-03-05 | Stop reason: HOSPADM

## 2025-03-02 RX ORDER — EZETIMIBE 10 MG/1
10 TABLET ORAL
Status: CANCELLED | OUTPATIENT
Start: 2025-03-02

## 2025-03-02 RX ADMIN — Medication 500 MCG: at 17:44

## 2025-03-02 RX ADMIN — FAMOTIDINE 20 MG: 20 TABLET, FILM COATED ORAL at 15:00

## 2025-03-02 RX ADMIN — INSULIN HUMAN 8 UNITS: 100 INJECTION, SUSPENSION SUBCUTANEOUS at 22:22

## 2025-03-02 RX ADMIN — PREDNISONE 30 MG: 10 TABLET ORAL at 15:00

## 2025-03-02 RX ADMIN — MIRTAZAPINE 7.5 MG: 15 TABLET, FILM COATED ORAL at 22:15

## 2025-03-02 RX ADMIN — EZETIMIBE 10 MG: 10 TABLET ORAL at 17:44

## 2025-03-02 RX ADMIN — INSULIN LISPRO 3 UNITS: 100 INJECTION, SOLUTION INTRAVENOUS; SUBCUTANEOUS at 17:46

## 2025-03-02 RX ADMIN — ACETAMINOPHEN 975 MG: 325 TABLET, FILM COATED ORAL at 10:12

## 2025-03-02 RX ADMIN — Medication 1 TABLET: at 15:00

## 2025-03-02 RX ADMIN — METHYLPREDNISOLONE SODIUM SUCCINATE 125 MG: 125 INJECTION, POWDER, FOR SOLUTION INTRAMUSCULAR; INTRAVENOUS at 09:42

## 2025-03-02 RX ADMIN — SODIUM CHLORIDE 1 G: 1 TABLET ORAL at 17:44

## 2025-03-02 RX ADMIN — BIMATOPROST 1 DROP: 0.3 SOLUTION/ DROPS OPHTHALMIC at 22:13

## 2025-03-02 RX ADMIN — PRAVASTATIN SODIUM 80 MG: 80 TABLET ORAL at 17:44

## 2025-03-02 RX ADMIN — ACETAMINOPHEN 650 MG: 325 TABLET, FILM COATED ORAL at 17:44

## 2025-03-02 RX ADMIN — BRIMONIDINE TARTRATE 1 DROP: 2 SOLUTION OPHTHALMIC at 22:12

## 2025-03-02 RX ADMIN — INSULIN LISPRO 3 UNITS: 100 INJECTION, SOLUTION INTRAVENOUS; SUBCUTANEOUS at 22:22

## 2025-03-02 RX ADMIN — CEFTRIAXONE SODIUM 1000 MG: 10 INJECTION, POWDER, FOR SOLUTION INTRAVENOUS at 12:42

## 2025-03-02 RX ADMIN — LORAZEPAM 0.5 MG: 0.5 TABLET ORAL at 22:20

## 2025-03-02 RX ADMIN — IOHEXOL 50 ML: 350 INJECTION, SOLUTION INTRAVENOUS at 11:01

## 2025-03-02 RX ADMIN — LORAZEPAM 0.5 MG: 0.5 TABLET ORAL at 17:50

## 2025-03-02 RX ADMIN — METOPROLOL TARTRATE 25 MG: 25 TABLET, FILM COATED ORAL at 22:15

## 2025-03-02 NOTE — Clinical Note
Case was discussed with BENITA and the patient's admission status was agreed to be Admission Status: inpatient status to the service of Dr. Aguilar

## 2025-03-02 NOTE — ASSESSMENT & PLAN NOTE
Has history of paroxysmal A-fib currently on Lopressor 25 mg every 12 hourly, Eliquis 2.5 mg twice daily  Did not receive her morning dose  [id number] VASc score 5 and HAS-BLED score 4    Plan:  Continue lopressor 25 mg every 12 hourly  Holding Eliquis for hemoptysis until pulmonology evaluation

## 2025-03-02 NOTE — CONSULTS
Pulmonary Consultation   Ac Duran 89 y.o. female MRN: [id number]  Unit/Bed#: ED-38 Encounter: [id number]      Reason for consultation: hemoptysis     Requesting physician: SLIM    Impression:     Hemoptysis likely in setting of Eliquis, CT reviewed there is some groundglass in the lower lung zones, her CRP is improved from earlier this week.  This may be blood but there is no batwing pattern on imaging.  She is requiring more oxygen.  Doubt this is her ILD flare, this is likely a separate process.  She did complain of epistaxis and hemoptysis  While she is on Eliquis  ILD not on any antifibrotic's, follows with Dr. Jovel  History of occupational exposures  Nicotine dependence in remission    Recommendation:    Her CRP is less than it was earlier in the week, okay to continue on PTA prednisone dose  Holding off on bronchoscopy for now  Wean oxygen as tolerated  Daily CBC  Hold Eliquis if deemed appropriate by the primary service  No bronchoscopy for now this can change based on her clinical course next 2 days  I weaned her down to 6 L nasal cannula.  Discussed with respiratory therapist to switch to low-flow nasal cannula  Discussed with hospitalist    History of Present Illness   HPI:      89-year-old female admitted to the medical service after complaining of cough with small amount of hemoptysis and epistaxis.  She recently discharged earlier this week for an ILD flare, normally follows up with pulmonary Dr. Jovel not on antifibrotic's.    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  She is an ex-smoker and was smoking 2 packs/day but quit in 1994, no other significant exposures.  Aside from that her brother does have ILD.  PFTs with mild restriction.  During her last hospitalization was placed on prednisone taper 40 mg daily and decrease by 10 mg every 5 days.   He was discharged on the 26th.    Currently during this admission she has a CT chest that shows unchanged lung parenchyma but there is new haziness throughout the lower lobes.  Differential can include possible aspiration versus alveolar hemorrhage.  Does not have the batwing appearance line.    Review of systems:  12 point review of systems was completed and was otherwise negative except as listed in HPI.      Historical Information   Past Medical History:   Diagnosis Date    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/7/2020    Glaucoma     H/O degenerative disc disease     Idiopathic pulmonary fibrosis (HCC) 11/2020    Irregular heart beat     afib    Peripheral neuropathy     S/P laparoscopic cholecystectomy 12/21/2020    Stenosis of right subclavian artery (HCC) 11/18/2016     Past Surgical History:   Procedure Laterality Date    CATARACT EXTRACTION, BILATERAL  2011    CHOLECYSTECTOMY      CHOLECYSTECTOMY LAPAROSCOPIC N/A 12/09/2020    Procedure: CHOLECYSTECTOMY LAPAROSCOPIC;  Surgeon: Kalen Garrett MD;  Location: [facility];  Service: General    COLONOSCOPY      CYST REMOVAL  2009    left lower Quadrant     FL LUMBAR PUNCTURE DIAGNOSTIC  4/28/2023    HERNIA REPAIR  1992    LIPOMA RESECTION  2010    [place] RPR 1ST INGUN HRNA AGE 5 YRS/> REDUCIBLE Bilateral 01/05/2018    Procedure: INGUINAL HERNIA REPAIR;  Surgeon: Volodymyr Lee MD;  Location: [facility];  Service: General    SHOULDER SURGERY  04/26/2019    Dr. Arnett ([place])    UPPER GASTROINTESTINAL ENDOSCOPY      VASCULAR SURGERY  1994    Agram-  R carotid occlusion     Family History   Problem Relation Age of Onset    Heart disease Mother     Heart attack Father     Hiatal hernia Father     Diabetes Father     Cancer Brother     Diabetes Brother     Heart disease Brother     Hypertension Brother     Lung disease Brother 75        interstitial lung disease    Cancer Brother 49        glioblastoma    Other Son         gastroparesis    Other Cousin         interstitial lung  disease       Occupational History [employer]   Social History: Ex-smoker    Meds/Allergies     Current Facility-Administered Medications:     acetaminophen (TYLENOL) tablet 650 mg, Q6H PRN    [Held by provider] apixaban (ELIQUIS) tablet 2.5 mg, BID    bimatoprost (LUMIGAN) 0.03 % ophthalmic drops 1 drop, Daily    brimonidine tartrate 0.2 % ophthalmic solution 1 drop, BID    [START ON 3/3/2025] ceftriaxone (ROCEPHIN) 1 g/50 mL in dextrose IVPB, Q24H    cyanocobalamin (VITAMIN B-12) tablet 500 mcg, BID    ezetimibe (ZETIA) tablet 10 mg, Daily With Dinner **AND** pravastatin (PRAVACHOL) tablet 80 mg, Daily With Dinner    famotidine (PEPCID) tablet 20 mg, Daily    insulin lispro (HumALOG/ADMELOG) 100 units/mL subcutaneous injection 1-5 Units, 4x Daily (AC & HS) **AND** Fingerstick Glucose (POCT), 4x Daily AC and at bedtime    [START ON 3/3/2025] insulin NPH (HumuLIN N,NovoLIN N) 100 Units/mL subcutaneous injection 12 Units, Daily Before Breakfast    insulin NPH (HumuLIN N,NovoLIN N) 100 Units/mL subcutaneous injection 8 Units, HS    LORazepam (ATIVAN) tablet 0.5 mg, BID PRN    metoprolol tartrate (LOPRESSOR) tablet 12.5 mg, Q12H JONNA    mirtazapine (REMERON) tablet 7.5 mg, HS    multivitamin stress formula tablet 1 tablet, Daily    predniSONE tablet 30 mg, Daily **FOLLOWED BY** [START ON 3/6/2025] predniSONE tablet 20 mg, Daily **FOLLOWED BY** [START ON 3/11/2025] predniSONE tablet 10 mg, Daily    sodium chloride tablet 1 g, TID With Meals  Not in a hospital admission.  Allergies   Allergen Reactions    Keflex [Cephalexin] Diarrhea       Vitals: Blood pressure 165/77, pulse 79, temperature 98.1 °F (36.7 °C), temperature source Oral, resp. rate (!) 24, SpO2 100%.,  There is no height or weight on file to calculate BMI.      Intake/Output Summary (Last 24 hours) at 3/2/2025 1440  Last data filed at 3/2/2025 1359  Gross per 24 hour   Intake 50 ml   Output --   Net 50 ml       Physical Exam  General: Awake alert and  oriented x 3, conversant without conversational dyspnea, NAD  HEENT:  No nasal drainage, Mucous membranes moist   NECK: Trachea midline, no accessory muscle use, JVP not elevated  CARDIAC: Reg, S1/S2  PULM: Faint rhonchi bilaterally  CHEST: No gross deformities, equal chest expansion on inspiration bilaterally  ABD: Soft nontender, nondistended, no rebound  EXT: No clubbing, normal capillary refill, no edema   SKIN:  No rashes, no lesions  NEURO: no focal neurologic deficits    Labs: I have personally reviewed pertinent lab results.    Imaging and other studies: Results Review Statement: I personally reviewed the following image studies in PACS and associated radiology reports: CT chest. My interpretation of the radiology images/reports is: CT with faint groundglass bilaterally, no In appearance.  No endobronchial debris.    Pulmonary function testing:Results Review Statement: No pertinent imaging studies reviewed.    EKG, Pathology, and Other Studies: Results Review Statement: No pertinent imaging studies reviewed.      Raj Lamar MD  Pulmonary & Critical Care Medicine

## 2025-03-02 NOTE — ASSESSMENT & PLAN NOTE
Recent echo shows mild to moderate aortic stenosis  Has a scheduled appointment with outpatient cardiology  Will hold off giving fluid right now with having hemodynamically stable  Monitor for symptoms

## 2025-03-02 NOTE — ASSESSMENT & PLAN NOTE
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

## 2025-03-02 NOTE — ASSESSMENT & PLAN NOTE
Presented today with oxygen saturation 77% at home  Recently discharged from hospital by being treated for dyspnea on exertion  Currently on 30 mg daily prednisone  Vitals shows tachypnea, /86, saturation 77% on room air and currently saturating 99% on 8 L oxygen  WBC elevated as 12.81, Pro-[place] less than 0.05, lactic acid 2.1> 1.2,   COVID/flu/RSV negative  On exam has lung crackles but no wheezing  CTA PE study is negative for PE.  Positive for chronic ILD with new diffuse groundglass haziness in lower lobe possibility of edema, hemorrhage  Chest x-ray shows moderate pulmonary fibrosis  Last echo in 2/25/2025 shows LVEF 55-60% with grade 1 diastolic dysfunction, mild to moderate aortic stenosis  Acute hypoxic respiratory failure possibly secondary to exacerbation of ILD, pulmonary hemorrhage, edema, pneumonia, CHF, pulmonary hypertension    Plan:  Continue oxygen to maintain saturation more than 90% and wean off when able to  Continue ceftriaxone 1 g daily  Pending sputum culture and Gram stain  Pulmonology consulted, appreciate recommendation  Continue oral prednisone as tapering dose, currently on 30 mg and 4 days left, then will be 20 mg for 5 days, 10 mg for 5 days  Respiratory protocol

## 2025-03-02 NOTE — ED PROVIDER NOTES
Pt Name: Ac Duran  MRN: [id number]  Birthdate 1935  Age/Sex: 89 y.o. female  Date of evaluation: 3/2/2025  PCP: ZANDER Swenson    CHIEF COMPLAINT    Chief Complaint   Patient presents with    Shortness of Breath     Pt presents to the ED via EMS from home for SOB, recently admitted and dx for same, hx of pulmonary fibrosis.        FINAL IMPRESSION    Final diagnoses:   Acute hypoxemic respiratory failure (HCC)   Hypertension   Hemoptysis   Hyponatremia   Hyperglycemia   Dehydration   Interstitial lung disease (HCC)         DISPOSITION/PLAN    89-year-old female presenting with acute hypoxemic respiratory failure suspected to be due to chronic interstitial lung disease with overlying process causing abrupt decompensation.  Exact cause of decompensation not clearly delineated in ER workup, although hemoptysis and CT concerning for some pulmonary hemorrhage versus edema is likely strong contributor.  Infectious etiology not completely ruled out at this time.  Patient responded well to treatment with steroids as well as oxygen in the emergency department, satting well on mid flow.  Labs remarkable for slightly elevated lactic acid as well as hyponatremia, suspect this is due to dehydration rather than sepsis, patient was given 500 mL fluid bolus with concern for volume overload, started on broad-spectrum antibiotics in case of occult infection.  Lactic acid normalized with modest fluid bolus as well as appropriate oxygenation, further fluid resuscitation not felt to be indicated at time of admission.  After initial workup in ER, admitted to internal medicine, hemodynamically stable and comfortable at that time.  Time reflects when diagnosis was documented in both MDM as applicable and the Disposition within this note       Time User Action Codes Description Comment    3/2/2025 12:18 PM Bryon Cuellar Add [J96.01] Acute hypoxemic respiratory failure (HCC)     3/2/2025 12:18 PM Bryon Cuellar  Add [I10] Hypertension     3/2/2025 12:18 PM Bryon Cuellar Add [R04.2] Hemoptysis     3/2/2025 12:19 PM Bryon Cuellar Add [E87.1] Hyponatremia     3/2/2025 12:19 PM Bryon Cuellar Add [R73.9] Hyperglycemia     3/2/2025 12:19 PM Bryon Cuellar Add [E86.0] Dehydration     3/2/2025 12:19 PM Bryon Cuellar Add [J84.9] Interstitial lung disease (HCC)           ED Disposition       ED Disposition   Admit    Condition   Stable    Date/Time   Sun Mar 2, 2025 12:18 PM    Comment   Case was discussed with BENITA and the patient's admission status was agreed to be Admission Status: inpatient status to the service of Dr. Baeza .               Follow-up Information    None             HPI    89 y.o. female presenting with shortness of breath, cough, hemoptysis.  Patient states that she was recently admitted for shortness of breath associated with her pulmonary fibrosis that she was initially diagnosed with about 5 years ago.  She is not on home oxygen, was hypoxemic and had to be admitted, was in for a few days, received steroids with improvement of her symptoms and normal oxygen.  She was sent home, has been on home steroids, last night began having cough and increased shortness of breath, much worse today.  She notes that she coughed up a large clot and has also had several episodes of bloody sputum, feels very weak.  She is not sure if the blood is coming out of the lungs or if she had had a small nosebleed that may have dripped down.  She denies fever, trauma, chest pain, leg swelling or pain, abdominal pain, vomiting, other symptoms.      Past Medical and Surgical History    Past Medical History:   Diagnosis Date    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/7/2020    Glaucoma     H/O degenerative disc disease     Idiopathic pulmonary fibrosis (HCC) 11/2020    Irregular heart beat     afib    Peripheral neuropathy     S/P laparoscopic cholecystectomy 12/21/2020    Stenosis of right subclavian artery (HCC)  2016       Past Surgical History:   Procedure Laterality Date    CATARACT EXTRACTION, BILATERAL      CHOLECYSTECTOMY      CHOLECYSTECTOMY LAPAROSCOPIC N/A 2020    Procedure: CHOLECYSTECTOMY LAPAROSCOPIC;  Surgeon: Kalen Garrett MD;  Location: [facility];  Service: General    COLONOSCOPY      CYST REMOVAL  2009    left lower Quadrant     FL LUMBAR PUNCTURE DIAGNOSTIC  2023    HERNIA REPAIR  1992    LIPOMA RESECTION  2010    DC RPR 1ST INGUN HRNA AGE 5 YRS/> REDUCIBLE Bilateral 2018    Procedure: INGUINAL HERNIA REPAIR;  Surgeon: Volodymyr Lee MD;  Location: [facility];  Service: General    SHOULDER SURGERY  2019    Dr. Arnett ([place])    UPPER GASTROINTESTINAL ENDOSCOPY      VASCULAR SURGERY      Agram-  R carotid occlusion       Family History   Problem Relation Age of Onset    Heart disease Mother     Heart attack Father     Hiatal hernia Father     Diabetes Father     Cancer Brother     Diabetes Brother     Heart disease Brother     Hypertension Brother     Lung disease Brother 75        interstitial lung disease    Cancer Brother 49        glioblastoma    Other Son         gastroparesis    Other Cousin         interstitial lung disease       Social History     Tobacco Use    Smoking status: Former     Current packs/day: 0.00     Average packs/day: 1.5 packs/day for 38.0 years (57.0 ttl pk-yrs)     Types: Cigarettes     Start date:      Quit date:      Years since quittin.1    Smokeless tobacco: Never   Vaping Use    Vaping status: Never Used   Substance Use Topics    Alcohol use: No    Drug use: No           Allergies    Allergies   Allergen Reactions    Keflex [Cephalexin] Diarrhea       Home Medications    Prior to Admission medications    Medication Sig Start Date End Date Taking? Authorizing Provider   acetaminophen (TYLENOL) 500 mg tablet Take 1,000 mg by mouth as needed for mild pain    Historical Provider, MD   apixaban (Eliquis) 2.5 mg TAKE 1 TABLET (2.5  MG TOTAL) BY MOUTH 2 (TWO) TIMES A DAY 2/4/25   ZANDER Swenson   bimatoprost (LUMIGAN) 0.01 % ophthalmic drops Administer 1 drop to both eyes daily at bedtime for 30 days 11/2/16   Sean Desai MD   brimonidine tartrate 0.2 % ophthalmic solution INSTILL 1 DROP INTO RIGHT EYE TWICE A DAY 6/3/24   Historical Provider, MD   Cyanocobalamin (Vitamin B 12) 500 MCG TABS Take 500 mcg by mouth 2 (two) times a day 2/26/25   Shay Jarrett PA-C   ezetimibe-simvastatin (VYTORIN) 10-40 mg per tablet TAKE 1 TABLET BY MOUTH DAILY AT BEDTIME 10/21/24   ZANDER Swenson   famotidine (PEPCID) 20 mg tablet TAKE 1 TABLET (20 MG TOTAL) BY MOUTH 2 (TWO) TIMES A DAY 2/18/25   ZANDER Swenson   glucose blood (OneTouch Ultra) test strip TEST FOUR TIMES A DAY 10/7/24   ZANDER Swenson   HumuLIN N KwikPen 100 units/mL injection pen INJECT 8 UNITS UNDER THE SKIN EVERY MORNING AND 4 UNITS EVERY EVENING. 11/6/24   ZANDER Swenson   Insulin Pen Needle (B-D UF III MINI PEN NEEDLES) 31G X 5 MM MISC USE 2 (TWO) TIMES A DAY 11/6/24   ZANDER Swenson   ipratropium (ATROVENT) 0.03 % nasal spray USE 2 SPRAYS INTO EACH NOSTRIL EVERY 12 (TWELVE) HOURS 10/31/24   Sanchez Murphy MD   LORazepam (ATIVAN) 0.5 mg tablet TAKE 1 TABLET (0.5 MG TOTAL) BY MOUTH 2 (TWO) TIMES A DAY AS NEEDED FOR ANXIETY 1/3/25   ZANDER Swenson   metFORMIN (GLUCOPHAGE) 500 mg tablet TAKE 2 TABLETS (1,000 MG TOTAL) BY MOUTH 2 (TWO) TIMES A DAY WITH MEALS 2/18/25   ZANDER Swenson   metoprolol tartrate (LOPRESSOR) 25 mg tablet Take 1 tablet (25 mg total) by mouth every 12 (twelve) hours  Patient taking differently: Take 12.5 mg by mouth every 12 (twelve) hours 9/11/24   ZANDER Swenson   mirtazapine (REMERON) 7.5 MG tablet TAKE 1 TABLET (7.5 MG TOTAL) BY MOUTH DAILY AT BEDTIME 2/18/25   ZANDER Swenson   Multiple Vitamin (multivitamin) tablet Take 1 tablet by mouth daily      Historical Provider, MD   OneTouch Delica Lancets 33G MISC Check blood sugars four times  daily. Please substitute with appropriate alternative as covered by patient's insurance. Dx: E11.65 8/30/24   ZANDER Swenson   pantoprazole (PROTONIX) 40 mg tablet TAKE 1 TABLET (40 MG TOTAL) BY MOUTH 2 (TWO) TIMES A DAY 12/3/24   ZANDER Swenson   polyethylene glycol (MIRALAX) 17 g packet Take 17 g by mouth daily as needed (constipation/ abominal cramping) for up to 14 days Prn constipation 7/6/24 12/11/24  Catia Castanon MD   predniSONE 10 mg tablet Take 4 tablets (40 mg total) by mouth daily for 3 days, THEN 3 tablets (30 mg total) daily for 5 days, THEN 2 tablets (20 mg total) daily for 5 days, THEN 1 tablet (10 mg total) daily for 5 days. 2/26/25 3/16/25  Shay Jarrett PA-C   sitaGLIPtin (Januvia) 100 mg tablet TAKE 1 TABLET (100 MG TOTAL) BY MOUTH DAILY 2/4/25   ZANDER Swenson   sodium chloride 1 g tablet TAKE 1 TABLET THREE TIMES A DAY 9/16/24   Joselyn Reyes Bahamonde, MD           Review of Systems    Review of Systems   Constitutional:  Positive for fatigue. Negative for activity change, chills and fever.   HENT:  Negative for drooling and facial swelling.    Eyes:  Negative for pain, discharge and visual disturbance.   Respiratory:  Positive for cough and shortness of breath. Negative for apnea, chest tightness and wheezing.    Cardiovascular:  Negative for chest pain and leg swelling.   Gastrointestinal:  Negative for abdominal pain, constipation, diarrhea, nausea and vomiting.   Genitourinary:  Negative for difficulty urinating, dysuria and urgency.   Musculoskeletal:  Negative for arthralgias, back pain and gait problem.   Skin:  Negative for color change and rash.   Neurological:  Negative for dizziness, speech difficulty, weakness and headaches.   Psychiatric/Behavioral:  Negative for agitation, behavioral problems and confusion.            All other systems reviewed and negative.    Physical Exam      ED Triage Vitals   Temperature Pulse Respirations Blood Pressure SpO2    03/02/25 0929 03/02/25 0923 03/02/25 0923 03/02/25 0926 03/02/25 0922   98.1 °F (36.7 °C) 98 (S) (!) 30 (!) 193/86 (S) (!) 77 %      Temp Source Heart Rate Source Patient Position - Orthostatic VS BP Location FiO2 (%)   03/02/25 0929 03/02/25 0923 03/02/25 0923 03/02/25 0923 --   Oral Monitor Sitting Right arm       Pain Score       03/02/25 0923       3               Physical Exam  Vitals and nursing note reviewed.   Constitutional:       General: She is not in acute distress.     Appearance: She is well-developed. She is not ill-appearing, toxic-appearing or diaphoretic.   HENT:      Head: Normocephalic and atraumatic.      Right Ear: External ear normal.      Left Ear: External ear normal.      Nose: Nose normal. No congestion or rhinorrhea.      Comments: No active epistaxis, no dried blood noted     Mouth/Throat:      Mouth: Mucous membranes are dry.      Pharynx: Oropharynx is clear. No oropharyngeal exudate or posterior oropharyngeal erythema.   Eyes:      Conjunctiva/sclera: Conjunctivae normal.      Pupils: Pupils are equal, round, and reactive to light.   Cardiovascular:      Rate and Rhythm: Normal rate and regular rhythm.      Pulses: Normal pulses.      Heart sounds: Normal heart sounds. No murmur heard.     No friction rub. No gallop.   Pulmonary:      Effort: Pulmonary effort is normal. No respiratory distress.      Breath sounds: Rales present. No wheezing.      Comments: Patient mild respiratory distress, speaking in 3-4 word phrases, tachypneic and with relatively shallow inspirations, no wheezing, scattered crackles noted.  Abdominal:      General: There is no distension.      Palpations: Abdomen is soft.      Tenderness: There is no abdominal tenderness. There is no guarding or rebound.   Musculoskeletal:         General: No deformity. Normal range of motion.      Cervical back: Normal range of motion and neck supple.      Right lower leg: No edema.      Left lower leg: No edema.      Comments:  No swelling or tenderness to palpation of either lower extremity   Skin:     General: Skin is warm and dry.      Capillary Refill: Capillary refill takes less than 2 seconds.      Findings: No erythema or rash.   Neurological:      Mental Status: She is alert and oriented to person, place, and time.   Psychiatric:         Behavior: Behavior normal.         Thought Content: Thought content normal.         Judgment: Judgment normal.              Diagnostic Results  EKG Interpretation    Rate:  92  BPM  Rhythm:  Normal Sinus Rhythm   Axis:  Normal   Intervals: Normal, no blocks, QTc  410 ms  Q waves:  No pathologic Q waves   T waves:  Normal   ST segments:  No significant elevations or depressions     Impression:  Normal sinus rhythm with LVH with repolarization abnormality without STEMI or significant arrhythmia.     EKG for comparison: EKG dated 24 February 2025 similar in character with no major changes.    EKG interpreted by me.       Labs:    Results Reviewed       Procedure Component Value Units Date/Time    Fingerstick Glucose (POCT) [[id number]]  (Abnormal) Collected: 03/02/25 1438    Lab Status: Final result Specimen: Blood Updated: 03/02/25 1439     POC Glucose 264 mg/dl     Sputum culture and Gram stain [[id number]]     Lab Status: No result Specimen: Expectorated Sputum     C-reactive protein [[id number]]     Lab Status: No result Specimen: Blood     Blood culture #2 [[id number]] Collected: 03/02/25 0934    Lab Status: Preliminary result Specimen: Blood Updated: 03/02/25 1306     Blood Culture Received in Microbiology Lab. Culture in Progress.    HS Troponin I 2hr [[id number]]  (Normal) Collected: 03/02/25 1207    Lab Status: Final result Specimen: Blood from Arm, Left Updated: 03/02/25 1243     hs TnI 2hr 13 ng/L      Delta 2hr hsTnI 4 ng/L     Lactic acid 2 Hours [[id number]]  (Normal) Collected: 03/02/25 1202    Lab Status: Final result Specimen: Blood from Arm, Left Updated: 03/02/25 1241     LACTIC ACID 1.2  mmol/L     Narrative:      Result may be elevated if tourniquet was used during collection.    Urinalysis with microscopic [[id number]]  (Abnormal) Collected: 03/02/25 1148    Lab Status: Final result Specimen: Urine, Clean Catch Updated: 03/02/25 1211     Color, UA Colorless     Clarity, UA Clear     Specific Gravity, UA 1.014     pH, UA 7.0     Leukocytes, UA Negative     Nitrite, UA Negative     Protein, UA Negative mg/dl      Glucose, UA Trace mg/dl      Ketones, UA Trace mg/dl      Urobilinogen, UA <2.0 mg/dl      Bilirubin, UA Negative     Occult Blood, UA Negative     RBC, UA None Seen /hpf      WBC, UA 1-2 /hpf      Epithelial Cells None Seen /hpf      Bacteria, UA Occasional /hpf     Blood culture #1 [[id number]] Collected: 03/02/25 1011    Lab Status: In process Specimen: Blood from Arm, Right Updated: 03/02/25 1121    HS Troponin 0hr (reflex protocol) [[id number]]  (Normal) Collected: 03/02/25 0942    Lab Status: Final result Specimen: Blood Updated: 03/02/25 1109     hs TnI 0hr 9 ng/L     Procalcitonin [[id number]]  (Normal) Collected: 03/02/25 0942    Lab Status: Final result Specimen: Blood Updated: 03/02/25 1109     Procalcitonin <0.05 ng/ml     B-Type Natriuretic Peptide(BNP) [[id number]]  (Abnormal) Collected: 03/02/25 0942    Lab Status: Final result Specimen: Blood Updated: 03/02/25 1056      pg/mL     FLU/RSV/COVID - if FLU/RSV clinically relevant [[id number]]  (Normal) Collected: 03/02/25 0942    Lab Status: Final result Specimen: Nares from Nose Updated: 03/02/25 1026     SARS-CoV-2 Negative     INFLUENZA A PCR Negative     INFLUENZA B PCR Negative     RSV PCR Negative    Narrative:      This test has been performed using the CoV-2/Flu/RSV plus assay on the [employer] GeneXpert platform. This test has been validated by the  and verified by the performing laboratory.     This test is designed to amplify and detect the following: nucleocapsid (N), envelope (E), and RNA-dependent RNA  polymerase (RdRP) genes of the SARS-CoV-2 genome; matrix (M), basic polymerase (PB2), and acidic protein (PA) segments of the influenza A genome; matrix (M) and non-structural protein (NS) segments of the influenza B genome, and the nucleocapsid genes of RSV A and RSV B.     Positive results are indicative of the presence of Flu A, Flu B, RSV, and/or SARS-CoV-2 RNA. Positive results for SARS-CoV-2 or suspected novel influenza should be reported to state, local, or federal health departments according to local reporting requirements.      All results should be assessed in conjunction with clinical presentation and other laboratory markers for clinical management.     FOR PEDIATRIC PATIENTS - copy/paste COVID Guidelines URL to browser: [email or web address]       Comprehensive metabolic panel [[id number]]  (Abnormal) Collected: 03/02/25 0942    Lab Status: Final result Specimen: Blood Updated: 03/02/25 1012     Sodium 132 mmol/L      Potassium 4.0 mmol/L      Chloride 92 mmol/L      CO2 30 mmol/L      ANION GAP 10 mmol/L      BUN 18 mg/dL      Creatinine 0.56 mg/dL      Glucose 215 mg/dL      Calcium 9.7 mg/dL      AST 20 U/L      ALT 15 U/L      Alkaline Phosphatase 60 U/L      Total Protein 8.7 g/dL      Albumin 4.6 g/dL      Total Bilirubin 0.83 mg/dL      eGFR 84 ml/min/1.73sq m     Narrative:      National Kid[employer] guidelines for Chronic Kidney Disease (CKD):     Stage 1 with normal or high GFR (GFR > 90 mL/min/1.73 square meters)    Stage 2 Mild CKD (GFR = 60-89 mL/min/1.73 square meters)    Stage 3A Moderate CKD (GFR = 45-59 mL/min/1.73 square meters)    Stage 3B Moderate CKD (GFR = 30-44 mL/min/1.73 square meters)    Stage 4 Severe CKD (GFR = 15-29 mL/min/1.73 square meters)    Stage 5 End Stage CKD (GFR <15 mL/min/1.73 square meters)  Note: GFR calculation is accurate only with a steady state creatinine    Lactic acid [[id number]]  (Abnormal)  Collected: 03/02/25 0942    Lab Status: Final result Specimen: Blood Updated: 03/02/25 1010     LACTIC ACID 2.1 mmol/L     Narrative:      Result may be elevated if tourniquet was used during collection.    Blood gas, Venous [[id number]]  (Abnormal) Collected: 03/02/25 0959    Lab Status: Final result Specimen: Blood from Arm, Left Updated: 03/02/25 1009     pH, Basilio 7.411     pCO2, Basilio 48.5 mm Hg      pO2, Basilio 34.0 mm Hg      HCO3, Basilio 30.1 mmol/L      Base Excess, Basilio 4.7 mmol/L      O2 Content, Basilio 10.3 ml/dL      O2 HGB, VENOUS 64.0 %     Protime-INR [[id number]]  (Normal) Collected: 03/02/25 0942    Lab Status: Final result Specimen: Blood Updated: 03/02/25 1008     Protime 14.0 seconds      INR 1.01    Narrative:      INR Therapeutic Range    Indication                                             INR Range      Atrial Fibrillation                                               2.0-3.0  Hypercoagulable State                                    2.0.2.3  Left Ventricular Asist Device                            2.0-3.0  Mechanical Heart Valve                                  -    Aortic(with afib, MI, embolism, HF, LA enlargement,    and/or coagulopathy)                                     2.0-3.0 (2.5-3.5)     Mitral                                                             2.5-3.5  Prosthetic/Bioprosthetic Heart Valve               2.0-3.0  Venous thromboembolism (VTE: VT, PE        2.0-3.0    APTT [[id number]]  (Normal) Collected: 03/02/25 0942    Lab Status: Final result Specimen: Blood Updated: 03/02/25 1008     PTT 24 seconds     CBC and differential [[id number]]  (Abnormal) Collected: 03/02/25 0942    Lab Status: Final result Specimen: Blood Updated: 03/02/25 0948     WBC 12.81 Thousand/uL      RBC 4.75 Million/uL      Hemoglobin 11.7 g/dL      Hematocrit 37.6 %      MCV 79 fL      MCH 24.6 pg      MCHC 31.1 g/dL      RDW 15.6 %      MPV 9.6 fL      Platelets 239 Thousands/uL      nRBC 0 /100 WBCs      Segmented  % 81 %      Immature Grans % 1 %      Lymphocytes % 10 %      Monocytes % 8 %      Eosinophils Relative 0 %      Basophils Relative 0 %      Absolute Neutrophils 10.32 Thousands/µL      Absolute Immature Grans 0.06 Thousand/uL      Absolute Lymphocytes 1.30 Thousands/µL      Absolute Monocytes 1.07 Thousand/µL      Eosinophils Absolute 0.04 Thousand/µL      Basophils Absolute 0.02 Thousands/µL             All labs reviewed and utilized in the medical decision making process    Radiology:    CTA chest pe study   Final Result      1.  No pulmonary embolus.   2.  Measured RV/LV ratio is within normal limits at less than 0.9.   3.  Chronic interstitial lung disease with new diffuse groundglass haziness predominantly in the lower lobes. Differential includes edema, hemorrhage. Clinical history of hemoptysis.                  Workstation performed: [id number]         XR chest portable - 1 view   Final Result      Moderate pulmonary fibrosis with no definite acute disease.            Workstation performed: [id number]             All radiology studies independently viewed by me and interpreted by the radiologist.    Procedure    Procedures    Critical Care Time Statement: Upon my evaluation, this patient had a high probability of imminent or life-threatening deterioration due to acute hypoxemic respiratory failure, which required my direct attention, intervention, and personal management.  I spent a total of 45 minutes directly providing critical care services, including interpretation of complex medical databases, evaluating for the presence of life-threatening injuries or illnesses, management of organ system failure(s) , complex medical decision making (to support/prevent further life-threatening deterioration)., and interpretation of hemodynamic data, Management of mid flow oxygen. This time is exclusive of procedures, teaching, treating other patients, family meetings, and any prior time recorded by providers other than  myself.        ED Course of Care and Re-Assessments      Initially treated with methylprednisolone, given Tylenol at patient request.  Chest x-ray nondiagnostic, given reported hemoptysis and significant hypoxemia felt to be high risk for PE, CT PE study was ordered, showed no PE but fluid consistent with either edema or hemorrhage noted.  Based on significant respiratory distress as well as some diagnostic uncertainty, started on ceftriaxone with concern for occult infection.    Medications   apixaban (ELIQUIS) tablet 2.5 mg ( Oral Held Dose 3/5/25 1800)   bimatoprost (LUMIGAN) 0.03 % ophthalmic drops 1 drop (has no administration in time range)   brimonidine tartrate 0.2 % ophthalmic solution 1 drop (has no administration in time range)   cyanocobalamin (VITAMIN B-12) tablet 500 mcg (has no administration in time range)   ezetimibe (ZETIA) tablet 10 mg (has no administration in time range)     And   pravastatin (PRAVACHOL) tablet 80 mg (has no administration in time range)   LORazepam (ATIVAN) tablet 0.5 mg (has no administration in time range)   metoprolol tartrate (LOPRESSOR) tablet 12.5 mg (has no administration in time range)   mirtazapine (REMERON) tablet 7.5 mg (has no administration in time range)   multivitamin stress formula tablet 1 tablet (has no administration in time range)   sodium chloride tablet 1 g (has no administration in time range)   acetaminophen (TYLENOL) tablet 650 mg (has no administration in time range)   insulin lispro (HumALOG/ADMELOG) 100 units/mL subcutaneous injection 1-5 Units (has no administration in time range)   insulin NPH (HumuLIN N,NovoLIN N) 100 Units/mL subcutaneous injection 12 Units (has no administration in time range)   insulin NPH (HumuLIN N,NovoLIN N) 100 Units/mL subcutaneous injection 8 Units (has no administration in time range)   predniSONE tablet 30 mg (has no administration in time range)     Followed by   predniSONE tablet 20 mg (has no administration in time  "range)     Followed by   predniSONE tablet 10 mg (has no administration in time range)   famotidine (PEPCID) tablet 20 mg (has no administration in time range)   ceftriaxone (ROCEPHIN) 1 g/50 mL in dextrose IVPB (has no administration in time range)   methylPREDNISolone sodium succinate (Solu-MEDROL) injection 125 mg (125 mg Intravenous Given 3/2/25 0942)   acetaminophen (TYLENOL) tablet 975 mg (975 mg Oral Given 3/2/25 1012)   iohexol (OMNIPAQUE) 350 MG/ML injection (MULTI-DOSE) 50 mL (50 mL Intravenous Given 3/2/25 1101)   ceftriaxone (ROCEPHIN) 1 g/50 mL in dextrose IVPB (0 mg Intravenous Stopped 3/2/25 1359)           PATIENT REFERRED TO:    No follow-up provider specified.    DISCHARGE MEDICATIONS:    Patient's Medications   Discharge Prescriptions    No medications on file       No discharge procedures on file.         Bryon Cuellar MD    Portions of the record may have been created with voice recognition software.  Occasional wrong word or \"sound alike\" substitutions may have occurred due to the inherent limitations of voice recognition software.  Please read the chart carefully and recognize, using context, where substitutions have occurred     Bryon Cuellar MD  03/02/25 [id number]    "

## 2025-03-02 NOTE — H&P
H&P - Hospitalist   Name: Ac Duran 89 y.o. female I MRN: [id number]  Unit/Bed#: ED-38 I Date of Admission: 3/2/2025   Date of Service: 3/2/2025 I Hospital Day: 0     Assessment & Plan  Hemoptysis  Presented today with 2 episodes of coughing up blood clot this morning  Recently discharged from the hospital with oral steroid tapering for the treatment of exacerbation of ILD  Labs shows WBC 12.81, Pro-Wicho less than 0.05, lactic acid 2.1  COVID/flu/RSV negative  CTA PE study shows no PE, chronic ILD with new diffuse groundglass haziness in lower lobes consistent with edema, hemorrhage  Chest x-ray shows moderate pulmonary fibrosis  Currently is on Eliquis 2.5 mg twice daily for A-fib  Hemoptysis differentials are pneumonia, pulmonary hemorrhage, exacerbation of ILD, malignancy, PE, medication induced, bronchitis, bronchiectasis, pseudo hemoptysis, TB    Plan:  Consulted pulmonology, would appreciate their recommendation  Currently n.p.o. until evaluation by pulmonology  Holding Eliquis with has bled score 4  Pending sputum culture and Gram stain  Trend morning procalcitonin  Continue ceftriaxone 1 g daily until evaluation by pulmonology  Monitor CBC  Will hold off for fluid right now as vitals are stable and has history of aortic stenosis  Transfuse if hemoglobin less than 7    Acute hypoxic respiratory failure (HCC)  Presented today with oxygen saturation 77% at home  Recently discharged from hospital by being treated for dyspnea on exertion  Currently on 30 mg daily prednisone  Vitals shows tachypnea, /86, saturation 77% on room air and currently saturating 99% on 8 L oxygen  WBC elevated as 12.81, Pro-Wicho less than 0.05, lactic acid 2.1> 1.2,   COVID/flu/RSV negative  On exam has lung crackles but no wheezing  CTA PE study is negative for PE.  Positive for chronic ILD with new diffuse groundglass haziness in lower lobe possibility of edema, hemorrhage  Chest x-ray shows moderate pulmonary  "fibrosis  Last echo in 2/25/2025 shows LVEF 55-60% with grade 1 diastolic dysfunction, mild to moderate aortic stenosis  Acute hypoxic respiratory failure possibly secondary to exacerbation of ILD, pulmonary hemorrhage, edema, pneumonia, CHF, pulmonary hypertension    Plan:  Continue oxygen to maintain saturation more than 90% and wean off when able to  Continue ceftriaxone 1 g daily  Pending sputum culture and Gram stain  Pulmonology consulted, appreciate recommendation  Continue oral prednisone as tapering dose, currently on 30 mg and 4 days left, then will be 20 mg for 5 days, 10 mg for 5 days  Respiratory protocol  Paroxysmal atrial fibrillation (HCC)  Has history of paroxysmal A-fib currently on Lopressor 25 mg every 12 hourly, Eliquis 2.5 mg twice daily  Did not receive her morning dose  [id number] VASc score 5 and HAS-BLED score 4    Plan:  Continue lopressor 25 mg every 12 hourly  Holding Eliquis for hemoptysis until pulmonology evaluation  Interstitial lung disease (HCC)  Diagnosed with ILD in 2019  Currently is not on any immunosuppressant  Following with Dr. 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    Blood Sugar Average: Last 72 hrs:    At home takes Novolin insulin 14 units in the morning and 8 units at night.  Recently dose increased because of his steroid intake  Initially she was on 8 units in the morning and 4 units at night  Also takes metformin, Januvia    Plan:  Continue Novolin 12 " units in the morning and 8 units at night  Continue sliding scale insulin  Holding metformin and Januvia  Monitor blood glucose with goal 140-180  Hypoglycemia protocol  Aortic stenosis  Recent echo shows mild to moderate aortic stenosis  Has a scheduled appointment with outpatient cardiology  Will hold off giving fluid right now with having hemodynamically stable  Monitor for symptoms  SIRS (systemic inflammatory response syndrome) (HCC)  Met SIRS criteria with tachypnea, elevated WBC  And management as per acute hypoxic respiratory failure, hemoptysis  Hypertension  Takes Lopressor 25 mg every 12 hourly  In the ED blood pressure was 193/86  Continue Lopressor 25 mg every 12 hourly  If still hypertensive can give hydralazine 5 mg every 6 hourly as needed for SBP >170      VTE Pharmacologic Prophylaxis: VTE Score: 5 High Risk (Score >/= 5) - Pharmacological DVT Prophylaxis Contraindicated. Sequential Compression Devices Ordered.  Code Status: Level 3 - DNAR and DNI Per patient  Discussion with family:  Son.     Anticipated Length of Stay: Patient will be admitted on an inpatient basis with an anticipated length of stay of greater than 2 midnights secondary to hemoptysis, acute hypoxic respiratory failure.    History of Present Illness   Chief Complaint: Cough up blood, shortness of breath    Ac Duran is a 89 y.o. female with a PMH of idiopathic pulmonary fibrosis, glucoma, afebrile, aortic stenosis, diabetes type 2, who presents with 2 episodes of bloody cough this early morning and found to have oxygen saturation 77%.  Patient stated that she was recently admitted for shortness of breath mostly on exertion associated with her pulmonary fibrosis and discharged with oral prednisone daily.  She could not take her morning meds today.  At baseline she does not wear any oxygen but this morning when she was checking her pulse ox she was found to be hypoxic as 77% saturation.  Early this morning she had 2  episodes of bloody cough which was initially was blood with mucus and the second 1 was a blood clot.  She has been on Eliquis for A-fib.  She mentioned that with taking a steroid she has been feeling slightly better but this morning having these episodes she urged to come to the hospital.  Denies any fever, chills, chest pain, palpitation, abdominal pain, nausea, vomiting, leg swelling, travel history, sick contact.  In the ED her blood pressure was 193/86, saturation 77% on room air.  Currently she is saturating 99% on 8 L mid flow oxygen.  Labs shows WBC 12.81, Pro-Wicho less than 0.05, lactic acid 2.1, .  CTA PE study shows no PE, chronic ILD with new diffuse groundglass haziness in the lower lobes.  Chest x-ray consistent with moderate pulmonary fibrosis.  She received 1 dose of ceftriaxone, Solu-Medrol  mg, Tylenol in the ED.  Been admitted under same service for further evaluation and management of her hemoptysis and shortness of breath    Review of Systems   Constitutional:  Positive for activity change. Negative for appetite change, chills, fatigue and fever.   HENT:  Negative for congestion, ear pain, sore throat and trouble swallowing.    Eyes:  Negative for pain and visual disturbance.   Respiratory:  Positive for cough and shortness of breath.    Cardiovascular:  Negative for chest pain, palpitations and leg swelling.   Gastrointestinal:  Negative for abdominal pain, anal bleeding, constipation, diarrhea, nausea and vomiting.   Genitourinary:  Negative for dysuria and hematuria.   Musculoskeletal:  Negative for arthralgias and back pain.   Skin:  Negative for color change and rash.   Neurological:  Negative for seizures and syncope.   Psychiatric/Behavioral:  Negative for behavioral problems.    All other systems reviewed and are negative.      Historical Information   Past Medical History:   Diagnosis Date    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/7/2020    Glaucoma     H/O degenerative disc disease      Idiopathic pulmonary fibrosis (HCC) 2020    Irregular heart beat     afib    Peripheral neuropathy     S/P laparoscopic cholecystectomy 2020    Stenosis of right subclavian artery (HCC) 2016     Past Surgical History:   Procedure Laterality Date    CATARACT EXTRACTION, BILATERAL      CHOLECYSTECTOMY      CHOLECYSTECTOMY LAPAROSCOPIC N/A 2020    Procedure: CHOLECYSTECTOMY LAPAROSCOPIC;  Surgeon: Kalen Garrett MD;  Location: [facility];  Service: General    COLONOSCOPY      CYST REMOVAL  2009    left lower Quadrant     FL LUMBAR PUNCTURE DIAGNOSTIC  2023    HERNIA REPAIR  1992    LIPOMA RESECTION  2010    NC RPR 1ST INGUN HRNA AGE 5 YRS/> REDUCIBLE Bilateral 2018    Procedure: INGUINAL HERNIA REPAIR;  Surgeon: Volodymyr Lee MD;  Location: [facility];  Service: General    SHOULDER SURGERY  2019    Dr. Arnett ([place])    UPPER GASTROINTESTINAL ENDOSCOPY      VASCULAR SURGERY      Agram-  R carotid occlusion     Social History     Tobacco Use    Smoking status: Former     Current packs/day: 0.00     Average packs/day: 1.5 packs/day for 38.0 years (57.0 ttl pk-yrs)     Types: Cigarettes     Start date:      Quit date:      Years since quittin.1    Smokeless tobacco: Never   Vaping Use    Vaping status: Never Used   Substance and Sexual Activity    Alcohol use: No    Drug use: No    Sexual activity: Not on file     E-Cigarette/Vaping    E-Cigarette Use Never User      E-Cigarette/Vaping Substances    Nicotine No     THC No     CBD No     Flavoring No     Other No     Unknown No      Family History   Problem Relation Age of Onset    Heart disease Mother     Heart attack Father     Hiatal hernia Father     Diabetes Father     Cancer Brother     Diabetes Brother     Heart disease Brother     Hypertension Brother     Lung disease Brother 75        interstitial lung disease    Cancer Brother 49        glioblastoma    Other Son         gastroparesis    Other Cousin          interstitial lung disease     Social History:  Marital Status:    Occupation: None  Patient Pre-hospital Living Situation: Apartment  Patient Pre-hospital Level of Mobility: walks  Patient Pre-hospital Diet Restrictions: CHO restricted    Meds/Allergies   I have reviewed home medications with patient personally.  Prior to Admission medications    Medication Sig Start Date End Date Taking? Authorizing Provider   acetaminophen (TYLENOL) 500 mg tablet Take 1,000 mg by mouth as needed for mild pain    Historical Provider, MD   apixaban (Eliquis) 2.5 mg TAKE 1 TABLET (2.5 MG TOTAL) BY MOUTH 2 (TWO) TIMES A DAY 2/4/25   ZANDER Swenson   bimatoprost (LUMIGAN) 0.01 % ophthalmic drops Administer 1 drop to both eyes daily at bedtime for 30 days 11/2/16   Sean Desai MD   brimonidine tartrate 0.2 % ophthalmic solution INSTILL 1 DROP INTO RIGHT EYE TWICE A DAY 6/3/24   Historical Provider, MD   Cyanocobalamin (Vitamin B 12) 500 MCG TABS Take 500 mcg by mouth 2 (two) times a day 2/26/25   Shay Jarrett PA-C   ezetimibe-simvastatin (VYTORIN) 10-40 mg per tablet TAKE 1 TABLET BY MOUTH DAILY AT BEDTIME 10/21/24   ZANDER Swenson   famotidine (PEPCID) 20 mg tablet TAKE 1 TABLET (20 MG TOTAL) BY MOUTH 2 (TWO) TIMES A DAY 2/18/25   ZANDER Swenson   glucose blood (OneTouch Ultra) test strip TEST FOUR TIMES A DAY 10/7/24   ZANDER Swenson   HumuLIN N KwikPen 100 units/mL injection pen INJECT 8 UNITS UNDER THE SKIN EVERY MORNING AND 4 UNITS EVERY EVENING. 11/6/24   ZANDER Swenson   Insulin Pen Needle (B-D UF III MINI PEN NEEDLES) 31G X 5 MM MISC USE 2 (TWO) TIMES A DAY 11/6/24   ZANDER Swenson   ipratropium (ATROVENT) 0.03 % nasal spray USE 2 SPRAYS INTO EACH NOSTRIL EVERY 12 (TWELVE) HOURS 10/31/24   Sanchez Murphy MD   LORazepam (ATIVAN) 0.5 mg tablet TAKE 1 TABLET (0.5 MG TOTAL) BY MOUTH 2 (TWO) TIMES A DAY AS NEEDED FOR ANXIETY 1/3/25   ZANDER Sewnson   metFORMIN (GLUCOPHAGE) 500 mg tablet TAKE 2  TABLETS (1,000 MG TOTAL) BY MOUTH 2 (TWO) TIMES A DAY WITH MEALS 2/18/25   ZANDER Swenson   metoprolol tartrate (LOPRESSOR) 25 mg tablet Take 1 tablet (25 mg total) by mouth every 12 (twelve) hours  Patient taking differently: Take 12.5 mg by mouth every 12 (twelve) hours 9/11/24   ZANDER Swenson   mirtazapine (REMERON) 7.5 MG tablet TAKE 1 TABLET (7.5 MG TOTAL) BY MOUTH DAILY AT BEDTIME 2/18/25   ZANDER Swenson   Multiple Vitamin (multivitamin) tablet Take 1 tablet by mouth daily      Historical Provider, MD   OneTouch Delica Lancets 33G MISC Check blood sugars four times daily. Please substitute with appropriate alternative as covered by patient's insurance. Dx: E11.65 8/30/24   ZANDER Swenson   pantoprazole (PROTONIX) 40 mg tablet TAKE 1 TABLET (40 MG TOTAL) BY MOUTH 2 (TWO) TIMES A DAY 12/3/24   ZANDER Swenson   polyethylene glycol (MIRALAX) 17 g packet Take 17 g by mouth daily as needed (constipation/ abominal cramping) for up to 14 days Prn constipation 7/6/24 12/11/24  Catia Castanon MD   predniSONE 10 mg tablet Take 4 tablets (40 mg total) by mouth daily for 3 days, THEN 3 tablets (30 mg total) daily for 5 days, THEN 2 tablets (20 mg total) daily for 5 days, THEN 1 tablet (10 mg total) daily for 5 days. 2/26/25 3/16/25  Shay Jarrett PA-C   sitaGLIPtin (Januvia) 100 mg tablet TAKE 1 TABLET (100 MG TOTAL) BY MOUTH DAILY 2/4/25   ZANDER Swenson   sodium chloride 1 g tablet TAKE 1 TABLET THREE TIMES A DAY 9/16/24   Joselyn Reyes Bahamonde, MD     Allergies   Allergen Reactions    Keflex [Cephalexin] Diarrhea       Objective :  Temp:  [98.1 °F (36.7 °C)] 98.1 °F (36.7 °C)  HR:  [78-98] 79  BP: (165-193)/(77-87) 165/77  Resp:  [15-30] 24  SpO2:  [77 %-100 %] 100 %  O2 Device: Mid flow nasal cannula  Nasal Cannula O2 Flow Rate (L/min):  [5 L/min-8 L/min] 8 L/min    Physical Exam  Vitals and nursing note reviewed.   Constitutional:       General: She is not in acute distress.      Appearance: She is well-developed. She is ill-appearing.   HENT:      Head: Normocephalic and atraumatic.      Nose:      Comments: On midflow oxygen 8L/min  Eyes:      Conjunctiva/sclera: Conjunctivae normal.   Cardiovascular:      Rate and Rhythm: Normal rate and regular rhythm.      Pulses: Normal pulses.      Heart sounds: Murmur heard.   Pulmonary:      Effort: Pulmonary effort is normal. No respiratory distress.      Breath sounds: Rales present. No wheezing.   Abdominal:      General: There is no distension.      Palpations: Abdomen is soft.      Tenderness: There is no abdominal tenderness. There is no guarding.   Musculoskeletal:         General: No swelling.      Cervical back: Normal range of motion and neck supple. No rigidity or tenderness.      Right lower leg: No edema.      Left lower leg: No edema.   Skin:     General: Skin is warm and dry.      Capillary Refill: Capillary refill takes less than 2 seconds.   Neurological:      General: No focal deficit present.      Mental Status: She is alert and oriented to person, place, and time. Mental status is at baseline.      Cranial Nerves: No cranial nerve deficit.      Motor: No weakness.      Gait: Gait normal.   Psychiatric:         Mood and Affect: Mood normal.         Behavior: Behavior normal.         Thought Content: Thought content normal.         Lines/Drains:Peripheral IVs            Lab Results: I have reviewed the following results:  Results from last 7 days   Lab Units 03/02/25  0942   WBC Thousand/uL 12.81*   HEMOGLOBIN g/dL 11.7   HEMATOCRIT % 37.6   PLATELETS Thousands/uL 239   SEGS PCT % 81*   LYMPHO PCT % 10*   MONO PCT % 8   EOS PCT % 0     Results from last 7 days   Lab Units 03/02/25  0942   SODIUM mmol/L 132*   POTASSIUM mmol/L 4.0   CHLORIDE mmol/L 92*   CO2 mmol/L 30   BUN mg/dL 18   CREATININE mg/dL 0.56*   ANION GAP mmol/L 10   CALCIUM mg/dL 9.7   ALBUMIN g/dL 4.6   TOTAL BILIRUBIN mg/dL 0.83   ALK PHOS U/L 60   ALT U/L 15   AST  U/L 20   GLUCOSE RANDOM mg/dL 215*     Results from last 7 days   Lab Units 03/02/25  0942   INR  1.01     Results from last 7 days   Lab Units 02/26/25  1108 02/26/25  0723 02/25/25  2131 02/25/25  1511 02/25/25  1130 02/25/25  0953 02/24/25  1641 02/24/25  1333   POC GLUCOSE mg/dl 198* 124 190* 193* 160* 188* 119 134     Lab Results   Component Value Date    HGBA1C 7.3 (H) 02/25/2025    HGBA1C 6.6 (H) 08/30/2024    HGBA1C 6.9 (H) 05/23/2024     Results from last 7 days   Lab Units 03/02/25  1202 03/02/25  0942   LACTIC ACID mmol/L 1.2 2.1*   PROCALCITONIN ng/ml  --  <0.05       Imaging Results Review: I personally reviewed the following image studies/reports in PACS and discussed pertinent findings with Radiology: chest xray and CT chest. My interpretation of the radiology images/reports is: Below.  CTA chest pe study   Final Result by Noé Bejarano MD (03/02 1149)      1.  No pulmonary embolus.   2.  Measured RV/LV ratio is within normal limits at less than 0.9.   3.  Chronic interstitial lung disease with new diffuse groundglass haziness predominantly in the lower lobes. Differential includes edema, hemorrhage. Clinical history of hemoptysis.                  Workstation performed: [id number]         XR chest portable - 1 view   Final Result by Alize Grewal MD (03/02 1052)      Moderate pulmonary fibrosis with no definite acute disease.            Workstation performed: [id number]             Other Study Results Review: EKG was reviewed.     Administrative Statements   I have spent a total time of 45 minutes in caring for this patient on the day of the visit/encounter including Diagnostic results, Prognosis, Risks and benefits of tx options, Instructions for management, Patient and family education, Importance of tx compliance, Risk factor reductions, Impressions, Counseling / Coordination of care, Documenting in the medical record, Reviewing/placing orders in the medical record (including tests,  medications, and/or procedures), Obtaining or reviewing history  , and Communicating with other healthcare professionals .    ** Please Note: This note has been constructed using a voice recognition system. **

## 2025-03-02 NOTE — RESPIRATORY THERAPY NOTE
03/02/25 0938   Respiratory Protocol   Protocol Initiated? Yes   Protocol Selection Respiratory   Language Barrier? No   Medical & Social History Reviewed? Yes   Diagnostic Studies Reviewed? Yes   Physical Assessment Performed? Yes   Home Devices/Therapy BiPAP/CPAP   Respiratory Plan No distress/Pulmonary history   Respiratory Assessment   Assessment Type Assess only   General Appearance Alert;Awake   Respiratory Pattern Normal   Chest Assessment Chest expansion symmetrical   Resp Comments Patient found on 6L NC, placed on MF 8L. Patient uses CPAP hs does not know her pressures. Denies home oxygen use and nebulizer/inhaler use.   O2 Device 8L: MF

## 2025-03-02 NOTE — ASSESSMENT & PLAN NOTE
Presented today with 2 episodes of coughing up blood clot this morning  Recently discharged from the hospital with oral steroid tapering for the treatment of exacerbation of ILD  Labs shows WBC 12.81, Pro-Wicho less than 0.05, lactic acid 2.1  COVID/flu/RSV negative  CTA PE study shows no PE, chronic ILD with new diffuse groundglass haziness in lower lobes consistent with edema, hemorrhage  Chest x-ray shows moderate pulmonary fibrosis  Currently is on Eliquis 2.5 mg twice daily for A-fib  Hemoptysis differentials are pneumonia, pulmonary hemorrhage, exacerbation of ILD, malignancy, PE, medication induced, bronchitis, bronchiectasis, pseudo hemoptysis, TB    Plan:  Consulted pulmonology, would appreciate their recommendation  Currently n.p.o. until evaluation by pulmonology  Holding Eliquis with has bled score 4  Pending sputum culture and Gram stain  Trend morning procalcitonin  Continue ceftriaxone 1 g daily until evaluation by pulmonology  Monitor CBC  Will hold off for fluid right now as vitals are stable and has history of aortic stenosis  Transfuse if hemoglobin less than 7

## 2025-03-02 NOTE — ASSESSMENT & PLAN NOTE
"Lab Results   Component Value Date    HGBA1C 7.3 (H) 02/25/2025       No results for input(s): \"POCGLU\" in the last 72 hours.    Blood Sugar Average: Last 72 hrs:    At home takes Novolin insulin 14 units in the morning and 8 units at night.  Recently dose increased because of his steroid intake  Initially she was on 8 units in the morning and 4 units at night  Also takes metformin, Januvia    Plan:  Continue Novolin 12 units in the morning and 8 units at night  Continue sliding scale insulin  Holding metformin and Januvia  Monitor blood glucose with goal 140-180  Hypoglycemia protocol  "

## 2025-03-02 NOTE — ASSESSMENT & PLAN NOTE
Met SIRS criteria with tachypnea, elevated WBC  And management as per acute hypoxic respiratory failure, hemoptysis

## 2025-03-02 NOTE — ASSESSMENT & PLAN NOTE
Takes Lopressor 25 mg every 12 hourly  In the ED blood pressure was 193/86  Continue Lopressor 25 mg every 12 hourly  If still hypertensive can give hydralazine 5 mg every 6 hourly as needed for SBP >170

## 2025-03-02 NOTE — ASSESSMENT & PLAN NOTE
Diagnosed with ILD in 2019  Currently is not on any immunosuppressant  Following with Dr. Jovel as outpatient  Recently discharged with oral tapering steroid    Plan:  Continue oral steroid  Pulmonology consulted, would appreciate recommendation

## 2025-03-03 ENCOUNTER — PATIENT OUTREACH (OUTPATIENT)
Dept: CASE MANAGEMENT | Facility: OTHER | Age: 89
End: 2025-03-03

## 2025-03-03 PROBLEM — D50.9 IRON DEFICIENCY ANEMIA: Status: ACTIVE | Noted: 2025-03-03

## 2025-03-03 LAB
ALBUMIN SERPL BCG-MCNC: 3.5 G/DL (ref 3.5–5)
ALP SERPL-CCNC: 48 U/L (ref 34–104)
ALT SERPL W P-5'-P-CCNC: 9 U/L (ref 7–52)
ANION GAP SERPL CALCULATED.3IONS-SCNC: 8 MMOL/L (ref 4–13)
AST SERPL W P-5'-P-CCNC: 17 U/L (ref 13–39)
BASOPHILS # BLD AUTO: 0.01 THOUSANDS/ÂΜL (ref 0–0.1)
BASOPHILS NFR BLD AUTO: 0 % (ref 0–1)
BILIRUB SERPL-MCNC: 0.64 MG/DL (ref 0.2–1)
BUN SERPL-MCNC: 19 MG/DL (ref 5–25)
CALCIUM SERPL-MCNC: 8.3 MG/DL (ref 8.4–10.2)
CHLORIDE SERPL-SCNC: 96 MMOL/L (ref 96–108)
CO2 SERPL-SCNC: 29 MMOL/L (ref 21–32)
CREAT SERPL-MCNC: 0.49 MG/DL (ref 0.6–1.3)
EOSINOPHIL # BLD AUTO: 0.03 THOUSAND/ÂΜL (ref 0–0.61)
EOSINOPHIL NFR BLD AUTO: 0 % (ref 0–6)
ERYTHROCYTE [DISTWIDTH] IN BLOOD BY AUTOMATED COUNT: 15.7 % (ref 11.6–15.1)
GFR SERPL CREATININE-BSD FRML MDRD: 87 ML/MIN/1.73SQ M
GLUCOSE SERPL-MCNC: 209 MG/DL (ref 65–140)
GLUCOSE SERPL-MCNC: 240 MG/DL (ref 65–140)
GLUCOSE SERPL-MCNC: 241 MG/DL (ref 65–140)
GLUCOSE SERPL-MCNC: 263 MG/DL (ref 65–140)
GLUCOSE SERPL-MCNC: 296 MG/DL (ref 65–140)
HCT VFR BLD AUTO: 31.8 % (ref 34.8–46.1)
HGB BLD-MCNC: 9.8 G/DL (ref 11.5–15.4)
IMM GRANULOCYTES # BLD AUTO: 0.02 THOUSAND/UL (ref 0–0.2)
IMM GRANULOCYTES NFR BLD AUTO: 0 % (ref 0–2)
LYMPHOCYTES # BLD AUTO: 0.72 THOUSANDS/ÂΜL (ref 0.6–4.47)
LYMPHOCYTES NFR BLD AUTO: 10 % (ref 14–44)
MAGNESIUM SERPL-MCNC: 1.8 MG/DL (ref 1.9–2.7)
MCH RBC QN AUTO: 24.4 PG (ref 26.8–34.3)
MCHC RBC AUTO-ENTMCNC: 30.8 G/DL (ref 31.4–37.4)
MCV RBC AUTO: 79 FL (ref 82–98)
MONOCYTES # BLD AUTO: 0.73 THOUSAND/ÂΜL (ref 0.17–1.22)
MONOCYTES NFR BLD AUTO: 10 % (ref 4–12)
NEUTROPHILS # BLD AUTO: 5.53 THOUSANDS/ÂΜL (ref 1.85–7.62)
NEUTS SEG NFR BLD AUTO: 80 % (ref 43–75)
NRBC BLD AUTO-RTO: 0 /100 WBCS
PLATELET # BLD AUTO: 190 THOUSANDS/UL (ref 149–390)
PMV BLD AUTO: 10 FL (ref 8.9–12.7)
POTASSIUM SERPL-SCNC: 4.2 MMOL/L (ref 3.5–5.3)
PROCALCITONIN SERPL-MCNC: <0.05 NG/ML
PROT SERPL-MCNC: 6.5 G/DL (ref 6.4–8.4)
RBC # BLD AUTO: 4.02 MILLION/UL (ref 3.81–5.12)
SODIUM SERPL-SCNC: 133 MMOL/L (ref 135–147)
WBC # BLD AUTO: 7.04 THOUSAND/UL (ref 4.31–10.16)

## 2025-03-03 PROCEDURE — 85025 COMPLETE CBC W/AUTO DIFF WBC: CPT

## 2025-03-03 PROCEDURE — 83735 ASSAY OF MAGNESIUM: CPT

## 2025-03-03 PROCEDURE — 99232 SBSQ HOSP IP/OBS MODERATE 35: CPT | Performed by: INTERNAL MEDICINE

## 2025-03-03 PROCEDURE — 82948 REAGENT STRIP/BLOOD GLUCOSE: CPT

## 2025-03-03 PROCEDURE — 80053 COMPREHEN METABOLIC PANEL: CPT

## 2025-03-03 PROCEDURE — 84145 PROCALCITONIN (PCT): CPT

## 2025-03-03 PROCEDURE — 99233 SBSQ HOSP IP/OBS HIGH 50: CPT | Performed by: INTERNAL MEDICINE

## 2025-03-03 PROCEDURE — 94760 N-INVAS EAR/PLS OXIMETRY 1: CPT

## 2025-03-03 RX ORDER — PANTOPRAZOLE SODIUM 40 MG/1
40 TABLET, DELAYED RELEASE ORAL 2 TIMES DAILY
Status: DISCONTINUED | OUTPATIENT
Start: 2025-03-03 | End: 2025-03-05 | Stop reason: HOSPADM

## 2025-03-03 RX ORDER — MAGNESIUM SULFATE HEPTAHYDRATE 40 MG/ML
2 INJECTION, SOLUTION INTRAVENOUS ONCE
Status: COMPLETED | OUTPATIENT
Start: 2025-03-03 | End: 2025-03-03

## 2025-03-03 RX ORDER — MUSCLE RUB CREAM 100; 150 MG/G; MG/G
CREAM TOPICAL 4 TIMES DAILY PRN
Status: DISCONTINUED | OUTPATIENT
Start: 2025-03-03 | End: 2025-03-05 | Stop reason: HOSPADM

## 2025-03-03 RX ADMIN — SODIUM CHLORIDE 1 G: 1 TABLET ORAL at 15:59

## 2025-03-03 RX ADMIN — EZETIMIBE 10 MG: 10 TABLET ORAL at 15:59

## 2025-03-03 RX ADMIN — METOPROLOL TARTRATE 25 MG: 25 TABLET, FILM COATED ORAL at 08:36

## 2025-03-03 RX ADMIN — BIMATOPROST 1 DROP: 0.3 SOLUTION/ DROPS OPHTHALMIC at 21:44

## 2025-03-03 RX ADMIN — Medication 500 MCG: at 08:36

## 2025-03-03 RX ADMIN — ACETAMINOPHEN 650 MG: 325 TABLET, FILM COATED ORAL at 22:04

## 2025-03-03 RX ADMIN — INSULIN LISPRO 2 UNITS: 100 INJECTION, SOLUTION INTRAVENOUS; SUBCUTANEOUS at 10:56

## 2025-03-03 RX ADMIN — MAGNESIUM SULFATE IN WATER FOR 2 G: 40 INJECTION INTRAVENOUS at 06:51

## 2025-03-03 RX ADMIN — LORAZEPAM 0.5 MG: 0.5 TABLET ORAL at 22:04

## 2025-03-03 RX ADMIN — BRIMONIDINE TARTRATE 1 DROP: 2 SOLUTION OPHTHALMIC at 08:36

## 2025-03-03 RX ADMIN — PANTOPRAZOLE SODIUM 40 MG: 40 TABLET, DELAYED RELEASE ORAL at 21:43

## 2025-03-03 RX ADMIN — INSULIN HUMAN 8 UNITS: 100 INJECTION, SUSPENSION SUBCUTANEOUS at 21:45

## 2025-03-03 RX ADMIN — PRAVASTATIN SODIUM 80 MG: 80 TABLET ORAL at 15:59

## 2025-03-03 RX ADMIN — MIRTAZAPINE 7.5 MG: 15 TABLET, FILM COATED ORAL at 21:42

## 2025-03-03 RX ADMIN — PREDNISONE 30 MG: 10 TABLET ORAL at 08:36

## 2025-03-03 RX ADMIN — ACETAMINOPHEN 650 MG: 325 TABLET, FILM COATED ORAL at 15:59

## 2025-03-03 RX ADMIN — PANTOPRAZOLE SODIUM 40 MG: 40 TABLET, DELAYED RELEASE ORAL at 10:47

## 2025-03-03 RX ADMIN — LORAZEPAM 0.5 MG: 0.5 TABLET ORAL at 15:59

## 2025-03-03 RX ADMIN — METOPROLOL TARTRATE 25 MG: 25 TABLET, FILM COATED ORAL at 21:45

## 2025-03-03 RX ADMIN — INSULIN LISPRO 1 UNITS: 100 INJECTION, SOLUTION INTRAVENOUS; SUBCUTANEOUS at 07:26

## 2025-03-03 RX ADMIN — INSULIN HUMAN 12 UNITS: 100 INJECTION, SUSPENSION SUBCUTANEOUS at 07:25

## 2025-03-03 RX ADMIN — INSULIN LISPRO 2 UNITS: 100 INJECTION, SOLUTION INTRAVENOUS; SUBCUTANEOUS at 21:44

## 2025-03-03 RX ADMIN — FAMOTIDINE 20 MG: 20 TABLET, FILM COATED ORAL at 08:36

## 2025-03-03 RX ADMIN — BRIMONIDINE TARTRATE 1 DROP: 2 SOLUTION OPHTHALMIC at 21:44

## 2025-03-03 RX ADMIN — SODIUM CHLORIDE 1 G: 1 TABLET ORAL at 07:25

## 2025-03-03 RX ADMIN — INSULIN LISPRO 2 UNITS: 100 INJECTION, SOLUTION INTRAVENOUS; SUBCUTANEOUS at 17:12

## 2025-03-03 RX ADMIN — Medication 1 TABLET: at 08:36

## 2025-03-03 RX ADMIN — MENTHOL 10%, METHYL SALICYLATE 15%: 10; 15 CREAM TOPICAL at 22:04

## 2025-03-03 RX ADMIN — Medication 500 MCG: at 17:12

## 2025-03-03 NOTE — ASSESSMENT & PLAN NOTE
-Home O2 saturation 77%-required 8 L supplemental oxygen in the emergency room  -Currently 93% on room air  -Improved  -Assess home O2 requirements prior to discharge-may require supplemental oxygen at home with exertion

## 2025-03-03 NOTE — ASSESSMENT & PLAN NOTE
Recent echo shows mild to moderate aortic stenosis. Possibly contributing to dyspnea  Has a scheduled appointment with outpatient cardiology

## 2025-03-03 NOTE — ASSESSMENT & PLAN NOTE
History of iron deficiency anemia, not on any supplements due to GI side effects in the past  Continue to monitor for need of iron transfusion

## 2025-03-03 NOTE — ASSESSMENT & PLAN NOTE
Diagnosed with ILD in 2019  Currently is not on any immunosuppressant  Following with Dr. Jovel as outpatient  Recently discharged with oral tapering steroid    Plan:  Continue oral steroid taper  Pulmonology consulted, appreciate recommendations

## 2025-03-03 NOTE — ASSESSMENT & PLAN NOTE
-Endorsed 2 episodes of coughing up blood at home in the morning prior to admission  -Currently on Eliquis 2.5 mg twice daily for A-fib,   -Recent ILD flare up  -WBC 12.81-7.04 (was on prednisone taper outpatient)  -Procalcitonin negative x 2  -Does endorse 1 episode this morning  -Would continue to hold Eliquis and resume tomorrow if no further episodes of hemoptysis occur

## 2025-03-03 NOTE — PROGRESS NOTES
Progress Note - Hospitalist   Name: Ac Duran 89 y.o. female I MRN: [id number]  Unit/Bed#: S -01 I Date of Admission: 3/2/2025   Date of Service: 3/3/2025 I Hospital Day: 1    Assessment & Plan  Hemoptysis  Presented today with 2 episodes of coughing up blood clots, requiring more oxygen at baseline  Recently discharged from the hospital with oral steroid tapering for the treatment of exacerbation of ILD  CTA PE study shows no PE, chronic ILD with new diffuse groundglass haziness in lower lobes consistent with edema, hemorrhage  Currently is on Eliquis 2.5 mg twice daily for A-fib  Hemoglobin 9.8 on 3/3    Plan:  Consulted pulmonology, appreciate recommendations.  For now, Eliquis held-considering to restart tomorrow per Pulm. Patient reported that the blood clot size she expectorated was around the size of a quarter previously. She had a small size clot again today (around a hudson size). No history of hemoptysis. Recently has been on steroid taper  Continue to monitor symptoms closely   Acute hypoxic respiratory failure (HCC)  Presented with oxygen saturation 77% at home. Recently discharged from hospital by being treated for dyspnea on exertion  CTA PE study is negative for PE.  Positive for chronic ILD with new diffuse groundglass haziness in lower lobe possibility of edema, hemorrhage. Chest x-ray shows moderate pulmonary fibrosis  Last echo in 2/25/2025 shows LVEF 55-60% with grade 1 diastolic dysfunction, mild to moderate aortic stenosis  CRP improved compared to earlier in the week  Weaned to room air morning of 3/3  Ambulatory pulse ox with nursing staff 3/3: O2 saturation on room air and with exercise 93%, she did drop down to 80's when she was sitting in chair after exercise but then it came right back up per nursing team. Her saturations were > 90 % when ambulating, per RT would not qualify for home O2 testing    Plan:  Continue patient's prior to admission prednisone dose  Pro maria de jesus  negative, will discontinue antibiotics. No fever, myalgias  Respiratory protocol  Paroxysmal atrial fibrillation ([facility])  Has history of paroxysmal A-fib currently on Lopressor 25 mg every 12 hourly, Eliquis 2.5 mg twice daily  Did not receive her morning dose  [id number] VASc score 5 and HAS-BLED score 4    Plan:  Continue lopressor 25 mg every 12 hourly  Consider restarting eliquis tomorrow based on pulmonary recs   Interstitial lung disease ([facility])  Diagnosed with ILD in 2019  Currently is not on any immunosuppressant  Following with Dr. 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  Recently dose increased because of his steroid intake  Initially she was on 8 units in the morning and 4 units at night  Also takes metformin, Januvia    Plan:  Currently receiving Novolin 12 units in the morning and 8 units at night, will consider increasing tomorrow given most recent elevated glucose levels  Continue sliding scale insulin  Holding metformin and Januvia  Monitor blood glucose with goal 140-180  Hypoglycemia protocol  Aortic stenosis  Recent echo shows mild to moderate aortic stenosis. Possibly contributing to dyspnea  Has a scheduled appointment with outpatient cardiology  SIRS (systemic inflammatory response syndrome) ([facility])  Met SIRS  criteria with tachypnea, elevated WBC  See management as per acute hypoxic respiratory failure, hemoptysis  Hypertension  Takes Lopressor 25 mg every 12 hourly  Iron deficiency anemia  History of iron deficiency anemia, not on any supplements due to GI side effects in the past  Continue to monitor for need of iron transfusion    VTE Pharmacologic Prophylaxis: VTE Score: 5 High Risk (Score >/= 5) - Pharmacological DVT Prophylaxis Contraindicated. Sequential Compression Devices Ordered.    Mobility:   -[facility] Achieved: 4: Move to chair/commode  -[facility] Goal achieved. Continue to encourage appropriate mobility.    Patient Centered Rounds: I performed bedside rounds with nursing staff today.     Education and Discussions with Family / Patient: Will reach out to update son    Current Length of Stay: 1 day(s)  Current Patient Status: Inpatient   Certification Statement: The patient will continue to require additional inpatient hospital stay due to acute hypoxic respiratory failure, hemoptysis  Discharge Plan: Anticipate discharge later today or tomorrow to discharge location to be determined pending rehab evaluations.    Code Status: Level 3 - DNAR and DNI    Subjective   Patient reports feeling a little better today. She is still anxious about her oxygen saturations, checking often. She denies SOB at rest but reports she was SOB earlier when moving around. This has been going on for the last 3 weeks. Today she denies additional hemoptysis episodes, chest pain, N/V. She is tolerating PO intake well, no episodes of choking.     Objective :  Temp:  [97.4 °F (36.3 °C)-98.2 °F (36.8 °C)] 97.4 °F (36.3 °C)  HR:  [72-98] 76  BP: (146-193)/(72-87) 156/83  Resp:  [15-30] 15  SpO2:  [77 %-100 %] 96 %  O2 Device: None (Room air)  Nasal Cannula O2 Flow Rate (L/min):  [4 L/min-8 L/min] 4 L/min    There is no height or weight on file to calculate BMI.     Input and Output Summary (last 24 hours):     Intake/Output Summary (Last 24  hours) at 3/3/2025 0914  Last data filed at 3/3/2025 0701  Gross per 24 hour   Intake 170 ml   Output 675 ml   Net -505 ml       Physical Exam  Vitals reviewed.   Constitutional:       Appearance: She is not diaphoretic.   HENT:      Head: Normocephalic and atraumatic.      Mouth/Throat:      Mouth: Mucous membranes are moist.   Eyes:      Conjunctiva/sclera: Conjunctivae normal.   Cardiovascular:      Rate and Rhythm: Normal rate and regular rhythm.   Pulmonary:      Effort: Pulmonary effort is normal. No respiratory distress.      Breath sounds: Normal breath sounds. No wheezing or rales.   Abdominal:      Palpations: Abdomen is soft.      Tenderness: There is no abdominal tenderness. There is no guarding or rebound.   Musculoskeletal:      Right lower leg: No edema.      Left lower leg: No edema.   Skin:     General: Skin is warm and dry.   Neurological:      General: No focal deficit present.      Mental Status: She is alert. Mental status is at baseline.   Psychiatric:         Mood and Affect: Mood normal.         Behavior: Behavior normal.         Thought Content: Thought content normal.                   Lab Results: I have reviewed the following results:   Results from last 7 days   Lab Units 03/03/25  0445   WBC Thousand/uL 7.04   HEMOGLOBIN g/dL 9.8*   HEMATOCRIT % 31.8*   PLATELETS Thousands/uL 190   SEGS PCT % 80*   LYMPHO PCT % 10*   MONO PCT % 10   EOS PCT % 0     Results from last 7 days   Lab Units 03/03/25  0445   SODIUM mmol/L 133*   POTASSIUM mmol/L 4.2   CHLORIDE mmol/L 96   CO2 mmol/L 29   BUN mg/dL 19   CREATININE mg/dL 0.49*   ANION GAP mmol/L 8   CALCIUM mg/dL 8.3*   ALBUMIN g/dL 3.5   TOTAL BILIRUBIN mg/dL 0.64   ALK PHOS U/L 48   ALT U/L 9   AST U/L 17   GLUCOSE RANDOM mg/dL 240*     Results from last 7 days   Lab Units 03/02/25  0942   INR  1.01     Results from last 7 days   Lab Units 03/03/25  0712 03/02/25  2047 03/02/25  1603 03/02/25  1438 02/26/25  1108 02/26/25  0723 02/25/25  2131  02/25/25  1511 02/25/25  1130 02/25/25  0953 02/24/25  1641 02/24/25  1333   POC GLUCOSE mg/dl 209* 359* 334* 264* 198* 124 190* 193* 160* 188* 119 134     Results from last 7 days   Lab Units 02/25/25  1139   HEMOGLOBIN A1C % 7.3*     Results from last 7 days   Lab Units 03/03/25  0445 03/02/25  1202 03/02/25  0942   LACTIC ACID mmol/L  --  1.2 2.1*   PROCALCITONIN ng/ml <0.05  --  <0.05       Recent Cultures (last 7 days):   Results from last 7 days   Lab Units 03/02/25  1011 03/02/25  0934   BLOOD CULTURE  Received in Microbiology Lab. Culture in Progress. Received in Microbiology Lab. Culture in Progress.             Last 24 Hours Medication List:     Current Facility-Administered Medications:     acetaminophen (TYLENOL) tablet 650 mg, Q6H PRN    [Held by provider] apixaban (ELIQUIS) tablet 2.5 mg, BID    bimatoprost (LUMIGAN) 0.03 % ophthalmic drops 1 drop, Daily    brimonidine tartrate 0.2 % ophthalmic solution 1 drop, BID    ceftriaxone (ROCEPHIN) 1 g/50 mL in dextrose IVPB, Q24H    cyanocobalamin (VITAMIN B-12) tablet 500 mcg, BID    ezetimibe (ZETIA) tablet 10 mg, Daily With Dinner **AND** pravastatin (PRAVACHOL) tablet 80 mg, Daily With Dinner    famotidine (PEPCID) tablet 20 mg, Daily    insulin lispro (HumALOG/ADMELOG) 100 units/mL subcutaneous injection 1-5 Units, 4x Daily (AC & HS) **AND** Fingerstick Glucose (POCT), 4x Daily AC and at bedtime    insulin NPH (HumuLIN N,NovoLIN N) 100 Units/mL subcutaneous injection 12 Units, Daily Before Breakfast    insulin NPH (HumuLIN N,NovoLIN N) 100 Units/mL subcutaneous injection 8 Units, HS    LORazepam (ATIVAN) tablet 0.5 mg, BID PRN    metoprolol tartrate (LOPRESSOR) tablet 25 mg, Q12H JONNA    mirtazapine (REMERON) tablet 7.5 mg, HS    multivitamin stress formula tablet 1 tablet, Daily    predniSONE tablet 30 mg, Daily **FOLLOWED BY** [START ON 3/6/2025] predniSONE tablet 20 mg, Daily **FOLLOWED BY** [START ON 3/11/2025] predniSONE tablet 10 mg, Daily    sodium  chloride tablet 1 g, TID With Meals    Administrative Statements   Today, Patient Was Seen By: Nguyen Babb MD      **Please Note: This note may have been constructed using a voice recognition system.**

## 2025-03-03 NOTE — TELEPHONE ENCOUNTER
PA has denied and stated that the patient needs to try and fail 2 alternatives.    Novolin 70/30 Flexpen pen injector 100 unit/mL  Novolin 70/30 suspension 100 unit/mL  Novolin N Flexpen suspension pen injector 100 unit/mL  Novolin R Flexpen suspension pen injector 100 unit/mL  Novolon R injection solution 100 unit/mL  Insulin Aspart 70/30 pen/solution  Insulin Aspart U-100 sub cartridge/insulin pen/solution    Would you like to change the script or for me to do an appeal?

## 2025-03-03 NOTE — ASSESSMENT & PLAN NOTE
-Continue CPAP at bedtime  -Follows with Dr. Jovel outpatient  -No outpatient concerns with compliance.  Patient tolerates PAP therapy well

## 2025-03-03 NOTE — PROGRESS NOTES
Progress Note - Pulmonology   Name: Ac Duran 89 y.o. female I MRN: [id number]  Unit/Bed#: S -01 I Date of Admission: 3/2/2025   Date of Service: 3/3/2025 I Hospital Day: 1    Assessment & Plan  Hemoptysis  -Endorsed 2 episodes of coughing up blood at home in the morning prior to admission  -Currently on Eliquis 2.5 mg twice daily for A-fib,   -Recent ILD flare up  -WBC 12.81-7.04 (was on prednisone taper outpatient)  -Procalcitonin negative x 2  -Does endorse 1 episode this morning  -Would continue to hold Eliquis and resume tomorrow if no further episodes of hemoptysis occur  Paroxysmal atrial fibrillation (HCC)  -Previously maintained on Eliquis for anticoagulation  -Eliquis on hold due to hemoptysis  Interstitial lung disease (HCC)  -Not currently on antifibrotic's  -Follows with Dr. Jovel outpatient last seen in the office October 30, 2024  -Patient had recent hospitalization 02/24/2025 to 02/26/2025   for LD flareup was discharged on prednisone taper, currently on 30 mg daily  -CRP 2.2  -Continue prednisone taper  -Does not appear in flare up today  Acute hypoxic respiratory failure (HCC)  -Home O2 saturation 77%-required 8 L supplemental oxygen in the emergency room  -Currently 93% on room air  -Improved  -Assess home O2 requirements prior to discharge-may require supplemental oxygen at home with exertion  ROMERO (obstructive sleep apnea)  -Continue CPAP at bedtime  -Follows with Dr. Jovel outpatient  -No outpatient concerns with compliance.  Patient tolerates PAP therapy well    24 Hour Events : No overnight events reported  Subjective : Patient seen and examined at bedside.  Found resting comfortably in bed.  Does not appear in any respiratory distress.  Denies any fever chills night sweats or chest pain.  Did have 1 episode of hemoptysis this morning small red clot noted in tissue     Objective :  Temp:  [97.4 °F (36.3 °C)-98.2 °F (36.8 °C)] 97.4 °F (36.3 °C)  HR:  [72-98] 76  BP:  (146-193)/(72-87) 156/83  Resp:  [15-30] 15  SpO2:  [77 %-100 %] 96 %  O2 Device: None (Room air)  Nasal Cannula O2 Flow Rate (L/min):  [4 L/min-8 L/min] 4 L/min    Physical Exam  Vitals reviewed.   Constitutional:       General: She is not in acute distress.     Appearance: She is not ill-appearing.   HENT:      Head: Normocephalic and atraumatic.      Right Ear: External ear normal.      Left Ear: External ear normal.      Nose: Nose normal.      Mouth/Throat:      Mouth: Mucous membranes are moist.      Pharynx: Oropharynx is clear.   Eyes:      Pupils: Pupils are equal, round, and reactive to light.   Cardiovascular:      Rate and Rhythm: Normal rate and regular rhythm.      Pulses: Normal pulses.      Heart sounds: Murmur heard.   Pulmonary:      Effort: Pulmonary effort is normal.      Comments: Scattered bilateral crackles  Abdominal:      General: Bowel sounds are normal.      Tenderness: There is no abdominal tenderness.   Musculoskeletal:      Cervical back: Normal range of motion and neck supple.   Neurological:      Mental Status: She is alert and oriented to person, place, and time.   Psychiatric:         Mood and Affect: Mood normal.         Behavior: Behavior normal.         Thought Content: Thought content normal.         Judgment: Judgment normal.         Lab Results: I have reviewed the following results:   .     03/02/25  0942 03/02/25  1202 03/02/25  1207 03/03/25  0445   WBC 12.81*  --   --  7.04   HGB 11.7  --   --  9.8*   HCT 37.6  --   --  31.8*     --   --  190   SODIUM 132*  --   --  133*   K 4.0  --   --  4.2   CL 92*  --   --  96   CO2 30  --   --  29   BUN 18  --   --  19   CREATININE 0.56*  --   --  0.49*   GLUC 215*  --   --  240*   MG  --   --   --  1.8*   AST 20  --   --  17   ALT 15  --   --  9   ALB 4.6  --   --  3.5   TBILI 0.83  --   --  0.64   ALKPHOS 60  --   --  48   PTT 24  --   --   --    INR 1.01  --   --   --    HSTNI0 9  --   --   --    HSTNI2  --   --  13  --     *  --   --   --    LACTICACID 2.1* 1.2  --   --      ABG: No new results in last 24 hours.    Imaging Results Review: I reviewed radiology reports from this admission including: CT chest.  CTA chest PE study 03/02/2025  1.  No pulmonary embolus.  2.  Measured RV/LV ratio is within normal limits at less than 0.9.  3.  Chronic interstitial lung disease with new diffuse groundglass haziness predominantly in the lower lobes. Differential includes edema, hemorrhage. Clinical history of hemoptysis.  Other Study Results Review: Other studies reviewed include: ECHO  Echo 02/25/2025 shows LVEF 55 to 60% systolic function normal wall motion normal diastolic function mildly abnormal consistent with grade 1 relaxation  PFT Results Reviewed: reviewed  PFTs 08/21/2023 showed mild restriction with mildly reduced DLCO

## 2025-03-03 NOTE — PROGRESS NOTES
Outpatient Care Management Note:    ADT alert received. Patient is currently hospitalized with hemoptosis and acute hypoxic respiratory failure.  Hospital risk assessment completed. CM will attempt to outreach on discharge.

## 2025-03-03 NOTE — ASSESSMENT & PLAN NOTE
-Not currently on antifibrotic's  -Follows with Dr. Jovel outpatient last seen in the office October 30, 2024  -Patient had recent hospitalization 02/24/2025 to 02/26/2025   for LD flareup was discharged on prednisone taper, currently on 30 mg daily  -CRP 2.2  -Continue prednisone taper  -Does not appear in flare up today

## 2025-03-03 NOTE — ASSESSMENT & PLAN NOTE
Met SIRS criteria with tachypnea, elevated WBC  See management as per acute hypoxic respiratory failure, hemoptysis

## 2025-03-03 NOTE — ASSESSMENT & PLAN NOTE
Presented today with 2 episodes of coughing up blood clots, requiring more oxygen at baseline  Recently discharged from the hospital with oral steroid tapering for the treatment of exacerbation of ILD  CTA PE study shows no PE, chronic ILD with new diffuse groundglass haziness in lower lobes consistent with edema, hemorrhage  Currently is on Eliquis 2.5 mg twice daily for A-fib  Hemoglobin 9.8 on 3/3    Plan:  Consulted pulmonology, appreciate recommendations.  For now, Eliquis held-considering to restart tomorrow per Pulm. Patient reported that the blood clot size she expectorated was around the size of a quarter previously. She had a small size clot again today (around a hudson size). No history of hemoptysis. Recently has been on steroid taper  Continue to monitor symptoms closely

## 2025-03-03 NOTE — ASSESSMENT & PLAN NOTE
Lab Results   Component Value Date    HGBA1C 7.3 (H) 02/25/2025       Recent Labs     03/02/25  1438 03/02/25  1603 03/02/25  2047 03/03/25  0712   POCGLU 264* 334* 359* 209*       Blood Sugar Average: Last 72 hrs:  (P) 291.5  At home takes Novolin insulin 14 units in the morning and 8 units at night.  Recently dose increased because of his steroid intake  Initially she was on 8 units in the morning and 4 units at night  Also takes metformin, Januvia    Plan:  Currently receiving Novolin 12 units in the morning and 8 units at night, will consider increasing tomorrow given most recent elevated glucose levels  Continue sliding scale insulin  Holding metformin and Januvia  Monitor blood glucose with goal 140-180  Hypoglycemia protocol

## 2025-03-03 NOTE — ASSESSMENT & PLAN NOTE
Has history of paroxysmal A-fib currently on Lopressor 25 mg every 12 hourly, Eliquis 2.5 mg twice daily  Did not receive her morning dose  [id number] VASc score 5 and HAS-BLED score 4    Plan:  Continue lopressor 25 mg every 12 hourly  Consider restarting eliquis tomorrow based on pulmonary recs

## 2025-03-03 NOTE — RESPIRATORY THERAPY NOTE
03/03/25 0720   Respiratory Assessment   Resp Comments patient on RA   Oxygen Therapy/Pulse Ox   O2 Device None (Room air)   SpO2 96 %   SpO2 Activity At Rest   $ Pulse Oximetry Spot Check Charge Completed

## 2025-03-04 LAB
ANION GAP SERPL CALCULATED.3IONS-SCNC: 6 MMOL/L (ref 4–13)
ATRIAL RATE: 92 BPM
BUN SERPL-MCNC: 17 MG/DL (ref 5–25)
CALCIUM SERPL-MCNC: 8.6 MG/DL (ref 8.4–10.2)
CHLORIDE SERPL-SCNC: 97 MMOL/L (ref 96–108)
CO2 SERPL-SCNC: 30 MMOL/L (ref 21–32)
CREAT SERPL-MCNC: 0.44 MG/DL (ref 0.6–1.3)
ERYTHROCYTE [DISTWIDTH] IN BLOOD BY AUTOMATED COUNT: 15.9 % (ref 11.6–15.1)
GFR SERPL CREATININE-BSD FRML MDRD: 91 ML/MIN/1.73SQ M
GLUCOSE SERPL-MCNC: 148 MG/DL (ref 65–140)
GLUCOSE SERPL-MCNC: 154 MG/DL (ref 65–140)
GLUCOSE SERPL-MCNC: 192 MG/DL (ref 65–140)
GLUCOSE SERPL-MCNC: 321 MG/DL (ref 65–140)
GLUCOSE SERPL-MCNC: 327 MG/DL (ref 65–140)
HCT VFR BLD AUTO: 32.3 % (ref 34.8–46.1)
HGB BLD-MCNC: 10.2 G/DL (ref 11.5–15.4)
MCH RBC QN AUTO: 24.8 PG (ref 26.8–34.3)
MCHC RBC AUTO-ENTMCNC: 31.6 G/DL (ref 31.4–37.4)
MCV RBC AUTO: 78 FL (ref 82–98)
P AXIS: 32 DEGREES
PLATELET # BLD AUTO: 202 THOUSANDS/UL (ref 149–390)
PMV BLD AUTO: 9.7 FL (ref 8.9–12.7)
POTASSIUM SERPL-SCNC: 4 MMOL/L (ref 3.5–5.3)
PR INTERVAL: 176 MS
QRS AXIS: -28 DEGREES
QRSD INTERVAL: 96 MS
QT INTERVAL: 332 MS
QTC INTERVAL: 410 MS
RBC # BLD AUTO: 4.12 MILLION/UL (ref 3.81–5.12)
SODIUM SERPL-SCNC: 133 MMOL/L (ref 135–147)
T WAVE AXIS: 111 DEGREES
VENTRICULAR RATE: 92 BPM
WBC # BLD AUTO: 8.8 THOUSAND/UL (ref 4.31–10.16)

## 2025-03-04 PROCEDURE — 93010 ELECTROCARDIOGRAM REPORT: CPT | Performed by: INTERNAL MEDICINE

## 2025-03-04 PROCEDURE — 85027 COMPLETE CBC AUTOMATED: CPT

## 2025-03-04 PROCEDURE — 97163 PT EVAL HIGH COMPLEX 45 MIN: CPT

## 2025-03-04 PROCEDURE — 99232 SBSQ HOSP IP/OBS MODERATE 35: CPT | Performed by: HOSPITALIST

## 2025-03-04 PROCEDURE — 99232 SBSQ HOSP IP/OBS MODERATE 35: CPT | Performed by: INTERNAL MEDICINE

## 2025-03-04 PROCEDURE — 80048 BASIC METABOLIC PNL TOTAL CA: CPT

## 2025-03-04 PROCEDURE — 82948 REAGENT STRIP/BLOOD GLUCOSE: CPT

## 2025-03-04 PROCEDURE — 97167 OT EVAL HIGH COMPLEX 60 MIN: CPT

## 2025-03-04 RX ADMIN — PANTOPRAZOLE SODIUM 40 MG: 40 TABLET, DELAYED RELEASE ORAL at 21:22

## 2025-03-04 RX ADMIN — Medication 500 MCG: at 17:06

## 2025-03-04 RX ADMIN — APIXABAN 2.5 MG: 2.5 TABLET, FILM COATED ORAL at 17:06

## 2025-03-04 RX ADMIN — INSULIN LISPRO 1 UNITS: 100 INJECTION, SOLUTION INTRAVENOUS; SUBCUTANEOUS at 07:18

## 2025-03-04 RX ADMIN — SODIUM CHLORIDE 1 G: 1 TABLET ORAL at 11:40

## 2025-03-04 RX ADMIN — EZETIMIBE 10 MG: 10 TABLET ORAL at 15:48

## 2025-03-04 RX ADMIN — BRIMONIDINE TARTRATE 1 DROP: 2 SOLUTION OPHTHALMIC at 21:25

## 2025-03-04 RX ADMIN — PRAVASTATIN SODIUM 80 MG: 80 TABLET ORAL at 15:49

## 2025-03-04 RX ADMIN — FAMOTIDINE 20 MG: 20 TABLET, FILM COATED ORAL at 08:40

## 2025-03-04 RX ADMIN — MIRTAZAPINE 7.5 MG: 15 TABLET, FILM COATED ORAL at 21:22

## 2025-03-04 RX ADMIN — BRIMONIDINE TARTRATE 1 DROP: 2 SOLUTION OPHTHALMIC at 08:42

## 2025-03-04 RX ADMIN — INSULIN HUMAN 8 UNITS: 100 INJECTION, SUSPENSION SUBCUTANEOUS at 21:52

## 2025-03-04 RX ADMIN — LORAZEPAM 0.5 MG: 0.5 TABLET ORAL at 21:35

## 2025-03-04 RX ADMIN — SODIUM CHLORIDE 1 G: 1 TABLET ORAL at 15:49

## 2025-03-04 RX ADMIN — ACETAMINOPHEN 650 MG: 325 TABLET, FILM COATED ORAL at 08:45

## 2025-03-04 RX ADMIN — INSULIN LISPRO 3 UNITS: 100 INJECTION, SOLUTION INTRAVENOUS; SUBCUTANEOUS at 21:28

## 2025-03-04 RX ADMIN — Medication 500 MCG: at 08:41

## 2025-03-04 RX ADMIN — PANTOPRAZOLE SODIUM 40 MG: 40 TABLET, DELAYED RELEASE ORAL at 08:41

## 2025-03-04 RX ADMIN — SODIUM CHLORIDE 1 G: 1 TABLET ORAL at 07:19

## 2025-03-04 RX ADMIN — PREDNISONE 30 MG: 10 TABLET ORAL at 08:40

## 2025-03-04 RX ADMIN — METOPROLOL TARTRATE 25 MG: 25 TABLET, FILM COATED ORAL at 21:22

## 2025-03-04 RX ADMIN — BIMATOPROST 1 DROP: 0.3 SOLUTION/ DROPS OPHTHALMIC at 21:25

## 2025-03-04 RX ADMIN — LORAZEPAM 0.5 MG: 0.5 TABLET ORAL at 15:49

## 2025-03-04 RX ADMIN — APIXABAN 2.5 MG: 2.5 TABLET, FILM COATED ORAL at 11:40

## 2025-03-04 RX ADMIN — INSULIN HUMAN 12 UNITS: 100 INJECTION, SUSPENSION SUBCUTANEOUS at 07:19

## 2025-03-04 RX ADMIN — ACETAMINOPHEN 650 MG: 325 TABLET, FILM COATED ORAL at 15:49

## 2025-03-04 RX ADMIN — METOPROLOL TARTRATE 25 MG: 25 TABLET, FILM COATED ORAL at 08:40

## 2025-03-04 RX ADMIN — Medication 1 TABLET: at 08:40

## 2025-03-04 RX ADMIN — INSULIN LISPRO 3 UNITS: 100 INJECTION, SOLUTION INTRAVENOUS; SUBCUTANEOUS at 17:06

## 2025-03-04 NOTE — ASSESSMENT & PLAN NOTE
Presented with oxygen saturation 77% at home. Recently discharged from hospital by being treated for dyspnea on exertion  CTA PE study is negative for PE.  Positive for chronic ILD with new diffuse groundglass haziness in lower lobe possibility of edema, hemorrhage. Chest x-ray shows moderate pulmonary fibrosis  Last echo in 2/25/2025 shows LVEF 55-60% with grade 1 diastolic dysfunction, mild to moderate aortic stenosis  CRP improved compared to earlier in the week  Weaned to room air morning of 3/3  Ambulatory pulse ox with nursing staff 3/3: O2 saturation on room air and with exercise 93%, she did drop down to 80's when she was sitting in chair after exercise but then it came right back up per nursing team. Her saturations were > 90 % when ambulating, per RT would not qualify for home O2 testing    Plan:  Continue patient's prior to admission prednisone dose  Pro maria de jesus negative, monitoring off antibiotics. No fever, myalgias  Respiratory protocol

## 2025-03-04 NOTE — ASSESSMENT & PLAN NOTE
Presented with two episodes of coughing up blood clots (quarter size amount), requiring more oxygen at baseline initially  Recently discharged from the hospital with oral steroid tapering for the treatment of exacerbation of ILD  On Eliquis 2.5 mg twice daily for A-fib  Possibly in the setting of accelerated hypertension with her stenotic aortic valve and diastolic dysfunction.   No further episodes of bright red blood clots    Plan:  Consulted pulmonology, appreciate recommendations. Will plan to restart eliquis today given no additional episodes  Continue to monitor symptoms closely

## 2025-03-04 NOTE — ASSESSMENT & PLAN NOTE
-Not currently on antifibrotic's  -Follows with Dr. Jovel outpatient last seen in the office October 30, 2024  -Patient had recent hospitalization 02/24/2025 to 02/26/2025   for LD flareup was discharged on prednisone taper, currently on 30 mg daily  -CRP 2.2  -Continue prednisone taper, discharge on remaining taper from previous hospitalization  -Does not appear in flare up today

## 2025-03-04 NOTE — OCCUPATIONAL THERAPY NOTE
Occupational Therapy Evaluation     Patient Name: Ac Duran  Today's Date: 3/4/2025  Problem List  Principal Problem:    Hemoptysis  Active Problems:    Essential hypertension    Paroxysmal atrial fibrillation (HCC)    Interstitial lung disease (HCC)    Hyponatremia    DM2 (diabetes mellitus, type 2) (HCC)    ROMERO (obstructive sleep apnea)    Aortic stenosis    Acute hypoxic respiratory failure (HCC)    SIRS (systemic inflammatory response syndrome) (HCC)    Hypertension    Iron deficiency anemia    Past Medical History  Past Medical History:   Diagnosis Date    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/7/2020    Glaucoma     H/O degenerative disc disease     Idiopathic pulmonary fibrosis (HCC) 11/2020    Irregular heart beat     afib    Peripheral neuropathy     S/P laparoscopic cholecystectomy 12/21/2020    Stenosis of right subclavian artery (HCC) 11/18/2016     Past Surgical History  Past Surgical History:   Procedure Laterality Date    CATARACT EXTRACTION, BILATERAL  2011    CHOLECYSTECTOMY      CHOLECYSTECTOMY LAPAROSCOPIC N/A 12/09/2020    Procedure: CHOLECYSTECTOMY LAPAROSCOPIC;  Surgeon: Kalen Garrett MD;  [facility];  Service: General    COLONOSCOPY      CYST REMOVAL  2009    left lower Quadrant     FL LUMBAR PUNCTURE DIAGNOSTIC  4/28/2023    HERNIA REPAIR  1992    LIPOMA RESECTION  2010    CO RPR 1ST INGUN HRNA AGE 5 YRS/> REDUCIBLE Bilateral 01/05/2018    Procedure: INGUINAL HERNIA REPAIR;  Surgeon: Volodymyr Lee MD;  Location: [facility];  Service: General    SHOULDER SURGERY  04/26/2019    Dr. Arnett ([place])    UPPER GASTROINTESTINAL ENDOSCOPY      VASCULAR SURGERY  1994    Agram-  R carotid occlusion           03/04/25 1041   OT Last Visit   OT Visit Date 03/04/25   Note Type   Note type Evaluation   Pain Assessment   Pain Assessment Tool 0-10   Pain Score No Pain   Restrictions/Precautions   Weight Bearing Precautions Per Order No   Other Precautions Chair Alarm;Bed Alarm;Fall Risk    Home Living   Type of Home Apartment  (2nd floor)   Home Layout One level;Performs ADLs on one level;Able to live on main level with bedroom/bathroom;Elevator;Access  (reports 20-30 feet walk up to apartment from car)   Bathroom Shower/Tub Tub/shower unit   Bathroom Toilet Raised   Bathroom Equipment Grab bars in shower;Toilet raiser   Bathroom Accessibility Accessible   Home Equipment Walker  (uses rollator for community distances for rest breaks. no AD used within the apartment)   Prior Function   Level of [place] Independent with ADLs;Independent with functional mobility;Independent with IADLS   Lives With Alone   Receives Help From Family  (son visits several times a week)   IADLs Independent with meal prep;Independent with medication management;Family/Friend/Other provides transportation  (son assists with transportation, pt typically goes to grocery store with son but has been unable x 2-3 weeks)   Falls in the last 6 months 0   Vocational Retired   Comments Pt reports increased fatigue and SOB limiting tolerance for IADLs and engagement in leisure tasks. Reports she has not gotten in shower x 1 week d/t fear of falling. Prior, pt walks community distances, has been using rollator x 1 year   Lifestyle   Autonomy PTA, pt is (I) c ADLs, light assistance with IADLs. no falls. (-) falls. (-) driving. retired   Reciprocal Relationships son, norm. Supportive friend/neighbor   Service to Others retired   Intrinsic Gratification maintaining independence, being able to go to the grocery store.   General   Additional Pertinent History Pt admitted d/t hemoptysis, iron deficiency anemia, SIRS, acute hypoxia respiratory failure. Hx of ILD, ROMERO, DM2, aortic stenosis, AFIB, HTN, hyponatremia, DASILVA, malnutrition, glaucoma.   Family/Caregiver Present No   Subjective   Subjective pt reports overall feeling fatigued, minimal s/s of anxiety re: SOB at times   ADL   Where Assessed Edge of bed   Eating Assistance 5   Supervision/Setup   Grooming Assistance 5  Supervision/Setup   UB Bathing Assistance 5  Supervision/Setup   LB Bathing Assistance 5  Supervision/Setup   UB Dressing Assistance 5  Supervision/Setup   LB Dressing Assistance 5  Supervision/Setup   LB Dressing Deficit Setup;Increased time to complete;Supervision/safety  (thread BLE into underwear, pulled over hips. Donned B socks at EOB. Intermittent rest breaks d/t SOB)   Toileting Assistance  5  Supervision/Setup   Functional Assistance 5  Supervision/Setup   Additional Comments Overall requires rest breaks d/t SOB / DASILVA during ADLs, benefits from completion in sitting position for task simplification/energy conservation.   Bed Mobility   Supine to Sit 6  Modified independent   Additional items HOB elevated   Sit to Supine   (seated OOB in recliner upon arrival)   Additional Comments sat EOB c good static /dynamic sitting balance. Denies lightheaded/dizziness   Transfers   Sit to Stand 5  Supervision   Additional items Verbal cues   Stand to Sit 5  Supervision   Additional items Verbal cues;Increased time required   Additional Comments RW used during transfers, cues for self pacing d/t levels of fatigue during stand>sit transfers   Functional Mobility   Functional Mobility 5  Supervision   Additional Comments household distances within hallway , room. SpO2 89-92% on RA. Standing rest breaks x 2. moderate SOB/DASILVA. x2 min rest break required prior to pt able to engage in conversation with therapist d/t SOB levels.   Additional items Rolling walker   Balance   Static Sitting Good   Dynamic Sitting Good   Static Standing Fair +   Dynamic Standing Fair +   Activity Tolerance   Activity Tolerance Patient limited by fatigue;Other (Comment)  (SOB/DASILVA)   Medical Staff Made Aware BENNIE Pulbonnie NP Nicolle, PT Fabrizio   Nurse Made Aware FIONA Riley pre/post session   RUE Assessment   RUE Assessment WFL  (MMT grossly 4/5 based on functional assessment)   LUE Assessment   LUE Assessment  WFL  (MMT grossly 4/5 based on functional assessment)   Hand Function   Gross Motor Coordination Functional   Fine Motor Coordination Functional   Sensation   Light Touch No apparent deficits   Vision-Basic Assessment   Current Vision   (no glasses donned during evaluation)   Visual History Glaucoma   Cognition   Overall Cognitive Status WFL   Arousal/Participation Cooperative;Alert   Attention Within functional limits   Orientation Level Oriented X4   Memory Within functional limits   Following Commands Follows multistep commands without difficulty   Comments min s/s of anxiety demonstrated by patient. Appropriate safety awareness and problem solving   Assessment   Limitation Decreased ADL status;Decreased endurance;Decreased self-care trans;Decreased high-level ADLs  (standing tolerance for ADLs)   Prognosis Good   Assessment Patient is a 89 y.o. female seen for OT evaluation at [facility] following admission on 3/2/2025  s/p Hemoptysis. Please see above for comprehensive list of comorbidities and significant PMHx impacting functional performance.  Upon initial evaluation, pt appears to be performing below baseline functional status.   Occupational performance is affected by the following deficits: endurance ,  decreased muscular strength , decreased standing tolerance for self care tasks , and decreased activity tolerance . Personal/Environmental factors impacting D/C include: lives alone. Supporting factors include: accessible home environment Patient would benefit from OT services within the acute care setting to maximize level of functional independence in the following areas energy conservation  and ADLs.  From OT standpoint, recommendation at time of D/C would be Level 3: minimum resource intensity - would benefit from increased IADL supports.   Goals   Patient Goals to not be fearful of falling, to improve endurance   Plan   Treatment Interventions ADL retraining;Functional transfer  training;Endurance training;Patient/family training;Equipment evaluation/education;Energy conservation   Goal Expiration Date 03/14/25   OT Treatment Day 0   OT Frequency 2-3x/wk   Discharge Recommendation   Rehab Resource Intensity Level, OT III (Minimum Resource Intensity)  (pt would benefit from increased IADL supports)   Equipment Recommended Shower/Tub chair with back ($)  (for energy conservation)   Additional Comments 2 The patient's raw score on the AM-PAC Daily Activity Inpatient Short Form is 19. A raw score of greater than or equal to 19 suggests the patient may benefit from discharge to home. Please refer to the recommendation of the Occupational Therapist for safe discharge planning.   AM-PAC Daily Activity Inpatient   Lower Body Dressing 3   Bathing 3   Toileting 3   Upper Body Dressing 3   Grooming 3   Eating 4   Daily Activity Raw Score 19   Daily Activity Standardized Score (Calc for Raw Score >=11) 40.22   AM-PAC Applied Cognition Inpatient   Following a Speech/Presentation 3   Understanding Ordinary Conversation 4   Taking Medications 3   Remembering Where Things Are Placed or Put Away 4   Remembering List of 4-5 Errands 4   Taking Care of Complicated Tasks 3   Applied Cognition Raw Score 21   Applied Cognition Standardized Score 44.3   End of Consult   Education Provided Yes   Patient Position at End of Consult Bedside chair  (PT Marney at bedside for cont'd session)   Nurse Communication Nurse aware of consult  (Myles Riley)   Goals established on initial evaluation in order to achieve pt's goal of improving endurance getting back to grocery store.      Pt will complete UB ADLs Mod independent   for increased ADL independence within 10 days.     Pt will complete LB ADLs Mod independent   for increased ADL independence within 10 days.     Pt will complete toileting Mod independent   with use of DME for increased ADL independence within 10 days.     Pt will demonstrate proper body mechanics to  complete self-care transfers and functional mobility with Mod independent  and use of LRAD for increased safety and functional independence within 10 days.     Pt will demonstrate standing tolerance of 6 min for increased activity tolerance during ADL/IADL tasks within 10 days.     Pt will demonstrate proper body mechanics and fall prevention strategies during 100% of tx sessions for increased safety awareness during ADL/IADLs    Pt will demonstrate activity tolerance of 30 min in therapeutic tasks for increased participation in meaningful activities upon D/C.      Pt will verbalize and demonstrate understanding of energy conservation/deep breathing techniques for increased activity tolerance and endurance during meaningful activities.     Pt will demonstrate OOB sitting tolerance of 2-4 hr/day for increased activity tolerance and engagement in leisure activities within 10 days.   Shaina Martinez, OT

## 2025-03-04 NOTE — PLAN OF CARE
Problem: OCCUPATIONAL THERAPY ADULT  Goal: Performs self-care activities at highest level of function for planned discharge setting.  See evaluation for individualized goals.  Description: Treatment Interventions: ADL retraining, Functional transfer training, Endurance training, Patient/family training, Equipment evaluation/education, Energy conservation  Equipment Recommended: Shower/Tub chair with back ($) (for energy conservation)       See flowsheet documentation for full assessment, interventions and recommendations.   Note: Limitation: Decreased ADL status, Decreased endurance, Decreased self-care trans, Decreased high-level ADLs (standing tolerance for ADLs)  Prognosis: Good  Assessment: Patient is a 89 y.o. female seen for OT evaluation at [facility] following admission on 3/2/2025  s/p Hemoptysis. Please see above for comprehensive list of comorbidities and significant PMHx impacting functional performance.  Upon initial evaluation, pt appears to be performing below baseline functional status.   Occupational performance is affected by the following deficits: endurance ,  decreased muscular strength , decreased standing tolerance for self care tasks , and decreased activity tolerance . Personal/Environmental factors impacting D/C include: lives alone. Supporting factors include: accessible home environment Patient would benefit from OT services within the acute care setting to maximize level of functional independence in the following areas energy conservation  and ADLs.  From OT standpoint, recommendation at time of D/C would be Level 3: minimum resource intensity - would benefit from increased IADL supports.     Rehab Resource Intensity Level, OT: III (Minimum Resource Intensity) (pt would benefit from increased IADL supports)     Shaina Martinez, OT

## 2025-03-04 NOTE — PHYSICAL THERAPY NOTE
PHYSICAL THERAPY EVALUATION  NAME:  Ac Duran  DATE: 03/04/25    AGE:   89 y.o.  Mrn:   [id number]  Principal problem: Principal Problem:    Hemoptysis  Active Problems:    Essential hypertension    Paroxysmal atrial fibrillation (HCC)    Interstitial lung disease (HCC)    Hyponatremia    DM2 (diabetes mellitus, type 2) (HCC)    ROMERO (obstructive sleep apnea)    Aortic stenosis    Acute hypoxic respiratory failure (HCC)    SIRS (systemic inflammatory response syndrome) (HCC)    Hypertension    Iron deficiency anemia      Vitals:    03/03/25 1527 03/03/25 2145 03/03/25 2221 03/04/25 0711   BP: 132/77 129/56 146/77 169/97   Pulse: 89 71 88 78   Resp: 21  18 18   Temp: 98.4 °F (36.9 °C)  98.5 °F (36.9 °C) 98.5 °F (36.9 °C)   TempSrc:       SpO2: 93%  93% 95%       Length Of Stay: 2  Performed at least 2 patient identifiers during session: Name and Epic photo  PHYSICAL THERAPY EVALUATION :    03/04/25 1100   PT Last Visit   PT Visit Date 03/04/25   Note Type   Note type Evaluation   Pain Assessment   Pain Assessment Tool 0-10   Pain Score No Pain   Restrictions/Precautions   Weight Bearing Precautions Per Order No   Other Precautions Chair Alarm;Bed Alarm;Fall Risk   Home Living   Type of Home Apartment  (second floor)   Home Layout One level;Performs ADLs on one level;Elevator  (reports 20-30 feet walk up to apartment from car)   Home Equipment   (uses rollator for community distances for rest breaks. no AD used within the apartment--tends to light touch furniture/wall support)   Prior Function   Level of [place] Independent with ADLs;Independent with functional mobility;Independent with IADLS   Lives With Alone   Receives Help From Family;Neighbor;Other (Comment)  (cleaning lady 1x month)   IADLs Independent with meal prep;Independent with medication management;Family/Friend/Other provides transportation  (son assists with transportation, pt typically goes to grocery store with son but has been unable x  "2-3 weeks)   Falls in the last 6 months 0   Vocational Retired   Comments Per OT: Pt reports increased fatigue and SOB limiting tolerance for IADLs and engagement in leisure tasks. Pt self reported that she \"let her laundry pile up\" and would push off her tasks. Prior, pt walks community distances, has been using rollator x 1 year, uses the laundry cart instead of rollator to/from laundry   General   Family/Caregiver Present No   Cognition   Overall Cognitive Status WFL   Arousal/Participation Alert   Orientation Level Oriented to person   Following Commands Follows one step commands with increased time or repetition  (w/ LE MMT)   Subjective   Subjective Pt pleasant and cooperative, denies any lightheadedness, does report fear of fallin/guarded  when not using walker in room   Strength RLE   R Hip Flexion 4-/5   R Knee Extension 4/5   R Ankle Dorsiflexion 4/5   Strength LLE   L Hip Flexion 4/5   L Knee Extension 4/5   L Ankle Dorsiflexion 4/5   Vision-Basic Assessment   Current Vision Wears glasses all the time   Patient Visual Report   (reports having a baseline condition in her right eye where she does not have full vision; reports getting injections into her left eye for treatment)   Light Touch   RLE Light Touch Impaired   RLE Light Touch Comments paresthesias @B feet   LLE Light Touch Impaired   LLE Light Touch Comments paresthesias @B feet   Timed Up and Go   TUG Trial 1 (Seconds) 25 Seconds  (first trial practice---submaximal effort, no device, highguard gait pattern)   TUG Trial 2 (Seconds) 17 Seconds  (no device, highguard gait pattern)   Bed Mobility   Supine to Sit   (NT as pt OOB in recliner after OT session)   Transfers   Sit to Stand 5  Supervision   Additional items Increased time required;Armrests   Stand to Sit 5  Supervision   Additional items Increased time required;Armrests   Ambulation/Elevation   Gait pattern Step through pattern;Excessively slow;Inconsistent meche  (high guard @ UEs)   Gait " Assistance 5  Supervision  (close S)   Additional items Assist x 1   Assistive Device None  (Pt agreeable to trialing w/o device as she typically does not use DME in apt, furniture walks if needed)   Distance 20'x2   Ambulation/Elevation Additional Comments Pt observed amb in guerra >50' w/ OT prior to session w/ walker without physical A nor uncorrected losses of balance   Balance   Static Sitting Good   Static Standing Fair   Ambulatory Fair -  (w/o device)   Endurance Deficit   Endurance Deficit Yes   Endurance Deficit Description (S)  self reported fatigue, increased respiration rate post short distance ambulation but no substantial decline during or post walk with Spo2 (not lower than 95%) or increased HR >100. Pt on RA   Activity Tolerance   Activity Tolerance Patient limited by fatigue   Medical Staff Made Aware spoke to Shaina from OT, SLIM attending and resident   Nurse Made Aware spoke to FIONA Bueno including ? if pt had fluid restriction     Assessment:   Pt is a 89 y.o. female who arrived at [facility] on 3/2/2025 w/ Hemoptysis in pt with ILD/Moderate pulmonary fibrosis.  Order placed for PT.      Prior to admission: Pt lives alone in a second floor apartment with elevator access.  Patient reports typically furniture or wall walking in the apartment, but uses a walker in the community.  Upon evaluation: Pt did not require any physical assistance for transfers nor ambulation, but did display gait deviations.      Pt's clinical presentation is currently unstable/unpredictable given the functional mobility deficits above, especially (but not limited to) decreased endurance, gait deviations, and decreased functional mobility tolerance, and combined with medical complications including abnormal H&H and abnormal sodium values, prior low Spo2.  Pt IS NOT at her mobility baseline.  She is at risk for falls based on impaired balance, altered vision, and limited sensation/neuropathy.       However, Patient  appears to appropriately compensate with using light touch furniture support if needed for short distance ambulation despite abnormal tug balance test, and has a rolling walker if needed.     During this admission, pt would benefit from continued skilled inpatient PT in the acute care setting in order to address deficits as defined above to maximize function and mobility.      Recommendations:    From a PT standpoint (If Pt is not DC), recommend next several sessions focus on working on mobility with rolling walker versus LRAD, balance activities, energy conservation.    Uncertain if pt requires home O2 at this time--but may need training on managing O2 lines in her environment if so.    Prognosis Fair   Problem List Decreased strength;Decreased range of motion;Impaired vision;Decreased mobility;Impaired balance;Decreased endurance;Impaired sensation   Barriers to Discharge Decreased caregiver support   Goals   Patient Goals to not fall, go home, increase stamina--agreeable to HHPT   [facility] Expiration Date 03/14/25   Short Term Goal #1 Goals: Pt will: Perform rolling  and supine<>sit bed mobility tasks with consistent modified I to prepare for transfers and reposition in bed. Perform transfers with modified I to promote proper hand placement and approach. Perform ambulation with LRAD for up to 50' with modified I to increase Indep in home alone environment. Perform TUG w/o device within 15 seconds to demonstrate less risk for falls.   PT Treatment Day 0   Plan   Treatment/Interventions Functional transfer training;LE strengthening/ROM;Therapeutic exercise;Cognitive reorientation;Patient/family training;Equipment eval/education;Endurance training;Bed mobility;Gait training;Spoke to nursing;Spoke to MD;OT   PT Frequency 2-3x/wk  (if not DCed)   Discharge Recommendation   Rehab Resource Intensity Level, PT III (Minimum Resource Intensity)   Equipment Recommended Walker  (PRN use in the home--has one)   AM-PAC Basic  "Mobility Inpatient   Turning in Flat Bed Without Bedrails 4   Lying on Back to Sitting on Edge of Flat Bed Without Bedrails 4   Moving Bed to Chair 3   Standing Up From Chair Using Arms 4   Walk in Room 3   Climb 3-5 Stairs With Railing 2   Basic Mobility Inpatient Raw Score 20   Basic Mobility Standardized Score 43.99   [facility] Highest Level Of Mobility   JH-HLM Goal 6: Walk 10 steps or more   JH-HLM Achieved 7: Walk 25 feet or more   End of Consult   Patient Position at End of Consult All needs within reach;Bed/Chair alarm activated;Bedside chair   PT /OT co-eval for initial portion of session due to medical complexity, limited activity tolerance.   PT and OT goals addressed separately.   (Please find full objective findings from PT assessment regarding body systems outlined above).     The patient's -[facility] Basic Mobility Inpatient Short Form Raw Score is 20. A Raw score of greater than 16 suggests the patient may benefit from discharge to home, which DOES coincide with CURRENT above PT recommendations.     However please refer to therapist recommendation for discharge planning given other factors that may influence destination.     Adapted from Mode SANCHEZ, Alcides J, Sol J, Nataliia BILLINGS. [employer] [facility] “6-Clicks” Basic Mobility and Daily Activity Scores With Discharge Destination. Physical Therapy, 2021;101:1-9. DOI: [email or web address]    Portions of the record may have been created with voice recognition software.  Occasional wrong word or \"sound a like\" substitutions may have occurred due to the inherent limitations of voice recognition software.  Read the chart carefully and recognize, using context, where substitutions have occurred    "

## 2025-03-04 NOTE — PLAN OF CARE
Problem: PAIN - ADULT  Goal: Verbalizes/displays adequate comfort level or baseline comfort level  Description: Interventions:  - Encourage patient to monitor pain and request assistance  - Assess pain using appropriate pain scale  - Administer analgesics based on type and severity of pain and evaluate response  - Implement non-pharmacological measures as appropriate and evaluate response  - Consider cultural and social influences on pain and pain management  - Notify physician/advanced practitioner if interventions unsuccessful or patient reports new pain  Outcome: Progressing     Problem: INFECTION - ADULT  Goal: Absence or prevention of progression during hospitalization  Description: INTERVENTIONS:  - Assess and monitor for signs and symptoms of infection  - Monitor lab/diagnostic results  - Monitor all insertion sites, i.e. indwelling lines, tubes, and drains  - Monitor endotracheal if appropriate and nasal secretions for changes in amount and color  - [place] appropriate cooling/warming therapies per order  - Administer medications as ordered  - Instruct and encourage patient and family to use good hand hygiene technique  - Identify and instruct in appropriate isolation precautions for identified infection/condition  Outcome: Progressing  Goal: Absence of fever/infection during neutropenic period  Description: INTERVENTIONS:  - Monitor WBC    Outcome: Progressing     Problem: DISCHARGE PLANNING  Goal: Discharge to home or other facility with appropriate resources  Description: INTERVENTIONS:  - Identify barriers to discharge w/patient and caregiver  - Arrange for needed discharge resources and transportation as appropriate  - Identify discharge learning needs (meds, wound care, etc.)  - Arrange for interpretive services to assist at discharge as needed  - Refer to Case Management Department for coordinating discharge planning if the patient needs post-hospital services based on physician/advanced  practitioner order or complex needs related to functional status, cognitive ability, or social support system  Outcome: Progressing     Problem: Nutrition/Hydration-ADULT  Goal: Nutrient/Hydration intake appropriate for improving, restoring or maintaining nutritional needs  Description: Monitor and assess patient's nutrition/hydration status for malnutrition. Collaborate with interdisciplinary team and initiate plan and interventions as ordered.  Monitor patient's weight and dietary intake as ordered or per policy. Utilize nutrition screening tool and intervene as necessary. Determine patient's food preferences and provide high-protein, high-caloric foods as appropriate.     INTERVENTIONS:  - Monitor oral intake, urinary output, labs, and treatment plans  - Assess nutrition and hydration status and recommend course of action  - Evaluate amount of meals eaten  - Assist patient with eating if necessary   - Allow adequate time for meals  - Recommend/ encourage appropriate diets, oral nutritional supplements, and vitamin/mineral supplements  - Order, calculate, and assess calorie counts as needed  - Recommend, monitor, and adjust tube feedings and TPN/PPN based on assessed needs  - Assess need for intravenous fluids  - Provide specific nutrition/hydration education as appropriate  - Include patient/family/caregiver in decisions related to nutrition  Outcome: Progressing

## 2025-03-04 NOTE — ASSESSMENT & PLAN NOTE
Lab Results   Component Value Date    HGBA1C 7.3 (H) 02/25/2025       Recent Labs     03/03/25  1053 03/03/25  1619 03/03/25  2106 03/04/25  0709   POCGLU 263* 241* 296* 154*       Blood Sugar Average: Last 72 hrs:  (P) 265

## 2025-03-04 NOTE — ASSESSMENT & PLAN NOTE
Lab Results   Component Value Date    HGBA1C 7.3 (H) 02/25/2025       Recent Labs     03/03/25  0712 03/03/25  1053 03/03/25  1619 03/03/25  2106   POCGLU 209* 263* 241* 296*       Blood Sugar Average: Last 72 hrs:  (P) 280.2459366193824342  At home takes Novolin insulin 14 units in the morning and 8 units at night.  Recently dose increased because of his steroid intake  Initially she was on 8 units in the morning and 4 units at night  Also takes metformin, Januvia    Plan:  Currently receiving Novolin 12 units in the morning and 8 units at night, titration done per PCP in the outpatient setting for her prednisone taper  Continue sliding scale insulin  Holding metformin and Januvia  Monitor blood glucose with goal 140-180  Hypoglycemia protocol

## 2025-03-04 NOTE — PLAN OF CARE
Problem: PHYSICAL THERAPY ADULT  Goal: Performs mobility at highest level of function for planned discharge setting.  See evaluation for individualized goals.  Description: Treatment/Interventions: Functional transfer training, LE strengthening/ROM, Therapeutic exercise, Cognitive reorientation, Patient/family training, Equipment eval/education, Endurance training, Bed mobility, Gait training, Spoke to nursing, Spoke to MD, OT  Equipment Recommended: Walker (PRN use in the home--has one)       See flowsheet documentation for full assessment, interventions and recommendations.  Note: Prognosis: Fair  Problem List: Decreased strength, Decreased range of motion, Impaired vision, Decreased mobility, Impaired balance, Decreased endurance, Impaired sensation  Assessment: Pt is a 89 y.o. female who arrived at [facility] on 3/2/2025 w/ Hemoptysis in pt with ILD/Moderate pulmonary fibrosis.  Order placed for PT.      Prior to admission: Pt lives alone in a second floor apartment with elevator access.  Patient reports typically furniture or wall walking in the apartment, but uses a walker in the community.  Upon evaluation: Pt did not require any physical assistance for transfers nor ambulation, but did display gait deviations.      Pt's clinical presentation is currently unstable/unpredictable given the functional mobility deficits above, especially (but not limited to) decreased endurance, gait deviations, and decreased functional mobility tolerance, and combined with medical complications including abnormal H&H and abnormal sodium values, prior low Spo2.  Pt IS NOT at her mobility baseline.  She is at risk for falls based on impaired balance, altered vision, and limited sensation/neuropathy.       However, Patient appears to appropriately compensate with using light touch furniture support if needed for short distance ambulation despite abnormal tug balance test, and has a rolling walker if needed.     During  this admission, pt would benefit from continued skilled inpatient PT in the acute care setting in order to address deficits as defined above to maximize function and mobility.      Recommendations:     From a PT standpoint (If Pt is not DC), recommend next several sessions focus on working on mobility with rolling walker versus LRAD, balance activities, energy conservation.     Uncertain if pt requires home O2 at this time--but may need training on managing O2 lines in her environment if so.  Barriers to Discharge: Decreased caregiver support     Rehab Resource Intensity Level, PT: III (Minimum Resource Intensity)    See flowsheet documentation for full assessment.

## 2025-03-04 NOTE — ASSESSMENT & PLAN NOTE
-Endorsed 2 episodes of coughing up blood at home in the morning prior to admission  -Currently on Eliquis 2.5 mg twice daily for A-fib,   -Recent ILD flare up  -WBC 12.81-7.04 (was on prednisone taper outpatient)  -Procalcitonin negative x 2  -Endorsed several episodes last night but no further episodes today  -Can restart Eliquis if no further episodes hemoptysis noted

## 2025-03-04 NOTE — ASSESSMENT & PLAN NOTE
Has history of paroxysmal A-fib currently on Lopressor 25 mg every 12 hourly, Eliquis 2.5 mg twice daily  Did not receive her morning dose  [id number] VASc score 5 and HAS-BLED score 4    Plan:  Continue lopressor 25 mg every 12 hourly  Restart eliquis today

## 2025-03-04 NOTE — PROGRESS NOTES
Progress Note - Pulmonology   Name: Ac Duran 89 y.o. female I MRN: [id number]  Unit/Bed#: S -01 I Date of Admission: 3/2/2025   Date of Service: 3/4/2025 I Hospital Day: 2    Assessment & Plan  Hemoptysis  -Endorsed 2 episodes of coughing up blood at home in the morning prior to admission  -Currently on Eliquis 2.5 mg twice daily for A-fib,   -Recent ILD flare up  -WBC 12.81-7.04 (was on prednisone taper outpatient)  -Procalcitonin negative x 2  -Endorsed several episodes last night but no further episodes today  -Can restart Eliquis if no further episodes hemoptysis noted  Paroxysmal atrial fibrillation (HCC)  -Previously maintained on Eliquis for anticoagulation  -Eliquis on hold due to hemoptysis  -Can restart Eliquis if no further episodes of hemoptysis noted  Interstitial lung disease (HCC)  -Not currently on antifibrotic's  -Follows with Dr. Jovel outpatient last seen in the office October 30, 2024  -Patient had recent hospitalization 02/24/2025 to 02/26/2025   for LD flareup was discharged on prednisone taper, currently on 30 mg daily  -CRP 2.2  -Continue prednisone taper, discharge on remaining taper from previous hospitalization  -Does not appear in flare up today  Acute hypoxic respiratory failure (HCC)  -Home O2 saturation 77%-required 8 L supplemental oxygen in the emergency room  -Currently 93% on room air  -Improved  -Assess home O2 requirements prior to discharge-may require supplemental oxygen at home with exertion  ROMERO (obstructive sleep apnea)  -Continue CPAP at bedtime  -Follows with Dr. Jovel outpatient  -No outpatient concerns with compliance.  Patient tolerates PAP therapy well    24 Hour Events : No overnight events reported  Subjective : Patient seen and examined at bedside found resting comfortably in bed.  Does not appear in any respiratory distress.  Currently 97% on room air denies any episodes of mopped assist this morning.  Does endorse a few dark brown episodes last  evening.  Denies any fever chills night sweats or chest pain    Objective :  Temp:  [98.4 °F (36.9 °C)-98.5 °F (36.9 °C)] 98.5 °F (36.9 °C)  HR:  [71-89] 78  BP: (129-169)/(56-97) 169/97  Resp:  [18-21] 18  SpO2:  [83 %-95 %] 95 %  O2 Device: None (Room air)    Physical Exam  Vitals reviewed.   Constitutional:       General: She is not in acute distress.     Appearance: She is not ill-appearing.   HENT:      Head: Normocephalic and atraumatic.      Right Ear: External ear normal.      Left Ear: External ear normal.      Nose: Nose normal.      Mouth/Throat:      Mouth: Mucous membranes are moist.      Pharynx: Oropharynx is clear.   Eyes:      Pupils: Pupils are equal, round, and reactive to light.   Cardiovascular:      Rate and Rhythm: Normal rate and regular rhythm.      Pulses: Normal pulses.      Heart sounds: Murmur heard.   Pulmonary:      Effort: Pulmonary effort is normal.      Breath sounds: Rales present.      Comments: Bibasilar crackles present with poor inspiratory effort  Abdominal:      General: Bowel sounds are normal.   Musculoskeletal:         General: Normal range of motion.      Cervical back: Normal range of motion.      Right lower leg: No edema.      Left lower leg: No edema.   Skin:     General: Skin is warm and dry.   Neurological:      Mental Status: She is alert and oriented to person, place, and time.   Psychiatric:         Mood and Affect: Mood normal.         Behavior: Behavior normal.         Thought Content: Thought content normal.         Judgment: Judgment normal.         Lab Results: I have reviewed the following results:   .     03/04/25  0452   WBC 8.80   HGB 10.2*   HCT 32.3*      SODIUM 133*   K 4.0   CL 97   CO2 30   BUN 17   CREATININE 0.44*   GLUC 192*     ABG: No new results in last 24 hours.    Imaging Results Review: I reviewed radiology reports from this admission including: CT chest.  CTA chest PE study 03/02/2025  1.  No pulmonary embolus.  2.  Measured RV/LV  ratio is within normal limits at less than 0.9.  3.  Chronic interstitial lung disease with new diffuse groundglass haziness predominantly in the lower lobes. Differential includes edema, hemorrhage. Clinical history of hemoptysis.    Other Study Results Review: Other studies reviewed include: ECHO  Echo 02/25/2025 shows LVEF 55 to 60% systolic function normal wall motion normal diastolic function mildly abnormal consistent with grade 1 relaxation   PFT Results Reviewed: reviewed  PFTs 08/21/2023 showed mild restriction with mildly reduced DLCO

## 2025-03-04 NOTE — MALNUTRITION/BMI
This medical record reflects one or more clinical indicators suggestive of malnutrition and/or morbid obesity.    Malnutrition Findings:   Adult Malnutrition type: Acute illness (in the setting of chronic illness)  Adult Degree of Malnutrition: Malnutrition of mild degree  Malnutrition Characteristics: Fat loss, Muscle loss                360 Statement: Mild malnutrition related to catabolic illness, inadequate oral intake as evidenced by loss of subcutaneous fat and muscle, lower BMI 18.88. Currently treated with oral supplementation.    BMI Findings:           There is no height or weight on file to calculate BMI. No new admission weight.     See Nutrition note dated 3/4/2025 for additional details.  Completed nutrition assessment is viewable in the nutrition documentation.

## 2025-03-04 NOTE — ASSESSMENT & PLAN NOTE
-Previously maintained on Eliquis for anticoagulation  -Eliquis on hold due to hemoptysis  -Can restart Eliquis if no further episodes of hemoptysis noted

## 2025-03-04 NOTE — PROGRESS NOTES
Progress Note - Hospitalist   Name: Ac Duran 89 y.o. female I MRN: [id number]  Unit/Bed#: S -01 I Date of Admission: 3/2/2025   Date of Service: 3/4/2025 I Hospital Day: 2    Assessment & Plan  Hemoptysis  Presented with two episodes of coughing up blood clots (quarter size amount), requiring more oxygen at baseline initially  Recently discharged from the hospital with oral steroid tapering for the treatment of exacerbation of ILD  On Eliquis 2.5 mg twice daily for A-fib  Possibly in the setting of accelerated hypertension with her stenotic aortic valve and diastolic dysfunction.   No further episodes of bright red blood clots    Plan:  Consulted pulmonology, appreciate recommendations. Will plan to restart eliquis today given no additional episodes  Continue to monitor symptoms closely   Acute hypoxic respiratory failure (HCC)  Presented with oxygen saturation 77% at home. Recently discharged from hospital by being treated for dyspnea on exertion  CTA PE study is negative for PE.  Positive for chronic ILD with new diffuse groundglass haziness in lower lobe possibility of edema, hemorrhage. Chest x-ray shows moderate pulmonary fibrosis  Last echo in 2/25/2025 shows LVEF 55-60% with grade 1 diastolic dysfunction, mild to moderate aortic stenosis  CRP improved compared to earlier in the week  Weaned to room air morning of 3/3  Ambulatory pulse ox with nursing staff 3/3: O2 saturation on room air and with exercise 93%, she did drop down to 80's when she was sitting in chair after exercise but then it came right back up per nursing team. Her saturations were > 90 % when ambulating, per RT would not qualify for home O2 testing    Plan:  Continue patient's prior to admission prednisone dose  Pro maria de jesus negative, monitoring off antibiotics. No fever, myalgias  Respiratory protocol  Paroxysmal atrial fibrillation (HCC)  Has history of paroxysmal A-fib currently on Lopressor 25 mg every 12 hourly, Eliquis 2.5 mg  twice daily  Did not receive her morning dose  [id number] VASc score 5 and HAS-BLED score 4    Plan:  Continue lopressor 25 mg every 12 hourly  Restart eliquis today  Interstitial lung disease (HCC)  Diagnosed with ILD in 2019  Currently is not on any immunosuppressant  Following with Dr. 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  Recently dose increased because of his steroid intake  Initially she was on 8 units in the morning and 4 units at night  Also takes metformin, Januvia    Plan:  Currently receiving Novolin 12 units in the morning and 8 units at night, titration done per PCP in the outpatient setting for her prednisone taper  Continue sliding scale insulin  Holding metformin and Januvia  Monitor blood glucose with goal 140-180  Hypoglycemia protocol  Aortic stenosis  Recent echo shows mild to moderate aortic stenosis. Possibly contributing to dyspnea  Has a scheduled appointment with outpatient cardiology  SIRS (systemic inflammatory response syndrome) ([facility])  Met SIRS criteria with tachypnea, elevated WBC  See management as per acute hypoxic respiratory failure, hemoptysis  Hypertension  Takes Lopressor 25 mg every 12 hourly  Iron deficiency anemia  History of iron deficiency anemia, not on any supplements  due to GI side effects in the past  Continue to monitor for need of iron transfusion  Essential hypertension  Continue Lopressor  ROMERO (obstructive sleep apnea)  Continue CPAP    VTE Pharmacologic Prophylaxis: VTE Score: 5 Eliquis on hold this morning may restart    Mobility:   Basic Mobility Inpatient Raw Score: 21  JH-HLM Goal: 6: Walk 10 steps or more  JH-HLM Achieved: 7: Walk 25 feet or more  JH-HLM Goal achieved. Continue to encourage appropriate mobility.    Patient Centered Rounds: I performed bedside rounds with nursing staff today.     Education and Discussions with Family / Patient: Will reach out to update son    Current Length of Stay: 2 day(s)  Current Patient Status: Inpatient   Certification Statement: The patient will continue to require additional inpatient hospital stay due to monitoring after hemoptysis event  Discharge Plan: Anticipate discharge later today or tomorrow to discharge location to be determined pending rehab evaluations.    Code Status: Level 3 - DNAR and DNI    Subjective   Today patient reports that she is doing well, she will occasionally have shortness of breath with walking or at rest then every time she was at her pulse ox it is normal.  She is tolerating p.o. intake little better, no episodes of choking.  No chest pain, nausea, vomiting.  She did not have any episodes of bright red hemoptysis.  She will occasionally have very dark red small streaks in her phlegm.  She is not really ambulated much, awaiting PT/OT .    Objective :  Temp:  [97.4 °F (36.3 °C)-98.5 °F (36.9 °C)] 98.5 °F (36.9 °C)  HR:  [71-89] 88  BP: (115-156)/(56-83) 146/77  Resp:  [15-21] 18  SpO2:  [83 %-98 %] 93 %  O2 Device: None (Room air)    There is no height or weight on file to calculate BMI.     Input and Output Summary (last 24 hours):     Intake/Output Summary (Last 24 hours) at 3/4/2025 0619  Last data filed at 3/3/2025 2340  Gross per 24 hour   Intake 480 ml   Output 1650 ml   Net -1170 ml        Physical Exam  Vitals reviewed.   Constitutional:       Appearance: She is not diaphoretic.   HENT:      Head: Normocephalic and atraumatic.      Mouth/Throat:      Mouth: Mucous membranes are moist.   Eyes:      Conjunctiva/sclera: Conjunctivae normal.   Cardiovascular:      Rate and Rhythm: Normal rate and regular rhythm.   Pulmonary:      Effort: Pulmonary effort is normal.      Comments: Saturating well on room air  Velcro-like crackles in the lower bases  Abdominal:      Palpations: Abdomen is soft.      Tenderness: There is no abdominal tenderness. There is no guarding or rebound.   Musculoskeletal:      Right lower leg: No edema.      Left lower leg: No edema.   Skin:     General: Skin is warm and dry.   Neurological:      Mental Status: She is alert. Mental status is at baseline.   Psychiatric:         Mood and Affect: Mood normal.         Behavior: Behavior normal.         Thought Content: Thought content normal.                     Lab Results: I have reviewed the following results:   Results from last 7 days   Lab Units 03/04/25  0452 03/03/25  0445   WBC Thousand/uL 8.80 7.04   HEMOGLOBIN g/dL 10.2* 9.8*   HEMATOCRIT % 32.3* 31.8*   PLATELETS Thousands/uL 202 190   SEGS PCT %  --  80*   LYMPHO PCT %  --  10*   MONO PCT %  --  10   EOS PCT %  --  0     Results from last 7 days   Lab Units 03/04/25  0452 03/03/25  0445   SODIUM mmol/L 133* 133*   POTASSIUM mmol/L 4.0 4.2   CHLORIDE mmol/L 97 96   CO2 mmol/L 30 29   BUN mg/dL 17 19   CREATININE mg/dL 0.44* 0.49*   ANION GAP mmol/L 6 8   CALCIUM mg/dL 8.6 8.3*   ALBUMIN g/dL  --  3.5   TOTAL BILIRUBIN mg/dL  --  0.64   ALK PHOS U/L  --  48   ALT U/L  --  9   AST U/L  --  17   GLUCOSE RANDOM mg/dL 192* 240*     Results from last 7 days   Lab Units 03/02/25  0942   INR  1.01     Results from last 7 days   Lab Units 03/03/25  2106 03/03/25  1619 03/03/25  1053 03/03/25  0712 03/02/25  2047 03/02/25  1603 03/02/25  1438 02/26/25  1108 02/26/25  0723  02/25/25  2131 02/25/25  1511 02/25/25  1130   POC GLUCOSE mg/dl 296* 241* 263* 209* 359* 334* 264* 198* 124 190* 193* 160*     Results from last 7 days   Lab Units 02/25/25  1139   HEMOGLOBIN A1C % 7.3*     Results from last 7 days   Lab Units 03/03/25  0445 03/02/25  1202 03/02/25  0942   LACTIC ACID mmol/L  --  1.2 2.1*   PROCALCITONIN ng/ml <0.05  --  <0.05       Recent Cultures (last 7 days):   Results from last 7 days   Lab Units 03/02/25  1011 03/02/25  0934   BLOOD CULTURE  No Growth at 24 hrs. No Growth at 24 hrs.             Last 24 Hours Medication List:     Current Facility-Administered Medications:     acetaminophen (TYLENOL) tablet 650 mg, Q6H PRN    [Held by provider] apixaban (ELIQUIS) tablet 2.5 mg, BID    bimatoprost (LUMIGAN) 0.03 % ophthalmic drops 1 drop, Daily    brimonidine tartrate 0.2 % ophthalmic solution 1 drop, BID    cyanocobalamin (VITAMIN B-12) tablet 500 mcg, BID    ezetimibe (ZETIA) tablet 10 mg, Daily With Dinner **AND** pravastatin (PRAVACHOL) tablet 80 mg, Daily With Dinner    famotidine (PEPCID) tablet 20 mg, Daily    insulin lispro (HumALOG/ADMELOG) 100 units/mL subcutaneous injection 1-5 Units, 4x Daily (AC & HS) **AND** Fingerstick Glucose (POCT), 4x Daily AC and at bedtime    insulin NPH (HumuLIN N,NovoLIN N) 100 Units/mL subcutaneous injection 12 Units, Daily Before Breakfast    insulin NPH (HumuLIN N,NovoLIN N) 100 Units/mL subcutaneous injection 8 Units, HS    LORazepam (ATIVAN) tablet 0.5 mg, BID PRN    menthol-methyl salicylate (BENGAY) 10-15 % cream, 4x Daily PRN    metoprolol tartrate (LOPRESSOR) tablet 25 mg, Q12H JONNA    mirtazapine (REMERON) tablet 7.5 mg, HS    multivitamin stress formula tablet 1 tablet, Daily    pantoprazole (PROTONIX) EC tablet 40 mg, BID    predniSONE tablet 30 mg, Daily **FOLLOWED BY** [START ON 3/6/2025] predniSONE tablet 20 mg, Daily **FOLLOWED BY** [START ON 3/11/2025] predniSONE tablet 10 mg, Daily    sodium chloride tablet 1 g, TID With  Meals    Administrative Statements   Today, Patient Was Seen By: Nguyen Babb MD      **Please Note: This note may have been constructed using a voice recognition system.**

## 2025-03-05 ENCOUNTER — TELEPHONE (OUTPATIENT)
Age: 89
End: 2025-03-05

## 2025-03-05 VITALS
OXYGEN SATURATION: 95 % | DIASTOLIC BLOOD PRESSURE: 95 MMHG | SYSTOLIC BLOOD PRESSURE: 168 MMHG | TEMPERATURE: 99.1 F | HEART RATE: 65 BPM | RESPIRATION RATE: 20 BRPM

## 2025-03-05 PROBLEM — E44.1 MILD PROTEIN-CALORIE MALNUTRITION (HCC): Status: ACTIVE | Noted: 2025-03-05

## 2025-03-05 LAB
ANION GAP SERPL CALCULATED.3IONS-SCNC: 4 MMOL/L (ref 4–13)
BUN SERPL-MCNC: 17 MG/DL (ref 5–25)
CALCIUM SERPL-MCNC: 8.4 MG/DL (ref 8.4–10.2)
CHLORIDE SERPL-SCNC: 99 MMOL/L (ref 96–108)
CO2 SERPL-SCNC: 30 MMOL/L (ref 21–32)
CREAT SERPL-MCNC: 0.4 MG/DL (ref 0.6–1.3)
ERYTHROCYTE [DISTWIDTH] IN BLOOD BY AUTOMATED COUNT: 16.6 % (ref 11.6–15.1)
GFR SERPL CREATININE-BSD FRML MDRD: 93 ML/MIN/1.73SQ M
GLUCOSE SERPL-MCNC: 176 MG/DL (ref 65–140)
GLUCOSE SERPL-MCNC: 185 MG/DL (ref 65–140)
GLUCOSE SERPL-MCNC: 220 MG/DL (ref 65–140)
GLUCOSE SERPL-MCNC: 292 MG/DL (ref 65–140)
HCT VFR BLD AUTO: 33.2 % (ref 34.8–46.1)
HGB BLD-MCNC: 9.9 G/DL (ref 11.5–15.4)
MCH RBC QN AUTO: 24.8 PG (ref 26.8–34.3)
MCHC RBC AUTO-ENTMCNC: 29.8 G/DL (ref 31.4–37.4)
MCV RBC AUTO: 83 FL (ref 82–98)
PLATELET # BLD AUTO: 186 THOUSANDS/UL (ref 149–390)
PMV BLD AUTO: 9.6 FL (ref 8.9–12.7)
POTASSIUM SERPL-SCNC: 4.1 MMOL/L (ref 3.5–5.3)
RBC # BLD AUTO: 4 MILLION/UL (ref 3.81–5.12)
SODIUM SERPL-SCNC: 133 MMOL/L (ref 135–147)
WBC # BLD AUTO: 6.83 THOUSAND/UL (ref 4.31–10.16)

## 2025-03-05 PROCEDURE — 99232 SBSQ HOSP IP/OBS MODERATE 35: CPT | Performed by: INTERNAL MEDICINE

## 2025-03-05 PROCEDURE — 85027 COMPLETE CBC AUTOMATED: CPT

## 2025-03-05 PROCEDURE — 82948 REAGENT STRIP/BLOOD GLUCOSE: CPT

## 2025-03-05 PROCEDURE — 80048 BASIC METABOLIC PNL TOTAL CA: CPT

## 2025-03-05 PROCEDURE — 99239 HOSP IP/OBS DSCHRG MGMT >30: CPT | Performed by: HOSPITALIST

## 2025-03-05 RX ORDER — INSULIN HUMAN 100 [IU]/ML
INJECTION, SUSPENSION SUBCUTANEOUS
Qty: 15 ML | Refills: 0 | Status: CANCELLED | OUTPATIENT
Start: 2025-03-05

## 2025-03-05 RX ORDER — PREDNISONE 10 MG/1
TABLET ORAL
Start: 2025-03-06 | End: 2025-03-11

## 2025-03-05 RX ORDER — METOPROLOL TARTRATE 25 MG/1
12.5 TABLET, FILM COATED ORAL EVERY 12 HOURS SCHEDULED
Status: CANCELLED
Start: 2025-03-05

## 2025-03-05 RX ADMIN — Medication 1 TABLET: at 08:10

## 2025-03-05 RX ADMIN — SODIUM CHLORIDE 1 G: 1 TABLET ORAL at 11:11

## 2025-03-05 RX ADMIN — INSULIN LISPRO 1 UNITS: 100 INJECTION, SOLUTION INTRAVENOUS; SUBCUTANEOUS at 11:11

## 2025-03-05 RX ADMIN — INSULIN LISPRO 1 UNITS: 100 INJECTION, SOLUTION INTRAVENOUS; SUBCUTANEOUS at 08:07

## 2025-03-05 RX ADMIN — FAMOTIDINE 20 MG: 20 TABLET, FILM COATED ORAL at 08:06

## 2025-03-05 RX ADMIN — PREDNISONE 30 MG: 10 TABLET ORAL at 08:06

## 2025-03-05 RX ADMIN — Medication 500 MCG: at 08:06

## 2025-03-05 RX ADMIN — APIXABAN 2.5 MG: 2.5 TABLET, FILM COATED ORAL at 08:06

## 2025-03-05 RX ADMIN — INSULIN HUMAN 12 UNITS: 100 INJECTION, SUSPENSION SUBCUTANEOUS at 08:06

## 2025-03-05 RX ADMIN — SODIUM CHLORIDE 1 G: 1 TABLET ORAL at 08:06

## 2025-03-05 RX ADMIN — LORAZEPAM 0.5 MG: 0.5 TABLET ORAL at 15:50

## 2025-03-05 RX ADMIN — PANTOPRAZOLE SODIUM 40 MG: 40 TABLET, DELAYED RELEASE ORAL at 08:06

## 2025-03-05 RX ADMIN — BRIMONIDINE TARTRATE 1 DROP: 2 SOLUTION OPHTHALMIC at 08:06

## 2025-03-05 RX ADMIN — METOPROLOL TARTRATE 25 MG: 25 TABLET, FILM COATED ORAL at 08:06

## 2025-03-05 NOTE — ASSESSMENT & PLAN NOTE
Malnutrition Findings:   Adult Malnutrition type: Acute illness (in the setting of chronic illness)  Adult Degree of Malnutrition: Malnutrition of mild degree  Malnutrition Characteristics: Fat loss, Muscle loss                  360 Statement: Mild malnutrition related to catabolic illness, inadequate oral intake as evidenced by loss of subcutaneous fat and muscle, lower BMI 18.88. Currently treated with oral supplementation.    BMI Findings:           There is no height or weight on file to calculate BMI.

## 2025-03-05 NOTE — PLAN OF CARE
Problem: PAIN - ADULT  Goal: Verbalizes/displays adequate comfort level or baseline comfort level  Description: Interventions:  - Encourage patient to monitor pain and request assistance  - Assess pain using appropriate pain scale  - Administer analgesics based on type and severity of pain and evaluate response  - Implement non-pharmacological measures as appropriate and evaluate response  - Consider cultural and social influences on pain and pain management  - Notify physician/advanced practitioner if interventions unsuccessful or patient reports new pain  Outcome: Progressing     Problem: INFECTION - ADULT  Goal: Absence or prevention of progression during hospitalization  Description: INTERVENTIONS:  - Assess and monitor for signs and symptoms of infection  - Monitor lab/diagnostic results  - Monitor all insertion sites, i.e. indwelling lines, tubes, and drains  - Monitor endotracheal if appropriate and nasal secretions for changes in amount and color  - Berthold appropriate cooling/warming therapies per order  - Administer medications as ordered  - Instruct and encourage patient and family to use good hand hygiene technique  - Identify and instruct in appropriate isolation precautions for identified infection/condition  Outcome: Progressing  Goal: Absence of fever/infection during neutropenic period  Description: INTERVENTIONS:  - Monitor WBC    Outcome: Progressing     Problem: SAFETY ADULT  Goal: Patient will remain free of falls  Description: INTERVENTIONS:  - Educate patient/family on patient safety including physical limitations  - Instruct patient to call for assistance with activity   - Consult OT/PT to assist with strengthening/mobility   - Keep Call bell within reach  - Keep bed low and locked with side rails adjusted as appropriate  - Keep care items and personal belongings within reach  - Initiate and maintain comfort rounds  - Make Fall Risk Sign visible to staff  - Offer Toileting every  Hours,  in advance of need  - Initiate/Maintain alarm  - Obtain necessary fall risk management equipment:   - Apply yellow socks and bracelet for high fall risk patients  - Consider moving patient to room near nurses station  Outcome: Progressing  Goal: Maintain or return to baseline ADL function  Description: INTERVENTIONS:  -  Assess patient's ability to carry out ADLs; assess patient's baseline for ADL function and identify physical deficits which impact ability to perform ADLs (bathing, care of mouth/teeth, toileting, grooming, dressing, etc.)  - Assess/evaluate cause of self-care deficits   - Assess range of motion  - Assess patient's mobility; develop plan if impaired  - Assess patient's need for assistive devices and provide as appropriate  - Encourage maximum independence but intervene and supervise when necessary  - Involve family in performance of ADLs  - Assess for home care needs following discharge   - Consider OT consult to assist with ADL evaluation and planning for discharge  - Provide patient education as appropriate  Outcome: Progressing  Goal: Maintains/Returns to pre admission functional level  Description: INTERVENTIONS:  - Perform AM-PAC 6 Click Basic Mobility/ Daily Activity assessment daily.  - Set and communicate daily mobility goal to care team and patient/family/caregiver.   - Collaborate with rehabilitation services on mobility goals if consulted  - Perform Range of Motion times a day.  - Reposition patient every  hours.  - Dangle patient  times a day  - Stand patient  times a day  - Ambulate patient  times a day  - Out of bed to chair  times a day   - Out of bed for meals  times a day  - Out of bed for toileting  - Record patient progress and toleration of activity level   Outcome: Progressing     Problem: DISCHARGE PLANNING  Goal: Discharge to home or other facility with appropriate resources  Description: INTERVENTIONS:  - Identify barriers to discharge w/patient and caregiver  - Arrange for  needed discharge resources and transportation as appropriate  - Identify discharge learning needs (meds, wound care, etc.)  - Arrange for interpretive services to assist at discharge as needed  - Refer to Case Management Department for coordinating discharge planning if the patient needs post-hospital services based on physician/advanced practitioner order or complex needs related to functional status, cognitive ability, or social support system  Outcome: Progressing     Problem: Knowledge Deficit  Goal: Patient/family/caregiver demonstrates understanding of disease process, treatment plan, medications, and discharge instructions  Description: Complete learning assessment and assess knowledge base.  Interventions:  - Provide teaching at level of understanding  - Provide teaching via preferred learning methods  Outcome: Progressing     Problem: Nutrition/Hydration-ADULT  Goal: Nutrient/Hydration intake appropriate for improving, restoring or maintaining nutritional needs  Description: Monitor and assess patient's nutrition/hydration status for malnutrition. Collaborate with interdisciplinary team and initiate plan and interventions as ordered.  Monitor patient's weight and dietary intake as ordered or per policy. Utilize nutrition screening tool and intervene as necessary. Determine patient's food preferences and provide high-protein, high-caloric foods as appropriate.     INTERVENTIONS:  - Monitor oral intake, urinary output, labs, and treatment plans  - Assess nutrition and hydration status and recommend course of action  - Evaluate amount of meals eaten  - Assist patient with eating if necessary   - Allow adequate time for meals  - Recommend/ encourage appropriate diets, oral nutritional supplements, and vitamin/mineral supplements  - Order, calculate, and assess calorie counts as needed  - Recommend, monitor, and adjust tube feedings and TPN/PPN based on assessed needs  - Assess need for intravenous fluids  -  Provide specific nutrition/hydration education as appropriate  - Include patient/family/caregiver in decisions related to nutrition  Outcome: Progressing

## 2025-03-05 NOTE — ASSESSMENT & PLAN NOTE
Lab Results   Component Value Date    HGBA1C 7.3 (H) 02/25/2025       Recent Labs     03/04/25  1602 03/04/25  2121 03/05/25  0703 03/05/25  1009   POCGLU 321* 327* 176* 220*       Blood Sugar Average: Last 72 hrs:  (P) [id number]  Initially she was on Novolin 8 units in the morning and 4 units at night  Also takes metformin, Januvia    Plan:  Her insulin regimen has been increased in the setting of prednisone taper.  She is following closely with her PCP regarding titration and has her instructions written down.  We will continue the current titration which is set for 10 units in the morning and 6 units at night when patient is on prednisone 20 mg and 10 mg.  She can resume her 8 units in the morning and 4 units at night when prednisone therapy has finished.  Continue home metformin and Januvia  Patient reports that she has supplies at home and does not need any additional.  She is aware of the insulin titration.

## 2025-03-05 NOTE — ASSESSMENT & PLAN NOTE
Presented with oxygen saturation 77% at home. Recently discharged from hospital by being treated for dyspnea on exertion  CTA PE study is negative for PE.  Positive for chronic ILD with new diffuse groundglass haziness in lower lobe possibility of edema, hemorrhage. Chest x-ray shows moderate pulmonary fibrosis  Last echo in 2/25/2025 shows LVEF 55-60% with grade 1 diastolic dysfunction, mild to moderate aortic stenosis  CRP improved compared to earlier in the week  Weaned to room air morning of 3/3  Ambulatory pulse ox with nursing staff 3/3: O2 saturation on room air and with exercise 93%, she did drop down to 80's when she was sitting in chair after exercise but then it came right back up per nursing team. Her saturations were > 90 % when ambulating, per RT would not qualify for home O2 testing    Plan:  Continue patient's prior to admission prednisone dose  Monitored off antibiotics  Respiratory protocol

## 2025-03-05 NOTE — CASE MANAGEMENT
Case Management Discharge Planning Note    Patient name Ac Duran  Location S /S -01 MRN [id number]  : 1935 Date 3/5/2025       Current Admission Date: 3/2/2025  Current Admission Diagnosis:Hemoptysis   Patient Active Problem List    Diagnosis Date Noted Date Diagnosed    Mild protein-calorie malnutrition (HCC) 2025     Iron deficiency anemia 2025     Hemoptysis 2025     Acute hypoxic respiratory failure (HCC) 2025     SIRS (systemic inflammatory response syndrome) ([facility]) 2025     Hypertension 2025     Aortic stenosis 2025     Microcytosis 2025     ROMERO (obstructive sleep apnea) 2024     Hypoxia 2024     Dyspnea on exertion 04/15/2024     Irritable bowel syndrome with both constipation and diarrhea 2024     Peripheral arterial disease ([facility]) 2024     Metabolic alkalosis 10/19/2023     SIADH (syndrome of inappropriate ADH production) ([facility]) 10/19/2023     Bilateral hip pain 2023     Pachymeningitis 2023     Hypovitaminosis D 2021     Depression, recurrent ([facility]) 2021     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, with long-term current use of insulin ([facility]) 2021     Type 2 diabetes mellitus with hyperlipidemia  ([facility]) 2021     Type 2 diabetes mellitus with diabetic polyneuropathy, without long-term current use of insulin ([facility]) 2021     DM2 (diabetes mellitus, type 2) ([facility]) 2021     Post-nasal drip 2021     Hyponatremia 2020     Moderate protein-calorie malnutrition ([facility]) 2020     Constipation 2020     Dysphagia 2020     Interstitial lung disease ([facility]) 10/13/2020     Iron deficiency 2020     Osteoporosis 2019     GERD (gastroesophageal reflux disease) 2019     Anxiety 2019     Pacemaker 2016     Occlusion of right carotid artery 2016      Paroxysmal atrial fibrillation (HCC) 11/09/2016     Symptomatic PVCs 11/01/2016     Essential hypertension 10/29/2016     Mixed hyperlipidemia 10/29/2016     Glaucoma 10/29/2016     Asymptomatic carotid artery stenosis, left 10/29/2016       LOS (days): 3  Geometric Mean LOS (GMLOS) (days): 3.5  Days to GMLOS:0.5     OBJECTIVE:  Risk of Unplanned Readmission Score: 21.49     Current admission status: Inpatient   Preferred Pharmacy:   [facility] - [place] - [address]  [address] [address]  Phone: [phone] Fax: [phone]    [facility] [address] [place], [place] - [address]  [address]  [place] [address]  Phone: [phone] Fax: [phone]    [id number] [place], [place] - [address]  [address] [address]  Phone: [phone] Fax: [phone]    Primary Care Provider: ZANDER Swenson    Primary Insurance: MEDICARE  Secondary Insurance: [facility]    DISCHARGE DETAILS:    Discharge planning discussed with:: Patient  Freedom of Choice: Yes  Comments - Freedom of Choice: CM met with patient at bedside to introduce self/role and discuss rehab recommendations. Patient was agreeable to C for PT/OT and consented to have referrals sent.    Requested Home Health Care         Is the patient interested in HHC at discharge?: Yes  Home Health Discipline requested:: Occupational Therapy, Physical Therapy  Home Health Agency Name:: Other (TBD-referrals sent)  HHA External Referral Reason (only applicable if external HHA name selected): [facility] does not accept patient's insurance  Home Health Follow-Up Provider:: PCP  Home Health Services Needed:: Evaluate Functional Status and Safety, Gait/ADL Training, Strengthening/Theraputic Exercises to Improve Function  Homebound Criteria Met:: Requires the Assistance of Another Person for Safe Ambulation or to Leave the Home  Supporting Clincal Findings:: Fatigues Easliy in Short  Distances, Limited Endurance    DME Referral Provided  Referral made for DME?: No    Other Referral/Resources/Interventions Provided:  Referral Comments: CM sent blanket referrals in [place].      Treatment Team Recommendation: Home with Home Health Care  Discharge Destination Plan:: Home with Home Health Care  Transport at Discharge : Family (Patient's son)    IMM Given (Date):: 03/05/25 (IMM was reviewed with patient at bedside. Patient verbalized understanding, signed, and was provided original. Copy placed in scan bin for patient's chart.)  IMM Given to:: Patient    IMM reviewed with patient, patient agrees with discharge determination.     Update at 12:30PM: Anitra reserved in Aidin.

## 2025-03-05 NOTE — TELEPHONE ENCOUNTER
Son Rey will be answering her phone she said.  She's in hospital still.  They want her to call PCP to talk about blood pressure medication.  She said anyone looks like Kimberly Gonsales out tomorrow.  Appt wanted tomorrow since she's getting discharged tomorrow.

## 2025-03-05 NOTE — ASSESSMENT & PLAN NOTE
-Not currently on antifibrotic's  -Follows with Dr. Jovel outpatient last seen in the office October 30, 2024  -Patient had recent hospitalization 02/24/2025 to 02/26/2025   for LD flareup was discharged on prednisone taper, currently on 30 mg daily  -CRP 2.2  -Continue prednisone taper, discharge on remaining taper from previous hospitalization  -Does not appear in flare up today  -Discharge on prednisone taper, continue previous taper on what ever dosage she is currently at  -Follow-up with Dr. Jovel outpatient

## 2025-03-05 NOTE — PLAN OF CARE
Problem: PAIN - ADULT  Goal: Verbalizes/displays adequate comfort level or baseline comfort level  Description: Interventions:  - Encourage patient to monitor pain and request assistance  - Assess pain using appropriate pain scale  - Administer analgesics based on type and severity of pain and evaluate response  - Implement non-pharmacological measures as appropriate and evaluate response  - Consider cultural and social influences on pain and pain management  - Notify physician/advanced practitioner if interventions unsuccessful or patient reports new pain  Outcome: Progressing     Problem: INFECTION - ADULT  Goal: Absence or prevention of progression during hospitalization  Description: INTERVENTIONS:  - Assess and monitor for signs and symptoms of infection  - Monitor lab/diagnostic results  - Monitor all insertion sites, i.e. indwelling lines, tubes, and drains  - Monitor endotracheal if appropriate and nasal secretions for changes in amount and color  - Brady appropriate cooling/warming therapies per order  - Administer medications as ordered  - Instruct and encourage patient and family to use good hand hygiene technique  - Identify and instruct in appropriate isolation precautions for identified infection/condition  Outcome: Progressing  Goal: Absence of fever/infection during neutropenic period  Description: INTERVENTIONS:  - Monitor WBC    Outcome: Progressing     Problem: DISCHARGE PLANNING  Goal: Discharge to home or other facility with appropriate resources  Description: INTERVENTIONS:  - Identify barriers to discharge w/patient and caregiver  - Arrange for needed discharge resources and transportation as appropriate  - Identify discharge learning needs (meds, wound care, etc.)  - Arrange for interpretive services to assist at discharge as needed  - Refer to Case Management Department for coordinating discharge planning if the patient needs post-hospital services based on physician/advanced  practitioner order or complex needs related to functional status, cognitive ability, or social support system  Outcome: Progressing     Problem: Knowledge Deficit  Goal: Patient/family/caregiver demonstrates understanding of disease process, treatment plan, medications, and discharge instructions  Description: Complete learning assessment and assess knowledge base.  Interventions:  - Provide teaching at level of understanding  - Provide teaching via preferred learning methods  Outcome: Progressing     Problem: Nutrition/Hydration-ADULT  Goal: Nutrient/Hydration intake appropriate for improving, restoring or maintaining nutritional needs  Description: Monitor and assess patient's nutrition/hydration status for malnutrition. Collaborate with interdisciplinary team and initiate plan and interventions as ordered.  Monitor patient's weight and dietary intake as ordered or per policy. Utilize nutrition screening tool and intervene as necessary. Determine patient's food preferences and provide high-protein, high-caloric foods as appropriate.     INTERVENTIONS:  - Monitor oral intake, urinary output, labs, and treatment plans  - Assess nutrition and hydration status and recommend course of action  - Evaluate amount of meals eaten  - Assist patient with eating if necessary   - Allow adequate time for meals  - Recommend/ encourage appropriate diets, oral nutritional supplements, and vitamin/mineral supplements  - Order, calculate, and assess calorie counts as needed  - Recommend, monitor, and adjust tube feedings and TPN/PPN based on assessed needs  - Assess need for intravenous fluids  - Provide specific nutrition/hydration education as appropriate  - Include patient/family/caregiver in decisions related to nutrition  Outcome: Progressing

## 2025-03-05 NOTE — ASSESSMENT & PLAN NOTE
Presented with two episodes of coughing up blood clots (quarter size amount), requiring more oxygen at baseline initially. Now on room air  Recently discharged from the hospital with oral steroid tapering for the treatment of exacerbation of ILD  On Eliquis 2.5 mg twice daily for A-fib  No further episodes of bright red blood clots, hemodynamically stable    Plan:  Consulted pulmonology, appreciate recommendations.  Patient restarted Eliquis yesterday and did not have any additional episodes of bright red blood clots. Continue to monitor symptoms closely.  Stable for discharge, patient advised to seek medical attention if she develops further bright red clots.

## 2025-03-05 NOTE — ASSESSMENT & PLAN NOTE
Has history of paroxysmal A-fib currently on Lopressor 25 mg every 12 hourly, Eliquis 2.5 mg twice daily  [id number] VASc score 5 and HAS-BLED score 4    Plan:  Continue lopressor 25 mg every 12 hours.  Patient advised to call her PCP if she develops lightheadedness or dizziness with this dose  Eliquis restarted

## 2025-03-05 NOTE — ASSESSMENT & PLAN NOTE
Lab Results   Component Value Date    HGBA1C 7.3 (H) 02/25/2025       Recent Labs     03/04/25  1602 03/04/25  2121 03/05/25  0703 03/05/25  1009   POCGLU 321* 327* 176* 220*       Blood Sugar Average: Last 72 hrs:  (P) [id number]

## 2025-03-05 NOTE — DISCHARGE INSTR - AVS FIRST PAGE
Dear Ac Duran,     It was our pleasure to care for you here at [facility].  It is our hope that we were always able to exceed the expected standards for your care during your stay.  You were hospitalized due to coughing up blood.  You were cared for on the south 4th floor by Nguyen Babb MD under the service of Volodymyr Tompkins MD with the [facility] who covers for your primary care physician (PCP), ZANDER Swenson, while you were hospitalized.  If you have any questions or concerns related to this hospitalization, you may contact us at .  For follow up as well as any medication refills, we recommend that you follow up with your primary care physician.  A registered nurse will reach out to you by phone within a few days after your discharge to answer any additional questions that you may have after going home.  However, at this time we provide for you here, the most important instructions / recommendations at discharge:     Notable Medication Adjustments -   Please continue taking all of your medications as prescribed. You will start taking the 20 mg prednisone daily for 5 days starting tomorrow 3/6. After that, you will take prednisone 10 mg for 5 days starting on 3/11. Please continue to take your insulin as prescribed by your PCP based on your prednisone doses. She told you that when your prednisone is at 20 mg and 10 mg to take 10 units in the morning and 6 units in the evening. Once prednisone is finished, you can take 8 units in the morning and 4 units in the evening. Call your PCP office if you have symptoms of hypoglycemia.   In the hospital, you were receiving 25 mg Lopressor twice day, we continued this at discharge since your pressures have been adequate. If you start to develop dizziness or lightheadedness please talk to you PCP to see if you need to go back to the 12.5 mg twice a day dosing.   Testing Required after  Discharge -   None from a hospital standpoint. Please reach out to your PCP to see if she would want additional testing.   Important follow up information -   Please return to the Emergency Department if you start to cough up additional bright red clots or have worsening shortness of breath.   Other Instructions -   It was a pleasure to take care of you during your hospital stay, we wish you all the best!   Please review this entire after visit summary as additional general instructions including medication list, appointments, activity, diet, any pertinent wound care, and other additional recommendations from your care team that may be provided for you.      Sincerely,     Nguyen Babb MD

## 2025-03-05 NOTE — ASSESSMENT & PLAN NOTE
Presented with oxygen saturation 77% at home. Recently discharged from hospital by being treated for dyspnea on exertion  CTA PE study is negative for PE.  Positive for chronic ILD with new diffuse groundglass haziness in lower lobe possibility of edema, hemorrhage. Chest x-ray shows moderate pulmonary fibrosis  Last echo in 2/25/2025 shows LVEF 55-60% with grade 1 diastolic dysfunction, mild to moderate aortic stenosis  CRP improved compared to earlier in the week  Weaned to room air morning of 3/3  Ambulatory pulse ox with nursing staff 3/3: O2 saturation on room air and with exercise 93%, she did drop down to 80's when she was sitting in chair after exercise but then it came right back up per nursing team. Her saturations were > 90 % when ambulating, per RT would not qualify for home O2 testing    Plan:  Continue patient's prior to admission prednisone dose  Ambulatory pulse ox walk test this morning  Pro maria de jesus negative, monitoring off antibiotics. No fever, myalgias  Respiratory protocol

## 2025-03-05 NOTE — PROGRESS NOTES
Progress Note - Pulmonology   Name: Ac Duran 89 y.o. female I MRN: [id number]  Unit/Bed#: S -01 I Date of Admission: 3/2/2025   Date of Service: 3/5/2025 I Hospital Day: 3    Assessment & Plan  Hemoptysis  -Endorsed 2 episodes of coughing up blood at home in the morning prior to admission  -Currently on Eliquis 2.5 mg twice daily for A-fib,   -Recent ILD flare up  -WBC 12.81-7.04 (was on prednisone taper outpatient)  -Procalcitonin negative x 2  -Patient denies any new episodes of hemoptysis  -Eliquis restarted  Paroxysmal atrial fibrillation (HCC)  -Maintained on Eliquis for anticoagulation    Interstitial lung disease (HCC)  -Not currently on antifibrotic's  -Follows with Dr. Jovel outpatient last seen in the office October 30, 2024  -Patient had recent hospitalization 02/24/2025 to 02/26/2025   for LD flareup was discharged on prednisone taper, currently on 30 mg daily  -CRP 2.2  -Continue prednisone taper, discharge on remaining taper from previous hospitalization  -Does not appear in flare up today  -Discharge on prednisone taper, continue previous taper on what ever dosage she is currently at  -Follow-up with Dr. Jovel outpatient  Acute hypoxic respiratory failure (HCC)  -Home O2 saturation 77%-required 8 L supplemental oxygen in the emergency room  -Currently 93% on room air  -Improved  -Assess home O2 requirements prior to discharge-may require supplemental oxygen at home with exertion  ROMERO (obstructive sleep apnea)  -Continue CPAP at bedtime  -Follows with Dr. Jovel outpatient  -No outpatient concerns with compliance.  Patient tolerates PAP therapy well    No further recommendations from pulmonary.  Pulmonary will sign off.  Please feel free to reach out with any further questions or concerns    24 Hour Events : No overnight events reported  Subjective : Patient seen and examined at bedside.  Found sitting in a recliner chair.  Was able to ambulate to the bathroom without any desaturation.   Some notable shortness of breath present.  Patient will complete ambulatory pulse ox prior to discharge.  Will follow-up with Dr. Jovel plans for discharge later on today    Objective :  Temp:  [97.3 °F (36.3 °C)-98.4 °F (36.9 °C)] 98.4 °F (36.9 °C)  HR:  [60-94] 71  BP: (108-168)/() 154/74  Resp:  [18] 18  SpO2:  [89 %-94 %] 89 %    Physical Exam  Vitals reviewed.   Constitutional:       General: She is not in acute distress.     Appearance: She is not ill-appearing.   HENT:      Head: Normocephalic and atraumatic.      Right Ear: External ear normal.      Left Ear: External ear normal.      Nose: Nose normal.      Mouth/Throat:      Mouth: Mucous membranes are moist.      Pharynx: Oropharynx is clear.   Eyes:      Extraocular Movements: Extraocular movements intact.      Pupils: Pupils are equal, round, and reactive to light.   Cardiovascular:      Rate and Rhythm: Normal rate and regular rhythm.      Pulses: Normal pulses.      Heart sounds: Murmur heard.   Pulmonary:      Effort: Pulmonary effort is normal.      Breath sounds: No wheezing or rhonchi.      Comments: Bibasilar crackles present  Abdominal:      General: Bowel sounds are normal.      Tenderness: There is no abdominal tenderness.   Musculoskeletal:         General: Normal range of motion.      Cervical back: Normal range of motion and neck supple.      Right lower leg: No edema.      Left lower leg: No edema.   Skin:     General: Skin is warm and dry.   Neurological:      Mental Status: She is alert and oriented to person, place, and time.   Psychiatric:         Mood and Affect: Mood normal.         Behavior: Behavior normal.         Thought Content: Thought content normal.         Judgment: Judgment normal.         Lab Results: I have reviewed the following results:   .     03/05/25  0516   WBC 6.83   HGB 9.9*   HCT 33.2*      SODIUM 133*   K 4.1   CL 99   CO2 30   BUN 17   CREATININE 0.40*   GLUC 185*     ABG: No new results in last 24  hours.    Imaging Results Review: I reviewed radiology reports from this admission including: CT chest.CTA chest PE study 03/02/2025  1.  No pulmonary embolus.  2.  Measured RV/LV ratio is within normal limits at less than 0.9.  3.  Chronic interstitial lung disease with new diffuse groundglass haziness predominantly in the lower lobes. Differential includes edema, hemorrhage. Clinical history of hemoptysis.  Other Study Results Review: Other studies reviewed include: ECHO  Echo 02/25/2025 shows LVEF 55 to 60% systolic function normal wall motion normal diastolic function mildly abnormal consistent with grade 1 relaxation   PFT Results Reviewed: reviewed  PFTs 08/21/2023 showed mild restriction with mildly reduced DLCO

## 2025-03-05 NOTE — ASSESSMENT & PLAN NOTE
Met SIRS criteria with tachypnea, elevated WBC  See management as per acute hypoxic respiratory failure, hemoptysis   Subjective:       Patient ID: Chetna Cardozo is a 64 y.o. female.    Chief Complaint: Vaginal Discharge    HPI    64yoF PMHx anxiety, depression, degenerative joint disease, GERD, HSV infection, history of syphillis, hyperlipidemia, hypertension, hypothyroidism s/p DOBSON for Grave's disease, and osteopenia for same day visit.     C/o thin grayish discharge x 2-3 months. Has seen several MDs and urgent cares about similar issue without resolution. Has been prescribed topical creams for possibility of lice being cause as well as po ivermectin.   Denies abnormal bleeding, dysuria, abd pain, changes in bm, fever, chills.     Chronic hx below:     #Cards: HTN, HLD  - current reg: Lisinopril-HCTZ 20-25 qd; lipitor 80 qd   - est w/ Dr. West, lov 2/2020    #Endo: Hypothyroidism, Osteopenia  - current reg: Beecher thyroid 60 qd    #Obgyn: H/o Vaginitis, atrophic:  Prescribed Estrace cream.    #Ortho: Cervical radiculopathy  - est w/ Dr. Duarte    Review of Systems   Constitutional: Negative for fever.   Gastrointestinal: Negative for abdominal pain, constipation, diarrhea, nausea and vomiting.   Genitourinary: Positive for vaginal discharge. Negative for decreased urine volume, difficulty urinating, dysuria, flank pain, hematuria, urgency and vaginal bleeding.         Past Medical History:   Diagnosis Date    Anxiety     Chest pain 4/28/2016    Decreased ROM of left shoulder 8/6/2019    Depression     on ssdi, was severe and related to a bad marriage    DJD (degenerative joint disease)     GERD (gastroesophageal reflux disease)     Hypertension     Muscle weakness 8/6/2019    STD (sexually transmitted disease)     h/o syphillis 3-4 years ago, HSV    Thyroid disease     s/p DOBSON for graves        Prior to Admission medications    Medication Sig Start Date End Date Taking? Authorizing Provider   albuterol (PROVENTIL/VENTOLIN HFA) 90 mcg/actuation inhaler Inhale 2 puffs into the lungs every 6 (six) hours as needed for  Wheezing. 12/2/19   Roberto Baldwin MD   aspirin (ECOTRIN) 81 MG EC tablet Take 1 tablet (81 mg total) by mouth once daily. 2/7/19 2/10/20  Greg Ruiz MD   atorvastatin (LIPITOR) 80 MG tablet Take 0.5 tablets (40 mg total) by mouth once daily.  Patient not taking: Reported on 2/10/2020 12/2/19 1/10/20  Roberto Baldwin MD   azelastine (ASTELIN) 137 mcg (0.1 %) nasal spray 1 spray (137 mcg total) by Nasal route 2 (two) times daily. 11/16/19 2/10/20  Jimbo Bergeron PA-C   cetirizine (ZYRTEC) 10 MG tablet Take 1 tablet (10 mg total) by mouth once daily. 2/7/19 2/10/20  Greg Ruiz MD   cholecalciferol, vitamin D3, (VITAMIN D3) 1,000 unit capsule Take 1 capsule (1,000 Units total) by mouth once daily. 2/7/19   Greg Ruiz MD   clindamycin (CLEOCIN) 100 mg vaginal suppository Place 1 suppository (100 mg total) vaginally every evening. 5/9/20   Jimbo Bergeron PA-C   diclofenac sodium (VOLTAREN) 1 % Gel Apply 2 g topically 4 (four) times daily. 1/10/20   Seth Bustillo DPM   diphenhydrAMINE (BENADRYL) 25 mg capsule Take 1 capsule (25 mg total) by mouth every 6 (six) hours as needed for Itching or Allergies. 8/2/19   Papito Dillon MD   estradiol (ESTRACE) 0.01 % (0.1 mg/gram) vaginal cream Place 1 g vaginally once daily. for 365 doses 11/13/19 11/12/20  Yann Castellanos Jr., MD   fluticasone propionate (FLONASE) 50 mcg/actuation nasal spray 1 spray (50 mcg total) by Each Nare route 2 (two) times daily as needed (Nasal congestion/drainage). 6/24/19   Andrei Acosta PA-C   gabapentin (NEURONTIN) 300 MG capsule  2/19/20   Historical Provider, MD   hydrOXYzine pamoate (VISTARIL) 25 MG Cap Take 1 capsule (25 mg total) by mouth every 8 (eight) hours as needed. 3/1/20   Radha Key PA-C   ibuprofen (ADVIL,MOTRIN) 600 MG tablet Take 1 tablet (600 mg total) by mouth every 6 (six) hours as needed for Pain. 3/4/20   Winsome Kimble PA-C   JUBLIA 10 % Alex Apply to affected nail(s) qday 9/26/17    Jannet Parekh, NP   ketoconazole (NIZORAL) 2 % cream Apply topically once daily. 1/10/20   Seth Bustillo DPM   lisinopril-hydrochlorothiazide (PRINZIDE,ZESTORETIC) 10-12.5 mg per tablet Take 1 tablet by mouth once daily. 2/26/20   Roberto Baldwin MD   meclizine (ANTIVERT) 25 mg tablet Take 1 tablet (25 mg total) by mouth 3 (three) times daily as needed for Dizziness. 2/26/15   Jaycee Ovalles MD   medroxyPROGESTERone (PROVERA) 10 MG tablet Take 1 tablet (10 mg total) by mouth once daily. Take daily x 14 days each month  Patient not taking: Reported on 2/10/2020 1/11/19 1/11/20  Geeta Leiva MD   montelukast (SINGULAIR) 10 mg tablet  9/27/19   Historical Provider, MD   mupirocin (BACTROBAN) 2 % ointment APPLY OINTMENT TOPICALLY ONCE DAILY 11/20/19   Historical Provider, MD   polyethylene glycol (GLYCOLAX) 17 gram PwPk Take 17 g by mouth once daily. 1/10/20   Roberto Baldwin MD   progesterone (PROMETRIUM) 200 MG capsule TAKE 1 CAPSULE BY MOUTH ONCE DAILY IN THE EVENING FOR 12 DAYS SEQUENTIALLY PER 28 DAY CYCLE 10/24/19   Historical Provider, MD   sertraline (ZOLOFT) 25 MG tablet TAKE 1 TABLET EVERY DAY 5/26/20   Roberto Baldwin MD   simethicone (GAS RELIEF EXTRA STRENGTH ORAL)  12/12/18   Historical Provider, MD   sodium chloride 0.9% SolP 50 mL with diphenhydrAMINE 50 mg/mL Soln 50 mg Take 50 mg by mouth. 3/7/18   Historical Provider, MD   thyroid, pork, (ARMOUR THYROID) 60 mg Tab 1 TAB M-S.  Extra half tab Sunday. 9/11/18   Farzad Quiroz II, MD   topiramate (TOPAMAX) 50 MG tablet Take 1 tablet (50 mg total) by mouth once daily. week 1: take 1/2 tab qhsfrom week 2: take 1 tab qhs 3/1/16   Levon Franks MD   traZODone (DESYREL) 50 MG tablet Take 1 tablet (50 mg total) by mouth nightly as needed for Insomnia. 12/2/19   Roberto Baldwin MD   triamcinolone acetonide 0.1% (KENALOG) 0.1 % cream Apply topically 2 (two) times daily. for 7 days 3/1/20 3/8/20  Radha Key PA-C  "  metronidazole 1% (METROGEL) 1 % Gel Apply topically once daily. 12/22/15 1/10/20  Levon Franks MD   sertraline (ZOLOFT) 25 MG tablet Take 1 tablet (25 mg total) by mouth once daily. 3/31/20 5/26/20  Bonita López PA-C        Past medical history, surgical history, and family medical history reviewed and updated as appropriate.    Medications and allergies reviewed.     Objective:          Vitals:    05/26/20 1557   BP: 122/82   BP Location: Left arm   Patient Position: Sitting   BP Method: Medium (Manual)   Pulse: 65   SpO2: 98%   Weight: 73 kg (160 lb 15 oz)   Height: 5' 3" (1.6 m)     Body mass index is 28.51 kg/m².  Physical Exam   Constitutional: She is oriented to person, place, and time. No distress.   Pulmonary/Chest: Effort normal. No respiratory distress.   Abdominal: Soft.   Genitourinary:   Genitourinary Comments: Deferred by patient   Neurological: She is alert and oriented to person, place, and time.   Nursing note and vitals reviewed.      Lab Results   Component Value Date    WBC 3.60 (L) 11/17/2019    HGB 14.4 11/17/2019    HCT 42.7 11/17/2019     11/17/2019    CHOL 258 (H) 01/26/2018    TRIG 141 01/26/2018    HDL 68 01/26/2018    ALT 16 11/17/2019    AST 34 11/17/2019     11/17/2019    K 4.4 11/17/2019     11/17/2019    CREATININE 0.90 11/17/2019    BUN 16 11/17/2019    CO2 26 11/17/2019    TSH 0.824 01/04/2019    INR 1.1 12/27/2017    HGBA1C 5.3 03/01/2016       Assessment:       1. Vaginal discharge        Plan:   Chetna was seen today for vaginal discharge.    Diagnoses and all orders for this visit:    Vaginal discharge  Per chart review, pt is due to follow up with obgyn for further eval of endometrial hyperplasia and discussed with patient to address this issue as well. Physical exam deferred by patient today. Pt has seen multiple providers including outside facilities s/t this issue without resolution. Has shown me pictures on her phone but can't be sure of what I'm " even looking at. Discussed with pt to f/u with obgyn asap for further eval and pt verbalized understanding. No systemic issues today.           Follow up if symptoms worsen or fail to improve.    Roberto Baldwin MD  Family Medicine  [facility]  5/26/2020

## 2025-03-05 NOTE — ASSESSMENT & PLAN NOTE
Has history of paroxysmal A-fib currently on Lopressor 25 mg every 12 hourly, Eliquis 2.5 mg twice daily  Did not receive her morning dose  [id number] VASc score 5 and HAS-BLED score 4    Plan:  Continue lopressor 25 mg every 12 hourly  Continue Eliquis

## 2025-03-05 NOTE — ASSESSMENT & PLAN NOTE
HR=57 bpm, TLQN=758/69 mmhg, OsY8=480.0 %, Resp=12 B/min, EtCO2=31 mmHg, Apnea=3 Seconds, Pain=0, Leslee=10, Guerra=2 Lab Results   Component Value Date    HGBA1C 7.3 (H) 02/25/2025       Recent Labs     03/04/25  0709 03/04/25  1048 03/04/25  1602 03/04/25  2121   POCGLU 154* 148* 321* 327*       Blood Sugar Average: Last 72 hrs:  (P) [id number]  At home takes Novolin insulin 14 units in the morning and 8 units at night.  Recently dose increased because of his steroid intake  Initially she was on 8 units in the morning and 4 units at night  Also takes metformin, Januvia    Plan:  Currently receiving Novolin 12 units in the morning and 8 units at night  Continue sliding scale insulin  Holding metformin and Januvia  Monitor blood glucose with goal 140-180  Hypoglycemia protocol

## 2025-03-05 NOTE — ASSESSMENT & PLAN NOTE
Presented with two episodes of coughing up blood clots (quarter size amount), requiring more oxygen at baseline initially  Recently discharged from the hospital with oral steroid tapering for the treatment of exacerbation of ILD  On Eliquis 2.5 mg twice daily for A-fib  No further episodes of bright red blood clots    Plan:  Consulted pulmonology, appreciate recommendations.  Eliquis restarted yesterday.  Likely discharge today if no further episodes of hemoptysis  Continue to monitor symptoms closely

## 2025-03-05 NOTE — ASSESSMENT & PLAN NOTE
-Endorsed 2 episodes of coughing up blood at home in the morning prior to admission  -Currently on Eliquis 2.5 mg twice daily for A-fib,   -Recent ILD flare up  -WBC 12.81-7.04 (was on prednisone taper outpatient)  -Procalcitonin negative x 2  -Patient denies any new episodes of hemoptysis  -Eliquis restarted

## 2025-03-05 NOTE — DISCHARGE SUMMARY
Discharge Summary - Hospitalist   Name: Ac Duran 89 y.o. female I MRN: [id number]  Unit/Bed#: S -01 I Date of Admission: 3/2/2025   Date of Service: 3/5/2025 I Hospital Day: 3     Assessment & Plan  Hemoptysis  Presented with two episodes of coughing up blood clots (quarter size amount), requiring more oxygen at baseline initially. Now on room air  Recently discharged from the hospital with oral steroid tapering for the treatment of exacerbation of ILD  On Eliquis 2.5 mg twice daily for A-fib  No further episodes of bright red blood clots, hemodynamically stable    Plan:  Consulted pulmonology, appreciate recommendations.  Patient restarted Eliquis yesterday and did not have any additional episodes of bright red blood clots. Continue to monitor symptoms closely.  Stable for discharge, patient advised to seek medical attention if she develops further bright red clots.  Acute hypoxic respiratory failure (HCC)  Presented with oxygen saturation 77% at home. Recently discharged from hospital by being treated for dyspnea on exertion  CTA PE study is negative for PE.  Positive for chronic ILD with new diffuse groundglass haziness in lower lobe possibility of edema, hemorrhage. Chest x-ray shows moderate pulmonary fibrosis  Last echo in 2/25/2025 shows LVEF 55-60% with grade 1 diastolic dysfunction, mild to moderate aortic stenosis  CRP improved compared to earlier in the week  Weaned to room air morning of 3/3  Ambulatory pulse ox with nursing staff 3/3: O2 saturation on room air and with exercise 93%, she did drop down to 80's when she was sitting in chair after exercise but then it came right back up per nursing team. Her saturations were > 90 % when ambulating, per RT would not qualify for home O2 testing    Plan:  Continue patient's prior to admission prednisone dose  Monitored off antibiotics  Respiratory protocol  Paroxysmal atrial fibrillation (HCC)  Has history of paroxysmal A-fib currently on  Lopressor 25 mg every 12 hourly, Eliquis 2.5 mg twice daily  [id number] VASc score 5 and HAS-BLED score 4    Plan:  Continue lopressor 25 mg every 12 hours.  Patient advised to call her PCP if she develops lightheadedness or dizziness with this dose  Eliquis restarted  Interstitial lung disease (HCC)  Diagnosed with ILD in 2019  Currently is not on any immunosuppressant  Following with Dr. 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 the setting of pulmonary disease  Currently at baseline  At home takes salt tablet 1 g 3 times daily    Plan:  Continue salt tablet 1 g 3 times daily  Monitor BMP  DM2 (diabetes mellitus, type 2) ([facility])  Lab Results   Component Value Date    HGBA1C 7.3 (H) 02/25/2025       Recent Labs     03/04/25  1602 03/04/25  2121 03/05/25  0703 03/05/25  1009   POCGLU 321* 327* 176* 220*       Blood Sugar Average: Last 72 hrs:  (P) [id number]  Initially she was on Novolin 8 units in the morning and 4 units at night  Also takes metformin, Januvia    Plan:  Her insulin regimen has been increased in the setting of prednisone taper.  She is following closely with her PCP regarding titration and has her instructions written down.  We will continue the current titration which is set for 10 units in the morning and 6 units at night when patient is on prednisone 20 mg and 10 mg.  She can resume her 8 units in the morning and 4 units at night when prednisone therapy has finished.  Continue home metformin and Januvia  Patient reports that she has supplies at home and does not need any additional.  She is aware of the insulin titration.  Aortic stenosis  Recent echo shows mild to moderate aortic stenosis. Possibly contributing to dyspnea  Has a scheduled appointment with outpatient cardiology  SIRS (systemic  inflammatory response syndrome) (HCC)  Met SIRS criteria with tachypnea, elevated WBC  See management as per acute hypoxic respiratory failure, hemoptysis  Hypertension  Continue Lopressor 25 mg every 12 hourly  Iron deficiency anemia  History of iron deficiency anemia, not on any supplements due to GI side effects in the past  ROMERO (obstructive sleep apnea)  Continue CPAP  Mild protein-calorie malnutrition (HCC)  Malnutrition Findings:   Adult Malnutrition type: Acute illness (in the setting of chronic illness)  Adult Degree of Malnutrition: Malnutrition of mild degree  Malnutrition Characteristics: Fat loss, Muscle loss                  360 Statement: Mild malnutrition related to catabolic illness, inadequate oral intake as evidenced by loss of subcutaneous fat and muscle, lower BMI 18.88. Currently treated with oral supplementation.    BMI Findings:           There is no height or weight on file to calculate BMI.        Medical Problems       Resolved Problems  Date Reviewed: 2/26/2025   None       Discharging Physician / Practitioner: Nguyen Babb MD  PCP: ZANDER Swenson  Admission Date:   Admission Orders (From admission, onward)       Ordered        03/02/25 1221  INPATIENT ADMISSION  Once                          Discharge Date: 03/05/25    Consultations During Hospital Stay:  Pulmonology  PT/OT    Procedures Performed:   None    Significant Findings / Test Results:   CT chest PE study did not show any pulmonary embolism. Chronic interstitial lung disease with new diffuse groundglass haziness predominantly in the lower lobes. Differential includes edema, hemorrhage.   Chest x-ray showed moderate pulmonary fibrosis with no definite acute process  Pro-Wicho negative  Blood cultures showing no growth    Incidental Findings:   None     Test Results Pending at Discharge (will require follow up):   None     Outpatient Tests Requested:  None    Complications:  None    Reason for Admission: Hemoptysis, shortness of  breath     Hospital Course:   Ac Duran is a 89 y.o. female patient who originally presented to the hospital on 3/2/2025 due to episodes of scant hemoptysis and worsening oxygen requirement from baseline.  Pulmonary was consulted and recommended holding her home Eliquis and monitoring for further episodes to assess need for bronchoscopy.  Fortunately patient did not have any additional episodes of bright red blood clots.  CT imaging showed chronic interstitial lung disease with new diffuse groundglass haziness predominate in the lower lobes.  Patient was able to be weaned to room air which is her baseline on 3/3.  She remained on room air until discharge.  She performed a pulse ox ambulatory testing on 3/3 and was noted to have good oxygen saturations greater than 90% while she was ambulating.  She performed additional pulse ox ambulatory testing on 3/5 and was noted to have no significant worsening of oxygen saturations as well, her lowest value was 89%.  Patient will be continued on her home prednisone taper.  She will also be continued on her insulin regimen just titrated by her PCP, patient has instructions already written down.  She understands that she will take 10 units in the morning and 6 units at night while she is on the 20 mg and 10 mg prednisone.  She will go back to her 8 units in the morning and 4 units at night when she is off the prednisone therapy and she will let her PCP know if she has symptoms of hypoglycemia.  Patient reported that she has enough insulin and supplies at home.  Patient was started on Lopressor 25 mg twice daily, increased from what she took previously and did well on that dosage so we will discharge her home on the 25 mg twice daily and encourage patient to take her blood pressures and let her PCP know if she gets lightheaded or dizzy.  PT/OT recommended level 3.      Please see above list of diagnoses and related plan for additional information.     Condition at  Discharge: good    Discharge Day Visit / Exam:   Subjective:  Today patient denies any worsening shortness of breath, further episodes of bright red blood clots, chest pain, lightheadedness.  She was able to walk around for ambulatory testing with her pulse ox not going below 89%. She does have shortness of breath on exertion. Otherwise, she feels well and is amenable to go home.  She is tolerating p.o. intake well.  Vitals: Blood Pressure: 154/74 (03/05/25 0704)  Pulse: 71 (03/05/25 0704)  Temperature: 98.4 °F (36.9 °C) (03/05/25 0704)  Temp Source: Oral (03/02/25 1543)  Respirations: 18 (03/05/25 0704)  SpO2: (!) 89 % (03/05/25 1001)  Physical Exam  Vitals reviewed.   Constitutional:       Appearance: She is not diaphoretic.   HENT:      Head: Normocephalic and atraumatic.      Nose: No congestion.   Cardiovascular:      Rate and Rhythm: Normal rate and regular rhythm.   Pulmonary:      Effort: Pulmonary effort is normal.      Comments: Mild bibasilar crackles  Abdominal:      Palpations: Abdomen is soft.      Tenderness: There is no abdominal tenderness. There is no guarding or rebound.   Musculoskeletal:      Right lower leg: No edema.      Left lower leg: No edema.   Skin:     General: Skin is warm and dry.   Neurological:      Mental Status: She is alert. Mental status is at baseline.   Psychiatric:         Mood and Affect: Mood normal.         Behavior: Behavior normal.         Thought Content: Thought content normal.          Discussion with Family: Updated  (son) via phone.    Discharge instructions/Information to patient and family:   See after visit summary for information provided to patient and family.      Provisions for Follow-Up Care:  See after visit summary for information related to follow-up care and any pertinent home health orders.      Mobility at time of Discharge:   Basic Mobility Inpatient Raw Score: 20  JH-HLM Goal: 6: Walk 10 steps or more  JH-HLM Achieved: 6: Walk 10 steps  or more  HLM Goal achieved. Continue to encourage appropriate mobility.     Disposition:   Home    Planned Readmission: No    Discharge Medications:  See after visit summary for reconciled discharge medications provided to patient and/or family.      Administrative Statements   Discharge Statement:  I have spent a total time of 25 minutes in caring for this patient on the day of the visit/encounter. .    **Please Note: This note may have been constructed using a voice recognition system**

## 2025-03-06 ENCOUNTER — PATIENT OUTREACH (OUTPATIENT)
Dept: CASE MANAGEMENT | Facility: OTHER | Age: 89
End: 2025-03-06

## 2025-03-06 ENCOUNTER — TRANSITIONAL CARE MANAGEMENT (OUTPATIENT)
Dept: FAMILY MEDICINE CLINIC | Facility: CLINIC | Age: 89
End: 2025-03-06

## 2025-03-06 ENCOUNTER — TELEPHONE (OUTPATIENT)
Age: 89
End: 2025-03-06

## 2025-03-06 DIAGNOSIS — F41.9 ANXIETY: ICD-10-CM

## 2025-03-06 DIAGNOSIS — Z79.4 TYPE 2 DIABETES MELLITUS WITH RETINOPATHY, WITH LONG-TERM CURRENT USE OF INSULIN, MACULAR EDEMA PRESENCE UNSPECIFIED, UNSPECIFIED LATERALITY, UNSPECIFIED RETINOPATHY SEVERITY (HCC): ICD-10-CM

## 2025-03-06 DIAGNOSIS — E11.319 TYPE 2 DIABETES MELLITUS WITH RETINOPATHY, WITH LONG-TERM CURRENT USE OF INSULIN, MACULAR EDEMA PRESENCE UNSPECIFIED, UNSPECIFIED LATERALITY, UNSPECIFIED RETINOPATHY SEVERITY (HCC): ICD-10-CM

## 2025-03-06 DIAGNOSIS — I48.0 PAROXYSMAL ATRIAL FIBRILLATION (HCC): ICD-10-CM

## 2025-03-06 NOTE — TELEPHONE ENCOUNTER
Pt called to see if she could have wheelchair assistance for her appointment tomorrow at 9am.  Pt is not able to walk far.    Pt requests a call back to confirm at [phone]

## 2025-03-06 NOTE — TELEPHONE ENCOUNTER
Ac looking for an update on her 2 rxs that will no longer be covered by her ins; stated it was the HumuLIN and the VYTORIN.    Advised PA was submitted/denied for the HumuLIN and alt that rx options have been received and will be reviewed by Kimberly. There doesn't seem to be an update on the VYTORIN however.    Please contact to advise of next steps.

## 2025-03-06 NOTE — PROGRESS NOTES
Outpatient Care Management Note:    Discharge ADT alert received. Patient was hospitalized from 3/2-3/5/25 with hemoptysis and worsening oxygen requirements.  She presented after having 2 episodes of coughing up blood clots with oxygen sats around 77%. She was placed on oxygen but eventually weaned to room air. She will continue her pre admission prednisone taper along with adjusted insulin dosing. She is to be followed by Anitra WAYNE. She is to follow up with her PCP on 3/10.     CM called Ac. She states that she is struggling with dyspnea with exertion. Any minimal activity leads to difficulty breathing and heart pounding. While on the phone, she walked with her pulse ox on and after a few minutes, she started experiencing symptoms.  Her pulse ox read 96% and Heart Rate=63. She was noticeably short of breath talking on the phone. She layed down and her pulse ox=94% with HR=78. After a few minutes, her breathing improved.     Ac states she still has a cough and lot of mucous. It is not discolored. She is not bringing up any blood. BENNIE reviewed that she should seek emergency care if she has any further noted bleeding.     Ac stated that this morning she woke up at 5 am and her chest ached. It did not radiate anywhere and she did not have shortness of breath. The symptoms resolved on their own. Ac will call 911 if she experiences any further chest pain, pressure, worsening breathing or dropping pulse ox.Ac canceled her cardiology appt for today. IT was rescheduled to 3/17. She is scheduled to see her PCP tomorrow and her son will be transporting her.     Ac is to be followed by Anitra. She has not been outreached yet. CM will attempt to follow up.     BENNIE reviewed that she still needs to schedule with pulmonary also. Her next scheduled appt is not until 10/31.      CM called [facility]. They have the referral and will be calling Ac to schedule.            Abdominal Pain, N/V/D

## 2025-03-07 ENCOUNTER — OFFICE VISIT (OUTPATIENT)
Dept: FAMILY MEDICINE CLINIC | Facility: CLINIC | Age: 89
End: 2025-03-07
Payer: MEDICARE

## 2025-03-07 ENCOUNTER — TELEPHONE (OUTPATIENT)
Age: 89
End: 2025-03-07

## 2025-03-07 ENCOUNTER — APPOINTMENT (EMERGENCY)
Dept: RADIOLOGY | Facility: HOSPITAL | Age: 89
DRG: 196 | End: 2025-03-07
Payer: MEDICARE

## 2025-03-07 ENCOUNTER — APPOINTMENT (OUTPATIENT)
Dept: LAB | Facility: CLINIC | Age: 89
DRG: 196 | End: 2025-03-07
Payer: MEDICARE

## 2025-03-07 ENCOUNTER — HOSPITAL ENCOUNTER (INPATIENT)
Facility: HOSPITAL | Age: 89
LOS: 4 days | Discharge: NURSING, REHAB OR LONG-TERM CARE | DRG: 196 | End: 2025-03-11
Attending: EMERGENCY MEDICINE | Admitting: FAMILY MEDICINE
Payer: MEDICARE

## 2025-03-07 VITALS
OXYGEN SATURATION: 96 % | SYSTOLIC BLOOD PRESSURE: 116 MMHG | HEART RATE: 72 BPM | WEIGHT: 118 LBS | TEMPERATURE: 97.5 F | DIASTOLIC BLOOD PRESSURE: 60 MMHG | BODY MASS INDEX: 18.96 KG/M2 | RESPIRATION RATE: 16 BRPM | HEIGHT: 66 IN

## 2025-03-07 DIAGNOSIS — G47.00 INSOMNIA, UNSPECIFIED TYPE: ICD-10-CM

## 2025-03-07 DIAGNOSIS — F41.9 ANXIETY: ICD-10-CM

## 2025-03-07 DIAGNOSIS — I10 ESSENTIAL HYPERTENSION: ICD-10-CM

## 2025-03-07 DIAGNOSIS — D50.9 IRON DEFICIENCY ANEMIA, UNSPECIFIED IRON DEFICIENCY ANEMIA TYPE: ICD-10-CM

## 2025-03-07 DIAGNOSIS — I48.0 PAROXYSMAL ATRIAL FIBRILLATION (HCC): ICD-10-CM

## 2025-03-07 DIAGNOSIS — I35.0 AORTIC VALVE STENOSIS, ETIOLOGY OF CARDIAC VALVE DISEASE UNSPECIFIED: ICD-10-CM

## 2025-03-07 DIAGNOSIS — E78.5 TYPE 2 DIABETES MELLITUS WITH HYPERLIPIDEMIA  (HCC): ICD-10-CM

## 2025-03-07 DIAGNOSIS — R06.02 SHORTNESS OF BREATH: ICD-10-CM

## 2025-03-07 DIAGNOSIS — I10 PRIMARY HYPERTENSION: ICD-10-CM

## 2025-03-07 DIAGNOSIS — R09.02 HYPOXIA: Primary | ICD-10-CM

## 2025-03-07 DIAGNOSIS — E11.319 TYPE 2 DIABETES MELLITUS WITH RETINOPATHY, WITH LONG-TERM CURRENT USE OF INSULIN, MACULAR EDEMA PRESENCE UNSPECIFIED, UNSPECIFIED LATERALITY, UNSPECIFIED RETINOPATHY SEVERITY (HCC): ICD-10-CM

## 2025-03-07 DIAGNOSIS — E11.42 TYPE 2 DIABETES MELLITUS WITH DIABETIC POLYNEUROPATHY, WITH LONG-TERM CURRENT USE OF INSULIN (HCC): ICD-10-CM

## 2025-03-07 DIAGNOSIS — R06.09 DYSPNEA ON EXERTION: ICD-10-CM

## 2025-03-07 DIAGNOSIS — E44.1 MILD PROTEIN-CALORIE MALNUTRITION (HCC): ICD-10-CM

## 2025-03-07 DIAGNOSIS — Z79.4 TYPE 2 DIABETES MELLITUS WITH DIABETIC POLYNEUROPATHY, WITH LONG-TERM CURRENT USE OF INSULIN (HCC): ICD-10-CM

## 2025-03-07 DIAGNOSIS — E78.2 MIXED HYPERLIPIDEMIA: ICD-10-CM

## 2025-03-07 DIAGNOSIS — R04.2 HEMOPTYSIS: ICD-10-CM

## 2025-03-07 DIAGNOSIS — Z79.4 TYPE 2 DIABETES MELLITUS WITH RETINOPATHY, WITH LONG-TERM CURRENT USE OF INSULIN, MACULAR EDEMA PRESENCE UNSPECIFIED, UNSPECIFIED LATERALITY, UNSPECIFIED RETINOPATHY SEVERITY (HCC): ICD-10-CM

## 2025-03-07 DIAGNOSIS — E87.1 HYPONATREMIA: ICD-10-CM

## 2025-03-07 DIAGNOSIS — R06.02 SOB (SHORTNESS OF BREATH): Primary | ICD-10-CM

## 2025-03-07 DIAGNOSIS — E11.69 TYPE 2 DIABETES MELLITUS WITH HYPERLIPIDEMIA  (HCC): ICD-10-CM

## 2025-03-07 DIAGNOSIS — R06.09 DYSPNEA ON EXERTION: Primary | ICD-10-CM

## 2025-03-07 DIAGNOSIS — R26.2 AMBULATORY DYSFUNCTION: ICD-10-CM

## 2025-03-07 DIAGNOSIS — J84.9 INTERSTITIAL LUNG DISEASE (HCC): ICD-10-CM

## 2025-03-07 PROBLEM — E11.3552: Status: ACTIVE | Noted: 2025-03-07

## 2025-03-07 PROBLEM — E11.3552: Status: RESOLVED | Noted: 2025-03-07 | Resolved: 2025-03-07

## 2025-03-07 LAB
2HR DELTA HS TROPONIN: -3 NG/L
ALBUMIN SERPL BCG-MCNC: 3.8 G/DL (ref 3.5–5)
ALBUMIN SERPL BCG-MCNC: 4 G/DL (ref 3.5–5)
ALP SERPL-CCNC: 50 U/L (ref 34–104)
ALP SERPL-CCNC: 58 U/L (ref 34–104)
ALT SERPL W P-5'-P-CCNC: 23 U/L (ref 7–52)
ALT SERPL W P-5'-P-CCNC: 24 U/L (ref 7–52)
ANION GAP SERPL CALCULATED.3IONS-SCNC: 8 MMOL/L (ref 4–13)
ANION GAP SERPL CALCULATED.3IONS-SCNC: 8 MMOL/L (ref 4–13)
AST SERPL W P-5'-P-CCNC: 25 U/L (ref 13–39)
AST SERPL W P-5'-P-CCNC: 29 U/L (ref 13–39)
BACTERIA BLD CULT: NORMAL
BACTERIA BLD CULT: NORMAL
BASOPHILS # BLD AUTO: 0.01 THOUSANDS/ÂΜL (ref 0–0.1)
BASOPHILS NFR BLD AUTO: 0 % (ref 0–1)
BILIRUB SERPL-MCNC: 0.76 MG/DL (ref 0.2–1)
BILIRUB SERPL-MCNC: 0.8 MG/DL (ref 0.2–1)
BUN SERPL-MCNC: 15 MG/DL (ref 5–25)
BUN SERPL-MCNC: 18 MG/DL (ref 5–25)
CALCIUM SERPL-MCNC: 8.6 MG/DL (ref 8.4–10.2)
CALCIUM SERPL-MCNC: 9.2 MG/DL (ref 8.4–10.2)
CARDIAC TROPONIN I PNL SERPL HS: 11 NG/L (ref ?–50)
CARDIAC TROPONIN I PNL SERPL HS: 14 NG/L (ref ?–50)
CHLORIDE SERPL-SCNC: 92 MMOL/L (ref 96–108)
CHLORIDE SERPL-SCNC: 93 MMOL/L (ref 96–108)
CHOLEST SERPL-MCNC: 163 MG/DL (ref ?–200)
CO2 SERPL-SCNC: 29 MMOL/L (ref 21–32)
CO2 SERPL-SCNC: 32 MMOL/L (ref 21–32)
CREAT SERPL-MCNC: 0.52 MG/DL (ref 0.6–1.3)
CREAT SERPL-MCNC: 0.58 MG/DL (ref 0.6–1.3)
EOSINOPHIL # BLD AUTO: 0.01 THOUSAND/ÂΜL (ref 0–0.61)
EOSINOPHIL NFR BLD AUTO: 0 % (ref 0–6)
ERYTHROCYTE [DISTWIDTH] IN BLOOD BY AUTOMATED COUNT: 16.4 % (ref 11.6–15.1)
EST. AVERAGE GLUCOSE BLD GHB EST-MCNC: 169 MG/DL
FERRITIN SERPL-MCNC: 140 NG/ML (ref 11–307)
GFR SERPL CREATININE-BSD FRML MDRD: 83 ML/MIN/1.73SQ M
GFR SERPL CREATININE-BSD FRML MDRD: 86 ML/MIN/1.73SQ M
GLUCOSE SERPL-MCNC: 113 MG/DL (ref 65–140)
GLUCOSE SERPL-MCNC: 186 MG/DL (ref 65–140)
GLUCOSE SERPL-MCNC: 200 MG/DL (ref 65–140)
GLUCOSE SERPL-MCNC: 205 MG/DL (ref 65–140)
GLUCOSE SERPL-MCNC: 96 MG/DL (ref 65–140)
HBA1C MFR BLD: 7.5 %
HCT VFR BLD AUTO: 38.2 % (ref 34.8–46.1)
HDLC SERPL-MCNC: 75 MG/DL
HGB BLD-MCNC: 11.6 G/DL (ref 11.5–15.4)
IMM GRANULOCYTES # BLD AUTO: 0.06 THOUSAND/UL (ref 0–0.2)
IMM GRANULOCYTES NFR BLD AUTO: 1 % (ref 0–2)
IRON SATN MFR SERPL: 14 % (ref 15–50)
IRON SERPL-MCNC: 48 UG/DL (ref 50–212)
LDLC SERPL CALC-MCNC: 69 MG/DL (ref 0–100)
LYMPHOCYTES # BLD AUTO: 0.4 THOUSANDS/ÂΜL (ref 0.6–4.47)
LYMPHOCYTES NFR BLD AUTO: 4 % (ref 14–44)
MAGNESIUM SERPL-MCNC: 1.7 MG/DL (ref 1.9–2.7)
MCH RBC QN AUTO: 24.7 PG (ref 26.8–34.3)
MCHC RBC AUTO-ENTMCNC: 30.4 G/DL (ref 31.4–37.4)
MCV RBC AUTO: 81 FL (ref 82–98)
MONOCYTES # BLD AUTO: 0.21 THOUSAND/ÂΜL (ref 0.17–1.22)
MONOCYTES NFR BLD AUTO: 2 % (ref 4–12)
NEUTROPHILS # BLD AUTO: 8.79 THOUSANDS/ÂΜL (ref 1.85–7.62)
NEUTS SEG NFR BLD AUTO: 93 % (ref 43–75)
NONHDLC SERPL-MCNC: 88 MG/DL
NRBC BLD AUTO-RTO: 0 /100 WBCS
PLATELET # BLD AUTO: 228 THOUSANDS/UL (ref 149–390)
PMV BLD AUTO: 9.8 FL (ref 8.9–12.7)
POTASSIUM SERPL-SCNC: 4.4 MMOL/L (ref 3.5–5.3)
POTASSIUM SERPL-SCNC: 4.6 MMOL/L (ref 3.5–5.3)
PROT SERPL-MCNC: 7 G/DL (ref 6.4–8.4)
PROT SERPL-MCNC: 7.3 G/DL (ref 6.4–8.4)
RBC # BLD AUTO: 4.7 MILLION/UL (ref 3.81–5.12)
SODIUM SERPL-SCNC: 130 MMOL/L (ref 135–147)
SODIUM SERPL-SCNC: 132 MMOL/L (ref 135–147)
TIBC SERPL-MCNC: 333.2 UG/DL (ref 250–450)
TRANSFERRIN SERPL-MCNC: 238 MG/DL (ref 203–362)
TRIGL SERPL-MCNC: 94 MG/DL (ref ?–150)
TSH SERPL DL<=0.05 MIU/L-ACNC: 2.67 UIU/ML (ref 0.45–4.5)
UIBC SERPL-MCNC: 285 UG/DL (ref 155–355)
WBC # BLD AUTO: 9.48 THOUSAND/UL (ref 4.31–10.16)

## 2025-03-07 PROCEDURE — 84443 ASSAY THYROID STIM HORMONE: CPT

## 2025-03-07 PROCEDURE — 94760 N-INVAS EAR/PLS OXIMETRY 1: CPT

## 2025-03-07 PROCEDURE — 99285 EMERGENCY DEPT VISIT HI MDM: CPT

## 2025-03-07 PROCEDURE — 80053 COMPREHEN METABOLIC PANEL: CPT

## 2025-03-07 PROCEDURE — 36415 COLL VENOUS BLD VENIPUNCTURE: CPT

## 2025-03-07 PROCEDURE — 83540 ASSAY OF IRON: CPT

## 2025-03-07 PROCEDURE — 94664 DEMO&/EVAL PT USE INHALER: CPT

## 2025-03-07 PROCEDURE — 99285 EMERGENCY DEPT VISIT HI MDM: CPT | Performed by: EMERGENCY MEDICINE

## 2025-03-07 PROCEDURE — 80061 LIPID PANEL: CPT

## 2025-03-07 PROCEDURE — 83735 ASSAY OF MAGNESIUM: CPT

## 2025-03-07 PROCEDURE — 82948 REAGENT STRIP/BLOOD GLUCOSE: CPT

## 2025-03-07 PROCEDURE — 83036 HEMOGLOBIN GLYCOSYLATED A1C: CPT

## 2025-03-07 PROCEDURE — 83550 IRON BINDING TEST: CPT

## 2025-03-07 PROCEDURE — 99223 1ST HOSP IP/OBS HIGH 75: CPT | Performed by: INTERNAL MEDICINE

## 2025-03-07 PROCEDURE — 85025 COMPLETE CBC W/AUTO DIFF WBC: CPT

## 2025-03-07 PROCEDURE — 82728 ASSAY OF FERRITIN: CPT

## 2025-03-07 PROCEDURE — 99495 TRANSJ CARE MGMT MOD F2F 14D: CPT | Performed by: NURSE PRACTITIONER

## 2025-03-07 PROCEDURE — 71046 X-RAY EXAM CHEST 2 VIEWS: CPT

## 2025-03-07 PROCEDURE — 93005 ELECTROCARDIOGRAM TRACING: CPT

## 2025-03-07 PROCEDURE — 84484 ASSAY OF TROPONIN QUANT: CPT

## 2025-03-07 RX ORDER — SITAGLIPTIN 100 MG/1
100 TABLET, FILM COATED ORAL DAILY
Qty: 30 TABLET | OUTPATIENT
Start: 2025-03-07

## 2025-03-07 RX ORDER — PANTOPRAZOLE SODIUM 40 MG/1
40 TABLET, DELAYED RELEASE ORAL 2 TIMES DAILY
Status: DISCONTINUED | OUTPATIENT
Start: 2025-03-07 | End: 2025-03-11 | Stop reason: HOSPADM

## 2025-03-07 RX ORDER — INSULIN LISPRO 100 [IU]/ML
1-5 INJECTION, SOLUTION INTRAVENOUS; SUBCUTANEOUS
Status: DISCONTINUED | OUTPATIENT
Start: 2025-03-07 | End: 2025-03-11 | Stop reason: HOSPADM

## 2025-03-07 RX ORDER — INSULIN LISPRO 100 [IU]/ML
4 INJECTION, SOLUTION INTRAVENOUS; SUBCUTANEOUS
Status: DISCONTINUED | OUTPATIENT
Start: 2025-03-07 | End: 2025-03-11 | Stop reason: HOSPADM

## 2025-03-07 RX ORDER — ONDANSETRON 2 MG/ML
4 INJECTION INTRAMUSCULAR; INTRAVENOUS EVERY 6 HOURS PRN
Status: DISCONTINUED | OUTPATIENT
Start: 2025-03-07 | End: 2025-03-11 | Stop reason: HOSPADM

## 2025-03-07 RX ORDER — INSULIN LISPRO 100 [IU]/ML
8 INJECTION, SOLUTION INTRAVENOUS; SUBCUTANEOUS
Status: DISCONTINUED | OUTPATIENT
Start: 2025-03-08 | End: 2025-03-11 | Stop reason: HOSPADM

## 2025-03-07 RX ORDER — PREDNISONE 10 MG/1
10 TABLET ORAL DAILY
Status: DISCONTINUED | OUTPATIENT
Start: 2025-03-11 | End: 2025-03-11 | Stop reason: HOSPADM

## 2025-03-07 RX ORDER — APIXABAN 2.5 MG/1
TABLET, FILM COATED ORAL
Qty: 60 TABLET | OUTPATIENT
Start: 2025-03-07

## 2025-03-07 RX ORDER — IPRATROPIUM BROMIDE 21 UG/1
2 SPRAY, METERED NASAL EVERY 12 HOURS
Status: DISCONTINUED | OUTPATIENT
Start: 2025-03-07 | End: 2025-03-07

## 2025-03-07 RX ORDER — LOPERAMIDE HYDROCHLORIDE 2 MG/1
2 CAPSULE ORAL ONCE
Status: COMPLETED | OUTPATIENT
Start: 2025-03-07 | End: 2025-03-07

## 2025-03-07 RX ORDER — METOPROLOL TARTRATE 25 MG/1
25 TABLET, FILM COATED ORAL EVERY 12 HOURS SCHEDULED
Status: DISCONTINUED | OUTPATIENT
Start: 2025-03-07 | End: 2025-03-11 | Stop reason: HOSPADM

## 2025-03-07 RX ORDER — EZETIMIBE 10 MG/1
10 TABLET ORAL
Status: DISCONTINUED | OUTPATIENT
Start: 2025-03-07 | End: 2025-03-11 | Stop reason: HOSPADM

## 2025-03-07 RX ORDER — PREDNISONE 20 MG/1
20 TABLET ORAL DAILY
Status: COMPLETED | OUTPATIENT
Start: 2025-03-08 | End: 2025-03-10

## 2025-03-07 RX ORDER — BRIMONIDINE TARTRATE 2 MG/ML
1 SOLUTION/ DROPS OPHTHALMIC 2 TIMES DAILY
Status: DISCONTINUED | OUTPATIENT
Start: 2025-03-07 | End: 2025-03-11 | Stop reason: HOSPADM

## 2025-03-07 RX ORDER — MIRTAZAPINE 15 MG/1
7.5 TABLET, FILM COATED ORAL
Status: DISCONTINUED | OUTPATIENT
Start: 2025-03-07 | End: 2025-03-11 | Stop reason: HOSPADM

## 2025-03-07 RX ORDER — LORAZEPAM 0.5 MG/1
0.5 TABLET ORAL 2 TIMES DAILY PRN
Status: DISCONTINUED | OUTPATIENT
Start: 2025-03-07 | End: 2025-03-11 | Stop reason: HOSPADM

## 2025-03-07 RX ORDER — LORAZEPAM 0.5 MG/1
0.5 TABLET ORAL ONCE
Status: COMPLETED | OUTPATIENT
Start: 2025-03-07 | End: 2025-03-07

## 2025-03-07 RX ORDER — LEVALBUTEROL INHALATION SOLUTION 0.63 MG/3ML
0.63 SOLUTION RESPIRATORY (INHALATION)
Status: DISCONTINUED | OUTPATIENT
Start: 2025-03-07 | End: 2025-03-07

## 2025-03-07 RX ORDER — SODIUM CHLORIDE 1 G/1
1 TABLET ORAL 3 TIMES DAILY
Status: DISCONTINUED | OUTPATIENT
Start: 2025-03-07 | End: 2025-03-11 | Stop reason: HOSPADM

## 2025-03-07 RX ORDER — PRAVASTATIN SODIUM 80 MG/1
80 TABLET ORAL
Status: DISCONTINUED | OUTPATIENT
Start: 2025-03-07 | End: 2025-03-11 | Stop reason: HOSPADM

## 2025-03-07 RX ORDER — BIMATOPROST 0.3 MG/ML
1 SOLUTION/ DROPS OPHTHALMIC
Status: DISCONTINUED | OUTPATIENT
Start: 2025-03-07 | End: 2025-03-11 | Stop reason: HOSPADM

## 2025-03-07 RX ORDER — FAMOTIDINE 20 MG/1
20 TABLET, FILM COATED ORAL 2 TIMES DAILY
Status: DISCONTINUED | OUTPATIENT
Start: 2025-03-07 | End: 2025-03-11 | Stop reason: HOSPADM

## 2025-03-07 RX ORDER — ACETAMINOPHEN 325 MG/1
650 TABLET ORAL EVERY 4 HOURS PRN
Status: DISCONTINUED | OUTPATIENT
Start: 2025-03-07 | End: 2025-03-11 | Stop reason: HOSPADM

## 2025-03-07 RX ADMIN — LORAZEPAM 0.5 MG: 0.5 TABLET ORAL at 16:26

## 2025-03-07 RX ADMIN — METOPROLOL TARTRATE 25 MG: 25 TABLET, FILM COATED ORAL at 21:05

## 2025-03-07 RX ADMIN — PRAVASTATIN SODIUM 80 MG: 80 TABLET ORAL at 21:05

## 2025-03-07 RX ADMIN — ACETAMINOPHEN 650 MG: 325 TABLET, FILM COATED ORAL at 22:06

## 2025-03-07 RX ADMIN — LOPERAMIDE HYDROCHLORIDE 2 MG: 2 CAPSULE ORAL at 16:20

## 2025-03-07 RX ADMIN — INSULIN LISPRO 1 UNITS: 100 INJECTION, SOLUTION INTRAVENOUS; SUBCUTANEOUS at 17:40

## 2025-03-07 RX ADMIN — EZETIMIBE 10 MG: 10 TABLET ORAL at 18:14

## 2025-03-07 RX ADMIN — APIXABAN 2.5 MG: 2.5 TABLET, FILM COATED ORAL at 18:14

## 2025-03-07 RX ADMIN — BIMATOPROST 1 DROP: 0.3 SOLUTION/ DROPS OPHTHALMIC at 21:59

## 2025-03-07 RX ADMIN — LORAZEPAM 0.5 MG: 0.5 TABLET ORAL at 22:04

## 2025-03-07 RX ADMIN — PANTOPRAZOLE SODIUM 40 MG: 40 TABLET, DELAYED RELEASE ORAL at 21:05

## 2025-03-07 RX ADMIN — INSULIN LISPRO 4 UNITS: 100 INJECTION, SOLUTION INTRAVENOUS; SUBCUTANEOUS at 17:41

## 2025-03-07 RX ADMIN — CYANOCOBALAMIN TAB 500 MCG 500 MCG: 500 TAB at 18:14

## 2025-03-07 RX ADMIN — MIRTAZAPINE 7.5 MG: 15 TABLET, FILM COATED ORAL at 22:04

## 2025-03-07 RX ADMIN — FAMOTIDINE 20 MG: 20 TABLET, FILM COATED ORAL at 18:14

## 2025-03-07 RX ADMIN — BRIMONIDINE TARTRATE 1 DROP: 2 SOLUTION/ DROPS OPHTHALMIC at 21:59

## 2025-03-07 RX ADMIN — INSULIN LISPRO 1 UNITS: 100 INJECTION, SOLUTION INTRAVENOUS; SUBCUTANEOUS at 21:59

## 2025-03-07 RX ADMIN — SODIUM CHLORIDE 1 G: 1 TABLET ORAL at 21:05

## 2025-03-07 NOTE — ASSESSMENT & PLAN NOTE
Patient was sent to the hospital by her PCP due to progressive dyspnea on exertion  Underlying history of interstitial lung disease, ROMERO  Recently hospitalized twice for dyspnea on exertion and hemoptysis  Currently on tapered dose of prednisone  However patient with limited activity due to progressive severe dyspnea on exertion  Recent cardiac echo with mild to moderate AS and EF 55 to 60%  Recent chest CTA with Chronic interstitial lung disease with new diffuse groundglass haziness predominantly in the lower lobes   Current chest x-ray with moderate pulmonary fibrosis, no acute abnormality  Patient is afebrile without leukocytosis, no chest pain  Negative troponin, EKG without acute ischemic changes    Suspect symptoms secondary to underlying severe interstitial lung disease  Admit patient to the hospital  Consult pulmonology for further management  Continue with taper dose of prednisone  Check anemia panel and TSH  PT OT eval

## 2025-03-07 NOTE — ASSESSMENT & PLAN NOTE
Malnutrition Findings:           BMI Findings:           There is no height or weight on file to calculate BMI.   Nutrition consult

## 2025-03-07 NOTE — ASSESSMENT & PLAN NOTE
Anticoagulated on Eliquis.  Rate controlled on metoprolol.  Continue follow-up with Cardiology, Dr. Ramsey.

## 2025-03-07 NOTE — RESPIRATORY THERAPY NOTE
RT Protocol Note  Ac Duran 89 y.o. female MRN: [id number]  Unit/Bed#: ED 19 Encounter: [id number]    Assessment    Principal Problem:    Dyspnea on exertion  Active Problems:    Essential hypertension    Glaucoma    Paroxysmal atrial fibrillation (HCC)    GERD (gastroesophageal reflux disease)    Interstitial lung disease (HCC)    Moderate protein-calorie malnutrition (HCC)    Hyponatremia    DM2 (diabetes mellitus, type 2) (HCC)    ROMERO (obstructive sleep apnea)    Aortic stenosis      Home Pulmonary Medications:  None   Home Devices/Therapy: BiPAP/CPAP    Past Medical History:   Diagnosis Date    Common bile duct dilatation 2020    Glaucoma     H/O degenerative disc disease     Idiopathic pulmonary fibrosis (HCC) 2020    Irregular heart beat     afib    Peripheral neuropathy     S/P laparoscopic cholecystectomy 2020    Stenosis of right subclavian artery (HCC) 2016     Social History     Socioeconomic History    Marital status:      Spouse name: None    Number of children: None    Years of education: None    Highest education level: None   Occupational History    Occupation: customer service   retired   Tobacco Use    Smoking status: Former     Current packs/day: 0.00     Average packs/day: 1.5 packs/day for 38.0 years (57.0 ttl pk-yrs)     Types: Cigarettes     Start date:      Quit date:      Years since quittin.2    Smokeless tobacco: Never   Vaping Use    Vaping status: Never Used   Substance and Sexual Activity    Alcohol use: No    Drug use: No    Sexual activity: None   Other Topics Concern    None   Social History Narrative    Lives alone [facility] [place].    Was divorce.  Ex- passed away.    3 children.  Four grandchildren.    Spends most of her time at home.  Does not drive.     Social Drivers of Health     Financial Resource Strain: Low Risk  (2023)    Overall Financial Resource Strain (CARDIA)     Difficulty of Paying Living Expenses: Not  very hard   Food Insecurity: No Food Insecurity (3/2/2025)    Nursing - Inadequate Food Risk Classification     Worried About Running Out of Food in the Last Year: Never true     Ran Out of Food in the Last Year: Never true     Ran Out of Food in the Last Year: Never true   Transportation Needs: No Transportation Needs (3/2/2025)    Nursing - Transportation Risk Classification     Lack of Transportation: Not on file     Lack of Transportation: No   Physical Activity: Not on file   Stress: Not on file   Social Connections: Not on file   Intimate Partner Violence: Unknown (3/2/2025)    Nursing IPS     Feels Physically and Emotionally Safe: Not on file     Physically Hurt by Someone: Not on file     Humiliated or Emotionally Abused by Someone: Not on file     Physically Hurt by Someone: No     Hurt or Threatened by Someone: No   Housing Stability: Unknown (3/2/2025)    Nursing: Inadequate Housing Risk Classification     Has Housing: Not on file     Worried About Losing Housing: Not on file     Unable to Get Utilities: Not on file     Unable to Pay for Housing in the Last Year: No     Has Housin       Subjective         Objective    Physical Exam:   Assessment Type: Assess only  General Appearance: Awake, Alert  Respiratory Pattern: Normal  Chest Assessment: Chest expansion symmetrical  Bilateral Breath Sounds: Diminished  Cough: Dry, Strong  O2 Device: RA    Vitals:  Blood pressure 156/70, pulse 74, temperature 97.6 °F (36.4 °C), temperature source Temporal, resp. rate 20, SpO2 95%.          Imaging and other studies: Results Review Statement: I reviewed radiology reports from this admission including: chest xray.    O2 Device: RA     Plan    Respiratory Plan: No distress/Pulmonary history        Resp Comments: (P) Pt presented to ED with CC of dyspnea. Pt says she is concerned about her ability to care for herself at home, that she is unable to perform tasks such as walking to the bathroom or making a sandwich  "without needing to \"sit down and get my breath.\" Pt has pmhx of pulmonary fibrosis, former smoker quit in 1994, no home bronchodilators, no home o2, wears HS CPAP and is compliant. Pt does not want to wear CPAP while in hospital at this time. CXR shows moderate pulmonary fibrosis and no acute pulmonary disease. Pt is speaking in full sentences, bbs diminished, dry non-productive cough. D/C'd ordered TID udn, not indicated,  will cont to monitor pt per RCP and will order bronchodilator tx if pt requires.   "

## 2025-03-07 NOTE — ASSESSMENT & PLAN NOTE
Sodium stable at 133.   Continue sodium tablets 1 gram three times daily.   Orders:  •  Basic metabolic panel; Future

## 2025-03-07 NOTE — ASSESSMENT & PLAN NOTE
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

## 2025-03-07 NOTE — ASSESSMENT & PLAN NOTE
CHELITA, low hemoglobin could be a contributor to dyspnea, but unlikely cause.   Currently hemoglobin 9.9. Tends to run 10-12.   Has not been able to tolerate GI side effects of oral iron.   Could consider iron infusion in the future.      Orders:  •  Iron Panel (Includes Ferritin, Iron Sat%, Iron, and TIBC); Future  •  CBC and differential; Future  •  Magnesium; Future

## 2025-03-07 NOTE — ED ATTENDING ATTESTATION
3/7/2025  IElba MD, saw and evaluated the patient. I have discussed the patient with the resident/non-physician practitioner and agree with the resident's/non-physician practitioner's findings, Plan of Care, and MDM as documented in the resident's/non-physician practitioner's note, except where noted. All available labs and Radiology studies were reviewed.  I was present for key portions of any procedure(s) performed by the resident/non-physician practitioner and I was immediately available to provide assistance.       At this point I agree with the current assessment done in the Emergency Department.  I have conducted an independent evaluation of this patient a history and physical is as follows:    Chief Complaint   Patient presents with    Medical Problem     Pt referred from family PCP due to being reported to need rehab as she is unable to stand well or ambulate even short distances. Pt reports ongoing SOB, 98% RA.     89 y.o. female presenting with ongoing dyspnea.    ED Course         Critical Care Time  Procedures

## 2025-03-07 NOTE — ASSESSMENT & PLAN NOTE
Lab Results   Component Value Date    HGBA1C 7.3 (H) 02/25/2025     Stable A1c.   Baseline insulin dosing is Humulin N 8 units in the morning, 4 units at bedtime.   Sugars are very sensitive to prednisone.   Currently on prednisone taper and insulin dosing is at Humulin N 10 units in the morning and 6 units at bedtime. Once prednisone is finished, she will return to baseline insulin.   Insurance will no longer cover Humulin N--will switch to Novolin N.

## 2025-03-07 NOTE — TELEPHONE ENCOUNTER
Carlyn De La Paz from [facility] called to inform Dr the pt is currently in hospital for sob

## 2025-03-07 NOTE — ED ATTENDING ATTESTATION
3/7/2025  IElba MD, saw and evaluated the patient. I have discussed the patient with the resident/non-physician practitioner and agree with the resident's/non-physician practitioner's findings, Plan of Care, and MDM as documented in the resident's/non-physician practitioner's note, except where noted. All available labs and Radiology studies were reviewed.  I was present for key portions of any procedure(s) performed by the resident/non-physician practitioner and I was immediately available to provide assistance.       At this point I agree with the current assessment done in the Emergency Department.  I have conducted an independent evaluation of this patient a history and physical is as follows:    Chief Complaint   Patient presents with    Medical Problem     Pt referred from family PCP due to being reported to need rehab as she is unable to stand well or ambulate even short distances. Pt reports ongoing SOB, 98% RA.     89 y.o. female presenting with ongoing dyspnea.    ED Course         Critical Care Time  Procedures       female appearing stated age resting in bed, in no acute distress  HEENT: atraumatic, normocephalic. Sclera anicteric, conjunctiva are not injected. Moist oral mucosa  CARDIOVASCULAR/CHEST: RRR, no M/R/G. 2+ radial pulses  PULMONARY: Breathing comfortably on RA.  There are sparse diffuse rhonchi throughout posterior lung fields laterally.  ABDOMEN: non-distended. BS present, normoactive. Non-tender  MSK: moves all extremities, no deformities, no peripheral edema, no calf asymmetry  NEURO: Awake, alert, and oriented x 3. Face symmetric. Moves all extremities spontaneously. No focal neurologic deficits  SKIN: Warm, appears well-perfused  MENTAL STATUS: Normal affect    Labs Reviewed   CBC AND DIFFERENTIAL - Abnormal       Result Value Ref Range Status    WBC 9.48  4.31 - 10.16 Thousand/uL Final    RBC 4.70  3.81 - 5.12 Million/uL Final    Hemoglobin 11.6  11.5 - 15.4 g/dL Final    Hematocrit 38.2  34.8 - 46.1 % Final    MCV 81 (*) 82 - 98 fL Final    MCH 24.7 (*) 26.8 - 34.3 pg Final    MCHC 30.4 (*) 31.4 - 37.4 g/dL Final    RDW 16.4 (*) 11.6 - 15.1 % Final    MPV 9.8  8.9 - 12.7 fL Final    Platelets 228  149 - 390 Thousands/uL Final    nRBC 0  /100 WBCs Final    Comment: This is an appended report.  These results have been appended to a previously preliminary verified report.    Segmented % 93 (*) 43 - 75 % Final    Immature Grans % 1  0 - 2 % Final    Lymphocytes % 4 (*) 14 - 44 % Final    Monocytes % 2 (*) 4 - 12 % Final    Eosinophils Relative 0  0 - 6 % Final    Basophils Relative 0  0 - 1 % Final    Absolute Neutrophils 8.79 (*) 1.85 - 7.62 Thousands/µL Final    Absolute Immature Grans 0.06  0.00 - 0.20 Thousand/uL Final    Absolute Lymphocytes 0.40 (*) 0.60 - 4.47 Thousands/µL Final    Absolute Monocytes 0.21  0.17 - 1.22 Thousand/µL Final    Eosinophils Absolute 0.01  0.00 - 0.61 Thousand/µL Final    Basophils Absolute 0.01  0.00 - 0.10 Thousands/µL Final    Narrative:     This is an appended report.  These  results have been appended to a previously verified report.   COMPREHENSIVE METABOLIC PANEL - Abnormal    Sodium 130 (*) 135 - 147 mmol/L Final    Potassium 4.4  3.5 - 5.3 mmol/L Final    Chloride 93 (*) 96 - 108 mmol/L Final    CO2 29  21 - 32 mmol/L Final    ANION GAP 8  4 - 13 mmol/L Final    BUN 15  5 - 25 mg/dL Final    Creatinine 0.52 (*) 0.60 - 1.30 mg/dL Final    Comment: Standardized to IDMS reference method    Glucose 113  65 - 140 mg/dL Final    Comment: If the patient is fasting, the ADA then defines impaired fasting glucose as > 100 mg/dL and diabetes as > or equal to 123 mg/dL.    Calcium 8.6  8.4 - 10.2 mg/dL Final    AST 29  13 - 39 U/L Final    ALT 23  7 - 52 U/L Final    Comment: Specimen collection should occur prior to Sulfasalazine administration due to the potential for falsely depressed results.     Alkaline Phosphatase 50  34 - 104 U/L Final    Total Protein 7.0  6.4 - 8.4 g/dL Final    Albumin 3.8  3.5 - 5.0 g/dL Final    Total Bilirubin 0.80  0.20 - 1.00 mg/dL Final    Comment: Use of this assay is not recommended for patients undergoing treatment with eltrombopag due to the potential for falsely elevated results.  N-acetyl-p-benzoquinone imine (metabolite of Acetaminophen) will generate erroneously low results in samples for patients that have taken an overdose of Acetaminophen.    eGFR 86  ml/min/1.73sq m Final    Narrative:     National Kidney Disease Foundation guidelines for Chronic Kidney Disease (CKD):     Stage 1 with normal or high GFR (GFR > 90 mL/min/1.73 square meters)    Stage 2 Mild CKD (GFR = 60-89 mL/min/1.73 square meters)    Stage 3A Moderate CKD (GFR = 45-59 mL/min/1.73 square meters)    Stage 3B Moderate CKD (GFR = 30-44 mL/min/1.73 square meters)    Stage 4 Severe CKD (GFR = 15-29 mL/min/1.73 square meters)    Stage 5 End Stage CKD (GFR <15 mL/min/1.73 square meters)  Note: GFR calculation is accurate only with a steady state creatinine   POCT GLUCOSE - Abnormal    POC  "Glucose 205 (*) 65 - 140 mg/dl Final   HS TROPONIN I 0HR - Normal    hs TnI 0hr 14  \"Refer to ACS Flowchart\"- see link ng/L Final    Comment:                                              Initial (time 0) result  If >=50 ng/L, Myocardial injury suggested ;  Type of myocardial injury and treatment strategy  to be determined.  If 5-49 ng/L, a delta result at 2 hours and or 4 hours will be needed to further evaluate.  If <4 ng/L, and chest pain has been >3 hours since onset, patient may qualify for discharge based on the HEART score in the ED.  If <5 ng/L and <3hours since onset of chest pain, a delta result at 2 hours will be needed to further evaluate.    HS Troponin 99th Percentile URL of a Health Population=12 ng/L with a 95% Confidence Interval of 8-18 ng/L.    Second Troponin (time 2 hours)  If calculated delta >= 20 ng/L,  Myocardial injury suggested ; Type of myocardial injury and treatment strategy to be determined.  If 5-49 ng/L and the calculated delta is 5-19 ng/L, consult medical service for evaluation.  Continue evaluation for ischemia on ecg and other possible etiology and repeat hs troponin at 4 hours.  If delta is <5 ng/L at 2 hours, consider discharge based on risk stratification via the HEART score (if in ED), or EARL risk score in IP/Observation.    HS Troponin 99th Percentile URL of a Health Population=12 ng/L with a 95% Confidence Interval of 8-18 ng/L.   HS TROPONIN I 2HR - Normal    hs TnI 2hr 11  \"Refer to ACS Flowchart\"- see link ng/L Final    Comment:                                              Initial (time 0) result  If >=50 ng/L, Myocardial injury suggested ;  Type of myocardial injury and treatment strategy  to be determined.  If 5-49 ng/L, a delta result at 2 hours and or 4 hours will be needed to further evaluate.  If <4 ng/L, and chest pain has been >3 hours since onset, patient may qualify for discharge based on the HEART score in the ED.  If <5 ng/L and <3hours since onset of chest " pain, a delta result at 2 hours will be needed to further evaluate.    HS Troponin 99th Percentile URL of a Health Population=12 ng/L with a 95% Confidence Interval of 8-18 ng/L.    Second Troponin (time 2 hours)  If calculated delta >= 20 ng/L,  Myocardial injury suggested ; Type of myocardial injury and treatment strategy to be determined.  If 5-49 ng/L and the calculated delta is 5-19 ng/L, consult medical service for evaluation.  Continue evaluation for ischemia on ecg and other possible etiology and repeat hs troponin at 4 hours.  If delta is <5 ng/L at 2 hours, consider discharge based on risk stratification via the HEART score (if in ED), or EARL risk score in IP/Observation.    HS Troponin 99th Percentile URL of a Health Population=12 ng/L with a 95% Confidence Interval of 8-18 ng/L.    Delta 2hr hsTnI -3  <20 ng/L Final   POCT GLUCOSE - Normal    POC Glucose 96  65 - 140 mg/dl Final     XR chest 2 views   Final Result      Moderate pulmonary fibrosis with no definite acute disease.            Workstation performed: [id number]               ED Course     Medications   loperamide (IMODIUM) capsule 2 mg (2 mg Oral Given 3/7/25 1620)   LORazepam (ATIVAN) tablet 0.5 mg (0.5 mg Oral Given 3/7/25 1626)   predniSONE tablet 20 mg (20 mg Oral Given 3/10/25 0821)   magnesium sulfate 2 g/50 mL IVPB (premix) 2 g (2 g Intravenous New Bag 3/8/25 0848)   magnesium sulfate 2 g/50 mL IVPB (premix) 2 g (0 g Intravenous Stopped 3/9/25 1416)     89-year-old female presenting with ongoing and worsening shortness of breath in setting of pulmonary fibrosis, and now recently diagnosed aortic stenosis.  Vital signs reviewed, hypertensive, WNL and not hypoxic while at rest. Differential diagnosis includes progression of pulmonary fibrosis, worsening atrial fibrillation with worsening CHF, pulmonary embolism, versus another etiology of symptoms.  EKG to my interpretation is as below.  Labs reveal CMP with mild hyponatremia of 132, CBC  with leukocytosis of 12.03, hemoglobin of 11.5, high-sensitivity troponin is 14.  Chest x-ray to my review is with findings suggestive of pulmonary fibrosis.  Formal read notes moderate pulmonary fibrosis with no definite acute disease.  Patient admitted to the hospital for further evaluation and treatment.    Procedure  ECG 12 Lead Documentation Only    Date/Time: 3/7/2025 12:49 PM    Performed by: Elba Smith MD  Authorized by: Elba Smith MD    Comments:      Atrial paced rhythm ventricular rate 81, MN interval 210, left axis deviation, Q waves in lead V2 suggestive of age-indeterminate septal infarct, no STEMI, no significant change in prior EKG dated 5/2/2025.

## 2025-03-07 NOTE — PROGRESS NOTES
Name: Ac Duran      : 1935      MRN: [id number]  Encounter Provider: ZANDER Swenson  Encounter Date: 3/7/2025   Encounter department: [facility]  :  Assessment & Plan  Dyspnea on exertion  2  recent hospitalizations.  She was admitted to the hospital  through  for Dyspnea.   Admitted again 3/2/25 through 3/5/25 for dyspnea and hemoptysis.     Has interstitial lung disease at baseline. First hospitalization dyspnea was attributed to ILD flare.   She was started on prednisone taper. Initially, she felt it was helpful, but not any longer.     Second hospitalization for hemoptysis, attributed to Eliquis.   Eliquis held, hemoptysis resolved. Eliquis has been restarted.     Required oxygen during both hospitalizations. Was able to wean off.   She had ambulatory pulse oximeter monitoring during hospitalization on 3/3, sats remained >90%, not qualifying her for home O2.     Dyspnea on exertion persists at home to the point she is not able to care for self.   We discussed today option for rehab placement, if this is not possible on an urgent basis, she will need to return to the hospital until this can be arranged. Our office nurse will contact our  regarding this.     She has pulmonology follow up 3/12 and cardiology follow up 3/17.    3/2/2025 CTA chest:  FINDINGS:     PULMONARY ARTERIAL TREE:  No pulmonary embolus.        LUNGS: Juxtapleural coarse reticular interstitial changes similar to prior study with moderate to severe bronchiectasis and bronchiolectasis unchanged.  New diffuse groundglass haziness throughout the lower lobes predominantly and minimally in the upper lobes in a patchy distribution.  . No tracheal or endobronchial lesion.     PLEURA: Unremarkable.     HEART/GREAT VESSELS: Heart is unremarkable for patient's age. No thoracic aortic aneurysm.     MEDIASTINUM AND BRIDGET: Moderate hiatal hernia.     CHEST WALL AND LOWER NECK: Left chest wall  pacemaker     VISUALIZED STRUCTURES IN THE UPPER ABDOMEN: Pneumobilia unchanged.     OSSEOUS STRUCTURES: No acute fracture or destructive osseous lesion.     IMPRESSION:     1.  No pulmonary embolus.  2.  Measured RV/LV ratio is within normal limits at less than 0.9.  3.  Chronic interstitial lung disease with new diffuse groundglass haziness predominantly in the lower lobes. Differential includes edema, hemorrhage. Clinical history of hemoptysis.    Hemoptysis  Hemoptysis on 3/2/25. No specific cause identified.   Resolved now, eliquis has been resumed.        Iron deficiency anemia, unspecified iron deficiency anemia type  CHELITA, low hemoglobin could be a contributor to dyspnea, but unlikely cause.   Currently hemoglobin 9.9. Tends to run 10-12.   Has not been able to tolerate GI side effects of oral iron.   Could consider iron infusion in the future.      Orders:  •  Iron Panel (Includes Ferritin, Iron Sat%, Iron, and TIBC); Future  •  CBC and differential; Future  •  Magnesium; Future    Hyponatremia  Sodium stable at 133.   Continue sodium tablets 1 gram three times daily.   Orders:  •  Basic metabolic panel; Future    Type 2 diabetes mellitus with diabetic polyneuropathy, with long-term current use of insulin ([facility])    Lab Results   Component Value Date    HGBA1C 7.3 (H) 02/25/2025     Stable A1c.   Baseline insulin dosing is Humulin N 8 units in the morning, 4 units at bedtime.   Sugars are very sensitive to prednisone.   Currently on prednisone taper and insulin dosing is at Humulin N 10 units in the morning and 6 units at bedtime. Once prednisone is finished, she will return to baseline insulin.   Insurance will no longer cover Humulin N--will switch to Novolin N.          Paroxysmal atrial fibrillation (HCC)  Anticoagulated on Eliquis.  Rate controlled on metoprolol.  Continue follow-up with Cardiology, Dr. Ramsey.        Primary hypertension  Hypertensive during most recent hospitalization, possibly in  part due to prednisone.   Metoprolol was increased from 12.5 mg twice daily to 25 mg twice daily.   BP today 116/60. Likely anxiety is in part contributing to hypertension as well.   We have been up and down on metoprolol dosing over the years, with BP too low on 25 mg twice daily and intermittently too high on 12.5 mg twice daily.  Will monitor.      Echo 2/25/25:    •  Left Ventricle: Left ventricular cavity size is normal. Wall thickness is mildly increased. There is mild asymmetric hypertrophy of the basal septal wall. The left ventricular ejection fraction is 55-60%. Systolic function is normal. Wall motion is normal. Diastolic function is mildly abnormal, consistent with grade I (abnormal) relaxation.  •  IVS: There is sigmoid appearance of the septum.  •  Right Ventricle: Right ventricular cavity size is normal. Systolic function is normal. A pacer wire is present.  •  Aortic Valve: There is mild to moderate stenosis. The aortic valve peak velocity is 2.6 m/s. The aortic valve mean gradient is 15.0 mmHg. The dimensionless velocity index is 0.32. The aortic valve area is 1.2 cm2 by continuity equation. The aortic valve area by planimetry is approximately 1.4 cm2.  •  Mitral Valve: There is mild annular calcification.  •  Tricuspid Valve: There is mild regurgitation.     Mixed hyperlipidemia  Insurance will no longer cover Vytorin.   Will order zetia and simvastatin separately.          I     After office visit: case management feels urgent placement would not be able to be achieved today or this weekend. Therefore, will recommend she return to the hospital until this can be arranged due to she is not able to care for herself at home.     Her son was notified by office nurse, Sarah.   Norm will take her [facility] ED.       History of Present Illness   Ac Duran is an 89 year old female presenting today for hospital discharge follow up.     She was admitted to the hospital 2/24 through 2/26  for Dyspnea.   Admitted again 3/2/25 through 3/5/25 for dyspnea and hemoptysis.     She is doing poorly at home.   Dyspneic on exertion, so much so she is not able to care for herself.   Lives alone in [place]. Her son helps, but he is not in a position to stay with her, and she would not be able to navigate his home.   She is having difficulty going to the bathroom, bathing dressing, brushing her teeth, making meals.   Can't walk out of her apartment to the building elevator, without sitting down.   3 weeks ago, she was going to the [employer] with her grandson--with no problems.    No further hemoptysis.  Itchy annoying cough, but nothing more.   Feels like mucous is deep down in her chest, doesn't come up.  Coughing fit morning before hospitalization on 3/2/25.   Oxygen saturations did not qualify her for home O2.    Symptoms started 3 weeks ago--getting weaker and weaker.     Prednisone taper day 2 of 20 mg dosing--3 more days of this and then 10 mg for 5 days.   Prednisone taper was started during first hospitalization.   Initially thought it was helpful, but not anymore.     Barely eating.   Last night ate half a can of chicken rice soup.   Lunch yesterday, her son made her a chicken wrap, she ate 2 bites and drank 4 ounces of milk.     Reports bout of diarrhea before this last hospitalization. Bowels moving regularly now, Last stool yesterday, she has not been paying attention to color of stool.     Hypertension during hospitalization, metoprolol was increased, anxiety may be contributing.     Anitra visiting nurse coming today.     Her son has accompanied her today.     TCM Call     Date and time call was made  3/6/2025  1:02 PM    Hospital care reviewed  Records reviewed    Patient was hospitial[facility] at  [facility]    Date of Admission  03/02/25    Date of discharge  03/05/25    Diagnosis  Hemoptysis.    Disposition  Home    Were the patients medications reviewed and updated  No    Current  "Symptoms  None      TCM Call     Post hospital issues  Reduced activity    Should patient be enrolled in anticoag monitoring?  No    Scheduled for follow up?  Yes    Did you obtain your prescribed medications  --  will ask at appointment    cs    Do you need help managing your prescriptions or medications  No    Is transportation to your appointment needed  No    I have advised the patient to call PCP with any new or worsening symptoms    Oksana Chaudhry MA    Living Arrangements  Family members    Are you recieving any outpatient services  No    Are you recieving home care services  Yes    Types of home care services  Nurse visit    Interperter language line needed  No    Counseling  Patient    Counseling topics  Activities of daily living; Importance of RX   compliance    Comments  Pt scheduled       I have reconciled medications today.               Review of Systems   Constitutional:  Positive for appetite change (no appetite) and fatigue (but not more than usual). Negative for chills, diaphoresis and fever.   HENT:  Positive for postnasal drip (chronic).    Respiratory:  Positive for cough (mild) and shortness of breath. Negative for chest tightness and wheezing.    Cardiovascular:  Negative for chest pain, palpitations and leg swelling.   Gastrointestinal: Negative.    Genitourinary: Negative.    Musculoskeletal: Negative.    Skin: Negative.    Neurological: Negative.    Psychiatric/Behavioral:          Some anxiety/sadness about health and aging       Objective   /60   Pulse 72   Temp 97.5 °F (36.4 °C) (Temporal)   Resp 16   Ht 5' 6\" (1.676 m)   Wt 53.5 kg (118 lb)   SpO2 96%   BMI 19.05 kg/m²      Physical Exam  Vitals and nursing note reviewed.   Constitutional:       General: She is not in acute distress.     Appearance: Normal appearance. She is not ill-appearing.   Cardiovascular:      Rate and Rhythm: Normal rate and regular rhythm.      Heart sounds: Murmur heard.   Pulmonary:      Effort: " Pulmonary effort is normal.      Breath sounds: Examination of the right-lower field reveals rales. Examination of the left-lower field reveals rales. Decreased breath sounds and rales present.      Comments: No cough.   Dyspnea with talking.  Musculoskeletal:      Cervical back: Normal range of motion and neck supple.      Right lower leg: No edema.      Left lower leg: No edema.   Lymphadenopathy:      Cervical: No cervical adenopathy.   Neurological:      Mental Status: She is alert.   Psychiatric:         Mood and Affect: Mood normal.         Current Outpatient Medications:   •  acetaminophen (TYLENOL) 500 mg tablet, Take 1,000 mg by mouth as needed for mild pain, Disp: , Rfl:   •  apixaban (Eliquis) 2.5 mg, TAKE 1 TABLET (2.5 MG TOTAL) BY MOUTH 2 (TWO) TIMES A DAY, Disp: 90 tablet, Rfl: 1  •  bimatoprost (LUMIGAN) 0.01 % ophthalmic drops, Administer 1 drop to both eyes daily at bedtime for 30 days, Disp: 1.5 mL, Rfl: 0  •  brimonidine tartrate 0.2 % ophthalmic solution, INSTILL 1 DROP INTO RIGHT EYE TWICE A DAY, Disp: , Rfl:   •  Cyanocobalamin (Vitamin B 12) 500 MCG TABS, Take 500 mcg by mouth 2 (two) times a day, Disp: , Rfl:   •  ezetimibe-simvastatin (VYTORIN) 10-40 mg per tablet, TAKE 1 TABLET BY MOUTH DAILY AT BEDTIME, Disp: 30 tablet, Rfl: 5  •  famotidine (PEPCID) 20 mg tablet, TAKE 1 TABLET (20 MG TOTAL) BY MOUTH 2 (TWO) TIMES A DAY, Disp: 60 tablet, Rfl: 5  •  glucose blood (OneTouch Ultra) test strip, TEST FOUR TIMES A DAY, Disp: 100 strip, Rfl: 5  •  HumuLIN N KwikPen 100 units/mL injection pen, INJECT 8 UNITS UNDER THE SKIN EVERY MORNING AND 4 UNITS EVERY EVENING., Disp: 15 mL, Rfl: 1  •  Insulin Pen Needle (B-D UF III MINI PEN NEEDLES) 31G X 5 MM MISC, USE 2 (TWO) TIMES A DAY, Disp: 200 each, Rfl: 3  •  ipratropium (ATROVENT) 0.03 % nasal spray, USE 2 SPRAYS INTO EACH NOSTRIL EVERY 12 (TWELVE) HOURS, Disp: 30 mL, Rfl: 6  •  LORazepam (ATIVAN) 0.5 mg tablet, TAKE 1 TABLET (0.5 MG TOTAL) BY MOUTH 2  (TWO) TIMES A DAY AS NEEDED FOR ANXIETY, Disp: 60 tablet, Rfl: 2  •  metFORMIN (GLUCOPHAGE) 500 mg tablet, TAKE 2 TABLETS (1,000 MG TOTAL) BY MOUTH 2 (TWO) TIMES A DAY WITH MEALS, Disp: 120 tablet, Rfl: 5  •  metoprolol tartrate (LOPRESSOR) 25 mg tablet, Take 1 tablet (25 mg total) by mouth every 12 (twelve) hours, Disp: 180 tablet, Rfl: 3  •  mirtazapine (REMERON) 7.5 MG tablet, TAKE 1 TABLET (7.5 MG TOTAL) BY MOUTH DAILY AT BEDTIME, Disp: 30 tablet, Rfl: 5  •  Multiple Vitamin (multivitamin) tablet, Take 1 tablet by mouth daily  , Disp: , Rfl:   •  OneTouch Delica Lancets 33G MISC, Check blood sugars four times daily. Please substitute with appropriate alternative as covered by patient's insurance. Dx: E11.65, Disp: 400 each, Rfl: 3  •  pantoprazole (PROTONIX) 40 mg tablet, TAKE 1 TABLET (40 MG TOTAL) BY MOUTH 2 (TWO) TIMES A DAY, Disp: 60 tablet, Rfl: 5  •  predniSONE 10 mg tablet, Take 2 tablets (20 mg total) by mouth daily for 5 days, THEN 1 tablet (10 mg total) daily for 5 days. Do not start before March 6, 2025., Disp: , Rfl:   •  sitaGLIPtin (Januvia) 100 mg tablet, TAKE 1 TABLET (100 MG TOTAL) BY MOUTH DAILY, Disp: 90 tablet, Rfl: 1  •  sodium chloride 1 g tablet, TAKE 1 TABLET THREE TIMES A DAY, Disp: 90 tablet, Rfl: 1  •  polyethylene glycol (MIRALAX) 17 g packet, Take 17 g by mouth daily as needed (constipation/ abominal cramping) for up to 14 days Prn constipation, Disp: 85 g, Rfl: 0

## 2025-03-07 NOTE — H&P
H&P - Hospitalist   Name: Ac Druan 89 y.o. female I MRN: [id number]  Unit/Bed#: ED 19 I Date of Admission: 3/7/2025   Date of Service: 3/7/2025 I Hospital Day: 0     Assessment & Plan  Dyspnea on exertion  Patient was sent to the hospital by her PCP due to progressive dyspnea on exertion  Underlying history of interstitial lung disease, ROMERO  Recently hospitalized twice for dyspnea on exertion and hemoptysis  Currently on tapered dose of prednisone  However patient with limited activity due to progressive severe dyspnea on exertion  Recent cardiac echo with mild to moderate AS and EF 55 to 60%  Recent chest CTA with Chronic interstitial lung disease with new diffuse groundglass haziness predominantly in the lower lobes   Current chest x-ray with moderate pulmonary fibrosis, no acute abnormality  Patient is afebrile without leukocytosis, no chest pain  Negative troponin, EKG without acute ischemic changes    Suspect symptoms secondary to underlying severe interstitial lung disease  Admit patient to the hospital  Consult pulmonology for further management  Continue with taper dose of prednisone  Check anemia panel and TSH  PT OT eval    Interstitial lung disease (HCC)  Patient had recent hospitalization 02/24/2025 to 02/26/2025   for LD flareup was discharged on prednisone taper  Consult pulmonology for further management  Aortic stenosis  Recent echo shows mild to moderate aortic stenosis   Outpatient cardiology follow-up  Essential hypertension  Stable BP  Monitor  Glaucoma  Continue home eyedrops  Paroxysmal atrial fibrillation (HCC)  Stable heart rate  Continue metoprolol and Eliquis  GERD (gastroesophageal reflux disease)  Continue with Protonix and Pepcid  Moderate protein-calorie malnutrition (HCC)  Malnutrition Findings:           BMI Findings:           There is no height or weight on file to calculate BMI.   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   Continue sodium tablet  Oral fluid restrictions  Monitor  DM2 (diabetes mellitus, type 2) (Formerly [facility])  Lab Results   Component Value Date    HGBA1C 7.3 (H) 02/25/2025       Recent Labs     03/05/25  1009 03/05/25  1616 03/07/25  1332 03/07/25  1737   POCGLU 220* 292* 96 205*       Blood Sugar Average: Last 72 hrs:  (P) 150.5  Home regimen on Novolin 8 units in the morning and 4 units at night  Also takes metformin, Januvia  Hold metformin  Continue insulin and Januvia  Start insulin/scale  Monitor  ROMERO (obstructive sleep apnea)  CPAP at bedtime      VTE Pharmacologic Prophylaxis: VTE Score: 6 High Risk (Score >/= 5) - Pharmacological DVT Prophylaxis Ordered: apixaban (Eliquis). Sequential Compression Devices Ordered.  Code Status: Level 3 - DNAR and DNI   Discussion with family:  Patient.     Anticipated Length of Stay: Patient will be admitted on an inpatient basis with an anticipated length of stay of greater than 2 midnights secondary to dyspnea on exertion.    History of Present Illness   Chief Complaint:   Dyspnea on exertion    Ac Duran is a 89 y.o. female with a PMH of interstitial lung disease, glaucoma, atrial fibrillation on Eliquis, aortic stenosis, diabetes mellitus type 2 who presents with progressive dyspnea on exertion.  Patient states unable to stand well or ambulate even short distance due to shortness of breath  No chest pain, no nausea vomiting or diarrhea no fever or chills  Reported mild dry cough    Recent hospitalized twice due to dyspnea on exertion and hemoptysis, first hospitalization on 2/24 was attributed to ILD flare and she was treated with prednisone taper, second hospitalization on 3/2/2025 due to hemoptysis related to Eliquis, MRI hemoptysis has resolved and Eliquis has been restarted  Patient was discharged on 3/5/2025 after she was weaned off oxygen, evaluated for home oxygen and she did not qualify for it    Patient with persistent  progressive dyspnea on exertion over the past 4 weeks  Evaluated in the emergency room, she is afebrile without leukocytosis, on room air  Chest x-ray with moderate pulmonary fibrosis no acute abnormality    Discussed with ED physician    Review of Systems   Constitutional:  Negative for chills and fever.   HENT:  Negative for sore throat.    Respiratory:  Positive for cough and shortness of breath. Negative for chest tightness.    Cardiovascular:  Negative for chest pain, palpitations and leg swelling.   Gastrointestinal:  Negative for abdominal pain, blood in stool, diarrhea, nausea and vomiting.   Endocrine: Negative for polyuria.   Genitourinary:  Negative for difficulty urinating and dysuria.   Musculoskeletal:  Positive for gait problem.   Neurological:  Negative for dizziness, speech difficulty and headaches.   All other systems reviewed and are negative.      Historical Information   Past Medical History:   Diagnosis Date    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/7/2020    Glaucoma     H/O degenerative disc disease     Idiopathic pulmonary fibrosis (HCC) 11/2020    Irregular heart beat     afib    Peripheral neuropathy     S/P laparoscopic cholecystectomy 12/21/2020    Stenosis of right subclavian artery (HCC) 11/18/2016     Past Surgical History:   Procedure Laterality Date    CATARACT EXTRACTION, BILATERAL  2011    CHOLECYSTECTOMY      CHOLECYSTECTOMY LAPAROSCOPIC N/A 12/09/2020    Procedure: CHOLECYSTECTOMY LAPAROSCOPIC;  Surgeon: Kalen Garrett MD;  Location: [facility];  Service: General    COLONOSCOPY      CYST REMOVAL  2009    left lower Quadrant     FL LUMBAR PUNCTURE DIAGNOSTIC  4/28/2023    HERNIA REPAIR  1992    LIPOMA RESECTION  2010    DC RPR 1ST INGUN HRNA AGE 5 YRS/> REDUCIBLE Bilateral 01/05/2018    Procedure: INGUINAL HERNIA REPAIR;  Surgeon: Volodymyr Lee MD;  Location: [facility];  Service: General    SHOULDER SURGERY  04/26/2019    Dr. Arnett ([place])    UPPER GASTROINTESTINAL ENDOSCOPY       VASCULAR SURGERY      Agram-  R carotid occlusion     Social History     Tobacco Use    Smoking status: Former     Current packs/day: 0.00     Average packs/day: 1.5 packs/day for 38.0 years (57.0 ttl pk-yrs)     Types: Cigarettes     Start date:      Quit date:      Years since quittin.2    Smokeless tobacco: Never   Vaping Use    Vaping status: Never Used   Substance and Sexual Activity    Alcohol use: No    Drug use: No    Sexual activity: Not on file     E-Cigarette/Vaping    E-Cigarette Use Never User      E-Cigarette/Vaping Substances    Nicotine No     THC No     CBD No     Flavoring No     Other No     Unknown No      Family history non-contributory  Social History:  Marital Status:        Meds/Allergies   I have reviewed home medications using recent Epic encounter.  Prior to Admission medications    Medication Sig Start Date End Date Taking? Authorizing Provider   acetaminophen (TYLENOL) 500 mg tablet Take 1,000 mg by mouth as needed for mild pain    Historical Provider, MD   apixaban (Eliquis) 2.5 mg TAKE 1 TABLET (2.5 MG TOTAL) BY MOUTH 2 (TWO) TIMES A DAY 25   ZANDER Swenson   bimatoprost (LUMIGAN) 0.01 % ophthalmic drops Administer 1 drop to both eyes daily at bedtime for 30 days 16   Sean Desai MD   brimonidine tartrate 0.2 % ophthalmic solution INSTILL 1 DROP INTO RIGHT EYE TWICE A DAY 6/3/24   Historical Provider, MD   Cyanocobalamin (Vitamin B 12) 500 MCG TABS Take 500 mcg by mouth 2 (two) times a day 25   Shay Jarrett PA-C   ezetimibe-simvastatin (VYTORIN) 10-40 mg per tablet TAKE 1 TABLET BY MOUTH DAILY AT BEDTIME 10/21/24   ZANDER Swenson   famotidine (PEPCID) 20 mg tablet TAKE 1 TABLET (20 MG TOTAL) BY MOUTH 2 (TWO) TIMES A DAY 25   ZANDER Swenson   glucose blood (OneTouch Ultra) test strip TEST FOUR TIMES A DAY 10/7/24   ZANDER Swenson   HumuLIN N KwikPen 100 units/mL injection pen INJECT 8 UNITS UNDER THE SKIN EVERY MORNING AND 4  UNITS EVERY EVENING. 11/6/24   ZANDER Swenson   Insulin Pen Needle (B-D UF III MINI PEN NEEDLES) 31G X 5 MM MISC USE 2 (TWO) TIMES A DAY 11/6/24   ZANDER Swenson   ipratropium (ATROVENT) 0.03 % nasal spray USE 2 SPRAYS INTO EACH NOSTRIL EVERY 12 (TWELVE) HOURS 10/31/24   Sanchez Murphy MD   LORazepam (ATIVAN) 0.5 mg tablet TAKE 1 TABLET (0.5 MG TOTAL) BY MOUTH 2 (TWO) TIMES A DAY AS NEEDED FOR ANXIETY 1/3/25   ZANDER Swenson   metFORMIN (GLUCOPHAGE) 500 mg tablet TAKE 2 TABLETS (1,000 MG TOTAL) BY MOUTH 2 (TWO) TIMES A DAY WITH MEALS 2/18/25   ZANDER Swenson   metoprolol tartrate (LOPRESSOR) 25 mg tablet Take 1 tablet (25 mg total) by mouth every 12 (twelve) hours 9/11/24   ZANDER Swenson   mirtazapine (REMERON) 7.5 MG tablet TAKE 1 TABLET (7.5 MG TOTAL) BY MOUTH DAILY AT BEDTIME 2/18/25   ZANDER Swenson   Multiple Vitamin (multivitamin) tablet Take 1 tablet by mouth daily      Historical Provider, MD   OneTouch Delica Lancets 33G MISC Check blood sugars four times daily. Please substitute with appropriate alternative as covered by patient's insurance. Dx: E11.65 8/30/24   ZANDER Swenson   pantoprazole (PROTONIX) 40 mg tablet TAKE 1 TABLET (40 MG TOTAL) BY MOUTH 2 (TWO) TIMES A DAY 12/3/24   ZANDER Swenson   polyethylene glycol (MIRALAX) 17 g packet Take 17 g by mouth daily as needed (constipation/ abominal cramping) for up to 14 days Prn constipation 7/6/24 12/11/24  Catia Castanon MD   predniSONE 10 mg tablet Take 2 tablets (20 mg total) by mouth daily for 5 days, THEN 1 tablet (10 mg total) daily for 5 days. Do not start before March 6, 2025. 3/6/25 3/16/25  gNuyen Babb MD   sitaGLIPtin (Januvia) 100 mg tablet TAKE 1 TABLET (100 MG TOTAL) BY MOUTH DAILY 2/4/25   ZANDER Swenson   sodium chloride 1 g tablet TAKE 1 TABLET THREE TIMES A DAY 9/16/24   Joselyn Reyes Bahamonde, MD     Allergies   Allergen Reactions    Keflex [Cephalexin] Diarrhea       Objective :  Temp:  [97.5 °F (36.4  °C)-97.6 °F (36.4 °C)] 97.6 °F (36.4 °C)  HR:  [60-79] 60  BP: (116-172)/(50-86) 156/70  Resp:  [16-18] 18  SpO2:  [96 %-97 %] 97 %  O2 Device: None (Room air)    Physical Exam  Vitals reviewed.   Constitutional:       General: She is not in acute distress.     Appearance: Normal appearance.      Comments: Thin female   HENT:      Head: Normocephalic and atraumatic.      Mouth/Throat:      Mouth: Mucous membranes are moist.      Pharynx: No oropharyngeal exudate.   Eyes:      General: No scleral icterus.     Extraocular Movements: Extraocular movements intact.      Conjunctiva/sclera: Conjunctivae normal.      Pupils: Pupils are equal, round, and reactive to light.   Cardiovascular:      Rate and Rhythm: Regular rhythm.      Pulses: Normal pulses.      Heart sounds: Murmur heard.   Pulmonary:      Effort: Pulmonary effort is normal.      Breath sounds: Rhonchi and rales present. No wheezing.   Abdominal:      General: Bowel sounds are normal. There is no distension.      Palpations: Abdomen is soft.      Tenderness: There is no abdominal tenderness.   Musculoskeletal:         General: Normal range of motion.      Cervical back: Normal range of motion and neck supple.      Right lower leg: No edema.      Left lower leg: No edema.   Skin:     General: Skin is warm and dry.      Findings: No rash.   Neurological:      General: No focal deficit present.      Mental Status: She is alert and oriented to person, place, and time.      Cranial Nerves: No cranial nerve deficit.   Psychiatric:         Mood and Affect: Mood normal.            Lines/Drains:        Lab Results: I have reviewed the following results:  Results from last 7 days   Lab Units 03/07/25  1333   WBC Thousand/uL 9.48   HEMOGLOBIN g/dL 11.6   HEMATOCRIT % 38.2   PLATELETS Thousands/uL 228   SEGS PCT % 93*   LYMPHO PCT % 4*   MONO PCT % 2*   EOS PCT % 0     Results from last 7 days   Lab Units 03/07/25  1434   SODIUM mmol/L 130*   POTASSIUM mmol/L 4.4    CHLORIDE mmol/L 93*   CO2 mmol/L 29   BUN mg/dL 15   CREATININE mg/dL 0.52*   ANION GAP mmol/L 8   CALCIUM mg/dL 8.6   ALBUMIN g/dL 3.8   TOTAL BILIRUBIN mg/dL 0.80   ALK PHOS U/L 50   ALT U/L 23   AST U/L 29   GLUCOSE RANDOM mg/dL 113     Results from last 7 days   Lab Units 03/02/25  0942   INR  1.01     Results from last 7 days   Lab Units 03/07/25  1737 03/07/25  1332 03/05/25  1616 03/05/25  1009 03/05/25  0703 03/04/25  2121 03/04/25  1602 03/04/25  1048 03/04/25  0709 03/03/25  2106 03/03/25  1619 03/03/25  1053   POC GLUCOSE mg/dl 205* 96 292* 220* 176* 327* 321* 148* 154* 296* 241* 263*     Lab Results   Component Value Date    HGBA1C 7.3 (H) 02/25/2025    HGBA1C 6.6 (H) 08/30/2024    HGBA1C 6.9 (H) 05/23/2024     Results from last 7 days   Lab Units 03/03/25  0445 03/02/25  1202 03/02/25  0942   LACTIC ACID mmol/L  --  1.2 2.1*   PROCALCITONIN ng/ml <0.05  --  <0.05       Imaging Results Review: I reviewed radiology reports from this admission including: chest xray.  Other Study Results Review: EKG was personally reviewed and my interpretation is: Atrial paced rhythm at 81 bpm..    Administrative Statements   I have spent a total time of 75 minutes in caring for this patient on the day of the visit/encounter including Counseling / Coordination of care, Documenting in the medical record, Reviewing/placing orders in the medical record (including tests, medications, and/or procedures), Obtaining or reviewing history  , and Communicating with other healthcare professionals .    ** Please Note: This note has been constructed using a voice recognition system. **

## 2025-03-07 NOTE — ASSESSMENT & PLAN NOTE
Hypertensive during most recent hospitalization, possibly in part due to prednisone.   Metoprolol was increased from 12.5 mg twice daily to 25 mg twice daily.   BP today 116/60. Likely anxiety is in part contributing to hypertension as well.   We have been up and down on metoprolol dosing over the years, with BP too low on 25 mg twice daily and intermittently too high on 12.5 mg twice daily.  Will monitor.      Echo 2/25/25:    •  Left Ventricle: Left ventricular cavity size is normal. Wall thickness is mildly increased. There is mild asymmetric hypertrophy of the basal septal wall. The left ventricular ejection fraction is 55-60%. Systolic function is normal. Wall motion is normal. Diastolic function is mildly abnormal, consistent with grade I (abnormal) relaxation.  •  IVS: There is sigmoid appearance of the septum.  •  Right Ventricle: Right ventricular cavity size is normal. Systolic function is normal. A pacer wire is present.  •  Aortic Valve: There is mild to moderate stenosis. The aortic valve peak velocity is 2.6 m/s. The aortic valve mean gradient is 15.0 mmHg. The dimensionless velocity index is 0.32. The aortic valve area is 1.2 cm2 by continuity equation. The aortic valve area by planimetry is approximately 1.4 cm2.  •  Mitral Valve: There is mild annular calcification.  •  Tricuspid Valve: There is mild regurgitation.

## 2025-03-07 NOTE — ASSESSMENT & PLAN NOTE
Patient had recent hospitalization 02/24/2025 to 02/26/2025   for LD flareup was discharged on prednisone taper  Consult pulmonology for further management

## 2025-03-07 NOTE — ASSESSMENT & PLAN NOTE
Lab Results   Component Value Date    HGBA1C 7.3 (H) 02/25/2025       Recent Labs     03/05/25  1009 03/05/25  1616 03/07/25  1332 03/07/25  1737   POCGLU 220* 292* 96 205*       Blood Sugar Average: Last 72 hrs:  (P) 150.5  Home regimen on Novolin 8 units in the morning and 4 units at night  Also takes metformin, Januvia  Hold metformin  Continue insulin and Januvia  Start insulin/scale  Monitor

## 2025-03-07 NOTE — ASSESSMENT & PLAN NOTE
2  recent hospitalizations.  She was admitted to the hospital 2/24 through 2/26 for Dyspnea.   Admitted again 3/2/25 through 3/5/25 for dyspnea and hemoptysis.     Has interstitial lung disease at baseline. First hospitalization dyspnea was attributed to ILD flare.   She was started on prednisone taper. Initially, she felt it was helpful, but not any longer.     Second hospitalization for hemoptysis, attributed to Eliquis.   Eliquis held, hemoptysis resolved. Eliquis has been restarted.     Required oxygen during both hospitalizations. Was able to wean off.   She had ambulatory pulse oximeter monitoring during hospitalization on 3/3, sats remained >90%, not qualifying her for home O2.     Dyspnea on exertion persists at home to the point she is not able to care for self.   We discussed today option for rehab placement, if this is not possible on an urgent basis, she will need to return to the hospital until this can be arranged. Our office nurse will contact our  regarding this.     She has pulmonology follow up 3/12 and cardiology follow up 3/17.    3/2/2025 CTA chest:  FINDINGS:     PULMONARY ARTERIAL TREE:  No pulmonary embolus.        LUNGS: Juxtapleural coarse reticular interstitial changes similar to prior study with moderate to severe bronchiectasis and bronchiolectasis unchanged.  New diffuse groundglass haziness throughout the lower lobes predominantly and minimally in the upper lobes in a patchy distribution.  . No tracheal or endobronchial lesion.     PLEURA: Unremarkable.     HEART/GREAT VESSELS: Heart is unremarkable for patient's age. No thoracic aortic aneurysm.     MEDIASTINUM AND BRIDGET: Moderate hiatal hernia.     CHEST WALL AND LOWER NECK: Left chest wall pacemaker     VISUALIZED STRUCTURES IN THE UPPER ABDOMEN: Pneumobilia unchanged.     OSSEOUS STRUCTURES: No acute fracture or destructive osseous lesion.     IMPRESSION:     1.  No pulmonary embolus.  2.  Measured RV/LV ratio is within  normal limits at less than 0.9.  3.  Chronic interstitial lung disease with new diffuse groundglass haziness predominantly in the lower lobes. Differential includes edema, hemorrhage. Clinical history of hemoptysis.

## 2025-03-07 NOTE — TELEPHONE ENCOUNTER
NP saw pt in office today and determined that pt continues be SOB and having ambulatory dysfunction. Per NP instructions, I contacted pts son Rey to have him return pt to the ED for further evaluation. He is agreeable and will taking pt to [facility] ED for evaluation. ADT 21 placed.     Out patient Care Manager Mercedes Platt RN aware pt will be returning to ED for evaluation.

## 2025-03-07 NOTE — ED PROVIDER NOTES
Time reflects when diagnosis was documented in both MDM as applicable and the Disposition within this note       Time User Action Codes Description Comment    3/7/2025  4:30 PM AngelesRubina Add [R09.02] Hypoxia     3/7/2025  4:30 PM Angeles, Rubina MCKENZIE Add [R06.02] Shortness of breath     3/7/2025  5:22 PM Lucas Moreno Add [R06.09] Dyspnea on exertion     3/7/2025  5:22 PM Lucas Moreno Add [J84.9] Interstitial lung disease (HCC)     3/7/2025  5:45 PM Lucas Moreno Add [E44.1] Mild protein-calorie malnutrition (HCC)     3/9/2025 11:32 AM Alverto Rodríguez Add [I35.0] Aortic valve stenosis, etiology of cardiac valve disease unspecified     3/11/2025  9:00 AM Kavya Kulkarni Add [F41.9] Anxiety     3/11/2025  9:00 AM Kavya Kulkarni Add [G47.00] Insomnia, unspecified type     3/11/2025  9:01 AM Kavya Kulkarni Add [I10] Essential hypertension     3/11/2025  9:01 AM Kavya Kulkarni Add [E11.42,  Z79.4] Type 2 diabetes mellitus with diabetic polyneuropathy, with long-term current use of insulin (HCC)           ED Disposition       ED Disposition   Admit    Condition   Stable    Date/Time   Fri Mar 7, 2025  4:30 PM    Comment   Case was discussed with Dr. Holland and the patient's admission status was agreed to be Admission Status: inpatient status to the service of Dr. Holland .               Assessment & Plan       Medical Decision Making  Amount and/or Complexity of Data Reviewed  Labs: ordered. Decision-making details documented in ED Course.  Radiology: ordered.    Risk  Prescription drug management.  Decision regarding hospitalization.      Patient is a 89 y.o. female with PMH of pulmonary fibrosis, A-fib with pacemaker, diabetes, aortic stenosis who presents to the ED with shortness of breath, ambulatory dysfunction.    Vital signs hypertensive otherwise stable, not hypoxic. On exam patient with generalized weakness.    History and physical exam most consistent with exacerbation of her chronic medical conditions. However,  "differential diagnosis included but not limited to anemia, electrolyte abnormality, ACS, pneumonia, pleural effusion, pneumothorax.     Plan: CBC, CMP, troponin, EKG, chest x-ray    Procedure Note: EKG  Interpreted by: Rubina Reynoso  Indications / Diagnosis: Shortness of breath  ECG reviewed by me, the ED Provider: yes   The EKG demonstrates:  Rate: 81  Rhythm: Atrial paced  Axis: Left  QRS/Blocks: Regular  ST Changes: No acute ST Changes, no STD/DANDRE.  Compared to prior EKG on 3/2/2025, no acute change.       View ED course for further discussion on patient workup.     On review of previous records reviewed failure medicine nurse practitioner note from earlier today, patient with continued dyspnea on exertion and no longer able to care for herself, discussed rehab placement options with family.    All labs reviewed and utilized in the medical decision making process  All radiology studies independently viewed by me and interpreted by the radiologist.  I reviewed all testing with the patient.     Upon re-evaluation patient resting comfortably no acute distress.    Disposition: I discussed the case with hospitalist.  We reviewed the HPI, pertinent PMH, ED course and workup. Agreed with plan and will admit the patient to the hospital for further evaluation and management of ambulatory dysfunction, dyspnea on exertion. Patient in stable condition at this time.       Portions of the record may have been created with voice recognition software. Occasional wrong word or \"sound a like\" substitutions may have occurred due to the inherent limitations of voice recognition software. Read the chart carefully and recognize, using context, where substitutions have occurred.      ED Course as of 03/11/25 2025   Fri Mar 07, 2025   1446 hs TnI 0hr: 14  Will check 2 hour delta troponin    1446 Hemoglobin: 11.6  No acute anemia   1554 Comprehensive metabolic panel(!)  No acute abnormality        Medications   loperamide (IMODIUM) " capsule 2 mg (2 mg Oral Given 3/7/25 1620)   LORazepam (ATIVAN) tablet 0.5 mg (0.5 mg Oral Given 3/7/25 1626)   predniSONE tablet 20 mg (has no administration in time range)       ED Risk Strat Scores                    Identification of Seniors at Risk      Flowsheet Row Most Recent Value   (ISAR) Identification of Seniors at Risk    Before the illness or injury that brought you to the Emergency, did you need someone to help you on a regular basis? 0 Filed at: 03/07/2025 1147   In the last 24 hours, have you needed more help than usual? 1 Filed at: 03/07/2025 1147   Have you been hospitalized for one or more nights during the past 6 months? 1 Filed at: 03/07/2025 1147   In general, do you see well? 0 Filed at: 03/07/2025 1147   In general, do you have serious problems with your memory? 0 Filed at: 03/07/2025 1147   Do you take more than three different medications every day? 1 Filed at: 03/07/2025 1147   ISAR Score 3 Filed at: 03/07/2025 1147                SBIRT 20yo+      Flowsheet Row Most Recent Value   Initial Alcohol Screen: US AUDIT-C     1. How often do you have a drink containing alcohol? 0 Filed at: 03/07/2025 1147   2. How many drinks containing alcohol do you have on a typical day you are drinking?  0 Filed at: 03/07/2025 1147   3b. FEMALE Any Age, or MALE 65+: How often do you have 4 or more drinks on one occassion? 0 Filed at: 03/07/2025 1147   Audit-C Score 0 Filed at: 03/07/2025 1147   [place]: How many times in the past year have you...    Used an illegal drug or used a prescription medication for non-medical reasons? Never Filed at: 03/07/2025 1147                            History of Present Illness       Chief Complaint   Patient presents with    Medical Problem     Pt referred from family PCP due to being reported to need rehab as she is unable to stand well or ambulate even short distances. Pt reports ongoing SOB, 98% RA.       Past Medical History:   Diagnosis Date    Common bile duct  dilatation 2020    Glaucoma     H/O degenerative disc disease     Idiopathic pulmonary fibrosis (HCC) 2020    Irregular heart beat     afib    Peripheral neuropathy     S/P laparoscopic cholecystectomy 2020    Stenosis of right subclavian artery (HCC) 2016      Past Surgical History:   Procedure Laterality Date    CATARACT EXTRACTION, BILATERAL      CHOLECYSTECTOMY      CHOLECYSTECTOMY LAPAROSCOPIC N/A 2020    Procedure: CHOLECYSTECTOMY LAPAROSCOPIC;  Surgeon: Kalen Garrett MD;  Location: [facility];  Service: General    COLONOSCOPY      CYST REMOVAL  2009    left lower Quadrant     FL LUMBAR PUNCTURE DIAGNOSTIC  2023    HERNIA REPAIR  1992    LIPOMA RESECTION  2010    SD RPR 1ST INGUN HRNA AGE 5 YRS/> REDUCIBLE Bilateral 2018    Procedure: INGUINAL HERNIA REPAIR;  Surgeon: Volodymyr Lee MD;  Location: [facility];  Service: General    SHOULDER SURGERY  2019    Dr. Arnett ([place])    UPPER GASTROINTESTINAL ENDOSCOPY      VASCULAR SURGERY      Agram-  R carotid occlusion      Family History   Problem Relation Age of Onset    Heart disease Mother     Heart attack Father     Hiatal hernia Father     Diabetes Father     Cancer Brother     Diabetes Brother     Heart disease Brother     Hypertension Brother     Lung disease Brother 75        interstitial lung disease    Cancer Brother 49        glioblastoma    Other Son         gastroparesis    Other Cousin         interstitial lung disease      Social History     Tobacco Use    Smoking status: Former     Current packs/day: 0.00     Average packs/day: 1.5 packs/day for 38.0 years (57.0 ttl pk-yrs)     Types: Cigarettes     Start date:      Quit date:      Years since quittin.2    Smokeless tobacco: Never   Vaping Use    Vaping status: Never Used   Substance Use Topics    Alcohol use: Never    Drug use: No      E-Cigarette/Vaping    E-Cigarette Use Never User       E-Cigarette/Vaping Substances    Nicotine No      THC No     CBD No     Flavoring No     Other No     Unknown No       I have reviewed and agree with the history as documented.     HPI  Patient is a 89 y.o. female with history of pulmonary fibrosis, A-fib with pacemaker, diabetes, aortic stenosis presenting to the emergency department for shortness of breath, fatigue, ambulatory dysfunction.  Per patient and her family she was sent in by their primary care provider today due to ongoing shortness of breath.  They feel that patient may require rehab as patient no longer feels safe at home due to this worsening shortness of breath and ambulatory dysfunction.  Patient denies having any chest pain, focal weakness, cough, fever or other complaints at this time.       Review of Systems   Constitutional:  Negative for fever.   HENT:  Negative for congestion.    Eyes:  Negative for visual disturbance.   Respiratory:  Positive for shortness of breath.    Cardiovascular:  Negative for chest pain.   Gastrointestinal:  Negative for abdominal pain, diarrhea and vomiting.   Endocrine: Negative for polyuria.   Genitourinary:  Negative for dysuria.   Musculoskeletal:  Positive for gait problem.   Skin:  Negative for rash.   Neurological:  Positive for weakness. Negative for dizziness.   All other systems reviewed and are negative.          Objective       ED Triage Vitals   Temperature Pulse Blood Pressure Respirations SpO2 Patient Position - Orthostatic VS   03/07/25 1145 03/07/25 1145 03/07/25 1145 03/07/25 1145 03/07/25 1145 03/07/25 1145   97.6 °F (36.4 °C) 79 (!) 172/86 18 96 % Sitting      Temp Source Heart Rate Source BP Location FiO2 (%) Pain Score    03/07/25 1145 03/07/25 1145 03/07/25 1145 -- 03/07/25 1315    Temporal Monitor Right arm  No Pain      Vitals      Date and Time Temp Pulse SpO2 Resp BP Pain Score FACES Pain Rating User   03/11/25 1132 -- -- 98 % -- -- -- -- JS   03/11/25 0830 -- -- -- -- -- 3 -- JM   03/11/25 0639 -- 60 96 % 17 165/73 -- -- DII    03/10/25 2108 -- 64 95 % -- 138/69 -- -- DII   03/10/25 2027 -- -- -- -- -- No Pain -- LJ   03/10/25 1502 -- 70 94 % 17 140/69 -- -- DII   03/10/25 1132 -- -- -- -- -- 3 -- JM   03/10/25 0800 97.6 °F (36.4 °C) -- -- -- 168/74 -- -- AG   03/10/25 0709 -- 71 96 % 17 180/88 -- -- DII   03/10/25 0058 -- -- -- -- -- 8 --    03/09/25 2202 97.6 °F (36.4 °C) -- -- -- -- -- -- SW   03/09/25 2202 -- 66 95 % 18 149/76 -- -- DII   03/09/25 1927 -- -- -- -- -- No Pain -- BW   03/09/25 1427 -- 70 95 % 19 145/68 -- -- DII   03/09/25 0937 -- 84 97 % -- 114/64 -- -- DII   03/09/25 0800 -- -- 97 % -- -- No Pain -- AM   03/09/25 0650 -- 63 96 % 18 175/73 -- -- DII   03/08/25 2242 97.1 °F (36.2 °C) -- -- -- -- -- -- BW   03/08/25 2242 -- 76 97 % 16 119/63 -- -- DII   03/08/25 2028 -- -- -- -- -- No Pain -- BW   03/08/25 1403 -- 77 97 % 18 120/64 -- -- DII   03/08/25 0856 -- -- -- -- -- 4 -- AM   03/08/25 0851 -- -- -- 18 -- -- -- AM   03/08/25 0851 -- 72 95 % -- 118/63 -- -- DII   03/08/25 0835 -- -- -- -- -- No Pain -- CA   03/08/25 0834 -- -- -- -- -- No Pain -- JA   03/08/25 0800 -- -- 95 % -- -- -- -- AM   03/07/25 2206 -- -- -- -- -- 3 -- BW   03/07/25 2146 98.1 °F (36.7 °C) -- -- -- -- -- -- LS   03/07/25 2146 -- 80 100 % 16 114/65 -- -- DII   03/07/25 2019 97.4 °F (36.3 °C) 74 94 % 16 112/68 -- -- DII   03/07/25 1827 -- -- 95 % -- -- -- -- AF   03/07/25 1815 -- 74 98 % 20 -- -- -- JS   03/07/25 1315 -- 60 97 % -- 156/70 No Pain -- MI   03/07/25 1145 97.6 °F (36.4 °C) 79 96 % 18 172/86 -- -- BL            Physical Exam  Vitals and nursing note reviewed.   Constitutional:       General: She is not in acute distress.     Appearance: Normal appearance. She is not ill-appearing.   HENT:      Head: Normocephalic and atraumatic.      Mouth/Throat:      Mouth: Mucous membranes are moist.   Eyes:      Conjunctiva/sclera: Conjunctivae normal.   Cardiovascular:      Rate and Rhythm: Normal rate.      Pulses: Normal pulses.      Heart  sounds: Normal heart sounds.   Pulmonary:      Effort: Pulmonary effort is normal.      Breath sounds: Normal breath sounds.   Abdominal:      Palpations: Abdomen is soft.      Tenderness: There is no abdominal tenderness.   Musculoskeletal:         General: No tenderness.      Cervical back: Neck supple.   Skin:     General: Skin is warm and dry.      Capillary Refill: Capillary refill takes less than 2 seconds.   Neurological:      General: No focal deficit present.      Mental Status: She is alert. Mental status is at baseline.      Cranial Nerves: No cranial nerve deficit.   Psychiatric:         Mood and Affect: Mood normal.         Results Reviewed       Procedure Component Value Units Date/Time    Basic metabolic panel [[id number]]  (Abnormal) Collected: 03/08/25 0640    Lab Status: Final result Specimen: Blood from Arm, Right Updated: 03/08/25 0820     Sodium 132 mmol/L      Potassium 4.3 mmol/L      Chloride 95 mmol/L      CO2 30 mmol/L      ANION GAP 7 mmol/L      BUN 19 mg/dL      Creatinine 0.58 mg/dL      Glucose 171 mg/dL      Calcium 9.0 mg/dL      eGFR 83 ml/min/1.73sq m     Narrative:      National Kidney Disease Foundation guidelines for Chronic Kidney Disease (CKD):     Stage 1 with normal or high GFR (GFR > 90 mL/min/1.73 square meters)    Stage 2 Mild CKD (GFR = 60-89 mL/min/1.73 square meters)    Stage 3A Moderate CKD (GFR = 45-59 mL/min/1.73 square meters)    Stage 3B Moderate CKD (GFR = 30-44 mL/min/1.73 square meters)    Stage 4 Severe CKD (GFR = 15-29 mL/min/1.73 square meters)    Stage 5 End Stage CKD (GFR <15 mL/min/1.73 square meters)  Note: GFR calculation is accurate only with a steady state creatinine    Magnesium [[id number]]  (Abnormal) Collected: 03/08/25 0640    Lab Status: Final result Specimen: Blood from Arm, Right Updated: 03/08/25 0820     Magnesium 1.6 mg/dL     Vitamin B12 [[id number]]  (Abnormal) Collected: 03/08/25 0640    Lab Status: Final result Specimen: Blood from Arm,  Right Updated: 03/08/25 0739     Vitamin B-12 1,394 pg/mL     Folate [[id number]]  (Normal) Collected: 03/08/25 0640    Lab Status: Final result Specimen: Blood from Arm, Right Updated: 03/08/25 0739     Folate 16.4 ng/mL     TSH, 3rd generation with Free T4 reflex [[id number]]  (Normal) Collected: 03/08/25 0640    Lab Status: Final result Specimen: Blood from Arm, Right Updated: 03/08/25 0739     TSH 3RD GENERATON 2.552 uIU/mL     Fingerstick Glucose (POCT) [[id number]]  (Abnormal) Collected: 03/07/25 1737    Lab Status: Final result Specimen: Blood Updated: 03/07/25 1738     POC Glucose 205 mg/dl     CBC and differential [[id number]]  (Abnormal) Collected: 03/07/25 1333    Lab Status: Final result Specimen: Blood from Arm, Left Updated: 03/07/25 1658     WBC 9.48 Thousand/uL      RBC 4.70 Million/uL      Hemoglobin 11.6 g/dL      Hematocrit 38.2 %      MCV 81 fL      MCH 24.7 pg      MCHC 30.4 g/dL      RDW 16.4 %      MPV 9.8 fL      Platelets 228 Thousands/uL      nRBC 0 /100 WBCs      Segmented % 93 %      Immature Grans % 1 %      Lymphocytes % 4 %      Monocytes % 2 %      Eosinophils Relative 0 %      Basophils Relative 0 %      Absolute Neutrophils 8.79 Thousands/µL      Absolute Immature Grans 0.06 Thousand/uL      Absolute Lymphocytes 0.40 Thousands/µL      Absolute Monocytes 0.21 Thousand/µL      Eosinophils Absolute 0.01 Thousand/µL      Basophils Absolute 0.01 Thousands/µL     Narrative:      This is an appended report.  These results have been appended to a previously verified report.    HS Troponin I 2hr [[id number]]  (Normal) Collected: 03/07/25 1532    Lab Status: Final result Specimen: Blood from Arm, Left Updated: 03/07/25 1604     hs TnI 2hr 11 ng/L      Delta 2hr hsTnI -3 ng/L     Comprehensive metabolic panel [[id number]]  (Abnormal) Collected: 03/07/25 1434    Lab Status: Final result Specimen: Blood from Arm, Left Updated: 03/07/25 1534     Sodium 130 mmol/L      Potassium 4.4 mmol/L      Chloride  93 mmol/L      CO2 29 mmol/L      ANION GAP 8 mmol/L      BUN 15 mg/dL      Creatinine 0.52 mg/dL      Glucose 113 mg/dL      Calcium 8.6 mg/dL      AST 29 U/L      ALT 23 U/L      Alkaline Phosphatase 50 U/L      Total Protein 7.0 g/dL      Albumin 3.8 g/dL      Total Bilirubin 0.80 mg/dL      eGFR 86 ml/min/1.73sq m     Narrative:      National Kidney Disease Foundation guidelines for Chronic Kidney Disease (CKD):     Stage 1 with normal or high GFR (GFR > 90 mL/min/1.73 square meters)    Stage 2 Mild CKD (GFR = 60-89 mL/min/1.73 square meters)    Stage 3A Moderate CKD (GFR = 45-59 mL/min/1.73 square meters)    Stage 3B Moderate CKD (GFR = 30-44 mL/min/1.73 square meters)    Stage 4 Severe CKD (GFR = 15-29 mL/min/1.73 square meters)    Stage 5 End Stage CKD (GFR <15 mL/min/1.73 square meters)  Note: GFR calculation is accurate only with a steady state creatinine    HS Troponin 0hr (reflex protocol) [[id number]]  (Normal) Collected: 03/07/25 1333    Lab Status: Final result Specimen: Blood from Arm, Left Updated: 03/07/25 1418     hs TnI 0hr 14 ng/L     Fingerstick Glucose (POCT) [[id number]]  (Normal) Collected: 03/07/25 1332    Lab Status: Final result Specimen: Blood Updated: 03/07/25 1333     POC Glucose 96 mg/dl             XR chest 2 views   Final Interpretation by Alize Grewal MD (03/07 1445)      Moderate pulmonary fibrosis with no definite acute disease.            Workstation performed: [id number]             Procedures    ED Medication and Procedure Management   Prior to Admission Medications   Prescriptions Last Dose Informant Patient Reported? Taking?   Cyanocobalamin (Vitamin B 12) 500 MCG TABS   No No   Sig: Take 500 mcg by mouth 2 (two) times a day   HumuLIN N KwikPen 100 units/mL injection pen  Self No No   Sig: INJECT 8 UNITS UNDER THE SKIN EVERY MORNING AND 4 UNITS EVERY EVENING.   Insulin Pen Needle (B-D UF III MINI PEN NEEDLES) 31G X 5 MM MISC  Self No No   Sig: USE 2 (TWO) TIMES A DAY    LORazepam (ATIVAN) 0.5 mg tablet  Self No No   Sig: TAKE 1 TABLET (0.5 MG TOTAL) BY MOUTH 2 (TWO) TIMES A DAY AS NEEDED FOR ANXIETY   Multiple Vitamin (multivitamin) tablet  Self Yes No   Sig: Take 1 tablet by mouth daily     OneTouch Delica Lancets 33G MISC  Self No No   Sig: Check blood sugars four times daily. Please substitute with appropriate alternative as covered by patient's insurance. Dx: E11.65   acetaminophen (TYLENOL) 500 mg tablet  Self Yes No   Sig: Take 1,000 mg by mouth as needed for mild pain   apixaban (Eliquis) 2.5 mg   No No   Sig: TAKE 1 TABLET (2.5 MG TOTAL) BY MOUTH 2 (TWO) TIMES A DAY   bimatoprost (LUMIGAN) 0.01 % ophthalmic drops  Self No No   Sig: Administer 1 drop to both eyes daily at bedtime for 30 days   brimonidine tartrate 0.2 % ophthalmic solution  Self Yes No   Sig: INSTILL 1 DROP INTO RIGHT EYE TWICE A DAY   ezetimibe-simvastatin (VYTORIN) 10-40 mg per tablet  Self No No   Sig: TAKE 1 TABLET BY MOUTH DAILY AT BEDTIME   famotidine (PEPCID) 20 mg tablet   No No   Sig: TAKE 1 TABLET (20 MG TOTAL) BY MOUTH 2 (TWO) TIMES A DAY   glucose blood (OneTouch Ultra) test strip  Self No No   Sig: TEST FOUR TIMES A DAY   ipratropium (ATROVENT) 0.03 % nasal spray  Self No No   Sig: USE 2 SPRAYS INTO EACH NOSTRIL EVERY 12 (TWELVE) HOURS   metFORMIN (GLUCOPHAGE) 500 mg tablet   No No   Sig: TAKE 2 TABLETS (1,000 MG TOTAL) BY MOUTH 2 (TWO) TIMES A DAY WITH MEALS   metoprolol tartrate (LOPRESSOR) 25 mg tablet  Self No No   Sig: Take 1 tablet (25 mg total) by mouth every 12 (twelve) hours   mirtazapine (REMERON) 7.5 MG tablet   No No   Sig: TAKE 1 TABLET (7.5 MG TOTAL) BY MOUTH DAILY AT BEDTIME   pantoprazole (PROTONIX) 40 mg tablet  Self No No   Sig: TAKE 1 TABLET (40 MG TOTAL) BY MOUTH 2 (TWO) TIMES A DAY   polyethylene glycol (MIRALAX) 17 g packet   No No   Sig: Take 17 g by mouth daily as needed (constipation/ abominal cramping) for up to 14 days Prn constipation   predniSONE 10 mg tablet   No  No   Sig: Take 2 tablets (20 mg total) by mouth daily for 5 days, THEN 1 tablet (10 mg total) daily for 5 days. Do not start before March 6, 2025.   sitaGLIPtin (Januvia) 100 mg tablet   No No   Sig: TAKE 1 TABLET (100 MG TOTAL) BY MOUTH DAILY   sodium chloride 1 g tablet  Self No No   Sig: TAKE 1 TABLET THREE TIMES A DAY      Facility-Administered Medications: None     Discharge Medication List as of 3/11/2025  1:09 PM        START taking these medications    Details   amLODIPine (NORVASC) 5 mg tablet Take 1 tablet (5 mg total) by mouth daily, Starting Wed 3/12/2025, No Print      insulin glargine (LANTUS) 100 units/mL subcutaneous injection Inject 10 Units under the skin daily at bedtime, Starting Tue 3/11/2025, No Print      !! insulin lispro (HumALOG/ADMELOG) 100 units/mL injection Inject 1-5 Units under the skin 3 (three) times a day before meals, Starting Tue 3/11/2025, No Print      !! insulin lispro (HumALOG/ADMELOG) 100 units/mL injection Inject 1-5 Units under the skin daily at bedtime, Starting Tue 3/11/2025, No Print      lidocaine (LIDODERM) 5 % Apply 1 patch topically over 12 hours daily Remove & Discard patch within 12 hours or as directed by MD, Starting Wed 3/12/2025, No Print       !! - Potential duplicate medications found. Please discuss with provider.        CONTINUE these medications which have CHANGED    Details   LORazepam (ATIVAN) 0.5 mg tablet Take 1 tablet (0.5 mg total) by mouth 2 (two) times a day as needed for anxiety for up to 10 days, Starting Tue 3/11/2025, Until Fri 3/21/2025 at 2359, Print      mirtazapine (REMERON) 7.5 MG tablet Take 1 tablet (7.5 mg total) by mouth daily at bedtime, Starting Tue 3/11/2025, Print      predniSONE 10 mg tablet Take 1 tablet (10 mg total) by mouth daily for 4 doses, Starting Wed 3/12/2025, Until Sun 3/16/2025, No Print           CONTINUE these medications which have NOT CHANGED    Details   acetaminophen (TYLENOL) 500 mg tablet Take 1,000 mg by mouth  as needed for mild pain, Historical Med      apixaban (Eliquis) 2.5 mg TAKE 1 TABLET (2.5 MG TOTAL) BY MOUTH 2 (TWO) TIMES A DAY, Normal      bimatoprost (LUMIGAN) 0.01 % ophthalmic drops Administer 1 drop to both eyes daily at bedtime for 30 days, Starting Wed 11/2/2016, Print      brimonidine tartrate 0.2 % ophthalmic solution INSTILL 1 DROP INTO RIGHT EYE TWICE A DAY, Historical Med      Cyanocobalamin (Vitamin B 12) 500 MCG TABS Take 500 mcg by mouth 2 (two) times a day, Starting Wed 2/26/2025, No Print      ezetimibe-simvastatin (VYTORIN) 10-40 mg per tablet TAKE 1 TABLET BY MOUTH DAILY AT BEDTIME, Starting Mon 10/21/2024, Normal      famotidine (PEPCID) 20 mg tablet TAKE 1 TABLET (20 MG TOTAL) BY MOUTH 2 (TWO) TIMES A DAY, Starting Tue 2/18/2025, Normal      glucose blood (OneTouch Ultra) test strip TEST FOUR TIMES A DAY, Normal      HumuLIN N KwikPen 100 units/mL injection pen Multiple Dosages:Starting Wed 11/6/2024INJECT 8 UNITS UNDER THE SKIN EVERY MORNING AND 4 UNITS EVERY EVENING., Normal      Insulin Pen Needle (B-D UF III MINI PEN NEEDLES) 31G X 5 MM MISC USE 2 (TWO) TIMES A DAY, Normal      ipratropium (ATROVENT) 0.03 % nasal spray USE 2 SPRAYS INTO EACH NOSTRIL EVERY 12 (TWELVE) HOURS, Normal      metFORMIN (GLUCOPHAGE) 500 mg tablet TAKE 2 TABLETS (1,000 MG TOTAL) BY MOUTH 2 (TWO) TIMES A DAY WITH MEALS, Starting Tue 2/18/2025, Normal      metoprolol tartrate (LOPRESSOR) 25 mg tablet Take 1 tablet (25 mg total) by mouth every 12 (twelve) hours, Starting Wed 9/11/2024, Normal      Multiple Vitamin (multivitamin) tablet Take 1 tablet by mouth daily  , Historical Med      OneTouch Delica Lancets 33G MISC Check blood sugars four times daily. Please substitute with appropriate alternative as covered by patient's insurance. Dx: E11.65, Normal      pantoprazole (PROTONIX) 40 mg tablet TAKE 1 TABLET (40 MG TOTAL) BY MOUTH 2 (TWO) TIMES A DAY, Starting Tue 12/3/2024, Normal      sitaGLIPtin (Ramoneuvia) 100 mg  tablet TAKE 1 TABLET (100 MG TOTAL) BY MOUTH DAILY, Starting Tue 2/4/2025, Normal      sodium chloride 1 g tablet TAKE 1 TABLET THREE TIMES A DAY, Starting Mon 9/16/2024, Normal           STOP taking these medications       polyethylene glycol (MIRALAX) 17 g packet Comments:   Reason for Stopping:             Outpatient Discharge Orders   Discharge Diet     Discharge Condtion:  Stabilized     Patient Aware of Diagnosis: Yes     Free of Communicable Disease:   Yes     Physical Therapy Eval And Treat     Occupational Therapy Eval and Treat     Activity:  Per Rehab Recommendations     ED SEPSIS DOCUMENTATION   Time reflects when diagnosis was documented in both MDM as applicable and the Disposition within this note       Time User Action Codes Description Comment    3/7/2025  4:30 PM Rubina Reynoso [R09.02] Hypoxia     3/7/2025  4:30 PM Rubina Reynoso [R06.02] Shortness of breath     3/7/2025  5:22 PM Lucas Moreno [R06.09] Dyspnea on exertion     3/7/2025  5:22 PM Lucas Moreno [J84.9] Interstitial lung disease (HCC)     3/7/2025  5:45 PM Lucas Moreno [E44.1] Mild protein-calorie malnutrition (HCC)     3/9/2025 11:32 AM Alverto Rodríguez Add [I35.0] Aortic valve stenosis, etiology of cardiac valve disease unspecified     3/11/2025  9:00 AM Kavya Kulkarni [F41.9] Anxiety     3/11/2025  9:00 AM Kavya Kulkarni [G47.00] Insomnia, unspecified type     3/11/2025  9:01 AM Kavya Kulkarni [I10] Essential hypertension     3/11/2025  9:01 AM Kavya Kulkarni [E11.42,  Z79.4] Type 2 diabetes mellitus with diabetic polyneuropathy, with long-term current use of insulin (HCC)                  Rubina Reynoso DO  03/11/25 2025

## 2025-03-08 ENCOUNTER — RESULTS FOLLOW-UP (OUTPATIENT)
Dept: FAMILY MEDICINE CLINIC | Facility: CLINIC | Age: 89
End: 2025-03-08

## 2025-03-08 LAB
ACANTHOCYTES BLD QL SMEAR: PRESENT
ANION GAP SERPL CALCULATED.3IONS-SCNC: 7 MMOL/L (ref 4–13)
ANISOCYTOSIS BLD QL SMEAR: PRESENT
ATRIAL RATE: 81 BPM
B PARAP IS1001 DNA NPH QL NAA+NON-PROBE: NOT DETECTED
B PERT.PT PRMT NPH QL NAA+NON-PROBE: NOT DETECTED
BASOPHILS # BLD AUTO: 0.02 THOUSANDS/ÂΜL (ref 0–0.1)
BASOPHILS NFR BLD AUTO: 0 % (ref 0–1)
BUN SERPL-MCNC: 19 MG/DL (ref 5–25)
BURR CELLS BLD QL SMEAR: PRESENT
C PNEUM DNA NPH QL NAA+NON-PROBE: NOT DETECTED
CALCIUM SERPL-MCNC: 9 MG/DL (ref 8.4–10.2)
CHLORIDE SERPL-SCNC: 95 MMOL/L (ref 96–108)
CO2 SERPL-SCNC: 30 MMOL/L (ref 21–32)
CREAT SERPL-MCNC: 0.58 MG/DL (ref 0.6–1.3)
EOSINOPHIL # BLD AUTO: 0.06 THOUSAND/ÂΜL (ref 0–0.61)
EOSINOPHIL NFR BLD AUTO: 1 % (ref 0–6)
ERYTHROCYTE [DISTWIDTH] IN BLOOD BY AUTOMATED COUNT: 16.9 % (ref 11.6–15.1)
FERRITIN SERPL-MCNC: 106 NG/ML (ref 11–307)
FLUAV RNA NPH QL NAA+NON-PROBE: NOT DETECTED
FLUBV RNA NPH QL NAA+NON-PROBE: NOT DETECTED
FOLATE SERPL-MCNC: 16.4 NG/ML
GFR SERPL CREATININE-BSD FRML MDRD: 83 ML/MIN/1.73SQ M
GLUCOSE SERPL-MCNC: 171 MG/DL (ref 65–140)
GLUCOSE SERPL-MCNC: 176 MG/DL (ref 65–140)
GLUCOSE SERPL-MCNC: 205 MG/DL (ref 65–140)
GLUCOSE SERPL-MCNC: 321 MG/DL (ref 65–140)
GLUCOSE SERPL-MCNC: 323 MG/DL (ref 65–140)
HADV DNA NPH QL NAA+NON-PROBE: NOT DETECTED
HCOV 229E RNA NPH QL NAA+NON-PROBE: NOT DETECTED
HCOV HKU1 RNA NPH QL NAA+NON-PROBE: NOT DETECTED
HCOV NL63 RNA NPH QL NAA+NON-PROBE: NOT DETECTED
HCOV OC43 RNA NPH QL NAA+NON-PROBE: NOT DETECTED
HCT VFR BLD AUTO: 37.7 % (ref 34.8–46.1)
HGB BLD-MCNC: 11.5 G/DL (ref 11.5–15.4)
HMPV RNA NPH QL NAA+NON-PROBE: NOT DETECTED
HPIV1 RNA NPH QL NAA+NON-PROBE: NOT DETECTED
HPIV2 RNA NPH QL NAA+NON-PROBE: NOT DETECTED
HPIV3 RNA NPH QL NAA+NON-PROBE: NOT DETECTED
HPIV4 RNA NPH QL NAA+NON-PROBE: NOT DETECTED
IMM GRANULOCYTES # BLD AUTO: 0.09 THOUSAND/UL (ref 0–0.2)
IMM GRANULOCYTES NFR BLD AUTO: 1 % (ref 0–2)
IRON SATN MFR SERPL: 10 % (ref 15–50)
IRON SERPL-MCNC: 32 UG/DL (ref 50–212)
LYMPHOCYTES # BLD AUTO: 0.45 THOUSANDS/ÂΜL (ref 0.6–4.47)
LYMPHOCYTES NFR BLD AUTO: 4 % (ref 14–44)
M PNEUMO DNA NPH QL NAA+NON-PROBE: NOT DETECTED
MAGNESIUM SERPL-MCNC: 1.6 MG/DL (ref 1.9–2.7)
MCH RBC QN AUTO: 24.6 PG (ref 26.8–34.3)
MCHC RBC AUTO-ENTMCNC: 30.5 G/DL (ref 31.4–37.4)
MCV RBC AUTO: 81 FL (ref 82–98)
MICROCYTES BLD QL AUTO: PRESENT
MONOCYTES # BLD AUTO: 0.53 THOUSAND/ÂΜL (ref 0.17–1.22)
MONOCYTES NFR BLD AUTO: 4 % (ref 4–12)
NEUTROPHILS # BLD AUTO: 10.88 THOUSANDS/ÂΜL (ref 1.85–7.62)
NEUTS SEG NFR BLD AUTO: 90 % (ref 43–75)
NRBC BLD AUTO-RTO: 0 /100 WBCS
OVALOCYTES BLD QL SMEAR: PRESENT
P AXIS: 16 DEGREES
PLATELET # BLD AUTO: 241 THOUSANDS/UL (ref 149–390)
PLATELET BLD QL SMEAR: ADEQUATE
PMV BLD AUTO: 9.9 FL (ref 8.9–12.7)
POIKILOCYTOSIS BLD QL SMEAR: PRESENT
POTASSIUM SERPL-SCNC: 4.3 MMOL/L (ref 3.5–5.3)
PR INTERVAL: 210 MS
QRS AXIS: -43 DEGREES
QRSD INTERVAL: 90 MS
QT INTERVAL: 372 MS
QTC INTERVAL: 432 MS
RBC # BLD AUTO: 4.67 MILLION/UL (ref 3.81–5.12)
RBC MORPH BLD: PRESENT
RSV RNA NPH QL NAA+NON-PROBE: NOT DETECTED
RV+EV RNA NPH QL NAA+NON-PROBE: NOT DETECTED
SARS-COV-2 RNA NPH QL NAA+NON-PROBE: NOT DETECTED
SODIUM SERPL-SCNC: 132 MMOL/L (ref 135–147)
T WAVE AXIS: 70 DEGREES
TIBC SERPL-MCNC: 308 UG/DL (ref 250–450)
TRANSFERRIN SERPL-MCNC: 220 MG/DL (ref 203–362)
TSH SERPL DL<=0.05 MIU/L-ACNC: 2.55 UIU/ML (ref 0.45–4.5)
UIBC SERPL-MCNC: 276 UG/DL (ref 155–355)
VENTRICULAR RATE: 81 BPM
VIT B12 SERPL-MCNC: 1394 PG/ML (ref 180–914)
WBC # BLD AUTO: 12.03 THOUSAND/UL (ref 4.31–10.16)

## 2025-03-08 PROCEDURE — 0202U NFCT DS 22 TRGT SARS-COV-2: CPT

## 2025-03-08 PROCEDURE — 82728 ASSAY OF FERRITIN: CPT | Performed by: INTERNAL MEDICINE

## 2025-03-08 PROCEDURE — 82948 REAGENT STRIP/BLOOD GLUCOSE: CPT

## 2025-03-08 PROCEDURE — 82607 VITAMIN B-12: CPT | Performed by: INTERNAL MEDICINE

## 2025-03-08 PROCEDURE — 84443 ASSAY THYROID STIM HORMONE: CPT | Performed by: INTERNAL MEDICINE

## 2025-03-08 PROCEDURE — 83550 IRON BINDING TEST: CPT | Performed by: INTERNAL MEDICINE

## 2025-03-08 PROCEDURE — 97163 PT EVAL HIGH COMPLEX 45 MIN: CPT

## 2025-03-08 PROCEDURE — 83540 ASSAY OF IRON: CPT | Performed by: INTERNAL MEDICINE

## 2025-03-08 PROCEDURE — 82746 ASSAY OF FOLIC ACID SERUM: CPT | Performed by: INTERNAL MEDICINE

## 2025-03-08 PROCEDURE — 97167 OT EVAL HIGH COMPLEX 60 MIN: CPT

## 2025-03-08 PROCEDURE — 99232 SBSQ HOSP IP/OBS MODERATE 35: CPT | Performed by: PHYSICIAN ASSISTANT

## 2025-03-08 PROCEDURE — 99223 1ST HOSP IP/OBS HIGH 75: CPT | Performed by: INTERNAL MEDICINE

## 2025-03-08 PROCEDURE — 80048 BASIC METABOLIC PNL TOTAL CA: CPT | Performed by: INTERNAL MEDICINE

## 2025-03-08 PROCEDURE — 83735 ASSAY OF MAGNESIUM: CPT | Performed by: INTERNAL MEDICINE

## 2025-03-08 PROCEDURE — 93010 ELECTROCARDIOGRAM REPORT: CPT | Performed by: INTERNAL MEDICINE

## 2025-03-08 RX ORDER — MAGNESIUM SULFATE HEPTAHYDRATE 40 MG/ML
2 INJECTION, SOLUTION INTRAVENOUS ONCE
Status: COMPLETED | OUTPATIENT
Start: 2025-03-08 | End: 2025-03-08

## 2025-03-08 RX ADMIN — MAGNESIUM SULFATE HEPTAHYDRATE 2 G: 40 INJECTION, SOLUTION INTRAVENOUS at 08:48

## 2025-03-08 RX ADMIN — PANTOPRAZOLE SODIUM 40 MG: 40 TABLET, DELAYED RELEASE ORAL at 08:48

## 2025-03-08 RX ADMIN — MIRTAZAPINE 7.5 MG: 15 TABLET, FILM COATED ORAL at 21:55

## 2025-03-08 RX ADMIN — METOPROLOL TARTRATE 25 MG: 25 TABLET, FILM COATED ORAL at 08:49

## 2025-03-08 RX ADMIN — INSULIN LISPRO 3 UNITS: 100 INJECTION, SOLUTION INTRAVENOUS; SUBCUTANEOUS at 21:57

## 2025-03-08 RX ADMIN — APIXABAN 2.5 MG: 2.5 TABLET, FILM COATED ORAL at 08:49

## 2025-03-08 RX ADMIN — INSULIN LISPRO 8 UNITS: 100 INJECTION, SOLUTION INTRAVENOUS; SUBCUTANEOUS at 08:51

## 2025-03-08 RX ADMIN — SITAGLIPTIN 100 MG: 100 TABLET, FILM COATED ORAL at 08:49

## 2025-03-08 RX ADMIN — INSULIN LISPRO 1 UNITS: 100 INJECTION, SOLUTION INTRAVENOUS; SUBCUTANEOUS at 08:51

## 2025-03-08 RX ADMIN — BRIMONIDINE TARTRATE 1 DROP: 2 SOLUTION/ DROPS OPHTHALMIC at 17:19

## 2025-03-08 RX ADMIN — INSULIN LISPRO 3 UNITS: 100 INJECTION, SOLUTION INTRAVENOUS; SUBCUTANEOUS at 17:18

## 2025-03-08 RX ADMIN — BRIMONIDINE TARTRATE 1 DROP: 2 SOLUTION/ DROPS OPHTHALMIC at 08:54

## 2025-03-08 RX ADMIN — PANTOPRAZOLE SODIUM 40 MG: 40 TABLET, DELAYED RELEASE ORAL at 21:55

## 2025-03-08 RX ADMIN — LORAZEPAM 0.5 MG: 0.5 TABLET ORAL at 21:55

## 2025-03-08 RX ADMIN — SODIUM CHLORIDE 1 G: 1 TABLET ORAL at 08:48

## 2025-03-08 RX ADMIN — PREDNISONE 20 MG: 20 TABLET ORAL at 08:48

## 2025-03-08 RX ADMIN — BIMATOPROST 1 DROP: 0.3 SOLUTION/ DROPS OPHTHALMIC at 21:57

## 2025-03-08 RX ADMIN — EZETIMIBE 10 MG: 10 TABLET ORAL at 17:18

## 2025-03-08 RX ADMIN — FAMOTIDINE 20 MG: 20 TABLET, FILM COATED ORAL at 08:48

## 2025-03-08 RX ADMIN — CYANOCOBALAMIN TAB 500 MCG 500 MCG: 500 TAB at 08:48

## 2025-03-08 RX ADMIN — SODIUM CHLORIDE 1 G: 1 TABLET ORAL at 17:20

## 2025-03-08 RX ADMIN — ACETAMINOPHEN 650 MG: 325 TABLET, FILM COATED ORAL at 08:56

## 2025-03-08 RX ADMIN — METOPROLOL TARTRATE 25 MG: 25 TABLET, FILM COATED ORAL at 21:55

## 2025-03-08 RX ADMIN — APIXABAN 2.5 MG: 2.5 TABLET, FILM COATED ORAL at 17:18

## 2025-03-08 RX ADMIN — INSULIN LISPRO 4 UNITS: 100 INJECTION, SOLUTION INTRAVENOUS; SUBCUTANEOUS at 17:18

## 2025-03-08 RX ADMIN — CYANOCOBALAMIN TAB 500 MCG 500 MCG: 500 TAB at 17:18

## 2025-03-08 RX ADMIN — INSULIN LISPRO 1 UNITS: 100 INJECTION, SOLUTION INTRAVENOUS; SUBCUTANEOUS at 12:09

## 2025-03-08 RX ADMIN — PRAVASTATIN SODIUM 80 MG: 80 TABLET ORAL at 17:18

## 2025-03-08 RX ADMIN — SODIUM CHLORIDE 1 G: 1 TABLET ORAL at 21:55

## 2025-03-08 RX ADMIN — FAMOTIDINE 20 MG: 20 TABLET, FILM COATED ORAL at 17:18

## 2025-03-08 RX ADMIN — LORAZEPAM 0.5 MG: 0.5 TABLET ORAL at 15:53

## 2025-03-08 NOTE — ASSESSMENT & PLAN NOTE
Malnutrition Findings:           BMI Findings:           Body mass index is 18.88 kg/m².   Nutrition consult

## 2025-03-08 NOTE — ASSESSMENT & PLAN NOTE
Patient had recent hospitalization 02/24/2025 to 02/26/2025 for ILD flareup was discharged on prednisone taper  CXR this admission as above  Pulm consult pending

## 2025-03-08 NOTE — PLAN OF CARE
Problem: Potential for Falls  Goal: Patient will remain free of falls  Description: INTERVENTIONS:  - Educate patient/family on patient safety including physical limitations  - Instruct patient to call for assistance with activity   - Consult OT/PT to assist with strengthening/mobility   - Keep Call bell within reach  - Keep bed low and locked with side rails adjusted as appropriate  - Keep care items and personal belongings within reach  - Initiate and maintain comfort rounds  - Make Fall Risk Sign visible to staff  - Offer Toileting every  Hours, in advance of need  - Initiate/Maintain alarm  - Obtain necessary fall risk management equipment:   - Apply yellow socks and bracelet for high fall risk patients  - Consider moving patient to room near nurses station  Outcome: Progressing     Problem: PAIN - ADULT  Goal: Verbalizes/displays adequate comfort level or baseline comfort level  Description: Interventions:  - Encourage patient to monitor pain and request assistance  - Assess pain using appropriate pain scale  - Administer analgesics based on type and severity of pain and evaluate response  - Implement non-pharmacological measures as appropriate and evaluate response  - Consider cultural and social influences on pain and pain management  - Notify physician/advanced practitioner if interventions unsuccessful or patient reports new pain  Outcome: Progressing     Problem: SAFETY ADULT  Goal: Patient will remain free of falls  Description: INTERVENTIONS:  - Educate patient/family on patient safety including physical limitations  - Instruct patient to call for assistance with activity   - Consult OT/PT to assist with strengthening/mobility   - Keep Call bell within reach  - Keep bed low and locked with side rails adjusted as appropriate  - Keep care items and personal belongings within reach  - Initiate and maintain comfort rounds  - Make Fall Risk Sign visible to staff  - Offer Toileting every  Hours, in advance  of need  - Initiate/Maintain alarm  - Obtain necessary fall risk management equipment:  - Apply yellow socks and bracelet for high fall risk patients  - Consider moving patient to room near nurses station  Outcome: Progressing  Goal: Maintain or return to baseline ADL function  Description: INTERVENTIONS:  -  Assess patient's ability to carry out ADLs; assess patient's baseline for ADL function and identify physical deficits which impact ability to perform ADLs (bathing, care of mouth/teeth, toileting, grooming, dressing, etc.)  - Assess/evaluate cause of self-care deficits   - Assess range of motion  - Assess patient's mobility; develop plan if impaired  - Assess patient's need for assistive devices and provide as appropriate  - Encourage maximum independence but intervene and supervise when necessary  - Involve family in performance of ADLs  - Assess for home care needs following discharge   - Consider OT consult to assist with ADL evaluation and planning for discharge  - Provide patient education as appropriate  Outcome: Progressing  Goal: Maintains/Returns to pre admission functional level  Description: INTERVENTIONS:  - Perform AM-PAC 6 Click Basic Mobility/ Daily Activity assessment daily.  - Set and communicate daily mobility goal to care team and patient/family/caregiver.   - Collaborate with rehabilitation services on mobility goals if consulted  - Perform Range of Motion  times a day.  - Reposition patient every  hours.  - Dangle patient times a day  - Stand patient  times a day  - Ambulate patient  times a day  - Out of bed to chair times a day   - Out of bed for times a day  - Out of bed for toileting  - Record patient progress and toleration of activity level   Outcome: Progressing     Problem: DISCHARGE PLANNING  Goal: Discharge to home or other facility with appropriate resources  Description: INTERVENTIONS:  - Identify barriers to discharge w/patient and caregiver  - Arrange for needed discharge  resources and transportation as appropriate  - Identify discharge learning needs (meds, wound care, etc.)  - Arrange for interpretive services to assist at discharge as needed  - Refer to Case Management Department for coordinating discharge planning if the patient needs post-hospital services based on physician/advanced practitioner order or complex needs related to functional status, cognitive ability, or social support system  Outcome: Progressing     Problem: RESPIRATORY - ADULT  Goal: Achieves optimal ventilation and oxygenation  Description: INTERVENTIONS:  - Assess for changes in respiratory status  - Assess for changes in mentation and behavior  - Position to facilitate oxygenation and minimize respiratory effort  - Oxygen administered by appropriate delivery if ordered  - Initiate smoking cessation education as indicated  - Encourage broncho-pulmonary hygiene including cough, deep breathe, Incentive Spirometry  - Assess the need for suctioning and aspirate as needed  - Assess and instruct to report SOB or any respiratory difficulty  - Respiratory Therapy support as indicated  Outcome: Progressing

## 2025-03-08 NOTE — ASSESSMENT & PLAN NOTE
"Patient was sent to the hospital by her PCP on 3/7/25 due to progressive dyspnea on exertion  Underlying history of interstitial lung disease, ROMERO  Recently hospitalized twice for dyspnea on exertion and hemoptysis  Currently on tapered dose of prednisone, continue  CXR revealed, \"Moderate pulmonary fibrosis with no definite acute disease.\"  Pulm consult pending  On room air     "

## 2025-03-08 NOTE — PROGRESS NOTES
"Progress Note - Hospitalist   Name: Ac Duran 89 y.o. female I MRN: [id number]  Unit/Bed#: [facility] 606-01 I Date of Admission: 3/7/2025   Date of Service: 3/8/2025 I Hospital Day: 1    Assessment & Plan  Dyspnea on exertion  Patient was sent to the hospital by her PCP on 3/7/25 due to progressive dyspnea on exertion  Underlying history of interstitial lung disease, ROMERO  Recently hospitalized twice for dyspnea on exertion and hemoptysis  Currently on tapered dose of prednisone, continue  CXR revealed, \"Moderate pulmonary fibrosis with no definite acute disease.\"  Pulm consult pending  On room air     Interstitial lung disease (HCC)  Patient had recent hospitalization 02/24/2025 to 02/26/2025 for ILD flareup was discharged on prednisone taper  CXR this admission as above  Pulm consult pending   Aortic stenosis  Recent echo shows mild to moderate aortic stenosis   Outpatient cardiology follow-up  Essential hypertension  Stable BP  Monitor  Glaucoma  Continue home eyedrops  Paroxysmal atrial fibrillation (HCC)  Stable heart rate  Continue metoprolol and Eliquis  GERD (gastroesophageal reflux disease)  Continue with Protonix and Pepcid  Moderate protein-calorie malnutrition (HCC)  Malnutrition Findings:           BMI Findings:           Body mass index is 18.88 kg/m².   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   Continue sodium tablet  Oral fluid restrictions  Monitor  DM2 (diabetes mellitus, type 2) ([facility])  Lab Results   Component Value Date    HGBA1C 7.5 (H) 03/07/2025       Recent Labs     03/07/25  1737 03/07/25  2100 03/08/25  0655 03/08/25  1122   POCGLU 205* 200* 176* 205*       Blood Sugar Average: Last 72 hrs:  (P) 176.4    Continue along 8 units with breakfast, 4 units with dinner, and sliding scale 3 times daily before meals and nightly  ROMERO (obstructive sleep apnea)  CPAP at bedtime    VTE Pharmacologic " Prophylaxis: VTE Score: 6 High Risk (Score >/= 5) - Pharmacological DVT Prophylaxis Ordered: apixaban (Eliquis). Sequential Compression Devices Ordered.    Mobility:   Basic Mobility Inpatient Raw Score: 18  JH-HLM Goal: 6: Walk 10 steps or more  JH-HLM Achieved: 4: Move to chair/commode  JH-HLM Goal NOT achieved. Continue with multidisciplinary rounding and encourage appropriate mobility to improve upon JH-HLM goals.    Patient Centered Rounds: I performed bedside rounds with nursing staff today.   Discussions with Specialists or Other Care Team Provider: Dr. Obregon - Pulm    Education and Discussions with Family / Patient: Updated  (son) via phone. Rey    Current Length of Stay: 1 day(s)  Current Patient Status: Inpatient   Certification Statement: The patient will continue to require additional inpatient hospital stay due to Pulm consult   Discharge Plan:  pending Pulm consult and symptom improvement.    Code Status: Level 3 - DNAR and DNI    Subjective   Ms. Duran reports DASILVA    Objective :  Temp:  [97.4 °F (36.3 °C)-98.1 °F (36.7 °C)] 98.1 °F (36.7 °C)  HR:  [60-80] 72  BP: (112-156)/(63-70) 118/63  Resp:  [16-20] 18  SpO2:  [94 %-100 %] 95 %  O2 Device: None (Room air)    Body mass index is 18.88 kg/m².     Input and Output Summary (last 24 hours):     Intake/Output Summary (Last 24 hours) at 3/8/2025 1247  Last data filed at 3/8/2025 1243  Gross per 24 hour   Intake 480 ml   Output --   Net 480 ml       Physical Exam  Vitals reviewed.   Constitutional:       Comments: Patient seen sitting comfortably resting, NAD   Cardiovascular:      Rate and Rhythm: Normal rate and regular rhythm.   Pulmonary:      Effort: Pulmonary effort is normal.      Breath sounds: Normal breath sounds.      Comments: 96% on RA  Abdominal:      General: Bowel sounds are normal.      Palpations: Abdomen is soft.      Tenderness: There is no abdominal tenderness.   Musculoskeletal:      Right lower leg: No edema.       Left lower leg: No edema.   Skin:     General: Skin is warm.   Neurological:      Mental Status: She is alert and oriented to person, place, and time.   Psychiatric:         Mood and Affect: Mood normal.           Lines/Drains:              Lab Results: I have reviewed the following results:   Results from last 7 days   Lab Units 03/07/25  1333   WBC Thousand/uL 9.48   HEMOGLOBIN g/dL 11.6   HEMATOCRIT % 38.2   PLATELETS Thousands/uL 228   SEGS PCT % 93*   LYMPHO PCT % 4*   MONO PCT % 2*   EOS PCT % 0     Results from last 7 days   Lab Units 03/08/25  0640 03/07/25  1434   SODIUM mmol/L 132* 130*   POTASSIUM mmol/L 4.3 4.4   CHLORIDE mmol/L 95* 93*   CO2 mmol/L 30 29   BUN mg/dL 19 15   CREATININE mg/dL 0.58* 0.52*   ANION GAP mmol/L 7 8   CALCIUM mg/dL 9.0 8.6   ALBUMIN g/dL  --  3.8   TOTAL BILIRUBIN mg/dL  --  0.80   ALK PHOS U/L  --  50   ALT U/L  --  23   AST U/L  --  29   GLUCOSE RANDOM mg/dL 171* 113     Results from last 7 days   Lab Units 03/02/25  0942   INR  1.01     Results from last 7 days   Lab Units 03/08/25  1122 03/08/25  0655 03/07/25  2100 03/07/25  1737 03/07/25  1332 03/05/25  1616 03/05/25  1009 03/05/25  0703 03/04/25  2121 03/04/25  1602 03/04/25  1048 03/04/25  0709   POC GLUCOSE mg/dl 205* 176* 200* 205* 96 292* 220* 176* 327* 321* 148* 154*     Results from last 7 days   Lab Units 03/07/25  1031   HEMOGLOBIN A1C % 7.5*     Results from last 7 days   Lab Units 03/03/25  0445 03/02/25  1202 03/02/25  0942   LACTIC ACID mmol/L  --  1.2 2.1*   PROCALCITONIN ng/ml <0.05  --  <0.05       Recent Cultures (last 7 days):   Results from last 7 days   Lab Units 03/02/25  1011 03/02/25  0934   BLOOD CULTURE  No Growth After 5 Days. No Growth After 5 Days.       Imaging Results Review: I reviewed radiology reports from this admission including: chest xray.      Last 24 Hours Medication List:     Current Facility-Administered Medications:     acetaminophen (TYLENOL) tablet 650 mg, Q4H PRN    apixaban  (ELIQUIS) tablet 2.5 mg, BID    bimatoprost (LUMIGAN) 0.03 % ophthalmic drops 1 drop, HS    brimonidine tartrate 0.2 % ophthalmic solution 1 drop, BID    cyanocobalamin (VITAMIN B-12) tablet 500 mcg, BID    ezetimibe (ZETIA) tablet 10 mg, Daily With Dinner **AND** pravastatin (PRAVACHOL) tablet 80 mg, Daily With Dinner    famotidine (PEPCID) tablet 20 mg, BID    insulin lispro (HumALOG/ADMELOG) 100 units/mL subcutaneous injection 1-5 Units, TID AC **AND** Fingerstick Glucose (POCT), TID AC    insulin lispro (HumALOG/ADMELOG) 100 units/mL subcutaneous injection 1-5 Units, HS    insulin lispro (HumALOG/ADMELOG) 100 units/mL subcutaneous injection 4 Units, Daily With Dinner    insulin lispro (HumALOG/ADMELOG) 100 units/mL subcutaneous injection 8 Units, Daily With Breakfast    LORazepam (ATIVAN) tablet 0.5 mg, BID PRN    metoprolol tartrate (LOPRESSOR) tablet 25 mg, Q12H JONNA    mirtazapine (REMERON) tablet 7.5 mg, HS    ondansetron (ZOFRAN) injection 4 mg, Q6H PRN    pantoprazole (PROTONIX) EC tablet 40 mg, BID    predniSONE tablet 20 mg, Daily **FOLLOWED BY** [START ON 3/11/2025] predniSONE tablet 10 mg, Daily    sitaGLIPtin (JANUVIA) tablet 100 mg, Daily    sodium chloride tablet 1 g, TID    Administrative Statements   Today, Patient Was Seen By: Alverto Rodríguez PA-C      **Please Note: This note may have been constructed using a voice recognition system.**

## 2025-03-08 NOTE — OCCUPATIONAL THERAPY NOTE
Occupational Therapy Evaluation     Patient Name: Ac Duran  Today's Date: 3/8/2025  Problem List  Principal Problem:    Dyspnea on exertion  Active Problems:    Essential hypertension    Glaucoma    Paroxysmal atrial fibrillation (HCC)    GERD (gastroesophageal reflux disease)    Interstitial lung disease (HCC)    Moderate protein-calorie malnutrition (HCC)    Hyponatremia    DM2 (diabetes mellitus, type 2) (HCC)    ROMERO (obstructive sleep apnea)    Aortic stenosis    Past Medical History  Past Medical History:   Diagnosis Date    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/7/2020    Glaucoma     H/O degenerative disc disease     Idiopathic pulmonary fibrosis (HCC) 11/2020    Irregular heart beat     afib    Peripheral neuropathy     S/P laparoscopic cholecystectomy 12/21/2020    Stenosis of right subclavian artery (HCC) 11/18/2016     Past Surgical History  Past Surgical History:   Procedure Laterality Date    CATARACT EXTRACTION, BILATERAL  2011    CHOLECYSTECTOMY      CHOLECYSTECTOMY LAPAROSCOPIC N/A 12/09/2020    Procedure: CHOLECYSTECTOMY LAPAROSCOPIC;  Surgeon: Klaen Garrett MD;  Location: [facility];  Service: General    COLONOSCOPY      CYST REMOVAL  2009    left lower Quadrant     FL LUMBAR PUNCTURE DIAGNOSTIC  4/28/2023    HERNIA REPAIR  1992    LIPOMA RESECTION  2010    AR RPR 1ST INGUN HRNA AGE 5 YRS/> REDUCIBLE Bilateral 01/05/2018    Procedure: INGUINAL HERNIA REPAIR;  Surgeon: Volodymyr Lee MD;  Location: [facility];  Service: General    SHOULDER SURGERY  04/26/2019    Dr. Arnett ([place])    UPPER GASTROINTESTINAL ENDOSCOPY      VASCULAR SURGERY  1994    Agram-  R carotid occlusion         03/08/25 0835   OT Last Visit   OT Visit Date 03/08/25   Note Type   Note type Evaluation   Pain Assessment   Pain Assessment Tool 0-10   Pain Score No Pain   Hospital Pain Intervention(s) Repositioned;Ambulation/increased activity;Emotional support   Restrictions/Precautions   Weight Bearing Precautions Per  "Order No   Other Precautions Chair Alarm;Bed Alarm;Multiple lines;Fall Risk;Pain   Home Living   Type of Home Apartment   Home Layout One level;Elevator   Bathroom Shower/Tub Tub/shower unit   Bathroom Toilet Standard   Additional Comments PT REPORTS USE OF ROLLATOR FOR COMMUNITY USE. NO AD WITHIN HOME- PER RECENT HOSPITALIZATION, PT RECOMMENDED FOR SC HOWEVER PT REPORTS NO BATHROOM DME   Prior Function   Level of [place] Independent with ADLs;Independent with functional mobility;Independent with IADLS   Lives With (S)  Alone   Receives Help From Family   IADLs Independent with driving;Independent with meal prep;Independent with medication management   Vocational Retired   Lifestyle   [employer] PT REPORTS BEING I WITH ADLS/IADLS/DRIVING AT BASELINE. PT REPORTS DIFFICULTY MANAGING FOLLOWING RETURN HOME   Reciprocal Relationships LIVES ALONE. LOCAL SUPPORTIVE SON WHO ASSISTS WITH GROCERY SHOPPING AND CHECKS IN A FEW TIMES PER WEEK HOWEVER PT REPORTS IT IS NOT POSSIBLE FOR HIM TO CHECK IN DAILY OR HER STAY WITH HIM UPON D/C   Service to Others RETIRED   Intrinsic Gratification ENJOYS BEING INDEPENDENT   Subjective   Subjective \"IT TOOK EVERYTHING I HAD TO MAKE MYSELF A CHEESE SANDWHICH AND GET TO THE CHAIR\"- IN REGARDS TO MANAGING AT HOME   ADL   Eating Assistance 5  Supervision/Setup   Grooming Assistance 5  Supervision/Setup   UB Bathing Assistance 4  Minimal Assistance   LB Bathing Assistance 4  Minimal Assistance   UB Dressing Assistance 4  Minimal Assistance   LB Dressing Assistance 4  Minimal Assistance   Toileting Assistance  4  Minimal Assistance   Functional Assistance 4  Minimal Assistance   Bed Mobility   Supine to Sit 5  Supervision   Additional items Increased time required   Sit to Supine Unable to assess   Additional Comments PT LEFT OOB WITH ALL NEEDS IN REACH + CHAIR ALARM ACTIVATED.   Transfers   Sit to Stand 4  Minimal assistance   Additional items Assist x 1;Increased time required;Verbal cues "   Stand to Sit 4  Minimal assistance   Additional items Assist x 1;Increased time required;Verbal cues   Functional Mobility   Functional Mobility 4  Minimal assistance   Additional Comments LIMITED MOBILITY FROM BED->CHAIR   Additional items Rolling walker   Balance   Static Sitting Fair +   Static Standing Fair -   Ambulatory Poor +   Activity Tolerance   Activity Tolerance Patient limited by fatigue   Medical Staff Made Aware PT SEEN FOR CO-EVAL WITH SKILLED PHYSICAL THERAPIST 2'  NEW PRECAUTIONS/LIMITATIONS, AND LIMITED ACTIVITY TOLERANCE WHICH IMPACT PERFORMANCE AND ARE A REGRESSION FROM PT'S BASELINE.   Nurse Made Aware APPROPRIATE TO SEE PER RN.   RUE Assessment   RUE Assessment WFL   LUE Assessment   LUE Assessment WFL   Hand Function   Gross Motor Coordination Functional   Fine Motor Coordination Functional   Psychosocial   Psychosocial (WDL) WDL   Cognition   Overall Cognitive Status WFL   Arousal/Participation Alert;Cooperative   Attention Within functional limits   Orientation Level Oriented X4   Memory Decreased recall of precautions   Following Commands Follows one step commands without difficulty   Comments PT IS PLEASANT AND COOPERATIVE. MILDLY ANXIOUS AT TIMES REGARDING BREATHING. ALARM ON FOR SAFETY   Assessment   Limitation Decreased ADL status;Decreased Safe judgement during ADL;Decreased cognition;Decreased endurance;Decreased self-care trans;Decreased high-level ADLs   Prognosis Good   Assessment 89 YO Female SEEN FOR INITIAL OCCUPATIONAL THERAPY EVALUATION FOLLOWING READMISSION TO [facility] WITH DASILVA WITH UNDERLYING INTERSTITIAL LUNG DISEASE. PT WITH RECENT HOSPITALIZATION FOR SIMILAR COMPLAINT.  PROBLEMS LIST/PMH INCLUDES Common bile duct dilatation, Glaucoma, H/O degenerative disc disease, Idiopathic pulmonary fibrosis (HCC), Irregular heart beat, Peripheral neuropathy, S/P laparoscopic cholecystectomy, and Stenosis of right subclavian artery (HCC). PT IS FROM AN [facility] ALONE WHERE  SHE REPORTS BEING INDEPENDENT WITH ADLS/IADLS PTA. PT CURRENTLY REQUIRES OVERALL MIN A WITH ADLS, TRANSFERS AND LIMITED FUNCTIONAL MOBILITY WITH USE OF RW. PT FATIGUES EASILY WITH NOTED SOB-O2 SATS REMAINED IN 90'S ON RA T/O ACTIVITY. PT IS LIMITED 2' PAIN, FATIGUE, IMPAIRED BALANCE, FALL RISK , OVERALL WEAKNESS/DECONDITIONING , SOB, LIMITED FAMILY/FRIEND SUPPORT , and OVERALL LIMITED ACTIVITY TOLERANCE. PT EDUCATED ON DEEP BREATHING TECHNIQUES T/O ACTIVITY, SLOWING OF PACE, ENERGY CONSERVATION TECHNIQUES FOR CARRY OVER UPON D/C, INCREASED FAMILY SUPPORT, and CONTINUE PARTICIPATION IN SELF-CARE/MOBILITY WITH STAFF WHILE IN THE HOSPITAL . The patient's raw score on the AM-PAC Daily Activity Inpatient Short Form is 18. A raw score of less than 19 suggests the patient may benefit from discharge to post-acute rehabilitation services. Please refer to the recommendation of the Occupational Therapist for safe discharge planning.  FROM AN OCCUPATIONAL THERAPY PERSPECTIVE, RECOMMEND LEVEL II RESOURCES PENDING PT PROGRESS. WILL CONT TO FOLLOW TO ADDRESS THE BELOW DESCRIBED GOALS.   Goals   Patient Goals TO BE MORE ACTIVE   LTG Time Frame 10-14   Long Term Goal #1 SEE BELOW   Plan   Treatment Interventions ADL retraining;Functional transfer training;Endurance training;Cognitive reorientation;Patient/family training;Equipment evaluation/education;Compensatory technique education;Energy conservation;Activityengagement   Goal Expiration Date 03/22/25   OT Frequency 2-3x/wk   Discharge Recommendation   Rehab Resource Intensity Level, OT II (Moderate Resource Intensity)   Equipment Recommended Shower/Tub chair with back ($)  (IF RETURNING DIRECTLY HOME)   AM-[facility] Daily Activity Inpatient   Lower Body Dressing 3   Bathing 3   Toileting 3   Upper Body Dressing 3   Grooming 3   Eating 3   Daily Activity Raw Score 18   Daily Activity Standardized Score (Calc for Raw Score >=11) 38.66   AM-PAC Applied Cognition Inpatient   Following a  Speech/Presentation 3   Understanding Ordinary Conversation 4   Taking Medications 4   Remembering Where Things Are Placed or Put Away 3   Remembering List of 4-5 Errands 3   Taking Care of Complicated Tasks 3   Applied Cognition Raw Score 20   Applied Cognition Standardized Score 41.76       OCCUPATIONAL THERAPY GOALS TO BE MET WITHIN 14 DAYS:    -Pt will increase bed mobility to MOD I to participate in functional activities with G tolerance and balance.  -Pt will improve functional mobility and transfers to MOD I on/off all surfaces w/ G balance/safety including toileting.  -Pt will participate in lt grooming task with MOD I after set-up standing at sink ~3-5 minutes with G safety and balance.   -Pt will increase independence in all ADLS to MOD I with G balance sitting upright in chair.  -Pt will improve activity tolerance to G for 30 min txment sessions w/ G carry over of learned energy conservation techniques.  -Pt will improve independence in lt homemaking activities to MOD I without requiring cues for safety.  -Pt will demonstrate G carryover of learned safety techniques and proper body mechanics in functional and leisure activities with use of DME.  -Pt will complete additional cognitive assessment with 100% attention to task in order to assist with safe d/c plan.   -Pt will follow 100% simple 2-step commands and be A&O x4 consistently with environmental cues to increase participation in functional activities.       Documentation completed by ROSALIE Khalil, OTR/L  MOCA Certified ID# [id number]

## 2025-03-08 NOTE — ASSESSMENT & PLAN NOTE
Dyspnea on exertion that has been getting worse than her baseline for the past 4 weeks  Had 2 hospitalizations prior to this, because of hemoptysis initially considered with ILD flare on steroids, and then considered to be due to Eliquis  Currently has no signs of worsening ILD, negative RP2 panel, no signs of pneumonia  Most likely in the setting of deconditioning    Recommend physical therapy  Can consider low dose IV Lasix if doesn't improve with physical therapy

## 2025-03-08 NOTE — CASE MANAGEMENT
Case Management Discharge Planning Note    Patient name Ac Duran  Location [facility] MRN [id number]  : 1935 Date 3/8/2025       Current Admission Date: 3/7/2025  Current Admission Diagnosis:Dyspnea on exertion   Patient Active Problem List    Diagnosis Date Noted Date Diagnosed    Mild protein-calorie malnutrition (HCC) 2025     Iron deficiency anemia 2025     Hemoptysis 2025     Acute hypoxic respiratory failure ([facility]) 2025     Hypertension 2025     Aortic stenosis 2025     Microcytosis 2025     ROMERO (obstructive sleep apnea) 2024     Hypoxia 2024     Dyspnea on exertion 04/15/2024     Irritable bowel syndrome with both constipation and diarrhea 2024     Peripheral arterial disease ([facility]) 2024     Metabolic alkalosis 10/19/2023     SIADH (syndrome of inappropriate ADH production) ([facility]) 10/19/2023     Bilateral hip pain 2023     Pachymeningitis 2023     Hypovitaminosis D 2021     Depression, recurrent ([facility]) 2021     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, with long-term current use of insulin ([facility]) 2021     Type 2 diabetes mellitus with hyperlipidemia  ([facility]) 2021     Type 2 diabetes mellitus with diabetic polyneuropathy, with long-term current use of insulin ([facility]) 2021     DM2 (diabetes mellitus, type 2) ([facility]) 2021     Post-nasal drip 2021     Hyponatremia 2020     Moderate protein-calorie malnutrition ([facility]) 2020     Constipation 2020     Dysphagia 2020     Interstitial lung disease ([facility]) 10/13/2020     Iron deficiency 2020     Osteoporosis 2019     GERD (gastroesophageal reflux disease) 2019     Anxiety 2019     Pacemaker 2016     Occlusion of right carotid artery 2016     Paroxysmal atrial fibrillation ([facility]) 2016     Symptomatic  PVCs 11/01/2016     Essential hypertension 10/29/2016     Mixed hyperlipidemia 10/29/2016     Glaucoma 10/29/2016     Asymptomatic carotid artery stenosis, left 10/29/2016       LOS (days): 1  Geometric Mean LOS (GMLOS) (days):   Days to GMLOS:     OBJECTIVE:  Risk of Unplanned Readmission Score: 22.86         Current admission status: Inpatient   Preferred Pharmacy:   [facility] - [place] - [address]   [address] [address]  Phone: [phone] Fax: [phone]    [facility]  [place] - [address]  [address]  [facility] [address]  Phone: [phone] Fax: [phone]    Primary Care Provider: ZANDER Swenson    Primary Insurance: MEDICARE  Secondary Insurance: [facility]    DISCHARGE DETAILS:    CM met with pt and informed her that [facility] can accept. She would like to hear from [facility], as this is her first choice

## 2025-03-08 NOTE — PLAN OF CARE
Problem: Potential for Falls  Goal: Patient will remain free of falls  Description: INTERVENTIONS:  - Educate patient/family on patient safety including physical limitations  - Instruct patient to call for assistance with activity   - Consult OT/PT to assist with strengthening/mobility   - Keep Call bell within reach  - Keep bed low and locked with side rails adjusted as appropriate  - Keep care items and personal belongings within reach  - Initiate and maintain comfort rounds  - Make Fall Risk Sign visible to staff  - Offer Toileting every  Hours, in advance of need  - Initiate/Maintain alarm  - Obtain necessary fall risk management equipment:   - Apply yellow socks and bracelet for high fall risk patients  - Consider moving patient to room near nurses station  Outcome: Progressing     Problem: PAIN - ADULT  Goal: Verbalizes/displays adequate comfort level or baseline comfort level  Description: Interventions:  - Encourage patient to monitor pain and request assistance  - Assess pain using appropriate pain scale  - Administer analgesics based on type and severity of pain and evaluate response  - Implement non-pharmacological measures as appropriate and evaluate response  - Consider cultural and social influences on pain and pain management  - Notify physician/advanced practitioner if interventions unsuccessful or patient reports new pain  Outcome: Progressing     Problem: INFECTION - ADULT  Goal: Absence or prevention of progression during hospitalization  Description: INTERVENTIONS:  - Assess and monitor for signs and symptoms of infection  - Monitor lab/diagnostic results  - Monitor all insertion sites, i.e. indwelling lines, tubes, and drains  - Monitor endotracheal if appropriate and nasal secretions for changes in amount and color  - [place] appropriate cooling/warming therapies per order  - Administer medications as ordered  - Instruct and encourage patient and family to use good hand hygiene technique  -  Identify and instruct in appropriate isolation precautions for identified infection/condition  Outcome: Progressing  Goal: Absence of fever/infection during neutropenic period  Description: INTERVENTIONS:  - Monitor WBC    Outcome: Progressing     Problem: SAFETY ADULT  Goal: Patient will remain free of falls  Description: INTERVENTIONS:  - Educate patient/family on patient safety including physical limitations  - Instruct patient to call for assistance with activity   - Consult OT/PT to assist with strengthening/mobility   - Keep Call bell within reach  - Keep bed low and locked with side rails adjusted as appropriate  - Keep care items and personal belongings within reach  - Initiate and maintain comfort rounds  - Make Fall Risk Sign visible to staff  - Offer Toileting every  Hours, in advance of need  - Initiate/Maintain alarm  - Obtain necessary fall risk management equipment:   - Apply yellow socks and bracelet for high fall risk patients  - Consider moving patient to room near nurses station  Outcome: Progressing  Goal: Maintain or return to baseline ADL function  Description: INTERVENTIONS:  -  Assess patient's ability to carry out ADLs; assess patient's baseline for ADL function and identify physical deficits which impact ability to perform ADLs (bathing, care of mouth/teeth, toileting, grooming, dressing, etc.)  - Assess/evaluate cause of self-care deficits   - Assess range of motion  - Assess patient's mobility; develop plan if impaired  - Assess patient's need for assistive devices and provide as appropriate  - Encourage maximum independence but intervene and supervise when necessary  - Involve family in performance of ADLs  - Assess for home care needs following discharge   - Consider OT consult to assist with ADL evaluation and planning for discharge  - Provide patient education as appropriate  Outcome: Progressing  Goal: Maintains/Returns to pre admission functional level  Description:  INTERVENTIONS:  - Perform AM-PAC 6 Click Basic Mobility/ Daily Activity assessment daily.  - Set and communicate daily mobility goal to care team and patient/family/caregiver.   - Collaborate with rehabilitation services on mobility goals if consulted  - Perform Range of Motion times a day.  - Reposition patient every  hours.  - Dangle patient  times a day  - Stand patient  times a day  - Ambulate patient  times a day  - Out of bed to chair  times a day   - Out of bed for meals  times a day  - Out of bed for toileting  - Record patient progress and toleration of activity level   Outcome: Progressing     Problem: DISCHARGE PLANNING  Goal: Discharge to home or other facility with appropriate resources  Description: INTERVENTIONS:  - Identify barriers to discharge w/patient and caregiver  - Arrange for needed discharge resources and transportation as appropriate  - Identify discharge learning needs (meds, wound care, etc.)  - Arrange for interpretive services to assist at discharge as needed  - Refer to Case Management Department for coordinating discharge planning if the patient needs post-hospital services based on physician/advanced practitioner order or complex needs related to functional status, cognitive ability, or social support system  Outcome: Progressing     Problem: Knowledge Deficit  Goal: Patient/family/caregiver demonstrates understanding of disease process, treatment plan, medications, and discharge instructions  Description: Complete learning assessment and assess knowledge base.  Interventions:  - Provide teaching at level of understanding  - Provide teaching via preferred learning methods  Outcome: Progressing     Problem: RESPIRATORY - ADULT  Goal: Achieves optimal ventilation and oxygenation  Description: INTERVENTIONS:  - Assess for changes in respiratory status  - Assess for changes in mentation and behavior  - Position to facilitate oxygenation and minimize respiratory effort  - Oxygen  administered by appropriate delivery if ordered  - Initiate smoking cessation education as indicated  - Encourage broncho-pulmonary hygiene including cough, deep breathe, Incentive Spirometry  - Assess the need for suctioning and aspirate as needed  - Assess and instruct to report SOB or any respiratory difficulty  - Respiratory Therapy support as indicated  Outcome: Progressing     Problem: Prexisting or High Potential for Compromised Skin Integrity  Goal: Skin integrity is maintained or improved  Description: INTERVENTIONS:  - Identify patients at risk for skin breakdown  - Assess and monitor skin integrity  - Assess and monitor nutrition and hydration status  - Monitor labs   - Assess for incontinence   - Turn and reposition patient  - Assist with mobility/ambulation  - Relieve pressure over bony prominences  - Avoid friction and shearing  - Provide appropriate hygiene as needed including keeping skin clean and dry  - Evaluate need for skin moisturizer/barrier cream  - Collaborate with interdisciplinary team   - Patient/family teaching  - Consider wound care consult   Outcome: Progressing

## 2025-03-08 NOTE — PHYSICAL THERAPY NOTE
Physical Therapy Evaluation     Patient's Name: Ac Duran    Admitting Diagnosis  Shortness of breath [R06.02]  Interstitial lung disease (HCC) [J84.9]  Dyspnea on exertion [R06.09]  Hypoxia [R09.02]  Mild protein-calorie malnutrition (HCC) [E44.1]    Problem List  Patient Active Problem List   Diagnosis    Essential hypertension    Mixed hyperlipidemia    Glaucoma    Asymptomatic carotid artery stenosis, left    Symptomatic PVCs    Occlusion of right carotid artery    Paroxysmal atrial fibrillation (HCC)    Pacemaker    Osteoporosis    GERD (gastroesophageal reflux disease)    Anxiety    Iron deficiency    Interstitial lung disease (HCC)    Dysphagia    Moderate protein-calorie malnutrition (HCC)    Constipation    Hyponatremia    Post-nasal drip    DM2 (diabetes mellitus, type 2) (HCC)    Type 2 diabetes mellitus with hyperlipidemia  (HCC)    Type 2 diabetes mellitus with diabetic polyneuropathy, with long-term current use of insulin (HCC)    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, with long-term current use of insulin (HCC)    Depression, recurrent (HCC)    Hypovitaminosis D    Pachymeningitis    Bilateral hip pain    Metabolic alkalosis    SIADH (syndrome of inappropriate ADH production) ([facility])    Irritable bowel syndrome with both constipation and diarrhea    Peripheral arterial disease (HCC)    Dyspnea on exertion    Hypoxia    ROMERO (obstructive sleep apnea)    Microcytosis    Aortic stenosis    Hemoptysis    Acute hypoxic respiratory failure (HCC)    Hypertension    Iron deficiency anemia    Mild protein-calorie malnutrition (HCC)       Past Medical History  Past Medical History:   Diagnosis Date    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/7/2020    Glaucoma     H/O degenerative disc disease     Idiopathic pulmonary fibrosis (HCC) 11/2020    Irregular heart beat     afib    Peripheral neuropathy     S/P laparoscopic cholecystectomy  12/21/2020    Stenosis of right subclavian artery (HCC) 11/18/2016       Past Surgical History  Past Surgical History:   Procedure Laterality Date    CATARACT EXTRACTION, BILATERAL  2011    CHOLECYSTECTOMY      CHOLECYSTECTOMY LAPAROSCOPIC N/A 12/09/2020    Procedure: CHOLECYSTECTOMY LAPAROSCOPIC;  Surgeon: Kalen Garrett MD;  Location: [facility];  Service: General    COLONOSCOPY      CYST REMOVAL  2009    left lower Quadrant     FL LUMBAR PUNCTURE DIAGNOSTIC  4/28/2023    HERNIA REPAIR  1992    LIPOMA RESECTION  2010    MO RPR 1ST INGUN HRNA AGE 5 YRS/> REDUCIBLE Bilateral 01/05/2018    Procedure: INGUINAL HERNIA REPAIR;  Surgeon: Volodymyr Lee MD;  Location: [facility];  Service: General    SHOULDER SURGERY  04/26/2019    Dr. Arnett ([place])    UPPER GASTROINTESTINAL ENDOSCOPY      VASCULAR SURGERY  1994    Agram-  R carotid occlusion          03/08/25 0834   PT Last Visit   PT Visit Date 03/08/25   Note Type   Note type Evaluation   Pain Assessment   Pain Assessment Tool 0-10   Pain Score No Pain   Restrictions/Precautions   Weight Bearing Precautions Per Order No   Other Precautions Chair Alarm;Bed Alarm;Multiple lines;Fall Risk;Cognitive   Home Living   Type of Home Apartment  (2nd floor)   Home Layout One level;Elevator   Home Equipment Other (Comment)  (rollator - in community, no device in home)   Prior Function   Level of [place] Independent with ADLs;Independent with functional mobility;Independent with IADLS   Lives With Alone   Receives Help From Family  (son checks in few times a week)   Vocational Retired   General   Family/Caregiver Present No   Cognition   Arousal/Participation Alert   Memory Decreased recall of precautions;Decreased short term memory   Following Commands Follows one step commands with increased time or repetition   Comments Pt with anxious like behaviors, benefits from slow and simple commands.  Decreased safety awareness.   Subjective   Subjective Pleasant and agreeable to  participate   RLE Assessment   RLE Assessment   (functionally 3/5)   LLE Assessment   LLE Assessment   (functionally 3/5)   Bed Mobility   Supine to Sit 5  Supervision   Additional items HOB elevated;Increased time required;Verbal cues   Additional Comments Left OOB in chair with chair alarm intact and all needs in reach.   Transfers   Sit to Stand 4  Minimal assistance   Additional items Assist x 1;Increased time required;Verbal cues   Stand to Sit 4  Minimal assistance   Additional items Assist x 1;Increased time required;Verbal cues   Additional Comments with RW   Ambulation/Elevation   Gait pattern Excessively slow;Step to;Short stride;Foward flexed;Decreased foot clearance;Improper Weight shift   Gait Assistance 4  Minimal assist   Additional items Assist x 1;Verbal cues;Tactile cues   Assistive Device Rolling walker   Distance 3 ft from bed to chair  (limited by fatigue and shallow breathing)   Balance   Static Sitting Fair +   Dynamic Sitting Fair +   Static Standing Fair -   Dynamic Standing Fair -   Ambulatory Poor +   Activity Tolerance   Activity Tolerance Patient limited by fatigue   Medical Staff Made Aware OT Chise   Nurse Made Aware RN cleared pt to be seen by PT   Assessment   Prognosis Good   Problem List Decreased strength;Impaired balance;Decreased endurance;Decreased mobility;Decreased safety awareness   Assessment Pt seen for high complexity PT evaluation due to decrease in functional mobility status compared to baseline.  Pt with active PT eval/treat orders at this time.  Pt is a 89 y.o. F who presented to [facility] with DASILVA on 3/7/25.  Pt with recent hospital admission for same.  Pt  has a past medical history of Common bile duct dilatation (12/7/2020), Glaucoma, H/O degenerative disc disease, Idiopathic pulmonary fibrosis (HCC) (11/2020), Irregular heart beat, Peripheral neuropathy, S/P laparoscopic cholecystectomy (12/21/2020), and Stenosis of right subclavian artery (HCC)  (11/18/2016).  Pt resides alone in 2nd floor apartment with elevator to enter.  Pt presents with decreased strength, balance, endurance that contribute to limitations in bed mobility, functional transfers, functional mobility.  Pt requires Min A x 1 for mobility at this time.  Pt left upright in bedside chair with chair alarm intact and with all needs in reach.  Pt will benefit from skilled therapy in order to address current impairments and functional limitations. PT to follow pt and recommending level II.  The patient's AM-PAC Basic Mobility Inpatient Short Form Raw Score is 18. A Raw score of greater than 16 suggests the patient may benefit from discharge to home. Please also refer to the recommendation of the Physical Therapist for safe discharge planning.   Barriers to Discharge Inaccessible home environment;Decreased caregiver support   Goals   Patient Goals to be able to move around and breathe easily   STG Expiration Date 03/22/25   Short Term Goal #1 1. Pt will demonstrate ability to perform all aspects of bed mobility with Mod I in order to increase independence and decrease burden on caregivers. 2. Pt will demonstrate ability to perform functional transfers with Mod I in order to increase independence and decrease burden on caregivers.  3. Pt will demonstrate ability to ambulate 200 ft with least restrictive AD with Mod I in order to return to mobility safely. 4. Pt will demonstrate ability to negotiate full flight steps with/without HR and Mod I in order to return to household/community mobility safely. 5. Pt will demonstrate improved balance by one grade order to decrease risk of falls.  6. Pt will increase b/l LE strength by 1 grade in order to increase ease of functional mobility and transfers.   Plan   Treatment/Interventions Functional transfer training;LE strengthening/ROM;Endurance training;Therapeutic exercise;Patient/family training;Equipment eval/education;Bed mobility;Gait training;Spoke to  nursing   PT Frequency 3-5x/wk   Discharge Recommendation   Rehab Resource Intensity Level, PT II (Moderate Resource Intensity)   AM-PAC Basic Mobility Inpatient   Turning in Flat Bed Without Bedrails 3   Lying on Back to Sitting on Edge of Flat Bed Without Bedrails 3   Moving Bed to Chair 3   Standing Up From Chair Using Arms 3   Walk in Room 3   Climb 3-5 Stairs With Railing 3   Basic Mobility Inpatient Raw Score 18   Basic Mobility Standardized Score 41.05   [facility] Highest Level Of Mobility   -HLM Goal 6: Walk 10 steps or more   -HLM Achieved 4: Move to chair/commode   Modified Kareem Scale   Modified Kareem Scale 4         Marleni Puga, PT, DPT

## 2025-03-08 NOTE — ASSESSMENT & PLAN NOTE
Lab Results   Component Value Date    HGBA1C 7.5 (H) 03/07/2025       Recent Labs     03/07/25  1737 03/07/25  2100 03/08/25  0655 03/08/25  1122   POCGLU 205* 200* 176* 205*       Blood Sugar Average: Last 72 hrs:  (P) 176.4    Continue along 8 units with breakfast, 4 units with dinner, and sliding scale 3 times daily before meals and nightly

## 2025-03-08 NOTE — ASSESSMENT & PLAN NOTE
Malnutrition Findings:                                 BMI Findings:           Body mass index is 18.88 kg/m².

## 2025-03-08 NOTE — CASE MANAGEMENT
Case Management Assessment & Discharge Planning Note    Patient name Ac Duran  Location [facility]-01 MRN [id number]  : 1935 Date 3/8/2025       Current Admission Date: 3/7/2025  Current Admission Diagnosis:Dyspnea on exertion   Patient Active Problem List    Diagnosis Date Noted Date Diagnosed    Mild protein-calorie malnutrition (HCC) 2025     Iron deficiency anemia 2025     Hemoptysis 2025     Acute hypoxic respiratory failure ([facility]) 2025     Hypertension 2025     Aortic stenosis 2025     Microcytosis 2025     ROMERO (obstructive sleep apnea) 2024     Hypoxia 2024     Dyspnea on exertion 04/15/2024     Irritable bowel syndrome with both constipation and diarrhea 2024     Peripheral arterial disease ([facility]) 2024     Metabolic alkalosis 10/19/2023     SIADH (syndrome of inappropriate ADH production) ([facility]) 10/19/2023     Bilateral hip pain 2023     Pachymeningitis 2023     Hypovitaminosis D 2021     Depression, recurrent (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) 2021     Type 2 diabetes mellitus with hyperlipidemia  ([facility]) 2021     Type 2 diabetes mellitus with diabetic polyneuropathy, with long-term current use of insulin ([facility]) 2021     DM2 (diabetes mellitus, type 2) ([facility]) 2021     Post-nasal drip 2021     Hyponatremia 2020     Moderate protein-calorie malnutrition ([facility]) 2020     Constipation 2020     Dysphagia 2020     Interstitial lung disease ([facility]) 10/13/2020     Iron deficiency 2020     Osteoporosis 2019     GERD (gastroesophageal reflux disease) 2019     Anxiety 2019     Pacemaker 2016     Occlusion of right carotid artery 2016     Paroxysmal atrial fibrillation ([facility]) 2016      Symptomatic PVCs 11/01/2016     Essential hypertension 10/29/2016     Mixed hyperlipidemia 10/29/2016     Glaucoma 10/29/2016     Asymptomatic carotid artery stenosis, left 10/29/2016       LOS (days): 1  Geometric Mean LOS (GMLOS) (days):   Days to GMLOS:     OBJECTIVE:  PATIENT READMITTED TO HOSPITAL  Risk of Unplanned Readmission Score: 22.59         Current admission status: Inpatient       Preferred Pharmacy:   [facility] - [place], [place] - [address]  [address]  [place] [address]  Phone: [phone] Fax: [phone]    [id number] - [place] - [address]  [address]  [facility] [address]  Phone: [phone] Fax: [phone]    Primary Care Provider: ZANDER Swenson    Primary Insurance: MEDICARE  Secondary Insurance: [facility] BLUE SHIELD    ASSESSMENT:  Active Health Care Proxies    There are no active Health Care Proxies on file.       Advance Directives  Primary Contact: Rey Duran (Son) [phone]    Readmission Root Cause  30 Day Readmission: Yes  During your hospital stay, did someone (provider, nurse, ) explain your care to you in a way you could understand?: Yes  Did you feel medically stable to leave the hospital?: Yes  Were you able to pay for your medication at the pharmacy?: Yes  Did you have reliable transportation to take you to your appointments?: Yes  During previous admission, was a post-acute recommendation made?: Yes  What post-acute resources were offered?: OP Therapy  Patient was readmitted due to: Dyspnea  Action Plan: rec for rehab    Patient Information  Admitted from:: Home  Mental Status: Alert  During Assessment patient was accompanied by: Not accompanied during assessment  Assessment information provided by:: Patient  Primary Caregiver: Self  Support Systems: Self, Son, Family members  County of Residence: [place]  What city do you live in?: [place]  Home entry access options. Select all that apply.:  Elevator  Type of Current Residence: Apartment  Floor Level: 2  Upon entering residence, is there a bedroom on the main floor (no further steps)?: Yes  Upon entering residence, is there a bathroom on the main floor (no further steps)?: Yes  Living Arrangements: Lives Alone  Is patient a ?: No    Activities of Daily Living Prior to Admission  Functional Status: Independent  Completes ADLs independently?: Yes  Ambulates independently?: Yes  Does patient use assisted devices?: Yes  Assisted Devices (DME) used: CPAP  DME Company Name (respiratory supplies): [employer]  Does patient currently own DME?: Yes  What DME does the patient currently own?: CPAP  Does patient have a history of Outpatient Therapy (PT/OT)?: Yes  Does patient have a history of HHC?: Yes  Does patient currently have HHC?: Yes    Current Home Health Care  Type of Current Home Care Services: Home PT, Home OT  Home Health Agency Name:: [facility]  Current Home Health Follow-Up Provider:: PCP    Patient Information Continued  Income Source: Pension/care home  Does patient have prescription coverage?: Yes  Does patient receive dialysis treatments?: No  Does patient have a history of substance abuse?: No    Means of Transportation  Means of Transport to Appts:: Drives Self    DISCHARGE DETAILS:    Discharge planning discussed with:: patient  Freedom of Choice: Yes     CM contacted family/caregiver?: Yes  Were Treatment Team discharge recommendations reviewed with patient/caregiver?: Yes  Did patient/caregiver verbalize understanding of patient care needs?: N/A- going to facility  Were patient/caregiver advised of the risks associated with not following Treatment Team discharge recommendations?: Yes    Contacts  Patient Contacts: Rey Duran (Son) [phone]  Relationship to Patient:: Family  Contact Method: Phone  Phone Number: [phone]  Reason/Outcome: Continuity of Care, Emergency Contact, Discharge Planning    Requested Home Health Care          Is the patient interested in HHC at discharge?: No  Home Health Agency Name:: [facility]    DME Referral Provided  Referral made for DME?: No    Treatment Team Recommendation: Short Term Rehab      CM met with pt to discuss d/c planning  Pt was a recent d/c from [facility], and d/c home with [facility], but the day of the first visit, the pt was readmitted  Pt lives alone in a 2nd floor apartment which has elevator access  Pt's been evaluated by OT/PT and recommended for IP rehab  Pt's amenable to referrals placed at [facility], [facility], [facility] and [facility].

## 2025-03-08 NOTE — PLAN OF CARE
Problem: OCCUPATIONAL THERAPY ADULT  Goal: Performs self-care activities at highest level of function for planned discharge setting.  See evaluation for individualized goals.  Description: Treatment Interventions: ADL retraining, Functional transfer training, Endurance training, Cognitive reorientation, Patient/family training, Equipment evaluation/education, Compensatory technique education, Energy conservation, Activityengagement  Equipment Recommended: Shower/Tub chair with back ($) (IF RETURNING DIRECTLY HOME)       See flowsheet documentation for full assessment, interventions and recommendations.   Note: Limitation: Decreased ADL status, Decreased Safe judgement during ADL, Decreased cognition, Decreased endurance, Decreased self-care trans, Decreased high-level ADLs  Prognosis: Good  Assessment: 88 YO Female SEEN FOR INITIAL OCCUPATIONAL THERAPY EVALUATION FOLLOWING READMISSION TO [facility] WITH DASILVA WITH UNDERLYING INTERSTITIAL LUNG DISEASE. PT WITH RECENT HOSPITALIZATION FOR SIMILAR COMPLAINT.  PROBLEMS LIST/PMH INCLUDES Common bile duct dilatation, Glaucoma, H/O degenerative disc disease, Idiopathic pulmonary fibrosis (HCC), Irregular heart beat, Peripheral neuropathy, S/P laparoscopic cholecystectomy, and Stenosis of right subclavian artery (HCC). PT IS FROM AN APT ALONE WHERE SHE REPORTS BEING INDEPENDENT WITH ADLS/IADLS PTA. PT CURRENTLY REQUIRES OVERALL MIN A WITH ADLS, TRANSFERS AND LIMITED FUNCTIONAL MOBILITY WITH USE OF RW. PT FATIGUES EASILY WITH NOTED SOB-O2 SATS REMAINED IN 90'S ON RA T/O ACTIVITY. PT IS LIMITED 2' PAIN, FATIGUE, IMPAIRED BALANCE, FALL RISK , OVERALL WEAKNESS/DECONDITIONING , SOB, LIMITED FAMILY/FRIEND SUPPORT , and OVERALL LIMITED ACTIVITY TOLERANCE. PT EDUCATED ON DEEP BREATHING TECHNIQUES T/O ACTIVITY, SLOWING OF PACE, ENERGY CONSERVATION TECHNIQUES FOR CARRY OVER UPON D/C, INCREASED FAMILY SUPPORT, and CONTINUE PARTICIPATION IN SELF-CARE/MOBILITY WITH STAFF WHILE  IN THE HOSPITAL . The patient's raw score on the AM-PAC Daily Activity Inpatient Short Form is 18. A raw score of less than 19 suggests the patient may benefit from discharge to post-acute rehabilitation services. Please refer to the recommendation of the Occupational Therapist for safe discharge planning.  FROM AN OCCUPATIONAL THERAPY PERSPECTIVE, RECOMMEND LEVEL II RESOURCES PENDING PT PROGRESS. WILL CONT TO FOLLOW TO ADDRESS THE BELOW DESCRIBED GOALS.     Rehab Resource Intensity Level, OT: II (Moderate Resource Intensity)

## 2025-03-08 NOTE — PLAN OF CARE
Problem: PHYSICAL THERAPY ADULT  Goal: Performs mobility at highest level of function for planned discharge setting.  See evaluation for individualized goals.  Description: Treatment/Interventions: Functional transfer training, LE strengthening/ROM, Endurance training, Therapeutic exercise, Patient/family training, Equipment eval/education, Bed mobility, Gait training, Spoke to nursing          See flowsheet documentation for full assessment, interventions and recommendations.  Note: Prognosis: Good  Problem List: Decreased strength, Impaired balance, Decreased endurance, Decreased mobility, Decreased safety awareness  Assessment: Pt seen for high complexity PT evaluation due to decrease in functional mobility status compared to baseline.  Pt with active PT eval/treat orders at this time.  Pt is a 89 y.o. F who presented to [facility] with DASILVA on 3/7/25.  Pt with recent hospital admission for same.  Pt  has a past medical history of Common bile duct dilatation (12/7/2020), Glaucoma, H/O degenerative disc disease, Idiopathic pulmonary fibrosis (HCC) (11/2020), Irregular heart beat, Peripheral neuropathy, S/P laparoscopic cholecystectomy (12/21/2020), and Stenosis of right subclavian artery (HCC) (11/18/2016).  Pt resides alone in 2nd floor apartment with elevator to enter.  Pt presents with decreased strength, balance, endurance that contribute to limitations in bed mobility, functional transfers, functional mobility.  Pt requires Min A x 1 for mobility at this time.  Pt left upright in bedside chair with chair alarm intact and with all needs in reach.  Pt will benefit from skilled therapy in order to address current impairments and functional limitations. PT to follow pt and recommending level II.  The patient's AM-PAC Basic Mobility Inpatient Short Form Raw Score is 18. A Raw score of greater than 16 suggests the patient may benefit from discharge to home. Please also refer to the recommendation of the  Physical Therapist for safe discharge planning.  Barriers to Discharge: Inaccessible home environment, Decreased caregiver support     Rehab Resource Intensity Level, PT: II (Moderate Resource Intensity)    See flowsheet documentation for full assessment.

## 2025-03-08 NOTE — ASSESSMENT & PLAN NOTE
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 Jovel  Not on antifibrotics or immunosuppressants    Will likely need repeat PFTs outpatient  Consideration for antifibrotic or immunosuppressant outpatient  Not clinical indication to be in a current flare

## 2025-03-08 NOTE — CONSULTS
Progress Note - Pulmonology   Name: Ac Duran 89 y.o. female I MRN: [id number]  Unit/Bed#: [facility] 606-01 I Date of Admission: 3/7/2025   Date of Service: 3/8/2025 I Hospital Day: 1     Assessment & Plan  Dyspnea on exertion  Dyspnea on exertion that has been getting worse than her baseline for the past 4 weeks  Had 2 hospitalizations prior to this, because of hemoptysis initially considered with ILD flare on steroids, and then considered to be due to Eliquis  Currently has no signs of worsening ILD, negative RP2 panel, no signs of pneumonia  Most likely in the setting of deconditioning    Recommend physical therapy  Can consider low dose IV Lasix if doesn't improve with physical therapy  Interstitial lung disease (HCC)  History of ILD  HRCT done in the past showed subpleural reticular changes  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 Jovel  Not on antifibrotics or immunosuppressants    Will likely need repeat PFTs outpatient  Consideration for antifibrotic or immunosuppressant outpatient  Not clinical indication to be in a current flare  ROMERO (obstructive sleep apnea)  Continue BiPAP therapy at night    HPI: 89 y.o. female, follows up with Dr. Jovel, [facility] of ILD, former smoker, quit in 1994, ILD was diagnosed in 2017, occupational exposure, worked for [employer], also in printing shop, also family history of ILD. PFTs previously showed DLCO 62%, and restrictive pattern, she had a recent admission for ILD flare, and then for hemoptysis which was thought to be due to Eliquis. Currently is on Prednisone taper.     Came in with DASILVA, sent to the hospital by PCP. CXR with no acute findings and moderate pulmonary fibrosis. Patient had negative RP2 panel, but has been getting worse dyspnea on exertion throughout the last 4 weeks, hasn't been moving around and is deconditioned, staying in bed most of the time. Most likely  "patient needs physical therapy.       Previous pulmonary history per Dr. Smith:  \"Patient for seen in the office In March 2021.  She had prior imaging showing significant interstitial lung disease and fibrosis. Looking back on prior imaging, it appears that she did have some reticular pattern back to imaging on 2007, but has progressed significantly over the past 13 years.      In terms of pulmonary history, patient is a former smoker, quit in 1994. 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  She subsequently worked as a  person and 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 different types of [place].  Most recently, she worked as a  without exposures.  She has never had pets and denies any exposure to birds, lifestyle, farm animals.  No autoimmune, connective tissue disease, skin rash  She does have intermittent knee pain, but denies significant small joint pains.  She has no family history of lung disease.  She does note some environmental allergies worsen her postnasal drip and given her watery eyes.     Given patient's finding of ILD, she then underwent further workup.  High-resolution CT chest revealed subpleural reticular/fiber nodular opacities, interlobular septal thickening, mild central/bibasilar bronchiectasis, possible UIP pattern.  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  Afterwards worked in  and exposed to different types of jewels.     Social History:  Former smoker.  Quit 1994.  1.5 packs per day for 38 years.     Malignancy History: None      Pets/animal exposure:  Never had pets denies exposure to birds, exotic animals, farm animals      Objective :  Temp:  [97.4 °F (36.3 °C)-98.1 °F (36.7 °C)] 98.1 °F (36.7 °C)  HR:  [60-80] 80  BP: " (112-172)/(50-86) 114/65  Resp:  [16-20] 16  SpO2:  [94 %-100 %] 100 %  O2 Device: None (Room air)    Physical Exam  Vitals and nursing note reviewed.   Constitutional:       General: She is not in acute distress.     Appearance: She is well-developed.   HENT:      Head: Normocephalic and atraumatic.   Eyes:      Conjunctiva/sclera: Conjunctivae normal.   Cardiovascular:      Rate and Rhythm: Normal rate and regular rhythm.      Heart sounds: No murmur heard.  Pulmonary:      Effort: Pulmonary effort is normal. No respiratory distress.      Breath sounds: Rales present. No wheezing.   Abdominal:      Palpations: Abdomen is soft.      Tenderness: There is no abdominal tenderness.   Musculoskeletal:         General: No swelling.      Cervical back: Neck supple.   Skin:     General: Skin is warm and dry.      Capillary Refill: Capillary refill takes less than 2 seconds.   Neurological:      Mental Status: She is alert.   Psychiatric:         Mood and Affect: Mood normal.           Lab Results: I have reviewed the following results:   .     03/07/25  1031 03/07/25  1333 03/07/25  1434 03/07/25  1532   WBC 12.03* 9.48  --   --    HGB 11.5 11.6  --   --    HCT 37.7 38.2  --   --     228  --   --    SODIUM 132*  --  130*  --    K 4.6  --  4.4  --    CL 92*  --  93*  --    CO2 32  --  29  --    BUN 18  --  15  --    CREATININE 0.58*  --  0.52*  --    GLUC 186*  --  113  --    MG 1.7*  --   --   --    AST 25  --  29  --    ALT 24  --  23  --    ALB 4.0  --  3.8  --    TBILI 0.76  --  0.80  --    ALKPHOS 58  --  50  --    HSTNI0  --  14  --   --    HSTNI2  --   --   --  11     ABG: No new results in last 24 hours.    Imaging Results Review: I personally reviewed the following image studies in PACS and associated radiology reports: chest xray. My interpretation of the radiology images/reports is: No signs of pneumonia. Also reviewed prior HRCT, and last CT.    PFT Results Reviewed: reviewed

## 2025-03-08 NOTE — ASSESSMENT & PLAN NOTE
Lab Results   Component Value Date    HGBA1C 7.5 (H) 03/07/2025       Recent Labs     03/07/25  1332 03/07/25  1737 03/07/25  2100 03/08/25  0655   POCGLU 96 205* 200* 176*       Blood Sugar Average: Last 72 hrs:  (P) 169.25

## 2025-03-09 LAB
ANION GAP SERPL CALCULATED.3IONS-SCNC: 7 MMOL/L (ref 4–13)
BUN SERPL-MCNC: 19 MG/DL (ref 5–25)
CALCIUM SERPL-MCNC: 8.5 MG/DL (ref 8.4–10.2)
CHLORIDE SERPL-SCNC: 94 MMOL/L (ref 96–108)
CO2 SERPL-SCNC: 31 MMOL/L (ref 21–32)
CREAT SERPL-MCNC: 0.59 MG/DL (ref 0.6–1.3)
ERYTHROCYTE [DISTWIDTH] IN BLOOD BY AUTOMATED COUNT: 16.9 % (ref 11.6–15.1)
GFR SERPL CREATININE-BSD FRML MDRD: 82 ML/MIN/1.73SQ M
GLUCOSE SERPL-MCNC: 239 MG/DL (ref 65–140)
GLUCOSE SERPL-MCNC: 269 MG/DL (ref 65–140)
GLUCOSE SERPL-MCNC: 270 MG/DL (ref 65–140)
GLUCOSE SERPL-MCNC: 306 MG/DL (ref 65–140)
GLUCOSE SERPL-MCNC: 307 MG/DL (ref 65–140)
HCT VFR BLD AUTO: 33.4 % (ref 34.8–46.1)
HGB BLD-MCNC: 10.2 G/DL (ref 11.5–15.4)
MAGNESIUM SERPL-MCNC: 1.7 MG/DL (ref 1.9–2.7)
MCH RBC QN AUTO: 24.6 PG (ref 26.8–34.3)
MCHC RBC AUTO-ENTMCNC: 30.5 G/DL (ref 31.4–37.4)
MCV RBC AUTO: 81 FL (ref 82–98)
PLATELET # BLD AUTO: 194 THOUSANDS/UL (ref 149–390)
PMV BLD AUTO: 9.9 FL (ref 8.9–12.7)
POTASSIUM SERPL-SCNC: 4.2 MMOL/L (ref 3.5–5.3)
RBC # BLD AUTO: 4.14 MILLION/UL (ref 3.81–5.12)
SODIUM SERPL-SCNC: 132 MMOL/L (ref 135–147)
WBC # BLD AUTO: 6.24 THOUSAND/UL (ref 4.31–10.16)

## 2025-03-09 PROCEDURE — 83735 ASSAY OF MAGNESIUM: CPT | Performed by: PHYSICIAN ASSISTANT

## 2025-03-09 PROCEDURE — 80048 BASIC METABOLIC PNL TOTAL CA: CPT | Performed by: PHYSICIAN ASSISTANT

## 2025-03-09 PROCEDURE — 85027 COMPLETE CBC AUTOMATED: CPT | Performed by: PHYSICIAN ASSISTANT

## 2025-03-09 PROCEDURE — 99232 SBSQ HOSP IP/OBS MODERATE 35: CPT | Performed by: PHYSICIAN ASSISTANT

## 2025-03-09 PROCEDURE — 82948 REAGENT STRIP/BLOOD GLUCOSE: CPT

## 2025-03-09 RX ORDER — LIDOCAINE 50 MG/G
1 PATCH TOPICAL DAILY
Status: DISCONTINUED | OUTPATIENT
Start: 2025-03-09 | End: 2025-03-11 | Stop reason: HOSPADM

## 2025-03-09 RX ORDER — MAGNESIUM SULFATE HEPTAHYDRATE 40 MG/ML
2 INJECTION, SOLUTION INTRAVENOUS ONCE
Status: COMPLETED | OUTPATIENT
Start: 2025-03-09 | End: 2025-03-09

## 2025-03-09 RX ORDER — LORAZEPAM 0.5 MG/1
0.5 TABLET ORAL 2 TIMES DAILY PRN
Qty: 60 TABLET | OUTPATIENT
Start: 2025-03-09

## 2025-03-09 RX ORDER — INSULIN GLARGINE 100 [IU]/ML
10 INJECTION, SOLUTION SUBCUTANEOUS
Status: DISCONTINUED | OUTPATIENT
Start: 2025-03-09 | End: 2025-03-11 | Stop reason: HOSPADM

## 2025-03-09 RX ADMIN — LORAZEPAM 0.5 MG: 0.5 TABLET ORAL at 21:52

## 2025-03-09 RX ADMIN — CYANOCOBALAMIN TAB 500 MCG 500 MCG: 500 TAB at 08:10

## 2025-03-09 RX ADMIN — SODIUM CHLORIDE 1 G: 1 TABLET ORAL at 21:51

## 2025-03-09 RX ADMIN — PANTOPRAZOLE SODIUM 40 MG: 40 TABLET, DELAYED RELEASE ORAL at 21:52

## 2025-03-09 RX ADMIN — SODIUM CHLORIDE 1 G: 1 TABLET ORAL at 17:18

## 2025-03-09 RX ADMIN — INSULIN LISPRO 8 UNITS: 100 INJECTION, SOLUTION INTRAVENOUS; SUBCUTANEOUS at 08:12

## 2025-03-09 RX ADMIN — PANTOPRAZOLE SODIUM 40 MG: 40 TABLET, DELAYED RELEASE ORAL at 08:10

## 2025-03-09 RX ADMIN — PREDNISONE 20 MG: 20 TABLET ORAL at 08:10

## 2025-03-09 RX ADMIN — CYANOCOBALAMIN TAB 500 MCG 500 MCG: 500 TAB at 17:18

## 2025-03-09 RX ADMIN — SITAGLIPTIN 100 MG: 100 TABLET, FILM COATED ORAL at 08:11

## 2025-03-09 RX ADMIN — INSULIN LISPRO 2 UNITS: 100 INJECTION, SOLUTION INTRAVENOUS; SUBCUTANEOUS at 08:12

## 2025-03-09 RX ADMIN — INSULIN LISPRO 4 UNITS: 100 INJECTION, SOLUTION INTRAVENOUS; SUBCUTANEOUS at 17:18

## 2025-03-09 RX ADMIN — MAGNESIUM SULFATE HEPTAHYDRATE 2 G: 40 INJECTION, SOLUTION INTRAVENOUS at 12:16

## 2025-03-09 RX ADMIN — BIMATOPROST 1 DROP: 0.3 SOLUTION/ DROPS OPHTHALMIC at 21:53

## 2025-03-09 RX ADMIN — METOPROLOL TARTRATE 25 MG: 25 TABLET, FILM COATED ORAL at 21:52

## 2025-03-09 RX ADMIN — SODIUM CHLORIDE 1 G: 1 TABLET ORAL at 08:10

## 2025-03-09 RX ADMIN — PRAVASTATIN SODIUM 80 MG: 80 TABLET ORAL at 17:17

## 2025-03-09 RX ADMIN — APIXABAN 2.5 MG: 2.5 TABLET, FILM COATED ORAL at 17:18

## 2025-03-09 RX ADMIN — LORAZEPAM 0.5 MG: 0.5 TABLET ORAL at 15:36

## 2025-03-09 RX ADMIN — EZETIMIBE 10 MG: 10 TABLET ORAL at 17:17

## 2025-03-09 RX ADMIN — ACETAMINOPHEN 650 MG: 325 TABLET, FILM COATED ORAL at 11:57

## 2025-03-09 RX ADMIN — INSULIN LISPRO 3 UNITS: 100 INJECTION, SOLUTION INTRAVENOUS; SUBCUTANEOUS at 17:16

## 2025-03-09 RX ADMIN — METOPROLOL TARTRATE 25 MG: 25 TABLET, FILM COATED ORAL at 08:10

## 2025-03-09 RX ADMIN — INSULIN LISPRO 3 UNITS: 100 INJECTION, SOLUTION INTRAVENOUS; SUBCUTANEOUS at 11:53

## 2025-03-09 RX ADMIN — INSULIN GLARGINE 10 UNITS: 100 INJECTION, SOLUTION SUBCUTANEOUS at 21:52

## 2025-03-09 RX ADMIN — DEXTRAN 70, GLYCERIN, HYPROMELLOSE 1 DROP: 1; 2; 3 SOLUTION/ DROPS OPHTHALMIC at 12:56

## 2025-03-09 RX ADMIN — FAMOTIDINE 20 MG: 20 TABLET, FILM COATED ORAL at 08:10

## 2025-03-09 RX ADMIN — BRIMONIDINE TARTRATE 1 DROP: 2 SOLUTION/ DROPS OPHTHALMIC at 08:11

## 2025-03-09 RX ADMIN — FAMOTIDINE 20 MG: 20 TABLET, FILM COATED ORAL at 17:17

## 2025-03-09 RX ADMIN — APIXABAN 2.5 MG: 2.5 TABLET, FILM COATED ORAL at 08:10

## 2025-03-09 RX ADMIN — LIDOCAINE 5% 1 PATCH: 700 PATCH TOPICAL at 11:57

## 2025-03-09 RX ADMIN — BRIMONIDINE TARTRATE 1 DROP: 2 SOLUTION/ DROPS OPHTHALMIC at 17:33

## 2025-03-09 RX ADMIN — INSULIN LISPRO 3 UNITS: 100 INJECTION, SOLUTION INTRAVENOUS; SUBCUTANEOUS at 21:52

## 2025-03-09 RX ADMIN — MIRTAZAPINE 7.5 MG: 15 TABLET, FILM COATED ORAL at 21:51

## 2025-03-09 NOTE — ASSESSMENT & PLAN NOTE
Recent echo shows mild to moderate aortic stenosis   Patient's son reports that patient has had difficulty making OP appointments due to frequent hospitalizations. Also with DASILVA as above. Pulm evaluated and felt no signs of worsening ILD. Son requesting Cardiology eval which has been placed

## 2025-03-09 NOTE — PLAN OF CARE
Problem: Potential for Falls  Goal: Patient will remain free of falls  Description: INTERVENTIONS:  - Educate patient/family on patient safety including physical limitations  - Instruct patient to call for assistance with activity   - Consult OT/PT to assist with strengthening/mobility   - Keep Call bell within reach  - Keep bed low and locked with side rails adjusted as appropriate  - Keep care items and personal belongings within reach  - Initiate and maintain comfort rounds  - Make Fall Risk Sign visible to staff  - Offer Toileting every  Hours, in advance of need  - Initiate/Maintain alarm  - Obtain necessary fall risk management equipment:   - Apply yellow socks and bracelet for high fall risk patients  - Consider moving patient to room near nurses station  Outcome: Progressing     Problem: PAIN - ADULT  Goal: Verbalizes/displays adequate comfort level or baseline comfort level  Description: Interventions:  - Encourage patient to monitor pain and request assistance  - Assess pain using appropriate pain scale  - Administer analgesics based on type and severity of pain and evaluate response  - Implement non-pharmacological measures as appropriate and evaluate response  - Consider cultural and social influences on pain and pain management  - Notify physician/advanced practitioner if interventions unsuccessful or patient reports new pain  Outcome: Progressing     Problem: SAFETY ADULT  Goal: Patient will remain free of falls  Description: INTERVENTIONS:  - Educate patient/family on patient safety including physical limitations  - Instruct patient to call for assistance with activity   - Consult OT/PT to assist with strengthening/mobility   - Keep Call bell within reach  - Keep bed low and locked with side rails adjusted as appropriate  - Keep care items and personal belongings within reach  - Initiate and maintain comfort rounds  - Make Fall Risk Sign visible to staff  - Offer Toileting every  Hours, in advance  of need  - Initiate/Maintain alarm  - Obtain necessary fall risk management equipment  - Apply yellow socks and bracelet for high fall risk patients  - Consider moving patient to room near nurses station  Outcome: Progressing     Problem: RESPIRATORY - ADULT  Goal: Achieves optimal ventilation and oxygenation  Description: INTERVENTIONS:  - Assess for changes in respiratory status  - Assess for changes in mentation and behavior  - Position to facilitate oxygenation and minimize respiratory effort  - Oxygen administered by appropriate delivery if ordered  - Initiate smoking cessation education as indicated  - Encourage broncho-pulmonary hygiene including cough, deep breathe, Incentive Spirometry  - Assess the need for suctioning and aspirate as needed  - Assess and instruct to report SOB or any respiratory difficulty  - Respiratory Therapy support as indicated  Outcome: Progressing     Problem: Prexisting or High Potential for Compromised Skin Integrity  Goal: Skin integrity is maintained or improved  Description: INTERVENTIONS:  - Identify patients at risk for skin breakdown  - Assess and monitor skin integrity  - Assess and monitor nutrition and hydration status  - Monitor labs   - Assess for incontinence   - Turn and reposition patient  - Assist with mobility/ambulation  - Relieve pressure over bony prominences  - Avoid friction and shearing  - Provide appropriate hygiene as needed including keeping skin clean and dry  - Evaluate need for skin moisturizer/barrier cream  - Collaborate with interdisciplinary team   - Patient/family teaching  - Consider wound care consult   Outcome: Progressing

## 2025-03-09 NOTE — ASSESSMENT & PLAN NOTE
Malnutrition Findings:   Adult Malnutrition type: Chronic illness  Adult Degree of Malnutrition: Other severe protein calorie malnutrition    BMI Findings:           Body mass index is 18.88 kg/m².   Nutrition consult

## 2025-03-09 NOTE — ASSESSMENT & PLAN NOTE
Lab Results   Component Value Date    HGBA1C 7.5 (H) 03/07/2025       Recent Labs     03/08/25  1122 03/08/25  1714 03/08/25 2056 03/09/25  0713   POCGLU 205* 321* 323* 239*       Blood Sugar Average: Last 72 hrs:  (P) 220.625    Add Lantus 10 units QHS  Continue Humalog 8 units with breakfast, 4 units with dinner, and sliding scale 3 times daily before meals and nightly

## 2025-03-09 NOTE — ASSESSMENT & PLAN NOTE
Patient had recent hospitalization 02/24/2025 to 02/26/2025 for ILD flareup was discharged on prednisone taper  CXR this admission as above  Pulm consult appreciated

## 2025-03-09 NOTE — PLAN OF CARE
Problem: Potential for Falls  Goal: Patient will remain free of falls  Description: INTERVENTIONS:  - Educate patient/family on patient safety including physical limitations  - Instruct patient to call for assistance with activity   - Consult OT/PT to assist with strengthening/mobility   - Keep Call bell within reach  - Keep bed low and locked with side rails adjusted as appropriate  - Keep care items and personal belongings within reach  - Initiate and maintain comfort rounds  - Make Fall Risk Sign visible to staff  - Offer Toileting every  Hours, in advance of need  - Initiate/Maintain alarm  - Obtain necessary fall risk management equipment:   - Apply yellow socks and bracelet for high fall risk patients  - Consider moving patient to room near nurses station  Outcome: Progressing     Problem: PAIN - ADULT  Goal: Verbalizes/displays adequate comfort level or baseline comfort level  Description: Interventions:  - Encourage patient to monitor pain and request assistance  - Assess pain using appropriate pain scale  - Administer analgesics based on type and severity of pain and evaluate response  - Implement non-pharmacological measures as appropriate and evaluate response  - Consider cultural and social influences on pain and pain management  - Notify physician/advanced practitioner if interventions unsuccessful or patient reports new pain  Outcome: Progressing     Problem: SAFETY ADULT  Goal: Patient will remain free of falls  Description: INTERVENTIONS:  - Educate patient/family on patient safety including physical limitations  - Instruct patient to call for assistance with activity   - Consult OT/PT to assist with strengthening/mobility   - Keep Call bell within reach  - Keep bed low and locked with side rails adjusted as appropriate  - Keep care items and personal belongings within reach  - Initiate and maintain comfort rounds  - Make Fall Risk Sign visible to staff  - Offer Toileting every  Hours, in advance  of need  - Initiate/Maintainalarm  - Obtain necessary fall risk management equipment:   - Apply yellow socks and bracelet for high fall risk patients  - Consider moving patient to room near nurses station  Outcome: Progressing  Goal: Maintain or return to baseline ADL function  Description: INTERVENTIONS:  -  Assess patient's ability to carry out ADLs; assess patient's baseline for ADL function and identify physical deficits which impact ability to perform ADLs (bathing, care of mouth/teeth, toileting, grooming, dressing, etc.)  - Assess/evaluate cause of self-care deficits   - Assess range of motion  - Assess patient's mobility; develop plan if impaired  - Assess patient's need for assistive devices and provide as appropriate  - Encourage maximum independence but intervene and supervise when necessary  - Involve family in performance of ADLs  - Assess for home care needs following discharge   - Consider OT consult to assist with ADL evaluation and planning for discharge  - Provide patient education as appropriate  Outcome: Progressing  Goal: Maintains/Returns to pre admission functional level  Description: INTERVENTIONS:  - Perform AM-PAC 6 Click Basic Mobility/ Daily Activity assessment daily.  - Set and communicate daily mobility goal to care team and patient/family/caregiver.   - Collaborate with rehabilitation services on mobility goals if consulted  - Perform Range of Motion  times a day.  - Reposition patient every  hours.  - Dangle patient  times a day  - Stand patient  times a day  - Ambulate patient times a day  - Out of bed to chair  times a day   - Out of bed for meals times a day  - Out of bed for toileting  - Record patient progress and toleration of activity level   Outcome: Progressing     Problem: RESPIRATORY - ADULT  Goal: Achieves optimal ventilation and oxygenation  Description: INTERVENTIONS:  - Assess for changes in respiratory status  - Assess for changes in mentation and behavior  - Position  to facilitate oxygenation and minimize respiratory effort  - Oxygen administered by appropriate delivery if ordered  - Initiate smoking cessation education as indicated  - Encourage broncho-pulmonary hygiene including cough, deep breathe, Incentive Spirometry  - Assess the need for suctioning and aspirate as needed  - Assess and instruct to report SOB or any respiratory difficulty  - Respiratory Therapy support as indicated  Outcome: Progressing     Problem: Prexisting or High Potential for Compromised Skin Integrity  Goal: Skin integrity is maintained or improved  Description: INTERVENTIONS:  - Identify patients at risk for skin breakdown  - Assess and monitor skin integrity  - Assess and monitor nutrition and hydration status  - Monitor labs   - Assess for incontinence   - Turn and reposition patient  - Assist with mobility/ambulation  - Relieve pressure over bony prominences  - Avoid friction and shearing  - Provide appropriate hygiene as needed including keeping skin clean and dry  - Evaluate need for skin moisturizer/barrier cream  - Collaborate with interdisciplinary team   - Patient/family teaching  - Consider wound care consult   Outcome: Progressing

## 2025-03-09 NOTE — PROGRESS NOTES
"Progress Note - Hospitalist   Name: Ac Duran 89 y.o. female I MRN: [id number]  [facility] Date of Admission: 3/7/2025   Date of Service: 3/9/2025 I Hospital Day: 2    Assessment & Plan  Dyspnea on exertion  Patient was sent to the hospital by her PCP on 3/7/25 due to progressive dyspnea on exertion  Underlying history of interstitial lung disease, ROMERO  Recently hospitalized twice for dyspnea on exertion and hemoptysis  Currently on tapered dose of prednisone, continue  CXR revealed, \"Moderate pulmonary fibrosis with no definite acute disease.\"  Pulm consult appreciated  On room air   Also with mild to moderate AS. Patient has been having difficulty getting into outpatient Cardiology visits due to frequent hospitalizations and son is requesting Cardiology evaluation here    Interstitial lung disease (HCC)  Patient had recent hospitalization 02/24/2025 to 02/26/2025 for ILD flareup was discharged on prednisone taper  CXR this admission as above  Pulm consult appreciated  Aortic stenosis  Recent echo shows mild to moderate aortic stenosis   Patient's son reports that patient has had difficulty making OP appointments due to frequent hospitalizations. Also with DASILVA as above. Pulm evaluated and felt no signs of worsening ILD. Son requesting Cardiology eval which has been placed  Essential hypertension  Stable BP  Monitor  Glaucoma  Continue home eyedrops  Paroxysmal atrial fibrillation (HCC)  Stable heart rate  Continue metoprolol and Eliquis  GERD (gastroesophageal reflux disease)  Continue with Protonix and Pepcid  Moderate protein-calorie malnutrition (HCC)  Malnutrition Findings:   Adult Malnutrition type: Chronic illness  Adult Degree of Malnutrition: Other severe protein calorie malnutrition    BMI Findings:           Body mass index is 18.88 kg/m².   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   Continue " sodium tablet  Oral fluid restrictions  Monitor  DM2 (diabetes mellitus, type 2) ([facility])  Lab Results   Component Value Date    HGBA1C 7.5 (H) 03/07/2025       Recent Labs     03/08/25  1122 03/08/25  1714 03/08/25 2056 03/09/25  0713   POCGLU 205* 321* 323* 239*       Blood Sugar Average: Last 72 hrs:  (P) 220.625    Add Lantus 10 units QHS  Continue Humalog 8 units with breakfast, 4 units with dinner, and sliding scale 3 times daily before meals and nightly  ROMERO (obstructive sleep apnea)  CPAP at bedtime    VTE Pharmacologic Prophylaxis: VTE Score: 6 High Risk (Score >/= 5) - Pharmacological DVT Prophylaxis Ordered: apixaban (Eliquis). Sequential Compression Devices Ordered.    Mobility:   Basic Mobility Inpatient Raw Score: 18  JH-HLM Goal: 6: Walk 10 steps or more  JH-HLM Achieved: 6: Walk 10 steps or more  JH-HLM Goal achieved. Continue to encourage appropriate mobility.    Patient Centered Rounds: I performed bedside rounds with nursing staff today.   Discussions with Specialists or Other Care Team Provider:     Education and Discussions with Family / Patient: Updated  (son) via phone. Rey    Current Length of Stay: 2 day(s)  Current Patient Status: Inpatient   Certification Statement: The patient will continue to require additional inpatient hospital stay due to cardiology consult, safe d/c planning   Discharge Plan: Anticipate discharge tomorrow to rehab facility.    Code Status: Level 3 - DNAR and DNI    Subjective   Ms. Duran reports that her breathing feels a bit better today. Was up and out of bed to chair    Objective :  Temp:  [97.1 °F (36.2 °C)] 97.1 °F (36.2 °C)  HR:  [63-84] 84  BP: (114-175)/(63-73) 114/64  Resp:  [16-18] 18  SpO2:  [96 %-97 %] 97 %  O2 Device: None (Room air)    Body mass index is 18.88 kg/m².     Input and Output Summary (last 24 hours):     Intake/Output Summary (Last 24 hours) at 3/9/2025 1136  Last data filed at 3/9/2025 1001  Gross per 24 hour   Intake 960  ml   Output --   Net 960 ml       Physical Exam  Vitals reviewed.   Constitutional:       Comments: Patient seen sitting in bedside chair, NAD, chronically ill appearing but NAD   Cardiovascular:      Rate and Rhythm: Normal rate and regular rhythm.   Pulmonary:      Effort: Pulmonary effort is normal. No respiratory distress.      Breath sounds: Normal breath sounds.      Comments: Room air  Abdominal:      General: Bowel sounds are normal.      Palpations: Abdomen is soft.      Tenderness: There is no abdominal tenderness.   Musculoskeletal:      Right lower leg: No edema.      Left lower leg: No edema.   Skin:     General: Skin is warm.   Neurological:      Mental Status: She is alert and oriented to person, place, and time.   Psychiatric:         Mood and Affect: Mood normal.           Lines/Drains:              Lab Results: I have reviewed the following results:   Results from last 7 days   Lab Units 03/09/25  0514 03/07/25  1333   WBC Thousand/uL 6.24 9.48   HEMOGLOBIN g/dL 10.2* 11.6   HEMATOCRIT % 33.4* 38.2   PLATELETS Thousands/uL 194 228   SEGS PCT %  --  93*   LYMPHO PCT %  --  4*   MONO PCT %  --  2*   EOS PCT %  --  0     Results from last 7 days   Lab Units 03/09/25  0514 03/08/25  0640 03/07/25  1434   SODIUM mmol/L 132*   < > 130*   POTASSIUM mmol/L 4.2   < > 4.4   CHLORIDE mmol/L 94*   < > 93*   CO2 mmol/L 31   < > 29   BUN mg/dL 19   < > 15   CREATININE mg/dL 0.59*   < > 0.52*   ANION GAP mmol/L 7   < > 8   CALCIUM mg/dL 8.5   < > 8.6   ALBUMIN g/dL  --   --  3.8   TOTAL BILIRUBIN mg/dL  --   --  0.80   ALK PHOS U/L  --   --  50   ALT U/L  --   --  23   AST U/L  --   --  29   GLUCOSE RANDOM mg/dL 269*   < > 113    < > = values in this interval not displayed.         Results from last 7 days   Lab Units 03/09/25  0713 03/08/25  2056 03/08/25  1714 03/08/25  1122 03/08/25  0655 03/07/25  2100 03/07/25  1737 03/07/25  1332 03/05/25  1616 03/05/25  1009 03/05/25  0703 03/04/25  2121   POC GLUCOSE  mg/dl 239* 323* 321* 205* 176* 200* 205* 96 292* 220* 176* 327*     Results from last 7 days   Lab Units 03/07/25  1031   HEMOGLOBIN A1C % 7.5*     Results from last 7 days   Lab Units 03/03/25  0445 03/02/25  1202   LACTIC ACID mmol/L  --  1.2   PROCALCITONIN ng/ml <0.05  --        Recent Cultures (last 7 days):         Imaging Results Review: I reviewed radiology reports from this admission including: Echocardiogram.      Last 24 Hours Medication List:     Current Facility-Administered Medications:     acetaminophen (TYLENOL) tablet 650 mg, Q4H PRN    apixaban (ELIQUIS) tablet 2.5 mg, BID    Artificial Tears Op Soln 1 drop, Q6H PRN    bimatoprost (LUMIGAN) 0.03 % ophthalmic drops 1 drop, HS    brimonidine tartrate 0.2 % ophthalmic solution 1 drop, BID    cyanocobalamin (VITAMIN B-12) tablet 500 mcg, BID    ezetimibe (ZETIA) tablet 10 mg, Daily With Dinner **AND** pravastatin (PRAVACHOL) tablet 80 mg, Daily With Dinner    famotidine (PEPCID) tablet 20 mg, BID    insulin lispro (HumALOG/ADMELOG) 100 units/mL subcutaneous injection 1-5 Units, TID AC **AND** Fingerstick Glucose (POCT), TID AC    insulin lispro (HumALOG/ADMELOG) 100 units/mL subcutaneous injection 1-5 Units, HS    insulin lispro (HumALOG/ADMELOG) 100 units/mL subcutaneous injection 4 Units, Daily With Dinner    insulin lispro (HumALOG/ADMELOG) 100 units/mL subcutaneous injection 8 Units, Daily With Breakfast    LORazepam (ATIVAN) tablet 0.5 mg, BID PRN    metoprolol tartrate (LOPRESSOR) tablet 25 mg, Q12H JONNA    mirtazapine (REMERON) tablet 7.5 mg, HS    ondansetron (ZOFRAN) injection 4 mg, Q6H PRN    pantoprazole (PROTONIX) EC tablet 40 mg, BID    predniSONE tablet 20 mg, Daily **FOLLOWED BY** [START ON 3/11/2025] predniSONE tablet 10 mg, Daily    sitaGLIPtin (JANUVIA) tablet 100 mg, Daily    sodium chloride tablet 1 g, TID    Administrative Statements   Today, Patient Was Seen By: Alverto Rodríguez PA-C      **Please Note: This note may have been  constructed using a voice recognition system.**

## 2025-03-09 NOTE — MALNUTRITION/BMI
This medical record reflects one or more clinical indicators suggestive of malnutrition and/or morbid obesity.    Malnutrition Findings:   Adult Malnutrition type: Chronic illness  Adult Degree of Malnutrition: Other severe protein calorie malnutrition  Malnutrition Characteristics: Muscle loss, Inadequate energy                360 Statement: Severe protein calorie malnutrition related to chronic illness evidenced by <75% energy intake compared to estimated energy needs for >1 month, and severe muscle loss in clavicle region with protruding, prominent bone. Treatmeat: Supplement chocolate glucerna BID.    BMI Findings:           Body mass index is 18.88 kg/m².     See Nutrition note dated 3/9/25 for additional details.  Completed nutrition assessment is viewable in the nutrition documentation.

## 2025-03-09 NOTE — ASSESSMENT & PLAN NOTE
"Patient was sent to the hospital by her PCP on 3/7/25 due to progressive dyspnea on exertion  Underlying history of interstitial lung disease, ROMERO  Recently hospitalized twice for dyspnea on exertion and hemoptysis  Currently on tapered dose of prednisone, continue  CXR revealed, \"Moderate pulmonary fibrosis with no definite acute disease.\"  Pulm consult appreciated  On room air   Also with mild to moderate AS. Patient has been having difficulty getting into outpatient Cardiology visits due to frequent hospitalizations and son is requesting Cardiology evaluation here    "

## 2025-03-09 NOTE — PLAN OF CARE
Problem: Potential for Falls  Goal: Patient will remain free of falls  Description: INTERVENTIONS:  - Educate patient/family on patient safety including physical limitations  - Instruct patient to call for assistance with activity   - Consult OT/PT to assist with strengthening/mobility   - Keep Call bell within reach  - Keep bed low and locked with side rails adjusted as appropriate  - Keep care items and personal belongings within reach  - Initiate and maintain comfort rounds  - Make Fall Risk Sign visible to staff  - Offer Toileting every  Hours, in advance of need  - Initiate/Maintain alarm  - Obtain necessary fall risk management equipment:   - Apply yellow socks and bracelet for high fall risk patients  - Consider moving patient to room near nurses station  Outcome: Progressing     Problem: PAIN - ADULT  Goal: Verbalizes/displays adequate comfort level or baseline comfort level  Description: Interventions:  - Encourage patient to monitor pain and request assistance  - Assess pain using appropriate pain scale  - Administer analgesics based on type and severity of pain and evaluate response  - Implement non-pharmacological measures as appropriate and evaluate response  - Consider cultural and social influences on pain and pain management  - Notify physician/advanced practitioner if interventions unsuccessful or patient reports new pain  Outcome: Progressing     Problem: INFECTION - ADULT  Goal: Absence or prevention of progression during hospitalization  Description: INTERVENTIONS:  - Assess and monitor for signs and symptoms of infection  - Monitor lab/diagnostic results  - Monitor all insertion sites, i.e. indwelling lines, tubes, and drains  - Monitor endotracheal if appropriate and nasal secretions for changes in amount and color  - [place] appropriate cooling/warming therapies per order  - Administer medications as ordered  - Instruct and encourage patient and family to use good hand hygiene technique  -  Identify and instruct in appropriate isolation precautions for identified infection/condition  Outcome: Progressing  Goal: Absence of fever/infection during neutropenic period  Description: INTERVENTIONS:  - Monitor WBC    Outcome: Progressing     Problem: SAFETY ADULT  Goal: Patient will remain free of falls  Description: INTERVENTIONS:  - Educate patient/family on patient safety including physical limitations  - Instruct patient to call for assistance with activity   - Consult OT/PT to assist with strengthening/mobility   - Keep Call bell within reach  - Keep bed low and locked with side rails adjusted as appropriate  - Keep care items and personal belongings within reach  - Initiate and maintain comfort rounds  - Make Fall Risk Sign visible to staff  - Offer Toileting every  Hours, in advance of need  - Initiate/Maintain alarm  - Obtain necessary fall risk management equipment:   - Apply yellow socks and bracelet for high fall risk patients  - Consider moving patient to room near nurses station  Outcome: Progressing  Goal: Maintain or return to baseline ADL function  Description: INTERVENTIONS:  -  Assess patient's ability to carry out ADLs; assess patient's baseline for ADL function and identify physical deficits which impact ability to perform ADLs (bathing, care of mouth/teeth, toileting, grooming, dressing, etc.)  - Assess/evaluate cause of self-care deficits   - Assess range of motion  - Assess patient's mobility; develop plan if impaired  - Assess patient's need for assistive devices and provide as appropriate  - Encourage maximum independence but intervene and supervise when necessary  - Involve family in performance of ADLs  - Assess for home care needs following discharge   - Consider OT consult to assist with ADL evaluation and planning for discharge  - Provide patient education as appropriate  Outcome: Progressing  Goal: Maintains/Returns to pre admission functional level  Description:  INTERVENTIONS:  - Perform AM-PAC 6 Click Basic Mobility/ Daily Activity assessment daily.  - Set and communicate daily mobility goal to care team and patient/family/caregiver.   - Collaborate with rehabilitation services on mobility goals if consulted  - Perform Range of Motion  times a day.  - Reposition patient every  hours.  - Dangle patient  times a day  - Stand patient  times a day  - Ambulate patient  times a day  - Out of bed to chair  times a day   - Out of bed for meal times a day  - Out of bed for toileting  - Record patient progress and toleration of activity level   Outcome: Progressing     Problem: DISCHARGE PLANNING  Goal: Discharge to home or other facility with appropriate resources  Description: INTERVENTIONS:  - Identify barriers to discharge w/patient and caregiver  - Arrange for needed discharge resources and transportation as appropriate  - Identify discharge learning needs (meds, wound care, etc.)  - Arrange for interpretive services to assist at discharge as needed  - Refer to Case Management Department for coordinating discharge planning if the patient needs post-hospital services based on physician/advanced practitioner order or complex needs related to functional status, cognitive ability, or social support system  Outcome: Progressing     Problem: Knowledge Deficit  Goal: Patient/family/caregiver demonstrates understanding of disease process, treatment plan, medications, and discharge instructions  Description: Complete learning assessment and assess knowledge base.  Interventions:  - Provide teaching at level of understanding  - Provide teaching via preferred learning methods  Outcome: Progressing     Problem: RESPIRATORY - ADULT  Goal: Achieves optimal ventilation and oxygenation  Description: INTERVENTIONS:  - Assess for changes in respiratory status  - Assess for changes in mentation and behavior  - Position to facilitate oxygenation and minimize respiratory effort  - Oxygen  administered by appropriate delivery if ordered  - Initiate smoking cessation education as indicated  - Encourage broncho-pulmonary hygiene including cough, deep breathe, Incentive Spirometry  - Assess the need for suctioning and aspirate as needed  - Assess and instruct to report SOB or any respiratory difficulty  - Respiratory Therapy support as indicated  Outcome: Progressing     Problem: Prexisting or High Potential for Compromised Skin Integrity  Goal: Skin integrity is maintained or improved  Description: INTERVENTIONS:  - Identify patients at risk for skin breakdown  - Assess and monitor skin integrity  - Assess and monitor nutrition and hydration status  - Monitor labs   - Assess for incontinence   - Turn and reposition patient  - Assist with mobility/ambulation  - Relieve pressure over bony prominences  - Avoid friction and shearing  - Provide appropriate hygiene as needed including keeping skin clean and dry  - Evaluate need for skin moisturizer/barrier cream  - Collaborate with interdisciplinary team   - Patient/family teaching  - Consider wound care consult   Outcome: Progressing     Problem: Nutrition/Hydration-ADULT  Goal: Nutrient/Hydration intake appropriate for improving, restoring or maintaining nutritional needs  Description: Monitor and assess patient's nutrition/hydration status for malnutrition. Collaborate with interdisciplinary team and initiate plan and interventions as ordered.  Monitor patient's weight and dietary intake as ordered or per policy. Utilize nutrition screening tool and intervene as necessary. Determine patient's food preferences and provide high-protein, high-caloric foods as appropriate.     INTERVENTIONS:  - Monitor oral intake, urinary output, labs, and treatment plans  - Assess nutrition and hydration status and recommend course of action  - Evaluate amount of meals eaten  - Assist patient with eating if necessary   - Allow adequate time for meals  - Recommend/ encourage  appropriate diets, oral nutritional supplements, and vitamin/mineral supplements  - Order, calculate, and assess calorie counts as needed  - Recommend, monitor, and adjust tube feedings and TPN/PPN based on assessed needs  - Assess need for intravenous fluids  - Provide specific nutrition/hydration education as appropriate  - Include patient/family/caregiver in decisions related to nutrition  Outcome: Progressing

## 2025-03-09 NOTE — PROGRESS NOTES
Pastoral Care Progress Note     responded to pt's request for Communion and remains available.       Chaplaincy Interventions Utilized:   Empowerment: Provided anxiety containment    Exploration: Explored emotional needs & resources and Explored spiritual needs & resources     Relationship Building: Cultivated a relationship of care and support and Listened empathically    Ritual: Provided prayer    Chaplaincy Outcomes Achieved:  Catharsis and Verbally processed emotions    Spiritual Coping Strategies Utilized:   Connectedness, Spiritual practices, Spiritual comfort, and Spiritual gratitude       03/09/25 0900   Clinical Encounter Type   Visited With Patient   Routine Visit Introduction   Referral From Nurse   Buddhist Encounters   Buddhist Needs Prayer

## 2025-03-10 PROBLEM — E43 SEVERE PROTEIN-CALORIE MALNUTRITION (HCC): Status: ACTIVE | Noted: 2025-03-10

## 2025-03-10 LAB
ANION GAP SERPL CALCULATED.3IONS-SCNC: 3 MMOL/L (ref 4–13)
BNP SERPL-MCNC: 200 PG/ML (ref 0–100)
BUN SERPL-MCNC: 17 MG/DL (ref 5–25)
CALCIUM SERPL-MCNC: 8.3 MG/DL (ref 8.4–10.2)
CHLORIDE SERPL-SCNC: 98 MMOL/L (ref 96–108)
CO2 SERPL-SCNC: 33 MMOL/L (ref 21–32)
CREAT SERPL-MCNC: 0.59 MG/DL (ref 0.6–1.3)
ERYTHROCYTE [DISTWIDTH] IN BLOOD BY AUTOMATED COUNT: 17 % (ref 11.6–15.1)
FERRITIN SERPL-MCNC: 94 NG/ML (ref 11–307)
GFR SERPL CREATININE-BSD FRML MDRD: 82 ML/MIN/1.73SQ M
GLUCOSE SERPL-MCNC: 155 MG/DL (ref 65–140)
GLUCOSE SERPL-MCNC: 176 MG/DL (ref 65–140)
GLUCOSE SERPL-MCNC: 326 MG/DL (ref 65–140)
GLUCOSE SERPL-MCNC: 326 MG/DL (ref 65–140)
GLUCOSE SERPL-MCNC: 329 MG/DL (ref 65–140)
GLUCOSE SERPL-MCNC: 81 MG/DL (ref 65–140)
HCT VFR BLD AUTO: 31.6 % (ref 34.8–46.1)
HGB BLD-MCNC: 9.8 G/DL (ref 11.5–15.4)
IRON SATN MFR SERPL: 10 % (ref 15–50)
IRON SERPL-MCNC: 30 UG/DL (ref 50–212)
MAGNESIUM SERPL-MCNC: 1.9 MG/DL (ref 1.9–2.7)
MCH RBC QN AUTO: 24.9 PG (ref 26.8–34.3)
MCHC RBC AUTO-ENTMCNC: 31 G/DL (ref 31.4–37.4)
MCV RBC AUTO: 80 FL (ref 82–98)
PLATELET # BLD AUTO: 168 THOUSANDS/UL (ref 149–390)
PMV BLD AUTO: 9.3 FL (ref 8.9–12.7)
POTASSIUM SERPL-SCNC: 4.3 MMOL/L (ref 3.5–5.3)
RBC # BLD AUTO: 3.94 MILLION/UL (ref 3.81–5.12)
SODIUM SERPL-SCNC: 134 MMOL/L (ref 135–147)
TIBC SERPL-MCNC: 308 UG/DL (ref 250–450)
TRANSFERRIN SERPL-MCNC: 220 MG/DL (ref 203–362)
UIBC SERPL-MCNC: 278 UG/DL (ref 155–355)
WBC # BLD AUTO: 6.43 THOUSAND/UL (ref 4.31–10.16)

## 2025-03-10 PROCEDURE — 85027 COMPLETE CBC AUTOMATED: CPT | Performed by: PHYSICIAN ASSISTANT

## 2025-03-10 PROCEDURE — 80048 BASIC METABOLIC PNL TOTAL CA: CPT | Performed by: PHYSICIAN ASSISTANT

## 2025-03-10 PROCEDURE — 83880 ASSAY OF NATRIURETIC PEPTIDE: CPT | Performed by: STUDENT IN AN ORGANIZED HEALTH CARE EDUCATION/TRAINING PROGRAM

## 2025-03-10 PROCEDURE — 99232 SBSQ HOSP IP/OBS MODERATE 35: CPT | Performed by: INTERNAL MEDICINE

## 2025-03-10 PROCEDURE — 82948 REAGENT STRIP/BLOOD GLUCOSE: CPT

## 2025-03-10 PROCEDURE — 82728 ASSAY OF FERRITIN: CPT | Performed by: INTERNAL MEDICINE

## 2025-03-10 PROCEDURE — 83735 ASSAY OF MAGNESIUM: CPT | Performed by: PHYSICIAN ASSISTANT

## 2025-03-10 PROCEDURE — 92610 EVALUATE SWALLOWING FUNCTION: CPT

## 2025-03-10 PROCEDURE — 99223 1ST HOSP IP/OBS HIGH 75: CPT | Performed by: INTERNAL MEDICINE

## 2025-03-10 PROCEDURE — 83550 IRON BINDING TEST: CPT | Performed by: INTERNAL MEDICINE

## 2025-03-10 PROCEDURE — 83540 ASSAY OF IRON: CPT | Performed by: INTERNAL MEDICINE

## 2025-03-10 RX ORDER — AMLODIPINE BESYLATE 5 MG/1
5 TABLET ORAL DAILY
Status: DISCONTINUED | OUTPATIENT
Start: 2025-03-10 | End: 2025-03-11 | Stop reason: HOSPADM

## 2025-03-10 RX ADMIN — ACETAMINOPHEN 650 MG: 325 TABLET, FILM COATED ORAL at 00:58

## 2025-03-10 RX ADMIN — PREDNISONE 20 MG: 20 TABLET ORAL at 08:21

## 2025-03-10 RX ADMIN — APIXABAN 2.5 MG: 2.5 TABLET, FILM COATED ORAL at 17:28

## 2025-03-10 RX ADMIN — SODIUM CHLORIDE 1 G: 1 TABLET ORAL at 08:21

## 2025-03-10 RX ADMIN — BRIMONIDINE TARTRATE 1 DROP: 2 SOLUTION/ DROPS OPHTHALMIC at 08:22

## 2025-03-10 RX ADMIN — LIDOCAINE 5% 1 PATCH: 700 PATCH TOPICAL at 08:21

## 2025-03-10 RX ADMIN — METOPROLOL TARTRATE 25 MG: 25 TABLET, FILM COATED ORAL at 21:11

## 2025-03-10 RX ADMIN — LORAZEPAM 0.5 MG: 0.5 TABLET ORAL at 15:57

## 2025-03-10 RX ADMIN — CYANOCOBALAMIN TAB 500 MCG 500 MCG: 500 TAB at 08:21

## 2025-03-10 RX ADMIN — INSULIN LISPRO 1 UNITS: 100 INJECTION, SOLUTION INTRAVENOUS; SUBCUTANEOUS at 08:22

## 2025-03-10 RX ADMIN — INSULIN LISPRO 3 UNITS: 100 INJECTION, SOLUTION INTRAVENOUS; SUBCUTANEOUS at 21:14

## 2025-03-10 RX ADMIN — SODIUM CHLORIDE 1 G: 1 TABLET ORAL at 21:13

## 2025-03-10 RX ADMIN — SODIUM CHLORIDE 1 G: 1 TABLET ORAL at 17:29

## 2025-03-10 RX ADMIN — PRAVASTATIN SODIUM 80 MG: 80 TABLET ORAL at 17:29

## 2025-03-10 RX ADMIN — CYANOCOBALAMIN TAB 500 MCG 500 MCG: 500 TAB at 17:28

## 2025-03-10 RX ADMIN — FAMOTIDINE 20 MG: 20 TABLET, FILM COATED ORAL at 17:29

## 2025-03-10 RX ADMIN — ACETAMINOPHEN 650 MG: 325 TABLET, FILM COATED ORAL at 08:32

## 2025-03-10 RX ADMIN — SITAGLIPTIN 100 MG: 100 TABLET, FILM COATED ORAL at 08:21

## 2025-03-10 RX ADMIN — INSULIN LISPRO 3 UNITS: 100 INJECTION, SOLUTION INTRAVENOUS; SUBCUTANEOUS at 17:29

## 2025-03-10 RX ADMIN — PANTOPRAZOLE SODIUM 40 MG: 40 TABLET, DELAYED RELEASE ORAL at 21:12

## 2025-03-10 RX ADMIN — MIRTAZAPINE 7.5 MG: 15 TABLET, FILM COATED ORAL at 21:12

## 2025-03-10 RX ADMIN — METOPROLOL TARTRATE 25 MG: 25 TABLET, FILM COATED ORAL at 08:21

## 2025-03-10 RX ADMIN — APIXABAN 2.5 MG: 2.5 TABLET, FILM COATED ORAL at 08:21

## 2025-03-10 RX ADMIN — FAMOTIDINE 20 MG: 20 TABLET, FILM COATED ORAL at 08:21

## 2025-03-10 RX ADMIN — BRIMONIDINE TARTRATE 1 DROP: 2 SOLUTION/ DROPS OPHTHALMIC at 17:30

## 2025-03-10 RX ADMIN — INSULIN LISPRO 4 UNITS: 100 INJECTION, SOLUTION INTRAVENOUS; SUBCUTANEOUS at 17:29

## 2025-03-10 RX ADMIN — BIMATOPROST 1 DROP: 0.3 SOLUTION/ DROPS OPHTHALMIC at 21:08

## 2025-03-10 RX ADMIN — INSULIN GLARGINE 10 UNITS: 100 INJECTION, SOLUTION SUBCUTANEOUS at 21:13

## 2025-03-10 RX ADMIN — INSULIN LISPRO 8 UNITS: 100 INJECTION, SOLUTION INTRAVENOUS; SUBCUTANEOUS at 08:22

## 2025-03-10 RX ADMIN — PANTOPRAZOLE SODIUM 40 MG: 40 TABLET, DELAYED RELEASE ORAL at 08:21

## 2025-03-10 RX ADMIN — EZETIMIBE 10 MG: 10 TABLET ORAL at 17:28

## 2025-03-10 RX ADMIN — AMLODIPINE BESYLATE 5 MG: 5 TABLET ORAL at 12:26

## 2025-03-10 RX ADMIN — DEXTRAN 70, GLYCERIN, HYPROMELLOSE 1 DROP: 1; 2; 3 SOLUTION/ DROPS OPHTHALMIC at 08:57

## 2025-03-10 NOTE — CONSULTS
Consultation - Cardiology Team One  Ac Duran 89 y.o. female MRN: [id number]  Unit/Bed#: [facility] [place] 606-01 Encounter: [id number]    Inpatient consult to Cardiology  Consult performed by: Norman Lamar PA-C  Consult ordered by: Alverto Rodríguez PA-C        Physician Requesting Consult: Payam Baeza MD  Reason for Consult / Principal Problem: AS    Assessment & Plan  Aortic stenosis  Echocardiogram 2/2025: EF 55-60% with mild asymmetric hypertrophy of the basal septal wall, no WMA, G1DD, normal RV function, mild-moderate AS with MG 15 mmHg and OZIEL 1.2 cm2  Patient denies chest pain, lightheadedness, syncope or near syncope  Volume status appears stable  Paroxysmal atrial fibrillation (HCC)  Currently maintaining NSR by exam on Lopressor 25 mg BID   Anticoagulated on Eliquis 2.5 mg BID  Pacemaker  History of TBS s/p MDT DC PPM implanted 2016  Device interrogation 2/17/2025: AP 88%,  0.2%, no significant high rate episodes.  Reprogrammed rate response activity threshold to low from medium low for reported dyspnea and fatigue.  Normal device function.  Dyspnea on exertion  Felt secondary to deconditioning and underlying severe ILD  Currently on steroids with improvement in DASILVA  Pulmonary following  Interstitial lung disease (HCC)  Management as above  Not on oxygen at baseline  Essential hypertension  Labile BPs on Lopressor 25 mg BID  DM2 (diabetes mellitus, type 2) (HCC)  Lab Results   Component Value Date    HGBA1C 7.5 (H) 03/07/2025   Management per primary team  Moderate protein-calorie malnutrition (HCC)  BMI 18  ROMERO (obstructive sleep apnea)      Plan/Recommendations:  Add amlodipine 5 mg daily to optimize BP  Continue Lopressor 25 mg BID  Continue anticoagulation with Eliquis  Will continue serial echocardiograms and close monitoring of AS in the outpatient setting with primary cardiologist Dr. Ramsey  No further invasive testing recommended at this time  Dr. Castillo reviewed recommendations with the patient's  son personally  ___________________________________________________________    CC: DASILVA      History of Present Illness   HPI: Ac Duran is a 89 y.o. year old female who has PAF, TBS s/p DC PPM implanted in 2016, essential hypertension, hyperlipidemia, type II DM, interstitial lung disease who follows with cardiologist Dr. Ramsey.  Patient was sent to B on 3/7 by her PCP due to 2 weeks of progressive dyspnea on exertion.  Patient had had 2 recent hospitalizations with hemoptysis attributed to ILD flareup on 2/24 and 3/5.  EKG on admission revealed a paced rhythm.  CXR revealed moderate pulmonary fibrosis with no definitive acute disease.  Labs revealed hyponatremia otherwise stable CMP, negative troponin x 2.  Patient's symptoms were felt to be secondary to severe ILD.  Pulmonary was consulted who felt symptoms were most likely secondary to deconditioning.  Echocardiogram this admission revealed preserved BiV function with mild-moderate aortic stenosis.  Cardiology has been consulted due to AS.    Home medication regimen includes Eliquis 2.5 mg BID, ezetimibe-simvastatin 10-40 mg daily, Lopressor 25 mg BID.    EKG reviewed personally: 3/7/2025-AV paced rhythm at a rate of 81 bpm with PVCs.  No significant change when compared to the EKG from 3/2/2025.    Telemetry reviewed personally: Not on telemetry      Review of Systems   Constitutional: Negative. Negative for chills.   Cardiovascular:  Positive for dyspnea on exertion. Negative for chest pain, leg swelling, near-syncope, orthopnea, palpitations, paroxysmal nocturnal dyspnea and syncope.        Improved DASILVA   Respiratory: Negative.  Negative for cough, shortness of breath and wheezing.    Endocrine: Negative.    Hematologic/Lymphatic: Negative.    Skin: Negative.    Musculoskeletal: Negative.    Gastrointestinal: Negative.  Negative for diarrhea, nausea and vomiting.   Neurological:  Negative for dizziness, light-headedness and weakness.    Psychiatric/Behavioral: Negative.  Negative for altered mental status.    All other systems reviewed and are negative.    Historical Information   Past Medical History:   Diagnosis Date    Common bile duct dilatation 2020    Glaucoma     H/O degenerative disc disease     Idiopathic pulmonary fibrosis (HCC) 2020    Irregular heart beat     afib    Peripheral neuropathy     S/P laparoscopic cholecystectomy 2020    Stenosis of right subclavian artery (HCC) 2016     Past Surgical History:   Procedure Laterality Date    CATARACT EXTRACTION, BILATERAL      CHOLECYSTECTOMY      CHOLECYSTECTOMY LAPAROSCOPIC N/A 2020    Procedure: CHOLECYSTECTOMY LAPAROSCOPIC;  Surgeon: Kalen Garrett MD;  Location: [facility];  Service: General    COLONOSCOPY      CYST REMOVAL  2009    left lower Quadrant     FL LUMBAR PUNCTURE DIAGNOSTIC  2023    HERNIA REPAIR  1992    LIPOMA RESECTION  2010    MA RPR 1ST INGUN HRNA AGE 5 YRS/> REDUCIBLE Bilateral 2018    Procedure: INGUINAL HERNIA REPAIR;  Surgeon: Volodymyr Lee MD;  Location: [facility];  Service: General    SHOULDER SURGERY  2019    Dr. Arnett ([place])    UPPER GASTROINTESTINAL ENDOSCOPY      VASCULAR SURGERY      Agram-  R carotid occlusion     Social History     Substance and Sexual Activity   Alcohol Use Never     Social History     Substance and Sexual Activity   Drug Use No     Social History     Tobacco Use   Smoking Status Former    Current packs/day: 0.00    Average packs/day: 1.5 packs/day for 38.0 years (57.0 ttl pk-yrs)    Types: Cigarettes    Start date:     Quit date:     Years since quittin.2   Smokeless Tobacco Never     Family History:   Family History   Problem Relation Age of Onset    Heart disease Mother     Heart attack Father     Hiatal hernia Father     Diabetes Father     Cancer Brother     Diabetes Brother     Heart disease Brother     Hypertension Brother     Lung disease Brother 75         interstitial lung disease    Cancer Brother 49        glioblastoma    Other Son         gastroparesis    Other Cousin         interstitial lung disease       Meds/Allergies   all current active meds have been reviewed, current meds:   Current Facility-Administered Medications:     acetaminophen (TYLENOL) tablet 650 mg, Q4H PRN    apixaban (ELIQUIS) tablet 2.5 mg, BID    Artificial Tears Op Soln 1 drop, Q6H PRN    bimatoprost (LUMIGAN) 0.03 % ophthalmic drops 1 drop, HS    brimonidine tartrate 0.2 % ophthalmic solution 1 drop, BID    cyanocobalamin (VITAMIN B-12) tablet 500 mcg, BID    ezetimibe (ZETIA) tablet 10 mg, Daily With Dinner **AND** pravastatin (PRAVACHOL) tablet 80 mg, Daily With Dinner    famotidine (PEPCID) tablet 20 mg, BID    insulin glargine (LANTUS) subcutaneous injection 10 Units 0.1 mL, HS    insulin lispro (HumALOG/ADMELOG) 100 units/mL subcutaneous injection 1-5 Units, TID AC **AND** Fingerstick Glucose (POCT), TID AC    insulin lispro (HumALOG/ADMELOG) 100 units/mL subcutaneous injection 1-5 Units, HS    insulin lispro (HumALOG/ADMELOG) 100 units/mL subcutaneous injection 4 Units, Daily With Dinner    insulin lispro (HumALOG/ADMELOG) 100 units/mL subcutaneous injection 8 Units, Daily With Breakfast    lidocaine (LIDODERM) 5 % patch 1 patch, Daily    LORazepam (ATIVAN) tablet 0.5 mg, BID PRN    metoprolol tartrate (LOPRESSOR) tablet 25 mg, Q12H JONNA    mirtazapine (REMERON) tablet 7.5 mg, HS    ondansetron (ZOFRAN) injection 4 mg, Q6H PRN    pantoprazole (PROTONIX) EC tablet 40 mg, BID    [COMPLETED] predniSONE tablet 20 mg, Daily **FOLLOWED BY** [START ON 3/11/2025] predniSONE tablet 10 mg, Daily    sitaGLIPtin (JANUVIA) tablet 100 mg, Daily    sodium chloride tablet 1 g, TID, and PTA meds:   Prior to Admission Medications   Prescriptions Last Dose Informant Patient Reported? Taking?   Cyanocobalamin (Vitamin B 12) 500 MCG TABS   No No   Sig: Take 500 mcg by mouth 2 (two) times a day   HumuLIN N  KwikPen 100 units/mL injection pen  Self No No   Sig: INJECT 8 UNITS UNDER THE SKIN EVERY MORNING AND 4 UNITS EVERY EVENING.   Insulin Pen Needle (B-D UF III MINI PEN NEEDLES) 31G X 5 MM MISC  Self No No   Sig: USE 2 (TWO) TIMES A DAY   LORazepam (ATIVAN) 0.5 mg tablet  Self No No   Sig: TAKE 1 TABLET (0.5 MG TOTAL) BY MOUTH 2 (TWO) TIMES A DAY AS NEEDED FOR ANXIETY   Multiple Vitamin (multivitamin) tablet  Self Yes No   Sig: Take 1 tablet by mouth daily     OneTouch Delica Lancets 33G MISC  Self No No   Sig: Check blood sugars four times daily. Please substitute with appropriate alternative as covered by patient's insurance. Dx: E11.65   acetaminophen (TYLENOL) 500 mg tablet  Self Yes No   Sig: Take 1,000 mg by mouth as needed for mild pain   apixaban (Eliquis) 2.5 mg   No No   Sig: TAKE 1 TABLET (2.5 MG TOTAL) BY MOUTH 2 (TWO) TIMES A DAY   bimatoprost (LUMIGAN) 0.01 % ophthalmic drops  Self No No   Sig: Administer 1 drop to both eyes daily at bedtime for 30 days   brimonidine tartrate 0.2 % ophthalmic solution  Self Yes No   Sig: INSTILL 1 DROP INTO RIGHT EYE TWICE A DAY   ezetimibe-simvastatin (VYTORIN) 10-40 mg per tablet  Self No No   Sig: TAKE 1 TABLET BY MOUTH DAILY AT BEDTIME   famotidine (PEPCID) 20 mg tablet   No No   Sig: TAKE 1 TABLET (20 MG TOTAL) BY MOUTH 2 (TWO) TIMES A DAY   glucose blood (OneTouch Ultra) test strip  Self No No   Sig: TEST FOUR TIMES A DAY   ipratropium (ATROVENT) 0.03 % nasal spray  Self No No   Sig: USE 2 SPRAYS INTO EACH NOSTRIL EVERY 12 (TWELVE) HOURS   metFORMIN (GLUCOPHAGE) 500 mg tablet   No No   Sig: TAKE 2 TABLETS (1,000 MG TOTAL) BY MOUTH 2 (TWO) TIMES A DAY WITH MEALS   metoprolol tartrate (LOPRESSOR) 25 mg tablet  Self No No   Sig: Take 1 tablet (25 mg total) by mouth every 12 (twelve) hours   mirtazapine (REMERON) 7.5 MG tablet   No No   Sig: TAKE 1 TABLET (7.5 MG TOTAL) BY MOUTH DAILY AT BEDTIME   pantoprazole (PROTONIX) 40 mg tablet  Self No No   Sig: TAKE 1 TABLET  "(40 MG TOTAL) BY MOUTH 2 (TWO) TIMES A DAY   polyethylene glycol (MIRALAX) 17 g packet   No No   Sig: Take 17 g by mouth daily as needed (constipation/ abominal cramping) for up to 14 days Prn constipation   predniSONE 10 mg tablet   No No   Sig: Take 2 tablets (20 mg total) by mouth daily for 5 days, THEN 1 tablet (10 mg total) daily for 5 days. Do not start before 2025.   sitaGLIPtin (Januvia) 100 mg tablet   No No   Sig: TAKE 1 TABLET (100 MG TOTAL) BY MOUTH DAILY   sodium chloride 1 g tablet  Self No No   Sig: TAKE 1 TABLET THREE TIMES A DAY      Facility-Administered Medications: None          Allergies   Allergen Reactions    Keflex [Cephalexin] Diarrhea       Objective   Vitals: Blood pressure 168/74, pulse 71, temperature 97.6 °F (36.4 °C), temperature source Oral, resp. rate 17, height 5' 6\" (1.676 m), weight 53.1 kg (117 lb), SpO2 96%.,     Body mass index is 18.88 kg/m².,     Systolic (24hrs), Av , Min:114 , Max:180     Diastolic (24hrs), Av, Min:64, Max:88    Wt Readings from Last 3 Encounters:   25 53.1 kg (117 lb)   25 53.5 kg (118 lb)   25 53.1 kg (117 lb)      Lab Results   Component Value Date    CREATININE 0.59 (L) 03/10/2025    CREATININE 0.59 (L) 2025    CREATININE 0.58 (L) 2025         Intake/Output Summary (Last 24 hours) at 3/10/2025 0855  Last data filed at 3/9/2025 1802  Gross per 24 hour   Intake 650 ml   Output --   Net 650 ml     Weight (last 2 days)       None          Invasive Devices       Peripheral Intravenous Line  Duration             Peripheral IV 25 Left;Ventral (anterior) Forearm 2 days                      Physical Exam  Vitals and nursing note reviewed.   Constitutional:       General: She is not in acute distress.     Appearance: She is well-developed.   HENT:      Head: Normocephalic and atraumatic.   Neck:      Vascular: No JVD.   Cardiovascular:      Rate and Rhythm: Normal rate and regular rhythm.      Heart sounds: " Murmur heard.      No friction rub.   Pulmonary:      Effort: Pulmonary effort is normal. No respiratory distress.      Breath sounds: Normal breath sounds. No wheezing or rales.   Abdominal:      General: Bowel sounds are normal. There is no distension.      Palpations: Abdomen is soft.      Tenderness: There is no abdominal tenderness.   Musculoskeletal:         General: No tenderness. Normal range of motion.      Cervical back: Normal range of motion and neck supple.   Skin:     General: Skin is warm and dry.      Findings: No erythema.   Neurological:      Mental Status: She is alert and oriented to person, place, and time.   Psychiatric:         Behavior: Behavior normal.         Thought Content: Thought content normal.         Judgment: Judgment normal.           LABORATORY RESULTS:      CBC with diff:   Results from last 7 days   Lab Units 03/10/25  0459 03/09/25  0514 03/07/25  1333 03/07/25  1031 03/05/25  0516 03/04/25  0452   WBC Thousand/uL 6.43 6.24 9.48 12.03* 6.83 8.80   HEMOGLOBIN g/dL 9.8* 10.2* 11.6 11.5 9.9* 10.2*   HEMATOCRIT % 31.6* 33.4* 38.2 37.7 33.2* 32.3*   MCV fL 80* 81* 81* 81* 83 78*   PLATELETS Thousands/uL 168 194 228 241 186 202   RBC Million/uL 3.94 4.14 4.70 4.67 4.00 4.12   MCH pg 24.9* 24.6* 24.7* 24.6* 24.8* 24.8*   MCHC g/dL 31.0* 30.5* 30.4* 30.5* 29.8* 31.6   RDW % 17.0* 16.9* 16.4* 16.9* 16.6* 15.9*   MPV fL 9.3 9.9 9.8 9.9 9.6 9.7   NRBC AUTO /100 WBCs  --   --  0 0  --   --        CMP:  Results from last 7 days   Lab Units 03/10/25  0459 03/09/25  0514 03/08/25  0640 03/07/25  1434 03/07/25  1031 03/05/25  0516 03/04/25  0452   POTASSIUM mmol/L 4.3 4.2 4.3 4.4 4.6 4.1 4.0   CHLORIDE mmol/L 98 94* 95* 93* 92* 99 97   CO2 mmol/L 33* 31 30 29 32 30 30   BUN mg/dL 17 19 19 15 18 17 17   CREATININE mg/dL 0.59* 0.59* 0.58* 0.52* 0.58* 0.40* 0.44*   CALCIUM mg/dL 8.3* 8.5 9.0 8.6 9.2 8.4 8.6   AST U/L  --   --   --  29 25  --   --    ALT U/L  --   --   --  23 24  --   --    ALK  "PHOS U/L  --   --   --  50 58  --   --    EGFR ml/min/1.73sq m 82 82 83 86 83 93 91       BMP:  Results from last 7 days   Lab Units 03/10/25  0459 03/09/25  0514 03/08/25  0640 03/07/25  1434 03/07/25  1031 03/05/25  0516 03/04/25  0452   POTASSIUM mmol/L 4.3 4.2 4.3 4.4 4.6 4.1 4.0   CHLORIDE mmol/L 98 94* 95* 93* 92* 99 97   CO2 mmol/L 33* 31 30 29 32 30 30   BUN mg/dL 17 19 19 15 18 17 17   CREATININE mg/dL 0.59* 0.59* 0.58* 0.52* 0.58* 0.40* 0.44*   CALCIUM mg/dL 8.3* 8.5 9.0 8.6 9.2 8.4 8.6          Lab Results   Component Value Date    NTBNP 397 03/21/2023    NTBNP 191 07/28/2022            Results from last 7 days   Lab Units 03/10/25  0459 03/09/25  0514 03/08/25  0640 03/07/25  1031   MAGNESIUM mg/dL 1.9 1.7* 1.6* 1.7*          Results from last 7 days   Lab Units 03/07/25  1031   HEMOGLOBIN A1C % 7.5*          Results from last 7 days   Lab Units 03/08/25  0640   TSH 3RD GENERATON uIU/mL 2.552           Lipid Profile:   No results found for: \"CHOL\"  Lab Results   Component Value Date    HDL 75 03/07/2025    HDL 57 05/23/2024    HDL 62 11/08/2023     Lab Results   Component Value Date    LDLCALC 69 03/07/2025    LDLCALC 53 05/23/2024    LDLCALC 48 11/08/2023     Lab Results   Component Value Date    TRIG 94 03/07/2025    TRIG 77 05/23/2024    TRIG 81 11/08/2023       Imaging: Results Review Statement: I reviewed radiology reports from this admission including: chest xray and Echocardiogram.  XR chest 2 views  Result Date: 3/7/2025  Narrative: XR CHEST PA AND LATERAL INDICATION: sob. COMPARISON: CXR and chest CT 3/2/2025. FINDINGS: Moderate pulmonary fibrosis. No pneumothorax or pleural effusion. Normal heart size with left subclavian pacemaker leads in right atrium and right ventricle. Bones are unremarkable for age. Reverse right shoulder arthroplasty. Upper abdomen normal. Cholecystectomy.     Impression: Moderate pulmonary fibrosis with no definite acute disease. Workstation performed: [id number]     CTA " chest pe study  Result Date: 3/2/2025  Narrative: CTA - CHEST WITH IV CONTRAST - PULMONARY ANGIOGRAM INDICATION: hypoxemia, nicky hemoptysis, r/o PE. COMPARISON: Compared with 8/17/2023 and CT abdomen of 7/6/2024 TECHNIQUE: CTA examination of the chest was performed using angiographic technique according to a protocol specifically tailored to evaluate for pulmonary embolism. Multiplanar 2D reformatted images were created from the source data. In addition, coronal  3D MIP postprocessing was performed on the acquisition scanner. Radiation dose length product (DLP) for this visit: 315 mGy-cm . This examination, like all CT scans performed in the [facility], was performed utilizing techniques to minimize radiation dose exposure, including the use of iterative reconstruction and automated exposure control. IV Contrast: 50 mL of iohexol FINDINGS: PULMONARY ARTERIAL TREE:  No pulmonary embolus. LUNGS: Juxtapleural coarse reticular interstitial changes similar to prior study with moderate to severe bronchiectasis and bronchiolectasis unchanged. New diffuse groundglass haziness throughout the lower lobes predominantly and minimally in the upper lobes in a patchy distribution. . No tracheal or endobronchial lesion. PLEURA: Unremarkable. HEART/GREAT VESSELS: Heart is unremarkable for patient's age. No thoracic aortic aneurysm. MEDIASTINUM AND BRIDGET: Moderate hiatal hernia. CHEST WALL AND LOWER NECK: Left chest wall pacemaker VISUALIZED STRUCTURES IN THE UPPER ABDOMEN: Pneumobilia unchanged. OSSEOUS STRUCTURES: No acute fracture or destructive osseous lesion.     Impression: 1.  No pulmonary embolus. 2.  Measured RV/LV ratio is within normal limits at less than 0.9. 3.  Chronic interstitial lung disease with new diffuse groundglass haziness predominantly in the lower lobes. Differential includes edema, hemorrhage. Clinical history of hemoptysis. Workstation performed: [id number]     XR chest portable - 1  view  Result Date: 3/2/2025  Narrative: XR CHEST PORTABLE INDICATION: sob. COMPARISON: CXR 2/24/2025, 12/09/2024, chest CT 8/17/2023. FINDINGS: Moderate pulmonary fibrosis. No definite acute disease. No pneumothorax or pleural effusion. Mild cardiomegaly, left subclavian pacemaker leads in right atrium and right ventricle. Bones are unremarkable for age. Reverse right shoulder arthroplasty. Upper abdomen normal. Cholecystectomy.     Impression: Moderate pulmonary fibrosis with no definite acute disease. Workstation performed: [id number]     Echo complete w/ contrast if indicated  Result Date: 2/25/2025  Narrative:   Left Ventricle: Left ventricular cavity size is normal. Wall thickness is mildly increased. There is mild asymmetric hypertrophy of the basal septal wall. The left ventricular ejection fraction is 55-60%. Systolic function is normal. Wall motion is normal. Diastolic function is mildly abnormal, consistent with grade I (abnormal) relaxation.   IVS: There is sigmoid appearance of the septum.   Right Ventricle: Right ventricular cavity size is normal. Systolic function is normal. A pacer wire is present.   Aortic Valve: There is mild to moderate stenosis. The aortic valve peak velocity is 2.6 m/s. The aortic valve mean gradient is 15.0 mmHg. The dimensionless velocity index is 0.32. The aortic valve area is 1.2 cm2 by continuity equation. The aortic valve area by planimetry is approximately 1.4 cm2.   Mitral Valve: There is mild annular calcification.   Tricuspid Valve: There is mild regurgitation.     XR chest 2 views  Result Date: 2/24/2025  Narrative: XR CHEST AP AND LATERAL INDICATION: Chest Pain. COMPARISON: CXR 12/09/2024, chest CT 8/17/2023. FINDINGS: Moderate pulmonary fibrosis. No pneumothorax or pleural effusion. Normal heart size with left subclavian pacemaker leads in the right atrial appendage and right ventricular apex. Reverse right shoulder arthroplasty. Normal upper abdomen.     Impression:  Moderate pulmonary fibrosis with no acute disease. Workstation performed: [id number]     Cardiac EP device report  Result Date: 2/17/2025  Narrative: MDT-DUAL CHAMBER PPM (AAIR-DDDR MODE)/ ACTIVE SYSTEM IS MRI CONDITIONAL DEVICE INTERROGATED IN THE [place] OFFICE. BATTERY VOLTAGE ADEQUATE (2 YRS). AP-88%, -0.2%. ALL LEAD PARAMETERS WITHIN NORMAL LIMITS. NO NEW SIGNIFICANT HIGH RATE EPISODES. PT C/O SOB & FATIGUE W/ ACTIVITY- REPROGRAMMED RATE RESPONSE ACTIVITY THRESHOLD TO LOW FROM MEDIAUM LOW. NORMAL DEVICE FUNCTION. GV         Counseling / Coordination of Care  Total floor / unit time spent today 45 minutes.  Greater than 50% of total time was spent with the patient and / or family counseling and / or coordination of care.  A description of the counseling / coordination of care: Review of history, current assessment, development of a plan.      Code Status: Level 3 - DNAR and DNI    ** Please Note: Dragon 360 Dictation voice to text software may have been used in the creation of this document. **

## 2025-03-10 NOTE — PLAN OF CARE
Problem: Potential for Falls  Goal: Patient will remain free of falls  Description: INTERVENTIONS:  - Educate patient/family on patient safety including physical limitations  - Instruct patient to call for assistance with activity   - Consult OT/PT to assist with strengthening/mobility   - Keep Call bell within reach  - Keep bed low and locked with side rails adjusted as appropriate  - Keep care items and personal belongings within reach  - Initiate and maintain comfort rounds  - Make Fall Risk Sign visible to staff  - Offer Toileting every  Hours, in advance of need  - Initiate/Maintain alarm  - Obtain necessary fall risk management equipment:   - Apply yellow socks and bracelet for high fall risk patients  - Consider moving patient to room near nurses station  Outcome: Progressing     Problem: PAIN - ADULT  Goal: Verbalizes/displays adequate comfort level or baseline comfort level  Description: Interventions:  - Encourage patient to monitor pain and request assistance  - Assess pain using appropriate pain scale  - Administer analgesics based on type and severity of pain and evaluate response  - Implement non-pharmacological measures as appropriate and evaluate response  - Consider cultural and social influences on pain and pain management  - Notify physician/advanced practitioner if interventions unsuccessful or patient reports new pain  Outcome: Progressing     Problem: INFECTION - ADULT  Goal: Absence or prevention of progression during hospitalization  Description: INTERVENTIONS:  - Assess and monitor for signs and symptoms of infection  - Monitor lab/diagnostic results  - Monitor all insertion sites, i.e. indwelling lines, tubes, and drains  - Monitor endotracheal if appropriate and nasal secretions for changes in amount and color  - [place] appropriate cooling/warming therapies per order  - Administer medications as ordered  - Instruct and encourage patient and family to use good hand hygiene technique  -  Identify and instruct in appropriate isolation precautions for identified infection/condition  Outcome: Progressing  Goal: Absence of fever/infection during neutropenic period  Description: INTERVENTIONS:  - Monitor WBC    Outcome: Progressing     Problem: SAFETY ADULT  Goal: Patient will remain free of falls  Description: INTERVENTIONS:  - Educate patient/family on patient safety including physical limitations  - Instruct patient to call for assistance with activity   - Consult OT/PT to assist with strengthening/mobility   - Keep Call bell within reach  - Keep bed low and locked with side rails adjusted as appropriate  - Keep care items and personal belongings within reach  - Initiate and maintain comfort rounds  - Make Fall Risk Sign visible to staff  - Offer Toileting every  Hours, in advance of need  - Initiate/Maintain alarm  - Obtain necessary fall risk management equipment:   - Apply yellow socks and bracelet for high fall risk patients  - Consider moving patient to room near nurses station  Outcome: Progressing  Goal: Maintain or return to baseline ADL function  Description: INTERVENTIONS:  -  Assess patient's ability to carry out ADLs; assess patient's baseline for ADL function and identify physical deficits which impact ability to perform ADLs (bathing, care of mouth/teeth, toileting, grooming, dressing, etc.)  - Assess/evaluate cause of self-care deficits   - Assess range of motion  - Assess patient's mobility; develop plan if impaired  - Assess patient's need for assistive devices and provide as appropriate  - Encourage maximum independence but intervene and supervise when necessary  - Involve family in performance of ADLs  - Assess for home care needs following discharge   - Consider OT consult to assist with ADL evaluation and planning for discharge  - Provide patient education as appropriate  Outcome: Progressing  Goal: Maintains/Returns to pre admission functional level  Description:  INTERVENTIONS:  - Perform AM-PAC 6 Click Basic Mobility/ Daily Activity assessment daily.  - Set and communicate daily mobility goal to care team and patient/family/caregiver.   - Collaborate with rehabilitation services on mobility goals if consulted  - Perform Range of Motion  times a day.  - Reposition patient every  hours.  - Dangle patient  times a day  - Stand patient  times a day  - Ambulate patient  times a day  - Out of bed to chair times a day   - Out of bed for meals  times a day  - Out of bed for toileting  - Record patient progress and toleration of activity level   Outcome: Progressing     Problem: DISCHARGE PLANNING  Goal: Discharge to home or other facility with appropriate resources  Description: INTERVENTIONS:  - Identify barriers to discharge w/patient and caregiver  - Arrange for needed discharge resources and transportation as appropriate  - Identify discharge learning needs (meds, wound care, etc.)  - Arrange for interpretive services to assist at discharge as needed  - Refer to Case Management Department for coordinating discharge planning if the patient needs post-hospital services based on physician/advanced practitioner order or complex needs related to functional status, cognitive ability, or social support system  Outcome: Progressing     Problem: Knowledge Deficit  Goal: Patient/family/caregiver demonstrates understanding of disease process, treatment plan, medications, and discharge instructions  Description: Complete learning assessment and assess knowledge base.  Interventions:  - Provide teaching at level of understanding  - Provide teaching via preferred learning methods  Outcome: Progressing     Problem: Nutrition/Hydration-ADULT  Goal: Nutrient/Hydration intake appropriate for improving, restoring or maintaining nutritional needs  Description: Monitor and assess patient's nutrition/hydration status for malnutrition. Collaborate with interdisciplinary team and initiate plan and  interventions as ordered.  Monitor patient's weight and dietary intake as ordered or per policy. Utilize nutrition screening tool and intervene as necessary. Determine patient's food preferences and provide high-protein, high-caloric foods as appropriate.     INTERVENTIONS:  - Monitor oral intake, urinary output, labs, and treatment plans  - Assess nutrition and hydration status and recommend course of action  - Evaluate amount of meals eaten  - Assist patient with eating if necessary   - Allow adequate time for meals  - Recommend/ encourage appropriate diets, oral nutritional supplements, and vitamin/mineral supplements  - Order, calculate, and assess calorie counts as needed  - Recommend, monitor, and adjust tube feedings and TPN/PPN based on assessed needs  - Assess need for intravenous fluids  - Provide specific nutrition/hydration education as appropriate  - Include patient/family/caregiver in decisions related to nutrition  Outcome: Progressing

## 2025-03-10 NOTE — SPEECH THERAPY NOTE
Speech-Language Pathology Bedside Swallow Evaluation      Patient Name: Ac Duran    Today's Date: 3/10/2025     Problem List  Principal Problem:    Dyspnea on exertion  Active Problems:    Essential hypertension    Glaucoma    Paroxysmal atrial fibrillation (HCC)    Pacemaker    GERD (gastroesophageal reflux disease)    Interstitial lung disease (HCC)    Moderate protein-calorie malnutrition (HCC)    Hyponatremia    DM2 (diabetes mellitus, type 2) (HCC)    ROMERO (obstructive sleep apnea)    Aortic stenosis    Past Medical History  Past Medical History:   Diagnosis Date    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/7/2020    Glaucoma     H/O degenerative disc disease     Idiopathic pulmonary fibrosis (HCC) 11/2020    Irregular heart beat     afib    Peripheral neuropathy     S/P laparoscopic cholecystectomy 12/21/2020    Stenosis of right subclavian artery (HCC) 11/18/2016     Past Surgical History  Past Surgical History:   Procedure Laterality Date    CATARACT EXTRACTION, BILATERAL  2011    CHOLECYSTECTOMY      CHOLECYSTECTOMY LAPAROSCOPIC N/A 12/09/2020    Procedure: CHOLECYSTECTOMY LAPAROSCOPIC;  Surgeon: Kalen Garrett MD;  Location: [facility];  Service: General    COLONOSCOPY      CYST REMOVAL  2009    left lower Quadrant     FL LUMBAR PUNCTURE DIAGNOSTIC  4/28/2023    HERNIA REPAIR  1992    LIPOMA RESECTION  2010    IN RPR 1ST INGUN HRNA AGE 7 YRS/> REDUCIBLE Bilateral 01/05/2018    Procedure: INGUINAL HERNIA REPAIR;  Surgeon: Volodymyr Lee MD;  Location: [facility];  Service: General    SHOULDER SURGERY  04/26/2019    Dr. Arnett ([place])    UPPER GASTROINTESTINAL ENDOSCOPY      VASCULAR SURGERY  1994    Agram-  R carotid occlusion       Summary  Pt presented with functional appearing oral and pharyngeal stage swallowing skills with materials administered today. She has reported long standing esophageal dysphagia with feeling of food sticking in her upper esophagus. Pt reported she has considered dilation but it  was not recommended. She manages well when she follows recommendations to eat slowly and chew her food well, as well as take frequent drinks of water while eating. No additional ST f/u needed at this time. Please re consult with any new changes or concerns.      Risk/s for Aspiration: low    Recommended Diet: regular diet and thin liquids   Recommended Form of Meds:  as best tolerated    Aspiration precautions and swallowing strategies: upright posture, slow rate of feeding, small bites/sips, and alternating bites and sips  Other Recommendations: Continue frequent oral care      Current Medical Status per H&P 3/7/25   Ac Duran is a 89 y.o. female with a PMH of interstitial lung disease, glaucoma, atrial fibrillation on Eliquis, aortic stenosis, diabetes mellitus type 2 who presents with progressive dyspnea on exertion.  Patient states unable to stand well or ambulate even short distance due to shortness of breath  No chest pain, no nausea vomiting or diarrhea no fever or chills  Reported mild dry cough  Recent hospitalized twice due to dyspnea on exertion and hemoptysis, first hospitalization on 2/24 was attributed to ILD flare and she was treated with prednisone taper, second hospitalization on 3/2/2025 due to hemoptysis related to Eliquis, MRI hemoptysis has resolved and Eliquis has been restarted  Patient was discharged on 3/5/2025 after she was weaned off oxygen, evaluated for home oxygen and she did not qualify for it  Patient with persistent progressive dyspnea on exertion over the past 4 weeks  Evaluated in the emergency room, she is afebrile without leukocytosis, on room air  Chest x-ray with moderate pulmonary fibrosis no acute abnormality     Special Studies:  CXR 3/7/25 IMPRESSION:  Moderate pulmonary fibrosis with no definite acute disease.    Social/Education/Vocational Hx:  Pt lives alone    Swallow Information   Current Risks for Dysphagia & Aspiration: known history of dysphagia  Current  Symptoms/Concerns:  globus sensation  Current Diet: regular diet and thin liquids   Baseline Diet: regular diet and thin liquids      Baseline Assessment   Behavior/Cognition: alert  Speech/Language Status: able to participate in conversation and able to follow commands  Patient Positioning: upright in chair  Pain Status/Interventions/Response to Interventions: No report of or nonverbal indications of pain.       Swallow Mechanism Exam  Facial: symmetrical  Labial: WFL  Lingual: WFL  Velum: symmetrical  Mandible: adequate ROM  Dentition: full dentures  Vocal quality:clear/adequate   Volitional Cough: strong/productive   Respiratory Status: on RA        Consistencies Assessed and Performance   Consistencies Administered: thin liquids and hard solids    Oral Stage: WFL  Mastication was adequate with the materials administered today.  Bolus formation and transfer were functional with no significant oral residue noted.  No overt s/s reduced oral control.    Pharyngeal Stage: WFL  Swallow Mechanics:  Swallowing initiation appeared prompt.  Laryngeal rise was palpated and judged to be within functional limits.  No coughing, throat clearing, change in vocal quality or respiratory status noted today.     Esophageal Concerns: globus sensation    Strategies and Efficacy: slow rate, thorough mastication    Summary and Recommendations (see above)    Results Reviewed with: patient     Treatment Recommended: No additional ST f/u needed at this time. Please re consult with any new changes or concerns.

## 2025-03-10 NOTE — PROGRESS NOTES
"Progress Note - Hospitalist   Name: Ac Duran 89 y.o. female I MRN: [id number]  [facility] Date of Admission: 3/7/2025   Date of Service: 3/10/2025 I Hospital Day: 3    Assessment & Plan  Dyspnea on exertion  Patient was sent to the hospital by her PCP on 3/7/25 due to progressive dyspnea on exertion. Has Underlying history of interstitial lung disease, ROMERO. Recently hospitalized twice for dyspnea on exertion and hemoptysis  CXR revealed, \"Moderate pulmonary fibrosis with no definite acute disease.\"  Pulm consult appreciated  RP2 panel negative  No signs of worsening ILD  Currently on tapered dose of prednisone, continue  Also with mild to moderate AS. Patient has been having difficulty getting into outpatient Cardiology visits due to frequent hospitalizations and son has requested cardiology evaluation here which is pending  PT/OT--rehab when stable     Interstitial lung disease (HCC)  Patient had recent hospitalization 02/24/2025 to 02/26/2025 for ILD flareup was discharged on prednisone taper  CXR this admission as above  Pulm consult appreciated  Aortic stenosis  Recent echo shows mild to moderate aortic stenosis   Patient's son reports that patient has had difficulty making OP appointments due to frequent hospitalizations. Also with DASILVA as above. Pulm evaluated and felt no signs of worsening ILD. Son requesting Cardiology eval which has been placed  Essential hypertension  BP somewhat labile and above goal  Monitor and keep on lopressor 25 mg BID   Glaucoma  Continue home eyedrops  Paroxysmal atrial fibrillation (HCC)  Stable heart rate  Continue metoprolol and Eliquis  GERD (gastroesophageal reflux disease)  Continue with Protonix and Pepcid  Moderate protein-calorie malnutrition (HCC)  Malnutrition Findings:   Adult Malnutrition type: Chronic illness  Adult Degree of Malnutrition: Other severe protein calorie malnutrition    BMI Findings:           Body mass index is 18.88 kg/m². 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   Continue sodium tablet  Oral fluid restrictions  Monitor  DM2 (diabetes mellitus, type 2) ([facility])  Lab Results   Component Value Date    HGBA1C 7.5 (H) 03/07/2025       Recent Labs     03/09/25  1146 03/09/25  1620 03/09/25  2104 03/10/25  0708   POCGLU 270* 306* 307* 176*       Blood Sugar Average: Last 72 hrs:  (P) [id number]    Holding home orals  Also on humulin N, 8 units in AM and 4 units PM  Glucose here is poorly controlled, likely 2/2 steroids  Lantus 10 units at bedtime was added  ROMERO (obstructive sleep apnea)  CPAP at bedtime  Pacemaker  Noted hx, 2016  Recent normal device function per cards    VTE Pharmacologic Prophylaxis: VTE Score: 6 Moderate Risk (Score 3-4) - Pharmacological DVT Prophylaxis Ordered: apixaban (Eliquis).    Mobility:   Basic Mobility Inpatient Raw Score: 18  JH-HLM Goal: 6: Walk 10 steps or more  JH-HLM Achieved: 7: Walk 25 feet or more  JH-HLM Goal achieved. Continue to encourage appropriate mobility.    Patient Centered Rounds: I performed bedside rounds with nursing staff today.   Discussions with Specialists or Other Care Team Provider: peng houston and CM     Education and Discussions with Family / Patient:  will update son after cards sees .     Current Length of Stay: 3 day(s)  Current Patient Status: Inpatient   Certification Statement: The patient will continue to require additional inpatient hospital stay due to awaiting cards recs and dc planning  Discharge Plan: Anticipate discharge later today or tomorrow to rehab facility.    Code Status: Level 3 - DNAR and DNI    Subjective   Pt reports feeling well overall. Feels breathing may be slightly better than when she came in. She is tired from being OOB into chair this AM.     Objective :  Temp:  [97.6 °F (36.4 °C)] 97.6 °F (36.4 °C)  HR:  [66-71] 71  BP: (145-180)/(68-88) 168/74  Resp:   [17-19] 17  SpO2:  [95 %-96 %] 96 %    Body mass index is 18.88 kg/m².     Input and Output Summary (last 24 hours):     Intake/Output Summary (Last 24 hours) at 3/10/2025 0958  Last data filed at 3/9/2025 1802  Gross per 24 hour   Intake 650 ml   Output --   Net 650 ml       Physical Exam  Vitals and nursing note reviewed.   Constitutional:       General: She is not in acute distress.     Comments: On RA    Cardiovascular:      Rate and Rhythm: Normal rate.      Heart sounds: Murmur heard.   Pulmonary:      Effort: No respiratory distress.      Breath sounds: No wheezing.   Abdominal:      General: There is no distension.      Palpations: Abdomen is soft.   Musculoskeletal:      Right lower leg: No edema.      Left lower leg: No edema.   Neurological:      Mental Status: She is alert and oriented to person, place, and time.   Psychiatric:         Mood and Affect: Mood normal.           Lines/Drains:              Lab Results: I have reviewed the following results:   Results from last 7 days   Lab Units 03/10/25  0459 03/09/25  0514 03/07/25  1333   WBC Thousand/uL 6.43   < > 9.48   HEMOGLOBIN g/dL 9.8*   < > 11.6   HEMATOCRIT % 31.6*   < > 38.2   PLATELETS Thousands/uL 168   < > 228   SEGS PCT %  --   --  93*   LYMPHO PCT %  --   --  4*   MONO PCT %  --   --  2*   EOS PCT %  --   --  0    < > = values in this interval not displayed.     Results from last 7 days   Lab Units 03/10/25  0459 03/08/25  0640 03/07/25  1434   SODIUM mmol/L 134*   < > 130*   POTASSIUM mmol/L 4.3   < > 4.4   CHLORIDE mmol/L 98   < > 93*   CO2 mmol/L 33*   < > 29   BUN mg/dL 17   < > 15   CREATININE mg/dL 0.59*   < > 0.52*   ANION GAP mmol/L 3*   < > 8   CALCIUM mg/dL 8.3*   < > 8.6   ALBUMIN g/dL  --   --  3.8   TOTAL BILIRUBIN mg/dL  --   --  0.80   ALK PHOS U/L  --   --  50   ALT U/L  --   --  23   AST U/L  --   --  29   GLUCOSE RANDOM mg/dL 155*   < > 113    < > = values in this interval not displayed.         Results from last 7 days    Lab Units 03/10/25  0708 03/09/25  2104 03/09/25  1620 03/09/25  1146 03/09/25  0713 03/08/25  2056 03/08/25  1714 03/08/25  1122 03/08/25  0655 03/07/25  2100 03/07/25  1737 03/07/25  1332   POC GLUCOSE mg/dl 176* 307* 306* 270* 239* 323* 321* 205* 176* 200* 205* 96     Results from last 7 days   Lab Units 03/07/25  1031   HEMOGLOBIN A1C % 7.5*           Recent Cultures (last 7 days):         Imaging Results Review: No pertinent imaging studies reviewed.  Other Study Results Review: No additional pertinent studies reviewed.    Last 24 Hours Medication List:     Current Facility-Administered Medications:     acetaminophen (TYLENOL) tablet 650 mg, Q4H PRN    apixaban (ELIQUIS) tablet 2.5 mg, BID    Artificial Tears Op Soln 1 drop, Q6H PRN    bimatoprost (LUMIGAN) 0.03 % ophthalmic drops 1 drop, HS    brimonidine tartrate 0.2 % ophthalmic solution 1 drop, BID    cyanocobalamin (VITAMIN B-12) tablet 500 mcg, BID    ezetimibe (ZETIA) tablet 10 mg, Daily With Dinner **AND** pravastatin (PRAVACHOL) tablet 80 mg, Daily With Dinner    famotidine (PEPCID) tablet 20 mg, BID    insulin glargine (LANTUS) subcutaneous injection 10 Units 0.1 mL, HS    insulin lispro (HumALOG/ADMELOG) 100 units/mL subcutaneous injection 1-5 Units, TID AC **AND** Fingerstick Glucose (POCT), TID AC    insulin lispro (HumALOG/ADMELOG) 100 units/mL subcutaneous injection 1-5 Units, HS    insulin lispro (HumALOG/ADMELOG) 100 units/mL subcutaneous injection 4 Units, Daily With Dinner    insulin lispro (HumALOG/ADMELOG) 100 units/mL subcutaneous injection 8 Units, Daily With Breakfast    lidocaine (LIDODERM) 5 % patch 1 patch, Daily    LORazepam (ATIVAN) tablet 0.5 mg, BID PRN    metoprolol tartrate (LOPRESSOR) tablet 25 mg, Q12H JONNA    mirtazapine (REMERON) tablet 7.5 mg, HS    ondansetron (ZOFRAN) injection 4 mg, Q6H PRN    pantoprazole (PROTONIX) EC tablet 40 mg, BID    [COMPLETED] predniSONE tablet 20 mg, Daily **FOLLOWED BY** [START ON 3/11/2025]  predniSONE tablet 10 mg, Daily    sitaGLIPtin (JANUVIA) tablet 100 mg, Daily    sodium chloride tablet 1 g, TID    Administrative Statements   Today, Patient Was Seen By: Kavya Kulkarni PA-C      **Please Note: This note may have been constructed using a voice recognition system.**

## 2025-03-10 NOTE — PROGRESS NOTES
Father nick gave a blessing and prayer and Holy Communion.    03/10/25 1600   Clinical Encounter Type   Visited With Patient   Jain Encounters   Jain Needs Prayer   Sacramental Encounters   Communion Given Indicator Yes

## 2025-03-10 NOTE — ASSESSMENT & PLAN NOTE
Echocardiogram 2/2025: EF 55-60% with mild asymmetric hypertrophy of the basal septal wall, no WMA, G1DD, normal RV function, mild-moderate AS with MG 15 mmHg and OZIEL 1.2 cm2  Patient denies chest pain, lightheadedness, syncope or near syncope  Volume status appears stable

## 2025-03-10 NOTE — ASSESSMENT & PLAN NOTE
"Patient was sent to the hospital by her PCP on 3/7/25 due to progressive dyspnea on exertion. Has Underlying history of interstitial lung disease, ROMERO. Recently hospitalized twice for dyspnea on exertion and hemoptysis  CXR revealed, \"Moderate pulmonary fibrosis with no definite acute disease.\"  Pulm consult appreciated  RP2 panel negative  No signs of worsening ILD  Currently on tapered dose of prednisone, continue  Also with mild to moderate AS. Patient has been having difficulty getting into outpatient Cardiology visits due to frequent hospitalizations and son has requested cardiology evaluation here which is pending  PT/OT--rehab when stable     "

## 2025-03-10 NOTE — ASSESSMENT & PLAN NOTE
History of TBS s/p MDT DC PPM implanted 2016  Device interrogation 2/17/2025: AP 88%,  0.2%, no significant high rate episodes.  Reprogrammed rate response activity threshold to low from medium low for reported dyspnea and fatigue.  Normal device function.

## 2025-03-10 NOTE — ASSESSMENT & PLAN NOTE
Lab Results   Component Value Date    HGBA1C 7.5 (H) 03/07/2025       Recent Labs     03/09/25  1146 03/09/25  1620 03/09/25  2104 03/10/25  0708   POCGLU 270* 306* 307* 176*       Blood Sugar Average: Last 72 hrs:  (P) [id number]    Holding home orals  Also on humulin N, 8 units in AM and 4 units PM  Glucose here is poorly controlled, likely 2/2 steroids  Lantus 10 units at bedtime was added

## 2025-03-10 NOTE — CASE MANAGEMENT
Case Management Discharge Planning Note    Patient name Ac Duran  Location [facility] 606/[facility] 606-01 MRN [id number]  : 1935 Date 3/10/2025       Current Admission Date: 3/7/2025  Current Admission Diagnosis:Dyspnea on exertion   Patient Active Problem List    Diagnosis Date Noted Date Diagnosed    Mild protein-calorie malnutrition (HCC) 2025     Iron deficiency anemia 2025     Hemoptysis 2025     Acute hypoxic respiratory failure (Formerly [facility]) 2025     Hypertension 2025     Aortic stenosis 2025     Microcytosis 2025     ROMERO (obstructive sleep apnea) 2024     Hypoxia 2024     Dyspnea on exertion 04/15/2024     Irritable bowel syndrome with both constipation and diarrhea 2024     Peripheral arterial disease ([facility]) 2024     Metabolic alkalosis 10/19/2023     SIADH (syndrome of inappropriate ADH production) (Formerly [facility]) 10/19/2023     Bilateral hip pain 2023     Pachymeningitis 2023     Hypovitaminosis D 2021     Depression, recurrent ([facility]) 2021     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, with long-term current use of insulin (Formerly [facility]) 2021     Type 2 diabetes mellitus with hyperlipidemia  ([facility]ly [facility]) 2021     Type 2 diabetes mellitus with diabetic polyneuropathy, with long-term current use of insulin ([facility]) 2021     DM2 (diabetes mellitus, type 2) ([facility]) 2021     Post-nasal drip 2021     Hyponatremia 2020     Moderate protein-calorie malnutrition ([facility]) 2020     Constipation 2020     Dysphagia 2020     Interstitial lung disease ([facility]) 10/13/2020     Iron deficiency 2020     Osteoporosis 2019     GERD (gastroesophageal reflux disease) 2019     Anxiety 2019     Pacemaker 2016     Occlusion of right carotid artery 2016     Paroxysmal atrial fibrillation ([facility]) 2016     Symptomatic  PVCs 11/01/2016     Essential hypertension 10/29/2016     Mixed hyperlipidemia 10/29/2016     Glaucoma 10/29/2016     Asymptomatic carotid artery stenosis, left 10/29/2016       LOS (days): 3  Geometric Mean LOS (GMLOS) (days):   Days to GMLOS:     OBJECTIVE:  Risk of Unplanned Readmission Score: 28.98   Current admission status: Inpatient   Preferred Pharmacy:   [facility] [place] - [address]  [address]  [place] [address]  Phone: [phone] Fax: [phone]    [id number] - [place] - [address]  [address]  [place] [address]  Phone: [phone] Fax: [phone]    Primary Care Provider: ZANDER Swenson    Primary Insurance: MEDICARE  Secondary Insurance: [employer]    DISCHARGE DETAILS:    Discharge planning discussed with:: patient. VM left for Rey (son) via tc  [place] of Choice: Yes  Comments - Freedom of Choice: Discussed FOC  CM contacted family/caregiver?: Yes  Were Treatment Team discharge recommendations reviewed with patient/caregiver?: Yes  Did patient/caregiver verbalize understanding of patient care needs?: N/A- going to facility  Were patient/caregiver advised of the risks associated with not following Treatment Team discharge recommendations?: Yes    Other Referral/Resources/Interventions Provided:  Interventions: Short Term Rehab  Referral Comments: This CM discussed patient choice with pt. [facility] to accept and reserved in [facility]. awaiting to confirm bed today vs tomorrow.    Treatment Team Recommendation: Short Term Rehab  Discharge Destination Plan:: Short Term Rehab

## 2025-03-10 NOTE — PROGRESS NOTES
Progress Note - Pulmonology   Name: Ac Duran 89 y.o. female I MRN: [id number]  [facility] I Date of Admission: 3/7/2025   Date of Service: 3/10/2025 I Hospital Day: 3    Assessment & Plan  Dyspnea on exertion   89 y.o. female former smoker (quit in 1994), PMH of ILD not on antifibrotics (Dx 2017), ROMERO on CPAP follows with Dr. Jovel, recent admission for hemoptysis 3/2-3/5 thought to be secondary to Eliquis, and ILD flare 2/24-2/26 discharged on steroid taper who was referred to ED by PCP for dyspnea on exertion.  CT PE study negative for PE, significant for bilateral juxtapleural reticulations, groundglass opacities.     Patient noted to be hypertensive to 180s otherwise saturating 95 to 97% on room air.   Labs significant for mild anemia 9.8.  BNP elevated at 265 on 3/2  On prednisone taper 20 mg, will be decreased to 10 mg tomorrow.   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 Jovel  Not on antifibrotics or immunosuppressants    Will likely need repeat PFTs outpatient  Consideration for antifibrotic or immunosuppressant outpatient  Not clinical indication to be in a current flare  Referral to Pulmonary rehab as an outpt  ROMERO (obstructive sleep apnea)  Continue BiPAP therapy at night  Aortic stenosis  Mild to moderate on 2D echo  Cardiology consulted.   F/U  BNP  Patient appears euvolemic at this time we will hold off on diuresis.     24 Hour Events : No acute events over past 24 hours.   Subjective : Patient seen and examined at bedside.  Sitting out of bed in chair.  States that she feels fatigued, however reports that shortness of breath with showering.  Denies chest pain.  Patient also endorses intermittent difficulty with swallowing and coughing noted with eating, will order swallow evaluation.    Objective :  Temp:  [97.6 °F (36.4 °C)] 97.6 °F (36.4 °C)  HR:  [66-84] 71  BP: (114-180)/(64-88) 180/88  Resp:  [17-19] 17  SpO2:  [95 %-97 %] 96 %  O2 Device: None (Room air)    Physical Exam  Constitutional:       General: She is not in acute distress.     Appearance: Normal appearance. She is not ill-appearing.   HENT:      Head: Normocephalic and atraumatic.   Pulmonary:      Effort: Pulmonary effort is normal.      Breath sounds: Rales present.   Abdominal:      General: Abdomen is flat.      Palpations: Abdomen is soft.   Musculoskeletal:      Right lower leg: No edema.      Left lower leg: No edema.   Skin:     General: Skin is warm and dry.      Capillary Refill: Capillary refill takes less than 2 seconds.   Neurological:      General: No focal deficit present.      Mental Status: She is alert.   Psychiatric:         Mood and Affect: Mood normal.           Lab Results: I have reviewed the following results:   .     03/10/25  0459   WBC 6.43   HGB 9.8*   HCT 31.6*      SODIUM 134*   K 4.3   CL 98   CO2 33*   BUN 17   CREATININE 0.59*   GLUC 155*   MG 1.9     ABG: No new results in last 24 hours.    Imaging Results Review: I personally reviewed the following image studies/reports in PACS and discussed pertinent findings with Radiology: CT chest. My interpretation of the radiology images/reports is:  .    PFT Results Reviewed: reviewed, as above

## 2025-03-10 NOTE — ASSESSMENT & PLAN NOTE
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 Jovel  Not on antifibrotics or immunosuppressants    Will likely need repeat PFTs outpatient  Consideration for antifibrotic or immunosuppressant outpatient  Not clinical indication to be in a current flare  Referral to Pulmonary rehab as an outpt

## 2025-03-10 NOTE — ASSESSMENT & PLAN NOTE
Mild to moderate on 2D echo  Cardiology consulted.   F/U BNP  Patient appears euvolemic at this time we will hold off on diuresis.

## 2025-03-10 NOTE — ASSESSMENT & PLAN NOTE
Felt secondary to deconditioning and underlying severe ILD  Currently on steroids with improvement in DASILVA  Pulmonary following

## 2025-03-10 NOTE — ASSESSMENT & PLAN NOTE
89 y.o. female former smoker (quit in 1994), PMH of ILD not on antifibrotics (Dx 2017), ROMERO on CPAP follows with Dr. Jovel, recent admission for hemoptysis 3/2-3/5 thought to be secondary to Eliquis, and ILD flare 2/24-2/26 discharged on steroid taper who was referred to ED by PCP for dyspnea on exertion.  CT PE study negative for PE, significant for bilateral juxtapleural reticulations, groundglass opacities.     Patient noted to be hypertensive to 180s otherwise saturating 95 to 97% on room air.   Labs significant for mild anemia 9.8.  BNP elevated at 265 on 3/2  On prednisone taper 20 mg, will be decreased to 10 mg tomorrow.   2D echo: Of 2/25: EF 55 to 60%, grade 1 diastolic dysfunction, paced wire to right ventricle, mild to moderate aortic valve stenosis, trace MR, mild TR, no pericardial effusion    Dyspnea on exertion likely multifactorial in the setting of deconditioning, mild to moderate aortic stenosis, anemia, ILD    PT/OT  Pulmonary toileting ICS  Continue with prednisone taper

## 2025-03-10 NOTE — QUICK NOTE
Discussed with son. He is awaiting to hear from cardiologist. He is concerned about patient's gradually dropping Hgb over last several weeks. No reported bleeding.     Check updated iron panel.     Kavya Kulkarni PA-C

## 2025-03-11 ENCOUNTER — NURSING HOME VISIT (OUTPATIENT)
Dept: GERIATRICS | Facility: OTHER | Age: 89
End: 2025-03-11
Payer: MEDICARE

## 2025-03-11 ENCOUNTER — PATIENT OUTREACH (OUTPATIENT)
Dept: CASE MANAGEMENT | Facility: OTHER | Age: 89
End: 2025-03-11

## 2025-03-11 VITALS
OXYGEN SATURATION: 98 % | WEIGHT: 117 LBS | TEMPERATURE: 97.6 F | RESPIRATION RATE: 17 BRPM | SYSTOLIC BLOOD PRESSURE: 165 MMHG | DIASTOLIC BLOOD PRESSURE: 73 MMHG | HEIGHT: 66 IN | HEART RATE: 60 BPM | BODY MASS INDEX: 18.8 KG/M2

## 2025-03-11 VITALS
SYSTOLIC BLOOD PRESSURE: 156 MMHG | OXYGEN SATURATION: 99 % | RESPIRATION RATE: 18 BRPM | BODY MASS INDEX: 19.11 KG/M2 | WEIGHT: 118.4 LBS | TEMPERATURE: 97.3 F | DIASTOLIC BLOOD PRESSURE: 86 MMHG | HEART RATE: 82 BPM

## 2025-03-11 DIAGNOSIS — E11.42 TYPE 2 DIABETES MELLITUS WITH DIABETIC POLYNEUROPATHY, WITH LONG-TERM CURRENT USE OF INSULIN (HCC): ICD-10-CM

## 2025-03-11 DIAGNOSIS — J84.9 INTERSTITIAL LUNG DISEASE (HCC): ICD-10-CM

## 2025-03-11 DIAGNOSIS — E78.2 MIXED HYPERLIPIDEMIA: ICD-10-CM

## 2025-03-11 DIAGNOSIS — E43 SEVERE PROTEIN-CALORIE MALNUTRITION (HCC): ICD-10-CM

## 2025-03-11 DIAGNOSIS — I48.0 PAROXYSMAL ATRIAL FIBRILLATION (HCC): ICD-10-CM

## 2025-03-11 DIAGNOSIS — F41.9 ANXIETY: ICD-10-CM

## 2025-03-11 DIAGNOSIS — E87.1 HYPONATREMIA: ICD-10-CM

## 2025-03-11 DIAGNOSIS — J96.01 ACUTE HYPOXIC RESPIRATORY FAILURE (HCC): Primary | ICD-10-CM

## 2025-03-11 DIAGNOSIS — K21.9 GASTROESOPHAGEAL REFLUX DISEASE, UNSPECIFIED WHETHER ESOPHAGITIS PRESENT: ICD-10-CM

## 2025-03-11 DIAGNOSIS — F33.9 DEPRESSION, RECURRENT (HCC): ICD-10-CM

## 2025-03-11 DIAGNOSIS — I73.9 PERIPHERAL ARTERIAL DISEASE (HCC): ICD-10-CM

## 2025-03-11 DIAGNOSIS — Z79.4 TYPE 2 DIABETES MELLITUS WITH DIABETIC POLYNEUROPATHY, WITH LONG-TERM CURRENT USE OF INSULIN (HCC): ICD-10-CM

## 2025-03-11 DIAGNOSIS — I10 ESSENTIAL HYPERTENSION: ICD-10-CM

## 2025-03-11 DIAGNOSIS — D50.9 IRON DEFICIENCY ANEMIA, UNSPECIFIED IRON DEFICIENCY ANEMIA TYPE: ICD-10-CM

## 2025-03-11 PROBLEM — D64.9 ANEMIA: Status: ACTIVE | Noted: 2025-03-11

## 2025-03-11 LAB
BASOPHILS # BLD AUTO: 0.01 THOUSANDS/ÂΜL (ref 0–0.1)
BASOPHILS NFR BLD AUTO: 0 % (ref 0–1)
EOSINOPHIL # BLD AUTO: 0.12 THOUSAND/ÂΜL (ref 0–0.61)
EOSINOPHIL NFR BLD AUTO: 2 % (ref 0–6)
ERYTHROCYTE [DISTWIDTH] IN BLOOD BY AUTOMATED COUNT: 17.2 % (ref 11.6–15.1)
GLUCOSE SERPL-MCNC: 119 MG/DL (ref 65–140)
GLUCOSE SERPL-MCNC: 142 MG/DL (ref 65–140)
HCT VFR BLD AUTO: 33.8 % (ref 34.8–46.1)
HGB BLD-MCNC: 10.5 G/DL (ref 11.5–15.4)
IMM GRANULOCYTES # BLD AUTO: 0.03 THOUSAND/UL (ref 0–0.2)
IMM GRANULOCYTES NFR BLD AUTO: 1 % (ref 0–2)
LYMPHOCYTES # BLD AUTO: 0.94 THOUSANDS/ÂΜL (ref 0.6–4.47)
LYMPHOCYTES NFR BLD AUTO: 16 % (ref 14–44)
MCH RBC QN AUTO: 25 PG (ref 26.8–34.3)
MCHC RBC AUTO-ENTMCNC: 31.1 G/DL (ref 31.4–37.4)
MCV RBC AUTO: 81 FL (ref 82–98)
MONOCYTES # BLD AUTO: 0.86 THOUSAND/ÂΜL (ref 0.17–1.22)
MONOCYTES NFR BLD AUTO: 15 % (ref 4–12)
NEUTROPHILS # BLD AUTO: 3.86 THOUSANDS/ÂΜL (ref 1.85–7.62)
NEUTS SEG NFR BLD AUTO: 66 % (ref 43–75)
NRBC BLD AUTO-RTO: 0 /100 WBCS
PLATELET # BLD AUTO: 179 THOUSANDS/UL (ref 149–390)
PMV BLD AUTO: 9.6 FL (ref 8.9–12.7)
RBC # BLD AUTO: 4.2 MILLION/UL (ref 3.81–5.12)
WBC # BLD AUTO: 5.82 THOUSAND/UL (ref 4.31–10.16)

## 2025-03-11 PROCEDURE — 82948 REAGENT STRIP/BLOOD GLUCOSE: CPT

## 2025-03-11 PROCEDURE — 99239 HOSP IP/OBS DSCHRG MGMT >30: CPT | Performed by: INTERNAL MEDICINE

## 2025-03-11 PROCEDURE — 99306 1ST NF CARE HIGH MDM 50: CPT | Performed by: INTERNAL MEDICINE

## 2025-03-11 PROCEDURE — 85025 COMPLETE CBC W/AUTO DIFF WBC: CPT | Performed by: INTERNAL MEDICINE

## 2025-03-11 RX ORDER — INSULIN LISPRO 100 [IU]/ML
1-5 INJECTION, SOLUTION INTRAVENOUS; SUBCUTANEOUS
Start: 2025-03-11

## 2025-03-11 RX ORDER — LIDOCAINE 50 MG/G
1 PATCH TOPICAL DAILY
Start: 2025-03-12

## 2025-03-11 RX ORDER — MIRTAZAPINE 7.5 MG/1
7.5 TABLET, FILM COATED ORAL
Qty: 20 TABLET | Refills: 0 | Status: SHIPPED | OUTPATIENT
Start: 2025-03-11

## 2025-03-11 RX ORDER — PREDNISONE 10 MG/1
10 TABLET ORAL DAILY
Start: 2025-03-12 | End: 2025-03-16

## 2025-03-11 RX ORDER — AMLODIPINE BESYLATE 5 MG/1
5 TABLET ORAL DAILY
Start: 2025-03-12 | End: 2025-03-17

## 2025-03-11 RX ORDER — LORAZEPAM 0.5 MG/1
0.5 TABLET ORAL 2 TIMES DAILY PRN
Qty: 20 TABLET | Refills: 0 | Status: SHIPPED | OUTPATIENT
Start: 2025-03-11 | End: 2025-03-21

## 2025-03-11 RX ORDER — INSULIN GLARGINE 100 [IU]/ML
10 INJECTION, SOLUTION SUBCUTANEOUS
Start: 2025-03-11 | End: 2025-03-13

## 2025-03-11 RX ADMIN — PANTOPRAZOLE SODIUM 40 MG: 40 TABLET, DELAYED RELEASE ORAL at 08:49

## 2025-03-11 RX ADMIN — SITAGLIPTIN 100 MG: 100 TABLET, FILM COATED ORAL at 08:50

## 2025-03-11 RX ADMIN — LIDOCAINE 5% 1 PATCH: 700 PATCH TOPICAL at 08:49

## 2025-03-11 RX ADMIN — METOPROLOL TARTRATE 25 MG: 25 TABLET, FILM COATED ORAL at 08:49

## 2025-03-11 RX ADMIN — FAMOTIDINE 20 MG: 20 TABLET, FILM COATED ORAL at 08:49

## 2025-03-11 RX ADMIN — APIXABAN 2.5 MG: 2.5 TABLET, FILM COATED ORAL at 08:49

## 2025-03-11 RX ADMIN — SODIUM CHLORIDE 1 G: 1 TABLET ORAL at 08:49

## 2025-03-11 RX ADMIN — ACETAMINOPHEN 650 MG: 325 TABLET, FILM COATED ORAL at 08:49

## 2025-03-11 RX ADMIN — BRIMONIDINE TARTRATE 1 DROP: 2 SOLUTION/ DROPS OPHTHALMIC at 08:50

## 2025-03-11 RX ADMIN — CYANOCOBALAMIN TAB 500 MCG 500 MCG: 500 TAB at 08:49

## 2025-03-11 RX ADMIN — INSULIN LISPRO 8 UNITS: 100 INJECTION, SOLUTION INTRAVENOUS; SUBCUTANEOUS at 08:50

## 2025-03-11 RX ADMIN — IRON SUCROSE 200 MG: 20 INJECTION, SOLUTION INTRAVENOUS at 08:59

## 2025-03-11 RX ADMIN — PREDNISONE 10 MG: 10 TABLET ORAL at 08:49

## 2025-03-11 RX ADMIN — AMLODIPINE BESYLATE 5 MG: 5 TABLET ORAL at 08:49

## 2025-03-11 NOTE — PLAN OF CARE
Problem: Potential for Falls  Goal: Patient will remain free of falls  Description: INTERVENTIONS:  - Educate patient/family on patient safety including physical limitations  - Instruct patient to call for assistance with activity   - Consult OT/PT to assist with strengthening/mobility   - Keep Call bell within reach  - Keep bed low and locked with side rails adjusted as appropriate  - Keep care items and personal belongings within reach  - Initiate and maintain comfort rounds  - Make Fall Risk Sign visible to staff  - Offer Toileting every 2 Hours, in advance of need  - Initiate/Maintain bwed alarm  - Obtain necessary fall risk management equipment: bed alarm   - Apply yellow socks and bracelet for high fall risk patients  - Consider moving patient to room near nurses station  Outcome: Progressing     Problem: PAIN - ADULT  Goal: Verbalizes/displays adequate comfort level or baseline comfort level  Description: Interventions:  - Encourage patient to monitor pain and request assistance  - Assess pain using appropriate pain scale  - Administer analgesics based on type and severity of pain and evaluate response  - Implement non-pharmacological measures as appropriate and evaluate response  - Consider cultural and social influences on pain and pain management  - Notify physician/advanced practitioner if interventions unsuccessful or patient reports new pain  Outcome: Progressing     Problem: INFECTION - ADULT  Goal: Absence or prevention of progression during hospitalization  Description: INTERVENTIONS:  - Assess and monitor for signs and symptoms of infection  - Monitor lab/diagnostic results  - Monitor all insertion sites, i.e. indwelling lines, tubes, and drains  - Monitor endotracheal if appropriate and nasal secretions for changes in amount and color  - [place] appropriate cooling/warming therapies per order  - Administer medications as ordered  - Instruct and encourage patient and family to use good hand  hygiene technique  - Identify and instruct in appropriate isolation precautions for identified infection/condition  Outcome: Progressing     Problem: SAFETY ADULT  Goal: Patient will remain free of falls  Description: INTERVENTIONS:  - Educate patient/family on patient safety including physical limitations  - Instruct patient to call for assistance with activity   - Consult OT/PT to assist with strengthening/mobility   - Keep Call bell within reach  - Keep bed low and locked with side rails adjusted as appropriate  - Keep care items and personal belongings within reach  - Initiate and maintain comfort rounds  - Make Fall Risk Sign visible to staff  - Offer Toileting every 3 Hours, in advance of need  - Initiate/Maintain  bed alarm  - Obtain necessary fall risk management equipment: bed alarm   - Apply yellow socks and bracelet for high fall risk patients  - Consider moving patient to room near nurses station  Outcome: Progressing  Goal: Maintain or return to baseline ADL function  Description: INTERVENTIONS:  -  Assess patient's ability to carry out ADLs; assess patient's baseline for ADL function and identify physical deficits which impact ability to perform ADLs (bathing, care of mouth/teeth, toileting, grooming, dressing, etc.)  - Assess/evaluate cause of self-care deficits   - Assess range of motion  - Assess patient's mobility; develop plan if impaired  - Assess patient's need for assistive devices and provide as appropriate  - Encourage maximum independence but intervene and supervise when necessary  - Involve family in performance of ADLs  - Assess for home care needs following discharge   - Consider OT consult to assist with ADL evaluation and planning for discharge  - Provide patient education as appropriate  Outcome: Progressing  Goal: Maintains/Returns to pre admission functional level  Description: INTERVENTIONS:  - Perform AM-PAC 6 Click Basic Mobility/ Daily Activity assessment daily.  - Set and  communicate daily mobility goal to care team and patient/family/caregiver.   - Collaborate with rehabilitation services on mobility goals if consulted  - Perform Range of Motion 3 times a day.  - Reposition patient every 2 hours.  - Dangle patient 3 times a day  - Stand patient 3 times a day  - Ambulate patient 3 times a day  - Out of bed to chair 3 times a day   - Out of bed for meals 3 times a day  - Out of bed for toileting  - Record patient progress and toleration of activity level   Outcome: Progressing     Problem: DISCHARGE PLANNING  Goal: Discharge to home or other facility with appropriate resources  Description: INTERVENTIONS:  - Identify barriers to discharge w/patient and caregiver  - Arrange for needed discharge resources and transportation as appropriate  - Identify discharge learning needs (meds, wound care, etc.)  - Arrange for interpretive services to assist at discharge as needed  - Refer to Case Management Department for coordinating discharge planning if the patient needs post-hospital services based on physician/advanced practitioner order or complex needs related to functional status, cognitive ability, or social support system  Outcome: Progressing     Problem: Knowledge Deficit  Goal: Patient/family/caregiver demonstrates understanding of disease process, treatment plan, medications, and discharge instructions  Description: Complete learning assessment and assess knowledge base.  Interventions:  - Provide teaching at level of understanding  - Provide teaching via preferred learning methods  Outcome: Progressing     Problem: RESPIRATORY - ADULT  Goal: Achieves optimal ventilation and oxygenation  Description: INTERVENTIONS:  - Assess for changes in respiratory status  - Assess for changes in mentation and behavior  - Position to facilitate oxygenation and minimize respiratory effort  - Oxygen administered by appropriate delivery if ordered  - Initiate smoking cessation education as  indicated  - Encourage broncho-pulmonary hygiene including cough, deep breathe, Incentive Spirometry  - Assess the need for suctioning and aspirate as needed  - Assess and instruct to report SOB or any respiratory difficulty  - Respiratory Therapy support as indicated  Outcome: Progressing     Problem: Prexisting or High Potential for Compromised Skin Integrity  Goal: Skin integrity is maintained or improved  Description: INTERVENTIONS:  - Identify patients at risk for skin breakdown  - Assess and monitor skin integrity  - Assess and monitor nutrition and hydration status  - Monitor labs   - Assess for incontinence   - Turn and reposition patient  - Assist with mobility/ambulation  - Relieve pressure over bony prominences  - Avoid friction and shearing  - Provide appropriate hygiene as needed including keeping skin clean and dry  - Evaluate need for skin moisturizer/barrier cream  - Collaborate with interdisciplinary team   - Patient/family teaching  - Consider wound care consult   Outcome: Progressing     Problem: Nutrition/Hydration-ADULT  Goal: Nutrient/Hydration intake appropriate for improving, restoring or maintaining nutritional needs  Description: Monitor and assess patient's nutrition/hydration status for malnutrition. Collaborate with interdisciplinary team and initiate plan and interventions as ordered.  Monitor patient's weight and dietary intake as ordered or per policy. Utilize nutrition screening tool and intervene as necessary. Determine patient's food preferences and provide high-protein, high-caloric foods as appropriate.     INTERVENTIONS:  - Monitor oral intake, urinary output, labs, and treatment plans  - Assess nutrition and hydration status and recommend course of action  - Evaluate amount of meals eaten  - Assist patient with eating if necessary   - Allow adequate time for meals  - Recommend/ encourage appropriate diets, oral nutritional supplements, and vitamin/mineral supplements  - Order,  calculate, and assess calorie counts as needed  - Recommend, monitor, and adjust tube feedings and TPN/PPN based on assessed needs  - Assess need for intravenous fluids  - Provide specific nutrition/hydration education as appropriate  - Include patient/family/caregiver in decisions related to nutrition  Outcome: Progressing

## 2025-03-11 NOTE — ASSESSMENT & PLAN NOTE
BP somewhat labile and above goal  Monitor and keep on lopressor 25 mg BID   Also added norvasc 5 mg daily

## 2025-03-11 NOTE — ASSESSMENT & PLAN NOTE
"Patient was sent to the hospital by her PCP on 3/7/25 due to progressive dyspnea on exertion. Has Underlying history of interstitial lung disease, ROMERO. Recently hospitalized twice for dyspnea on exertion and hemoptysis  CXR revealed, \"Moderate pulmonary fibrosis with no definite acute disease.\"  Pulm consult appreciated  RP2 panel negative  No signs of worsening ILD  Currently on tapered dose of prednisone, continue  Also with mild to moderate AS. Patient has been having difficulty getting into outpatient Cardiology visits due to frequent hospitalizations and son has requested cardiology evaluation here, recommended serial echos outpatient to monitor   PT/OT recommending rehab     "

## 2025-03-11 NOTE — DISCHARGE SUMMARY
"Discharge Summary - Hospitalist   Name: Ac Duran 89 y.o. female I MRN: [id number]  Unit/Bed#: [facility] 606-01 I Date of Admission: 3/7/2025   Date of Service: 3/11/2025 I Hospital Day: 4     Assessment & Plan  Dyspnea on exertion  Patient was sent to the hospital by her PCP on 3/7/25 due to progressive dyspnea on exertion. Has Underlying history of interstitial lung disease, ROMERO. Recently hospitalized twice for dyspnea on exertion and hemoptysis  CXR revealed, \"Moderate pulmonary fibrosis with no definite acute disease.\"  Pulm consult appreciated  RP2 panel negative  No signs of worsening ILD  Currently on tapered dose of prednisone, continue  Also with mild to moderate AS. Patient has been having difficulty getting into outpatient Cardiology visits due to frequent hospitalizations and son has requested cardiology evaluation here, recommended serial echos outpatient to monitor   PT/OT recommending rehab     Interstitial lung disease (HCC)  Patient had recent hospitalization 02/24/2025 to 02/26/2025 for ILD flareup was discharged on prednisone taper  CXR this admission as above  Pulm consult appreciated  Aortic stenosis  Recent echo shows mild to moderate aortic stenosis   Patient's son reports that patient has had difficulty making OP appointments due to frequent hospitalizations. Also with DASILVA as above. Pulm evaluated and felt no signs of worsening ILD. Son requesting Cardiology eval which was done, recommending serial echos OP  Essential hypertension  BP somewhat labile and above goal  Monitor and keep on lopressor 25 mg BID   Also added norvasc 5 mg daily  Glaucoma  Continue home eyedrops  Paroxysmal atrial fibrillation (HCC)  Stable heart rate  Continue metoprolol and Eliquis  GERD (gastroesophageal reflux disease)  Continue with Protonix and Pepcid  Moderate protein-calorie malnutrition (HCC)  Malnutrition Findings:   Adult Malnutrition type: Chronic illness  Adult Degree of Malnutrition: Other severe " protein calorie malnutrition    BMI Findings:           Body mass index is 18.88 kg/m².   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   Continue sodium tablet  Oral fluid restrictions  Monitor  DM2 (diabetes mellitus, type 2) (HCC)  Lab Results   Component Value Date    HGBA1C 7.5 (H) 03/07/2025       Recent Labs     03/10/25  1512 03/10/25  1648 03/10/25  2042 03/11/25  0722   POCGLU 326* 329* 326* 142*       Blood Sugar Average: Last 72 hrs:  (P) [id number]    Holding home orals  Also on humulin N, 8 units in AM and 4 units PM  Glucose here is poorly controlled, likely 2/2 steroids  Lantus 10 units at bedtime was added with improvement   ROMERO (obstructive sleep apnea)  CPAP at bedtime  Pacemaker  Noted hx, 2016  Recent normal device function per cards  Severe protein-calorie malnutrition (HCC)  Malnutrition Findings:   Adult Malnutrition type: Chronic illness  Adult Degree of Malnutrition: Other severe protein calorie malnutrition  Malnutrition Characteristics: Muscle loss, Inadequate energy                  360 Statement: Severe protein calorie malnutrition related to chronic illness evidenced by <75% energy intake compared to estimated energy needs for >1 month, and severe muscle loss in clavicle region with protruding, prominent bone. Treatmeat: Supplement chocolate glucerna BID.    BMI Findings:           Body mass index is 18.88 kg/m².     Anemia  Mild, hgb ranging 9-11 range  Iron panel suggestive of some mild iron deficiency  S/p  dose of IV venofer  Can monitor OP      Medical Problems       Resolved Problems  Date Reviewed: 3/10/2025   None       Discharging Physician / Practitioner: Kavya Kulkarni PA-C  PCP: ZANDER Swenson  Admission Date:   Admission Orders (From admission, onward)       Ordered        03/07/25 1631  INPATIENT ADMISSION  Once                          Discharge Date:  "03/11/25    Consultations During Hospital Stay:  Cardiology  Pulmonology    Procedures Performed:   CXR 3/7: Moderate pulmonary fibrosis with no definite acute disease.   RP2 Panel: negative    Significant Findings / Test Results:   See above    Incidental Findings:   none    Test Results Pending at Discharge (will require follow up):   none     Outpatient Tests Requested:  F/u cards with serial echos  F/u PCP  F/u pulmonology    Complications:  none     Reason for Admission: SOB    Hospital Course:   Ac Duran is a 89 y.o. female patient PAF, TBS s/p DC PPM implanted in 2016, essential hypertension, hyperlipidemia, AS, type II DM, interstitial lung disease who originally presented to the hospital on 3/7/2025 due to progressive DASILVA.     Patient was sent to [facility] on 3/7 by her PCP due to 2 weeks of progressive dyspnea on exertion.  Patient had had 2 recent hospitalizations with hemoptysis attributed to ILD flareup on 2/24 and 3/5.  EKG on admission revealed a paced rhythm.  CXR revealed moderate pulmonary fibrosis with no definitive acute disease.     Seen by cards and pulmonology. Please see above under A/P section for related plans. Stable for discharge to rehab.      Condition at Discharge: stable    Discharge Day Visit / Exam:   Subjective:  doing well today. Feels a bit anxious and wants to go to rehab. Denies SOB or CP currently. No bleeding reported.   Vitals: Blood Pressure: 165/73 (03/11/25 0639)  Pulse: 60 (03/11/25 0639)  Temperature: 97.6 °F (36.4 °C) (03/10/25 0800)  Temp Source: Oral (03/10/25 0800)  Respirations: 17 (03/11/25 0639)  Height: 5' 6\" (167.6 cm) (03/07/25 2146)  Weight - Scale: 53.1 kg (117 lb) (03/07/25 2146)  SpO2: 96 % (03/11/25 0639)  Physical Exam  Vitals and nursing note reviewed.   Constitutional:       General: She is not in acute distress.     Comments: On RA    Cardiovascular:      Rate and Rhythm: Normal rate and regular rhythm.      Heart sounds: Murmur heard. "   Pulmonary:      Effort: No respiratory distress.   Abdominal:      General: Bowel sounds are normal. There is no distension.      Palpations: Abdomen is soft.   Musculoskeletal:      Right lower leg: No edema.      Left lower leg: No edema.   Skin:     Coloration: Skin is pale.   Neurological:      Mental Status: She is alert and oriented to person, place, and time.   Psychiatric:         Mood and Affect: Mood normal.          Discussion with Family: Updated  (son) via phone.    Discharge instructions/Information to patient and family:   See after visit summary for information provided to patient and family.      Provisions for Follow-Up Care:  See after visit summary for information related to follow-up care and any pertinent home health orders.      Mobility at time of Discharge:   Basic Mobility Inpatient Raw Score: 18  JH-HLM Goal: 6: Walk 10 steps or more  JH-HLM Achieved: 6: Walk 10 steps or more  HLM Goal achieved. Continue to encourage appropriate mobility.     Disposition:   Other: STR at [facility]    Planned Readmission: none    Discharge Medications:  See after visit summary for reconciled discharge medications provided to patient and/or family.      Administrative Statements   Discharge Statement:  I have spent a total time of 35 minutes in caring for this patient on the day of the visit/encounter. .    **Please Note: This note may have been constructed using a voice recognition system**

## 2025-03-11 NOTE — ASSESSMENT & PLAN NOTE
Recent echo shows mild to moderate aortic stenosis   Patient's son reports that patient has had difficulty making OP appointments due to frequent hospitalizations. Also with DASILVA as above. Pulm evaluated and felt no signs of worsening ILD. Son requesting Cardiology eval which was done, recommending serial echos OP

## 2025-03-11 NOTE — QUICK NOTE
Report called to [facility]. Anticipated  time is 1300.  Call back number provided.

## 2025-03-11 NOTE — ASSESSMENT & PLAN NOTE
Malnutrition Findings:   Adult Malnutrition type: Chronic illness  Adult Degree of Malnutrition: Other severe protein calorie malnutrition  Malnutrition Characteristics: Muscle loss, Inadequate energy                  360 Statement: Severe protein calorie malnutrition related to chronic illness evidenced by <75% energy intake compared to estimated energy needs for >1 month, and severe muscle loss in clavicle region with protruding, prominent bone. Treatmeat: Supplement chocolate glucerna BID.    BMI Findings:           Body mass index is 18.88 kg/m².

## 2025-03-11 NOTE — PLAN OF CARE
Problem: Potential for Falls  Goal: Patient will remain free of falls  Description: INTERVENTIONS:  - Educate patient/family on patient safety including physical limitations  - Instruct patient to call for assistance with activity   - Consult OT/PT to assist with strengthening/mobility   - Keep Call bell within reach  - Keep bed low and locked with side rails adjusted as appropriate  - Keep care items and personal belongings within reach  - Initiate and maintain comfort rounds  - Make Fall Risk Sign visible to staff  - Offer Toileting every Hours, in advance of need  - Initiate/Maintain alarm  - Obtain necessary fall risk management equipment:   - Apply yellow socks and bracelet for high fall risk patients  - Consider moving patient to room near nurses station  Outcome: Progressing     Problem: PAIN - ADULT  Goal: Verbalizes/displays adequate comfort level or baseline comfort level  Description: Interventions:  - Encourage patient to monitor pain and request assistance  - Assess pain using appropriate pain scale  - Administer analgesics based on type and severity of pain and evaluate response  - Implement non-pharmacological measures as appropriate and evaluate response  - Consider cultural and social influences on pain and pain management  - Notify physician/advanced practitioner if interventions unsuccessful or patient reports new pain  Outcome: Progressing     Problem: INFECTION - ADULT  Goal: Absence or prevention of progression during hospitalization  Description: INTERVENTIONS:  - Assess and monitor for signs and symptoms of infection  - Monitor lab/diagnostic results  - Monitor all insertion sites, i.e. indwelling lines, tubes, and drains  - Monitor endotracheal if appropriate and nasal secretions for changes in amount and color  - [place] appropriate cooling/warming therapies per order  - Administer medications as ordered  - Instruct and encourage patient and family to use good hand hygiene technique  -  Identify and instruct in appropriate isolation precautions for identified infection/condition  Outcome: Progressing  Goal: Absence of fever/infection during neutropenic period  Description: INTERVENTIONS:  - Monitor WBC    Outcome: Progressing     Problem: RESPIRATORY - ADULT  Goal: Achieves optimal ventilation and oxygenation  Description: INTERVENTIONS:  - Assess for changes in respiratory status  - Assess for changes in mentation and behavior  - Position to facilitate oxygenation and minimize respiratory effort  - Oxygen administered by appropriate delivery if ordered  - Initiate smoking cessation education as indicated  - Encourage broncho-pulmonary hygiene including cough, deep breathe, Incentive Spirometry  - Assess the need for suctioning and aspirate as needed  - Assess and instruct to report SOB or any respiratory difficulty  - Respiratory Therapy support as indicated  Outcome: Progressing     Problem: Prexisting or High Potential for Compromised Skin Integrity  Goal: Skin integrity is maintained or improved  Description: INTERVENTIONS:  - Identify patients at risk for skin breakdown  - Assess and monitor skin integrity  - Assess and monitor nutrition and hydration status  - Monitor labs   - Assess for incontinence   - Turn and reposition patient  - Assist with mobility/ambulation  - Relieve pressure over bony prominences  - Avoid friction and shearing  - Provide appropriate hygiene as needed including keeping skin clean and dry  - Evaluate need for skin moisturizer/barrier cream  - Collaborate with interdisciplinary team   - Patient/family teaching  - Consider wound care consult   Outcome: Progressing     Problem: Nutrition/Hydration-ADULT  Goal: Nutrient/Hydration intake appropriate for improving, restoring or maintaining nutritional needs  Description: Monitor and assess patient's nutrition/hydration status for malnutrition. Collaborate with interdisciplinary team and initiate plan and interventions as  ordered.  Monitor patient's weight and dietary intake as ordered or per policy. Utilize nutrition screening tool and intervene as necessary. Determine patient's food preferences and provide high-protein, high-caloric foods as appropriate.     INTERVENTIONS:  - Monitor oral intake, urinary output, labs, and treatment plans  - Assess nutrition and hydration status and recommend course of action  - Evaluate amount of meals eaten  - Assist patient with eating if necessary   - Allow adequate time for meals  - Recommend/ encourage appropriate diets, oral nutritional supplements, and vitamin/mineral supplements  - Order, calculate, and assess calorie counts as needed  - Recommend, monitor, and adjust tube feedings and TPN/PPN based on assessed needs  - Assess need for intravenous fluids  - Provide specific nutrition/hydration education as appropriate  - Include patient/family/caregiver in decisions related to nutrition  Outcome: Progressing

## 2025-03-11 NOTE — PROGRESS NOTES
Outpatient Care Management Note:    Patient is currently hospitalized with dyspnea on exertion. Hospital risk assessment was completed on 3/3/25.  CM will attempt to outreach on discharge.     Discharge ADT alert received. Patient was hospitalized from 3/7-3/11/24 with dyspnea. She was discharged to [facility].  Patient will be followed by Brea Young while in rehab.

## 2025-03-11 NOTE — ASSESSMENT & PLAN NOTE
Mild, hgb ranging 9-11 range  Iron panel suggestive of some mild iron deficiency  S/p  dose of IV venofer  Can monitor OP

## 2025-03-11 NOTE — CASE MANAGEMENT
Case Management Discharge Planning Note    Patient name Ac Duran  Location [facility]-01 MRN [id number]  : 1935 Date 3/11/2025       Current Admission Date: 3/7/2025  Current Admission Diagnosis:Dyspnea on exertion   Patient Active Problem List    Diagnosis Date Noted Date Diagnosed    Anemia 2025     Severe protein-calorie malnutrition (HCC) 03/10/2025     Mild protein-calorie malnutrition (HCC) 2025     Iron deficiency anemia 2025     Hemoptysis 2025     Acute hypoxic respiratory failure ([facility]) 2025     Hypertension 2025     Aortic stenosis 2025     Microcytosis 2025     ROMERO (obstructive sleep apnea) 2024     Hypoxia 2024     Dyspnea on exertion 04/15/2024     Irritable bowel syndrome with both constipation and diarrhea 2024     Peripheral arterial disease ([facility]) 2024     Metabolic alkalosis 10/19/2023     SIADH (syndrome of inappropriate ADH production) ([facility]) 10/19/2023     Bilateral hip pain 2023     Pachymeningitis 2023     Hypovitaminosis D 2021     Depression, recurrent ([facility]) 2021     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, with long-term current use of insulin ([facility]) 2021     Type 2 diabetes mellitus with hyperlipidemia  ([facility]) 2021     Type 2 diabetes mellitus with diabetic polyneuropathy, with long-term current use of insulin ([facility]) 2021     DM2 (diabetes mellitus, type 2) ([facility]) 2021     Post-nasal drip 2021     Hyponatremia 2020     Moderate protein-calorie malnutrition ([facility]) 2020     Constipation 2020     Dysphagia 2020     Interstitial lung disease ([facility]) 10/13/2020     Iron deficiency 2020     Osteoporosis 2019     GERD (gastroesophageal reflux disease) 2019     Anxiety 2019     Pacemaker 2016     Occlusion of right carotid artery  11/18/2016     Paroxysmal atrial fibrillation (HCC) 11/09/2016     Symptomatic PVCs 11/01/2016     Essential hypertension 10/29/2016     Mixed hyperlipidemia 10/29/2016     Glaucoma 10/29/2016     Asymptomatic carotid artery stenosis, left 10/29/2016       LOS (days): 4  Geometric Mean LOS (GMLOS) (days): 4.9  Days to GMLOS:1.2     OBJECTIVE:  Risk of Unplanned Readmission Score: 29.6         Current admission status: Inpatient   Preferred Pharmacy:   [facility] - [place] - [address]  [address]  [place] [address]  Phone: [phone] Fax: [phone]    [facility] - [place] - [address]  [address]  [place] [address]  Phone: [phone] Fax: [phone]    Primary Care Provider: ZANDER Swenson    Primary Insurance: MEDICARE  Secondary Insurance: [employer]    DISCHARGE DETAILS:    Discharge planning discussed with:: patient and Rey (Son) via tc  [place] of Choice: Yes  Comments - Freedom of Choice: Discussed FOC  CM contacted family/caregiver?: Yes  Were Treatment Team discharge recommendations reviewed with patient/caregiver?: Yes  Did patient/caregiver verbalize understanding of patient care needs?: N/A- going to facility  Were patient/caregiver advised of the risks associated with not following Treatment Team discharge recommendations?: Yes    Other Referral/Resources/Interventions Provided:  Interventions: SNF, Short Term Rehab  Referral Comments: [facility] able to accept today    Treatment Team Recommendation: Short Term Rehab  Discharge Destination Plan:: Short Term Rehab  Transport at Discharge : Wheelchair van     Number/Name of Dispatcher: [facility]  Transported by (Company and Unit #): [facility]  ETA of Transport (Date): 03/11/25  ETA of Transport (Time): 1300    IMM Given (Date):: 03/11/25  IMM Given to:: Patient  IMM reviewed with patient, patient agrees with discharge determination.    Accepting Facility Name, City & State : [facility]  Receiving Facility/Agency Phone Number: [phone] ext [phone]  Facility/Agency Fax Number: [phone]

## 2025-03-12 ENCOUNTER — PATIENT OUTREACH (OUTPATIENT)
Dept: CASE MANAGEMENT | Facility: OTHER | Age: 89
End: 2025-03-12

## 2025-03-12 NOTE — ASSESSMENT & PLAN NOTE
Patient was recently hospitalized with acute hypoxic respiratory failure.  CTA PE study was negative for PE.  Positive for chronic ILD with new diffuse groundglass haziness in lower lobe possibility of edema, hemorrhage. Chest x-ray showed moderate pulmonary fibrosis  Last echo in 2/25/2025 showed LVEF 55-60% with grade 1 diastolic dysfunction, mild to moderate aortic stenosis  Patient initially required oxygen, was later weaned off to RA  She was treated with tapering doses of prednisone, currently remains on prednisone 10 mg daily for ILD  Follow-up with pulmonary service.  Monitor respiratory status

## 2025-03-12 NOTE — ASSESSMENT & PLAN NOTE
Malnutrition Findings:             Pt is noted to be weak and frail.  Muscle wasting noted on exam.  Encourage p.o. intake.  Follow-up with dietitian as needed                    BMI Findings:           Body mass index is 19.11 kg/m².

## 2025-03-12 NOTE — ASSESSMENT & PLAN NOTE
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

## 2025-03-12 NOTE — ASSESSMENT & PLAN NOTE
HGB has been ranging  between 9-11 range  Iron panel was  suggestive of some mild iron deficiency  S/p  dose of IV venofer  Repeat CBC in one week

## 2025-03-12 NOTE — ASSESSMENT & PLAN NOTE
Blood sugars were recently noted to be on the higher side at the hospital for which Lantus was added to her insulin regimen.  Patient currently remains on insulin glargine 10 units daily, insulin Humulin N 10 units in the morning and at bedtime along with sitagliptin 100 mg daily and metformin 500 mg twice daily.  Suspect hyperglycemia at the hospital was due to high doses of steroids.  Insulin requirements are suspected to be decreased on a low-dose of prednisone.  Monitor blood sugars closely and adjust dosages accordingly  Lab Results   Component Value Date    HGBA1C 7.5 (H) 03/07/2025

## 2025-03-12 NOTE — PROGRESS NOTES
Dinesh received from OPCM indicating the patient Admitted 3/7/25 to [facility]. Discharged 3/11/25 to [facility]. Email sent to facility to inform them I will be following the patient during their skilled stay.  This Admin Coordinator will continue to monitor via chart review.

## 2025-03-12 NOTE — PROGRESS NOTES
[facility]  [address]  [place] [address]  [id number]    Nursing Home Admission    NAME: Ac Duran  AGE: 89 y.o. SEX: female [phone]      Patient Location     Currently [facility]    Patient’s care was coordinated with nursing facility staff. Recent vitals, labs and updated medications were reviewed on [employer] system of facility. Past Medical, surgical, social, medication and allergy history and patient’s previous records reviewed.       Assessment/Plan:    Acute hypoxic respiratory failure (HCC)  Patient was recently hospitalized with acute hypoxic respiratory failure.  CTA PE study was negative for PE.  Positive for chronic ILD with new diffuse groundglass haziness in lower lobe possibility of edema, hemorrhage. Chest x-ray showed moderate pulmonary fibrosis  Last echo in 2/25/2025 showed LVEF 55-60% with grade 1 diastolic dysfunction, mild to moderate aortic stenosis  Patient initially required oxygen, was later weaned off to RA  She was treated with tapering doses of prednisone, currently remains on prednisone 10 mg daily for ILD  Follow-up with pulmonary service.  Monitor respiratory status    Anemia  HGB has been ranging  between 9-11 range  Iron panel was  suggestive of some mild iron deficiency  S/p  dose of IV venofer  Repeat CBC in one week    Anxiety  Pt appears to be anxious. Has had issues with anxiety in the past. Continue Mirtazapine, additionally remains on PRN Lorazepam.    Essential hypertension  BP readings were noted to be on the high side in the hospital for which she was started on Amlodipine 5 mg daily.  Additionally remains on metoprolol 25 mg twice daily.  Blood pressure is currently stable.  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 She  was discharged on prednisone taper. She was readmitted with hemoptysis and later with dyspnea.  CTA PE study was negative for PE.  Imaging studies additionally revealed chronic ILD with diffuse groundglass haziness in the lower lobe possibly edema versus hemorrhage.  Chest x-ray showed moderate pulmonary fibrosis.  Pt currently remains on Prednisone 10 mg daily  Sao2 is stable on RA  F.U with pulmonary service    Paroxysmal atrial fibrillation (HCC)  HR stable on  metoprolol. Continue  Eliquis    Severe protein-calorie malnutrition ([facility])  Malnutrition Findings:             Pt is noted to be weak and frail.  Muscle wasting noted on exam.  Encourage p.o. intake.  Follow-up with dietitian as needed                    BMI Findings:           Body mass index is 19.11 kg/m².       Type 2 diabetes mellitus with diabetic polyneuropathy, with long-term current use of insulin ([facility])  Blood sugars were recently noted to be on the higher side at the hospital for which Lantus was added to her insulin regimen.  Patient currently remains on insulin glargine 10 units daily, insulin Humulin N 10 units in the morning and at bedtime along with sitagliptin 100 mg daily and metformin 500 mg twice daily.  Suspect hyperglycemia at the hospital was due to high doses of steroids.  Insulin requirements are suspected to be decreased on a low-dose of prednisone.  Monitor blood sugars closely and adjust dosages accordingly  Lab Results   Component Value Date    HGBA1C 7.5 (H) 03/07/2025       Peripheral arterial disease (HCC)      Patient remains on aspirin, statin and Eliquis    Mixed hyperlipidemia  Continue ezetimibe and simvastatin      Depression, recurrent (HCC)  Continue Mirtazapine          Chief Complaint     Recent hospitalization for dyspnea, ILD,  deconditioning    HPI       Patient is a 89 y.o. female with past medical history significant for ILD, A-fib, neuropathy, diabetes mellitus type 2, hypertension, GERD, anemia, anxiety, AS and glaucoma  Patient was hospitalized 3 times during the last month.  Initial hospitalization was from 2/24-2/26 with dyspnea on exertion.  Symptoms were attributed to ILD and aortic stenosis. Patient was started on prednisone taper.  Troponins were negative.  Chest x-ray was negative for acute findings.  Patient was discharged on 2/26 and advised to follow-up with cardiology service for AS.    Patient was rehospitalized from 3/2 to 3/5 with episodes of hemoptysis.  CT chest was negative for PE revealed chronic interstitial lung disease with new diffuse groundglass haziness predominantly in the lower lobes suspected to be due to edema/hemorrhage.  Chest x-ray showed moderate pulmonary fibrosis with no definitive acute process.  Procalcitonin and blood cultures remained negative.  Anticoagulation was held for short time,later resumed. Initially bronchoscopically was considered however it was felt that risk outweighed benefits and therefore conservative approach was followed.  Patient was recommended to continue prednisone taper as was started recently for suspected ILD flare up. Patient was noted to have elevated blood pressure readings for which Lopressor dose was adjusted and Amlodipine was added. Patient was later discharged home.  Patient presented to the hospital again on 3/7 with dyspnea on exertion.  Chest x-ray done during this hospitalization again revealed moderate pulmonary fibrosis with no definitive acute disease.  Patient was seen by pulmonary service.  Respiratory panel was negative.  There were no signs of ILD worsening.  Patient was seen by cardiology service for aortic stenosis.  Outpatient follow-up was recommended.  Patient was felt to be quite deconditioned and weak and was therefore discharged to [facility] where she is being seen for posthospital admission.  At the time of my evaluation patient is doing okay.  Patient denies any dyspnea at rest.  Reports feeling anxious    Past Surgical History:   Procedure Laterality Date    CATARACT EXTRACTION, BILATERAL      CHOLECYSTECTOMY      CHOLECYSTECTOMY LAPAROSCOPIC N/A 2020    Procedure: CHOLECYSTECTOMY LAPAROSCOPIC;  Surgeon: Kalen Garrett MD;  Location: [facility];  Service: General    COLONOSCOPY      CYST REMOVAL  2009    left lower Quadrant     FL LUMBAR PUNCTURE DIAGNOSTIC  2023    HERNIA REPAIR  1992    LIPOMA RESECTION  2010    CA RPR 1ST INGUN HRNA AGE 5 YRS/> REDUCIBLE Bilateral 2018    Procedure: INGUINAL HERNIA REPAIR;  Surgeon: Volodymyr Lee MD;  Location: [facility];  Service: General    SHOULDER SURGERY  2019    Dr. Arnett ([place])    UPPER GASTROINTESTINAL ENDOSCOPY      VASCULAR SURGERY      Agram-  R carotid occlusion       Social History     Tobacco Use   Smoking Status Former    Current packs/day: 0.00    Average packs/day: 1.5 packs/day for 38.0 years (57.0 ttl pk-yrs)    Types: Cigarettes    Start date:     Quit date:     Years since quittin.2   Smokeless Tobacco Never          Family History   Problem Relation Age of Onset    Heart disease Mother     Heart attack Father     Hiatal hernia Father     Diabetes Father     Cancer Brother     Diabetes Brother     Heart disease Brother     Hypertension Brother     Lung disease Brother 75        interstitial lung disease    Cancer Brother 49        glioblastoma    Other Son         gastroparesis    Other Cousin         interstitial lung disease        Allergies   Allergen Reactions    Keflex [Cephalexin] Diarrhea          Current Outpatient Medications:     acetaminophen (TYLENOL) 500 mg tablet, Take 1,000 mg by mouth as needed for mild pain, Disp: , Rfl:     amLODIPine (NORVASC) 5 mg tablet, Take 1 tablet (5 mg total) by mouth daily, Disp: , Rfl:      apixaban (Eliquis) 2.5 mg, TAKE 1 TABLET (2.5 MG TOTAL) BY MOUTH 2 (TWO) TIMES A DAY, Disp: 90 tablet, Rfl: 1    bimatoprost (LUMIGAN) 0.01 % ophthalmic drops, Administer 1 drop to both eyes daily at bedtime for 30 days, Disp: 1.5 mL, Rfl: 0    brimonidine tartrate 0.2 % ophthalmic solution, INSTILL 1 DROP INTO RIGHT EYE TWICE A DAY, Disp: , Rfl:     Cyanocobalamin (Vitamin B 12) 500 MCG TABS, Take 500 mcg by mouth 2 (two) times a day, Disp: , Rfl:     ezetimibe-simvastatin (VYTORIN) 10-40 mg per tablet, TAKE 1 TABLET BY MOUTH DAILY AT BEDTIME, Disp: 30 tablet, Rfl: 5    famotidine (PEPCID) 20 mg tablet, TAKE 1 TABLET (20 MG TOTAL) BY MOUTH 2 (TWO) TIMES A DAY, Disp: 60 tablet, Rfl: 5    glucose blood (OneTouch Ultra) test strip, TEST FOUR TIMES A DAY, Disp: 100 strip, Rfl: 5    HumuLIN N KwikPen 100 units/mL injection pen, INJECT 8 UNITS UNDER THE SKIN EVERY MORNING AND 4 UNITS EVERY EVENING., Disp: 15 mL, Rfl: 1    insulin glargine (LANTUS) 100 units/mL subcutaneous injection, Inject 10 Units under the skin daily at bedtime, Disp: , Rfl:     insulin lispro (HumALOG/ADMELOG) 100 units/mL injection, Inject 1-5 Units under the skin 3 (three) times a day before meals, Disp: , Rfl:     insulin lispro (HumALOG/ADMELOG) 100 units/mL injection, Inject 1-5 Units under the skin daily at bedtime, Disp: , Rfl:     Insulin Pen Needle (B-D UF III MINI PEN NEEDLES) 31G X 5 MM MISC, USE 2 (TWO) TIMES A DAY, Disp: 200 each, Rfl: 3    ipratropium (ATROVENT) 0.03 % nasal spray, USE 2 SPRAYS INTO EACH NOSTRIL EVERY 12 (TWELVE) HOURS, Disp: 30 mL, Rfl: 6    lidocaine (LIDODERM) 5 %, Apply 1 patch topically over 12 hours daily Remove & Discard patch within 12 hours or as directed by MD, Disp: , Rfl:     LORazepam (ATIVAN) 0.5 mg tablet, Take 1 tablet (0.5 mg total) by mouth 2 (two) times a day as needed for anxiety for up to 10 days, Disp: 20 tablet, Rfl: 0    metFORMIN (GLUCOPHAGE) 500 mg tablet, TAKE 2 TABLETS (1,000 MG TOTAL) BY  MOUTH 2 (TWO) TIMES A DAY WITH MEALS, Disp: 120 tablet, Rfl: 5    metoprolol tartrate (LOPRESSOR) 25 mg tablet, Take 1 tablet (25 mg total) by mouth every 12 (twelve) hours, Disp: 180 tablet, Rfl: 3    mirtazapine (REMERON) 7.5 MG tablet, Take 1 tablet (7.5 mg total) by mouth daily at bedtime, Disp: 20 tablet, Rfl: 0    Multiple Vitamin (multivitamin) tablet, Take 1 tablet by mouth daily  , Disp: , Rfl:     OneTouch Delica Lancets 33G MISC, Check blood sugars four times daily. Please substitute with appropriate alternative as covered by patient's insurance. Dx: E11.65, Disp: 400 each, Rfl: 3    pantoprazole (PROTONIX) 40 mg tablet, TAKE 1 TABLET (40 MG TOTAL) BY MOUTH 2 (TWO) TIMES A DAY, Disp: 60 tablet, Rfl: 5    predniSONE 10 mg tablet, Take 1 tablet (10 mg total) by mouth daily for 4 doses, Disp: , Rfl:     sitaGLIPtin (Januvia) 100 mg tablet, TAKE 1 TABLET (100 MG TOTAL) BY MOUTH DAILY, Disp: 90 tablet, Rfl: 1    sodium chloride 1 g tablet, TAKE 1 TABLET THREE TIMES A DAY, Disp: 90 tablet, Rfl: 1  No current facility-administered medications for this visit.    Updated list was reviewed in [employer] system of facility.     Vitals:    03/11/25 2300   BP: 156/86   Pulse: 82   Resp: 18   Temp: (!) 97.3 °F (36.3 °C)   SpO2: 99%       Vital signs were reviewed in [facility] care    Review of Systems   Constitutional:  Negative for chills and fever.   HENT:  Negative for nosebleeds and rhinorrhea.    Eyes:  Negative for discharge and redness.   Respiratory:  Positive for shortness of breath (With activities). Negative for cough, chest tightness, wheezing and stridor.    Cardiovascular:  Negative for chest pain and leg swelling.   Gastrointestinal:  Negative for abdominal distention, abdominal pain, diarrhea and vomiting.   Genitourinary:  Negative for dysuria, flank pain and hematuria.   Musculoskeletal:  Positive for gait problem. Negative for arthralgias and back pain.   Skin:  Negative for pallor.    Neurological:  Positive for weakness (Generalized). Negative for tremors, seizures, syncope and headaches.   Psychiatric/Behavioral:  Negative for agitation, behavioral problems and confusion. The patient is nervous/anxious.        Physical Exam  Constitutional:       General: She is not in acute distress.  HENT:      Head: Normocephalic and atraumatic.      Nose: No rhinorrhea.   Eyes:      General: No scleral icterus.        Right eye: No discharge.         Left eye: No discharge.   Cardiovascular:      Rate and Rhythm: Normal rate and regular rhythm.      Heart sounds: Murmur heard.   Pulmonary:      Breath sounds: Rales (few at bases) present. No wheezing or rhonchi.   Abdominal:      General: There is no distension.      Palpations: Abdomen is soft.      Tenderness: There is no abdominal tenderness. There is no guarding.   Musculoskeletal:      Cervical back: Neck supple.      Right lower leg: No edema.      Left lower leg: No edema.   Skin:     Coloration: Skin is not jaundiced.   Neurological:      General: No focal deficit present.      Mental Status: Mental status is at baseline.      Cranial Nerves: No cranial nerve deficit.      Comments: Awake alert answering questions appropriately   Psychiatric:         Mood and Affect: Mood normal.         Behavior: Behavior normal.           Diagnostic Data       Recent labs and imaging studies were reviewed.       Lab Results   Component Value Date    WBC 5.82 03/11/2025    HGB 10.5 (L) 03/11/2025    HCT 33.8 (L) 03/11/2025    MCV 81 (L) 03/11/2025     03/11/2025      Lab Results   Component Value Date    SODIUM 134 (L) 03/10/2025    K 4.3 03/10/2025    CL 98 03/10/2025    CO2 33 (H) 03/10/2025    BUN 17 03/10/2025    CREATININE 0.59 (L) 03/10/2025    GLUC 155 (H) 03/10/2025    CALCIUM 8.3 (L) 03/10/2025          Code Status:      DNR/DNI    Additional notes:      Monitor blood sugars closely and adjust therapy accordingly  Care coordinated with patient's  "son over the phone    Portions of the record may have been created with voice recognition software.  Occasional wrong word or \"sound a like\" substitutions may have occurred due to the inherent limitations of voice recognition software.  Read the chart carefully and recognize, using context, where substitutions have occurred.    This note was electronically signed by Dr. Carlie Plunkett   "

## 2025-03-12 NOTE — ASSESSMENT & PLAN NOTE
Pt appears to be anxious. Has had issues with anxiety in the past. Continue Mirtazapine, additionally remains on PRN Lorazepam.

## 2025-03-12 NOTE — ASSESSMENT & PLAN NOTE
Patient had recent hospitalization from 02/24/2025 to 02/26/2025 for ILD flare up. She  was discharged on prednisone taper. She was readmitted with hemoptysis and later with dyspnea.  CTA PE study was negative for PE.  Imaging studies additionally revealed chronic ILD with diffuse groundglass haziness in the lower lobe possibly edema versus hemorrhage.  Chest x-ray showed moderate pulmonary fibrosis.  Pt currently remains on Prednisone 10 mg daily  Sao2 is stable on RA  F.U with pulmonary service

## 2025-03-12 NOTE — ASSESSMENT & PLAN NOTE
BP readings were noted to be on the high side in the hospital for which she was started on Amlodipine 5 mg daily.  Additionally remains on metoprolol 25 mg twice daily.  Blood pressure is currently stable.  Will continue to monitor

## 2025-03-13 ENCOUNTER — NURSING HOME VISIT (OUTPATIENT)
Dept: GERIATRICS | Facility: OTHER | Age: 89
End: 2025-03-13
Payer: MEDICARE

## 2025-03-13 VITALS
RESPIRATION RATE: 20 BRPM | TEMPERATURE: 97.6 F | OXYGEN SATURATION: 93 % | WEIGHT: 118.4 LBS | SYSTOLIC BLOOD PRESSURE: 142 MMHG | DIASTOLIC BLOOD PRESSURE: 71 MMHG | BODY MASS INDEX: 19.11 KG/M2

## 2025-03-13 DIAGNOSIS — I48.0 PAROXYSMAL ATRIAL FIBRILLATION (HCC): ICD-10-CM

## 2025-03-13 DIAGNOSIS — Z79.4 TYPE 2 DIABETES MELLITUS WITH DIABETIC POLYNEUROPATHY, WITH LONG-TERM CURRENT USE OF INSULIN (HCC): Primary | ICD-10-CM

## 2025-03-13 DIAGNOSIS — I10 ESSENTIAL HYPERTENSION: ICD-10-CM

## 2025-03-13 DIAGNOSIS — J84.9 INTERSTITIAL LUNG DISEASE (HCC): ICD-10-CM

## 2025-03-13 DIAGNOSIS — E78.2 MIXED HYPERLIPIDEMIA: ICD-10-CM

## 2025-03-13 DIAGNOSIS — R13.10 DYSPHAGIA, UNSPECIFIED TYPE: ICD-10-CM

## 2025-03-13 DIAGNOSIS — J96.01 ACUTE HYPOXIC RESPIRATORY FAILURE (HCC): ICD-10-CM

## 2025-03-13 DIAGNOSIS — K21.9 GASTROESOPHAGEAL REFLUX DISEASE, UNSPECIFIED WHETHER ESOPHAGITIS PRESENT: ICD-10-CM

## 2025-03-13 DIAGNOSIS — E11.42 TYPE 2 DIABETES MELLITUS WITH DIABETIC POLYNEUROPATHY, WITH LONG-TERM CURRENT USE OF INSULIN (HCC): Primary | ICD-10-CM

## 2025-03-13 DIAGNOSIS — I35.0 AORTIC VALVE STENOSIS, ETIOLOGY OF CARDIAC VALVE DISEASE UNSPECIFIED: ICD-10-CM

## 2025-03-13 DIAGNOSIS — D50.9 IRON DEFICIENCY ANEMIA, UNSPECIFIED IRON DEFICIENCY ANEMIA TYPE: ICD-10-CM

## 2025-03-13 DIAGNOSIS — E87.1 HYPONATREMIA: ICD-10-CM

## 2025-03-13 PROCEDURE — 99310 SBSQ NF CARE HIGH MDM 45: CPT | Performed by: INTERNAL MEDICINE

## 2025-03-13 RX ORDER — INSULIN GLARGINE 100 [IU]/ML
12 INJECTION, SOLUTION SUBCUTANEOUS EVERY MORNING
Start: 2025-03-13 | End: 2025-03-17

## 2025-03-13 RX ORDER — PANTOPRAZOLE SODIUM 40 MG/1
40 TABLET, DELAYED RELEASE ORAL DAILY
Start: 2025-03-13 | End: 2025-03-17

## 2025-03-13 RX ORDER — INSULIN LISPRO 100 [IU]/ML
3 INJECTION, SOLUTION INTRAVENOUS; SUBCUTANEOUS
COMMUNITY
End: 2025-03-17

## 2025-03-13 NOTE — ASSESSMENT & PLAN NOTE
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  Patient currently remains on sodium chloride tablet 1 g 3 times daily with meals along with fluid restriction of 1800 cc.  Recent sodium level was borderline low at 134. Patient is unhappy about current fluid restriction.  Will change it to 2000 cc for now.  Follow-up repeat BMP  Lab Results   Component Value Date    SODIUM 134 (L) 03/10/2025    K 4.3 03/10/2025    CL 98 03/10/2025    CO2 33 (H) 03/10/2025    BUN 17 03/10/2025    CREATININE 0.59 (L) 03/10/2025    GLUC 155 (H) 03/10/2025    CALCIUM 8.3 (L) 03/10/2025     Recent sodium level was

## 2025-03-13 NOTE — ASSESSMENT & PLAN NOTE
Lab Results   Component Value Date    HGBA1C 7.5 (H) 03/07/2025   Patient's blood sugars are mostly running on the higher side.She had 1 episode of low blood sugar of 86 yesterday morning.  Fasting blood sugar was 141 this morning.  Post breakfast reading was 383 and post supper readings last night was 237.  Patient was discharged on Humulin and 8 units in the morning, 4 units every nigh, Lantus 10 units nightly, metformin 1000 mg twice daily and Sitagliptin 100 mg daily.  Lantus QHS  was discontinued earlier due to fasting hypoglycemia.  Patient is noted to have mostly postprandial hyperglycemia at this time.  Discontinue Humulin N.  Start Lantus 12 units daily and insulin lispro, 3 units 3 times daily with meals.  Monitor blood sugars closely and adjust insulin dose accordingly.  Glycemic control is suspected to improve once patient comes off of prednisone.  Last dose of prednisone to be given on 3/16/2020

## 2025-03-13 NOTE — ASSESSMENT & PLAN NOTE
Patient is noted to be on pantoprazole 40 mg twice daily and famotidine 20 mg daily.  Decrease pantoprazole to 40 mg once daily for now.  May consider discontinuing famotidine if patient remains asymptomatic

## 2025-03-13 NOTE — ASSESSMENT & PLAN NOTE
HGB has been ranging  between 9-11 range  Iron panel was  suggestive of some mild iron deficiency  S/p  dose of IV venofer recently at the [facility]. repeat CBC

## 2025-03-13 NOTE — ASSESSMENT & PLAN NOTE
Patient had recent hospitalization from 02/24/2025 to 02/26/2025 for ILD flare up. She  was discharged on prednisone taper. She was readmitted with hemoptysis and later with dyspnea.  CTA PE study was negative for PE.  Imaging studies additionally revealed chronic ILD with diffuse groundglass haziness in the lower lobe possibly edema versus hemorrhage.  Chest x-ray showed moderate pulmonary fibrosis.  Pt currently remains on Prednisone 10 mg daily, last dose to be given on 3/16/25  Respiratory status remains stable with Sao2 of 93% on RA  F.U with pulmonary service

## 2025-03-13 NOTE — PROGRESS NOTES
[facility]  [address]   [address]  [place], [place], [address] [phone]    Progress Note  Code [id number]    Patient Location     [facility]    Reason for visit     Follow-up ILD, dyspnea, hypertension, diabetes mellitus type 2 with mostly  elevated blood sugar readings, anemia    Patient’s recent vitals, labs and updated medications were reviewed on [employer] system of facility.     Problem List Items Addressed This Visit       Essential hypertension    Blood pressure stable on metoprolol 25 mg twice a day and amlodipine 5 mg daily         Mixed hyperlipidemia    Continue ezetimibe and simvastatin           Paroxysmal atrial fibrillation (HCC)    HR stable on  metoprolol. Continue  Eliquis         GERD (gastroesophageal reflux disease)    Patient is noted to be on pantoprazole 40 mg twice daily and famotidine 20 mg daily.  Decrease pantoprazole to 40 mg once daily for now.  May consider discontinuing famotidine if patient remains asymptomatic         Relevant Medications    pantoprazole (PROTONIX) 40 mg tablet    Interstitial lung disease (HCC)    Patient had recent hospitalization from 02/24/2025 to 02/26/2025 for ILD flare up. She  was discharged on prednisone taper. She was readmitted with hemoptysis and later with dyspnea.  CTA PE study was negative for PE.  Imaging studies additionally revealed chronic ILD with diffuse groundglass haziness in the lower lobe possibly edema versus hemorrhage.  Chest x-ray showed moderate pulmonary fibrosis.  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  Patient currently remains on sodium  chloride tablet 1 g 3 times daily with meals along with fluid restriction of 1800 cc.  Recent sodium level was borderline low at 134. Patient is unhappy about current fluid restriction.  Will change it to 2000 cc for now.  Follow-up repeat BMP  Lab Results   Component Value Date    SODIUM 134 (L) 03/10/2025    K 4.3 03/10/2025    CL 98 03/10/2025    CO2 33 (H) 03/10/2025    BUN 17 03/10/2025    CREATININE 0.59 (L) 03/10/2025    GLUC 155 (H) 03/10/2025    CALCIUM 8.3 (L) 03/10/2025     Recent sodium level was         Type 2 diabetes mellitus with diabetic polyneuropathy, with long-term current use of insulin ([facility]) - Primary      Lab Results   Component Value Date    HGBA1C 7.5 (H) 03/07/2025   Patient's blood sugars are mostly running on the higher side.She had 1 episode of low blood sugar of 86 yesterday morning.  Fasting blood sugar was 141 this morning.  Post breakfast reading was 383 and post supper readings last night was 237.  Patient was discharged on Humulin and 8 units in the morning, 4 units every nigh, Lantus 10 units nightly, metformin 1000 mg twice daily and Sitagliptin 100 mg daily.  Lantus QHS  was discontinued earlier due to fasting hypoglycemia.  Patient is noted to have mostly postprandial hyperglycemia at this time.  Discontinue Humulin N.  Start Lantus 12 units daily and insulin lispro, 3 units 3 times daily with meals.  Monitor blood sugars closely and adjust insulin dose accordingly.  Glycemic control is suspected to improve once patient comes off of prednisone.  Last dose of prednisone to be given on 3/16/2020         Relevant Medications    insulin lispro (HumALOG/ADMELOG) 100 units/mL injection    insulin glargine (LANTUS) 100 units/mL subcutaneous injection    Aortic stenosis    Recent echo shows mild to moderate aortic stenosis   Patient's son reports that patient has had difficulty making OP appointments due to frequent hospitalizations. Also with DASILVA as above. Pulm evaluated and felt no  signs of worsening ILD. Son requesting Cardiology eval which was done, recommending serial echos OP         Acute hypoxic respiratory failure (HCC)    Patient was recently hospitalized with acute hypoxic respiratory failure.  CTA PE study was negative for PE.  Positive for chronic ILD with new diffuse groundglass haziness in lower lobe possibility of edema, hemorrhage. Chest x-ray showed moderate pulmonary fibrosis  Last echo in 2/25/2025 showed LVEF 55-60% with grade 1 diastolic dysfunction, mild to moderate aortic stenosis  Patient initially required oxygen, was later weaned off to RA  She was treated with tapering doses of prednisone, currently remains on prednisone 10 mg daily for ILD  Follow-up with pulmonary service.  Monitor respiratory status         Anemia    HGB has been ranging  between 9-11 range  Iron panel was  suggestive of some mild iron deficiency  S/p  dose of IV venofer recently at the [facility]  F.U repeat CBC                HPI         Patient is being seen for a follow-up visit today.  She is doing okay at present.  Awake alert in no distress.  Denies any dyspnea at rest.  Anxiety symptoms noted on last encounter  appear to have improved.  Blood sugars are mostly running on the higher side except patient had 1 episode of low blood sugar of 86 yesterday morning.  Patient is currently complaining about not receiving enough fluids due to fluid restriction.  Denies any GI or  complaints.    Review of Systems   Constitutional:  Negative for chills and fever.   Respiratory:  Positive for shortness of breath (With exertion). Negative for wheezing and stridor.    Cardiovascular:  Negative for chest pain.   Gastrointestinal:  Negative for abdominal distention, abdominal pain and vomiting.   Endocrine:        Blood sugars have been mostly running on the high side   Genitourinary:  Negative for flank pain and hematuria.   Musculoskeletal:  Positive for back pain and gait problem.   Neurological:   Positive for weakness. Negative for seizures and syncope.   Psychiatric/Behavioral:  Negative for agitation and behavioral problems.        Past Medical History:   Diagnosis Date    Common bile duct dilatation 2020    Glaucoma     H/O degenerative disc disease     Idiopathic pulmonary fibrosis (HCC) 2020    Irregular heart beat     afib    Peripheral neuropathy     S/P laparoscopic cholecystectomy 2020    Stenosis of right subclavian artery (HCC) 2016       Past Surgical History:   Procedure Laterality Date    CATARACT EXTRACTION, BILATERAL      CHOLECYSTECTOMY      CHOLECYSTECTOMY LAPAROSCOPIC N/A 2020    Procedure: CHOLECYSTECTOMY LAPAROSCOPIC;  Surgeon: Kalen Garrett MD;  Location: [facility];  Service: General    COLONOSCOPY      CYST REMOVAL  2009    left lower Quadrant     FL LUMBAR PUNCTURE DIAGNOSTIC  2023    HERNIA REPAIR  1992    LIPOMA RESECTION  2010    IN RPR 1ST INGUN HRNA AGE 5 YRS/> REDUCIBLE Bilateral 2018    Procedure: INGUINAL HERNIA REPAIR;  Surgeon: Volodymyr Lee MD;  Location: [facility];  Service: General    SHOULDER SURGERY  2019    Dr. Arnett ([place])    UPPER GASTROINTESTINAL ENDOSCOPY      VASCULAR SURGERY      Agram-  R carotid occlusion       Social History     Tobacco Use   Smoking Status Former    Current packs/day: 0.00    Average packs/day: 1.5 packs/day for 38.0 years (57.0 ttl pk-yrs)    Types: Cigarettes    Start date:     Quit date:     Years since quittin.2   Smokeless Tobacco Never       Family History   Problem Relation Age of Onset    Heart disease Mother     Heart attack Father     Hiatal hernia Father     Diabetes Father     Cancer Brother     Diabetes Brother     Heart disease Brother     Hypertension Brother     Lung disease Brother 75        interstitial lung disease    Cancer Brother 49        glioblastoma    Other Son         gastroparesis    Other Cousin         interstitial lung disease         Allergies   Allergen Reactions    Keflex [Cephalexin] Diarrhea         Current Outpatient Medications:     insulin glargine (LANTUS) 100 units/mL subcutaneous injection, Inject 12 Units under the skin every morning, Disp: , Rfl:     insulin lispro (HumALOG/ADMELOG) 100 units/mL injection, Inject 3 Units under the skin 3 (three) times a day with meals, Disp: , Rfl:     pantoprazole (PROTONIX) 40 mg tablet, Take 1 tablet (40 mg total) by mouth daily, Disp: , Rfl:     acetaminophen (TYLENOL) 500 mg tablet, Take 1,000 mg by mouth as needed for mild pain, Disp: , Rfl:     amLODIPine (NORVASC) 5 mg tablet, Take 1 tablet (5 mg total) by mouth daily, Disp: , Rfl:     apixaban (Eliquis) 2.5 mg, TAKE 1 TABLET (2.5 MG TOTAL) BY MOUTH 2 (TWO) TIMES A DAY, Disp: 90 tablet, Rfl: 1    bimatoprost (LUMIGAN) 0.01 % ophthalmic drops, Administer 1 drop to both eyes daily at bedtime for 30 days, Disp: 1.5 mL, Rfl: 0    brimonidine tartrate 0.2 % ophthalmic solution, INSTILL 1 DROP INTO RIGHT EYE TWICE A DAY, Disp: , Rfl:     Cyanocobalamin (Vitamin B 12) 500 MCG TABS, Take 500 mcg by mouth 2 (two) times a day, Disp: , Rfl:     ezetimibe-simvastatin (VYTORIN) 10-40 mg per tablet, TAKE 1 TABLET BY MOUTH DAILY AT BEDTIME, Disp: 30 tablet, Rfl: 5    famotidine (PEPCID) 20 mg tablet, TAKE 1 TABLET (20 MG TOTAL) BY MOUTH 2 (TWO) TIMES A DAY, Disp: 60 tablet, Rfl: 5    glucose blood (OneTouch Ultra) test strip, TEST FOUR TIMES A DAY, Disp: 100 strip, Rfl: 5    insulin lispro (HumALOG/ADMELOG) 100 units/mL injection, Inject 1-5 Units under the skin 3 (three) times a day before meals, Disp: , Rfl:     insulin lispro (HumALOG/ADMELOG) 100 units/mL injection, Inject 1-5 Units under the skin daily at bedtime, Disp: , Rfl:     Insulin Pen Needle (B-D UF III MINI PEN NEEDLES) 31G X 5 MM MISC, USE 2 (TWO) TIMES A DAY, Disp: 200 each, Rfl: 3    ipratropium (ATROVENT) 0.03 % nasal spray, USE 2 SPRAYS INTO EACH NOSTRIL EVERY 12 (TWELVE) HOURS, Disp:  30 mL, Rfl: 6    lidocaine (LIDODERM) 5 %, Apply 1 patch topically over 12 hours daily Remove & Discard patch within 12 hours or as directed by MD, Disp: , Rfl:     LORazepam (ATIVAN) 0.5 mg tablet, Take 1 tablet (0.5 mg total) by mouth 2 (two) times a day as needed for anxiety for up to 10 days, Disp: 20 tablet, Rfl: 0    metFORMIN (GLUCOPHAGE) 500 mg tablet, TAKE 2 TABLETS (1,000 MG TOTAL) BY MOUTH 2 (TWO) TIMES A DAY WITH MEALS, Disp: 120 tablet, Rfl: 5    metoprolol tartrate (LOPRESSOR) 25 mg tablet, Take 1 tablet (25 mg total) by mouth every 12 (twelve) hours, Disp: 180 tablet, Rfl: 3    mirtazapine (REMERON) 7.5 MG tablet, Take 1 tablet (7.5 mg total) by mouth daily at bedtime, Disp: 20 tablet, Rfl: 0    Multiple Vitamin (multivitamin) tablet, Take 1 tablet by mouth daily  , Disp: , Rfl:     OneTouch Delica Lancets 33G MISC, Check blood sugars four times daily. Please substitute with appropriate alternative as covered by patient's insurance. Dx: E11.65, Disp: 400 each, Rfl: 3    predniSONE 10 mg tablet, Take 1 tablet (10 mg total) by mouth daily for 4 doses, Disp: , Rfl:     sitaGLIPtin (Januvia) 100 mg tablet, TAKE 1 TABLET (100 MG TOTAL) BY MOUTH DAILY, Disp: 90 tablet, Rfl: 1    sodium chloride 1 g tablet, TAKE 1 TABLET THREE TIMES A DAY, Disp: 90 tablet, Rfl: 1    Updated list was reviewed in [facility] system of facility.     Vitals:    03/13/25 1354   BP: 142/71   Resp: 20   Temp: 97.6 °F (36.4 °C)   SpO2: 93%       Physical Exam  Constitutional:       General: She is not in acute distress.  HENT:      Head: Normocephalic and atraumatic.      Nose: No rhinorrhea.   Cardiovascular:      Rate and Rhythm: Normal rate and regular rhythm.      Heart sounds: Murmur heard.   Pulmonary:      Breath sounds: Rales (few at bases) present. No wheezing or rhonchi.   Abdominal:      General: There is no distension.      Palpations: Abdomen is soft.      Tenderness: There is no abdominal tenderness. There is no  "guarding.   Musculoskeletal:      Cervical back: Neck supple.      Right lower leg: No edema.      Left lower leg: No edema.   Skin:     Coloration: Skin is not jaundiced.   Neurological:      General: No focal deficit present.      Mental Status: Mental status is at baseline.      Cranial Nerves: No cranial nerve deficit.      Comments: Awake alert answering questions appropriately   Psychiatric:         Mood and Affect: Mood normal.         Behavior: Behavior normal.         Diagnostic Data:    Recent labs were reviewed.  Lab Results   Component Value Date    WBC 5.82 03/11/2025    HGB 10.5 (L) 03/11/2025    HCT 33.8 (L) 03/11/2025    MCV 81 (L) 03/11/2025     03/11/2025      Lab Results   Component Value Date    SODIUM 134 (L) 03/10/2025    K 4.3 03/10/2025    CL 98 03/10/2025    CO2 33 (H) 03/10/2025    BUN 17 03/10/2025    CREATININE 0.59 (L) 03/10/2025    GLUC 155 (H) 03/10/2025    CALCIUM 8.3 (L) 03/10/2025      Additional Notes:   Discontinue Humulin. Start Lantus 12 units every a.m. along with insulin lispro 3 units 3 times daily with meals.  Titrate insulin dose based on blood sugar readings.  Change fluid restriction to 2000 cc  Repeat CBC BMP in 1 week  Care coordinated with patient's brother in person  Total time spent on this patient's encounter and coordination of care with family and staff was in excess of 35 minutes        Portions of the record may have been created with voice recognition software.  Occasional wrong word or \"sound a like\" substitutions may have occurred due to the inherent limitations of voice recognition software.  Read the chart carefully and recognize, using context, where substitutions have occurred.    This note was electronically signed by Dr. Carlie Plunkett   "

## 2025-03-17 ENCOUNTER — PATIENT OUTREACH (OUTPATIENT)
Dept: CASE MANAGEMENT | Facility: OTHER | Age: 89
End: 2025-03-17

## 2025-03-17 ENCOUNTER — NURSING HOME VISIT (OUTPATIENT)
Dept: GERIATRICS | Facility: OTHER | Age: 89
End: 2025-03-17
Payer: MEDICARE

## 2025-03-17 ENCOUNTER — OFFICE VISIT (OUTPATIENT)
Dept: CARDIOLOGY CLINIC | Facility: CLINIC | Age: 89
End: 2025-03-17
Payer: MEDICARE

## 2025-03-17 VITALS
SYSTOLIC BLOOD PRESSURE: 108 MMHG | TEMPERATURE: 97.8 F | DIASTOLIC BLOOD PRESSURE: 54 MMHG | WEIGHT: 118.4 LBS | RESPIRATION RATE: 18 BRPM | OXYGEN SATURATION: 95 % | HEART RATE: 83 BPM | BODY MASS INDEX: 19.11 KG/M2

## 2025-03-17 VITALS — SYSTOLIC BLOOD PRESSURE: 100 MMHG | DIASTOLIC BLOOD PRESSURE: 50 MMHG | OXYGEN SATURATION: 99 % | HEART RATE: 73 BPM

## 2025-03-17 DIAGNOSIS — J84.9 INTERSTITIAL LUNG DISEASE (HCC): ICD-10-CM

## 2025-03-17 DIAGNOSIS — F41.9 ANXIETY: ICD-10-CM

## 2025-03-17 DIAGNOSIS — E78.2 MIXED HYPERLIPIDEMIA: ICD-10-CM

## 2025-03-17 DIAGNOSIS — I10 ESSENTIAL HYPERTENSION: Primary | ICD-10-CM

## 2025-03-17 DIAGNOSIS — I48.0 PAROXYSMAL ATRIAL FIBRILLATION (HCC): ICD-10-CM

## 2025-03-17 DIAGNOSIS — R06.09 DOE (DYSPNEA ON EXERTION): ICD-10-CM

## 2025-03-17 DIAGNOSIS — Z79.4 TYPE 2 DIABETES MELLITUS WITH HYPERGLYCEMIA, WITH LONG-TERM CURRENT USE OF INSULIN (HCC): Primary | ICD-10-CM

## 2025-03-17 DIAGNOSIS — E87.1 HYPONATREMIA: ICD-10-CM

## 2025-03-17 DIAGNOSIS — I10 ESSENTIAL HYPERTENSION: ICD-10-CM

## 2025-03-17 DIAGNOSIS — E11.65 TYPE 2 DIABETES MELLITUS WITH HYPERGLYCEMIA, WITH LONG-TERM CURRENT USE OF INSULIN (HCC): Primary | ICD-10-CM

## 2025-03-17 DIAGNOSIS — I35.0 AORTIC VALVE STENOSIS, ETIOLOGY OF CARDIAC VALVE DISEASE UNSPECIFIED: ICD-10-CM

## 2025-03-17 DIAGNOSIS — E11.42 TYPE 2 DIABETES MELLITUS WITH DIABETIC POLYNEUROPATHY, WITH LONG-TERM CURRENT USE OF INSULIN (HCC): ICD-10-CM

## 2025-03-17 DIAGNOSIS — F33.9 DEPRESSION, RECURRENT (HCC): ICD-10-CM

## 2025-03-17 DIAGNOSIS — J96.01 ACUTE HYPOXIC RESPIRATORY FAILURE (HCC): ICD-10-CM

## 2025-03-17 DIAGNOSIS — Z79.4 TYPE 2 DIABETES MELLITUS WITH DIABETIC POLYNEUROPATHY, WITH LONG-TERM CURRENT USE OF INSULIN (HCC): ICD-10-CM

## 2025-03-17 DIAGNOSIS — R13.10 DYSPHAGIA, UNSPECIFIED TYPE: ICD-10-CM

## 2025-03-17 DIAGNOSIS — Z95.0 PACEMAKER: ICD-10-CM

## 2025-03-17 DIAGNOSIS — D50.9 IRON DEFICIENCY ANEMIA, UNSPECIFIED IRON DEFICIENCY ANEMIA TYPE: ICD-10-CM

## 2025-03-17 DIAGNOSIS — K21.9 GASTROESOPHAGEAL REFLUX DISEASE, UNSPECIFIED WHETHER ESOPHAGITIS PRESENT: ICD-10-CM

## 2025-03-17 DIAGNOSIS — I73.9 PERIPHERAL ARTERIAL DISEASE (HCC): ICD-10-CM

## 2025-03-17 PROCEDURE — 93000 ELECTROCARDIOGRAM COMPLETE: CPT

## 2025-03-17 PROCEDURE — 99214 OFFICE O/P EST MOD 30 MIN: CPT

## 2025-03-17 PROCEDURE — 99310 SBSQ NF CARE HIGH MDM 45: CPT | Performed by: INTERNAL MEDICINE

## 2025-03-17 RX ORDER — INSULIN GLARGINE 100 [IU]/ML
5 INJECTION, SOLUTION SUBCUTANEOUS EVERY MORNING
Start: 2025-03-17 | End: 2025-04-16

## 2025-03-17 RX ORDER — PANTOPRAZOLE SODIUM 40 MG/1
40 TABLET, DELAYED RELEASE ORAL EVERY 12 HOURS
Start: 2025-03-17 | End: 2025-04-16

## 2025-03-17 NOTE — ASSESSMENT & PLAN NOTE
Blood pressure was noted to be on the lower side with SBP of 100 at cardiologist office.  Patient currently remains on amlodipine 5 mg daily and metoprolol 25 mg twice daily.    Manual blood pressure taken at the facility was also on the softer side at 108/584.  Patient reports feeling tired and fatigued.  Discontinue amlodipine and follow

## 2025-03-17 NOTE — PROGRESS NOTES
[facility]  [address]   [address]  [place], [place], [address]  [phone]    Progress Note  Code  SNF31    Patient Location     [facility]     Reason for visit     Diabetes mellitus type with episodes of hypoglycemia +  Follow-up ILD, dyspnea, hypertension, , anemia       Patient’s recent vitals, labs and updated medications were reviewed on [facility] system of facility.     Problem List Items Addressed This Visit       Essential hypertension    Blood pressure was noted to be on the lower side with SBP of 100 at cardiologist office.  Patient currently remains on amlodipine 5 mg daily and metoprolol 25 mg twice daily.    Manual blood pressure taken at the facility was also on the softer side at 108/584.  Patient reports feeling tired and fatigued.  Discontinue amlodipine and follow         Paroxysmal atrial fibrillation (HCC)    Heart rate stable on metoprolol succinate 25 mg daily.  Continue Eliquis         GERD (gastroesophageal reflux disease)    Patient was discharged  on pantoprazole 40 mg twice daily and famotidine 20 mg daily.  Pantoprazole dose was reduced  to 40 mg once daily on last encounter.  Patient complains of increased GERD symptoms.  Will increase Protonix back to 40 mg twice daily and follow           Relevant Medications    pantoprazole (PROTONIX) 40 mg tablet    Anxiety    Patient remains on mirtazapine and as needed lorazepam         Interstitial lung disease (HCC)    Patient had recent hospitalization from 02/24/2025 to 02/26/2025 for ILD flare up. She  was discharged on prednisone taper. She was readmitted with hemoptysis and later with dyspnea.  CTA PE study was negative for PE.  Imaging studies additionally revealed chronic ILD with diffuse groundglass haziness in the lower lobe possibly edema versus hemorrhage.  Chest x-ray showed moderate pulmonary fibrosis.  Patient received last dose of prednisone 10 mg yesterday  Respiratory  status remains stable.  Patient is not requiring any supplemental oxygen.  Recommend pulmonary follow-up         Dysphagia    Relevant Medications    pantoprazole (PROTONIX) 40 mg tablet    Hyponatremia    History of hyponatremia/SIADH.  Sodium level was 134 on 3/10/2025.  Patient remains on sodium chloride tablets 1 g 3 times daily.  Fluid restriction was changed to 2000 cc per patient's request.  Repeat BMP in a.m.         DM2 (diabetes mellitus, type 2) ([facility]) - Primary      Lab Results   Component Value Date    HGBA1C 7.5 (H) 03/07/2025   Patient had issues with hyperglycemia earlier for which insulin dose was recently adjusted however blood sugars have been running on the lower side during last few days suspect partly due to patient coming off of steroids altogether.  Blood sugar dropped down to 60 earlier today.  Patient was shaky and symptomatic.  Currently remains on Lantus 14 units daily along with lispro 3 units 3 times daily, metformin 1000 mg twice daily and sitagliptin 100 mg daily.  Postprandial blood sugars are mostly low.  Discontinue lispro with meals for now.  Decrease Lantus to 5 units and follow         Type 2 diabetes mellitus with diabetic polyneuropathy, with long-term current use of insulin (HCC)    Depression, recurrent (HCC)    Continue Mirtazapine         Aortic stenosis    Continue conservative care.  Follow-up with cardiology service         Acute hypoxic respiratory failure (HCC)    Patient was recently hospitalized with acute hypoxic respiratory failure.  CTA PE study was negative for PE.  Positive for chronic ILD with new diffuse groundglass haziness in lower lobe possibility of edema, hemorrhage. Chest x-ray showed moderate pulmonary fibrosis  Last echo in 2/25/2025 showed LVEF 55-60% with grade 1 diastolic dysfunction, mild to moderate aortic stenosis  Patient initially required oxygen, was later weaned off to RA  Recently completed prednisone taper follow-up with pulmonary service.   Monitor respiratory status.  SaO2 remains stable on room air         Anemia    HGB has been ranging  between 9-11 range  Iron panel was  suggestive of some mild iron deficiency  S/p  dose of IV venofer recently at the [facility]  Patient reports having black-colored stools.  Will change labs from 3/20 to tomorrow                HPI       Patient is being seen for a follow-up visit today.  She was noted to have episode of hypoglycemia short while ago.  Blood sugar dropped down to 60.  Of note blood sugars in general have been running on the lower side.  Patient recently completed prednisone taper for ILD flare up.  Patient was symptomatic with hypoglycemia, felt dizzy, shaky and lightheaded.  She was additionally seen by cardiology service today.  Follow-up notes were reviewed.  At the time of my evaluation today patient is resting comfortably on the bed.  Reports feeling tired and fatigued.      Review of Systems   Constitutional:  Positive for fatigue. Negative for chills and fever.   Respiratory:  Positive for shortness of breath (With exertion). Negative for wheezing.    Cardiovascular:  Negative for chest pain.   Gastrointestinal:  Negative for abdominal distention, abdominal pain and vomiting.   Endocrine:        Blood sugars have been mostly running on the low side, lowest reported reading was 60.  Patient felt lightheaded and shaky   Genitourinary:  Negative for flank pain and hematuria.   Musculoskeletal:  Positive for back pain and gait problem.   Neurological:  Positive for weakness. Negative for seizures and syncope.   Psychiatric/Behavioral:  Negative for agitation and behavioral problems.        Past Medical History:   Diagnosis Date    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/7/2020    Glaucoma     H/O degenerative disc disease     Idiopathic pulmonary fibrosis (HCC) 11/2020    Irregular heart beat     afib    Peripheral neuropathy     S/P laparoscopic cholecystectomy 12/21/2020    Stenosis of right subclavian  artery (HCC) 2016       Past Surgical History:   Procedure Laterality Date    CATARACT EXTRACTION, BILATERAL      CHOLECYSTECTOMY      CHOLECYSTECTOMY LAPAROSCOPIC N/A 2020    Procedure: CHOLECYSTECTOMY LAPAROSCOPIC;  Surgeon: Kalen Garrett MD;  Location: [facility];  Service: General    COLONOSCOPY      CYST REMOVAL      left lower Quadrant     FL LUMBAR PUNCTURE DIAGNOSTIC  2023    HERNIA REPAIR  1992    LIPOMA RESECTION  2010    SC RPR 1ST INGUN HRNA AGE 5 YRS/> REDUCIBLE Bilateral 2018    Procedure: INGUINAL HERNIA REPAIR;  Surgeon: Volodymyr Lee MD;  Location: [facility];  Service: General    SHOULDER SURGERY  2019    Dr. Arnett ([place])    UPPER GASTROINTESTINAL ENDOSCOPY      VASCULAR SURGERY      Agram-  R carotid occlusion       Social History     Tobacco Use   Smoking Status Former    Current packs/day: 0.00    Average packs/day: 1.5 packs/day for 38.0 years (57.0 ttl pk-yrs)    Types: Cigarettes    Start date:     Quit date:     Years since quittin.2   Smokeless Tobacco Never       Family History   Problem Relation Age of Onset    Heart disease Mother     Heart attack Father     Hiatal hernia Father     Diabetes Father     Cancer Brother     Diabetes Brother     Heart disease Brother     Hypertension Brother     Lung disease Brother 75        interstitial lung disease    Cancer Brother 49        glioblastoma    Other Son         gastroparesis    Other Cousin         interstitial lung disease        Allergies   Allergen Reactions    Keflex [Cephalexin] Diarrhea         Current Outpatient Medications:     pantoprazole (PROTONIX) 40 mg tablet, Take 1 tablet (40 mg total) by mouth every 12 (twelve) hours, Disp: , Rfl:     acetaminophen (TYLENOL) 500 mg tablet, Take 1,000 mg by mouth as needed for mild pain, Disp: , Rfl:     apixaban (Eliquis) 2.5 mg, TAKE 1 TABLET (2.5 MG TOTAL) BY MOUTH 2 (TWO) TIMES A DAY, Disp: 90 tablet, Rfl: 1    bimatoprost  (LUMIGAN) 0.01 % ophthalmic drops, Administer 1 drop to both eyes daily at bedtime for 30 days, Disp: 1.5 mL, Rfl: 0    brimonidine tartrate 0.2 % ophthalmic solution, INSTILL 1 DROP INTO RIGHT EYE TWICE A DAY, Disp: , Rfl:     Cyanocobalamin (Vitamin B 12) 500 MCG TABS, Take 500 mcg by mouth 2 (two) times a day, Disp: , Rfl:     ezetimibe-simvastatin (VYTORIN) 10-40 mg per tablet, TAKE 1 TABLET BY MOUTH DAILY AT BEDTIME, Disp: 30 tablet, Rfl: 5    famotidine (PEPCID) 20 mg tablet, TAKE 1 TABLET (20 MG TOTAL) BY MOUTH 2 (TWO) TIMES A DAY, Disp: 60 tablet, Rfl: 5    glucose blood (OneTouch Ultra) test strip, TEST FOUR TIMES A DAY, Disp: 100 strip, Rfl: 5    insulin glargine (LANTUS) 100 units/mL subcutaneous injection, Inject 12 Units under the skin every morning, Disp: , Rfl:     insulin lispro (HumALOG/ADMELOG) 100 units/mL injection, Inject 1-5 Units under the skin 3 (three) times a day before meals, Disp: , Rfl:     insulin lispro (HumALOG/ADMELOG) 100 units/mL injection, Inject 1-5 Units under the skin daily at bedtime, Disp: , Rfl:     Insulin Pen Needle (B-D UF III MINI PEN NEEDLES) 31G X 5 MM MISC, USE 2 (TWO) TIMES A DAY, Disp: 200 each, Rfl: 3    ipratropium (ATROVENT) 0.03 % nasal spray, USE 2 SPRAYS INTO EACH NOSTRIL EVERY 12 (TWELVE) HOURS, Disp: 30 mL, Rfl: 6    lidocaine (LIDODERM) 5 %, Apply 1 patch topically over 12 hours daily Remove & Discard patch within 12 hours or as directed by MD, Disp: , Rfl:     LORazepam (ATIVAN) 0.5 mg tablet, Take 1 tablet (0.5 mg total) by mouth 2 (two) times a day as needed for anxiety for up to 10 days, Disp: 20 tablet, Rfl: 0    metFORMIN (GLUCOPHAGE) 500 mg tablet, TAKE 2 TABLETS (1,000 MG TOTAL) BY MOUTH 2 (TWO) TIMES A DAY WITH MEALS, Disp: 120 tablet, Rfl: 5    metoprolol tartrate (LOPRESSOR) 25 mg tablet, Take 1 tablet (25 mg total) by mouth every 12 (twelve) hours, Disp: 180 tablet, Rfl: 3    mirtazapine (REMERON) 7.5 MG tablet, Take 1 tablet (7.5 mg total) by  mouth daily at bedtime, Disp: 20 tablet, Rfl: 0    Multiple Vitamin (multivitamin) tablet, Take 1 tablet by mouth daily  , Disp: , Rfl:     OneTouch Delica Lancets 33G MISC, Check blood sugars four times daily. Please substitute with appropriate alternative as covered by patient's insurance. Dx: E11.65, Disp: 400 each, Rfl: 3    sitaGLIPtin (Januvia) 100 mg tablet, TAKE 1 TABLET (100 MG TOTAL) BY MOUTH DAILY (Patient not taking: Reported on 3/17/2025), Disp: 90 tablet, Rfl: 1    sodium chloride 1 g tablet, TAKE 1 TABLET THREE TIMES A DAY, Disp: 90 tablet, Rfl: 1    Updated list was reviewed in [facility] system of facility.     Vitals:    03/17/25 1310   BP: 108/54   Pulse: 83   Resp: 18   Temp: 97.8 °F (36.6 °C)   SpO2: 95%       Physical Exam  Constitutional:       General: She is not in acute distress.  HENT:      Head: Normocephalic and atraumatic.      Nose: No rhinorrhea.   Cardiovascular:      Rate and Rhythm: Normal rate and regular rhythm.      Heart sounds: Murmur heard.   Pulmonary:      Breath sounds: Rales (few at bases) present. No wheezing or rhonchi.   Abdominal:      General: There is no distension.      Palpations: Abdomen is soft.      Tenderness: There is no abdominal tenderness. There is no guarding.   Musculoskeletal:      Cervical back: Neck supple.      Right lower leg: No edema.      Left lower leg: No edema.   Skin:     Coloration: Skin is not jaundiced.   Neurological:      General: No focal deficit present.      Mental Status: Mental status is at baseline.      Cranial Nerves: No cranial nerve deficit.      Comments: Awake alert answering questions appropriately   Psychiatric:         Mood and Affect: Mood normal.         Behavior: Behavior normal.         Diagnostic Data:    Recent labs were reviewed.  Lab Results   Component Value Date    WBC 5.82 03/11/2025    HGB 10.5 (L) 03/11/2025    HCT 33.8 (L) 03/11/2025    MCV 81 (L) 03/11/2025     03/11/2025      Lab Results  "  Component Value Date    SODIUM 134 (L) 03/10/2025    K 4.3 03/10/2025    CL 98 03/10/2025    CO2 33 (H) 03/10/2025    BUN 17 03/10/2025    CREATININE 0.59 (L) 03/10/2025    GLUC 155 (H) 03/10/2025    CALCIUM 8.3 (L) 03/10/2025      Recommendations:  Discontinue lispro with meals  Decrease Lantus to 5 units daily.  Follow blood sugars closely  Discontinue amlodipine.  Monitor blood pressure closely  DC labs on 3/20/2025.  Repeat CBC BMP in a.m.  Increase Protonix to 40 mg 2 times daily for uncontrolled GERD like symptoms  Care coordinated at length with patient's son and brother at bedside    Portions of the record may have been created with voice recognition software.  Occasional wrong word or \"sound a like\" substitutions may have occurred due to the inherent limitations of voice recognition software.  Read the chart carefully and recognize, using context, where substitutions have occurred.    This note was electronically signed by Dr. Carlie Plunkett   "

## 2025-03-17 NOTE — ASSESSMENT & PLAN NOTE
Patient was discharged  on pantoprazole 40 mg twice daily and famotidine 20 mg daily.  Pantoprazole dose was reduced  to 40 mg once daily on last encounter.  Patient complains of increased GERD symptoms.  Will increase Protonix back to 40 mg twice daily and follow

## 2025-03-17 NOTE — ASSESSMENT & PLAN NOTE
HGB has been ranging  between 9-11 range  Iron panel was  suggestive of some mild iron deficiency  S/p  dose of IV venofer recently at the [facility]  Patient reports having black-colored stools.  Will change labs from 3/20 to tomorrow

## 2025-03-17 NOTE — ASSESSMENT & PLAN NOTE
Patient had recent hospitalization from 02/24/2025 to 02/26/2025 for ILD flare up. She  was discharged on prednisone taper. She was readmitted with hemoptysis and later with dyspnea.  CTA PE study was negative for PE.  Imaging studies additionally revealed chronic ILD with diffuse groundglass haziness in the lower lobe possibly edema versus hemorrhage.  Chest x-ray showed moderate pulmonary fibrosis.  Patient received last dose of prednisone 10 mg yesterday  Respiratory status remains stable.  Patient is not requiring any supplemental oxygen.  Recommend pulmonary follow-up

## 2025-03-17 NOTE — ASSESSMENT & PLAN NOTE
BP on the low side today 100/50 HR 73  Patient's son reports that her BP have been normal at the facility  She is on Lopressor 25 mg BID and amlodipine 5 mg daily   She reports chronic dizziness with positioning. States it is not worsening  Advised to monitor BP and HR , can consider cutting down on the amlodipine if needed  Patient also c/o heartburn that started about 5 days post discharge. She does have GERD and is on protonix and pepcid. Unsure if it is amlodipine side effect. We will reassess in 2 weeks  Reinforced hydration, can have up to 2 L of fluids  
Controlled  Continue ezetimibe and simvastatin  
Currently maintaining NSR by exam on Lopressor 25 mg BID   Anticoagulated on Eliquis 2.5 mg BID  
Echocardiogram 2/2025: EF 55-60% with mild asymmetric hypertrophy of the basal septal wall, no WMA, G1DD, normal RV function, mild-moderate AS with MG 15 mmHg and OZIEL 1.2 cm2  Patient denies chest pain, lightheadedness, syncope or near syncope  Volume status appears stable  
History of TBS s/p MDT DC PPM implanted 2016  Device interrogation 2/17/2025: AP 88%,  0.2%, no significant high rate episodes.  Reprogrammed rate response activity threshold to low from medium low for reported dyspnea and fatigue.  Normal device function.  
Not on oxygen at baseline  
Patient remains on aspirin, statin and Eliquis  
14-Sep-2018

## 2025-03-17 NOTE — PROGRESS NOTES
[facility]  General Cardiology Note  Ac Duran  1935  [id number]      Referring Provider - No ref. provider found  Chief Complaint   Patient presents with    Follow-up    Shortness of Breath     SOB often     Dizziness     Sometimes     Palpitations     Sometimes        Assessment & Plan  Essential hypertension  BP on the low side today 100/50 HR 73  Patient's son reports that her BP have been normal at the facility  She is on Lopressor 25 mg BID and amlodipine 5 mg daily   She reports chronic dizziness with positioning. States it is not worsening  Advised to monitor BP and HR , can consider cutting down on the amlodipine if needed  Patient also c/o heartburn that started about 5 days post discharge. She does have GERD and is on protonix and pepcid. Unsure if it is amlodipine side effect. We will reassess in 2 weeks  Reinforced hydration, can have up to 2 L of fluids  Paroxysmal atrial fibrillation (HCC)  Currently maintaining NSR by exam on Lopressor 25 mg BID   Anticoagulated on Eliquis 2.5 mg BID  Mixed hyperlipidemia  Controlled  Continue ezetimibe and simvastatin  Pacemaker  History of TBS s/p MDT DC PPM implanted 2016  Device interrogation 2/17/2025: AP 88%,  0.2%, no significant high rate episodes.  Reprogrammed rate response activity threshold to low from medium low for reported dyspnea and fatigue.  Normal device function.  Aortic valve stenosis, etiology of cardiac valve disease unspecified  Echocardiogram 2/2025: EF 55-60% with mild asymmetric hypertrophy of the basal septal wall, no WMA, G1DD, normal RV function, mild-moderate AS with MG 15 mmHg and OZIEL 1.2 cm2  Patient denies chest pain, lightheadedness, syncope or near syncope  Volume status appears stable  Interstitial lung disease (HCC)  Not on oxygen at baseline  DASILVA (dyspnea on exertion)  Felt secondary to deconditioning and underlying severe ILD  Follows with Pulmonary   Peripheral arterial disease  (HCC)  Patient remains on aspirin, statin and Eliquis       Will RTO in 2 weeks or sooner if necessary  Patient / Caretaker was advised and educated to call our office  immediately if  patient has any new symptoms of chest pain/shortness of breath, near-syncope, syncope, light headedness sustained palpitations  or any other cardiovascular symptoms before their scheduled follow-up appointment.  ED precautions reviewed    Interval History: 89 y.o.  female  with PMH as below is here for HFU  Patient of Dr. Ramsey  Admitted 3/7-3/11/25 due to 2 weeks of progressive dyspnea on exertion.  Patient had had 2 recent hospitalizations with hemoptysis attributed to ILD flareup on 2/24 and 3/5. CXR revealed moderate pulmonary fibrosis with no definitive acute disease.   Past 6 weeks    All of a suddden can't walk to the bed without DASILVA     Today, patient presents with son.  She is in her wheelchair.  She reports SOB and DASILVA.  Denies denies any chest pain, palpitations.  She has difficulty maintaining conversation due to DASILVA.  She is at rehab now at [facility] and is working with physical therapy  States she is compliant with medication regimen  Reports that she is not getting enough fluids at the facility    Patient Active Problem List    Diagnosis Date Noted    Anemia 03/11/2025    Severe protein-calorie malnutrition (HCC) 03/10/2025    Mild protein-calorie malnutrition (HCC) 03/05/2025    Iron deficiency anemia 03/03/2025    Hemoptysis 03/02/2025    Acute hypoxic respiratory failure (HCC) 03/02/2025    Hypertension 03/02/2025    Aortic stenosis 02/26/2025    Microcytosis 02/24/2025    ROMERO (obstructive sleep apnea) 08/14/2024    Hypoxia 05/13/2024    Dyspnea on exertion 04/15/2024    Irritable bowel syndrome with both constipation and diarrhea 03/17/2024    Peripheral arterial disease (HCC) 03/17/2024    Metabolic alkalosis 10/19/2023    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) 10/19/2023    Bilateral hip  pain 2023    Pachymeningitis 2023    Hypovitaminosis D 2021    Depression, recurrent ([facility]) 2021    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, with long-term current use of insulin ([facility]) 2021    Type 2 diabetes mellitus with hyperlipidemia  ([facility]) 2021    Type 2 diabetes mellitus with diabetic polyneuropathy, with long-term current use of insulin ([facility]) 2021    DM2 (diabetes mellitus, type 2) ([facility]) 2021    Post-nasal drip 2021    Hyponatremia 2020    Moderate protein-calorie malnutrition ([facility]) 2020    Constipation 2020    Dysphagia 2020    Interstitial lung disease ([facility]) 10/13/2020    Iron deficiency 2020    Osteoporosis 2019    GERD (gastroesophageal reflux disease) 2019    Anxiety 2019    Pacemaker 2016    Occlusion of right carotid artery 2016    Paroxysmal atrial fibrillation ([facility]) 2016    Symptomatic PVCs 2016    Essential hypertension 10/29/2016    Mixed hyperlipidemia 10/29/2016    Glaucoma 10/29/2016    Asymptomatic carotid artery stenosis, left 10/29/2016     Past Medical History:   Diagnosis Date    Common bile duct dilatation 2020    Glaucoma     H/O degenerative disc disease     Idiopathic pulmonary fibrosis ([facility]) 2020    Irregular heart beat     afib    Peripheral neuropathy     S/P laparoscopic cholecystectomy 2020    Stenosis of right subclavian artery ([facility]) 2016     Social History     Tobacco Use    Smoking status: Former     Current packs/day: 0.00     Average packs/day: 1.5 packs/day for 38.0 years (57.0 ttl pk-yrs)     Types: Cigarettes     Start date:      Quit date:      Years since quittin.2    Smokeless tobacco: Never   Vaping Use    Vaping status: Never Used   Substance Use Topics    Alcohol use: Never    Drug use: No      Family History   Problem Relation Age of Onset     Heart disease Mother     Heart attack Father     Hiatal hernia Father     Diabetes Father     Cancer Brother     Diabetes Brother     Heart disease Brother     Hypertension Brother     Lung disease Brother 75        interstitial lung disease    Cancer Brother 49        glioblastoma    Other Son         gastroparesis    Other Cousin         interstitial lung disease     Past Surgical History:   Procedure Laterality Date    CATARACT EXTRACTION, BILATERAL  2011    CHOLECYSTECTOMY      CHOLECYSTECTOMY LAPAROSCOPIC N/A 12/09/2020    Procedure: CHOLECYSTECTOMY LAPAROSCOPIC;  Surgeon: Kalen Garrett MD;  Location: [facility];  Service: General    COLONOSCOPY      CYST REMOVAL  2009    left lower Quadrant     FL LUMBAR PUNCTURE DIAGNOSTIC  4/28/2023    HERNIA REPAIR  1992    LIPOMA RESECTION  2010    IA RPR 1ST INGUN HRNA AGE 5 YRS/> REDUCIBLE Bilateral 01/05/2018    Procedure: INGUINAL HERNIA REPAIR;  Surgeon: Volodymyr Lee MD;  Location: [facility];  Service: General    SHOULDER SURGERY  04/26/2019    Dr. Arnett ([place])    UPPER GASTROINTESTINAL ENDOSCOPY      VASCULAR SURGERY  1994    Agram-  R carotid occlusion       Current Outpatient Medications:     acetaminophen (TYLENOL) 500 mg tablet, Take 1,000 mg by mouth as needed for mild pain, Disp: , Rfl:     amLODIPine (NORVASC) 5 mg tablet, Take 1 tablet (5 mg total) by mouth daily, Disp: , Rfl:     apixaban (Eliquis) 2.5 mg, TAKE 1 TABLET (2.5 MG TOTAL) BY MOUTH 2 (TWO) TIMES A DAY, Disp: 90 tablet, Rfl: 1    bimatoprost (LUMIGAN) 0.01 % ophthalmic drops, Administer 1 drop to both eyes daily at bedtime for 30 days, Disp: 1.5 mL, Rfl: 0    brimonidine tartrate 0.2 % ophthalmic solution, INSTILL 1 DROP INTO RIGHT EYE TWICE A DAY, Disp: , Rfl:     Cyanocobalamin (Vitamin B 12) 500 MCG TABS, Take 500 mcg by mouth 2 (two) times a day, Disp: , Rfl:     ezetimibe-simvastatin (VYTORIN) 10-40 mg per tablet, TAKE 1 TABLET BY MOUTH DAILY AT BEDTIME, Disp: 30 tablet, Rfl: 5     famotidine (PEPCID) 20 mg tablet, TAKE 1 TABLET (20 MG TOTAL) BY MOUTH 2 (TWO) TIMES A DAY, Disp: 60 tablet, Rfl: 5    glucose blood (OneTouch Ultra) test strip, TEST FOUR TIMES A DAY, Disp: 100 strip, Rfl: 5    insulin glargine (LANTUS) 100 units/mL subcutaneous injection, Inject 12 Units under the skin every morning, Disp: , Rfl:     insulin lispro (HumALOG/ADMELOG) 100 units/mL injection, Inject 1-5 Units under the skin 3 (three) times a day before meals, Disp: , Rfl:     insulin lispro (HumALOG/ADMELOG) 100 units/mL injection, Inject 1-5 Units under the skin daily at bedtime, Disp: , Rfl:     insulin lispro (HumALOG/ADMELOG) 100 units/mL injection, Inject 3 Units under the skin 3 (three) times a day with meals, Disp: , Rfl:     Insulin Pen Needle (B-D UF III MINI PEN NEEDLES) 31G X 5 MM MISC, USE 2 (TWO) TIMES A DAY, Disp: 200 each, Rfl: 3    ipratropium (ATROVENT) 0.03 % nasal spray, USE 2 SPRAYS INTO EACH NOSTRIL EVERY 12 (TWELVE) HOURS, Disp: 30 mL, Rfl: 6    lidocaine (LIDODERM) 5 %, Apply 1 patch topically over 12 hours daily Remove & Discard patch within 12 hours or as directed by MD, Disp: , Rfl:     LORazepam (ATIVAN) 0.5 mg tablet, Take 1 tablet (0.5 mg total) by mouth 2 (two) times a day as needed for anxiety for up to 10 days, Disp: 20 tablet, Rfl: 0    metFORMIN (GLUCOPHAGE) 500 mg tablet, TAKE 2 TABLETS (1,000 MG TOTAL) BY MOUTH 2 (TWO) TIMES A DAY WITH MEALS, Disp: 120 tablet, Rfl: 5    metoprolol tartrate (LOPRESSOR) 25 mg tablet, Take 1 tablet (25 mg total) by mouth every 12 (twelve) hours, Disp: 180 tablet, Rfl: 3    mirtazapine (REMERON) 7.5 MG tablet, Take 1 tablet (7.5 mg total) by mouth daily at bedtime, Disp: 20 tablet, Rfl: 0    Multiple Vitamin (multivitamin) tablet, Take 1 tablet by mouth daily  , Disp: , Rfl:     OneTouch Delica Lancets 33G MISC, Check blood sugars four times daily. Please substitute with appropriate alternative as covered by patient's insurance. Dx: E11.65, Disp: 400  each, Rfl: 3    pantoprazole (PROTONIX) 40 mg tablet, Take 1 tablet (40 mg total) by mouth daily, Disp: , Rfl:     sodium chloride 1 g tablet, TAKE 1 TABLET THREE TIMES A DAY, Disp: 90 tablet, Rfl: 1    sitaGLIPtin (Januvia) 100 mg tablet, TAKE 1 TABLET (100 MG TOTAL) BY MOUTH DAILY (Patient not taking: Reported on 3/17/2025), Disp: 90 tablet, Rfl: 1    Allergies   Allergen Reactions    Keflex [Cephalexin] Diarrhea       Vitals:    03/17/25 0948   BP: 100/50   BP Location: Left arm   Patient Position: Sitting   Cuff Size: Large   Pulse: 73   SpO2: 99%        Vitals:          There is no height or weight on file to calculate BMI.    Labs:   Lab Results   Component Value Date/Time    CHOLESTEROL 163 03/07/2025 10:31 AM    TRIG 94 03/07/2025 10:31 AM    HDL 75 03/07/2025 10:31 AM    LDLCALC 69 03/07/2025 10:31 AM      Lab Results   Component Value Date    SODIUM 134 (L) 03/10/2025    K 4.3 03/10/2025    CL 98 03/10/2025    CREATININE 0.59 (L) 03/10/2025    EGFR 82 03/10/2025    BUN 17 03/10/2025    CO2 33 (H) 03/10/2025    ALT 23 03/07/2025    AST 29 03/07/2025    TSH 0.97 12/04/2018    INR 1.01 03/02/2025    GLUF 122 (H) 08/30/2024    HGBA1C 7.5 (H) 03/07/2025    WBC 5.82 03/11/2025    HGB 10.5 (L) 03/11/2025    HCT 33.8 (L) 03/11/2025     03/11/2025         Imaging: XR chest 2 views  Result Date: 3/7/2025  Narrative: XR CHEST PA AND LATERAL INDICATION: sob. COMPARISON: CXR and chest CT 3/2/2025. FINDINGS: Moderate pulmonary fibrosis. No pneumothorax or pleural effusion. Normal heart size with left subclavian pacemaker leads in right atrium and right ventricle. Bones are unremarkable for age. Reverse right shoulder arthroplasty. Upper abdomen normal. Cholecystectomy.     Impression: Moderate pulmonary fibrosis with no definite acute disease. Workstation performed: [id number]     CTA chest pe study  Result Date: 3/2/2025  Narrative: CTA - CHEST WITH IV CONTRAST - PULMONARY ANGIOGRAM INDICATION: hypoxemia, nicky  hemoptysis, r/o PE. COMPARISON: Compared with 8/17/2023 and CT abdomen of 7/6/2024 TECHNIQUE: CTA examination of the chest was performed using angiographic technique according to a protocol specifically tailored to evaluate for pulmonary embolism. Multiplanar 2D reformatted images were created from the source data. In addition, coronal  3D MIP postprocessing was performed on the acquisition scanner. Radiation dose length product (DLP) for this visit: 315 mGy-cm . This examination, like all CT scans performed in the [facility], was performed utilizing techniques to minimize radiation dose exposure, including the use of iterative reconstruction and automated exposure control. IV Contrast: 50 mL of iohexol FINDINGS: PULMONARY ARTERIAL TREE:  No pulmonary embolus. LUNGS: Juxtapleural coarse reticular interstitial changes similar to prior study with moderate to severe bronchiectasis and bronchiolectasis unchanged. New diffuse groundglass haziness throughout the lower lobes predominantly and minimally in the upper lobes in a patchy distribution. . No tracheal or endobronchial lesion. PLEURA: Unremarkable. HEART/GREAT VESSELS: Heart is unremarkable for patient's age. No thoracic aortic aneurysm. MEDIASTINUM AND BRIDGET: Moderate hiatal hernia. CHEST WALL AND LOWER NECK: Left chest wall pacemaker VISUALIZED STRUCTURES IN THE UPPER ABDOMEN: Pneumobilia unchanged. OSSEOUS STRUCTURES: No acute fracture or destructive osseous lesion.     Impression: 1.  No pulmonary embolus. 2.  Measured RV/LV ratio is within normal limits at less than 0.9. 3.  Chronic interstitial lung disease with new diffuse groundglass haziness predominantly in the lower lobes. Differential includes edema, hemorrhage. Clinical history of hemoptysis. Workstation performed: [id number]     XR chest portable - 1 view  Result Date: 3/2/2025  Narrative: XR CHEST PORTABLE INDICATION: sob. COMPARISON: CXR 2/24/2025, 12/09/2024, chest CT 8/17/2023.  FINDINGS: Moderate pulmonary fibrosis. No definite acute disease. No pneumothorax or pleural effusion. Mild cardiomegaly, left subclavian pacemaker leads in right atrium and right ventricle. Bones are unremarkable for age. Reverse right shoulder arthroplasty. Upper abdomen normal. Cholecystectomy.     Impression: Moderate pulmonary fibrosis with no definite acute disease. Workstation performed: [id number]     Echo complete w/ contrast if indicated  Result Date: 2/25/2025  Narrative:   Left Ventricle: Left ventricular cavity size is normal. Wall thickness is mildly increased. There is mild asymmetric hypertrophy of the basal septal wall. The left ventricular ejection fraction is 55-60%. Systolic function is normal. Wall motion is normal. Diastolic function is mildly abnormal, consistent with grade I (abnormal) relaxation.   IVS: There is sigmoid appearance of the septum.   Right Ventricle: Right ventricular cavity size is normal. Systolic function is normal. A pacer wire is present.   Aortic Valve: There is mild to moderate stenosis. The aortic valve peak velocity is 2.6 m/s. The aortic valve mean gradient is 15.0 mmHg. The dimensionless velocity index is 0.32. The aortic valve area is 1.2 cm2 by continuity equation. The aortic valve area by planimetry is approximately 1.4 cm2.   Mitral Valve: There is mild annular calcification.   Tricuspid Valve: There is mild regurgitation.     XR chest 2 views  Result Date: 2/24/2025  Narrative: XR CHEST AP AND LATERAL INDICATION: Chest Pain. COMPARISON: CXR 12/09/2024, chest CT 8/17/2023. FINDINGS: Moderate pulmonary fibrosis. No pneumothorax or pleural effusion. Normal heart size with left subclavian pacemaker leads in the right atrial appendage and right ventricular apex. Reverse right shoulder arthroplasty. Normal upper abdomen.     Impression: Moderate pulmonary fibrosis with no acute disease. Workstation performed: [id number]     Cardiac EP device report  Result Date:  2/17/2025  Narrative: MDT-DUAL CHAMBER PPM (AAIR-DDDR MODE)/ ACTIVE SYSTEM IS MRI CONDITIONAL DEVICE INTERROGATED IN THE [facility] OFFICE. BATTERY VOLTAGE ADEQUATE (2 YRS). AP-88%, -0.2%. ALL LEAD PARAMETERS WITHIN NORMAL LIMITS. NO NEW SIGNIFICANT HIGH RATE EPISODES. PT C/O SOB & FATIGUE W/ ACTIVITY- REPROGRAMMED RATE RESPONSE ACTIVITY THRESHOLD TO LOW FROM MEDIAUM LOW. NORMAL DEVICE FUNCTION. GV       ECG:  Atrial paced rhythm with occasional premature ventricular complexes.  Septal infarct, age undetermined.  73 bpm   Reviewed by ZANDER Vallejo      Review of Systems   Cardiovascular:  Positive for dyspnea on exertion and palpitations (occasional).   Respiratory:  Positive for shortness of breath.    Neurological:  Positive for dizziness (occasional with positioning).     Except as noted in HPI, is otherwise reviewed in detail and a 12 point review of systems is negative.    Physical Exam  Vitals reviewed.   Constitutional:       General: She is not in acute distress.     Appearance: Normal appearance. She is not diaphoretic.   HENT:      Head: Normocephalic and atraumatic.   Eyes:      General: No scleral icterus.  Cardiovascular:      Rate and Rhythm: Normal rate and regular rhythm.      Pulses: Normal pulses.           Radial pulses are 2+ on the right side and 2+ on the left side.      Heart sounds: S1 normal and S2 normal. Murmur heard.   Pulmonary:      Effort: No tachypnea or respiratory distress.      Breath sounds: Decreased breath sounds present. No wheezing or rales.   Abdominal:      General: Bowel sounds are normal. There is no distension.      Palpations: Abdomen is soft.   Musculoskeletal:      Right lower leg: No edema.      Left lower leg: No edema.   Skin:     General: Skin is warm and dry.      Capillary Refill: Capillary refill takes less than 2 seconds.   Neurological:      Mental Status: She is alert and oriented to person, place, and time.   Psychiatric:         Mood and Affect: Mood  normal.         Behavior: Behavior normal.          **Please Note: This note may have been constructed using a voice recognition system**     Thank you for the opportunity to participate in the care of this patient.   ZANDER Vallejo

## 2025-03-17 NOTE — ASSESSMENT & PLAN NOTE
History of hyponatremia/SIADH.  Sodium level was 134 on 3/10/2025.  Patient remains on sodium chloride tablets 1 g 3 times daily.  Fluid restriction was changed to 2000 cc per patient's request.  Repeat BMP in a.m.

## 2025-03-17 NOTE — ASSESSMENT & PLAN NOTE
Lab Results   Component Value Date    HGBA1C 7.5 (H) 03/07/2025   Patient had issues with hyperglycemia earlier for which insulin dose was recently adjusted however blood sugars have been running on the lower side during last few days suspect partly due to patient coming off of steroids altogether.  Blood sugar dropped down to 60 earlier today.  Patient was shaky and symptomatic.  Currently remains on Lantus 14 units daily along with lispro 3 units 3 times daily, metformin 1000 mg twice daily and sitagliptin 100 mg daily.  Postprandial blood sugars are mostly low.  Discontinue lispro with meals for now.  Decrease Lantus to 5 units and follow

## 2025-03-17 NOTE — ASSESSMENT & PLAN NOTE
Patient was recently hospitalized with acute hypoxic respiratory failure.  CTA PE study was negative for PE.  Positive for chronic ILD with new diffuse groundglass haziness in lower lobe possibility of edema, hemorrhage. Chest x-ray showed moderate pulmonary fibrosis  Last echo in 2/25/2025 showed LVEF 55-60% with grade 1 diastolic dysfunction, mild to moderate aortic stenosis  Patient initially required oxygen, was later weaned off to RA  Recently completed prednisone taper follow-up with pulmonary service.  Monitor respiratory status.  SaO2 remains stable on room air

## 2025-03-19 ENCOUNTER — NURSING HOME VISIT (OUTPATIENT)
Dept: GERIATRICS | Facility: OTHER | Age: 89
End: 2025-03-19
Payer: MEDICARE

## 2025-03-19 VITALS
DIASTOLIC BLOOD PRESSURE: 79 MMHG | SYSTOLIC BLOOD PRESSURE: 161 MMHG | HEART RATE: 75 BPM | OXYGEN SATURATION: 99 % | WEIGHT: 113.8 LBS | RESPIRATION RATE: 18 BRPM | BODY MASS INDEX: 18.37 KG/M2

## 2025-03-19 DIAGNOSIS — E11.65 TYPE 2 DIABETES MELLITUS WITH HYPERGLYCEMIA, WITH LONG-TERM CURRENT USE OF INSULIN (HCC): Primary | ICD-10-CM

## 2025-03-19 DIAGNOSIS — I10 ESSENTIAL HYPERTENSION: ICD-10-CM

## 2025-03-19 DIAGNOSIS — Z79.4 TYPE 2 DIABETES MELLITUS WITH HYPERGLYCEMIA, WITH LONG-TERM CURRENT USE OF INSULIN (HCC): Primary | ICD-10-CM

## 2025-03-19 DIAGNOSIS — E87.1 HYPONATREMIA: ICD-10-CM

## 2025-03-19 DIAGNOSIS — I48.0 PAROXYSMAL ATRIAL FIBRILLATION (HCC): ICD-10-CM

## 2025-03-19 DIAGNOSIS — K21.9 GASTROESOPHAGEAL REFLUX DISEASE, UNSPECIFIED WHETHER ESOPHAGITIS PRESENT: ICD-10-CM

## 2025-03-19 DIAGNOSIS — J84.9 INTERSTITIAL LUNG DISEASE (HCC): ICD-10-CM

## 2025-03-19 DIAGNOSIS — K59.00 CONSTIPATION, UNSPECIFIED CONSTIPATION TYPE: ICD-10-CM

## 2025-03-19 PROCEDURE — 99309 SBSQ NF CARE MODERATE MDM 30: CPT | Performed by: INTERNAL MEDICINE

## 2025-03-19 NOTE — ASSESSMENT & PLAN NOTE
Patient continues to complain of abdominal discomfort, indigestion at times.  Protonix dose was recently increased back to 40 mg twice daily on last encounter.  Patient additionally remains on famotidine.  Continue to monitor

## 2025-03-19 NOTE — ASSESSMENT & PLAN NOTE
History of chronic hyponatremia.  Patient remains on sodium chloride tablets.  Sodium level was mildly low at 122 on today's BMP.  Will continue to monitor

## 2025-03-19 NOTE — ASSESSMENT & PLAN NOTE
Lab Results   Component Value Date    HGBA1C 7.5 (H) 03/07/2025   Patient was recently taken off of scheduled lispro with meals due to episodes of hypoglycemia.  Lantus dose was recently reduced to 5 units daily.  Blood sugars remain stable for the most part except for 1 isolated increase of 278 this morning.  Blood sugar of 564 reported earlier was likely a glucometer and as the repeat level was 278.  Continue sitagliptin 100 mg daily, metformin 1000 mg twice daily and Lantus 5 units daily

## 2025-03-19 NOTE — ASSESSMENT & PLAN NOTE
Patient had recent hospitalization from 02/24/2025 to 02/26/2025 for ILD flare up. She  was discharged on prednisone taper. She was readmitted with hemoptysis and later with dyspnea.  CTA PE study was negative for PE.  Imaging studies additionally revealed chronic ILD with diffuse groundglass haziness in the lower lobe possibly edema versus hemorrhage.  Chest x-ray showed moderate pulmonary fibrosis.  Patient received last dose of prednisone 10 mg on 3/16  Sao2 remains stable at 96 % on RA   F.U with pulmonary service

## 2025-03-19 NOTE — PROGRESS NOTES
[facility]  [facility]  [address]   [address]  [place], [place], [address] [phone]    Progress Note  Code SNF 31    Patient Location     [facility]    Reason for visit     Follow-up diabetes mellitus type 2, interstitial lung disease, anemia    Patient’s recent vitals, labs and updated medications were reviewed on [employer] system of facility.     Problem List Items Addressed This Visit       Essential hypertension    Blood pressure was noted to be on the lower side at 98/52 this morning.  Lopressor dose was reduced to 25 mg once daily.  Continue to monitor blood pressure closely.  BUN/creatinine was stable yesterday.  Sodium was mildly low at 132         Paroxysmal atrial fibrillation (HCC)    Heart rate stable on metoprolol succinate 25 mg daily.  Continue Eliquis         GERD (gastroesophageal reflux disease)    Patient continues to complain of abdominal discomfort, indigestion at times.  Protonix dose was recently increased back to 40 mg twice daily on last encounter.  Patient additionally remains on famotidine.  Continue to monitor         Interstitial lung disease (HCC)    Patient had recent hospitalization from 02/24/2025 to 02/26/2025 for ILD flare up. She  was discharged on prednisone taper. She was readmitted with hemoptysis and later with dyspnea.  CTA PE study was negative for PE.  Imaging studies additionally revealed chronic ILD with diffuse groundglass haziness in the lower lobe possibly edema versus hemorrhage.  Chest x-ray showed moderate pulmonary fibrosis.  Patient received last dose of prednisone 10 mg on 3/16  Sao2 remains stable at 96 % on RA   F.U with pulmonary service         Constipation    Patient reports having hard stools yesterday, needed suppository and manual disimpaction.  Add MiraLAX daily         Hyponatremia    History of chronic hyponatremia.  Patient remains on sodium chloride tablets.  Sodium level was mildly low at 122 on  today's BMP.  Will continue to monitor         DM2 (diabetes mellitus, type 2) ([facility]) - Primary      Lab Results   Component Value Date    HGBA1C 7.5 (H) 03/07/2025   Patient was recently taken off of scheduled lispro with meals due to episodes of hypoglycemia.  Lantus dose was recently reduced to 5 units daily.  Blood sugars remain stable for the most part except for 1 isolated increase of 278 this morning.  Blood sugar of 564 reported earlier was likely a glucometer and as the repeat level was 278.  Continue sitagliptin 100 mg daily, metformin 1000 mg twice daily and Lantus 5 units daily                HPI       Patient is being seen for follow-up visit today.  She is doing okay at present.  Patient was recently noted to have episodes of hypoglycemia for which she was taken off of scheduled lispro with meals.  Lantus dose was also reduced.  Blood sugar readings have been stable for the most part except 1 isolated increase was reported earlier today.  Initially blood  sugar was reported to be 564. staff was advised to repeat Accu-Chek as other levels were stable.  Repeat level was 278.  Patient was additionally noted to have low blood pressure of 90/62 this morning.  Lopressor dose was reduced to once daily with holding parameters  At the time of my evaluation patient is doing okay.  Denies any dyspnea or chest pain.  Remains afebrile.  Reports having hard bowel movements yesterday.  Patient needed help with manual disc impaction additionally received suppository.    Review of Systems   Constitutional:  Negative for chills and fever.   Respiratory:  Negative for shortness of breath and wheezing.    Cardiovascular:  Negative for chest pain.   Gastrointestinal:  Negative for abdominal distention, abdominal pain and vomiting.        Abdominal discomfort /possible indigestion at times   Endocrine:        Blood sugar readings have improved.  No further hypoglycemia.  Isolated increase of 278 was reported earlier today    Genitourinary:  Negative for flank pain and hematuria.   Musculoskeletal:  Positive for gait problem. Negative for back pain.   Neurological:  Positive for weakness. Negative for seizures and syncope.   Psychiatric/Behavioral:  Negative for agitation and behavioral problems.        Past Medical History:   Diagnosis Date    Common bile duct dilatation 2020    Glaucoma     H/O degenerative disc disease     Idiopathic pulmonary fibrosis (HCC) 2020    Irregular heart beat     afib    Peripheral neuropathy     S/P laparoscopic cholecystectomy 2020    Stenosis of right subclavian artery (HCC) 2016       Past Surgical History:   Procedure Laterality Date    CATARACT EXTRACTION, BILATERAL      CHOLECYSTECTOMY      CHOLECYSTECTOMY LAPAROSCOPIC N/A 2020    Procedure: CHOLECYSTECTOMY LAPAROSCOPIC;  Surgeon: Kalen Garrett MD;  Location: [facility];  Service: General    COLONOSCOPY      CYST REMOVAL      left lower Quadrant     FL LUMBAR PUNCTURE DIAGNOSTIC  2023    HERNIA REPAIR  1992    LIPOMA RESECTION  2010    [place] RPR 1ST INGUN HRNA AGE 5 YRS/> REDUCIBLE Bilateral 2018    Procedure: INGUINAL HERNIA REPAIR;  Surgeon: Volodymyr Lee MD;  Location: [facility];  Service: General    SHOULDER SURGERY  2019    Dr. Arnett ([place])    UPPER GASTROINTESTINAL ENDOSCOPY      VASCULAR SURGERY      Agram-  R carotid occlusion       Social History     Tobacco Use   Smoking Status Former    Current packs/day: 0.00    Average packs/day: 1.5 packs/day for 38.0 years (57.0 ttl pk-yrs)    Types: Cigarettes    Start date:     Quit date:     Years since quittin.2   Smokeless Tobacco Never       Family History   Problem Relation Age of Onset    Heart disease Mother     Heart attack Father     Hiatal hernia Father     Diabetes Father     Cancer Brother     Diabetes Brother     Heart disease Brother     Hypertension Brother     Lung disease Brother 75        interstitial lung  disease    Cancer Brother 49        glioblastoma    Other Son         gastroparesis    Other Cousin         interstitial lung disease        Allergies   Allergen Reactions    Keflex [Cephalexin] Diarrhea         Current Outpatient Medications:     acetaminophen (TYLENOL) 500 mg tablet, Take 1,000 mg by mouth as needed for mild pain, Disp: , Rfl:     apixaban (Eliquis) 2.5 mg, TAKE 1 TABLET (2.5 MG TOTAL) BY MOUTH 2 (TWO) TIMES A DAY, Disp: 90 tablet, Rfl: 1    bimatoprost (LUMIGAN) 0.01 % ophthalmic drops, Administer 1 drop to both eyes daily at bedtime for 30 days, Disp: 1.5 mL, Rfl: 0    brimonidine tartrate 0.2 % ophthalmic solution, INSTILL 1 DROP INTO RIGHT EYE TWICE A DAY, Disp: , Rfl:     Cyanocobalamin (Vitamin B 12) 500 MCG TABS, Take 500 mcg by mouth 2 (two) times a day, Disp: , Rfl:     ezetimibe-simvastatin (VYTORIN) 10-40 mg per tablet, TAKE 1 TABLET BY MOUTH DAILY AT BEDTIME, Disp: 30 tablet, Rfl: 5    famotidine (PEPCID) 20 mg tablet, TAKE 1 TABLET (20 MG TOTAL) BY MOUTH 2 (TWO) TIMES A DAY, Disp: 60 tablet, Rfl: 5    glucose blood (OneTouch Ultra) test strip, TEST FOUR TIMES A DAY, Disp: 100 strip, Rfl: 5    insulin glargine (LANTUS) 100 units/mL subcutaneous injection, Inject 5 Units under the skin every morning, Disp: , Rfl:     insulin lispro (HumALOG/ADMELOG) 100 units/mL injection, Inject 1-5 Units under the skin 3 (three) times a day before meals, Disp: , Rfl:     insulin lispro (HumALOG/ADMELOG) 100 units/mL injection, Inject 1-5 Units under the skin daily at bedtime, Disp: , Rfl:     Insulin Pen Needle (B-D UF III MINI PEN NEEDLES) 31G X 5 MM MISC, USE 2 (TWO) TIMES A DAY, Disp: 200 each, Rfl: 3    ipratropium (ATROVENT) 0.03 % nasal spray, USE 2 SPRAYS INTO EACH NOSTRIL EVERY 12 (TWELVE) HOURS, Disp: 30 mL, Rfl: 6    lidocaine (LIDODERM) 5 %, Apply 1 patch topically over 12 hours daily Remove & Discard patch within 12 hours or as directed by MD, Disp: , Rfl:     LORazepam (ATIVAN) 0.5 mg  tablet, Take 1 tablet (0.5 mg total) by mouth 2 (two) times a day as needed for anxiety for up to 10 days, Disp: 20 tablet, Rfl: 0    metFORMIN (GLUCOPHAGE) 500 mg tablet, TAKE 2 TABLETS (1,000 MG TOTAL) BY MOUTH 2 (TWO) TIMES A DAY WITH MEALS, Disp: 120 tablet, Rfl: 5    metoprolol tartrate (LOPRESSOR) 25 mg tablet, Take 1 tablet (25 mg total) by mouth every 12 (twelve) hours, Disp: 180 tablet, Rfl: 3    mirtazapine (REMERON) 7.5 MG tablet, Take 1 tablet (7.5 mg total) by mouth daily at bedtime, Disp: 20 tablet, Rfl: 0    Multiple Vitamin (multivitamin) tablet, Take 1 tablet by mouth daily  , Disp: , Rfl:     OneTouch Delica Lancets 33G MISC, Check blood sugars four times daily. Please substitute with appropriate alternative as covered by patient's insurance. Dx: E11.65, Disp: 400 each, Rfl: 3    pantoprazole (PROTONIX) 40 mg tablet, Take 1 tablet (40 mg total) by mouth every 12 (twelve) hours, Disp: , Rfl:     sitaGLIPtin (Januvia) 100 mg tablet, TAKE 1 TABLET (100 MG TOTAL) BY MOUTH DAILY (Patient not taking: Reported on 3/17/2025), Disp: 90 tablet, Rfl: 1    sodium chloride 1 g tablet, TAKE 1 TABLET THREE TIMES A DAY, Disp: 90 tablet, Rfl: 1    Updated list was reviewed in [facility] system of facility.     Vitals:    03/19/25 1613   BP: 161/79   Pulse: 75   Resp: 18   SpO2: 99%       Physical Exam  Constitutional:       General: She is not in acute distress.  HENT:      Head: Normocephalic and atraumatic.      Nose: No rhinorrhea.   Cardiovascular:      Rate and Rhythm: Normal rate and regular rhythm.      Heart sounds: Murmur heard.   Pulmonary:      Breath sounds: Rales (few at bases) present. No wheezing or rhonchi.   Abdominal:      General: There is no distension.      Palpations: Abdomen is soft.      Tenderness: There is no abdominal tenderness. There is no guarding.   Musculoskeletal:      Cervical back: Neck supple.      Right lower leg: No edema.      Left lower leg: No edema.   Skin:      "Coloration: Skin is not jaundiced.   Neurological:      General: No focal deficit present.      Mental Status: Mental status is at baseline.      Cranial Nerves: No cranial nerve deficit.   Psychiatric:         Mood and Affect: Mood normal.         Behavior: Behavior normal.         Diagnostic Data:    Labs from 3/18/2025 revealed hemoglobin of 10.9, WBC count 5, platelet count 186  BUN 17, creatinine 0.58, sodium 132, potassium 4.3.    Additional Notes:   Add MiraLAX  Monitor blood sugars closely  Reduce metoprolol to once daily  Monitor blood pressure  Portions of the record may have been created with voice recognition software.  Occasional wrong word or \"sound a like\" substitutions may have occurred due to the inherent limitations of voice recognition software.  Read the chart carefully and recognize, using context, where substitutions have occurred.    This note was electronically signed by Dr. Carlie Plunkett   "

## 2025-03-19 NOTE — ASSESSMENT & PLAN NOTE
Patient reports having hard stools yesterday, needed suppository and manual disimpaction.  Add MiraLAX daily

## 2025-03-19 NOTE — ASSESSMENT & PLAN NOTE
Blood pressure was noted to be on the lower side at 98/52 this morning.  Lopressor dose was reduced to 25 mg once daily.  Continue to monitor blood pressure closely.  BUN/creatinine was stable yesterday.  Sodium was mildly low at 132

## 2025-03-21 ENCOUNTER — NURSING HOME VISIT (OUTPATIENT)
Dept: GERIATRICS | Facility: OTHER | Age: 89
End: 2025-03-21
Payer: MEDICARE

## 2025-03-21 ENCOUNTER — TELEPHONE (OUTPATIENT)
Dept: OTHER | Facility: OTHER | Age: 89
End: 2025-03-21

## 2025-03-21 VITALS
OXYGEN SATURATION: 95 % | WEIGHT: 113.8 LBS | SYSTOLIC BLOOD PRESSURE: 131 MMHG | DIASTOLIC BLOOD PRESSURE: 65 MMHG | RESPIRATION RATE: 22 BRPM | BODY MASS INDEX: 18.37 KG/M2 | HEART RATE: 84 BPM | TEMPERATURE: 97 F

## 2025-03-21 DIAGNOSIS — E11.42 TYPE 2 DIABETES MELLITUS WITH DIABETIC POLYNEUROPATHY, WITH LONG-TERM CURRENT USE OF INSULIN (HCC): ICD-10-CM

## 2025-03-21 DIAGNOSIS — E43 SEVERE PROTEIN-CALORIE MALNUTRITION (HCC): ICD-10-CM

## 2025-03-21 DIAGNOSIS — I48.0 PAROXYSMAL ATRIAL FIBRILLATION (HCC): ICD-10-CM

## 2025-03-21 DIAGNOSIS — E44.0 MODERATE PROTEIN-CALORIE MALNUTRITION (HCC): ICD-10-CM

## 2025-03-21 DIAGNOSIS — I10 ESSENTIAL HYPERTENSION: ICD-10-CM

## 2025-03-21 DIAGNOSIS — Z79.4 TYPE 2 DIABETES MELLITUS WITH DIABETIC POLYNEUROPATHY, WITH LONG-TERM CURRENT USE OF INSULIN (HCC): ICD-10-CM

## 2025-03-21 DIAGNOSIS — K21.9 GASTROESOPHAGEAL REFLUX DISEASE, UNSPECIFIED WHETHER ESOPHAGITIS PRESENT: ICD-10-CM

## 2025-03-21 DIAGNOSIS — J84.9 INTERSTITIAL LUNG DISEASE (HCC): Primary | ICD-10-CM

## 2025-03-21 DIAGNOSIS — E87.1 HYPONATREMIA: ICD-10-CM

## 2025-03-21 DIAGNOSIS — R63.0 DECREASED APPETITE: ICD-10-CM

## 2025-03-21 PROCEDURE — 99309 SBSQ NF CARE MODERATE MDM 30: CPT | Performed by: INTERNAL MEDICINE

## 2025-03-21 NOTE — ASSESSMENT & PLAN NOTE
Lab Results   Component Value Date    HGBA1C 7.5 (H) 03/07/2025   Patient was recently taken off of scheduled lispro due to episodes of hypoglycemia.  Blood sugar readings have been variable.  Highest reported reading was 330 however patient reports eating dark chocolate brought by her son before.  Most of the other readings are acceptable under 200.  Continue sitagliptin 100 mg daily, metformin 1000 mg twice daily and Lantus 5 units daily.  Patient is currently off steroids.  Monitor blood sugars closely and adjust therapy accordingly.  Avoid hypoglycemia

## 2025-03-21 NOTE — ASSESSMENT & PLAN NOTE
Malnutrition Findings:             Pt is noted to be weak and frail.  Muscle wasting noted on exam.  Appetite and p.o. intake is down at times.  Encourage p.o. intake.  Follow-up with dietitian.  She was recently started on Glucerna                 BMI Findings:           Body mass index is 18.37 kg/m².

## 2025-03-21 NOTE — ASSESSMENT & PLAN NOTE
Patient had recent hospitalization from 02/24/2025 to 02/26/2025 for ILD flare up. She  was discharged on prednisone taper. She was readmitted with hemoptysis and later with dyspnea.  CTA PE study was negative for PE.  Imaging studies additionally revealed chronic ILD with diffuse groundglass haziness in the lower lobe possibly edema versus hemorrhage.  Chest x-ray showed moderate pulmonary fibrosis.  Patient received last dose of prednisone 10 mg on 3/16  Respiratory status remains stable with SaO2 of 98% on room air.  Follow-up with pulmonary service

## 2025-03-21 NOTE — ASSESSMENT & PLAN NOTE
Patient has had issues with GERD and indigestion.  Currently remains on pantoprazole 40 mg twice daily and famotidine.  Consider reducing pantoprazole dose to once daily in 1 to 2 weeks

## 2025-03-21 NOTE — PROGRESS NOTES
[facility]  [address]   [address]  [place], [place], [address]  [phone]    Progress Note  Code SNF 31    Patient Location     [facility]     Reason for visit     Follow-up  interstitial lung disease, anemia, diabetes mellitus type 2, retention, PAF    Patient’s recent vitals, labs and updated medications were reviewed on [employer] system of facility.     Problem List Items Addressed This Visit       Essential hypertension    Metoprolol dose was recently reduced to 25 mg once daily for borderline low blood pressures.  Blood pressure readings remain stable at 131/65 today.  Isolated increase of 180/71 was reported yesterday however other readings are acceptable.  Recommend checking blood pressure manually for more accurate reading         Paroxysmal atrial fibrillation (HCC)    Heart rate stable on metoprolol succinate 25 mg daily.  Continue Eliquis         GERD (gastroesophageal reflux disease)    Patient has had issues with GERD and indigestion.  Currently remains on pantoprazole 40 mg twice daily and famotidine.  Consider reducing pantoprazole dose to once daily in 1 to 2 weeks         Interstitial lung disease (HCC) - Primary    Patient had recent hospitalization from 02/24/2025 to 02/26/2025 for ILD flare up. She  was discharged on prednisone taper. She was readmitted with hemoptysis and later with dyspnea.  CTA PE study was negative for PE.  Imaging studies additionally revealed chronic ILD with diffuse groundglass haziness in the lower lobe possibly edema versus hemorrhage.  Chest x-ray showed moderate pulmonary fibrosis.  Patient received last dose of prednisone 10 mg on 3/16  Respiratory status remains stable with SaO2 of 98% on room air.  Follow-up with pulmonary service         Moderate protein-calorie malnutrition (HCC)    Malnutrition Findings:                                 BMI Findings:           Body mass index is 18.37 kg/m².             Hyponatremia    Recent sodium was mildly low at 132.  Repeat BMP in 1 week         Type 2 diabetes mellitus with diabetic polyneuropathy, with long-term current use of insulin ([facility])      Lab Results   Component Value Date    HGBA1C 7.5 (H) 03/07/2025   Patient was recently taken off of scheduled lispro due to episodes of hypoglycemia.  Blood sugar readings have been variable.  Highest reported reading was 330 however patient reports eating dark chocolate brought by her son before.  Most of the other readings are acceptable under 200.  Continue sitagliptin 100 mg daily, metformin 1000 mg twice daily and Lantus 5 units daily.  Patient is currently off steroids.  Monitor blood sugars closely and adjust therapy accordingly.  Avoid hypoglycemia         Severe protein-calorie malnutrition (HCC)    Malnutrition Findings:             Pt is noted to be weak and frail.  Muscle wasting noted on exam.  Appetite and p.o. intake is down at times.  Encourage p.o. intake.  Follow-up with dietitian.  She was recently started on Glucerna                 BMI Findings:           Body mass index is 18.37 kg/m².            Decreased appetite    Patient has had decreased appetite and decreased p.o. intake since she came off of steroids.  Currently remains on mirtazapine 7.5 mg daily.  Patient is noted to be tired and fatigued at time.  Will hold off increasing mirtazapine at this time.  She is being followed by dietitian and was started on Glucerna.  Patient ate better for supper last night and breakfast this morning.  Continue to monitor                HPI       Patient is being seen for follow-up visit today.  He is doing okay at present.  Remains weak.  Appetite is down with decreased p.o. intake however patient reports eating better last night and this morning.  Blood sugar readings have been variable.  Highest reported reading was 330 yesterday.  No further episodes of recurrence of hypoglycemia      Review of Systems    Constitutional:  Positive for appetite change (Decrease appettite). Negative for chills and fever.   Respiratory:  Negative for shortness of breath and wheezing.    Cardiovascular:  Negative for chest pain.   Gastrointestinal:  Negative for abdominal distention, abdominal pain and vomiting.   Endocrine:        Blood sugar readings have been acceptable for the most part, occasionally high.  Highest reported reading was 330   Genitourinary:  Negative for flank pain and hematuria.   Musculoskeletal:  Positive for gait problem. Negative for back pain.   Neurological:  Positive for weakness. Negative for seizures and syncope.   Psychiatric/Behavioral:  Negative for agitation and behavioral problems.        Past Medical History:   Diagnosis Date    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/7/2020    Glaucoma     H/O degenerative disc disease     Idiopathic pulmonary fibrosis (HCC) 11/2020    Irregular heart beat     afib    Peripheral neuropathy     S/P laparoscopic cholecystectomy 12/21/2020    Stenosis of right subclavian artery (HCC) 11/18/2016       Past Surgical History:   Procedure Laterality Date    CATARACT EXTRACTION, BILATERAL  2011    CHOLECYSTECTOMY      CHOLECYSTECTOMY LAPAROSCOPIC N/A 12/09/2020    Procedure: CHOLECYSTECTOMY LAPAROSCOPIC;  Surgeon: Kalen Garrett MD;  Location: [facility];  Service: General    COLONOSCOPY      CYST REMOVAL  2009    left lower Quadrant     FL LUMBAR PUNCTURE DIAGNOSTIC  4/28/2023    HERNIA REPAIR  1992    LIPOMA RESECTION  2010    AK RPR 1ST INGUN HRNA AGE 5 YRS/> REDUCIBLE Bilateral 01/05/2018    Procedure: INGUINAL HERNIA REPAIR;  Surgeon: Volodymyr Lee MD;  Location: [facility];  Service: General    SHOULDER SURGERY  04/26/2019    Dr. Arnett ([place])    UPPER GASTROINTESTINAL ENDOSCOPY      VASCULAR SURGERY  1994    Agram-  R carotid occlusion       Social History     Tobacco Use   Smoking Status Former    Current packs/day: 0.00    Average packs/day: 1.5 packs/day for 38.0 years  (57.0 ttl pk-yrs)    Types: Cigarettes    Start date:     Quit date:     Years since quittin.2   Smokeless Tobacco Never       Family History   Problem Relation Age of Onset    Heart disease Mother     Heart attack Father     Hiatal hernia Father     Diabetes Father     Cancer Brother     Diabetes Brother     Heart disease Brother     Hypertension Brother     Lung disease Brother 75        interstitial lung disease    Cancer Brother 49        glioblastoma    Other Son         gastroparesis    Other Cousin         interstitial lung disease        Allergies   Allergen Reactions    Keflex [Cephalexin] Diarrhea         Current Outpatient Medications:     acetaminophen (TYLENOL) 500 mg tablet, Take 1,000 mg by mouth as needed for mild pain, Disp: , Rfl:     apixaban (Eliquis) 2.5 mg, TAKE 1 TABLET (2.5 MG TOTAL) BY MOUTH 2 (TWO) TIMES A DAY, Disp: 90 tablet, Rfl: 1    bimatoprost (LUMIGAN) 0.01 % ophthalmic drops, Administer 1 drop to both eyes daily at bedtime for 30 days, Disp: 1.5 mL, Rfl: 0    brimonidine tartrate 0.2 % ophthalmic solution, INSTILL 1 DROP INTO RIGHT EYE TWICE A DAY, Disp: , Rfl:     Cyanocobalamin (Vitamin B 12) 500 MCG TABS, Take 500 mcg by mouth 2 (two) times a day, Disp: , Rfl:     ezetimibe-simvastatin (VYTORIN) 10-40 mg per tablet, TAKE 1 TABLET BY MOUTH DAILY AT BEDTIME, Disp: 30 tablet, Rfl: 5    famotidine (PEPCID) 20 mg tablet, TAKE 1 TABLET (20 MG TOTAL) BY MOUTH 2 (TWO) TIMES A DAY, Disp: 60 tablet, Rfl: 5    glucose blood (OneTouch Ultra) test strip, TEST FOUR TIMES A DAY, Disp: 100 strip, Rfl: 5    insulin glargine (LANTUS) 100 units/mL subcutaneous injection, Inject 5 Units under the skin every morning, Disp: , Rfl:     insulin lispro (HumALOG/ADMELOG) 100 units/mL injection, Inject 1-5 Units under the skin 3 (three) times a day before meals, Disp: , Rfl:     insulin lispro (HumALOG/ADMELOG) 100 units/mL injection, Inject 1-5 Units under the skin daily at bedtime, Disp: ,  Rfl:     Insulin Pen Needle (B-D UF III MINI PEN NEEDLES) 31G X 5 MM MISC, USE 2 (TWO) TIMES A DAY, Disp: 200 each, Rfl: 3    ipratropium (ATROVENT) 0.03 % nasal spray, USE 2 SPRAYS INTO EACH NOSTRIL EVERY 12 (TWELVE) HOURS, Disp: 30 mL, Rfl: 6    lidocaine (LIDODERM) 5 %, Apply 1 patch topically over 12 hours daily Remove & Discard patch within 12 hours or as directed by MD, Disp: , Rfl:     LORazepam (ATIVAN) 0.5 mg tablet, Take 1 tablet (0.5 mg total) by mouth 2 (two) times a day as needed for anxiety for up to 10 days, Disp: 20 tablet, Rfl: 0    metFORMIN (GLUCOPHAGE) 500 mg tablet, TAKE 2 TABLETS (1,000 MG TOTAL) BY MOUTH 2 (TWO) TIMES A DAY WITH MEALS, Disp: 120 tablet, Rfl: 5    metoprolol tartrate (LOPRESSOR) 25 mg tablet, Take 1 tablet (25 mg total) by mouth every 12 (twelve) hours, Disp: 180 tablet, Rfl: 3    mirtazapine (REMERON) 7.5 MG tablet, Take 1 tablet (7.5 mg total) by mouth daily at bedtime, Disp: 20 tablet, Rfl: 0    Multiple Vitamin (multivitamin) tablet, Take 1 tablet by mouth daily  , Disp: , Rfl:     OneTouch Delica Lancets 33G MISC, Check blood sugars four times daily. Please substitute with appropriate alternative as covered by patient's insurance. Dx: E11.65, Disp: 400 each, Rfl: 3    pantoprazole (PROTONIX) 40 mg tablet, Take 1 tablet (40 mg total) by mouth every 12 (twelve) hours, Disp: , Rfl:     sitaGLIPtin (Januvia) 100 mg tablet, TAKE 1 TABLET (100 MG TOTAL) BY MOUTH DAILY (Patient not taking: Reported on 3/17/2025), Disp: 90 tablet, Rfl: 1    sodium chloride 1 g tablet, TAKE 1 TABLET THREE TIMES A DAY, Disp: 90 tablet, Rfl: 1    Updated list was reviewed in [facility] system of facility.     Vitals:    03/21/25 1351   BP: 131/65   Pulse: 84   Resp: 22   Temp: (!) 97 °F (36.1 °C)   SpO2: 95%       Physical Exam  Constitutional:       General: She is not in acute distress.  HENT:      Head: Normocephalic and atraumatic.      Nose: No rhinorrhea.   Cardiovascular:      Rate and  Rhythm: Normal rate and regular rhythm.      Heart sounds: Murmur heard.   Pulmonary:      Breath sounds: Rales (few at bases) present. No wheezing or rhonchi.   Abdominal:      General: There is no distension.      Palpations: Abdomen is soft.      Tenderness: There is no abdominal tenderness. There is no guarding.   Musculoskeletal:      Cervical back: Neck supple.      Right lower leg: No edema.      Left lower leg: No edema.   Skin:     Coloration: Skin is not jaundiced.   Neurological:      General: No focal deficit present.      Mental Status: Mental status is at baseline.      Cranial Nerves: No cranial nerve deficit.   Psychiatric:         Mood and Affect: Mood normal.         Behavior: Behavior normal.         Diagnostic Data:  10.9 g/dL 11.5-14.5 L Final               HEMATOCRIT 33.3 % 35.0-43.0 L Final             WBC 5.0 thou/cmm 4.0-10.0  Final             RBC 4.37 mill/cmm 3.70-4.70  Final             PLATELET COUNT 186 thou/cmm 140-350  Final             MPV 7.7 fL 7.5-11.3  Final             MCV 76 fL  L Final             MCH 25.0 pg 26.0-34.0 L Final             MCHC 32.8 g/dL 32.0-37.0  Final             RDW 17.2 % 12.0-16.0 H Final             DIFFERENTIAL TYPE AUTO    Final             ABSOLUTE NEUT 3.3 thou/cmm 1.8-7.8  Final             ABSOLUTE LYMPH 0.9 thou/cmm 1.0-3.0 L Final             ABSOLUTE MONO 0.6 thou/cmm 0.3-1.0  Final             ABSOLUTE EOS 0.1 thou/cmm 0.0-0.5  Final             ABSOLUTE BASO 0.0 thou/cmm 0.0-0.1  Final             NEUTROPHILS 68 %   Final             LYMPHOCYTES 18 %   Final             MONOCYTES 12 %   Final             EOSINOPHILS 2 %   Final             BASOPHILS 0 %   Final           BASIC METABOLIC PNL              GLUCOSE 133 mg/dL 65-99 H Final              BUN 17 mg/dL 7-25  Final             CREATININE 0.58 mg/dL 0.40-1.10  Final             SODIUM 132 mmol/L 135-145 L Final             POTASSIUM 4.3 mmol/L 3.5-5.2  Final             CHLORIDE  "91 mmol/L 100-109 L Final             CARBON DIOXIDE 33 mmol/L 21-31 H Final             CALCIUM 8.9 mg/dL 8.5-10.5  Final             ANION GAP 8  3-11  Final             eGFRcr 87  >59  Final                   Additional Notes:     Portions of the record may have been created with voice recognition software.  Occasional wrong word or \"sound a like\" substitutions may have occurred due to the inherent limitations of voice recognition software.  Read the chart carefully and recognize, using context, where substitutions have occurred.    This note was electronically signed by Dr. Carlie Plunkett   "

## 2025-03-21 NOTE — ASSESSMENT & PLAN NOTE
Patient has had decreased appetite and decreased p.o. intake since she came off of steroids.  Currently remains on mirtazapine 7.5 mg daily.  Patient is noted to be tired and fatigued at time.  Will hold off increasing mirtazapine at this time.  She is being followed by dietitian and was started on Glucerna.  Patient ate better for supper last night and breakfast this morning.  Continue to monitor

## 2025-03-21 NOTE — ASSESSMENT & PLAN NOTE
Metoprolol dose was recently reduced to 25 mg once daily for borderline low blood pressures.  Blood pressure readings remain stable at 131/65 today.  Isolated increase of 180/71 was reported yesterday however other readings are acceptable.  Recommend checking blood pressure manually for more accurate reading

## 2025-03-21 NOTE — ASSESSMENT & PLAN NOTE
Malnutrition Findings:                                 BMI Findings:           Body mass index is 18.37 kg/m².

## 2025-03-23 NOTE — ASSESSMENT & PLAN NOTE
Blood pressure readings have been variable.    Blood pressure was reported to be high at 171/81 this morning.  Most of the other readings are acceptable. Considering advanced age will avoid hypotension.  Check blood pressure manually for more accurate reading.  Continue metoprolol tart related 25 mg daily

## 2025-03-23 NOTE — ASSESSMENT & PLAN NOTE
Malnutrition Findings:                Patient is noted to be weak and frail.  Generalized body muscle wasting noted on exam.  Currently remains on 113.8 pounds.  P.o. intake is down at times.  Patient remains on mirtazapine at.  Follow-up with dietitian service.  Continue protein supplements               BMI Findings:           There is no height or weight on file to calculate BMI.

## 2025-03-23 NOTE — ASSESSMENT & PLAN NOTE
Lab Results   Component Value Date    HGBA1C 7.5 (H) 03/07/2025   Patient was recently taken off of scheduled short acting insulin due to episodes of hypoglycemia.  Currently remains on Lantus 5 units daily, metformin 1000 mg twice daily and Januvia 100 mg daily.  Blood sugars are acceptable with readings between 121-281 range.  Avoid hypoglycemia

## 2025-03-23 NOTE — PROGRESS NOTES
[facility]  [address]   [address]  [place], [place], [address] [phone]    Progress Note  Code  SNF 31    Patient Location     [facility]    Reason for visit     Follow-up interstitial lung disease, diabetes mellitus type 2, protein calorie malnutrition, A-fib, AS    Patient’s recent vitals, labs and updated medications were reviewed on [employer] system of facility.     Problem List Items Addressed This Visit       Essential hypertension    Blood pressure readings have been variable.    Blood pressure was reported to be high at 171/81 this morning.  Most of the other readings are acceptable. Considering advanced age will avoid hypotension.  Check blood pressure manually for more accurate reading.  Continue metoprolol tart related 25 mg daily         Paroxysmal atrial fibrillation (HCC)    Heart rate stable on metoprolol succinate 25 mg daily.  Continue Eliquis         GERD (gastroesophageal reflux disease)    Patient has had issues with GERD and indigestion.  Currently remains on pantoprazole 40 mg twice daily and famotidine.  Consider reducing pantoprazole dose to once daily in 1 to 2 weeks         Interstitial lung disease (HCC) - Primary    History of ILD.  Patient recently completed prednisone course.  SaO2 remains stable at 96% on room air.  Continue to monitor.  Follow-up with pulmonary service         Type 2 diabetes mellitus with diabetic polyneuropathy, with long-term current use of insulin ([facility])      Lab Results   Component Value Date    HGBA1C 7.5 (H) 03/07/2025   Patient was recently taken off of scheduled short acting insulin due to episodes of hypoglycemia.  Currently remains on Lantus 5 units daily, metformin 1000 mg twice daily and Januvia 100 mg daily.  Blood sugars are acceptable with readings between 121-281 range.  Avoid hypoglycemia         Aortic stenosis    Continue conservative care.  Follow-up with cardiology service          Severe protein-calorie malnutrition (HCC)    Malnutrition Findings:                Patient is noted to be weak and frail.  Generalized body muscle wasting noted on exam.  Currently remains on 113.8 pounds.  P.o. intake is down at times.  Patient remains on mirtazapine at.  Follow-up with dietitian service.  Continue protein supplements               BMI Findings:           There is no height or weight on file to calculate BMI.                   HPI       Patient is being seen for a follow-up visit.  She is doing okay at present.  Awake alert in no distress.  Reports trouble sleeping last night  Patient has occasional anxiety issues, has been receiving as needed lorazepam which helps.  Blood sugar readings have been ranging between 1 21-2 81.  Highest reported reading was 3 09/03/2021.  No recurrence of hypoglycemia.  Patient has stage II tissue injury on right buttock for which local wound care is being performed.      Review of Systems   Constitutional:  Positive for fatigue. Negative for chills and fever. Appetite change: Decrease appettite.  Respiratory:  Negative for shortness of breath and wheezing.    Cardiovascular:  Negative for chest pain.   Gastrointestinal:  Negative for abdominal distention, abdominal pain and vomiting.   Endocrine:        Blood sugar readings are acceptable for now ranging between 121-281 with.  No recurrence of hypoglycemia   Genitourinary:  Negative for flank pain and hematuria.   Musculoskeletal:  Positive for gait problem. Negative for back pain.   Neurological:  Positive for weakness. Negative for seizures and syncope.   Psychiatric/Behavioral:  Negative for agitation and behavioral problems.        Past Medical History:   Diagnosis Date    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/7/2020    Glaucoma     H/O degenerative disc disease     Idiopathic pulmonary fibrosis (HCC) 11/2020    Irregular heart beat     afib    Peripheral neuropathy     S/P laparoscopic cholecystectomy 12/21/2020     Stenosis of right subclavian artery (HCC) 2016       Past Surgical History:   Procedure Laterality Date    CATARACT EXTRACTION, BILATERAL      CHOLECYSTECTOMY      CHOLECYSTECTOMY LAPAROSCOPIC N/A 2020    Procedure: CHOLECYSTECTOMY LAPAROSCOPIC;  Surgeon: Kalen Garrett MD;  [facility]: [facility];  Service: General    COLONOSCOPY      CYST REMOVAL      left lower Quadrant     FL LUMBAR PUNCTURE DIAGNOSTIC  2023    HERNIA REPAIR  1992    LIPOMA RESECTION  2010    [place] RPR 1ST INGUN HRNA AGE 5 YRS/> REDUCIBLE Bilateral 2018    Procedure: INGUINAL HERNIA REPAIR;  Surgeon: Volodymyr Lee MD;  Location: [facility];  Service: General    SHOULDER SURGERY  2019    Dr. Arnett ([place])    UPPER GASTROINTESTINAL ENDOSCOPY      VASCULAR SURGERY      Agram-  R carotid occlusion       Social History     Tobacco Use   Smoking Status Former    Current packs/day: 0.00    Average packs/day: 1.5 packs/day for 38.0 years (57.0 ttl pk-yrs)    Types: Cigarettes    Start date:     Quit date:     Years since quittin.2   Smokeless Tobacco Never       Family History   Problem Relation Age of Onset    Heart disease Mother     Heart attack Father     Hiatal hernia Father     Diabetes Father     Cancer Brother     Diabetes Brother     Heart disease Brother     Hypertension Brother     Lung disease Brother 75        interstitial lung disease    Cancer Brother 49        glioblastoma    Other Son         gastroparesis    Other Cousin         interstitial lung disease        Allergies   Allergen Reactions    Keflex [Cephalexin] Diarrhea         Current Outpatient Medications:     acetaminophen (TYLENOL) 500 mg tablet, Take 1,000 mg by mouth as needed for mild pain, Disp: , Rfl:     apixaban (Eliquis) 2.5 mg, TAKE 1 TABLET (2.5 MG TOTAL) BY MOUTH 2 (TWO) TIMES A DAY, Disp: 90 tablet, Rfl: 1    bimatoprost (LUMIGAN) 0.01 % ophthalmic drops, Administer 1 drop to both eyes daily at bedtime for 30 days,  Disp: 1.5 mL, Rfl: 0    brimonidine tartrate 0.2 % ophthalmic solution, INSTILL 1 DROP INTO RIGHT EYE TWICE A DAY, Disp: , Rfl:     Cyanocobalamin (Vitamin B 12) 500 MCG TABS, Take 500 mcg by mouth 2 (two) times a day, Disp: , Rfl:     ezetimibe-simvastatin (VYTORIN) 10-40 mg per tablet, TAKE 1 TABLET BY MOUTH DAILY AT BEDTIME, Disp: 30 tablet, Rfl: 5    famotidine (PEPCID) 20 mg tablet, TAKE 1 TABLET (20 MG TOTAL) BY MOUTH 2 (TWO) TIMES A DAY, Disp: 60 tablet, Rfl: 5    glucose blood (OneTouch Ultra) test strip, TEST FOUR TIMES A DAY, Disp: 100 strip, Rfl: 5    insulin glargine (LANTUS) 100 units/mL subcutaneous injection, Inject 5 Units under the skin every morning, Disp: , Rfl:     insulin lispro (HumALOG/ADMELOG) 100 units/mL injection, Inject 1-5 Units under the skin 3 (three) times a day before meals, Disp: , Rfl:     insulin lispro (HumALOG/ADMELOG) 100 units/mL injection, Inject 1-5 Units under the skin daily at bedtime, Disp: , Rfl:     Insulin Pen Needle (B-D UF III MINI PEN NEEDLES) 31G X 5 MM MISC, USE 2 (TWO) TIMES A DAY, Disp: 200 each, Rfl: 3    ipratropium (ATROVENT) 0.03 % nasal spray, USE 2 SPRAYS INTO EACH NOSTRIL EVERY 12 (TWELVE) HOURS, Disp: 30 mL, Rfl: 6    lidocaine (LIDODERM) 5 %, Apply 1 patch topically over 12 hours daily Remove & Discard patch within 12 hours or as directed by MD, Disp: , Rfl:     LORazepam (ATIVAN) 0.5 mg tablet, Take 1 tablet (0.5 mg total) by mouth 2 (two) times a day as needed for anxiety for up to 10 days, Disp: 20 tablet, Rfl: 0    metFORMIN (GLUCOPHAGE) 500 mg tablet, TAKE 2 TABLETS (1,000 MG TOTAL) BY MOUTH 2 (TWO) TIMES A DAY WITH MEALS, Disp: 120 tablet, Rfl: 5    metoprolol tartrate (LOPRESSOR) 25 mg tablet, Take 1 tablet (25 mg total) by mouth every 12 (twelve) hours, Disp: 180 tablet, Rfl: 3    mirtazapine (REMERON) 7.5 MG tablet, Take 1 tablet (7.5 mg total) by mouth daily at bedtime, Disp: 20 tablet, Rfl: 0    Multiple Vitamin (multivitamin) tablet, Take 1  tablet by mouth daily  , Disp: , Rfl:     OneTouch Delica Lancets 33G MISC, Check blood sugars four times daily. Please substitute with appropriate alternative as covered by patient's insurance. Dx: E11.65, Disp: 400 each, Rfl: 3    pantoprazole (PROTONIX) 40 mg tablet, Take 1 tablet (40 mg total) by mouth every 12 (twelve) hours, Disp: , Rfl:     sitaGLIPtin (Januvia) 100 mg tablet, TAKE 1 TABLET (100 MG TOTAL) BY MOUTH DAILY (Patient not taking: Reported on 3/17/2025), Disp: 90 tablet, Rfl: 1    sodium chloride 1 g tablet, TAKE 1 TABLET THREE TIMES A DAY, Disp: 90 tablet, Rfl: 1    Updated list was reviewed in [facility] care system of facility.     Vitals:    03/24/25 1425   BP: (!) 171/81   Pulse: 69   Resp: 18   Temp: 97.9 °F (36.6 °C)   SpO2: 96%       Physical Exam  Constitutional:       General: She is not in acute distress.  HENT:      Head: Normocephalic and atraumatic.      Nose: No rhinorrhea.   Cardiovascular:      Rate and Rhythm: Normal rate and regular rhythm.      Heart sounds: Murmur heard.   Pulmonary:      Breath sounds: Rales (few at bases) present. No wheezing or rhonchi.   Abdominal:      General: There is no distension.      Palpations: Abdomen is soft.      Tenderness: There is no abdominal tenderness. There is no guarding.   Musculoskeletal:      Cervical back: Neck supple.      Right lower leg: No edema.      Left lower leg: No edema.   Skin:     Coloration: Skin is not jaundiced.   Neurological:      General: No focal deficit present.      Mental Status: Mental status is at baseline.      Cranial Nerves: No cranial nerve deficit.   Psychiatric:         Mood and Affect: Mood normal.         Behavior: Behavior normal.         Diagnostic Data:    Recent labs were reviewed.     Diagnostic Data:  3/18/25  10.9 g/dL 11.5-14.5 L Final                    HEMATOCRIT 33.3 % 35.0-43.0 L Final                WBC 5.0 thou/cmm 4.0-10.0   Final                RBC 4.37 mill/cmm 3.70-4.70   Final           "      PLATELET COUNT 186 thou/cmm 140-350   Final                MPV 7.7 fL 7.5-11.3   Final                MCV 76 fL  L Final                MCH 25.0 pg 26.0-34.0 L Final                MCHC 32.8 g/dL 32.0-37.0   Final                RDW 17.2 % 12.0-16.0 H Final                DIFFERENTIAL TYPE AUTO       Final                ABSOLUTE NEUT 3.3 thou/cmm 1.8-7.8   Final                ABSOLUTE LYMPH 0.9 thou/cmm 1.0-3.0 L Final                ABSOLUTE MONO 0.6 thou/cmm 0.3-1.0   Final                ABSOLUTE EOS 0.1 thou/cmm 0.0-0.5   Final                ABSOLUTE BASO 0.0 thou/cmm 0.0-0.1   Final                NEUTROPHILS 68 %     Final                LYMPHOCYTES 18 %     Final                MONOCYTES 12 %     Final                EOSINOPHILS 2 %     Final                BASOPHILS 0 %     Final              BASIC METABOLIC PNL                      GLUCOSE 133 mg/dL 65-99 H Final                  BUN 17 mg/dL 7-25   Final                CREATININE 0.58 mg/dL 0.40-1.10   Final                SODIUM 132 mmol/L 135-145 L Final                POTASSIUM 4.3 mmol/L 3.5-5.2   Final                CHLORIDE 91 mmol/L 100-109 L Final                CARBON DIOXIDE 33 mmol/L 21-31 H Final                CALCIUM 8.9 mg/dL 8.5-10.5   Final                ANION GAP 8   3-11   Final                eGFRcr 87   >59   Final                          Additional Notes:   Care coordinated with patient's brother in person    Portions of the record may have been created with voice recognition software.  Occasional wrong word or \"sound a like\" substitutions may have occurred due to the inherent limitations of voice recognition software.  Read the chart carefully and recognize, using context, where substitutions have occurred.    This note was electronically signed by Dr. Carlie Plunkett   "

## 2025-03-23 NOTE — ASSESSMENT & PLAN NOTE
History of ILD.  Patient recently completed prednisone course.  SaO2 remains stable at 96% on room air.  Continue to monitor.  Follow-up with pulmonary service

## 2025-03-24 ENCOUNTER — NURSING HOME VISIT (OUTPATIENT)
Dept: GERIATRICS | Facility: OTHER | Age: 89
End: 2025-03-24
Payer: MEDICARE

## 2025-03-24 ENCOUNTER — PATIENT OUTREACH (OUTPATIENT)
Dept: CASE MANAGEMENT | Facility: OTHER | Age: 89
End: 2025-03-24

## 2025-03-24 VITALS
RESPIRATION RATE: 18 BRPM | SYSTOLIC BLOOD PRESSURE: 171 MMHG | TEMPERATURE: 97.9 F | OXYGEN SATURATION: 96 % | DIASTOLIC BLOOD PRESSURE: 81 MMHG | HEART RATE: 69 BPM | BODY MASS INDEX: 18.37 KG/M2 | WEIGHT: 113.8 LBS

## 2025-03-24 DIAGNOSIS — E43 SEVERE PROTEIN-CALORIE MALNUTRITION (HCC): ICD-10-CM

## 2025-03-24 DIAGNOSIS — J84.9 INTERSTITIAL LUNG DISEASE (HCC): Primary | ICD-10-CM

## 2025-03-24 DIAGNOSIS — I10 ESSENTIAL HYPERTENSION: ICD-10-CM

## 2025-03-24 DIAGNOSIS — Z79.4 TYPE 2 DIABETES MELLITUS WITH DIABETIC POLYNEUROPATHY, WITH LONG-TERM CURRENT USE OF INSULIN (HCC): ICD-10-CM

## 2025-03-24 DIAGNOSIS — I35.0 AORTIC VALVE STENOSIS, ETIOLOGY OF CARDIAC VALVE DISEASE UNSPECIFIED: ICD-10-CM

## 2025-03-24 DIAGNOSIS — E11.42 TYPE 2 DIABETES MELLITUS WITH DIABETIC POLYNEUROPATHY, WITH LONG-TERM CURRENT USE OF INSULIN (HCC): ICD-10-CM

## 2025-03-24 DIAGNOSIS — K21.9 GASTROESOPHAGEAL REFLUX DISEASE, UNSPECIFIED WHETHER ESOPHAGITIS PRESENT: ICD-10-CM

## 2025-03-24 DIAGNOSIS — I48.0 PAROXYSMAL ATRIAL FIBRILLATION (HCC): ICD-10-CM

## 2025-03-24 PROCEDURE — 99309 SBSQ NF CARE MODERATE MDM 30: CPT | Performed by: INTERNAL MEDICINE

## 2025-03-28 ENCOUNTER — NURSING HOME VISIT (OUTPATIENT)
Dept: GERIATRICS | Facility: OTHER | Age: 89
End: 2025-03-28
Payer: MEDICARE

## 2025-03-28 VITALS
DIASTOLIC BLOOD PRESSURE: 82 MMHG | SYSTOLIC BLOOD PRESSURE: 160 MMHG | WEIGHT: 113.8 LBS | RESPIRATION RATE: 18 BRPM | BODY MASS INDEX: 18.37 KG/M2 | HEART RATE: 100 BPM | OXYGEN SATURATION: 96 %

## 2025-03-28 DIAGNOSIS — I35.0 AORTIC VALVE STENOSIS, ETIOLOGY OF CARDIAC VALVE DISEASE UNSPECIFIED: ICD-10-CM

## 2025-03-28 DIAGNOSIS — E11.65 TYPE 2 DIABETES MELLITUS WITH HYPERGLYCEMIA, WITH LONG-TERM CURRENT USE OF INSULIN (HCC): ICD-10-CM

## 2025-03-28 DIAGNOSIS — K21.9 GASTROESOPHAGEAL REFLUX DISEASE, UNSPECIFIED WHETHER ESOPHAGITIS PRESENT: ICD-10-CM

## 2025-03-28 DIAGNOSIS — F33.9 DEPRESSION, RECURRENT (HCC): ICD-10-CM

## 2025-03-28 DIAGNOSIS — Z79.4 TYPE 2 DIABETES MELLITUS WITH HYPERGLYCEMIA, WITH LONG-TERM CURRENT USE OF INSULIN (HCC): ICD-10-CM

## 2025-03-28 DIAGNOSIS — J84.9 INTERSTITIAL LUNG DISEASE (HCC): ICD-10-CM

## 2025-03-28 DIAGNOSIS — E87.1 HYPONATREMIA: ICD-10-CM

## 2025-03-28 DIAGNOSIS — I48.0 PAROXYSMAL ATRIAL FIBRILLATION (HCC): ICD-10-CM

## 2025-03-28 DIAGNOSIS — I10 ESSENTIAL HYPERTENSION: Primary | ICD-10-CM

## 2025-03-28 DIAGNOSIS — F41.9 ANXIETY: ICD-10-CM

## 2025-03-28 PROCEDURE — 99309 SBSQ NF CARE MODERATE MDM 30: CPT | Performed by: INTERNAL MEDICINE

## 2025-03-28 NOTE — ASSESSMENT & PLAN NOTE
Lab Results   Component Value Date    HGBA1C 7.5 (H) 03/07/2025   Blood sugar readings were reviewed, acceptable for the most part.  Levels have been ranging between 118 to 227 lately.  Continue Sitagliptin, Metformin and Lantus 5 units daily

## 2025-03-28 NOTE — ASSESSMENT & PLAN NOTE
Patient remains on mirtazapine and as needed lorazepam.  Lorazepam prescription was provided on this encounter

## 2025-03-28 NOTE — ASSESSMENT & PLAN NOTE
Pt remains on Pantoprazole 40 mg bid and Famotidine 40 mg daily.  Decrease Pantoprazole to 40 mg daily

## 2025-03-28 NOTE — ASSESSMENT & PLAN NOTE
HR was slightly on the higher side today, other readings have been stable. Continue Metoprolol 25 mg daily.   Continue Eliquis

## 2025-03-28 NOTE — PROGRESS NOTES
[facility]  [address]   [address]  [place], [address] [phone]    Progress Note  Code SNF 31    Patient Location     [facility]    Reason for visit     Follow-up ILD, diabetes mellitus type 2, A-fib, aortic stenosis, protein calorie malnutrition, anxiety    Patient’s recent vitals, labs and updated medications [facility]     Problem List Items Addressed This Visit       Essential hypertension - Primary    Blood pressure readings are at times slightly on higher side however acceptable for age, 160/82 this morning, 130/78 yesterday.  Continue metoprolol heart rate 25 mg daily.  Dose was recently reduced due to low blood pressure         Paroxysmal atrial fibrillation (HCC)    HR was slightly on the higher side today, other readings have been stable. Continue Metoprolol 25 mg daily.   Continue Eliquis         GERD (gastroesophageal reflux disease)    Pt remains on Pantoprazole 40 mg bid and Famotidine 40 mg daily.  Decrease Pantoprazole to 40 mg daily         Anxiety    Patient remains on mirtazapine and as needed lorazepam.  Lorazepam prescription was provided on this encounter         Interstitial lung disease (HCC)    History of ILD.  Patient recently completed prednisone course.  SaO2 remains stable at 96% on room air.  Continue to monitor.  Follow-up with pulmonary service         Hyponatremia    History of hyponatremia/ SIADH  Last reported reading was 132.   Pt requested to discontinue fluid restriction  F.U repeat BMP          DM2 (diabetes mellitus, type 2) (HCC)      Lab Results   Component Value Date    HGBA1C 7.5 (H) 03/07/2025   Blood sugar readings were reviewed, acceptable for the most part.  Levels have been ranging between 118 to 227 lately.  Continue Sitagliptin, Metformin and Lantus 5 units daily         Depression, recurrent (HCC)    Continue mirtazapine         Aortic stenosis    Continue  conservative care.  Follow-up with cardiology service                HPI       Patient is being seen for a follow-up visit today.  She is doing okay at present.  Awake alert in no distress.  Patient reports feeling tired, tends to get short of breath with activities.  SaO2 is stable at 98% on room air.  Patient has occasional anxiety episodes needing Ativan.  Blood sugar readings are stable for the most part.      Review of Systems   Constitutional:  Positive for fatigue. Negative for chills and fever. Appetite change: Decrease appettite.  Respiratory:  Positive for shortness of breath (With activities). Negative for wheezing.    Cardiovascular:  Negative for chest pain.   Gastrointestinal:  Negative for abdominal distention, abdominal pain and vomiting.   Endocrine:        Blood sugars are acceptable and mostly stable   Genitourinary:  Negative for flank pain and hematuria.   Musculoskeletal:  Positive for gait problem. Negative for back pain.   Neurological:  Positive for weakness. Negative for seizures and syncope.   Psychiatric/Behavioral:  Negative for agitation and behavioral problems. The patient is nervous/anxious (Anxious at times needing lorazepam).        Past Medical History:   Diagnosis Date    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/7/2020    Glaucoma     H/O degenerative disc disease     Idiopathic pulmonary fibrosis (HCC) 11/2020    Irregular heart beat     afib    Peripheral neuropathy     S/P laparoscopic cholecystectomy 12/21/2020    Stenosis of right subclavian artery (HCC) 11/18/2016       Past Surgical History:   Procedure Laterality Date    CATARACT EXTRACTION, BILATERAL  2011    CHOLECYSTECTOMY      CHOLECYSTECTOMY LAPAROSCOPIC N/A 12/09/2020    Procedure: CHOLECYSTECTOMY LAPAROSCOPIC;  Surgeon: Kalen Garrett MD;  [facility];  Service: General    COLONOSCOPY      CYST REMOVAL  2009    left lower Quadrant     FL LUMBAR PUNCTURE DIAGNOSTIC  4/28/2023    HERNIA REPAIR  1992    LIPOMA RESECTION   2010    [place] RPR 1ST INGUN HRNA AGE 5 YRS/> REDUCIBLE Bilateral 2018    Procedure: INGUINAL HERNIA REPAIR;  Surgeon: Volodymyr Lee MD;  [facility];  Service: General    SHOULDER SURGERY  2019    Dr. Arnett ([place])    UPPER GASTROINTESTINAL ENDOSCOPY      VASCULAR SURGERY      Agram-  R carotid occlusion       Social History     Tobacco Use   Smoking Status Former    Current packs/day: 0.00    Average packs/day: 1.5 packs/day for 38.0 years (57.0 ttl pk-yrs)    Types: Cigarettes    Start date:     Quit date:     Years since quittin.2   Smokeless Tobacco Never       Family History   Problem Relation Age of Onset    Heart disease Mother     Heart attack Father     Hiatal hernia Father     Diabetes Father     Cancer Brother     Diabetes Brother     Heart disease Brother     Hypertension Brother     Lung disease Brother 75        interstitial lung disease    Cancer Brother 49        glioblastoma    Other Son         gastroparesis    Other Cousin         interstitial lung disease        Allergies   Allergen Reactions    Keflex [Cephalexin] Diarrhea         Current Outpatient Medications:     acetaminophen (TYLENOL) 500 mg tablet, Take 1,000 mg by mouth as needed for mild pain, Disp: , Rfl:     apixaban (Eliquis) 2.5 mg, TAKE 1 TABLET (2.5 MG TOTAL) BY MOUTH 2 (TWO) TIMES A DAY, Disp: 90 tablet, Rfl: 1    bimatoprost (LUMIGAN) 0.01 % ophthalmic drops, Administer 1 drop to both eyes daily at bedtime for 30 days, Disp: 1.5 mL, Rfl: 0    brimonidine tartrate 0.2 % ophthalmic solution, INSTILL 1 DROP INTO RIGHT EYE TWICE A DAY, Disp: , Rfl:     Cyanocobalamin (Vitamin B 12) 500 MCG TABS, Take 500 mcg by mouth 2 (two) times a day, Disp: , Rfl:     ezetimibe-simvastatin (VYTORIN) 10-40 mg per tablet, TAKE 1 TABLET BY MOUTH DAILY AT BEDTIME, Disp: 30 tablet, Rfl: 5    famotidine (PEPCID) 20 mg tablet, TAKE 1 TABLET (20 MG TOTAL) BY MOUTH 2 (TWO) TIMES A DAY, Disp: 60 tablet, Rfl: 5     glucose blood (OneTouch Ultra) test strip, TEST FOUR TIMES A DAY, Disp: 100 strip, Rfl: 5    insulin glargine (LANTUS) 100 units/mL subcutaneous injection, Inject 5 Units under the skin every morning, Disp: , Rfl:     insulin lispro (HumALOG/ADMELOG) 100 units/mL injection, Inject 1-5 Units under the skin 3 (three) times a day before meals, Disp: , Rfl:     insulin lispro (HumALOG/ADMELOG) 100 units/mL injection, Inject 1-5 Units under the skin daily at bedtime, Disp: , Rfl:     Insulin Pen Needle (B-D UF III MINI PEN NEEDLES) 31G X 5 MM MISC, USE 2 (TWO) TIMES A DAY, Disp: 200 each, Rfl: 3    ipratropium (ATROVENT) 0.03 % nasal spray, USE 2 SPRAYS INTO EACH NOSTRIL EVERY 12 (TWELVE) HOURS, Disp: 30 mL, Rfl: 6    lidocaine (LIDODERM) 5 %, Apply 1 patch topically over 12 hours daily Remove & Discard patch within 12 hours or as directed by MD, Disp: , Rfl:     LORazepam (ATIVAN) 0.5 mg tablet, Take 1 tablet (0.5 mg total) by mouth 2 (two) times a day as needed for anxiety for up to 10 days, Disp: 20 tablet, Rfl: 0    metFORMIN (GLUCOPHAGE) 500 mg tablet, TAKE 2 TABLETS (1,000 MG TOTAL) BY MOUTH 2 (TWO) TIMES A DAY WITH MEALS, Disp: 120 tablet, Rfl: 5    metoprolol tartrate (LOPRESSOR) 25 mg tablet, Take 1 tablet (25 mg total) by mouth every 12 (twelve) hours, Disp: 180 tablet, Rfl: 3    mirtazapine (REMERON) 7.5 MG tablet, Take 1 tablet (7.5 mg total) by mouth daily at bedtime, Disp: 20 tablet, Rfl: 0    Multiple Vitamin (multivitamin) tablet, Take 1 tablet by mouth daily  , Disp: , Rfl:     OneTouch Delica Lancets 33G MISC, Check blood sugars four times daily. Please substitute with appropriate alternative as covered by patient's insurance. Dx: E11.65, Disp: 400 each, Rfl: 3    pantoprazole (PROTONIX) 40 mg tablet, Take 1 tablet (40 mg total) by mouth every 12 (twelve) hours, Disp: , Rfl:     sitaGLIPtin (Januvia) 100 mg tablet, TAKE 1 TABLET (100 MG TOTAL) BY MOUTH DAILY (Patient not taking: Reported on 3/17/2025),  "Disp: 90 tablet, Rfl: 1    sodium chloride 1 g tablet, TAKE 1 TABLET THREE TIMES A DAY, Disp: 90 tablet, Rfl: 1    Updated list was reviewed in [facility] of facility.     Vitals:    03/28/25 1346   BP: 160/82   Pulse: 100   Resp: 18   SpO2: 96%       Physical Exam  Constitutional:       General: She is not in acute distress.  HENT:      Head: Normocephalic and atraumatic.      Nose: No rhinorrhea.   Cardiovascular:      Rate and Rhythm: Normal rate and regular rhythm.      Heart sounds: Murmur heard.   Pulmonary:      Breath sounds: Rales (few at bases) present. No wheezing or rhonchi.   Abdominal:      General: There is no distension.      Palpations: Abdomen is soft.      Tenderness: There is no abdominal tenderness. There is no guarding.   Musculoskeletal:      Cervical back: Neck supple.      Right lower leg: No edema.      Left lower leg: No edema.   Skin:     Coloration: Skin is not jaundiced.   Neurological:      General: No focal deficit present.      Mental Status: Mental status is at baseline.      Cranial Nerves: No cranial nerve deficit.   Psychiatric:         Mood and Affect: Mood normal.         Behavior: Behavior normal.         Diagnostic Data:    Recent labs were reviewed.  Labs from 3/18 revealed hemoglobin of 10.9, WBC count 5, platelet count 186  BUN 17, creatinine 0.58, sodium 132, potassium 4.3    Additional Notes:   Repeat BMP next week  Continue PT/OT    Portions of the record may have been created with voice recognition software.  Occasional wrong word or \"sound a like\" substitutions may have occurred due to the inherent limitations of voice recognition software.  Read the chart carefully and recognize, using context, where substitutions have occurred.    This note was electronically signed by Dr. Carlie Plunkett   "

## 2025-03-28 NOTE — ASSESSMENT & PLAN NOTE
Blood pressure readings are at times slightly on higher side however acceptable for age, 160/82 this morning, 130/78 yesterday.  Continue metoprolol heart rate 25 mg daily.  Dose was recently reduced due to low blood pressure

## 2025-03-28 NOTE — ASSESSMENT & PLAN NOTE
History of hyponatremia/ SIADH  Last reported reading was 132.   Pt requested to discontinue fluid restriction  F.U repeat BMP

## 2025-03-31 ENCOUNTER — PATIENT OUTREACH (OUTPATIENT)
Dept: CASE MANAGEMENT | Facility: OTHER | Age: 89
End: 2025-03-31

## 2025-04-01 ENCOUNTER — NURSING HOME VISIT (OUTPATIENT)
Dept: GERIATRICS | Facility: OTHER | Age: 89
End: 2025-04-01
Payer: MEDICARE

## 2025-04-01 VITALS
BODY MASS INDEX: 18.66 KG/M2 | DIASTOLIC BLOOD PRESSURE: 86 MMHG | TEMPERATURE: 97 F | OXYGEN SATURATION: 96 % | WEIGHT: 115.6 LBS | SYSTOLIC BLOOD PRESSURE: 158 MMHG | HEART RATE: 89 BPM | RESPIRATION RATE: 18 BRPM

## 2025-04-01 DIAGNOSIS — E43 SEVERE PROTEIN-CALORIE MALNUTRITION (HCC): ICD-10-CM

## 2025-04-01 DIAGNOSIS — E11.319 TYPE 2 DIABETES MELLITUS WITH RETINOPATHY, WITH LONG-TERM CURRENT USE OF INSULIN, MACULAR EDEMA PRESENCE UNSPECIFIED, UNSPECIFIED LATERALITY, UNSPECIFIED RETINOPATHY SEVERITY (HCC): ICD-10-CM

## 2025-04-01 DIAGNOSIS — I35.0 AORTIC VALVE STENOSIS, ETIOLOGY OF CARDIAC VALVE DISEASE UNSPECIFIED: ICD-10-CM

## 2025-04-01 DIAGNOSIS — Z79.4 TYPE 2 DIABETES MELLITUS WITH RETINOPATHY, WITH LONG-TERM CURRENT USE OF INSULIN, MACULAR EDEMA PRESENCE UNSPECIFIED, UNSPECIFIED LATERALITY, UNSPECIFIED RETINOPATHY SEVERITY (HCC): ICD-10-CM

## 2025-04-01 DIAGNOSIS — E11.65 TYPE 2 DIABETES MELLITUS WITH HYPERGLYCEMIA, WITH LONG-TERM CURRENT USE OF INSULIN (HCC): ICD-10-CM

## 2025-04-01 DIAGNOSIS — K21.9 GASTROESOPHAGEAL REFLUX DISEASE, UNSPECIFIED WHETHER ESOPHAGITIS PRESENT: ICD-10-CM

## 2025-04-01 DIAGNOSIS — J84.9 INTERSTITIAL LUNG DISEASE (HCC): Primary | ICD-10-CM

## 2025-04-01 DIAGNOSIS — I10 ESSENTIAL HYPERTENSION: ICD-10-CM

## 2025-04-01 DIAGNOSIS — Z79.4 TYPE 2 DIABETES MELLITUS WITH HYPERGLYCEMIA, WITH LONG-TERM CURRENT USE OF INSULIN (HCC): ICD-10-CM

## 2025-04-01 DIAGNOSIS — I48.0 PAROXYSMAL ATRIAL FIBRILLATION (HCC): ICD-10-CM

## 2025-04-01 DIAGNOSIS — E87.1 HYPONATREMIA: ICD-10-CM

## 2025-04-01 DIAGNOSIS — E78.2 MIXED HYPERLIPIDEMIA: ICD-10-CM

## 2025-04-01 PROBLEM — R04.2 HEMOPTYSIS: Status: RESOLVED | Noted: 2025-03-02 | Resolved: 2025-04-01

## 2025-04-01 PROCEDURE — 99309 SBSQ NF CARE MODERATE MDM 30: CPT | Performed by: INTERNAL MEDICINE

## 2025-04-01 NOTE — ASSESSMENT & PLAN NOTE
Patient remains on both famotidine 40 mg daily and pantoprazole 40 mg twice daily.  Dose reduction of pantoprazole to once daily was unsuccessful.  Patient requested to be placed back to twice daily dosing for better symptomatic relief

## 2025-04-01 NOTE — ASSESSMENT & PLAN NOTE
History of hyponatremia.  Fluid restriction was discontinued per patient's request.  Patient currently remains on sodium chloride tablet 1 g 3 times daily.  BMP from yesterday revealed sodium level to be normal at 137.  Continue to monitor periodically

## 2025-04-01 NOTE — ASSESSMENT & PLAN NOTE
Lab Results   Component Value Date    HGBA1C 7.5 (H) 03/07/2025   Blood sugar readings are noted to be slightly on the higher side during last few days.  Highest reported reading was 289 after breakfast today.  Other readings have been ranging between 157-250 range for the most part. Patient currently remains on metformin 1000 mg twice daily, Januvia 100 mg daily and Lantus 5 units daily.  Follow few more readings before further adjustment.  Avoid hypoglycemia

## 2025-04-01 NOTE — PROGRESS NOTES
[facility]  [facility]  [address]   [address]  [place], [place], [address] [phone]    Progress Note  Code SNF 31    Patient Location     [facility]    Reason for visit     Follow-up ILD, diabetes mellitus type 2, anxiety, GERD    Patient’s recent vitals, labs and updated medications were reviewed on [employer] system of facility.     Problem List Items Addressed This Visit       Essential hypertension    Metoprolol dose was recently reduced to 25 mg once daily for borderline low blood pressures.  Blood pressure is currently acceptable at 158/86.  Systolic blood pressure has been ranging between 10 5-1 50 range for the most part.         Mixed hyperlipidemia    Continue ezetimibe/simvastatin         Paroxysmal atrial fibrillation (HCC)    Heart rate stable on e Metoprolol 25 mg daily.   Continue Eliquis         GERD (gastroesophageal reflux disease)    Patient remains on both famotidine 40 mg daily and pantoprazole 40 mg twice daily.  Dose reduction of pantoprazole to once daily was unsuccessful.  Patient requested to be placed back to twice daily dosing for better symptomatic relief         Interstitial lung disease (HCC) - Primary    History of ILD.  Patient recently completed tapering doses of steroids.  Respiratory status remains stable with SaO2 of 96% on room air.  Continue to monitor.  Follow-up with pulm service         Hyponatremia    History of hyponatremia.  Fluid restriction was discontinued per patient's request.  Patient currently remains on sodium chloride tablet 1 g 3 times daily.  BMP from yesterday revealed sodium level to be normal at 137.  Continue to monitor periodically         DM2 (diabetes mellitus, type 2) (HCC)      Lab Results   Component Value Date    HGBA1C 7.5 (H) 03/07/2025   Blood sugar readings are noted to be slightly on the higher side during last few days.  Highest reported reading was 289 after breakfast today.  Other readings have  been ranging between 157-250 range for the most part. Patient currently remains on metformin 1000 mg twice daily, Januvia 100 mg daily and Lantus 5 units daily.  Follow few more readings before further adjustment.  Avoid hypoglycemia         Relevant Medications    sitaGLIPtin (Januvia) 100 mg tablet    Aortic stenosis    Continue conservative care.  Follow-up with cardiology service         Severe protein-calorie malnutrition (HCC)    Malnutrition Findings:                Patient is noted to be weak and frail.  Generalized body muscle wasting noted on exam.  Currently remains on 113.8 pounds.  P.o. intake is down at times.  Patient remains on mirtazapine at.  Follow-up with dietitian service.  Continue protein supplements               BMI Findings:           Body mass index is 18.66 kg/m².                   HPI         Patient is being seen for a follow-up visit today.  She is doing okay at present.  Patient feels she did better with physical therapy today.  No chest pain or overt dyspnea.  Patient remains afebrile.  No GI or  complaints.  Blood sugars are running between    Review of Systems   Constitutional:  Negative for chills and fever.   Respiratory:  Negative for cough, shortness of breath and wheezing.    Cardiovascular:  Negative for chest pain and leg swelling.   Gastrointestinal:  Negative for abdominal distention, abdominal pain and vomiting.   Musculoskeletal:  Positive for gait problem.   Neurological:  Positive for weakness. Negative for seizures and syncope.   Psychiatric/Behavioral:  Negative for agitation, behavioral problems and confusion.        Past Medical History:   Diagnosis Date    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/7/2020    Glaucoma     H/O degenerative disc disease     Idiopathic pulmonary fibrosis (HCC) 11/2020    Irregular heart beat     afib    Peripheral neuropathy     S/P laparoscopic cholecystectomy 12/21/2020    Stenosis of right subclavian artery (HCC) 11/18/2016       Past  Surgical History:   Procedure Laterality Date    CATARACT EXTRACTION, BILATERAL      CHOLECYSTECTOMY      CHOLECYSTECTOMY LAPAROSCOPIC N/A 2020    Procedure: CHOLECYSTECTOMY LAPAROSCOPIC;  Surgeon: Kalen Garrett MD;  Location: [facility];  Service: General    COLONOSCOPY      CYST REMOVAL      left lower Quadrant     FL LUMBAR PUNCTURE DIAGNOSTIC  2023    HERNIA REPAIR  1992    LIPOMA RESECTION  2010    NM RPR 1ST INGUN HRNA AGE 5 YRS/> REDUCIBLE Bilateral 2018    Procedure: INGUINAL HERNIA REPAIR;  Surgeon: Volodymyr Lee MD;  Location: [facility];  Service: General    SHOULDER SURGERY  2019    Dr. Arnett ([place])    UPPER GASTROINTESTINAL ENDOSCOPY      VASCULAR SURGERY      Agram-  R carotid occlusion       Social History     Tobacco Use   Smoking Status Former    Current packs/day: 0.00    Average packs/day: 1.5 packs/day for 38.0 years (57.0 ttl pk-yrs)    Types: Cigarettes    Start date:     Quit date:     Years since quittin.2   Smokeless Tobacco Never       Family History   Problem Relation Age of Onset    Heart disease Mother     Heart attack Father     Hiatal hernia Father     Diabetes Father     Cancer Brother     Diabetes Brother     Heart disease Brother     Hypertension Brother     Lung disease Brother 75        interstitial lung disease    Cancer Brother 49        glioblastoma    Other Son         gastroparesis    Other Cousin         interstitial lung disease        Allergies   Allergen Reactions    Keflex [Cephalexin] Diarrhea         Current Outpatient Medications:     acetaminophen (TYLENOL) 500 mg tablet, Take 1,000 mg by mouth as needed for mild pain, Disp: , Rfl:     apixaban (Eliquis) 2.5 mg, TAKE 1 TABLET (2.5 MG TOTAL) BY MOUTH 2 (TWO) TIMES A DAY, Disp: 90 tablet, Rfl: 1    bimatoprost (LUMIGAN) 0.01 % ophthalmic drops, Administer 1 drop to both eyes daily at bedtime for 30 days, Disp: 1.5 mL, Rfl: 0    brimonidine tartrate 0.2 % ophthalmic  solution, INSTILL 1 DROP INTO RIGHT EYE TWICE A DAY, Disp: , Rfl:     Cyanocobalamin (Vitamin B 12) 500 MCG TABS, Take 500 mcg by mouth 2 (two) times a day, Disp: , Rfl:     ezetimibe-simvastatin (VYTORIN) 10-40 mg per tablet, TAKE 1 TABLET BY MOUTH DAILY AT BEDTIME, Disp: 30 tablet, Rfl: 5    famotidine (PEPCID) 20 mg tablet, TAKE 1 TABLET (20 MG TOTAL) BY MOUTH 2 (TWO) TIMES A DAY, Disp: 60 tablet, Rfl: 5    glucose blood (OneTouch Ultra) test strip, TEST FOUR TIMES A DAY, Disp: 100 strip, Rfl: 5    insulin glargine (LANTUS) 100 units/mL subcutaneous injection, Inject 5 Units under the skin every morning, Disp: , Rfl:     insulin lispro (HumALOG/ADMELOG) 100 units/mL injection, Inject 1-5 Units under the skin 3 (three) times a day before meals, Disp: , Rfl:     insulin lispro (HumALOG/ADMELOG) 100 units/mL injection, Inject 1-5 Units under the skin daily at bedtime, Disp: , Rfl:     Insulin Pen Needle (B-D UF III MINI PEN NEEDLES) 31G X 5 MM MISC, USE 2 (TWO) TIMES A DAY, Disp: 200 each, Rfl: 3    ipratropium (ATROVENT) 0.03 % nasal spray, USE 2 SPRAYS INTO EACH NOSTRIL EVERY 12 (TWELVE) HOURS, Disp: 30 mL, Rfl: 6    lidocaine (LIDODERM) 5 %, Apply 1 patch topically over 12 hours daily Remove & Discard patch within 12 hours or as directed by MD, Disp: , Rfl:     LORazepam (ATIVAN) 0.5 mg tablet, Take 1 tablet (0.5 mg total) by mouth 2 (two) times a day as needed for anxiety for up to 10 days, Disp: 20 tablet, Rfl: 0    metFORMIN (GLUCOPHAGE) 500 mg tablet, TAKE 2 TABLETS (1,000 MG TOTAL) BY MOUTH 2 (TWO) TIMES A DAY WITH MEALS, Disp: 120 tablet, Rfl: 5    metoprolol tartrate (LOPRESSOR) 25 mg tablet, Take 1 tablet (25 mg total) by mouth every 12 (twelve) hours, Disp: 180 tablet, Rfl: 3    mirtazapine (REMERON) 7.5 MG tablet, Take 1 tablet (7.5 mg total) by mouth daily at bedtime, Disp: 20 tablet, Rfl: 0    Multiple Vitamin (multivitamin) tablet, Take 1 tablet by mouth daily  , Disp: , Rfl:     OneTouch Delica  "Lancets 33G MISC, Check blood sugars four times daily. Please substitute with appropriate alternative as covered by patient's insurance. Dx: E11.65, Disp: 400 each, Rfl: 3    pantoprazole (PROTONIX) 40 mg tablet, Take 1 tablet (40 mg total) by mouth every 12 (twelve) hours, Disp: , Rfl:     sitaGLIPtin (Januvia) 100 mg tablet, TAKE 1 TABLET (100 MG TOTAL) BY MOUTH DAILY (Patient not taking: Reported on 3/17/2025), Disp: 90 tablet, Rfl: 1    sodium chloride 1 g tablet, TAKE 1 TABLET THREE TIMES A DAY, Disp: 90 tablet, Rfl: 1    Updated list was reviewed in [facility] system of facility.     Vitals:    04/01/25 1535   BP: 158/86   Pulse: 89   Resp: 18   Temp: (!) 97 °F (36.1 °C)   SpO2: 96%       Physical Exam  Constitutional:       General: She is not in acute distress.  HENT:      Head: Normocephalic and atraumatic.      Nose: No rhinorrhea.   Cardiovascular:      Rate and Rhythm: Normal rate and regular rhythm.      Heart sounds: Murmur heard.   Pulmonary:      Breath sounds: Rales (few at bases) present. No wheezing or rhonchi.   Abdominal:      General: There is no distension.      Palpations: Abdomen is soft.      Tenderness: There is no abdominal tenderness. There is no guarding.   Musculoskeletal:      Cervical back: Neck supple.      Right lower leg: No edema.      Left lower leg: No edema.   Skin:     Coloration: Skin is not jaundiced.   Neurological:      General: No focal deficit present.      Mental Status: Mental status is at baseline.      Cranial Nerves: No cranial nerve deficit.   Psychiatric:         Mood and Affect: Mood normal.         Behavior: Behavior normal.         Diagnostic Data:    BMP from yesterday revealed BUN 15, creatinine 0.56, sodium 137 and potassium 3.7    Portions of the record may have been created with voice recognition software.  Occasional wrong word or \"sound a like\" substitutions may have occurred due to the inherent limitations of voice recognition software.  Read the " chart carefully and recognize, using context, where substitutions have occurred.    This note was electronically signed by Dr. Carlie Plunkett

## 2025-04-01 NOTE — ASSESSMENT & PLAN NOTE
Malnutrition Findings:                Patient is noted to be weak and frail.  Generalized body muscle wasting noted on exam.  Currently remains on 113.8 pounds.  P.o. intake is down at times.  Patient remains on mirtazapine at.  Follow-up with dietitian service.  Continue protein supplements               BMI Findings:           Body mass index is 18.66 kg/m².

## 2025-04-01 NOTE — ASSESSMENT & PLAN NOTE
Metoprolol dose was recently reduced to 25 mg once daily for borderline low blood pressures.  Blood pressure is currently acceptable at 158/86.  Systolic blood pressure has been ranging between 10 5-1 50 range for the most part.

## 2025-04-01 NOTE — ASSESSMENT & PLAN NOTE
History of ILD.  Patient recently completed tapering doses of steroids.  Respiratory status remains stable with SaO2 of 96% on room air.  Continue to monitor.  Follow-up with pulm service

## 2025-04-02 ENCOUNTER — TELEMEDICINE (OUTPATIENT)
Dept: CARDIOLOGY CLINIC | Facility: CLINIC | Age: 89
End: 2025-04-02
Payer: MEDICARE

## 2025-04-02 ENCOUNTER — TELEPHONE (OUTPATIENT)
Age: 89
End: 2025-04-02

## 2025-04-02 VITALS — WEIGHT: 115 LBS | BODY MASS INDEX: 18.48 KG/M2 | OXYGEN SATURATION: 99 % | HEIGHT: 66 IN

## 2025-04-02 DIAGNOSIS — Z95.0 PACEMAKER: ICD-10-CM

## 2025-04-02 DIAGNOSIS — I35.0 NONRHEUMATIC AORTIC VALVE STENOSIS: ICD-10-CM

## 2025-04-02 DIAGNOSIS — I48.0 PAROXYSMAL ATRIAL FIBRILLATION (HCC): ICD-10-CM

## 2025-04-02 DIAGNOSIS — I49.5 TACHY-BRADY SYNDROME (HCC): ICD-10-CM

## 2025-04-02 DIAGNOSIS — I10 PRIMARY HYPERTENSION: ICD-10-CM

## 2025-04-02 DIAGNOSIS — E78.2 MIXED HYPERLIPIDEMIA: ICD-10-CM

## 2025-04-02 PROCEDURE — 99214 OFFICE O/P EST MOD 30 MIN: CPT

## 2025-04-02 RX ORDER — FAMOTIDINE 10 MG
TABLET ORAL
COMMUNITY
Start: 2025-03-11 | End: 2025-04-04

## 2025-04-02 NOTE — TELEPHONE ENCOUNTER
Jennifer Casiano from [facility] called to make sure it is ok to send home health orders to [facility].  She said patient will be discharged from [facility] 4/4 and she will have her first visit 4/5.    Jennifer Casiano's ph:  [phone]    Thank you

## 2025-04-02 NOTE — PROGRESS NOTES
Ac Duran  1935  [id number]  [facility] [facility] [address] [phone]    1. Paroxysmal atrial fibrillation (HCC)        2. Primary hypertension        3. Nonrheumatic aortic valve stenosis        4. Pacemaker        5. Tachy-guerline syndrome (HCC)        6. Mixed hyperlipidemia            Summary/Discussion:  Hypertension:  - blood pressure not obtained today due to this being a virtual visit.  Per chart review blood pressures do not appear well controlled.  She is currently in a SNF in which she reports that her blood pressures are being monitored regularly   - continue present medication regimen at this time   - lifestyle modification   - close blood pressure monitoring     Paroxysmal atrial fibrillation:  - EKG not obtained  - echo (2/2025): LV wall thickness mildly increased, mild asymmetric hypertrophy of basal septal wall, LVEF 55-60%, no WMA, G1 DD, normal RV, mild to moderate aortic stenosis, mild annular calcification of mitral valve and mild TR  - anticoagulation on Eliquis 2.5 mg twice daily  - continue metoprolol tartrate 25 mg twice daily    Hx of tachybradycardia syndrome s/p MDT DC PPM in 2016  - device interrogation (2/17/2025): AP 88%,  0.2%, no significant high rate episodes.  Reprogrammed rate response activity threshold to low from medium low for reported dyspnea and fatigue.  Normal device function    Aortic stenosis:  - mild-moderate by echo in 2/2025  - unable to assess volume status due to this being a virtual visit. She currently denies any HF s/s.  Not currently requiring any loop diuretics  - continue to monitor with serial echo at appropriate intervals     Interstitial lung disease   Dyspnea on exertion  - per documentation, felt to be secondary to deconditioning and underlying severe ILD  - follows with Pulmonary   - reports improvement in her symptoms from prior    Peripheral arterial disease:  - on  aspirin,  "vytorin and Eliquis    Dyslipidemia:  - Lipid Profile:    Latest Reference Range & Units 03/07/25 10:31   Cholesterol See Comment mg/dL 163   Triglycerides See Comment mg/dL 94   HDL >=50 mg/dL 75   Non-HDL Cholesterol mg/dl 88   LDL Calculated 0 - 100 mg/dL 69   - continue vytorin 10-40 mg daily   - encouraged low cholesterol, mediterranean diet, and annual lipid follow up      Interval History: Ac Duran is a 89 y.o. year old female with history mentioned in problem list who presents via virtual visit today for a short term follow up.    Today, she does not express any significant cardiac complaints. She denies any chest pain/pressure/discomfort. She denies lower extremity edema, orthopnea, and PND. She denies lightheadedness, worsening dizziness (chronic), and syncope. She denies palpitations.  She reports improvement in her dyspnea on exertion from prior visit and has not had any recurrent symptoms of \"heart burn\".        She will RTO in 6 months with Dr. Ramsey or sooner if necessary. She will call with any concerns.         Medical Problems       Problem List       Essential hypertension    Mixed hyperlipidemia    Glaucoma    Asymptomatic carotid artery stenosis, left    Overview Signed 10/29/2016  5:29 PM by Jose Guaman   R sided occlusion         Symptomatic PVCs    Occlusion of right carotid artery    Paroxysmal atrial fibrillation (HCC)    Pacemaker    Osteoporosis    GERD (gastroesophageal reflux disease)    Anxiety    Iron deficiency    Interstitial lung disease (HCC)    Dysphagia    Moderate protein-calorie malnutrition (HCC)    Malnutrition Findings:                                 BMI Findings:           Body mass index is 18.56 kg/m².         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, with long-term current use of insulin ([facility])      Lab Results   Component Value Date    HGBA1C 7.5 (H) 03/07/2025         Depression, recurrent ([facility])    Hypovitaminosis D    Overview Deleted 12/12/2021  8:22 PM by ZANDER Swenson          Pachymeningitis    Bilateral hip pain    Metabolic alkalosis    SIADH (syndrome of inappropriate ADH production) ([facility])    Irritable bowel syndrome with both constipation and diarrhea    Peripheral arterial disease ([facility])    Dyspnea on exertion    Hypoxia    ROMERO (obstructive sleep apnea)    Microcytosis    Aortic stenosis    Acute hypoxic respiratory failure ([facility])    Hypertension    Iron deficiency anemia    Mild protein-calorie malnutrition ([facility])    Malnutrition Findings:                                 BMI Findings:           Body mass index is 18.56 kg/m².         Severe protein-calorie malnutrition (HCC)    Malnutrition Findings:                                 BMI Findings:           Body mass index is 18.56 kg/m².         Anemia    Decreased appetite        Past Medical History:   Diagnosis Date    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/7/2020    Glaucoma     H/O degenerative disc disease     Idiopathic pulmonary fibrosis ([facility]) 11/2020    Irregular heart beat     afib    Peripheral neuropathy     S/P laparoscopic cholecystectomy 12/21/2020    Stenosis of right subclavian artery ([facility]) 11/18/2016     Social History     Socioeconomic History    Marital status:      Spouse name: Not on file    Number of children: Not on file    Years of education: Not on file    Highest education level: Not on file   Occupational History    Occupation:  customer service   retired   Tobacco Use    Smoking status: Former     Current packs/day: 0.00     Average packs/day: 1.5 packs/day for 38.0 years (57.0 ttl pk-yrs)     Types: Cigarettes     Start date:      Quit date:      Years since quittin.2    Smokeless tobacco: Never   Vaping Use    Vaping status: Never Used   Substance and Sexual Activity    Alcohol use: Never    Drug use: No    Sexual activity: Not on file   Other Topics Concern    Not on file   Social History Narrative    Lives [place].    Was divorce.  Ex- passed away.    3 children.  Four grandchildren.    Spends most of her time at home.  Does not drive.     Social Drivers of Health     Financial Resource Strain: Low Risk  (2023)    Overall Financial Resource Strain (CARDIA)     Difficulty of Paying Living Expenses: Not very hard   Food Insecurity: No Food Insecurity (3/7/2025)    Nursing - Inadequate Food Risk Classification     Worried About Running Out of Food in the Last Year: Never true     Ran Out of Food in the Last Year: Never true     Ran Out of Food in the Last Year: Never true   Transportation Needs: No Transportation Needs (3/7/2025)    Nursing - Transportation Risk Classification     Lack of Transportation: Not on file     Lack of Transportation: No   Physical Activity: Not on file   Stress: Not on file   Social Connections: Not on file   Intimate Partner Violence: Unknown (3/7/2025)    Nursing IPS     Feels Physically and Emotionally Safe: Not on file     Physically Hurt by Someone: Not on file     Humiliated or Emotionally Abused by Someone: Not on file     Physically Hurt by Someone: No     Hurt or Threatened by Someone: No   Housing Stability: Unknown (3/7/2025)    Nursing: Inadequate Housing Risk Classification     Has Housing: Not on file     Worried About Losing Housing: Not on file     Unable to Get Utilities: Not on file     Unable to Pay for Housing in the Last Year: No     Has Housin       Family History   Problem Relation Age of Onset    Heart disease Mother     Heart attack Father     Hiatal hernia Father     Diabetes Father     Cancer Brother     Diabetes Brother     Heart disease Brother     Hypertension Brother     Lung disease Brother 75        interstitial lung disease    Cancer Brother 49        glioblastoma    Other Son         gastroparesis    Other Cousin         interstitial lung disease     Past Surgical History:   Procedure Laterality Date    CATARACT EXTRACTION, BILATERAL  2011    CHOLECYSTECTOMY      CHOLECYSTECTOMY LAPAROSCOPIC N/A 12/09/2020    Procedure: CHOLECYSTECTOMY LAPAROSCOPIC;  Surgeon: Kalen Garrett MD;  Location: [facility];  Service: General    COLONOSCOPY      CYST REMOVAL  2009    left lower Quadrant     FL LUMBAR PUNCTURE DIAGNOSTIC  4/28/2023    HERNIA REPAIR  1992    LIPOMA RESECTION  2010    NE RPR 1ST INGUN HRNA AGE 5 YRS/> REDUCIBLE Bilateral 01/05/2018    Procedure: INGUINAL HERNIA REPAIR;  Surgeon: Volodymyr Lee MD;  Location: [facility];  Service: General    SHOULDER SURGERY  04/26/2019    Dr. Arnett ([place])    UPPER GASTROINTESTINAL ENDOSCOPY      VASCULAR SURGERY  1994    Agram-  R carotid occlusion       Current Outpatient Medications:     acetaminophen (TYLENOL) 500 mg tablet, Take 1,000 mg by mouth as needed for mild pain, Disp: , Rfl:     apixaban (Eliquis) 2.5 mg, TAKE 1 TABLET (2.5 MG TOTAL) BY MOUTH 2 (TWO) TIMES A DAY, Disp: 90 tablet, Rfl: 1    bimatoprost (LUMIGAN) 0.01 % ophthalmic drops, Administer 1 drop to both eyes daily at bedtime for 30 days, Disp: 1.5 mL, Rfl: 0    brimonidine tartrate 0.2 % ophthalmic solution, INSTILL 1 DROP INTO RIGHT EYE TWICE A DAY, Disp: , Rfl:     Cyanocobalamin (Vitamin B 12) 500 MCG TABS, Take 500 mcg by mouth 2 (two) times a day, Disp: , Rfl:     ezetimibe-simvastatin (VYTORIN) 10-40 mg per tablet, TAKE 1 TABLET BY MOUTH DAILY AT BEDTIME, Disp: 30 tablet, Rfl: 5    glucose blood (OneTouch Ultra) test strip,  TEST FOUR TIMES A DAY, Disp: 100 strip, Rfl: 5    insulin glargine (LANTUS) 100 units/mL subcutaneous injection, Inject 5 Units under the skin every morning, Disp: , Rfl:     insulin lispro (HumALOG/ADMELOG) 100 units/mL injection, Inject 1-5 Units under the skin 3 (three) times a day before meals, Disp: , Rfl:     insulin lispro (HumALOG/ADMELOG) 100 units/mL injection, Inject 1-5 Units under the skin daily at bedtime, Disp: , Rfl:     Insulin Pen Needle (B-D UF III MINI PEN NEEDLES) 31G X 5 MM MISC, USE 2 (TWO) TIMES A DAY, Disp: 200 each, Rfl: 3    ipratropium (ATROVENT) 0.03 % nasal spray, USE 2 SPRAYS INTO EACH NOSTRIL EVERY 12 (TWELVE) HOURS, Disp: 30 mL, Rfl: 6    LORazepam (ATIVAN) 0.5 mg tablet, Take 1 tablet (0.5 mg total) by mouth 2 (two) times a day as needed for anxiety for up to 10 days, Disp: 20 tablet, Rfl: 0    metFORMIN (GLUCOPHAGE) 500 mg tablet, TAKE 2 TABLETS (1,000 MG TOTAL) BY MOUTH 2 (TWO) TIMES A DAY WITH MEALS, Disp: 120 tablet, Rfl: 5    metoprolol tartrate (LOPRESSOR) 25 mg tablet, Take 1 tablet (25 mg total) by mouth every 12 (twelve) hours, Disp: 180 tablet, Rfl: 3    mirtazapine (REMERON) 7.5 MG tablet, Take 1 tablet (7.5 mg total) by mouth daily at bedtime, Disp: 20 tablet, Rfl: 0    OneTouch Delica Lancets 33G MISC, Check blood sugars four times daily. Please substitute with appropriate alternative as covered by patient's insurance. Dx: E11.65, Disp: 400 each, Rfl: 3    pantoprazole (PROTONIX) 40 mg tablet, Take 1 tablet (40 mg total) by mouth every 12 (twelve) hours, Disp: , Rfl:     sitaGLIPtin (Januvia) 100 mg tablet, Take 1 tablet (100 mg total) by mouth daily, Disp: , Rfl:     sodium chloride 1 g tablet, TAKE 1 TABLET THREE TIMES A DAY, Disp: 90 tablet, Rfl: 1    famotidine (PEPCID) 10 mg tablet, , Disp: , Rfl:     famotidine (PEPCID) 20 mg tablet, TAKE 1 TABLET (20 MG TOTAL) BY MOUTH 2 (TWO) TIMES A DAY, Disp: 60 tablet, Rfl: 5    lidocaine (LIDODERM) 5 %, Apply 1 patch  "topically over 12 hours daily Remove & Discard patch within 12 hours or as directed by MD (Patient not taking: Reported on 4/2/2025), Disp: , Rfl:     Multiple Vitamin (multivitamin) tablet, Take 1 tablet by mouth daily  , Disp: , Rfl:   Allergies   Allergen Reactions    Keflex [Cephalexin] Diarrhea       Labs:     Chemistry        Component Value Date/Time    K 4.3 03/10/2025 0459    K 4.6 12/05/2020 0811    CL 98 03/10/2025 0459    CL 92 (L) 12/05/2020 0811    CO2 33 (H) 03/10/2025 0459    CO2 26 12/05/2020 0811    BUN 17 03/10/2025 0459    BUN 9 12/05/2020 0811    CREATININE 0.59 (L) 03/10/2025 0459    CREATININE 0.53 12/05/2020 0811        Component Value Date/Time    CALCIUM 8.3 (L) 03/10/2025 0459    CALCIUM 9.0 12/05/2020 0811    ALKPHOS 50 03/07/2025 1434    ALKPHOS 261 (H) 12/05/2020 0811    AST 29 03/07/2025 1434     (H) 12/05/2020 0811    ALT 23 03/07/2025 1434     (H) 12/05/2020 0811            No results found for: \"CHOL\"  Lab Results   Component Value Date    HDL 75 03/07/2025    HDL 57 05/23/2024    HDL 62 11/08/2023     Lab Results   Component Value Date    LDLCALC 69 03/07/2025    LDLCALC 53 05/23/2024    LDLCALC 48 11/08/2023     Lab Results   Component Value Date    TRIG 94 03/07/2025    TRIG 77 05/23/2024    TRIG 81 11/08/2023     No results found for: \"CHOLHDL\"    Imaging: XR chest 2 views  Result Date: 3/7/2025  Narrative: XR CHEST PA AND LATERAL INDICATION: sob. COMPARISON: CXR and chest CT 3/2/2025. FINDINGS: Moderate pulmonary fibrosis. No pneumothorax or pleural effusion. Normal heart size with left subclavian pacemaker leads in right atrium and right ventricle. Bones are unremarkable for age. Reverse right shoulder arthroplasty. Upper abdomen normal. Cholecystectomy.     Impression: Moderate pulmonary fibrosis with no definite acute disease. Workstation performed: [id number]       ECG:  n/a    Review of Systems   All other systems reviewed and are negative.      Vitals:    " "04/02/25 1000   SpO2: 99%     Vitals:    04/02/25 1000   Weight: 52.2 kg (115 lb)     Height: 5' 6\" (167.6 cm)   Body mass index is 18.56 kg/m².      Administrative Statements   Encounter provider ZANDER Liu    The Patient is located at Other and in the following state in which I hold an active license [place].    The patient was identified by name and date of birth. Ac Duran was informed that this is a telemedicine visit and that the visit is being conducted through the Epic Embedded platform. She agrees to proceed..  My office door was closed. No one else was in the room.  She acknowledged consent and understanding of privacy and security of the video platform. The patient has agreed to participate and understands they can discontinue the visit at any time.    I have spent a total time of 20 minutes in caring for this patient on the day of the visit/encounter including Diagnostic results, not including the time spent for establishing the audio/video connection.    "

## 2025-04-03 ENCOUNTER — NURSING HOME VISIT (OUTPATIENT)
Dept: GERIATRICS | Facility: OTHER | Age: 89
End: 2025-04-03
Payer: MEDICARE

## 2025-04-03 DIAGNOSIS — J84.9 INTERSTITIAL LUNG DISEASE (HCC): ICD-10-CM

## 2025-04-03 DIAGNOSIS — E78.2 MIXED HYPERLIPIDEMIA: ICD-10-CM

## 2025-04-03 DIAGNOSIS — I35.0 AORTIC VALVE STENOSIS, ETIOLOGY OF CARDIAC VALVE DISEASE UNSPECIFIED: ICD-10-CM

## 2025-04-03 DIAGNOSIS — D50.9 IRON DEFICIENCY ANEMIA, UNSPECIFIED IRON DEFICIENCY ANEMIA TYPE: ICD-10-CM

## 2025-04-03 DIAGNOSIS — F33.9 DEPRESSION, RECURRENT (HCC): ICD-10-CM

## 2025-04-03 DIAGNOSIS — K21.9 GASTROESOPHAGEAL REFLUX DISEASE, UNSPECIFIED WHETHER ESOPHAGITIS PRESENT: ICD-10-CM

## 2025-04-03 DIAGNOSIS — E44.0 MODERATE PROTEIN-CALORIE MALNUTRITION (HCC): ICD-10-CM

## 2025-04-03 DIAGNOSIS — Z79.4 TYPE 2 DIABETES MELLITUS WITH HYPERGLYCEMIA, WITH LONG-TERM CURRENT USE OF INSULIN (HCC): ICD-10-CM

## 2025-04-03 DIAGNOSIS — E87.1 HYPONATREMIA: ICD-10-CM

## 2025-04-03 DIAGNOSIS — I48.0 PAROXYSMAL ATRIAL FIBRILLATION (HCC): ICD-10-CM

## 2025-04-03 DIAGNOSIS — I10 ESSENTIAL HYPERTENSION: Primary | ICD-10-CM

## 2025-04-03 DIAGNOSIS — E11.65 TYPE 2 DIABETES MELLITUS WITH HYPERGLYCEMIA, WITH LONG-TERM CURRENT USE OF INSULIN (HCC): ICD-10-CM

## 2025-04-03 PROCEDURE — 99316 NF DSCHRG MGMT 30 MIN+: CPT | Performed by: INTERNAL MEDICINE

## 2025-04-04 VITALS
HEART RATE: 72 BPM | BODY MASS INDEX: 18.66 KG/M2 | DIASTOLIC BLOOD PRESSURE: 66 MMHG | WEIGHT: 115.6 LBS | RESPIRATION RATE: 18 BRPM | SYSTOLIC BLOOD PRESSURE: 150 MMHG | TEMPERATURE: 96.7 F | OXYGEN SATURATION: 94 %

## 2025-04-04 NOTE — ASSESSMENT & PLAN NOTE
Most recent blood pressure readings are noted to be on the higher side.  Systolic blood pressure has been ranging between 123-186 range  Patient was taken off of amlodipine earlier due to soft blood pressures.  Dose of metoprolol was reduced to 25 mg once daily.  Advised patient to increase metoprolol to 25 mg twice daily and follow-up with PCP for further recommendations.  May need to restart amlodipine if blood pressure remains persistently low

## 2025-04-04 NOTE — PROGRESS NOTES
[facility]  [address]   [address]  [place], [place], [address] [phone]  Nursing Home Discharge    Code SNF 31      Patient Location     [facility]       Patient’s care was coordinated with nursing facility staff. Recent vitals, labs and updated medications were reviewed in [facility] of facility.      History of Present   Illness     Hospital course:  Patient is a 89 y.o. female with past medical history significant for ILD, A-fib, neuropathy, diabetes mellitus type 2, hypertension, GERD, anemia, anxiety, AS and glaucoma    Patient was hospitalized 3 times during the 6 weeks.  Initial hospitalization was from 2/24-2/26 with dyspnea on exertion.  Symptoms were attributed to ILD and aortic stenosis. Patient was started on prednisone taper.  Troponins were negative.  Chest x-ray was negative for acute findings.  Patient was discharged on 2/26 and advised to follow-up with cardiology service for AS.     Patient was rehospitalized from 3/2 to 3/5 with episodes of hemoptysis.  CT chest was negative for PE revealed chronic interstitial lung disease with new diffuse groundglass haziness predominantly in the lower lobes suspected to be due to edema/hemorrhage.  Chest x-ray showed moderate pulmonary fibrosis with no definitive acute process.  Procalcitonin and blood cultures remained negative.  Anticoagulation was held for short time,later resumed. Initially bronchoscopically was considered however it was felt that risk outweighed benefits and therefore conservative approach was followed.  Patient was recommended to continue prednisone taper as was started recently for suspected ILD flare up. Patient was noted to have elevated blood pressure readings for which Lopressor dose was adjusted and Amlodipine was added. Patient was later discharged home.  Patient presented to the hospital again on 3/7 with dyspnea on exertion.  Chest x-ray done during this  hospitalization again revealed moderate pulmonary fibrosis with no definitive acute disease.  Patient was seen by pulmonary service.  Respiratory panel was negative.  There were no signs of ILD worsening.  Patient was seen by cardiology service for aortic stenosis.  Outpatient follow-up was recommended.  Patient was felt to be quite deconditioned and weak and was therefore discharged to [facility].    Rehab course:  Pt progressed well with PT/OT in rehab. Blood sugars were initially noted to be on higher side while she was on Prednisone, later trended down off steroids.  Patient had been on Novolin and twice a day.  She was switched to Lantus once daily.  Dose was later reduced to 5 units daily due to low blood sugars.  Patient additionally remains on sitagliptin and metformin.  Patient's blood pressure readings were also quite labile.  She had few readings of low blood pressures for which she was taken off of amlodipine.  Metoprolol dose was reduced to 25 mg  once daily.  Blood pressure readings were later noted to be on the higher side.  Patient was advised to increase metoprolol to twice daily and follow-up with PCP for further dose adjustments.  Patient remained on pantoprazole 40 mg twice daily along with famotidine for GERD.  Dose reduction of pantoprazole to once daily was attempted however patient had relapse of GERD symptoms and indigestion and requested to be placed back on twice daily dosing.  Patient completed prednisone taper in rehab.  Respiratory status remained stable with SaO2 of around 94 to 96% on room air.  Labs done in the facility stable.  Patient had history of hyponatremia.  Sodium level was normal at 137 03/31/2025.  Sodium chloride tablets 1 g 3 times daily were continued.  Fluid restriction was discontinued per patient's request.  Patient was seen by cardiology service for follow-up visit on 3/17 and 4/2/25. Notes were reviewed  Patient is being discharged home on 4/4/2025.   Recommend close PCP follow-up.    Problem List Items Addressed This Visit       Essential hypertension - Primary    Most recent blood pressure readings are noted to be on the higher side.  Systolic blood pressure has been ranging between 123-186 range  Patient was taken off of amlodipine earlier due to soft blood pressures.  Dose of metoprolol was reduced to 25 mg once daily.  Advised patient to increase metoprolol to 25 mg twice daily and follow-up with PCP for further recommendations.  May need to restart amlodipine if blood pressure remains persistently low         Mixed hyperlipidemia    Continue ezetimibe/simvastatin         Paroxysmal atrial fibrillation (HCC)    Heart rate stable on metoprolol.  Dose to be increased to 25 mg twice daily for borderline high blood pressures.  Continue Eliquis.  Recent cardiology follow-up notes were reviewed.           GERD (gastroesophageal reflux disease)    Patient remains on pantoprazole 40 mg twice daily and famotidine 40 mg daily.  Dose reduction of pantoprazole to once daily was unuccessful patient had relapse of her symptoms         Interstitial lung disease (HCC)    History of ILD.  Patient recently completed tapering doses of steroids.  Respiratory status remains stable with SaO2 of 94% on room air.  Continue to monitor.  Follow-up with pulm service 5         Moderate protein-calorie malnutrition (HCC)    Malnutrition Findings:          Patient is noted to be weak and frail.  Currently weighs only 115 pounds.  Encourage p.o. intake and protein supplements                       BMI Findings:           There is no height or weight on file to calculate BMI.            Hyponatremia    History of hyponatremia.  Fluid restriction was discontinued per patient's request.  Patient currently remains on sodium chloride tablet 1 g 3 times daily.  BMP from 3/31 revealed sodium level to be normal at 137.  Continue to monitor periodically         DM2 (diabetes mellitus, type 2)  (HCC)      Lab Results   Component Value Date    HGBA1C 7.5 (H) 03/07/2025   Most recent Accu-Cheks have been acceptable with blood sugars ranging from 132 to 251 range.  Highest reported reading was 289 on 4/1/25  Recent hemoglobin A1c was acceptable for age.  Continue metformin 1000 mg twice a day, sitagliptin 100 mg daily and Lantus 5 units daily.  May titrate Lantus dose based on blood sugars.  Follow-up with PCP.  Discontinue sliding scale coverage upon discharge         Depression, recurrent (HCC)    Continue mirtazapine         Aortic stenosis    Continue conservative care.  Follow-up with cardiology service         Anemia    HGB has been ranging  between 9-11 range  Iron panel was  suggestive of some mild iron deficiency  S/p  dose of IV venofer recently at the [facility]  HGB was 10.9 on 3/18/25              Review of Systems   Constitutional:  Negative for chills and fever.   Respiratory:  Negative for cough, shortness of breath and wheezing.    Cardiovascular:  Negative for chest pain and leg swelling.   Gastrointestinal:  Negative for abdominal distention, abdominal pain and vomiting.   Musculoskeletal:  Positive for gait problem.   Neurological:  Positive for weakness. Negative for seizures and syncope.   Psychiatric/Behavioral:  Negative for agitation, behavioral problems and confusion.         Past Surgical History:   Procedure Laterality Date    CATARACT EXTRACTION, BILATERAL  2011    CHOLECYSTECTOMY      CHOLECYSTECTOMY LAPAROSCOPIC N/A 12/09/2020    Procedure: CHOLECYSTECTOMY LAPAROSCOPIC;  Surgeon: Kalen Garrett MD;  Location: [facility];  Service: General    COLONOSCOPY      CYST REMOVAL  2009    left lower Quadrant     FL LUMBAR PUNCTURE DIAGNOSTIC  4/28/2023    HERNIA REPAIR  1992    LIPOMA RESECTION  2010    MS RPR 1ST INGUN HRNA AGE 5 YRS/> REDUCIBLE Bilateral 01/05/2018    Procedure: INGUINAL HERNIA REPAIR;  Surgeon: Volodymyr Lee MD;  Location: [facility];  Service: General    SHOULDER  SURGERY  2019    Dr. Arnett ([place])    UPPER GASTROINTESTINAL ENDOSCOPY      VASCULAR SURGERY      Agram-  R carotid occlusion       Past Medical History:   Diagnosis Date    Common bile duct dilatation 2020    Glaucoma     H/O degenerative disc disease     Idiopathic pulmonary fibrosis (HCC) 2020    Irregular heart beat     afib    Peripheral neuropathy     S/P laparoscopic cholecystectomy 2020    Stenosis of right subclavian artery (HCC) 2016         Social History     Tobacco Use   Smoking Status Former    Current packs/day: 0.00    Average packs/day: 1.5 packs/day for 38.0 years (57.0 ttl pk-yrs)    Types: Cigarettes    Start date:     Quit date:     Years since quittin.2   Smokeless Tobacco Never       Social History     Substance and Sexual Activity   Alcohol Use Never       Family History   Problem Relation Age of Onset    Heart disease Mother     Heart attack Father     Hiatal hernia Father     Diabetes Father     Cancer Brother     Diabetes Brother     Heart disease Brother     Hypertension Brother     Lung disease Brother 75        interstitial lung disease    Cancer Brother 49        glioblastoma    Other Son         gastroparesis    Other Cousin         interstitial lung disease       Allergies   Allergen Reactions    Keflex [Cephalexin] Diarrhea          Current Outpatient Medications:     acetaminophen (TYLENOL) 500 mg tablet, Take 1,000 mg by mouth as needed for mild pain, Disp: , Rfl:     apixaban (Eliquis) 2.5 mg, TAKE 1 TABLET (2.5 MG TOTAL) BY MOUTH 2 (TWO) TIMES A DAY, Disp: 90 tablet, Rfl: 1    bimatoprost (LUMIGAN) 0.01 % ophthalmic drops, Administer 1 drop to both eyes daily at bedtime for 30 days, Disp: 1.5 mL, Rfl: 0    brimonidine tartrate 0.2 % ophthalmic solution, INSTILL 1 DROP INTO RIGHT EYE TWICE A DAY, Disp: , Rfl:     Cyanocobalamin (Vitamin B 12) 500 MCG TABS, Take 500 mcg by mouth 2 (two) times a day, Disp: , Rfl:      ezetimibe-simvastatin (VYTORIN) 10-40 mg per tablet, TAKE 1 TABLET BY MOUTH DAILY AT BEDTIME, Disp: 30 tablet, Rfl: 5    famotidine (PEPCID) 20 mg tablet, TAKE 1 TABLET (20 MG TOTAL) BY MOUTH 2 (TWO) TIMES A DAY, Disp: 60 tablet, Rfl: 5    glucose blood (OneTouch Ultra) test strip, TEST FOUR TIMES A DAY, Disp: 100 strip, Rfl: 5    insulin glargine (LANTUS) 100 units/mL subcutaneous injection, Inject 5 Units under the skin every morning, Disp: , Rfl:     ipratropium (ATROVENT) 0.03 % nasal spray, USE 2 SPRAYS INTO EACH NOSTRIL EVERY 12 (TWELVE) HOURS, Disp: 30 mL, Rfl: 6    lidocaine (LIDODERM) 5 %, Apply 1 patch topically over 12 hours daily Remove & Discard patch within 12 hours or as directed by MD (Patient not taking: Reported on 4/2/2025), Disp: , Rfl:     LORazepam (ATIVAN) 0.5 mg tablet, Take 1 tablet (0.5 mg total) by mouth 2 (two) times a day as needed for anxiety for up to 10 days, Disp: 20 tablet, Rfl: 0    metFORMIN (GLUCOPHAGE) 500 mg tablet, TAKE 2 TABLETS (1,000 MG TOTAL) BY MOUTH 2 (TWO) TIMES A DAY WITH MEALS, Disp: 120 tablet, Rfl: 5    metoprolol tartrate (LOPRESSOR) 25 mg tablet, Take 1 tablet (25 mg total) by mouth every 12 (twelve) hours, Disp: 180 tablet, Rfl: 3    mirtazapine (REMERON) 7.5 MG tablet, Take 1 tablet (7.5 mg total) by mouth daily at bedtime, Disp: 20 tablet, Rfl: 0    Multiple Vitamin (multivitamin) tablet, Take 1 tablet by mouth daily  , Disp: , Rfl:     OneTouch Delica Lancets 33G MISC, Check blood sugars four times daily. Please substitute with appropriate alternative as covered by patient's insurance. Dx: E11.65, Disp: 400 each, Rfl: 3    pantoprazole (PROTONIX) 40 mg tablet, Take 1 tablet (40 mg total) by mouth every 12 (twelve) hours, Disp: , Rfl:     sitaGLIPtin (Januvia) 100 mg tablet, Take 1 tablet (100 mg total) by mouth daily, Disp: , Rfl:     sodium chloride 1 g tablet, TAKE 1 TABLET THREE TIMES A DAY, Disp: 90 tablet, Rfl: 1    Updated medication list was reviewed in  [facility] system of  the facility.       Vitals:    04/03/25 1215   BP: 150/66   Pulse: 72   Resp: 18   Temp: (!) 96.7 °F (35.9 °C)   SpO2: 94%         Physical Exam  Constitutional:       General: She is not in acute distress.  HENT:      Head: Normocephalic and atraumatic.   Cardiovascular:      Rate and Rhythm: Normal rate and regular rhythm.      Heart sounds: Murmur heard.   Pulmonary:      Breath sounds: Rales (few at bases) present. No wheezing or rhonchi.   Abdominal:      General: There is no distension.      Palpations: Abdomen is soft.      Tenderness: There is no abdominal tenderness. There is no guarding.   Musculoskeletal:      Cervical back: Neck supple.      Right lower leg: No edema.      Left lower leg: No edema.   Skin:     Coloration: Skin is not jaundiced.   Neurological:      General: No focal deficit present.      Mental Status: Mental status is at baseline.      Cranial Nerves: No cranial nerve deficit.   Psychiatric:         Mood and Affect: Mood normal.         Behavior: Behavior normal.         Diagnostic Data     Recent labs were reviewed  3/31              GLUCOSE 165 mg/dL 65-99 H Final              BUN 15 mg/dL 7-25  Final             CREATININE 0.56 mg/dL 0.40-1.10  Final             SODIUM 137 mmol/L 135-145  Final             POTASSIUM 3.7 mmol/L 3.5-5.2  Final             CHLORIDE 94 mmol/L 100-109 L Final             CARBON DIOXIDE 33 mmol/L 21-31 H Final             CALCIUM 8.8 mg/dL 8.5-10.5  Final             ANION GAP 10  3-11  Final             eGFRcr 88  >59  Final               3/18   CBC WITH DIFF               HEMOGLOBIN 10.9 g/dL 11.5-14.5 L Final              HEMATOCRIT 33.3 % 35.0-43.0 L Final             WBC 5.0 thou/cmm 4.0-10.0  Final             RBC 4.37 mill/cmm 3.70-4.70  Final             PLATELET COUNT 186 thou/cmm 140-350  Final             MPV 7.7 fL 7.5-11.3  Final             MCV 76 fL  L Final             MCH 25.0 pg 26.0-34.0 L Final            "  MCHC 32.8 g/dL 32.0-37.0  Final             RDW 17.2 % 12.0-16.0 H Final             DIFFERENTIAL TYPE AUTO    Final             ABSOLUTE NEUT 3.3 thou/cmm 1.8-7.8  Final             ABSOLUTE LYMPH 0.9 thou/cmm 1.0-3.0 L Final             ABSOLUTE MONO 0.6 thou/cmm 0.3-1.0  Final             ABSOLUTE EOS 0.1 thou/cmm 0.0-0.5  Final             ABSOLUTE BASO 0.0 thou/cmm 0.0-0.1  Final             NEUTROPHILS 68 %   Final             LYMPHOCYTES 18 %   Final             MONOCYTES 12 %   Final             EOSINOPHILS 2 %   Final             BASOPHILS 0 %   Final                Follow-up Instructions     F.U with PCP in one week. Monitor blood sugars and Blood pressure closely  F.U with pulmonary service  Care coordinated with pt's son    Total time spent on discharge process was in excess of 30 minutes.  Greater than 50 % of time was spent in review of  records, labs, meds and coordination of care with  the nurse       Portions of the record may have been created with voice recognition software.  Occasional wrong word or \"sound a like\" substitutions may have occurred due to the inherent limitations of voice recognition software.  Read the chart carefully and recognize, using context, where substitutions have occurred.  This note was electronically signed by Dr. Carlie Plunkett  "

## 2025-04-04 NOTE — ASSESSMENT & PLAN NOTE
History of ILD.  Patient recently completed tapering doses of steroids.  Respiratory status remains stable with SaO2 of 94% on room air.  Continue to monitor.  Follow-up with pulm service 5

## 2025-04-04 NOTE — ASSESSMENT & PLAN NOTE
HGB has been ranging  between 9-11 range  Iron panel was  suggestive of some mild iron deficiency  S/p  dose of IV venofer recently at the [facility]  HGB was 10.9 on 3/18/25

## 2025-04-04 NOTE — ASSESSMENT & PLAN NOTE
Lab Results   Component Value Date    HGBA1C 7.5 (H) 03/07/2025   Most recent Accu-Cheks have been acceptable with blood sugars ranging from 132 to 251 range.  Highest reported reading was 289 on 4/1/25  Recent hemoglobin A1c was acceptable for age.  Continue metformin 1000 mg twice a day, sitagliptin 100 mg daily and Lantus 5 units daily.  May titrate Lantus dose based on blood sugars.  Follow-up with PCP.  Discontinue sliding scale coverage upon discharge

## 2025-04-04 NOTE — ASSESSMENT & PLAN NOTE
History of hyponatremia.  Fluid restriction was discontinued per patient's request.  Patient currently remains on sodium chloride tablet 1 g 3 times daily.  BMP from 3/31 revealed sodium level to be normal at 137.  Continue to monitor periodically

## 2025-04-04 NOTE — ASSESSMENT & PLAN NOTE
Heart rate stable on metoprolol.  Dose to be increased to 25 mg twice daily for borderline high blood pressures.  Continue Eliquis.  Recent cardiology follow-up notes were reviewed.

## 2025-04-04 NOTE — ASSESSMENT & PLAN NOTE
Malnutrition Findings:          Patient is noted to be weak and frail.  Currently weighs only 115 pounds.  Encourage p.o. intake and protein supplements                       BMI Findings:           There is no height or weight on file to calculate BMI.

## 2025-04-04 NOTE — ASSESSMENT & PLAN NOTE
Patient remains on pantoprazole 40 mg twice daily and famotidine 40 mg daily.  Dose reduction of pantoprazole to once daily was unuccessful patient had relapse of her symptoms

## 2025-04-07 ENCOUNTER — PATIENT OUTREACH (OUTPATIENT)
Dept: CASE MANAGEMENT | Facility: OTHER | Age: 89
End: 2025-04-07

## 2025-04-07 ENCOUNTER — TELEPHONE (OUTPATIENT)
Age: 89
End: 2025-04-07

## 2025-04-07 ENCOUNTER — TRANSITIONAL CARE MANAGEMENT (OUTPATIENT)
Dept: FAMILY MEDICINE CLINIC | Facility: CLINIC | Age: 89
End: 2025-04-07

## 2025-04-07 NOTE — PROGRESS NOTES
Update obtained from PCC the patient discharged 4/4/25 to Home with [facility] care Home care. I have removed myself from the care team, updated the Care Coordination note, and made the care manager who follows the patient responsible staff.

## 2025-04-07 NOTE — TELEPHONE ENCOUNTER
Audrey called to report she had started the home health eval for patient over the weekend meet with patient Saturday, she will be faxing over orders for plan of care to include skilled Nursing and PT at this time.   Vitals reported from Saturdays visit:  /88  HR 96  O2 Sat 97%  Resp 16  Patient was noted to have been short of breath upon talking not while at rest, her son had also reported that her BP has been elevated since her return home from ED, he believes it to be anxiety related as during her stay they would give her an anti anxiety medication which would then decrease her BP to normal range.   Please reach out to Audrey with any questions she will be faxing plan of care to office today .

## 2025-04-07 NOTE — TELEPHONE ENCOUNTER
Patients son, jennifer, had called in and wanted to let Sarah know that the patient was discharged on Friday the 4th.

## 2025-04-08 ENCOUNTER — TELEPHONE (OUTPATIENT)
Dept: FAMILY MEDICINE CLINIC | Facility: CLINIC | Age: 89
End: 2025-04-08

## 2025-04-08 ENCOUNTER — NURSE TRIAGE (OUTPATIENT)
Age: 89
End: 2025-04-08

## 2025-04-08 ENCOUNTER — PATIENT OUTREACH (OUTPATIENT)
Dept: CASE MANAGEMENT | Facility: OTHER | Age: 89
End: 2025-04-08

## 2025-04-08 NOTE — PROGRESS NOTES
"Outpatient Care Management Note:    Inbasket message received from Brea Young noting that Ac was discharged to home with [facility] care home care.     Ac states that she is still \"learning how to breathe properly\". She states that she is supposed to breathe in her nose and out her mouth. She is still getting short of breath with exertion. BENNIE noted that she is to follow up with pulmonary. She will call to schedule. She also missed a vascular appt and will call.  She states her son assists with all transportation.     Ac is monitoring her BP at home. She will check it once a day at varying times and keep a log. She will call her PCP if her readings are trending higher than 160 systolic. She is scheduled to see her PCP on 4/11.     Ac is also diabetic. She is now on lantus 5 units each evening.She states her blood sugar this morning was 146. She will keep a log and review with her PCP on 4/11. She will call if her numbers are trending higher or lower.     Ac has [facility] coming today.     She declined completing a med review stating she has no questions.     BENNIE gave Ac my contact information. She knows that my phone goes through my computer and I do not get messages after hours or on weekends. She is aware to call her PCP office directly with any immediate questions.  She was busy and we could not complete initial assessment. CM will attempt on next outreach.   "

## 2025-04-08 NOTE — TELEPHONE ENCOUNTER
Patient called because she took her insulin last night 5 units around 6pm and then again at 9am 5 units this morning she's not feeling any heart palpitations dizziness she is however sob but she stated that is not from the insulin  patient stated she was going to eat. I advised if she's starts to feel bad to proceed to the ED. She just wanted to let you know. Please advise

## 2025-04-08 NOTE — TELEPHONE ENCOUNTER
"FOLLOW UP: Call transferred to Brea in clinical per protocol    REASON FOR CONVERSATION: Medication Problem    SYMPTOMS: Asymptomatic at this time. See below    OTHER: N/A    DISPOSITION: Call Transferred to PCP Now      Reason for Disposition   Diabetes drug error or overdose (e.g., took wrong type of insulin or took extra dose)    Answer Assessment - Initial Assessment Questions  1. NAME of MEDICINE: \"What medicine(s) are you calling about?\"      Lantus  2. QUESTION: \"What is your question?\" (e.g., double dose of medicine, side effect)      Took an extra dose of lantus last night, 5 units at 6pm. Took additional 5 units this morning around 9am. Blood glucose level before medication was 146  3. PRESCRIBER: \"Who prescribed the medicine?\" Reason: if prescribed by specialist, call should be referred to that group.      New prescription started 3/17, prescribed by rehab facility  4. SYMPTOMS: \"Do you have any symptoms?\" If Yes, ask: \"What symptoms are you having?\"  \"How bad are the symptoms (e.g., mild, moderate, severe)      Denies  5. PREGNANCY:  \"Is there any chance that you are pregnant?\" \"When was your last menstrual period?\"          Protocols used: Medication Question Call-Adult-[place]    "

## 2025-04-08 NOTE — TELEPHONE ENCOUNTER
Please have her eat something small every 2-3 hours today, watch closely for hypoglycemia (low sugars), check sugars 3-4 times today.  Do not take insulin dose tonight. Hold insulin until tomorrow night, and then get back on schedule.

## 2025-04-10 DIAGNOSIS — E87.1 HYPONATREMIA: ICD-10-CM

## 2025-04-10 RX ORDER — SODIUM CHLORIDE 1 G/1
1 TABLET ORAL 3 TIMES DAILY
Qty: 100 TABLET | Refills: 1 | Status: SHIPPED | OUTPATIENT
Start: 2025-04-10

## 2025-04-11 ENCOUNTER — OFFICE VISIT (OUTPATIENT)
Dept: FAMILY MEDICINE CLINIC | Facility: CLINIC | Age: 89
End: 2025-04-11
Payer: MEDICARE

## 2025-04-11 VITALS
WEIGHT: 116 LBS | RESPIRATION RATE: 16 BRPM | DIASTOLIC BLOOD PRESSURE: 80 MMHG | SYSTOLIC BLOOD PRESSURE: 140 MMHG | HEART RATE: 88 BPM | BODY MASS INDEX: 18.64 KG/M2 | OXYGEN SATURATION: 98 % | TEMPERATURE: 97.1 F | HEIGHT: 66 IN

## 2025-04-11 DIAGNOSIS — E11.42 TYPE 2 DIABETES MELLITUS WITH DIABETIC POLYNEUROPATHY, WITH LONG-TERM CURRENT USE OF INSULIN (HCC): ICD-10-CM

## 2025-04-11 DIAGNOSIS — I10 ESSENTIAL HYPERTENSION: ICD-10-CM

## 2025-04-11 DIAGNOSIS — I48.0 PAROXYSMAL ATRIAL FIBRILLATION (HCC): ICD-10-CM

## 2025-04-11 DIAGNOSIS — E11.69 TYPE 2 DIABETES MELLITUS WITH HYPERLIPIDEMIA  (HCC): Primary | ICD-10-CM

## 2025-04-11 DIAGNOSIS — R06.09 DYSPNEA ON EXERTION: ICD-10-CM

## 2025-04-11 DIAGNOSIS — Z79.4 TYPE 2 DIABETES MELLITUS WITH DIABETIC POLYNEUROPATHY, WITH LONG-TERM CURRENT USE OF INSULIN (HCC): ICD-10-CM

## 2025-04-11 DIAGNOSIS — E78.2 MIXED HYPERLIPIDEMIA: ICD-10-CM

## 2025-04-11 DIAGNOSIS — F41.9 ANXIETY: ICD-10-CM

## 2025-04-11 DIAGNOSIS — E87.1 HYPONATREMIA: ICD-10-CM

## 2025-04-11 DIAGNOSIS — J84.9 INTERSTITIAL LUNG DISEASE (HCC): ICD-10-CM

## 2025-04-11 DIAGNOSIS — E11.3392 TYPE 2 DIABETES MELLITUS WITH LEFT EYE AFFECTED BY MODERATE NONPROLIFERATIVE RETINOPATHY WITHOUT MACULAR EDEMA, WITH LONG-TERM CURRENT USE OF INSULIN (HCC): ICD-10-CM

## 2025-04-11 DIAGNOSIS — E78.5 TYPE 2 DIABETES MELLITUS WITH HYPERLIPIDEMIA  (HCC): Primary | ICD-10-CM

## 2025-04-11 DIAGNOSIS — Z79.4 TYPE 2 DIABETES MELLITUS WITH LEFT EYE AFFECTED BY MODERATE NONPROLIFERATIVE RETINOPATHY WITHOUT MACULAR EDEMA, WITH LONG-TERM CURRENT USE OF INSULIN (HCC): ICD-10-CM

## 2025-04-11 DIAGNOSIS — K21.9 GASTROESOPHAGEAL REFLUX DISEASE, UNSPECIFIED WHETHER ESOPHAGITIS PRESENT: ICD-10-CM

## 2025-04-11 DIAGNOSIS — I35.0 NONRHEUMATIC AORTIC VALVE STENOSIS: ICD-10-CM

## 2025-04-11 DIAGNOSIS — R63.0 DECREASED APPETITE: ICD-10-CM

## 2025-04-11 PROCEDURE — 99496 TRANSJ CARE MGMT HIGH F2F 7D: CPT | Performed by: NURSE PRACTITIONER

## 2025-04-11 RX ORDER — SIMVASTATIN 40 MG
40 TABLET ORAL
Qty: 100 TABLET | Refills: 3 | Status: SHIPPED | OUTPATIENT
Start: 2025-04-11 | End: 2025-04-14 | Stop reason: ALTCHOICE

## 2025-04-11 RX ORDER — EZETIMIBE 10 MG/1
10 TABLET ORAL DAILY
Qty: 100 TABLET | Refills: 3 | Status: SHIPPED | OUTPATIENT
Start: 2025-04-11 | End: 2025-04-14 | Stop reason: ALTCHOICE

## 2025-04-11 RX ORDER — HUMAN INSULIN 100 [IU]/ML
INJECTION, SUSPENSION SUBCUTANEOUS
Qty: 15 ML | Refills: 2 | Status: SHIPPED | OUTPATIENT
Start: 2025-04-11

## 2025-04-11 RX ORDER — SERTRALINE HYDROCHLORIDE 25 MG/1
25 TABLET, FILM COATED ORAL DAILY
Qty: 100 TABLET | Refills: 3 | Status: SHIPPED | OUTPATIENT
Start: 2025-04-11 | End: 2026-04-06

## 2025-04-11 NOTE — ASSESSMENT & PLAN NOTE
Lab Results   Component Value Date    HGBA1C 7.5 (H) 03/07/2025       Orders:  •  insulin isophane (NovoLIN N FlexPen) 100 units/mL injection pen; Inject 8 units under the skin in the morning and 4 units in the evening  •  Continuous Glucose Sensor (FreeStyle Noemí 3 Sensor) MISC; Use 1 each every 14 (fourteen) days  •  Continuous Glucose  (FreeStyle Noemí 3 [place]) ANGEL; Use 1 each 1 (one) time for 1 dose  •  Ambulatory referral to Diabetic Education - use to refer for diabetes group classes, individual diabetes education, medical nutrition therapy, device training; Future

## 2025-04-11 NOTE — ASSESSMENT & PLAN NOTE
Increasing anxiety, will start sertraline 25 mg once daily.   Continue mirtazapine at bedtime.   Lorazepam as needed.   Orders:  •  sertraline (ZOLOFT) 25 mg tablet; Take 1 tablet (25 mg total) by mouth daily

## 2025-04-11 NOTE — PROGRESS NOTES
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   Changed to Lantus while at rehab, one episode of hypoglycemia. She has used lantus in the past with hypoglycemia on even very low doses.   Now she reports sugars are too high, running 220 or higher.   Will transition to Novolin N (brand change for insurance purposes) 8 units in the morning and 4 units in the evening.   Will trial CGM, her son will help her with this.   Currently checking sugars 4 or more times per day.     Orders:  •  Continuous Glucose Sensor (FreeStyle Noemí 3 Sensor) MISC; Use 1 each every 14 (fourteen) days  •  Continuous Glucose  (FreeStyle Noemí 3 [place]) ANGEL; Use 1 each 1 (one) time for 1 dose  •  Ambulatory referral to Diabetic Education - use to refer for diabetes group classes, individual diabetes education, medical nutrition therapy, device training; Future    Type 2 diabetes mellitus with hyperlipidemia  (HCC)    Lab Results   Component Value Date    HGBA1C 7.5 (H) 2025       Orders:  •  insulin isophane (NovoLIN N FlexPen) 100 units/mL injection pen; Inject 8 units under the skin in the morning and 4 units in the evening  •  Continuous Glucose Sensor (FreeStyle Noemí 3 Sensor) MISC; Use 1 each every 14 (fourteen) days  •  Continuous Glucose  (FreeStyle Noemí 3 [place]) ANGEL; Use 1 each 1 (one) time for 1 dose  •  Ambulatory referral to Diabetic Education - use to refer for diabetes  group classes, individual diabetes education, medical nutrition therapy, device training; Future    Type 2 diabetes mellitus with diabetic polyneuropathy, with long-term current use of insulin (HCC)    Lab Results   Component Value Date    HGBA1C 7.5 (H) 03/07/2025       Orders:  •  Continuous Glucose Sensor (FreeStyle Noemí 3 Sensor) MISC; Use 1 each every 14 (fourteen) days  •  Continuous Glucose  (FreeStyle Noemí 3 [place]) ANGEL; Use 1 each 1 (one) time for 1 dose  •  Ambulatory referral to Diabetic Education - use to refer for diabetes group classes, individual diabetes education, medical nutrition therapy, device training; Future    Dyspnea on exertion  3 recent hospitalizations for DASILVA, with no clear cause determined.   She will follow up with pulmonology.        Interstitial lung disease (HCC)  No changes on recent imaging.   Corticosteroids did not significantly help.   Continue follow up with Dr. Jovel, pulmonology.        Anxiety  Increasing anxiety, will start sertraline 25 mg once daily.   Continue mirtazapine at bedtime.   Lorazepam as needed.   Orders:  •  sertraline (ZOLOFT) 25 mg tablet; Take 1 tablet (25 mg total) by mouth daily    Nonrheumatic aortic valve stenosis  Moderate aortic stenosis, as per cardiology, this is not causing shortness of breath.   Continues to follow with cardiology, Dr. Ramsey.        Decreased appetite  Using Glucerna supplements as needed.          Essential hypertension  Stable blood pressure on metoprolol 25 mg twice daily.   Will monitor closely, has been hypotensive on this in the past.          Gastroesophageal reflux disease, unspecified whether esophagitis present  Continue pantoprazole  40 mg every 12 hours, dose was decreased to once daily at rehab, not tolerated.   Famotidine 20 mg twice daily.        Hyponatremia  Sodium 134, continue sodium chloride tablets, 1 GM TID.       Mixed hyperlipidemia  Due to insurance reasons will change Vytorin to individual  zetia 10 mg daily and simvastatin 40 mg daily.          Paroxysmal atrial fibrillation (HCC)  Continue Eliquis and metoprolol.   Continue to follow up with cardiology.           8 week follow up.    History of Present Illness     Transitional Care Management Review:   Ac Duran is a 89 y.o. female here for TCM follow up.     During the TCM phone call patient stated:  TCM Call (since 3/31/2025)     Date and time call was made  4/7/2025  9:07 AM    Hospital care reviewed  Records reviewed    Patient was hospitialized at  [facility]    Date of Admission  03/07/25    Date of discharge  03/11/25    Diagnosis  Dyspnea upon exertion    Disposition  Home    Were the patients medications reviewed and updated  No    Current Symptoms  Fatigue; Weakness      TCM Call (since 3/31/2025)     Post hospital issues  Reduced activity    Scheduled for follow up?  Yes  Appt 4/11/25 with Kimberly FERMIN 10:20am    Did you obtain your prescribed medications  Yes    Do you need help managing your prescriptions or medications  Yes    Why type of assitance do you need  [employer]    Is transportation to your appointment needed  Yes    Specify why  Son will drive    I have advised the patient to call PCP with any new or worsening symptoms  Sarah Dickinson LPN    Living Arrangements  Alone    Support System  Family    The type of support provided  Physical; Emotional    Do you have social support  Yes, as much as I need    Are you recieving home care services  No        Ac Duran is an 89 year old female presenting today for hospital follow up.     Hospitalized 3 times recently.   3/7 through 3/11 for DASILVA and inability to care for herself at home due to this.   3/2 through 3/5 for hemoptysis and hypoxia  2/24 through 2/26 for DASILVA.     Discharged to rehab 3/11 through 4/4.   Notes therapy was really good at rehab, she was doing well, walking further than she expected could.   Still has to take a lot of breaks.  "  Working on breathing exercises she has learned.     Noted by patient and son, anxiety has increased, which may contribute to feeling breathless.     Ac notes is difficult to tell any more if she is having blood pressure problem, blood sugar problem or anxiety when she doesn't feel well.     No appetite. Using Glucerna as needed.   Still struggles with constipation, diarrhea.    Mirtazapine did not help with appetite. It does help with sleep.     Blood sugars are \"not good.\"  Prior to hospitalization running 170-180 at the most.   Now running 220 or higher.   Checks sugars multiple times per day, would like to try CGM, son has this and can help her.     Changed from Humulin N  twice daily to Lantus while in rehab.   We have done lantus in the past, and she had problems with hypoglycemia even on very low doses.     Will go back to prior insulin regimen, will switch to Novolin N due to insurance coverage.     Rhinorrhea constant, using atrovent nasal spray 4 times per day.   Can consider ENT evaluation if not effective.     Setting up [facility] to come to the home as well a few days per week.                       Review of Systems   Constitutional:  Positive for appetite change, chills, diaphoresis and fatigue. Negative for fever and unexpected weight change.   HENT:  Positive for rhinorrhea.    Respiratory:  Positive for shortness of breath.    Cardiovascular: Negative.    Gastrointestinal:  Positive for constipation and diarrhea.   Genitourinary: Negative.    Musculoskeletal: Negative.    Neurological: Negative.    Psychiatric/Behavioral:  The patient is nervous/anxious.      Objective   /80   Pulse 88   Temp (!) 97.1 °F (36.2 °C) (Temporal)   Resp 16   Ht 5' 6\" (1.676 m)   Wt 52.6 kg (116 lb)   SpO2 98%   BMI 18.72 kg/m²     Physical Exam  Vitals and nursing note reviewed.   Constitutional:       Comments: Frail, cachetic.    HENT:      Head: Atraumatic.      Right Ear: Tympanic membrane " normal.      Left Ear: Tympanic membrane normal.      Mouth/Throat:      Mouth: Mucous membranes are moist.      Pharynx: Oropharynx is clear.   Cardiovascular:      Rate and Rhythm: Normal rate and regular rhythm.      Heart sounds: No murmur heard.  Pulmonary:      Effort: Pulmonary effort is normal.      Breath sounds: Normal breath sounds.   Musculoskeletal:      Cervical back: Normal range of motion and neck supple.      Right lower leg: No edema.      Left lower leg: No edema.   Lymphadenopathy:      Cervical: No cervical adenopathy.   Skin:     Findings: No rash.   Neurological:      Mental Status: She is alert.      Gait: Gait abnormal.   Psychiatric:         Mood and Affect: Mood normal.       Medications have been reviewed by provider in current encounter      Current Outpatient Medications:   •  acetaminophen (TYLENOL) 500 mg tablet, Take 1,000 mg by mouth as needed for mild pain, Disp: , Rfl:   •  apixaban (Eliquis) 2.5 mg, TAKE 1 TABLET (2.5 MG TOTAL) BY MOUTH 2 (TWO) TIMES A DAY, Disp: 90 tablet, Rfl: 1  •  bimatoprost (LUMIGAN) 0.01 % ophthalmic drops, Administer 1 drop to both eyes daily at bedtime for 30 days, Disp: 1.5 mL, Rfl: 0  •  brimonidine tartrate 0.2 % ophthalmic solution, INSTILL 1 DROP INTO RIGHT EYE TWICE A DAY, Disp: , Rfl:   •  Continuous Glucose  (FreeStyle Noemí 3 [place]) [facility], [place]), Use 1 each 1 (one) time for 1 dose, Disp: 1 each, Rfl: 0  •  Continuous Glucose Sensor (FreeStyle Noemí 3 Sensor) MISC, Use 1 each every 14 (fourteen) days, Disp: 6 each, Rfl: 3  •  Cyanocobalamin (Vitamin B 12) 500 MCG TABS, Take 500 mcg by mouth 2 (two) times a day, Disp: , Rfl:   •  famotidine (PEPCID) 20 mg tablet, TAKE 1 TABLET (20 MG TOTAL) BY MOUTH 2 (TWO) TIMES A DAY, Disp: 60 tablet, Rfl: 5  •  glucose blood (OneTouch Ultra) test strip, TEST FOUR TIMES A DAY, Disp: 100 strip, Rfl: 5  •  insulin isophane (NovoLIN N FlexPen) 100 units/mL injection pen, Inject 8 units under the skin in the  morning and 4 units in the evening, Disp: 15 mL, Rfl: 2  •  ipratropium (ATROVENT) 0.03 % nasal spray, USE 2 SPRAYS INTO EACH NOSTRIL EVERY 12 (TWELVE) HOURS, Disp: 30 mL, Rfl: 6  •  metFORMIN (GLUCOPHAGE) 500 mg tablet, TAKE 2 TABLETS (1,000 MG TOTAL) BY MOUTH 2 (TWO) TIMES A DAY WITH MEALS, Disp: 120 tablet, Rfl: 5  •  metoprolol tartrate (LOPRESSOR) 25 mg tablet, Take 1 tablet (25 mg total) by mouth every 12 (twelve) hours, Disp: 180 tablet, Rfl: 3  •  mirtazapine (REMERON) 7.5 MG tablet, Take 1 tablet (7.5 mg total) by mouth daily at bedtime, Disp: 20 tablet, Rfl: 0  •  Multiple Vitamin (multivitamin) tablet, Take 1 tablet by mouth daily  , Disp: , Rfl:   •  OneTouch Delica Lancets 33G MISC, Check blood sugars four times daily. Please substitute with appropriate alternative as covered by patient's insurance. Dx: E11.65, Disp: 400 each, Rfl: 3  •  pantoprazole (PROTONIX) 40 mg tablet, Take 1 tablet (40 mg total) by mouth every 12 (twelve) hours, Disp: , Rfl:   •  sertraline (ZOLOFT) 25 mg tablet, Take 1 tablet (25 mg total) by mouth daily, Disp: 100 tablet, Rfl: 3  •  sitaGLIPtin (Januvia) 100 mg tablet, Take 1 tablet (100 mg total) by mouth daily, Disp: , Rfl:   •  sodium chloride 1 g tablet, TAKE 1 TABLET THREE TIMES A DAY, Disp: 100 tablet, Rfl: 1  •  ezetimibe-simvastatin (VYTORIN) 10-40 mg per tablet, Take 1 tablet by mouth daily at bedtime, Disp: 100 tablet, Rfl: 3  •  lidocaine (LIDODERM) 5 %, Apply 1 patch topically over 12 hours daily Remove & Discard patch within 12 hours or as directed by MD (Patient not taking: Reported on 4/11/2025), Disp: , Rfl:   •  LORazepam (ATIVAN) 0.5 mg tablet, Take 1 tablet (0.5 mg total) by mouth 2 (two) times a day as needed for anxiety for up to 10 days, Disp: 20 tablet, Rfl: 0

## 2025-04-12 DIAGNOSIS — E78.5 HYPERLIPIDEMIA, UNSPECIFIED HYPERLIPIDEMIA TYPE: ICD-10-CM

## 2025-04-14 ENCOUNTER — TELEPHONE (OUTPATIENT)
Age: 89
End: 2025-04-14

## 2025-04-14 DIAGNOSIS — E78.5 HYPERLIPIDEMIA, UNSPECIFIED HYPERLIPIDEMIA TYPE: Primary | ICD-10-CM

## 2025-04-14 RX ORDER — ACYCLOVIR 800 MG/1
1 TABLET ORAL
Qty: 6 EACH | Refills: 3 | Status: SHIPPED | OUTPATIENT
Start: 2025-04-14

## 2025-04-14 RX ORDER — KETOROLAC TROMETHAMINE 30 MG/ML
1 INJECTION, SOLUTION INTRAMUSCULAR; INTRAVENOUS ONCE
Qty: 1 EACH | Refills: 0 | Status: SHIPPED | OUTPATIENT
Start: 2025-04-14 | End: 2025-04-14

## 2025-04-14 RX ORDER — EZETIMIBE AND SIMVASTATIN 10; 40 MG/1; MG/1
1 TABLET ORAL
Qty: 30 TABLET | Refills: 5 | OUTPATIENT
Start: 2025-04-14

## 2025-04-14 RX ORDER — EZETIMIBE AND SIMVASTATIN 10; 40 MG/1; MG/1
1 TABLET ORAL
Qty: 100 TABLET | Refills: 3 | Status: SHIPPED | OUTPATIENT
Start: 2025-04-14

## 2025-04-14 NOTE — ASSESSMENT & PLAN NOTE
3 recent hospitalizations for DASILVA, with no clear cause determined.   She will follow up with pulmonology.

## 2025-04-14 NOTE — ASSESSMENT & PLAN NOTE
No changes on recent imaging.   Corticosteroids did not significantly help.   Continue follow up with Dr. Jovel, pulmonology.

## 2025-04-14 NOTE — ASSESSMENT & PLAN NOTE
Lab Results   Component Value Date    HGBA1C 7.5 (H) 03/07/2025     Sugars were well controlled prior to hospitalization and rehab stay on Humlin N 8 units in the morning, 4 units at bedtime.   Changed to Lantus while at rehab, one episode of hypoglycemia. She has used lantus in the past with hypoglycemia on even very low doses.   Now she reports sugars are too high, running 220 or higher.   Will transition to Novolin N (brand change for insurance purposes) 8 units in the morning and 4 units in the evening.   Will trial CGM, her son will help her with this.   Currently checking sugars 4 or more times per day.     Orders:  •  Continuous Glucose Sensor (FreeStyle Noemí 3 Sensor) MISC; Use 1 each every 14 (fourteen) days  •  Continuous Glucose  (FreeStyle Noemí 3 [place]) ANGEL; Use 1 each 1 (one) time for 1 dose  •  Ambulatory referral to Diabetic Education - use to refer for diabetes group classes, individual diabetes education, medical nutrition therapy, device training; Future

## 2025-04-14 NOTE — ASSESSMENT & PLAN NOTE
Stable blood pressure on metoprolol 25 mg twice daily.   Will monitor closely, has been hypotensive on this in the past.

## 2025-04-14 NOTE — TELEPHONE ENCOUNTER
Called and spoke with patient and relay providers message. Patient verbalized understanding and will call GI at [facility] to get the medication.

## 2025-04-14 NOTE — TELEPHONE ENCOUNTER
She is likely looking for Rifaximin (Xifaxan), she would need to reach out to GI for this prescription.

## 2025-04-14 NOTE — TELEPHONE ENCOUNTER
The pharmacy would like to know if a script for VYTORIN 10-40 can be order instead of the ezetimibe and simvastatin being individually. He said that [facility]'s insurance program would cover the VYTORIN 10-40.

## 2025-04-14 NOTE — ASSESSMENT & PLAN NOTE
Continue pantoprazole  40 mg every 12 hours, dose was decreased to once daily at rehab, not tolerated.   Famotidine 20 mg twice daily.

## 2025-04-14 NOTE — ASSESSMENT & PLAN NOTE
Due to insurance reasons will change Vytorin to individual zetia 10 mg daily and simvastatin 40 mg daily.

## 2025-04-14 NOTE — ASSESSMENT & PLAN NOTE
Moderate aortic stenosis, as per cardiology, this is not causing shortness of breath.   Continues to follow with cardiology, Dr. Ramsey.

## 2025-04-14 NOTE — TELEPHONE ENCOUNTER
Patient is requesting Vifaxin (Diarrhea medication) not sure about spelling. Please call to advise.  Thank you!!

## 2025-04-14 NOTE — ASSESSMENT & PLAN NOTE
Lab Results   Component Value Date    HGBA1C 7.5 (H) 03/07/2025       Orders:  •  Continuous Glucose Sensor (FreeStyle Noemí 3 Sensor) MISC; Use 1 each every 14 (fourteen) days  •  Continuous Glucose  (FreeStyle Noemí 3 [place]) ANGEL; Use 1 each 1 (one) time for 1 dose  •  Ambulatory referral to Diabetic Education - use to refer for diabetes group classes, individual diabetes education, medical nutrition therapy, device training; Future

## 2025-04-15 ENCOUNTER — PATIENT OUTREACH (OUTPATIENT)
Dept: CASE MANAGEMENT | Facility: OTHER | Age: 89
End: 2025-04-15

## 2025-04-15 NOTE — PROGRESS NOTES
Outpatient Care Management Note:    CM called Ac. She still has ongoing shortness of breath with exertion. She scheduled her pulmonary follow up for 5/19. She will call to schedule her vascular appt today.     Ac continues to be followed by [address] [place] [address] home care.She has physical therapy and nursing.     Ac is off the lantus and now taking novolin insulin twice a day. She states her sugars are better controlled. She is averaging between 108-145.  She had one high sugar last evening of 243 and does not understand why. She will continue to monitor her sugars closely and will call her PCP if they are trending higher. She knows to call her PCP if experiences any low blood sugars. She states that she is aware of how to treat a low.     Ac does not have much of an appetite and knows that she needs to eat or she may have low blood sugars. CM suggested eating more frequent, smaller meals. She is also using a drink named orgain which has no artificial sweeteners. She has glucerna but feels it gives her diarrhea.     Initial assessment completed and care plan updated.

## 2025-04-17 ENCOUNTER — NURSE TRIAGE (OUTPATIENT)
Age: 89
End: 2025-04-17

## 2025-04-17 ENCOUNTER — TELEPHONE (OUTPATIENT)
Age: 89
End: 2025-04-17

## 2025-04-17 DIAGNOSIS — K58.0 IRRITABLE BOWEL SYNDROME WITH DIARRHEA: Primary | ICD-10-CM

## 2025-04-17 NOTE — TELEPHONE ENCOUNTER
"Answer Assessment - Initial Assessment Questions  1. ONSET: \"When did the swelling start?\" (e.g., minutes, hours, days)      Unsure   2. LOCATION: \"What part of the leg is swollen?\"  \"Are both legs swollen or just one leg?\"      Left leg- foot and up, but unsure of how far up- unable to tell  3. SEVERITY: \"How bad is the swelling?\" (e.g., localized; mild, moderate, severe)      Unsure how bad, states \"not bad\"  4. REDNESS: \"Does the swelling look red or infected?\"      Denies   5. PAIN: \"Is the swelling painful to touch?\" If Yes, ask: \"How painful is it?\"   (Scale 1-10; mild, moderate or severe)      Denies   6. FEVER: \"Do you have a fever?\" If Yes, ask: \"What is it, how was it measured, and when did it start?\"       Denies   7. CAUSE: \"What do you think is causing the leg swelling?\"      Unsure   8. MEDICAL HISTORY: \"Do you have a history of blood clots (e.g., DVT), cancer, heart failure, kidney disease, or liver failure?\"      Carotid stenosis   9. RECURRENT SYMPTOM: \"Have you had leg swelling before?\" If Yes, ask: \"When was the last time?\" \"What happened that time?\"      Denies   10. OTHER SYMPTOMS: \"Do you have any other symptoms?\" (e.g., chest pain, difficulty breathing)        Sob- pulmonary fibrosis, was in hospital 3/7    Protocols used: Leg Swelling and Edema-Adult-[place]    "

## 2025-04-17 NOTE — TELEPHONE ENCOUNTER
Patients GI provider:  A. Bone    Number to return call: [phone]    Reason for call: Pt calling saying her diarrhea is back and she is wondering if she should do another course of the antibiotic xifaxan? Please reach back out to the pt to discuss    Scheduled procedure/appointment date if applicable: Apt/7/16/25

## 2025-04-17 NOTE — TELEPHONE ENCOUNTER
Last ov 1/13/25-IBS-D  Since beginning of April pt has been having intermittent episodes of diarrhea, really never stopped  No recent travel, or abx use that she is aware of  Takes imodium PRN which is effective, Requesting Xifaxan

## 2025-04-17 NOTE — TELEPHONE ENCOUNTER
FOLLOW UP: pt refused ED/urgent care recommendation.  She preferred to schedule an office visit.  Scheduled 5/12, added to wait list.  Advised pt I would send a message to provider to review.    REASON FOR CONVERSATION: Leg Swelling    SYMPTOMS: pt has been having left leg swelling, she is unsure when it started.  She cannot tell how far up it goes, but it does start at her feet.  Denies redness or s/s infection.  Denies pain.  She said her leg looks white in comparison with right leg.  She has sob, states she has pulmonary fibrosis.  She was in the hospital 3/7 for sob.     OTHER: latest testing: carotid study 8/23/24, LEAD 2/20/24    DISPOSITION: See Today in Office      Reason for Disposition   MODERATE swelling of both ankles (e.g., swelling extends up to the knees) AND new-onset or getting worse    Protocols used: Leg Swelling and Edema-Adult-OH

## 2025-04-18 NOTE — TELEPHONE ENCOUNTER
Contacted the pt again and informed her the Rx was resent to the pharmacy requested. Pt verbalized understanding.

## 2025-04-18 NOTE — TELEPHONE ENCOUNTER
Spoke with the patient and informed her of provider's message regarding Xifaxin being sent to pharmacy. However, pt stated [facility] does not have medication to dispense, pt would need it to be sent to [facility] on [address] in [place], as previously done in the past. I explained to the patient I will message provider with new pharmacy info and advise her when provider sends the rx. Pt verbalized understanding.

## 2025-04-28 ENCOUNTER — OFFICE VISIT (OUTPATIENT)
Dept: FAMILY MEDICINE CLINIC | Facility: CLINIC | Age: 89
End: 2025-04-28
Payer: MEDICARE

## 2025-04-28 ENCOUNTER — TELEPHONE (OUTPATIENT)
Age: 89
End: 2025-04-28

## 2025-04-28 VITALS
RESPIRATION RATE: 16 BRPM | HEIGHT: 66 IN | WEIGHT: 117 LBS | DIASTOLIC BLOOD PRESSURE: 80 MMHG | TEMPERATURE: 97.6 F | HEART RATE: 81 BPM | BODY MASS INDEX: 18.8 KG/M2 | SYSTOLIC BLOOD PRESSURE: 140 MMHG | OXYGEN SATURATION: 97 %

## 2025-04-28 DIAGNOSIS — E11.3392 TYPE 2 DIABETES MELLITUS WITH LEFT EYE AFFECTED BY MODERATE NONPROLIFERATIVE RETINOPATHY WITHOUT MACULAR EDEMA, WITH LONG-TERM CURRENT USE OF INSULIN (HCC): ICD-10-CM

## 2025-04-28 DIAGNOSIS — Z79.4 TYPE 2 DIABETES MELLITUS WITH LEFT EYE AFFECTED BY MODERATE NONPROLIFERATIVE RETINOPATHY WITHOUT MACULAR EDEMA, WITH LONG-TERM CURRENT USE OF INSULIN (HCC): ICD-10-CM

## 2025-04-28 DIAGNOSIS — R19.7 DIARRHEA, UNSPECIFIED TYPE: Primary | ICD-10-CM

## 2025-04-28 DIAGNOSIS — K58.2 IRRITABLE BOWEL SYNDROME WITH BOTH CONSTIPATION AND DIARRHEA: ICD-10-CM

## 2025-04-28 DIAGNOSIS — G47.00 INSOMNIA, UNSPECIFIED TYPE: ICD-10-CM

## 2025-04-28 PROCEDURE — G2211 COMPLEX E/M VISIT ADD ON: HCPCS | Performed by: NURSE PRACTITIONER

## 2025-04-28 PROCEDURE — 99214 OFFICE O/P EST MOD 30 MIN: CPT | Performed by: NURSE PRACTITIONER

## 2025-04-28 RX ORDER — MIRTAZAPINE 7.5 MG/1
7.5 TABLET, FILM COATED ORAL
Qty: 100 TABLET | Refills: 3 | Status: SHIPPED | OUTPATIENT
Start: 2025-04-28

## 2025-04-28 NOTE — PROGRESS NOTES
Name: Ac Duran      : 1935      MRN: [id number]  Encounter Provider: ZANDER Swenson  Encounter Date: 2025   Encounter department: [facility]  :  Assessment & Plan  Diarrhea, unspecified type  Long history of IBS, with mixed constipation and diarrhea.   Recently more diarrhea, no constipation.   Using imodium.   Multiple hospitalizations with rehab stay recently, will check stool for c.diff.   Will try to eliiminate some medications that could contribute to diarrhea.   Start with holding metformin for next few weeks.   Can consider other medications--sertraline, Januvia, pantoprazole, famotidine.  She does have significant GERD, could consider alternate PPI, but I do not feel at this time she would do well without PPI.   Xifaxin is ordered, but has not yet been approved by health insurance.   She is following with GI.   Orders:  •  Clostridioides difficile toxin by PCR with EIA; Future    Irritable bowel syndrome with both constipation and diarrhea  Long history of IBS, with mixed constipation and diarrhea.   Recently more diarrhea, no constipation.   Using imodium.   Multiple hospitalizations with rehab stay recently, will check stool for c.diff.   Will try to eliiminate some medications that could contribute to diarrhea.   Start with holding metformin for next few weeks.   Can consider other medications--sertraline, Januvia, pantoprazole, famotidine.  She does have significant GERD, could consider alternate PPI, but I do not feel at this time she would do well without PPI.   Xifaxin is ordered, but has not yet been approved by health insurance.   She is following with GI.       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)    Lab Results   Component Value Date    HGBA1C 7.5 (H) 2025     A1c stable at 7.5%, acceptable for age and chronic conditions.   Will  "hold metformin for now, she will call me with her sugars at the end of the week, and we will adjust her insulin accordingly.                 History of Present Illness   Ac Duran is an 89 year old female presenting today for diarrhea.     Has long history of intermittent diarrhea and constipation.   Follows with GI.   Recently more diarrhea without constipation.     She is taking imodium as needed.   Has lower abdominal cramping.     Diarrhea on the following days--watery stools.  4/3, 4/7, 4/13, 4/17, 4/19, 4/22, 4/23, 4/26, 4/28.    Avoiding artifical sweeteners.   Has been on Metformin for 20+years.   Not eating well.    So far today:  Orgain drink this morning x2.  Scrambled eggs, 3/4 piece of toast.  Chicken soup this afternoon.    Zoloft started couple weeks ago.    Ativan yesterday--gets shaky--stops shaking with lorazepam.      Home health aid recently started-- working out well, 12 hours per week. 3 days per week.    Xifaxin ordered by GI-not approved byinsurance yet.     Sugars are running mostly 100's--120's, some up to 200's, not many.    Low sugars 79 4/16 1130  4/18 63 before dinner              Diarrhea   Associated symptoms include abdominal pain (cramping). Pertinent negatives include no vomiting.   Abdominal Pain  Associated symptoms include diarrhea. Pertinent negatives include no constipation, nausea or vomiting.     Review of Systems   Constitutional: Negative.    Respiratory:  Positive for shortness of breath (chronic).    Gastrointestinal:  Positive for abdominal distention, abdominal pain (cramping) and diarrhea. Negative for anal bleeding, blood in stool, constipation, nausea, rectal pain and vomiting.       Objective   /80   Pulse 81   Temp 97.6 °F (36.4 °C) (Temporal)   Resp 16   Ht 5' 6\" (1.676 m)   Wt 53.1 kg (117 lb)   SpO2 97%   BMI 18.88 kg/m²      Physical Exam  Vitals and nursing note reviewed.   Constitutional:       General: She is not in acute distress.    "  Appearance: She is well-developed.      Comments: Frail, chronically ill appearing   Cardiovascular:      Rate and Rhythm: Normal rate and regular rhythm.      Heart sounds: No murmur heard.  Pulmonary:      Effort: Pulmonary effort is normal.      Breath sounds: Normal breath sounds.   Abdominal:      General: Bowel sounds are normal.      Palpations: Abdomen is soft.      Tenderness: There is no abdominal tenderness.   Musculoskeletal:      Right lower leg: No edema.      Left lower leg: No edema.   Skin:     General: Skin is warm and dry.      Findings: No rash.   Neurological:      Mental Status: She is alert.      Gait: Gait abnormal (ambulating with walker).   Psychiatric:         Mood and Affect: Mood normal.         Current Outpatient Medications:   •  acetaminophen (TYLENOL) 500 mg tablet, Take 1,000 mg by mouth as needed for mild pain, Disp: , Rfl:   •  apixaban (Eliquis) 2.5 mg, TAKE 1 TABLET (2.5 MG TOTAL) BY MOUTH 2 (TWO) TIMES A DAY, Disp: 90 tablet, Rfl: 1  •  bimatoprost (LUMIGAN) 0.01 % ophthalmic drops, Administer 1 drop to both eyes daily at bedtime for 30 days, Disp: 1.5 mL, Rfl: 0  •  brimonidine tartrate 0.2 % ophthalmic solution, INSTILL 1 DROP INTO RIGHT EYE TWICE A DAY, Disp: , Rfl:   •  Continuous Glucose Sensor (FreeStyle Noemí 3 Sensor) MISC, Use 1 each every 14 (fourteen) days, Disp: 6 each, Rfl: 3  •  Cyanocobalamin (Vitamin B 12) 500 MCG TABS, Take 500 mcg by mouth 2 (two) times a day, Disp: , Rfl:   •  ezetimibe-simvastatin (VYTORIN) 10-40 mg per tablet, Take 1 tablet by mouth daily at bedtime, Disp: 100 tablet, Rfl: 3  •  famotidine (PEPCID) 20 mg tablet, TAKE 1 TABLET (20 MG TOTAL) BY MOUTH 2 (TWO) TIMES A DAY, Disp: 60 tablet, Rfl: 5  •  glucose blood (OneTouch Ultra) test strip, TEST FOUR TIMES A DAY, Disp: 100 strip, Rfl: 5  •  insulin isophane (NovoLIN N FlexPen) 100 units/mL injection pen, Inject 8 units under the skin in the morning and 4 units in the evening, Disp: 15 mL,  Rfl: 2  •  ipratropium (ATROVENT) 0.03 % nasal spray, USE 2 SPRAYS INTO EACH NOSTRIL EVERY 12 (TWELVE) HOURS, Disp: 30 mL, Rfl: 6  •  metFORMIN (GLUCOPHAGE) 500 mg tablet, TAKE 2 TABLETS (1,000 MG TOTAL) BY MOUTH 2 (TWO) TIMES A DAY WITH MEALS, Disp: 120 tablet, Rfl: 5  •  metoprolol tartrate (LOPRESSOR) 25 mg tablet, Take 1 tablet (25 mg total) by mouth every 12 (twelve) hours, Disp: 180 tablet, Rfl: 3  •  mirtazapine (REMERON) 7.5 MG tablet, Take 1 tablet (7.5 mg total) by mouth daily at bedtime, Disp: 100 tablet, Rfl: 3  •  Multiple Vitamin (multivitamin) tablet, Take 1 tablet by mouth daily  , Disp: , Rfl:   •  OneTouch Delica Lancets 33G MISC, Check blood sugars four times daily. Please substitute with appropriate alternative as covered by patient's insurance. Dx: E11.65, Disp: 400 each, Rfl: 3  •  rifaximin (XIFAXAN) 550 mg tablet, Take 1 tablet (550 mg total) by mouth 3 (three) times a day for 14 days, Disp: 42 tablet, Rfl: 0  •  sertraline (ZOLOFT) 25 mg tablet, Take 1 tablet (25 mg total) by mouth daily, Disp: 100 tablet, Rfl: 3  •  sitaGLIPtin (Januvia) 100 mg tablet, Take 1 tablet (100 mg total) by mouth daily, Disp: , Rfl:   •  sodium chloride 1 g tablet, TAKE 1 TABLET THREE TIMES A DAY, Disp: 100 tablet, Rfl: 1  •  lidocaine (LIDODERM) 5 %, Apply 1 patch topically over 12 hours daily Remove & Discard patch within 12 hours or as directed by MD (Patient not taking: Reported on 4/2/2025), Disp: , Rfl:   •  LORazepam (ATIVAN) 0.5 mg tablet, Take 1 tablet (0.5 mg total) by mouth 2 (two) times a day as needed for anxiety for up to 10 days, Disp: 20 tablet, Rfl: 0  •  pantoprazole (PROTONIX) 40 mg tablet, Take 1 tablet (40 mg total) by mouth every 12 (twelve) hours, Disp: , Rfl:

## 2025-04-28 NOTE — TELEPHONE ENCOUNTER
Patient called to check and see about call from office that disconnected. Office called to notify patient mirtazapine 7.5 mg was sent to her pharmacy.

## 2025-04-28 NOTE — TELEPHONE ENCOUNTER
Jerry from [place] called stating the patient was there to refill mirtazapine 7.5 mg, but he said it is inactive.  Please advise.

## 2025-04-29 ENCOUNTER — PATIENT OUTREACH (OUTPATIENT)
Dept: CASE MANAGEMENT | Facility: OTHER | Age: 89
End: 2025-04-29

## 2025-04-29 NOTE — PROGRESS NOTES
Outpatient Care Management Note:    Voice mail message left for Ac, with my contact information, requesting a call back.     Voice mail message received from cA returning my call and stating she had diarrhea again for a 2nd day in a row.     CM attempted to call back but had to leave another voice mail message.     Ac called back. She states that she did not get a stool specimen, because she thought it needed to be formed.  She will obtain a specimen with her next movement. She is holding her metformin per her PCP. She states her blood sugar this morning was 121 and then 226 at lunch. She will call her PCP with an update if her sugars continue to trend higher, so her insulin dosing can be adjusted. Ac states that she did not get the rifaxamin because it needs a prior authorization. She will call GI to follow up.  She is drinking liquids and will monitor her fluid status closely.     Ac states she is fatigued. Her breathing is at it's baseline. Her pulse ox=96%.      She does get some swelling of her left foot and leg. She states that she is scheduled to follow up with vascular on 5/12 related to a blockage.     Ac will call with any questions.

## 2025-04-30 ENCOUNTER — TELEPHONE (OUTPATIENT)
Age: 89
End: 2025-04-30

## 2025-04-30 NOTE — TELEPHONE ENCOUNTER
Pt states she was going to stop taking Metformin because of diarrhea side effect. Pt awoke early this morning shaking so she took 2 metformin pills and now feels better. She thinks she was going through withdrawals. Please advise pt through this

## 2025-04-30 NOTE — ASSESSMENT & PLAN NOTE
Lab Results   Component Value Date    HGBA1C 7.5 (H) 03/07/2025     A1c stable at 7.5%, acceptable for age and chronic conditions.   Will hold metformin for now, she will call me with her sugars at the end of the week, and we will adjust her insulin accordingly.

## 2025-05-01 NOTE — TELEPHONE ENCOUNTER
Please let Ac know she could have been shaky if her sugar was very high, but stopping metformin alone, would not cause shaking.   She has been shaky at times over the last few months, I don't think the shakiness was from not taking metformin.     If she feels more comfortable continuing metformin, this is okay to continue.   Will need to wait for Xifaxin to be approved, and continue follow up with GI for diarrhea.

## 2025-05-01 NOTE — TELEPHONE ENCOUNTER
Spoke to patient. She started taking the Metformin again. She was experiencing urine frequency and that stopped when she started the Metformin again. She also stated that she got approved for the Xifaxin and took 2 doses so far.

## 2025-05-05 ENCOUNTER — APPOINTMENT (OUTPATIENT)
Dept: LAB | Facility: CLINIC | Age: 89
End: 2025-05-05
Attending: NURSE PRACTITIONER
Payer: MEDICARE

## 2025-05-05 ENCOUNTER — NURSE TRIAGE (OUTPATIENT)
Age: 89
End: 2025-05-05

## 2025-05-05 DIAGNOSIS — R19.7 DIARRHEA, UNSPECIFIED TYPE: ICD-10-CM

## 2025-05-05 DIAGNOSIS — R19.7 DIARRHEA, UNSPECIFIED TYPE: Primary | ICD-10-CM

## 2025-05-05 NOTE — TELEPHONE ENCOUNTER
Spoke with patient and she informed me the collection was dropped off at the lab. I checked orders and read it's in process.

## 2025-05-05 NOTE — TELEPHONE ENCOUNTER
"FOLLOW UP: Please advise.     REASON FOR CONVERSATION: Diarrhea    SYMPTOMS: Watery diarrhea 2-3 times/day since April 2025.  Denies any other symptoms.    OTHER: Pt states that this has been ongoing for a couple years, but has never been as \"intense\" as it is now.  States she dropped off stool sample at lab today- c diff order was prev placed by PCP on 4/28/25. Tried Imodium without relief.    DISPOSITION: Discuss with Provider and Call Back Patient (overriding See Within 2 Weeks in Office)    Reason for Disposition   Diarrhea is a chronic symptom (recurrent or ongoing AND lasting > 4 weeks)    Answer Assessment - Initial Assessment Questions  1. DIARRHEA SEVERITY: \"How bad is the diarrhea?\" \"How many more stools have you had in the past 24 hours than normal?\"       2-3 times/day  2. ONSET: \"When did the diarrhea begin?\"     Since April 2025  3. STOOL DESCRIPTION:  \"How loose or watery is the diarrhea?\" \"What is the stool color?\" \"Is there any blood or mucous in the stool?\"      Watery   4. VOMITING: \"Are you also vomiting?\" If Yes, ask: \"How many times in the past 24 hours?\"       No   5. ABDOMEN PAIN: \"Are you having any abdomen pain?\" If Yes, ask: \"What does it feel like?\" (e.g., crampy, dull, intermittent, constant)       No   6. ABDOMEN PAIN SEVERITY: If present, ask: \"How bad is the pain?\"  (e.g., Scale 1-10; mild, moderate, or severe)      N/a   7. ORAL INTAKE: If vomiting, \"Have you been able to drink liquids?\" \"How much liquids have you had in the past 24 hours?\"      No concerns  8. HYDRATION: \"Any signs of dehydration?\" (e.g., dry mouth [not just dry lips], too weak to stand, dizziness, new weight loss) \"When did you last urinate?\"      No concerns, drinking electrolyte drink  9. EXPOSURE: \"Have you traveled to a foreign country recently?\" \"Have you been exposed to anyone with diarrhea?\" \"Could you have eaten any food that was spoiled?\"        10. ANTIBIOTIC USE: \"Are you taking antibiotics now or have " "you taken antibiotics in the past 2 months?\"       No   11. OTHER SYMPTOMS: \"Do you have any other symptoms?\" (e.g., fever, blood in stool)        No    Protocols used: Diarrhea-Adult-[place]    "

## 2025-05-06 ENCOUNTER — TELEPHONE (OUTPATIENT)
Dept: LAB | Facility: HOSPITAL | Age: 89
End: 2025-05-06

## 2025-05-07 ENCOUNTER — TELEPHONE (OUTPATIENT)
Age: 89
End: 2025-05-07

## 2025-05-07 LAB
CALPROTECTIN STL-MCNC: 266 ÂΜG/G
ELASTASE PANC STL-MCNT: >800 UG/G
H PYLORI AG STL QL IA: NEGATIVE

## 2025-05-07 NOTE — TELEPHONE ENCOUNTER
Audrey from [facility] called to report patient is being discharged from skilled nursing and will continue with physical therapy.

## 2025-05-09 ENCOUNTER — RESULTS FOLLOW-UP (OUTPATIENT)
Age: 89
End: 2025-05-09

## 2025-05-12 ENCOUNTER — APPOINTMENT (OUTPATIENT)
Dept: LAB | Facility: CLINIC | Age: 89
End: 2025-05-12

## 2025-05-13 ENCOUNTER — PATIENT OUTREACH (OUTPATIENT)
Dept: CASE MANAGEMENT | Facility: OTHER | Age: 89
End: 2025-05-13

## 2025-05-13 NOTE — PROGRESS NOTES
Outpatient Care Management Note:    Voice mail message left for Ac, with my contact information, requesting a call back.

## 2025-05-19 ENCOUNTER — APPOINTMENT (OUTPATIENT)
Dept: LAB | Facility: CLINIC | Age: 89
End: 2025-05-19
Attending: DIETITIAN, REGISTERED
Payer: MEDICARE

## 2025-05-19 DIAGNOSIS — R19.7 DIARRHEA, UNSPECIFIED TYPE: ICD-10-CM

## 2025-05-19 PROCEDURE — 87493 C DIFF AMPLIFIED PROBE: CPT

## 2025-05-20 ENCOUNTER — PATIENT OUTREACH (OUTPATIENT)
Dept: CASE MANAGEMENT | Facility: OTHER | Age: 89
End: 2025-05-20

## 2025-05-20 DIAGNOSIS — E11.65 UNCONTROLLED TYPE 2 DIABETES MELLITUS WITH HYPERGLYCEMIA (HCC): ICD-10-CM

## 2025-05-20 LAB — C DIFF TOX GENS STL QL NAA+PROBE: NEGATIVE

## 2025-05-20 NOTE — PROGRESS NOTES
"Outpatient Care Management Note:    CM called Ac. She states that she has had ongoing diarrhea symptoms every day.  She is waiting on cdiff culture results.  CM did note that it does not look like she completed \"stool enteric bacterial panel\".  She will ask GI if it needs to be completed when they call with cdiff results.      Ac states that she did take the rifaxamin but it not improve her diarrhea symptoms.     Ac is concerned that her blood sugars have been elevated despite a decreased appetite. This  morning it was in the 140s (she could not recall the exact number). At 4:30 pm yesterday, it was 209. She will take her blood sugar a couple times per day at alternating times. She will keep a log and we will review them next week.  If they are very elevated, she will call her PCP sooner.     Ac followed up with pulmonary, but still has no answers.  She states that she misplaced her pulse ox. She will search for it and start monitoring her oxygen levels. She is still being followed by [address] [place] [address] [facility].         "

## 2025-05-20 NOTE — TELEPHONE ENCOUNTER
Reason for call:   [x] Refill   [] Prior Auth  [] Other:     Office:   [x] PCP/Provider -   [] Specialty/Provider -     Medication:   - OneTouch Ultra Test Strip- Test Four times a day      Pharmacy: [facility] [place]    Local Pharmacy   Does the patient have enough for 3 days?   [x] Yes   [] No - Send as HP to POD    Mail Away Pharmacy   Does the patient have enough for 10 days?   [] Yes   [] No - Send as HP to POD

## 2025-05-21 RX ORDER — BLOOD SUGAR DIAGNOSTIC
STRIP MISCELLANEOUS
Qty: 100 STRIP | Refills: 5 | Status: SHIPPED | OUTPATIENT
Start: 2025-05-21

## 2025-05-27 ENCOUNTER — PATIENT OUTREACH (OUTPATIENT)
Dept: CASE MANAGEMENT | Facility: OTHER | Age: 89
End: 2025-05-27

## 2025-05-27 ENCOUNTER — TELEPHONE (OUTPATIENT)
Age: 89
End: 2025-05-27

## 2025-05-27 DIAGNOSIS — F41.9 ANXIETY: ICD-10-CM

## 2025-05-27 RX ORDER — LORAZEPAM 0.5 MG/1
TABLET ORAL
Qty: 60 TABLET | Refills: 2 | Status: SHIPPED | OUTPATIENT
Start: 2025-05-27

## 2025-05-27 NOTE — TELEPHONE ENCOUNTER
Spoke with pt, triaged 5/5/25  No new symptoms, feels about the same. Taking imodium daily, no pain, soreness after going to the bathroom so many times, denies bleeding. Will complete last stool study

## 2025-05-27 NOTE — TELEPHONE ENCOUNTER
Pt. Calling for stool study results, pt. Still having diarrhea, pt. Is asking what she needs to do next, next OV 6/16, pt. Still has not yet collected stool enteric, transferred call to lab to further assist her with collection of stool test, please advise

## 2025-05-27 NOTE — TELEPHONE ENCOUNTER
Pt called and stated that she was to do a fecal test and when she did it, she did it incorrectly so she has to re-do it. She is asking if another hat and sticks/supplies be bagged up for her and she will send someone to get it tomorrow so she can do the test correctly.    Please call her when it is ready to be picked up.

## 2025-05-27 NOTE — PROGRESS NOTES
Outpatient Care Management Note:    CM called Ac. She states that she continues with ongoing diarrhea. She will call GI now to follow up and determine what is next. She is keeping her liquid intake up.     She states that her blood sugars are higher in the morning, because she does not always eat enough during the day and she is concerned about low blood sugars while sleeping, so she eats before bed.  Ac did not feel she needed to review her recent sugars. She is scheduled to see her PCP on 6/10 and will bring her sugar log to her appt.      Ac denies any other needs. CM will close the referral since she is self managing her needs. She will continue to follow up with GI.

## 2025-05-28 NOTE — TELEPHONE ENCOUNTER
Pt made aware and verbalized understanding. States she finished Xifaxan about 1 week ago and did not feel it helped.

## 2025-06-10 ENCOUNTER — OFFICE VISIT (OUTPATIENT)
Dept: FAMILY MEDICINE CLINIC | Facility: CLINIC | Age: 89
End: 2025-06-10
Payer: MEDICARE

## 2025-06-10 VITALS
SYSTOLIC BLOOD PRESSURE: 150 MMHG | BODY MASS INDEX: 18.32 KG/M2 | TEMPERATURE: 97.3 F | HEART RATE: 89 BPM | WEIGHT: 114 LBS | OXYGEN SATURATION: 96 % | RESPIRATION RATE: 16 BRPM | HEIGHT: 66 IN | DIASTOLIC BLOOD PRESSURE: 100 MMHG

## 2025-06-10 DIAGNOSIS — E01.0 THYROMEGALY: ICD-10-CM

## 2025-06-10 DIAGNOSIS — I10 ESSENTIAL HYPERTENSION: ICD-10-CM

## 2025-06-10 DIAGNOSIS — E78.2 MIXED HYPERLIPIDEMIA: ICD-10-CM

## 2025-06-10 DIAGNOSIS — Z79.4 TYPE 2 DIABETES MELLITUS WITH DIABETIC POLYNEUROPATHY, WITH LONG-TERM CURRENT USE OF INSULIN (HCC): Primary | ICD-10-CM

## 2025-06-10 DIAGNOSIS — K52.9 CHRONIC DIARRHEA: ICD-10-CM

## 2025-06-10 DIAGNOSIS — J84.9 INTERSTITIAL LUNG DISEASE (HCC): ICD-10-CM

## 2025-06-10 DIAGNOSIS — E87.1 HYPONATREMIA: ICD-10-CM

## 2025-06-10 DIAGNOSIS — E11.42 TYPE 2 DIABETES MELLITUS WITH DIABETIC POLYNEUROPATHY, WITH LONG-TERM CURRENT USE OF INSULIN (HCC): Primary | ICD-10-CM

## 2025-06-10 LAB — SL AMB POCT HEMOGLOBIN AIC: 6.9 (ref ?–6.5)

## 2025-06-10 PROCEDURE — 83036 HEMOGLOBIN GLYCOSYLATED A1C: CPT | Performed by: NURSE PRACTITIONER

## 2025-06-10 PROCEDURE — 99214 OFFICE O/P EST MOD 30 MIN: CPT | Performed by: NURSE PRACTITIONER

## 2025-06-10 PROCEDURE — G2211 COMPLEX E/M VISIT ADD ON: HCPCS | Performed by: NURSE PRACTITIONER

## 2025-06-10 RX ORDER — PEN NEEDLE, DIABETIC 31 GX5/16"
NEEDLE, DISPOSABLE MISCELLANEOUS
COMMUNITY
Start: 2025-05-29

## 2025-06-10 NOTE — PROGRESS NOTES
Name: Ac Duran      : 1935      MRN: [id number]  Encounter Provider: ZANDER Swenson  Encounter Date: 6/10/2025   Encounter department: [facility]  :  Assessment & Plan  Type 2 diabetes mellitus with diabetic polyneuropathy, with long-term current use of insulin (HCC)    Lab Results   Component Value Date    HGBA1C 6.9 (A) 06/10/2025     A1c 6.9%.   She is having intermittent hypoglycemia--due to not eating enough.   Will stop metformin, ? If contributing to diarrhea.   Will continue Novolin N 8 units in the morning, 4 units in the evening.   We will be in close contact regarding blood glucoses, and will adjust insulin as needed.     Orders:  •  POCT hemoglobin A1c    Thyromegaly  Sensation of throat swelling and tightness. Will check thyroid US.    Orders:  •  US thyroid; Future    Essential hypertension  BP high today, usually well controlled.   Will continue to monitor.   Has not checked at home recently.     Orders:  •  CBC and differential; Future  •  Comprehensive metabolic panel; Future  •  TSH, 3rd generation; Future    Interstitial lung disease (HCC)  Following with Dr. Jovel, pulmonology.   Was unable to complete 6 minute walk test for pulmonology.   CPAP at bedtime for ROMERO.   Declines treatment with OFEV due to possible side effect of diarrhea--already has chronic diarrhea.   As per Dr. Jovel's notes on 25, further respiratory decline is expected.          Chronic diarrhea  Recently took Xifaxin, does not feel it was effective.   Continues to have diarrhea.   Using Imodium as needed.   Gi follow up in July.   Will stop metformin to see if this has any impact on diarrhea.          Mixed hyperlipidemia  LDL 69, continue Vytorin.     Orders:  •  Lipid panel; Future  •  TSH, 3rd generation; Future    Hyponatremia  Sodium 134.   Continue sodium chloride tablets.     Orders:  •  Comprehensive metabolic panel; Future           History of Present Illness   Ac CANCHOLA  "Roger is an 89 year old female presenting today for follow up.     Continues with diarrhea.   Xifaxin did not help at all with diarrhea.     Accompanied by her friend today.     Would like to try to stop metformin again.     Last few weeks. Throat feels like it is swollen, feels like her shirt is tight around her neck.   Has not had this feeling before.     GI appointment in July.  Stool culture sample due.     So far no diarrhea today.   Took imodium twice yesterday.     Sugars are all over, started new insulin brand.   Diet is poor, not eating enough. Forgets to eat, no appetite.  Low blood sugars at times.   9:30 last night 86.     According to sugar log, hypoglycemia.   6/7 1500 86  6/4 95  6/1 86    A1c 6.9%  Vision changed a little--thinks maybe it was recent corticosteroids.  Needs to schedule eye exam.     Dr. Jovel--Tried 6 minute walk test--office was so cold--couldn't get saturations on her fingers.               Review of Systems   Constitutional:  Positive for fatigue. Negative for chills, diaphoresis, fever and unexpected weight change.   HENT:  Positive for postnasal drip and sore throat.    Respiratory:  Positive for shortness of breath.    Cardiovascular: Negative.    Gastrointestinal:  Positive for diarrhea.   Genitourinary: Negative.    Neurological: Negative.        Objective   /100   Pulse 89   Temp (!) 97.3 °F (36.3 °C) (Temporal)   Resp 16   Ht 5' 6\" (1.676 m)   Wt 51.7 kg (114 lb)   SpO2 96%   BMI 18.40 kg/m²      Physical Exam  Vitals and nursing note reviewed.   Constitutional:       Appearance: She is ill-appearing (chronically ill, frail appearing).   HENT:      Head: Atraumatic.     Cardiovascular:      Rate and Rhythm: Normal rate and regular rhythm.      Heart sounds: No murmur heard.  Pulmonary:      Comments: Dyspneic on exertion, including talking.   Decreased breath sounds throughout, crackles in the bases bilaterally.   Abdominal:      Palpations: Abdomen is soft. "      Tenderness: There is no abdominal tenderness.     Musculoskeletal:      Right lower leg: No edema.      Left lower leg: No edema.     Neurological:      Mental Status: She is alert.     Psychiatric:         Mood and Affect: Mood normal.       Current Medications[1]          [1]    Current Outpatient Medications:   •  acetaminophen (TYLENOL) 500 mg tablet, Take 1,000 mg by mouth as needed for mild pain, Disp: , Rfl:   •  apixaban (Eliquis) 2.5 mg, TAKE 1 TABLET (2.5 MG TOTAL) BY MOUTH 2 (TWO) TIMES A DAY, Disp: 90 tablet, Rfl: 1  •  BD Pen Needle Mini Ultrafine 31G X 5 MM MISC, USE 2 (TWO) TIMES A DAY OR AS DIRECTED, Disp: , Rfl:   •  bimatoprost (LUMIGAN) 0.01 % ophthalmic drops, Administer 1 drop to both eyes daily at bedtime for 30 days, Disp: 1.5 mL, Rfl: 0  •  brimonidine tartrate 0.2 % ophthalmic solution, , Disp: , Rfl:   •  Continuous Glucose Sensor (FreeStyle Noemí 3 Sensor) MISC, Use 1 each every 14 (fourteen) days, Disp: 6 each, Rfl: 3  •  Cyanocobalamin (Vitamin B 12) 500 MCG TABS, Take 500 mcg by mouth 2 (two) times a day, Disp: , Rfl:   •  ezetimibe-simvastatin (VYTORIN) 10-40 mg per tablet, Take 1 tablet by mouth daily at bedtime, Disp: 100 tablet, Rfl: 3  •  famotidine (PEPCID) 20 mg tablet, TAKE 1 TABLET (20 MG TOTAL) BY MOUTH 2 (TWO) TIMES A DAY, Disp: 60 tablet, Rfl: 5  •  glucose blood (OneTouch Ultra) test strip, TEST FOUR TIMES A DAY, Disp: 100 strip, Rfl: 5  •  insulin isophane (NovoLIN N FlexPen) 100 units/mL injection pen, Inject 8 units under the skin in the morning and 4 units in the evening, Disp: 15 mL, Rfl: 2  •  ipratropium (ATROVENT) 0.03 % nasal spray, USE 2 SPRAYS INTO EACH NOSTRIL EVERY 12 (TWELVE) HOURS, Disp: 30 mL, Rfl: 6  •  LORazepam (ATIVAN) 0.5 mg tablet, TAKE 1 TABLET (0.5 MG TOTAL) BY MOUTH 2 (TWO) TIMES A DAY AS NEEDED FOR ANXIETY, Disp: 60 tablet, Rfl: 2  •  metoprolol tartrate (LOPRESSOR) 25 mg tablet, Take 1 tablet (25 mg total) by mouth every 12 (twelve) hours,  Disp: 180 tablet, Rfl: 3  •  mirtazapine (REMERON) 7.5 MG tablet, Take 1 tablet (7.5 mg total) by mouth daily at bedtime, Disp: 100 tablet, Rfl: 3  •  Multiple Vitamin (multivitamin) tablet, Take 1 tablet by mouth in the morning., Disp: , Rfl:   •  OneTouch Delica Lancets 33G MISC, Check blood sugars four times daily. Please substitute with appropriate alternative as covered by patient's insurance. Dx: E11.65, Disp: 400 each, Rfl: 3  •  sertraline (ZOLOFT) 25 mg tablet, Take 1 tablet (25 mg total) by mouth daily, Disp: 100 tablet, Rfl: 3  •  sitaGLIPtin (Januvia) 100 mg tablet, Take 1 tablet (100 mg total) by mouth daily, Disp: , Rfl:   •  sodium chloride 1 g tablet, TAKE 1 TABLET THREE TIMES A DAY, Disp: 100 tablet, Rfl: 1  •  lidocaine (LIDODERM) 5 %, Apply 1 patch topically over 12 hours daily Remove & Discard patch within 12 hours or as directed by MD (Patient not taking: Reported on 4/2/2025), Disp: , Rfl:   •  pantoprazole (PROTONIX) 40 mg tablet, Take 1 tablet (40 mg total) by mouth every 12 (twelve) hours, Disp: , Rfl:

## 2025-06-10 NOTE — ASSESSMENT & PLAN NOTE
Sodium 134.   Continue sodium chloride tablets.     Orders:  •  Comprehensive metabolic panel; Future

## 2025-06-10 NOTE — ASSESSMENT & PLAN NOTE
BP high today, usually well controlled.   Will continue to monitor.   Has not checked at home recently.     Orders:  •  CBC and differential; Future  •  Comprehensive metabolic panel; Future  •  TSH, 3rd generation; Future

## 2025-06-11 ENCOUNTER — APPOINTMENT (OUTPATIENT)
Dept: LAB | Facility: HOSPITAL | Age: 89
End: 2025-06-11
Payer: MEDICARE

## 2025-06-11 LAB
C COLI+JEJUNI TUF STL QL NAA+PROBE: NEGATIVE
EC STX1+STX2 GENES STL QL NAA+PROBE: NEGATIVE
SALMONELLA SP SPAO STL QL NAA+PROBE: NEGATIVE
SHIGELLA SP+EIEC IPAH STL QL NAA+PROBE: NEGATIVE

## 2025-06-11 PROCEDURE — 87505 NFCT AGENT DETECTION GI: CPT

## 2025-06-12 ENCOUNTER — RESULTS FOLLOW-UP (OUTPATIENT)
Age: 89
End: 2025-06-12

## 2025-06-13 ENCOUNTER — TELEPHONE (OUTPATIENT)
Age: 89
End: 2025-06-13

## 2025-06-13 NOTE — TELEPHONE ENCOUNTER
Blood sugar readings   without metformin on Monday night 5pm-177  8pm-217     6/11 131 am 5pm-195  8pm-182     6/12 11am 182  4pm-125  6/1311am 173 3pm 180

## 2025-06-13 NOTE — TELEPHONE ENCOUNTER
Spoke with Ac regarding sugars.   Sugar 135 just now.     Will hold off on insulin adjustment for now, I will check in with her again in the next 4-5 days for an update.

## 2025-06-14 NOTE — ASSESSMENT & PLAN NOTE
Lab Results   Component Value Date    HGBA1C 6.9 (A) 06/10/2025     A1c 6.9%.   She is having intermittent hypoglycemia--due to not eating enough.   Will stop metformin, ? If contributing to diarrhea.   Will continue Novolin N 8 units in the morning, 4 units in the evening.   We will be in close contact regarding blood glucoses, and will adjust insulin as needed.     Orders:  •  POCT hemoglobin A1c

## 2025-06-14 NOTE — ASSESSMENT & PLAN NOTE
Following with Dr. Jovel, pulmonology.   Was unable to complete 6 minute walk test for pulmonology.   CPAP at bedtime for ROMERO.   Declines treatment with OFEV due to possible side effect of diarrhea--already has chronic diarrhea.   As per Dr. Jovel's notes on 5/19/25, further respiratory decline is expected.

## 2025-06-17 ENCOUNTER — TELEPHONE (OUTPATIENT)
Dept: FAMILY MEDICINE CLINIC | Facility: CLINIC | Age: 89
End: 2025-06-17

## 2025-06-17 NOTE — TELEPHONE ENCOUNTER
Attempted to call Ac regarding blood sugars over the last few days.   No answer. Left message to return call, and I will try again tomorrow as well.

## 2025-06-18 ENCOUNTER — HOSPITAL ENCOUNTER (OUTPATIENT)
Dept: RADIOLOGY | Facility: HOSPITAL | Age: 89
Discharge: HOME | End: 2025-06-18
Attending: NURSE PRACTITIONER
Payer: MEDICARE

## 2025-06-18 DIAGNOSIS — E01.0 THYROMEGALY: ICD-10-CM

## 2025-06-18 PROCEDURE — 76536 US EXAM OF HEAD AND NECK: CPT

## 2025-06-18 NOTE — TELEPHONE ENCOUNTER
Spoke to Ac via telephone.   She has had no diarrhea for the last 8 days since stopping metformin. Feels hungrier, eating more.   Admits she has been eating potato chips and ice, sugars have been high.   Blood sugar log reported  6/10: 1700--177, 2130--217  6/11: 0830--131, 1600--195, 2000--182  6/12: 1130--183, 1600--225  6/13: 0830--172, 1130--173, 1500--180, 1730--130, 2130--207  6/14: 0900--163, 1200--172, 1700--156, 2130--223  6/15: 0845--169, 1100--214  6/16: 0800--165, 1600--243  6/17: 0800--186, 1200--131, 1700--276, 2000--218  6/18: 0800--199, 1100--205, 1700--235.     Will increase insulin dose from 8 units every morning and 4 units every evening to 10 units in the morning and 6 units in the evening.   I will contact her again in one week to review sugars. She will call me with any concerns in the interim.   She will eat less frequent ice cream and chips.

## 2025-06-24 ENCOUNTER — TELEPHONE (OUTPATIENT)
Dept: ADMINISTRATIVE | Facility: OTHER | Age: 89
End: 2025-06-24

## 2025-06-24 DIAGNOSIS — R13.10 DYSPHAGIA, UNSPECIFIED TYPE: ICD-10-CM

## 2025-06-24 RX ORDER — PANTOPRAZOLE SODIUM 40 MG/1
40 TABLET, DELAYED RELEASE ORAL 2 TIMES DAILY
Qty: 180 TABLET | Refills: 3 | Status: SHIPPED | OUTPATIENT
Start: 2025-06-24

## 2025-06-24 NOTE — LETTER
Diabetic Eye Exam Form    Date Requested: 25  Patient: Ac Duran  Patient : 1935   Referring Provider: ZANDER Swenson      DIABETIC Eye Exam Date _______________________________      Type of Exam MUST be documented for Diabetic Eye Exams. Please CHECK ONE.     Retinal Exam       Dilated Retinal Exam       OCT       Optomap-Iris Exam      Fundus Photography       Left Eye - Please check Retinopathy or No Retinopathy        Exam did show retinopathy    Exam did not show retinopathy       Right Eye - Please check Retinopathy or No Retinopathy       Exam did show retinopathy    Exam did not show retinopathy       Comments __________________________________________________________    Practice Providing Exam ______________________________________________    Exam Performed By (print name) _______________________________________      Provider Signature ___________________________________________________      These reports are needed for  compliance.    Please fax this completed form and a copy of the Diabetic Eye Exam report to the [facility] Based Department as soon as possible via Fax [phone], attention Sanchez: Phone [phone]. Our office is located at [address] [place] [address].     We thank you for your assistance in treating our mutual patient.

## 2025-06-24 NOTE — LETTER
Diabetic Eye Exam Form    Date Requested: 25  Patient: Ac Duran  Patient : 1935   Referring Provider: ZANDER Swenson      DIABETIC Eye Exam Date _______25________________________      Type of Exam MUST be documented for Diabetic Eye Exams. Please CHECK ONE.     Retinal Exam       Dilated Retinal Exam       OCT       Optomap-Iris Exam      Fundus Photography       Left Eye - Please check Retinopathy or No Retinopathy        Exam did show retinopathy    Exam did not show retinopathy       Right Eye - Please check Retinopathy or No Retinopathy       Exam did show retinopathy    Exam did not show retinopathy       Comments __________________________________________________________    Practice Providing Exam ______________________________________________    Exam Performed By (print name) _______________________________________      Provider Signature ___________________________________________________      These reports are needed for  compliance.    Please fax this completed form and a copy of the Diabetic Eye Exam report to the [facility] Based Department as soon as possible via Fax [phone], attention Sanchez: Phone [phone]. Our office is located at [address] [place] [address].     We thank you for your assistance in treating our mutual patient.

## 2025-06-24 NOTE — TELEPHONE ENCOUNTER
----- Message from ZANDER Pisano sent at 6/23/2025  5:49 PM EDT -----  Regarding: eye exam  06/23/25 5:49 PM    Hello, our patient Ac Duran has had Diabetic Eye Exam completed/performed. Please assist in updating the patient chart by pulling the document from encounters Tab within Chart Review. Scanned document under scan date of 6/19/2025.    Thank you,  ZANDER Swenson  [facility]

## 2025-06-24 NOTE — TELEPHONE ENCOUNTER
Upon review of the In Basket request and the patient's chart, initial outreach has been made via fax to facility. Please see Contacts section for details.     Thank you  Sanchez Beatty MA

## 2025-06-24 NOTE — TELEPHONE ENCOUNTER
Spoke with Tr today for update on blood glucoses.     6/19: 0800--208, 1130--200, 2100--210  6/20: 0900--167, 1200--154, 1630--151, MDN--125  6/21: 0830--168, 1615--247, 1900--168  6/22: 0830--130, 1230--134, 1630--174, 2000--116  6/23: 0830--122, 1630--229, 2200--200  6/24: 0930--148, 1230--164, 1730--218    Advised to adjust insulin to 12 units in the morning, continue 6 units in the evening.     No further diarrhea.   Constipation now.   Start colace 1 capsule twice daily.   I will contact  her again in one week for update.

## 2025-06-30 NOTE — TELEPHONE ENCOUNTER
As a follow-up, a second attempt has been made for outreach via fax to facility. Please see Contacts section for details.    Thank you  Sanchez Beatty MA

## 2025-07-01 ENCOUNTER — TELEPHONE (OUTPATIENT)
Dept: FAMILY MEDICINE CLINIC | Facility: CLINIC | Age: 89
End: 2025-07-01

## 2025-07-01 DIAGNOSIS — E11.69 TYPE 2 DIABETES MELLITUS WITH HYPERLIPIDEMIA  (HCC): ICD-10-CM

## 2025-07-01 DIAGNOSIS — E78.5 TYPE 2 DIABETES MELLITUS WITH HYPERLIPIDEMIA  (HCC): ICD-10-CM

## 2025-07-01 RX ORDER — HUMAN INSULIN 100 [IU]/ML
INJECTION, SUSPENSION SUBCUTANEOUS
Qty: 15 ML | Refills: 2 | Status: SHIPPED | OUTPATIENT
Start: 2025-07-01

## 2025-07-01 NOTE — TELEPHONE ENCOUNTER
Spoke with Ac via telephone.     Reviewed thyroid US results. No enlargement of thyroid gland, and no nodules requiring further follow up.   Chronic dysphagia.  Has GI follow up later this month.     Constipation- improved with colace 1 capsule BID.     Insulin Novolin N 12 units in the morning, and 6 units in the evening  Glucoses:  6/26: 0900--146  6/27: 0300--125, 0800--163, 1200--126, 1600--280, 2100--218  6/28: 0900--130, 1700--262, 2000--200  6/29: 0830--157, 1715--228, 2100--240  6/30: 0800--118, 1130--124, 1500--118, 2000--142  7/1: 0730--117, 1130--114, 1615--104 today was having work on her kitchen, not eating much    Will leave insulin at same dose for now. Monitor for one more week prior to considering changes, she admits she has not been eating the best food choices. Has her appetite back again. Will work on eating better food choices--again encouraged to eat as much as she wants, but avoid chips and ice cream.

## 2025-07-03 ENCOUNTER — ESTABLISHED COMPREHENSIVE EXAM (OUTPATIENT)
Dept: URBAN - METROPOLITAN AREA CLINIC 6 | Facility: CLINIC | Age: 89
End: 2025-07-03

## 2025-07-03 DIAGNOSIS — H17.9: ICD-10-CM

## 2025-07-03 DIAGNOSIS — H16.223: ICD-10-CM

## 2025-07-03 DIAGNOSIS — H26.493: ICD-10-CM

## 2025-07-03 DIAGNOSIS — E11.9: ICD-10-CM

## 2025-07-03 DIAGNOSIS — H40.1132: ICD-10-CM

## 2025-07-03 DIAGNOSIS — Z96.1: ICD-10-CM

## 2025-07-03 LAB
LEFT EYE DIABETIC RETINOPATHY: NORMAL
RIGHT EYE DIABETIC RETINOPATHY: NORMAL

## 2025-07-03 PROCEDURE — 92020 GONIOSCOPY: CPT

## 2025-07-03 PROCEDURE — 92014 COMPRE OPH EXAM EST PT 1/>: CPT

## 2025-07-03 PROCEDURE — 92202 OPSCPY EXTND ON/MAC DRAW: CPT

## 2025-07-03 PROCEDURE — 92133 CPTRZD OPH DX IMG PST SGM ON: CPT

## 2025-07-03 ASSESSMENT — TONOMETRY
OD_IOP_MMHG: 18
OS_IOP_MMHG: 20

## 2025-07-03 ASSESSMENT — VISUAL ACUITY
OS_CC: 20/40
OD_CC: 20/300

## 2025-07-07 ENCOUNTER — OFFICE VISIT (OUTPATIENT)
Dept: VASCULAR SURGERY | Facility: CLINIC | Age: 89
End: 2025-07-07
Payer: MEDICARE

## 2025-07-07 VITALS
BODY MASS INDEX: 19.21 KG/M2 | SYSTOLIC BLOOD PRESSURE: 118 MMHG | DIASTOLIC BLOOD PRESSURE: 60 MMHG | WEIGHT: 119 LBS | HEART RATE: 72 BPM | OXYGEN SATURATION: 99 %

## 2025-07-07 DIAGNOSIS — I10 PRIMARY HYPERTENSION: ICD-10-CM

## 2025-07-07 DIAGNOSIS — I65.22 ASYMPTOMATIC CAROTID ARTERY STENOSIS, LEFT: Primary | ICD-10-CM

## 2025-07-07 DIAGNOSIS — E44.0 MODERATE PROTEIN-CALORIE MALNUTRITION (HCC): ICD-10-CM

## 2025-07-07 DIAGNOSIS — I65.21 OCCLUSION OF RIGHT CAROTID ARTERY: ICD-10-CM

## 2025-07-07 DIAGNOSIS — E11.3392 TYPE 2 DIABETES MELLITUS WITH LEFT EYE AFFECTED BY MODERATE NONPROLIFERATIVE RETINOPATHY WITHOUT MACULAR EDEMA, WITH LONG-TERM CURRENT USE OF INSULIN (HCC): ICD-10-CM

## 2025-07-07 DIAGNOSIS — Z79.4 TYPE 2 DIABETES MELLITUS WITH LEFT EYE AFFECTED BY MODERATE NONPROLIFERATIVE RETINOPATHY WITHOUT MACULAR EDEMA, WITH LONG-TERM CURRENT USE OF INSULIN (HCC): ICD-10-CM

## 2025-07-07 DIAGNOSIS — E22.2 SIADH (SYNDROME OF INAPPROPRIATE ADH PRODUCTION) (HCC): ICD-10-CM

## 2025-07-07 DIAGNOSIS — I73.9 PERIPHERAL ARTERIAL DISEASE (HCC): ICD-10-CM

## 2025-07-07 PROCEDURE — 99214 OFFICE O/P EST MOD 30 MIN: CPT | Performed by: PHYSICIAN ASSISTANT

## 2025-07-07 NOTE — PATIENT INSTRUCTIONS
Symptoms of stroke:  - Unable to speak or understand speech  - Unable to move one side of the body (arm or leg)  - Visual changes  - Call 911 for any symptoms of stroke      PAD (plaque and narrowing in the blood vessels of the legs)  -Walking as tolerated  -Maintain good blood pressure, cholesterol and glucose control  -Continue with medical therapy on apixaban 2.5 twice a day and Vytorin 10/40.  -Wear good protective footwear, avoid wounds and check feet daily for any wounds or changes  -Follow up LEAD

## 2025-07-07 NOTE — ASSESSMENT & PLAN NOTE
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    Plan:  -Stable, asymptomatic atherosclerosis of the lower extremities  -Reviewed dopplers with patient which shows SFA and tibial disease with great toe pressures above healing in diabetic but should be monitored.  -Recommend walking as tolerated and good foot care  -Maintain good blood pressure, cholesterol and glucose control  -Continue with medical therapy on apixaban 2.5 twice a day and Vytorin 10/40.  -Maintain good diabetic foot care  -Wear good protective footwear, avoid wounds and check feet daily for any wounds or changes  -Given diabetes, recommend routine clinical monitoring and periodic imaging with [facility]  Orders:    VAS ARTERIAL DUPLEX- LOWER LIMB BILATERAL; Future

## 2025-07-07 NOTE — ASSESSMENT & PLAN NOTE
89 yoF glaucoma, GERD, depression/anxiety, bilateral hip pain, osteoporosis, hypertension, hyperlipidemia, diabetes, Parox AF (Eliquis), PPM, R carotid artery occlusion and left carotid artery stenosis who present for vascular follow up, RFM and review of carotid duplex.     Occlusion of right carotid artery  Asymptomatic carotid artery stenosis, left  -No symptoms of TIA/stroke  -CV dup 8/23/24: R ICA occluded; L ICA 50-69% (129/36, ratio 2.55).    Plan:  -Asymptomatic carotid disease with chronic, known R ICA occlusion (since '94 per patient) and moderate L ICA stenosis.  -Reviewed carotid duplex study which re-demonstrates AISHA occlusion and LICA with  cm/s  and EDV 36 cm/s falling in the moderate, 50-59% stenosis range. LICA stenosis is on the lower end of the range and dopplers stable with similar velocities for the past several years by our review in the office.   -Discussed the pathophysiology and treatment of carotid artery stenosis,  including indications for surgical intervention.  -Maintain good blood pressure, cholesterol and glucose control  -Continue with medical therapy on apixaban 2.5 twice a day and Vytorin 10/40.  -Reviewed symptoms of stroke for which 911 should be called  -Doppler surveillance every 6 months (due now) and annual office visit  Orders:    VAS carotid complete study; Future    VAS carotid complete study; Future

## 2025-07-07 NOTE — PROGRESS NOTES
Name: Ac Duran      : 1935      MRN: [id number]  Encounter Provider: Brigitte Valadez PA-C  Encounter Date: 2025   Encounter department: [facility] [place]  :  Assessment & Plan  Asymptomatic carotid artery stenosis, left  89 yoF glaucoma, GERD, depression/anxiety, bilateral hip pain, osteoporosis, hypertension, hyperlipidemia, diabetes, Parox AF (Eliquis), PPM, R carotid artery occlusion and left carotid artery stenosis who present for vascular follow up, RFM and review of carotid duplex.     Occlusion of right carotid artery  Asymptomatic carotid artery stenosis, left  -No symptoms of TIA/stroke  -CV dup 24: R ICA occluded; L ICA 50-69% (129/36, ratio 2.55).    Plan:  -Asymptomatic carotid disease with chronic, known R ICA occlusion (since  per patient) and moderate L ICA stenosis.  -Reviewed carotid duplex study which re-demonstrates AISHA occlusion and LICA with  cm/s  and EDV 36 cm/s falling in the moderate, 50-59% stenosis range. LICA stenosis is on the lower end of the range and dopplers stable with similar velocities for the past several years by our review in the office.   -Discussed the pathophysiology and treatment of carotid artery stenosis,  including indications for surgical intervention.  -Maintain good blood pressure, cholesterol and glucose control  -Continue with medical therapy on apixaban 2.5 twice a day and Vytorin 10/40.  -Reviewed symptoms of stroke for which 911 should be called  -Doppler surveillance every 6 months (due now) and annual office visit  Orders:    VAS carotid complete study; Future    VAS carotid complete study; Future    Occlusion of right carotid artery  -Chronic, known AISHA occlusion  -No intervention indicated, other than medical therapy and monitoring of the left carotid  Orders:    VAS carotid complete study; Future    VAS carotid complete study; Future    Peripheral arterial disease (HCC)  -Ambulates with rollator  -No  claudication, ischemic rest pain or tissue loss    -CHELSEA 2/20/24:     R 0.81/128/94: >75% pSFA, T-P disease    L 0.7/105/60; >75% pSFA, T-P disease with well developed arterial branch of the distal fem artery.    Plan:  -Stable, asymptomatic atherosclerosis of the lower extremities  -Reviewed dopplers with patient which shows SFA and tibial disease with great toe pressures above healing in diabetic but should be monitored.  -Recommend walking as tolerated and good foot care  -Maintain good blood pressure, cholesterol and glucose control  -Continue with medical therapy on apixaban 2.5 twice a day and Vytorin 10/40.  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          History of Present Illness   Pt is here for a follow up. Pt states she has minor swelling on L leg. Pt denies pain when walking, open wounds, numbness/tingling. Pt ambulates with walker.    HPI  Ac Duran is a 89 y.o. female who presents for vascular follow up.    7/7/25: Patient is new to me but has been seen by other providers in the vascular group previously for carotid and peripheral arterial disease.  She  has not been seen in almost 2 years.  Patient cites she has had issues with diarrhea and was afraid to leave the house.  Took quite a while to discover reason for diarrhea, but she reports that she is doing better now after discontinuation of metformin.  She has also had issues with grieving.  Biggest change since she was last seen is that her son has hired an aide to help her around the house and keep her company.      From a vascular standpoint, she reports that she has had intermittent leg swelling, but not recently.  She has no leg swelling on exam today.     She ambulates with a walker and cites that she is easily tired and has shortness of breath due to pulmonary fibrosis, but no leg pain or heaviness.  She has no foot pain, numbness or tissue loss.    She tells me she has had no known right carotid artery occlusion since '94.  She has no symptoms of TIA/stroke.  No amaurosis fugax, vision changes, dysarthria unilateral numbness or weakness.  We reviewed the symptoms of stroke which she should call 911.    We reviewed her recent carotid duplex study as well as prior studies for the past several years which remain consistent with redemonstration of known right carotid artery occlusion and mild to moderate left ICA stenosis.  Will continue with medical therapy and monitoring.    We also reviewed her lower extremity arterial duplex study and recent pertinent labs.    Patient confirms medications, including: apixaban 2.5 twice a day, Vytorin 10/40, Novolin insulin twice daily.       Hemoglobin 10.5  BUN/creatinine 17/0.59, eGFR 82  A1c 6.9        -Diarrhea (metformin)  -Grief  -Dizziness, believes it has to do with metoprolol  -Tired easily / pulmonary fibrosis  -Ambulates with Rolator and has an aide to help with housekeeping and meals    Review of Systems   Cardiovascular:  Positive for leg swelling.   Musculoskeletal:  Positive for gait problem.   Neurological:  Negative for numbness.     Objective   /60  (BP Location: Right arm, Patient Position: Sitting, Cuff Size: Standard)   Pulse 72   Wt 54 kg (119 lb)   SpO2 99%   BMI 19.21 kg/m²          No LE edema on exam today. No significant chronic changes. Feet warm. No wounds or ischemic changes. Decreased pulses.     Physical Exam  Vitals and nursing note reviewed.   Constitutional:       Appearance: She is well-developed.   HENT:      Head: Normocephalic and atraumatic.     Eyes:      Pupils: Pupils are equal, round, and reactive to light.     Neck:      Vascular: No carotid bruit or JVD.     Cardiovascular:      Rate and Rhythm: Normal rate and regular rhythm.      Pulses: Decreased pulses.           Carotid pulses are 2+ on the right side and 2+ on the left side.       Radial pulses are 2+ on the right side and 2+ on the left side.        Dorsalis pedis pulses are 1+ on the right side and 1+ on the left side.      Heart sounds: S1 normal and S2 normal. Murmur heard.      Systolic murmur is present with a grade of 2/6.      No friction rub. No gallop.   Pulmonary:      Effort: Pulmonary effort is normal. No accessory muscle usage or respiratory distress.      Breath sounds: Normal breath sounds. No wheezing or rales.   Abdominal:      General: Bowel sounds are normal. There is no distension.      Palpations: Abdomen is soft.      Tenderness: There is no abdominal tenderness.     Musculoskeletal:         General: No deformity. Normal range of motion.      Right lower leg: No edema.      Left lower leg: No edema.     Skin:     General: Skin is warm and dry.      Findings: No lesion or rash.      Nails: There is no clubbing.     Neurological:      Mental Status: She is alert and oriented to person, place, and time.      Comments: Grossly normal    Psychiatric:         Behavior: Behavior is cooperative.         CV carotid 8/23/24  [facility]PORT  CLINICAL:  Indications:  6 month surveillance of carotid artery disease.  Patient is asymptomatic at  this  time.  Operative History:  1994-01-01 Right Carotid agram  Risk Factors  The patient has history of HTN, Diabetes, Hyperlipidemia and previous smoking  (quit >10yrs ago).     FINDINGS:     Right        Impression  PSV  EDV (cm/s)  Direction of Flow  Ratio    Dist. ICA    Occluded                                                 Mid. ICA     Occluded                                                 Prox. ICA    Occluded                                                 Dist CCA                  21           0                              Mid CCA                   29           0                      0.37    Prox CCA                  79           0                              Ext Carotid               55           0                      1.90    Prox Vert                 57          13  Antegrade                   Subclavian                98           0                                Left         Impression  PSV  EDV (cm/s)  Direction of Flow  Ratio    Dist. ICA                 70          16                      1.38    Mid. ICA                  82          16                      1.62    Prox. ICA    50 - 69%    129          36                      2.55    Dist CCA                  50          12                              Mid CCA                   51          12                      0.92    Prox CCA                  55           9                              Ext Carotid              149           0                      2.95    Prox Vert                 60          14  Antegrade                   Subclavian               114           0                                       CONCLUSION:  Impression  RIGHT:  There is chronic occlusion of the internal carotid artery.  Vertebral artery flow is antegrade. There is no significant subclavian artery  disease.  LEFT:  There is 50-69% stenosis noted in the internal carotid artery. Plaque is  heterogenous and irregular.  Vertebral artery flow is antegrade. There is no  significant subclavian artery  disease.     Compared to previous study on 2/20/2024, there is no change.      [facility] 2/20/24  [facility] REPORT  CLINICAL:  Indications:  Patient presents with bilateral foot redness and pain. Patient states she uses  a walker and does not walk very far at any one time.  Operative History:  1994-01-01 Right Carotid agram  Risk Factors:  The patient has history of HTN, Diabetes, glaucoma, DDD, idiopathic pulmonary  fibrosis, peripheral neuropathy, irregular heart beat, hyperlipidemia,  and  previous smoking (quit >10 years ago).  Height:  66 inches.  Weight:  117 lbs.  Clinical:  Brachial BP:  Right:  160/ mm Hg, Left:  153/ mmHg.     FINDINGS:     Right                  Impression  Waveform  PSV (cm/s)    Post. Tibial                       Biphasic                Ant. Tibial                        Biphasic                Common Femoral Artery                               116    Prox Profunda                                       176    Prox SFA               >75%                         463    Mid SFA                                              68    Dist SFA                                             67    Proximal Pop                                         79    Distal Pop                                           58    Tibioperoneal                                        33    Dist Post Tibial                                     31    Prox. Ant. Tibial                                    25    Dist. Ant. Tibial                                    63    Dist Peroneal                                        50       Left                   Impression  Waveform  PSV (cm/s)    Post. Tibial                       Biphasic                Ant. Tibial                        Biphasic                Common Femoral Artery                                74    Prox Profunda                                        79    Prox SFA                                             74    Mid SFA                                               42    Dist SFA                                             64    Proximal Pop           >75%                         435    Distal Pop                                           41    Tibioperoneal                                        32    Dist Post Tibial                                     21    Prox. Ant. Tibial                                    30    Dist. Ant. Tibial                                    51    Dist Peroneal                                        31             CONCLUSION:     Impression:     RIGHT LOWER LIMB:  ABNORMAL:  Evaluation shows a >75% stenosis of the proximal superficial femoral artery.  Diffuse calcified disease noted throughout the remaining femoral-popliteal  arteries without significant focal stenosis identified.  There is evidence of tibio-peroneal disease.  Ankle/Brachial index:  0.81 which is in the moderate disease category.  PVR/ PPG tracings are dampened.  Metatarsal pressure:  128 mmHg  Great toe pressure:  94 mmHg, within the healing range for a diabetic.     LEFT LOWER LIMB:  ABNORMAL:  Evaluation shows a >75% stenosis of the proximal popliteal artery.  Diffuse calcified disease noted throughout the remaining femoral-popliteal  arteries without significant focal stenosis identified.  There is evidence of tibio-peroneal disease with a well developed arterial  branch at the distal femoral artery.  Ankle/Brachial index:  0.7, which is in the moderate disease category.  PVR/ PPG tracings are dampened.  Metatarsal pressure:  105 mmHg.  Great toe pressure:  60 mmHg, within the healing range for a diabetic.     Technical findings posted on EPIC.        I have reviewed and made appropriate changes to the review of systems input by the medical assistant.    Vitals:    07/07/25 1254   BP: 118/60   BP Location: Right arm   Patient Position: Sitting   Cuff Size: Standard   Pulse: 72   SpO2: 99%   Weight: 54 kg (119 lb)       Problem  List[1]    Past Surgical History[2]    Family History[3]    Social History     Socioeconomic History    Marital status:      Spouse name: Not on file    Number of children: Not on file    Years of education: Not on file    Highest education level: Not on file   Occupational History    Occupation: customer service   retired   Tobacco Use    Smoking status: Former     Current packs/day: 0.00     Average packs/day: 1.5 packs/day for 38.0 years (57.0 ttl pk-yrs)     Types: Cigarettes     Start date:      Quit date:      Years since quittin.5    Smokeless tobacco: Never   Vaping Use    Vaping status: Never Used   Substance and Sexual Activity    Alcohol use: Never    Drug use: No    Sexual activity: Not on file   Other Topics Concern    Not on file   Social History Narrative    Lives [place].    Was divorce.  Ex- passed away.    3 children.  Four grandchildren.    Spends most of her time at home.  Does not drive.     Social Drivers of Health     Financial Resource Strain: Low Risk  (2023)    Overall Financial Resource Strain (CARDIA)     Difficulty of Paying Living Expenses: Not very hard   Food Insecurity: No Food Insecurity (3/7/2025)    Nursing - Inadequate Food Risk Classification     Worried About Running Out of Food in the Last Year: Never true     Ran Out of Food in the Last Year: Never true     Ran Out of Food in the Last Year: Never true   Transportation Needs: No Transportation Needs (3/7/2025)    Nursing - Transportation Risk Classification     Lack of Transportation: Not on file     Lack of Transportation: No   Physical Activity: Not on file   Stress: Not on file   Social Connections: Not on file   Intimate Partner Violence: Unknown (3/7/2025)    Nursing IPS     Feels Physically and Emotionally Safe: Not on file     Physically Hurt by Someone: Not on file     Humiliated or Emotionally Abused by Someone: Not on file     Physically Hurt by Someone: No     Hurt or  Threatened by Someone: No   Housing Stability: Unknown (3/7/2025)    Nursing: Inadequate Housing Risk Classification     Has Housing: Not on file     Worried About Losing Housing: Not on file     Unable to Get Utilities: Not on file     Unable to Pay for Housing in the Last Year: No     Has Housing: No       Allergies[4]    Current Medications[5]           [1]   Patient Active Problem List  Diagnosis    Essential hypertension    Mixed hyperlipidemia    Glaucoma    Asymptomatic carotid artery stenosis, left    Symptomatic PVCs    Occlusion of right carotid artery    Paroxysmal atrial fibrillation (HCC)    Pacemaker    Osteoporosis    GERD (gastroesophageal reflux disease)    Anxiety    Iron deficiency    Interstitial lung disease (HCC)    Dysphagia    Moderate protein-calorie malnutrition (HCC)    Constipation    Hyponatremia    Post-nasal drip    DM2 (diabetes mellitus, type 2) ([facility])    Type 2 diabetes mellitus with hyperlipidemia  (HCC)    Type 2 diabetes mellitus with diabetic polyneuropathy, with long-term current use of insulin (HCC)    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, with long-term current use of insulin ([facility])    Depression, recurrent ([facility])    Hypovitaminosis D    Pachymeningitis    Bilateral hip pain    Metabolic alkalosis    SIADH (syndrome of inappropriate ADH production) ([facility])    Irritable bowel syndrome with both constipation and diarrhea    Peripheral arterial disease (HCC)    Dyspnea on exertion    Hypoxia    ROMERO (obstructive sleep apnea)    Microcytosis    Aortic stenosis    Hypertension    Iron deficiency anemia    Mild protein-calorie malnutrition (HCC)    Severe protein-calorie malnutrition (HCC)    Anemia    Decreased appetite   [2]   Past Surgical History:  Procedure Laterality Date    CATARACT EXTRACTION, BILATERAL  2011    CHOLECYSTECTOMY      CHOLECYSTECTOMY LAPAROSCOPIC N/A 12/09/2020    Procedure: CHOLECYSTECTOMY  LAPAROSCOPIC;  Surgeon: Kalen Garrett MD;  Location: [facility];  Service: General    COLONOSCOPY      CYST REMOVAL  2009    left lower Quadrant     FL LUMBAR PUNCTURE DIAGNOSTIC  4/28/2023    HERNIA REPAIR  1992    LIPOMA RESECTION  2010    MN RPR 1ST INGUN HRNA AGE 5 YRS/> REDUCIBLE Bilateral 01/05/2018    Procedure: INGUINAL HERNIA REPAIR;  Surgeon: Volodymyr Lee MD;  Location: [facility];  Service: General    SHOULDER SURGERY  04/26/2019    Dr. Arnett ([place])    UPPER GASTROINTESTINAL ENDOSCOPY      VASCULAR SURGERY  1994    Agram-  R carotid occlusion   [3]   Family History  Problem Relation Name Age of Onset    Heart disease Mother      Heart attack Father      Hiatal hernia Father      Diabetes Father      Cancer Brother      Diabetes Brother      Heart disease Brother      Hypertension Brother      Lung disease Brother  75        interstitial lung disease    Cancer Brother  49        glioblastoma    Other Son          gastroparesis    Other Cousin          interstitial lung disease   [4]   Allergies  Allergen Reactions    Keflex [Cephalexin] Diarrhea   [5]   Current Outpatient Medications:     acetaminophen (TYLENOL) 500 mg tablet, Take 1,000 mg by mouth as needed for mild pain, Disp: , Rfl:     apixaban (Eliquis) 2.5 mg, TAKE 1 TABLET (2.5 MG TOTAL) BY MOUTH 2 (TWO) TIMES A DAY, Disp: 90 tablet, Rfl: 1    BD Pen Needle Mini Ultrafine 31G X 5 MM MISC, USE 2 (TWO) TIMES A DAY OR AS DIRECTED, Disp: , Rfl:     bimatoprost (LUMIGAN) 0.01 % ophthalmic drops, Administer 1 drop to both eyes daily at bedtime for 30 days, Disp: 1.5 mL, Rfl: 0    brimonidine tartrate 0.2 % ophthalmic solution, , Disp: , Rfl:     Continuous Glucose Sensor (FreeStyle Noemí 3 Sensor) MISC, Use 1 each every 14 (fourteen) days, Disp: 6 each, Rfl: 3    Cyanocobalamin (Vitamin B 12) 500 MCG TABS, Take 500 mcg by mouth 2 (two) times a day, Disp: , Rfl:     ezetimibe-simvastatin (VYTORIN) 10-40 mg per tablet, Take 1 tablet by mouth daily at  bedtime, Disp: 100 tablet, Rfl: 3    famotidine (PEPCID) 20 mg tablet, TAKE 1 TABLET (20 MG TOTAL) BY MOUTH 2 (TWO) TIMES A DAY, Disp: 60 tablet, Rfl: 5    glucose blood (OneTouch Ultra) test strip, TEST FOUR TIMES A DAY, Disp: 100 strip, Rfl: 5    insulin isophane (NovoLIN N FlexPen) 100 units/mL injection pen, Inject 12 units under the skin in the morning and 6 units in the evening, Disp: 15 mL, Rfl: 2    ipratropium (ATROVENT) 0.03 % nasal spray, USE 2 SPRAYS INTO EACH NOSTRIL EVERY 12 (TWELVE) HOURS, Disp: 30 mL, Rfl: 6    LORazepam (ATIVAN) 0.5 mg tablet, TAKE 1 TABLET (0.5 MG TOTAL) BY MOUTH 2 (TWO) TIMES A DAY AS NEEDED FOR ANXIETY, Disp: 60 tablet, Rfl: 2    metoprolol tartrate (LOPRESSOR) 25 mg tablet, Take 1 tablet (25 mg total) by mouth every 12 (twelve) hours, Disp: 180 tablet, Rfl: 3    mirtazapine (REMERON) 7.5 MG tablet, Take 1 tablet (7.5 mg total) by mouth daily at bedtime, Disp: 100 tablet, Rfl: 3    Multiple Vitamin (multivitamin) tablet, Take 1 tablet by mouth in the morning., Disp: , Rfl:     OneTouch Delica Lancets 33G MISC, Check blood sugars four times daily. Please substitute with appropriate alternative as covered by patient's insurance. Dx: E11.65, Disp: 400 each, Rfl: 3    pantoprazole (PROTONIX) 40 mg tablet, TAKE 1 TABLET (40 MG TOTAL) BY MOUTH 2 (TWO) TIMES A DAY, Disp: 180 tablet, Rfl: 3    sertraline (ZOLOFT) 25 mg tablet, Take 1 tablet (25 mg total) by mouth daily, Disp: 100 tablet, Rfl: 3    sitaGLIPtin (Januvia) 100 mg tablet, Take 1 tablet (100 mg total) by mouth daily, Disp: , Rfl:     sodium chloride 1 g tablet, TAKE 1 TABLET THREE TIMES A DAY, Disp: 100 tablet, Rfl: 1    lidocaine (LIDODERM) 5 %, Apply 1 patch topically over 12 hours daily Remove & Discard patch within 12 hours or as directed by MD (Patient not taking: Reported on 4/2/2025), Disp: , Rfl:

## 2025-07-07 NOTE — ASSESSMENT & PLAN NOTE
-Chronic, known AISHA occlusion  -No intervention indicated, other than medical therapy and monitoring of the left carotid  Orders:    VAS carotid complete study; Future    VAS carotid complete study; Future

## 2025-07-07 NOTE — ASSESSMENT & PLAN NOTE
- Maintain good blood pressure, cholesterol glucose control per primary care  Lab Results   Component Value Date    HGBA1C 6.9 (A) 06/10/2025     Orders:    VAS ARTERIAL DUPLEX- LOWER LIMB BILATERAL; Future

## 2025-07-09 ENCOUNTER — TELEPHONE (OUTPATIENT)
Dept: FAMILY MEDICINE CLINIC | Facility: CLINIC | Age: 89
End: 2025-07-09

## 2025-07-09 NOTE — TELEPHONE ENCOUNTER
Attempted to call patient to review weekly blood glucoses.     No answer, message left, will try again tomorrow.

## 2025-07-14 NOTE — TELEPHONE ENCOUNTER
Upon review of the In Basket request we were able to locate, review, and update the patient chart as requested for Diabetic Eye Exam.    Any additional questions or concerns should be emailed to the Practice Liaisons via the appropriate education email address, please do not reply via In Basket.    Thank you  Sanchez Beatty MA   [facility]

## 2025-07-15 ENCOUNTER — TELEPHONE (OUTPATIENT)
Dept: FAMILY MEDICINE CLINIC | Facility: CLINIC | Age: 89
End: 2025-07-15

## 2025-07-16 ENCOUNTER — OFFICE VISIT (OUTPATIENT)
Age: 89
End: 2025-07-16
Payer: MEDICARE

## 2025-07-16 VITALS
WEIGHT: 119 LBS | HEIGHT: 65 IN | TEMPERATURE: 97 F | BODY MASS INDEX: 19.83 KG/M2 | DIASTOLIC BLOOD PRESSURE: 62 MMHG | SYSTOLIC BLOOD PRESSURE: 122 MMHG | OXYGEN SATURATION: 92 % | HEART RATE: 94 BPM

## 2025-07-16 DIAGNOSIS — K21.9 LARYNGOPHARYNGEAL REFLUX (LPR): ICD-10-CM

## 2025-07-16 DIAGNOSIS — R13.10 DYSPHAGIA, UNSPECIFIED TYPE: ICD-10-CM

## 2025-07-16 DIAGNOSIS — K21.9 GASTROESOPHAGEAL REFLUX DISEASE WITHOUT ESOPHAGITIS: ICD-10-CM

## 2025-07-16 DIAGNOSIS — J31.0 NONALLERGIC RHINITIS: ICD-10-CM

## 2025-07-16 DIAGNOSIS — K58.0 IRRITABLE BOWEL SYNDROME WITH DIARRHEA: Primary | ICD-10-CM

## 2025-07-16 PROCEDURE — 99214 OFFICE O/P EST MOD 30 MIN: CPT | Performed by: DIETITIAN, REGISTERED

## 2025-07-16 RX ORDER — FAMOTIDINE 40 MG/1
40 TABLET, FILM COATED ORAL 2 TIMES DAILY
Qty: 180 TABLET | Refills: 3 | Status: SHIPPED | OUTPATIENT
Start: 2025-07-16

## 2025-07-16 NOTE — PATIENT INSTRUCTIONS
1. Take pantoprazole 40 mg daily before breakfast  2. Take famotidine 40 mg daily around lunchtime.  3. Take pantoprazole 40 mg daily before dinner.  4. Take famotidine 40 mg before bed.

## 2025-07-16 NOTE — ASSESSMENT & PLAN NOTE
Patient reports worsening in dysphagia to solids x last several months.  She is taking pantoprazole 40 mg twice daily and Pepcid 20 mg twice daily.  Video barium swallow with speech 5/7/2024 showed oral stage WNL, pharyngeal stage with mild dysphagia, and esophageal stage with solid food retention, cleared with thin liquids.  Pill retention was noted throughout the study.  Barium esophagram 6/20/2024 showed tertiary esophageal contractions and diminished primary esophageal peristalsis, moderate gastroesophageal reflux, pooling of contrast in the vallecula and silent laryngeal penetration, and a small to moderate hiatal hernia.  Last EGD was in 2020, which showed irregular Z-line but was otherwise normal.   She was evaluated by SLP in 9/2024 who advises that oropharyngeal swallow function is normal/only mildly abnormal and they suspect esophageal dysphagia is patient's primary issue.      Patient reports heartburn/acid reflux symptoms are under very good control with her current medication regimen, but she continues to have dysphagia with sensation of food getting momentarily stuck in the esophagus when swallowing.  She has also recently been evaluated by ENT for postnasal drip and chronic cough.  Patient admits she eats very quickly but she is trying to work on this.      -Continue pantoprazole 40 mg twice daily  -Increase famotidine to 40 mg twice daily  -We discussed that recent testing has shown esophageal dysmotility, which I suspect to be the primary cause of her dysphagia.  Last EGD was overall normal in 2020.  Patient has complex medical history including interstitial lung disease, paroxysmal A-fib, pacemaker in place, and type 2 diabetes.  We can certainly consider scheduling a repeat EGD if indicated, but at this time I feel the risks would outweigh the benefits.   -Instructed patient to eat slowly, take small bites, chew thoroughly, sip liquids between bites  -Patient will call us if any worsening of her  symptoms.

## 2025-07-16 NOTE — PROGRESS NOTES
Name: Ac Duran      : 1935      MRN: [id number]  Encounter Provider: Krishna Kim PA-C  Encounter Date: 2025   Encounter department: [facility]  :  Assessment & Plan  Irritable bowel syndrome with diarrhea  Patient with history of IBS-D.  In 10/2024 we treated with Xifaxan x 2 weeks.  She had improvement of bowel function for several months, but called the office with recurrence of diarrhea in April.  She was prescribed another 2-week course of Xifaxan.  Unfortunately Xifaxan did not help.  Stool testing completed, which showed elevated fecal calprotectin 266.  Pancreatic elastase normal.  H. pylori, C. difficile, and enteric pathogen PCR negative.  Today, patient reports that previous diarrhea resolved when metformin was discontinued.  She is now dealing with a bit of constipation, which she is treating with Colace, which has been helpful.    -Continue Colace as needed.  -If Colace is ineffective, would recommend MiraLAX 1 capful once daily, we discussed that dose can be increased/decreased if needed.  -Follow-up in 3 months.       Dysphagia, unspecified type  Gastroesophageal reflux disease without esophagitis  Patient reports worsening in dysphagia to solids x last several months.  She is taking pantoprazole 40 mg twice daily and Pepcid 20 mg twice daily.  Video barium swallow with speech 2024 showed oral stage WNL, pharyngeal stage with mild dysphagia, and esophageal stage with solid food retention, cleared with thin liquids.  Pill retention was noted throughout the study.  Barium esophagram 2024 showed tertiary esophageal contractions and diminished primary esophageal peristalsis, moderate gastroesophageal reflux, pooling of contrast in the vallecula and silent laryngeal penetration, and a small to moderate hiatal hernia.  Last EGD was in , which showed irregular Z-line but was otherwise normal.   She was evaluated by SLP in  9/2024 who advises that oropharyngeal swallow function is normal/only mildly abnormal and they suspect esophageal dysphagia is patient's primary issue.      Patient reports heartburn/acid reflux symptoms are under very good control with her current medication regimen, but she continues to have dysphagia with sensation of food getting momentarily stuck in the esophagus when swallowing.  She has also recently been evaluated by ENT for postnasal drip and chronic cough.  Patient admits she eats very quickly but she is trying to work on this.      -Continue pantoprazole 40 mg twice daily  -Increase famotidine to 40 mg twice daily  -We discussed that recent testing has shown esophageal dysmotility, which I suspect to be the primary cause of her dysphagia.  Last EGD was overall normal in 2020.  Patient has complex medical history including interstitial lung disease, paroxysmal A-fib, pacemaker in place, and type 2 diabetes.  We can certainly consider scheduling a repeat EGD if indicated, but at this time I feel the risks would outweigh the benefits.   -Instructed patient to eat slowly, take small bites, chew thoroughly, sip liquids between bites  -Patient will call us if any worsening of her symptoms.         Nonallergic rhinitis  Laryngopharyngeal reflux (LPR)      Orders:  •  famotidine (PEPCID) 40 MG tablet; Take 1 tablet (40 mg total) by mouth 2 (two) times a day        History of Present Illness   Ac Duran is a 89 y.o. female who presents for follow-up of GERD, dysphagia, and diarrhea.  Last seen in 1/2025.    Patient recently called the office with recurrence of diarrhea in April.  She was prescribed another 2-week course of Xifaxan.  Unfortunately Xifaxan did not help.  Stool testing completed, which showed elevated fecal calprotectin 266.  Pancreatic elastase normal.  H. pylori, C. difficile, and enteric pathogen PCR negative.  Today, patient reports that previous diarrhea resolved when metformin was  "discontinued.  She is now dealing with a bit of constipation, which she is treating with Colace, which has been helpful.      Patient reports heartburn/acid reflux symptoms are under very good control with her current medication regimen, but she continues to have dysphagia with sensation of food getting momentarily stuck in the esophagus when swallowing.  She has also recently been evaluated by ENT for postnasal drip and chronic cough.  Patient admits she eats very quickly but she is trying to work on this.      Last colonoscopy 9/2020 which was normal other than 2 subcentimeter polyps (TA x 1, hyperplastic polyp x 1) removed.    HPI    Review of Systems A complete review of systems is negative other than that noted above in the HPI.      Current Medications[1]  Objective   /62 (BP Location: Left arm, Patient Position: Sitting, Cuff Size: Standard)   Pulse 94   Temp (!) 97 °F (36.1 °C) (Tympanic)   Ht 5' 5\" (1.651 m)   Wt 54 kg (119 lb)   SpO2 92%   BMI 19.80 kg/m²     Physical Exam  Vitals reviewed.   Constitutional:       Appearance: Normal appearance.   HENT:      Head: Normocephalic and atraumatic.     Eyes:      Conjunctiva/sclera: Conjunctivae normal.     Pulmonary:      Effort: Pulmonary effort is normal.     Skin:     Coloration: Skin is not jaundiced or pale.     Neurological:      Mental Status: She is alert.     Psychiatric:         Mood and Affect: Mood normal.         Behavior: Behavior normal.          Lab Results: I personally reviewed relevant lab results.       Results for orders placed during the hospital encounter of 01/08/21    ERCP    Impression  ERCP with metal stent removal, cholangiogram pre and post sweepings of the CBD were negative for filling defects    RECOMMENDATION:  Follow up clinically      ATTENDING ATTESTATION:  I was present throughout the entire procedure from insertion to complete withdrawal of the scope. I performed all interventions myself or oversaw the fellow.  " Dionisio Velazquez MD               [1]  Current Outpatient Medications   Medication Sig Dispense Refill   • acetaminophen (TYLENOL) 500 mg tablet Take 1,000 mg by mouth as needed for mild pain     • apixaban (Eliquis) 2.5 mg TAKE 1 TABLET (2.5 MG TOTAL) BY MOUTH 2 (TWO) TIMES A DAY 90 tablet 1   • BD Pen Needle Mini Ultrafine 31G X 5 MM MISC USE 2 (TWO) TIMES A DAY OR AS DIRECTED     • bimatoprost (LUMIGAN) 0.01 % ophthalmic drops Administer 1 drop to both eyes daily at bedtime for 30 days 1.5 mL 0   • brimonidine tartrate 0.2 % ophthalmic solution      • Continuous Glucose Sensor (FreeStyle Noemí 3 Sensor) MISC Use 1 each every 14 (fourteen) days 6 each 3   • Cyanocobalamin (Vitamin B 12) 500 MCG TABS Take 500 mcg by mouth 2 (two) times a day     • ezetimibe-simvastatin (VYTORIN) 10-40 mg per tablet Take 1 tablet by mouth daily at bedtime 100 tablet 3   • famotidine (PEPCID) 40 MG tablet Take 1 tablet (40 mg total) by mouth 2 (two) times a day 180 tablet 3   • glucose blood (OneTouch Ultra) test strip TEST FOUR TIMES A  strip 5   • insulin isophane (NovoLIN N FlexPen) 100 units/mL injection pen Inject 12 units under the skin in the morning and 6 units in the evening 15 mL 2   • ipratropium (ATROVENT) 0.06 % nasal spray 2 sprays into each nostril 4 (four) times a day 15 mL 3   • LORazepam (ATIVAN) 0.5 mg tablet TAKE 1 TABLET (0.5 MG TOTAL) BY MOUTH 2 (TWO) TIMES A DAY AS NEEDED FOR ANXIETY 60 tablet 2   • metoprolol tartrate (LOPRESSOR) 25 mg tablet Take 1 tablet (25 mg total) by mouth every 12 (twelve) hours 180 tablet 3   • mirtazapine (REMERON) 7.5 MG tablet Take 1 tablet (7.5 mg total) by mouth daily at bedtime 100 tablet 3   • Multiple Vitamin (multivitamin) tablet Take 1 tablet by mouth in the morning.     • OneTouch Delica Lancets 33G MISC Check blood sugars four times daily. Please substitute with appropriate alternative as covered by patient's insurance. Dx: E11.65 400 each 3   • pantoprazole  (PROTONIX) 40 mg tablet TAKE 1 TABLET (40 MG TOTAL) BY MOUTH 2 (TWO) TIMES A  tablet 3   • sertraline (ZOLOFT) 25 mg tablet Take 1 tablet (25 mg total) by mouth daily 100 tablet 3   • sitaGLIPtin (Januvia) 100 mg tablet Take 1 tablet (100 mg total) by mouth daily     • sodium chloride 1 g tablet TAKE 1 TABLET THREE TIMES A  tablet 1   • lidocaine (LIDODERM) 5 % Apply 1 patch topically over 12 hours daily Remove & Discard patch within 12 hours or as directed by MD (Patient not taking: Reported on 4/2/2025)       No current facility-administered medications for this visit.

## 2025-07-17 ENCOUNTER — TELEPHONE (OUTPATIENT)
Age: 89
End: 2025-07-17

## 2025-07-17 ENCOUNTER — TELEPHONE (OUTPATIENT)
Dept: GASTROENTEROLOGY | Facility: CLINIC | Age: 89
End: 2025-07-17

## 2025-07-17 RX ORDER — PANTOPRAZOLE SODIUM 40 MG/1
40 TABLET, DELAYED RELEASE ORAL 2 TIMES DAILY
Qty: 180 TABLET | Refills: 3 | Status: SHIPPED | OUTPATIENT
Start: 2025-07-17

## 2025-07-17 NOTE — TELEPHONE ENCOUNTER
Ac is planning on taking Biotin 1000mg and would like to know if Kimberly thinks it's okay.    Please contact Ac  to advise.

## 2025-07-24 ENCOUNTER — TELEPHONE (OUTPATIENT)
Dept: FAMILY MEDICINE CLINIC | Facility: CLINIC | Age: 89
End: 2025-07-24

## 2025-07-24 NOTE — TELEPHONE ENCOUNTER
Spoke to Ac on 7/22 regarding blood sugar log.     7/16: 0830--145, 2000--265, 2200--146.  7/17: 0900--130, 1730--97, 2000--235  7/18: 0830--117, 1230--156, 1700--232  7/19: 0900--156, 1230--127, 1700--189  7/20: 0830--154, 1235--202, 2215--135  7/21: 0830--116, 1530--276, 2145--163, 2330--67 (fell asleep on chair, didn't eat usual bedtime snack).  7/22: 0730--165, 1230--137. Tonight has company--eating pizza for dinner    Feels her afternoon sugars are highest due to afternoon snacks.     Has company at her home when I called her, not able to talk long.   We will touch base again next week.

## 2025-07-30 ENCOUNTER — TELEPHONE (OUTPATIENT)
Dept: FAMILY MEDICINE CLINIC | Facility: CLINIC | Age: 89
End: 2025-07-30

## 2025-07-30 DIAGNOSIS — F41.9 ANXIETY: ICD-10-CM

## 2025-07-30 DIAGNOSIS — Z79.4 TYPE 2 DIABETES MELLITUS WITH DIABETIC POLYNEUROPATHY, WITH LONG-TERM CURRENT USE OF INSULIN (HCC): Primary | ICD-10-CM

## 2025-07-30 DIAGNOSIS — E11.42 TYPE 2 DIABETES MELLITUS WITH DIABETIC POLYNEUROPATHY, WITH LONG-TERM CURRENT USE OF INSULIN (HCC): Primary | ICD-10-CM

## 2025-07-30 RX ORDER — SERTRALINE HYDROCHLORIDE 25 MG/1
12.5 TABLET, FILM COATED ORAL DAILY
Start: 2025-07-30 | End: 2026-07-25

## 2025-07-30 RX ORDER — INSULIN LISPRO 100 [IU]/ML
2 INJECTION, SOLUTION INTRAVENOUS; SUBCUTANEOUS
Qty: 3 ML | Refills: 0 | Status: SHIPPED | OUTPATIENT
Start: 2025-07-30

## 2025-08-06 ENCOUNTER — TELEPHONE (OUTPATIENT)
Dept: FAMILY MEDICINE CLINIC | Facility: CLINIC | Age: 89
End: 2025-08-06

## 2025-08-18 ENCOUNTER — REMOTE DEVICE CLINIC VISIT (OUTPATIENT)
Dept: CARDIOLOGY CLINIC | Facility: CLINIC | Age: 89
End: 2025-08-18
Payer: MEDICARE

## 2025-08-18 DIAGNOSIS — R00.1 BRADYCARDIA: Primary | ICD-10-CM

## 2025-08-18 PROCEDURE — 93296 REM INTERROG EVL PM/IDS: CPT | Performed by: STUDENT IN AN ORGANIZED HEALTH CARE EDUCATION/TRAINING PROGRAM

## 2025-08-18 PROCEDURE — 93294 REM INTERROG EVL PM/LDLS PM: CPT | Performed by: STUDENT IN AN ORGANIZED HEALTH CARE EDUCATION/TRAINING PROGRAM

## (undated) DEVICE — TROCAR: Brand: KII FIOS FIRST ENTRY

## (undated) DEVICE — CHLORAPREP HI-LITE 26ML ORANGE

## (undated) DEVICE — ELECTRODE LAP J HOOK E-Z CLEAN 33CM-0021

## (undated) DEVICE — NEEDLE 22 G X 1 1/2 SAFETY

## (undated) DEVICE — PLUMEPEN PRO 10FT

## (undated) DEVICE — 3M™ STERI-STRIP™ REINFORCED ADHESIVE SKIN CLOSURES, R1547, 1/2 IN X 4 IN (12 MM X 100 MM), 6 STRIPS/ENVELOPE: Brand: 3M™ STERI-STRIP™

## (undated) DEVICE — GLOVE SRG BIOGEL ECLIPSE 7

## (undated) DEVICE — INTENDED FOR TISSUE SEPARATION, AND OTHER PROCEDURES THAT REQUIRE A SHARP SURGICAL BLADE TO PUNCTURE OR CUT.: Brand: BARD-PARKER SAFETY BLADES SIZE 15, STERILE

## (undated) DEVICE — SUT VICRYL 1 CT-1 27 IN J261H

## (undated) DEVICE — PACK PBDS LAP CHOLE RF

## (undated) DEVICE — TISSUE RETRIEVAL SYSTEM: Brand: INZII RETRIEVAL SYSTEM

## (undated) DEVICE — STRL UNIVERSAL MINOR GENERAL: Brand: CARDINAL HEALTH

## (undated) DEVICE — INTENDED FOR TISSUE SEPARATION, AND OTHER PROCEDURES THAT REQUIRE A SHARP SURGICAL BLADE TO PUNCTURE OR CUT.: Brand: BARD-PARKER SAFETY BLADES SIZE 11, STERILE

## (undated) DEVICE — TROCAR: Brand: KII® SLEEVE

## (undated) DEVICE — ADHESIVE SKN CLSR HISTOACRYL FLEX 0.5ML LF

## (undated) DEVICE — GLOVE INDICATOR PI UNDERGLOVE SZ 8 BLUE

## (undated) DEVICE — SUT VICRYL 0 UR-6 27 IN J603H

## (undated) DEVICE — SUT VICRYL 0 CT-1 27 IN J260H

## (undated) DEVICE — SUT PDS II 3-0 SH 27 IN Z316H

## (undated) DEVICE — SUT VICRYL 0 54 IN J207G

## (undated) DEVICE — ENDOPATH 5MM CURVED SCISSORS WITH MONOPOLAR CAUTERY: Brand: ENDOPATH

## (undated) DEVICE — GLOVE SRG BIOGEL 8

## (undated) DEVICE — SUT MONOCRYL 4-0 PS-2 27 IN Y426H

## (undated) DEVICE — PLASTIC ADHESIVE BANDAGE: Brand: CURITY

## (undated) DEVICE — NEEDLE 25G X 1 1/2

## (undated) DEVICE — PENCIL ELECTROSURG E-Z CLEAN -0035H

## (undated) DEVICE — UNDYED BRAIDED (POLYGLACTIN 910), SYNTHETIC ABSORBABLE SUTURE: Brand: COATED VICRYL

## (undated) DEVICE — LIGAMAX 5 MM ENDOSCOPIC MULTIPLE CLIP APPLIER: Brand: LIGAMAX

## (undated) DEVICE — SUT MONOCRYL 4-0 PS-2 18 IN Y496G

## (undated) RX ORDER — CYCLOSPORINE 0.5 MG/ML: 1 EMULSION OPHTHALMIC TWICE A DAY

## (undated) RX ORDER — BRIMONIDINE TARTRATE 1 MG/ML
1 SOLUTION/ DROPS OPHTHALMIC TWICE A DAY
Start: 2022-02-22